# Patient Record
Sex: FEMALE | Race: WHITE | Employment: OTHER | URBAN - NONMETROPOLITAN AREA
[De-identification: names, ages, dates, MRNs, and addresses within clinical notes are randomized per-mention and may not be internally consistent; named-entity substitution may affect disease eponyms.]

---

## 2017-01-09 ENCOUNTER — OFFICE VISIT (OUTPATIENT)
Dept: PULMONOLOGY | Age: 77
End: 2017-01-09

## 2017-01-09 VITALS
BODY MASS INDEX: 22.81 KG/M2 | OXYGEN SATURATION: 97 % | DIASTOLIC BLOOD PRESSURE: 64 MMHG | HEIGHT: 69 IN | SYSTOLIC BLOOD PRESSURE: 104 MMHG | WEIGHT: 154 LBS | HEART RATE: 68 BPM

## 2017-01-09 DIAGNOSIS — I26.92 ACUTE SADDLE PULMONARY EMBOLISM WITHOUT ACUTE COR PULMONALE (HCC): Primary | ICD-10-CM

## 2017-01-09 PROCEDURE — 99213 OFFICE O/P EST LOW 20 MIN: CPT | Performed by: NURSE PRACTITIONER

## 2017-01-25 ENCOUNTER — TELEPHONE (OUTPATIENT)
Dept: FAMILY MEDICINE CLINIC | Age: 77
End: 2017-01-25

## 2017-02-06 ENCOUNTER — OFFICE VISIT (OUTPATIENT)
Dept: FAMILY MEDICINE CLINIC | Age: 77
End: 2017-02-06

## 2017-02-06 VITALS
HEIGHT: 69 IN | RESPIRATION RATE: 14 BRPM | TEMPERATURE: 98.2 F | SYSTOLIC BLOOD PRESSURE: 104 MMHG | DIASTOLIC BLOOD PRESSURE: 76 MMHG | BODY MASS INDEX: 22.22 KG/M2 | HEART RATE: 59 BPM | WEIGHT: 150 LBS

## 2017-02-06 DIAGNOSIS — R63.4 WEIGHT LOSS: Primary | ICD-10-CM

## 2017-02-06 DIAGNOSIS — I10 ESSENTIAL (PRIMARY) HYPERTENSION: Chronic | ICD-10-CM

## 2017-02-06 DIAGNOSIS — I26.99 BILATERAL PULMONARY EMBOLISM (HCC): ICD-10-CM

## 2017-02-06 DIAGNOSIS — S91.109A WOUND, OPEN, TOE, INITIAL ENCOUNTER: ICD-10-CM

## 2017-02-06 PROCEDURE — 1090F PRES/ABSN URINE INCON ASSESS: CPT | Performed by: FAMILY MEDICINE

## 2017-02-06 PROCEDURE — G8419 CALC BMI OUT NRM PARAM NOF/U: HCPCS | Performed by: FAMILY MEDICINE

## 2017-02-06 PROCEDURE — G8484 FLU IMMUNIZE NO ADMIN: HCPCS | Performed by: FAMILY MEDICINE

## 2017-02-06 PROCEDURE — 1036F TOBACCO NON-USER: CPT | Performed by: FAMILY MEDICINE

## 2017-02-06 PROCEDURE — 99204 OFFICE O/P NEW MOD 45 MIN: CPT | Performed by: FAMILY MEDICINE

## 2017-02-06 PROCEDURE — G8400 PT W/DXA NO RESULTS DOC: HCPCS | Performed by: FAMILY MEDICINE

## 2017-02-06 PROCEDURE — 1123F ACP DISCUSS/DSCN MKR DOCD: CPT | Performed by: FAMILY MEDICINE

## 2017-02-06 PROCEDURE — 4040F PNEUMOC VAC/ADMIN/RCVD: CPT | Performed by: FAMILY MEDICINE

## 2017-02-06 PROCEDURE — G8427 DOCREV CUR MEDS BY ELIG CLIN: HCPCS | Performed by: FAMILY MEDICINE

## 2017-02-06 RX ORDER — CEPHALEXIN 500 MG/1
1000 CAPSULE ORAL 4 TIMES DAILY
Qty: 40 CAPSULE | Refills: 0 | Status: SHIPPED | OUTPATIENT
Start: 2017-02-06 | End: 2017-03-30 | Stop reason: ALTCHOICE

## 2017-02-06 ASSESSMENT — ENCOUNTER SYMPTOMS
WHEEZING: 0
EYE REDNESS: 0
COUGH: 0
BLURRED VISION: 0
DOUBLE VISION: 0
BLOOD IN STOOL: 0
EYE PAIN: 0
BACK PAIN: 1
SHORTNESS OF BREATH: 1
SORE THROAT: 0
HEARTBURN: 0
DIARRHEA: 0
EYE DISCHARGE: 0
ORTHOPNEA: 0
HEMOPTYSIS: 0
NAUSEA: 0
VOMITING: 0
CONSTIPATION: 0

## 2017-02-06 ASSESSMENT — PATIENT HEALTH QUESTIONNAIRE - PHQ9
1. LITTLE INTEREST OR PLEASURE IN DOING THINGS: 0
SUM OF ALL RESPONSES TO PHQ QUESTIONS 1-9: 0
2. FEELING DOWN, DEPRESSED OR HOPELESS: 0
SUM OF ALL RESPONSES TO PHQ9 QUESTIONS 1 & 2: 0

## 2017-03-28 ENCOUNTER — NURSE TRIAGE (OUTPATIENT)
Dept: ADMINISTRATIVE | Age: 77
End: 2017-03-28

## 2017-03-30 ENCOUNTER — OFFICE VISIT (OUTPATIENT)
Dept: FAMILY MEDICINE CLINIC | Age: 77
End: 2017-03-30

## 2017-03-30 VITALS
WEIGHT: 150.6 LBS | DIASTOLIC BLOOD PRESSURE: 62 MMHG | TEMPERATURE: 97.9 F | SYSTOLIC BLOOD PRESSURE: 112 MMHG | HEIGHT: 68 IN | HEART RATE: 56 BPM | BODY MASS INDEX: 22.82 KG/M2

## 2017-03-30 DIAGNOSIS — I10 ESSENTIAL (PRIMARY) HYPERTENSION: Primary | Chronic | ICD-10-CM

## 2017-03-30 DIAGNOSIS — R42 DIZZINESS: ICD-10-CM

## 2017-03-30 DIAGNOSIS — E03.8 OTHER SPECIFIED HYPOTHYROIDISM: ICD-10-CM

## 2017-03-30 PROCEDURE — 4040F PNEUMOC VAC/ADMIN/RCVD: CPT | Performed by: FAMILY MEDICINE

## 2017-03-30 PROCEDURE — G8420 CALC BMI NORM PARAMETERS: HCPCS | Performed by: FAMILY MEDICINE

## 2017-03-30 PROCEDURE — 1036F TOBACCO NON-USER: CPT | Performed by: FAMILY MEDICINE

## 2017-03-30 PROCEDURE — 1090F PRES/ABSN URINE INCON ASSESS: CPT | Performed by: FAMILY MEDICINE

## 2017-03-30 PROCEDURE — G8400 PT W/DXA NO RESULTS DOC: HCPCS | Performed by: FAMILY MEDICINE

## 2017-03-30 PROCEDURE — 1123F ACP DISCUSS/DSCN MKR DOCD: CPT | Performed by: FAMILY MEDICINE

## 2017-03-30 PROCEDURE — 99213 OFFICE O/P EST LOW 20 MIN: CPT | Performed by: FAMILY MEDICINE

## 2017-03-30 PROCEDURE — G8484 FLU IMMUNIZE NO ADMIN: HCPCS | Performed by: FAMILY MEDICINE

## 2017-03-30 PROCEDURE — G8427 DOCREV CUR MEDS BY ELIG CLIN: HCPCS | Performed by: FAMILY MEDICINE

## 2017-03-30 RX ORDER — METOPROLOL SUCCINATE 25 MG/1
25 TABLET, EXTENDED RELEASE ORAL DAILY
Qty: 30 TABLET | Refills: 5 | Status: SHIPPED | OUTPATIENT
Start: 2017-03-30 | End: 2017-07-24

## 2017-04-06 ENCOUNTER — TELEPHONE (OUTPATIENT)
Dept: FAMILY MEDICINE CLINIC | Age: 77
End: 2017-04-06

## 2017-05-16 ENCOUNTER — TELEPHONE (OUTPATIENT)
Dept: FAMILY MEDICINE CLINIC | Age: 77
End: 2017-05-16

## 2017-05-18 ENCOUNTER — OFFICE VISIT (OUTPATIENT)
Dept: FAMILY MEDICINE CLINIC | Age: 77
End: 2017-05-18

## 2017-05-18 VITALS
TEMPERATURE: 98 F | HEIGHT: 67 IN | BODY MASS INDEX: 23.23 KG/M2 | SYSTOLIC BLOOD PRESSURE: 120 MMHG | WEIGHT: 148 LBS | DIASTOLIC BLOOD PRESSURE: 74 MMHG | RESPIRATION RATE: 14 BRPM | HEART RATE: 67 BPM

## 2017-05-18 DIAGNOSIS — M54.42 CHRONIC BILATERAL LOW BACK PAIN WITH BILATERAL SCIATICA: Primary | ICD-10-CM

## 2017-05-18 DIAGNOSIS — G89.29 CHRONIC BILATERAL LOW BACK PAIN WITH BILATERAL SCIATICA: Primary | ICD-10-CM

## 2017-05-18 DIAGNOSIS — M54.41 CHRONIC BILATERAL LOW BACK PAIN WITH BILATERAL SCIATICA: Primary | ICD-10-CM

## 2017-05-18 PROCEDURE — 1036F TOBACCO NON-USER: CPT | Performed by: FAMILY MEDICINE

## 2017-05-18 PROCEDURE — G8420 CALC BMI NORM PARAMETERS: HCPCS | Performed by: FAMILY MEDICINE

## 2017-05-18 PROCEDURE — 99213 OFFICE O/P EST LOW 20 MIN: CPT | Performed by: FAMILY MEDICINE

## 2017-05-18 PROCEDURE — G8427 DOCREV CUR MEDS BY ELIG CLIN: HCPCS | Performed by: FAMILY MEDICINE

## 2017-05-18 PROCEDURE — 4040F PNEUMOC VAC/ADMIN/RCVD: CPT | Performed by: FAMILY MEDICINE

## 2017-05-18 PROCEDURE — 1090F PRES/ABSN URINE INCON ASSESS: CPT | Performed by: FAMILY MEDICINE

## 2017-05-18 PROCEDURE — G8400 PT W/DXA NO RESULTS DOC: HCPCS | Performed by: FAMILY MEDICINE

## 2017-05-18 PROCEDURE — 1123F ACP DISCUSS/DSCN MKR DOCD: CPT | Performed by: FAMILY MEDICINE

## 2017-05-18 RX ORDER — HYDROCODONE BITARTRATE AND ACETAMINOPHEN 5; 325 MG/1; MG/1
1 TABLET ORAL EVERY 6 HOURS PRN
Qty: 30 TABLET | Refills: 0 | Status: SHIPPED | OUTPATIENT
Start: 2017-05-18 | End: 2017-06-15 | Stop reason: SDUPTHER

## 2017-05-22 RX ORDER — ENALAPRIL MALEATE 10 MG/1
10 TABLET ORAL DAILY
Qty: 90 TABLET | Refills: 3 | Status: ON HOLD | OUTPATIENT
Start: 2017-05-22 | End: 2017-09-16

## 2017-05-22 RX ORDER — GABAPENTIN 400 MG/1
400 CAPSULE ORAL 3 TIMES DAILY
Qty: 270 CAPSULE | Refills: 3 | Status: SHIPPED | OUTPATIENT
Start: 2017-05-22 | End: 2017-09-26 | Stop reason: SDUPTHER

## 2017-06-12 ENCOUNTER — TELEPHONE (OUTPATIENT)
Dept: FAMILY MEDICINE CLINIC | Age: 77
End: 2017-06-12

## 2017-06-15 DIAGNOSIS — M54.41 CHRONIC BILATERAL LOW BACK PAIN WITH BILATERAL SCIATICA: ICD-10-CM

## 2017-06-15 DIAGNOSIS — G89.29 CHRONIC BILATERAL LOW BACK PAIN WITH BILATERAL SCIATICA: ICD-10-CM

## 2017-06-15 DIAGNOSIS — M54.42 CHRONIC BILATERAL LOW BACK PAIN WITH BILATERAL SCIATICA: ICD-10-CM

## 2017-06-15 RX ORDER — HYDROCODONE BITARTRATE AND ACETAMINOPHEN 5; 325 MG/1; MG/1
1 TABLET ORAL EVERY 6 HOURS PRN
Qty: 30 TABLET | Refills: 0 | Status: SHIPPED | OUTPATIENT
Start: 2017-06-15 | End: 2017-08-08 | Stop reason: SDUPTHER

## 2017-06-28 ENCOUNTER — TELEPHONE (OUTPATIENT)
Dept: FAMILY MEDICINE CLINIC | Age: 77
End: 2017-06-28

## 2017-06-29 ENCOUNTER — OFFICE VISIT (OUTPATIENT)
Dept: FAMILY MEDICINE CLINIC | Age: 77
End: 2017-06-29

## 2017-06-29 VITALS
TEMPERATURE: 98.3 F | HEART RATE: 76 BPM | OXYGEN SATURATION: 97 % | BODY MASS INDEX: 23.07 KG/M2 | RESPIRATION RATE: 16 BRPM | WEIGHT: 147 LBS | SYSTOLIC BLOOD PRESSURE: 136 MMHG | HEIGHT: 67 IN | DIASTOLIC BLOOD PRESSURE: 74 MMHG

## 2017-06-29 DIAGNOSIS — R00.2 HEART PALPITATIONS: Primary | ICD-10-CM

## 2017-06-29 DIAGNOSIS — R42 LIGHTHEADEDNESS: ICD-10-CM

## 2017-06-29 PROCEDURE — 99214 OFFICE O/P EST MOD 30 MIN: CPT | Performed by: FAMILY MEDICINE

## 2017-06-29 PROCEDURE — 1036F TOBACCO NON-USER: CPT | Performed by: FAMILY MEDICINE

## 2017-06-29 PROCEDURE — 1090F PRES/ABSN URINE INCON ASSESS: CPT | Performed by: FAMILY MEDICINE

## 2017-06-29 PROCEDURE — G8420 CALC BMI NORM PARAMETERS: HCPCS | Performed by: FAMILY MEDICINE

## 2017-06-29 PROCEDURE — G8400 PT W/DXA NO RESULTS DOC: HCPCS | Performed by: FAMILY MEDICINE

## 2017-06-29 PROCEDURE — G8427 DOCREV CUR MEDS BY ELIG CLIN: HCPCS | Performed by: FAMILY MEDICINE

## 2017-06-29 PROCEDURE — 93000 ELECTROCARDIOGRAM COMPLETE: CPT | Performed by: FAMILY MEDICINE

## 2017-06-29 PROCEDURE — 1123F ACP DISCUSS/DSCN MKR DOCD: CPT | Performed by: FAMILY MEDICINE

## 2017-06-29 PROCEDURE — 4040F PNEUMOC VAC/ADMIN/RCVD: CPT | Performed by: FAMILY MEDICINE

## 2017-06-30 ENCOUNTER — TELEPHONE (OUTPATIENT)
Dept: FAMILY MEDICINE CLINIC | Age: 77
End: 2017-06-30

## 2017-07-05 ENCOUNTER — TELEPHONE (OUTPATIENT)
Dept: FAMILY MEDICINE CLINIC | Age: 77
End: 2017-07-05

## 2017-07-05 DIAGNOSIS — E87.1 HYPONATREMIA: Primary | ICD-10-CM

## 2017-07-06 ENCOUNTER — TELEPHONE (OUTPATIENT)
Dept: FAMILY MEDICINE CLINIC | Age: 77
End: 2017-07-06

## 2017-07-06 DIAGNOSIS — E87.1 HYPONATREMIA: Primary | ICD-10-CM

## 2017-07-06 DIAGNOSIS — D72.829 LEUKOCYTOSIS, UNSPECIFIED TYPE: Primary | ICD-10-CM

## 2017-07-10 ENCOUNTER — TELEPHONE (OUTPATIENT)
Dept: FAMILY MEDICINE CLINIC | Age: 77
End: 2017-07-10

## 2017-07-17 ENCOUNTER — TELEPHONE (OUTPATIENT)
Dept: FAMILY MEDICINE CLINIC | Age: 77
End: 2017-07-17

## 2017-07-24 ENCOUNTER — HOSPITAL ENCOUNTER (OUTPATIENT)
Dept: INFUSION THERAPY | Age: 77
Discharge: HOME | End: 2017-07-24
Payer: MEDICARE

## 2017-07-24 ENCOUNTER — OFFICE VISIT (OUTPATIENT)
Dept: ONCOLOGY | Age: 77
End: 2017-07-24
Payer: MEDICARE

## 2017-07-24 VITALS
DIASTOLIC BLOOD PRESSURE: 72 MMHG | BODY MASS INDEX: 23.23 KG/M2 | SYSTOLIC BLOOD PRESSURE: 112 MMHG | HEIGHT: 67 IN | RESPIRATION RATE: 18 BRPM | HEART RATE: 78 BPM | OXYGEN SATURATION: 97 % | WEIGHT: 148 LBS

## 2017-07-24 DIAGNOSIS — M79.7 FIBROMYALGIA: ICD-10-CM

## 2017-07-24 DIAGNOSIS — D70.9 NEUTROPENIA, UNSPECIFIED TYPE (HCC): Primary | ICD-10-CM

## 2017-07-24 PROCEDURE — 4040F PNEUMOC VAC/ADMIN/RCVD: CPT | Performed by: INTERNAL MEDICINE

## 2017-07-24 PROCEDURE — 1123F ACP DISCUSS/DSCN MKR DOCD: CPT | Performed by: INTERNAL MEDICINE

## 2017-07-24 PROCEDURE — G8420 CALC BMI NORM PARAMETERS: HCPCS | Performed by: INTERNAL MEDICINE

## 2017-07-24 PROCEDURE — 99203 OFFICE O/P NEW LOW 30 MIN: CPT | Performed by: INTERNAL MEDICINE

## 2017-07-24 PROCEDURE — 99211 OFF/OP EST MAY X REQ PHY/QHP: CPT

## 2017-07-24 PROCEDURE — G8400 PT W/DXA NO RESULTS DOC: HCPCS | Performed by: INTERNAL MEDICINE

## 2017-07-24 PROCEDURE — G8427 DOCREV CUR MEDS BY ELIG CLIN: HCPCS | Performed by: INTERNAL MEDICINE

## 2017-07-24 PROCEDURE — 1090F PRES/ABSN URINE INCON ASSESS: CPT | Performed by: INTERNAL MEDICINE

## 2017-07-24 PROCEDURE — 1036F TOBACCO NON-USER: CPT | Performed by: INTERNAL MEDICINE

## 2017-07-24 RX ORDER — GLUCOSAMINE/D3/BOSWELLIA SERRA 1500MG-400
1-2 TABLET ORAL DAILY
COMMUNITY
End: 2021-05-17

## 2017-07-24 RX ORDER — FLUTICASONE PROPIONATE 50 MCG
1 SPRAY, SUSPENSION (ML) NASAL PRN
COMMUNITY

## 2017-07-25 ENCOUNTER — OFFICE VISIT (OUTPATIENT)
Dept: FAMILY MEDICINE CLINIC | Age: 77
End: 2017-07-25
Payer: MEDICARE

## 2017-07-25 VITALS
TEMPERATURE: 98.2 F | RESPIRATION RATE: 12 BRPM | HEIGHT: 69 IN | DIASTOLIC BLOOD PRESSURE: 76 MMHG | SYSTOLIC BLOOD PRESSURE: 128 MMHG | WEIGHT: 148.2 LBS | BODY MASS INDEX: 21.95 KG/M2 | HEART RATE: 80 BPM

## 2017-07-25 DIAGNOSIS — M32.9 SLE (SYSTEMIC LUPUS ERYTHEMATOSUS) (HCC): ICD-10-CM

## 2017-07-25 DIAGNOSIS — I49.3 FREQUENT PVCS: Primary | ICD-10-CM

## 2017-07-25 PROCEDURE — G8400 PT W/DXA NO RESULTS DOC: HCPCS | Performed by: FAMILY MEDICINE

## 2017-07-25 PROCEDURE — G8420 CALC BMI NORM PARAMETERS: HCPCS | Performed by: FAMILY MEDICINE

## 2017-07-25 PROCEDURE — G8427 DOCREV CUR MEDS BY ELIG CLIN: HCPCS | Performed by: FAMILY MEDICINE

## 2017-07-25 PROCEDURE — 1090F PRES/ABSN URINE INCON ASSESS: CPT | Performed by: FAMILY MEDICINE

## 2017-07-25 PROCEDURE — 1123F ACP DISCUSS/DSCN MKR DOCD: CPT | Performed by: FAMILY MEDICINE

## 2017-07-25 PROCEDURE — 99213 OFFICE O/P EST LOW 20 MIN: CPT | Performed by: FAMILY MEDICINE

## 2017-07-25 PROCEDURE — 1036F TOBACCO NON-USER: CPT | Performed by: FAMILY MEDICINE

## 2017-07-25 PROCEDURE — 4040F PNEUMOC VAC/ADMIN/RCVD: CPT | Performed by: FAMILY MEDICINE

## 2017-07-25 RX ORDER — METOPROLOL SUCCINATE 25 MG/1
25 TABLET, EXTENDED RELEASE ORAL DAILY
Qty: 30 TABLET | Refills: 3 | Status: SHIPPED | OUTPATIENT
Start: 2017-07-25 | End: 2017-08-29 | Stop reason: SDUPTHER

## 2017-08-08 ENCOUNTER — HOSPITAL ENCOUNTER (OUTPATIENT)
Age: 77
Discharge: HOME | End: 2017-08-08
Payer: MEDICARE

## 2017-08-08 DIAGNOSIS — M54.41 CHRONIC BILATERAL LOW BACK PAIN WITH BILATERAL SCIATICA: ICD-10-CM

## 2017-08-08 DIAGNOSIS — M54.42 CHRONIC BILATERAL LOW BACK PAIN WITH BILATERAL SCIATICA: ICD-10-CM

## 2017-08-08 DIAGNOSIS — G89.29 CHRONIC BILATERAL LOW BACK PAIN WITH BILATERAL SCIATICA: ICD-10-CM

## 2017-08-08 DIAGNOSIS — M32.9 SLE (SYSTEMIC LUPUS ERYTHEMATOSUS) (HCC): ICD-10-CM

## 2017-08-08 LAB — SEDIMENTATION RATE, ERYTHROCYTE: 8 MM/HR (ref 0–20)

## 2017-08-08 PROCEDURE — 36415 COLL VENOUS BLD VENIPUNCTURE: CPT

## 2017-08-08 PROCEDURE — 86038 ANTINUCLEAR ANTIBODIES: CPT

## 2017-08-08 PROCEDURE — 86225 DNA ANTIBODY NATIVE: CPT

## 2017-08-08 PROCEDURE — 85651 RBC SED RATE NONAUTOMATED: CPT

## 2017-08-08 RX ORDER — HYDROCODONE BITARTRATE AND ACETAMINOPHEN 5; 325 MG/1; MG/1
1 TABLET ORAL EVERY 6 HOURS PRN
Qty: 30 TABLET | Refills: 0 | Status: SHIPPED | OUTPATIENT
Start: 2017-08-08 | End: 2017-09-27

## 2017-08-11 ENCOUNTER — TELEPHONE (OUTPATIENT)
Dept: FAMILY MEDICINE CLINIC | Age: 77
End: 2017-08-11

## 2017-08-11 LAB
ANA SCREEN: NORMAL
DSDNA ANTIBODY: NORMAL

## 2017-08-15 ASSESSMENT — ENCOUNTER SYMPTOMS
CHEST TIGHTNESS: 0
ABDOMINAL DISTENTION: 0
TROUBLE SWALLOWING: 1
BLOOD IN STOOL: 0
CONSTIPATION: 0
NAUSEA: 1
COLOR CHANGE: 0
WHEEZING: 0
COUGH: 0
BACK PAIN: 1
EYE DISCHARGE: 0
FACIAL SWELLING: 0
SORE THROAT: 0
RECTAL PAIN: 0
DIARRHEA: 0
VOMITING: 0
ABDOMINAL PAIN: 0
SHORTNESS OF BREATH: 1

## 2017-08-25 ENCOUNTER — OFFICE VISIT (OUTPATIENT)
Dept: CARDIOLOGY CLINIC | Age: 77
End: 2017-08-25
Payer: MEDICARE

## 2017-08-25 VITALS — DIASTOLIC BLOOD PRESSURE: 75 MMHG | HEART RATE: 75 BPM | SYSTOLIC BLOOD PRESSURE: 123 MMHG

## 2017-08-25 DIAGNOSIS — I49.3 PVC (PREMATURE VENTRICULAR CONTRACTION): ICD-10-CM

## 2017-08-25 DIAGNOSIS — I10 ESSENTIAL HYPERTENSION: Primary | ICD-10-CM

## 2017-08-25 DIAGNOSIS — R42 DIZZINESS: ICD-10-CM

## 2017-08-25 DIAGNOSIS — R00.2 PALPITATION: ICD-10-CM

## 2017-08-25 PROCEDURE — 1036F TOBACCO NON-USER: CPT | Performed by: NUCLEAR MEDICINE

## 2017-08-25 PROCEDURE — 1123F ACP DISCUSS/DSCN MKR DOCD: CPT | Performed by: NUCLEAR MEDICINE

## 2017-08-25 PROCEDURE — 1090F PRES/ABSN URINE INCON ASSESS: CPT | Performed by: NUCLEAR MEDICINE

## 2017-08-25 PROCEDURE — G8400 PT W/DXA NO RESULTS DOC: HCPCS | Performed by: NUCLEAR MEDICINE

## 2017-08-25 PROCEDURE — G8420 CALC BMI NORM PARAMETERS: HCPCS | Performed by: NUCLEAR MEDICINE

## 2017-08-25 PROCEDURE — 4040F PNEUMOC VAC/ADMIN/RCVD: CPT | Performed by: NUCLEAR MEDICINE

## 2017-08-25 PROCEDURE — G8427 DOCREV CUR MEDS BY ELIG CLIN: HCPCS | Performed by: NUCLEAR MEDICINE

## 2017-08-25 PROCEDURE — 99204 OFFICE O/P NEW MOD 45 MIN: CPT | Performed by: NUCLEAR MEDICINE

## 2017-08-25 ASSESSMENT — ENCOUNTER SYMPTOMS
RECTAL PAIN: 0
SHORTNESS OF BREATH: 1
ANAL BLEEDING: 0
ABDOMINAL DISTENTION: 0
NAUSEA: 0
VOMITING: 0
BLOOD IN STOOL: 0
DIARRHEA: 0
CHEST TIGHTNESS: 1
BACK PAIN: 1
CONSTIPATION: 0
PHOTOPHOBIA: 0
COLOR CHANGE: 0
ABDOMINAL PAIN: 0

## 2017-08-29 DIAGNOSIS — I49.3 FREQUENT PVCS: ICD-10-CM

## 2017-08-29 RX ORDER — METOPROLOL SUCCINATE 25 MG/1
25 TABLET, EXTENDED RELEASE ORAL DAILY
Qty: 30 TABLET | Refills: 5 | Status: SHIPPED | OUTPATIENT
Start: 2017-08-29 | End: 2017-09-07 | Stop reason: SDUPTHER

## 2017-08-29 RX ORDER — LEVOTHYROXINE SODIUM 0.15 MG/1
150 TABLET ORAL DAILY
Qty: 30 TABLET | Refills: 5 | Status: SHIPPED | OUTPATIENT
Start: 2017-08-29 | End: 2017-09-07 | Stop reason: SDUPTHER

## 2017-09-07 ENCOUNTER — TELEPHONE (OUTPATIENT)
Dept: FAMILY MEDICINE CLINIC | Age: 77
End: 2017-09-07

## 2017-09-07 DIAGNOSIS — I49.3 FREQUENT PVCS: ICD-10-CM

## 2017-09-07 RX ORDER — METOPROLOL SUCCINATE 25 MG/1
25 TABLET, EXTENDED RELEASE ORAL DAILY
Qty: 90 TABLET | Refills: 3 | Status: SHIPPED | OUTPATIENT
Start: 2017-09-07 | End: 2018-09-27 | Stop reason: SDUPTHER

## 2017-09-07 RX ORDER — LEVOTHYROXINE SODIUM 0.15 MG/1
150 TABLET ORAL DAILY
Qty: 90 TABLET | Refills: 3 | Status: SHIPPED | OUTPATIENT
Start: 2017-09-07 | End: 2018-09-04 | Stop reason: SDUPTHER

## 2017-09-08 ENCOUNTER — OFFICE VISIT (OUTPATIENT)
Dept: FAMILY MEDICINE CLINIC | Age: 77
End: 2017-09-08
Payer: MEDICARE

## 2017-09-08 ENCOUNTER — HOSPITAL ENCOUNTER (OUTPATIENT)
Age: 77
Discharge: HOME | End: 2017-09-08
Payer: MEDICARE

## 2017-09-08 ENCOUNTER — HOSPITAL ENCOUNTER (OUTPATIENT)
Dept: NON INVASIVE DIAGNOSTICS | Age: 77
Discharge: HOME | End: 2017-09-08
Payer: MEDICARE

## 2017-09-08 VITALS
WEIGHT: 151 LBS | BODY MASS INDEX: 22.88 KG/M2 | SYSTOLIC BLOOD PRESSURE: 118 MMHG | HEART RATE: 60 BPM | HEIGHT: 68 IN | TEMPERATURE: 98.5 F | DIASTOLIC BLOOD PRESSURE: 60 MMHG | RESPIRATION RATE: 10 BRPM

## 2017-09-08 DIAGNOSIS — K62.5 PRB (RECTAL BLEEDING): ICD-10-CM

## 2017-09-08 DIAGNOSIS — I10 ESSENTIAL HYPERTENSION: ICD-10-CM

## 2017-09-08 DIAGNOSIS — K62.5 PRB (RECTAL BLEEDING): Primary | ICD-10-CM

## 2017-09-08 DIAGNOSIS — I49.3 PVC (PREMATURE VENTRICULAR CONTRACTION): ICD-10-CM

## 2017-09-08 DIAGNOSIS — R42 DIZZINESS: ICD-10-CM

## 2017-09-08 LAB
BASOPHILS # BLD: 2.1 %
BASOPHILS ABSOLUTE: 0.1 THOU/MM3 (ref 0–0.1)
EOSINOPHIL # BLD: 5.5 %
EOSINOPHILS ABSOLUTE: 0.2 THOU/MM3 (ref 0–0.4)
HCT VFR BLD CALC: 37.2 % (ref 37–47)
HEMOGLOBIN: 12.6 GM/DL (ref 12–16)
LYMPHOCYTES # BLD: 30.4 %
LYMPHOCYTES ABSOLUTE: 1.2 THOU/MM3 (ref 1–4.8)
MCH RBC QN AUTO: 31 PG (ref 27–31)
MCHC RBC AUTO-ENTMCNC: 34 GM/DL (ref 33–37)
MCV RBC AUTO: 91.2 FL (ref 81–99)
MONOCYTES # BLD: 6.9 %
MONOCYTES ABSOLUTE: 0.3 THOU/MM3 (ref 0.4–1.3)
NUCLEATED RED BLOOD CELLS: 0 /100 WBC
PDW BLD-RTO: 14.2 % (ref 11.5–14.5)
PLATELET # BLD: 274 THOU/MM3 (ref 130–400)
PMV BLD AUTO: 8.1 MCM (ref 7.4–10.4)
RBC # BLD: 4.07 MILL/MM3 (ref 4.2–5.4)
RBC # BLD: NORMAL 10*6/UL
SEG NEUTROPHILS: 55.1 %
SEGMENTED NEUTROPHILS ABSOLUTE COUNT: 2.3 THOU/MM3 (ref 1.8–7.7)
WBC # BLD: 4.1 THOU/MM3 (ref 4.8–10.8)

## 2017-09-08 PROCEDURE — 1090F PRES/ABSN URINE INCON ASSESS: CPT | Performed by: FAMILY MEDICINE

## 2017-09-08 PROCEDURE — G8427 DOCREV CUR MEDS BY ELIG CLIN: HCPCS | Performed by: FAMILY MEDICINE

## 2017-09-08 PROCEDURE — 99213 OFFICE O/P EST LOW 20 MIN: CPT | Performed by: FAMILY MEDICINE

## 2017-09-08 PROCEDURE — 4040F PNEUMOC VAC/ADMIN/RCVD: CPT | Performed by: FAMILY MEDICINE

## 2017-09-08 PROCEDURE — 93660 TILT TABLE EVALUATION: CPT | Performed by: NUCLEAR MEDICINE

## 2017-09-08 PROCEDURE — 36415 COLL VENOUS BLD VENIPUNCTURE: CPT

## 2017-09-08 PROCEDURE — G8420 CALC BMI NORM PARAMETERS: HCPCS | Performed by: FAMILY MEDICINE

## 2017-09-08 PROCEDURE — 1123F ACP DISCUSS/DSCN MKR DOCD: CPT | Performed by: FAMILY MEDICINE

## 2017-09-08 PROCEDURE — G8400 PT W/DXA NO RESULTS DOC: HCPCS | Performed by: FAMILY MEDICINE

## 2017-09-08 PROCEDURE — 1036F TOBACCO NON-USER: CPT | Performed by: FAMILY MEDICINE

## 2017-09-08 PROCEDURE — 85025 COMPLETE CBC W/AUTO DIFF WBC: CPT

## 2017-09-09 ENCOUNTER — NURSE TRIAGE (OUTPATIENT)
Dept: ADMINISTRATIVE | Age: 77
End: 2017-09-09

## 2017-09-11 ENCOUNTER — TELEPHONE (OUTPATIENT)
Dept: FAMILY MEDICINE CLINIC | Age: 77
End: 2017-09-11

## 2017-09-13 ENCOUNTER — APPOINTMENT (OUTPATIENT)
Dept: CT IMAGING | Age: 77
DRG: 378 | End: 2017-09-13
Payer: MEDICARE

## 2017-09-13 ENCOUNTER — HOSPITAL ENCOUNTER (INPATIENT)
Age: 77
LOS: 2 days | Discharge: HOME | DRG: 378 | End: 2017-09-16
Attending: FAMILY MEDICINE | Admitting: INTERNAL MEDICINE
Payer: MEDICARE

## 2017-09-13 DIAGNOSIS — K62.5 RECTAL BLEEDING: Primary | ICD-10-CM

## 2017-09-13 LAB
ALBUMIN SERPL-MCNC: 3.7 G/DL (ref 3.5–5.1)
ALP BLD-CCNC: 56 U/L (ref 38–126)
ALT SERPL-CCNC: 15 U/L (ref 11–66)
ANION GAP SERPL CALCULATED.3IONS-SCNC: 11 MEQ/L (ref 8–16)
AST SERPL-CCNC: 26 U/L (ref 5–40)
BASOPHILS # BLD: 2.7 %
BASOPHILS ABSOLUTE: 0.1 THOU/MM3 (ref 0–0.1)
BILIRUB SERPL-MCNC: 0.2 MG/DL (ref 0.3–1.2)
BILIRUBIN DIRECT: < 0.2 MG/DL (ref 0–0.3)
BUN BLDV-MCNC: 17 MG/DL (ref 7–22)
CALCIUM SERPL-MCNC: 9.2 MG/DL (ref 8.5–10.5)
CHLORIDE BLD-SCNC: 94 MEQ/L (ref 98–111)
CO2: 28 MEQ/L (ref 23–33)
CREAT SERPL-MCNC: 0.8 MG/DL (ref 0.4–1.2)
EOSINOPHIL # BLD: 6.2 %
EOSINOPHILS ABSOLUTE: 0.2 THOU/MM3 (ref 0–0.4)
GFR SERPL CREATININE-BSD FRML MDRD: 69 ML/MIN/1.73M2
GLUCOSE BLD-MCNC: 76 MG/DL (ref 70–108)
HCT VFR BLD CALC: 37.4 % (ref 37–47)
HEMOCCULT STL QL: POSITIVE
HEMOGLOBIN: 12.2 GM/DL (ref 12–16)
INR BLD: 0.84 (ref 0.85–1.13)
LIPASE: 29.7 U/L (ref 5.6–51.3)
LYMPHOCYTES # BLD: 29.5 %
LYMPHOCYTES ABSOLUTE: 0.9 THOU/MM3 (ref 1–4.8)
MCH RBC QN AUTO: 30.2 PG (ref 27–31)
MCHC RBC AUTO-ENTMCNC: 32.7 GM/DL (ref 33–37)
MCV RBC AUTO: 92.2 FL (ref 81–99)
MONOCYTES # BLD: 9.8 %
MONOCYTES ABSOLUTE: 0.3 THOU/MM3 (ref 0.4–1.3)
NUCLEATED RED BLOOD CELLS: 0 /100 WBC
OSMOLALITY CALCULATION: 266.7 MOSMOL/KG (ref 275–300)
PDW BLD-RTO: 13.8 % (ref 11.5–14.5)
PLATELET # BLD: 245 THOU/MM3 (ref 130–400)
PMV BLD AUTO: 8.1 MCM (ref 7.4–10.4)
POTASSIUM SERPL-SCNC: 3.9 MEQ/L (ref 3.5–5.2)
RBC # BLD: 4.05 MILL/MM3 (ref 4.2–5.4)
RBC # BLD: NORMAL 10*6/UL
SEG NEUTROPHILS: 51.8 %
SEGMENTED NEUTROPHILS ABSOLUTE COUNT: 1.7 THOU/MM3 (ref 1.8–7.7)
SODIUM BLD-SCNC: 133 MEQ/L (ref 135–145)
TOTAL PROTEIN: 6.4 G/DL (ref 6.1–8)
WBC # BLD: 3.2 THOU/MM3 (ref 4.8–10.8)

## 2017-09-13 PROCEDURE — 85025 COMPLETE CBC W/AUTO DIFF WBC: CPT

## 2017-09-13 PROCEDURE — 82248 BILIRUBIN DIRECT: CPT

## 2017-09-13 PROCEDURE — 85610 PROTHROMBIN TIME: CPT

## 2017-09-13 PROCEDURE — 80053 COMPREHEN METABOLIC PANEL: CPT

## 2017-09-13 PROCEDURE — 83690 ASSAY OF LIPASE: CPT

## 2017-09-13 PROCEDURE — 99284 EMERGENCY DEPT VISIT MOD MDM: CPT

## 2017-09-13 PROCEDURE — 83540 ASSAY OF IRON: CPT

## 2017-09-13 PROCEDURE — 74177 CT ABD & PELVIS W/CONTRAST: CPT

## 2017-09-13 PROCEDURE — 96361 HYDRATE IV INFUSION ADD-ON: CPT

## 2017-09-13 PROCEDURE — 2580000003 HC RX 258: Performed by: FAMILY MEDICINE

## 2017-09-13 PROCEDURE — 82272 OCCULT BLD FECES 1-3 TESTS: CPT

## 2017-09-13 PROCEDURE — 6360000004 HC RX CONTRAST MEDICATION: Performed by: FAMILY MEDICINE

## 2017-09-13 PROCEDURE — 36415 COLL VENOUS BLD VENIPUNCTURE: CPT

## 2017-09-13 RX ORDER — 0.9 % SODIUM CHLORIDE 0.9 %
1000 INTRAVENOUS SOLUTION INTRAVENOUS ONCE
Status: COMPLETED | OUTPATIENT
Start: 2017-09-13 | End: 2017-09-14

## 2017-09-13 RX ADMIN — IOPAMIDOL 80 ML: 755 INJECTION, SOLUTION INTRAVENOUS at 23:50

## 2017-09-13 RX ADMIN — SODIUM CHLORIDE 1000 ML: 9 INJECTION, SOLUTION INTRAVENOUS at 23:14

## 2017-09-13 ASSESSMENT — ENCOUNTER SYMPTOMS
EYE PAIN: 0
WHEEZING: 0
RHINORRHEA: 0
COUGH: 0
SHORTNESS OF BREATH: 0
ABDOMINAL PAIN: 1
ANAL BLEEDING: 1
NAUSEA: 0
DIARRHEA: 0
BACK PAIN: 0
ABDOMINAL DISTENTION: 1
VOMITING: 0
BLOOD IN STOOL: 1
EYE DISCHARGE: 0
SORE THROAT: 0

## 2017-09-13 ASSESSMENT — PAIN DESCRIPTION - ORIENTATION: ORIENTATION: LOWER

## 2017-09-13 ASSESSMENT — PAIN DESCRIPTION - LOCATION: LOCATION: BACK

## 2017-09-13 ASSESSMENT — PAIN SCALES - GENERAL: PAINLEVEL_OUTOF10: 4

## 2017-09-13 ASSESSMENT — PAIN DESCRIPTION - DESCRIPTORS: DESCRIPTORS: SORE

## 2017-09-13 ASSESSMENT — PAIN DESCRIPTION - PAIN TYPE: TYPE: CHRONIC PAIN

## 2017-09-14 ENCOUNTER — APPOINTMENT (OUTPATIENT)
Dept: NUCLEAR MEDICINE | Age: 77
DRG: 378 | End: 2017-09-14
Payer: MEDICARE

## 2017-09-14 PROBLEM — T39.395A GI BLEED DUE TO NSAIDS: Status: ACTIVE | Noted: 2017-09-14

## 2017-09-14 PROBLEM — K62.5 BRBPR (BRIGHT RED BLOOD PER RECTUM): Status: ACTIVE | Noted: 2017-09-14

## 2017-09-14 PROBLEM — K92.2 GI BLEED DUE TO NSAIDS: Status: ACTIVE | Noted: 2017-09-14

## 2017-09-14 LAB
BACTERIA: ABNORMAL /HPF
BILIRUBIN URINE: NEGATIVE
BLOOD, URINE: ABNORMAL
CASTS 2: ABNORMAL /LPF
CASTS UA: ABNORMAL /LPF
CHARACTER, URINE: ABNORMAL
COLOR: YELLOW
CRYSTALS, UA: ABNORMAL
EPITHELIAL CELLS, UA: ABNORMAL /HPF
GLUCOSE URINE: NEGATIVE MG/DL
HCT VFR BLD CALC: 34 % (ref 37–47)
HEMOGLOBIN: 11.5 GM/DL (ref 12–16)
IRON: NORMAL UG/DL (ref 50–170)
KETONES, URINE: NEGATIVE
LEUKOCYTE ESTERASE, URINE: NEGATIVE
MISCELLANEOUS 2: ABNORMAL
NITRITE, URINE: NEGATIVE
ORIGINAL SAMPLE NUMBER: NORMAL
PH UA: 7
PROTEIN UA: NEGATIVE
RBC URINE: ABNORMAL /HPF
REFERENCE LOCATION: NORMAL
REFERENCE RANGE: NORMAL
RENAL EPITHELIAL, UA: ABNORMAL
SPECIFIC GRAVITY, URINE: 1.02 (ref 1–1.03)
TEST RESULTS WITH UNITS: NORMAL
TEST(S) BEING PERFORMED: NORMAL
UROBILINOGEN, URINE: 0.2 EU/DL
WBC UA: ABNORMAL /HPF
YEAST: ABNORMAL

## 2017-09-14 PROCEDURE — 85014 HEMATOCRIT: CPT

## 2017-09-14 PROCEDURE — 78278 ACUTE GI BLOOD LOSS IMAGING: CPT

## 2017-09-14 PROCEDURE — 1210000002 HC MED SURG R&B - NEUROSCIENCE

## 2017-09-14 PROCEDURE — 85018 HEMOGLOBIN: CPT

## 2017-09-14 PROCEDURE — C9113 INJ PANTOPRAZOLE SODIUM, VIA: HCPCS | Performed by: FAMILY MEDICINE

## 2017-09-14 PROCEDURE — 6370000000 HC RX 637 (ALT 250 FOR IP): Performed by: INTERNAL MEDICINE

## 2017-09-14 PROCEDURE — A9560 TC99M LABELED RBC: HCPCS | Performed by: NURSE PRACTITIONER

## 2017-09-14 PROCEDURE — 2580000003 HC RX 258: Performed by: FAMILY MEDICINE

## 2017-09-14 PROCEDURE — 96365 THER/PROPH/DIAG IV INF INIT: CPT

## 2017-09-14 PROCEDURE — 6370000000 HC RX 637 (ALT 250 FOR IP): Performed by: NURSE PRACTITIONER

## 2017-09-14 PROCEDURE — 36415 COLL VENOUS BLD VENIPUNCTURE: CPT

## 2017-09-14 PROCEDURE — 81001 URINALYSIS AUTO W/SCOPE: CPT

## 2017-09-14 PROCEDURE — 2580000003 HC RX 258: Performed by: INTERNAL MEDICINE

## 2017-09-14 PROCEDURE — 6360000002 HC RX W HCPCS: Performed by: FAMILY MEDICINE

## 2017-09-14 PROCEDURE — 3430000000 HC RX DIAGNOSTIC RADIOPHARMACEUTICAL: Performed by: NURSE PRACTITIONER

## 2017-09-14 PROCEDURE — 99222 1ST HOSP IP/OBS MODERATE 55: CPT | Performed by: INTERNAL MEDICINE

## 2017-09-14 RX ORDER — ACETAMINOPHEN 325 MG/1
650 TABLET ORAL EVERY 6 HOURS PRN
Status: DISCONTINUED | OUTPATIENT
Start: 2017-09-14 | End: 2017-09-16 | Stop reason: HOSPADM

## 2017-09-14 RX ORDER — ONDANSETRON 2 MG/ML
4 INJECTION INTRAMUSCULAR; INTRAVENOUS EVERY 6 HOURS PRN
Status: DISCONTINUED | OUTPATIENT
Start: 2017-09-14 | End: 2017-09-16 | Stop reason: HOSPADM

## 2017-09-14 RX ORDER — GABAPENTIN 400 MG/1
400 CAPSULE ORAL 3 TIMES DAILY
Status: DISCONTINUED | OUTPATIENT
Start: 2017-09-14 | End: 2017-09-16 | Stop reason: HOSPADM

## 2017-09-14 RX ORDER — SENNA PLUS 8.6 MG/1
15 TABLET ORAL ONCE
Status: COMPLETED | OUTPATIENT
Start: 2017-09-14 | End: 2017-09-14

## 2017-09-14 RX ORDER — ENALAPRIL MALEATE 10 MG/1
10 TABLET ORAL DAILY
Status: DISCONTINUED | OUTPATIENT
Start: 2017-09-14 | End: 2017-09-16 | Stop reason: HOSPADM

## 2017-09-14 RX ORDER — SODIUM CHLORIDE 0.9 % (FLUSH) 0.9 %
10 SYRINGE (ML) INJECTION PRN
Status: DISCONTINUED | OUTPATIENT
Start: 2017-09-14 | End: 2017-09-16 | Stop reason: HOSPADM

## 2017-09-14 RX ORDER — SODIUM CHLORIDE 0.9 % (FLUSH) 0.9 %
10 SYRINGE (ML) INJECTION EVERY 12 HOURS SCHEDULED
Status: DISCONTINUED | OUTPATIENT
Start: 2017-09-14 | End: 2017-09-16 | Stop reason: HOSPADM

## 2017-09-14 RX ORDER — POLYETHYLENE GLYCOL 3350 17 G/17G
238 POWDER, FOR SOLUTION ORAL ONCE
Status: COMPLETED | OUTPATIENT
Start: 2017-09-14 | End: 2017-09-14

## 2017-09-14 RX ORDER — M-VIT,TX,IRON,MINS/CALC/FOLIC 27MG-0.4MG
1 TABLET ORAL NIGHTLY
Status: DISCONTINUED | OUTPATIENT
Start: 2017-09-14 | End: 2017-09-16 | Stop reason: HOSPADM

## 2017-09-14 RX ORDER — LEVOTHYROXINE SODIUM 0.15 MG/1
150 TABLET ORAL DAILY
Status: DISCONTINUED | OUTPATIENT
Start: 2017-09-14 | End: 2017-09-16 | Stop reason: HOSPADM

## 2017-09-14 RX ORDER — FLUTICASONE PROPIONATE 50 MCG
1 SPRAY, SUSPENSION (ML) NASAL DAILY PRN
Status: DISCONTINUED | OUTPATIENT
Start: 2017-09-14 | End: 2017-09-16 | Stop reason: HOSPADM

## 2017-09-14 RX ORDER — METOPROLOL SUCCINATE 25 MG/1
25 TABLET, EXTENDED RELEASE ORAL DAILY
Status: DISCONTINUED | OUTPATIENT
Start: 2017-09-14 | End: 2017-09-16 | Stop reason: HOSPADM

## 2017-09-14 RX ORDER — PANTOPRAZOLE SODIUM 40 MG/1
40 TABLET, DELAYED RELEASE ORAL
Status: DISCONTINUED | OUTPATIENT
Start: 2017-09-15 | End: 2017-09-16 | Stop reason: HOSPADM

## 2017-09-14 RX ADMIN — SENNA 129 MG: 8.6 TABLET, COATED ORAL at 22:08

## 2017-09-14 RX ADMIN — METOPROLOL SUCCINATE 25 MG: 25 TABLET, FILM COATED, EXTENDED RELEASE ORAL at 10:33

## 2017-09-14 RX ADMIN — Medication 10 ML: at 22:07

## 2017-09-14 RX ADMIN — POLYETHYLENE GLYCOL 3350 238 G: 17 POWDER, FOR SOLUTION ORAL at 22:07

## 2017-09-14 RX ADMIN — ACETAMINOPHEN 650 MG: 325 TABLET ORAL at 13:57

## 2017-09-14 RX ADMIN — GABAPENTIN 400 MG: 400 CAPSULE ORAL at 22:07

## 2017-09-14 RX ADMIN — GABAPENTIN 400 MG: 400 CAPSULE ORAL at 10:33

## 2017-09-14 RX ADMIN — SODIUM CHLORIDE 8 MG/HR: 9 INJECTION, SOLUTION INTRAVENOUS at 00:21

## 2017-09-14 RX ADMIN — SODIUM CHLORIDE 8 MG/HR: 9 INJECTION, SOLUTION INTRAVENOUS at 10:42

## 2017-09-14 RX ADMIN — Medication 27.1 MILLICURIE: at 17:42

## 2017-09-14 RX ADMIN — ENALAPRIL MALEATE 5 MG: 10 TABLET ORAL at 10:33

## 2017-09-14 RX ADMIN — MULTIPLE VITAMINS W/ MINERALS TAB 1 TABLET: TAB at 22:07

## 2017-09-14 RX ADMIN — LEVOTHYROXINE SODIUM 150 MCG: 150 TABLET ORAL at 06:52

## 2017-09-14 RX ADMIN — SODIUM CHLORIDE 80 MG: 9 INJECTION, SOLUTION INTRAVENOUS at 01:09

## 2017-09-14 RX ADMIN — SODIUM CHLORIDE 8 MG/HR: 9 INJECTION, SOLUTION INTRAVENOUS at 04:50

## 2017-09-14 RX ADMIN — GABAPENTIN 400 MG: 400 CAPSULE ORAL at 13:57

## 2017-09-14 ASSESSMENT — PAIN SCALES - GENERAL
PAINLEVEL_OUTOF10: 0
PAINLEVEL_OUTOF10: 6
PAINLEVEL_OUTOF10: 0
PAINLEVEL_OUTOF10: 5
PAINLEVEL_OUTOF10: 0
PAINLEVEL_OUTOF10: 5
PAINLEVEL_OUTOF10: 0

## 2017-09-14 ASSESSMENT — PAIN DESCRIPTION - PAIN TYPE
TYPE: CHRONIC PAIN

## 2017-09-14 ASSESSMENT — PAIN DESCRIPTION - LOCATION
LOCATION: BACK

## 2017-09-14 ASSESSMENT — PAIN DESCRIPTION - FREQUENCY
FREQUENCY: INTERMITTENT
FREQUENCY: INTERMITTENT

## 2017-09-14 ASSESSMENT — PAIN DESCRIPTION - ORIENTATION
ORIENTATION: LOWER

## 2017-09-14 ASSESSMENT — PAIN DESCRIPTION - DESCRIPTORS
DESCRIPTORS: ACHING
DESCRIPTORS: ACHING
DESCRIPTORS: SORE

## 2017-09-15 LAB
ANION GAP SERPL CALCULATED.3IONS-SCNC: 14 MEQ/L (ref 8–16)
BUN BLDV-MCNC: 11 MG/DL (ref 7–22)
CALCIUM SERPL-MCNC: 8.9 MG/DL (ref 8.5–10.5)
CHLORIDE BLD-SCNC: 95 MEQ/L (ref 98–111)
CO2: 26 MEQ/L (ref 23–33)
CREAT SERPL-MCNC: 0.6 MG/DL (ref 0.4–1.2)
GFR SERPL CREATININE-BSD FRML MDRD: > 90 ML/MIN/1.73M2
GLUCOSE BLD-MCNC: 103 MG/DL (ref 70–108)
HCT VFR BLD CALC: 36.4 % (ref 37–47)
HEMOGLOBIN: 12.3 GM/DL (ref 12–16)
MCH RBC QN AUTO: 31.2 PG (ref 27–31)
MCHC RBC AUTO-ENTMCNC: 33.8 GM/DL (ref 33–37)
MCV RBC AUTO: 92.4 FL (ref 81–99)
PDW BLD-RTO: 13.7 % (ref 11.5–14.5)
PLATELET # BLD: 236 THOU/MM3 (ref 130–400)
PMV BLD AUTO: 7.8 MCM (ref 7.4–10.4)
POTASSIUM SERPL-SCNC: 4.3 MEQ/L (ref 3.5–5.2)
RBC # BLD: 3.94 MILL/MM3 (ref 4.2–5.4)
SODIUM BLD-SCNC: 135 MEQ/L (ref 135–145)
WBC # BLD: 3.9 THOU/MM3 (ref 4.8–10.8)

## 2017-09-15 PROCEDURE — 7100000001 HC PACU RECOVERY - ADDTL 15 MIN: Performed by: INTERNAL MEDICINE

## 2017-09-15 PROCEDURE — 99152 MOD SED SAME PHYS/QHP 5/>YRS: CPT | Performed by: INTERNAL MEDICINE

## 2017-09-15 PROCEDURE — A6210 FOAM DRG >16<=48 SQ IN W/O B: HCPCS

## 2017-09-15 PROCEDURE — 6370000000 HC RX 637 (ALT 250 FOR IP): Performed by: NURSE PRACTITIONER

## 2017-09-15 PROCEDURE — 99153 MOD SED SAME PHYS/QHP EA: CPT | Performed by: INTERNAL MEDICINE

## 2017-09-15 PROCEDURE — 6370000000 HC RX 637 (ALT 250 FOR IP): Performed by: INTERNAL MEDICINE

## 2017-09-15 PROCEDURE — 85027 COMPLETE CBC AUTOMATED: CPT

## 2017-09-15 PROCEDURE — 7100000000 HC PACU RECOVERY - FIRST 15 MIN: Performed by: INTERNAL MEDICINE

## 2017-09-15 PROCEDURE — 6360000002 HC RX W HCPCS: Performed by: INTERNAL MEDICINE

## 2017-09-15 PROCEDURE — 1210000002 HC MED SURG R&B - NEUROSCIENCE

## 2017-09-15 PROCEDURE — 80048 BASIC METABOLIC PNL TOTAL CA: CPT

## 2017-09-15 PROCEDURE — 3609027000 HC COLONOSCOPY: Performed by: INTERNAL MEDICINE

## 2017-09-15 PROCEDURE — 36415 COLL VENOUS BLD VENIPUNCTURE: CPT

## 2017-09-15 PROCEDURE — 2580000003 HC RX 258: Performed by: INTERNAL MEDICINE

## 2017-09-15 PROCEDURE — 0DJD8ZZ INSPECTION OF LOWER INTESTINAL TRACT, VIA NATURAL OR ARTIFICIAL OPENING ENDOSCOPIC: ICD-10-PCS | Performed by: INTERNAL MEDICINE

## 2017-09-15 PROCEDURE — 99232 SBSQ HOSP IP/OBS MODERATE 35: CPT | Performed by: INTERNAL MEDICINE

## 2017-09-15 RX ORDER — SENNA PLUS 8.6 MG/1
15 TABLET ORAL ONCE
Status: COMPLETED | OUTPATIENT
Start: 2017-09-15 | End: 2017-09-15

## 2017-09-15 RX ORDER — FENTANYL CITRATE 50 UG/ML
INJECTION, SOLUTION INTRAMUSCULAR; INTRAVENOUS PRN
Status: DISCONTINUED | OUTPATIENT
Start: 2017-09-15 | End: 2017-09-15 | Stop reason: HOSPADM

## 2017-09-15 RX ORDER — POLYETHYLENE GLYCOL 3350 17 G/17G
238 POWDER, FOR SOLUTION ORAL ONCE
Status: COMPLETED | OUTPATIENT
Start: 2017-09-15 | End: 2017-09-15

## 2017-09-15 RX ORDER — MIDAZOLAM HYDROCHLORIDE 1 MG/ML
INJECTION INTRAMUSCULAR; INTRAVENOUS PRN
Status: DISCONTINUED | OUTPATIENT
Start: 2017-09-15 | End: 2017-09-15 | Stop reason: HOSPADM

## 2017-09-15 RX ADMIN — POLYETHYLENE GLYCOL 3350 238 G: 17 POWDER, FOR SOLUTION ORAL at 07:52

## 2017-09-15 RX ADMIN — ACETAMINOPHEN 650 MG: 325 TABLET ORAL at 18:26

## 2017-09-15 RX ADMIN — Medication 10 ML: at 21:10

## 2017-09-15 RX ADMIN — Medication 10 ML: at 09:35

## 2017-09-15 RX ADMIN — GABAPENTIN 400 MG: 400 CAPSULE ORAL at 18:05

## 2017-09-15 RX ADMIN — GABAPENTIN 400 MG: 400 CAPSULE ORAL at 23:58

## 2017-09-15 RX ADMIN — ONDANSETRON 4 MG: 2 INJECTION INTRAMUSCULAR; INTRAVENOUS at 03:06

## 2017-09-15 RX ADMIN — ONDANSETRON 4 MG: 2 INJECTION INTRAMUSCULAR; INTRAVENOUS at 09:24

## 2017-09-15 RX ADMIN — SENNA 129 MG: 8.6 TABLET, COATED ORAL at 07:53

## 2017-09-15 RX ADMIN — MULTIPLE VITAMINS W/ MINERALS TAB 1 TABLET: TAB at 21:10

## 2017-09-15 ASSESSMENT — PAIN DESCRIPTION - FREQUENCY: FREQUENCY: INTERMITTENT

## 2017-09-15 ASSESSMENT — PAIN SCALES - GENERAL
PAINLEVEL_OUTOF10: 5
PAINLEVEL_OUTOF10: 6
PAINLEVEL_OUTOF10: 6
PAINLEVEL_OUTOF10: 0
PAINLEVEL_OUTOF10: 3
PAINLEVEL_OUTOF10: 0

## 2017-09-15 ASSESSMENT — PAIN DESCRIPTION - LOCATION
LOCATION: HEAD
LOCATION: BACK
LOCATION: HEAD

## 2017-09-15 ASSESSMENT — PAIN DESCRIPTION - ORIENTATION
ORIENTATION: LOWER
ORIENTATION: RIGHT;LEFT
ORIENTATION: RIGHT;LEFT

## 2017-09-15 ASSESSMENT — PAIN DESCRIPTION - PAIN TYPE
TYPE: ACUTE PAIN
TYPE: CHRONIC PAIN
TYPE: ACUTE PAIN

## 2017-09-15 ASSESSMENT — PAIN DESCRIPTION - DESCRIPTORS: DESCRIPTORS: ACHING

## 2017-09-16 VITALS
RESPIRATION RATE: 16 BRPM | TEMPERATURE: 98.4 F | WEIGHT: 150.4 LBS | OXYGEN SATURATION: 94 % | SYSTOLIC BLOOD PRESSURE: 126 MMHG | HEIGHT: 68 IN | BODY MASS INDEX: 22.79 KG/M2 | DIASTOLIC BLOOD PRESSURE: 76 MMHG | HEART RATE: 74 BPM

## 2017-09-16 PROCEDURE — 6370000000 HC RX 637 (ALT 250 FOR IP): Performed by: INTERNAL MEDICINE

## 2017-09-16 PROCEDURE — 99238 HOSP IP/OBS DSCHRG MGMT 30/<: CPT | Performed by: INTERNAL MEDICINE

## 2017-09-16 PROCEDURE — 2580000003 HC RX 258: Performed by: INTERNAL MEDICINE

## 2017-09-16 PROCEDURE — 6370000000 HC RX 637 (ALT 250 FOR IP): Performed by: NURSE PRACTITIONER

## 2017-09-16 RX ORDER — ENALAPRIL MALEATE 10 MG/1
5 TABLET ORAL DAILY
Qty: 30 TABLET | Refills: 1
Start: 2017-09-16 | End: 2018-06-29 | Stop reason: SDUPTHER

## 2017-09-16 RX ADMIN — LEVOTHYROXINE SODIUM 150 MCG: 150 TABLET ORAL at 06:48

## 2017-09-16 RX ADMIN — ACETAMINOPHEN 650 MG: 325 TABLET ORAL at 04:56

## 2017-09-16 RX ADMIN — Medication 10 ML: at 08:37

## 2017-09-16 RX ADMIN — PANTOPRAZOLE SODIUM 40 MG: 40 TABLET, DELAYED RELEASE ORAL at 06:48

## 2017-09-16 RX ADMIN — GABAPENTIN 400 MG: 400 CAPSULE ORAL at 15:00

## 2017-09-16 RX ADMIN — GABAPENTIN 400 MG: 400 CAPSULE ORAL at 08:37

## 2017-09-16 ASSESSMENT — PAIN SCALES - GENERAL: PAINLEVEL_OUTOF10: 5

## 2017-09-16 ASSESSMENT — PAIN DESCRIPTION - LOCATION: LOCATION: HEAD

## 2017-09-16 ASSESSMENT — PAIN DESCRIPTION - PAIN TYPE: TYPE: ACUTE PAIN

## 2017-09-17 ENCOUNTER — NURSE TRIAGE (OUTPATIENT)
Dept: ADMINISTRATIVE | Age: 77
End: 2017-09-17

## 2017-09-18 ENCOUNTER — TELEPHONE (OUTPATIENT)
Dept: FAMILY MEDICINE CLINIC | Age: 77
End: 2017-09-18

## 2017-09-19 ENCOUNTER — TELEPHONE (OUTPATIENT)
Dept: FAMILY MEDICINE CLINIC | Age: 77
End: 2017-09-19

## 2017-09-20 ENCOUNTER — OFFICE VISIT (OUTPATIENT)
Dept: FAMILY MEDICINE CLINIC | Age: 77
End: 2017-09-20
Payer: MEDICARE

## 2017-09-20 VITALS
HEIGHT: 68 IN | RESPIRATION RATE: 12 BRPM | SYSTOLIC BLOOD PRESSURE: 124 MMHG | BODY MASS INDEX: 22.28 KG/M2 | WEIGHT: 147 LBS | HEART RATE: 57 BPM | DIASTOLIC BLOOD PRESSURE: 76 MMHG | TEMPERATURE: 97.7 F

## 2017-09-20 DIAGNOSIS — R05.9 COUGH: ICD-10-CM

## 2017-09-20 DIAGNOSIS — K92.2 LOWER GI BLEED: ICD-10-CM

## 2017-09-20 DIAGNOSIS — K57.31 DIVERTICULOSIS OF LARGE INTESTINE WITH HEMORRHAGE: Primary | ICD-10-CM

## 2017-09-20 PROCEDURE — 99495 TRANSJ CARE MGMT MOD F2F 14D: CPT | Performed by: FAMILY MEDICINE

## 2017-09-20 RX ORDER — GUAIFENESIN/DEXTROMETHORPHAN 100-10MG/5
5 SYRUP ORAL 4 TIMES DAILY PRN
Qty: 200 ML | Refills: 0 | Status: SHIPPED | OUTPATIENT
Start: 2017-09-20 | End: 2017-09-30

## 2017-09-20 RX ORDER — AZITHROMYCIN 250 MG/1
TABLET, FILM COATED ORAL
Qty: 1 PACKET | Refills: 0 | Status: SHIPPED | OUTPATIENT
Start: 2017-09-20 | End: 2017-11-20 | Stop reason: ALTCHOICE

## 2017-09-26 ENCOUNTER — TELEPHONE (OUTPATIENT)
Dept: FAMILY MEDICINE CLINIC | Age: 77
End: 2017-09-26

## 2017-09-26 ENCOUNTER — HOSPITAL ENCOUNTER (OUTPATIENT)
Dept: GENERAL RADIOLOGY | Age: 77
Discharge: HOME | End: 2017-09-26
Payer: MEDICARE

## 2017-09-26 ENCOUNTER — HOSPITAL ENCOUNTER (OUTPATIENT)
Age: 77
Discharge: HOME | End: 2017-09-26
Payer: MEDICARE

## 2017-09-26 DIAGNOSIS — M54.42 CHRONIC BILATERAL LOW BACK PAIN WITH BILATERAL SCIATICA: Primary | ICD-10-CM

## 2017-09-26 DIAGNOSIS — R05.9 COUGH: ICD-10-CM

## 2017-09-26 DIAGNOSIS — G89.29 CHRONIC BILATERAL LOW BACK PAIN WITH BILATERAL SCIATICA: Primary | ICD-10-CM

## 2017-09-26 DIAGNOSIS — M54.41 CHRONIC BILATERAL LOW BACK PAIN WITH BILATERAL SCIATICA: Primary | ICD-10-CM

## 2017-09-26 PROCEDURE — 71020 XR CHEST STANDARD TWO VW: CPT

## 2017-09-26 RX ORDER — GABAPENTIN 300 MG/1
600 CAPSULE ORAL 3 TIMES DAILY
Qty: 180 CAPSULE | Refills: 2 | Status: SHIPPED | OUTPATIENT
Start: 2017-09-26 | End: 2018-06-20

## 2017-09-27 ENCOUNTER — OFFICE VISIT (OUTPATIENT)
Dept: FAMILY MEDICINE CLINIC | Age: 77
End: 2017-09-27
Payer: MEDICARE

## 2017-09-27 VITALS
SYSTOLIC BLOOD PRESSURE: 130 MMHG | HEIGHT: 68 IN | DIASTOLIC BLOOD PRESSURE: 64 MMHG | TEMPERATURE: 97.9 F | BODY MASS INDEX: 22.28 KG/M2 | RESPIRATION RATE: 14 BRPM | WEIGHT: 147 LBS | HEART RATE: 60 BPM

## 2017-09-27 DIAGNOSIS — S22.000A COMPRESSION FX, THORACIC SPINE, CLOSED, INITIAL ENCOUNTER (HCC): ICD-10-CM

## 2017-09-27 DIAGNOSIS — G89.29 CHRONIC MIDLINE THORACIC BACK PAIN: Primary | ICD-10-CM

## 2017-09-27 DIAGNOSIS — M54.42 CHRONIC BILATERAL LOW BACK PAIN WITH BILATERAL SCIATICA: ICD-10-CM

## 2017-09-27 DIAGNOSIS — M54.6 CHRONIC MIDLINE THORACIC BACK PAIN: Primary | ICD-10-CM

## 2017-09-27 DIAGNOSIS — M54.41 CHRONIC BILATERAL LOW BACK PAIN WITH BILATERAL SCIATICA: ICD-10-CM

## 2017-09-27 DIAGNOSIS — G89.29 CHRONIC BILATERAL LOW BACK PAIN WITH BILATERAL SCIATICA: ICD-10-CM

## 2017-09-27 DIAGNOSIS — R05.9 COUGH: ICD-10-CM

## 2017-09-27 DIAGNOSIS — Z78.0 POST-MENOPAUSAL: ICD-10-CM

## 2017-09-27 PROCEDURE — G8420 CALC BMI NORM PARAMETERS: HCPCS | Performed by: FAMILY MEDICINE

## 2017-09-27 PROCEDURE — G8400 PT W/DXA NO RESULTS DOC: HCPCS | Performed by: FAMILY MEDICINE

## 2017-09-27 PROCEDURE — 1111F DSCHRG MED/CURRENT MED MERGE: CPT | Performed by: FAMILY MEDICINE

## 2017-09-27 PROCEDURE — 1036F TOBACCO NON-USER: CPT | Performed by: FAMILY MEDICINE

## 2017-09-27 PROCEDURE — 99214 OFFICE O/P EST MOD 30 MIN: CPT | Performed by: FAMILY MEDICINE

## 2017-09-27 PROCEDURE — 4040F PNEUMOC VAC/ADMIN/RCVD: CPT | Performed by: FAMILY MEDICINE

## 2017-09-27 PROCEDURE — 1123F ACP DISCUSS/DSCN MKR DOCD: CPT | Performed by: FAMILY MEDICINE

## 2017-09-27 PROCEDURE — 1090F PRES/ABSN URINE INCON ASSESS: CPT | Performed by: FAMILY MEDICINE

## 2017-09-27 PROCEDURE — G8428 CUR MEDS NOT DOCUMENT: HCPCS | Performed by: FAMILY MEDICINE

## 2017-09-27 RX ORDER — HYDROCODONE BITARTRATE AND ACETAMINOPHEN 7.5; 325 MG/1; MG/1
1 TABLET ORAL EVERY 8 HOURS PRN
Qty: 60 TABLET | Refills: 0 | Status: SHIPPED | OUTPATIENT
Start: 2017-09-27 | End: 2017-10-04

## 2017-09-27 RX ORDER — GUAIFENESIN AND CODEINE PHOSPHATE 100; 10 MG/5ML; MG/5ML
5 SOLUTION ORAL EVERY 4 HOURS PRN
Qty: 200 ML | Refills: 0 | Status: SHIPPED | OUTPATIENT
Start: 2017-09-27 | End: 2017-10-04

## 2017-09-27 RX ORDER — BENZONATATE 200 MG/1
200 CAPSULE ORAL 3 TIMES DAILY PRN
Qty: 30 CAPSULE | Refills: 0 | Status: SHIPPED | OUTPATIENT
Start: 2017-09-27 | End: 2017-10-07

## 2017-09-29 ENCOUNTER — HOSPITAL ENCOUNTER (OUTPATIENT)
Dept: WOMENS IMAGING | Age: 77
Discharge: HOME | End: 2017-09-29
Payer: MEDICARE

## 2017-09-29 DIAGNOSIS — S22.000A COMPRESSION FX, THORACIC SPINE, CLOSED, INITIAL ENCOUNTER (HCC): ICD-10-CM

## 2017-09-29 DIAGNOSIS — Z78.0 POST-MENOPAUSAL: ICD-10-CM

## 2017-09-29 PROCEDURE — 77080 DXA BONE DENSITY AXIAL: CPT

## 2017-10-02 ENCOUNTER — TELEPHONE (OUTPATIENT)
Dept: FAMILY MEDICINE CLINIC | Age: 77
End: 2017-10-02

## 2017-10-02 NOTE — TELEPHONE ENCOUNTER
----- Message from Blue Alvarado DO sent at 9/29/2017  5:33 PM EDT -----  Surprisingly, her DEXA scan came back normal.  Does not treated for osteoporosis at this time.

## 2017-10-03 ENCOUNTER — TELEPHONE (OUTPATIENT)
Dept: FAMILY MEDICINE CLINIC | Age: 77
End: 2017-10-03

## 2017-10-03 DIAGNOSIS — R05.9 COUGH: Primary | ICD-10-CM

## 2017-10-03 RX ORDER — DOXYCYCLINE HYCLATE 100 MG
100 TABLET ORAL 2 TIMES DAILY
Qty: 20 TABLET | Refills: 0 | Status: SHIPPED | OUTPATIENT
Start: 2017-10-03 | End: 2017-10-13

## 2017-10-24 ENCOUNTER — HOSPITAL ENCOUNTER (OUTPATIENT)
Dept: INFUSION THERAPY | Age: 77
Discharge: HOME | End: 2017-10-24
Payer: MEDICARE

## 2017-10-24 ENCOUNTER — OFFICE VISIT (OUTPATIENT)
Dept: ONCOLOGY | Age: 77
End: 2017-10-24
Payer: MEDICARE

## 2017-10-24 VITALS
TEMPERATURE: 97.2 F | HEIGHT: 68 IN | BODY MASS INDEX: 22.85 KG/M2 | HEART RATE: 64 BPM | DIASTOLIC BLOOD PRESSURE: 81 MMHG | SYSTOLIC BLOOD PRESSURE: 124 MMHG | RESPIRATION RATE: 16 BRPM | OXYGEN SATURATION: 97 % | WEIGHT: 150.8 LBS

## 2017-10-24 DIAGNOSIS — D70.9 NEUTROPENIA, UNSPECIFIED TYPE (HCC): Primary | ICD-10-CM

## 2017-10-24 DIAGNOSIS — D70.9 NEUTROPENIA, UNSPECIFIED TYPE (HCC): ICD-10-CM

## 2017-10-24 LAB
BASINOPHIL, AUTOMATED: 0 % (ref 0–12)
EOSINOPHILS RELATIVE PERCENT: 4 % (ref 0–12)
HCT VFR BLD CALC: 37.8 % (ref 37–47)
HEMOGLOBIN: 12.5 GM/DL (ref 12–16)
LYMPHOCYTES # BLD: 37 % (ref 15–47)
MCH RBC QN AUTO: 30.6 PG (ref 27–31)
MCHC RBC AUTO-ENTMCNC: 33.1 GM/DL (ref 33–37)
MCV RBC AUTO: 92 FL (ref 81–99)
MONOCYTES: 9 % (ref 0–12)
PDW BLD-RTO: 11.2 % (ref 11.5–14.5)
PLATELET # BLD: 241 THOU/MM3 (ref 130–400)
PMV BLD AUTO: 7.7 MCM (ref 7.4–10.4)
RBC # BLD: 4.09 MILL/MM3 (ref 4.2–5.4)
SEG NEUTROPHILS: 49 % (ref 43–75)
WBC # BLD: 3.2 THOU/MM3 (ref 4.8–10.8)

## 2017-10-24 PROCEDURE — G8420 CALC BMI NORM PARAMETERS: HCPCS | Performed by: INTERNAL MEDICINE

## 2017-10-24 PROCEDURE — G8484 FLU IMMUNIZE NO ADMIN: HCPCS | Performed by: INTERNAL MEDICINE

## 2017-10-24 PROCEDURE — 99211 OFF/OP EST MAY X REQ PHY/QHP: CPT

## 2017-10-24 PROCEDURE — G8399 PT W/DXA RESULTS DOCUMENT: HCPCS | Performed by: INTERNAL MEDICINE

## 2017-10-24 PROCEDURE — 85025 COMPLETE CBC W/AUTO DIFF WBC: CPT

## 2017-10-24 PROCEDURE — G8427 DOCREV CUR MEDS BY ELIG CLIN: HCPCS | Performed by: INTERNAL MEDICINE

## 2017-10-24 PROCEDURE — 99213 OFFICE O/P EST LOW 20 MIN: CPT | Performed by: INTERNAL MEDICINE

## 2017-10-24 PROCEDURE — 1123F ACP DISCUSS/DSCN MKR DOCD: CPT | Performed by: INTERNAL MEDICINE

## 2017-10-24 PROCEDURE — 4040F PNEUMOC VAC/ADMIN/RCVD: CPT | Performed by: INTERNAL MEDICINE

## 2017-10-24 PROCEDURE — 1036F TOBACCO NON-USER: CPT | Performed by: INTERNAL MEDICINE

## 2017-10-24 PROCEDURE — 36415 COLL VENOUS BLD VENIPUNCTURE: CPT

## 2017-10-24 PROCEDURE — 1090F PRES/ABSN URINE INCON ASSESS: CPT | Performed by: INTERNAL MEDICINE

## 2017-10-24 ASSESSMENT — ENCOUNTER SYMPTOMS
CHEST TIGHTNESS: 0
SORE THROAT: 0
CONSTIPATION: 0
TROUBLE SWALLOWING: 1
DIARRHEA: 0
SHORTNESS OF BREATH: 1
ABDOMINAL DISTENTION: 0
RECTAL PAIN: 0
BLOOD IN STOOL: 0
VOMITING: 0
NAUSEA: 1
EYE DISCHARGE: 0
WHEEZING: 0
FACIAL SWELLING: 0
BACK PAIN: 1
COLOR CHANGE: 0
COUGH: 0
ABDOMINAL PAIN: 0

## 2017-10-24 NOTE — PROGRESS NOTES
[facility] PROFESSIONAL SERVS  [facility]COL[facility] SPECIAL[facility] OF [facility]  [address] [facility] [address]  [address] [facility] [address]  [address] [address]  Dept: [phone]  Dept Fax: [phone]: [phone]     Visit Date: 10/24/2017     Jewell Gunter is a 76 y.o. 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 CBC available in [facility] showed the patient had mild neutropenia in May 2016, at that time her WBC was 3800. In July, 2016 her WBC dropped to 2500. However it returned to normal value in August and October 2016. Her most recent CBC from July 6, 2017 showed WBC of 3000. Her platelet count has been normal from May 2016 till most current in July 2017. Her hemoglobin was fine until September 2016 when it dropped to 10.5. Most recent hemoglobin on July 6, 2017 was 14.3 g.  Cell count differential didn't show any abnormal findings. No shift to the left and no blasts. The patient reports history of DVT and PÉAN in October 2016. The patient is Jehovah witness she never had a blood transfusion.         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 Theron Alva GERD (gastroesophageal reflux disease)     Diverticulitis     Hiatal hernia     Hypertension     Hypothyroidism     Osteoarthritis       Past Surgical History:   Procedure Laterality Date    APPENDECTOMY  1958    BACK SURGERY      CARPAL TUNNEL RELEASE Bilateral 2013    w/cubital tunnel    COLON SURGERY  07/29/2016    Procedure: ATTEMPTED DAVINCI XI ROBOTIC ASSISTED SIGMOID COLON RESECTION, ROBOTIC ASSISTED MOBILIZATION OF RECTAL SIGMOID TO SPLENIC FLEXURE, CONVERTED TO Biotin 48993 MCG TABS Take 1-2 tablets by mouth daily      fluticasone (FLONASE) 50 MCG/ACT nasal spray 1 spray by Nasal route as needed for Rhinitis      DULoxetine (CYMBALTA) 60 MG extended release capsule Take 50 mg by mouth daily       Omega 3-6-9 Fatty Acids (OMEGA 3-6-9 COMPLEX) CAPS Take 1 capsule by mouth daily       calcium carbonate-vitamin D (CALTRATE 600+D) 600-400 MG-UNIT TABS per tab Take 1 tablet by mouth 2 times daily.  therapeutic multivitamin-minerals (THERAGRAN-M) tablet Take 1 tablet by mouth nightly       azithromycin (ZITHROMAX) 250 MG tablet Take 2 tabs (500 mg) on Day 1, and take 1 tab (250 mg) on days 2 through 5. 1 packet 0    meclizine (ANTIVERT) 25 MG tablet Take 25 mg by mouth every 6 hours as needed for Dizziness       No current facility-administered medications for this visit. Allergies   Allergen Reactions    Ampicillin     Morphine Other (See Comments)     Hallucinations     Pcn [Penicillins]     Sulfa Antibiotics       Health Maintenance   Topic Date Due    DTaP/Tdap/Td vaccine (1 - Tdap) 10/17/1957    Lipid screen  10/17/1978    Zostavax vaccine  10/17/1998    Pneumococcal low/med risk (2 of 2 - PCV13) 10/12/2011    Flu vaccine (1) 09/01/2017    DEXA (modify frequency per FRAX score)  Completed        Subjective:   Review of Systems   Constitutional: Positive for fatigue, fever and unexpected weight change. Negative for activity change and appetite change. HENT: Positive for hearing loss and trouble swallowing. Negative for congestion, dental problem, facial swelling, mouth sores, nosebleeds, sore throat and tinnitus. Eyes: Positive for visual disturbance. Negative for discharge. Respiratory: Positive for shortness of breath. Negative for cough, chest tightness and wheezing. Cardiovascular: Positive for palpitations. Negative for chest pain and leg swelling. Gastrointestinal: Positive for nausea.  Negative for abdominal distention, abdominal another condition. The neutropenia is typically mild to moderate. In adults, immune neutropenia can occur in the setting of collagen/vascular disorders, alone or in combination with other cytopenias. Deficiencies of dietary vitamins and minerals ( vitamin B12, folate, copper) typically cause neutropenia along with other cytopenias, but isolated or predominant neutropenia is possible. Myelodysplastic syndromes and hematologic malignancies typically cause pancytopenia and may present with infection or symptoms related to other cytopenias. Paroxysmal nocturnal hemoglobinuria can present with neutropenia, along with symptoms and signs of intravascular hemolytic anemia ( fatigue, dark urine). Patient denies recent infections. She has arthritis, but she was never diagnosed with collagen/vascular disorder. She has been not placed on any new drugs recently. Today her hemoglobin and platelet count are within normal limits. Her WBC is 3200 with 65% of neutrophils. Will continue monitoring. Return in about 6 months (around 4/24/2018). Orders Placed:   Orders Placed This Encounter   Procedures    CBC Auto Differential     Standing Status:   Future     Standing Expiration Date:   10/24/2018        Medications Prescribed:   No orders of the defined types were placed in this encounter.

## 2017-10-31 ENCOUNTER — OFFICE VISIT (OUTPATIENT)
Dept: FAMILY MEDICINE CLINIC | Age: 77
End: 2017-10-31
Payer: MEDICARE

## 2017-10-31 VITALS
WEIGHT: 148 LBS | TEMPERATURE: 97.6 F | RESPIRATION RATE: 14 BRPM | SYSTOLIC BLOOD PRESSURE: 106 MMHG | BODY MASS INDEX: 22.43 KG/M2 | DIASTOLIC BLOOD PRESSURE: 68 MMHG | HEIGHT: 68 IN | HEART RATE: 76 BPM

## 2017-10-31 DIAGNOSIS — M41.26 OTHER IDIOPATHIC SCOLIOSIS, LUMBAR REGION: ICD-10-CM

## 2017-10-31 DIAGNOSIS — M48.061 SPINAL STENOSIS OF LUMBAR REGION WITHOUT NEUROGENIC CLAUDICATION: Primary | ICD-10-CM

## 2017-10-31 DIAGNOSIS — G89.4 CHRONIC PAIN SYNDROME: ICD-10-CM

## 2017-10-31 PROCEDURE — 1123F ACP DISCUSS/DSCN MKR DOCD: CPT | Performed by: FAMILY MEDICINE

## 2017-10-31 PROCEDURE — G8484 FLU IMMUNIZE NO ADMIN: HCPCS | Performed by: FAMILY MEDICINE

## 2017-10-31 PROCEDURE — 99213 OFFICE O/P EST LOW 20 MIN: CPT | Performed by: FAMILY MEDICINE

## 2017-10-31 PROCEDURE — G8399 PT W/DXA RESULTS DOCUMENT: HCPCS | Performed by: FAMILY MEDICINE

## 2017-10-31 PROCEDURE — 4040F PNEUMOC VAC/ADMIN/RCVD: CPT | Performed by: FAMILY MEDICINE

## 2017-10-31 PROCEDURE — G8427 DOCREV CUR MEDS BY ELIG CLIN: HCPCS | Performed by: FAMILY MEDICINE

## 2017-10-31 PROCEDURE — 1090F PRES/ABSN URINE INCON ASSESS: CPT | Performed by: FAMILY MEDICINE

## 2017-10-31 PROCEDURE — 1036F TOBACCO NON-USER: CPT | Performed by: FAMILY MEDICINE

## 2017-10-31 PROCEDURE — G8420 CALC BMI NORM PARAMETERS: HCPCS | Performed by: FAMILY MEDICINE

## 2017-10-31 RX ORDER — OXYCODONE HYDROCHLORIDE AND ACETAMINOPHEN 5; 325 MG/1; MG/1
1 TABLET ORAL EVERY 8 HOURS PRN
Qty: 90 TABLET | Refills: 0 | Status: SHIPPED | OUTPATIENT
Start: 2017-10-31 | End: 2017-11-20 | Stop reason: SDUPTHER

## 2017-10-31 NOTE — PROGRESS NOTES
Wesley Gunter is a 76 y.o. female that presents for Follow-up (power mobility ); Other (leifer Valentino Hitesh  have different  opinoins  on colectomy  want  Dr Meryle Poet opinio ); and Other (norco not working )        HPI:    Power mobility evaluation:    Date: 10/31/17  Dx:  Low back pain without sciatica, lumbar spinal stenosis, scoliosis    Prognosis:Fair    Ambulatory Status:  Currently uses a wheeled walker with all ambulation    Hx of falls:  yes - with instability  Bed/chair confined:   no    Why can't manual wheelchair , can, walker or scooter meet patients needs? She has a history of falls with a walker and cane in the past.  She is unable to manually power a manual wheelchair due to subjective weakness and ostearthritis. A power wheelchair is needed to complete the following ADLs that he would otherwise be unable to accomplish:  eating, bathing, toileting, personal cares and ambulating    Does patient have physical and mental abilities to operate device? yes     How will patient transfer to a power wheelchair? Able to transfer independently     Patient willing and motivated to use power mobiliy device? yes     Chronic Pain    HPI:    Patient has chronic pain of the low back. Was previously on Norco, but states that she did not notice an improvement in pain or functioning. Pain control: inadequate  Improvement in function and ADLs?  no  Current Medications:  Cymbalta  Escalation of dosage recently?  no    Evidence for abuse or diversion? no   Seen specialist or pain clinic?: yes  Pain contract: yes    Controlled Substances Monitoring: Attestation: The Prescription Monitoring Report for this patient was reviewed today. Ciara Sinclair DO)  Documentation: No signs of potential drug abuse or diversion identified.  Ciara Sinclair DO)        Health Maintenance   Topic Date Due    DTaP/Tdap/Td vaccine (1 - Tdap) 10/17/1957    Lipid screen  10/17/1978    Zostavax vaccine  10/17/1998    Pneumococcal low/med risk Arthritis Mother     Hearing Loss Mother     High Cholesterol Mother     Hearing Loss Father     Heart Disease Father 76     CABG    Stroke Father     Arthritis Sister     Depression Sister     Diabetes Sister     Arthritis Brother     Depression Brother     Cancer Maternal Aunt     Early Death Maternal Aunt     Diabetes Maternal Grandmother     Heart Disease Paternal Grandmother          I have reviewed the patient's past medical history, past surgical history, allergies, medications, social and family history and I have made updates where appropriate. ROS        PHYSICAL EXAM:  /68   Pulse 76   Temp 97.6 °F (36.4 °C) (Oral)   Resp 14   Ht 5' 7.75\" (1.721 m)   Wt 148 lb (67.1 kg)   BMI 22.67 kg/m²     General Appearance: well developed and well- nourished, in no acute distress  Head: normocephalic and atraumatic  ENT: external ear and ear canal normal bilaterally, nose without deformity, nasal mucosa and turbinates normal without polyps, oropharynx normal, dentition is normal for age, no lip or gum lesions noted  Neck: supple and non-tender without mass, no thyromegaly or thyroid nodules, no cervical lymphadenopathy  Pulmonary/Chest: clear to auscultation bilaterally- no wheezes, rales or rhonchi, normal air movement, no respiratory distress or retractions  Cardiovascular: normal rate, regular rhythm, normal S1 and S2, no murmurs, rubs, clicks, or gallops, distal pulses intact  Extremities: no cyanosis, clubbing or edema of the lower extremities  Psych:  Normal affect without evidence of depression or anxiety, insight and judgement are appropriate, memory appears intact  Skin: warm and dry, no rash or erythema      ASSESSMENT & PLAN  Flavio Chavez was seen today for follow-up, other and other. Diagnoses and all orders for this visit:    Spinal stenosis of lumbar region without neurogenic claudication  -     oxyCODONE-acetaminophen (PERCOCET) 5-325 MG per tablet;  Take 1 tablet by mouth every

## 2017-11-01 ENCOUNTER — TELEPHONE (OUTPATIENT)
Dept: FAMILY MEDICINE CLINIC | Age: 77
End: 2017-11-01

## 2017-11-03 ENCOUNTER — TELEPHONE (OUTPATIENT)
Dept: FAMILY MEDICINE CLINIC | Age: 77
End: 2017-11-03

## 2017-11-07 NOTE — TELEPHONE ENCOUNTER
Appointment scheduled for Nov. 22 at 11:30 at Dr. Brewster Fairly office. Pt informed and verbalized understanding.

## 2017-11-20 ENCOUNTER — TELEPHONE (OUTPATIENT)
Dept: FAMILY MEDICINE CLINIC | Age: 77
End: 2017-11-20

## 2017-11-20 ENCOUNTER — OFFICE VISIT (OUTPATIENT)
Dept: FAMILY MEDICINE CLINIC | Age: 77
End: 2017-11-20
Payer: MEDICARE

## 2017-11-20 VITALS
RESPIRATION RATE: 14 BRPM | HEART RATE: 60 BPM | HEIGHT: 68 IN | TEMPERATURE: 97.9 F | SYSTOLIC BLOOD PRESSURE: 126 MMHG | DIASTOLIC BLOOD PRESSURE: 64 MMHG | WEIGHT: 147 LBS | BODY MASS INDEX: 22.28 KG/M2

## 2017-11-20 DIAGNOSIS — G89.4 CHRONIC PAIN SYNDROME: ICD-10-CM

## 2017-11-20 DIAGNOSIS — G62.9 NEUROPATHY: ICD-10-CM

## 2017-11-20 DIAGNOSIS — M48.061 SPINAL STENOSIS OF LUMBAR REGION WITHOUT NEUROGENIC CLAUDICATION: Primary | ICD-10-CM

## 2017-11-20 DIAGNOSIS — M41.26 OTHER IDIOPATHIC SCOLIOSIS, LUMBAR REGION: ICD-10-CM

## 2017-11-20 PROCEDURE — G8399 PT W/DXA RESULTS DOCUMENT: HCPCS | Performed by: FAMILY MEDICINE

## 2017-11-20 PROCEDURE — 4040F PNEUMOC VAC/ADMIN/RCVD: CPT | Performed by: FAMILY MEDICINE

## 2017-11-20 PROCEDURE — G8427 DOCREV CUR MEDS BY ELIG CLIN: HCPCS | Performed by: FAMILY MEDICINE

## 2017-11-20 PROCEDURE — G8420 CALC BMI NORM PARAMETERS: HCPCS | Performed by: FAMILY MEDICINE

## 2017-11-20 PROCEDURE — 1123F ACP DISCUSS/DSCN MKR DOCD: CPT | Performed by: FAMILY MEDICINE

## 2017-11-20 PROCEDURE — G8484 FLU IMMUNIZE NO ADMIN: HCPCS | Performed by: FAMILY MEDICINE

## 2017-11-20 PROCEDURE — 1036F TOBACCO NON-USER: CPT | Performed by: FAMILY MEDICINE

## 2017-11-20 PROCEDURE — 1090F PRES/ABSN URINE INCON ASSESS: CPT | Performed by: FAMILY MEDICINE

## 2017-11-20 PROCEDURE — 99213 OFFICE O/P EST LOW 20 MIN: CPT | Performed by: FAMILY MEDICINE

## 2017-11-20 RX ORDER — OXYCODONE HYDROCHLORIDE AND ACETAMINOPHEN 5; 325 MG/1; MG/1
1 TABLET ORAL EVERY 8 HOURS PRN
Qty: 90 TABLET | Refills: 0 | Status: SHIPPED | OUTPATIENT
Start: 2017-11-20 | End: 2017-12-29 | Stop reason: SDUPTHER

## 2017-11-20 NOTE — PROGRESS NOTES
Kelsey Gunter is a 76 y.o. female that presents for Follow-up (2 month f/u, clarification for power w/c diagnosis, forms brought to office)        HPI:    Chronic Pain    HPI:    Patient has chronic pain of the low and mid back. Emmanuel Amador stopped at last visit and percocet was started due to Emmanuel Amador being ineffective at all for her pain. States that she is taking the Percocet on a daily basis, it helps to 'take the edge off'. She is taking about 2 per day. Pain control: adequate  Improvement in function and ADLs? 'not particularly'  Present for: 10+ years  Current Medications:  Percocet 5/325 q 8 hrs pRN, gabapentin  Escalation of dosage recently?  no    Evidence for abuse or diversion? no   Seen specialist or pain clinic?: yes  Pain contract: yes    Controlled Substances Monitoring: Attestation: The Prescription Monitoring Report for this patient was reviewed today. Jenna Eddy DO)  Documentation: No signs of potential drug abuse or diversion identified.  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 Lucian Colunga GERD (gastroesophageal reflux disease)     Diverticulitis     Hiatal hernia     Hypertension     Hypothyroidism     Osteoarthritis        Past Surgical History:   Procedure Laterality Date    APPENDECTOMY  1958    BACK SURGERY      CARPAL TUNNEL RELEASE Bilateral 2013    w/cubital tunnel    COLON SURGERY  07/29/2016    Procedure: ATTEMPTED DAVINCI XI ROBOTIC compliance addressed. Patient given educational materials - see patient instructions. All patient questions answered. Patient voiced understanding.

## 2017-11-27 ENCOUNTER — HOSPITAL ENCOUNTER (OUTPATIENT)
Dept: MRI IMAGING | Age: 77
Discharge: HOME | End: 2017-11-27
Payer: MEDICARE

## 2017-11-27 DIAGNOSIS — M51.26 DISPLACEMENT OF LUMBAR INTERVERTEBRAL DISC WITHOUT MYELOPATHY: ICD-10-CM

## 2017-11-27 PROCEDURE — 72148 MRI LUMBAR SPINE W/O DYE: CPT

## 2017-12-14 ENCOUNTER — HOSPITAL ENCOUNTER (OUTPATIENT)
Dept: OCCUPATIONAL THERAPY | Age: 77
Setting detail: THERAPIES SERIES
Discharge: HOME | End: 2017-12-14
Payer: MEDICARE

## 2017-12-14 PROCEDURE — G8978 MOBILITY CURRENT STATUS: HCPCS

## 2017-12-14 PROCEDURE — 97542 WHEELCHAIR MNGMENT TRAINING: CPT

## 2017-12-14 PROCEDURE — 97165 OT EVAL LOW COMPLEX 30 MIN: CPT

## 2017-12-14 PROCEDURE — G8980 MOBILITY D/C STATUS: HCPCS

## 2017-12-14 PROCEDURE — G8979 MOBILITY GOAL STATUS: HCPCS

## 2017-12-14 ASSESSMENT — PAIN DESCRIPTION - PAIN TYPE: TYPE: CHRONIC PAIN

## 2017-12-14 ASSESSMENT — PAIN DESCRIPTION - DIRECTION: RADIATING_TOWARDS: RADIATES DOWN LEGS

## 2017-12-14 ASSESSMENT — PAIN DESCRIPTION - LOCATION: LOCATION: BACK

## 2017-12-14 NOTE — PROGRESS NOTES
I certify that I have examined the above patient and determined that Physical Medicine and Rehabilitation service is necessary; that the secondary diagnosis for the provision of rehabilitation services is consistent with identified needs; that service will be furnished on an outpatient basis while the patient is in my care; that I approve the above plan of care for up to 90 days or as specifically noted above and will review it within that time frame or more often if the patients condition requires. Attestation, signature or co-signature of physician indicates approval of certification requirements.    ______________________________________  ______________________  Physician Signature      Date    4258 THERAPY  GENERAL EVALUATION  WHEELCHAIR NECESSITY FORM         Date: 2017  Patient Name: Esteban Gunter        MRN: [id number]      : 1940  (73 y.o.)  Gender: female   Referring Practitioner: Dr. Abelino Johnson  Diagnosis: Spinal Stenosis of lumbar region M48.061, idiopathic scoliosis of lumbar region M41.26     Additional Pertinent Hx: Pt. presents today for evaluation for a power wheelchair. Pt. currently utilizing a rolling walker at home but her  must provide assist at all times when pt. is using the walker due to frequent falling. Pt. has a transport chair for when she goes out in the community and her  pushes her. Medical issues include spinal stenosis, of lumbar region , idiopathic scoliosis of lumbar region, chronic low back pain, fibromyalgia, OA, back surgery, bilateral carpal tunnel release with cubital tunnel, left total knee, bilateral total hips, and left total should replacement. Subjective:  Subjective: Pt. presents today for evaluation of power wheelchair. Pt. has had frequent falling using a rolling walker and must have her  with her at all times if up using her walker.          Please see medical history questionnaire bilateral elbow strength - pt. Does have OA in both elbows   Comments/Support Needed: Height adjustable armrests for optimal support    WRIST AND HAND:  Posture of Right Wrist/Hand: Within Functional Limits    Posture of Left Wrist/Hand: Within Functional Limits    Wrist/Hand Strength/Dexterity: Within Functional Limits    Comments/Support Needed: Pt. Is able to touch fingers to thumb both hands. Pt. Does have OA in joints of hands, but will be able to manipulate a joystick       TRUNK:  Anterior/Posterior:Increased Thoracic Kyphosis and Partly Flexible  Left/Right: Within Functional Limits and Partly Flexible  Rotation:  Within Functional Limits and Partly Flexible  Comments/Support Needed: Pt. Does have slightly forward shoulders with kyphotic posture and forward head position    PELVIS:  Anterior/Posterior: Posterior and Partly Flexible  Obliquity:  Within Functional Limits and Partly Flexible   Rotation:  Within Functional Limits and Partly Flexible   Comments/Support Needed: standard captain chair will provide adequate support    HIPS:   Position:Neutral and Partly Flexible  Windswept: Neutral and Partly Flexible  Comments/Support Needed: Pt. Does have bilateral total hip replacements with weakness of both hips. Pt. Unable to stand completely erect. KNEES AND FEET:  Strength: Poor strength of knees and ankles  Hamstring Flexibility       Pelvis to Thigh Angle:  Greater Than 90       Thigh to Calf Angle: Greater Than 90     Tone/Contractures: n/a  Foot Positioning:       Right:  Within Functional Limits       Left: Within Functional Limits  Comments/Support Needed: Pt.  Would benefit from angle adjustable footplates for added support with swing away leg rests for safety with transfers    MOBILITY   SITTING BALANCE:  WFL  STANDING BALANCE:  Minimal Support  BED TO WHEELCHAIR TRANSFER:  Assisted  WHEELCHAIR TO COMMODE TRANSFER: Assisted    AMBULATION:  Ambulates with rolling walker with Assistance from Does the user's environment support the use of the type of wheelchair being recommended? SEE VENDOR'S NOTE. If a manual wheelchair is recommended, does the user have sufficient function/abilities to use the recommended equipment? no  - pt. Has had a left total shoulder replacement, bilateral carpal tunnel release with cubital tunnel, and OA making it impossible to self propel a manual chair    If a POV is recommended, does the user have sufficient stability and upper extremity function to operate it?  n/a    If a power wheelchair is recommended, does the user have sufficient function/abilities to use the recommended equipment? yes and pt. Demo's functional use of hands to be able to manipulate a joystick and functional cognitive abilities. RECOMMENDATION/GOALS     TYPE OF WHEELCHAIR RECOMMENDED:  Power Wheelchair - Emma [employer]  POSITIONING SYSTEMS RECOMMENDED: None    SEATING RECOMMENDED:      * Captain Seat 18 inches wide to 18-20 inches deep  * Height adjustable armrests for optimal support of UE\"s  * 2 batteries to power the wheelchair  * Lap belt for safety while driving    OT G-codes  Functional Assessment Tool Used: Patient specific Functional scale  Score: walking = 1, standing to cook =0  Functional Limitation: Mobility: Walking and moving around  Mobility: Walking and Moving Around Current Status (): At least 80 percent but less than 100 percent impaired, limited or restricted  Mobility: Walking and Moving Around Goal Status (): At least 80 percent but less than 100 percent impaired, limited or restricted  Mobility: Walking and Moving Around Discharge Status ():  At least 80 percent but less than 100 percent impaired, limited or restricted     [employer], OTR/L [phone]

## 2017-12-29 DIAGNOSIS — M41.26 OTHER IDIOPATHIC SCOLIOSIS, LUMBAR REGION: ICD-10-CM

## 2017-12-29 DIAGNOSIS — G89.4 CHRONIC PAIN SYNDROME: ICD-10-CM

## 2017-12-29 DIAGNOSIS — M48.061 SPINAL STENOSIS OF LUMBAR REGION WITHOUT NEUROGENIC CLAUDICATION: ICD-10-CM

## 2017-12-29 RX ORDER — OXYCODONE HYDROCHLORIDE AND ACETAMINOPHEN 5; 325 MG/1; MG/1
1 TABLET ORAL EVERY 8 HOURS PRN
Qty: 90 TABLET | Refills: 0 | Status: SHIPPED | OUTPATIENT
Start: 2017-12-29 | End: 2018-01-28

## 2018-01-23 ENCOUNTER — TELEPHONE (OUTPATIENT)
Dept: FAMILY MEDICINE CLINIC | Age: 78
End: 2018-01-23

## 2018-01-23 RX ORDER — DULOXETIN HYDROCHLORIDE 60 MG/1
60 CAPSULE, DELAYED RELEASE ORAL DAILY
Qty: 90 CAPSULE | Refills: 3 | Status: SHIPPED | OUTPATIENT
Start: 2018-01-23 | End: 2019-01-21 | Stop reason: SDUPTHER

## 2018-02-20 ENCOUNTER — OFFICE VISIT (OUTPATIENT)
Dept: FAMILY MEDICINE CLINIC | Age: 78
End: 2018-02-20
Payer: MEDICARE

## 2018-02-20 VITALS
TEMPERATURE: 98.1 F | HEIGHT: 68 IN | SYSTOLIC BLOOD PRESSURE: 104 MMHG | WEIGHT: 145 LBS | RESPIRATION RATE: 14 BRPM | BODY MASS INDEX: 21.98 KG/M2 | HEART RATE: 68 BPM | DIASTOLIC BLOOD PRESSURE: 62 MMHG

## 2018-02-20 DIAGNOSIS — E03.9 ACQUIRED HYPOTHYROIDISM: ICD-10-CM

## 2018-02-20 DIAGNOSIS — M48.061 SPINAL STENOSIS OF LUMBAR REGION WITHOUT NEUROGENIC CLAUDICATION: ICD-10-CM

## 2018-02-20 DIAGNOSIS — M54.41 CHRONIC BILATERAL LOW BACK PAIN WITH BILATERAL SCIATICA: Primary | ICD-10-CM

## 2018-02-20 DIAGNOSIS — G89.29 CHRONIC BILATERAL LOW BACK PAIN WITH BILATERAL SCIATICA: Primary | ICD-10-CM

## 2018-02-20 DIAGNOSIS — M54.42 CHRONIC BILATERAL LOW BACK PAIN WITH BILATERAL SCIATICA: Primary | ICD-10-CM

## 2018-02-20 DIAGNOSIS — I10 ESSENTIAL (PRIMARY) HYPERTENSION: Chronic | ICD-10-CM

## 2018-02-20 PROCEDURE — 3288F FALL RISK ASSESSMENT DOCD: CPT | Performed by: FAMILY MEDICINE

## 2018-02-20 PROCEDURE — 99214 OFFICE O/P EST MOD 30 MIN: CPT | Performed by: FAMILY MEDICINE

## 2018-02-20 PROCEDURE — G8510 SCR DEP NEG, NO PLAN REQD: HCPCS | Performed by: FAMILY MEDICINE

## 2018-02-20 ASSESSMENT — PATIENT HEALTH QUESTIONNAIRE - PHQ9
1. LITTLE INTEREST OR PLEASURE IN DOING THINGS: 1
SUM OF ALL RESPONSES TO PHQ9 QUESTIONS 1 & 2: 2
SUM OF ALL RESPONSES TO PHQ QUESTIONS 1-9: 2
2. FEELING DOWN, DEPRESSED OR HOPELESS: 1

## 2018-04-16 ENCOUNTER — TELEPHONE (OUTPATIENT)
Dept: FAMILY MEDICINE CLINIC | Age: 78
End: 2018-04-16

## 2018-04-16 DIAGNOSIS — N30.00 ACUTE CYSTITIS WITHOUT HEMATURIA: Primary | ICD-10-CM

## 2018-04-16 RX ORDER — CIPROFLOXACIN 500 MG/1
500 TABLET, FILM COATED ORAL 2 TIMES DAILY
Qty: 10 TABLET | Refills: 0 | Status: SHIPPED | OUTPATIENT
Start: 2018-04-16 | End: 2019-04-12 | Stop reason: SDUPTHER

## 2018-04-18 ENCOUNTER — TELEPHONE (OUTPATIENT)
Dept: FAMILY MEDICINE CLINIC | Age: 78
End: 2018-04-18

## 2018-05-04 ENCOUNTER — OFFICE VISIT (OUTPATIENT)
Dept: ONCOLOGY | Age: 78
End: 2018-05-04
Payer: MEDICARE

## 2018-05-04 ENCOUNTER — HOSPITAL ENCOUNTER (OUTPATIENT)
Dept: INFUSION THERAPY | Age: 78
Discharge: HOME | End: 2018-05-04
Payer: MEDICARE

## 2018-05-04 VITALS
DIASTOLIC BLOOD PRESSURE: 77 MMHG | RESPIRATION RATE: 16 BRPM | HEART RATE: 67 BPM | OXYGEN SATURATION: 97 % | BODY MASS INDEX: 21.52 KG/M2 | WEIGHT: 142 LBS | TEMPERATURE: 97.4 F | HEIGHT: 68 IN | SYSTOLIC BLOOD PRESSURE: 117 MMHG

## 2018-05-04 DIAGNOSIS — M48.061 SPINAL STENOSIS OF LUMBAR REGION WITHOUT NEUROGENIC CLAUDICATION: ICD-10-CM

## 2018-05-04 DIAGNOSIS — D70.9 NEUTROPENIA, UNSPECIFIED TYPE (HCC): Primary | ICD-10-CM

## 2018-05-04 DIAGNOSIS — D70.9 NEUTROPENIA, UNSPECIFIED TYPE (HCC): ICD-10-CM

## 2018-05-04 LAB
BASINOPHIL, AUTOMATED: 0 % (ref 0–3)
EOSINOPHILS RELATIVE PERCENT: 4 % (ref 0–4)
HCT VFR BLD CALC: 41.5 % (ref 37–47)
HEMOGLOBIN: 13.2 GM/DL (ref 12–16)
LYMPHOCYTES # BLD: 32 % (ref 15–47)
MCH RBC QN AUTO: 30.1 PG (ref 27–31)
MCHC RBC AUTO-ENTMCNC: 31.9 GM/DL (ref 33–37)
MCV RBC AUTO: 94 FL (ref 81–99)
MONOCYTES: 10 % (ref 0–12)
PDW BLD-RTO: 11.1 % (ref 11.5–14.5)
PLATELET # BLD: 260 THOU/MM3 (ref 130–400)
PMV BLD AUTO: 6.8 FL (ref 7.4–10.4)
RBC # BLD: 4.39 MILL/MM3 (ref 4.2–5.4)
SEG NEUTROPHILS: 53 % (ref 43–75)
WBC # BLD: 3 THOU/MM3 (ref 4.8–10.8)

## 2018-05-04 PROCEDURE — 99213 OFFICE O/P EST LOW 20 MIN: CPT | Performed by: INTERNAL MEDICINE

## 2018-05-04 PROCEDURE — 85025 COMPLETE CBC W/AUTO DIFF WBC: CPT

## 2018-05-04 PROCEDURE — 99211 OFF/OP EST MAY X REQ PHY/QHP: CPT

## 2018-05-04 PROCEDURE — 36415 COLL VENOUS BLD VENIPUNCTURE: CPT

## 2018-05-04 ASSESSMENT — ENCOUNTER SYMPTOMS
TROUBLE SWALLOWING: 1
SHORTNESS OF BREATH: 1
CONSTIPATION: 0
RECTAL PAIN: 0
COUGH: 0
SORE THROAT: 0
NAUSEA: 1
FACIAL SWELLING: 0
ABDOMINAL DISTENTION: 0
VOMITING: 0
BACK PAIN: 1
DIARRHEA: 0
CHEST TIGHTNESS: 0
EYE DISCHARGE: 0
COLOR CHANGE: 0
WHEEZING: 0
ABDOMINAL PAIN: 0
BLOOD IN STOOL: 0

## 2018-05-10 ENCOUNTER — OFFICE VISIT (OUTPATIENT)
Dept: CARDIOLOGY CLINIC | Age: 78
End: 2018-05-10
Payer: MEDICARE

## 2018-05-10 VITALS
HEART RATE: 70 BPM | HEIGHT: 67 IN | BODY MASS INDEX: 23.23 KG/M2 | WEIGHT: 148 LBS | SYSTOLIC BLOOD PRESSURE: 118 MMHG | DIASTOLIC BLOOD PRESSURE: 64 MMHG

## 2018-05-10 DIAGNOSIS — I10 ESSENTIAL (PRIMARY) HYPERTENSION: Primary | Chronic | ICD-10-CM

## 2018-05-10 DIAGNOSIS — I49.3 PVC (PREMATURE VENTRICULAR CONTRACTION): ICD-10-CM

## 2018-05-10 DIAGNOSIS — G62.9 NEUROPATHY: ICD-10-CM

## 2018-05-10 PROCEDURE — 93000 ELECTROCARDIOGRAM COMPLETE: CPT | Performed by: NUCLEAR MEDICINE

## 2018-05-10 PROCEDURE — 99213 OFFICE O/P EST LOW 20 MIN: CPT | Performed by: NUCLEAR MEDICINE

## 2018-05-31 ENCOUNTER — TELEPHONE (OUTPATIENT)
Dept: FAMILY MEDICINE CLINIC | Age: 78
End: 2018-05-31

## 2018-05-31 DIAGNOSIS — M54.41 CHRONIC BILATERAL LOW BACK PAIN WITH BILATERAL SCIATICA: Primary | ICD-10-CM

## 2018-05-31 DIAGNOSIS — G89.29 CHRONIC BILATERAL LOW BACK PAIN WITH BILATERAL SCIATICA: Primary | ICD-10-CM

## 2018-05-31 DIAGNOSIS — M54.42 CHRONIC BILATERAL LOW BACK PAIN WITH BILATERAL SCIATICA: Primary | ICD-10-CM

## 2018-06-06 ENCOUNTER — NURSE TRIAGE (OUTPATIENT)
Dept: ADMINISTRATIVE | Age: 78
End: 2018-06-06

## 2018-06-20 ENCOUNTER — OFFICE VISIT (OUTPATIENT)
Dept: FAMILY MEDICINE CLINIC | Age: 78
End: 2018-06-20
Payer: MEDICARE

## 2018-06-20 VITALS
HEART RATE: 99 BPM | BODY MASS INDEX: 23.63 KG/M2 | DIASTOLIC BLOOD PRESSURE: 68 MMHG | HEIGHT: 66 IN | TEMPERATURE: 97.8 F | SYSTOLIC BLOOD PRESSURE: 129 MMHG | RESPIRATION RATE: 12 BRPM | WEIGHT: 147 LBS

## 2018-06-20 DIAGNOSIS — M79.7 FIBROMYALGIA: Primary | ICD-10-CM

## 2018-06-20 DIAGNOSIS — I10 ESSENTIAL (PRIMARY) HYPERTENSION: Chronic | ICD-10-CM

## 2018-06-20 DIAGNOSIS — M54.40 CHRONIC LOW BACK PAIN WITH SCIATICA, SCIATICA LATERALITY UNSPECIFIED, UNSPECIFIED BACK PAIN LATERALITY: ICD-10-CM

## 2018-06-20 DIAGNOSIS — G89.29 CHRONIC LOW BACK PAIN WITH SCIATICA, SCIATICA LATERALITY UNSPECIFIED, UNSPECIFIED BACK PAIN LATERALITY: ICD-10-CM

## 2018-06-20 PROCEDURE — 99214 OFFICE O/P EST MOD 30 MIN: CPT | Performed by: FAMILY MEDICINE

## 2018-06-20 RX ORDER — GABAPENTIN 400 MG/1
400 CAPSULE ORAL 3 TIMES DAILY
COMMUNITY
Start: 2018-04-18 | End: 2018-06-20 | Stop reason: SDUPTHER

## 2018-06-20 RX ORDER — GABAPENTIN 400 MG/1
400 CAPSULE ORAL 4 TIMES DAILY
Qty: 120 CAPSULE | Refills: 3 | Status: SHIPPED | OUTPATIENT
Start: 2018-06-20 | End: 2018-09-10 | Stop reason: SDUPTHER

## 2018-06-29 RX ORDER — ENALAPRIL MALEATE 10 MG/1
5 TABLET ORAL DAILY
Qty: 30 TABLET | Refills: 5 | Status: SHIPPED | OUTPATIENT
Start: 2018-06-29 | End: 2019-08-24 | Stop reason: SDUPTHER

## 2018-07-09 NOTE — TELEPHONE ENCOUNTER
Patient was seen 09/27/17 and the medication for her cough is not helping. Her cough is not getting better and she is spitting up green and yellow film. Patient stated the gabapentin was increased to 600 mg three times a day and it is not helping.    Pharmacy:  [address]
Pt informed. Verbalized understanding.
patient brought to in house dental clinic by Ed staff

## 2018-07-26 ENCOUNTER — OFFICE VISIT (OUTPATIENT)
Dept: FAMILY MEDICINE CLINIC | Age: 78
End: 2018-07-26
Payer: MEDICARE

## 2018-07-26 VITALS
HEART RATE: 84 BPM | BODY MASS INDEX: 23.39 KG/M2 | TEMPERATURE: 98.1 F | HEIGHT: 67 IN | RESPIRATION RATE: 16 BRPM | WEIGHT: 149 LBS | SYSTOLIC BLOOD PRESSURE: 122 MMHG | DIASTOLIC BLOOD PRESSURE: 64 MMHG

## 2018-07-26 DIAGNOSIS — M79.7 FIBROMYALGIA: ICD-10-CM

## 2018-07-26 DIAGNOSIS — M54.10 RADICULOPATHY, UNSPECIFIED SPINAL REGION: Primary | ICD-10-CM

## 2018-07-26 PROCEDURE — 99213 OFFICE O/P EST LOW 20 MIN: CPT | Performed by: FAMILY MEDICINE

## 2018-07-26 RX ORDER — AMITRIPTYLINE HYDROCHLORIDE 10 MG/1
10 TABLET, FILM COATED ORAL NIGHTLY
Qty: 30 TABLET | Refills: 5 | Status: SHIPPED | OUTPATIENT
Start: 2018-07-26 | End: 2019-04-18

## 2018-07-26 NOTE — PROGRESS NOTES
Viral Gunter is a 76 y.o. female that presents for Follow-up and Discuss Medications (gabepentin not helping )        HPI:    Patient resents for follow up of chronic pain. I had increased her gabapentin at last visit. States that she did not notice any difference with increasing this. She is getting a tingling/burning sensation of the arms and legs. Has a history of radiculopathy. Feel like the arm pain is advancing. Has seen pain management and had an RFA and states that this helps. She is on cymbalta.       Health Maintenance   Topic Date Due    DTaP/Tdap/Td vaccine (1 - Tdap) 10/17/1957    Shingles Vaccine (1 of 2 - 2 Dose Series) 10/17/1988    Pneumococcal low/med risk (2 of 2 - PCV13) 10/12/2011    TSH testing  06/29/2018    Flu vaccine (1) 09/01/2018    Potassium monitoring  09/15/2018    Creatinine monitoring  09/15/2018    DEXA (modify frequency per FRAX score)  Completed       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 Humza Vitale GERD (gastroesophageal reflux disease)     Diverticulitis     Hiatal hernia     Hypertension     Hypothyroidism     Osteoarthritis        Past Surgical History:   Procedure Laterality Date    APPENDECTOMY  1958    BACK SURGERY      CARPAL TUNNEL RELEASE Bilateral 2013    w/cubital tunnel    COLON SURGERY  07/29/2016    Procedure: ATTEMPTED DAVINCI XI ROBOTIC ASSISTED SIGMOID COLON RESECTION, ROBOTIC ASSISTED MOBILIZATION OF RECTAL SIGMOID TO SPLENIC FLEXURE, CONVERTED TO OPEN LEFT HEMICOLECTOMY WITH COLOPROCTOCTOMY, SPLENORRHAPHY, RIGID SIGMOIDOSCOPY, LASER EVALUATION OF VASCULARITY WITH ICG; Surgeon: Humza Vitale MD; Location: [facility]; Service: General    COLONOSCOPY  2012    CYST REMOVAL  2010    EYE

## 2018-09-04 RX ORDER — LEVOTHYROXINE SODIUM 0.15 MG/1
150 TABLET ORAL DAILY
Qty: 90 TABLET | Refills: 3 | Status: SHIPPED | OUTPATIENT
Start: 2018-09-04 | End: 2019-02-11 | Stop reason: SDUPTHER

## 2018-09-10 DIAGNOSIS — M54.40 CHRONIC LOW BACK PAIN WITH SCIATICA, SCIATICA LATERALITY UNSPECIFIED, UNSPECIFIED BACK PAIN LATERALITY: ICD-10-CM

## 2018-09-10 DIAGNOSIS — M79.7 FIBROMYALGIA: ICD-10-CM

## 2018-09-10 DIAGNOSIS — G89.29 CHRONIC LOW BACK PAIN WITH SCIATICA, SCIATICA LATERALITY UNSPECIFIED, UNSPECIFIED BACK PAIN LATERALITY: ICD-10-CM

## 2018-09-10 RX ORDER — GABAPENTIN 400 MG/1
400 CAPSULE ORAL 4 TIMES DAILY
Qty: 120 CAPSULE | Refills: 3 | Status: SHIPPED | OUTPATIENT
Start: 2018-09-10 | End: 2019-01-08 | Stop reason: SDUPTHER

## 2018-09-27 DIAGNOSIS — I49.3 FREQUENT PVCS: ICD-10-CM

## 2018-09-27 RX ORDER — METOPROLOL SUCCINATE 25 MG/1
25 TABLET, EXTENDED RELEASE ORAL DAILY
Qty: 90 TABLET | Refills: 3 | Status: SHIPPED | OUTPATIENT
Start: 2018-09-27 | End: 2019-09-23 | Stop reason: SDUPTHER

## 2018-10-18 ENCOUNTER — OFFICE VISIT (OUTPATIENT)
Dept: FAMILY MEDICINE CLINIC | Age: 78
End: 2018-10-18
Payer: MEDICARE

## 2018-10-18 VITALS
TEMPERATURE: 98.1 F | DIASTOLIC BLOOD PRESSURE: 70 MMHG | HEIGHT: 67 IN | WEIGHT: 152 LBS | BODY MASS INDEX: 23.86 KG/M2 | RESPIRATION RATE: 16 BRPM | SYSTOLIC BLOOD PRESSURE: 124 MMHG | HEART RATE: 68 BPM

## 2018-10-18 DIAGNOSIS — M54.42 CHRONIC BILATERAL LOW BACK PAIN WITH BILATERAL SCIATICA: Primary | ICD-10-CM

## 2018-10-18 DIAGNOSIS — M79.7 FIBROMYALGIA: ICD-10-CM

## 2018-10-18 DIAGNOSIS — G89.29 CHRONIC BILATERAL LOW BACK PAIN WITH BILATERAL SCIATICA: Primary | ICD-10-CM

## 2018-10-18 DIAGNOSIS — E03.9 ACQUIRED HYPOTHYROIDISM: ICD-10-CM

## 2018-10-18 DIAGNOSIS — M54.41 CHRONIC BILATERAL LOW BACK PAIN WITH BILATERAL SCIATICA: Primary | ICD-10-CM

## 2018-10-18 DIAGNOSIS — I10 ESSENTIAL (PRIMARY) HYPERTENSION: Chronic | ICD-10-CM

## 2018-10-18 PROCEDURE — 99214 OFFICE O/P EST MOD 30 MIN: CPT | Performed by: FAMILY MEDICINE

## 2018-10-18 NOTE — PROGRESS NOTES
 Depression Brother     Cancer Maternal Aunt     Early Death Maternal Aunt     Diabetes Maternal Grandmother     Heart Disease Paternal Grandmother          I have reviewed the patient's past medical history, past surgical history, allergies, medications, social and family history and I have made updates where appropriate. Review of Systems        PHYSICAL EXAM:  /70   Pulse 68   Temp 98.1 °F (36.7 °C) (Oral)   Resp 16   Ht 5' 7\" (1.702 m)   Wt 152 lb (68.9 kg)   BMI 23.81 kg/m²     General Appearance: well developed and well- nourished, in no acute distress  Head: normocephalic and atraumatic  ENT: external ear and ear canal normal bilaterally, nose without deformity, nasal mucosa and turbinates normal without polyps, oropharynx normal, dentition is normal for age, no lipor gum lesions noted  Neck: supple and non-tender without mass, no thyromegaly or thyroid nodules, no cervical lymphadenopathy  Pulmonary/Chest: clear to auscultation bilaterally- no wheezes, rales or rhonchi, normal air movement, no respiratory distress or retractions  Cardiovascular: normal rate, regular rhythm, normal S1 and S2, no murmurs, rubs, clicks, or gallops, distal pulses intact  Abdomen: soft, non-tender, non-distended, no rebound or guarding, no masses or hernias noted, no hepatospleenomegaly  Extremities: no cyanosis, clubbing or edema of the lower extremities  Psych:  Normal affect without evidence of depression oranxiety, insight and judgement are appropriate, memory appears intact  Skin: warm and dry, no rash or erythema      ASSESSMENT & PLAN  Minh Delgado was seen today for follow-up, other and other. Diagnoses and all orders for this visit:    Chronic bilateral low back pain with bilateral sciatica    Fibromyalgia    Essential (primary) hypertension  -     Basic Metabolic Panel; Future    Acquired hypothyroidism  -     TSH with Reflex;  Future    -Chronic pain/Back pain/FM is at her baseline, continue gabapentin and Cymbalta, she wants to start the amitriptyline  -HTN controlled, continue current regimen    Return in about 6 months (around 4/18/2019), or if symptoms worsen or fail to improve. Controlled Substances Monitoring: The Prescription Monitoring Report for this patient was reviewed today. Yoko Pierce, DO)    No signs of potential drug abuse or diversion identified. Rambo Torres DO)          Alline Body received counseling on the following healthy behaviors: medication adherence  Reviewed prior labs and health maintenance. Continue current medications, diet and exercise. Discussed use, benefit, and side effects of prescribed medications. Barriers to medication compliance addressed. Patient given educational materials - see patient instructions. All patient questions answered. Patient voiced understanding.

## 2018-10-18 NOTE — PROGRESS NOTES
Have you changed or started any medications since your last visit including any over-the-counter medicines, vitamins, or herbal medicines? no   Are you having any side effects from any of your medications? -  no  Have you stopped taking any of your medications? Is so, why? -  no    Have you seen any other physician or provider since your last visit? No  Have you had any other diagnostic tests since your last visit? No  Have you been seen in the emergency room and/or had an admission to a hospital since we last saw you? No  Have you had your routine dental cleaning in the past 6 months? no    Have you activated your [employer] account? If not, what are your barriers? No:      Patient Care Team:  Baron Glynns, DO as PCP - General  Montaño Shoulders, DO as PCP - [facility] Attributed Provider    Medical History Review  Past Medical, Family, and Social History     Defer to provider.

## 2018-11-05 DIAGNOSIS — D64.9 NORMOCYTIC ANEMIA: Primary | ICD-10-CM

## 2018-11-06 ENCOUNTER — HOSPITAL ENCOUNTER (OUTPATIENT)
Dept: INFUSION THERAPY | Age: 78
Discharge: HOME | End: 2018-11-06
Payer: MEDICARE

## 2018-11-06 ENCOUNTER — OFFICE VISIT (OUTPATIENT)
Dept: ONCOLOGY | Age: 78
End: 2018-11-06
Payer: MEDICARE

## 2018-11-06 VITALS
WEIGHT: 152 LBS | TEMPERATURE: 97.2 F | RESPIRATION RATE: 18 BRPM | HEIGHT: 67 IN | BODY MASS INDEX: 23.86 KG/M2 | OXYGEN SATURATION: 96 % | SYSTOLIC BLOOD PRESSURE: 130 MMHG | DIASTOLIC BLOOD PRESSURE: 78 MMHG | HEART RATE: 65 BPM

## 2018-11-06 DIAGNOSIS — D64.9 NORMOCYTIC ANEMIA: ICD-10-CM

## 2018-11-06 DIAGNOSIS — D70.9 NEUTROPENIA, UNSPECIFIED TYPE (HCC): Primary | ICD-10-CM

## 2018-11-06 LAB
ALBUMIN SERPL-MCNC: 3.8 G/DL (ref 3.5–5.1)
ALP BLD-CCNC: 107 U/L (ref 38–126)
ALT SERPL-CCNC: 12 U/L (ref 11–66)
AST SERPL-CCNC: 24 U/L (ref 5–40)
BASINOPHIL, AUTOMATED: 0 % (ref 0–3)
BILIRUB SERPL-MCNC: 0.3 MG/DL (ref 0.3–1.2)
BILIRUBIN DIRECT: < 0.2 MG/DL (ref 0–0.3)
BUN, WHOLE BLOOD: 15 MG/DL (ref 8–26)
CHLORIDE, WHOLE BLOOD: 95 MEQ/L (ref 98–109)
CREATININE, WHOLE BLOOD: 0.8 MG/DL (ref 0.5–1.2)
EOSINOPHILS RELATIVE PERCENT: 6 % (ref 0–4)
GFR, ESTIMATED: 73 ML/MIN/1.73M2
GLUCOSE, WHOLE BLOOD: 99 MG/DL (ref 70–108)
HCT VFR BLD CALC: 38.7 % (ref 37–47)
HEMOGLOBIN: 13.3 GM/DL (ref 12–16)
IONIZED CALCIUM, WHOLE BLOOD: 1.2 MMOL/L (ref 1.12–1.32)
LYMPHOCYTES # BLD: 24 % (ref 15–47)
MCH RBC QN AUTO: 31.1 PG (ref 27–31)
MCHC RBC AUTO-ENTMCNC: 34.3 GM/DL (ref 33–37)
MCV RBC AUTO: 91 FL (ref 81–99)
MONOCYTES: 10 % (ref 0–12)
PDW BLD-RTO: 10.9 % (ref 11.5–14.5)
PLATELET # BLD: 236 THOU/MM3 (ref 130–400)
PMV BLD AUTO: 6.8 FL (ref 7.4–10.4)
POTASSIUM, WHOLE BLOOD: 4.7 MEQ/L (ref 3.5–4.9)
RBC # BLD: 4.26 MILL/MM3 (ref 4.2–5.4)
SEG NEUTROPHILS: 60 % (ref 43–75)
SODIUM, WHOLE BLOOD: 132 MEQ/L (ref 138–146)
TOTAL CO2, WHOLE BLOOD: 29 MEQ/L (ref 23–33)
TOTAL PROTEIN: 7.1 G/DL (ref 6.1–8)
WBC # BLD: 3.6 THOU/MM3 (ref 4.8–10.8)

## 2018-11-06 PROCEDURE — 99211 OFF/OP EST MAY X REQ PHY/QHP: CPT

## 2018-11-06 PROCEDURE — 80076 HEPATIC FUNCTION PANEL: CPT

## 2018-11-06 PROCEDURE — 85025 COMPLETE CBC W/AUTO DIFF WBC: CPT

## 2018-11-06 PROCEDURE — 36415 COLL VENOUS BLD VENIPUNCTURE: CPT

## 2018-11-06 PROCEDURE — 80047 BASIC METABLC PNL IONIZED CA: CPT

## 2018-11-06 PROCEDURE — 99213 OFFICE O/P EST LOW 20 MIN: CPT | Performed by: INTERNAL MEDICINE

## 2018-11-06 ASSESSMENT — ENCOUNTER SYMPTOMS
ABDOMINAL PAIN: 0
BLOOD IN STOOL: 0
RECTAL PAIN: 0
WHEEZING: 0
SHORTNESS OF BREATH: 1
CONSTIPATION: 0
CHEST TIGHTNESS: 0
COUGH: 0
BACK PAIN: 1
VOMITING: 0
SORE THROAT: 0
NAUSEA: 1
FACIAL SWELLING: 0
TROUBLE SWALLOWING: 1
ABDOMINAL DISTENTION: 0
DIARRHEA: 0
EYE DISCHARGE: 0
COLOR CHANGE: 0

## 2019-01-08 DIAGNOSIS — M79.7 FIBROMYALGIA: ICD-10-CM

## 2019-01-08 DIAGNOSIS — G89.29 CHRONIC LOW BACK PAIN WITH SCIATICA, SCIATICA LATERALITY UNSPECIFIED, UNSPECIFIED BACK PAIN LATERALITY: ICD-10-CM

## 2019-01-08 DIAGNOSIS — M54.40 CHRONIC LOW BACK PAIN WITH SCIATICA, SCIATICA LATERALITY UNSPECIFIED, UNSPECIFIED BACK PAIN LATERALITY: ICD-10-CM

## 2019-01-08 RX ORDER — GABAPENTIN 400 MG/1
400 CAPSULE ORAL 4 TIMES DAILY
Qty: 120 CAPSULE | Refills: 3 | Status: SHIPPED | OUTPATIENT
Start: 2019-01-08 | End: 2019-05-07 | Stop reason: SDUPTHER

## 2019-01-21 RX ORDER — DULOXETIN HYDROCHLORIDE 60 MG/1
60 CAPSULE, DELAYED RELEASE ORAL DAILY
Qty: 90 CAPSULE | Refills: 3 | Status: SHIPPED | OUTPATIENT
Start: 2019-01-21 | End: 2020-01-16

## 2019-01-24 ENCOUNTER — TELEPHONE (OUTPATIENT)
Dept: FAMILY MEDICINE CLINIC | Age: 79
End: 2019-01-24

## 2019-02-08 ENCOUNTER — TELEPHONE (OUTPATIENT)
Dept: FAMILY MEDICINE CLINIC | Age: 79
End: 2019-02-08

## 2019-02-08 ENCOUNTER — HOSPITAL ENCOUNTER (OUTPATIENT)
Age: 79
Discharge: HOME | End: 2019-02-08
Payer: MEDICARE

## 2019-02-08 DIAGNOSIS — E03.9 ACQUIRED HYPOTHYROIDISM: ICD-10-CM

## 2019-02-08 DIAGNOSIS — I10 ESSENTIAL (PRIMARY) HYPERTENSION: Chronic | ICD-10-CM

## 2019-02-08 LAB
ANION GAP SERPL CALCULATED.3IONS-SCNC: 11 MEQ/L (ref 8–16)
BUN BLDV-MCNC: 7 MG/DL (ref 7–22)
CALCIUM SERPL-MCNC: 9.1 MG/DL (ref 8.5–10.5)
CHLORIDE BLD-SCNC: 95 MEQ/L (ref 98–111)
CO2: 26 MEQ/L (ref 23–33)
CREAT SERPL-MCNC: 0.6 MG/DL (ref 0.4–1.2)
GFR SERPL CREATININE-BSD FRML MDRD: > 90 ML/MIN/1.73M2
GLUCOSE BLD-MCNC: 113 MG/DL (ref 70–108)
POTASSIUM SERPL-SCNC: 4.8 MEQ/L (ref 3.5–5.2)
SODIUM BLD-SCNC: 132 MEQ/L (ref 135–145)
T4 FREE: 1.92 NG/DL (ref 0.93–1.76)
TSH SERPL DL<=0.05 MIU/L-ACNC: 0.02 UIU/ML (ref 0.4–4.2)

## 2019-02-08 PROCEDURE — 36415 COLL VENOUS BLD VENIPUNCTURE: CPT

## 2019-02-08 PROCEDURE — 80048 BASIC METABOLIC PNL TOTAL CA: CPT

## 2019-02-08 PROCEDURE — 84439 ASSAY OF FREE THYROXINE: CPT

## 2019-02-08 PROCEDURE — 84443 ASSAY THYROID STIM HORMONE: CPT

## 2019-02-11 ENCOUNTER — TELEPHONE (OUTPATIENT)
Dept: FAMILY MEDICINE CLINIC | Age: 79
End: 2019-02-11

## 2019-02-11 DIAGNOSIS — E03.9 ACQUIRED HYPOTHYROIDISM: Primary | ICD-10-CM

## 2019-02-11 RX ORDER — LEVOTHYROXINE SODIUM 0.12 MG/1
125 TABLET ORAL DAILY
Qty: 30 TABLET | Refills: 5 | Status: SHIPPED | OUTPATIENT
Start: 2019-02-11 | End: 2019-07-26 | Stop reason: SDUPTHER

## 2019-02-14 ENCOUNTER — OFFICE VISIT (OUTPATIENT)
Dept: FAMILY MEDICINE CLINIC | Age: 79
End: 2019-02-14
Payer: MEDICARE

## 2019-02-14 VITALS
HEART RATE: 64 BPM | SYSTOLIC BLOOD PRESSURE: 132 MMHG | RESPIRATION RATE: 14 BRPM | DIASTOLIC BLOOD PRESSURE: 80 MMHG | TEMPERATURE: 98.2 F

## 2019-02-14 DIAGNOSIS — J04.0 LARYNGITIS: Primary | ICD-10-CM

## 2019-02-14 PROCEDURE — 99213 OFFICE O/P EST LOW 20 MIN: CPT | Performed by: FAMILY MEDICINE

## 2019-02-14 RX ORDER — AZITHROMYCIN 250 MG/1
250 TABLET, FILM COATED ORAL SEE ADMIN INSTRUCTIONS
Qty: 6 TABLET | Refills: 0 | Status: SHIPPED | OUTPATIENT
Start: 2019-02-14 | End: 2019-02-19

## 2019-02-14 RX ORDER — PREDNISONE 20 MG/1
40 TABLET ORAL DAILY
Qty: 10 TABLET | Refills: 0 | Status: SHIPPED | OUTPATIENT
Start: 2019-02-14 | End: 2019-02-19

## 2019-02-14 ASSESSMENT — PATIENT HEALTH QUESTIONNAIRE - PHQ9
SUM OF ALL RESPONSES TO PHQ QUESTIONS 1-9: 0
1. LITTLE INTEREST OR PLEASURE IN DOING THINGS: 0
SUM OF ALL RESPONSES TO PHQ QUESTIONS 1-9: 0
2. FEELING DOWN, DEPRESSED OR HOPELESS: 0
SUM OF ALL RESPONSES TO PHQ9 QUESTIONS 1 & 2: 0

## 2019-03-15 ENCOUNTER — APPOINTMENT (OUTPATIENT)
Dept: GENERAL RADIOLOGY | Age: 79
End: 2019-03-15
Payer: MEDICARE

## 2019-03-15 ENCOUNTER — HOSPITAL ENCOUNTER (EMERGENCY)
Age: 79
Discharge: HOME | End: 2019-03-15
Attending: EMERGENCY MEDICINE
Payer: MEDICARE

## 2019-03-15 ENCOUNTER — APPOINTMENT (OUTPATIENT)
Dept: CT IMAGING | Age: 79
End: 2019-03-15
Payer: MEDICARE

## 2019-03-15 VITALS
TEMPERATURE: 97.6 F | HEIGHT: 67 IN | BODY MASS INDEX: 23.54 KG/M2 | WEIGHT: 150 LBS | SYSTOLIC BLOOD PRESSURE: 152 MMHG | HEART RATE: 71 BPM | RESPIRATION RATE: 19 BRPM | OXYGEN SATURATION: 96 % | DIASTOLIC BLOOD PRESSURE: 86 MMHG
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
## 2019-03-15 DIAGNOSIS — E87.1 HYPONATREMIA: ICD-10-CM

## 2019-03-15 DIAGNOSIS — S60.211A CONTUSION OF RIGHT WRIST, INITIAL ENCOUNTER: ICD-10-CM

## 2019-03-15 DIAGNOSIS — S92.514A CLOSED NONDISPLACED FRACTURE OF PROXIMAL PHALANX OF LESSER TOE OF RIGHT FOOT, INITIAL ENCOUNTER: ICD-10-CM

## 2019-03-15 DIAGNOSIS — S70.01XA CONTUSION OF RIGHT HIP, INITIAL ENCOUNTER: ICD-10-CM

## 2019-03-15 LAB
ANION GAP SERPL CALCULATED.3IONS-SCNC: 11 MEQ/L (ref 8–16)
BASOPHILS # BLD: 2.3 %
BASOPHILS ABSOLUTE: 0.1 THOU/MM3 (ref 0–0.1)
BUN BLDV-MCNC: 7 MG/DL (ref 7–22)
CALCIUM SERPL-MCNC: 8.8 MG/DL (ref 8.5–10.5)
CHLORIDE BLD-SCNC: 90 MEQ/L (ref 98–111)
CO2: 27 MEQ/L (ref 23–33)
CREAT SERPL-MCNC: 0.6 MG/DL (ref 0.4–1.2)
EKG ATRIAL RATE: 64 BPM
EKG P AXIS: 57 DEGREES
EKG P-R INTERVAL: 168 MS
EKG Q-T INTERVAL: 376 MS
EKG QRS DURATION: 76 MS
EKG QTC CALCULATION (BAZETT): 387 MS
EKG R AXIS: 15 DEGREES
EKG T AXIS: 57 DEGREES
EKG VENTRICULAR RATE: 64 BPM
EOSINOPHIL # BLD: 6.3 %
EOSINOPHILS ABSOLUTE: 0.2 THOU/MM3 (ref 0–0.4)
ERYTHROCYTE [DISTWIDTH] IN BLOOD BY AUTOMATED COUNT: 12.6 % (ref 11.5–14.5)
ERYTHROCYTE [DISTWIDTH] IN BLOOD BY AUTOMATED COUNT: 42.3 FL (ref 35–45)
GFR SERPL CREATININE-BSD FRML MDRD: > 90 ML/MIN/1.73M2
GLUCOSE BLD-MCNC: 84 MG/DL (ref 70–108)
HCT VFR BLD CALC: 39.2 % (ref 37–47)
HEMOGLOBIN: 12.9 GM/DL (ref 12–16)
IMMATURE GRANS (ABS): 0.02 THOU/MM3 (ref 0–0.07)
IMMATURE GRANULOCYTES: 0.6 %
LYMPHOCYTES # BLD: 24.5 %
LYMPHOCYTES ABSOLUTE: 0.9 THOU/MM3 (ref 1–4.8)
MCH RBC QN AUTO: 30.4 PG (ref 26–33)
MCHC RBC AUTO-ENTMCNC: 32.9 GM/DL (ref 32.2–35.5)
MCV RBC AUTO: 92.2 FL (ref 81–99)
MONOCYTES # BLD: 10.1 %
MONOCYTES ABSOLUTE: 0.4 THOU/MM3 (ref 0.4–1.3)
NUCLEATED RED BLOOD CELLS: 0 /100 WBC
OSMOLALITY CALCULATION: 254.2 MOSMOL/KG (ref 275–300)
PLATELET # BLD: 264 THOU/MM3 (ref 130–400)
PMV BLD AUTO: 9.2 FL (ref 9.4–12.4)
POTASSIUM REFLEX MAGNESIUM: 4.3 MEQ/L (ref 3.5–5.2)
RBC # BLD: 4.25 MILL/MM3 (ref 4.2–5.4)
SEG NEUTROPHILS: 56.2 %
SEGMENTED NEUTROPHILS ABSOLUTE COUNT: 2 THOU/MM3 (ref 1.8–7.7)
SODIUM BLD-SCNC: 128 MEQ/L (ref 135–145)
WBC # BLD: 3.5 THOU/MM3 (ref 4.8–10.8)

## 2019-03-15 PROCEDURE — 73562 X-RAY EXAM OF KNEE 3: CPT

## 2019-03-15 PROCEDURE — 73120 X-RAY EXAM OF HAND: CPT

## 2019-03-15 PROCEDURE — 80048 BASIC METABOLIC PNL TOTAL CA: CPT

## 2019-03-15 PROCEDURE — 73630 X-RAY EXAM OF FOOT: CPT

## 2019-03-15 PROCEDURE — 73502 X-RAY EXAM HIP UNI 2-3 VIEWS: CPT

## 2019-03-15 PROCEDURE — 73610 X-RAY EXAM OF ANKLE: CPT

## 2019-03-15 PROCEDURE — 73090 X-RAY EXAM OF FOREARM: CPT

## 2019-03-15 PROCEDURE — 70450 CT HEAD/BRAIN W/O DYE: CPT

## 2019-03-15 PROCEDURE — 36415 COLL VENOUS BLD VENIPUNCTURE: CPT

## 2019-03-15 PROCEDURE — 99284 EMERGENCY DEPT VISIT MOD MDM: CPT

## 2019-03-15 PROCEDURE — 93010 ELECTROCARDIOGRAM REPORT: CPT | Performed by: INTERNAL MEDICINE

## 2019-03-15 PROCEDURE — 73130 X-RAY EXAM OF HAND: CPT

## 2019-03-15 PROCEDURE — 73564 X-RAY EXAM KNEE 4 OR MORE: CPT

## 2019-03-15 PROCEDURE — 93005 ELECTROCARDIOGRAM TRACING: CPT | Performed by: EMERGENCY MEDICINE

## 2019-03-15 PROCEDURE — 85025 COMPLETE CBC W/AUTO DIFF WBC: CPT

## 2019-03-15 ASSESSMENT — ENCOUNTER SYMPTOMS
NAUSEA: 0
SHORTNESS OF BREATH: 0
COUGH: 0
BACK PAIN: 0
ABDOMINAL PAIN: 0
DIARRHEA: 0
WHEEZING: 0
RHINORRHEA: 0
EYE PAIN: 0
SORE THROAT: 0
VOMITING: 0
EYE DISCHARGE: 0

## 2019-03-15 ASSESSMENT — PAIN DESCRIPTION - LOCATION: LOCATION: HIP;SHOULDER;WRIST

## 2019-03-15 ASSESSMENT — PAIN SCALES - GENERAL: PAINLEVEL_OUTOF10: 6

## 2019-03-15 ASSESSMENT — PAIN DESCRIPTION - DESCRIPTORS: DESCRIPTORS: ACHING

## 2019-03-15 ASSESSMENT — PAIN DESCRIPTION - ORIENTATION: ORIENTATION: RIGHT

## 2019-03-15 ASSESSMENT — PAIN DESCRIPTION - PAIN TYPE: TYPE: ACUTE PAIN

## 2019-03-18 ENCOUNTER — CARE COORDINATION (OUTPATIENT)
Dept: CASE MANAGEMENT | Age: 79
End: 2019-03-18

## 2019-03-20 ENCOUNTER — OFFICE VISIT (OUTPATIENT)
Dept: FAMILY MEDICINE CLINIC | Age: 79
End: 2019-03-20
Payer: MEDICARE

## 2019-03-20 VITALS
HEIGHT: 67 IN | BODY MASS INDEX: 24.48 KG/M2 | SYSTOLIC BLOOD PRESSURE: 148 MMHG | WEIGHT: 156 LBS | HEART RATE: 68 BPM | DIASTOLIC BLOOD PRESSURE: 80 MMHG | TEMPERATURE: 98.1 F | RESPIRATION RATE: 16 BRPM

## 2019-03-20 DIAGNOSIS — M54.42 CHRONIC BILATERAL LOW BACK PAIN WITH BILATERAL SCIATICA: Primary | ICD-10-CM

## 2019-03-20 DIAGNOSIS — G89.29 CHRONIC BILATERAL LOW BACK PAIN WITH BILATERAL SCIATICA: Primary | ICD-10-CM

## 2019-03-20 DIAGNOSIS — R29.6 FALLS FREQUENTLY: ICD-10-CM

## 2019-03-20 DIAGNOSIS — E03.9 ACQUIRED HYPOTHYROIDISM: ICD-10-CM

## 2019-03-20 DIAGNOSIS — M54.41 CHRONIC BILATERAL LOW BACK PAIN WITH BILATERAL SCIATICA: Primary | ICD-10-CM

## 2019-03-20 DIAGNOSIS — Z91.81 AT HIGH RISK FOR FALLS: ICD-10-CM

## 2019-03-20 DIAGNOSIS — R53.81 PHYSICAL DECONDITIONING: ICD-10-CM

## 2019-03-20 DIAGNOSIS — D70.9 NEUTROPENIA, UNSPECIFIED TYPE (HCC): ICD-10-CM

## 2019-03-20 PROCEDURE — 99213 OFFICE O/P EST LOW 20 MIN: CPT | Performed by: FAMILY MEDICINE

## 2019-03-20 PROCEDURE — 3288F FALL RISK ASSESSMENT DOCD: CPT | Performed by: FAMILY MEDICINE

## 2019-04-12 ENCOUNTER — OFFICE VISIT (OUTPATIENT)
Dept: FAMILY MEDICINE CLINIC | Age: 79
End: 2019-04-12
Payer: MEDICARE

## 2019-04-12 VITALS
SYSTOLIC BLOOD PRESSURE: 120 MMHG | TEMPERATURE: 98 F | RESPIRATION RATE: 16 BRPM | HEART RATE: 88 BPM | DIASTOLIC BLOOD PRESSURE: 74 MMHG

## 2019-04-12 DIAGNOSIS — N30.00 ACUTE CYSTITIS WITHOUT HEMATURIA: Primary | ICD-10-CM

## 2019-04-12 LAB
BILIRUBIN, POC: NEGATIVE
BLOOD URINE, POC: NORMAL
CLARITY, POC: NORMAL
COLOR, POC: YELLOW
GLUCOSE URINE, POC: NEGATIVE
KETONES, POC: NEGATIVE
LEUKOCYTE EST, POC: NORMAL
NITRITE, POC: NEGATIVE
PH, POC: 7
PROTEIN, POC: NORMAL
SPECIFIC GRAVITY, POC: 1.01
UROBILINOGEN, POC: 0.2

## 2019-04-12 PROCEDURE — 81003 URINALYSIS AUTO W/O SCOPE: CPT | Performed by: FAMILY MEDICINE

## 2019-04-12 PROCEDURE — 99213 OFFICE O/P EST LOW 20 MIN: CPT | Performed by: FAMILY MEDICINE

## 2019-04-12 RX ORDER — CIPROFLOXACIN 500 MG/1
500 TABLET, FILM COATED ORAL 2 TIMES DAILY
Qty: 14 TABLET | Refills: 0 | Status: SHIPPED | OUTPATIENT
Start: 2019-04-12 | End: 2019-04-15

## 2019-04-12 NOTE — PROGRESS NOTES
connections:     Talks on phone: Not on file     Gets together: Not on file     Attends Restoration[employer] service: Not on file     Active member of club or organization: Not on file     Attends meetings of clubs or organizations: Not on file     Relationship status: Not on file    Intimate partner violence:     Fear of current or ex partner: Not on file     Emotionally abused: Not on file     Physically abused: Not on file     Forced sexual activity: Not on file   Other Topics Concern    Not on file   Social History Narrative    Not on file       Family History   Problem Relation Age of Onset    Arthritis Mother     Hearing Loss Mother     High Cholesterol Mother     Hearing Loss Father     Heart Disease Father 76        CABG    Stroke Father     Arthritis Sister     Depression Sister     Diabetes Sister     Arthritis Brother     Depression Brother     Cancer Maternal Aunt     Early Death Maternal Aunt     Diabetes Maternal Grandmother     Heart Disease Paternal Grandmother            OBJECTIVE:  /74   Pulse 88   Temp 98 °F (36.7 °C) (Oral)   Resp 16   She appears well; non-toxic and in no apparent distress. Physical Examination: Chest - clear to auscultation, no wheezes, rales or rhonchi, symmetric air entry  Heart - normal rate, regular rhythm, normal S1, S2, no murmurs, rubs, clicks or gallops  Lungs - clear to auscultation, no wheezes, rales or rhonchi, symmetric air entry. Abdomen - soft, nontender, nondistended, no masses or organomegaly, no CVA tenderness  Extremities - peripheral pulses normal, no pedal edema, no clubbing or cyanosis  Psych -  Affect appropriate. Thought process is normal without evidence of depression or psychosis. Good insight and appropriae interaction. Cognition and memory appear to be intact. ASSESSMENT & PLAN  Percy La was seen today for other.     Diagnoses and all orders for this visit:    Acute cystitis without hematuria  -     POCT Urinalysis No Micro (Auto)  -     Urine Culture; Future  -     ciprofloxacin (CIPRO) 500 MG tablet; Take 1 tablet by mouth 2 times daily for 7 days        Return if symptoms worsen or fail to improve. Will treat with cipro. Will also send urine for culture. Advised on conservative treatment. Nina Archiedaisy received counseling on the following healthy behaviors: medication adherence  Reviewed prior labs and health maintenance. Continue current medications, diet and exercise. Discussed use, benefit, and side effects of prescribed medications. Barriers to medication compliance addressed. Patient given educational materials - see patient instructions. All patient questions answered. Patient voiced understanding.

## 2019-04-14 LAB
ORGANISM: ABNORMAL
URINE CULTURE, ROUTINE: ABNORMAL

## 2019-04-15 ENCOUNTER — TELEPHONE (OUTPATIENT)
Dept: FAMILY MEDICINE CLINIC | Age: 79
End: 2019-04-15

## 2019-04-15 RX ORDER — NITROFURANTOIN 25; 75 MG/1; MG/1
100 CAPSULE ORAL 2 TIMES DAILY
Qty: 10 CAPSULE | Refills: 0 | Status: SHIPPED | OUTPATIENT
Start: 2019-04-15 | End: 2019-04-20

## 2019-04-15 NOTE — TELEPHONE ENCOUNTER
----- Message from Gayatri Holliday DO sent at 4/15/2019  8:12 AM EDT -----  Culture indicates that Cipro may not be effective for her UTI. I want her to stop the cipro and I will send macrobid to the pharmacy in its place.

## 2019-04-15 NOTE — TELEPHONE ENCOUNTER
Pt notified via VM (ok per hipaa) of Macrobid at pharmacy for UTI, advised to stop the Cipro as directed, advised to call back for any additional questions or concerns

## 2019-04-16 ENCOUNTER — NURSE ONLY (OUTPATIENT)
Dept: LAB | Age: 79
End: 2019-04-16

## 2019-04-16 DIAGNOSIS — E03.9 ACQUIRED HYPOTHYROIDISM: ICD-10-CM

## 2019-04-16 LAB
ANION GAP SERPL CALCULATED.3IONS-SCNC: 10 MEQ/L (ref 8–16)
BUN BLDV-MCNC: 9 MG/DL (ref 7–22)
CALCIUM SERPL-MCNC: 8.8 MG/DL (ref 8.5–10.5)
CHLORIDE BLD-SCNC: 96 MEQ/L (ref 98–111)
CO2: 27 MEQ/L (ref 23–33)
CREAT SERPL-MCNC: 0.6 MG/DL (ref 0.4–1.2)
GFR SERPL CREATININE-BSD FRML MDRD: > 90 ML/MIN/1.73M2
GLUCOSE BLD-MCNC: 107 MG/DL (ref 70–108)
POTASSIUM SERPL-SCNC: 4.4 MEQ/L (ref 3.5–5.2)
SODIUM BLD-SCNC: 133 MEQ/L (ref 135–145)
T4 FREE: 1.69 NG/DL (ref 0.93–1.76)
TSH SERPL DL<=0.05 MIU/L-ACNC: 0.06 UIU/ML (ref 0.4–4.2)

## 2019-04-18 ENCOUNTER — OFFICE VISIT (OUTPATIENT)
Dept: FAMILY MEDICINE CLINIC | Age: 79
End: 2019-04-18
Payer: MEDICARE

## 2019-04-18 VITALS
BODY MASS INDEX: 24.9 KG/M2 | WEIGHT: 159 LBS | HEART RATE: 72 BPM | RESPIRATION RATE: 16 BRPM | SYSTOLIC BLOOD PRESSURE: 122 MMHG | DIASTOLIC BLOOD PRESSURE: 74 MMHG | TEMPERATURE: 98 F

## 2019-04-18 DIAGNOSIS — N30.00 ACUTE CYSTITIS WITHOUT HEMATURIA: Primary | ICD-10-CM

## 2019-04-18 DIAGNOSIS — M54.40 CHRONIC LOW BACK PAIN WITH SCIATICA, SCIATICA LATERALITY UNSPECIFIED, UNSPECIFIED BACK PAIN LATERALITY: ICD-10-CM

## 2019-04-18 DIAGNOSIS — M79.7 FIBROMYALGIA: ICD-10-CM
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
## 2019-04-18 DIAGNOSIS — R53.1 GENERALIZED WEAKNESS: ICD-10-CM

## 2019-04-18 DIAGNOSIS — I10 ESSENTIAL (PRIMARY) HYPERTENSION: ICD-10-CM

## 2019-04-18 DIAGNOSIS — G89.29 CHRONIC LOW BACK PAIN WITH SCIATICA, SCIATICA LATERALITY UNSPECIFIED, UNSPECIFIED BACK PAIN LATERALITY: ICD-10-CM

## 2019-04-18 PROCEDURE — 99214 OFFICE O/P EST MOD 30 MIN: CPT | Performed by: FAMILY MEDICINE

## 2019-04-18 NOTE — PROGRESS NOTES
Have you changed or started any medications since your last visit including any over-the-counter medicines, vitamins, or herbal medicines? no   Are you having any side effects from any of your medications? -  no  Have you stopped taking any of your medications? Is so, why? -  no    Have you seen any other physician or provider since your last visit? No  Have you had any other diagnostic tests since your last visit? No  Have you been seen in the emergency room and/or had an admission to a hospital since we last saw you? No  Have you had your routine dental cleaning in the past 6 months? no    Have you activated your [employer] account? If not, what are your barriers? No:      Patient Care Team:  Chantal Can,  as PCP - General  Chantal Can DO as PCP - [facility] Attributed Provider    Medical History Review  Past Medical, Family, and Social History     Defer to provider.

## 2019-04-18 NOTE — PROGRESS NOTES
COLONOSCOPY performed by Soraya Mccormack MD at [facility]    discectomy   [address] [place] [address]    UPPER GASTROINTESTINAL ENDOSCOPY  2012       Social History     Tobacco Use    Smoking status: Former Smoker     Packs/day: 2.00     Years: 11.00     Pack years: 22.00     Types: Cigarettes     Start date: 1957     Last attempt to quit: 1967     Years since quittin.4    Smokeless tobacco: Never Used   Substance Use Topics    Alcohol use: No    Drug use: No       Family History   Problem Relation Age of Onset    Arthritis Mother     Hearing Loss Mother     High Cholesterol Mother     Hearing Loss Father     Heart Disease Father 76        CABG    Stroke Father     Arthritis Sister     Depression Sister     Diabetes Sister     Arthritis Brother     Depression Brother     Cancer Maternal Aunt     Early Death Maternal Aunt     Diabetes Maternal Grandmother     Heart Disease Paternal Grandmother          I have reviewed the patient's past medical history, past surgical history, allergies, medications, social and family history and I have made updates where appropriate.     Review of Systems        PHYSICAL EXAM:  /74   Pulse 72   Temp 98 °F (36.7 °C) (Oral)   Resp 16   Wt 159 lb (72.1 kg)   BMI 24.90 kg/m²     General Appearance: well developed and well- nourished, in no acute distress  Head: normocephalic and atraumatic  ENT: external ear and ear canal normal bilaterally, nose without deformity, nasal mucosa and turbinates normal without polyps, oropharynx normal, dentition is normal for age, no lipor gum lesions noted  Neck: supple and non-tender without mass, no thyromegaly or thyroid nodules, no cervical lymphadenopathy  Pulmonary/Chest: clear to auscultation bilaterally- no wheezes, rales or rhonchi, normal air movement, no respiratory distress or retractions  Cardiovascular: normal rate, regular rhythm, normal S1 and S2, no murmurs, rubs, clicks, or gallops, distal pulses intact  Extremities: no cyanosis, clubbing or edema of the lower extremities  Psych:  Normal affect without evidence of depression oranxiety, insight and judgement are appropriate, memory appears intact  Skin: warm and dry, no rash or erythema      ASSESSMENT & PLAN  Jhon Vasquez was seen today for follow-up, discuss labs and swelling. Diagnoses and all orders for this visit:    Acute cystitis without hematuria    Fibromyalgia    Chronic low back pain with sciatica, sciatica laterality unspecified, unspecified back pain laterality    Essential (primary) hypertension    Fall, subsequent encounter    Generalized weakness    -Continue macrobid for UTI  -Weakness is improving, continue PT  -Chronic issues stable, continue current medications  -Advised to call if any issues      Return in about 4 months (around 8/18/2019), or if symptoms worsen or fail to improve. Controlled Substances Monitoring:                     Jhon Vasquez received counseling on the following healthy behaviors: medication adherence  Reviewed prior labs and health maintenance. Continue current medications, diet and exercise. Discussed use, benefit, and side effects of prescribed medications. Barriers to medication compliance addressed. Patient given educational materials - see patient instructions. All patient questions answered. Patient voiced understanding.

## 2019-05-07 DIAGNOSIS — M79.7 FIBROMYALGIA: ICD-10-CM

## 2019-05-07 DIAGNOSIS — G89.29 CHRONIC LOW BACK PAIN WITH SCIATICA, SCIATICA LATERALITY UNSPECIFIED, UNSPECIFIED BACK PAIN LATERALITY: ICD-10-CM

## 2019-05-07 DIAGNOSIS — M54.40 CHRONIC LOW BACK PAIN WITH SCIATICA, SCIATICA LATERALITY UNSPECIFIED, UNSPECIFIED BACK PAIN LATERALITY: ICD-10-CM

## 2019-05-07 RX ORDER — GABAPENTIN 400 MG/1
400 CAPSULE ORAL 4 TIMES DAILY
Qty: 360 CAPSULE | Refills: 3 | Status: SHIPPED | OUTPATIENT
Start: 2019-05-07 | End: 2019-07-26 | Stop reason: SDUPTHER

## 2019-05-16 ENCOUNTER — OFFICE VISIT (OUTPATIENT)
Dept: CARDIOLOGY CLINIC | Age: 79
End: 2019-05-16
Payer: MEDICARE

## 2019-05-16 VITALS
DIASTOLIC BLOOD PRESSURE: 78 MMHG | WEIGHT: 160 LBS | SYSTOLIC BLOOD PRESSURE: 152 MMHG | HEIGHT: 66 IN | HEART RATE: 66 BPM | BODY MASS INDEX: 25.71 KG/M2

## 2019-05-16 DIAGNOSIS — I49.3 PVC (PREMATURE VENTRICULAR CONTRACTION): Primary | ICD-10-CM

## 2019-05-16 DIAGNOSIS — I10 ESSENTIAL HYPERTENSION: ICD-10-CM

## 2019-05-16 PROCEDURE — 99213 OFFICE O/P EST LOW 20 MIN: CPT | Performed by: NUCLEAR MEDICINE

## 2019-05-16 NOTE — PROGRESS NOTES
[address] [facility] CARDIOLOGY  [address] [place] [address] [address].  [address]  [address] [facility] [place] [address]  Dept: [phone]  Dept Fax: [phone]  Loc: [phone]    Visit Date: 5/16/2019    Wandalee Sandhoff L Height is a [de-identified] y.o. 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 Piter Marinelli     GERD (gastroesophageal reflux disease)     Diverticulitis     Hiatal hernia     Hypertension     Hypothyroidism     Osteoarthritis       Past Surgical History:   Procedure Laterality Date    APPENDECTOMY  1958    BACK SURGERY      CARPAL TUNNEL RELEASE Bilateral 2013    w/cubital tunnel    COLON SURGERY  07/29/2016    Procedure: ATTEMPTED DAVINCI XI ROBOTIC ASSISTED SIGMOID COLON RESECTION, ROBOTIC ASSISTED MOBILIZATION OF RECTAL SIGMOID TO SPLENIC FLEXURE, CONVERTED TO OPEN LEFT HEMICOLECTOMY WITH COLOPROCTOCTOMY, SPLENORRHAPHY, RIGID SIGMOIDOSCOPY, LASER EVALUATION OF VASCULARITY WITH ICG; Surgeon: Deanna Ashford MD; Location: [facility]; Service: General    COLONOSCOPY  2012    CYST REMOVAL  2010   [address], 1999    Cataract    FOOT SURGERY Left 2001   [facility]    w/bladder suspension    JOINT REPLACEMENT  2002, 2008, 2009    rt hip(2002), lt knee(2009), lt hip(2008) Shoulder (2009)    OVARY REMOVAL  1999    [place] COLON CA SCRN NOT  [address] IND Left 9/15/2017    COLONOSCOPY performed by Philippe Alegria MD at [address] [place] [address] [facility]    discectomy    TONSILLECTOMY      TOOTH EXTRACTION  1964    UPPER GASTROINTESTINAL ENDOSCOPY       Family History   Problem Relation Age of Onset    Arthritis Mother     Hearing Loss Mother     High Cholesterol Mother     Hearing Loss Father     Heart Disease Father 76        CABG    Stroke Father     Arthritis Sister     Depression Sister     Diabetes Sister     Arthritis Brother     Depression Brother     Cancer Maternal Aunt     Early Death Maternal Aunt     Diabetes Maternal Grandmother     Heart Disease Paternal Grandmother      Social History     Tobacco Use    Smoking status: Former Smoker     Packs/day: 2.00     Years: 11.00     Pack years: 22.00     Types: Cigarettes     Start date: 1957     Last attempt to quit: 1967     Years since quittin.5    Smokeless tobacco: Never Used   Substance Use Topics    Alcohol use: No      Current Outpatient Medications   Medication Sig Dispense Refill    gabapentin (NEURONTIN) 400 MG capsule Take 1 capsule by mouth 4 times daily for 120 days. 360 capsule 3    levothyroxine (SYNTHROID) 125 MCG tablet Take 1 tablet by mouth Daily 30 tablet 5    DULoxetine (CYMBALTA) 60 MG extended release capsule Take 1 capsule by mouth daily 90 capsule 3    metoprolol succinate (TOPROL XL) 25 MG extended release tablet Take 1 tablet by mouth daily 90 tablet 3    enalapril (VASOTEC) 10 MG tablet Take 0.5 tablets by mouth daily 30 tablet 5    Misc.  Devices MISC Mechanical lift for a [employer] 1 Device 0    Scooter MISC by Does not apply route Power scooter 1 each 0    CRANBERRY PO Take 1,500 mg by mouth daily      Biotin [address]9817 MCG TABS Take 1-2 tablets by mouth daily      fluticasone (FLONASE) 50 MCG/ACT nasal spray 1 spray by Nasal route as needed for Rhinitis      meclizine (ANTIVERT) 25 MG tablet Take 25 mg by mouth every 6 hours as needed for Dizziness      Omega 3-6-9 Fatty Acids (OMEGA 3-6-9 COMPLEX) CAPS Take 1 capsule by mouth daily       therapeutic multivitamin-minerals (THERAGRAN-M) tablet Take 1 tablet by mouth nightly        No current facility-administered medications for this visit. Allergies   Allergen Reactions    Ampicillin     Morphine Other (See Comments)     Hallucinations     Pcn [Penicillins]     Sulfa Antibiotics      Health Maintenance   Topic Date Due    DTaP/Tdap/Td vaccine (1 - Tdap) 10/17/1957    Shingles Vaccine (1 of 2) 10/17/1988    Pneumococcal 65+ years Vaccine (2 of 2 - PCV13) 10/12/2011    TSH testing  04/16/2020    Potassium monitoring  04/16/2020    Creatinine monitoring  04/16/2020    DEXA (modify frequency per FRAX score)  Completed    Flu vaccine  Completed       Subjective:  Review of Systems  General:   No fever, no chills, No fatigue or weight loss  Pulmonary:    some dyspnea, no wheezing  Cardiac:    Denies recent chest pain,   GI:     No nausea or vomiting, no abdominal pain  Neuro:    some dizziness or light headedness,   Musculoskeletal: Neuropathy   Extremities:   No edema, good peripheral pulses      Objective:  Physical Exam  BP (!) 152/78   Pulse 66   Ht 5' 6\" (1.676 m)   Wt 160 lb (72.6 kg)   BMI 25.82 kg/m²   General:   Well developed, well nourished  Lungs:   Clear to auscultation  Heart:    Normal S1 S2, Slight murmur. no rubs, no gallops  Abdomen:   Soft, non tender, no organomegalies, positive bowel sounds  Extremities:   No edema, no cyanosis, good peripheral pulses  Neurological:   Awake, alert, oriented. No obvious focal deficits  Musculoskelatal:  No obvious deformities    Assessment:      Diagnosis Orders   1. PVC (premature ventricular contraction)     2. Essential hypertension     cardiac as above  Fair for most part       Plan:  No follow-ups on file. As above  Continue risk factor modification and medical management  Thank you for allowing me to participate in the care of your patient.  Please don't hesitate to contact me regarding any further issues related to the patient care    Orders Placed:  No orders of the defined types were placed in this encounter. Medications Prescribed:  No orders of the defined types were placed in this encounter. Discussed use, benefit, and side effects of prescribed medications. All patient questions answered. Pt voicedunderstanding. Instructed to continue current medications, diet and exercise. Continue risk factor modification and medical management. Patient agreed with treatment plan. Follow up as directed.     Electronically signedby Yousuf Jamil MD on 5/16/2019 at 11:41 AM

## 2019-07-26 DIAGNOSIS — E03.9 ACQUIRED HYPOTHYROIDISM: ICD-10-CM

## 2019-07-26 DIAGNOSIS — M79.7 FIBROMYALGIA: ICD-10-CM

## 2019-07-26 DIAGNOSIS — M54.40 CHRONIC LOW BACK PAIN WITH SCIATICA, SCIATICA LATERALITY UNSPECIFIED, UNSPECIFIED BACK PAIN LATERALITY: ICD-10-CM

## 2019-07-26 DIAGNOSIS — G89.29 CHRONIC LOW BACK PAIN WITH SCIATICA, SCIATICA LATERALITY UNSPECIFIED, UNSPECIFIED BACK PAIN LATERALITY: ICD-10-CM

## 2019-07-26 RX ORDER — LEVOTHYROXINE SODIUM 0.12 MG/1
125 TABLET ORAL DAILY
Qty: 30 TABLET | Refills: 5 | Status: SHIPPED | OUTPATIENT
Start: 2019-07-26 | End: 2020-01-22 | Stop reason: SDUPTHER

## 2019-07-26 RX ORDER — GABAPENTIN 400 MG/1
400 CAPSULE ORAL 4 TIMES DAILY
Qty: 360 CAPSULE | Refills: 3 | Status: SHIPPED | OUTPATIENT
Start: 2019-07-26 | End: 2019-08-08

## 2019-07-29 ENCOUNTER — OFFICE VISIT (OUTPATIENT)
Dept: FAMILY MEDICINE CLINIC | Age: 79
End: 2019-07-29
Payer: MEDICARE

## 2019-07-29 VITALS
SYSTOLIC BLOOD PRESSURE: 108 MMHG | BODY MASS INDEX: 26.15 KG/M2 | DIASTOLIC BLOOD PRESSURE: 64 MMHG | TEMPERATURE: 98 F | WEIGHT: 162 LBS | RESPIRATION RATE: 14 BRPM | HEART RATE: 72 BPM

## 2019-07-29 DIAGNOSIS — M48.061 SPINAL STENOSIS OF LUMBAR REGION WITHOUT NEUROGENIC CLAUDICATION: ICD-10-CM

## 2019-07-29 DIAGNOSIS — M54.2 NECK PAIN: Primary | ICD-10-CM

## 2019-07-29 PROCEDURE — 99213 OFFICE O/P EST LOW 20 MIN: CPT | Performed by: FAMILY MEDICINE

## 2019-07-29 RX ORDER — PREDNISONE 20 MG/1
40 TABLET ORAL DAILY
Qty: 10 TABLET | Refills: 0 | Status: SHIPPED | OUTPATIENT
Start: 2019-07-29 | End: 2019-08-03

## 2019-07-29 RX ORDER — HYDROCODONE BITARTRATE AND ACETAMINOPHEN 5; 325 MG/1; MG/1
1 TABLET ORAL EVERY 6 HOURS PRN
Qty: 20 TABLET | Refills: 0 | Status: SHIPPED | OUTPATIENT
Start: 2019-07-29 | End: 2019-08-03

## 2019-07-29 NOTE — PROGRESS NOTES
Hypertension     Hypothyroidism     Osteoarthritis        Past Surgical History:   Procedure Laterality Date    APPENDECTOMY      BACK SURGERY      CARPAL TUNNEL RELEASE Bilateral 2013    w/cubital tunnel    COLON SURGERY  2016    Procedure: ATTEMPTED DAVINCI XI ROBOTIC ASSISTED SIGMOID COLON RESECTION, ROBOTIC ASSISTED MOBILIZATION OF RECTAL SIGMOID TO SPLENIC FLEXURE, CONVERTED TO OPEN LEFT HEMICOLECTOMY WITH COLOPROCTOCTOMY, SPLENORRHAPHY, RIGID SIGMOIDOSCOPY, LASER EVALUATION OF VASCULARITY WITH ICG; Surgeon: Dayana Hernandez MD; Location: [place] [address]; Service: General    COLONOSCOPY      CYST REMOVAL     [address],     Cataract    FOOT SURGERY Left    [place]    w/bladder suspension    JOINT REPLACEMENT  , , 2009    rt hip(), lt knee(), lt hip() Shoulder ()   [address]    [place] COLON CA SCRN NOT  [address] IND Left 9/15/2017    COLONOSCOPY performed by Krysta Hannah MD at [address] [facility]   [address] [facility]    discectomy   [address]    UPPER GASTROINTESTINAL ENDOSCOPY         Social History     Tobacco Use    Smoking status: Former Smoker     Packs/day: 2.00     Years: 11.00     Pack years: 22.00     Types: Cigarettes     Start date: 1957     Last attempt to quit: 1967     Years since quittin.7    Smokeless tobacco: Never Used   Substance Use Topics    Alcohol use: No    Drug use: No       Family History   Problem Relation Age of Onset    Arthritis Mother     Hearing Loss Mother     High Cholesterol Mother     Hearing Loss Father     Heart Disease Father 76        CABG    Stroke Father     Arthritis Sister     Depression Sister     Diabetes Sister     Arthritis Brother     Depression Brother     Cancer Maternal Aunt     Early Death Maternal Aunt     Diabetes Maternal Grandmother     Heart Disease Paternal worsening of symptoms      Return in about 10 days (around 8/8/2019). Controlled Substance Monitoring:    Acute and Chronic Pain Monitoring:   RX Monitoring 10/18/2018   Attestation The Prescription Monitoring Report for this patient was reviewed today. Periodic Controlled Substance Monitoring No signs of potential drug abuse or diversion identified. Chronic Pain > 80 MEDD -                 Paulina received counseling on the following healthy behaviors: medication adherence  Reviewed prior labs and health maintenance. Continue current medications, diet and exercise. Discussed use, benefit, and side effects of prescribed medications. Barriers to medication compliance addressed. Patient given educational materials - see patient instructions. All patient questions answered. Patient voiced understanding.

## 2019-08-02 ENCOUNTER — HOSPITAL ENCOUNTER (OUTPATIENT)
Age: 79
Discharge: HOME | End: 2019-08-02
Payer: MEDICARE

## 2019-08-02 ENCOUNTER — HOSPITAL ENCOUNTER (OUTPATIENT)
Dept: GENERAL RADIOLOGY | Age: 79
Discharge: HOME | End: 2019-08-02
Payer: MEDICARE

## 2019-08-02 DIAGNOSIS — M54.2 NECK PAIN: ICD-10-CM

## 2019-08-02 DIAGNOSIS — M48.061 SPINAL STENOSIS OF LUMBAR REGION WITHOUT NEUROGENIC CLAUDICATION: ICD-10-CM

## 2019-08-02 PROCEDURE — 72100 X-RAY EXAM L-S SPINE 2/3 VWS: CPT

## 2019-08-02 PROCEDURE — 72040 X-RAY EXAM NECK SPINE 2-3 VW: CPT

## 2019-08-05 ENCOUNTER — TELEPHONE (OUTPATIENT)
Dept: FAMILY MEDICINE CLINIC | Age: 79
End: 2019-08-05

## 2019-08-08 ENCOUNTER — OFFICE VISIT (OUTPATIENT)
Dept: FAMILY MEDICINE CLINIC | Age: 79
End: 2019-08-08
Payer: MEDICARE

## 2019-08-08 VITALS
HEART RATE: 68 BPM | SYSTOLIC BLOOD PRESSURE: 132 MMHG | DIASTOLIC BLOOD PRESSURE: 80 MMHG | RESPIRATION RATE: 14 BRPM | WEIGHT: 162 LBS | TEMPERATURE: 98.1 F | BODY MASS INDEX: 26.15 KG/M2

## 2019-08-08 DIAGNOSIS — G89.29 CHRONIC LOW BACK PAIN WITH SCIATICA, SCIATICA LATERALITY UNSPECIFIED, UNSPECIFIED BACK PAIN LATERALITY: ICD-10-CM

## 2019-08-08 DIAGNOSIS — M79.7 FIBROMYALGIA: ICD-10-CM

## 2019-08-08 DIAGNOSIS — M54.40 CHRONIC LOW BACK PAIN WITH SCIATICA, SCIATICA LATERALITY UNSPECIFIED, UNSPECIFIED BACK PAIN LATERALITY: ICD-10-CM

## 2019-08-08 PROCEDURE — 99213 OFFICE O/P EST LOW 20 MIN: CPT | Performed by: FAMILY MEDICINE

## 2019-08-08 RX ORDER — GABAPENTIN 600 MG/1
600 TABLET ORAL 4 TIMES DAILY
Qty: 120 TABLET | Refills: 5 | Status: SHIPPED | OUTPATIENT
Start: 2019-08-08 | End: 2019-08-08 | Stop reason: SDUPTHER

## 2019-08-08 RX ORDER — PREDNISONE 10 MG/1
TABLET ORAL
Qty: 40 TABLET | Refills: 0 | Status: SHIPPED | OUTPATIENT
Start: 2019-08-08 | End: 2019-10-12

## 2019-08-08 RX ORDER — GABAPENTIN 600 MG/1
600 TABLET ORAL 4 TIMES DAILY
Qty: 360 TABLET | Refills: 1 | Status: SHIPPED | OUTPATIENT
Start: 2019-08-08 | End: 2020-03-13 | Stop reason: SDUPTHER

## 2019-08-08 NOTE — PROGRESS NOTES
Have you changed or started any medications since your last visit including any over-the-counter medicines, vitamins, or herbal medicines? no   Are you having any side effects from any of your medications? -  no  Have you stopped taking any of your medications? Is so, why? -  no    Have you seen any other physician or provider since your last visit? No  Have you had any other diagnostic tests since your last visit? No  Have you been seen in the emergency room and/or had an admission to a hospital since we last saw you? No  Have you had your routine dental cleaning in the past 6 months? no    Have you activated your [employer] account? If not, what are your barriers? No:      Patient Care Team:  Juan Rosa DO as PCP - General  Juan Rosa DO [facility] PCP [facility]    Medical History Review  Past Medical, Family, and Social History     Defer to provider.

## 2019-08-08 NOTE — PROGRESS NOTES
Paternal Grandmother          I have reviewed the patient's past medical history, past surgical history, allergies, medications, social and family history and I have made updates where appropriate. Review of Systems        PHYSICAL EXAM:  /80   Pulse 68   Temp 98.1 °F (36.7 °C) (Oral)   Resp 14   Wt 162 lb (73.5 kg)   BMI 26.15 kg/m²     General Appearance: well developed and well- nourished, in no acute distress  Head: normocephalic and atraumatic  ENT: external ear and ear canal normal bilaterally, nose without deformity, nasal mucosa and turbinates normal without polyps, oropharynx normal, dentition is normal for age, no lipor gum lesions noted  Neck: supple and non-tender without mass, no thyromegaly or thyroid nodules, no cervical lymphadenopathy  Pulmonary/Chest: clear to auscultation bilaterally- no wheezes, rales or rhonchi, normal air movement, no respiratory distress or retractions  Cardiovascular: normal rate, regular rhythm, normal S1 and S2, no murmurs, rubs, clicks, or gallops, distal pulses intact  Extremities: no cyanosis, clubbing or edema of the lower extremities  Psych:  Normal affect without evidence of depression oranxiety, insight and judgement are appropriate, memory appears intact  Skin: warm and dry, no rash or erythema      ASSESSMENT & PLAN  Nicole Kay was seen today for follow-up. Diagnoses and all orders for this visit:    Fibromyalgia  -     Discontinue: gabapentin (NEURONTIN) 600 MG tablet; Take 1 tablet by mouth 4 times daily for 180 days. -     predniSONE (DELTASONE) 10 MG tablet; Take 4 tabs PO x 4 days, then take 3 tabs PO x 4 days, then take 2 tabs PO x 4 days, then take 1 tab PO x 4 days  -     gabapentin (NEURONTIN) 600 MG tablet; Take 1 tablet by mouth 4 times daily for 180 days. Chronic low back pain with sciatica, sciatica laterality unspecified, unspecified back pain laterality  -     Discontinue: gabapentin (NEURONTIN) 600 MG tablet;  Take 1 tablet by mouth 4 times daily for 180 days. -     predniSONE (DELTASONE) 10 MG tablet; Take 4 tabs PO x 4 days, then take 3 tabs PO x 4 days, then take 2 tabs PO x 4 days, then take 1 tab PO x 4 days  -     gabapentin (NEURONTIN) 600 MG tablet; Take 1 tablet by mouth 4 times daily for 180 days. Will trial doing a prednisone taper for her acute inflammation and increase her gabapentin to 600mg QID for chronic management. Recheck in 2 months. Return in about 2 months (around 10/8/2019), or if symptoms worsen or fail to improve. Controlled Substance Monitoring:    Acute and Chronic Pain Monitoring:   RX Monitoring 10/18/2018   Attestation The Prescription Monitoring Report for this patient was reviewed today. Periodic Controlled Substance Monitoring No signs of potential drug abuse or diversion identified. Chronic Pain > 80 MEDD -                 Paulina received counseling on the following healthy behaviors: medication adherence  Reviewed prior labs and health maintenance. Continue current medications, diet and exercise. Discussed use, benefit, and side effects of prescribed medications. Barriers to medication compliance addressed. Patient given educational materials - see patient instructions. All patient questions answered. Patient voiced understanding.

## 2019-09-23 DIAGNOSIS — I49.3 FREQUENT PVCS: ICD-10-CM

## 2019-09-23 RX ORDER — METOPROLOL SUCCINATE 25 MG/1
TABLET, EXTENDED RELEASE ORAL
Qty: 90 TABLET | Refills: 4 | Status: SHIPPED | OUTPATIENT
Start: 2019-09-23 | End: 2020-12-16

## 2019-10-12 ENCOUNTER — OFFICE VISIT (OUTPATIENT)
Dept: FAMILY MEDICINE CLINIC | Age: 79
End: 2019-10-12
Payer: MEDICARE

## 2019-10-12 VITALS
SYSTOLIC BLOOD PRESSURE: 124 MMHG | HEART RATE: 88 BPM | BODY MASS INDEX: 25.99 KG/M2 | TEMPERATURE: 97.6 F | DIASTOLIC BLOOD PRESSURE: 62 MMHG | WEIGHT: 161 LBS | RESPIRATION RATE: 10 BRPM

## 2019-10-12 DIAGNOSIS — G89.29 CHRONIC LOW BACK PAIN WITH SCIATICA, SCIATICA LATERALITY UNSPECIFIED, UNSPECIFIED BACK PAIN LATERALITY: Primary | ICD-10-CM

## 2019-10-12 DIAGNOSIS — M54.40 CHRONIC LOW BACK PAIN WITH SCIATICA, SCIATICA LATERALITY UNSPECIFIED, UNSPECIFIED BACK PAIN LATERALITY: Primary | ICD-10-CM

## 2019-10-12 DIAGNOSIS — M79.7 FIBROMYALGIA: ICD-10-CM

## 2019-10-12 DIAGNOSIS — Z23 NEEDS FLU SHOT: ICD-10-CM

## 2019-10-12 PROCEDURE — 99213 OFFICE O/P EST LOW 20 MIN: CPT | Performed by: FAMILY MEDICINE

## 2019-10-12 PROCEDURE — 90653 IIV ADJUVANT VACCINE IM: CPT | Performed by: FAMILY MEDICINE

## 2019-10-12 PROCEDURE — G0008 ADMIN INFLUENZA VIRUS VAC: HCPCS | Performed by: FAMILY MEDICINE

## 2019-10-12 RX ORDER — LIDOCAINE 40 MG/G
CREAM TOPICAL
Qty: 30 G | Refills: 5 | Status: SHIPPED | OUTPATIENT
Start: 2019-10-12 | End: 2021-03-05

## 2019-10-14 ENCOUNTER — TELEPHONE (OUTPATIENT)
Dept: FAMILY MEDICINE CLINIC | Age: 79
End: 2019-10-14

## 2019-10-22 ENCOUNTER — TELEPHONE (OUTPATIENT)
Dept: FAMILY MEDICINE CLINIC | Age: 79
End: 2019-10-22

## 2019-12-05 ENCOUNTER — TELEPHONE (OUTPATIENT)
Dept: FAMILY MEDICINE CLINIC | Age: 79
End: 2019-12-05

## 2019-12-19 ENCOUNTER — TELEPHONE (OUTPATIENT)
Dept: FAMILY MEDICINE CLINIC | Age: 79
End: 2019-12-19

## 2019-12-21 ENCOUNTER — HOSPITAL ENCOUNTER (EMERGENCY)
Age: 79
Discharge: HOME | End: 2019-12-21
Payer: MEDICARE

## 2019-12-21 ENCOUNTER — APPOINTMENT (OUTPATIENT)
Dept: GENERAL RADIOLOGY | Age: 79
End: 2019-12-21
Payer: MEDICARE

## 2019-12-21 VITALS
OXYGEN SATURATION: 97 % | RESPIRATION RATE: 18 BRPM | TEMPERATURE: 98.2 F | HEART RATE: 87 BPM | DIASTOLIC BLOOD PRESSURE: 81 MMHG | SYSTOLIC BLOOD PRESSURE: 156 MMHG

## 2019-12-21 DIAGNOSIS — J04.0 LARYNGITIS, ACUTE: ICD-10-CM

## 2019-12-21 DIAGNOSIS — J06.9 URI WITH COUGH AND CONGESTION: Primary | ICD-10-CM

## 2019-12-21 LAB
ALBUMIN SERPL-MCNC: 3.5 G/DL (ref 3.5–5.1)
ALP BLD-CCNC: 100 U/L (ref 38–126)
ALT SERPL-CCNC: 12 U/L (ref 11–66)
ANION GAP SERPL CALCULATED.3IONS-SCNC: 8 MEQ/L (ref 8–16)
AST SERPL-CCNC: 24 U/L (ref 5–40)
BASOPHILS # BLD: 1.2 %
BASOPHILS ABSOLUTE: 0.1 THOU/MM3 (ref 0–0.1)
BILIRUB SERPL-MCNC: 0.5 MG/DL (ref 0.3–1.2)
BUN BLDV-MCNC: 8 MG/DL (ref 7–22)
CALCIUM SERPL-MCNC: 9 MG/DL (ref 8.5–10.5)
CHLORIDE BLD-SCNC: 94 MEQ/L (ref 98–111)
CO2: 26 MEQ/L (ref 23–33)
CREAT SERPL-MCNC: 0.5 MG/DL (ref 0.4–1.2)
EKG ATRIAL RATE: 76 BPM
EKG P AXIS: 29 DEGREES
EKG P-R INTERVAL: 162 MS
EKG Q-T INTERVAL: 344 MS
EKG QRS DURATION: 62 MS
EKG QTC CALCULATION (BAZETT): 387 MS
EKG R AXIS: -10 DEGREES
EKG T AXIS: 45 DEGREES
EKG VENTRICULAR RATE: 76 BPM
EOSINOPHIL # BLD: 2.1 %
EOSINOPHILS ABSOLUTE: 0.1 THOU/MM3 (ref 0–0.4)
ERYTHROCYTE [DISTWIDTH] IN BLOOD BY AUTOMATED COUNT: 13.1 % (ref 11.5–14.5)
ERYTHROCYTE [DISTWIDTH] IN BLOOD BY AUTOMATED COUNT: 46.7 FL (ref 35–45)
FLU A ANTIGEN: NEGATIVE
FLU B ANTIGEN: NEGATIVE
GFR SERPL CREATININE-BSD FRML MDRD: > 90 ML/MIN/1.73M2
GLUCOSE BLD-MCNC: 87 MG/DL (ref 70–108)
GROUP A STREP CULTURE, REFLEX: NEGATIVE
HCT VFR BLD CALC: 41.4 % (ref 37–47)
HEMOGLOBIN: 13.3 GM/DL (ref 12–16)
IMMATURE GRANS (ABS): 0.02 THOU/MM3 (ref 0–0.07)
IMMATURE GRANULOCYTES: 0.3 %
LYMPHOCYTES # BLD: 11.9 %
LYMPHOCYTES ABSOLUTE: 0.8 THOU/MM3 (ref 1–4.8)
MCH RBC QN AUTO: 31.4 PG (ref 26–33)
MCHC RBC AUTO-ENTMCNC: 32.1 GM/DL (ref 32.2–35.5)
MCV RBC AUTO: 97.6 FL (ref 81–99)
MONOCYTES # BLD: 12.8 %
MONOCYTES ABSOLUTE: 0.9 THOU/MM3 (ref 0.4–1.3)
NUCLEATED RED BLOOD CELLS: 0 /100 WBC
OSMOLALITY CALCULATION: 254.8 MOSMOL/KG (ref 275–300)
PLATELET # BLD: 277 THOU/MM3 (ref 130–400)
PMV BLD AUTO: 9.3 FL (ref 9.4–12.4)
POTASSIUM SERPL-SCNC: 5.4 MEQ/L (ref 3.5–5.2)
PROCALCITONIN: 0.03 NG/ML (ref 0.01–0.09)
RBC # BLD: 4.24 MILL/MM3 (ref 4.2–5.4)
REFLEX THROAT C + S: NORMAL
SEG NEUTROPHILS: 71.7 %
SEGMENTED NEUTROPHILS ABSOLUTE COUNT: 4.8 THOU/MM3 (ref 1.8–7.7)
SODIUM BLD-SCNC: 128 MEQ/L (ref 135–145)
TOTAL PROTEIN: 6.8 G/DL (ref 6.1–8)
TROPONIN T: < 0.01 NG/ML
WBC # BLD: 6.7 THOU/MM3 (ref 4.8–10.8)

## 2019-12-21 PROCEDURE — 71046 X-RAY EXAM CHEST 2 VIEWS: CPT

## 2019-12-21 PROCEDURE — 87880 STREP A ASSAY W/OPTIC: CPT

## 2019-12-21 PROCEDURE — 84145 PROCALCITONIN (PCT): CPT

## 2019-12-21 PROCEDURE — 84484 ASSAY OF TROPONIN QUANT: CPT

## 2019-12-21 PROCEDURE — 93005 ELECTROCARDIOGRAM TRACING: CPT | Performed by: PHYSICIAN ASSISTANT

## 2019-12-21 PROCEDURE — 87804 INFLUENZA ASSAY W/OPTIC: CPT

## 2019-12-21 PROCEDURE — 87070 CULTURE OTHR SPECIMN AEROBIC: CPT

## 2019-12-21 PROCEDURE — 85025 COMPLETE CBC W/AUTO DIFF WBC: CPT

## 2019-12-21 PROCEDURE — 99284 EMERGENCY DEPT VISIT MOD MDM: CPT

## 2019-12-21 PROCEDURE — 80053 COMPREHEN METABOLIC PANEL: CPT

## 2019-12-21 PROCEDURE — 36415 COLL VENOUS BLD VENIPUNCTURE: CPT

## 2019-12-21 RX ORDER — GUAIFENESIN 1200 MG/1
1200 TABLET, EXTENDED RELEASE ORAL 2 TIMES DAILY
Qty: 20 TABLET | Refills: 0 | Status: SHIPPED | OUTPATIENT
Start: 2019-12-21 | End: 2022-06-13

## 2019-12-21 RX ORDER — PROMETHAZINE HYDROCHLORIDE AND CODEINE PHOSPHATE 6.25; 1 MG/5ML; MG/5ML
5 SYRUP ORAL 4 TIMES DAILY PRN
Qty: 75 ML | Refills: 0 | Status: SHIPPED | OUTPATIENT
Start: 2019-12-21 | End: 2019-12-25

## 2019-12-21 ASSESSMENT — PAIN DESCRIPTION - LOCATION: LOCATION: THROAT

## 2019-12-21 ASSESSMENT — ENCOUNTER SYMPTOMS
EYE ITCHING: 0
ABDOMINAL PAIN: 0
SINUS PRESSURE: 0
RHINORRHEA: 0
SORE THROAT: 1
SHORTNESS OF BREATH: 1
NAUSEA: 0
VOMITING: 0
DIARRHEA: 0
EYE DISCHARGE: 0

## 2019-12-21 ASSESSMENT — PAIN DESCRIPTION - PAIN TYPE: TYPE: ACUTE PAIN

## 2019-12-21 ASSESSMENT — PAIN SCALES - GENERAL: PAINLEVEL_OUTOF10: 4

## 2019-12-22 PROCEDURE — 93010 ELECTROCARDIOGRAM REPORT: CPT | Performed by: INTERNAL MEDICINE

## 2019-12-22 ASSESSMENT — ENCOUNTER SYMPTOMS
VOICE CHANGE: 1
COUGH: 1

## 2019-12-23 LAB — THROAT/NOSE CULTURE: NORMAL

## 2020-01-16 RX ORDER — DULOXETIN HYDROCHLORIDE 60 MG/1
CAPSULE, DELAYED RELEASE ORAL
Qty: 90 CAPSULE | Refills: 4 | Status: SHIPPED | OUTPATIENT
Start: 2020-01-16 | End: 2021-04-12

## 2020-01-22 RX ORDER — LEVOTHYROXINE SODIUM 0.12 MG/1
125 TABLET ORAL DAILY
Qty: 90 TABLET | Refills: 3 | Status: SHIPPED | OUTPATIENT
Start: 2020-01-22 | End: 2021-02-02

## 2020-01-29 ENCOUNTER — TELEPHONE (OUTPATIENT)
Dept: FAMILY MEDICINE CLINIC | Age: 80
End: 2020-01-29

## 2020-01-29 RX ORDER — OSELTAMIVIR PHOSPHATE 75 MG/1
75 CAPSULE ORAL DAILY
Qty: 7 CAPSULE | Refills: 0 | Status: SHIPPED | OUTPATIENT
Start: 2020-01-29 | End: 2020-02-05

## 2020-02-11 ENCOUNTER — NURSE ONLY (OUTPATIENT)
Dept: LAB | Age: 80
End: 2020-02-11

## 2020-02-11 ENCOUNTER — OFFICE VISIT (OUTPATIENT)
Dept: FAMILY MEDICINE CLINIC | Age: 80
End: 2020-02-11
Payer: MEDICARE

## 2020-02-11 VITALS
DIASTOLIC BLOOD PRESSURE: 88 MMHG | BODY MASS INDEX: 25.27 KG/M2 | HEIGHT: 67 IN | WEIGHT: 161 LBS | HEART RATE: 76 BPM | SYSTOLIC BLOOD PRESSURE: 132 MMHG | RESPIRATION RATE: 14 BRPM | TEMPERATURE: 98.5 F

## 2020-02-11 PROBLEM — M32.9 SYSTEMIC LUPUS ERYTHEMATOSUS (HCC): Status: ACTIVE | Noted: 2020-02-11

## 2020-02-11 LAB
ANION GAP SERPL CALCULATED.3IONS-SCNC: 11 MEQ/L (ref 8–16)
BUN BLDV-MCNC: 8 MG/DL (ref 7–22)
CALCIUM SERPL-MCNC: 8.9 MG/DL (ref 8.5–10.5)
CHLORIDE BLD-SCNC: 97 MEQ/L (ref 98–111)
CO2: 25 MEQ/L (ref 23–33)
CREAT SERPL-MCNC: 0.5 MG/DL (ref 0.4–1.2)
GFR SERPL CREATININE-BSD FRML MDRD: > 90 ML/MIN/1.73M2
GLUCOSE BLD-MCNC: 98 MG/DL (ref 70–108)
POTASSIUM SERPL-SCNC: 4.5 MEQ/L (ref 3.5–5.2)
SODIUM BLD-SCNC: 133 MEQ/L (ref 135–145)
T4 FREE: 1.6 NG/DL (ref 0.93–1.76)
TSH SERPL DL<=0.05 MIU/L-ACNC: 0.06 UIU/ML (ref 0.4–4.2)

## 2020-02-11 PROCEDURE — G8510 SCR DEP NEG, NO PLAN REQD: HCPCS | Performed by: FAMILY MEDICINE

## 2020-02-11 PROCEDURE — 99214 OFFICE O/P EST MOD 30 MIN: CPT | Performed by: FAMILY MEDICINE

## 2020-02-11 ASSESSMENT — PATIENT HEALTH QUESTIONNAIRE - PHQ9
1. LITTLE INTEREST OR PLEASURE IN DOING THINGS: 0
SUM OF ALL RESPONSES TO PHQ QUESTIONS 1-9: 0
2. FEELING DOWN, DEPRESSED OR HOPELESS: 0
SUM OF ALL RESPONSES TO PHQ9 QUESTIONS 1 & 2: 0
SUM OF ALL RESPONSES TO PHQ QUESTIONS 1-9: 0

## 2020-03-10 ENCOUNTER — OFFICE VISIT (OUTPATIENT)
Dept: FAMILY MEDICINE CLINIC | Age: 80
End: 2020-03-10
Payer: MEDICARE

## 2020-03-10 VITALS
RESPIRATION RATE: 16 BRPM | WEIGHT: 162 LBS | BODY MASS INDEX: 25.43 KG/M2 | DIASTOLIC BLOOD PRESSURE: 78 MMHG | HEART RATE: 76 BPM | TEMPERATURE: 98 F | HEIGHT: 67 IN | SYSTOLIC BLOOD PRESSURE: 138 MMHG

## 2020-03-10 PROCEDURE — G0438 PPPS, INITIAL VISIT: HCPCS | Performed by: FAMILY MEDICINE

## 2020-03-10 ASSESSMENT — LIFESTYLE VARIABLES
HOW OFTEN DURING THE LAST YEAR HAVE YOU NEEDED AN ALCOHOLIC DRINK FIRST THING IN THE MORNING TO GET YOURSELF GOING AFTER A NIGHT OF HEAVY DRINKING: 0
HOW OFTEN DURING THE LAST YEAR HAVE YOU HAD A FEELING OF GUILT OR REMORSE AFTER DRINKING: 0
HOW OFTEN DO YOU HAVE A DRINK CONTAINING ALCOHOL: 4
HOW OFTEN DO YOU HAVE SIX OR MORE DRINKS ON ONE OCCASION: 0
HOW OFTEN DURING THE LAST YEAR HAVE YOU BEEN UNABLE TO REMEMBER WHAT HAPPENED THE NIGHT BEFORE BECAUSE YOU HAD BEEN DRINKING: 0
AUDIT-C TOTAL SCORE: 4
HAVE YOU OR SOMEONE ELSE BEEN INJURED AS A RESULT OF YOUR DRINKING: 0
HOW OFTEN DURING THE LAST YEAR HAVE YOU FAILED TO DO WHAT WAS NORMALLY EXPECTED FROM YOU BECAUSE OF DRINKING: 0
HOW MANY STANDARD DRINKS CONTAINING ALCOHOL DO YOU HAVE ON A TYPICAL DAY: 0
HAS A RELATIVE, FRIEND, DOCTOR, OR ANOTHER HEALTH PROFESSIONAL EXPRESSED CONCERN ABOUT YOUR DRINKING OR SUGGESTED YOU CUT DOWN: 2
AUDIT TOTAL SCORE: 6
HOW OFTEN DURING THE LAST YEAR HAVE YOU FOUND THAT YOU WERE NOT ABLE TO STOP DRINKING ONCE YOU HAD STARTED: 0

## 2020-03-10 ASSESSMENT — PATIENT HEALTH QUESTIONNAIRE - PHQ9
SUM OF ALL RESPONSES TO PHQ QUESTIONS 1-9: 2
SUM OF ALL RESPONSES TO PHQ QUESTIONS 1-9: 2

## 2020-03-10 NOTE — PROGRESS NOTES
Medicare Annual Wellness Visit  Name: Shannan Gunter [id number] Date: 3/10/2020   MRN: [id number] Sex: Female   Age: 78 y.o. Ethnicity: Non-/Non    : 1940 Race: Diana Gunter is here for Medicare AWV    Screenings for behavioral, psychosocial and functional/safety risks, and cognitive dysfunction are all negative except as indicated below. These results, as well as other patient data from the [address] form, are documented in Flowsheets linked to this Encounter. Allergies   Allergen Reactions    Ampicillin     Morphine Other (See Comments)     Hallucinations     Pcn [Penicillins]     Sulfa Antibiotics        Prior to Visit Medications    Medication Sig Taking? Authorizing Provider   Misc. Devices (WALKER) MISC 1 each by Does not apply route daily Yes Denis Mathur, DO   levothyroxine (SYNTHROID) 125 MCG tablet Take 1 tablet by mouth Daily Yes Denis Mathur, DO   DULoxetine (CYMBALTA) 60 MG extended release capsule TAKE 1 CAPSULE DAILY Yes Ascencion Pierce, DO   lidocaine (LMX) 4 % cream Apply topically 4x daily as needed. Yes Denis Mathur, DO   metoprolol succinate (TOPROL XL) 25 MG extended release tablet TAKE 1 TABLET DAILY Yes Ascencion Pierce, DO   enalapril (VASOTEC) 10 MG tablet TAKE ONE-HALF (1/2) TABLET DAILY Yes Denis Mathur, DO   Misc.  Devices MISC Mechanical lift for a [facility] Denis Mathur, DO   Scooter MISC by Does not apply route Power scooter Yes Denis Mathur DO   Biotin 99519 MCG TABS Take 1-2 tablets by mouth daily Yes Historical Provider, MD   meclizine (ANTIVERT) 25 MG tablet Take 25 mg by mouth every 6 hours as needed for Dizziness Yes Historical Provider, MD   Omega 3-6-9 Fatty Acids (OMEGA 3-6-9 COMPLEX) CAPS Take 1 capsule by mouth daily  Yes Historical Provider, MD   therapeutic multivitamin-minerals (THERAGRAN-M) tablet Take 1 tablet by mouth nightly  Yes Historical Provider, MD   guaiFENesin 1200 MG TB12 Take 1,200 mg by mouth 2 times daily  Patient not taking: Reported on 3/10/2020  Daniella Gabriel PA-C   gabapentin (NEURONTIN) 600 MG tablet Take 1 tablet by mouth 4 times daily for 180 days.   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 Radha Stewart GERD (gastroesophageal reflux disease)     Diverticulitis     Hiatal hernia     Hypertension     Hypothyroidism     Osteoarthritis        Past Surgical History:   Procedure Laterality Date    APPENDECTOMY  1958    BACK SURGERY      CARPAL TUNNEL RELEASE Bilateral 2013    w/cubital tunnel    COLON SURGERY  07/29/2016    Procedure: ATTEMPTED DAVINCI XI ROBOTIC ASSISTED SIGMOID COLON RESECTION, ROBOTIC ASSISTED MOBILIZATION OF RECTAL SIGMOID TO SPLENIC FLEXURE, CONVERTED TO OPEN LEFT HEMICOLECTOMY WITH COLOPROCTOCTOMY, SPLENORRHAPHY, RIGID SIGMOIDOSCOPY, LASER EVALUATION OF VASCULARITY WITH ICG; Surgeon: Radha Stewart MD; Location: [facility]; Service: General    COLONOSCOPY  2012    CYST REMOVAL  2010   [address], [address]    Cataract    FOOT SURGERY Left 2001   [place]    w/bladder suspension    JOINT REPLACEMENT  2002, 2008, 2009    rt hip(2002), lt knee(2009), lt hip(2008) Shoulder (2009)   [address]    [place] COLON CA SCRN NOT  [address] IND Left 9/15/2017    COLONOSCOPY performed by Denny Owens MD at [facility]   [address] [facility]    discectomy   [address] [place] [address]   2012       Family History   Problem Polysaccharide ([id number]) 11/02/2004, 10/12/2010        Health Maintenance   Topic Date Due    DTaP/Tdap/Td vaccine (1 - Tdap) 10/17/1957    Shingles Vaccine (1 of 2) 10/17/1988    Annual Wellness Visit (AWV)  05/29/2019    TSH testing  02/11/2021    Potassium monitoring  02/11/2021    Creatinine monitoring  02/11/2021    DEXA (modify frequency per FRAX score)  Completed    Flu vaccine  Completed    Pneumococcal 65+ years Vaccine  Completed    Hepatitis A vaccine  Aged Out    Hepatitis B vaccine  Aged Out    Hib vaccine  Aged Out    Meningococcal (ACWY) vaccine  Aged Out     Recommendations for [employer] Due: see orders and patient instructions/AVS.  . Recommended screening schedule for the next 5-10 years is provided to the patient in written form: see Patient Instructions/AVS.    Nilson ZIMMERMAN LPN, 7/87/8975, performed the documented evaluation under the direct supervision of the attending physician.

## 2020-03-10 NOTE — PATIENT INSTRUCTIONS
Personalized Preventive Plan for Johnny Gunter - 3/10/2020  Medicare offers a range of preventive health benefits. Some of the tests and screenings are paid in full while other may be subject to a deductible, co-insurance, and/or copay. Some of these benefits include a comprehensive review of your medical history including lifestyle, illnesses that may run in your family, and various assessments and screenings as appropriate. After reviewing your medical record and screening and assessments performed today your provider may have ordered immunizations, labs, imaging, and/or referrals for you. A list of these orders (if applicable) as well as your Preventive Care list are included within your After Visit Summary for your review. Other Preventive Recommendations:    · A preventive eye exam performed by an eye specialist is recommended every 1-2 years to screen for glaucoma; cataracts, macular degeneration, and other eye disorders. · A preventive dental visit is recommended every 6 months. · Try to get at least 150 minutes of exercise per week or 10,000 steps per day on a pedometer . · Order or download the FREE \"Exercise & Physical Activity: Your Everyday Guide\" from [employer]. Call 4-287.493.6409 or search [employer] online. · You need 8502-6721 mg of calcium and 8768-1991 IU of vitamin D per day. It is possible to meet your calcium requirement with diet alone, but a vitamin D supplement is usually necessary to meet this goal.  · When exposed to the sun, use a sunscreen that protects against both UVA and UVB radiation with an SPF of 30 or greater. Reapply every 2 to 3 hours or after sweating, drying off with a towel, or swimming. · Always wear a seat belt when traveling in a car. Always wear a helmet when riding a bicycle or motorcycle.

## 2020-03-13 RX ORDER — GABAPENTIN 600 MG/1
600 TABLET ORAL 4 TIMES DAILY
Qty: 360 TABLET | Refills: 1 | Status: SHIPPED | OUTPATIENT
Start: 2020-03-13 | End: 2021-01-27 | Stop reason: ALTCHOICE

## 2020-03-13 NOTE — TELEPHONE ENCOUNTER
Trista Gunter called requesting a refill on the following medications:  Requested Prescriptions     Pending Prescriptions Disp Refills    gabapentin (NEURONTIN) 600 MG tablet 360 tablet 1     Sig: Take 1 tablet by mouth 4 times daily for 180 days.      Pharmacy verified:  .pv    [facility]    Date of last visit: 3/10/20  Date of next visit (if applicable): 6/37/7116

## 2020-08-19 ENCOUNTER — OFFICE VISIT (OUTPATIENT)
Dept: FAMILY MEDICINE CLINIC | Age: 80
End: 2020-08-19
Payer: MEDICARE

## 2020-08-19 VITALS
BODY MASS INDEX: 26.52 KG/M2 | TEMPERATURE: 97.8 F | RESPIRATION RATE: 14 BRPM | SYSTOLIC BLOOD PRESSURE: 138 MMHG | HEART RATE: 64 BPM | HEIGHT: 66 IN | WEIGHT: 165 LBS | DIASTOLIC BLOOD PRESSURE: 84 MMHG

## 2020-08-19 PROCEDURE — 99214 OFFICE O/P EST MOD 30 MIN: CPT | Performed by: FAMILY MEDICINE

## 2020-08-19 RX ORDER — PREGABALIN 100 MG/1
100 CAPSULE ORAL 2 TIMES DAILY
Qty: 180 CAPSULE | Refills: 1 | Status: SHIPPED | OUTPATIENT
Start: 2020-08-19 | End: 2021-01-28

## 2020-08-19 NOTE — PROGRESS NOTES
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 Mars Lopes GERD (gastroesophageal reflux disease)     Diverticulitis     Hiatal hernia     Hypertension     Hypothyroidism     Osteoarthritis        Past Surgical History:   Procedure Laterality Date    APPENDECTOMY      BACK SURGERY      CARPAL TUNNEL RELEASE Bilateral     w/cubital tunnel    COLON SURGERY  2016    Procedure: ATTEMPTED DAVINCI XI ROBOTIC ASSISTED SIGMOID COLON RESECTION, ROBOTIC ASSISTED MOBILIZATION OF RECTAL SIGMOID TO SPLENIC FLEXURE, CONVERTED TO OPEN LEFT HEMICOLECTOMY WITH COLOPROCTOCTOMY, SPLENORRHAPHY, RIGID SIGMOIDOSCOPY, LASER EVALUATION OF VASCULARITY WITH ICG; Surgeon: Mars Lopes MD; Location: [facility]; Service: General    COLONOSCOPY      [address] REMOV[address]     [address],     Cataract    FOOT SURGERY Left    [address]    w/bladder suspension    JOINT REPLACEMENT  , ,     rt hip(), lt knee(), lt hip() Shoulder ()    OVARY REMOVAL      [place] COLON CA SCRN NOT  [address] IND Left 9/15/2017    COLONOSCOPY performed by Terrie Carmen MD at [facility]   [facility]    discectomy   [address] [place] [address]          Social History     Tobacco Use    Smoking status: Former Smoker     Packs/day: 2.00     Years: 11.00     Pack years: 22.00     Types: Cigarettes     Start date: 1957     Last attempt to quit: 1967     Years since quittin.8    Smokeless tobacco: (LYRICA) 100 MG capsule; Take 1 capsule by mouth 2 times daily for 180 days. At high risk for falls    Chronic low back pain with sciatica, sciatica laterality unspecified, unspecified back pain laterality  -     pregabalin (LYRICA) 100 MG capsule; Take 1 capsule by mouth 2 times daily for 180 days.  -     External Referral To Pain Clinic    Acquired hypothyroidism  -     T4, Free; Future  -     TSH without Reflex; Future    Essential (primary) hypertension  -     Basic Metabolic Panel; Future    Pain in both hands  -     Hafsa Timmons MD, Orthopedic Surgery, [facility]         -Pain is uncontrolled, will stop gabapentin and try lyrica instead. She will let me know if she has SEs or if ineffective.  -Other chronic issues are stable, continue current medications  -Advised to call if any issues          Return in about 6 months (around 2/19/2021), or if symptoms worsen or fail to improve. Controlled Substance Monitoring:    Acute and Chronic Pain Monitoring:   RX Monitoring 10/18/2018   Attestation The Prescription Monitoring Report for this patient was reviewed today. Periodic Controlled Substance Monitoring No signs of potential drug abuse or diversion identified. Chronic Pain > 80 MEDD -                 Paulina received counseling on the following healthy behaviors: medication adherence  Reviewed prior labs and health maintenance. Continue current medications, diet and exercise. Discussed use, benefit, and side effects of prescribed medications. Barriers to medication compliance addressed. Patient given educational materials - see patient instructions. All patient questions answered. Patient voiced understanding. On the basis of positive falls risk screening, assessment and plan is as follows: home safety tips provided, continue wheelchair.

## 2020-10-05 ENCOUNTER — VIRTUAL VISIT (OUTPATIENT)
Dept: FAMILY MEDICINE CLINIC | Age: 80
End: 2020-10-05
Payer: MEDICARE

## 2020-10-05 PROCEDURE — 99441 PR PHYS/QHP TELEPHONE EVALUATION 5-10 MIN: CPT | Performed by: FAMILY MEDICINE

## 2020-10-05 RX ORDER — DOXYCYCLINE HYCLATE 100 MG
100 TABLET ORAL 2 TIMES DAILY
Qty: 14 TABLET | Refills: 0 | Status: SHIPPED | OUTPATIENT
Start: 2020-10-05 | End: 2020-10-12

## 2020-10-06 ENCOUNTER — HOSPITAL ENCOUNTER (OUTPATIENT)
Age: 80
Setting detail: SPECIMEN
Discharge: HOME | End: 2020-10-06
Payer: MEDICARE

## 2020-10-06 PROCEDURE — U0003 INFECTIOUS AGENT DETECTION BY NUCLEIC ACID (DNA OR RNA); SEVERE ACUTE RESPIRATORY SYNDROME CORONAVIRUS 2 (SARS-COV-2) (CORONAVIRUS DISEASE [COVID-19]), AMPLIFIED PROBE TECHNIQUE, MAKING USE OF HIGH THROUGHPUT TECHNOLOGIES AS DESCRIBED BY CMS-2020-01-R: HCPCS

## 2020-10-07 LAB — SARS-COV-2: NOT DETECTED

## 2020-10-08 ENCOUNTER — TELEPHONE (OUTPATIENT)
Dept: FAMILY MEDICINE CLINIC | Age: 80
End: 2020-10-08

## 2020-10-08 NOTE — TELEPHONE ENCOUNTER
----- Message from Nellie Celis DO sent at 10/8/2020  7:41 AM EDT -----  Please let patient know that COVID test was normal.

## 2020-10-14 ENCOUNTER — TELEPHONE (OUTPATIENT)
Dept: FAMILY MEDICINE CLINIC | Age: 80
End: 2020-10-14

## 2020-10-14 RX ORDER — BENZONATATE 100 MG/1
100-200 CAPSULE ORAL 3 TIMES DAILY PRN
Qty: 42 CAPSULE | Refills: 0 | Status: SHIPPED | OUTPATIENT
Start: 2020-10-14 | End: 2020-10-21

## 2020-11-18 RX ORDER — ENALAPRIL MALEATE 10 MG/1
TABLET ORAL
Qty: 90 TABLET | Refills: 3 | Status: ON HOLD | OUTPATIENT
Start: 2020-11-18 | End: 2021-03-19 | Stop reason: SDUPTHER

## 2020-12-16 RX ORDER — METOPROLOL SUCCINATE 25 MG/1
TABLET, EXTENDED RELEASE ORAL
Qty: 90 TABLET | Refills: 3 | Status: ON HOLD | OUTPATIENT
Start: 2020-12-16 | End: 2021-03-03 | Stop reason: HOSPADM

## 2020-12-20 ENCOUNTER — APPOINTMENT (OUTPATIENT)
Dept: GENERAL RADIOLOGY | Age: 80
DRG: 481 | End: 2020-12-20
Payer: MEDICARE

## 2020-12-20 ENCOUNTER — HOSPITAL ENCOUNTER (INPATIENT)
Age: 80
LOS: 2 days | Discharge: NURSING, REHAB OR LONG-TERM CARE | DRG: 481 | End: 2020-12-23
Attending: EMERGENCY MEDICINE | Admitting: ORTHOPAEDIC SURGERY
Payer: MEDICARE

## 2020-12-20 PROCEDURE — 6820000001 HC L2 TRAUMA SURGERY EVALUATION: Performed by: ORTHOPAEDIC SURGERY

## 2020-12-20 PROCEDURE — 96374 THER/PROPH/DIAG INJ IV PUSH: CPT

## 2020-12-20 PROCEDURE — 71045 X-RAY EXAM CHEST 1 VIEW: CPT

## 2020-12-20 PROCEDURE — 99285 EMERGENCY DEPT VISIT HI MDM: CPT

## 2020-12-20 PROCEDURE — 96375 TX/PRO/DX INJ NEW DRUG ADDON: CPT

## 2020-12-20 PROCEDURE — 73564 X-RAY EXAM KNEE 4 OR MORE: CPT

## 2020-12-20 PROCEDURE — 73552 X-RAY EXAM OF FEMUR 2/>: CPT

## 2020-12-20 RX ORDER — ONDANSETRON 2 MG/ML
4 INJECTION INTRAMUSCULAR; INTRAVENOUS ONCE
Status: COMPLETED | OUTPATIENT
Start: 2020-12-20 | End: 2020-12-21

## 2020-12-20 RX ORDER — FENTANYL CITRATE 50 UG/ML
50 INJECTION, SOLUTION INTRAMUSCULAR; INTRAVENOUS ONCE
Status: COMPLETED | OUTPATIENT
Start: 2020-12-20 | End: 2020-12-21

## 2020-12-20 ASSESSMENT — PAIN SCALES - GENERAL: PAINLEVEL_OUTOF10: 3

## 2020-12-20 ASSESSMENT — PAIN DESCRIPTION - LOCATION: LOCATION: KNEE

## 2020-12-20 ASSESSMENT — PAIN DESCRIPTION - ORIENTATION: ORIENTATION: LEFT

## 2020-12-21 ENCOUNTER — APPOINTMENT (OUTPATIENT)
Dept: GENERAL RADIOLOGY | Age: 80
DRG: 481 | End: 2020-12-21
Payer: MEDICARE

## 2020-12-21 ENCOUNTER — ANESTHESIA EVENT (OUTPATIENT)
Dept: OPERATING ROOM | Age: 80
DRG: 481 | End: 2020-12-21
Payer: MEDICARE

## 2020-12-21 ENCOUNTER — ANESTHESIA (OUTPATIENT)
Dept: OPERATING ROOM | Age: 80
DRG: 481 | End: 2020-12-21
Payer: MEDICARE

## 2020-12-21 VITALS
SYSTOLIC BLOOD PRESSURE: 112 MMHG | RESPIRATION RATE: 15 BRPM | DIASTOLIC BLOOD PRESSURE: 58 MMHG | OXYGEN SATURATION: 95 %

## 2020-12-21 PROBLEM — S72.402A CLOSED FRACTURE OF DISTAL END OF LEFT FEMUR, INITIAL ENCOUNTER (HCC): Status: ACTIVE | Noted: 2020-12-21

## 2020-12-21 LAB
ANION GAP SERPL CALCULATED.3IONS-SCNC: 11 MEQ/L (ref 8–16)
APTT: 29.1 SECONDS (ref 22–38)
BASOPHILS # BLD: 0.9 %
BASOPHILS ABSOLUTE: 0.1 THOU/MM3 (ref 0–0.1)
BUN BLDV-MCNC: 7 MG/DL (ref 7–22)
CALCIUM SERPL-MCNC: 9 MG/DL (ref 8.5–10.5)
CHLORIDE BLD-SCNC: 93 MEQ/L (ref 98–111)
CO2: 25 MEQ/L (ref 23–33)
CREAT SERPL-MCNC: 0.5 MG/DL (ref 0.4–1.2)
EKG ATRIAL RATE: 68 BPM
EKG P AXIS: 63 DEGREES
EKG P-R INTERVAL: 190 MS
EKG Q-T INTERVAL: 384 MS
EKG QRS DURATION: 70 MS
EKG QTC CALCULATION (BAZETT): 408 MS
EKG R AXIS: 42 DEGREES
EKG T AXIS: 57 DEGREES
EKG VENTRICULAR RATE: 68 BPM
EOSINOPHIL # BLD: 2 %
EOSINOPHILS ABSOLUTE: 0.2 THOU/MM3 (ref 0–0.4)
ERYTHROCYTE [DISTWIDTH] IN BLOOD BY AUTOMATED COUNT: 12.2 % (ref 11.5–14.5)
ERYTHROCYTE [DISTWIDTH] IN BLOOD BY AUTOMATED COUNT: 43.8 FL (ref 35–45)
GFR SERPL CREATININE-BSD FRML MDRD: > 90 ML/MIN/1.73M2
GLUCOSE BLD-MCNC: 92 MG/DL (ref 70–108)
HCT VFR BLD CALC: 39.8 % (ref 37–47)
HEMOGLOBIN: 13.1 GM/DL (ref 12–16)
IMMATURE GRANS (ABS): 0.02 THOU/MM3 (ref 0–0.07)
IMMATURE GRANULOCYTES: 0.3 %
INR BLD: 0.91 (ref 0.85–1.13)
LYMPHOCYTES # BLD: 12.7 %
LYMPHOCYTES ABSOLUTE: 1 THOU/MM3 (ref 1–4.8)
MCH RBC QN AUTO: 32 PG (ref 26–33)
MCHC RBC AUTO-ENTMCNC: 32.9 GM/DL (ref 32.2–35.5)
MCV RBC AUTO: 97.3 FL (ref 81–99)
MONOCYTES # BLD: 6.6 %
MONOCYTES ABSOLUTE: 0.5 THOU/MM3 (ref 0.4–1.3)
NUCLEATED RED BLOOD CELLS: 0 /100 WBC
OSMOLALITY CALCULATION: 256.6 MOSMOL/KG (ref 275–300)
PLATELET # BLD: 206 THOU/MM3 (ref 130–400)
PMV BLD AUTO: 9.7 FL (ref 9.4–12.4)
POTASSIUM REFLEX MAGNESIUM: 3.9 MEQ/L (ref 3.5–5.2)
RBC # BLD: 4.09 MILL/MM3 (ref 4.2–5.4)
SARS-COV-2, NAAT: NOT DETECTED
SEG NEUTROPHILS: 77.5 %
SEGMENTED NEUTROPHILS ABSOLUTE COUNT: 6.1 THOU/MM3 (ref 1.8–7.7)
SODIUM BLD-SCNC: 129 MEQ/L (ref 135–145)
WBC # BLD: 7.9 THOU/MM3 (ref 4.8–10.8)

## 2020-12-21 PROCEDURE — 3700000000 HC ANESTHESIA ATTENDED CARE: Performed by: ORTHOPAEDIC SURGERY

## 2020-12-21 PROCEDURE — 2580000003 HC RX 258: Performed by: ORTHOPAEDIC SURGERY

## 2020-12-21 PROCEDURE — 85610 PROTHROMBIN TIME: CPT

## 2020-12-21 PROCEDURE — 6360000002 HC RX W HCPCS: Performed by: ORTHOPAEDIC SURGERY

## 2020-12-21 PROCEDURE — 73552 X-RAY EXAM OF FEMUR 2/>: CPT

## 2020-12-21 PROCEDURE — 6370000000 HC RX 637 (ALT 250 FOR IP): Performed by: PHYSICIAN ASSISTANT

## 2020-12-21 PROCEDURE — 93005 ELECTROCARDIOGRAM TRACING: CPT | Performed by: EMERGENCY MEDICINE

## 2020-12-21 PROCEDURE — 99221 1ST HOSP IP/OBS SF/LOW 40: CPT | Performed by: PHYSICIAN ASSISTANT

## 2020-12-21 PROCEDURE — 6360000002 HC RX W HCPCS: Performed by: ANESTHESIOLOGY

## 2020-12-21 PROCEDURE — 85730 THROMBOPLASTIN TIME PARTIAL: CPT

## 2020-12-21 PROCEDURE — 0QSC04Z REPOSITION LEFT LOWER FEMUR WITH INTERNAL FIXATION DEVICE, OPEN APPROACH: ICD-10-PCS | Performed by: ORTHOPAEDIC SURGERY

## 2020-12-21 PROCEDURE — 7100000000 HC PACU RECOVERY - FIRST 15 MIN: Performed by: ORTHOPAEDIC SURGERY

## 2020-12-21 PROCEDURE — U0002 COVID-19 LAB TEST NON-CDC: HCPCS

## 2020-12-21 PROCEDURE — 3700000001 HC ADD 15 MINUTES (ANESTHESIA): Performed by: ORTHOPAEDIC SURGERY

## 2020-12-21 PROCEDURE — C1713 ANCHOR/SCREW BN/BN,TIS/BN: HCPCS | Performed by: ORTHOPAEDIC SURGERY

## 2020-12-21 PROCEDURE — 2580000003 HC RX 258: Performed by: PHYSICIAN ASSISTANT

## 2020-12-21 PROCEDURE — 2500000003 HC RX 250 WO HCPCS: Performed by: ORTHOPAEDIC SURGERY

## 2020-12-21 PROCEDURE — 93010 ELECTROCARDIOGRAM REPORT: CPT | Performed by: NUCLEAR MEDICINE

## 2020-12-21 PROCEDURE — 2709999900 HC NON-CHARGEABLE SUPPLY: Performed by: ORTHOPAEDIC SURGERY

## 2020-12-21 PROCEDURE — 2500000003 HC RX 250 WO HCPCS: Performed by: NURSE ANESTHETIST, CERTIFIED REGISTERED

## 2020-12-21 PROCEDURE — 7100000001 HC PACU RECOVERY - ADDTL 15 MIN: Performed by: ORTHOPAEDIC SURGERY

## 2020-12-21 PROCEDURE — 3600000014 HC SURGERY LEVEL 4 ADDTL 15MIN: Performed by: ORTHOPAEDIC SURGERY

## 2020-12-21 PROCEDURE — 36415 COLL VENOUS BLD VENIPUNCTURE: CPT

## 2020-12-21 PROCEDURE — 51798 US URINE CAPACITY MEASURE: CPT

## 2020-12-21 PROCEDURE — 85025 COMPLETE CBC W/AUTO DIFF WBC: CPT

## 2020-12-21 PROCEDURE — 6360000002 HC RX W HCPCS: Performed by: NURSE ANESTHETIST, CERTIFIED REGISTERED

## 2020-12-21 PROCEDURE — 3600000004 HC SURGERY LEVEL 4 BASE: Performed by: ORTHOPAEDIC SURGERY

## 2020-12-21 PROCEDURE — 1200000000 HC SEMI PRIVATE

## 2020-12-21 PROCEDURE — 80048 BASIC METABOLIC PNL TOTAL CA: CPT

## 2020-12-21 PROCEDURE — 3209999900 FLUORO FOR SURGICAL PROCEDURES

## 2020-12-21 PROCEDURE — 6360000002 HC RX W HCPCS: Performed by: EMERGENCY MEDICINE

## 2020-12-21 DEVICE — PERI-LOC 4.5MM T25 LOCKING SCREW                                    68MM SELF-TAPPING
Type: IMPLANTABLE DEVICE | Status: FUNCTIONAL
Brand: PERI-LOC

## 2020-12-21 DEVICE — PERI-LOC 4.5MM T25 CORTEX SCREW                                    36MM SELF-TAPPING
Type: IMPLANTABLE DEVICE | Status: FUNCTIONAL
Brand: PERI-LOC

## 2020-12-21 DEVICE — PERI-LOC 4.5MM T25 CORTEX SCREW                                    34MM SELF-TAPPING
Type: IMPLANTABLE DEVICE | Status: FUNCTIONAL
Brand: PERI-LOC

## 2020-12-21 DEVICE — 4.5MM LAT DIST FEMUR LCK PL 16H L342MM
Type: IMPLANTABLE DEVICE | Status: FUNCTIONAL
Brand: PERI-LOC

## 2020-12-21 DEVICE — PERI-LOC 4.5MM T25 LOCKING SCREW                                    76MM SELF-TAPPING
Type: IMPLANTABLE DEVICE | Status: FUNCTIONAL
Brand: PERI-LOC

## 2020-12-21 DEVICE — PERI-LOC 4.5MM T25 LOCKING SCREW                                    78MM SELF-TAPPING
Type: IMPLANTABLE DEVICE | Status: FUNCTIONAL
Brand: PERI-LOC

## 2020-12-21 DEVICE — PERI-LOC 4.5MM T25 LOCKING SCREW                                    80MM SELF-TAPPING
Type: IMPLANTABLE DEVICE | Status: FUNCTIONAL
Brand: PERI-LOC

## 2020-12-21 DEVICE — PERI-LOC 4.5MM T25 CORTEX SCREW                                    40MM SELF-TAPPING
Type: IMPLANTABLE DEVICE | Status: FUNCTIONAL
Brand: PERI-LOC

## 2020-12-21 DEVICE — PERI-LOC 4.5MM T25 LOCKING SCREW                                    32MM SELF-TAPPING
Type: IMPLANTABLE DEVICE | Status: FUNCTIONAL
Brand: PERI-LOC

## 2020-12-21 DEVICE — PERI-LOC 4.5MM T25 CORTEX SCREW                                    38MM SELF-TAPPING
Type: IMPLANTABLE DEVICE | Status: FUNCTIONAL
Brand: PERI-LOC

## 2020-12-21 RX ORDER — CEFAZOLIN SODIUM 1 G/50ML
1 INJECTION, SOLUTION INTRAVENOUS EVERY 8 HOURS
Status: COMPLETED | OUTPATIENT
Start: 2020-12-21 | End: 2020-12-22

## 2020-12-21 RX ORDER — MORPHINE SULFATE 2 MG/ML
2 INJECTION, SOLUTION INTRAMUSCULAR; INTRAVENOUS EVERY 4 HOURS PRN
Status: DISCONTINUED | OUTPATIENT
Start: 2020-12-21 | End: 2020-12-23 | Stop reason: HOSPADM

## 2020-12-21 RX ORDER — MORPHINE SULFATE 2 MG/ML
1 INJECTION, SOLUTION INTRAMUSCULAR; INTRAVENOUS EVERY 4 HOURS PRN
Status: DISCONTINUED | OUTPATIENT
Start: 2020-12-21 | End: 2020-12-23 | Stop reason: HOSPADM

## 2020-12-21 RX ORDER — KETOROLAC TROMETHAMINE 30 MG/ML
INJECTION, SOLUTION INTRAMUSCULAR; INTRAVENOUS PRN
Status: DISCONTINUED | OUTPATIENT
Start: 2020-12-21 | End: 2020-12-21 | Stop reason: ALTCHOICE

## 2020-12-21 RX ORDER — FENTANYL CITRATE 50 UG/ML
50 INJECTION, SOLUTION INTRAMUSCULAR; INTRAVENOUS EVERY 5 MIN PRN
Status: DISCONTINUED | OUTPATIENT
Start: 2020-12-21 | End: 2020-12-21 | Stop reason: HOSPADM

## 2020-12-21 RX ORDER — LABETALOL 20 MG/4 ML (5 MG/ML) INTRAVENOUS SYRINGE
5 EVERY 10 MIN PRN
Status: DISCONTINUED | OUTPATIENT
Start: 2020-12-21 | End: 2020-12-21 | Stop reason: HOSPADM

## 2020-12-21 RX ORDER — LIDOCAINE HCL/PF 100 MG/5ML
SYRINGE (ML) INJECTION PRN
Status: DISCONTINUED | OUTPATIENT
Start: 2020-12-21 | End: 2020-12-21 | Stop reason: SDUPTHER

## 2020-12-21 RX ORDER — SODIUM CHLORIDE 9 MG/ML
INJECTION, SOLUTION INTRAVENOUS CONTINUOUS PRN
Status: COMPLETED | OUTPATIENT
Start: 2020-12-21 | End: 2020-12-21

## 2020-12-21 RX ORDER — FENTANYL CITRATE 50 UG/ML
25 INJECTION, SOLUTION INTRAMUSCULAR; INTRAVENOUS EVERY 5 MIN PRN
Status: DISCONTINUED | OUTPATIENT
Start: 2020-12-21 | End: 2020-12-21 | Stop reason: HOSPADM

## 2020-12-21 RX ORDER — ONDANSETRON 2 MG/ML
4 INJECTION INTRAMUSCULAR; INTRAVENOUS EVERY 6 HOURS PRN
Status: DISCONTINUED | OUTPATIENT
Start: 2020-12-21 | End: 2020-12-23 | Stop reason: HOSPADM

## 2020-12-21 RX ORDER — PROPOFOL 10 MG/ML
INJECTION, EMULSION INTRAVENOUS PRN
Status: DISCONTINUED | OUTPATIENT
Start: 2020-12-21 | End: 2020-12-21 | Stop reason: SDUPTHER

## 2020-12-21 RX ORDER — HYDROCODONE BITARTRATE AND ACETAMINOPHEN 5; 325 MG/1; MG/1
1-2 TABLET ORAL EVERY 4 HOURS PRN
Qty: 50 TABLET | Refills: 0 | Status: SHIPPED | OUTPATIENT
Start: 2020-12-21 | End: 2020-12-28

## 2020-12-21 RX ORDER — ENALAPRIL MALEATE 5 MG/1
5 TABLET ORAL DAILY
Status: DISCONTINUED | OUTPATIENT
Start: 2020-12-21 | End: 2020-12-23 | Stop reason: HOSPADM

## 2020-12-21 RX ORDER — FLUTICASONE PROPIONATE 50 MCG
1 SPRAY, SUSPENSION (ML) NASAL 2 TIMES DAILY PRN
Status: DISCONTINUED | OUTPATIENT
Start: 2020-12-21 | End: 2020-12-23 | Stop reason: HOSPADM

## 2020-12-21 RX ORDER — DULOXETIN HYDROCHLORIDE 60 MG/1
60 CAPSULE, DELAYED RELEASE ORAL DAILY
Status: DISCONTINUED | OUTPATIENT
Start: 2020-12-21 | End: 2020-12-23 | Stop reason: HOSPADM

## 2020-12-21 RX ORDER — LEVOTHYROXINE SODIUM 0.12 MG/1
125 TABLET ORAL DAILY
Status: DISCONTINUED | OUTPATIENT
Start: 2020-12-21 | End: 2020-12-23 | Stop reason: HOSPADM

## 2020-12-21 RX ORDER — MECLIZINE HYDROCHLORIDE CHEWABLE TABLETS 25 MG/1
25 TABLET, CHEWABLE ORAL EVERY 6 HOURS PRN
Status: DISCONTINUED | OUTPATIENT
Start: 2020-12-21 | End: 2020-12-23 | Stop reason: HOSPADM

## 2020-12-21 RX ORDER — ONDANSETRON 2 MG/ML
INJECTION INTRAMUSCULAR; INTRAVENOUS PRN
Status: DISCONTINUED | OUTPATIENT
Start: 2020-12-21 | End: 2020-12-21 | Stop reason: SDUPTHER

## 2020-12-21 RX ORDER — MORPHINE SULFATE 10 MG/ML
INJECTION, SOLUTION INTRAMUSCULAR; INTRAVENOUS PRN
Status: DISCONTINUED | OUTPATIENT
Start: 2020-12-21 | End: 2020-12-21 | Stop reason: ALTCHOICE

## 2020-12-21 RX ORDER — ONDANSETRON 2 MG/ML
4 INJECTION INTRAMUSCULAR; INTRAVENOUS
Status: DISCONTINUED | OUTPATIENT
Start: 2020-12-21 | End: 2020-12-21 | Stop reason: HOSPADM

## 2020-12-21 RX ORDER — PREGABALIN 50 MG/1
100 CAPSULE ORAL 2 TIMES DAILY
Status: DISCONTINUED | OUTPATIENT
Start: 2020-12-21 | End: 2020-12-23 | Stop reason: HOSPADM

## 2020-12-21 RX ORDER — CEFAZOLIN SODIUM 1 G/3ML
INJECTION, POWDER, FOR SOLUTION INTRAMUSCULAR; INTRAVENOUS PRN
Status: DISCONTINUED | OUTPATIENT
Start: 2020-12-21 | End: 2020-12-21 | Stop reason: SDUPTHER

## 2020-12-21 RX ORDER — HYDROCODONE BITARTRATE AND ACETAMINOPHEN 5; 325 MG/1; MG/1
2 TABLET ORAL EVERY 6 HOURS PRN
Status: DISCONTINUED | OUTPATIENT
Start: 2020-12-21 | End: 2020-12-23 | Stop reason: HOSPADM

## 2020-12-21 RX ORDER — METOPROLOL SUCCINATE 25 MG/1
25 TABLET, EXTENDED RELEASE ORAL DAILY
Status: DISCONTINUED | OUTPATIENT
Start: 2020-12-21 | End: 2020-12-23 | Stop reason: HOSPADM

## 2020-12-21 RX ORDER — HYDROCODONE BITARTRATE AND ACETAMINOPHEN 5; 325 MG/1; MG/1
1 TABLET ORAL EVERY 6 HOURS PRN
Status: DISCONTINUED | OUTPATIENT
Start: 2020-12-21 | End: 2020-12-23 | Stop reason: HOSPADM

## 2020-12-21 RX ORDER — SODIUM CHLORIDE 9 MG/ML
INJECTION, SOLUTION INTRAVENOUS CONTINUOUS
Status: DISCONTINUED | OUTPATIENT
Start: 2020-12-21 | End: 2020-12-23 | Stop reason: HOSPADM

## 2020-12-21 RX ORDER — FENTANYL CITRATE 50 UG/ML
INJECTION, SOLUTION INTRAMUSCULAR; INTRAVENOUS PRN
Status: DISCONTINUED | OUTPATIENT
Start: 2020-12-21 | End: 2020-12-21 | Stop reason: SDUPTHER

## 2020-12-21 RX ORDER — BUPIVACAINE HYDROCHLORIDE AND EPINEPHRINE 5; 5 MG/ML; UG/ML
INJECTION, SOLUTION EPIDURAL; INTRACAUDAL; PERINEURAL PRN
Status: DISCONTINUED | OUTPATIENT
Start: 2020-12-21 | End: 2020-12-21 | Stop reason: ALTCHOICE

## 2020-12-21 RX ADMIN — CEFAZOLIN SODIUM 1 G: 1 INJECTION, SOLUTION INTRAVENOUS at 21:03

## 2020-12-21 RX ADMIN — METOPROLOL SUCCINATE 25 MG: 25 TABLET, FILM COATED, EXTENDED RELEASE ORAL at 11:22

## 2020-12-21 RX ADMIN — PHENYLEPHRINE HYDROCHLORIDE 200 MCG: 10 INJECTION INTRAVENOUS at 14:14

## 2020-12-21 RX ADMIN — SODIUM CHLORIDE: 9 INJECTION, SOLUTION INTRAVENOUS at 21:03

## 2020-12-21 RX ADMIN — FENTANYL CITRATE 50 MCG: 50 INJECTION, SOLUTION INTRAMUSCULAR; INTRAVENOUS at 14:14

## 2020-12-21 RX ADMIN — HYDROCODONE BITARTRATE AND ACETAMINOPHEN 2 TABLET: 5; 325 TABLET ORAL at 02:54

## 2020-12-21 RX ADMIN — SODIUM CHLORIDE: 9 INJECTION, SOLUTION INTRAVENOUS at 14:48

## 2020-12-21 RX ADMIN — HYDROCODONE BITARTRATE AND ACETAMINOPHEN 1 TABLET: 5; 325 TABLET ORAL at 09:55

## 2020-12-21 RX ADMIN — FENTANYL CITRATE 50 MCG: 50 INJECTION, SOLUTION INTRAMUSCULAR; INTRAVENOUS at 14:55

## 2020-12-21 RX ADMIN — PREGABALIN 100 MG: 50 CAPSULE ORAL at 21:03

## 2020-12-21 RX ADMIN — CEFAZOLIN 2000 MG: 1 INJECTION, POWDER, FOR SOLUTION INTRAMUSCULAR; INTRAVENOUS; PARENTERAL at 14:13

## 2020-12-21 RX ADMIN — SODIUM CHLORIDE: 9 INJECTION, SOLUTION INTRAVENOUS at 03:01

## 2020-12-21 RX ADMIN — HYDROCODONE BITARTRATE AND ACETAMINOPHEN 2 TABLET: 5; 325 TABLET ORAL at 15:10

## 2020-12-21 RX ADMIN — Medication 100 MG: at 14:05

## 2020-12-21 RX ADMIN — PHENYLEPHRINE HYDROCHLORIDE 100 MCG: 10 INJECTION INTRAVENOUS at 14:10

## 2020-12-21 RX ADMIN — PHENYLEPHRINE HYDROCHLORIDE 200 MCG: 10 INJECTION INTRAVENOUS at 14:20

## 2020-12-21 RX ADMIN — PREGABALIN 100 MG: 50 CAPSULE ORAL at 09:54

## 2020-12-21 RX ADMIN — LEVOTHYROXINE SODIUM 125 MCG: 125 TABLET ORAL at 11:22

## 2020-12-21 RX ADMIN — ONDANSETRON 4 MG: 2 INJECTION INTRAMUSCULAR; INTRAVENOUS at 00:23

## 2020-12-21 RX ADMIN — PROPOFOL 120 MG: 10 INJECTION, EMULSION INTRAVENOUS at 14:05

## 2020-12-21 RX ADMIN — DULOXETINE HYDROCHLORIDE 60 MG: 60 CAPSULE, DELAYED RELEASE ORAL at 11:22

## 2020-12-21 RX ADMIN — PHENYLEPHRINE HYDROCHLORIDE 200 MCG: 10 INJECTION INTRAVENOUS at 14:33

## 2020-12-21 RX ADMIN — FENTANYL CITRATE 50 MCG: 50 INJECTION, SOLUTION INTRAMUSCULAR; INTRAVENOUS at 00:24

## 2020-12-21 RX ADMIN — HYDROCODONE BITARTRATE AND ACETAMINOPHEN 2 TABLET: 5; 325 TABLET ORAL at 21:03

## 2020-12-21 RX ADMIN — FENTANYL CITRATE 50 MCG: 50 INJECTION, SOLUTION INTRAMUSCULAR; INTRAVENOUS at 15:00

## 2020-12-21 RX ADMIN — ONDANSETRON HYDROCHLORIDE 4 MG: 4 INJECTION, SOLUTION INTRAMUSCULAR; INTRAVENOUS at 14:48

## 2020-12-21 RX ADMIN — FENTANYL CITRATE 50 MCG: 50 INJECTION, SOLUTION INTRAMUSCULAR; INTRAVENOUS at 14:05

## 2020-12-21 ASSESSMENT — PAIN SCALES - GENERAL
PAINLEVEL_OUTOF10: 8
PAINLEVEL_OUTOF10: 4
PAINLEVEL_OUTOF10: 5
PAINLEVEL_OUTOF10: 8
PAINLEVEL_OUTOF10: 9
PAINLEVEL_OUTOF10: 8

## 2020-12-21 ASSESSMENT — PULMONARY FUNCTION TESTS
PIF_VALUE: 9
PIF_VALUE: 14
PIF_VALUE: 13
PIF_VALUE: 13
PIF_VALUE: 5
PIF_VALUE: 13
PIF_VALUE: 13
PIF_VALUE: 2
PIF_VALUE: 13
PIF_VALUE: 1
PIF_VALUE: 16
PIF_VALUE: 13
PIF_VALUE: 13
PIF_VALUE: 15
PIF_VALUE: 13
PIF_VALUE: 6
PIF_VALUE: 13
PIF_VALUE: 1
PIF_VALUE: 13
PIF_VALUE: 13
PIF_VALUE: 12
PIF_VALUE: 13
PIF_VALUE: 15
PIF_VALUE: 14
PIF_VALUE: 2
PIF_VALUE: 15
PIF_VALUE: 16
PIF_VALUE: 13
PIF_VALUE: 15
PIF_VALUE: 16
PIF_VALUE: 13
PIF_VALUE: 14
PIF_VALUE: 13
PIF_VALUE: 13
PIF_VALUE: 15
PIF_VALUE: 13
PIF_VALUE: 16
PIF_VALUE: 13
PIF_VALUE: 21
PIF_VALUE: 5

## 2020-12-21 ASSESSMENT — PAIN DESCRIPTION - DESCRIPTORS: DESCRIPTORS: SHARP;SHOOTING

## 2020-12-21 ASSESSMENT — PAIN DESCRIPTION - ORIENTATION: ORIENTATION: LEFT

## 2020-12-21 ASSESSMENT — PAIN DESCRIPTION - LOCATION: LOCATION: LEG

## 2020-12-21 ASSESSMENT — PAIN DESCRIPTION - FREQUENCY: FREQUENCY: CONTINUOUS

## 2020-12-21 ASSESSMENT — PAIN DESCRIPTION - PAIN TYPE: TYPE: SURGICAL PAIN

## 2020-12-21 NOTE — ANESTHESIA PRE PROCEDURE
Department of Anesthesiology  Preprocedure Note       Name:  Kadie Perez Height   Age:  78 y.o.  :  1940                                          MRN:  [id number]         Date:  2020      Surgeon: Paulo Dowling):  Verónica Camacho MD    Procedure: Procedure(s):  LEFT FEMUR OPEN REDUCTION INTERNAL FIXATION    Medications prior to admission:   Prior to Admission medications    Medication Sig Start Date End Date Taking? Authorizing Provider   metoprolol succinate (TOPROL XL) 25 MG extended release tablet TAKE 1 TABLET DAILY 20  Yes FLORIDA Vazquez CNP   enalapril (VASOTEC) 10 MG tablet TAKE ONE-HALF (1/2) TABLET DAILY 20  Yes Eli Pierce DO   pregabalin (LYRICA) 100 MG capsule Take 1 capsule by mouth 2 times daily for 180 days. 8/19/20 2/15/21 Yes Griffin Corona, DO   Misc. Devices [facility]) MISC 1 each by Does not apply route daily 20  Yes Griffin Corona DO   levothyroxine (SYNTHROID) 125 MCG tablet Take 1 tablet by mouth Daily 20  Yes Griffin Corona DO   DULoxetine (CYMBALTA) 60 MG extended release capsule TAKE 1 CAPSULE DAILY 20  Yes Griffin Corona, DO   Misc. Devices MISC Mechanical lift for a [address]  Yes Griffin Corona DO   Scooter [address] [facility] by Does not apply route Power scooter 10/31/17  Yes Ascencion Pierce, DO   CRANBERRY PO Take 1,500 mg by mouth daily   Yes Historical Provider, MD   Biotin 55860 MCG TABS Take 1-2 tablets by mouth daily   Yes Historical Provider, MD   Omega 3-6-9 Fatty Acids (OMEGA 3-6-9 COMPLEX) CAPS Take 1 capsule by mouth daily    Yes Historical Provider, MD   therapeutic multivitamin-minerals (THERAGRAN-M) tablet Take 1 tablet by mouth nightly    Yes Historical Provider, MD   gabapentin (NEURONTIN) 600 MG tablet Take 1 tablet by mouth 4 times daily for 180 days.  3/13/20 9/9/20  Griffin Corona DO   guaiFENesin 1200 MG TB12 Take 1,200 mg by mouth 2 times daily 19   Joie Marr PA-C lidocaine (LMX) 4 % cream Apply topically 4x daily as needed. 10/12/19   [place], DO   fluticasone (FLONASE) 50 MCG/ACT nasal spray 1 spray by Nasal route as needed for Rhinitis    Historical Provider, MD   meclizine (ANTIVERT) 25 MG tablet Take 25 mg by mouth every 6 hours as needed for Dizziness    Historical Provider, MD       Current medications:    Current Facility-Administered Medications   Medication Dose Route Frequency Provider Last Rate Last Admin    0.9 % sodium chloride infusion   Intravenous Continuous Alesha Bright PA-C 100 mL/hr at 12/21/20 0301 New Bag at 12/21/20 0301    morphine (PF) injection 1 mg  1 mg Intravenous Q4H PRN Alesha Bright PA-C        Or    morphine (PF) injection 2 mg  2 mg Intravenous Q4H PRN Alesha Bright PA-C        HYDROcodone-acetaminophen [facility]) 5-325 MG per tablet 1 tablet  1 tablet Oral Q6H PRN Alesha Bright PA-C   1 tablet at 12/21/20 7361    Or    HYDROcodone-acetaminophen (NORCO) 5-325 MG per tablet 2 tablet  2 tablet Oral Q6H PRN Alesha Bright PA-C   2 tablet at 12/21/20 0254    ondansetron (ZOFRAN) injection 4 mg  4 mg Intravenous Q6H PRN Alesha Bright PA-C        DULoxetine (CYMBALTA) extended release capsule 60 mg  60 mg Oral Daily [employer], PA-C   60 mg at 12/21/20 1122    enalapril (VASOTEC) tablet 5 mg  5 mg Oral Daily Dina R BrownFRED        fluticasone (FLONASE) 50 MCG/ACT nasal spray 1 spray  1 spray Nasal BID PRN [employer], PA-C        levothyroxine (SYNTHROID) tablet 125 mcg  125 mcg Oral Daily [employer], PA-C   125 mcg at 12/21/20 1122    meclizine (ANTIVERT) chewable tablet 25 mg  25 mg Oral Q6H PRN [employer] PA-C        metoprolol succinate (TOPROL XL) extended release tablet 25 mg  25 mg Oral Daily [employer], PA-C   25 mg at 12/21/20 1122    pregabalin (LYRICA) capsule 100 mg  100 mg Oral BID [employer], PA-C   100 mg at 12/21/20 1930       Allergies:     Allergies   Allergen Reactions  Ampicillin     Morphine Other (See Comments)     Hallucinations     Pcn [Penicillins]     Sulfa Antibiotics        Problem List:    Patient Active Problem List   Diagnosis Code    Osteoarthritis M19.90    Fibromyalgia M79.7    Gastroesophageal reflux disease K21.9    Hypothyroidism E03.9    Patient is Spiritism Z78.9    Back pain M54.9    H/O abdominal surgery Z98.890    Normocytic anemia D64.9    History of diverticulosis Z87.19    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.40, G89.29    Chest pain R07.9    Essential (primary) hypertension I10    Abnormal CT scan, chest R93.89    BRBPR (bright red blood per rectum) K62.5    GI bleed due to NSAIDs K92.2, T39.395A    Spinal stenosis of lumbar region without neurogenic claudication M48.061    Other idiopathic scoliosis, lumbar region M41.26    Neutropenia (HCC) D70.9    Systemic lupus erythematosus ([facility] M32.9    Closed [facility] of distal end of left femur[facility] S72.402A       Past Medical History:        Diagnosis Date    Abnormal EKG     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 Krish Dan GERD (gastroesophageal reflux disease)     Diverticulitis     Hiatal hernia     Hypertension     Hypothyroidism     Osteoarthritis        Past Surgical History:        Procedure Laterality Date    APPENDECTOMY  1958    BACK SURGERY      CARPAL TUNNEL RELEASE Bilateral 2013    w/cubital tunnel    COLON SURGERY  07/29/2016 Procedure: ATTEMPTED DAVINCI XI ROBOTIC ASSISTED SIGMOID COLON RESECTION, ROBOTIC ASSISTED MOBILIZATION OF RECTAL SIGMOID TO SPLENIC FLEXURE, CONVERTED TO OPEN LEFT HEMICOLECTOMY WITH COLOPROCTOCTOMY, SPLENORRHAPHY, RIGID SIGMOIDOSCOPY, LASER EVALUATION OF VASCULARITY WITH ICG; Surgeon: Wilmer Sarmiento MD; Location: [place] [address]; Service: General    COLONOSCOPY      CYST REMOVAL     [address],     Cataract    FOOT SURGERY Left    [place]    w/bladder suspension    JOINT REPLACEMENT  , ,     rt hip(), lt knee(), lt hip() Shoulder ()   [address] [facility]    [place] COLON CA SCRN NOT  [address] IND Left 9/15/2017    COLONOSCOPY performed by Tammie Bhatia MD at [facility]   [address] [place] [address] ENDOSCOPY         Social History:    Social History     Tobacco Use    Smoking status: Former Smoker     Packs/day: 2.00     Years: 11.00     Pack years: 22.00     Types: Cigarettes     Start date: 1957     Quit date: 1967     Years since quittin.1    Smokeless tobacco: Never Used   Substance Use Topics    Alcohol use:  No                                Counseling given: Not Answered      Vital Signs (Current):   Vitals:    20 0020 20 0101 20 0237 20 0945   BP: (!) 166/98 (!) 142/83 (!) 176/89 137/68   Pulse: 66 70 75 86   Resp:  18 18 16   Temp:   97.7 °F (36.5 °C) 97.9 °F (36.6 °C)   TempSrc:   Oral Oral   SpO2: 97% 92% 96% 93%   Weight:   162 lb 6.4 oz (73.7 kg)    Height:   5' 6.5\" (1.689 m)                                               BP Readings from Last 3 Encounters:   20 137/68   20 138/84   03/10/20 138/78       NPO Status:                                                                                 BMI:   Wt Readings from Last 3 Encounters: 12/21/20 162 lb 6.4 oz (73.7 kg)   08/19/20 165 lb (74.8 kg)   03/10/20 162 lb (73.5 kg)     Body mass index is 25.82 kg/m². CBC:   Lab Results   Component Value Date    WBC 7.9 12/21/2020    RBC 4.09 12/21/2020    HGB 13.1 12/21/2020    HCT 39.8 12/21/2020    MCV 97.3 12/21/2020    RDW 10.9 11/06/2018     12/21/2020       CMP:   Lab Results   Component Value Date     12/21/2020    K 3.9 12/21/2020    CL 93 12/21/2020    CO2 25 12/21/2020    BUN 7 12/21/2020    CREATININE 0.5 12/21/2020    LABGLOM >90 12/21/2020    GLUCOSE 92 12/21/2020    PROT 6.8 12/21/2019    CALCIUM 9.0 12/21/2020    BILITOT 0.5 12/21/2019    ALKPHOS 100 12/21/2019    AST 24 12/21/2019    ALT 12 12/21/2019       POC Tests: No results for input(s): POCGLU, POCNA, POCK, POCCL, POCBUN, POCHEMO, POCHCT in the last 72 hours. Coags:   Lab Results   Component Value Date    INR 0.91 12/21/2020    APTT 29.1 12/21/2020       HCG (If Applicable): No results found for: PREGTESTUR, PREGSERUM, HCG, HCGQUANT     ABGs: No results found for: PHART, PO2ART, NTH7FMV, GDN1GIJ, BEART, S7MFATSI     Type & Screen (If Applicable):  No results found for: LABABO, LABRH    Drug/Infectious Status (If Applicable):  No results found for: HIV, HEPCAB    COVID-19 Screening (If Applicable):   Lab Results   Component Value Date    COVID19 NOT DETECTED 12/21/2020    COVID19 Not Detected 10/06/2020         Anesthesia Evaluation   no history of anesthetic complications:   Airway: Mallampati: II  TM distance: >3 FB   Neck ROM: full  Mouth opening: > = 3 FB Dental:          Pulmonary:normal exam              Patient did not smoke on day of surgery.                  Cardiovascular:    (+) hypertension:,                   Neuro/Psych:   (+) neuromuscular disease:,             GI/Hepatic/Renal:   (+) hiatal hernia, GERD: well controlled,           Endo/Other:    (+) hypothyroidism::., .          Pt had no PAT visit       Abdominal: Vascular: negative vascular ROS. Anesthesia Plan      general     ASA 3       Induction: intravenous. MIPS: Postoperative opioids intended and Prophylactic antiemetics administered. Anesthetic plan and risks discussed with patient. Special considerations: Quaker. Plan discussed with CRNA.                   Jeannette Rosario MD   12/21/2020

## 2020-12-21 NOTE — PLAN OF CARE
Problem: DISCHARGE BARRIERS  Goal: Patient's continuum of care needs are met  12/21/2020 1348 by Kenny Poole MSW, LSW  Outcome: Ongoing  Note: Patient may need rehab at [place], referral to [facility].  See SW notes 12/21/2020.  12/21/2020 1133 by Nelson Amador RN  Outcome: Ongoing

## 2020-12-21 NOTE — ED PROVIDER NOTES
 CARPAL TUNNEL RELEASE Bilateral 2013    w/cubital tunnel    COLON SURGERY  07/29/2016    Procedure: ATTEMPTED DAVINCI XI ROBOTIC ASSISTED SIGMOID COLON RESECTION, ROBOTIC ASSISTED MOBILIZATION OF RECTAL SIGMOID TO SPLENIC FLEXURE, CONVERTED TO OPEN LEFT HEMICOLECTOMY WITH COLOPROCTOCTOMY, SPLENORRHAPHY, RIGID SIGMOIDOSCOPY, LASER EVALUATION OF VASCULARITY WITH ICG; Surgeon: Natty Barnett MD; [facility]; Service: General    COLONOSCOPY  2012    CYST REMOVAL  2010   [address], [address]    [place]ataract    FOOT SURGERY Left 2001   [place]    w/bladder suspension    JOINT REPLACEMENT  2002, 2008, 2009    rt hip(2002), lt knee(2009), lt hip(2008) Shoulder (2009)   [address]    MT COLON CA SCRN NOT  [address] IND Left 9/15/2017    COLONOSCOPY performed by Jayde Bateman MD at [facility]   [facility]    discectomy   [address] [facility] ENDOSCOPY  2012         CURRENT MEDICATIONS       Current Discharge Medication List      CONTINUE these medications which have NOT CHANGED    Details   metoprolol succinate (TOPROL XL) 25 MG extended release tablet TAKE 1 TABLET DAILY  Qty: 90 tablet, Refills: 3    Associated Diagnoses: Frequent PVCs      enalapril (VASOTEC) 10 MG tablet TAKE ONE-HALF (1/2) TABLET DAILY  Qty: 90 tablet, Refills: 3      pregabalin (LYRICA) 100 MG capsule Take 1 capsule by mouth 2 times daily for 180 days. Qty: 180 capsule, Refills: 1    Associated Diagnoses: Fibromyalgia; Chronic low back pain with sciatica, sciatica laterality unspecified, unspecified back pain laterality      gabapentin (NEURONTIN) 600 MG tablet Take 1 tablet by mouth 4 times daily for 180 days. Qty: 360 tablet, Refills: 1    Associated Diagnoses: Fibromyalgia;  Chronic low back pain with sciatica, sciatica laterality unspecified, unspecified back pain laterality !! Misc. Devices (WALKER) MISC 1 each by Does not apply route daily  Qty: 1 each, Refills: 0    Associated Diagnoses: Spinal stenosis of lumbar region without neurogenic claudication      levothyroxine (SYNTHROID) 125 MCG tablet Take 1 tablet by mouth Daily  Qty: 90 tablet, Refills: 3    Associated Diagnoses: Acquired hypothyroidism      DULoxetine (CYMBALTA) 60 MG extended release capsule TAKE 1 CAPSULE DAILY  Qty: 90 capsule, Refills: 4      guaiFENesin 1200 MG TB12 Take 1,200 mg by mouth 2 times daily  Qty: 20 tablet, Refills: 0      lidocaine (LMX) 4 % cream Apply topically 4x daily as needed. Qty: 30 g, Refills: 5    Associated Diagnoses: Chronic low back pain with sciatica, sciatica laterality unspecified, unspecified back pain laterality      !! Misc. Devices MISC Mechanical lift for a [employer]: 1 Device, Refills: 0    Associated Diagnoses: Chronic bilateral low back pain with bilateral sciatica      Scooter MISC by Does not apply route Power scooter  Qty: 1 each, Refills: 0    Associated Diagnoses: Spinal stenosis of lumbar region without neurogenic claudication; Other idiopathic scoliosis, lumbar region; Chronic pain syndrome      CRANBERRY PO Take 1,500 mg by mouth daily      Biotin 43545 MCG TABS Take 1-2 tablets by mouth daily      fluticasone (FLONASE) 50 MCG/ACT nasal spray 1 spray by Nasal route as needed for Rhinitis      meclizine (ANTIVERT) 25 MG tablet Take 25 mg by mouth every 6 hours as needed for Dizziness      Omega 3-6-9 Fatty Acids (OMEGA 3-6-9 COMPLEX) CAPS Take 1 capsule by mouth daily       therapeutic multivitamin-minerals (THERAGRAN-M) tablet Take 1 tablet by mouth nightly        !! - Potential duplicate medications found. Please discuss with provider.           ALLERGIES     Ampicillin, Morphine, Pcn [penicillins], and Sulfa antibiotics    FAMILY HISTORY       Family History   Problem Relation Age of Onset    Arthritis Mother     Hearing Loss Mother     High Cholesterol Mother  Hearing Loss Father     Heart Disease Father 76        CABG    Stroke Father     Arthritis Sister     Depression Sister     Diabetes Sister     Arthritis Brother     Depression Brother     Cancer Maternal Aunt     Early Death Maternal Aunt     Diabetes Maternal Grandmother     Heart Disease Paternal Grandmother           SOCIAL HISTORY       Social History     Socioeconomic History    Marital status:      Spouse name: None    Number of children: None    Years of education: None    Highest education level: None   Occupational History    None   Social Needs    Financial resource strain: None    Food insecurity     Worry: None     Inability: None    Transportation needs     Medical: None     Non-medical: None   Tobacco Use    Smoking status: Former Smoker     Packs/day: 2.00     Years: 11.00     Pack years: 22.00     Types: Cigarettes     Start date: 1957     Quit date: 1967     Years since quittin.1    Smokeless tobacco: Never Used   Substance and Sexual Activity    Alcohol use: No    Drug use: No    Sexual activity: None   Lifestyle    Physical activity     Days per week: None     Minutes per session: None    Stress: None   Relationships    Social connections     Talks on phone: None     Gets together: None     Attends Orthodox service: None     Active member of club or organization: None     Attends meetings of clubs or organizations: None     Relationship status: None    Intimate partner violence     Fear of current or ex partner: None     Emotionally abused: None     Physically abused: None     Forced sexual activity: None   Other Topics Concern    None   Social History Narrative    None       SCREENINGS        [place] Coma Scale  Eye Opening: Spontaneous  Best Verbal Response: Oriented  Best Motor Response: Obeys commands  [place] Coma Scale Score: 15               PHYSICAL EXAM    (up to 7 for level 4, 8 or more for level 5) ED Triage Vitals [12/20/20 2235]   BP Temp Temp Source Pulse Resp SpO2 Height Weight   135/83 97.8 °F (36.6 °C) Oral 64 18 98 % 5' 6.5\" (1.689 m) 165 lb (74.8 kg)       Physical Exam  Vitals signs and nursing note reviewed. Constitutional:       General: She is not in acute distress. Appearance: She is well-developed. She is not diaphoretic. HENT:      Head: Normocephalic. Mouth/Throat:      Pharynx: No oropharyngeal exudate. Eyes:      General:         Right eye: No discharge. Left eye: No discharge. Pupils: Pupils are equal, round, and reactive to light. Neck:      Musculoskeletal: Neck supple. Trachea: No tracheal deviation. Cardiovascular:      Rate and Rhythm: Normal rate and regular rhythm. Pulses:           Radial pulses are 2+ on the right side and 2+ on the left side. Dorsalis pedis pulses are 2+ on the right side and 2+ on the left side. Heart sounds: Normal heart sounds. No murmur. Pulmonary:      Effort: Pulmonary effort is normal. No respiratory distress. Breath sounds: Normal breath sounds. No stridor. No wheezing or rales. Abdominal:      General: Bowel sounds are normal. There is no distension. Palpations: Abdomen is soft. Tenderness: There is no abdominal tenderness. There is no guarding or rebound. Musculoskeletal:      Left hip: Normal.      Left knee: She exhibits decreased range of motion, swelling and ecchymosis. She exhibits no deformity. Tenderness found. Left ankle: Normal. No tenderness. Right lower leg: No edema. Left lower leg: No edema. Comments: Patient unable to straight leg raise left lower extremity. Skin:     General: Skin is warm and dry. Capillary Refill: Capillary refill takes less than 2 seconds. Findings: No erythema or rash. Neurological:      Mental Status: She is alert and oriented to person, place, and time. Cranial Nerves: No cranial nerve deficit. Sensory: No sensory deficit. Motor: No abnormal muscle tone. Coordination: Coordination normal.   Psychiatric:         Behavior: Behavior normal.         Thought Content: Thought content normal.         Judgment: Judgment normal.         DIAGNOSTIC RESULTS     EKG: All EKG's are interpreted by the Emergency Department Physician who either signs or Co-signs this chart in the absence of a cardiologist.    EKG demonstrates normal sinus rhythm. OK interval is 190. Ventricular rate of 68. QRS duration is 70. No ST elevation. No T wave inversions suggestive of ischemia. RADIOLOGY:   Non-plain film images such as CT, Ultrasound and MRI are read by the radiologist. Plain radiographic images are visualized and preliminarily interpreted by the emergency physician with the below findings:      Interpretation per the Radiologist below, if available at the time of this note:    XR FEMUR LEFT (MIN 2 VIEWS)   Final Result   Mid to distal left femur fracture. This document has been electronically signed by: Cira Serna MD on    12/21/2020 12:43 AM         XR CHEST PORTABLE   Final Result   No acute findings. Nonemergent/incidental findings in the report. This document has been electronically signed by: Cira Serna MD on    12/21/2020 12:43 AM         XR KNEE LEFT (MIN 4 VIEWS)   Final Result   Comminuted distal femur fracture.       This document has been electronically signed by: Cira Serna MD on    12/20/2020 11:09 PM               ED BEDSIDE ULTRASOUND:   Performed by ED Physician - none    LABS:  Labs Reviewed   CBC WITH AUTO DIFFERENTIAL - Abnormal; Notable for the following components:       Result Value    RBC 4.09 (*)     All other components within normal limits   BASIC METABOLIC PANEL W/ REFLEX TO MG FOR LOW K - Abnormal; Notable for the following components:    Sodium 129 (*)     Chloride 93 (*)     All other components within normal limits OSMOLALITY - Abnormal; Notable for the following components:    Osmolality Calc 256.6 (*)     All other components within normal limits   PROTIME-INR   APTT   ANION GAP   GLOMERULAR FILTRATION RATE, ESTIMATED       All other labs were within normal range or not returned as of this dictation. EMERGENCY DEPARTMENT COURSE and DIFFERENTIAL DIAGNOSIS/MDM:   Vitals:    Vitals:    12/20/20 2235 12/20/20 2312 12/21/20 0020 12/21/20 0101   BP: 135/83 (!) 143/77 (!) 166/98 (!) 142/83   Pulse: 64 67 66 70   Resp: 18 18  18   Temp: 97.8 °F (36.6 °C)      TempSrc: Oral      SpO2: 98% 99% 97% 92%   Weight: 165 lb (74.8 kg)      Height: 5' 6.5\" (1.689 m)          MDM  Number of Diagnoses or Management Options  Closed fracture of distal end of left femur, unspecified fracture morphology, initial encounter ([facility]  Periprosthetic fracture around internal prosthetic left knee joint, initial encounter  Diagnosis management comments: 49-year-old female with fall and twisting injury mechanism to left knee with periprosthetic distal femur fracture. Patient obviously cannot ambulate due to the fracture. She is neurovascularly intact distal to the injury. No hip pain or left ankle pain. Head injury or loss of consciousness. Patient has not anticoagulation. I did discuss the patient with Delores MARTÍNEZ. He recommended admission to Dr. Terence Arita and consultation of the hospitalist.  Preoperative laboratory testing ordered. REASSESSMENT          CRITICAL CARE TIME     CONSULTS:  None    PROCEDURES:  Unless otherwise noted below, none     Procedures      FINAL IMPRESSION      1. Closed fracture of distal end of left femur, unspecified fracture morphology, initial encounter ([facility]    2. Periprosthetic fracture around internal prosthetic left knee joint, initial encounter          DISPOSITION/PLAN   DISPOSITION Admitted 12/21/2020 01:10:44 AM      PATIENT REFERRED TO:  No follow-up provider specified.     DISCHARGE MEDICATIONS:

## 2020-12-21 NOTE — ED TRIAGE NOTES
Pt presents to the ED via EMS from home after patient fell and injured her left knee. Pt had a knee replacement completed years ago. Pt states she fell because she is clumsy. Pt is not on blood thinners, denies loss of consciousness, and is alert and oriented times 4. Pt states pain is 2/10, but states pain is worse with movement.

## 2020-12-21 NOTE — H&P
History and Physical        CHIEF COMPLAINT:  Left leg pain    HISTORY OF PRESENT ILLNESS:      The patient is a 78 y.o. female with a past medical history significant for HTN, OA, anemia, DVT, and PE who ortho admitted for definitive treatment of a left distal femur fracture. Patient states she lost her balance last evening outside her home and fell onto the left leg. She had severe pain in the left leg and was unable to tolerate movement. Patient usually walks with a walker and notes has frequent falls secondary to weakness. She denies other injuries and no numbness and tingling. X-rays show a nondisplaced left distal femur fracture. She had a left TKR 4 years ago and denies knee pain. 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 Hgb is 13.      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 Shanell Rodriguez GERD (gastroesophageal reflux disease)     Diverticulitis     Hiatal hernia     Hypertension     Hypothyroidism     Osteoarthritis        Past Surgical History:    Past Surgical History:   Procedure Laterality Date    APPENDECTOMY  1958    BACK SURGERY      CARPAL TUNNEL RELEASE Bilateral 2013    w/cubital tunnel    COLON SURGERY  07/29/2016    Procedure: ATTEMPTED DAVINCI XI ROBOTIC ASSISTED SIGMOID COLON RESECTION, ROBOTIC ASSISTED MOBILIZATION OF RECTAL SIGMOID TO SPLENIC FLEXURE, CONVERTED TO OPEN LEFT HEMICOLECTOMY WITH COLOPROCTOCTOMY, SPLENORRHAPHY, RIGID SIGMOIDOSCOPY, LASER EVALUATION OF VASCULARITY WITH ICG; Surgeon: Shanell Rodriguez MD; Location: [facility]; Service: General    COLONOSCOPY  2012    CYST REMOVAL  2010   [place] gabapentin (NEURONTIN) 600 MG tablet Take 1 tablet by mouth 4 times daily for 180 days. 3/13/20 9/9/20  Clebalaji Dela Cruz DO   guaiFENesin 1200 MG TB12 Take 1,200 mg by mouth 2 times daily 19   Timothy Concepcion PA-C   lidocaine (LMX) 4 % cream Apply topically 4x daily as needed.  10/12/19   [place] DO Jose De Jesus   fluticasone (FLONASE) 50 MCG/ACT nasal spray 1 spray by Nasal route as needed for Rhinitis    Historical Provider, MD   meclizine (ANTIVERT) 25 MG tablet Take 25 mg by mouth every 6 hours as needed for Dizziness    Historical Provider, MD    Scheduled Meds:   DULoxetine  60 mg Oral Daily    enalapril  5 mg Oral Daily    levothyroxine  125 mcg Oral Daily    metoprolol succinate  25 mg Oral Daily    pregabalin  100 mg Oral BID     Continuous Infusions:   sodium chloride 100 mL/hr at 20 0301     PRN Meds:.morphine **OR** morphine, HYDROcodone 5 mg - acetaminophen **OR** HYDROcodone 5 mg - acetaminophen, ondansetron, fluticasone, meclizine    Allergies:  Ampicillin, Morphine, Pcn [penicillins], and Sulfa antibiotics    Social History:   Social History     Socioeconomic History    Marital status:      Spouse name: None    Number of children: None    Years of education: None    Highest education level: None   Occupational History    None   Social Needs    Financial resource strain: None    Food insecurity     Worry: None     Inability: None    Transportation needs     Medical: None     Non-medical: None   Tobacco Use    Smoking status: Former Smoker     Packs/day: 2.00     Years: 11.00     Pack years: 22.00     Types: Cigarettes     Start date: 1957     Quit date: 1967     Years since quittin.1    Smokeless tobacco: Never Used   Substance and Sexual Activity    Alcohol use: No    Drug use: No    Sexual activity: None   Lifestyle    Physical activity     Days per week: None     Minutes per session: None    Stress: None   Relationships    Social connections Talks on phone: None     Gets together: None     Attends Adventism service: None     Active member of club or organization: None     Attends meetings of clubs or organizations: None     Relationship status: None    Intimate partner violence     Fear of current or ex partner: None     Emotionally abused: None     Physically abused: None     Forced sexual activity: None   Other Topics Concern    None   Social History Narrative    None     Social History     Tobacco Use   Smoking Status Former Smoker    Packs/day: 2.00    Years: 11.00    Pack years: 22.00    Types: Cigarettes    Start date: 1957   Shaniqua Her Quit date: 1967    Years since quittin.1   Smokeless Tobacco Never Used     Social History     Substance and Sexual Activity   Alcohol Use No     Social History     Substance and Sexual Activity   Drug Use No       Family History:  Family History   Problem Relation Age of Onset    Arthritis Mother     Hearing Loss Mother     High Cholesterol Mother     Hearing Loss Father     Heart Disease Father 76        CABG    Stroke Father     Arthritis Sister     Depression Sister     Diabetes Sister     Arthritis Brother     Depression Brother     Cancer Maternal Aunt     Early Death Maternal Aunt     Diabetes Maternal Grandmother     Heart Disease Paternal Grandmother        REVIEW OF SYSTEMS:  Constitutional: Denies any fever, chills. Derm: Denies any rash or skin color change. Eyes: Denies blurred or decreased in vision. Ent: Denies any tinnitus or vertigo. Resp: Denies any cough or shortness of breath. CV: Denies any syncope, palpitations or chest pain. GI:  Denies any abdominal pain, nausea, vomiting, constipation or diarrhea. : Denies any hematuria, hesitancy, or dysuria. Heme/Lymph: Denies any bleeding. Musculoskeletal: Positive for left leg pain  Neuro: Denies any dizziness, paresthesia or weakness.     PHYSICAL EXAM:  Patient Vitals for the past 24 hrs: BP Temp Temp src Pulse Resp SpO2 Height Weight   12/21/20 0237 (!) 176/89 97.7 °F (36.5 °C) Oral 75 18 96 % 5' 6.5\" (1.689 m) 162 lb 6.4 oz (73.7 kg)   12/21/20 0101 (!) 142/83   70 18 92 %     12/21/20 0020 (!) 166/98   66  97 %     12/20/20 2312 (!) 143/77   67 18 99 %     12/20/20 2235 135/83 97.8 °F (36.6 °C) Oral 64 18 98 % 5' 6.5\" (1.689 m) 165 lb (74.8 kg)     General appearance:  Alert and oriented x 3. No apparent distress, appears stated age and cooperative. HEENT:  Normal cephalic, atraumatic without obvious deformity. Pupils equal, round, and reactive to light. Conjunctivae/corneas clear. Neck: Supple, with full range of motion. Trachea midline. Respiratory:  Normal respiratory effort. No audible Wheezes or Rhonchi. Cardiovascular:  Regular rate and rhythm. Abdomen: Soft, non-tender, non-distended. Musculoskeletal: LLE skin intact. Flex and extends toes and ankle. ROM limited 2/2 pain. Calf and compartments soft. SILT. Skin: Skin color, texture, turgor normal.  No rashes or lesions. Neurologic:  Neurovascularly intact without any focal sensory/motor deficits. Sensation intact. DATA:  CBC:   Lab Results   Component Value Date    WBC 7.9 12/21/2020    HGB 13.1 12/21/2020     12/21/2020     BMP:    Lab Results   Component Value Date     12/21/2020    K 3.9 12/21/2020    CL 93 12/21/2020    CO2 25 12/21/2020    BUN 7 12/21/2020    CREATININE 0.5 12/21/2020    CALCIUM 9.0 12/21/2020    GLUCOSE 92 12/21/2020     PT/INR:    Lab Results   Component Value Date    INR 0.91 12/21/2020     Troponin:  No results found for: TROPONINI  No results for input(s): LIPASE, AMYLASE in the last 72 hours. No results for input(s): AST, ALT, BILIDIR, BILITOT, ALKPHOS in the last 72 hours.     Radiology:  Xr Femur Left (min 2 Views)    Result Date: 12/21/2020 Left femur, 2 views COMPARISON:  CR,SR  - XR KNEE LEFT (MIN 4 VIEWS)  - 12/20/2020 10:43 PM EST FINDINGS: Mildly displaced mid to distal femur fracture. No dislocation. Status post left hip and left knee arthroplasty. Hardware appears intact. Unremarkable soft tissues. Mid to distal left femur fracture. This document has been electronically signed by: Tony Salmeron MD on 12/21/2020 12:43 AM     Xr Knee Left (min 4 Views)    Result Date: 12/20/2020  XR KNEE LEFT (MIN 4 VIEWS), 4 views COMPARISON: None FINDINGS: Comminuted mildly displaced distal femur fracture. No dislocation. Status post left knee arthroplasty. Hardware appears intact. No visible effusion. Unremarkable soft tissues. Comminuted distal femur fracture. This document has been electronically signed by: Tony Salmeron MD on 12/20/2020 11:09 PM     Xr Chest Portable    Result Date: 12/21/2020  Chest X-ray, 1 View COMPARISON:  CR,SR  - XR CHEST STANDARD (2 VW)  - 12/21/2019 10:42 AM EST FINDINGS: No consolidation. Hiatal hernia projecting behind the heart. No pleural effusion. No pneumothorax. No cardiomegaly. No acute fracture. Status post left shoulder arthroplasty. Degenerative changes in the spine. Elevation of the right hemidiaphragm. No acute findings. Nonemergent/incidental findings in the report. This document has been electronically signed by: Tony Salmeron MD on 12/21/2020 12:43 AM       ASSESSMENT:Principal Problem:    Closed fracture of distal end of left femur, initial encounter [facility])  Active Problems:    Hypothyroidism    Essential (primary) hypertension  Resolved Problems:    * No resolved hospital problems. *       PLAN as discussed with Dr. Cristiano Pineda:  Surgery today for ORIF left femur. Continue NPO  COvid negative  Consent for surgery. The patient was counseled at length about the risks of lacie Covid-19 during their perioperative period and any recovery window from their procedure. The patient was made aware that lacie Covid-19  may worsen their prognosis for recovering from their procedure  and lend to a higher morbidity and/or mortality risk. All material risks, benefits, and reasonable alternatives including postponing the procedure were discussed. The patient does wish to proceed with the procedure at this time.          Electronically signed by FLORIDA Whyte CNP on 12/21/2020 at 9:40 AM

## 2020-12-21 NOTE — PLAN OF CARE
Problem: Falls - Risk of:  Goal: Will remain free from falls  Description: Will remain free from falls  Outcome: Ongoing  Goal: Absence of physical injury  Description: Absence of physical injury  Outcome: Ongoing     Problem: Pain:  Goal: Pain level will decrease  Description: Pain level will decrease  Outcome: Ongoing  Goal: Control of acute pain  Description: Control of acute pain  Outcome: Ongoing  Goal: Control of chronic pain  Description: Control of chronic pain  Outcome: Ongoing     Problem: Skin Integrity:  Goal: Will show no infection signs and symptoms  Description: Will show no infection signs and symptoms  Outcome: Ongoing  Goal: Absence of new skin breakdown  Description: Absence of new skin breakdown  Outcome: Ongoing     Problem: DAILY CARE  Goal: Daily care needs are met  Outcome: Ongoing     Problem: KNOWLEDGE DEFICIT  Goal: Patient/S.O. demonstrates understanding of disease process, treatment plan, medications, and discharge instructions. Outcome: Ongoing     Problem: DISCHARGE BARRIERS  Goal: Patient's continuum of care needs are met  Outcome: Ongoing   Planning in progress pending post op states    Care plan reviewed with patient and  spouse. Patient and spouse verbalize understanding of the plan of care and contribute to goal setting.

## 2020-12-21 NOTE — PROGRESS NOTES
Patient has two gold rings on left ring finger. Was unable to get them off.  has two silver rings from right pinky finger. Patient's upper and lower dentures are in denture cup with chart.

## 2020-12-21 NOTE — PROGRESS NOTES
1452 Awake and oriented on arrival to PACU , O2 3L NC applied , pt c/o # 8 Lt leg pain   1455 medicated with Fentanyl 50 mcg IV   1500 no change in pain medicated with fentanyl 50 mcg IV   1510 pain unchanged , pt states does very well with Norco , pt medicated with 2 tabs po   1525 Pt resting resp easy   1540 resting on and off   1555 pt states pain tolerable , denies any needs , pt  in room waiting  1600 meets criteria for discharge , transported to [address] [place] [address]

## 2020-12-21 NOTE — ED NOTES
Pt denies any pain after medication. Pt  at bedside, respirations even and unlabored.        Cynthia Zurita RN  12/21/20 2713

## 2020-12-21 NOTE — CARE COORDINATION
DISASTER CHARTING    12/21/20, 12:25 PM EST    DISCHARGE ONGOING EVALUATION:     [address] day: 0  Location: 7-11/011-A Reason for admit: Closed fracture of distal end of left femur, initial encounter ([facility] [address] [S70.823V]   Barriers to Discharge: Admitted with fx left femur. Surgery planned today by Dr Tidwell Abo: ORIF left femur  She is a [employer] and does not want any blood or blood products, she will accept iron and erythropoietin if need be. Hgb is 13. Hgb 13.1 today. PCP: Raf Renee, DO  Patient Goals/Plan/Treatment Preferences: I spoke with Sofy and her . They are requesting [facility]. SW consulted.

## 2020-12-21 NOTE — ANESTHESIA POSTPROCEDURE EVALUATION
Department of Anesthesiology  Postprocedure Note    Patient: Ramandeep Gunter  MRN: [id number]  YOB: 1940  Date of evaluation: 12/21/2020  Time:  5:32 PM     Procedure Summary     Date: 12/21/20 Room / Location: [place] [facility] [address] / [facility] OR    Anesthesia Start: 1400 Anesthesia Stop: 5318    Procedure: LEFT FEMUR OPEN REDUCTION INTERNAL FIXATION (Left Leg Upper) Diagnosis: (FRACTURE OF DISTAL END OF LEFT FEMUR)    Surgeons: Odessa Mcmahon MD Responsible Provider: Kesha Varma MD    Anesthesia Type: general ASA Status: 3          Anesthesia Type: general    Mat Phase I: Mat Score: 9    Mat Phase II:      Last vitals: Reviewed and per EMR flowsheets.        Anesthesia Post Evaluation    Patient location during evaluation: PACU  Patient participation: complete - patient participated  Level of consciousness: awake and alert  Airway patency: patent  Nausea & Vomiting: no nausea  Complications: no  Cardiovascular status: blood pressure returned to baseline and hemodynamically stable  Respiratory status: acceptable and spontaneous ventilation  Hydration status: euvolemic

## 2020-12-21 NOTE — OP NOTE
Operative Note      Patient: Franco Gunter  YOB: 1940  MRN: [id number]    Date of Procedure: 12/21/2020    Pre-Op Diagnosis: Left closed minimally displaced supracondylar femur fracture without intercondylar extension periprosthetic    Post-Op Diagnosis: Same       Procedure:  Open treatment left supracondylar femur fracture without intercondylar extension 80916    Surgeon(s):  Luther Cadena MD    Assistant:   Physician Assistant: Rodolfo Mahmood PA-C    Anesthesia: General    Estimated Blood Loss (mL): 028     Complications: None    Specimens:   * No specimens in log *    Implants:  * No implants in log *      Drains: * No LDAs found *    Findings: Confirmed    Detailed Description of Procedure: Indications  This an 78-year-old female history of fall. Pain in the left thigh area. Diagnosed with a supracondylar femur fracture without intercondylar extension. Fracture is minimally displaced but a high incidence of displacing. Felt she would benefit from op intervention the form of a plate and screws. Patient agreed.     Narrative Patient taken operating underwent general anesthetic. Left lower extremities prepped in a sterile fashion. Timeout was taken consent was confirmed. Started a lateral approach to the distal femur skin knife followed by electrocautery splitting the iliotibial band. Slid a 16 hole distal femoral locking plate into position. Placed 1 nonlocking screw in the supracondylar region. Centered the plate proximally and placed multiple nonlocking screws up the shaft. Get a cortices. And were able to bridge the total hip as well. A series of locking screws were then placed distally. X-rays demonstrate fracture be in satisfactory limit hardware in satisfactory position. Wounds were irrigated. Injected local anesthetic. Repaired the deep tissues with absorbable suture followed by Charlotte Briceno dry dressings. Patient was awakened turned to cover room in good condition. Postoperative plan  She will toe-touch weightbearing on that side. Dry dressings as necessary. I will see her in the office in 8 weeks x-rays 2 views of the left femur at that time.     Electronically signed by Mynor Tucker MD on 12/21/2020 at 2:40 PM

## 2020-12-21 NOTE — ED NOTES
Bed: 012A  Expected date: 12/20/20  Expected time: 10:27 PM  Means of arrival: [facility]  Comments:     Henrietta Spence RN  12/20/20 6517

## 2020-12-21 NOTE — CONSULTS
Hospitalist Consult Note        Patient:  Adama Arguello Height  YOB: 1940  Date of Service: 12/21/2020  MRN: [id number]   Acct:  [de-identified]   Primary Care Physician: Prem Edwards DO    Chief Complaint:  Left leg pain  Reason for consult  Pre op risk management and medical management    Date of Service: Pt seen/examined in consultation on 12/21/2020     History Of Present Illness:      Lavetta Sauce y.o. female who we are asked to see/evaluate by Jacob Deluca MD for medical management of hypertension and hypothyroidism. The patient states she twisted her left knee and felt a pop. She fell to the ground and was unable to walk. The patient has a prosthetic of the left knee. Assessment and Plan:-  1. Left distal femur fracture: ortho to manage  2. Hypertension: continue home medications, monitor for hypotension  3. Hypothyroidism: continue home medications, euthyroid  4. Pre-op risk stratification: Revised Cardiac Risk Assessment is 0 or Class I risk. Patient is low risk for low risk surgery.       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 Shanell Rodriguez GERD (gastroesophageal reflux disease)     Diverticulitis     Hiatal hernia     Hypertension     Hypothyroidism     Osteoarthritis        Past Surgical History:        Procedure Laterality Date    APPENDECTOMY  1958    BACK SURGERY      CARPAL TUNNEL RELEASE Bilateral 2013    w/cubital tunnel    COLON SURGERY  07/29/2016 Procedure: ATTEMPTED DAVINCI XI ROBOTIC ASSISTED SIGMOID COLON RESECTION, ROBOTIC ASSISTED MOBILIZATION OF RECTAL SIGMOID TO SPLENIC FLEXURE, CONVERTED TO OPEN LEFT HEMICOLECTOMY WITH COLOPROCTOCTOMY, SPLENORRHAPHY, RIGID SIGMOIDOSCOPY, LASER EVALUATION OF VASCULARITY WITH ICG; Surgeon: Christine Tovar MD; Location: [facility]; Service: General    COLONOSCOPY  2012    CYST REMOVAL  2010   [address], 1999    Cataract    FOOT SURGERY Left 2001   [place]    w/bladder suspension    JOINT REPLACEMENT  2002, 2008, 2009    rt hip(2002), lt knee(2009), lt hip(2008) Shoulder (2009)   [address]    [place] COLON CA SCRN NOT  [address] IND Left 9/15/2017    COLONOSCOPY performed by Segundo Davis MD at [facility]   [facility]    discectomy   [facility] ENDOSCOPY  2012       Home Medications:   No current facility-administered medications on file prior to encounter. Current Outpatient Medications on File Prior to Encounter   Medication Sig Dispense Refill    metoprolol succinate (TOPROL XL) 25 MG extended release tablet TAKE 1 TABLET DAILY 90 tablet 3    enalapril (VASOTEC) 10 MG tablet TAKE ONE-HALF (1/2) TABLET DAILY 90 tablet 3    pregabalin (LYRICA) 100 MG capsule Take 1 capsule by mouth 2 times daily for 180 days. 180 capsule 1    Misc. Devices (WALKER) MISC 1 each by Does not apply route daily 1 each 0    levothyroxine (SYNTHROID) 125 MCG tablet Take 1 tablet by mouth Daily 90 tablet 3    DULoxetine (CYMBALTA) 60 MG extended release capsule TAKE 1 CAPSULE DAILY 90 capsule 4    Misc.  Devices MISC Mechanical lift for a Anthonette Slimmer 1 Device 0    Scooter MISC by Does not apply route Power scooter 1 each 0    CRANBERRY PO Take 1,500 mg by mouth daily      Biotin 31579 MCG TABS Take 1-2 tablets by mouth daily  Omega 3-6-9 Fatty Acids (OMEGA 3-6-9 COMPLEX) CAPS Take 1 capsule by mouth daily       therapeutic multivitamin-minerals (THERAGRAN-M) tablet Take 1 tablet by mouth nightly       gabapentin (NEURONTIN) 600 MG tablet Take 1 tablet by mouth 4 times daily for 180 days. 360 tablet 1    guaiFENesin 1200 MG TB12 Take 1,200 mg by mouth 2 times daily 20 tablet 0    lidocaine (LMX) 4 % cream Apply topically 4x daily as needed. 30 g 5    fluticasone (FLONASE) 50 MCG/ACT nasal spray 1 spray by Nasal route as needed for Rhinitis      meclizine (ANTIVERT) 25 MG tablet Take 25 mg by mouth every 6 hours as needed for Dizziness         Allergies:    Ampicillin, Morphine, Pcn [penicillins], and Sulfa antibiotics    Social History:    reports that she quit smoking about 53 years ago. Her smoking use included cigarettes. She started smoking about 63 years ago. She has a 22.00 pack-year smoking history. She has never used smokeless tobacco. She reports that she does not drink alcohol or use drugs. Family History:       Problem Relation Age of Onset    Arthritis Mother     Hearing Loss Mother     High Cholesterol Mother     Hearing Loss Father     Heart Disease Father 76        CABG    Stroke Father     Arthritis Sister     Depression Sister     Diabetes Sister     Arthritis Brother     Depression Brother     Cancer Maternal Aunt     Early Death Maternal Aunt     Diabetes Maternal Grandmother     Heart Disease Paternal Grandmother        Diet:  Diet NPO Effective Now    Review of systems:   Pertinent positives as noted in the HPI. All other systems reviewed and negative. PHYSICAL EXAM:  BP (!) 176/89   Pulse 75   Temp 97.7 °F (36.5 °C) (Oral)   Resp 18   Ht 5' 6.5\" (1.689 m)   Wt 162 lb 6.4 oz (73.7 kg)   SpO2 96%   BMI 25.82 kg/m²   General appearance: No apparent distress, appears stated age and cooperative. HEENT: Normal cephalic, atraumatic without obvious deformity. Pupils equal, round, and reactive to light. Extra ocular muscles intact. Conjunctivae/corneas clear. Neck: Supple, with full range of motion. No jugular venous distention. Trachea midline. Respiratory:  Normal respiratory effort. Clear to auscultation, bilaterally without Rales/Wheezes/Rhonchi. Cardiovascular: Regular rate and rhythm with normal S1/S2 without murmurs, rubs or gallops. Abdomen: Soft, non-tender, non-distended with normal bowel sounds. Musculoskeletal:  No clubbing, cyanosis or edema bilaterally. Skin: Skin color, texture, turgor normal.  No rashes or lesions. Neurologic:  Neurovascularly intact without any focal sensory/motor deficits. Cranial nerves: II-XII intact, grossly non-focal.  Psychiatric: Alert and oriented, thought content appropriate, normal insight  Capillary Refill: Brisk,< 3 seconds   Peripheral Pulses: +2 palpable, equal bilaterally     Labs:   Recent Labs     12/21/20  0015   WBC 7.9   HGB 13.1   HCT 39.8        Recent Labs     12/21/20  0015   *   K 3.9   CL 93*   CO2 25   BUN 7   CREATININE 0.5   CALCIUM 9.0     No results for input(s): AST, ALT, BILIDIR, BILITOT, ALKPHOS in the last 72 hours. Recent Labs     12/21/20  0015   INR 0.91     No results for input(s): Anthony Hug in the last 72 hours. Urinalysis:    Lab Results   Component Value Date    NITRU NEGATIVE 09/14/2017    WBCUA 0-2 09/14/2017    BACTERIA NONE 09/14/2017    RBCUA 0-2 09/14/2017    BLOODU small 04/12/2019    BLOODU MODERATE 09/14/2017    SPECGRAV 1.015 04/12/2019    GLUCOSEU negative 04/12/2019    GLUCOSEU NEGATIVE 09/14/2017       Radiology:   XR FEMUR LEFT (MIN 2 VIEWS)   Final Result   Mid to distal left femur fracture. This document has been electronically signed by: Markos Potts MD on    12/21/2020 12:43 AM         XR CHEST PORTABLE   Final Result   No acute findings.

## 2020-12-21 NOTE — CARE COORDINATION
DISCHARGE/PLANNING EVALUATION  12/21/20, 1:27 PM EST    Reason for Referral: requesting [facility]  Mental Status: Patient is alert and oriented  Decision Making: Patient is making own decisions. Family/Social/Home Environment: Assessment completed with Patient. Patient and spouse resides in a 2 story home where they complete all ADL's on first floor. Patient stated that she lives with her daughter, MARIELA, and MARIELA's mother also. Patient stated that they all have their own space and cooking area. Spouse is an active . Patient stated she will more than likely need 24 hour care at discharge. Current Services including food security, transportation and housekeeping:  See above  Current Equipment: walker, wheelchair, electric scooter  Payment Source: [employer] Medicare  Concerns or Barriers to Discharge:  Not at this time  Post acute provider list with quality measures, geographic area and applicable managed care information provided. Questions regarding selection process answered: offered and provided    Teach Back Method used with Patient regarding care plan and discharge plan. Patient and spouse verbalize understanding of the plan of care and contribute to goal setting. Patient goals, treatment preferences and discharge plan: Patient is agreeable for a referral to [facility] as there is no availability currently at [facility]. Referral provided to [facility].      Electronically signed by MARK Bull LSW on 12/21/2020 at 1:27 PM

## 2020-12-22 LAB
ANION GAP SERPL CALCULATED.3IONS-SCNC: 7 MEQ/L (ref 8–16)
BASOPHILS # BLD: 0.7 %
BASOPHILS ABSOLUTE: 0 THOU/MM3 (ref 0–0.1)
BUN BLDV-MCNC: 14 MG/DL (ref 7–22)
CALCIUM SERPL-MCNC: 8.1 MG/DL (ref 8.5–10.5)
CHLORIDE BLD-SCNC: 99 MEQ/L (ref 98–111)
CO2: 25 MEQ/L (ref 23–33)
CREAT SERPL-MCNC: 0.6 MG/DL (ref 0.4–1.2)
EOSINOPHIL # BLD: 2 %
EOSINOPHILS ABSOLUTE: 0.1 THOU/MM3 (ref 0–0.4)
ERYTHROCYTE [DISTWIDTH] IN BLOOD BY AUTOMATED COUNT: 12.6 % (ref 11.5–14.5)
ERYTHROCYTE [DISTWIDTH] IN BLOOD BY AUTOMATED COUNT: 47.1 FL (ref 35–45)
GFR SERPL CREATININE-BSD FRML MDRD: > 90 ML/MIN/1.73M2
GLUCOSE BLD-MCNC: 171 MG/DL (ref 70–108)
HCT VFR BLD CALC: 31.7 % (ref 37–47)
HEMOGLOBIN: 10.1 GM/DL (ref 12–16)
IMMATURE GRANS (ABS): 0.01 THOU/MM3 (ref 0–0.07)
IMMATURE GRANULOCYTES: 0.2 %
LYMPHOCYTES # BLD: 6.6 %
LYMPHOCYTES ABSOLUTE: 0.4 THOU/MM3 (ref 1–4.8)
MCH RBC QN AUTO: 32.4 PG (ref 26–33)
MCHC RBC AUTO-ENTMCNC: 31.9 GM/DL (ref 32.2–35.5)
MCV RBC AUTO: 101.6 FL (ref 81–99)
MONOCYTES # BLD: 8.3 %
MONOCYTES ABSOLUTE: 0.5 THOU/MM3 (ref 0.4–1.3)
NUCLEATED RED BLOOD CELLS: 0 /100 WBC
PLATELET # BLD: 181 THOU/MM3 (ref 130–400)
PMV BLD AUTO: 10 FL (ref 9.4–12.4)
POTASSIUM SERPL-SCNC: 4.8 MEQ/L (ref 3.5–5.2)
RBC # BLD: 3.12 MILL/MM3 (ref 4.2–5.4)
SEG NEUTROPHILS: 82.2 %
SEGMENTED NEUTROPHILS ABSOLUTE COUNT: 4.9 THOU/MM3 (ref 1.8–7.7)
SODIUM BLD-SCNC: 131 MEQ/L (ref 135–145)
WBC # BLD: 6 THOU/MM3 (ref 4.8–10.8)

## 2020-12-22 PROCEDURE — 36415 COLL VENOUS BLD VENIPUNCTURE: CPT

## 2020-12-22 PROCEDURE — 1200000000 HC SEMI PRIVATE

## 2020-12-22 PROCEDURE — 97162 PT EVAL MOD COMPLEX 30 MIN: CPT

## 2020-12-22 PROCEDURE — 6370000000 HC RX 637 (ALT 250 FOR IP): Performed by: PHYSICIAN ASSISTANT

## 2020-12-22 PROCEDURE — 85025 COMPLETE CBC W/AUTO DIFF WBC: CPT

## 2020-12-22 PROCEDURE — 6370000000 HC RX 637 (ALT 250 FOR IP): Performed by: NURSE PRACTITIONER

## 2020-12-22 PROCEDURE — 97110 THERAPEUTIC EXERCISES: CPT

## 2020-12-22 PROCEDURE — 6360000002 HC RX W HCPCS: Performed by: ORTHOPAEDIC SURGERY

## 2020-12-22 PROCEDURE — 99231 SBSQ HOSP IP/OBS SF/LOW 25: CPT | Performed by: PHYSICIAN ASSISTANT

## 2020-12-22 PROCEDURE — 51701 INSERT BLADDER CATHETER: CPT

## 2020-12-22 PROCEDURE — 80048 BASIC METABOLIC PNL TOTAL CA: CPT

## 2020-12-22 PROCEDURE — 6370000000 HC RX 637 (ALT 250 FOR IP): Performed by: ORTHOPAEDIC SURGERY

## 2020-12-22 RX ORDER — DOCUSATE SODIUM 100 MG/1
100 CAPSULE, LIQUID FILLED ORAL 2 TIMES DAILY
Status: DISCONTINUED | OUTPATIENT
Start: 2020-12-22 | End: 2020-12-23 | Stop reason: HOSPADM

## 2020-12-22 RX ORDER — SENNA PLUS 8.6 MG/1
2 TABLET ORAL NIGHTLY
Status: DISCONTINUED | OUTPATIENT
Start: 2020-12-22 | End: 2020-12-23 | Stop reason: HOSPADM

## 2020-12-22 RX ORDER — POLYETHYLENE GLYCOL 3350 17 G/17G
17 POWDER, FOR SOLUTION ORAL 2 TIMES DAILY
Status: DISCONTINUED | OUTPATIENT
Start: 2020-12-22 | End: 2020-12-23 | Stop reason: HOSPADM

## 2020-12-22 RX ADMIN — DOCUSATE SODIUM 100 MG: 100 CAPSULE, LIQUID FILLED ORAL at 20:51

## 2020-12-22 RX ADMIN — CEFAZOLIN SODIUM 1 G: 1 INJECTION, SOLUTION INTRAVENOUS at 06:17

## 2020-12-22 RX ADMIN — LEVOTHYROXINE SODIUM 125 MCG: 125 TABLET ORAL at 06:18

## 2020-12-22 RX ADMIN — HYDROCODONE BITARTRATE AND ACETAMINOPHEN 2 TABLET: 5; 325 TABLET ORAL at 12:23

## 2020-12-22 RX ADMIN — PREGABALIN 100 MG: 50 CAPSULE ORAL at 08:22

## 2020-12-22 RX ADMIN — METOPROLOL SUCCINATE 25 MG: 25 TABLET, FILM COATED, EXTENDED RELEASE ORAL at 08:23

## 2020-12-22 RX ADMIN — PREGABALIN 100 MG: 50 CAPSULE ORAL at 20:51

## 2020-12-22 RX ADMIN — ENALAPRIL MALEATE 5 MG: 5 TABLET ORAL at 20:58

## 2020-12-22 RX ADMIN — RIVAROXABAN 10 MG: 10 TABLET, FILM COATED ORAL at 19:35

## 2020-12-22 RX ADMIN — POLYETHYLENE GLYCOL 3350 17 G: 17 POWDER, FOR SOLUTION ORAL at 20:57

## 2020-12-22 RX ADMIN — DULOXETINE HYDROCHLORIDE 60 MG: 60 CAPSULE, DELAYED RELEASE ORAL at 08:23

## 2020-12-22 RX ADMIN — HYDROCODONE BITARTRATE AND ACETAMINOPHEN 2 TABLET: 5; 325 TABLET ORAL at 06:17

## 2020-12-22 ASSESSMENT — PAIN SCALES - GENERAL
PAINLEVEL_OUTOF10: 8
PAINLEVEL_OUTOF10: 7

## 2020-12-22 NOTE — PROGRESS NOTES
Hospitalist Progress Note      Patient:  Patricia Gunter    Unit/Bed:7K-11/011-A  YOB: 1940  MRN: [id number]   Acct: [de-identified]   PCP: Stephanie Tran DO  Date of Admission: 12/20/2020    Assessment/Plan:    1. Left distal femur fracture: ortho to manage  2. Hypertension: continue home medications, monitor for hypotension. Blood pressure well controlled at this time. 3. Hypothyroidism: continue home medications, euthyroid. Continue to monitor  4. Pre-op risk stratification: Revised Cardiac Risk Assessment is 0 or Class I risk. Patient is low risk for low risk surgery. 5. Hyponatremia: 129 on admission, 131 today. Decreased rate IV fluids. Patient is on general diet. Chief Complaint: Left leg pain    Initial H and P:-    Catalino Rojas y.o. female who we are asked to see/evaluate by Cristina Butts MD for medical management of hypertension and hypothyroidism. The patient states she twisted her left knee and felt a pop. She fell to the ground and was unable to walk. The patient has a prosthetic of the left knee. Subjective (past 24 hours):    Patient reports continued left leg pain, but states it is not severe. Patient's  present at bedside patient states overall she feels well, tolerated therapy today patient denies chest pain, shortness of breath, cough, headache, dizziness, lightheadedness, numbness, paraesthesias, weakness, chills, fevers, abdominal pain, nausea, vomiting,neck pain, or back pain. Plan to continue to monitor, primary to manage left distal femur fracture. Hemoglobin down trended as expected after surgery, WBC stable, mild hyponatremia ,  vital signs stable on exam.  Mildly elevated glucose. We will continue to monitor    Past medical history, family history, social history and allergies reviewed again and is unchanged since admission. ROS (12 point review of systems completed. Pertinent positives noted. Otherwise ROS is negative)     Medications:  Reviewed    Infusion Medications    sodium chloride 100 mL/hr at 12/21/20 2103     Scheduled Medications    docusate sodium  100 mg Oral BID    senna  2 tablet Oral Nightly    polyethylene glycol  17 g Oral BID    rivaroxaban  10 mg Oral Daily    DULoxetine  60 mg Oral Daily    enalapril  5 mg Oral Daily    levothyroxine  125 mcg Oral Daily    metoprolol succinate  25 mg Oral Daily    pregabalin  100 mg Oral BID     PRN Meds: magnesium hydroxide, morphine **OR** morphine, HYDROcodone 5 mg - acetaminophen **OR** HYDROcodone 5 mg - acetaminophen, ondansetron, fluticasone, meclizine      Intake/Output Summary (Last 24 hours) at 12/22/2020 1730  Last data filed at 12/22/2020 0346  Gross per 24 hour   Intake 1619.3 ml   Output 950 ml   Net 669.3 ml       Diet:  DIET GENERAL;    Exam:  /64   Pulse 74   Temp 98.4 °F (36.9 °C) (Oral)   Resp 16   Ht 5' 6.5\" (1.689 m)   Wt 162 lb 6.4 oz (73.7 kg)   SpO2 94%   BMI 25.82 kg/m²   General appearance: No apparent distress, appears stated age and cooperative. HEENT: Pupils equal, round, and reactive to light. Conjunctivae/corneas clear. Neck: Supple, with full range of motion. No jugular venous distention. Trachea midline. Respiratory:  Normal respiratory effort. Clear to auscultation, bilaterally without Rales/Wheezes/Rhonchi. Cardiovascular: Regular rate and rhythm with normal S1/S2 without murmurs, rubs or gallops. Abdomen: Soft, non-tender, non-distended with normal bowel sounds. Musculoskeletal: Left knee dressed with Ace wrap. Multiple surgical sites on left lateral thigh appear well-healing, no active bleeding. Remaining limbs adequate passive and active ROM. Skin: Skin color, texture, turgor normal.  No rashes or lesions. Neurologic:  Neurovascularly intact without any focal sensory/motor deficits. Cranial nerves: II-XII intact, grossly non-focal.  Psychiatric: Alert and oriented, thought content appropriate, normal insight  Capillary Refill: Brisk,< 3 seconds   Peripheral Pulses: +2 palpable, equal bilaterally     Labs:   Recent Labs     12/21/20 0015 12/22/20  0911   WBC 7.9 6.0   HGB 13.1 10.1*   HCT 39.8 31.7*    181     Recent Labs     12/21/20 0015 12/22/20 0911   * 131*   K 3.9 4.8   CL 93* 99   CO2 25 25   BUN 7 14   CREATININE 0.5 0.6   CALCIUM 9.0 8.1*     No results for input(s): AST, ALT, BILIDIR, BILITOT, ALKPHOS in the last 72 hours. Recent Labs     12/21/20 0015   INR 0.91     No results for input(s): Kuldeep Snowball in the last 72 hours.     Microbiology:    Blood culture #1: No results found for: BC    Blood culture #2:No results found for: BLOODCULT2    Organism:  Lab Results   Component Value Date    ORG Enterococcus faecalis - (Group D) 04/12/2019       No results found for: LABGRAM    MRSA culture only:No results found for: [facility]    Urine culture:   Lab Results   Component Value Date    LABURIN [place] count: >100,000 CFU/mL 04/12/2019       Respiratory culture: No results found for: CULTRESP    Aerobic and Anaerobic :  No results found for: LABAERO  No results found for: LABANAE    Urinalysis:      Lab Results   Component Value Date    NITRU NEGATIVE 09/14/2017    WBCUA 0-2 09/14/2017    BACTERIA NONE 09/14/2017    RBCUA 0-2 09/14/2017    BLOODU small 04/12/2019    BLOODU MODERATE 09/14/2017    SPECGRAV 1.015 04/12/2019    GLUCOSEU negative 04/12/2019    GLUCOSEU NEGATIVE 09/14/2017       Radiology:  FLUORO FOR SURGICAL PROCEDURES   Final Result      XR FEMUR LEFT (MIN 2 VIEWS)   Final Result FINDINGS/IMPRESSION: Saved fluoroscopic images show screw plate fixation of distal femoral fracture with anatomic alignment and without evidence of hardware failure or loosening. Redemonstration of left knee and hip arthroplasties. Please refer to the operative report for further information. **This report has been created using voice recognition software. It may contain minor errors which are inherent in voice recognition technology. ** Final report electronically signed by Dr. Keyonna Rodas MD on 12/21/2020 4:37 PM    Xr Femur Left (min 2 Views)    Result Date: 12/21/2020  Left femur, 2 views COMPARISON:  CR,SR  - XR KNEE LEFT (MIN 4 VIEWS)  - 12/20/2020 10:43 PM EST FINDINGS: Mildly displaced mid to distal femur fracture. No dislocation. Status post left hip and left knee arthroplasty. Hardware appears intact. Unremarkable soft tissues. Mid to distal left femur fracture. This document has been electronically signed by: Markos Potts MD on 12/21/2020 12:43 AM     Xr Knee Left (min 4 Views)    Result Date: 12/20/2020  XR KNEE LEFT (MIN 4 VIEWS), 4 views COMPARISON: None FINDINGS: Comminuted mildly displaced distal femur fracture. No dislocation. Status post left knee arthroplasty. Hardware appears intact. No visible effusion. Unremarkable soft tissues. Comminuted distal femur fracture. This document has been electronically signed by: Markos Potts MD on 12/20/2020 11:09 PM     Xr Chest Portable    Result Date: 12/21/2020  Chest X-ray, 1 View COMPARISON:  CR,SR  - XR CHEST STANDARD (2 VW)  - 12/21/2019 10:42 AM EST FINDINGS: No consolidation. Hiatal hernia projecting behind the heart. No pleural effusion. No pneumothorax. No cardiomegaly. No acute fracture. Status post left shoulder arthroplasty. Degenerative changes in the spine. Elevation of the right hemidiaphragm. No acute findings. Nonemergent/incidental findings in the report. This document has been electronically signed by: Rhonda Everett MD on 12/21/2020 12:43 AM     Fluoro For Surgical Procedures    Result Date: 12/21/2020  Radiology exam is complete. No Radiologist dictation. Please follow up with ordering provider.        Electronically signed by MAURICIO Michael on 12/22/2020 at 5:30 PM

## 2020-12-22 NOTE — DISCHARGE INSTR - COC
Continuity of Care Form    Patient Name: Abdulaziz Gunter   :  1940  MRN:  [id number]    Admit date:  2020  Discharge date:  2020    Code Status Order: Prior   Advance Directives:   [facility] Documentation       Date/Time Healthcare Directive Type of Healthcare Directive Copy in [address] Agent's Name Healthcare Agent's Phone Number    [phone]  Yes, patient has an advance directive for healthcare treatment  Durable power of  for health care  Yes, copy in chart  Healthcare power of   Bruce Gunter and [address]            Admitting Physician:  Christiano Negrete MD  PCP: Juanis Mir DO    Discharging Nurse: Oracio Kebede RN  [address] Unit/Room#: 7K-11/011-A  Discharging Unit Phone Number: [phone]    Emergency Contact:   Extended Emergency Contact Information  Primary Emergency Contact: Kari Gunter  Address: [address] [place] [address], [address] [place] [address] [facility] Phone: [phone]  Mobile Phone: [phone]  Relation: Spouse  Secondary Emergency Contact: Kris Santos  Address: [phone]           [address] Phone: [phone]  Mobile Phone: [phone]  Relation: Child    Past Surgical History:  Past Surgical History:   Procedure Laterality Date   [address] Bilateral     w/cubital tunnel    COLON SURGERY  2016    Procedure: ATTEMPTED DAVINCI XI ROBOTIC ASSISTED SIGMOID COLON RESECTION, ROBOTIC ASSISTED MOBILIZATION OF RECTAL SIGMOID TO SPLENIC FLEXURE, CONVERTED TO OPEN LEFT HEMICOLECTOMY WITH COLOPROCTOCTOMY, SPLENORRHAPHY, RIGID SIGMOIDOSCOPY, LASER EVALUATION OF VASCULARITY WITH ICG; Surgeon: Christine Tovar MD; Location: [facility]; Service: General    COLONOSCOPY  2012    CYST REMOVAL     [facility]    FOOT SURGERY Left   HYSTERECTOMY  1979    w/bladder suspension    JOINT REPLACEMENT  2002, 2008, 2009    rt hip(2002), lt knee(2009), lt hip(2008) Shoulder (2009)    OVARY REMOVAL  1999    MO COLON CA SCRN NOT  [address] IND Left 9/15/2017    COLONOSCOPY performed by Kaelyn Robertson MD at [facility]    discectomy   [address] [place] [address]    UPPER GASTROINTESTINAL ENDOSCOPY  2012       Immunization History:   Immunization History   Administered Date(s) Administered    Influenza Vaccine, unspecified formulation 09/26/2014, 10/13/2015, 10/01/2016    Influenza, High Dose (Fluzone 65 yrs and older) 10/24/2017, 09/29/2018, 10/20/2020    Influenza, Triv, inactivated, subunit, adjuvanted, IM (Fluad 65 yrs and older) 10/12/2019    Pneumococcal Polysaccharide ([id number]) 11/02/2004, 10/12/2010       Active Problems:  Patient Active Problem List   Diagnosis Code    Osteoarthritis M19.90    Fibromyalgia M79.7    Gastroesophageal reflux disease K21.9    Hypothyroidism E03.9    Patient is Sikh Z78.9    Back pain M54.9    H/O abdominal surgery Z98.890    Normocytic anemia D64.9    History of diverticulosis Z87.19    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.40, G89.29    Chest pain R07.9    Essential (primary) hypertension I10    Abnormal CT scan, chest R93.89    BRBPR (bright red blood per rectum) K62.5    GI bleed due to NSAIDs K92.2, T39.395A    Spinal stenosis of lumbar region without neurogenic claudication M48.061    Other idiopathic scoliosis, lumbar region M41.26    Neutropenia (HCC) D70.9    Systemic lupus erythematosus (HCC) M32.9    Closed fracture of distal end of left femur, initial encounter ([facility] [address] S72.402A       Isolation/Infection:   Isolation            Contact          Patient Infection Status       Infection Onset Added Last Indicated Last Indicated By Review Planned Expiration Resolved Resolved By VRE  10/05/16 10/05/16 Simona Melgar RN        MRSA  10/03/16 10/03/16 Simona Melgar RN        Resolved    COVID-19 Rule Out 12/21/20 12/21/20 12/21/20 COVID-19 (Ordered)   12/21/20 Rule-Out Test Resulted    COVID-19 Rule Out 10/06/20 10/06/20 10/06/20 Covid-19 Ambulatory (Ordered)   10/07/20 Rule-Out Test Resulted            Nurse Assessment:  Last Vital Signs: /64   Pulse 74   Temp 98.4 °F (36.9 °C) (Oral)   Resp 16   Ht 5' 6.5\" (1.689 m)   Wt 162 lb 6.4 oz (73.7 kg)   SpO2 94%   BMI 25.82 kg/m²     Last documented pain score (0-10 scale): Pain Level: 8  Last Weight:   Wt Readings from Last 1 Encounters:   12/21/20 162 lb 6.4 oz (73.7 kg)     Mental Status:  oriented and alert    IV Access:  - None    Nursing Mobility/ADLs:  Walking   Dependent  Transfer  Dependent  Bathing  Dependent  Dressing  Assisted  Toileting  Dependent  Feeding  Independent  Med Admin  Independent  Med Delivery   whole    Wound Care Documentation and Therapy:  Wound 09/15/17 Skin tear Wrist Left (Active)   Number of days: 1194        Elimination:  Continence:   · Bowel: Yes  · Bladder: Yes  Urinary Catheter: None   Colostomy/Ileostomy/Ileal Conduit: No       Date of Last BM: 12/21/2020    Intake/Output Summary (Last 24 hours) at 12/22/2020 1034  Last data filed at 12/22/2020 0346  Gross per 24 hour   Intake 3698.3 ml   Output 1050 ml   Net 2648. 3 ml     I/O last 3 completed shifts: In: 3698.3 [P.O.:350; I.V.:3348.3]  Out: 1050 [Urine:950; Blood:100]    Safety Concerns:     History of Falls (last 30 days) and At Risk for Falls    Impairments/Disabilities:      None    Nutrition Therapy:  Current Nutrition Therapy:   - Oral Diet:  General    Routes of Feeding: Oral  Liquids: Thin Liquids  Daily Fluid Restriction: no  Last Modified Barium Swallow with Video (Video Swallowing Test): not done    Treatments at the Time of Hospital Discharge:   Respiratory Treatments: NA  Oxygen Therapy:  is not on home oxygen therapy. Ventilator:    - No ventilator support    Rehab Therapies: Physical Therapy and Occupational Therapy  Weight Bearing Status/Restrictions: Touchdown weight bearing (10-25 lbs) only on left leg  Other Medical Equipment (for information only, NOT a DME order):  walker  Other Treatments: NA    Patient's personal belongings (please select all that are sent with patient):  None    RN SIGNATURE:  Electronically signed by Cheng Montenegro on 12/23/20 at 11:28 AM EST    CASE MANAGEMENT/SOCIAL WORK SECTION    Inpatient Status Date:  12/21/2020    Readmission Risk Assessment Score:  Readmission Risk              Risk of Unplanned Readmission:        13           Discharging to Facility/ Agency   · Name:  [facility]  · Address:  [address] [place] [address], [address] [place] [address]  · Phone:  [phone]  · Fax:  [phone]    Dialysis Facility (if applicable)   · Name:  · Address:  · Dialysis Schedule:  · Phone:  · Fax:    / signature: Electronically signed by KARLIE Ferguson on 12/22/20 at 10:36 AM EST    PHYSICIAN SECTION    Prognosis: Good    Condition at Discharge: Stable    Rehab Potential (if transferring to Rehab): Good    Recommended Labs or Other Treatments After Discharge: NA    Physician Certification: I certify the above information and transfer of Roderick Gunter  is necessary for the continuing treatment of the diagnosis listed and that she requires [facility] for less 30 days.      Update Admission H&P: No change in H&P    PHYSICIAN SIGNATURE:  Electronically signed by FLORIDA Rice CNP on 12/23/2020 at 8:21 AM

## 2020-12-22 NOTE — PROGRESS NOTES
Orthopaedic Progress Note      SUBJECTIVE:    Chief Complaint   Patient presents with    Fall    Knee Pain   POD 1 s/p ORIF left supracondylar femur fracture without intercondylar extension. Seen up in chair  Pain controlled with medications. Denies numbness and tingling. Physical    Vitals:    12/22/20 0815   BP: 114/64   Pulse: 74   Resp: 16   Temp: 98.4 °F (36.9 °C)   SpO2: 94%         OBJECTIVE  LLE dressing c/d/i. Flex and extends toes and ankle. Compartments and calf soft. NVI foot.        Data  CBC:   Lab Results   Component Value Date    WBC 6.0 12/22/2020    RBC 3.12 12/22/2020    HGB 10.1 12/22/2020    HCT 31.7 12/22/2020    .6 12/22/2020    MCH 32.4 12/22/2020    MCHC 31.9 12/22/2020    RDW 10.9 11/06/2018     12/22/2020    MPV 10.0 12/22/2020     BMP:    Lab Results   Component Value Date     12/22/2020    K 4.8 12/22/2020    K 3.9 12/21/2020    CL 99 12/22/2020    CO2 25 12/22/2020    BUN 14 12/22/2020    LABALBU 3.5 12/21/2019    CREATININE 0.6 12/22/2020    CALCIUM 8.1 12/22/2020    LABGLOM >90 12/22/2020    GLUCOSE 171 12/22/2020     Uric Acid:  No components found for: URIC  PT/INR:    Lab Results   Component Value Date    INR 0.91 12/21/2020     PTT:    Lab Results   Component Value Date    APTT 29.1 12/21/2020   [APTT  Troponin:  No results found for: TROPONINI  Urine Culture:  No components found for: CURINE    Current Inpatient Medications    Current Facility-Administered Medications: docusate sodium (COLACE) capsule 100 mg, 100 mg, Oral, BID  senna (SENOKOT) tablet 17.2 mg, 2 tablet, Oral, Nightly  polyethylene glycol (GLYCOLAX) packet 17 g, 17 g, Oral, BID  magnesium hydroxide (MILK OF MAGNESIA) 400 MG/5ML suspension 30 mL, 30 mL, Oral, Daily PRN  rivaroxaban (XARELTO) tablet 10 mg, 10 mg, Oral, Daily  0.9 % sodium chloride infusion, , Intravenous, Continuous morphine (PF) injection 1 mg, 1 mg, Intravenous, Q4H PRN **OR** morphine (PF) injection 2 mg, 2 mg, Intravenous, Q4H PRN  HYDROcodone-acetaminophen (NORCO) 5-325 MG per tablet 1 tablet, 1 tablet, Oral, Q6H PRN **OR** HYDROcodone-acetaminophen (NORCO) 5-325 MG per tablet 2 tablet, 2 tablet, Oral, Q6H PRN  ondansetron (ZOFRAN) injection 4 mg, 4 mg, Intravenous, Q6H PRN  DULoxetine (CYMBALTA) extended release capsule 60 mg, 60 mg, Oral, Daily  enalapril (VASOTEC) tablet 5 mg, 5 mg, Oral, Daily  fluticasone (FLONASE) 50 MCG/ACT nasal spray 1 spray, 1 spray, Nasal, BID PRN  levothyroxine (SYNTHROID) tablet 125 mcg, 125 mcg, Oral, Daily  meclizine (ANTIVERT) chewable tablet 25 mg, 25 mg, Oral, Q6H PRN  metoprolol succinate (TOPROL XL) extended release tablet 25 mg, 25 mg, Oral, Daily  pregabalin (LYRICA) capsule 100 mg, 100 mg, Oral, BID    .     ASSESSMENT AND PLAN  Toe touch weight bearing LLE  PT/OT  Xarelto for DVT ppx  Dry dressings prn  Continue medical management  [facility]

## 2020-12-22 NOTE — CARE COORDINATION
DISASTER CHARTING    12/22/20, 1:19 PM EST    DISCHARGE ONGOING EVALUATION:     [address] day: 1  Location: -11/011-A Reason for admit: [facility] distal end of left [facility] [S71.947Q]   Barriers to Discharge: Admitted with fx left femur. Surgery yesterday by Dr Silvia Stroud: ORIF left femur  She is a Christian and does not want any blood or blood products, she will accept iron and erythropoietin if need be. Hgb is 13.   PCP: Beverly Rosales, DO  Patient Goals/Plan/Treatment Preferences: SW made referral to [employer].  Per Joni Alvarez, \"They are able to take clinically and will just need therapy notes to start a pre-cert but do not need to wait until approval. They are also able to take with the COVID test done yesterday as long as discharged within 4 days\"

## 2020-12-22 NOTE — PROCEDURES
A Bladder scan was performed at 2347 . The residual amount was measured to be >999 ML. Report of results was given to [facility].

## 2020-12-22 NOTE — PLAN OF CARE
Problem: Falls - Risk of:  Goal: Will remain free from falls  Description: Will remain free from falls  Outcome: Ongoing  Goal: Absence of physical injury  Description: Absence of physical injury  Outcome: Ongoing     Problem: Pain:  Goal: Pain level will decrease  Description: Pain level will decrease  Outcome: Ongoing  Goal: Control of acute pain  Description: Control of acute pain  Outcome: Ongoing  Goal: Control of chronic pain  Description: Control of chronic pain  Outcome: Ongoing     Problem: Skin Integrity:  Goal: Will show no infection signs and symptoms  Description: Will show no infection signs and symptoms  Outcome: Ongoing  Goal: Absence of new skin breakdown  Description: Absence of new skin breakdown  Outcome: Ongoing     Problem: DAILY CARE  Goal: Daily care needs are met  Outcome: Ongoing  Patient encouraged to provide own care as able. Problem: KNOWLEDGE DEFICIT  Goal: Patient/S.O. demonstrates understanding of disease process, treatment plan, medications, and discharge instructions. Outcome: Ongoing     Problem: DISCHARGE BARRIERS  Goal: Patient's continuum of care needs are met  Outcome: Ongoing  Patient requesting Nathanael Olszewski for continued therapies prior to returning home with spouse. Care plan reviewed with patient and spouse. Patient and spouse verbalize understanding of the plan of care and contribute to goal setting.

## 2020-12-22 NOTE — CARE COORDINATION
12/22/20, 10:31 AM EST    DISCHARGE PLANNING EVALUATION      SW spoke to Sarah Fish with [facility] admissions. They are able to take clinically and will just need therapy notes to start a pre-cert but do not need to wait until approval. They are also able to take with the COVID test done yesterday as long as discharged within 4 days.  SW updated the CM

## 2020-12-22 NOTE — PROGRESS NOTES
Additional Comments: Pt amb with rollator at home, power w/c for longer distance due to h/o scoliosis    OBJECTIVE:  Range of Motion:  Left Lower Extremity: Impaired - L knee flexion ~100 degrees, in sitting    Strength:  Left Lower Extremity: Impaired - L quad 3+/5, L DF 3+/5    Balance:  Static Standing Balance: Moderate Assistance, with SW, unable to stand fully, R knee flexion and stooped posture, cues to extend R knee, pt unable, unable to stand upright at trunk due to h/o scoliosis  Dynamic Standing Balance: Moderate Assistance, X 2    Bed Mobility:  Not Tested    Transfers:  Sit to Stand: Maximum Assistance, X 1, slow to ascend, difficulty advancing UE's to walker, manual assist to maintain TTWB L LE  Stand to Sit:Maximum Assistance, X 1  Stand Pivot: Moderate Assistance, X 2, without AD, recliner to bedside commode    Ambulation:  Unable due to impaired standing balance    Exercise:  Patient was guided in 1 set(s) 10 reps of exercise to left lower extremities. Ankle pumps, Seated marches and Long arc quads. Exercises were completed for increased independence with functional mobility. Functional Outcome Measures: Completed  AM-PAC Inpatient Mobility Raw Score : 10  AM-PAC Inpatient T-Scale Score : 32.29    ASSESSMENT:  Activity Tolerance:  Patient tolerance of  treatment: good. Treatment Initiated: Treatment and education initiated within context of evaluation. Evaluation time included review of current medical information, gathering information related to past medical, social and functional history, completion of standardized testing, formal and informal observation of tasks, assessment of data and development of plan of care and goals. Treatment time included skilled education and facilitation of tasks to increase safety and independence with functional mobility for improved independence and quality of life. See above exercises, education on WB status and safe transfer technique.

## 2020-12-23 VITALS
WEIGHT: 162.4 LBS | HEIGHT: 67 IN | RESPIRATION RATE: 18 BRPM | HEART RATE: 95 BPM | OXYGEN SATURATION: 97 % | SYSTOLIC BLOOD PRESSURE: 127 MMHG | DIASTOLIC BLOOD PRESSURE: 61 MMHG | BODY MASS INDEX: 25.49 KG/M2 | TEMPERATURE: 98.3 F

## 2020-12-23 PROCEDURE — 97535 SELF CARE MNGMENT TRAINING: CPT

## 2020-12-23 PROCEDURE — 6370000000 HC RX 637 (ALT 250 FOR IP): Performed by: ORTHOPAEDIC SURGERY

## 2020-12-23 PROCEDURE — 6370000000 HC RX 637 (ALT 250 FOR IP): Performed by: PHYSICIAN ASSISTANT

## 2020-12-23 PROCEDURE — 97166 OT EVAL MOD COMPLEX 45 MIN: CPT

## 2020-12-23 RX ADMIN — METOPROLOL SUCCINATE 25 MG: 25 TABLET, FILM COATED, EXTENDED RELEASE ORAL at 08:22

## 2020-12-23 RX ADMIN — DULOXETINE HYDROCHLORIDE 60 MG: 60 CAPSULE, DELAYED RELEASE ORAL at 08:22

## 2020-12-23 RX ADMIN — HYDROCODONE BITARTRATE AND ACETAMINOPHEN 2 TABLET: 5; 325 TABLET ORAL at 08:22

## 2020-12-23 RX ADMIN — LEVOTHYROXINE SODIUM 125 MCG: 125 TABLET ORAL at 06:28

## 2020-12-23 RX ADMIN — HYDROCODONE BITARTRATE AND ACETAMINOPHEN 1 TABLET: 5; 325 TABLET ORAL at 01:33

## 2020-12-23 RX ADMIN — PREGABALIN 100 MG: 50 CAPSULE ORAL at 08:22

## 2020-12-23 RX ADMIN — POLYETHYLENE GLYCOL 3350 17 G: 17 POWDER, FOR SOLUTION ORAL at 08:22

## 2020-12-23 RX ADMIN — DOCUSATE SODIUM 100 MG: 100 CAPSULE, LIQUID FILLED ORAL at 08:22

## 2020-12-23 ASSESSMENT — PAIN DESCRIPTION - DESCRIPTORS: DESCRIPTORS: ACHING;SORE

## 2020-12-23 ASSESSMENT — PAIN DESCRIPTION - PAIN TYPE
TYPE: ACUTE PAIN;SURGICAL PAIN
TYPE: SURGICAL PAIN

## 2020-12-23 ASSESSMENT — PAIN DESCRIPTION - LOCATION
LOCATION: LEG
LOCATION: HIP

## 2020-12-23 ASSESSMENT — PAIN DESCRIPTION - ORIENTATION
ORIENTATION: LEFT
ORIENTATION: LEFT

## 2020-12-23 ASSESSMENT — PAIN DESCRIPTION - ONSET: ONSET: ON-GOING

## 2020-12-23 ASSESSMENT — PAIN - FUNCTIONAL ASSESSMENT: PAIN_FUNCTIONAL_ASSESSMENT: ACTIVITIES ARE NOT PREVENTED

## 2020-12-23 ASSESSMENT — PAIN SCALES - GENERAL
PAINLEVEL_OUTOF10: 2
PAINLEVEL_OUTOF10: 6
PAINLEVEL_OUTOF10: 5

## 2020-12-23 ASSESSMENT — PAIN DESCRIPTION - FREQUENCY: FREQUENCY: CONTINUOUS

## 2020-12-23 ASSESSMENT — PAIN DESCRIPTION - PROGRESSION: CLINICAL_PROGRESSION: NOT CHANGED

## 2020-12-23 NOTE — PROGRESS NOTES
Flor Marie 58  INPATIENT OCCUPATIONAL THERAPY  [facility] ORTHOPEDICS 7K  EVALUATION    Time:    Time In: 8940  Time Out: 3499  Timed Code Treatment Minutes: 21 Minutes  Minutes: 35          Date: 2020  Patient Name: Ailyn Gunter,   Gender: female      MRN: [id number]  : 1940  (78 y.o.)  Referring Practitioner: Dr. Adia Sheridan  Diagnosis: closed fracture of distal femur  Additional Pertinent Hx: Patient is an 73-year-old female patient has a history of hypertension, hypothyroidism who presents for evaluation of left knee pain. She states that she was outside on her porch when she got clumsy and twisted her left knee. She states that she fell to the ground and was unable to ambulate after this occurred. Patient states that she has a left knee replacement done approximately 10 years ago by Dr. Oracio Caicedo, orthopedics. Pt sustained a distal L femur fracture, underwent ORIF by Dr Laura Pearl . Restrictions/Precautions:  Restrictions/Precautions: Weight Bearing, Fall Risk  Left Lower Extremity Weight Bearing: Toe Touch Weight Bearing    Subjective  Chart Reviewed: Yes, Orders, Progress Notes, History and Physical  Patient assessed for rehabilitation services?: Yes  Family / Caregiver Present: Yes    Subjective: pleasant, cooperative, spouse present and supportive.     Pain:  Pain Assessment  Patient Currently in Pain: Yes  Pain Level: 5  Pain Type: Surgical pain  Pain Location: Leg  Pain Orientation: Left    Social/Functional History:  Lives With: Family  Type of Home: House  Home Layout: One level  Home Access: Stairs to enter without rails  Entrance Stairs - Number of Steps: 1  Home Equipment: 4 wheeled walker, Wheelchair-electric   Bathroom Shower/Tub: Walk-in shower, Shower chair with back  Bathroom Toilet: Handicap height  Bathroom Equipment: Grab bars in shower       ADL Assistance: [address] [place] [address] Avenue: Independent  Ambulation Assistance: Independent Performance deficits / Impairments: Decreased functional mobility , Decreased high-level IADLs, Decreased endurance, Decreased ADL status, Decreased strength, Decreased balance  Prognosis: Good  REQUIRES OT FOLLOW UP: Yes  Decision Making: Medium Complexity  Safety Devices in place: Yes  Type of devices: All fall risk precautions in place, Gait belt, Call light within reach, Chair alarm in place, Left in chair    Treatment Initiated: Treatment and education initiated within context of evaluation. Evaluation time included review of current medical information, gathering information related to past medical, social and functional history, completion of standardized testing, formal and informal observation of tasks, assessment of data and development of plan of care and goals. Treatment time included skilled education and facilitation of tasks to increase safety and independence with ADL's for improved functional independence and quality of life. Discharge Recommendations:  Continue to assess pending progress, ECF with OT    Patient Education:  OT Education: Plan of Care, OT Role, Precautions, ADL Adaptive Strategies    Equipment Recommendations: Other: has a BSC and shower chair.  need to assess for a reacher    Plan:  Times per week: 6 x  Current Treatment Recommendations: Strengthening, Safety Education & Training, Balance Training, Patient/Caregiver Education & Training, Self-Care / ADL, Functional Mobility Training, Home Management Training, Endurance Training    Goals:  Patient goals : Be able to transfer better  Short term goals  Time Frame for Short term goals: By discharge pt will  Short term goal 1: complete basic trasfers with CGA and no > min cues for TTWB L LE to increase ability to complete toilet transfers  Short term goal 2: tolerate standing x 2 min with CGA and able to stand for clothing mgt Short term goal 3: Pt will compelte B UE light strengthening ex x 10 reps to increase strength for safe transers  Short term goal 4: Complete ADL routine with no > min A and min cues for adapted tech  Long term goals  Time Frame for Long term goals : Not set due to est short LOS  See long-term goal time frame for expected duration of plan of care. If no long-term goals established, a short length of stay is anticipated. Following session, patient left in safe position with all fall risk precautions in place.

## 2020-12-23 NOTE — PROGRESS NOTES
Orthopaedic Progress Note      SUBJECTIVE:    Chief Complaint   Patient presents with    Fall    Knee Pain   POD 2 s/p ORIF left supracondylar femur fracture. Doing well.    No new ortho complaints      Physical    Vitals:    12/23/20 0810   BP: 127/61   Pulse: 95   Resp: 18   Temp: 98.3 °F (36.8 °C)   SpO2: 97%               Data  CBC:   Lab Results   Component Value Date    WBC 6.0 12/22/2020    RBC 3.12 12/22/2020    HGB 10.1 12/22/2020    HCT 31.7 12/22/2020    .6 12/22/2020    MCH 32.4 12/22/2020    MCHC 31.9 12/22/2020    RDW 10.9 11/06/2018     12/22/2020    MPV 10.0 12/22/2020     BMP:    Lab Results   Component Value Date     12/22/2020    K 4.8 12/22/2020    K 3.9 12/21/2020    CL 99 12/22/2020    CO2 25 12/22/2020    BUN 14 12/22/2020    LABALBU 3.5 12/21/2019    CREATININE 0.6 12/22/2020    CALCIUM 8.1 12/22/2020    LABGLOM >90 12/22/2020    GLUCOSE 171 12/22/2020     Uric Acid:  No components found for: URIC  PT/INR:    Lab Results   Component Value Date    INR 0.91 12/21/2020     PTT:    Lab Results   Component Value Date    APTT 29.1 12/21/2020   [APTT  Troponin:  No results found for: TROPONINI  Urine Culture:  No components found for: CURINE    Current Inpatient Medications    Current Facility-Administered Medications: docusate sodium (COLACE) capsule 100 mg, 100 mg, Oral, BID  senna (SENOKOT) tablet 17.2 mg, 2 tablet, Oral, Nightly  polyethylene glycol (GLYCOLAX) packet 17 g, 17 g, Oral, BID  magnesium hydroxide (MILK OF MAGNESIA) 400 MG/5ML suspension 30 mL, 30 mL, Oral, Daily PRN  rivaroxaban (XARELTO) tablet 10 mg, 10 mg, Oral, Daily  0.9 % sodium chloride infusion, , Intravenous, Continuous  morphine (PF) injection 1 mg, 1 mg, Intravenous, Q4H PRN **OR** morphine (PF) injection 2 mg, 2 mg, Intravenous, Q4H PRN HYDROcodone-acetaminophen (NORCO) 5-325 MG per tablet 1 tablet, 1 tablet, Oral, Q6H PRN **OR** HYDROcodone-acetaminophen (NORCO) 5-325 MG per tablet 2 tablet, 2 tablet, Oral, Q6H PRN  ondansetron (ZOFRAN) injection 4 mg, 4 mg, Intravenous, Q6H PRN  DULoxetine (CYMBALTA) extended release capsule 60 mg, 60 mg, Oral, Daily  enalapril (VASOTEC) tablet 5 mg, 5 mg, Oral, Daily  fluticasone (FLONASE) 50 MCG/ACT nasal spray 1 spray, 1 spray, Nasal, BID PRN  levothyroxine (SYNTHROID) tablet 125 mcg, 125 mcg, Oral, Daily  meclizine (ANTIVERT) chewable tablet 25 mg, 25 mg, Oral, Q6H PRN  metoprolol succinate (TOPROL XL) extended release tablet 25 mg, 25 mg, Oral, Daily  pregabalin (LYRICA) capsule 100 mg, 100 mg, Oral, BID    .     ASSESSMENT AND PLAN  Toe touch weight bearing LLE  PT/OT  Xarelto for DVT ppx  Dry dressings prn  Continue medical management  [facility] [place]

## 2020-12-23 NOTE — PROGRESS NOTES
Attempted to call report to [facility] and was placed on hold for 10 minutes. Will attempt to call back at a later time.

## 2020-12-24 ENCOUNTER — TELEPHONE (OUTPATIENT)
Dept: FAMILY MEDICINE CLINIC | Age: 80
End: 2020-12-24

## 2020-12-24 NOTE — TELEPHONE ENCOUNTER
Dariela [facility] Transitions Initial Follow Up Call    Outreach made within 2 business days of discharge: Yes    Patient: Adama Gunter Patient : 1940   MRN: [id number]  Reason for Admission: There are no discharge diagnoses documented for the most recent discharge. Discharge Date: 20       Spoke with: [facility] requesting pt to call back at earliest convenience.        Scheduled appointment with PCP within 7-14 days    Follow Up  Future Appointments   Date Time Provider Huber Little   2021  1:20 PM DO ARCADIO Cameron PCT PHILIPPP - Walt Godinez LPN

## 2020-12-29 NOTE — DISCHARGE SUMMARY
Patient ID:  Margo Talbot Height  [id number]  78 y.o.  1940    Admit date: 12/20/2020    Discharge date and time: 12/23/2020 12:42 PM     Admitting Physician: Michaela Gee MD     Discharge Physician: Michaela Gee MD    Admission Diagnoses: Closed fracture of distal end of left femur, initial encounter [facility]) [S72.402A]    Discharge Diagnoses: Closed fracture of distal end of left femur, initial encounter [facility]) [id number] [facility] Course: Nika Glez is an 78year old female with a history of a fall sustaining a left supracondylar femur fracture. She underwent ORIF left femur without surgical complications. Post-operatively her pain was well controlled, she worked with PT/OT. She is stable and ready for DC. Consults:  IM     Treatments: ORIF left distal femur    Disposition: fair/stable    Patient Instructions:      Medication List      START taking these medications    rivaroxaban 10 MG Tabs tablet  Commonly known as: XARELTO  Take 1 tablet by mouth daily for 28 days        CONTINUE taking these medications    Biotin 47620 MCG Tabs     CRANBERRY PO     DULoxetine 60 MG extended release capsule  Commonly known as: CYMBALTA  TAKE 1 CAPSULE DAILY     enalapril 10 MG tablet  Commonly known as: VASOTEC  TAKE ONE-HALF (1/2) TABLET DAILY     Flonase 50 MCG/ACT nasal spray  Generic drug: fluticasone     gabapentin 600 MG tablet  Commonly known as: NEURONTIN  Take 1 tablet by mouth 4 times daily for 180 days. guaiFENesin 1200 MG Tb12  Take 1,200 mg by mouth 2 times daily     levothyroxine 125 MCG tablet  Commonly known as: SYNTHROID  Take 1 tablet by mouth Daily     lidocaine 4 % cream  Commonly known as: LMX  Apply topically 4x daily as needed. meclizine 25 MG tablet  Commonly known as: ANTIVERT     metoprolol succinate 25 MG extended release tablet  Commonly known as: TOPROL XL  TAKE 1 TABLET DAILY     * Misc.  Devices Misc  Mechanical lift for a [employer] Misc  1 each by Does not apply route daily Omega 3-6-9 Complex Caps     pregabalin 100 MG capsule  Commonly known as: LYRICA  Take 1 capsule by mouth 2 times daily for 180 days. Scooter Misc  by Does not apply route Power scooter     therapeutic multivitamin-minerals tablet         * This list has 2 medication(s) that are the same as other medications prescribed for you. Read the directions carefully, and ask your doctor or other care provider to review them with you. ASK your doctor about these medications    HYDROcodone-acetaminophen 5-325 MG per tablet  Commonly known as: Norco  Take 1-2 tablets by mouth every 4 hours as needed for Pain for up to 7 days. Ask about: Should I take this medication?            Where to Get Your Medications      You can get these medications from any pharmacy    Bring a paper prescription for each of these medications  · HYDROcodone-acetaminophen 5-325 MG per tablet  · rivaroxaban 10 MG Tabs tablet       Activity: WBAT   Diet: regular  Wound Care: Dry dressings prn    Follow-up Dr. Sandra Corbin    Signed:  Joni Riggins  12/29/2020  9:53 AM

## 2021-01-04 ENCOUNTER — TELEPHONE (OUTPATIENT)
Dept: FAMILY MEDICINE CLINIC | Age: 81
End: 2021-01-04

## 2021-01-04 NOTE — TELEPHONE ENCOUNTER
Patient currently at [facility] for rehab from recent surgery and has tested positive for COVID on 01.02.2021  Bola Holland (on HIPAA) is calling () to see if Dr An Olguin was aware of this and if he would be accessing the patients care and if so would he be able to prescribe something to help with the sxs the patient is having.     Please advise

## 2021-01-27 ENCOUNTER — OFFICE VISIT (OUTPATIENT)
Dept: FAMILY MEDICINE CLINIC | Age: 81
End: 2021-01-27
Payer: MEDICARE

## 2021-01-27 VITALS
HEART RATE: 68 BPM | OXYGEN SATURATION: 97 % | RESPIRATION RATE: 16 BRPM | DIASTOLIC BLOOD PRESSURE: 65 MMHG | SYSTOLIC BLOOD PRESSURE: 136 MMHG | TEMPERATURE: 97.7 F

## 2021-01-27 DIAGNOSIS — M48.061 SPINAL STENOSIS OF LUMBAR REGION WITHOUT NEUROGENIC CLAUDICATION: ICD-10-CM

## 2021-01-27 DIAGNOSIS — R09.82 POST-NASAL DRIP: ICD-10-CM

## 2021-01-27 DIAGNOSIS — E03.9 ACQUIRED HYPOTHYROIDISM: ICD-10-CM

## 2021-01-27 DIAGNOSIS — D70.9 NEUTROPENIA, UNSPECIFIED TYPE (HCC): ICD-10-CM

## 2021-01-27 DIAGNOSIS — S72.402A CLOSED FRACTURE OF DISTAL END OF LEFT FEMUR, INITIAL ENCOUNTER (HCC): Primary | ICD-10-CM

## 2021-01-27 PROBLEM — M32.9 SYSTEMIC LUPUS ERYTHEMATOSUS (HCC): Status: RESOLVED | Noted: 2020-02-11 | Resolved: 2021-01-27

## 2021-01-27 PROCEDURE — 1111F DSCHRG MED/CURRENT MED MERGE: CPT | Performed by: FAMILY MEDICINE

## 2021-01-27 PROCEDURE — 99214 OFFICE O/P EST MOD 30 MIN: CPT | Performed by: FAMILY MEDICINE

## 2021-01-27 RX ORDER — HYDROCODONE BITARTRATE AND ACETAMINOPHEN 5; 325 MG/1; MG/1
TABLET ORAL
COMMUNITY
Start: 2021-01-21 | End: 2021-02-15 | Stop reason: SDUPTHER

## 2021-01-27 RX ORDER — PREDNISONE 20 MG/1
20 TABLET ORAL DAILY
Qty: 7 TABLET | Refills: 0 | Status: SHIPPED | OUTPATIENT
Start: 2021-01-27 | End: 2021-02-03

## 2021-01-27 ASSESSMENT — PATIENT HEALTH QUESTIONNAIRE - PHQ9
2. FEELING DOWN, DEPRESSED OR HOPELESS: 1
1. LITTLE INTEREST OR PLEASURE IN DOING THINGS: 1
SUM OF ALL RESPONSES TO PHQ QUESTIONS 1-9: 2
SUM OF ALL RESPONSES TO PHQ9 QUESTIONS 1 & 2: 2
SUM OF ALL RESPONSES TO PHQ QUESTIONS 1-9: 2

## 2021-01-27 NOTE — PROGRESS NOTES
LASER EVALUATION OF VASCULARITY WITH ICG; Surgeon: Anatoly Stewart MD; Location: [place] [address]; Service: General    COLONOSCOPY      CYST REMOVAL  2010   [address],     Cataract    FEMUR FRACTURE SURGERY Left 2020    LEFT FEMUR OPEN REDUCTION INTERNAL FIXATION performed by Adriana Snyder MD at [address] Left    [place]    w/bladder suspension    JOINT REPLACEMENT  , ,     rt hip(), lt knee(), lt hip() Shoulder ()   Juvenal MitchellJosué De Fabiano [address] SCRN NOT  [address] IND Left 9/15/2017    COLONOSCOPY performed by Anthony Cheng MD at [facility]   [address]    discectomy   [address]    UPPER GASTROINTESTINAL ENDOSCOPY  2012       Social History     Tobacco Use    Smoking status: Former Smoker     Packs/day: 2.00     Years: 11.00     Pack years: 22.00     Types: Cigarettes     Start date: 1957     Quit date: 1967     Years since quittin.2    Smokeless tobacco: Never Used   Substance Use Topics    Alcohol use: No    Drug use: No       Family History   Problem Relation Age of Onset    Arthritis Mother     Hearing Loss Mother     High Cholesterol Mother     Hearing Loss Father     Heart Disease Father 76        CABG    Stroke Father     Arthritis Sister     Depression Sister     Diabetes Sister     Arthritis Brother     Depression Brother     Cancer Maternal Aunt     Early Death Maternal Aunt     Diabetes Maternal Grandmother     Heart Disease Paternal Grandmother          I have reviewed the patient's past medical history, past surgical history, allergies, medications, social and family history and I have made updates where appropriate.         PHYSICAL EXAM:  /65   Pulse 68   Temp 97.7 °F (36.5 °C) (Temporal)   Resp 16   SpO2 97%   General Appearance: well developed and well- nourished, in no acute distress  Head: normocephalic and atraumatic  ENT: external ear and ear canal normal bilaterally, nose without deformity, nasal mucosa and turbinates normal without polyps, oropharynx normal, dentition is normal for age, no lip or gum lesions noted  Neck: supple and non-tender without mass, no thyromegaly or thyroid nodules, no cervical lymphadenopathy  Pulmonary/Chest: clear to auscultation bilaterally- no wheezes, rales or rhonchi, normal air movement, no respiratory distress or retractions  Cardiovascular: normal rate, regular rhythm, normal S1 and S2, no murmurs, rubs, clicks, or gallops, distal pulses intact  Abdomen: soft, non-tender, non-distended, no rebound or guarding, no masses or hernias noted, no hepatospleenomegaly  Extremities: no cyanosis, clubbing or edema of the lower extremities  Psych:  Normal affect without evidence of depression or anxiety, insight and judgement are appropriate, memory appears intact  Skin: warm and dry, no rash or erythema      Diagnostic test results reviewed: inpatient labs    Patient risk of morbidity and mortality: moderate      ASSESSMENT & PLAN  Jose M Ryan was seen today for follow-up from hospital, chest congestion and hand pain. Diagnoses and all orders for this visit:    Closed fracture of distal end of left femur, initial encounter ([facility]  -     [facility] DISCHARGE MEDS RECONCILED W/ CURRENT OUTPATIENT MED LIST    [facility]    Spinal stenosis of lumbar region without neurogenic claudication    Acquired hypothyroidism    Post-nasal drip  -     predniSONE (DELTASONE) 20 MG tablet; Take 1 tablet by mouth daily for 7 days        Return in about 3 months (around 4/27/2021), or if symptoms worsen or fail to improve.     Level of medical complexity:  moderate    -Overall, patient is improving, continue home PT/OT  -I can refill norco if needed, pt aware  -Will do 7 days of prednisone for the chest and nasal congestion  -Other chronic issues are stable, continue current medications  -Advised to call if any issues      All copied or forwarded information in the progress note was verified by me to be accurate at the time of visit  Patient's past medical, surgical, social and family history were reviewed and updated      All patient questions answered. Patient voiced understanding.

## 2021-01-28 ENCOUNTER — TELEPHONE (OUTPATIENT)
Dept: FAMILY MEDICINE CLINIC | Age: 81
End: 2021-01-28

## 2021-01-28 DIAGNOSIS — M79.7 FIBROMYALGIA: Primary | ICD-10-CM

## 2021-01-28 RX ORDER — PREGABALIN 150 MG/1
150 CAPSULE ORAL 2 TIMES DAILY
Qty: 180 CAPSULE | Refills: 1 | Status: SHIPPED | OUTPATIENT
Start: 2021-01-28 | End: 2021-08-09 | Stop reason: SDUPTHER

## 2021-01-28 NOTE — TELEPHONE ENCOUNTER
Pt called the office today stating she had forgotten to inform  that her neuropathy has increased. Pt had woken up this morning with the sensation of \"ants crawling\" on her face. Pt is wanting 's recommendation on what she should do.     Please advise

## 2021-02-02 DIAGNOSIS — E03.9 ACQUIRED HYPOTHYROIDISM: ICD-10-CM

## 2021-02-02 RX ORDER — LEVOTHYROXINE SODIUM 0.12 MG/1
TABLET ORAL
Qty: 90 TABLET | Refills: 3 | Status: ON HOLD | OUTPATIENT
Start: 2021-02-02 | End: 2021-03-03 | Stop reason: HOSPADM

## 2021-02-15 DIAGNOSIS — S72.402A CLOSED FRACTURE OF DISTAL END OF LEFT FEMUR, INITIAL ENCOUNTER (HCC): Primary | ICD-10-CM

## 2021-02-15 RX ORDER — HYDROCODONE BITARTRATE AND ACETAMINOPHEN 5; 325 MG/1; MG/1
TABLET ORAL
Status: CANCELLED | OUTPATIENT
Start: 2021-02-15

## 2021-02-15 RX ORDER — HYDROCODONE BITARTRATE AND ACETAMINOPHEN 5; 325 MG/1; MG/1
1 TABLET ORAL EVERY 8 HOURS PRN
Qty: 21 TABLET | Refills: 0 | Status: SHIPPED | OUTPATIENT
Start: 2021-02-15 | End: 2021-02-22

## 2021-02-24 ENCOUNTER — TELEPHONE (OUTPATIENT)
Dept: FAMILY MEDICINE CLINIC | Age: 81
End: 2021-02-24

## 2021-02-24 NOTE — TELEPHONE ENCOUNTER
1st attempt to contact the pt re:overdue labs that Dr Cuba Rivera ordered on 8/19/20. No answer, no VM so no msg was left.

## 2021-02-25 ENCOUNTER — TELEPHONE (OUTPATIENT)
Dept: FAMILY MEDICINE CLINIC | Age: 81
End: 2021-02-25

## 2021-02-25 NOTE — TELEPHONE ENCOUNTER
Pt called stating on Tuesday evening she had bright red blood on the toilet paper when wiping after a bowel movement. Then the next morning she had actual blood in her stool which had turned the water red. Pt also had an \"extreme\" full feeling in her lower abdomin and discomfort in the bowel that goes to the rectum. Pt stated about 4 to 5 years ago she had 10in of her bowel removed by . Pt is asking if she should contact 's office.     Please advise

## 2021-02-26 ENCOUNTER — OFFICE VISIT (OUTPATIENT)
Dept: FAMILY MEDICINE CLINIC | Age: 81
End: 2021-02-26
Payer: MEDICARE

## 2021-02-26 VITALS
RESPIRATION RATE: 18 BRPM | TEMPERATURE: 97.6 F | DIASTOLIC BLOOD PRESSURE: 70 MMHG | OXYGEN SATURATION: 98 % | SYSTOLIC BLOOD PRESSURE: 132 MMHG | HEART RATE: 70 BPM

## 2021-02-26 DIAGNOSIS — R19.7 DIARRHEA, UNSPECIFIED TYPE: ICD-10-CM

## 2021-02-26 DIAGNOSIS — Z87.19 HISTORY OF DIVERTICULOSIS: ICD-10-CM

## 2021-02-26 DIAGNOSIS — K62.5 BRBPR (BRIGHT RED BLOOD PER RECTUM): Primary | ICD-10-CM

## 2021-02-26 PROCEDURE — 99214 OFFICE O/P EST MOD 30 MIN: CPT | Performed by: NURSE PRACTITIONER

## 2021-02-26 NOTE — PATIENT INSTRUCTIONS
Patient Education        Rectal Bleeding: Care Instructions  Your Care Instructions     Rectal bleeding in small amounts is common. You may see red spotting on toilet paper or drops of blood in the toilet. Rectal bleeding has many possible causes, from something as minor as hemorrhoids to something as serious as colon cancer. You may need more tests to find the cause of your bleeding. Follow-up care is a key part of your treatment and safety. Be sure to make and go to all appointments, and call your doctor if you are having problems. It's also a good idea to know your test results and keep a list of the medicines you take. How can you care for yourself at home? · Avoid aspirin and other nonsteroidal anti-inflammatory drugs (NSAIDs), such as ibuprofen (Advil, Motrin) and naproxen (Aleve). They can cause you to bleed more. Ask your doctor if you can take acetaminophen (Tylenol). Read and follow all instructions on the label. · Use a stool softener that contains bran or psyllium. You can save money by buying bran or psyllium (available in bulk at most health food stores) and sprinkling it on foods or stirring it into fruit juice. You can also use a product such as Metamucil or Citrucel. · Take your medicines exactly as directed. Call your doctor if you think you are having a problem with your medicine. When should you call for help? Call 911 anytime you think you may need emergency care. For example, call if:    · You passed out (lost consciousness). Call your doctor now or seek immediate medical care if:    · You have new or worse pain.     · You have new or worse bleeding from the rectum.     · You are dizzy or light-headed, or you feel like you may faint. Watch closely for changes in your health, and be sure to contact your doctor if:    · You cannot pass stools or gas.     · You do not get better as expected. Where can you learn more? Go to [email or web address].[email or web address].[email or web address]. org and sign in to your [employer] account. Enter Q105 in the [facility] box to learn more about \"Rectal Bleeding: Care Instructions. \"     If you do not have an account, please click on the \"Sign Up Now\" link. Current as of: April 15, 2020               Content Version: 12.6  © 2484-9980 [employer]. Care instructions adapted under license by [facility] ([facility]). If you have questions about a medical condition or this instruction, always ask your healthcare professional. [address] any warranty or liability for your use of this information.

## 2021-02-26 NOTE — PROGRESS NOTES
SURGERY  ,     Cataract    FEMUR FRACTURE SURGERY Left 2020    LEFT FEMUR OPEN REDUCTION INTERNAL FIXATION performed by Ezequiel Charles MD at [address] [facility]    JOINT REPLACEMENT  , ,     rt hip(), lt knee(), lt hip() Shoulder ()   Juvenal Wolf [address] [place] SCRN NOT  [address] IND Left 9/15/2017    COLONOSCOPY performed by Nazia Munguia MD at [facility]   [facility]    discectomy   [address]    UPPER GASTROINTESTINAL ENDOSCOPY         Social History     Tobacco Use    Smoking status: Former Smoker     Packs/day: 2.00     Years: 11.00     Pack years: 22.00     Types: Cigarettes     Start date: 1957     Quit date: 1967     Years since quittin.3    Smokeless tobacco: Never Used   Substance Use Topics    Alcohol use: No    Drug use: No       Family History   Problem Relation Age of Onset    Arthritis Mother     Hearing Loss Mother     High Cholesterol Mother     Hearing Loss Father     Heart Disease Father 76        CABG    Stroke Father     Arthritis Sister     Depression Sister     Diabetes Sister     Arthritis Brother     Depression Brother     Cancer Maternal Aunt     Early Death Maternal Aunt     Diabetes Maternal Grandmother     Heart Disease Paternal Grandmother          Review of Systems  Constitutional: Negative for Fever, Chills, Fatigue  Cardiovascular:  Negative forChest Pain, Palpitations  Respiratory:  Negative for Cough, Wheezing, Shortness of breath  Gastrointestinal:  +diarrhea, bloody stools, no N, V  Genitourinary:  Negative for Difficulty or painful urination,Change in frequency, Urgency  Skin:  Negative for Color change, Rash, Itching, Wound  Psychiatric:  Negative for Anxiety, Depression, Suicidal ideation  Musculoskeletal:  Negative for Joint pain, Back pain, Gait problems  Neurological:  Negative for Dizziness, Headaches        PHYSICAL EXAM:  /70   Pulse 70   Temp 97.6 °F (36.4 °C)   Resp 18   SpO2 98%     General Appearance: well developed and well- nourished, in no acute distress  Head: normocephalic and atraumatic  Eyes: pupils equal, round, and reactive to light,  conjunctivae and eye lids without erythema  ENT: external ear normal bilaterally, nose without deformity, nasal mucosa and turbinates normal without polyps, oropharynx normal, dentition is normal for age, no lip or gum lesions noted  Neck: supple and non-tender without mass, no thyromegaly or thyroid nodules, no cervical lymphadenopathy  Pulmonary/Chest: clear to auscultation bilaterally- no wheezes, rales or rhonchi, normal air movement, no respiratory distress or retractions  Cardiovascular: normal rate, regular rhythm, normal S1 and S2, no murmurs, rubs, clicks, or gallops,distal pulses intact  Abdomen: soft, non-tender, non-distended, normal bowel sounds  Extremities: no cyanosis, clubbing or edema of the lower extremities  Musculoskeletal: No joint swelling or gross deformity   Neuro:  Alert, 5/5 strength globally and symmetrically, 2+ patellar reflexes b/l,  normal speech, no focal findings or movement disorder noted, in wheelchair  Psych:  Normal affect without evidence of depression or anxiety, insight and judgement are appropriate, memory appears intact  Skin: warm and dry, no rash or erythema  Lymph:  No cervical, auricular or supraclavicular lymph nodes palpated    ASSESSMENT & PLAN  Dong Cruz was seen today for other. Diagnoses and all orders for this visit:    BRBPR (bright red blood per rectum)  -     Gastrointestinal Panel, Molecular; Future  -     Culture, Stool; Future    History of diverticulosis  -     Gastrointestinal Panel, Molecular; Future  -     Culture, Stool; Future    Diarrhea, unspecified type  -     Gastrointestinal Panel, Molecular;  Future  -     Culture, Stool; Future    Follows with Dr. Donavan Landry  Encouraged her to call him and get scheduled for an appt  Will check stool  ER precautions given in case her symptoms return  They verbalized understanding  Encouraged them to call with any questions or concerns    No follow-ups on file. Caryle Muller received counseling on the following healthy behaviors: nutrition, exercise and medication adherence  Reviewedprior labs and health maintenance. Continue current medications, diet and exercise. Discussed use, benefit, and side effects of prescribed medications. Barriers to medication compliance addressed. Patient given educationalmaterials - see patient instructions. All patient questions answered. Patient voiced understanding.      Electronically signed by FLORIDA Mcdaniel on 2/26/21 at 12:53 PM EST

## 2021-02-27 ENCOUNTER — HOSPITAL ENCOUNTER (INPATIENT)
Age: 81
LOS: 3 days | Discharge: HOME | DRG: 393 | End: 2021-03-03
Attending: EMERGENCY MEDICINE | Admitting: INTERNAL MEDICINE
Payer: MEDICARE

## 2021-02-27 ENCOUNTER — APPOINTMENT (OUTPATIENT)
Dept: GENERAL RADIOLOGY | Age: 81
DRG: 393 | End: 2021-02-27
Payer: MEDICARE

## 2021-02-27 DIAGNOSIS — R07.89 OTHER CHEST PAIN: ICD-10-CM

## 2021-02-27 DIAGNOSIS — E87.1 HYPONATREMIA: ICD-10-CM

## 2021-02-27 DIAGNOSIS — R55 PRE-SYNCOPE: Primary | ICD-10-CM

## 2021-02-27 LAB
ALBUMIN SERPL-MCNC: 3.5 G/DL (ref 3.5–5.1)
ALP BLD-CCNC: 111 U/L (ref 38–126)
ALT SERPL-CCNC: 12 U/L (ref 11–66)
ANION GAP SERPL CALCULATED.3IONS-SCNC: 9 MEQ/L (ref 8–16)
APTT: 29.6 SECONDS (ref 22–38)
AST SERPL-CCNC: 30 U/L (ref 5–40)
BASOPHILS # BLD: 1.1 %
BASOPHILS ABSOLUTE: 0.1 THOU/MM3 (ref 0–0.1)
BILIRUB SERPL-MCNC: 0.4 MG/DL (ref 0.3–1.2)
BUN BLDV-MCNC: 7 MG/DL (ref 7–22)
CALCIUM SERPL-MCNC: 9 MG/DL (ref 8.5–10.5)
CHLORIDE BLD-SCNC: 90 MEQ/L (ref 98–111)
CO2: 26 MEQ/L (ref 23–33)
CREAT SERPL-MCNC: 0.5 MG/DL (ref 0.4–1.2)
EOSINOPHIL # BLD: 2.2 %
EOSINOPHILS ABSOLUTE: 0.1 THOU/MM3 (ref 0–0.4)
ERYTHROCYTE [DISTWIDTH] IN BLOOD BY AUTOMATED COUNT: 13.1 % (ref 11.5–14.5)
ERYTHROCYTE [DISTWIDTH] IN BLOOD BY AUTOMATED COUNT: 43.8 FL (ref 35–45)
GFR SERPL CREATININE-BSD FRML MDRD: > 90 ML/MIN/1.73M2
GLUCOSE BLD-MCNC: 102 MG/DL (ref 70–108)
HCT VFR BLD CALC: 42.8 % (ref 37–47)
HEMOGLOBIN: 14.2 GM/DL (ref 12–16)
IMMATURE GRANS (ABS): 0.01 THOU/MM3 (ref 0–0.07)
IMMATURE GRANULOCYTES: 0.2 %
INR BLD: 0.9 (ref 0.85–1.13)
LIPASE: 14.1 U/L (ref 5.6–51.3)
LYMPHOCYTES # BLD: 10.7 %
LYMPHOCYTES ABSOLUTE: 0.7 THOU/MM3 (ref 1–4.8)
MCH RBC QN AUTO: 30.5 PG (ref 26–33)
MCHC RBC AUTO-ENTMCNC: 33.2 GM/DL (ref 32.2–35.5)
MCV RBC AUTO: 92 FL (ref 81–99)
MONOCYTES # BLD: 7.4 %
MONOCYTES ABSOLUTE: 0.5 THOU/MM3 (ref 0.4–1.3)
NUCLEATED RED BLOOD CELLS: 0 /100 WBC
OSMOLALITY CALCULATION: 249.7 MOSMOL/KG (ref 275–300)
PLATELET # BLD: 252 THOU/MM3 (ref 130–400)
PMV BLD AUTO: 9.8 FL (ref 9.4–12.4)
POTASSIUM REFLEX MAGNESIUM: 4.3 MEQ/L (ref 3.5–5.2)
RBC # BLD: 4.65 MILL/MM3 (ref 4.2–5.4)
SEG NEUTROPHILS: 78.4 %
SEGMENTED NEUTROPHILS ABSOLUTE COUNT: 5.1 THOU/MM3 (ref 1.8–7.7)
SODIUM BLD-SCNC: 125 MEQ/L (ref 135–145)
TOTAL PROTEIN: 6.9 G/DL (ref 6.1–8)
TROPONIN T: < 0.01 NG/ML
WBC # BLD: 6.5 THOU/MM3 (ref 4.8–10.8)

## 2021-02-27 PROCEDURE — 99285 EMERGENCY DEPT VISIT HI MDM: CPT

## 2021-02-27 PROCEDURE — 93005 ELECTROCARDIOGRAM TRACING: CPT | Performed by: EMERGENCY MEDICINE

## 2021-02-27 PROCEDURE — 96374 THER/PROPH/DIAG INJ IV PUSH: CPT

## 2021-02-27 PROCEDURE — 83690 ASSAY OF LIPASE: CPT

## 2021-02-27 PROCEDURE — 85610 PROTHROMBIN TIME: CPT

## 2021-02-27 PROCEDURE — 6360000002 HC RX W HCPCS: Performed by: STUDENT IN AN ORGANIZED HEALTH CARE EDUCATION/TRAINING PROGRAM

## 2021-02-27 PROCEDURE — 85025 COMPLETE CBC W/AUTO DIFF WBC: CPT

## 2021-02-27 PROCEDURE — 84484 ASSAY OF TROPONIN QUANT: CPT

## 2021-02-27 PROCEDURE — 71045 X-RAY EXAM CHEST 1 VIEW: CPT

## 2021-02-27 PROCEDURE — 80053 COMPREHEN METABOLIC PANEL: CPT

## 2021-02-27 PROCEDURE — 36415 COLL VENOUS BLD VENIPUNCTURE: CPT

## 2021-02-27 PROCEDURE — 96375 TX/PRO/DX INJ NEW DRUG ADDON: CPT

## 2021-02-27 PROCEDURE — 85730 THROMBOPLASTIN TIME PARTIAL: CPT

## 2021-02-27 RX ORDER — ONDANSETRON 2 MG/ML
4 INJECTION INTRAMUSCULAR; INTRAVENOUS ONCE
Status: COMPLETED | OUTPATIENT
Start: 2021-02-27 | End: 2021-02-27

## 2021-02-27 RX ORDER — FENTANYL CITRATE 50 UG/ML
50 INJECTION, SOLUTION INTRAMUSCULAR; INTRAVENOUS ONCE
Status: COMPLETED | OUTPATIENT
Start: 2021-02-27 | End: 2021-02-27

## 2021-02-27 RX ADMIN — ONDANSETRON 4 MG: 2 INJECTION INTRAMUSCULAR; INTRAVENOUS at 23:42

## 2021-02-27 RX ADMIN — FENTANYL CITRATE 50 MCG: 50 INJECTION, SOLUTION INTRAMUSCULAR; INTRAVENOUS at 23:42

## 2021-02-28 ENCOUNTER — APPOINTMENT (OUTPATIENT)
Dept: ULTRASOUND IMAGING | Age: 81
DRG: 393 | End: 2021-02-28
Payer: MEDICARE

## 2021-02-28 ENCOUNTER — APPOINTMENT (OUTPATIENT)
Dept: CT IMAGING | Age: 81
DRG: 393 | End: 2021-02-28
Payer: MEDICARE

## 2021-02-28 ENCOUNTER — APPOINTMENT (OUTPATIENT)
Dept: INTERVENTIONAL RADIOLOGY/VASCULAR | Age: 81
DRG: 393 | End: 2021-02-28
Payer: MEDICARE

## 2021-02-28 ENCOUNTER — APPOINTMENT (OUTPATIENT)
Dept: GENERAL RADIOLOGY | Age: 81
DRG: 393 | End: 2021-02-28
Payer: MEDICARE

## 2021-02-28 PROBLEM — E83.42 HYPOMAGNESEMIA: Status: ACTIVE | Noted: 2021-02-28

## 2021-02-28 PROBLEM — R13.19 ESOPHAGEAL DYSPHAGIA: Status: ACTIVE | Noted: 2021-02-28

## 2021-02-28 PROBLEM — J90 PLEURAL EFFUSION ON LEFT: Status: ACTIVE | Noted: 2021-02-28

## 2021-02-28 PROBLEM — R53.1 WEAKNESS GENERALIZED: Status: ACTIVE | Noted: 2021-02-28

## 2021-02-28 PROBLEM — K44.9 LARGE HIATAL HERNIA: Status: ACTIVE | Noted: 2021-02-28

## 2021-02-28 PROBLEM — E87.1 HYPO-OSMOLAR HYPONATREMIA: Status: ACTIVE | Noted: 2021-02-28

## 2021-02-28 PROBLEM — R55 PRE-SYNCOPE: Status: ACTIVE | Noted: 2021-02-28

## 2021-02-28 LAB
BILIRUBIN URINE: NEGATIVE
BLOOD, URINE: NEGATIVE
C-REACTIVE PROTEIN: 4 MG/DL (ref 0–1)
CHARACTER, URINE: CLEAR
CHLORIDE, URINE: 229 MEQ/L
COLOR: YELLOW
EKG ATRIAL RATE: 97 BPM
EKG ATRIAL RATE: 97 BPM
EKG P AXIS: 37 DEGREES
EKG P AXIS: 55 DEGREES
EKG P-R INTERVAL: 154 MS
EKG P-R INTERVAL: 178 MS
EKG Q-T INTERVAL: 334 MS
EKG Q-T INTERVAL: 346 MS
EKG QRS DURATION: 66 MS
EKG QRS DURATION: 80 MS
EKG QTC CALCULATION (BAZETT): 416 MS
EKG QTC CALCULATION (BAZETT): 424 MS
EKG R AXIS: 32 DEGREES
EKG T AXIS: 45 DEGREES
EKG T AXIS: 60 DEGREES
EKG VENTRICULAR RATE: 87 BPM
EKG VENTRICULAR RATE: 97 BPM
GLUCOSE URINE: NEGATIVE MG/DL
KETONES, URINE: NEGATIVE
LACTIC ACID: 1.2 MMOL/L (ref 0.5–2.2)
LEUKOCYTE ESTERASE, URINE: NEGATIVE
MAGNESIUM: 1.5 MG/DL (ref 1.6–2.4)
NITRITE, URINE: NEGATIVE
OSMOLALITY URINE: 961 MOSMOL/KG (ref 250–750)
PH UA: 5 (ref 5–9)
PHOSPHORUS: 3.5 MG/DL (ref 2.4–4.7)
POTASSIUM, URINE: 39 MEQ/L
PROCALCITONIN: 0.86 NG/ML (ref 0.01–0.09)
PROCALCITONIN: 1.31 NG/ML (ref 0.01–0.09)
PROTEIN UA: NEGATIVE
SODIUM BLD-SCNC: 124 MEQ/L (ref 135–145)
SODIUM BLD-SCNC: 126 MEQ/L (ref 135–145)
SODIUM BLD-SCNC: 126 MEQ/L (ref 135–145)
SODIUM BLD-SCNC: 127 MEQ/L (ref 135–145)
SODIUM BLD-SCNC: 128 MEQ/L (ref 135–145)
SODIUM URINE: 209 MEQ/L
SPECIFIC GRAVITY, URINE: 1.02 (ref 1–1.03)
T4 FREE: 2.09 NG/DL (ref 0.93–1.76)
TROPONIN T: < 0.01 NG/ML
TROPONIN T: < 0.01 NG/ML
TSH SERPL DL<=0.05 MIU/L-ACNC: 0.03 UIU/ML (ref 0.4–4.2)
UROBILINOGEN, URINE: 1 EU/DL (ref 0–1)

## 2021-02-28 PROCEDURE — 84100 ASSAY OF PHOSPHORUS: CPT

## 2021-02-28 PROCEDURE — 93010 ELECTROCARDIOGRAM REPORT: CPT | Performed by: NUCLEAR MEDICINE

## 2021-02-28 PROCEDURE — G0378 HOSPITAL OBSERVATION PER HR: HCPCS

## 2021-02-28 PROCEDURE — 83605 ASSAY OF LACTIC ACID: CPT

## 2021-02-28 PROCEDURE — 96375 TX/PRO/DX INJ NEW DRUG ADDON: CPT

## 2021-02-28 PROCEDURE — 6370000000 HC RX 637 (ALT 250 FOR IP): Performed by: FAMILY MEDICINE

## 2021-02-28 PROCEDURE — 81003 URINALYSIS AUTO W/O SCOPE: CPT

## 2021-02-28 PROCEDURE — 84439 ASSAY OF FREE THYROXINE: CPT

## 2021-02-28 PROCEDURE — 6360000002 HC RX W HCPCS: Performed by: FAMILY MEDICINE

## 2021-02-28 PROCEDURE — 96365 THER/PROPH/DIAG IV INF INIT: CPT

## 2021-02-28 PROCEDURE — 2060000000 HC ICU INTERMEDIATE R&B

## 2021-02-28 PROCEDURE — 84145 PROCALCITONIN (PCT): CPT

## 2021-02-28 PROCEDURE — 96372 THER/PROPH/DIAG INJ SC/IM: CPT

## 2021-02-28 PROCEDURE — 2580000003 HC RX 258: Performed by: INTERNAL MEDICINE

## 2021-02-28 PROCEDURE — 99220 PR INITIAL OBSERVATION CARE/DAY 70 MINUTES: CPT | Performed by: INTERNAL MEDICINE

## 2021-02-28 PROCEDURE — 86140 C-REACTIVE PROTEIN: CPT

## 2021-02-28 PROCEDURE — 2500000003 HC RX 250 WO HCPCS: Performed by: STUDENT IN AN ORGANIZED HEALTH CARE EDUCATION/TRAINING PROGRAM

## 2021-02-28 PROCEDURE — 96376 TX/PRO/DX INJ SAME DRUG ADON: CPT

## 2021-02-28 PROCEDURE — 83735 ASSAY OF MAGNESIUM: CPT

## 2021-02-28 PROCEDURE — 84133 ASSAY OF URINE POTASSIUM: CPT

## 2021-02-28 PROCEDURE — 84295 ASSAY OF SERUM SODIUM: CPT

## 2021-02-28 PROCEDURE — 84443 ASSAY THYROID STIM HORMONE: CPT

## 2021-02-28 PROCEDURE — 6360000002 HC RX W HCPCS: Performed by: EMERGENCY MEDICINE

## 2021-02-28 PROCEDURE — 6360000002 HC RX W HCPCS: Performed by: INTERNAL MEDICINE

## 2021-02-28 PROCEDURE — 32555 ASPIRATE PLEURA W/ IMAGING: CPT

## 2021-02-28 PROCEDURE — 71275 CT ANGIOGRAPHY CHEST: CPT

## 2021-02-28 PROCEDURE — 83935 ASSAY OF URINE OSMOLALITY: CPT

## 2021-02-28 PROCEDURE — 6360000004 HC RX CONTRAST MEDICATION: Performed by: EMERGENCY MEDICINE

## 2021-02-28 PROCEDURE — 82436 ASSAY OF URINE CHLORIDE: CPT

## 2021-02-28 PROCEDURE — 0W9B3ZZ DRAINAGE OF LEFT PLEURAL CAVITY, PERCUTANEOUS APPROACH: ICD-10-PCS | Performed by: RADIOLOGY

## 2021-02-28 PROCEDURE — 93005 ELECTROCARDIOGRAM TRACING: CPT | Performed by: INTERNAL MEDICINE

## 2021-02-28 PROCEDURE — 6370000000 HC RX 637 (ALT 250 FOR IP): Performed by: INTERNAL MEDICINE

## 2021-02-28 PROCEDURE — 96367 TX/PROPH/DG ADDL SEQ IV INF: CPT

## 2021-02-28 PROCEDURE — 2580000003 HC RX 258: Performed by: FAMILY MEDICINE

## 2021-02-28 PROCEDURE — 84300 ASSAY OF URINE SODIUM: CPT

## 2021-02-28 PROCEDURE — 94760 N-INVAS EAR/PLS OXIMETRY 1: CPT

## 2021-02-28 PROCEDURE — 36415 COLL VENOUS BLD VENIPUNCTURE: CPT

## 2021-02-28 PROCEDURE — 99204 OFFICE O/P NEW MOD 45 MIN: CPT | Performed by: INTERNAL MEDICINE

## 2021-02-28 PROCEDURE — 84484 ASSAY OF TROPONIN QUANT: CPT

## 2021-02-28 PROCEDURE — 96366 THER/PROPH/DIAG IV INF ADDON: CPT

## 2021-02-28 PROCEDURE — 93880 EXTRACRANIAL BILAT STUDY: CPT

## 2021-02-28 RX ORDER — SODIUM CHLORIDE 0.9 % (FLUSH) 0.9 %
10 SYRINGE (ML) INJECTION PRN
Status: DISCONTINUED | OUTPATIENT
Start: 2021-02-28 | End: 2021-03-03 | Stop reason: HOSPADM

## 2021-02-28 RX ORDER — PROMETHAZINE HYDROCHLORIDE 25 MG/1
12.5 TABLET ORAL EVERY 6 HOURS PRN
Status: DISCONTINUED | OUTPATIENT
Start: 2021-02-28 | End: 2021-03-03 | Stop reason: HOSPADM

## 2021-02-28 RX ORDER — LEVOTHYROXINE SODIUM 0.1 MG/1
100 TABLET ORAL DAILY
Status: DISCONTINUED | OUTPATIENT
Start: 2021-02-28 | End: 2021-02-28

## 2021-02-28 RX ORDER — TRAMADOL HYDROCHLORIDE 50 MG/1
25 TABLET ORAL EVERY 8 HOURS PRN
Status: DISCONTINUED | OUTPATIENT
Start: 2021-02-28 | End: 2021-02-28

## 2021-02-28 RX ORDER — HYDROCODONE BITARTRATE AND ACETAMINOPHEN 5; 325 MG/1; MG/1
1 TABLET ORAL EVERY 8 HOURS PRN
Status: DISCONTINUED | OUTPATIENT
Start: 2021-02-28 | End: 2021-03-03 | Stop reason: HOSPADM

## 2021-02-28 RX ORDER — LEVOFLOXACIN 5 MG/ML
500 INJECTION, SOLUTION INTRAVENOUS EVERY 24 HOURS
Status: DISCONTINUED | OUTPATIENT
Start: 2021-02-28 | End: 2021-02-28

## 2021-02-28 RX ORDER — ACETAMINOPHEN 650 MG/1
650 SUPPOSITORY RECTAL EVERY 6 HOURS PRN
Status: DISCONTINUED | OUTPATIENT
Start: 2021-02-28 | End: 2021-03-03 | Stop reason: HOSPADM

## 2021-02-28 RX ORDER — SODIUM CHLORIDE, SODIUM LACTATE, POTASSIUM CHLORIDE, CALCIUM CHLORIDE 600; 310; 30; 20 MG/100ML; MG/100ML; MG/100ML; MG/100ML
INJECTION, SOLUTION INTRAVENOUS CONTINUOUS
Status: DISCONTINUED | OUTPATIENT
Start: 2021-02-28 | End: 2021-02-28

## 2021-02-28 RX ORDER — POLYETHYLENE GLYCOL 3350 17 G/17G
17 POWDER, FOR SOLUTION ORAL DAILY PRN
Status: DISCONTINUED | OUTPATIENT
Start: 2021-02-28 | End: 2021-03-03 | Stop reason: HOSPADM

## 2021-02-28 RX ORDER — DULOXETIN HYDROCHLORIDE 60 MG/1
60 CAPSULE, DELAYED RELEASE ORAL DAILY
Status: DISCONTINUED | OUTPATIENT
Start: 2021-02-28 | End: 2021-03-03 | Stop reason: HOSPADM

## 2021-02-28 RX ORDER — ONDANSETRON 2 MG/ML
4 INJECTION INTRAMUSCULAR; INTRAVENOUS EVERY 6 HOURS PRN
Status: DISCONTINUED | OUTPATIENT
Start: 2021-02-28 | End: 2021-03-03 | Stop reason: HOSPADM

## 2021-02-28 RX ORDER — LEVOTHYROXINE SODIUM 0.1 MG/1
100 TABLET ORAL DAILY
Status: DISCONTINUED | OUTPATIENT
Start: 2021-03-01 | End: 2021-03-03 | Stop reason: HOSPADM

## 2021-02-28 RX ORDER — SODIUM CHLORIDE 9 MG/ML
INJECTION, SOLUTION INTRAVENOUS CONTINUOUS
Status: DISCONTINUED | OUTPATIENT
Start: 2021-02-28 | End: 2021-03-01

## 2021-02-28 RX ORDER — SODIUM CHLORIDE 0.9 % (FLUSH) 0.9 %
10 SYRINGE (ML) INJECTION EVERY 12 HOURS SCHEDULED
Status: DISCONTINUED | OUTPATIENT
Start: 2021-02-28 | End: 2021-03-03 | Stop reason: HOSPADM

## 2021-02-28 RX ORDER — LEVOTHYROXINE SODIUM 0.07 MG/1
75 TABLET ORAL DAILY
Status: DISCONTINUED | OUTPATIENT
Start: 2021-03-01 | End: 2021-02-28

## 2021-02-28 RX ORDER — ONDANSETRON 2 MG/ML
4 INJECTION INTRAMUSCULAR; INTRAVENOUS ONCE
Status: COMPLETED | OUTPATIENT
Start: 2021-02-28 | End: 2021-02-28

## 2021-02-28 RX ORDER — MAGNESIUM SULFATE IN WATER 40 MG/ML
2000 INJECTION, SOLUTION INTRAVENOUS ONCE
Status: COMPLETED | OUTPATIENT
Start: 2021-02-28 | End: 2021-02-28

## 2021-02-28 RX ORDER — METOPROLOL SUCCINATE 25 MG/1
25 TABLET, EXTENDED RELEASE ORAL DAILY
Status: DISCONTINUED | OUTPATIENT
Start: 2021-02-28 | End: 2021-03-02

## 2021-02-28 RX ORDER — MAGNESIUM SULFATE IN WATER 40 MG/ML
2000 INJECTION, SOLUTION INTRAVENOUS PRN
Status: DISCONTINUED | OUTPATIENT
Start: 2021-02-28 | End: 2021-03-03 | Stop reason: HOSPADM

## 2021-02-28 RX ORDER — ACETAMINOPHEN 325 MG/1
650 TABLET ORAL EVERY 6 HOURS PRN
Status: DISCONTINUED | OUTPATIENT
Start: 2021-02-28 | End: 2021-03-03 | Stop reason: HOSPADM

## 2021-02-28 RX ORDER — LIDOCAINE 4 G/G
1 PATCH TOPICAL DAILY
Status: DISCONTINUED | OUTPATIENT
Start: 2021-02-28 | End: 2021-03-03 | Stop reason: HOSPADM

## 2021-02-28 RX ORDER — PREGABALIN 75 MG/1
150 CAPSULE ORAL 2 TIMES DAILY
Status: DISCONTINUED | OUTPATIENT
Start: 2021-02-28 | End: 2021-03-03 | Stop reason: HOSPADM

## 2021-02-28 RX ADMIN — HYDROCODONE BITARTRATE AND ACETAMINOPHEN 1 TABLET: 5; 325 TABLET ORAL at 11:50

## 2021-02-28 RX ADMIN — MAGNESIUM SULFATE HEPTAHYDRATE 2000 MG: 40 INJECTION, SOLUTION INTRAVENOUS at 09:21

## 2021-02-28 RX ADMIN — SODIUM CHLORIDE, PRESERVATIVE FREE 10 ML: 5 INJECTION INTRAVENOUS at 20:20

## 2021-02-28 RX ADMIN — GLUCAGON HYDROCHLORIDE 1 MG: KIT at 00:41

## 2021-02-28 RX ADMIN — PREGABALIN 150 MG: 75 CAPSULE ORAL at 20:20

## 2021-02-28 RX ADMIN — DULOXETINE HYDROCHLORIDE 60 MG: 60 CAPSULE, DELAYED RELEASE ORAL at 09:34

## 2021-02-28 RX ADMIN — PREGABALIN 150 MG: 75 CAPSULE ORAL at 09:00

## 2021-02-28 RX ADMIN — METOPROLOL SUCCINATE 25 MG: 25 TABLET, FILM COATED, EXTENDED RELEASE ORAL at 11:12

## 2021-02-28 RX ADMIN — IOPAMIDOL 80 ML: 755 INJECTION, SOLUTION INTRAVENOUS at 01:20

## 2021-02-28 RX ADMIN — SODIUM CHLORIDE: 9 INJECTION, SOLUTION INTRAVENOUS at 09:20

## 2021-02-28 RX ADMIN — ENOXAPARIN SODIUM 40 MG: 40 INJECTION SUBCUTANEOUS at 09:00

## 2021-02-28 RX ADMIN — PIPERACILLIN AND TAZOBACTAM 3375 MG: 3; .375 INJECTION, POWDER, LYOPHILIZED, FOR SOLUTION INTRAVENOUS at 20:26

## 2021-02-28 RX ADMIN — HYDROCODONE BITARTRATE AND ACETAMINOPHEN 1 TABLET: 5; 325 TABLET ORAL at 20:20

## 2021-02-28 RX ADMIN — ONDANSETRON HYDROCHLORIDE 4 MG: 4 INJECTION, SOLUTION INTRAMUSCULAR; INTRAVENOUS at 01:05

## 2021-02-28 RX ADMIN — PIPERACILLIN AND TAZOBACTAM 3375 MG: 3; .375 INJECTION, POWDER, LYOPHILIZED, FOR SOLUTION INTRAVENOUS at 12:30

## 2021-02-28 ASSESSMENT — ENCOUNTER SYMPTOMS
ABDOMINAL PAIN: 1
TROUBLE SWALLOWING: 1
EYES NEGATIVE: 1
COLOR CHANGE: 0
CHOKING: 1
COUGH: 1
VOICE CHANGE: 0
SHORTNESS OF BREATH: 0
EYE PAIN: 0
BACK PAIN: 0
RECTAL PAIN: 0
SORE THROAT: 0
WHEEZING: 0
APNEA: 0
ANAL BLEEDING: 0
EYE DISCHARGE: 0
CHOKING: 0
FACIAL SWELLING: 0
NAUSEA: 0
CHEST TIGHTNESS: 0
EYE REDNESS: 0
SHORTNESS OF BREATH: 1
CHEST TIGHTNESS: 1
BLOOD IN STOOL: 0
ABDOMINAL PAIN: 0
VOMITING: 0
NAUSEA: 1
RHINORRHEA: 0
EYE ITCHING: 0
DIARRHEA: 0
COUGH: 0
CONSTIPATION: 0
PHOTOPHOBIA: 0
SINUS PRESSURE: 0
SINUS PAIN: 0
VOMITING: 1
STRIDOR: 0
ABDOMINAL DISTENTION: 0

## 2021-02-28 ASSESSMENT — PAIN SCALES - GENERAL: PAINLEVEL_OUTOF10: 0

## 2021-02-28 NOTE — PROGRESS NOTES
Patient will be seen by Dr. Jane Anand who is covering for [facility].  Called in referral.

## 2021-02-28 NOTE — PLAN OF CARE
Problem: Falls - Risk of:  Goal: Will remain free from falls  Description: Will remain free from falls  Outcome: Ongoing   Bed alarm and non skid socks currently on. Problem: Falls - Risk of:  Goal: Absence of physical injury  Description: Absence of physical injury  Outcome: Ongoing     Problem: Pain:  Goal: Pain level will decrease  Description: Pain level will decrease  Outcome: Ongoing  Patient has norco ordered for pain.    Problem: Pain:  Goal: Control of acute pain  Description: Control of acute pain  Outcome: Ongoing     Problem: Pain:  Goal: Control of chronic pain  Description: Control of chronic pain  Outcome: Ongoing

## 2021-02-28 NOTE — PROGRESS NOTES
Pt admitted to  6 IV site free of s/s of infection or infiltration. Vital signs obtained. Assessment and data collection initiated. Two nurse skin assessment performed by [facility] RN and [facility]. Oriented to room. Policies and procedures for  explained. Mel RODRIGUEZ discussed hourly rounding with patient addressing 5 P's. Fall prevention and safety brochure discussed with patient. Bed alarm on. Call light in reach.

## 2021-02-28 NOTE — ED NOTES
Pt called out stating that she is having difficulty catching her breath due to the pain. Pt's pulse ox is 98% at this time. Pt stating that her pain has \"wrapped around my entire ribcage. \[address] [facility]  02/27/21 1582

## 2021-02-28 NOTE — H&P
Patient: Venecia Levine Height    Unit/Bed: 6-01/001-A    YOB: 1940    MRN: [id number]    Acct: [de-identified]     Admitting Diagnosis: Pre-syncope [R55]    Admit Date:  2/27/2021    CC: Dysphagia x1 day    HPI :  66-year-old female patient presented to the ED with sensation of esophageal food impaction after dinner. Associated regurgitation of liquids and food . She complains of upper abdominal pain due to persistent nausea and vomiting. She has a 2 years history of progressive dysphagia to solids. No history of prior EGD studies. In the ED patient had dizziness with near syncope after nausea and vomiting. She was noted to be pale and diaphoretic by the nursing staff. Latest vital signs in the ED - temperature 98.6 °C, respirate of 25 breaths/min, heart rate 110 bpm, blood pressure 105/93, oxygen 93% on room air. Notable labs sodium 125 from 131 on 12/22/2020, potassium 4.2, creatinine 0.5, osmolality 249.7, troponin first set less than 0.010, WBC 6.5, hemoglobin 14.2, MCV 92.0, platelets 839. Chest x-ray which I reviewed shows left lower lung zone infiltrate. ED treatment included fentanyl 50 mcg IV x1 dose, Zofran 4 mg IV x2 doses, glucagon 1 mg. Review of Systems   Constitutional: Negative for activity change, appetite change, chills, diaphoresis, fatigue, fever and unexpected weight change. HENT: Positive for trouble swallowing. Negative for congestion, dental problem, drooling, ear pain, facial swelling, hearing loss, mouth sores, nosebleeds, postnasal drip, rhinorrhea, sinus pressure, sinus pain, sore throat, tinnitus and voice change. Eyes: Negative for photophobia, pain, discharge, redness, itching and visual disturbance. Respiratory: Positive for cough. Negative for apnea, choking, chest tightness, shortness of breath, wheezing and stridor. Cardiovascular: Negative for chest pain, palpitations and leg swelling.    Gastrointestinal: Positive for abdominal pain, nausea and vomiting. Negative for abdominal distention, anal bleeding, blood in stool, constipation, diarrhea and rectal pain. Endocrine: Negative for heat intolerance, polyphagia and polyuria. Genitourinary: Negative for decreased urine volume, difficulty urinating, dyspareunia, dysuria, enuresis, flank pain, genital sores, hematuria, menstrual problem, pelvic pain, urgency, vaginal bleeding, vaginal discharge and vaginal pain. Musculoskeletal: Negative for arthralgias, back pain, gait problem, myalgias, neck pain and neck stiffness. Skin: Negative for color change, pallor, rash and wound. Allergic/Immunologic: Negative for food allergies and immunocompromised state. Neurological: Positive for dizziness, weakness and light-headedness. Negative for tremors, seizures, syncope, facial asymmetry, speech difficulty, numbness and headaches. Hematological: Negative for adenopathy. Does not bruise/bleed easily. Psychiatric/Behavioral: Negative for agitation, confusion, dysphoric mood, hallucinations, sleep disturbance and suicidal ideas. The patient is not nervous/anxious and is not hyperactive.         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 Luci Holm GERD (gastroesophageal reflux disease)     Diverticulitis     Hiatal hernia     Hypertension     Hypothyroidism     Osteoarthritis        Past Surgical History:        Procedure Laterality Date    APPENDECTOMY  1958    BACK SURGERY      CARPAL TUNNEL RELEASE Bilateral 2013    w/cubital tunnel    COLON SURGERY  07/29/2016    Procedure: ATTEMPTED DAVINCI XI ROBOTIC ASSISTED SIGMOID COLON RESECTION, ROBOTIC ASSISTED MOBILIZATION OF RECTAL SIGMOID TO SPLENIC FLEXURE, CONVERTED TO OPEN LEFT HEMICOLECTOMY WITH COLOPROCTOCTOMY, SPLENORRHAPHY, RIGID SIGMOIDOSCOPY, LASER EVALUATION OF VASCULARITY WITH ICG; Surgeon: Dez Hodgson MD; Location: [facility]; Service: General    COLONOSCOPY  2012    CYST REMOVAL  2010   [address], [address]    Cataract    FEMUR FRACTURE SURGERY Left 12/21/2020    LEFT FEMUR OPEN REDUCTION INTERNAL FIXATION performed by Lauro Adair MD at [address] Left 2001   [place]    w/bladder suspension    JOINT REPLACEMENT  2002, 2008, 2009    rt hip(2002), lt knee(2009), lt hip(2008) Shoulder (2009)   [address]    ME COLON CA SCRN NOT  [address] IND Left 9/15/2017    COLONOSCOPY performed by Eugene Beach MD at [address] [facility]   [facility]    discectomy   [facility] ENDOSCOPY  2012       Medications Prior to Admission:    Prior to Admission medications    Medication Sig Start Date End Date Taking? Authorizing Provider   levothyroxine (SYNTHROID) 125 MCG tablet TAKE 1 TABLET DAILY 2/2/21  Yes FLORIDA Hill CNP   pregabalin (LYRICA) 150 MG capsule Take 1 capsule by mouth 2 times daily for 180 days. 1/28/21 7/27/21 Yes Ascencion Pierce DO   metoprolol succinate (TOPROL XL) 25 MG extended release tablet TAKE 1 TABLET DAILY 12/16/20  Yes FLORIDA Hill CNP   enalapril (VASOTEC) 10 MG tablet TAKE ONE-HALF (1/2) TABLET DAILY 11/18/20  Yes Pawan Garcia DO   DULoxetine (CYMBALTA) 60 MG extended release capsule TAKE 1 CAPSULE DAILY 1/16/20  Yes Pawan Garcia DO   guaiFENesin 1200 MG TB12 Take 1,200 mg by mouth 2 times daily 12/21/19  Yes eFlicitas Kay PA-C   lidocaine (LMX) 4 % cream Apply topically 4x daily as needed.  10/12/19  Yes Ascencion Pierce DO   CRANBERRY PO Take 1,500 mg by mouth daily   Yes Historical Provider, MD   Biotin 29890 MCG TABS Take 1-2 tablets by mouth daily   Yes Historical Provider, MD fluticasone (FLONASE) 50 MCG/ACT nasal spray 1 spray by Nasal route as needed for Rhinitis   Yes Historical Provider, MD   Omega 3-6-9 Fatty Acids (OMEGA 3-6-9 COMPLEX) CAPS Take 1 capsule by mouth daily    Yes Historical Provider, MD   therapeutic multivitamin-minerals (THERAGRAN-M) tablet Take 1 tablet by mouth nightly    Yes Historical Provider, MD   Misc. Devices [facility]) MISC 1 each by Does not apply route daily 2/11/20   Tasia Payne DO   Misc. Devices MISC Mechanical lift for a Bernette Evener 5/31/18   Tasia Payne DO   Scooter MISC by Does not apply route Power scooter 10/31/17   Tasia Payne DO       Allergies:    Ampicillin, Morphine, Pcn [penicillins], and Sulfa antibiotics    Social History:    TOBACCO:   reports that she quit smoking about 53 years ago. Her smoking use included cigarettes. She started smoking about 64 years ago. She has a 22.00 pack-year smoking history. She has never used smokeless tobacco.    ETOH:   reports no history of alcohol use. Family History:        Problem Relation Age of Onset    Arthritis Mother    [facility] Hearing Loss Mother     High Cholesterol Mother    [facility] Hearing Loss Father     Heart Disease Father 76        CABG    Stroke Father     Arthritis Sister     Depression Sister     Diabetes Sister     Arthritis Brother     Depression Brother     Cancer Maternal Aunt     Early Death Maternal Aunt     Diabetes Maternal Grandmother     Heart Disease Paternal Grandmother        Objective:   /66   Pulse 95   Temp 98.4 °F (36.9 °C) (Oral)   Resp 16   Ht 5' 6\" (1.676 m)   Wt 162 lb (73.5 kg)   SpO2 96%   BMI 26.15 kg/m²   No intake or output data in the 24 hours ending 02/28/21 9970    Physical Exam  Vitals signs and nursing note reviewed. Constitutional:       General: She is not in acute distress. Appearance: Normal appearance. She is normal weight. She is not ill-appearing, toxic-appearing or diaphoretic. HENT:      Head: Normocephalic and atraumatic. Right Ear: External ear normal. There is no impacted cerumen. Left Ear: External ear normal. There is no impacted cerumen. Nose: Nose normal. No congestion or rhinorrhea. Mouth/Throat:      Mouth: Mucous membranes are moist.      Pharynx: Oropharynx is clear. No oropharyngeal exudate or posterior oropharyngeal erythema. Eyes:      General: No scleral icterus. Right eye: No discharge. Left eye: No discharge. Extraocular Movements: Extraocular movements intact. Conjunctiva/sclera: Conjunctivae normal.      Pupils: Pupils are equal, round, and reactive to light. Neck:      Musculoskeletal: Normal range of motion and neck supple. No neck rigidity or muscular tenderness. Vascular: No carotid bruit. Cardiovascular:      Rate and Rhythm: Normal rate and regular rhythm. Pulses: Normal pulses. Heart sounds: Normal heart sounds. No murmur. No friction rub. No gallop. Pulmonary:      Effort: Pulmonary effort is normal. No respiratory distress. Breath sounds: Normal breath sounds. No stridor. No wheezing, rhonchi or rales. Chest:      Chest wall: No tenderness. Abdominal:      General: Abdomen is flat. Bowel sounds are normal. There is distension. Palpations: Abdomen is soft. There is no mass. Tenderness: There is abdominal tenderness. There is no right CVA tenderness, left CVA tenderness, guarding or rebound. Hernia: No hernia is present. Comments: Epigastric tenderness. Uncomplicated ventral hernia. Musculoskeletal: Normal range of motion. General: No swelling, tenderness, deformity or signs of injury. Right lower leg: No edema. Left lower leg: No edema. Lymphadenopathy:      Cervical: No cervical adenopathy. Skin:     General: Skin is warm and dry. Coloration: Skin is not jaundiced or pale. Findings: No bruising, erythema, lesion or rash. Neurological:      General: No focal deficit present. Mental Status: She is alert and oriented to person, place, and time. Mental status is at baseline. Cranial Nerves: No cranial nerve deficit. Sensory: No sensory deficit. Motor: No weakness. Coordination: Coordination normal.      Gait: Gait normal.      Deep Tendon Reflexes: Reflexes normal.   Psychiatric:         Mood and Affect: Mood normal.         Behavior: Behavior normal.         Thought Content: Thought content normal.         Judgment: Judgment normal.     Medications:     Continuous Infusions:    PRN Meds:    Data:    CBC:   Recent Labs     02/27/21 2200   WBC 6.5   RBC 4.65   HGB 14.2   HCT 42.8   MCV 92.0          BMP:   Recent Labs     02/27/21 2200 02/28/21  0521   *  --    K 4.3  --    CL 90*  --    CO2 26  --    PHOS  --  3.5   BUN 7  --    CREATININE 0.5  --        PT/INR:   Recent Labs     02/27/21 2200   INR 0.90       LIVER PROFILE:   Recent Labs     02/27/21 2200   AST 30   ALT 12   BILITOT 0.4   ALKPHOS 111      ABG. None. URINALYSIS. None. SEROLOGY  None. TUMOR MARKERS. None. MICROBIOLOGY   Results for Douglas Leal" (MRN [id number]) as of 2/28/2021 05:38   Ref. Range 10/6/2020 10:23 12/21/2020 08:04   SARS-CoV-2 Latest Ref Range: Not Detected  Not Detected    COVID-19 Unknown  Rpt   SARS-CoV-2, NAAT Latest Ref Range: NOT DETECTED   NOT DETECTED     HISTOPATHOLOGY. None. TOXICOLOGY. None. ENDOSCOPE STUDIES. None. PROCEDURES. Holter monitor report-07/20/2017. CONCLUSION:  Abnormal 48-hour Holter monitor for frequent PACs and runs of PACs as well as PVCs, occasional symptoms associated with both the PACs and PVCs.     EVELIN WOOD. RADIOLOGY. CTA chest -02/28/2021. FINDINGS:  Artifact mildly limits the study. No definite PE is identified. No dissection of the thoracic aorta is seen.     There is a large hiatal hernia with mild nonspecific dilatation of the esophagus.     There is evidence of prior granulomatous disease. There is elevation of the right hemidiaphragm. Small left pleural effusion. There is no focal consolidation. There are patchy areas of mild atelectasis or scarring bilaterally. Indeterminate parenchymal opacity in the left lower lobe could represent developing round atelectasis. The gallbladder is moderately distended.     IMPRESSION:  No definite PE is identified. Large hiatal hernia. Small left pleural effusion. Possible developing round atelectasis left lower lobe is technically indeterminate. Recommend follow-up.     This document has been electronically signed by: Bianka Truong MD on 02/28/2021   02:00 AM    Chest x-ray-02/27/2021. FINDINGS:  New patchy opacity in the left midlung with laboratory atelectasis or airspace disease. No pneumothorax. Stable nodular density in the right midlung. Right infrahilar atelectasis.     Normal size heart. No pulmonary vascular congestion. Calcification in the aortic knob. Intact left shoulder prosthesis.     IMPRESSION:  New patchy left midlung opacity which may reflect underlying infiltrate.     This document has been electronically signed by: Librado Alva MD on   02/27/2021 11:47 PM    Echocardiogram-07/10/2017. Ejection fraction is visually estimated at 60%. Overall left ventricular function is normal.    Echocardiogram-09/30/2016. Normal left ventricle size and systolic function. Ejection fraction was estimated at 55-%. There were no regional left ventricular wall motion abnormalities and wall thickness   was within normal limits. The mitral valve structure was normal with normal leaflet separation. DOPPLER:   The transmitral velocity was within the normal range with no evidence for mitral stenosis. .    Mild mitral regurgitation is present. The tricuspid valve structure was normal with normal leaflet separation. DOPPLER:   There was no evidence of tricuspid stenosis.     Mild tricuspid regurgitation visualized. MRI lumbar spine without contrast-11/27/2017. IMPRESSION:  Multilevel degenerative changes are present throughout the lumbar spine, as further   discussed by level in the findings. These are most significant at L2-L3 with a disc osteophyte complex and ligamentum  flavum thickening with facet arthropathy causing mild to moderate spinal canal narrowing   and narrowing of the left subarticular zone, impinging the traversing left L3 nerve root. Severe neural foraminal narrowing is also present on the left at this level. Brain MRI with and without contrast -03/02/2017. IMPRESSION:  1. No abnormalities are noted along the expected location of the fourth cranial nerve. 2. Moderate severity chronic small vessel ischemic changes.     ASSESSMENT AND  PLAN. 1.NEUROVASCULAR. Presyncope for evaluation-suspected vasovagal.       2.PULMONARY. Suspected LLL PNA w/ parapneumonic effusion-possible aspiration-IV Levaquin. Wava Prim Unilateral left pleural effusion-diagnostic thoracentesis. Procalcitonin and CRP check. 3.CARDIOVASCULAR. Hypertension-controlled. H/o Abdominal EKGs w/ PVCs and PACs -telemetry. 4.GI. Progressive dysphagia to solids. Symptomatic large hiatal hernia. GERD on PPIs. H/o diverticulosis. GI consult for EGD studies. 5.RENAL AND ELECTROLYTES. Symptomatic hypo-osmolar hyponatremia -Serum Na 125- IVF w/ NS. Electrolyte imbalance with hypomagnesemia-replete. Mag 1.5 . Evaluation for paraneoplastic syndrome. Nephrology consult. 6.ID. Urinalysis rule out a UTI. 7.ENDO. Hypothyroidism on levothyroxine. TSH 0.025.    8.MUSCULOSKELETAL. 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 H/o osteoarthritis. H/o fibromyalgia. 9.DISPO. Planned d/c to home vs rehab soon.       Electronically signed by Elizabeth Rodriguez MD on 2/28/2021 at 6:55 AM

## 2021-02-28 NOTE — ED TRIAGE NOTES
Pt arrived to ED with c/o food stuck in the esophagus and abdominal pain. Pt informed this RN that she did have bloody stool earlier in this week. Pt reporting pain in her \"waistline\" that is getting worse. Pt has had food stuck in her throat before, but usually can get it \"down\" with warm water. Pt states that she did try that, but was unsuccessful. Pt reporting that she does not receive blood d/t being a Mu-ism.

## 2021-02-28 NOTE — H&P
Formulation and discussion of sedation / procedure plans, risks, benefits, side effects and alternatives with patient and/or responsible adult completed.     Electronically signed by Eugene Castellanos MD on 2/28/2021 at 8:12 AM

## 2021-02-28 NOTE — PLAN OF CARE
Hospitalist Progress Note    Patient:  Judy Teran Height      Unit/Bed:6K-01/001-A    YOB: 1940    MRN: [id number]       Acct: [de-identified]     PCP: Deana Mcburney, DO    Date of Admission: 2/27/2021    Chief Complaint: Follow-up for dysphagia    Hospital Course:     Per H&P note:    \"esophageal food impaction after dinner. Associated regurgitation of liquids and food . She complains of upper abdominal pain due to persistent nausea and vomiting. She has a 2 years history of progressive dysphagia to solids. No history of prior EGD studies.       In the ED patient had dizziness with near syncope after nausea and vomiting. She was noted to be pale and diaphoretic by the nursing staff.     Latest vital signs in the ED - temperature 98.6 °C, respirate of 25 breaths/min, heart rate 110 bpm, blood pressure 105/93, oxygen 93% on room air.     Notable labs sodium 125 from 131 on 12/22/2020, potassium 4.2, creatinine 0.5, osmolality 249.7, troponin first set less than 0.010, WBC 6.5, hemoglobin 14.2, MCV 92.0, platelets 008.     Chest x-ray which I reviewed shows left lower lung zone infiltrate.     ED treatment included fentanyl 50 mcg IV x1 dose, Zofran 4 mg IV x2 doses, glucagon 1 mg.\"      Subjective:     Patient seen and examined. Patient reports that she usually has dysphagia with solid food for several years, then got worse yesterday while eating dinner, she said that it feels like food stuck on the mid chest, tried to drink water, however, water got back up and had some vomiting. She denies abdominal pain. She states that about Wednesday last week, had watery diarrhea with the fresh red blood per rectum, that happened again the following day Thursday. She denies black stools. She reports that her dad had history of rectal cancer. She reports that she had bowel movement this morning, formed stool, no rectal bleeding. She denies hematemesis.   Today, she denies abdominal pain, chest pain, shortness of breath, cough, dizziness, palpitations, focal weakness or numbness, new onset blurry vision. She reports 1 out of 10 dull headache on right frontal area. Also, patient reports that she had left leg fracture in December 2020, had surgery on that leg, and since then she is wheelchair-bound. Patient also reports that she has history of scoliosis for several years, and has chronic bilateral, right more than left rib cage pain, constant, 5 out of 10 in intensity at rest, 7 out of 10 in intensity on deep breathing. Medications:  Reviewed    Infusion Medications    sodium chloride 75 mL/hr at 02/28/21 0920     Scheduled Medications    sodium chloride flush  10 mL Intravenous 2 times per day    enoxaparin  40 mg Subcutaneous Daily    DULoxetine  60 mg Oral Daily    metoprolol succinate  25 mg Oral Daily    pregabalin  150 mg Oral BID    magnesium sulfate  2,000 mg Intravenous Once    levofloxacin  500 mg Intravenous Q24H    [START ON 3/1/2021] levothyroxine  75 mcg Oral Daily     PRN Meds: sodium chloride flush, promethazine **OR** ondansetron, polyethylene glycol, acetaminophen **OR** acetaminophen, magnesium sulfate, traMADol    No intake or output data in the 24 hours ending 02/28/21 1033    Diet:  DIET CLEAR LIQUID; No Added Salt (3-4 GM)    Exam:  BP (!) 113/54   Pulse 91   Temp 98 °F (36.7 °C) (Oral)   Resp 16   Ht 5' 6\" (1.676 m)   Wt 162 lb (73.5 kg)   SpO2 94%   BMI 26.15 kg/m²     General appearance: Alert, not in acute distress  HEENT: Pupils equal, round, and reactive to light. Conjunctivae clear. Slightly dry oral mucosa  Neck: Supple, with full range of motion. No jugular venous distention. Trachea midline. Respiratory:  Normal respiratory effort. Crackles on lung bases, right more than left. No wheezing, no rhonchi  Cardiovascular: Normal rate, regular rhythm with normal S1/S2 without murmurs, rubs or gallops.   Abdomen: distended ( globular) ( chronic per pt) with normal bowel sounds. Nontender. Musculoskeletal: passive and active ROM x 4 extremities. Skin: No rashes or lesions. Neurological exam: alert, oriented, normal speech, no focal findings or movement disorder noted, cranial nerves II through XII intact, motor and sensory grossly normal bilaterally. Exam of extremities: peripheral pulses normal, no pedal edema, (+) trace to grade 1 pitting edema from below knees to distal legs, no clubbing or cyanosis        Labs:   Recent Labs     02/27/21 2200   WBC 6.5   HGB 14.2   HCT 42.8        Recent Labs     02/27/21 2200 02/28/21  0521 02/28/21  0959   *  --  127*   K 4.3  --   --    CL 90*  --   --    CO2 26  --   --    BUN 7  --   --    CREATININE 0.5  --   --    CALCIUM 9.0  --   --    PHOS  --  3.5  --      Recent Labs     02/27/21 2200   AST 30   ALT 12   BILITOT 0.4   ALKPHOS 111     Recent Labs     02/27/21 2200   INR 0.90     No results for input(s): Marlene Baseman in the last 72 hours. Urinalysis:      Lab Results   Component Value Date    NITRU NEGATIVE 02/28/2021    WBCUA 0-2 09/14/2017    BACTERIA NONE 09/14/2017    RBCUA 0-2 09/14/2017    BLOODU NEGATIVE 02/28/2021    SPECGRAV 1.015 04/12/2019    GLUCOSEU NEGATIVE 02/28/2021       Radiology:  VL DUP CAROTID BILATERAL   Final Result      1. RIGHT ICA . Gerldine Prost Gerldine Prost Minimal soft plaque, no appreciable stenosis. ..    2. RIGHT ECA. . Minimal soft plaque, no appreciable stenosis. ..... Gerldine Prost RIGHT CCA. Gerldine Prost Unremarkable . Gerldine Prost 3. RIGHT VERTEBRAL. Gerldine Prost Antegrade flow . .. ... LEFT VERTEBRAL. .. Antegrade flow. ..      4. LEFT ICA. .... Minimal soft plaque, no appreciable stenosis. ..   5. LEFT ECA. .. Unremarkable. .... LEFT CCA. .. Unremarkable. **This report has been created using voice recognition software. It may contain minor errors which are inherent in voice recognition technology. **      Final report electronically signed by Dr. Nazia Calles on 2/28/2021 9:30 AM      US THORACENTESIS Which side should the procedure be performed? Left   Final Result   1. Attempted thoracentesis left side. This was unsuccessful due to combination of a very small effusion and the patient's inability to tolerate the needle puncture. 2. If desired, a repeat attempt at thoracentesis can be performed tomorrow with CT guidance. **This report has been created using voice recognition software. It may contain minor errors which are inherent in voice recognition technology. **      Final report electronically signed by Dr. Savanna Liang on 2/28/2021 9:33 AM      CTA CHEST W WO CONTRAST   Final Result   Impression:   No definite PE is identified. Large hiatal hernia. Small left pleural effusion. Possible developing round atelectasis left lower lobe is technically    indeterminate. Recommend follow-up. This document has been electronically signed by: Ej Jensen MD on    02/28/2021 02:00 AM      All CTs at this facility use dose modulation techniques and iterative    reconstructions, and/or weight-based dosing   when appropriate to reduce radiation to a low as reasonably achievable. XR CHEST PORTABLE   Final Result   New patchy left midlung opacity which may reflect underlying infiltrate. This document has been electronically signed by: Virginia Mobley MD on    02/27/2021 11:47 PM      XR CHEST PORTABLE    (Results Pending)       Diet: DIET CLEAR LIQUID; No Added Salt (3-4 GM)      Assessment/Plan:    Dysphagia, worsening    -Patient reports dysphagia to solid food several years ago, worsening today of admission.   -GI consult for further recommendation  -Clear liquid diet for now    Pre-syncope, suspect vasovagal    -Per H&P note, patient had dizziness with near syncope after nausea and vomiting in the ED, where she was noted pale and diaphoretic per nursing staff.  -Work-up were ordered during admission: echocardiogram, pending; bilateral carotid duplex ultrasound unremarkable  -EKG showed sinus rhythm with marked sinus arrhythmia, initial EKG had PVCs and PACs, resolved on the repeat EKG  -Telemetry  -Neurochecks    Acute on chronic hypotonic hyponatremia    -Patient has chronic hyponatremia, her baseline sodium ranges between 128-133. Patient is on Cymbalta, suspect this is the cause of her chronic hyponatremia. Suspect that her acute hyponatremia could be from dehydration from nausea and vomiting, and possibly from diarrhea this past week. Clinically, she looks dry.  -Serum osmolality is low chronically, ordered urine osmolality and urine sodium, unfortunately, patient did not have enough urine yet per RN  -Continue IV fluids 0.9% normal saline at 75 cc/h for now  -Cannot abruptly stop Cymbalta so will continue   -Monitor sodium every 2 hours, initially appropriately corrected since last night 2/27/2021 at 10 PM, most recent sodium at 4 PM today down to 124 from 128 at noon. -nephrology consult  -Check urine osmolality and urine sodium    Hematochezia    -Patient reports that she had 2 days of hematochezia on 2/24-25/2021  -She reports family history of rectal cancer, dad  -GI consult for further recommendation    Suspected CAP versus aspiration pneumonia    -pt denies cough, but had vomiting and regurgitation last night while eating dinner  -chest CTA showed \"no PE, possible developing round atelectasis left lower lobe\"   -Procalcitonin elevated at 1.31 today  -Switch Levaquin to Zosyn to cover for possible aspiration pneumonia  -Check COVID-19    Small left pleural effusion    -Thoracentesis ordered on admission, however per IR report, had very small pleural effusion, hence thoracentesis was not completed  -Patient is on room air, saturating well, will continue to monitor     Hypomagnesemia    -Replace per protocol  -Check magnesium level in a.m.     Large hiatal hernia    -Noted on CTA chest    Bilateral lower extremity edema, chronic, stable    -Echocardiogram pending  -Daily weights, I's and O's    History of left leg fracture, status post surgery    Wheelchair-bound    -PT/OT  -lives at home with daughter and son in-law     History of scoliosis/chronic bilateral rib pain    -on prn norco at home     Diarrhea, resolved    Stable right midlung nodule    -Patient is aware  -Patient is a former smoker  -Advised to follow-up with PCP on discharge for further evaluation    Hypothyroidism    -TSH is low, patient takes levothyroxine 125 mcg p.o. daily, reduce dose to 100 mcg p.o. daily  -Advised to follow-up with PCP on discharge and recommend to recheck TFT in 4-6 weeks    Essential hypertension    -Continue metoprolol with holding parameters    Chronic pain/fibromyalgia    -Continue Cymbalta and Lyrica      Anticipated Discharge in : pending           DVT prophylaxis: [] Lovenox                                 [] SCDs                                 [] SQ Heparin                                 [] Encourage ambulation           [] Already on Anticoagulation     Disposition:    [] Home       [] TCU       [] Rehab       [] Psych       [] SNF       [] [place]       [x] Other-Plan as above       Code Status: Full Code        Electronically signed by Zeinab Guidry MD on 2/28/2021 at 10:33 AM

## 2021-02-28 NOTE — ED NOTES
Pt stating that she is feeling better and will be ok for CT scan. Pt's color has returned and appears to be better.        [address]  02/28/21 9429

## 2021-02-28 NOTE — ED NOTES
Pt taken to bathroom, via wheelchair. Upon arrival to bathroom and sitting on toilet, pt reporting that she is feeling faint. Pt became diaphoretic and pale in color. Staff assisted patient to wheelchair and returned to room 23. Pt was given zofran per verbal order from Dr. Desmond Cadena.        [address] [facility]  02/28/21 5403

## 2021-02-28 NOTE — OP NOTE
Department of Radiology  Post Procedure Progress Note      Pre-Procedure Diagnosis:  Pleural effusion    Procedure Performed:  Attempted thoracentesis left side    Anesthesia: local     Findings: not successful. .small fluid pocket, attempted one puncture , pt could not tolerate, became clammy and nauseated, vagal reaction. .cancelled procedure. Can try tomorrow with CT guidance      Immediate Complications:  None    Estimated Blood Loss: minimal    SEE DICTATED PROCEDURE NOTE FOR COMPLETE DETAILS.     Seven Cordoba MD   2/28/2021 8:12 AM

## 2021-02-28 NOTE — CONSULTS
[facility]          [facility]        [address]        [facility], [address]        [phone]           Inpatient Initial consult note         2/28/2021 6:27 PM    Patient Name:   Michele Holstein Height  YOB: 1940  Primary Care Physician:  Jennifer Aviles DO     History Obtained From:  patient     Consultation requested by : Cassy Clark MD    requested for  : Evaluation of hyponatremia     History of presentingillness   Michele Holstein Height is a 78 y.o.   female with Past Medical History as stated below presented with a chief complaint of Other (Food stuck in throat/unable to keep liquids down) and Abdominal Pain   on 2/27/2021 . Patient presented with chief complaints of dysphagia, which started 1 day prior to admission. ,  Symptoms moderate severity, she had a sensation of associated esophageal food impaction and regurgitation of liquids and food. She was also having some mild upper abdominal pain and some nausea. No significant vomiting. No specific modifying factors. She had a prior history of progressive dysphagia to solids. In the ED patient has dizziness with near syncope after having an episode of nausea and vomiting. She was diaphoretic as well.     Chest x-ray showed bilateral infiltrates and her sodium was 125 during the course of the day the sodium has improved to 128 and subsequently has dropped to 124 and so nephrology has been consulted for further evaluation and manage     Past History      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 Gonzales Fritz GERD (gastroesophageal reflux disease)     Diverticulitis     Hiatal hernia     Hypertension     Hypothyroidism     Osteoarthritis      Past Surgical History: Procedure Laterality Date    APPENDECTOMY      BACK SURGERY      CARPAL TUNNEL RELEASE Bilateral 2013    w/cubital tunnel    COLON SURGERY  2016    Procedure: ATTEMPTED DAVINCI XI ROBOTIC ASSISTED SIGMOID COLON RESECTION, ROBOTIC ASSISTED MOBILIZATION OF RECTAL SIGMOID TO SPLENIC FLEXURE, CONVERTED TO OPEN LEFT HEMICOLECTOMY WITH COLOPROCTOCTOMY, SPLENORRHAPHY, RIGID SIGMOIDOSCOPY, LASER EVALUATION OF VASCULARITY WITH ICG; Surgeon: Isidro Green MD; Location: [place] [address]; Service: General    COLONOSCOPY      CYST REMOVAL     [address],     Cataract    FEMUR FRACTURE SURGERY Left 2020    LEFT FEMUR OPEN REDUCTION INTERNAL FIXATION performed by Sandra Sher MD at [address] [place] [address] [place] Left    [place]    w/bladder suspension    JOINT REPLACEMENT  , ,     rt hip(), lt knee(), lt hip() Shoulder ()   [address] [facility]    CO COLON CA SCRN NOT  [address] IND Left 9/15/2017    COLONOSCOPY performed by Jerome French MD at [facility]   [address]    discectomy   [address]    UPPER GASTROINTESTINAL ENDOSCOPY  2012     Social History     Socioeconomic History    Marital status:      Spouse name: Not on file    Number of children: Not on file    Years of education: Not on file    Highest education level: Not on file   Occupational History    Not on file   Social Needs    Financial resource strain: Not on file    Food insecurity     Worry: Not on file     Inability: Not on file    Transportation needs     Medical: Not on file     Non-medical: Not on file   Tobacco Use    Smoking status: Former Smoker     Packs/day: 2.00     Years: 11.00     Pack years: 22.00     Types: Cigarettes     Start date: 1957     Quit date: 1967     Years since quittin.3    Smokeless tobacco: Never Used   Substance and Sexual Activity    Alcohol use: No    Drug use: No    Sexual activity: Not on file   Lifestyle    Physical activity     Days per week: Not on file     Minutes per session: Not on file    Stress: Not on file   Relationships    Social connections     Talks on phone: Not on file     Gets together: Not on file     Attends Oriental orthodox service: Not on file     Active member of club or organization: Not on file     Attends meetings of clubs or organizations: Not on file     Relationship status: Not on file    Intimate partner violence     Fear of current or ex partner: Not on file     Emotionally abused: Not on file     Physically abused: Not on file     Forced sexual activity: Not on file   Other Topics Concern    Not on file   Social History Narrative    Not on file     Family History   Problem Relation Age of Onset    Arthritis Mother     Hearing Loss Mother     High Cholesterol Mother     Hearing Loss Father     Heart Disease Father 76        CABG    Stroke Father     Arthritis Sister     Depression Sister     Diabetes Sister     Arthritis Brother     Depression Brother     Cancer Maternal Aunt     Early Death Maternal Aunt     Diabetes Maternal Grandmother     Heart Disease Paternal Grandmother      Medications & Allergies      Prior to Admission medications    Medication Sig Start Date End Date Taking? Authorizing Provider   levothyroxine (SYNTHROID) 125 MCG tablet TAKE 1 TABLET DAILY 2/2/21  Yes FLORIDA Orozco CNP   pregabalin (LYRICA) 150 MG capsule Take 1 capsule by mouth 2 times daily for 180 days.  1/28/21 7/27/21 Yes Ascencion Pierce,    metoprolol succinate (TOPROL XL) 25 MG extended release tablet TAKE 1 TABLET DAILY 12/16/20  Yes FLORIDA Orozco CNP   enalapril (VASOTEC) 10 MG tablet TAKE ONE-HALF (1/2) TABLET DAILY 11/18/20  Yes Grover Mccarthy DO   DULoxetine (CYMBALTA) 60 MG extended release capsule TAKE 1 CAPSULE DAILY 1/16/20  Yes Grover Mccarthy DO   guaiFENesin 1200 MG TB12 Take 1,200 mg by mouth 2 times daily 12/21/19  Yes Jose Elias Graves PA-C   lidocaine (LMX) 4 % cream Apply topically 4x daily as needed. 10/12/19  Yes Ascencion Pierce, DO   CRANBERRY PO Take 1,500 mg by mouth daily   Yes Historical Provider, MD   Biotin 38220 MCG TABS Take 1-2 tablets by mouth daily   Yes Historical Provider, MD   fluticasone (FLONASE) 50 MCG/ACT nasal spray 1 spray by Nasal route as needed for Rhinitis   Yes Historical Provider, MD   Omega 3-6-9 Fatty Acids (OMEGA 3-6-9 COMPLEX) CAPS Take 1 capsule by mouth daily    Yes Historical Provider, MD   therapeutic multivitamin-minerals (THERAGRAN-M) tablet Take 1 tablet by mouth nightly    Yes Historical Provider, MD [employer]. [employer] [facility]) MISC 1 each by Does not apply route daily 2/11/20   Michael Marx, DO   [employer]. Devices MISC Mechanical lift for a Buck Mais 5/31/18   Michael Marx DO   Scooter MISC by Does not apply route Power scooter 10/31/17   Michael Marx,      Allergies: Ampicillin, Morphine, Pcn [penicillins], and Sulfa antibiotics  IP meds : Scheduled Meds:   sodium chloride flush  10 mL Intravenous 2 times per day    enoxaparin  40 mg Subcutaneous Daily    DULoxetine  60 mg Oral Daily    metoprolol succinate  25 mg Oral Daily    pregabalin  150 mg Oral BID    piperacillin-tazobactam  3,375 mg Intravenous Q8H    lidocaine  1 patch Transdermal Daily    [START ON 3/1/2021] levothyroxine  100 mcg Oral Daily     Continuous Infusions:   sodium chloride 75 mL/hr at 02/28/21 0920     Review of Systems Physical Exam   Review of Systems   Constitutional: Negative for activity change, chills, fatigue and fever. HENT: Negative for congestion, ear pain and sore throat. Eyes: Negative. Negative for pain. Respiratory: Positive for choking and chest tightness. Negative for cough and shortness of breath. Cardiovascular: Positive for leg swelling. Negative for chest pain. Gastrointestinal: Positive for abdominal pain, nausea and vomiting. Negative for diarrhea. Dysphagia   Genitourinary: Negative for dysuria, frequency and hematuria. Musculoskeletal: Negative for back pain and neck pain. Skin: Negative for rash and wound. Neurological: Negative for dizziness, light-headedness and headaches. Psychiatric/Behavioral: Negative for agitation and confusion. Physical Exam  Vitals signs reviewed. Constitutional:       General: She is not in acute distress. Appearance: Normal appearance. She is not diaphoretic. HENT:      Head: Normocephalic and atraumatic. Right Ear: External ear normal.      Left Ear: External ear normal.      Nose: Nose normal.      Mouth/Throat:      Mouth: Mucous membranes are dry. Eyes:      General: No scleral icterus. Right eye: No discharge. Left eye: No discharge. Conjunctiva/sclera: Conjunctivae normal.   Neck:      Musculoskeletal: Normal range of motion and neck supple. Thyroid: No thyromegaly. Vascular: No JVD. Cardiovascular:      Rate and Rhythm: Normal rate and regular rhythm. Heart sounds: Normal heart sounds. No murmur. Pulmonary:      Effort: Pulmonary effort is normal. No respiratory distress. Breath sounds: Normal breath sounds. No stridor. No wheezing or rales. Chest:      Chest wall: No tenderness. Abdominal:      General: Bowel sounds are normal. There is no distension. Palpations: Abdomen is soft. Tenderness: There is no abdominal tenderness. Musculoskeletal:         General: Swelling present. No tenderness. Right lower leg: Edema present. Left lower leg: Edema present. Skin:     General: Skin is warm and dry. Findings: No erythema or rash. Neurological:      General: No focal deficit present. Mental Status: She is alert and oriented to person, place, and time.    Psychiatric:         Mood and Affect: Mood normal.         Behavior: Behavior normal.           Vitals:    02/28/21 1557   BP: 130/62 Pulse: 84   Resp: 18   Temp: 97.9 °F (36.6 °C)   SpO2: 92%     Labs, Radiology and Tests       Recent Labs     02/27/21  2200   WBC 6.5   RBC 4.65   HGB 14.2   HCT 42.8   MCV 92.0   MCH 30.5   MCHC 33.2        Recent Labs     02/27/21  2200 02/28/21  0959 02/28/21  1154 02/28/21  1606   * 127* 128* 124*   K 4.3  --   --   --    CL 90*  --   --   --    CO2 26  --   --   --    BUN 7  --   --   --    CREATININE 0.5  --   --   --    CALCIUM 9.0  --   --   --    PROT 6.9  --   --   --    LABALBU 3.5  --   --   --    BILITOT 0.4  --   --   --    ALKPHOS 111  --   --   --    AST 30  --   --   --    ALT 12  --   --   --        Radiology : CTA chest shows no PE large hiatus hernia small left pleural effusion    Other : All lab data have been reviewed and noted patient did have some hyponatremia on and off around 130s since almost 2017    Assessment    1 Renal -renal function appears to be stable at baseline  2 Hyponatremia-exact etiology not clear, urine lites are not helpful at this time as she has received IV contrast earlier today  ? However patient does look clinically dry we will continue IV hydration, monitor renal function closely  ? She was on Cymbalta and recently started on Lyrica which can also precipitate some hyponatremia/SIADH  ? If not much improvement with IV fluids might consider SIADH approach  ? Meanwhile 1500 mL fluid restriction as well  ? Patient does have some hyponatremia however this is not significant enough to cause hyponatremia    3 Essential hypertension running reasonable  4 History of hypothyroidism  5 Meds reviewed and discussed with patient and family at bedside      **This report has been created using voice recognition software. It maycontain minor  errors which are inherent in voice recognition technology. **    Gabino Zimmerman M.D  [facility].

## 2021-02-28 NOTE — ED PROVIDER NOTES
[place] ENCOUNTER          Pt Name: Minh Gunter  MRN: [id number]  Carolagfchristina 1940  Date of evaluation: 2/27/2021  Treating Resident Physician: Santos De La Rosa DO  Supervising Physician: Mahad Nelson DO    CHIEF COMPLAINT       Chief Complaint   Patient presents with    Other     Food stuck in throat/unable to keep liquids down    Abdominal Pain     History obtained from the patient. HISTORY OF PRESENT ILLNESS    HPI  Minh Gunter is a 78 y.o. female who presents to the emergency department for evaluation of chest pain. The patient states that she was eating at approximately 7:30 PM the night prior to arrival and reported that food got stuck in her throat. She reports that she was eating shrimp scampi tonight and had had similar episodes of having a food bolus while eating rice and spaghetti in the past.  The patient reports a food bolus sensation and chest pain to the front of her rib cage, radiating to her back and spine. She characterizes her pain stating \"like I cannot breathe, sharp, achy \"and reports that her chest pain is constant. She does not know if she has passed her food bolus or not. The patient reports some associated shortness of breath, nausea and with a small amount of vomiting containing some spaghetti. She additionally states that she is experienced some BRBPR Tuesday night and Wednesday morning and has not had any recurrence of hematochezia or melena. She was diagnosed with Covid earlier this year after being admitted to a nursing home for a femur fracture. 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 The patient has no other acute complaints at this time. REVIEW OF SYSTEMS   Review of Systems   Constitutional: Negative for fever. HENT: Negative for rhinorrhea, sinus pressure and sinus pain. Eyes: Negative for discharge, redness and itching. Respiratory: Positive for shortness of breath. Negative for chest tightness. Cardiovascular: Positive for chest pain. Gastrointestinal: Negative for abdominal distention, abdominal pain, diarrhea, nausea and vomiting. Genitourinary: Negative for difficulty urinating, dysuria, frequency, hematuria and urgency. Musculoskeletal: Negative for arthralgias. Skin: Negative for color change and pallor. Neurological: Negative for dizziness, light-headedness and headaches.          PAST MEDICAL AND SURGICAL HISTORY     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 Codi Gonzalez GERD (gastroesophageal reflux disease)     Diverticulitis     Hiatal hernia     Hypertension     Hypothyroidism     Osteoarthritis      Past Surgical History:   Procedure Laterality Date    APPENDECTOMY  1958    BACK SURGERY      CARPAL TUNNEL RELEASE Bilateral 2013    w/cubital tunnel    COLON SURGERY  07/29/2016    Procedure: ATTEMPTED DAVINCI XI ROBOTIC ASSISTED SIGMOID COLON RESECTION, ROBOTIC ASSISTED MOBILIZATION OF RECTAL SIGMOID TO SPLENIC FLEXURE, CONVERTED TO OPEN LEFT HEMICOLECTOMY WITH COLOPROCTOCTOMY, SPLENORRHAPHY, RIGID SIGMOIDOSCOPY, LASER EVALUATION OF VASCULARITY WITH ICG; Surgeon: Codi Gonzalez MD; Location: [facility]; Service: General    COLONOSCOPY  2012    CYST REMOVAL  2010   [address], [address]    Cataract    FEMUR FRACTURE SURGERY Left 12/21/2020    LEFT FEMUR OPEN REDUCTION INTERNAL FIXATION performed by Queen Osler, MD at [facility] Left 2001   [facility]    w/bladder suspension    JOINT REPLACEMENT  2002, 2008, 2009    rt hip(2002), lt knee(2009), lt hip(2008) Shoulder (2009)    OVARY REMOVAL  1999    VT COLON CA SCRN NOT HI [address] IND Left 9/15/2017    COLONOSCOPY performed by Martha Foster MD at [facility]    discectomy   [address] [place] [address]    UPPER GASTROINTESTINAL ENDOSCOPY  2012         MEDICATIONS     Current Facility-Administered Medications:     sodium chloride flush 0.9 % injection 10 mL, 10 mL, Intravenous, 2 times per day, Eb Mora MD    sodium chloride flush 0.9 % injection 10 mL, 10 mL, Intravenous, PRN, Eb Mora MD    enoxaparin (LOVENOX) injection 40 mg, 40 mg, Subcutaneous, Daily, Eb Mora MD    promethazine (PHENERGAN) tablet 12.5 mg, 12.5 mg, Oral, Q6H PRN **OR** ondansetron (ZOFRAN) injection 4 mg, 4 mg, Intravenous, Q6H PRN, Eb Mora MD    polyethylene glycol (GLYCOLAX) packet 17 g, 17 g, Oral, Daily PRN, Eb Mora MD    acetaminophen (TYLENOL) tablet 650 mg, 650 mg, Oral, Q6H PRN **OR** acetaminophen (TYLENOL) suppository 650 mg, 650 mg, Rectal, Q6H PRN, Eb Mora MD    0.9 % sodium chloride infusion, , Intravenous, Continuous, Eb Mora MD    DULoxetine (CYMBALTA) extended release capsule 60 mg, 60 mg, Oral, Daily, Eb Mora MD    levothyroxine (SYNTHROID) tablet 100 mcg, 100 mcg, Oral, Daily, Eb Mora MD    metoprolol succinate (TOPROL XL) extended release tablet 25 mg, 25 mg, Oral, Daily, Eb Mora MD    pregabalin (LYRICA) capsule 150 mg, 150 mg, Oral, BID, Eb Mora MD    magnesium sulfate 2000 mg in 50 mL IVPB premix, 2,000 mg, Intravenous, Once, Eb Mora MD    levoFLOXacin (LEVAQUIN) 500 MG/100ML infusion 500 mg, 500 mg, Intravenous, Q24H, Eb Mora MD      SOCIAL HISTORY     Social History     Social History Narrative    Not on file     Social History     Tobacco Use    Smoking status: Former Smoker     Packs/day: 2.00     Years: 11.00     Pack years: 22.00     Types: Cigarettes     Start date: 1/9/1957     Quit date: 1967     Years since quittin.1    Smokeless tobacco: Never Used   Substance Use Topics    Alcohol use: No    Drug use: No         ALLERGIES     Allergies   Allergen Reactions    Ampicillin     Morphine Other (See Comments)     Hallucinations     Pcn [Penicillins]     Sulfa Antibiotics          FAMILY HISTORY     Family History   Problem Relation Age of Onset    Arthritis Mother     Hearing Loss Mother     High Cholesterol Mother     Hearing Loss Father     Heart Disease Father 76        CABG    Stroke Father     Arthritis Sister     Depression Sister     Diabetes Sister     Arthritis Brother     Depression Brother     Cancer Maternal Aunt     Early Death Maternal Aunt     Diabetes Maternal Grandmother     Heart Disease Paternal Grandmother          PREVIOUS RECORDS   Previous records reviewed:       PHYSICAL EXAM     ED Triage Vitals [21 2147]   BP Temp Temp Source Pulse Resp SpO2 Height Weight   (!) 172/88 98.6 °F (37 °C) Oral 82 25 92 % 5' 6\" (1.676 m) 162 lb (73.5 kg)     Initial vital signs and nursing assessment reviewed and abnormal from HTN. Body mass index is 26.15 kg/m². Pulsoximetry is normal per my interpretation. Additional Vital Signs:  Vitals:    21 0500   BP: 124/66   Pulse: 95   Resp:    Temp:    SpO2:        Physical Exam  Vitals signs and nursing note reviewed. Constitutional:       Appearance: Normal appearance. She is ill-appearing. Comments: Patient appearing uncomfortable and prefers to lie on the left side of her chest.  The patient was given a small amount of effervescent soda to swallow and she was unable to and reported this worsened her chest pain. HENT:      Head: Normocephalic and atraumatic. Nose: Nose normal. No congestion or rhinorrhea. Mouth/Throat:      Mouth: Mucous membranes are moist.      Pharynx: Oropharynx is clear. No oropharyngeal exudate or posterior oropharyngeal erythema.    Eyes:      Extraocular Movements: Extraocular movements intact. Pupils: Pupils are equal, round, and reactive to light. Neck:      Musculoskeletal: Normal range of motion and neck supple. Cardiovascular:      Rate and Rhythm: Normal rate and regular rhythm. Pulses: Normal pulses. Heart sounds: Normal heart sounds. Pulmonary:      Effort: Pulmonary effort is normal.      Breath sounds: Normal breath sounds. Abdominal:      General: Abdomen is flat. There is no distension. Palpations: Abdomen is soft. Tenderness: There is no abdominal tenderness. Musculoskeletal: Normal range of motion. General: No swelling or tenderness. Right lower leg: No edema. Left lower leg: No edema. Skin:     General: Skin is warm and dry. Neurological:      General: No focal deficit present. Mental Status: She is alert and oriented to person, place, and time. Mental status is at baseline. EKG Interpretation    Interpreted by emergency department physician    Rhythm: sinus rhythm  Rate: 87  Axis: normal  Ectopy: premature ventricular contractions (unifocal) and premature atrial contraction  Conduction: normal  ST Segments: normal  T Waves: normal  Q Waves: none    Clinical Impression: Sinus rhythm at a rate 87 with PACs and PVCs    Holly Marks      MEDICAL DECISION MAKING   Initial Assessment:   1. Chest pain secondary to food bolus  2. R/o ACS, PE  Plan:    Labs, EKG, troponin   Zofran, glucagon for esophageal spasm, fentanyl.    Chest x-ray, CTA chest with without contrast.        ED RESULTS   Laboratory results:  Labs Reviewed   CBC WITH AUTO DIFFERENTIAL - Abnormal; Notable for the following components:       Result Value    Lymphocytes Absolute 0.7 (*)     All other components within normal limits   COMPREHENSIVE METABOLIC PANEL W/ REFLEX TO MG FOR LOW K - Abnormal; Notable for the following components:    Sodium 125 (*)     Chloride 90 (*)     All other components within normal limits OSMOLALITY - Abnormal; Notable for the following components:    Osmolality Calc 249.7 (*)     All other components within normal limits   MAGNESIUM - Abnormal; Notable for the following components:    Magnesium 1.5 (*)     All other components within normal limits   TSH WITH REFLEX - Abnormal; Notable for the following components:    TSH 0.025 (*)     All other components within normal limits   C-REACTIVE PROTEIN - Abnormal; Notable for the following components:    CRP 4.00 (*)     All other components within normal limits   PROCALCITONIN - Abnormal; Notable for the following components:    Procalcitonin 0.86 (*)     All other components within normal limits   STREP PNEUMONIAE ANTIGEN   LEGIONELLA ANTIGEN, URINE   LIPASE   APTT   PROTIME-INR   ANION GAP   GLOMERULAR FILTRATION RATE, ESTIMATED   TROPONIN   TROPONIN   PHOSPHORUS   LACTIC ACID, PLASMA   TROPONIN   OSMOLALITY, URINE   SODIUM, URINE, RANDOM   URINE RT REFLEX TO CULTURE   T4, FREE   PROCALCITONIN       Radiologic studies results:  CTA CHEST W WO CONTRAST   Final Result   Impression:   No definite PE is identified. Large hiatal hernia. Small left pleural effusion. Possible developing round atelectasis left lower lobe is technically    indeterminate. Recommend follow-up. This document has been electronically signed by: Walter Nova MD on    02/28/2021 02:00 AM      All CTs at this facility use dose modulation techniques and iterative    reconstructions, and/or weight-based dosing   when appropriate to reduce radiation to a low as reasonably achievable. XR CHEST PORTABLE   Final Result   New patchy left midlung opacity which may reflect underlying infiltrate. This document has been electronically signed by: Aravind Miguel MD on    02/27/2021 11:47 PM      VL DUP CAROTID BILATERAL    (Results Pending)   US THORACENTESIS Which side should the procedure be performed?  Left    (Results Pending)       ED Medications administered this visit: Medications   sodium chloride flush 0.9 % injection 10 mL (has no administration in time range)   sodium chloride flush 0.9 % injection 10 mL (has no administration in time range)   enoxaparin (LOVENOX) injection 40 mg (has no administration in time range)   promethazine (PHENERGAN) tablet 12.5 mg (has no administration in time range)     Or   ondansetron (ZOFRAN) injection 4 mg (has no administration in time range)   polyethylene glycol (GLYCOLAX) packet 17 g (has no administration in time range)   acetaminophen (TYLENOL) tablet 650 mg (has no administration in time range)     Or   acetaminophen (TYLENOL) suppository 650 mg (has no administration in time range)   0.9 % sodium chloride infusion (has no administration in time range)   DULoxetine (CYMBALTA) extended release capsule 60 mg (has no administration in time range)   levothyroxine (SYNTHROID) tablet 100 mcg (has no administration in time range)   metoprolol succinate (TOPROL XL) extended release tablet 25 mg (has no administration in time range)   pregabalin (LYRICA) capsule 150 mg (has no administration in time range)   magnesium sulfate 2000 mg in 50 mL IVPB premix (has no administration in time range)   levoFLOXacin (LEVAQUIN) 500 MG/100ML infusion 500 mg (has no administration in time range)   ondansetron (ZOFRAN) injection 4 mg (4 mg Intravenous Given 2/27/21 2342)   fentaNYL (SUBLIMAZE) injection 50 mcg (50 mcg Intravenous Given 2/27/21 2342)   glucagon (rDNA) injection 1 mg (1 mg Intravenous Given 2/28/21 0041)   iopamidol (ISOVUE-370) 76 % injection 80 mL (80 mLs Intravenous Given 2/28/21 0120)   ondansetron (ZOFRAN) injection 4 mg (4 mg Intravenous Given 2/28/21 0105)         ED COURSE           MEDICATION CHANGES     Current Discharge Medication List            FINAL DISPOSITION   Work-up in the emergency department was remarkable for an EKG and troponin which was not suggestive of ACS, and a serum sodium of 125 likely secondary to low solute diet, and a chest x-ray showing new patchy left midlung opacity which may reflect underlying infiltrate. The patient was given fentanyl, Zofran, and glucagon for pain and suspected esophageal spasm and during the emergency department course the patient reported that she felt that her food bolus had cleared. However the patient reported that she still had some persistent chest pain after clearing her food bolus. The patient was additionally noted to have a presyncopal event while using the bathroom where she was observed to become diaphoretic and pale which improved after Zofran. Given the patient's presyncope a CTA of the chest was pursued to further evaluate the patient's left chest infiltrate and for PE which was negative. We will plan to admit the patient to the hospital for hyponatremia, presyncope, and for further evaluation of her chest pain. My supervising physician discussed this case with the admitting hospitalist, Dr. Gayatri Anand, who agreed to accept the patient for admission to the hospital.  I discussed the test results and the treatment plan with the patient and her  and all questions were answered. The patient was subsequently admitted to the hospital.     Final diagnoses:   Pre-syncope   Hyponatremia   Other chest pain     Condition: condition: stable  Dispo: Admit to med/surg floor      This transcription was electronically signed. Parts of this transcriptions may have been dictated by use of voice recognition software and electronically transcribed, and parts may have been transcribed with the assistance of an ED scribe. The transcription may contain errors not detected in proofreading. Please refer to my supervising physician's documentation if my documentation differs.     Electronically Signed: Jc Michel, 02/28/21, 7:45 AM          Jc Michel DO  Resident  02/28/21 [id number]

## 2021-02-28 NOTE — ED NOTES
Received report from [facility]. Upon first encounter pt resting in bed radiology into transport for further testing. Will monitor for further orders.      Gisselle James RN  02/28/21 0784

## 2021-02-28 NOTE — ED NOTES
Pt paged out requesting an update on the POC and requesting to use the bathroom. Pt assisted to the bedside commode will monitor for assistance back into bed. Vs obtained and stable. Pt currently denies pain and reports that she no longer feels food stuck in her throat. Call light in reach with significant other at the bedside.      Taylor Bolanos RN  02/28/21 [id number]

## 2021-03-01 ENCOUNTER — APPOINTMENT (OUTPATIENT)
Dept: GENERAL RADIOLOGY | Age: 81
DRG: 393 | End: 2021-03-01
Payer: MEDICARE

## 2021-03-01 PROBLEM — R13.10 DYSPHAGIA: Status: ACTIVE | Noted: 2021-03-01

## 2021-03-01 LAB
ABSOLUTE RETIC #: 66 THOU/MM3 (ref 20–115)
ANION GAP SERPL CALCULATED.3IONS-SCNC: 8 MEQ/L (ref 8–16)
BUN BLDV-MCNC: 7 MG/DL (ref 7–22)
CALCIUM SERPL-MCNC: 8.6 MG/DL (ref 8.5–10.5)
CHLORIDE BLD-SCNC: 97 MEQ/L (ref 98–111)
CO2: 28 MEQ/L (ref 23–33)
CREAT SERPL-MCNC: 0.5 MG/DL (ref 0.4–1.2)
ERYTHROCYTE [DISTWIDTH] IN BLOOD BY AUTOMATED COUNT: 13.4 % (ref 11.5–14.5)
ERYTHROCYTE [DISTWIDTH] IN BLOOD BY AUTOMATED COUNT: 47.8 FL (ref 35–45)
FERRITIN: 209 NG/ML (ref 10–291)
FOLATE: > 20 NG/ML (ref 4.8–24.2)
GFR SERPL CREATININE-BSD FRML MDRD: > 90 ML/MIN/1.73M2
GLUCOSE BLD-MCNC: 93 MG/DL (ref 70–108)
HCT VFR BLD CALC: 37.5 % (ref 37–47)
HEMOGLOBIN: 11.9 GM/DL (ref 12–16)
IMMATURE RETIC FRACT: 9.2 % (ref 3–15.9)
IRON SATURATION: 8 % (ref 20–50)
IRON: 21 UG/DL (ref 50–170)
LACTIC ACID: 0.6 MMOL/L (ref 0.5–2.2)
LEGIONELLA PNEUMOPHILIA AG, URINE: NEGATIVE
LV EF: 60 %
LVEF MODALITY: NORMAL
MAGNESIUM: 1.7 MG/DL (ref 1.6–2.4)
MCH RBC QN AUTO: 30.5 PG (ref 26–33)
MCHC RBC AUTO-ENTMCNC: 31.7 GM/DL (ref 32.2–35.5)
MCV RBC AUTO: 96.2 FL (ref 81–99)
PLATELET # BLD: 196 THOU/MM3 (ref 130–400)
PMV BLD AUTO: 10 FL (ref 9.4–12.4)
POTASSIUM SERPL-SCNC: 4.1 MEQ/L (ref 3.5–5.2)
PROCALCITONIN: 1.44 NG/ML (ref 0.01–0.09)
RBC # BLD: 3.9 MILL/MM3 (ref 4.2–5.4)
RETIC HEMOGLOBIN: 32.8 PG (ref 28.2–35.7)
RETICULOCYTE ABSOLUTE COUNT: 1.6 % (ref 0.5–2)
SARS-COV-2, NAAT: NOT DETECTED
SODIUM BLD-SCNC: 125 MEQ/L (ref 135–145)
SODIUM BLD-SCNC: 128 MEQ/L (ref 135–145)
SODIUM BLD-SCNC: 130 MEQ/L (ref 135–145)
SODIUM BLD-SCNC: 133 MEQ/L (ref 135–145)
STREP PNEUMO AG, UR: NEGATIVE
TOTAL IRON BINDING CAPACITY: 267 UG/DL (ref 171–450)
VITAMIN B-12: 689 PG/ML (ref 211–911)
WBC # BLD: 4.8 THOU/MM3 (ref 4.8–10.8)

## 2021-03-01 PROCEDURE — 85046 RETICYTE/HGB CONCENTRATE: CPT

## 2021-03-01 PROCEDURE — 82607 VITAMIN B-12: CPT

## 2021-03-01 PROCEDURE — 83605 ASSAY OF LACTIC ACID: CPT

## 2021-03-01 PROCEDURE — 6360000002 HC RX W HCPCS: Performed by: FAMILY MEDICINE

## 2021-03-01 PROCEDURE — 82746 ASSAY OF FOLIC ACID SERUM: CPT

## 2021-03-01 PROCEDURE — 6370000000 HC RX 637 (ALT 250 FOR IP): Performed by: NURSE PRACTITIONER

## 2021-03-01 PROCEDURE — 80048 BASIC METABOLIC PNL TOTAL CA: CPT

## 2021-03-01 PROCEDURE — 97110 THERAPEUTIC EXERCISES: CPT

## 2021-03-01 PROCEDURE — 87449 NOS EACH ORGANISM AG IA: CPT

## 2021-03-01 PROCEDURE — 97530 THERAPEUTIC ACTIVITIES: CPT

## 2021-03-01 PROCEDURE — 1200000003 HC TELEMETRY R&B

## 2021-03-01 PROCEDURE — 84145 PROCALCITONIN (PCT): CPT

## 2021-03-01 PROCEDURE — 85027 COMPLETE CBC AUTOMATED: CPT

## 2021-03-01 PROCEDURE — 6360000002 HC RX W HCPCS: Performed by: INTERNAL MEDICINE

## 2021-03-01 PROCEDURE — 97162 PT EVAL MOD COMPLEX 30 MIN: CPT

## 2021-03-01 PROCEDURE — 36415 COLL VENOUS BLD VENIPUNCTURE: CPT

## 2021-03-01 PROCEDURE — 2580000003 HC RX 258: Performed by: FAMILY MEDICINE

## 2021-03-01 PROCEDURE — 93306 TTE W/DOPPLER COMPLETE: CPT

## 2021-03-01 PROCEDURE — 83550 IRON BINDING TEST: CPT

## 2021-03-01 PROCEDURE — 83735 ASSAY OF MAGNESIUM: CPT

## 2021-03-01 PROCEDURE — 97535 SELF CARE MNGMENT TRAINING: CPT

## 2021-03-01 PROCEDURE — 83540 ASSAY OF IRON: CPT

## 2021-03-01 PROCEDURE — 2580000003 HC RX 258: Performed by: INTERNAL MEDICINE

## 2021-03-01 PROCEDURE — 87899 AGENT NOS ASSAY W/OPTIC: CPT

## 2021-03-01 PROCEDURE — 99232 SBSQ HOSP IP/OBS MODERATE 35: CPT | Performed by: FAMILY MEDICINE

## 2021-03-01 PROCEDURE — 82728 ASSAY OF FERRITIN: CPT

## 2021-03-01 PROCEDURE — 84295 ASSAY OF SERUM SODIUM: CPT

## 2021-03-01 PROCEDURE — 6370000000 HC RX 637 (ALT 250 FOR IP): Performed by: FAMILY MEDICINE

## 2021-03-01 PROCEDURE — 6370000000 HC RX 637 (ALT 250 FOR IP): Performed by: INTERNAL MEDICINE

## 2021-03-01 PROCEDURE — 87635 SARS-COV-2 COVID-19 AMP PRB: CPT

## 2021-03-01 PROCEDURE — 97166 OT EVAL MOD COMPLEX 45 MIN: CPT

## 2021-03-01 PROCEDURE — 99214 OFFICE O/P EST MOD 30 MIN: CPT | Performed by: INTERNAL MEDICINE

## 2021-03-01 RX ORDER — PANTOPRAZOLE SODIUM 40 MG/1
40 TABLET, DELAYED RELEASE ORAL
Status: DISCONTINUED | OUTPATIENT
Start: 2021-03-01 | End: 2021-03-03 | Stop reason: HOSPADM

## 2021-03-01 RX ADMIN — DEXTROSE MONOHYDRATE 500 ML: 50 INJECTION, SOLUTION INTRAVENOUS at 06:56

## 2021-03-01 RX ADMIN — SODIUM CHLORIDE, PRESERVATIVE FREE 10 ML: 5 INJECTION INTRAVENOUS at 20:41

## 2021-03-01 RX ADMIN — PIPERACILLIN AND TAZOBACTAM 3375 MG: 3; .375 INJECTION, POWDER, LYOPHILIZED, FOR SOLUTION INTRAVENOUS at 20:41

## 2021-03-01 RX ADMIN — HYDROCODONE BITARTRATE AND ACETAMINOPHEN 1 TABLET: 5; 325 TABLET ORAL at 15:55

## 2021-03-01 RX ADMIN — PIPERACILLIN AND TAZOBACTAM 3375 MG: 3; .375 INJECTION, POWDER, LYOPHILIZED, FOR SOLUTION INTRAVENOUS at 12:02

## 2021-03-01 RX ADMIN — PREGABALIN 150 MG: 75 CAPSULE ORAL at 08:17

## 2021-03-01 RX ADMIN — PANTOPRAZOLE SODIUM 40 MG: 40 TABLET, DELAYED RELEASE ORAL at 14:14

## 2021-03-01 RX ADMIN — HYDROCODONE BITARTRATE AND ACETAMINOPHEN 1 TABLET: 5; 325 TABLET ORAL at 06:00

## 2021-03-01 RX ADMIN — ENOXAPARIN SODIUM 40 MG: 40 INJECTION SUBCUTANEOUS at 08:17

## 2021-03-01 RX ADMIN — METOPROLOL SUCCINATE 25 MG: 25 TABLET, FILM COATED, EXTENDED RELEASE ORAL at 08:17

## 2021-03-01 RX ADMIN — PIPERACILLIN AND TAZOBACTAM 3375 MG: 3; .375 INJECTION, POWDER, LYOPHILIZED, FOR SOLUTION INTRAVENOUS at 05:50

## 2021-03-01 RX ADMIN — LEVOTHYROXINE SODIUM 100 MCG: 100 TABLET ORAL at 06:01

## 2021-03-01 RX ADMIN — PREGABALIN 150 MG: 75 CAPSULE ORAL at 20:39

## 2021-03-01 RX ADMIN — DULOXETINE HYDROCHLORIDE 60 MG: 60 CAPSULE, DELAYED RELEASE ORAL at 08:17

## 2021-03-01 ASSESSMENT — PAIN SCALES - GENERAL
PAINLEVEL_OUTOF10: 3
PAINLEVEL_OUTOF10: 1
PAINLEVEL_OUTOF10: 4

## 2021-03-01 ASSESSMENT — PAIN DESCRIPTION - LOCATION
LOCATION: HEAD
LOCATION: HEAD

## 2021-03-01 ASSESSMENT — PAIN DESCRIPTION - PAIN TYPE
TYPE: ACUTE PAIN
TYPE: ACUTE PAIN

## 2021-03-01 ASSESSMENT — PAIN DESCRIPTION - ORIENTATION: ORIENTATION: RIGHT;LEFT

## 2021-03-01 NOTE — PLAN OF CARE
Problem: Falls - Risk of:  Goal: Will remain free from falls  Description: Will remain free from falls  3/1/2021 0119 by Leola Toscano RN  Outcome: Met This Shift  2/28/2021 1138 by Jerald Tipton RN  Outcome: Ongoing  Goal: Absence of physical injury  Description: Absence of physical injury  3/1/2021 0119 by Leola Toscano RN  Outcome: Met This Shift  2/28/2021 1138 by Jerald Tipton RN  Outcome: Ongoing     Problem: Skin Integrity:  Goal: Will show no infection signs and symptoms  Description: Will show no infection signs and symptoms  Outcome: Met This Shift  Goal: Absence of new skin breakdown  Description: Absence of new skin breakdown  Outcome: Met This Shift     Problem: Pain:  Goal: Pain level will decrease  Description: Pain level will decrease  3/1/2021 0119 by Leola Toscano RN  Outcome: Ongoing  2/28/2021 1138 by Jerald Tipton RN  Outcome: Ongoing  Goal: Control of acute pain  Description: Control of acute pain  3/1/2021 0119 by Leola Toscano RN  Outcome: Ongoing  2/28/2021 1138 by Jerald Tipton RN  Outcome: Ongoing  Goal: Control of chronic pain  Description: Control of chronic pain  2/28/2021 1138 by Jerald Tipton RN  Outcome: Ongoing

## 2021-03-01 NOTE — CARE COORDINATION
DISCHARGE/PLANNING EVALUATION  3/1/21, 12:10 PM EST    Reason for Referral: Has [facility] (therapy is currently on hold until after her OIO appt. On 3/17). Mental Status: Patient is alert and oriented  Decision Making: Patient is making own decisions. Family/Social/Home Environment: Assessment completed with Patient, daughter, spouse and MARIELA. Patient resides with daughter and several family members in the 2 story home but all ADL's on first floor of the home. Patient and spouse have their own space and cooking area. Patient is current with [facility] for RN only at this time. Current Services including food security, transportation and housekeeping:  See above  Current Equipment: walker, wheelchair, electric scooter  Payment Source: tna Medicare  Concerns or Barriers to Discharge: not at this time  Post acute provider list with quality measures, geographic area and applicable managed care information provided. Questions regarding selection process answered: offered and provided    Teach Back Method used with Patient regarding care plan and discharge plan. Patient and familiy verbalize understanding of the plan of care and contribute to goal setting. Patient goals, treatment preferences and discharge plan: Patient discharge home with family and current with [facility].      Electronically signed by MARK Abrams, KARLIE on 3/1/2021 at 12:10 PM

## 2021-03-01 NOTE — PROGRESS NOTES
[facility]         Renal Inpatient Follow-Up note         3/1/2021 12:02 PM    Pt Name:   Cathren Dakin Height  YOB: 1940  Attending: Candi Astorga MD    Chief Complaint : Cathren Dakin Height is a 78 y.o. female being followed by nephrology for hyponatremia    Interval History :   Patient seen and examined by me. No distress  Feels well denies any complaints no chest pain no shortness of breath. She was on normal saline and sodium has been improving well  However she received D5W this morning for concern for overcorrection and sodium dropped right back to 125. Scheduled Medications :    pantoprazole  40 mg Oral QAM AC    sodium chloride flush  10 mL Intravenous 2 times per day    enoxaparin  40 mg Subcutaneous Daily    DULoxetine  60 mg Oral Daily    metoprolol succinate  25 mg Oral Daily    pregabalin  150 mg Oral BID    piperacillin-tazobactam  3,375 mg Intravenous Q8H    lidocaine  1 patch Transdermal Daily    levothyroxine  100 mcg Oral Daily         Vitals :  BP (!) 162/79   Pulse 64   Temp 97.9 °F (36.6 °C) (Oral)   Resp 16   Ht 5' 6\" (1.676 m)   Wt 166 lb 1 oz (75.3 kg)   SpO2 95%   BMI 26.80 kg/m²     24HR INTAKE/OUTPUT:      Intake/Output Summary (Last 24 hours) at 3/1/2021 1202  Last data filed at 3/1/2021 0545  Gross per 24 hour   Intake 1112.87 ml   Output 700 ml   Net 412.87 ml     Last 3 weights  Wt Readings from Last 3 Encounters:   03/01/21 166 lb 1 oz (75.3 kg)   12/21/20 162 lb 6.4 oz (73.7 kg)   08/19/20 165 lb (74.8 kg)           Physical Exam :  General Appearance:  Well developed.  No distress  Mouth/Throat:  Oral mucosa moist  Neck:  Supple, no JVD  Lungs:  Breath sounds: clear  Heart[de-identified]  S1,S2 heard  Abdomen:  Soft, non - tender  Musculoskeletal:  Edema -trace         Last 3 CBC   Recent Labs     02/27/21  2200 03/01/21  0526   WBC 6.5 4.8   RBC 4.65 3.90*   HGB 14.2 11.9*   HCT 42.8 37.5    196     Last 3 CMP  Recent Labs 02/27/21 2200 02/27/21  2200 03/01/21  0055 03/01/21  0526 03/01/21  0915   *   < > 128* 133* 125*   K 4.3  --   --  4.1  --    CL 90*  --   --  97*  --    CO2 26  --   --  28  --    BUN 7  --   --  7  --    CREATININE 0.5  --   --  0.5  --    CALCIUM 9.0  --   --  8.6  --    LABALBU 3.5  --   --   --   --    BILITOT 0.4  --   --   --   --     < > = values in this interval not displayed. Assessment    1. Renal -renal function appears to be stable at baseline  2. Hyponatremia-exact etiology not clear, urine lites are not helpful at this time as she has received IV contrast earlier today  ? However patient does look clinically dry he was started on IV fluids and sodium is improving well.  ? She was on Cymbalta and recently started on Lyrica which can also precipitate some hyponatremia/SIADH but doubt this is the case  ? Recheck sodium at 1 PM based on that might consider normal saline.     3. Essential hypertension running reasonable  4. History of hypothyroidism  5. Dysphagia  6. Meds reviewed and discussed with patient and family at bedside      LOLITA Fonseca D.  [facility].

## 2021-03-01 NOTE — PROGRESS NOTES
PROGRESS NOTE      Patient:  Shani Bain Height      Unit/Bed:6-01/001-A    YOB: 1940    MRN: [id number]       Acct: [de-identified]     PCP: Walt Cruz DO    Date of Admission: 2/27/2021      Assessment/Plan:        Dysphagia  -Gradually worsening dysphagia to solid foods that has been present for years. Worsened on admission. -GI consulted, recommending full liquid, soft diet with n.p.o. at midnight for scheduled esophagram. Appreciate GI input   -Large hiatal hernia noted on CTA of the chest  -Patient will need outpatient EGD. -[facility]    2. Presyncope  -Likely vasovagal process with her nausea and vomiting. -EKG initially showing sinus arrhythmia however this resolved spontaneously  -Bilateral carotid duplex unremarkable  -Echocardiogram completed on 3/1/2021 and pending read  -Continue telemetry and neurochecks    3. Acute on chronic hypotonic hyponatremia  -asymptomatic  -Chronic hyponatremia with sodium baseline of 128-133.  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 Clinically dry on exam.  Urine osmolality elevated and urine sodium elevated. Urine osmolality decreased  -Sodium slightly increased today at 133 which is around 9 mEq greater the 124 mEq of sodium drawn yesterday afternoon ( corrected 9 mEqs in roughly 12 hours). Patient given 250cc bolus of D5W due to this rapid increase in sodium concentration this morning. Sodium reduced to 125 after bolus.  -goals of correction: no more than 6-8 meqs in 24 hours  -cont fluid restriction 1.5L/24 hours  -Nephrology consulted and is following patient's care. Appreciate nephrology input     4. Hematochezia, resolved prior to admission  -Mildly decreased hemoglobin at 11.9  -Anemia panel ordered per GI  -Likely hemorrhoidal versus diverticular  -GI following    5.  CAP versus aspiration pneumonia  -Vomiting and regurgitation after dysphagia prior to admission  -CTA of the chest showing atelectasis in the left lower lobe.  -Mildly elevated procalcitonin at 1.31 on admission this increased to 1.44 today  -Patient on Zosyn to cover for possible aspiration pneumonia  -Continue antibiotics for now. 6. Left-sided pleural effusion  -Very small pleural effusion unable to undergo thoracentesis. -Patient is saturating well on room air no signs of acute respiratory distress currently  -continue to monitor     7. Hypomagnesemia, resolving   -Magnesium of 1.7 today. Will follow    8. Hiatal hernia  -Seen on CTA of the chest    9. Bilateral lower extremity edema  -Chronic, stable  -Pending echocardiogram    10. History of left leg fracture  -Wheelchair-bound most of the time. -PT and OT consulted    11. Stable right midlung nodule  -Former smoker  -Follow-up with PCP at discharge    12. Hypothyroidism  -TSH low on admission. Synthroid decreased to 100 mcg daily.  -Follow-up with PCP with repeat lab work in 6 weeks    13. Essential hypertension  -Stable, chronic  -Continue home Toprol-XL    14. Chronic pain/fibromyalgia  -Continue Cymbalta, Lyrica    15. Former smoker     Chief Complaint: Dysphagia    Hospital Course:   Per H&P note:     \"esophageal food impaction after dinner. Associated regurgitation of liquids and food . She complains of upper abdominal pain due to persistent nausea and vomiting. She has a 2 years history of progressive dysphagia to solids. No history of prior EGD studies. In the ED patient had dizziness with near syncope after nausea and vomiting. She was noted to be pale and diaphoretic by the nursing staff. Latest vital signs in the ED - temperature 98.6 °C, respirate of 25 breaths/min, heart rate 110 bpm, blood pressure 105/93, oxygen 93% on room air. Notable labs sodium 125 from 131 on 12/22/2020, potassium 4.2, creatinine 0.5, osmolality 249.7, troponin first set less than 0.010, WBC 6.5, hemoglobin 14.2, MCV 92.0, platelets 699.      Chest x-ray which I reviewed shows left lower lung zone infiltrate. ED treatment included fentanyl 50 mcg IV x1 dose, Zofran 4 mg IV x2 doses, glucagon 1 mg.\"     3/1/21: Nephrology following for acute on chronic hyponatremia. Rapid increase in sodium level overnight requiring D5W bolus. Sodium improved with bolus and is gradually increasing. GI consulted and recommending esophagram tomorrow morning. N.p.o. at midnight. Subjective: Patient states that she is doing well this morning. She is sitting up eating her food this morning. She states that she is having some difficulty with dysphagia even on soft foods today. She feels like it is getting caught in her mid chest.  Denies any other issues today. Medications:  Reviewed    Infusion Medications    Scheduled Medications    pantoprazole  40 mg Oral QAM AC    sodium chloride flush  10 mL Intravenous 2 times per day    enoxaparin  40 mg Subcutaneous Daily    DULoxetine  60 mg Oral Daily    metoprolol succinate  25 mg Oral Daily    pregabalin  150 mg Oral BID    piperacillin-tazobactam  3,375 mg Intravenous Q8H    lidocaine  1 patch Transdermal Daily    levothyroxine  100 mcg Oral Daily     PRN Meds: sodium chloride flush, promethazine **OR** ondansetron, polyethylene glycol, acetaminophen **OR** acetaminophen, magnesium sulfate, HYDROcodone 5 mg - acetaminophen      Intake/Output Summary (Last 24 hours) at 3/1/2021 1528  Last data filed at 3/1/2021 0545  Gross per 24 hour   Intake 907.98 ml   Output 700 ml   Net 207.98 ml       Diet:  DIET FULL LIQUID; No Added Salt (3-4 GM); Dental Soft; Daily Fluid Restriction: 1500 ml  Diet NPO, After Midnight    Exam:  BP (!) 162/79   Pulse 64   Temp 97.9 °F (36.6 °C) (Oral)   Resp 16   Ht 5' 6\" (1.676 m)   Wt 166 lb 1 oz (75.3 kg)   SpO2 95%   BMI 26.80 kg/m²     General appearance: No apparent distress, appears stated age and cooperative. HEENT: Pupils equal, round, and reactive to light. Conjunctivae/corneas clear.   Neck: Supple, with full range of motion. No jugular venous distention. Trachea midline. Respiratory:  Normal respiratory effort. Fine crackles on LLL, diminished air entry on RLL  Cardiovascular: Normal rate, regular rhythm with normal S1/S2 without murmurs, rubs or gallops. Abdomen: Soft, non-tender, non-distended with normal bowel sounds. Musculoskeletal: passive and active ROM x 4 extremities. Skin: Skin color, texture, turgor normal.  No rashes or lesions. Neurologic:  Neurovascularly intact without any focal sensory/motor deficits. Cranial nerves: II-XII intact, grossly non-focal.  Psychiatric: thought content appropriate, normal insight  Exam of extremities: peripheral pulses normal, no pedal edema, trace pitting edema on both legs, from below knees to distal legs, no clubbing or cyanosis      Labs:   Recent Labs     02/27/21 2200 03/01/21 0526   WBC 6.5 4.8   HGB 14.2 11.9*   HCT 42.8 37.5    196     Recent Labs     02/27/21 2200 02/28/21 0521 02/28/21 0521 03/01/21  0526 03/01/21  0915 03/01/21  1253   *  --    < > 133* 125* 130*   K 4.3  --   --  4.1  --   --    CL 90*  --   --  97*  --   --    CO2 26  --   --  28  --   --    BUN 7  --   --  7  --   --    CREATININE 0.5  --   --  0.5  --   --    CALCIUM 9.0  --   --  8.6  --   --    PHOS  --  3.5  --   --   --   --     < > = values in this interval not displayed. Recent Labs     02/27/21 2200   AST 30   ALT 12   BILITOT 0.4   ALKPHOS 111     Recent Labs     02/27/21 2200   INR 0.90     No results for input(s): Jaycob Neumann in the last 72 hours. Urinalysis:      Lab Results   Component Value Date    NITRU NEGATIVE 02/28/2021    WBCUA 0-2 09/14/2017    BACTERIA NONE 09/14/2017    RBCUA 0-2 09/14/2017    BLOODU NEGATIVE 02/28/2021    SPECGRAV 1.015 04/12/2019    GLUCOSEU NEGATIVE 02/28/2021       Radiology:  VL DUP CAROTID BILATERAL   Final Result      1. RIGHT ICA . Shelly  Shelly  Minimal soft plaque, no appreciable stenosis. ..    2. RIGHT ECA. . Minimal soft plaque, no appreciable stenosis. ..... Vonzella Goodpasture RIGHT CCA. Vonzella Goodpasture Unremarkable . Vonzella Goodpasture 3. RIGHT VERTEBRAL. Vonzella Goodpasture Antegrade flow . .. ... LEFT VERTEBRAL. .. Antegrade flow. ..      4. LEFT ICA. .... Minimal soft plaque, no appreciable stenosis. ..   5. LEFT ECA. .. Unremarkable. .... LEFT CCA. .. Unremarkable. **This report has been created using voice recognition software. It may contain minor errors which are inherent in voice recognition technology. **      Final report electronically signed by Dr. Kashmir Estrada on 2/28/2021 9:30 AM      US THORACENTESIS Which side should the procedure be performed? Left   Final Result   1. Attempted thoracentesis left side. This was unsuccessful due to combination of a very small effusion and the patient's inability to tolerate the needle puncture. 2. If desired, a repeat attempt at thoracentesis can be performed tomorrow with CT guidance. **This report has been created using voice recognition software. It may contain minor errors which are inherent in voice recognition technology. **      Final report electronically signed by Dr. Kashmir Estrada on 2/28/2021 9:33 AM      CTA CHEST W WO CONTRAST   Final Result   Impression:   No definite PE is identified. Large hiatal hernia. Small left pleural effusion. Possible developing round atelectasis left lower lobe is technically    indeterminate. Recommend follow-up. This document has been electronically signed by: Napoleon Phoenix, MD on    02/28/2021 02:00 AM      All CTs at this facility use dose modulation techniques and iterative    reconstructions, and/or weight-based dosing   when appropriate to reduce radiation to a low as reasonably achievable. XR CHEST PORTABLE   Final Result   New patchy left midlung opacity which may reflect underlying infiltrate.       This document has been electronically signed by: Lisa Dinero MD on    02/27/2021 11:47 PM      [place] ESOPHAGRAM    (Results Pending)       Diet: DIET FULL LIQUID; No Added Salt (3-4 GM);  Dental Soft; Daily Fluid Restriction: 1500 ml  Diet NPO, After Midnight    DVT prophylaxis: [x] Lovenox                                 [] SCDs                                 [] SQ Heparin                                 [] Encourage ambulation           [] Already on Anticoagulation    Anticipated Discharge in : TBD      Disposition:    [] Home       [] TCU       [] Rehab       [] Psych       [] SNF       [] [place]       [x] Other-Home with [facility] PT/OT once medically stable     Code Status: Full Code      Electronically signed by Kameron Castro DO on 3/1/2021 at 3:28 PM

## 2021-03-01 NOTE — CONSULTS
1/2014    Back pain     pain clinic - s/p injections     Blood circulation, collateral     Diverticulitis     Dr. Cesar Tovar GERD (gastroesophageal reflux disease)     Diverticulitis     Hiatal hernia     Hypertension     Hypothyroidism     Osteoarthritis        Home Medications:  Prior to Admission medications    Medication Sig Start Date End Date Taking? Authorizing Provider   levothyroxine (SYNTHROID) 125 MCG tablet TAKE 1 TABLET DAILY 2/2/21  Yes FLORIDA Linares CNP   pregabalin (LYRICA) 150 MG capsule Take 1 capsule by mouth 2 times daily for 180 days. 1/28/21 7/27/21 Yes Ascencion Pierce DO   metoprolol succinate (TOPROL XL) 25 MG extended release tablet TAKE 1 TABLET DAILY 12/16/20  Yes FLOR[place] Linares CNP   enalapril (VASOTEC) 10 MG tablet TAKE ONE-HALF (1/2) TABLET DAILY 11/18/20  Yes Michael Marx DO   DULoxetine (CYMBALTA) 60 MG extended release capsule TAKE 1 CAPSULE DAILY 1/16/20  Yes Michael Marx DO   guaiFENesin 1200 MG TB12 Take 1,200 mg by mouth 2 times daily 12/21/19  Yes Jose Elias Graves PA-C   lidocaine (LMX) 4 % cream Apply topically 4x daily as needed. 10/12/19  Yes Ascencion Pierce,    CRANBERRY PO Take 1,500 mg by mouth daily   Yes Historical Provider, MD   Biotin 40851 MCG TABS Take 1-2 tablets by mouth daily   Yes Historical Provider, MD   fluticasone (FLONASE) 50 MCG/ACT nasal spray 1 spray by Nasal route as needed for Rhinitis   Yes Historical Provider, MD   Omega 3-6-9 Fatty Acids (OMEGA 3-6-9 COMPLEX) CAPS Take 1 capsule by mouth daily    Yes Historical Provider, MD   therapeutic multivitamin-minerals (THERAGRAN-M) tablet Take 1 tablet by mouth nightly    Yes Historical Provider, MD   Misc. Devices [facility]) MISC 1 each by Does not apply route daily 2/11/20   Michael Marx, DO   Misc.  Devices MISC Mechanical lift for a Buck Mais 5/31/18   Michael Marx, DO   Scooter MISC by Does not apply route Power scooter 10/31/17   Michael Marx, DO       Surgical History:  Past Surgical History:   Procedure Laterality Date    APPENDECTOMY  1958    BACK SURGERY      CARPAL TUNNEL RELEASE Bilateral 2013    w/cubital tunnel    COLON SURGERY  07/29/2016    Procedure: ATTEMPTED DAVINCI XI ROBOTIC ASSISTED SIGMOID COLON RESECTION, ROBOTIC ASSISTED MOBILIZATION OF RECTAL SIGMOID TO SPLENIC FLEXURE, CONVERTED TO OPEN LEFT HEMICOLECTOMY WITH COLOPROCTOCTOMY, SPLENORRHAPHY, RIGID SIGMOIDOSCOPY, LASER EVALUATION OF VASCULARITY WITH ICG; Surgeon: Anatoly Stewart MD; Location: [place] [address]; Service: General    COLONOSCOPY  2012    CYST REMOVAL  2010   [address], [address]    Cataract    FEMUR FRACTURE SURGERY Left 12/21/2020    LEFT FEMUR OPEN REDUCTION INTERNAL FIXATION performed by Adriana Snyder MD at [facility] Left 2001   [facility]    w/bladder suspension    JOINT REPLACEMENT  2002, 2008, 2009    rt hip(2002), lt knee(2009), lt hip(2008) Shoulder (2009)    OVARY REMOVAL  1999    [place] COLON CA SCRN NOT  [address] IND Left 9/15/2017    COLONOSCOPY performed by Anthony Cheng MD at [facility]    [address]    UPPER GASTROINTESTINAL ENDOSCOPY  2012       Family History:  Family History   Problem Relation Age of Onset    Arthritis Mother     Hearing Loss Mother     High Cholesterol Mother     Hearing Loss Father     Heart Disease Father 76        CABG    Stroke Father     Arthritis Sister     Depression Sister     Diabetes Sister     Arthritis Brother     Depression Brother     Cancer Maternal Aunt     Early Death Maternal Aunt     Diabetes Maternal Grandmother     Heart Disease Paternal Grandmother        Past GI History:  Large hiatal hernia with Mickey ulcer, esophageal dilatation, diverticular bleed, colon resection 2016, diverticulosis, EGD, colonoscopy    Allergies:  Ampicillin, Morphine, Pcn [penicillins], and Sulfa antibiotics    Social History:   TOBACCO:   reports that she quit smoking about 53 years ago. Her smoking use included cigarettes. She started smoking about 64 years ago. She has a 22.00 pack-year smoking history. She has never used smokeless tobacco.  ETOH:   reports no history of alcohol use. Review Of Systems  GENERAL: No fever, chills or weight loss. EYES:  No  blurred vision, double vision   CARDIOVASCULAR: No chest pain or palpitations. RESPIRATORY:  No dyspnea or cough. GI:  See HPI  MUSCULOSKELETAL: No new painful or swollen joints or myalgias. :   No dysuria or hematuria. SKIN:  No rashes or jaundice. NEUROLOGIC:  No headaches or seizures, numbness or tingling of arms, or legs. PSYCH:  No anxiety or depression. ENDOCRINE:  No polyuria or polydipsia. BLOOD:  +anemia    PHYSICAL EXAM:  BP (!) 142/69   Pulse 83   Temp 97.9 °F (36.6 °C) (Oral)   Resp 16   Ht 5' 6\" (1.676 m)   Wt 166 lb 1 oz (75.3 kg)   SpO2 93%   BMI 26.80 kg/m²     General appearance: No apparent distress, appears stated age and cooperative. HEENT: Normal cephalic, atraumatic without obvious deformity. Pupils equal, round, and reactive to light. Neck: Supple, with full range of motion. No jugular venous distention. Trachea midline. Respiratory:  Normal respiratory effort. Clear to auscultation, bilaterally without Rales/Wheezes/Rhonchi. Cardiovascular: Regular rate and rhythm without murmurs, rubs or gallops. Abdomen: Soft, non-tender, chronically distended with active bowel sounds. Hernia noted. Musculoskeletal: No clubbing, cyanosis or edema bilaterally. Skin: Pink, warm, dry. No rashes or lesions.   Psychiatric: Alert and oriented, thought content appropriate, normal insight    Labs:   Recent Labs     03/01/21 0526   WBC 4.8   HGB 11.9*   HCT 37.5        Recent Labs     02/28/21  0521 02/28/21  0521 03/01/21  0526 03/01/21  0915   NA  --    < > 133* 125*   K  --   --  4.1  --    CL  -- --  97*  --    CO2  --   --  28  --    BUN  --   --  7  --    CREATININE  --   --  0.5  --    CALCIUM  --   --  8.6  --    PHOS 3.5  --   --   --     < > = values in this interval not displayed. Recent Labs     02/27/21  2200   AST 30   ALT 12   BILITOT 0.4   ALKPHOS 111     Recent Labs     02/27/21  2200   INR 0.90       Radiology:   CXR 02/27/21  Impression   New patchy left midlung opacity which may reflect underlying infiltrate. CTA chest W WO contrast 02/28/21      Impression   Impression:   No definite PE is identified. Large hiatal hernia. Small left pleural effusion. Possible developing round atelectasis left lower lobe is technically    indeterminate. Recommend follow-up     VL dup carotid bilateral 02/28/21      Impression       1. RIGHT ICA . Haley Severance Haley Severance Minimal soft plaque, no appreciable stenosis. ..    2. RIGHT ECA. . Minimal soft plaque, no appreciable stenosis. ..... Haley Severance RIGHT CCA. Haley Severance Unremarkable . .       3. RIGHT VERTEBRAL. Haley Severance Antegrade flow . .. Haley Severance Haley Severance Haley Severance     LEFT VERTEBRAL. .. Antegrade flow. ..       4. LEFT ICA. .... Minimal soft plaque, no appreciable stenosis. ..   5. LEFT ECA. .. Unremarkable. .... LEFT CCA. .. Unremarkable     Code Status: Full Code    ASSESSMENT:  1. Chronic dysphagia- solids only, ongoing 2 years  2. Pneumonia- on ATBs  3. Left pleural effusion  4. UTI- on ATBs  5. Acute on chronic hyponatremia  6. Hiatal hernia  7. H/O diverticular disease s/p colon resection 2016  8. Pandiverticulosis noted on colonoscopy 2017  9. Hypothyroidism  10. H/O left leg fracture s/p surgery- wheelchair bound  11. H/O HTN  12. GERD- not on medication  13. Anemia  14.  BRBPR last week- resolved, likely hemorrhoidal vs diveritcular     PLAN:     Monitor H & H, transfuse prn   Nursing to monitor stool output   Anemia labs   PO PPI daily   Advance to full liquid, soft diet   NPO at midnight   Esophagram tomorrow   ATBs per primary   Electrolyte management per nephrology   No plan for emergent endoscopy at

## 2021-03-01 NOTE — PROGRESS NOTES
[address] [address]  INPATIENT PHYSICAL THERAPY  EVALUATION  [facility] RENAL TELEMETRY 6K - 6K-01/001-A    Time In: 1040  Time Out: 1120  Timed Code Treatment Minutes: 25 Minutes  Minutes: 40          Date: 3/1/2021  Patient Name: Abel Gunter,  Gender:  female        MRN: [id number]  : 1940  (78 y.o.)      Referring Practitioner: Kt Navarro MD  Diagnosis: Pre-syncope  Additional Pertinent Hx: 78-year-old female patient presented to the ED with sensation of esophageal food impaction after dinner. Associated regurgitation of liquids and food . She complains of upper abdominal pain due to persistent nausea and vomiting. In the ED patient had dizziness with near syncope after nausea and vomiting. She was noted to be pale and diaphoretic by the nursing staff. Chest x-ray which I reviewed shows left lower lung zone infiltrate. Restrictions/Precautions:  Restrictions/Precautions: Weight Bearing, Fall Risk  Left Lower Extremity Weight Bearing: Toe Touch Weight Bearing  Position Activity Restriction  Other position/activity restrictions: s/p distal L femur ORIF     Subjective:  Chart Reviewed: Yes  Patient assessed for rehabilitation services?: Yes  Family / Caregiver Present: Yes  Subjective: RN approved session, pt is supine in bed with several family members present, pleasant and agreeable to PT. Pt states she just saw ortho last week, is to continue with TTWB at this time.     General:  Overall Orientation Status: Within Functional Limits  Follows Commands: Within Functional Limits    Vision: Impaired  Vision Exceptions: Wears glasses at all times    Hearing: Within functional limits       Pain: \"mild headache\"    Social/Functional History:    Lives With: Spouse, Daughter  Type of Home: House  Home Access: Stairs to enter with rails  Entrance Stairs - Number of Steps: 5-6 (family assists pt in/out of the home in w/c, working on obtaining ramp)  Home Equipment: Sissy Gilford walker, 4 wheeled walker     Bathroom Shower/Tub: Walk-in shower  Bathroom Toilet: Handicap height  Bathroom Equipment: Grab bars in shower, Shower chair  Bathroom Accessibility: Accessible       ADL Assistance: [address]: Independent  Homemaking Responsibilities: Yes  Transfer Assistance: Independent        Additional Comments: Pt went to SNF after fracture in December, has been home ~3 weeks, transfers stand pivot to w/c I'ly, primary mobility via w/c, pt able to self propel around home; family home 24/7, having ramp installed    OBJECTIVE:  Range of Motion:  Bilateral Lower Extremity: WFL    Strength:  Bilateral Lower Extremity: WFL    Balance:  Static Sitting Balance:  Stand By Assistance; pt sits EOB at least 15 minutes during session  Static Standing Balance: Stand By Assistance  Dynamic Standing Balance: Contact Guard Assistance    Bed Mobility:  Supine to Sit: Stand By Assistance, HOB elevated, use of bed rail  Sit to Supine: Stand By Assistance     Transfers:  Sit to Stand: Contact Guard Assistance  Stand to [employer] Assistance  Stand Pivot:Contact Guard Assistance  **First trial pt transfers ~180 degree turn with w/c facing pt, uses arm rests for support, does WB ~25% on one occasion through L LE, transfers towards L side; education on improved setup and to complete towards R side and to complete ~90 degree turn, good compliance with TTWB L LE; pt states she is unable to transfer towards R side at home due to setup    Ambulation:  NA at this time    Exercise:  Patient was guided in 1 set(s) 10 reps of exercise to both lower extremities. Ankle pumps, Quad sets, Heelslides and Hip abduction/adduction. Exercises were completed for increased independence with functional mobility. Functional Outcome Measures: Not completed     ASSESSMENT:  Activity Tolerance:  Patient tolerance of  treatment: good.        Treatment Initiated: Treatment and education initiated within context of evaluation. Evaluation time included review of current medical information, gathering information related to past medical, social and functional history, completion of standardized testing, formal and informal observation of tasks, assessment of data and development of plan of care and goals. Treatment time included skilled education and facilitation of tasks to increase safety and independence with functional mobility for improved independence and quality of life. See above exercises, additional transfer, education. Assessment: Body structures, Functions, Activity limitations: Decreased functional mobility , Decreased strength, Decreased balance  Assessment: Pt tolerates session well, transfers with SBA-CGA, cues for TTWB to L LE and education on alternative setup with w/c, however, pt states unable due to home setup. PT to continue to progress strength and mobility. Prognosis: Good    REQUIRES PT FOLLOW UP: Yes    Discharge Recommendations:  Discharge Recommendations: 24 hour supervision or assist, Home with Home health PT( PT to resume after ortho appt on 3/17)    Patient Education:  PT Education: PT Role, Plan of Care, General Safety, Transfer Training    Equipment Recommendations:  Equipment Needed: No    Plan:  Times per week: 1-2x GM  Current Treatment Recommendations: Strengthening, Home Exercise Program, Safety Education & Training, Functional Mobility Training, Transfer Training, Patient/Caregiver Education & Training, Wheelchair Mobility Training    Goals:  Patient goals : to go home  Short term goals  Time Frame for Short term goals: by discharge  Short term goal 1: Pt to transfer supine <--> sit S to enable pt to get in/out of bed. Short term goal 2: Pt to perform stand pivot S for increased functional mobility. Short term goal 3: Pt to self propel >100 feet in manual w/c mod I for home mobility.   Long term goals  Time Frame for Long term goals : NA due to short length of stay.    Following session, patient left in safe position with all fall risk precautions in place.

## 2021-03-01 NOTE — PROGRESS NOTES
[address]  INPATIENT OCCUPATIONAL THERAPY  [facility] RENAL TELEMETRY 6K  EVALUATION    Time In: 6591  Time Out: 1229  Timed Code Treatment Minutes: 15 Minutes  Minutes: 24          Date: 3/1/2021  Patient Name: Lydia Gunter,   Gender: female      MRN: [id number]  : 1940  (78 y.o.)  Referring Practitioner: Cheikh Grant MD  Diagnosis: Pre-syncope  Additional Pertinent Hx: 24-year-old female patient presented to the ED with sensation of esophageal food impaction after dinner. Associated regurgitation of liquids and food . She complains of upper abdominal pain due to persistent nausea and vomiting. Restrictions/Precautions:  Restrictions/Precautions: Weight Bearing, Fall Risk  Left Lower Extremity Weight Bearing: Toe Touch Weight Bearing  Position Activity Restriction  Other position/activity restrictions: s/p distal L femur ORIF     Vitals: Vitals not assessed per clinical judgement, see nursing flowsheet    Subjective  Chart Reviewed: Yes, Orders, Progress Notes  Patient assessed for rehabilitation services?: Yes    Subjective: RN okayed session. Pt was supine in bed with family present upon arrival. Pt was pleasant and agreeable to OT.     Pain:  Pain Assessment  Patient Currently in Pain: No    Social/Functional History:  Lives With: Spouse, Daughter  Type of Home: House  Home Access: Stairs to enter with rails  Entrance Stairs - Number of Steps: 5-6  Home Equipment: Wheelchair-manual, Rolling walker, 4 wheeled walker   Bathroom Shower/Tub: Walk-in shower  Bathroom Toilet: Handicap height  Bathroom Equipment: Grab bars in shower, Shower chair  Bathroom Accessibility: Accessible       ADL Assistance: [address] [place] [address] Avenue: Independent  Homemaking Responsibilities: Yes  Transfer Assistance: Independent          Additional Comments: Pt went to SNF after fracture in December, has been home ~3 weeks, transfers stand pivot to w/c I'ly, primary mobility via w/c, pt able to self propel around home; family home 24/7, having ramp installed    VISION:Corrected    HEARING:  WFL    COGNITION: Decreased Insight and Decreased Safety Awareness    RANGE OF MOTION:  Bilateral Upper Extremity:  WFL    STRENGTH:  Bilateral Upper Extremity:  grossly deconditioned    SENSATION:   WFL    ADL:   Grooming: Modified Independent and with set-up. for combing hair while seated in w/c at sink and washing hands while seated  Toileting: Contact Guard Assistance. while standing for clothing management  Toilet Transfer: Minimal Assistance. Pt educated on appropriate angle for stand pivot t/f to increase ease. Pt required min A, good maintainence of WB status. BALANCE:  Sitting Balance:  Stand By Assistance. while seated on EOB  Standing Balance: Contact Guard Assistance, Minimal Assistance. with 0-1 hand release for clothing management and t/fs    BED MOBILITY:  Supine to Sit: Stand By Assistance    Sit to Supine: Stand By Assistance    Scooting: Stand By Assistance      TRANSFERS:  Sit to Stand:  [employer], Minimal Assistance. Pt fluctuated in assist throughout session, min VC for WB status required  Stand to Sit: Contact Guard Assistance, Minimal Assistance. Min A for toilet t/f to control descent, pt completed t/f to EOB and w/c with CGA and good maintainence of WB status  Stand Pivot: Contact Guard Assistance, Minimal Assistance. Pt requiring min VC for WB status and w/c positioning to increase ease of stand pivot. Pt tolerated well    FUNCTIONAL MOBILITY:  Pt self propelled w/c from EOB to bathroom      Activity Tolerance:  Patient tolerance of  treatment: good. Assessment:  Assessment: Pt presented with the listed defiicts on this date. Pt with decreased strength, endurance, and overall indep with ADLs and IADLs. Pt would benefit from continued OT to further increase strength, endurance, and activity tolerance in prep for increased ease with ADLs and IADLs.   Performance deficits / Impairments: Decreased safe awareness, Decreased balance, Decreased posture, Decreased endurance, Decreased high-level IADLs, Decreased strength, Decreased ADL status  Prognosis: Good  REQUIRES OT FOLLOW UP: Yes  Decision Making: Medium Complexity  Safety Devices in place: Yes    Treatment Initiated: Treatment and education initiated within context of evaluation. Evaluation time included review of current medical information, gathering information related to past medical, social and functional history, completion of standardized testing, formal and informal observation of tasks, assessment of data and development of plan of care and goals. Treatment time included skilled education and facilitation of tasks to increase safety and independence with ADL's for improved functional independence and quality of life. Discharge Recommendations:  Continue to assess pending progress, Home with Home health OT    Patient Education:  OT Education: OT Role, Plan of Care, Transfer Training, Energy Conservation, IADL Safety    Equipment Recommendations:       Plan:  Times per week: 5x  Times per day: Daily  Current Treatment Recommendations: Strengthening, Safety Education & Training, Balance Training, Self-Care / ADL, Patient/Caregiver Education & Training, Functional Mobility Training, Home Management Training, Endurance Training. See long-term goal time frame for expected duration of plan of care. If no long-term goals established, a short length of stay is anticipated.     Goals:  Patient goals : return home  Short term goals  Time Frame for Short term goals: by discharge  Short term goal 1: Pt will complete stand pivot t/fs with SBA and no safety cues for WB status to increase ease with toileting routine  Short term goal 2: Pt will complete BADL routine with SBA and no cues for WB status to increase indep with bathing  Short term goal 3: Pt will tolerate dynamic standing wtih unilateral release x2 min with SBA and no cues for WB status to increase ease with clothing mgmt during toileting routine  Short term goal 4: Pt will complete BUE strengthening HEP with light resistance to increase ease with t/fs  Long term goals  Time Frame for Long term goals : no LTG d/t short ELOS         Following session, patient left in safe position with all fall risk precautions in place.

## 2021-03-01 NOTE — CARE COORDINATION
3/1/21, 7:13 AM EST  DISCHARGE PLANNING EVALUATION:    Bon Scales Height       Admitted: 2/27/2021/ 2144   Hospital day: 0   Location: -01/001-A Reason for admit: Pre-syncope [R55]   PMH:  has a past medical history of Abnormal EKG, Anemia, Back pain, Blood circulation, collateral, Diverticulitis, Fibromyalgia, GERD (gastroesophageal reflux disease), Hiatal hernia, Hypertension, Hypothyroidism, and Osteoarthritis. Procedure: 2/28 attempted left thoracentesis- unsuccessful  Barriers to Discharge:  PCT 1.44, CRP 4.00, Na+ improved to 133. 500 mL D5 bolus x1, IV zosyn. Nephrology following. Await GI consult. Echo ordered. Palliative care to see. PCP: DO Marleny Peralta    Patient Goals/Plan/Treatment Preferences: Spoke with Sofy and family, she and her spouse live with their daughter and family. She is NWB d/t femur fracture in December. She has a wheelchair and walker. She has [facility], but the therapy portion is currently on hold until after her OIO appointment on March 17. Terry Oreilly consulted. Transportation/Food Security/Housekeeping Addressed:  No issues identified.

## 2021-03-01 NOTE — PLAN OF CARE
Problem: DISCHARGE BARRIERS  Goal: Patient's continuum of care needs are met  Outcome: Ongoing  Note: Patient return home with family and current [facility]. See  notes 3/1/21.

## 2021-03-02 ENCOUNTER — APPOINTMENT (OUTPATIENT)
Dept: GENERAL RADIOLOGY | Age: 81
DRG: 393 | End: 2021-03-02
Payer: MEDICARE

## 2021-03-02 LAB
ANION GAP SERPL CALCULATED.3IONS-SCNC: 9 MEQ/L (ref 8–16)
BASOPHILS # BLD: 1.8 %
BASOPHILS ABSOLUTE: 0.1 THOU/MM3 (ref 0–0.1)
BUN BLDV-MCNC: 5 MG/DL (ref 7–22)
CALCIUM SERPL-MCNC: 8.6 MG/DL (ref 8.5–10.5)
CHLORIDE BLD-SCNC: 95 MEQ/L (ref 98–111)
CO2: 28 MEQ/L (ref 23–33)
CREAT SERPL-MCNC: 0.4 MG/DL (ref 0.4–1.2)
EOSINOPHIL # BLD: 7.5 %
EOSINOPHILS ABSOLUTE: 0.2 THOU/MM3 (ref 0–0.4)
ERYTHROCYTE [DISTWIDTH] IN BLOOD BY AUTOMATED COUNT: 13.2 % (ref 11.5–14.5)
ERYTHROCYTE [DISTWIDTH] IN BLOOD BY AUTOMATED COUNT: 47 FL (ref 35–45)
GFR SERPL CREATININE-BSD FRML MDRD: > 90 ML/MIN/1.73M2
GLUCOSE BLD-MCNC: 93 MG/DL (ref 70–108)
HCT VFR BLD CALC: 37.2 % (ref 37–47)
HEMOGLOBIN: 11.6 GM/DL (ref 12–16)
IMMATURE GRANS (ABS): 0.01 THOU/MM3 (ref 0–0.07)
IMMATURE GRANULOCYTES: 0.4 %
LYMPHOCYTES # BLD: 26.5 %
LYMPHOCYTES ABSOLUTE: 0.7 THOU/MM3 (ref 1–4.8)
MAGNESIUM: 1.7 MG/DL (ref 1.6–2.4)
MCH RBC QN AUTO: 29.9 PG (ref 26–33)
MCHC RBC AUTO-ENTMCNC: 31.2 GM/DL (ref 32.2–35.5)
MCV RBC AUTO: 95.9 FL (ref 81–99)
MONOCYTES # BLD: 16.1 %
MONOCYTES ABSOLUTE: 0.5 THOU/MM3 (ref 0.4–1.3)
NUCLEATED RED BLOOD CELLS: 0 /100 WBC
PLATELET # BLD: 202 THOU/MM3 (ref 130–400)
PMV BLD AUTO: 10.1 FL (ref 9.4–12.4)
POTASSIUM SERPL-SCNC: 3.8 MEQ/L (ref 3.5–5.2)
RBC # BLD: 3.88 MILL/MM3 (ref 4.2–5.4)
SEG NEUTROPHILS: 47.7 %
SEGMENTED NEUTROPHILS ABSOLUTE COUNT: 1.3 THOU/MM3 (ref 1.8–7.7)
SODIUM BLD-SCNC: 132 MEQ/L (ref 135–145)
WBC # BLD: 2.8 THOU/MM3 (ref 4.8–10.8)

## 2021-03-02 PROCEDURE — 6370000000 HC RX 637 (ALT 250 FOR IP): Performed by: STUDENT IN AN ORGANIZED HEALTH CARE EDUCATION/TRAINING PROGRAM

## 2021-03-02 PROCEDURE — 99222 1ST HOSP IP/OBS MODERATE 55: CPT | Performed by: SURGERY

## 2021-03-02 PROCEDURE — 97530 THERAPEUTIC ACTIVITIES: CPT

## 2021-03-02 PROCEDURE — 36415 COLL VENOUS BLD VENIPUNCTURE: CPT

## 2021-03-02 PROCEDURE — 97535 SELF CARE MNGMENT TRAINING: CPT

## 2021-03-02 PROCEDURE — 6360000002 HC RX W HCPCS: Performed by: FAMILY MEDICINE

## 2021-03-02 PROCEDURE — 83735 ASSAY OF MAGNESIUM: CPT

## 2021-03-02 PROCEDURE — 2580000003 HC RX 258: Performed by: INTERNAL MEDICINE

## 2021-03-02 PROCEDURE — 99232 SBSQ HOSP IP/OBS MODERATE 35: CPT | Performed by: FAMILY MEDICINE

## 2021-03-02 PROCEDURE — A4641 RADIOPHARM DX AGENT NOC: HCPCS | Performed by: NURSE PRACTITIONER

## 2021-03-02 PROCEDURE — 6360000002 HC RX W HCPCS: Performed by: NURSE PRACTITIONER

## 2021-03-02 PROCEDURE — 6360000004 HC RX CONTRAST MEDICATION: Performed by: NURSE PRACTITIONER

## 2021-03-02 PROCEDURE — 2580000003 HC RX 258: Performed by: NURSE PRACTITIONER

## 2021-03-02 PROCEDURE — 6360000002 HC RX W HCPCS: Performed by: INTERNAL MEDICINE

## 2021-03-02 PROCEDURE — 6370000000 HC RX 637 (ALT 250 FOR IP): Performed by: NURSE PRACTITIONER

## 2021-03-02 PROCEDURE — 85025 COMPLETE CBC W/AUTO DIFF WBC: CPT

## 2021-03-02 PROCEDURE — 6370000000 HC RX 637 (ALT 250 FOR IP): Performed by: FAMILY MEDICINE

## 2021-03-02 PROCEDURE — 2580000003 HC RX 258: Performed by: FAMILY MEDICINE

## 2021-03-02 PROCEDURE — 74220 X-RAY XM ESOPHAGUS 1CNTRST: CPT

## 2021-03-02 PROCEDURE — 1200000003 HC TELEMETRY R&B

## 2021-03-02 PROCEDURE — 6370000000 HC RX 637 (ALT 250 FOR IP): Performed by: INTERNAL MEDICINE

## 2021-03-02 PROCEDURE — 99232 SBSQ HOSP IP/OBS MODERATE 35: CPT | Performed by: INTERNAL MEDICINE

## 2021-03-02 PROCEDURE — 80048 BASIC METABOLIC PNL TOTAL CA: CPT

## 2021-03-02 RX ORDER — AMOXICILLIN AND CLAVULANATE POTASSIUM 875; 125 MG/1; MG/1
1 TABLET, FILM COATED ORAL EVERY 12 HOURS SCHEDULED
Status: DISCONTINUED | OUTPATIENT
Start: 2021-03-02 | End: 2021-03-03 | Stop reason: HOSPADM

## 2021-03-02 RX ORDER — SODIUM CHLORIDE 1000 MG
2 TABLET, SOLUBLE MISCELLANEOUS ONCE
Status: COMPLETED | OUTPATIENT
Start: 2021-03-02 | End: 2021-03-02

## 2021-03-02 RX ORDER — METOPROLOL SUCCINATE 50 MG/1
50 TABLET, EXTENDED RELEASE ORAL DAILY
Status: DISCONTINUED | OUTPATIENT
Start: 2021-03-02 | End: 2021-03-03 | Stop reason: HOSPADM

## 2021-03-02 RX ADMIN — PIPERACILLIN AND TAZOBACTAM 3375 MG: 3; .375 INJECTION, POWDER, LYOPHILIZED, FOR SOLUTION INTRAVENOUS at 04:17

## 2021-03-02 RX ADMIN — Medication 2 G: at 16:06

## 2021-03-02 RX ADMIN — PANTOPRAZOLE SODIUM 40 MG: 40 TABLET, DELAYED RELEASE ORAL at 06:16

## 2021-03-02 RX ADMIN — METOPROLOL SUCCINATE 50 MG: 50 TABLET, FILM COATED, EXTENDED RELEASE ORAL at 10:10

## 2021-03-02 RX ADMIN — IRON SUCROSE 300 MG: 20 INJECTION, SOLUTION INTRAVENOUS at 16:02

## 2021-03-02 RX ADMIN — PREGABALIN 150 MG: 75 CAPSULE ORAL at 21:10

## 2021-03-02 RX ADMIN — ENOXAPARIN SODIUM 40 MG: 40 INJECTION SUBCUTANEOUS at 08:17

## 2021-03-02 RX ADMIN — DULOXETINE HYDROCHLORIDE 60 MG: 60 CAPSULE, DELAYED RELEASE ORAL at 08:18

## 2021-03-02 RX ADMIN — PREGABALIN 150 MG: 75 CAPSULE ORAL at 08:18

## 2021-03-02 RX ADMIN — SODIUM CHLORIDE, PRESERVATIVE FREE 10 ML: 5 INJECTION INTRAVENOUS at 21:11

## 2021-03-02 RX ADMIN — AMOXICILLIN AND CLAVULANATE POTASSIUM 1 TABLET: 875; 125 TABLET, FILM COATED ORAL at 21:10

## 2021-03-02 RX ADMIN — SODIUM CHLORIDE, PRESERVATIVE FREE 10 ML: 5 INJECTION INTRAVENOUS at 08:30

## 2021-03-02 RX ADMIN — BARIUM SULFATE 100 ML: 0.6 SUSPENSION ORAL at 09:57

## 2021-03-02 RX ADMIN — LEVOTHYROXINE SODIUM 100 MCG: 100 TABLET ORAL at 06:22

## 2021-03-02 RX ADMIN — AMOXICILLIN AND CLAVULANATE POTASSIUM 1 TABLET: 875; 125 TABLET, FILM COATED ORAL at 12:59

## 2021-03-02 ASSESSMENT — PAIN SCALES - GENERAL: PAINLEVEL_OUTOF10: 0

## 2021-03-02 NOTE — PROGRESS NOTES
Palliative care referral received to discuss code status wishes with patient. Patient does have living will/DPOA on file. Patient is Christian and wishes are listed in living will document on file. Discussed code status options/wishes with patient. She wishes to remain a Full Code, she does state that she would not want any long term life support measures. Patient denies having any questions. Palliative care will follow as needed.

## 2021-03-02 NOTE — PROGRESS NOTES
AST 30 02/27/2021    BILITOT 0.4 02/27/2021    BILIDIR <0.2 11/06/2018    LABALBU 3.5 02/27/2021    LIPASE 14.1 02/27/2021      Ref. Range 3/1/2021 12:53   Ferritin Latest Ref Range: 10 - 291 ng/mL 209   Iron Latest Ref Range: 50 - 170 ug/dL 21 (L)   Iron Saturation Latest Ref Range: 20 - 50 % 8 (L)   TIBC Latest Ref Range: 171 - 450 ug/dL 267   Folate Latest Ref Range: 4.8 - 24.2 ng/mL > 20.0   Vitamin B-12 Latest Ref Range: 211 - 911 pg/mL 689      Ref. Range 3/1/2021 12:53   Immature Retic Fract Latest Ref Range: 3.0 - 15.9 % 9.2   Retic Ct Abs Latest Ref Range: 0.5 - 2.0 % 1.6   Absolute Retic # Latest Ref Range: 20.0 - 115.0 thou/mm3 66.0   Retic Hemoglobin Latest Ref Range: 28.2 - 35.7 pg 32.8     Significant Diagnostic Studies:   Esophagram 03/02/21  FINDINGS: Evaluation is limited due to patient's lack of mobility. Swallowing was not evaluated. There are no strictures or extrinsic abnormalities in the esophagus. No mucosal lesions are identified. There is a large fixed hiatal hernia containing    approximately two thirds of the esophagus. There is organoaxial gastric volvulus within the hernia. No gastric obstruction is identified.           Impression       Large fixed hiatal hernia with organoaxial gastric volvulus. Current Meds:  Scheduled Meds:   metoprolol succinate  50 mg Oral Daily    amoxicillin-clavulanate  1 tablet Oral 2 times per day    pantoprazole  40 mg Oral QAM AC    sodium chloride flush  10 mL Intravenous 2 times per day    enoxaparin  40 mg Subcutaneous Daily    DULoxetine  60 mg Oral Daily    pregabalin  150 mg Oral BID    lidocaine  1 patch Transdermal Daily    levothyroxine  100 mcg Oral Daily     Assessment:  79 yo F admitted 02/27/21 for worsening dysphagia. H/O intermittent dysphagia for 2 years. Endorses BRBPR twice a week ago. Noted to have pneumonia & UTI, started on ATBs. Thoracentesis attempted, aborted due to patient's anxiety.  H/O diverticular bleeds, underwent

## 2021-03-02 NOTE — PROGRESS NOTES
[facility]         Renal Inpatient Follow-Up note         3/2/2021 11:13 AM    Pt Name:   Chelly Gunter  YOB: 1940  Attending: Kallie Vides MD    Chief Complaint : Chelly Gunter is a 78 y.o. female being followed by nephrology for hyponatremia    Interval History :   Patient seen and examined by me. No distress  Feels well denies any complaints no chest pain no shortness of breath. Overall she is feeling okay. Scheduled Medications :    metoprolol succinate  50 mg Oral Daily    amoxicillin-clavulanate  1 tablet Oral 2 times per day    pantoprazole  40 mg Oral QAM AC    sodium chloride flush  10 mL Intravenous 2 times per day    enoxaparin  40 mg Subcutaneous Daily    DULoxetine  60 mg Oral Daily    pregabalin  150 mg Oral BID    lidocaine  1 patch Transdermal Daily    levothyroxine  100 mcg Oral Daily         Vitals :  BP (!) 180/88   Pulse 73   Temp 97.8 °F (36.6 °C) (Oral)   Resp 16   Ht 5' 6\" (1.676 m)   Wt 166 lb 1 oz (75.3 kg)   SpO2 95%   BMI 26.80 kg/m²     24HR INTAKE/OUTPUT:    No intake or output data in the 24 hours ending 03/02/21 1113  Last 3 weights  Wt Readings from Last 3 Encounters:   03/01/21 166 lb 1 oz (75.3 kg)   12/21/20 162 lb 6.4 oz (73.7 kg)   08/19/20 165 lb (74.8 kg)           Physical Exam :  General Appearance:  Well developed.  No distress  Mouth/Throat:  Oral mucosa moist  Neck:  Supple, no JVD  Lungs:  Breath sounds: clear  Heart[de-identified]  S1,S2 heard  Abdomen:  Soft, non - tender  Musculoskeletal:  Edema -trace         Last 3 CBC   Recent Labs     02/27/21 2200 03/01/21  0526 03/02/21  0500   WBC 6.5 4.8 2.8*   RBC 4.65 3.90* 3.88*   HGB 14.2 11.9* 11.6*   HCT 42.8 37.5 37.2    196 202     Last 3 CMP  Recent Labs     02/27/21  2200 02/27/21  2200 03/01/21  0526 03/01/21  0915 03/01/21  1253 03/02/21  0501   *   < > 133* 125* 130* 132*   K 4.3  --  4.1  --   --  3.8   CL 90*  --  97*  --   --  95*   CO2 26  --  28  --   --  28   BUN 7  --  7  --   --  5*   CREATININE 0.5  --  0.5  --   --  0.4   CALCIUM 9.0  --  8.6  --   --  8.6   LABALBU 3.5  --   --   --   --   --    BILITOT 0.4  --   --   --   --   --     < > = values in this interval not displayed. Assessment    1. Renal -renal function appears to be stable at baseline  2. Hyponatremia-exact etiology not clear, urine lites are not helpful at this time as she has received IV contrast earlier today  ? However patient does look clinically dry he was started on IV fluids and sodium is improving well.  ? She was on Cymbalta and recently started on Lyrica which can also precipitate some hyponatremia/SIADH but doubt this is the case  ? Overall sodium better we will do 2 g of sodium chloride tablet today and continue fluid restriction.     3. Essential hypertension running high this morning add Norvasc 5 mg p.o. daily  4. History of hypothyroidism  5. Dysphagia will be having any esophageal gram today  6. Meds reviewed and discussed with patient and family at bedside      LOLITA Pino D.  [facility].

## 2021-03-02 NOTE — CONSULTS
[address] [id number]  General Surgery Consult Note  Jarek Joyce MD FACS    Pt Name: Cheral Lefort Height  MRN: [id number]  YOB: 1940  Date of evaluation: 3/2/2021  Primary Care Physician: Grover Mccarthy DO  Patient evaluated at the request of Hospitalist    Reason for evaluation: Large paraesophageal/hiatal hernia with volvulus  IMPRESSIONS:   1. Large paraesophageal/hiatal hernia with gastric volvulus  2. Dysphagia  3. Pneumonia  4. Left pleural effusion  5. Urinary tract infection  6. Acute on chronic hyponatremia  7. Anemia  8. GERD  9. Bright red blood per rectum last week  10. Hypothyroidism  11. History diverticular disease status post colectomy  12. Roman Catholic  13. does not have any pertinent problems on file. PLANS:   1. Long discussion with patient and  at bedside about the pros and cons of conservative management, gastric volvulus and hiatal hernia reduction by G-tube insertion and also formal repair of the hernia with fundoplication and possible mesh. Discussed with them the pros and cons of robotic, laparoscopic and open techniques. Concern with patient's previous history of colectomy and scar tissue which may hinder robotic approach. Abdomen benign tonight. No urgent intervention needed. Patient to discuss it further with family prior to making a decision. Starting on liquid diet this evening. Reevaluate in a.m.  2. Palliative care  3. Antibiotics per primary service  4. PPI  5. Monitor hemoglobin/hematocrit  6. Replace electrolytes as needed per protocol. 7. Nephrology following  8. GI service following with no plans of endoscopy at this time  9. Wrist ratification/medical clearance as needed if surgery wished upon by patient/family.     SUBJECTIVE:   Chief complaint: Dysphagia    History of present illness: Cory Schirmer is an 24-year-old female who was asked to see in consultation secondary to large paraesophageal/hiatal hernia with organoaxial volvulus but no gastric outlet obstruction. Patient has a long history of a known hiatal hernia. Gradually has been increasing in size. Came in secondary to food getting stuck at the distal esophagus when she was eating spaghetti. She states it usually happens in regards to the dysphagia with dry foods such as pasta, rice or steak/meat. This time she was trying to pass the food with liquids even hot water for 3 hours unsuccessfully. She states during this time the liquid came right back up. Currently denies any abdominal pain. No nausea or vomiting. No diarrhea or constipation. No melena. Recent history of bright red blood per rectum. Concern for hemorrhoids. Antibiotics for pneumonia and urinary tract infection. Failed thoracentesis for left pleural effusion. Patient unknown Hinduism. Underwent colon resection due to diverticular disease in 2016. This was attempted robotically but converted to open at that time. Previous upper endoscopy in 2011 11 demonstrated per reports a large hiatal hernia with Alondra Powhatan ulcers and esophageal scarring post dilatation. She was dilated at that time of the [address] [place] [address]. She states currently she feels she is at her baseline. She is planning full liquids for dinner. Denies any current chest pain or shortness of breath. No abdominal pain.     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 David Santos GERD (gastroesophageal reflux disease)     Diverticulitis     Hiatal hernia     Hypertension     Hypothyroidism     Osteoarthritis      Past Surgical History:      Procedure Laterality Date    APPENDECTOMY  1958    BACK SURGERY      CARPAL TUNNEL RELEASE Bilateral 2013    w/cubital tunnel    COLON SURGERY  07/29/2016    Procedure: Dai Xiao DAILY 2/2/21   Diya Medicus, APRN - CNP   pregabalin (LYRICA) 150 MG capsule Take 1 capsule by mouth 2 times daily for 180 days. 1/28/21 7/27/21  DO Jessie Alfordc. Devices Donya Da) MISC 1 each by Does not apply route daily 2/11/20   Shira Snyder DO   DULoxetine (CYMBALTA) 60 MG extended release capsule TAKE 1 CAPSULE DAILY 1/16/20   Shira Snyder DO   Misc. Devices MISC Mechanical lift for a Radha Swain 5/31/18   Shira Snyder DO   Scooter MISC by Does not apply route Power scooter 10/31/17   Shira Snyder DO   CRANBERRY PO Take 1,500 mg by mouth daily    Historical Provider, MD   Omega 3-6-9 Fatty Acids (OMEGA 3-6-9 COMPLEX) CAPS Take 1 capsule by mouth daily     Historical Provider, MD    Scheduled Meds:   metoprolol succinate  50 mg Oral Daily    amoxicillin-clavulanate  1 tablet Oral 2 times per day    iron sucrose  300 mg Intravenous Once    pantoprazole  40 mg Oral QAM AC    sodium chloride flush  10 mL Intravenous 2 times per day    enoxaparin  40 mg Subcutaneous Daily    DULoxetine  60 mg Oral Daily    pregabalin  150 mg Oral BID    lidocaine  1 patch Transdermal Daily    levothyroxine  100 mcg Oral Daily     Continuous Infusions:  PRN Meds:.sodium chloride flush, promethazine **OR** ondansetron, polyethylene glycol, acetaminophen **OR** acetaminophen, magnesium sulfate, HYDROcodone 5 mg - acetaminophen  Allergies:  is allergic to ampicillin; morphine; pcn [penicillins]; and sulfa antibiotics. Family History:  family history includes Arthritis in her brother, mother, and sister; Cancer in her maternal aunt; Depression in her brother and sister; Diabetes in her maternal grandmother and sister; Early Death in her maternal aunt; Hearing Loss in her father and mother; Heart Disease in her paternal grandmother; Heart Disease (age of onset: 76) in her father; High Cholesterol in her mother; Stroke in her father. Social History:   reports that she quit smoking about 53 years ago.  Her smoking use included cigarettes. She started smoking about 64 years ago. She has a 22.00 pack-year smoking history. She has never used smokeless tobacco. She reports that she does not drink alcohol or use drugs. Review of Systems:  General Denies any fever or chills. No significant unexpected weight change. HEENT Denies any diplopia, tinnitus or vertigo. No chronic headaches. Resp Denies any shortness of breath, cough or wheezing  Cardiac Denies any chest pain, palpitations, claudication or edema. Hypertension  GI Denies any melena, hematochezia, hematemesis or pyrosis. Dysphagia. Large hiatal hernia as mentioned above. GERD. History of diverticular disease status post colectomy.  Denies any frequency, urgency, hesitancy or incontinence  Heme Denies bruising or bleeding easily  Endocrine hypothyroidism. No diabetes mellitus  Neuro Denies any focal motor or sensory deficits  Musculoskeletal  Arthritis. No gout. No weakness. Fibromyalgia. Chronic back pain  Psychiatric  Denies any severe depression or agitation. No panic attacks. No suicidal ideation. OBJECTIVE:   CURRENT VITALS:  height is 5' 6\" (1.676 m) and weight is 166 lb 1 oz (75.3 kg). Her oral temperature is 97.8 °F (36.6 °C). Her blood pressure is 185/92 (abnormal) and her pulse is 73. Her respiration is 16 and oxygen saturation is 96%. Temperature Range (24h):Temp: 97.8 °F (36.6 °C) Temp  Av.9 °F (36.6 °C)  Min: 97.6 °F (36.4 °C)  Max: 98.2 °F (36.8 °C)  BP Range (02A): Systolic (60XRI), QMP:264 , Min:157 , ZSZ:678     Diastolic (61YZN), BQA:34, Min:76, Max:92    Pulse Range (24h): Pulse  Av  Min: 68  Max: 79  Respiration Range (24h): Resp  Av.4  Min: 16  Max: 18  Current Pulse Ox (24h):  SpO2: 96 %  Pulse Ox Range (24h):  SpO2  Av.2 %  Min: 95 %  Max: 96 %  Oxygen Amount and Delivery:    CONSTITUTIONAL: Alert and oriented times 3, no acute distress and cooperative to examination with proper mood and affect.   SKIN: Skin color, texture, turgor normal. No rashes or lesions. LYMPH: no cervical nodes, no inguinal nodes  HEENT: Head is normocephalic, atraumatic. EOMI, PERRLA. NECK: Supple, symmetrical, trachea midline, no adenopathy, thyroid symmetric, not enlarged and no tenderness, skin normal.  CHEST/LUNGS: chest symmetric with normal A/P diameter, normal respiratory rate and rhythm, lungs clear to auscultation without wheezes, rales or rhonchi. No accessory muscle use. Scars None   CARDIOVASCULAR: Heart sounds are normal.  Regular rate and rhythm. Normal S1 and S2.  ABDOMEN: Normal shape. N Normal bowel sounds. Soft, nondistended, no masses or organomegaly. No evidence of incarcerated/strangulated hernia. Percussion: Normal without hepatosplenomegally. Tenderness: absent. RECTAL: deferred, not clinically indicated  NEUROLOGIC: There are no focalizing motor or sensory deficits. CN II-XII are grossly intact. EXTREMITIES: no cyanosis, no clubbing and no edema.   LABS:     Recent Labs     02/27/21  2200 02/28/21  0521 02/28/21  0521 02/28/21  0800 02/28/21  0800 03/01/21  0526 03/01/21  0658 03/01/21  0915 03/01/21  1253 03/02/21  0500 03/02/21  0501 03/02/21  1348   WBC 6.5  --   --   --   --  4.8  --   --   --  2.8*  --   --    HGB 14.2  --   --   --   --  11.9*  --   --   --  11.6*  --   --    HCT 42.8  --   --   --   --  37.5  --   --   --  37.2  --   --      --   --   --   --  196  --   --   --  202  --   --    *  --    < >  --    < > 133*  --  125* 130*  --  132*  --    K 4.3  --   --   --   --  4.1  --   --   --   --  3.8  --    CL 90*  --   --   --   --  97*  --   --   --   --  95*  --    CO2 26  --   --   --   --  28  --   --   --   --  28  --    BUN 7  --   --   --   --  7  --   --   --   --  5*  --    CREATININE 0.5  --   --   --   --  0.5  --   --   --   --  0.4  --    MG  --  1.5*  --   --   --   --  1.7  --   --   --   --  1.7   PHOS  --  3.5  --   --   --   --   --   --   --   --   --   --    CALCIUM 9.0  -- ATTEMPTED THORACENTESIS WITH ULTRASOUND GUIDANCE:       PERFORMED BY: Nacho Young M.D.       CLINICAL INFORMATION: Pleural effusion left side.       APPROACH: Left side posteriorly and inferiorly . A few permanent sonographic images were obtained during procedure for documentation.       FLUID WITHDRAWN: None. ESTIMATED BLOOD LOSS: Minimal       PROCEDURE: Signed informed consent was obtained prior to performing this procedure.       The thorax was initially evaluated sonographically to determine appropriate puncture site. The skin was marked, prepped, and draped in a sterile fashion. The pleural effusions very small. Following local anesthesia and utilizing aseptic technique, one    pass of a 5 Greek one-step catheter was made on the left side. No fluid was obtained. Unfortunately, the patient did not tolerate the initial puncture and became nauseated, cold, clammy. Due to this, no additional attempts were made.           Impression   1. Attempted thoracentesis left side. This was unsuccessful due to combination of a very small effusion and the patient's inability to tolerate the needle puncture. 2. If desired, a repeat attempt at thoracentesis can be performed tomorrow with CT guidance.               **This report has been created using voice recognition software.  It may contain minor errors which are inherent in voice recognition technology. **       Final report electronically signed by Dr. Daquan Garcia on 2/28/2021 9:33 AM     Narrative   PROCEDURE: FL ESOPHAGRAM       CLINICAL INFORMATION: Dysphasia       TECHNIQUE: Following the oral administration of thin barium, the anatomy of the esophagus was evaluated in a limited esophagram. 10 images were obtained. Total fluoroscopy time 1.0 minutes.       COMPARISON: None       FINDINGS: Evaluation is limited due to patient's lack of mobility. Swallowing was not evaluated. There are no strictures or extrinsic abnormalities in the esophagus.  No mucosal lesions are identified. There is a large fixed hiatal hernia containing    approximately two thirds of the esophagus. There is organoaxial gastric volvulus within the hernia. No gastric obstruction is identified.           Impression       Large fixed hiatal hernia with organoaxial gastric volvulus.       Final report electronically signed by Dr. Areli Snow on 3/2/2021 10:02 AM     Thank you for the interesting evaluation. Further recommendations to follow.     Electronically signed by Eric Melendez MD on 3/2/2021 at 5:05 PM

## 2021-03-02 NOTE — PLAN OF CARE
Problem: Nutrition  Goal: Optimal nutrition therapy  Outcome: Ongoing   Nutrition Problem #1: Inadequate oral intake  Intervention: Food and/or Nutrient Delivery: Continue Current Diet, Start Oral Nutrition Supplement  Nutritional Goals: Patient will safely consume 75% or more of meals during LOS.

## 2021-03-02 NOTE — PROGRESS NOTES
PROGRESS NOTE      Patient:  Joni Bhatia Height      Unit/Bed:-01/001-A    YOB: 1940    MRN: [id number]       Acct: [de-identified]     PCP: Rene Preciado DO    Date of Admission: 2/27/2021      Assessment/Plan:        Dysphagia  -Gradually worsening dysphagia to solid foods that has been present for years. Worsened on admission. -GI consulted, plan for esophagram today. Appreciate GI input   -Large hiatal hernia noted on CTA of the chest  -Esophagram on 3/2/2021: Large fixed hiatal hernia with organoaxial gastric volvulus  -GI on-board. GI consulted general surgery, awaiting consult. Appreciate GI input   -Patient will need outpatient EGD. 2. Presyncope  -Likely vasovagal process with her nausea and vomiting. -EKG initially showing sinus arrhythmia however this resolved spontaneously  -Bilateral carotid duplex unremarkable  -Echocardiogram completed on 3/1/2021 and unremarkable  -Continue telemetry and neurochecks    3. Acute on chronic hypotonic hyponatremia, improving   -asymptomatic  -Chronic hyponatremia with sodium baseline of 128-133.  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 Urine osmolality elevated and urine sodium elevated. Urine osmolality decreased  -Sodium slightly increased on 3/1/2021 at 133 which is around 9 mEq greater the 124 mEq of sodium drawn ( corrected 9 mEqs in roughly 12 hours). Patient given 250cc bolus of D5W due to this rapid increase in sodium concentration. Sodium reduced to 125 after bolus.  -Sodium of 132 this morning, back to baseline.   -cont fluid restriction 1.5L/24 hours  -Nephrology consulted and is following patient's care. Nephrology recommend salt tab 2 g PO x 1 today. Nephrology Appreciate nephrology input     4. Hematochezia, resolved prior to admission  -Mildly decreased hemoglobin at 11.6  -Anemia panel showing mild iron deficiency  -Likely hemorrhoidal versus diverticular  -GI following    5.  CAP versus aspiration pneumonia  -Vomiting and regurgitation after dysphagia prior to admission  -CTA of the chest showing atelectasis in the left lower lobe. -Mildly elevated procalcitonin at 1.31 on admission this increased to 1.44 3/1/2021  -Patient on Zosyn to cover for possible aspiration pneumonia  -Switch zosyn to augmentin today. Had 3 days of zosyn, 4 more day of Augmentin, total 7 days antibiotics     6. Left-sided pleural effusion  -Very small pleural effusion unable to undergo thoracentesis. -Patient is saturating well on room air no signs of acute respiratory distress currently  -continue to monitor     7. Hypomagnesemia, resolved   -Magnesium of 1.7 3/2/21. Will follow    8. Hiatal hernia  -Seen on CTA of the chest    9. Bilateral lower extremity edema  -Chronic, stable  -Echocardiogram unremarkable    10. History of left leg fracture  -Wheelchair-bound most of the time. -PT and OT consulted    11. Stable right midlung nodule  -Former smoker  -Follow-up with PCP at discharge    12. Hypothyroidism  -TSH low on admission. Synthroid decreased to 100 mcg daily.  -Follow-up with PCP with repeat lab work in 6 weeks    13. Essential hypertension  -hypertensive for past 24 hours, increase Toprol XL from 25 mg to 50 mg PO daily  -pt also takes enalapril at home, will hold for now due to hyponatremia     14. Chronic pain/fibromyalgia  -Continue Cymbalta, Lyrica    15. Former smoker     12. Leukopenia, likely from pneumonia    -afebrile. CBC in am    17. Low BUN, possibly malnutrition    -dietician consult     Chief Complaint: Dysphagia    Hospital Course:   Per H&P note:     \"esophageal food impaction after dinner. Associated regurgitation of liquids and food . She complains of upper abdominal pain due to persistent nausea and vomiting. She has a 2 years history of progressive dysphagia to solids. No history of prior EGD studies. In the ED patient had dizziness with near syncope after nausea and vomiting.   She was noted to be pale and diaphoretic by the nursing staff. Latest vital signs in the ED - temperature 98.6 °C, respirate of 25 breaths/min, heart rate 110 bpm, blood pressure 105/93, oxygen 93% on room air. Notable labs sodium 125 from 131 on 12/22/2020, potassium 4.2, creatinine 0.5, osmolality 249.7, troponin first set less than 0.010, WBC 6.5, hemoglobin 14.2, MCV 92.0, platelets 702. Chest x-ray which I reviewed shows left lower lung zone infiltrate. ED treatment included fentanyl 50 mcg IV x1 dose, Zofran 4 mg IV x2 doses, glucagon 1 mg.\"     3/1/21: Nephrology following for acute on chronic hyponatremia. Rapid increase in sodium level overnight requiring D5W bolus. Sodium improved with bolus and is gradually increasing. GI consulted and recommending esophagram tomorrow morning. N.p.o. at midnight. 3/2/21: Esophagram ordered for today. Plan based on results. Subjective: Patient sitting up in bed today. States that she has been feeling pretty well this morning. Still hurts on mid sternum when she swallows. Denies increasing dysphagia yesterday and states that it was around her baseline.  Denies chest pain, abd pain, N/V, sob      Medications:  Reviewed    Infusion Medications    Scheduled Medications    pantoprazole  40 mg Oral QAM AC    sodium chloride flush  10 mL Intravenous 2 times per day    enoxaparin  40 mg Subcutaneous Daily    DULoxetine  60 mg Oral Daily    metoprolol succinate  25 mg Oral Daily    pregabalin  150 mg Oral BID    piperacillin-tazobactam  3,375 mg Intravenous Q8H    lidocaine  1 patch Transdermal Daily    levothyroxine  100 mcg Oral Daily     PRN Meds: sodium chloride flush, promethazine **OR** ondansetron, polyethylene glycol, acetaminophen **OR** acetaminophen, magnesium sulfate, HYDROcodone 5 mg - acetaminophen    No intake or output data in the 24 hours ending 03/02/21 0707    Diet:  Diet NPO, After Midnight    Exam:  BP (!) 157/76   Pulse 77   Temp 98.2 °F (36.8 °C) (Oral) Resp 16   Ht 5' 6\" (1.676 m)   Wt 166 lb 1 oz (75.3 kg)   SpO2 95%   BMI 26.80 kg/m²     General appearance: No apparent distress, appears stated age and cooperative. HEENT: Pupils equal, round, and reactive to light. Conjunctivae/corneas clear. Neck: Supple, with full range of motion. No jugular venous distention. Trachea midline. Respiratory:  Normal respiratory effort. Fine crackles on LLL, diminished air entry on RLL  Cardiovascular: Normal rate, regular rhythm with normal S1/S2 without murmurs, rubs or gallops. Abdomen: Soft, non-tender, non-distended with normal bowel sounds. Musculoskeletal: passive and active ROM x 4 extremities. Trace lower extremity edema. Skin: Skin color, texture, turgor normal.  No rashes or lesions. Neurologic:  Neurovascularly intact without any focal sensory/motor deficits. Cranial nerves: II-XII intact, grossly non-focal.  Psychiatric: thought content appropriate, normal insight  Exam of extremities: peripheral pulses normal, no pedal edema, trace pitting edema on both legs, from below knees to distal legs, no clubbing or cyanosis      Labs:   Recent Labs     02/27/21 2200 03/01/21  0526 03/02/21  0500   WBC 6.5 4.8 2.8*   HGB 14.2 11.9* 11.6*   HCT 42.8 37.5 37.2    196 202     Recent Labs     02/27/21 2200 02/28/21  0521 02/28/21  0521 03/01/21  0526 03/01/21  0915 03/01/21  1253 03/02/21  0501   *  --    < > 133* 125* 130* 132*   K 4.3  --   --  4.1  --   --  3.8   CL 90*  --   --  97*  --   --  95*   CO2 26  --   --  28  --   --  28   BUN 7  --   --  7  --   --  5*   CREATININE 0.5  --   --  0.5  --   --  0.4   CALCIUM 9.0  --   --  8.6  --   --  8.6   PHOS  --  3.5  --   --   --   --   --     < > = values in this interval not displayed. Recent Labs     02/27/21  2200   AST 30   ALT 12   BILITOT 0.4   ALKPHOS 111     Recent Labs     02/27/21  2200   INR 0.90     No results for input(s): [place]aks in the last 72 hours.     Urinalysis: reconstructions, and/or weight-based dosing   when appropriate to reduce radiation to a low as reasonably achievable. XR CHEST PORTABLE   Final Result   New patchy left midlung opacity which may reflect underlying infiltrate.       This document has been electronically signed by: Meaghan Braga MD on    02/27/2021 11:47 PM      FL ESOPHAGRAM    (Results Pending)       Diet: Diet NPO, After Midnight    DVT prophylaxis: [x] Lovenox                                 [] SCDs                                 [] SQ Heparin                                 [] Encourage ambulation           [] Already on Anticoagulation    Anticipated Discharge in : TBD      Disposition:    [] Home       [] TCU       [] Rehab       [] Psych       [] SNF       [] [place]       [x] Other-Home with [facility] PT/OT once medically stable     Code Status: Full Code      Electronically signed by Cortez Mera DO on 3/2/2021 at 7:07 AM

## 2021-03-02 NOTE — ED PROVIDER NOTES
[address], Pt Name: Joni Gunter  MRN: [id number]  Jama 1940  Date of evaluation: 9/12/20      I personally saw and examined the patient. I have reviewed and agree with the Resident findings, including all diagnostic interpretations and treatment plans as written. I was present for the key portion of any procedures performed and the inclusive time noted in any critical care statement. History: This patient was seen in conjunction with Luisa Marina, resident physician. This is a somewhat not-straightforward case where the patient ended up getting admitted for reasons other than the chief complaint. The patient's main presenting complaint sounded like she had an esophageal food bolus. She was eating shrimp scampi and she had a sensation where she could not completely swallow it. When I went in to see the patient she was not spitting or drooling. But she says that she was actually doing that earlier. She thinks she might have passed the obstruction spontaneously. However other issues quickly became apparent. Her serum chemistry was markedly abnormal with a sodium of 125. She has had some hyponatremia in the past but not to this extent. The amount of water that she described drinking to me did not really sound like polydipsia, unless she is downplaying it. Also, I was called to the room emergently about half-way into her stay because she had either a syncopal or near syncopal episode while ambulating to the bathroom in th ED. By the time I arrived she appeared to be fully alert but keeps repeating \" I just don't feel right\". She did describe some chest pain earlier which was unrelated to this food bolus incident, and did have some tachycardia at 1 point. She had a history of previous PE. So we felt that we had to rule her out for pulmonary embolism on this visit.   She ended up getting a CTA of the chest.  Which was negative for pulmonary embolism. EKG interpreted by me showing sinus rhythm at a rate of 87, multiple PACs noted. Troponin negative    In any case, I felt that she needed to remain on a cardiac monitor and probably have a full set of troponins done as well as potential investigation into her hyponatremia. I discussed the case with the hospitalist to arrange for admission. CRITICAL CARE: There was a high probability of clinically significant/life threatening deterioration in this patient's condition which required my urgent intervention. Total critical care time was 30 minutes. This excludes any time for separately reportable procedures.     .    Diagnosis: Near-Syncope vs syncope, Hyponatremia, Chest Pain, ? - esophageal food bolus with spontaneous resolution  Admitted                    Pee Franks DO  03/01/21 4801

## 2021-03-02 NOTE — PROGRESS NOTES
Comprehensive Nutrition Assessment    Type and Reason for Visit:  Initial, Positive Nutrition Screen, Consult(poor appetite/ intake, difficulty chewing/ swallowing; eval and treat)    Nutrition Recommendations/Plan:   Trial ONS: Ensure Clear BID (note report of not tolerate \"milky type supplement\" on past admit). Rcommend MVI. Diet as per GI. Nutrition Assessment:    Pt. nutritionally compromised AEB report of poor intake/ appetite over the past year. At risk for further nutrition compromise r/t admit with worsening dysphagia and underlying medical condition (hx diverticulitis, fibromyalgia, OA). Nutrition recommendations/interventions as per above. Malnutrition Assessment:  Malnutrition Status: At risk for malnutrition (Comment)    Context:  Chronic Illness     Findings of the 6 clinical characteristics of malnutrition:  Energy Intake:  7 - 75% or less estimated energy requirements for 1 month or longer  Weight Loss:  No significant weight loss     Body Fat Loss:  No significant body fat loss     Muscle Mass Loss:  Unable to assess    Fluid Accumulation:  Unable to assess     Strength:  Not Performed    Estimated Daily Nutrient Needs:  Energy (kcal):  1500- 1875 kcals (20-25); Weight Used for Energy Requirements:  (75kgm on 3/1)     Protein (g):   grams (1.3-2); Weight Used for Protein Requirements:  Ideal(59kgm)        Nutrition Related Findings:  Patient admit with worsening dysphagia, history of intermittent dysphagia x 2 years, reports difficulty swallowing spaghetti and rice; had been NPO earlier today for esophagram; per GI esophagram demonstrated large fixed hiatal hernia with organoaxial gastric volvulus.  Reports appetite/ intake had declined over the years due to less active and intentional less intake to prevent weight gain, denies nausea; sodium 132, potassium 3.8, BUN 5, Creatinine 0.4, glucose 93; antibiotic; BM x 1 today brief visit with patient due to Provider visit and patient later unavailable x 2 attempts      Wounds:  None       Current Nutrition Therapies:    DIET DENTAL SOFT; No Added Salt (3-4 GM); Dental Soft; Daily Fluid Restriction: 1500 ml    Anthropometric Measures:  · Height: 5' 6\" (167.6 cm)  · Current Body Weight: 166 lb 1 oz (75.3 kg)   · Admission Body Weight: 166 lb 1 oz (75.3 kg)(3/1; +1 BLE edema)    · Usual Body Weight: (per EMR: 3/10/20 162#, 12/20/20 162# 6.4oz)     · Ideal Body Weight: 130 lbs;      · BMI: 26.8  · BMI Categories: Overweight (BMI 25.0-29. 9)       Nutrition Diagnosis:   · Inadequate oral intake related to altered GI structure as evidenced by poor intake prior to admission      Nutrition Interventions:   Food and/or Nutrient Delivery:  Continue Current Diet, Start Oral Nutrition Supplement  Nutrition Education/Counseling:  Education not appropriate   Coordination of Nutrition Care:  Continue to monitor while inpatient    Goals:  Patient will safely consume 75% or more of meals during LOS. Nutrition Monitoring and Evaluation:   Behavioral-Environmental Outcomes:  None Identified   Food/Nutrient Intake Outcomes:  Diet Advancement/Tolerance, Food and Nutrient Intake, Supplement Intake  Physical Signs/Symptoms Outcomes:  Biochemical Data, Nutrition Focused Physical Findings, Skin, Weight, Chewing or Swallowing, Fluid Status or Edema, GI Status     Discharge Planning:     Too soon to determine     Electronically signed by Brit Hardin RD, LD on 3/2/21 at 3:31 PM EST    Contact: [phone]

## 2021-03-02 NOTE — PROGRESS NOTES
[address] RENAL TELEMETRY 6K  Occupational Therapy  Daily Note  Time:   Time In: 901  Time Out: 9623  Timed Code Treatment Minutes: 24 Minutes  Minutes: 24          Date: 3/2/2021  Patient Name: Yordy Gunter,   Gender: female      Room: [facility]-A  MRN: [id number]  : 1940  (78 y.o.)  Referring Practitioner: Antelmo Beyer MD  Diagnosis: Pre-syncope  Additional Pertinent Hx: 57-year-old female patient presented to the ED with sensation of esophageal food impaction after dinner. Associated regurgitation of liquids and food . She complains of upper abdominal pain due to persistent nausea and vomiting. Restrictions/Precautions:  Restrictions/Precautions: Weight Bearing, Fall Risk  Left Lower Extremity Weight Bearing: Toe Touch Weight Bearing  Position Activity Restriction  Other position/activity restrictions: s/p distal L femur ORIF      VITALS: Vitals not assessed per clinical judgement, see nursing flowsheet    SUBJECTIVE: Pt resting in bed upon arrival, agreeable to OT session requesting up to BR. RN okayed session stating transport was arriving shortly to take pt to procedure. PAIN: Denies. COGNITION: WFL    ADL:   Grooming: with set-up. Using wet wipes washing face/hands while seated on STS. Toileting: Supervision. For hygiene while seated on STS  Toilet Transfer: Minimal Assistance. From STS- use of grab bars as assist.. BALANCE:  Sitting Balance:  Supervision. Standing Balance: Contact Guard Assistance, Minimal Assistance. x1-2 minutes within toileting task. BED MOBILITY:  Supine to Sit: Stand By Assistance    Scooting: Stand By Assistance EOB    TRANSFERS:  Sit to Stand:  [employer], Minimal Assistance. Stand to Sit: Contact Guard Assistance. Stand Pivot: Contact Guard Assistance. EOB to w/c, wc<>BSC    FUNCTIONAL MOBILITY:  Assistive Device: Wheelchair  Assist Level:  Supervision.    Distance: Completed functional mobility to/from BR at fair pace. Pt requires increased time problem solving for safety within tight spaces. ADDITIONAL ACTIVITIES:  Not completed d/t transport arriving. ASSESSMENT:     Activity Tolerance:  Patient tolerance of  treatment: good. Discharge Recommendations: Continue to assess pending progress, Home with Home health OT   Equipment Recommendations:    Plan: Times per week: 5x  Times per day: Daily  Current Treatment Recommendations: Strengthening, Safety Education & Training, Balance Training, Self-Care / ADL, Patient/Caregiver Education & Training, Functional Mobility Training, Home Management Training, Endurance Training    Patient Education  Patient Education: ADL's, Precautions, Assistive Device Safety and Safety with transfers and w/c mobility. Goals  Short term goals  Time Frame for Short term goals: by discharge  Short term goal 1: Pt will complete stand pivot t/fs with SBA and no safety cues for WB status to increase ease with toileting routine  Short term goal 2: Pt will complete BADL routine with SBA and no cues for WB status to increase indep with bathing  Short term goal 3: Pt will tolerate dynamic standing wtih unilateral release x2 min with SBA and no cues for WB status to increase ease with clothing mgmt during toileting routine  Short term goal 4: Pt will complete BUE strengthening HEP with light resistance to increase ease with t/fs  Long term goals  Time Frame for Long term goals : no LTG d/t short ELOS    Following session, patient left in safe position with all fall risk precautions in place.

## 2021-03-03 ENCOUNTER — PREP FOR PROCEDURE (OUTPATIENT)
Dept: SURGERY | Age: 81
End: 2021-03-03

## 2021-03-03 VITALS
DIASTOLIC BLOOD PRESSURE: 93 MMHG | HEART RATE: 78 BPM | RESPIRATION RATE: 18 BRPM | OXYGEN SATURATION: 96 % | SYSTOLIC BLOOD PRESSURE: 186 MMHG | TEMPERATURE: 98.2 F | HEIGHT: 66 IN | BODY MASS INDEX: 25.73 KG/M2 | WEIGHT: 160.1 LBS

## 2021-03-03 LAB
ANION GAP SERPL CALCULATED.3IONS-SCNC: 10 MEQ/L (ref 8–16)
BASOPHILS # BLD: 2.5 %
BASOPHILS ABSOLUTE: 0.1 THOU/MM3 (ref 0–0.1)
BUN BLDV-MCNC: 4 MG/DL (ref 7–22)
CALCIUM SERPL-MCNC: 8.8 MG/DL (ref 8.5–10.5)
CHLORIDE BLD-SCNC: 100 MEQ/L (ref 98–111)
CO2: 27 MEQ/L (ref 23–33)
CREAT SERPL-MCNC: 0.5 MG/DL (ref 0.4–1.2)
EOSINOPHIL # BLD: 6.8 %
EOSINOPHILS ABSOLUTE: 0.2 THOU/MM3 (ref 0–0.4)
ERYTHROCYTE [DISTWIDTH] IN BLOOD BY AUTOMATED COUNT: 13.1 % (ref 11.5–14.5)
ERYTHROCYTE [DISTWIDTH] IN BLOOD BY AUTOMATED COUNT: 45.4 FL (ref 35–45)
GFR SERPL CREATININE-BSD FRML MDRD: > 90 ML/MIN/1.73M2
GLUCOSE BLD-MCNC: 91 MG/DL (ref 70–108)
HCT VFR BLD CALC: 39.3 % (ref 37–47)
HEMOGLOBIN: 12.3 GM/DL (ref 12–16)
IMMATURE GRANS (ABS): 0.01 THOU/MM3 (ref 0–0.07)
IMMATURE GRANULOCYTES: 0.3 %
LYMPHOCYTES # BLD: 21.2 %
LYMPHOCYTES ABSOLUTE: 0.7 THOU/MM3 (ref 1–4.8)
MAGNESIUM: 1.7 MG/DL (ref 1.6–2.4)
MCH RBC QN AUTO: 29.6 PG (ref 26–33)
MCHC RBC AUTO-ENTMCNC: 31.3 GM/DL (ref 32.2–35.5)
MCV RBC AUTO: 94.5 FL (ref 81–99)
MONOCYTES # BLD: 12.6 %
MONOCYTES ABSOLUTE: 0.4 THOU/MM3 (ref 0.4–1.3)
NUCLEATED RED BLOOD CELLS: 0 /100 WBC
PLATELET # BLD: 242 THOU/MM3 (ref 130–400)
PMV BLD AUTO: 9.5 FL (ref 9.4–12.4)
POTASSIUM SERPL-SCNC: 3.5 MEQ/L (ref 3.5–5.2)
PROCALCITONIN: 0.53 NG/ML (ref 0.01–0.09)
RBC # BLD: 4.16 MILL/MM3 (ref 4.2–5.4)
SEG NEUTROPHILS: 56.6 %
SEGMENTED NEUTROPHILS ABSOLUTE COUNT: 1.9 THOU/MM3 (ref 1.8–7.7)
SODIUM BLD-SCNC: 137 MEQ/L (ref 135–145)
WBC # BLD: 3.3 THOU/MM3 (ref 4.8–10.8)

## 2021-03-03 PROCEDURE — 85025 COMPLETE CBC W/AUTO DIFF WBC: CPT

## 2021-03-03 PROCEDURE — 80048 BASIC METABOLIC PNL TOTAL CA: CPT

## 2021-03-03 PROCEDURE — 6370000000 HC RX 637 (ALT 250 FOR IP): Performed by: INTERNAL MEDICINE

## 2021-03-03 PROCEDURE — 6370000000 HC RX 637 (ALT 250 FOR IP): Performed by: NURSE PRACTITIONER

## 2021-03-03 PROCEDURE — 83735 ASSAY OF MAGNESIUM: CPT

## 2021-03-03 PROCEDURE — 84145 PROCALCITONIN (PCT): CPT

## 2021-03-03 PROCEDURE — 99239 HOSP IP/OBS DSCHRG MGMT >30: CPT | Performed by: FAMILY MEDICINE

## 2021-03-03 PROCEDURE — 6370000000 HC RX 637 (ALT 250 FOR IP): Performed by: STUDENT IN AN ORGANIZED HEALTH CARE EDUCATION/TRAINING PROGRAM

## 2021-03-03 PROCEDURE — 99232 SBSQ HOSP IP/OBS MODERATE 35: CPT | Performed by: SURGERY

## 2021-03-03 PROCEDURE — 36415 COLL VENOUS BLD VENIPUNCTURE: CPT

## 2021-03-03 PROCEDURE — 99232 SBSQ HOSP IP/OBS MODERATE 35: CPT | Performed by: INTERNAL MEDICINE

## 2021-03-03 PROCEDURE — 6360000002 HC RX W HCPCS: Performed by: INTERNAL MEDICINE

## 2021-03-03 PROCEDURE — 97535 SELF CARE MNGMENT TRAINING: CPT

## 2021-03-03 PROCEDURE — 6370000000 HC RX 637 (ALT 250 FOR IP): Performed by: FAMILY MEDICINE

## 2021-03-03 PROCEDURE — 2580000003 HC RX 258: Performed by: INTERNAL MEDICINE

## 2021-03-03 PROCEDURE — 97530 THERAPEUTIC ACTIVITIES: CPT

## 2021-03-03 PROCEDURE — APPNB30 APP NON BILLABLE TIME 0-30 MINS: Performed by: NURSE PRACTITIONER

## 2021-03-03 RX ORDER — SODIUM CHLORIDE 9 MG/ML
INJECTION, SOLUTION INTRAVENOUS CONTINUOUS
Status: CANCELLED | OUTPATIENT
Start: 2021-03-03

## 2021-03-03 RX ORDER — METOPROLOL SUCCINATE 50 MG/1
50 TABLET, EXTENDED RELEASE ORAL DAILY
Qty: 30 TABLET | Refills: 0 | Status: SHIPPED | OUTPATIENT
Start: 2021-03-04 | End: 2021-06-08 | Stop reason: SDUPTHER

## 2021-03-03 RX ORDER — CIPROFLOXACIN 2 MG/ML
400 INJECTION, SOLUTION INTRAVENOUS
Status: CANCELLED | OUTPATIENT
Start: 2021-03-03

## 2021-03-03 RX ORDER — AMLODIPINE BESYLATE 10 MG/1
10 TABLET ORAL DAILY
Status: DISCONTINUED | OUTPATIENT
Start: 2021-03-03 | End: 2021-03-03 | Stop reason: HOSPADM

## 2021-03-03 RX ORDER — PANTOPRAZOLE SODIUM 40 MG/1
40 TABLET, DELAYED RELEASE ORAL
Qty: 30 TABLET | Refills: 2 | Status: SHIPPED | OUTPATIENT
Start: 2021-03-04 | End: 2021-05-14 | Stop reason: ALTCHOICE

## 2021-03-03 RX ORDER — LEVOTHYROXINE SODIUM 0.1 MG/1
100 TABLET ORAL DAILY
Qty: 30 TABLET | Refills: 0 | Status: SHIPPED | OUTPATIENT
Start: 2021-03-04 | End: 2021-05-12 | Stop reason: SDUPTHER

## 2021-03-03 RX ORDER — AMOXICILLIN AND CLAVULANATE POTASSIUM 875; 125 MG/1; MG/1
1 TABLET, FILM COATED ORAL EVERY 12 HOURS SCHEDULED
Qty: 20 TABLET | Refills: 0 | Status: ON HOLD | OUTPATIENT
Start: 2021-03-03 | End: 2021-03-19 | Stop reason: HOSPADM

## 2021-03-03 RX ORDER — AMLODIPINE BESYLATE 5 MG/1
5 TABLET ORAL DAILY
Status: DISCONTINUED | OUTPATIENT
Start: 2021-03-03 | End: 2021-03-03

## 2021-03-03 RX ADMIN — AMLODIPINE BESYLATE 10 MG: 5 TABLET ORAL at 13:25

## 2021-03-03 RX ADMIN — AMOXICILLIN AND CLAVULANATE POTASSIUM 1 TABLET: 875; 125 TABLET, FILM COATED ORAL at 08:14

## 2021-03-03 RX ADMIN — METOPROLOL SUCCINATE 50 MG: 50 TABLET, FILM COATED, EXTENDED RELEASE ORAL at 08:15

## 2021-03-03 RX ADMIN — LEVOTHYROXINE SODIUM 100 MCG: 100 TABLET ORAL at 06:34

## 2021-03-03 RX ADMIN — PREGABALIN 150 MG: 75 CAPSULE ORAL at 08:14

## 2021-03-03 RX ADMIN — PANTOPRAZOLE SODIUM 40 MG: 40 TABLET, DELAYED RELEASE ORAL at 06:34

## 2021-03-03 RX ADMIN — ENOXAPARIN SODIUM 40 MG: 40 INJECTION SUBCUTANEOUS at 08:26

## 2021-03-03 RX ADMIN — SODIUM CHLORIDE, PRESERVATIVE FREE 10 ML: 5 INJECTION INTRAVENOUS at 08:18

## 2021-03-03 RX ADMIN — DULOXETINE HYDROCHLORIDE 60 MG: 60 CAPSULE, DELAYED RELEASE ORAL at 08:14

## 2021-03-03 ASSESSMENT — PAIN SCALES - GENERAL
PAINLEVEL_OUTOF10: 0
PAINLEVEL_OUTOF10: 0

## 2021-03-03 NOTE — PROGRESS NOTES
Patient discharged in a wheel chair, with  in a private car. They stated that their children were at home to help get patient into their home. No concerns noted by patient or .

## 2021-03-03 NOTE — PROGRESS NOTES
Stand Pivot: Contact Guard Assistance, Minimal Assistance. EOB<> w/c, w/c<>STS    FUNCTIONAL MOBILITY:  Assistive Device: Wheelchair  Assist Level:  Modified Independent. Distance:   Completed functional mobility to/from BR and within pt room at fair pace, no LOB noted. Pt requires no cues for walker safety. ASSESSMENT:     Activity Tolerance:  Patient tolerance of  treatment: good. Discharge Recommendations: Continue to assess pending progress, Home with Home health OT   Equipment Recommendations:    Plan: Times per week: 5x  Times per day: Daily  Current Treatment Recommendations: Strengthening, Safety Education & Training, Balance Training, Self-Care / ADL, Patient/Caregiver Education & Training, Functional Mobility Training, Home Management Training, Endurance Training    Patient Education  Patient Education: ADL's, Precautions, Importance of Increasing Activity, Assistive Device Safety and Safety with transfers and w/c mobility. Goals  Short term goals  Time Frame for Short term goals: by discharge  Short term goal 1: Pt will complete stand pivot t/fs with SBA and no safety cues for WB status to increase ease with toileting routine  Short term goal 2: Pt will complete BADL routine with SBA and no cues for WB status to increase indep with bathing  Short term goal 3: Pt will tolerate dynamic standing wtih unilateral release x2 min with SBA and no cues for WB status to increase ease with clothing mgmt during toileting routine  Short term goal 4: Pt will complete BUE strengthening HEP with light resistance to increase ease with t/fs  Long term goals  Time Frame for Long term goals : no LTG d/t short ELOS    Following session, patient left in safe position with all fall risk precautions in place.

## 2021-03-03 NOTE — CARE COORDINATION
3/3/21, 9:31 AM EST    DISCHARGE ON 10 [facility] day: 2  Location: [facility]01/001-A Reason for admit: Pre-syncope [R55]  Dysphagia [R13.10]   Barriers to Discharge: General surgery planning OR for large hiatal hernia as OP. Planning to discharge today. PCP: Candelaria Phillip DO  Readmission Risk Score: 14%  Patient Goals/Plan/Treatment Preferences: Home with  and [facility].

## 2021-03-03 NOTE — DISCHARGE SUMMARY
Hospital Medicine Discharge Summary      Patient Identification:   Anais Gunter   : 1940  MRN: [id number]   Account: [de-identified]      Patient's PCP: Ellie Tse DO    Admit Date: 2021     Discharge Date:   3/3/2021    Admitting Physician: Ivette Henderson MD     Discharge Physician: Keisha Muller MD     Discharge Diagnoses: Active Hospital Problems    Diagnosis Date Noted    Dysphagia [R13.10] 2021    Pneumonia of left lower lobe due to infectious organism [J18.9]     Bilateral lower extremity edema [R60.0]     Hx of fracture of leg [Z87.81]     Lung nodule [R91.1]     Pre-syncope [R55] 2021    Weakness generalized [R53.1] 2021    Large hiatal hernia [K44.9] 2021    Hypo-osmolar hyponatremia [E87.1] 2021    Esophageal dysphagia [R13.10] 2021    Pleural effusion on left [J90] 2021    Hypomagnesemia [E83.42] 2021    Spinal stenosis of lumbar region without neurogenic claudication [M48.061] 10/31/2017    Essential hypertension [I10] 10/19/2016    Chronic low back pain with sciatica [M54.40, G89.29]     History of diverticulosis [Z87.19]     Osteoarthritis [M19.90]     Hypothyroidism [E03.9]        The patient was seen and examined on day of discharge and this discharge summary is in conjunction with any daily progress note from day of discharge. Hospital Course:   Anais Gunter is a 78 y.o. female admitted to [address] [place] [address] on 2021 for dysphagia. She was found to have hyponatremia, elevated blood pressures, large paraesophageal, hiatal hernia with gastric volvulus, BRB per rectum. Patient was evaluated by GI and Gen surgery. Plan outpatient egd and hiatal hernia repair. Risk Stratification for surgery:  RCRI: 1 point if patient ends up happening open procedure. She would be classified as Class II risk. 6% 30 day risk of death, MI, cardiac arrest. This was discussed with patient on day of dc.  She understands the risks/benefits and would like to proceed with the surgery. I took over pts care on day of dc. See below for detailed assessments. 1. Dysphagia  -Gradually worsening dysphagia to solid foods that has been present for years. Worsened on admission. -GI consulted, plan for esophagram today. Appreciate GI input   -Large hiatal hernia noted on CTA of the chest  -Esophagram on 3/2/2021: Large fixed hiatal hernia with organoaxial gastric volvulus  -GI on-board. GI consulted general surgery who will do outpt intervention. Appreciate GI input   -Patient will need outpatient EGD.      2. Presyncope  -Likely vasovagal process with her nausea and vomiting. -EKG initially showing sinus arrhythmia however this resolved spontaneously  -Bilateral carotid duplex unremarkable  -Echocardiogram completed on 3/1/2021 and unremarkable  -Continue telemetry and neurochecks     3. Acute on chronic hypotonic hyponatremia, improving   -asymptomatic  -Chronic hyponatremia with sodium baseline of 128-133.  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 Urine osmolality elevated and urine sodium elevated. Urine osmolality decreased  -Sodium slightly increased on 3/1/2021 at 133 which is around 9 mEq greater the 124 mEq of sodium drawn ( corrected 9 mEqs in roughly 12 hours). Patient given 250cc bolus of D5W due to this rapid increase in sodium concentration. Sodium reduced to 125 after bolus.  -Sodium of 132 this morning, back to baseline.   -cont fluid restriction 1.5L/24 hours  -Nephrology consulted and is following patient's care. Nephrology recommend salt tab 2 g PO x 1 today. Nephrology Appreciate nephrology input      4. Hematochezia, resolved prior to admission  -Mildly decreased hemoglobin at 11.6  -Anemia panel showing mild iron deficiency  -Likely hemorrhoidal versus diverticular  -GI following     5.  CAP versus aspiration pneumonia  -Vomiting and regurgitation after dysphagia prior to admission  -CTA of the chest showing atelectasis in the left lower lobe. -Mildly elevated procalcitonin at 1.31 on admission this increased to 1.44 3/1/2021  -Patient on Zosyn to cover for possible aspiration pneumonia  -Switch zosyn to augmentin today. Had 3 days of zosyn, 4 more day of Augmentin, total 7 days antibiotics      6. Left-sided pleural effusion  -Very small pleural effusion unable to undergo thoracentesis. -Patient is saturating well on room air no signs of acute respiratory distress currently  -continue to monitor      7. Hypomagnesemia, resolved   -Magnesium of 1.7 3/2/21. Will follow     8. Hiatal hernia  -Seen on CTA of the chest     9. Bilateral lower extremity edema  -Chronic, stable  -Echocardiogram unremarkable     10. History of left leg fracture  -Wheelchair-bound most of the time. -PT and OT consulted     11. Stable right midlung nodule  -Former smoker  -Follow-up with PCP at discharge     12. Hypothyroidism  -TSH low on admission. Synthroid decreased to 100 mcg daily.  -Follow-up with PCP with repeat lab work in 6 weeks     13. Essential hypertension  -hypertensive for past 24 hours, increase Toprol XL from 25 mg to 50 mg PO daily  -pt also takes enalapril at home - resumed today. Consider adding norvasc if bps persistently elevated     14. Chronic pain/fibromyalgia  -Continue Cymbalta, Lyrica     15. Former smoker      16. Leukopenia, likely from pneumonia     -afebrile. CBC in am     17.  Low BUN, possibly malnutrition     -dietician consult       Exam:     Vitals:  Vitals:    03/02/21 2303 03/03/21 0433 03/03/21 0812 03/03/21 1108   BP: (!) 164/93 (!) 184/87 (!) 182/92 (!) 182/90   Pulse: 71 64 73 72   Resp: 16 18  17   Temp: 97.7 °F (36.5 °C) 97.8 °F (36.6 °C)  97.8 °F (36.6 °C)   TempSrc: Oral Oral  Oral   SpO2: 93% 93%  96%   Weight:  160 lb 1.6 oz (72.6 kg)     Height:         Weight: Weight: 160 lb 1.6 oz (72.6 kg)     24 hour intake/output:    Intake/Output Summary (Last 24 hours) at 3/3/2021 1312  Last data filed at 3/3/2021 0433  Gross per 24 hour   Intake 578.74 ml   Output --   Net 578.74 ml         General appearance:  No apparent distress, appears stated age and cooperative. HEENT:  Normal cephalic, atraumatic without obvious deformity. Pupils equal, round, and reactive to light. Extra ocular muscles intact. Conjunctivae/corneas clear. Neck: Supple, with full range of motion. No jugular venous distention. Trachea midline. Respiratory:  Normal respiratory effort. Clear to auscultation, bilaterally without Rales/Wheezes/Rhonchi. Cardiovascular:  Regular rate and rhythm with normal S1/S2 without murmurs, rubs or gallops. Abdomen: Soft, non-tender, non-distended with normal bowel sounds. Musculoskeletal:  No clubbing, cyanosis or edema bilaterally. Full range of motion without deformity. Skin: Skin color, texture, turgor normal.  No rashes or lesions. Neurologic:  Neurovascularly intact without any focal sensory/motor deficits. Cranial nerves: II-XII intact, grossly non-focal.  Psychiatric:  Alert and oriented, thought content appropriate, normal insight  Capillary Refill: Brisk,< 3 seconds   Peripheral Pulses: +2 palpable, equal bilaterally       Labs: For convenience and continuity at follow-up the following most recent labs are provided:      CBC:    Lab Results   Component Value Date    WBC 3.3 03/03/2021    HGB 12.3 03/03/2021    HCT 39.3 03/03/2021     03/03/2021       Renal:    Lab Results   Component Value Date     03/03/2021    K 3.5 03/03/2021    K 4.3 02/27/2021     03/03/2021    CO2 27 03/03/2021    BUN 4 03/03/2021    CREATININE 0.5 03/03/2021    CALCIUM 8.8 03/03/2021    PHOS 3.5 02/28/2021         Significant Diagnostic Studies    Radiology:   FL ESOPHAGRAM   Final Result      Large fixed hiatal hernia with organoaxial gastric volvulus.       Final report electronically signed by Dr. Lazaro Campos on 3/2/2021 10:02 AM      VL DUP CAROTID BILATERAL   Final Result      1. RIGHT ICA . Leland Fallow Leland Fallow Minimal soft plaque, no appreciable stenosis. ..    2. RIGHT ECA. . Minimal soft plaque, no appreciable stenosis. ..... Leland Fallow RIGHT CCA. Leland Fallow Unremarkable . Leland Fallow 3. RIGHT VERTEBRAL. Leland Fallow Antegrade flow . .. ... LEFT VERTEBRAL. .. Antegrade flow. ..      4. LEFT ICA. .... Minimal soft plaque, no appreciable stenosis. ..   5. LEFT ECA. .. Unremarkable. .... LEFT CCA. .. Unremarkable. **This report has been created using voice recognition software. It may contain minor errors which are inherent in voice recognition technology. **      Final report electronically signed by Dr. Tuan Garcia on 2/28/2021 9:30 AM      US THORACENTESIS Which side should the procedure be performed? Left   Final Result   1. Attempted thoracentesis left side. This was unsuccessful due to combination of a very small effusion and the patient's inability to tolerate the needle puncture. 2. If desired, a repeat attempt at thoracentesis can be performed tomorrow with CT guidance. **This report has been created using voice recognition software. It may contain minor errors which are inherent in voice recognition technology. **      Final report electronically signed by Dr. Tuan Garcia on 2/28/2021 9:33 AM      CTA CHEST W WO CONTRAST   Final Result   Impression:   No definite PE is identified. Large hiatal hernia. Small left pleural effusion. Possible developing round atelectasis left lower lobe is technically    indeterminate. Recommend follow-up. This document has been electronically signed by: Rubin Alston MD on    02/28/2021 02:00 AM      All CTs at this facility use dose modulation techniques and iterative    reconstructions, and/or weight-based dosing   when appropriate to reduce radiation to a low as reasonably achievable. XR CHEST PORTABLE   Final Result   New patchy left midlung opacity which may reflect underlying infiltrate.       This document has been electronically signed by: Fran Schulz Starr Toney MD on    02/27/2021 11:47 PM             Consults:     IP CONSULT TO NEPHROLOGY  PALLIATIVE CARE EVAL  IP CONSULT TO GI  IP CONSULT TO SOCIAL WORK  IP CONSULT TO GENERAL SURGERY  IP CONSULT TO DIETITIAN    Disposition: Home  Condition at Discharge: Stable    Code Status:  Full Code     Patient Instructions:    Discharge lab work: Activity: activity as tolerated  Diet: DIET DENTAL SOFT; No Added Salt (3-4 GM); Dental Soft; Daily Fluid Restriction: 1500 ml  Dietary Nutrition Supplements: Clear Liquid Oral Supplement      Follow-up visits:   [address] [facility] [address] [facility]        Lizette Gonzalez, [address] [facility]  [address]  [place] [address]    Schedule an appointment as soon as possible for a visit in 6 weeks           Discharge Medications:      Height, Leigh Free"   Home Medication Instructions YGK:[id number]    Printed on:03/03/21 1312   Medication Information                      amoxicillin-clavulanate (AUGMENTIN) 875-125 MG per tablet  Take 1 tablet by mouth every 12 hours for 10 days             Biotin 84787 MCG TABS  Take 1-2 tablets by mouth daily             CRANBERRY PO  Take 1,500 mg by mouth daily             DULoxetine (CYMBALTA) 60 MG extended release capsule  TAKE 1 CAPSULE DAILY             enalapril (VASOTEC) 10 MG tablet  TAKE ONE-HALF (1/2) TABLET DAILY             fluticasone (FLONASE) 50 MCG/ACT nasal spray  1 spray by Nasal route as needed for Rhinitis             guaiFENesin 1200 MG TB12  Take 1,200 mg by mouth 2 times daily             levothyroxine (SYNTHROID) 100 MCG tablet  Take 1 tablet by mouth Daily             lidocaine (LMX) 4 % cream  Apply topically 4x daily as needed. metoprolol succinate (TOPROL XL) 50 MG extended release tablet  Take 1 tablet by mouth daily             Misc. Devices (WALKER) MISC  1 each by Does not apply route daily             Misc.  Devices MISC  Mechanical lift for a [employer]

## 2021-03-03 NOTE — PROGRESS NOTES
[facility]         Renal Inpatient Follow-Up note         3/3/2021 7:59 AM    Pt Name:   Cathren Dakin Height  YOB: 1940  Attending:   Marly Davidson MD    Chief Complaint : Cathren Dakin Height is a 78 y.o. female being followed by nephrology for hyponatremia    Interval History :   Patient seen and examined by me. No distress  Feels well denies any complaints no chest pain no shortness of breath. Overall she is feeling okay. Eating and drinking well. Scheduled Medications :    amLODIPine  5 mg Oral Daily    metoprolol succinate  50 mg Oral Daily    amoxicillin-clavulanate  1 tablet Oral 2 times per day    pantoprazole  40 mg Oral QAM AC    sodium chloride flush  10 mL Intravenous 2 times per day    enoxaparin  40 mg Subcutaneous Daily    DULoxetine  60 mg Oral Daily    pregabalin  150 mg Oral BID    lidocaine  1 patch Transdermal Daily    levothyroxine  100 mcg Oral Daily         Vitals :  BP (!) 184/87   Pulse 64   Temp 97.8 °F (36.6 °C) (Oral)   Resp 18   Ht 5' 6\" (1.676 m)   Wt 160 lb 1.6 oz (72.6 kg)   SpO2 93%   BMI 25.84 kg/m²     24HR INTAKE/OUTPUT:      Intake/Output Summary (Last 24 hours) at 3/3/2021 0759  Last data filed at 3/3/2021 0433  Gross per 24 hour   Intake 1251.46 ml   Output 850 ml   Net 401.46 ml     Last 3 weights  Wt Readings from Last 3 Encounters:   03/03/21 160 lb 1.6 oz (72.6 kg)   12/21/20 162 lb 6.4 oz (73.7 kg)   08/19/20 165 lb (74.8 kg)           Physical Exam :  General Appearance:  Well developed.  No distress  Mouth/Throat:  Oral mucosa moist  Neck:  Supple, no JVD  Lungs:  Breath sounds: clear  Heart[de-identified]  S1,S2 heard  Abdomen:  Soft, non - tender  Musculoskeletal:  Edema -trace         Last 3 CBC   Recent Labs     03/01/21  0526 03/02/21  0500 03/03/21  0446   WBC 4.8 2.8* 3.3*   RBC 3.90* 3.88* 4.16*   HGB 11.9* 11.6* 12.3   HCT 37.5 37.2 39.3    202 242     Last 3 CMP  Recent Labs     03/01/21  0526 03/01/21  0526 03/01/21  1253 03/02/21  0501 03/03/21  0446   *   < > 130* 132* 137   K 4.1  --   --  3.8 3.5   CL 97*  --   --  95* 100   CO2 28  --   --  28 27   BUN 7  --   --  5* 4*   CREATININE 0.5  --   --  0.4 0.5   CALCIUM 8.6  --   --  8.6 8.8    < > = values in this interval not displayed. Assessment    1. Renal -renal function appears to be stable at baseline  2. Hyponatremia-exact etiology not clear, urine lites are not helpful at this time as she has received IV contrast earlier today  ? However patient does look clinically dry he was started on IV fluids and sodium is improving well.  ? She was on Cymbalta and recently started on Lyrica which can also precipitate some hyponatremia/SIADH but doubt this is the case  ? She did receive her 2 g of sodium chloride tablet yesterday. Sodium improved to 137  ? Monitor her without salt tabs.     3. Essential hypertension running high increase Norvasc to 10 mg p.o. daily  4. History of hypothyroidism  5. Dysphagia status post esophageal gram, seen by surgery most likely being planned for surgery in near future  6. Meds reviewed and discussed with patient       LOLITA Lou D.  [facility].

## 2021-03-03 NOTE — CARE COORDINATION
3/3/21, 1:40 PM EST    Patient goals/plan/ treatment preferences discussed by  and . Patient goals/plan/ treatment preferences reviewed with patient/ family. Patient/ family verbalize understanding of discharge plan and are in agreement with goal/plan/treatment preferences. Understanding was demonstrated using the teach back method. AVS provided by RN at time of discharge, which includes all necessary medical information pertaining to the patients current course of illness, treatment, post-discharge goals of care, and treatment preferences. Services After Discharge  Services At/After Discharge: Nursing Services([facility])   IMM Letter  IMM Letter given to Patient/Family/Significant other/Guardian/POA/by[de-identified] Javid Armas CM  IMM Letter date given[de-identified] 03/02/21  IMM Letter time given[de-identified] 0935  Observation Status Letter date given[de-identified] 02/28/21  Observation Status Letter time given[de-identified] 36  Observation Status Letter given to Patient/Family/Significant other/Guardian/POA/by[de-identified] staff     Pt plans on returning home with her spouse and current services with [facility]. SW notified Cassie with [facility] of discharge, no need to fax, they have access to epic.

## 2021-03-03 NOTE — FLOWSHEET NOTE
03/03/21 1135   Encounter Summary   Services provided to: Patient and family together   Referral/Consult From: Rounding   Continue Visiting Yes  (3/3)   Complexity of Encounter Moderate   Length of Encounter 15 minutes   Routine   Type Initial   Assessment Approachable   Intervention Nurtured hope   Outcome Comfort   Spiritual/Yarsani   Type Spiritual support   Assessment: In my encounter with the 78 yr old patient the pts family was supportively present. While rounding the unit 6K,  I provided spiritual care to patient and their family through conversation, I also came to assess their spiritual needs present. The pt was admitted due to dysphagia. The pt's yvette preference is Sikhism. Interventions:  I provided, prayer, emotional support and words of comfort.  provided a listening presence and encouraged pt to share their beliefs and how they support him during their hospitalization. Outcomes: The patient was encouraged and didnt share any further spiritual needs at this time. The pt remains optimistic and hopeful. The pt shared that they were appreciative for the support. Plan:  1. Chaplains will follow-up at a later time for assessment of any spiritual care needs present. 2.   The Chaplains will be available to provide further emotional support per request.        3.   The pt receives spiritual support from [facility] witness.

## 2021-03-03 NOTE — PROGRESS NOTES
[facility]  Daily Progress Note    Pt Name: Jeane Gunter  Medical Record Number: [id number]  Date of Birth 1940   Today's Date: 3/3/2021    Hospital day # 2     ASSESSMENT   Large paraesophageal/hiatal hernia with gastric volvulus  Dysphagia  Pneumonia  Left pleural effusion  Urinary tract infection  Acute on chronic hyponatremia  Anemia  GERD  Bright red blood per rectum last week  Hypothyroidism  History diverticular disease status post colectomy  Islam   has a past medical history of Abnormal EKG, Anemia, Back pain, Blood circulation, collateral, Diverticulitis, Fibromyalgia, GERD (gastroesophageal reflux disease), Hiatal hernia, Hypertension, Hypothyroidism, and Osteoarthritis. PLAN   Tolerated soft diet. Having bowel movements. GI following   Medical management. Discussed with them. Risk stratification/medical clearance prior to discharge. Being treated for a UTI. Labs reviewed   Home with home health  Doing well from our standpoint. Patient likely going home today. Discussed surgical intervention again in detail. Patient wants to proceed knowing her risks and possible complications. Surgery scheduled for next Tuesday outpatient. Her instructions are in chart. Postoperative instructions reviewed and discussed dietary changes/modifications. Patient understands, questions answered. Signing off. SUBJECTIVE   Chief complaint: Hiatal hernia    Patient was stable overnight. Blood pressure is elevated - hospitalist managing. Chart reviewed. Updated by nursing staff. Denies chest discomfort or dyspnea. No N/V. Tolerating eggs and toast this morning without issues. Denies any dysphagia and trouble with choking this morning. Occasional feels discomfort in her mid sternal area when she eats too much. Having bowel movements. Up with assistance.   CURRENT MEDICATIONS   Scheduled Meds:   amLODIPine  5 mg Oral Daily    metoprolol succinate  50 [id number] 6014-2339 24 Hour Total   INTAKE   P.O. 0   0   I. V.(mL/kg/hr) 0(0)   0   Shift Total(mL/kg) 0(0)   0(0)   OUTPUT   Shift Total(mL/kg)       Weight (kg) 72.6 72.6 72.6 72.6     LABS     Recent Labs     03/01/21  0526 03/01/21  0658 03/01/21  0658 03/01/21  1253 03/02/21  0500 03/02/21  0501 03/02/21  1348 03/03/21  0446   WBC 4.8  --   --   --  2.8*  --   --  3.3*   HGB 11.9*  --   --   --  11.6*  --   --  12.3   HCT 37.5  --   --   --  37.2  --   --  39.3     --   --   --  202  --   --  242   *  --    < > 130*  --  132*  --  137   K 4.1  --   --   --   --  3.8  --  3.5   CL 97*  --   --   --   --  95*  --  100   CO2 28  --   --   --   --  28  --  27   BUN 7  --   --   --   --  5*  --  4*   CREATININE 0.5  --   --   --   --  0.4  --  0.5   MG  --  1.7  --   --   --   --  1.7 1.7   CALCIUM 8.6  --   --   --   --  8.6  --  8.8    < > = values in this interval not displayed. No results for input(s): PTT, INR in the last 72 hours. Invalid input(s): PT  Recent Labs     03/01/21  0526   LACTA 0.6     RADIOLOGY   No new imaging    Electronically signed by FLORIDA Perry CNP on 3/3/2021 at 10:23 AM     Patient seen and examined independently by me earlier this AM. Above discussed and I agree with Jonatan Yee CNP. See my additional comments below for updated orders and plan. Labs, cultures, and radiographs where available were reviewed. I discussed patient concerns with the patient's nurse and instructions were given. Please see our orders for the updated patient care plan. -Tolerating full liquids this morning. Soft diet today. Bowel function okay. GI service following but at this time not planning any endoscopies. Again discussed with patient the pros and cons of surgical intervention versus conservative treatment. She would actually like to proceed with repair if possible. No surgical intervention required in-house as she is tolerating diet and abdomen benign.   Most likely will proceed in outpatient setting with formal repair. Patient in agreement.     Electronically signed by Low Boswell MD on 3/3/21 at 11:26 AM EST

## 2021-03-03 NOTE — PROGRESS NOTES
Gastroenterology Progress Note:     Patient Name:  Josi Gunter   MRN: [id number]  [id number]  YOB: 1940  Admit Date: 2/27/2021  9:44 PM  Primary Care Physician: Torin Irizarry DO   6K-01/001-A     Patient seen and examined. 24 hours events and chart reviewed. Subjective: Patient resting in bed,  present. Denies abdominal pain, nausea, & vomiting. She is tolerating a diet. She is planning to proceed with surgery and it is scheduled for next week. Objective:  BP (!) 182/90   Pulse 72   Temp 97.8 °F (36.6 °C) (Oral)   Resp 17   Ht 5' 6\" (1.676 m)   Wt 160 lb 1.6 oz (72.6 kg)   SpO2 96%   BMI 25.84 kg/m²     Physical Exam:    General:  Nourished in no distress  HEENT: Atraumatic, normocephalic. Moist oral mucous membranes. Neck: Supple without adenopathy, JVD, thyromegaly or masses. Trachea midline. CV: Heart RRR, no murmurs, rubs, gallops. Resp: Even, easy without cough or accessory use. Lungs clear to ascultation bilaterally. Abd: Round, soft, nontender. No hepatosplenomegaly or mass present. Active bowel sounds heard. No distention noted. Ext:  Without cyanosis, clubbing, edema. Skin: Pink, warm, dry  Neuro:  Alert, oriented x 3 with no obvious deficits.        Rectal: deferred    Labs:   CBC:   Lab Results   Component Value Date    WBC 3.3 03/03/2021    HGB 12.3 03/03/2021    HCT 39.3 03/03/2021    MCV 94.5 03/03/2021     03/03/2021     BMP:   Lab Results   Component Value Date     03/03/2021    K 3.5 03/03/2021    K 4.3 02/27/2021     03/03/2021    CO2 27 03/03/2021    PHOS 3.5 02/28/2021    BUN 4 03/03/2021    CREATININE 0.5 03/03/2021    CALCIUM 8.8 03/03/2021     PT/INR:   Lab Results   Component Value Date    INR 0.90 02/27/2021     Lipids:   Lab Results   Component Value Date    ALKPHOS 111 02/27/2021    ALT 12 02/27/2021    AST 30 02/27/2021    BILITOT 0.4 02/27/2021    BILIDIR <0.2 11/06/2018    LABALBU 3.5 02/27/2021    LIPASE 14.1 02/27/2021 Current Meds:  Scheduled Meds:   amLODIPine  10 mg Oral Daily    metoprolol succinate  50 mg Oral Daily    amoxicillin-clavulanate  1 tablet Oral 2 times per day    pantoprazole  40 mg Oral QAM AC    sodium chloride flush  10 mL Intravenous 2 times per day    enoxaparin  40 mg Subcutaneous Daily    DULoxetine  60 mg Oral Daily    pregabalin  150 mg Oral BID    lidocaine  1 patch Transdermal Daily    levothyroxine  100 mcg Oral Daily     Assessment:  77 yo F admitted 02/27/21 for worsening dysphagia. H/O intermittent dysphagia for 2 years. Endorses BRBPR twice a week ago. Noted to have pneumonia & UTI, started on ATBs. Thoracentesis attempted, aborted due to patient's anxiety. H/O diverticular bleeds, underwent colon resection secondary to this in 2016. H/O large hiatal hernia with Carry Lyme ulcers. Esophagram 03/02/21 demonstrated large fixed hiatal hernia with organoaxial gastric volvulus. 1. Chronic dysphagia- solids only, ongoing 2 years  2. Pneumonia- on ATBs  3. Left pleural effusion  4. UTI- on ATBs  5. Acute on chronic hyponatremia  6. Hiatal hernia, large- taking up 2/3 the esophagus  7. H/O diverticular disease s/p colon resection 2016  8. Pandiverticulosis noted on colonoscopy 2017  9. Hypothyroidism  10. H/O left leg fracture s/p surgery- wheelchair bound  11. H/O HTN  12. GERD- not on medication  13. Anemia  14. BRBPR last week- resolved, likely hemorrhoidal vs diveritcular   15.  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 Nicolas Chi signing off    Case reviewed and impression/plan reviewed in collaboration with Dr. Ezra Ponce  Electronically signed by FLORIDA Mueller CNP on 3/3/2021 at 11:26 AM    [facility]

## 2021-03-04 ENCOUNTER — TELEPHONE (OUTPATIENT)
Dept: FAMILY MEDICINE CLINIC | Age: 81
End: 2021-03-04

## 2021-03-04 ENCOUNTER — CARE COORDINATION (OUTPATIENT)
Dept: CASE MANAGEMENT | Age: 81
End: 2021-03-04

## 2021-03-04 NOTE — TELEPHONE ENCOUNTER
Dariela [facility] Transitions Initial Follow Up Call    Outreach made within 2 business days of discharge: Yes    Patient: Christian Gunter Patient : 1940   MRN: [id number]  Reason for Admission: There are no discharge diagnoses documented for the most recent discharge.   Discharge Date: 3/3/21       Spoke with:     No answer, VM full    Discharge department/facility: [facility]      Scheduled appointment with PCP within 7-14 days    Follow Up  Future Appointments   Date Time Provider Huber Little   2021  1:00 PM [facility] ([address] [place] [address])

## 2021-03-04 NOTE — CARE COORDINATION
Dariela [facility] Transitions Initial Follow Up Call    Call within 2 business days of discharge: Yes    Patient: Barbara Gunter Patient : 1940   MRN: [id number]  Reason for Admission: Pre-Syncope, Abdominal Pain  Discharge Date: 3/3/21 RARS: Readmission Risk Score: 15      Last Discharge [facility]       Complaint Diagnosis Description Type Department Provider    21 Other; Abdominal Pain Pre-syncope . .. ED to Hosp-Admission (Discharged) (ADMITTED) [facility] 6K [id number] Rody MD; JIM Goyal..        1st Attempt to contact patient for Initial 24 Hour Transition from hospital to home Call:   Patient not reached. Mail Box Full - Unable to leave message at this time. Will continue to follow. [facility] care, Spoke to Huber Maldonado. They are following patient and will see her later today.        Hao Chavez LPN    [phone]  [facility] / [address] [address] Transitions 24 Hour Call    Care Transitions Interventions         Follow Up  Future Appointments   Date Time Provider Huber Little   2021  1:00 PM DO ARCADIO Beltrán PCT P - Luke Cavanaugh LPN

## 2021-03-05 ENCOUNTER — CARE COORDINATION (OUTPATIENT)
Dept: CASE MANAGEMENT | Age: 81
End: 2021-03-05

## 2021-03-05 DIAGNOSIS — K44.9 LARGE HIATAL HERNIA: Primary | ICD-10-CM

## 2021-03-05 PROCEDURE — 1111F DSCHRG MED/CURRENT MED MERGE: CPT | Performed by: FAMILY MEDICINE

## 2021-03-05 NOTE — PROGRESS NOTES
CLINICAL PHARMACY NOTE: MEDS TO [address] Select Patient?: Yes  Total # of Prescriptions Filled: 4   The following medications were delivered to the patient:  Pantoprazole 40mg  Levothyroxine 100mcg  Metoprolol Succinate ER 50mg  Augmentin 875-125mg  Total # of Interventions Completed: 2  Time Spent (min): 30    Additional Documentation:

## 2021-03-05 NOTE — CARE COORDINATION
understanding of administration of home medications. Advised obtaining a 90-day supply of all daily and as-needed medications. Covid Risk Education    Patient has following risk factors of: no known risk factors. Education provided regarding infection prevention, and signs and symptoms of COVID-19 and when to seek medical attention with patient who verbalized understanding. Discussed exposure protocols and quarantine From CDC: Are you at higher risk for severe illness?   and given an opportunity for questions and concerns. The patient agrees to contact  PCP office for questions related to COVID-19. For more information on steps you can take to protect yourself, see CDC's How to Protect Yourself     Was patient discharged with a pulse oximeter? No Discussed and confirmed pulse oximeter discharge instructions and when to notify provider or seek emergency care. Discussed follow-up appointments. . Is follow up appointment scheduled within 7 days of discharge? Yes    Plan for follow-up call in 5-7 days based on severity of symptoms and risk factors. Plan for next call: any choking? weakness/?  CTN provided contact information for future needs.     Non-face-to-face services provided:  Obtained and reviewed discharge summary and/or continuity of care documents  Assessment and support for treatment adherence and medication management-reviewed med    Care Transitions 24 Hour Call    Do you have any ongoing symptoms?: Yes  Do you have a copy of your discharge instructions?: Yes  Do you have all of your prescriptions and are they filled?: Yes  Have you scheduled your follow up appointment?: Yes  How are you going to get to your appointment?: Car - family or friend to transport  Were you discharged with any Home Care or Post Acute Services: Yes  Post Acute Services: Home Health (Comment: [address])  Do you feel like you have everything you need to keep you well at home?: Yes  Care Transitions Interventions  No Identified Needs     Called pt for the THOMPSON YOVANY initial call. Pt stated she is taking a full liquid diet, takes small amount & has been able to swallow. Pt stated the \"blockage in my esophagus, got better after the pain pill in the ER\"  Pt stated she was also increased her water intake. Pt stated she feels stronger than when she went to the ER. Pt is not using the walker at this time, she is using the wheelchair. Pt denied any abdominal pain or feeling faint. Pt feels \"really good\" today. Pt stated she will have hernia repair on 3/9/21    Pt denied any needs or concerns. CTN will continue to follow.     Follow Up  Future Appointments   Date Time Provider Huber Little   3/8/2021 10:40 AM DO ARCADIO Pichardo [facility] [place]   4/27/2021  1:00 PM DO ARCADIO Pichardo [facility] Lissa Hardin RN  Care Transition Nurse  [phone]

## 2021-03-05 NOTE — H&P
Pt Name: Marylee Rom Height   MRN: [id number]   YOB: 1940   Date of evaluation: 3/2/2021   Primary Care Physician: Tanner Perkins DO   Patient evaluated at the request of Hospitalist   Reason for evaluation: Large paraesophageal/hiatal hernia with volvulus   IMPRESSIONS:   1. Large paraesophageal/hiatal hernia with gastric volvulus  2. Dysphagia  3. Pneumonia  4. Left pleural effusion  5. Urinary tract infection  6. Acute on chronic hyponatremia  7. Anemia  8. GERD  9. Bright red blood per rectum last week  10. Hypothyroidism  11. History diverticular disease status post colectomy  12. Jehovah's witness  13. does not have any pertinent problems on file. PLANS:   1. Long discussion with patient and  at bedside about the pros and cons of conservative management, gastric volvulus and hiatal hernia reduction by G-tube insertion and also formal repair of the hernia with fundoplication and possible mesh. Discussed with them the pros and cons of robotic, laparoscopic and open techniques. Concern with patient's previous history of colectomy and scar tissue which may hinder robotic approach. Abdomen benign tonight. No urgent intervention needed. Patient to discuss it further with family prior to making a decision. Starting on liquid diet this evening. Reevaluate in a.m.  2. Palliative care  3. Antibiotics per primary service  4. PPI  5. Monitor hemoglobin/hematocrit  6. Replace electrolytes as needed per protocol. 7. Nephrology following  8. GI service following with no plans of endoscopy at this time  9. Wrist ratification/medical clearance as needed if surgery wished upon by patient/family. SUBJECTIVE:   Chief complaint: Dysphagia   History of present illness: Jesus Gimenez is an 78-year-old female who was asked to see in consultation secondary to large paraesophageal/hiatal hernia with organoaxial volvulus but no gastric outlet obstruction. Patient has a long history of a known hiatal hernia.  Gradually has been ASSISTED MOBILIZATION OF RECTAL SIGMOID TO SPLENIC FLEXURE, CONVERTED TO OPEN LEFT HEMICOLECTOMY WITH COLOPROCTOCTOMY, SPLENORRHAPHY, RIGID SIGMOIDOSCOPY, LASER EVALUATION OF VASCULARITY WITH ICG; Surgeon: Sherly Cruz MD; Location: [facility]; Service: General    COLONOSCOPY  2012    CYST REMOVAL  2010   [address], [address]    Cataract    FEMUR FRACTURE SURGERY Left 12/21/2020    LEFT FEMUR OPEN REDUCTION INTERNAL FIXATION performed by Maria Del Carmen DeL a Rosa MD at [address] Left 2001   [facility]    w/bladder suspension    JOINT REPLACEMENT  2002, 2008, 2009    rt hip(2002), lt knee(2009), lt hip(2008) Shoulder (2009)   [address]    CA COLON CA SCRN NOT  [address] IND Left 9/15/2017    COLONOSCOPY performed by Verner Seton, MD at [facility]   [address]    discectomy   [facility] ENDOSCOPY  2012   Medications:   Home Medications           Prior to Admission medications    Medication Sig Start Date End Date Taking? Authorizing Provider   metoprolol succinate (TOPROL XL) 25 MG extended release tablet TAKE 1 TABLET DAILY 12/16/20  Yes Gracy Sorto APRN - CNP   enalapril (VASOTEC) 10 MG tablet TAKE ONE-HALF (1/2) TABLET DAILY 11/18/20  Yes Qasim Curl, DO   guaiFENesin 1200 MG TB12 Take 1,200 mg by mouth 2 times daily 12/21/19  Yes Krys Dawn PA-C   lidocaine (LMX) 4 % cream Apply topically 4x daily as needed.  10/12/19  Yes Qasim Curl, DO   Biotin 30703 MCG TABS Take 1-2 tablets by mouth daily   Yes Historical Provider, MD   fluticasone (FLONASE) 50 MCG/ACT nasal spray 1 spray by Nasal route as needed for Rhinitis   Yes Historical Provider, MD   therapeutic multivitamin-minerals (THERAGRAN-M) tablet Take 1 tablet by mouth nightly    Yes Historical Provider, MD   levothyroxine (SYNTHROID) 125 MCG tablet TAKE 1 TABLET DAILY 2/2/21   Aunkeshawn Clement Renata Dixon, APRN - CNP   pregabalin (LYRICA) 150 MG capsule Take 1 capsule by mouth 2 times daily for 180 days. 1/28/21 7/27/21  Kirsty Brown, DO   Misc. Devices [facility]) MISC 1 each by Does not apply route daily 2/11/20   Kirsty Ayalaf, DO   DULoxetine (CYMBALTA) 60 MG extended release capsule TAKE 1 CAPSULE DAILY 1/16/20   Kirsty Brown, DO   Misc. Devices MISC Mechanical lift for a Sarika Danni 5/31/18   Kirsty Hof, DO   Scooter MISC by Does not apply route Power scooter 10/31/17   Kirsty Hof, DO   CRANBERRY PO Take 1,500 mg by mouth daily    Historical Provider, MD   Omega 3-6-9 Fatty Acids ([place] 3-6-9 COMPLEX) CAPS Take 1 capsule by mouth daily     Historical Provider, MD   Scheduled Meds:   Scheduled Medications    metoprolol succinate 50 mg Oral Daily    amoxicillin-clavulanate 1 tablet Oral 2 times per day    iron sucrose 300 mg Intravenous Once    pantoprazole 40 mg Oral QAM AC    sodium chloride flush 10 mL Intravenous 2 times per day    enoxaparin 40 mg Subcutaneous Daily    DULoxetine 60 mg Oral Daily    pregabalin 150 mg Oral BID    lidocaine 1 patch Transdermal Daily    levothyroxine 100 mcg Oral Daily   Continuous Infusions:   Infusions Meds      PRN Meds:. PRN Medications   sodium chloride flush, promethazine **OR** ondansetron, polyethylene glycol, acetaminophen **OR** acetaminophen, magnesium sulfate, HYDROcodone 5 mg - acetaminophen   Allergies:   is allergic to ampicillin; morphine; pcn [penicillins]; and sulfa antibiotics. Family History:   family history includes Arthritis in her brother, mother, and sister; Cancer in her maternal aunt; Depression in her brother and sister; Diabetes in her maternal grandmother and sister; Early Death in her maternal aunt; Hearing Loss in her father and mother; Heart Disease in her paternal grandmother; Heart Disease (age of onset: 76) in her father; High Cholesterol in her mother; Stroke in her father.    Social History:   reports that she quit smoking about 53 years ago. Her smoking use included cigarettes. She started smoking about 64 years ago. She has a 22.00 pack-year smoking history. She has never used smokeless tobacco. She reports that she does not drink alcohol or use drugs. Review of Systems:   General Denies any fever or chills. No significant unexpected weight change. HEENT Denies any diplopia, tinnitus or vertigo. No chronic headaches. Resp Denies any shortness of breath, cough or wheezing   Cardiac Denies any chest pain, palpitations, claudication or edema. Hypertension   GI Denies any melena, hematochezia, hematemesis or pyrosis. Dysphagia. Large hiatal hernia as mentioned above. GERD. History of diverticular disease status post colectomy.  Denies any frequency, urgency, hesitancy or incontinence   Heme Denies bruising or bleeding easily   Endocrine hypothyroidism. No diabetes mellitus   Neuro Denies any focal motor or sensory deficits   Musculoskeletal Arthritis. No gout. No weakness. Fibromyalgia. Chronic back pain   Psychiatric Denies any severe depression or agitation. No panic attacks. No suicidal ideation. OBJECTIVE:   CURRENT VITALS: height is 5' 6\" (1.676 m) and weight is 166 lb 1 oz (75.3 kg). Her oral temperature is 97.8 °F (36.6 °C). Her blood pressure is 185/92 (abnormal) and her pulse is 73. Her respiration is 16 and oxygen saturation is 96%.    Temperature Range (24h):Temp: 97.8 °F (36.6 °C) Temp Av.9 °F (36.6 °C) Min: 97.6 °F (36.4 °C) Max: 98.2 °F (36.8 °C)   BP Range (79G): Systolic (26AJA), ZZT:324 , Min:157 , UPO:964   Diastolic (51NJJ), JRU:62, Min:76, Max:92     Pulse Range (24h): Pulse Av Min: 68 Max: 79   Respiration Range (24h): Resp Av.4 Min: 16 Max: 18   Current Pulse Ox (24h): SpO2: 96 %   Pulse Ox Range (24h): SpO2 Av.2 % Min: 95 % Max: 96 %   Oxygen Amount and Delivery:   CONSTITUTIONAL: Alert and oriented times 3, no acute distress and cooperative to examination with proper mood and affect. SKIN: Skin color, texture, turgor normal. No rashes or lesions. LYMPH: no cervical nodes, no inguinal nodes   HEENT: Head is normocephalic, atraumatic. EOMI, PERRLA. NECK: Supple, symmetrical, trachea midline, no adenopathy, thyroid symmetric, not enlarged and no tenderness, skin normal.   CHEST/LUNGS: chest symmetric with normal A/P diameter, normal respiratory rate and rhythm, lungs clear to auscultation without wheezes, rales or rhonchi. No accessory muscle use. Scars None   CARDIOVASCULAR: Heart sounds are normal. Regular rate and rhythm. Normal S1 and S2.   ABDOMEN: Normal shape. N Normal bowel sounds. Soft, nondistended, no masses or organomegaly. No evidence of incarcerated/strangulated hernia. Percussion: Normal without hepatosplenomegally. Tenderness: absent. RECTAL: deferred, not clinically indicated   NEUROLOGIC: There are no focalizing motor or sensory deficits. CN II-XII are grossly intact. EXTREMITIES: no cyanosis, no clubbing and no edema.    LABS:                    Recent Labs    02/27/21   2200 02/28/21   0521 02/28/21   0521 02/28/21   0800 02/28/21   0800 03/01/21   0526 03/01/21   0658 03/01/21   0915 03/01/21   1253 03/02/21   0500 03/02/21   0501 03/02/21   1348   WBC 6.5 --  --  --  --  4.8 --  --  --  2.8* --  --    HGB 14.2 --  --  --  --  11.9* --  --  --  11.6* --  --    HCT 42.8 --  --  --  --  37.5 --  --  --  37.2 --  --     --  --  --  --  196 --  --  --  202 --  --    * --  < > --  < > 133* --  125* 130* --  132* --    K 4.3 --  --  --  --  4.1 --  --  --  --  3.8 --    CL 90* --  --  --  --  97* --  --  --  --  95* --    CO2 26 --  --  --  --  28 --  --  --  --  28 --    BUN 7 --  --  --  --  7 --  --  --  --  5* --    CREATININE 0.5 --  --  --  --  0.5 --  --  --  --  0.4 --    MG --  1.5* --  --  --  --  1.7 --  --  --  --  1.7   PHOS --  3.5 --  --  --  --  --  --  --  --  --  --    CALCIUM 9.0 --  --  --  --  8.6 --  --  --  --  8.6 --    INR 0.90 APPROACH: Left side posteriorly and inferiorly . A few permanent sonographic images were obtained during procedure for documentation. FLUID WITHDRAWN: None. ESTIMATED BLOOD LOSS: Minimal      PROCEDURE: Signed informed consent was obtained prior to performing this procedure. The thorax was initially evaluated sonographically to determine appropriate puncture site. The skin was marked, prepped, and draped in a sterile fashion. The pleural effusions very small. Following local anesthesia and utilizing aseptic technique, one    pass of a 5 [place] one-step catheter was made on the left side. No fluid was obtained. Unfortunately, the patient did not tolerate the initial puncture and became nauseated, cold, clammy. Due to this, no additional attempts were made. Impression   1. Attempted thoracentesis left side. This was unsuccessful due to combination of a very small effusion and the patient's inability to tolerate the needle puncture. 2. If desired, a repeat attempt at thoracentesis can be performed tomorrow with CT guidance. **This report has been created using voice recognition software. It may contain minor errors which are inherent in voice recognition technology. **      Final report electronically signed by Dr. Nataliya Thurston on 2/28/2021 9:33 AM     Narrative   PROCEDURE: FL ESOPHAGRAM      CLINICAL INFORMATION: Dysphasia      TECHNIQUE: Following the oral administration of thin barium, the anatomy of the esophagus was evaluated in a limited esophagram. 10 images were obtained. Total fluoroscopy time 1.0 minutes. COMPARISON: None      FINDINGS: Evaluation is limited due to patient's lack of mobility. Swallowing was not evaluated. There are no strictures or extrinsic abnormalities in the esophagus. No mucosal lesions are identified. There is a large fixed hiatal hernia containing    approximately two thirds of the esophagus.  There is organoaxial gastric volvulus within the hernia. No gastric obstruction is identified. Impression      Large fixed hiatal hernia with organoaxial gastric volvulus. Final report electronically signed by Dr. Leann Hagan on 3/2/2021 10:02 AM   Thank you for the interesting evaluation. Further recommendations to follow. Electronically signed by Maxine Manzanares MD on 3/2/2021 at 5:05 PM    ADDENDUM:  1. Schedule Paulina for robotic repair large paraesophageal/hiatal hernia with gastric volvulus reduction and fundoplication; possible G-tube insertion/gastropexy. 2. She will undergo pre-operative clearance per anesthesia guidelines with risk factors listed under the past medical history diagnosis & problem list.  3. The risks, benefits and alternatives were discussed with Jocelyne Chaidez including non-operative management. The pros and cons of robotic, laparoscopic and open techniques were discussed. The pros and cons of mesh insertion were discussed. All questions answered. She understands and wishes to proceed with surgical intervention. 4. Restrictions discussed with Jocelyne Chaidez and she expresses understanding. 5. She is advised to call back directly if there are further questions/concerns, or if her symptoms worsen prior to surgery.     Electronically signed by Maxine Manzanares MD on 3/5/21 at 6:57 AM EST

## 2021-03-05 NOTE — TELEPHONE ENCOUNTER
[facility] Transitions Initial Follow Up Call    Outreach made within 2 business days of discharge: Yes    Patient: Bon Gunter Patient : 1940   MRN: [id number]  Reason for Admission: There are no discharge diagnoses documented for the most recent discharge. Discharge Date: 3/3/21       Spoke with: Helen Hogan    Discharge department/facility: [facility]    TCM Interactive Patient Contact:  Was patient able to fill all prescriptions: Yes  Was patient instructed to bring all medications to the follow-up visit: Yes  Is patient taking all medications as directed in the discharge summary?  Yes  Does patient understand their discharge instructions: Yes  Does patient have questions or concerns that need addressed prior to 7-14 day follow up office visit: no    Scheduled appointment with PCP within 7-14 days    Follow Up  Future Appointments   Date Time Provider Huber Little   3/8/2021 10:40 AM DO ARCADIO Peralta P - [place]   2021  1:00 PM DO ARCADIO Peralta WARD Andino LPN

## 2021-03-08 ENCOUNTER — OFFICE VISIT (OUTPATIENT)
Dept: FAMILY MEDICINE CLINIC | Age: 81
End: 2021-03-08
Payer: MEDICARE

## 2021-03-08 VITALS
SYSTOLIC BLOOD PRESSURE: 128 MMHG | BODY MASS INDEX: 25.71 KG/M2 | OXYGEN SATURATION: 98 % | TEMPERATURE: 98.7 F | WEIGHT: 160 LBS | HEIGHT: 66 IN | HEART RATE: 76 BPM | DIASTOLIC BLOOD PRESSURE: 72 MMHG | RESPIRATION RATE: 18 BRPM

## 2021-03-08 DIAGNOSIS — K62.5 BRBPR (BRIGHT RED BLOOD PER RECTUM): Primary | ICD-10-CM

## 2021-03-08 DIAGNOSIS — R13.10 DYSPHAGIA, UNSPECIFIED TYPE: ICD-10-CM

## 2021-03-08 DIAGNOSIS — M54.41 CHRONIC BILATERAL LOW BACK PAIN WITH BILATERAL SCIATICA: ICD-10-CM

## 2021-03-08 DIAGNOSIS — R13.19 ESOPHAGEAL DYSPHAGIA: ICD-10-CM

## 2021-03-08 DIAGNOSIS — G89.29 CHRONIC BILATERAL LOW BACK PAIN WITH BILATERAL SCIATICA: ICD-10-CM

## 2021-03-08 DIAGNOSIS — K44.9 LARGE HIATAL HERNIA: ICD-10-CM

## 2021-03-08 DIAGNOSIS — E87.1 HYPONATREMIA: ICD-10-CM

## 2021-03-08 DIAGNOSIS — I10 ESSENTIAL HYPERTENSION: ICD-10-CM

## 2021-03-08 DIAGNOSIS — R53.1 WEAKNESS GENERALIZED: ICD-10-CM

## 2021-03-08 DIAGNOSIS — M48.061 SPINAL STENOSIS OF LUMBAR REGION WITHOUT NEUROGENIC CLAUDICATION: ICD-10-CM

## 2021-03-08 DIAGNOSIS — M54.42 CHRONIC BILATERAL LOW BACK PAIN WITH BILATERAL SCIATICA: ICD-10-CM

## 2021-03-08 PROCEDURE — 1111F DSCHRG MED/CURRENT MED MERGE: CPT | Performed by: FAMILY MEDICINE

## 2021-03-08 PROCEDURE — 99495 TRANSJ CARE MGMT MOD F2F 14D: CPT | Performed by: FAMILY MEDICINE

## 2021-03-08 NOTE — PROGRESS NOTES
Transition of Care Visit/Hospital Follow Up:      Dean Gunter is a 78 y.o. female that presents for Follow-Up from Hospital        Date of Discharge:   3/3/21  Was patient contacted within 2 business days of discharge (see chart for documentation):  yes - 3/5/21      Patient presents for hospital follow up. Patient recently hospitalized at [facility] for treatment of rectal bleeding, hyponatremia, dysphagia. Symptoms prior to admission:  dysphagia     Hospital Course per DC Summary:    Hospital Course:   Dean Gunter is a 78 y.o. female admitted to [address] [place] [address] on 2/27/2021 for dysphagia. She was found to have hyponatremia, elevated blood pressures, large paraesophageal, hiatal hernia with gastric volvulus, BRB per rectum. Patient was evaluated by GI and Gen surgery. Plan outpatient egd and hiatal hernia repair.      Risk Stratification for surgery:  RCRI: 1 point if patient ends up happening open procedure. She would be classified as Class II risk. 6% 30 day risk of death, MI, cardiac arrest. This was discussed with patient on day of dc. She understands the risks/benefits and would like to proceed with the surgery.           I took over pts care on day of dc. See below for detailed assessments.      1. Dysphagia  -Gradually worsening dysphagia to solid foods that has been present for years.  Worsened on admission. -GI consulted, plan for esophagram today.  Appreciate GI input   -Large hiatal hernia noted on CTA of the chest  -Esophagram on 3/2/2021: Large fixed hiatal hernia with organoaxial gastric volvulus  -GI on-board. GI consulted general surgery who will do outpt intervention. Appreciate GI input   -Patient will need outpatient EGD.      2. Presyncope  -Likely vasovagal process with her nausea and vomiting.    -EKG initially showing sinus arrhythmia however this resolved spontaneously  -Bilateral carotid duplex unremarkable  -Echocardiogram completed on 3/1/2021 and unremarkable  -Continue telemetry and neurochecks     3. Acute on chronic hypotonic hyponatremia, improving   -asymptomatic  -Chronic hyponatremia with sodium baseline of 128-133.  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  Urine osmolality elevated and urine sodium elevated.  Urine osmolality decreased  -Sodium slightly increased on 3/1/2021 at 133 which is around 9 mEq greater the 124 mEq of sodium drawn ( corrected 9 mEqs in roughly 12 hours).  Patient given 250cc bolus of D5W due to this rapid increase in sodium concentration. Sodium reduced to 125 after bolus.  -Sodium of 132 this morning, back to baseline.   -cont fluid restriction 1.5L/24 hours  -Nephrology consulted and is following patient's care. Nephrology recommend salt tab 2 g PO x 1 today. Nephrology Appreciate nephrology input      4. Hematochezia, resolved prior to admission  -Mildly decreased hemoglobin at 11.6  -Anemia panel showing mild iron deficiency  -Likely hemorrhoidal versus diverticular  -GI following     5. CAP versus aspiration pneumonia  -Vomiting and regurgitation after dysphagia prior to admission  -CTA of the chest showing atelectasis in the left lower lobe. -Mildly elevated procalcitonin at 1.31 on admission this increased to 1.44 3/1/2021  -Patient on Zosyn to cover for possible aspiration pneumonia  -Switch zosyn to augmentin today. Had 3 days of zosyn, 4 more day of Augmentin, total 7 days antibiotics      6. Left-sided pleural effusion  -Very small pleural effusion unable to undergo thoracentesis. -Patient is saturating well on room air no signs of acute respiratory distress currently  -continue to monitor      7. Hypomagnesemia, resolved   -Magnesium of 1.7 3/2/21.  Will follow     8. Hiatal hernia  -Seen on CTA of the chest     9. Bilateral lower extremity edema  -Chronic, stable  -Echocardiogram unremarkable     10. History of left leg fracture  -Wheelchair-bound most of the time. -PT and OT consulted     11.  Stable right midlung nodule  -Former smoker  -Follow-up with PCP at discharge     12. Hypothyroidism  -TSH low on admission.  Synthroid decreased to 100 mcg daily.  -Follow-up with PCP with repeat lab work in 6 weeks     13. Essential hypertension  -hypertensive for past 24 hours, increase Toprol XL from 25 mg to 50 mg PO daily  -pt also takes enalapril at home - resumed today. Consider adding norvasc if bps persistently elevated     14. Chronic pain/fibromyalgia  -Continue Cymbalta, Lyrica     15. Former smoker      12. Leukopenia, likely from pneumonia     -afebrile. CBC in am     17. Low BUN, possibly malnutrition     -dietician consult     Medication changes at discharge:  Med list reconciled today    Clinical course since discharge:  Has surgery scheduled with Dr Naz Olivares tomorrow to repair hiatal hernia. Has not had issues with dysphagia since discharge, but is on a liquid diet. Has had intermittent dysphagia for several years, but states that she hadn't sought treatment for this because it was only with certain foods. Has had 2 BMs since d/c. Denies cough, SOB, chest pain, nausea. HTN    Does patient check BP regularly at home? - No, had been off of her enalapril, just restarted emalapril last night  Current Medication regimen - metoprolol, enalapril  Tolerating medications well? - yes    Shortness of breath or chest pain? No  Headache or visual complaints? No  Neurologic changes like confusion? No  Extremity edema?  No    BP Readings from Last 3 Encounters:   03/08/21 128/72   03/03/21 (!) 186/93   02/26/21 132/70         Current Outpatient Medications   Medication Sig Dispense Refill    pantoprazole (PROTONIX) 40 MG tablet Take 1 tablet by mouth every morning (before breakfast) 30 tablet 2    amoxicillin-clavulanate (AUGMENTIN) 875-125 MG per tablet Take 1 tablet by mouth every 12 hours for 10 days 20 tablet 0    levothyroxine (SYNTHROID) 100 MCG tablet Take 1 tablet by mouth Daily 30 tablet 0    metoprolol succinate (TOPROL XL) 50 MG extended release tablet Take 1 tablet by mouth daily 30 tablet 0    pregabalin (LYRICA) 150 MG capsule Take 1 capsule by mouth 2 times daily for 180 days. 180 capsule 1    enalapril (VASOTEC) 10 MG tablet TAKE ONE-HALF (1/2) TABLET DAILY 90 tablet 3    Misc. Devices (WALKER) MISC 1 each by Does not apply route daily 1 each 0    DULoxetine (CYMBALTA) 60 MG extended release capsule TAKE 1 CAPSULE DAILY 90 capsule 4    guaiFENesin 1200 MG TB12 Take 1,200 mg by mouth 2 times daily 20 tablet 0    Misc. Devices MISC Mechanical lift for a Dionna Elliott 1 Device 0    Scooter MISC by Does not apply route Power scooter 1 each 0    CRANBERRY PO Take 1,500 mg by mouth daily      Biotin 43705 MCG TABS Take 1-2 tablets by mouth daily      fluticasone (FLONASE) 50 MCG/ACT nasal spray 1 spray by Nasal route as needed for Rhinitis      Omega 3-6-9 Fatty Acids (OMEGA 3-6-9 COMPLEX) CAPS Take 1 capsule by mouth daily       therapeutic multivitamin-minerals (THERAGRAN-M) tablet Take 1 tablet by mouth nightly        No current facility-administered medications for this visit.         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 Carmelo Buck GERD (gastroesophageal reflux disease)     Diverticulitis     Hiatal hernia     Hypertension     Hypothyroidism     Osteoarthritis        Past Surgical History:   Procedure Laterality Date    APPENDECTOMY  1958    BACK SURGERY      CARPAL TUNNEL RELEASE Bilateral 2013    w/cubital tunnel    COLON SURGERY  07/29/2016    Procedure: ATTEMPTED DAVINCI XI ROBOTIC ASSISTED SIGMOID COLON RESECTION, ROBOTIC ASSISTED MOBILIZATION OF RECTAL SIGMOID TO SPLENIC FLEXURE, CONVERTED TO OPEN LEFT HEMICOLECTOMY WITH COLOPROCTOCTOMY, SPLENORRHAPHY, RIGID SIGMOIDOSCOPY, LASER EVALUATION OF VASCULARITY WITH ICG; Surgeon: Winnie Levine MD; Location: [facility] [place] [address]; Service: General    COLONOSCOPY  2012    CYST REMOVAL     [address],     Cataract    FEMUR FRACTURE SURGERY Left 2020    LEFT FEMUR OPEN REDUCTION INTERNAL FIXATION performed by Thelma Harmon MD at [address] Left    [facility]    w/bladder suspension    JOINT REPLACEMENT  , ,     rt hip(), lt knee(), lt hip() Shoulder ()   Juvenal MitchellJosué De Fabiano [address] S[address]  [address] IND Left 9/15/2017    COLONOSCOPY performed by Nicolasa Priest MD at [facility]   [facility]    discectomy   [address]    UPPER GASTROINTESTINAL ENDOSCOPY         Social History     Tobacco Use    Smoking status: Former Smoker     Packs/day: 2.00     Years: 11.00     Pack years: 22.00     Types: Cigarettes     Start date: 1957     Quit date: 1967     Years since quittin.3    Smokeless tobacco: Never Used   Substance Use Topics    Alcohol use: No    Drug use: No       Family History   Problem Relation Age of Onset    Arthritis Mother     Hearing Loss Mother     High Cholesterol Mother     Hearing Loss Father     Heart Disease Father 76        CABG    Stroke Father     Arthritis Sister     Depression Sister     Diabetes Sister     Arthritis Brother     Depression Brother     Cancer Maternal Aunt     Early Death Maternal Aunt     Diabetes Maternal Grandmother     Heart Disease Paternal Grandmother          I have reviewed the patient's past medical history, past surgical history, allergies, medications, social and family history and I have made updates where appropriate.         PHYSICAL EXAM:  /72   Pulse 76   Temp 98.7 °F (37.1 °C)   Resp 18   Ht 5' 6\" (1.676 m)   Wt 160 lb (72.6 kg)   SpO2 98%   BMI 25.82 kg/m²   General Appearance: well developed and well- nourished, in no acute distress  Head: normocephalic and atraumatic  ENT: external ear and ear canal normal bilaterally, nose without deformity, nasal mucosa and turbinates normal without polyps, oropharynx normal, dentition is normal for age, no lip or gum lesions noted  Neck: supple and non-tender without mass, no thyromegaly or thyroid nodules, no cervical lymphadenopathy  Pulmonary/Chest: clear to auscultation bilaterally- no wheezes, rales or rhonchi, normal air movement, no respiratory distress or retractions  Cardiovascular: normal rate, regular rhythm, normal S1 and S2, no murmurs, rubs, clicks, or gallops, distal pulses intact  Abdomen: soft, non-tender, non-distended, no rebound or guarding, no masses or hernias noted, no hepatospleenomegaly  Extremities: no cyanosis, clubbing or edema of the lower extremities  Psych:  Normal affect without evidence of depression or anxiety, insight and judgement are appropriate, memory appears intact  Skin: warm and dry, no rash or erythema      Diagnostic test results reviewed: inpatient labs    Patient risk of morbidity and mortality: moderate      ASSESSMENT & PLAN  Gil Puri was seen today for follow-up from hospital.    Diagnoses and all orders for this visit:    BRBPR (bright red blood per rectum)  -     MD DISCHARGE MEDS RECONCILED W/ CURRENT OUTPATIENT MED LIST    Spinal stenosis of lumbar region without neurogenic claudication    Weakness generalized    Chronic bilateral low back pain with bilateral sciatica    Dysphagia, unspecified type    Esophageal dysphagia    Essential hypertension    Large hiatal hernia  -     Basic Metabolic Panel; Future    Hyponatremia  -     Basic Metabolic Panel; Future        Return in about 6 weeks (around 4/19/2021).     Level of medical complexity:  moderate    -Overall, patient is improving  -BMP in 2 weeks  -Follow up with specialists as scheduled  -Continue current meds  -Advised to continue to closely monitor her symptoms and if any worsening to seek treatment    All copied or forwarded information in the progress note was verified by me to be accurate at the time of visit  Patient's past medical, surgical, social and family history were reviewed and updated      All patient questions answered. Patient voiced understanding.

## 2021-03-08 NOTE — PROGRESS NOTES
Patient contacted regarding COVID-19 screen. Test was done on 3/1/2021 at [facility]  Following questions asked: In the last month, have you been in contact with someone who was confirmed or suspected to have Coronavirus/COVID-19:  Patient stated NO      Pt was informed can be a visitor allowed. Please bring masks. Do you or family members have any of the following symptoms:  Cough-no   Muscle pain-no   Shortness of breath-no   Fever-no   Weakness-no  Severe headache-no   Sore throat-no   Respiratory symptoms-no    Have you traveled internationally in the last month?  No     Have you been to the emergency room recently-no

## 2021-03-09 ENCOUNTER — ANESTHESIA (OUTPATIENT)
Dept: OPERATING ROOM | Age: 81
DRG: 326 | End: 2021-03-09
Payer: MEDICARE

## 2021-03-09 ENCOUNTER — HOSPITAL ENCOUNTER (INPATIENT)
Age: 81
LOS: 6 days | Discharge: HOME HEALTH | DRG: 326 | End: 2021-03-19
Attending: SURGERY | Admitting: SURGERY
Payer: MEDICARE

## 2021-03-09 ENCOUNTER — ANESTHESIA EVENT (OUTPATIENT)
Dept: OPERATING ROOM | Age: 81
DRG: 326 | End: 2021-03-09
Payer: MEDICARE

## 2021-03-09 VITALS — SYSTOLIC BLOOD PRESSURE: 112 MMHG | OXYGEN SATURATION: 89 % | DIASTOLIC BLOOD PRESSURE: 55 MMHG | TEMPERATURE: 96.3 F

## 2021-03-09 PROBLEM — K44.9 PARAESOPHAGEAL HERNIA: Status: ACTIVE | Noted: 2021-03-09

## 2021-03-09 PROCEDURE — 6360000002 HC RX W HCPCS: Performed by: SURGERY

## 2021-03-09 PROCEDURE — 2720000010 HC SURG SUPPLY STERILE: Performed by: SURGERY

## 2021-03-09 PROCEDURE — 6360000002 HC RX W HCPCS: Performed by: NURSE ANESTHETIST, CERTIFIED REGISTERED

## 2021-03-09 PROCEDURE — 2500000003 HC RX 250 WO HCPCS: Performed by: NURSE ANESTHETIST, CERTIFIED REGISTERED

## 2021-03-09 PROCEDURE — 0DH64UZ INSERTION OF FEEDING DEVICE INTO STOMACH, PERCUTANEOUS ENDOSCOPIC APPROACH: ICD-10-PCS | Performed by: SURGERY

## 2021-03-09 PROCEDURE — 43830 GSTRST OPEN WO CONSTJ TUBE: CPT | Performed by: SURGERY

## 2021-03-09 PROCEDURE — 0DN84ZZ RELEASE SMALL INTESTINE, PERCUTANEOUS ENDOSCOPIC APPROACH: ICD-10-PCS | Performed by: SURGERY

## 2021-03-09 PROCEDURE — 6360000002 HC RX W HCPCS

## 2021-03-09 PROCEDURE — 3600000009 HC SURGERY ROBOT BASE: Performed by: SURGERY

## 2021-03-09 PROCEDURE — 8E0W4CZ ROBOTIC ASSISTED PROCEDURE OF TRUNK REGION, PERCUTANEOUS ENDOSCOPIC APPROACH: ICD-10-PCS | Performed by: SURGERY

## 2021-03-09 PROCEDURE — 2709999900 HC NON-CHARGEABLE SUPPLY: Performed by: SURGERY

## 2021-03-09 PROCEDURE — 0BQT4ZZ REPAIR DIAPHRAGM, PERCUTANEOUS ENDOSCOPIC APPROACH: ICD-10-PCS | Performed by: SURGERY

## 2021-03-09 PROCEDURE — 3600000019 HC SURGERY ROBOT ADDTL 15MIN: Performed by: SURGERY

## 2021-03-09 PROCEDURE — 2500000003 HC RX 250 WO HCPCS: Performed by: SURGERY

## 2021-03-09 PROCEDURE — 2580000003 HC RX 258

## 2021-03-09 PROCEDURE — 0DQ64ZZ REPAIR STOMACH, PERCUTANEOUS ENDOSCOPIC APPROACH: ICD-10-PCS | Performed by: SURGERY

## 2021-03-09 PROCEDURE — 3700000001 HC ADD 15 MINUTES (ANESTHESIA): Performed by: SURGERY

## 2021-03-09 PROCEDURE — 7100000000 HC PACU RECOVERY - FIRST 15 MIN: Performed by: SURGERY

## 2021-03-09 PROCEDURE — 7100000001 HC PACU RECOVERY - ADDTL 15 MIN: Performed by: SURGERY

## 2021-03-09 PROCEDURE — 2500000003 HC RX 250 WO HCPCS

## 2021-03-09 PROCEDURE — 0DNU4ZZ RELEASE OMENTUM, PERCUTANEOUS ENDOSCOPIC APPROACH: ICD-10-PCS | Performed by: SURGERY

## 2021-03-09 PROCEDURE — 6370000000 HC RX 637 (ALT 250 FOR IP): Performed by: SURGERY

## 2021-03-09 PROCEDURE — 3700000000 HC ANESTHESIA ATTENDED CARE: Performed by: SURGERY

## 2021-03-09 PROCEDURE — 2580000003 HC RX 258: Performed by: SURGERY

## 2021-03-09 PROCEDURE — 43281 LAP PARAESOPHAG HERN REPAIR: CPT | Performed by: SURGERY

## 2021-03-09 PROCEDURE — 6360000002 HC RX W HCPCS: Performed by: ANESTHESIOLOGY

## 2021-03-09 PROCEDURE — S2900 ROBOTIC SURGICAL SYSTEM: HCPCS | Performed by: SURGERY

## 2021-03-09 PROCEDURE — G0378 HOSPITAL OBSERVATION PER HR: HCPCS

## 2021-03-09 PROCEDURE — 2580000003 HC RX 258: Performed by: NURSE ANESTHETIST, CERTIFIED REGISTERED

## 2021-03-09 RX ORDER — BUPIVACAINE HYDROCHLORIDE 5 MG/ML
INJECTION, SOLUTION EPIDURAL; INTRACAUDAL PRN
Status: DISCONTINUED | OUTPATIENT
Start: 2021-03-09 | End: 2021-03-09 | Stop reason: ALTCHOICE

## 2021-03-09 RX ORDER — LABETALOL 20 MG/4 ML (5 MG/ML) INTRAVENOUS SYRINGE
5 EVERY 10 MIN PRN
Status: DISCONTINUED | OUTPATIENT
Start: 2021-03-09 | End: 2021-03-09

## 2021-03-09 RX ORDER — PROMETHAZINE HYDROCHLORIDE 25 MG/1
12.5 TABLET ORAL EVERY 6 HOURS PRN
Status: DISCONTINUED | OUTPATIENT
Start: 2021-03-09 | End: 2021-03-19 | Stop reason: HOSPADM

## 2021-03-09 RX ORDER — FENTANYL CITRATE 50 UG/ML
INJECTION, SOLUTION INTRAMUSCULAR; INTRAVENOUS PRN
Status: DISCONTINUED | OUTPATIENT
Start: 2021-03-09 | End: 2021-03-09 | Stop reason: SDUPTHER

## 2021-03-09 RX ORDER — ONDANSETRON 2 MG/ML
4 INJECTION INTRAMUSCULAR; INTRAVENOUS EVERY 6 HOURS PRN
Status: DISCONTINUED | OUTPATIENT
Start: 2021-03-09 | End: 2021-03-19 | Stop reason: HOSPADM

## 2021-03-09 RX ORDER — HYDROCODONE BITARTRATE AND ACETAMINOPHEN 5; 325 MG/1; MG/1
1 TABLET ORAL EVERY 4 HOURS PRN
Status: DISCONTINUED | OUTPATIENT
Start: 2021-03-09 | End: 2021-03-19 | Stop reason: HOSPADM

## 2021-03-09 RX ORDER — FENTANYL CITRATE 50 UG/ML
25 INJECTION, SOLUTION INTRAMUSCULAR; INTRAVENOUS EVERY 5 MIN PRN
Status: DISCONTINUED | OUTPATIENT
Start: 2021-03-09 | End: 2021-03-09

## 2021-03-09 RX ORDER — SODIUM CHLORIDE 9 MG/ML
INJECTION, SOLUTION INTRAVENOUS CONTINUOUS
Status: DISCONTINUED | OUTPATIENT
Start: 2021-03-09 | End: 2021-03-13

## 2021-03-09 RX ORDER — SODIUM CHLORIDE 9 MG/ML
INJECTION, SOLUTION INTRAVENOUS CONTINUOUS PRN
Status: DISCONTINUED | OUTPATIENT
Start: 2021-03-09 | End: 2021-03-09 | Stop reason: SDUPTHER

## 2021-03-09 RX ORDER — HYDROCODONE BITARTRATE AND ACETAMINOPHEN 5; 325 MG/1; MG/1
2 TABLET ORAL EVERY 4 HOURS PRN
Status: DISCONTINUED | OUTPATIENT
Start: 2021-03-09 | End: 2021-03-19 | Stop reason: HOSPADM

## 2021-03-09 RX ORDER — LEVOTHYROXINE SODIUM 0.1 MG/1
100 TABLET ORAL DAILY
Status: DISCONTINUED | OUTPATIENT
Start: 2021-03-10 | End: 2021-03-19 | Stop reason: HOSPADM

## 2021-03-09 RX ORDER — ONDANSETRON 2 MG/ML
INJECTION INTRAMUSCULAR; INTRAVENOUS PRN
Status: DISCONTINUED | OUTPATIENT
Start: 2021-03-09 | End: 2021-03-09 | Stop reason: SDUPTHER

## 2021-03-09 RX ORDER — SODIUM CHLORIDE 9 MG/ML
INJECTION, SOLUTION INTRAVENOUS CONTINUOUS
Status: DISCONTINUED | OUTPATIENT
Start: 2021-03-09 | End: 2021-03-09

## 2021-03-09 RX ORDER — DEXAMETHASONE SODIUM PHOSPHATE 10 MG/ML
INJECTION, EMULSION INTRAMUSCULAR; INTRAVENOUS PRN
Status: DISCONTINUED | OUTPATIENT
Start: 2021-03-09 | End: 2021-03-09 | Stop reason: SDUPTHER

## 2021-03-09 RX ORDER — NEOSTIGMINE METHYLSULFATE 5 MG/5 ML
SYRINGE (ML) INTRAVENOUS PRN
Status: DISCONTINUED | OUTPATIENT
Start: 2021-03-09 | End: 2021-03-09 | Stop reason: SDUPTHER

## 2021-03-09 RX ORDER — ROCURONIUM BROMIDE 10 MG/ML
INJECTION, SOLUTION INTRAVENOUS PRN
Status: DISCONTINUED | OUTPATIENT
Start: 2021-03-09 | End: 2021-03-09 | Stop reason: SDUPTHER

## 2021-03-09 RX ORDER — DULOXETIN HYDROCHLORIDE 60 MG/1
60 CAPSULE, DELAYED RELEASE ORAL DAILY
Status: DISCONTINUED | OUTPATIENT
Start: 2021-03-09 | End: 2021-03-19 | Stop reason: HOSPADM

## 2021-03-09 RX ORDER — PROPOFOL 10 MG/ML
INJECTION, EMULSION INTRAVENOUS PRN
Status: DISCONTINUED | OUTPATIENT
Start: 2021-03-09 | End: 2021-03-09 | Stop reason: SDUPTHER

## 2021-03-09 RX ORDER — FENTANYL CITRATE 50 UG/ML
50 INJECTION, SOLUTION INTRAMUSCULAR; INTRAVENOUS EVERY 5 MIN PRN
Status: DISCONTINUED | OUTPATIENT
Start: 2021-03-09 | End: 2021-03-09

## 2021-03-09 RX ORDER — SODIUM CHLORIDE 0.9 % (FLUSH) 0.9 %
10 SYRINGE (ML) INJECTION EVERY 12 HOURS SCHEDULED
Status: DISCONTINUED | OUTPATIENT
Start: 2021-03-09 | End: 2021-03-19 | Stop reason: HOSPADM

## 2021-03-09 RX ORDER — SUCCINYLCHOLINE/SOD CL,ISO/PF 200MG/10ML
SYRINGE (ML) INTRAVENOUS PRN
Status: DISCONTINUED | OUTPATIENT
Start: 2021-03-09 | End: 2021-03-09 | Stop reason: SDUPTHER

## 2021-03-09 RX ORDER — GLYCOPYRROLATE 1 MG/5 ML
SYRINGE (ML) INTRAVENOUS PRN
Status: DISCONTINUED | OUTPATIENT
Start: 2021-03-09 | End: 2021-03-09 | Stop reason: SDUPTHER

## 2021-03-09 RX ORDER — EPHEDRINE SULFATE 50 MG/ML
INJECTION INTRAVENOUS PRN
Status: DISCONTINUED | OUTPATIENT
Start: 2021-03-09 | End: 2021-03-09 | Stop reason: SDUPTHER

## 2021-03-09 RX ORDER — ONDANSETRON 2 MG/ML
4 INJECTION INTRAMUSCULAR; INTRAVENOUS
Status: DISCONTINUED | OUTPATIENT
Start: 2021-03-09 | End: 2021-03-09

## 2021-03-09 RX ORDER — PREGABALIN 75 MG/1
150 CAPSULE ORAL 2 TIMES DAILY
Status: DISCONTINUED | OUTPATIENT
Start: 2021-03-09 | End: 2021-03-19 | Stop reason: HOSPADM

## 2021-03-09 RX ORDER — SODIUM CHLORIDE 0.9 % (FLUSH) 0.9 %
10 SYRINGE (ML) INJECTION PRN
Status: DISCONTINUED | OUTPATIENT
Start: 2021-03-09 | End: 2021-03-19 | Stop reason: HOSPADM

## 2021-03-09 RX ORDER — METOPROLOL SUCCINATE 25 MG/1
50 TABLET, EXTENDED RELEASE ORAL DAILY
Status: DISCONTINUED | OUTPATIENT
Start: 2021-03-10 | End: 2021-03-19 | Stop reason: HOSPADM

## 2021-03-09 RX ORDER — LIDOCAINE HCL/PF 100 MG/5ML
SYRINGE (ML) INJECTION PRN
Status: DISCONTINUED | OUTPATIENT
Start: 2021-03-09 | End: 2021-03-09 | Stop reason: SDUPTHER

## 2021-03-09 RX ADMIN — PREGABALIN 150 MG: 75 CAPSULE ORAL at 20:42

## 2021-03-09 RX ADMIN — ONDANSETRON HYDROCHLORIDE 4 MG: 4 INJECTION, SOLUTION INTRAMUSCULAR; INTRAVENOUS at 14:30

## 2021-03-09 RX ADMIN — Medication 60 MG: at 14:29

## 2021-03-09 RX ADMIN — DULOXETINE HYDROCHLORIDE 60 MG: 60 CAPSULE, DELAYED RELEASE ORAL at 20:42

## 2021-03-09 RX ADMIN — SODIUM CHLORIDE: 9 INJECTION, SOLUTION INTRAVENOUS at 14:10

## 2021-03-09 RX ADMIN — HYDROCODONE BITARTRATE AND ACETAMINOPHEN 2 TABLET: 5; 325 TABLET ORAL at 20:42

## 2021-03-09 RX ADMIN — Medication 0.2 MG: at 14:47

## 2021-03-09 RX ADMIN — PHENYLEPHRINE HYDROCHLORIDE 100 MCG: 10 INJECTION INTRAVENOUS at 14:57

## 2021-03-09 RX ADMIN — SODIUM CHLORIDE: 9 INJECTION, SOLUTION INTRAVENOUS at 18:43

## 2021-03-09 RX ADMIN — HYDROMORPHONE HYDROCHLORIDE 0.25 MG: 1 INJECTION, SOLUTION INTRAMUSCULAR; INTRAVENOUS; SUBCUTANEOUS at 17:30

## 2021-03-09 RX ADMIN — CEFAZOLIN 2 G: 10 INJECTION, POWDER, FOR SOLUTION INTRAVENOUS at 14:34

## 2021-03-09 RX ADMIN — Medication 4 MG: at 16:48

## 2021-03-09 RX ADMIN — PHENYLEPHRINE HYDROCHLORIDE 100 MCG: 10 INJECTION INTRAVENOUS at 14:48

## 2021-03-09 RX ADMIN — DEXAMETHASONE SODIUM PHOSPHATE 10 MG: 10 INJECTION, EMULSION INTRAMUSCULAR; INTRAVENOUS at 14:31

## 2021-03-09 RX ADMIN — PROPOFOL 100 MG: 10 INJECTION, EMULSION INTRAVENOUS at 14:32

## 2021-03-09 RX ADMIN — EPHEDRINE SULFATE 10 MG: 50 INJECTION, SOLUTION INTRAVENOUS at 15:15

## 2021-03-09 RX ADMIN — SUGAMMADEX 200 MG: 100 INJECTION, SOLUTION INTRAVENOUS at 17:16

## 2021-03-09 RX ADMIN — PHENYLEPHRINE HYDROCHLORIDE 100 MCG: 10 INJECTION INTRAVENOUS at 16:00

## 2021-03-09 RX ADMIN — ROCURONIUM BROMIDE 10 MG: 10 INJECTION INTRAVENOUS at 14:54

## 2021-03-09 RX ADMIN — EPHEDRINE SULFATE 10 MG: 50 INJECTION, SOLUTION INTRAVENOUS at 15:16

## 2021-03-09 RX ADMIN — SODIUM CHLORIDE: 9 INJECTION, SOLUTION INTRAVENOUS at 16:00

## 2021-03-09 RX ADMIN — PHENYLEPHRINE HYDROCHLORIDE 100 MCG: 10 INJECTION INTRAVENOUS at 15:18

## 2021-03-09 RX ADMIN — PHENYLEPHRINE HYDROCHLORIDE 100 MCG: 10 INJECTION INTRAVENOUS at 15:33

## 2021-03-09 RX ADMIN — PHENYLEPHRINE HYDROCHLORIDE 100 MCG: 10 INJECTION INTRAVENOUS at 16:14

## 2021-03-09 RX ADMIN — Medication 0.6 MG: at 16:48

## 2021-03-09 RX ADMIN — EPHEDRINE SULFATE 10 MG: 50 INJECTION, SOLUTION INTRAVENOUS at 15:34

## 2021-03-09 RX ADMIN — FAMOTIDINE 20 MG: 10 INJECTION, SOLUTION INTRAVENOUS at 20:43

## 2021-03-09 RX ADMIN — EPHEDRINE SULFATE 10 MG: 50 INJECTION, SOLUTION INTRAVENOUS at 15:07

## 2021-03-09 RX ADMIN — EPHEDRINE SULFATE 10 MG: 50 INJECTION, SOLUTION INTRAVENOUS at 15:14

## 2021-03-09 RX ADMIN — HYDROMORPHONE HYDROCHLORIDE 0.25 MG: 1 INJECTION, SOLUTION INTRAMUSCULAR; INTRAVENOUS; SUBCUTANEOUS at 23:03

## 2021-03-09 RX ADMIN — HYDROMORPHONE HYDROCHLORIDE 0.25 MG: 1 INJECTION, SOLUTION INTRAMUSCULAR; INTRAVENOUS; SUBCUTANEOUS at 17:35

## 2021-03-09 RX ADMIN — ROCURONIUM BROMIDE 10 MG: 10 INJECTION INTRAVENOUS at 15:36

## 2021-03-09 RX ADMIN — ROCURONIUM BROMIDE 30 MG: 10 INJECTION INTRAVENOUS at 14:41

## 2021-03-09 RX ADMIN — FENTANYL CITRATE 25 MCG: 50 INJECTION, SOLUTION INTRAMUSCULAR; INTRAVENOUS at 14:29

## 2021-03-09 RX ADMIN — FENTANYL CITRATE 50 MCG: 50 INJECTION, SOLUTION INTRAMUSCULAR; INTRAVENOUS at 17:20

## 2021-03-09 RX ADMIN — Medication 100 MG: at 14:29

## 2021-03-09 RX ADMIN — FENTANYL CITRATE 25 MCG: 50 INJECTION, SOLUTION INTRAMUSCULAR; INTRAVENOUS at 17:12

## 2021-03-09 ASSESSMENT — PULMONARY FUNCTION TESTS
PIF_VALUE: 22
PIF_VALUE: 23
PIF_VALUE: 24
PIF_VALUE: 24
PIF_VALUE: 18
PIF_VALUE: 15
PIF_VALUE: 2
PIF_VALUE: 22
PIF_VALUE: 22
PIF_VALUE: 16
PIF_VALUE: 24
PIF_VALUE: 16
PIF_VALUE: 27
PIF_VALUE: 22
PIF_VALUE: 24
PIF_VALUE: 23
PIF_VALUE: 24
PIF_VALUE: 22
PIF_VALUE: 23
PIF_VALUE: 0
PIF_VALUE: 15
PIF_VALUE: 24
PIF_VALUE: 15
PIF_VALUE: 22
PIF_VALUE: 24
PIF_VALUE: 15
PIF_VALUE: 17
PIF_VALUE: 24
PIF_VALUE: 21
PIF_VALUE: 19
PIF_VALUE: 26
PIF_VALUE: 22
PIF_VALUE: 22
PIF_VALUE: 15
PIF_VALUE: 22
PIF_VALUE: 24
PIF_VALUE: 22
PIF_VALUE: 24
PIF_VALUE: 24
PIF_VALUE: 2
PIF_VALUE: 23
PIF_VALUE: 22
PIF_VALUE: 23
PIF_VALUE: 22
PIF_VALUE: 15
PIF_VALUE: 26
PIF_VALUE: 23
PIF_VALUE: 17
PIF_VALUE: 16
PIF_VALUE: 18
PIF_VALUE: 16
PIF_VALUE: 22
PIF_VALUE: 22
PIF_VALUE: 23
PIF_VALUE: 16
PIF_VALUE: 22
PIF_VALUE: 24
PIF_VALUE: 16
PIF_VALUE: 22
PIF_VALUE: 25
PIF_VALUE: 22
PIF_VALUE: 15
PIF_VALUE: 24
PIF_VALUE: 15
PIF_VALUE: 2
PIF_VALUE: 22
PIF_VALUE: 15
PIF_VALUE: 17
PIF_VALUE: 16
PIF_VALUE: 22
PIF_VALUE: 5
PIF_VALUE: 23
PIF_VALUE: 15
PIF_VALUE: 21
PIF_VALUE: 0
PIF_VALUE: 15
PIF_VALUE: 16
PIF_VALUE: 22
PIF_VALUE: 22
PIF_VALUE: 24
PIF_VALUE: 23
PIF_VALUE: 22
PIF_VALUE: 23
PIF_VALUE: 15
PIF_VALUE: 22
PIF_VALUE: 23
PIF_VALUE: 26
PIF_VALUE: 24
PIF_VALUE: 27

## 2021-03-09 ASSESSMENT — PAIN DESCRIPTION - ORIENTATION
ORIENTATION: RIGHT;LEFT
ORIENTATION: RIGHT;LEFT

## 2021-03-09 ASSESSMENT — PAIN DESCRIPTION - ONSET: ONSET: ON-GOING

## 2021-03-09 ASSESSMENT — PAIN SCALES - GENERAL
PAINLEVEL_OUTOF10: 7
PAINLEVEL_OUTOF10: 5
PAINLEVEL_OUTOF10: 7
PAINLEVEL_OUTOF10: 6
PAINLEVEL_OUTOF10: 8
PAINLEVEL_OUTOF10: 7

## 2021-03-09 ASSESSMENT — PAIN - FUNCTIONAL ASSESSMENT: PAIN_FUNCTIONAL_ASSESSMENT: PREVENTS OR INTERFERES SOME ACTIVE ACTIVITIES AND ADLS

## 2021-03-09 ASSESSMENT — PAIN DESCRIPTION - FREQUENCY: FREQUENCY: CONTINUOUS

## 2021-03-09 ASSESSMENT — PAIN DESCRIPTION - LOCATION: LOCATION: ABDOMEN

## 2021-03-09 ASSESSMENT — PAIN DESCRIPTION - PAIN TYPE: TYPE: SURGICAL PAIN

## 2021-03-09 ASSESSMENT — PAIN DESCRIPTION - DESCRIPTORS: DESCRIPTORS: SHARP;STABBING

## 2021-03-09 NOTE — ANESTHESIA PRE PROCEDURE
Department of Anesthesiology  Preprocedure Note       Name:  Vero Easton Height   Age:  78 y.o.  :  1940                                          MRN:  [id number]         Date:  3/9/2021      Surgeon: Myrna Martinez):  Salazar Sears MD    Procedure: Procedure(s):  ROBOTIC PARAESOPHAGEAL HERNIA REPAIR WITH MESH    Medications prior to admission:   Prior to Admission medications    Medication Sig Start Date End Date Taking? Authorizing Provider   pantoprazole (PROTONIX) 40 MG tablet Take 1 tablet by mouth every morning (before breakfast) 3/4/21  Yes Lamar Rosario APRN - CNP   levothyroxine (SYNTHROID) 100 MCG tablet Take 1 tablet by mouth Daily 3/4/21  Yes Vincent Holman MD   metoprolol succinate (TOPROL XL) 50 MG extended release tablet Take 1 tablet by mouth daily 3/4/21  Yes Vincent Holamn MD   pregabalin (LYRICA) 150 MG capsule Take 1 capsule by mouth 2 times daily for 180 days. 21 Yes Ascencion Pierce,    enalapril (VASOTEC) 10 MG tablet TAKE ONE-HALF (1/2) TABLET DAILY 20  Yes Sergio Orta DO   guaiFENesin 1200 MG TB12 Take 1,200 mg by mouth 2 times daily 19  Yes Leonides Roberto PA-C   CRANBERRY PO Take 1,500 mg by mouth daily   Yes Historical Provider, MD   Omega 3-6-9 Fatty Acids (OMEGA 3-6-9 COMPLEX) CAPS Take 1 capsule by mouth daily    Yes Historical Provider, MD   therapeutic multivitamin-minerals (THERAGRAN-M) tablet Take 1 tablet by mouth nightly    Yes Historical Provider, MD   amoxicillin-clavulanate (AUGMENTIN) 875-125 MG per tablet Take 1 tablet by mouth every 12 hours for 10 days 3/3/21 3/13/21  Vincent Holman MD   Misc. Devices [facility]) MISC 1 each by Does not apply route daily 20   Sergio Orta DO   DULoxetine (CYMBALTA) 60 MG extended release capsule TAKE 1 CAPSULE DAILY 20   DO June Dutton.  Devices MISC Mechanical lift for a Deniz Been 18   SergioDO Ross Corrigan MISC by Does not apply route Power scooter 10/31/17   DO Kristen Dutton MCG TABS Take 1-2 tablets by mouth daily    Historical Provider, MD   fluticasone (FLONASE) 50 MCG/ACT nasal spray 1 spray by Nasal route as needed for Rhinitis    Historical Provider, MD       Current medications:    Current Facility-Administered Medications   Medication Dose Route Frequency Provider Last Rate Last Admin    ceFAZolin (ANCEF) 2000 mg in dextrose 5 % 50 mL IVPB  2,000 mg Intravenous 30 Min Pre-Op Salazar Sears MD        0.9 % sodium chloride infusion   Intravenous Continuous Crystal Camptami, LPN           Allergies:     Allergies   Allergen Reactions    Ampicillin     Morphine Other (See Comments)     Hallucinations     Pcn [Penicillins]     Sulfa Antibiotics        Problem List:    Patient Active Problem List   Diagnosis Code    Osteoarthritis M19.90    Fibromyalgia M79.7    Gastroesophageal reflux disease K21.9    Hypothyroidism E03.9    Patient is Temple Z78.9    Back pain M54.9    H/O abdominal surgery Z98.890    Normocytic anemia D64.9    History of diverticulosis Z87.19    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.40, G89.29    Chest pain R07.9    Essential hypertension I10    Abnormal CT scan, chest R93.89    BRBPR (bright red blood per rectum) K62.5    GI bleed due to NSAIDs K92.2, T39.395A    Spinal stenosis of lumbar region without neurogenic claudication M48.061    Other idiopathic scoliosis, lumbar region M41.26    Neutropenia (HCC) D70.9    Closed fracture of distal end of left femur, initial encounter ([facility] [address] [address] S72.402A    Pre-syncope R55    Weakness generalized R53.1    Large hiatal hernia K44.9    Hypo-osmolar hyponatremia E87.1    Esophageal dysphagia R13.10    Pleural effusion on left J90    Hypomagnesemia E83.42    Dysphagia R13.10    Pneumonia of left lower lobe due to infectious organism J18.9    Bilateral lower extremity edema R60.0    Hx of fracture of leg Z87.81    Lung nodule R91.1       Past Medical History:        Diagnosis Date  Abnormal EKG     inferior infarct on EKG - last stress test     Anemia     mild - Hg 11.8 preop 2014    Back pain     pain clinic - s/p injections     Blood circulation, collateral     Diverticulitis     Dr. Shannan Sauer GERD (gastroesophageal reflux disease)     Diverticulitis     Hiatal hernia     Hypertension     Hypothyroidism     Osteoarthritis        Past Surgical History:        Procedure Laterality Date    APPENDECTOMY      BACK SURGERY      CARPAL TUNNEL RELEASE Bilateral     w/cubital tunnel    COLON SURGERY  2016    Procedure: ATTEMPTED DAVINCI XI ROBOTIC ASSISTED SIGMOID COLON RESECTION, ROBOTIC ASSISTED MOBILIZATION OF RECTAL SIGMOID TO SPLENIC FLEXURE, CONVERTED TO OPEN LEFT HEMICOLECTOMY WITH COLOPROCTOCTOMY, SPLENORRHAPHY, RIGID SIGMOIDOSCOPY, LASER EVALUATION OF VASCULARITY WITH ICG; Surgeon: Shannan Sauer MD; Location: [facility]; Service: General    COLONOSCOPY      CYST REMOVAL     [address],     Cataract    FEMUR FRACTURE SURGERY Left 2020    LEFT FEMUR OPEN REDUCTION INTERNAL FIXATION performed by Tatyana Neil MD at [address] Left    [address]    w/bladder suspension   [address]  , ,     rt hip(), lt knee(), lt hip() Shoulder ()   [address]    [place] COLON [place] SCRN NOT  [address] IND Left 9/15/2017    COLONOSCOPY performed by Gloria Burton MD at [facility]   [facility]    discectomy   [address] [place] [address]          Social History:    Social History     Tobacco Use    Smoking status: Former Smoker     Packs/day: 2.00     Years: 11.00     Pack years: 22.00     Types: Cigarettes     Start date: 1957     Quit date: 1967     Years since quittin.3    Smokeless tobacco: Never Used   Substance Use Topics    Alcohol use: No                                Counseling given: Not Answered      Vital Signs (Current):   Vitals:    03/09/21 1316 03/09/21 1323 03/09/21 1332   BP: (!) 160/77     Pulse: 71     Resp: 16     Temp: 97.4 °F (36.3 °C)     TempSrc: Temporal     SpO2: 98%     Weight:  159 lb (72.1 kg)    Height:   5' 7\" (1.702 m)                                              BP Readings from Last 3 Encounters:   03/09/21 (!) 160/77   03/08/21 128/72   03/03/21 (!) 186/93       NPO Status: Time of last liquid consumption: 0700                        Time of last solid consumption: 1800                        Date of last liquid consumption: 03/09/21                        Date of last solid food consumption: 03/08/21    BMI:   Wt Readings from Last 3 Encounters:   03/09/21 159 lb (72.1 kg)   03/08/21 160 lb (72.6 kg)   03/03/21 160 lb 1.6 oz (72.6 kg)     Body mass index is 24.9 kg/m². CBC:   Lab Results   Component Value Date    WBC 3.3 03/03/2021    RBC 4.16 03/03/2021    HGB 12.3 03/03/2021    HCT 39.3 03/03/2021    MCV 94.5 03/03/2021    RDW 10.9 11/06/2018     03/03/2021       CMP:   Lab Results   Component Value Date     03/03/2021    K 3.5 03/03/2021    K 4.3 02/27/2021     03/03/2021    CO2 27 03/03/2021    BUN 4 03/03/2021    CREATININE 0.5 03/03/2021    LABGLOM >90 03/03/2021    GLUCOSE 91 03/03/2021    PROT 6.9 02/27/2021    CALCIUM 8.8 03/03/2021    BILITOT 0.4 02/27/2021    ALKPHOS 111 02/27/2021    AST 30 02/27/2021    ALT 12 02/27/2021       POC Tests: No results for input(s): POCGLU, POCNA, POCK, POCCL, POCBUN, POCHEMO, POCHCT in the last 72 hours.     Coags:   Lab Results   Component Value Date    INR 0.90 02/27/2021    APTT 29.6 02/27/2021       HCG (If Applicable): No results found for: PREGTESTUR, PREGSERUM, HCG, HCGQUANT     ABGs: No results found for: PHART, PO2ART, AVS9IHJ, YOA0UEN, BEART, B4PHJQGU     Type & Screen (If Applicable):  No results found for: LABABO, [address]    Drug/Infectious Status (If Applicable):  No results found for: HIV, HEPCAB    COVID-19 Screening (If Applicable):   Lab Results   Component Value Date    COVID19 NOT DETECTED 03/01/2021    COVID19 Not Detected 10/06/2020         Anesthesia Evaluation   no history of anesthetic complications:   Airway: Mallampati: II  TM distance: >3 FB   Neck ROM: full  Mouth opening: > = 3 FB Dental:          Pulmonary:normal exam              Patient did not smoke on day of surgery. Cardiovascular:  Exercise tolerance: good (>4 METS),   (+) hypertension:,                   Neuro/Psych:   Negative Neuro/Psych ROS              GI/Hepatic/Renal:   (+) hiatal hernia, GERD:,           Endo/Other:    (+) hypothyroidism::., .          Pt had no PAT visit       Abdominal:           Vascular:                                        Anesthesia Plan      general     ASA 3       Induction: intravenous. MIPS: Postoperative opioids intended and Prophylactic antiemetics administered. Anesthetic plan and risks discussed with patient. Special considerations: Latter day. Plan discussed with CRNA.                   Madhu Kim MD   3/9/2021

## 2021-03-09 NOTE — BRIEF OP NOTE
Brief Postoperative Note      Patient: Lb Gunter  YOB: 1940  MRN: [id number]    Date of Procedure: 3/9/2021    Pre-Op Diagnosis: PARAESOPHAGEAL HERNIA with gastric volvulus    Post-Op Diagnosis: Same       Procedure(s):  ROBOTIC EXTENSIVE LYSIS OF ADHESIONS, ROBOTIC REDUCTION OF HIATAL HERNIA WITH GASTROPEXY    Surgeon(s):  Senait Siddiqui MD    Assistant:  First Assistant: Shayy Pisano RN    Anesthesia: General/local    Estimated Blood Loss (mL): 20 ml    Complications: None    Specimens:   * No specimens in log *    Implants:  * No implants in log *      Drains:   Gastrostomy/Enterostomy/Jejunostomy Gastrostomy LUQ 1 24 fr (Active)       Findings: as above - see op note for details     Electronically signed by Senait Siddiqui MD on 3/9/2021 at 5:07 PM

## 2021-03-09 NOTE — PROGRESS NOTES
1712 Awake and oriented on arrival to PACU , HOB elevated , pt c/o pain   1715 medicated with Fentanyl from CRNA  1730 pt states pain a # 8 medicated with 0.25 mg dilaudid   1735 no change in pain medicated with 0.25 mg dilaudid IV   1740 pt states pain easing up   1755 resting on and off   1805 pt states pain tolerable   1808 meets criteria for discharge , transported to [facility]

## 2021-03-09 NOTE — INTERVAL H&P NOTE
Update History & Physical    The patient's History and Physical was reviewed with the patient and I examined the patient. There was no change. The surgical site was confirmed by the patient and me. Plan: The risks, benefits, expected outcome, and alternative to the recommended procedure have been discussed with the patient. Patient understands and wants to proceed with the procedure. The patient was counseled at length about the risks of lacie Covid-19 during their perioperative period and any recovery window from their procedure. The patient was made aware that lacie Covid-19  may worsen their prognosis for recovering from their procedure  and lend to a higher morbidity and/or mortality risk. All material risks, benefits, and reasonable alternatives including postponing the procedure were discussed. The patient does wish to proceed with the procedure at this time.     Electronically signed by Shania Campos MD on 3/9/2021 at 1:20 PM

## 2021-03-10 LAB
ANION GAP SERPL CALCULATED.3IONS-SCNC: 8 MEQ/L (ref 8–16)
BUN BLDV-MCNC: 10 MG/DL (ref 7–22)
CALCIUM SERPL-MCNC: 8.4 MG/DL (ref 8.5–10.5)
CHLORIDE BLD-SCNC: 103 MEQ/L (ref 98–111)
CO2: 23 MEQ/L (ref 23–33)
CREAT SERPL-MCNC: 0.5 MG/DL (ref 0.4–1.2)
GFR SERPL CREATININE-BSD FRML MDRD: > 90 ML/MIN/1.73M2
GLUCOSE BLD-MCNC: 107 MG/DL (ref 70–108)
HCT VFR BLD CALC: 39.7 % (ref 37–47)
HEMOGLOBIN: 12.1 GM/DL (ref 12–16)
POTASSIUM REFLEX MAGNESIUM: 4.9 MEQ/L (ref 3.5–5.2)
SODIUM BLD-SCNC: 134 MEQ/L (ref 135–145)

## 2021-03-10 PROCEDURE — 99024 POSTOP FOLLOW-UP VISIT: CPT | Performed by: SURGERY

## 2021-03-10 PROCEDURE — 36415 COLL VENOUS BLD VENIPUNCTURE: CPT

## 2021-03-10 PROCEDURE — 85014 HEMATOCRIT: CPT

## 2021-03-10 PROCEDURE — 6360000002 HC RX W HCPCS: Performed by: SURGERY

## 2021-03-10 PROCEDURE — 85018 HEMOGLOBIN: CPT

## 2021-03-10 PROCEDURE — 80048 BASIC METABOLIC PNL TOTAL CA: CPT

## 2021-03-10 PROCEDURE — 2500000003 HC RX 250 WO HCPCS: Performed by: SURGERY

## 2021-03-10 PROCEDURE — 6370000000 HC RX 637 (ALT 250 FOR IP): Performed by: SURGERY

## 2021-03-10 PROCEDURE — 6370000000 HC RX 637 (ALT 250 FOR IP): Performed by: NURSE PRACTITIONER

## 2021-03-10 PROCEDURE — 2580000003 HC RX 258: Performed by: SURGERY

## 2021-03-10 PROCEDURE — APPSS30 APP SPLIT SHARED TIME 16-30 MINUTES: Performed by: NURSE PRACTITIONER

## 2021-03-10 PROCEDURE — G0378 HOSPITAL OBSERVATION PER HR: HCPCS

## 2021-03-10 RX ORDER — ENALAPRIL MALEATE 5 MG/1
5 TABLET ORAL DAILY
Status: DISCONTINUED | OUTPATIENT
Start: 2021-03-10 | End: 2021-03-17

## 2021-03-10 RX ORDER — DOCUSATE SODIUM 100 MG/1
100 CAPSULE, LIQUID FILLED ORAL 2 TIMES DAILY
Status: DISCONTINUED | OUTPATIENT
Start: 2021-03-10 | End: 2021-03-14

## 2021-03-10 RX ORDER — POLYETHYLENE GLYCOL 3350 17 G/17G
17 POWDER, FOR SOLUTION ORAL DAILY PRN
Status: DISCONTINUED | OUTPATIENT
Start: 2021-03-10 | End: 2021-03-17

## 2021-03-10 RX ADMIN — FAMOTIDINE 20 MG: 10 INJECTION, SOLUTION INTRAVENOUS at 21:47

## 2021-03-10 RX ADMIN — HYDROCODONE BITARTRATE AND ACETAMINOPHEN 1 TABLET: 5; 325 TABLET ORAL at 14:45

## 2021-03-10 RX ADMIN — METOPROLOL SUCCINATE 50 MG: 25 TABLET, FILM COATED, EXTENDED RELEASE ORAL at 10:04

## 2021-03-10 RX ADMIN — LEVOTHYROXINE SODIUM 100 MCG: 100 TABLET ORAL at 05:51

## 2021-03-10 RX ADMIN — HYDROCODONE BITARTRATE AND ACETAMINOPHEN 2 TABLET: 5; 325 TABLET ORAL at 10:05

## 2021-03-10 RX ADMIN — HYDROCODONE BITARTRATE AND ACETAMINOPHEN 2 TABLET: 5; 325 TABLET ORAL at 19:20

## 2021-03-10 RX ADMIN — HYDROMORPHONE HYDROCHLORIDE 0.25 MG: 1 INJECTION, SOLUTION INTRAMUSCULAR; INTRAVENOUS; SUBCUTANEOUS at 03:26

## 2021-03-10 RX ADMIN — FAMOTIDINE 20 MG: 10 INJECTION, SOLUTION INTRAVENOUS at 10:05

## 2021-03-10 RX ADMIN — ENOXAPARIN SODIUM 40 MG: 40 INJECTION SUBCUTANEOUS at 10:40

## 2021-03-10 RX ADMIN — SODIUM CHLORIDE, PRESERVATIVE FREE 10 ML: 5 INJECTION INTRAVENOUS at 10:07

## 2021-03-10 RX ADMIN — DULOXETINE HYDROCHLORIDE 60 MG: 60 CAPSULE, DELAYED RELEASE ORAL at 10:05

## 2021-03-10 RX ADMIN — PREGABALIN 150 MG: 75 CAPSULE ORAL at 21:47

## 2021-03-10 RX ADMIN — ENALAPRIL MALEATE 5 MG: 5 TABLET ORAL at 10:04

## 2021-03-10 RX ADMIN — SODIUM CHLORIDE: 9 INJECTION, SOLUTION INTRAVENOUS at 07:27

## 2021-03-10 RX ADMIN — PREGABALIN 150 MG: 75 CAPSULE ORAL at 10:04

## 2021-03-10 RX ADMIN — DOCUSATE SODIUM 100 MG: 100 CAPSULE, LIQUID FILLED ORAL at 21:47

## 2021-03-10 ASSESSMENT — PAIN SCALES - GENERAL
PAINLEVEL_OUTOF10: 6
PAINLEVEL_OUTOF10: 7
PAINLEVEL_OUTOF10: 8
PAINLEVEL_OUTOF10: 2
PAINLEVEL_OUTOF10: 8

## 2021-03-10 ASSESSMENT — PAIN DESCRIPTION - DESCRIPTORS
DESCRIPTORS: ACHING
DESCRIPTORS: ACHING

## 2021-03-10 ASSESSMENT — PAIN - FUNCTIONAL ASSESSMENT: PAIN_FUNCTIONAL_ASSESSMENT: PREVENTS OR INTERFERES SOME ACTIVE ACTIVITIES AND ADLS

## 2021-03-10 ASSESSMENT — PAIN DESCRIPTION - FREQUENCY: FREQUENCY: CONTINUOUS

## 2021-03-10 ASSESSMENT — PAIN DESCRIPTION - ORIENTATION: ORIENTATION: LOWER

## 2021-03-10 ASSESSMENT — PAIN DESCRIPTION - PAIN TYPE: TYPE: ACUTE PAIN

## 2021-03-10 ASSESSMENT — PAIN DESCRIPTION - PROGRESSION: CLINICAL_PROGRESSION: NOT CHANGED

## 2021-03-10 ASSESSMENT — PAIN DESCRIPTION - LOCATION: LOCATION: ABDOMEN

## 2021-03-10 NOTE — PROGRESS NOTES
Patient states she is used to taking her morning meds at 7am. She is requesting them to be taken at that time. Primary nurse informed. SANTO PRICE/

## 2021-03-10 NOTE — ANESTHESIA POSTPROCEDURE EVALUATION
Department of Anesthesiology  Postprocedure Note    Patient: Sharmaine Gunter  MRN: [id number]  YOB: 1940  Date of evaluation: 3/9/2021  Time:  8:01 PM     Procedure Summary     Date: 03/09/21 Room / Location: [address] [place] [address]    Anesthesia Start: 7400 Anesthesia Stop: 8207    Procedure: ROBOTIC EXTENSIVE LYSIS OF ADHESIONS, ROBOTIC REDUCTION OF HIATAL HERNIA WITH GASTROPEXY (N/A ) Diagnosis: (PARAESOPHAGEAL HERNIA)    Surgeons: Heidy Abdalla MD Responsible Provider: Kiarra Deluca MD    Anesthesia Type: general ASA Status: 3          Anesthesia Type: general    Mat Phase I: Mat Score: 9    Mat Phase II:      Last vitals: Reviewed and per EMR flowsheets.        Anesthesia Post Evaluation    Patient location during evaluation: PACU  Patient participation: complete - patient participated  Level of consciousness: awake and alert  Pain score: 3  Airway patency: patent  Nausea & Vomiting: no nausea and no vomiting  Complications: no  Cardiovascular status: hemodynamically stable and blood pressure returned to baseline  Respiratory status: spontaneous ventilation, acceptable and nasal cannula  Hydration status: stable

## 2021-03-10 NOTE — FLOWSHEET NOTE
03/10/21 1240   Encounter Summary   Services provided to: Patient   Referral/Consult From: [place]   Continue Visiting Yes  (3/10)   Complexity of Encounter Low   Length of Encounter 15 minutes   Routine   Type Initial   Assessment Sleeping   Intervention Prayer   During my encounter with the 78 yr old patient, I attempted to visit with the pt on 5K . The patient appears to be resting now and I didnt want to disturb the patient. The pt was tired and admitted due to a paraesophageal hernia. The pt receives support from [facility]. I or another Archie Zhang will attempt to visit the patient or the family at another time.

## 2021-03-10 NOTE — OP NOTE
small bowel, large bowel  and omentum. No bowel injury was identified. After all of the  adhesions had been lysed and freed at that point, then I was able to  scrub back into the case and placed the other 8-mm trocar in the  standard location for the hiatal hernia reduction/repair portion of the  procedure. Liver retractor was brought in to the far right lateral  upper abdominal wall trocar site. This was placed under direct vision. She was placed in a reverse Trendelenburg. The robot was brought back  in and docked. Instruments placed under direct vision. We then  unscrubbed again and went back to the console. Here the entire stomach  had been herniated up into the posterior mediastinum. I really was not  able to reduce much of this at this point due to the chronicity of the  hernia. All other organs stayed in the intra-abdominal region. Began  on the right side. Pars flaccida was dissected through. This  dissection up to the pars flaccida and phrenoesophageal membrane carried  me up into the posterior mediastinum. Right roni was fully freed. Similar dissection on the left side up into the posterior mediastinum. We went deep up into the posterior mediastinum free and everything up  anteriorly. Upon doing all this, it became apparent that the patient  had definitely a short esophagus. The entire stomach was not going to  be able to be reduced back into the intra-abdominal cavity. I was able  to continue reduction of the stomach till about 80 to 90% of the stomach  was at that time able to be reduced down into the intraabdominal cavity. At that point given the sheer large nature of the defect, the patient's  age, hostile abdomen with her abdominal wall as well as with being a  Adventism, we elected to simply proceed with the gastropexy. Short gastrics were all taken down off of the greater curve down all the  way up to the left roni.   Again I had about 80% of the stomach if not  more all the way down into the abdomen that was resting comfortably with  no undue tension. On the anterior wall, we did place a 2-0 silk  pursestring. Same gastrostomy tube was placed into the far left upper  lateral abdominal region. The old Burgos's point incision was used for  this. Gastrotomy was then made at the middle of the pursestring suture. The gastric tube was placed within the lumen of the stomach and  pursestring suture tied. Multiple interrupted 2-0 silk sutures were  then used to anchor the stomach up to the undersurface of the anterior  abdominal wall. The gastric tube balloon was insufflated with water. This helped the anchor of the stomach up to the anterior abdominal wall. This was tested with 50 mL of water through the Charlotte syringe and there  was no leakage. No other abnormalities were identified at that time. Instruments removed. Robot undocked. I then scrubbed back into the  case. Large trocar site was closed at the fascia with a 0 Vicryl suture  on a UR needle. At the completion of this, there were no fascial  defects. The patient tolerated desufflation well. Skin reapproximated  at all the incisional sites with 4-0 Vicryl in a subcuticular fashion. Closed incisions were then cleaned, dried, and Steri-Strips applied. Dry sterile dressing was applied. Sponge, needle, and instrumentation  counts were correct at the end of the procedure. The patient tolerated  the procedure well with no apparent complications and less than 20 mL of  blood loss. She was able to be brought out of general anesthesia and  transferred to the postanesthesia care unit in stable condition.         Pino Haji M.D.    D: 03/09/2021 17:17:30       T: 03/09/2021 22:36:26     [email or web address]  Job#: [id number]     Doc#: [id number]    CC:

## 2021-03-10 NOTE — CARE COORDINATION
DISCHARGE/PLANNING EVALUATION  3/10/21, 3:17 PM EST    Reason for Referral:patient is current with [facility]. Mental Status: alert and oriented  Decision Making: patient is able to make own decisions. Family/Social/Home Environment: Spoke with patient, souse and daughter re: home situation and discharge needs. Patient and spouse reside with daughter, her  and his mother. Home is 1 1/2 story with a basement. Patient states that her bedroom and bathroom are on the main level. Bathroom has a walk in shower and a higher toilet. Patient has a history of a femur fracture (right) from this past December. [facility] at [facility] until sometime in February. She is currently toe touch weight bearing on the right. She states that her spouse assists her with her personal care as needed. She is able to transfer from wheelchair to shower seat and he assists her with getting in and out as needed. Her spouse also transports her to her medical appointments. Her son in law assists with transferring her in and out of the care. She is current with [facility] for nursing only. Therapy will start hopefully after her next ortho appointment (3/17). Patient states that she will be returning to daughter's home at [address] [facility] confirmed by daughter and spouse. Current Services including food security, transportation and housekeeping: no issues identified. Current Equipment: wheelchair, transport chair, shower seat, electric scooter  Payment Source: [employer] Medicare  Concerns or Barriers to Discharge: none indciated  Post acute provider list with quality measures, geographic area and applicable managed care information provided. Questions regarding selection process answered: N/A-already current with home health    Teach Back Method used with patient, spouse and daugther regarding care plan   Patient, spouse and daughter verbalize understanding of the plan of care and contribute to goal setting. Patient goals, treatment preferences and discharge plan: home as PTA with continued services through [facility]. Spoke with Elda with [facility] and confirmed services and that they were aware of patient admission.      Electronically signed by KARLIE Tello on 3/10/2021 at 3:17 PM

## 2021-03-10 NOTE — PROGRESS NOTES
Patient resting in bed, denies further needs. Call light and tray table in reach. Bed in lowest position. SBARR report given to primary nurse, Alycia Winn RN.  Jose Luis PRICE/SN

## 2021-03-10 NOTE — PLAN OF CARE
Problem: Falls - Risk of:  Goal: Will remain free from falls  Description: Will remain free from falls  3/10/2021 1135 by Alicia Camacho  Outcome: Ongoing  Note: Fall precaution posted. Nonskid socks worn with ambulation. Up with assistance with staff and assistive devices. Call light within reach. Bed in lowest position. Bed/Chair alarm on. Problem: Falls - Risk of:  Goal: Absence of physical injury  Description: Absence of physical injury  Outcome: Ongoing     Problem: Pain:  Goal: Pain level will decrease  Description: Pain level will decrease  3/10/2021 1135 by Alicia Camacho  Outcome: Ongoing  Note: Patient rates pain 6 on 1-10 scale. Rest/Reposition and PRN pain medication given. Problem: Pain:  Goal: Control of acute pain  Description: Control of acute pain  Outcome: Ongoing     Problem: Pain:  Goal: Control of chronic pain  Description: Control of chronic pain  Outcome: Ongoing     Problem: Discharge Planning:  Goal: Discharged to appropriate level of care  Description: Discharged to appropriate level of care  3/10/2021 1135 by Alicia Camacho  Outcome: Ongoing  Note: Identified barriers to discharge.       Problem: Skin Integrity:  Goal: Will show no infection signs and symptoms  Description: Will show no infection signs and symptoms  Outcome: Ongoing     Problem: Skin Integrity:  Goal: Absence of new skin breakdown  Description: Absence of new skin breakdown  Outcome: Ongoing

## 2021-03-10 NOTE — CARE COORDINATION
DISCHARGE PLANNING EVALUATION: OP/OBSERVATION        3/10/21, 7:42 AM DAVID BERNAL Height       Admitted from: PACU 3/9/2021/ 1252   Location: [facility]/020-A Reason for admit: Paraesophageal hernia [K44.9]   Admit order signed?: n/a    Procedure: Pertinent:   3/09/2021  1. Robotic extensive lysis of adhesions (one hour)  2. Robotic reduction of large paraesophageal/hiatal hernia and gastric  volvulus with gastropexy/G-tube insertion. Info/Orders/Treatment Plan:  IV fluids, IV Pepcid, Lovenox, prn pain medications and antiemetics, full liquid diet, ambulate, abdominal binder, incentive spirometry, SCD's, telemetry, up with assistance. PCP: Dennis Santacruz, DO    Patient Goals/Plan/Treatment Preferences: Met with Alice Ibrahim, her , and daughter present at bedside. Alice Ibrahim and her  reside with her daughter, son-in-law and his mother. Alice Ibrahim is current with [facility] for skilled nursing. Therapies were ordered (per her ) but she is toe touch weight bearing on her left due to recent surgery. Alice Ibrahim was [facility] in December after her surgery. From there she was sent home with [facility]. Alice Ibrahim states the plan is for her to return home at discharge resuming services through [facility]. She has all the DME needed-wheelchair, transfer bench, walkers, canes, electric scooter, etc.   Transportation/Food Security/Housekeeping Addressed:  No issues identified.

## 2021-03-10 NOTE — PROGRESS NOTES
[facility] Surgery - Dr. Lindsay Grullon  Postoperative Progress Note    Pt Name: Veronica Gunter  Medical Record Number: [id number]  Date of Birth 1940   Today's Date: 3/10/2021    ASSESSMENT   1. POD # 1 Status post robotic extensive lysis of adhesions and robotic reduction of large paraesophageal/hiatal hernia and gastric volvulus with gastropexy/G-tube insertion  2. Recent left leg fracture  3. Hyponatremia   has a past medical history of Abnormal EKG, Anemia, Back pain, Blood circulation, collateral, Diverticulitis, Fibromyalgia, GERD (gastroesophageal reflux disease), Hiatal hernia, Hypertension, Hypothyroidism, and Osteoarthritis. PLAN   1. Full liquids  2. Stool softeners  3. Remove carroll  4. Up to [facility] with assist - she can pivot and weight bearing on right leg only  5. Incisional care  6. GI & DVT prophylaxis  7. Diet education. Questions answered. 8. Home medications   9. Pain control  10. Keep peg tube clamped   11. Labs reviewed   12. Looks good. Needs HH at discharge. Hopefully home tomorrow. SUBJECTIVE   Patient was stable overnight. Chart reviewed. Updated by nursing staff. No fevers. No SOB. G-tube clamped. Denies chest discomfort or dyspnea. No N/V; (+) belching, but no flatus and BM. Tolerating DIET FULL LIQUID; diet. Pain controlled with analgesia. Up with assistance - patient only able to pivot. She is up to wheelchair at home. Carroll needs to be removed. Incisions are clean, dry and intact.    CURRENT MEDICATIONS   Scheduled Meds:   ceFAZolin  2,000 mg Intravenous 30 Min Pre-Op    pregabalin  150 mg Oral BID    metoprolol succinate  50 mg Oral Daily    levothyroxine  100 mcg Oral Daily    DULoxetine  60 mg Oral Daily    sodium chloride flush  10 mL Intravenous 2 times per day    enoxaparin  40 mg Subcutaneous Q24H    famotidine (PEPCID) injection  20 mg Intravenous BID     Continuous Infusions:   sodium chloride 75 mL/hr at 03/10/21 0727     PRN Meds:.sodium chloride flush, promethazine **OR** ondansetron, HYDROmorphone **OR** HYDROmorphone, HYDROcodone 5 mg - acetaminophen **OR** HYDROcodone 5 mg - acetaminophen  OBJECTIVE   CURRENT VITALS:  height is 5' 7\" (1.702 m) and weight is 159 lb (72.1 kg). Her oral temperature is 97.8 °F (36.6 °C). Her blood pressure is 128/81 and her pulse is 89. Her respiration is 18 and oxygen saturation is 95%. Temperature Range (24h):Temp: 97.8 °F (36.6 °C) Temp  Av.9 °F (36.1 °C)  Min: 95.7 °F (35.4 °C)  Max: 97.8 °F (36.6 °C)  BP Range (73P): Systolic (46FSE), ERNESTINE:862 , Min:69 , FSF:584     Diastolic (86WQW), UHT:31, Min:42, Max:102    Pulse Range (24h): Pulse  Av.1  Min: 79  Max: 90  Respiration Range (24h): Resp  Av.5  Min: 0  Max: 22  Current Pulse Ox (24h):  SpO2: 95 %  Pulse Ox Range (24h):  SpO2  Av.3 %  Min: 81 %  Max: 100 %  Oxygen Amount and Delivery: O2 Flow Rate (L/min): 1 L/min  Incentive Spirometry Tx:            GENERAL: alert, no distress  LUNGS: clear to ausculation, without wheezes, rales or rhonci  HEART: normal rate and regular rhythm  ABDOMEN: distended, soft, incisional tenderness, bowel sounds hypoactive, G-tube in place and clamped without bleeding   INCISION: healing well, no significant drainage, no significant erythema  EXTERMITY: no cyanosis, clubbing or edema  In: 3416.1 [P.O.:800; I.V.:2616.1]  Out: 6519 [Urine:1150]  Date 03/10/21 0000 - 03/10/21 2359   Shift  4395-4218 4358-2488 24 Hour Total   INTAKE   P.O. 800   800   I. V.(mL/kg/hr) 861.7(1.5)   861.7   Shift Total(mL/kg) 5509.9(45)   1169.9(88)   OUTPUT   Urine(mL/kg/hr) 800(1.4)   800   Shift Total(mL/kg) 800(11.1)   800(11.1)   Weight (kg) 72.1 72.1 72.1 72.1     LABS     Recent Labs     03/10/21  0542   HGB 12.1   HCT 39.7   *   K 4.9      CO2 23   BUN 10   CREATININE 0.5   CALCIUM 8.4*      RADIOLOGY   No new imaging    Electronically signed by FLORIDA Sanders CNP on 3/10/2021 at 8:13 AM     Patient seen and examined independently by me earlier this AM. Above discussed and I agree with Krystle Hdez CNP. See my additional comments below for updated orders and plan. Labs, cultures, and radiographs where available were reviewed. I discussed patient concerns with the patient's nurse and instructions were given. Please see our orders for the updated patient care plan. -Full liquid/soft diet. Dietary education. Remove Timmons. Stool softeners as needed. Incisional/wound care. DVT prophylaxis. G-tube clamped and no need to use at this time. Home medications. Social service for discharge needs. Home health. Most likely home tomorrow.     Electronically signed by Thresa Runner, MD on 3/10/21 at 5:03 PM EST

## 2021-03-10 NOTE — PLAN OF CARE
Consult received-see SW notes for 3/10/21-home with [facility] and [facility].

## 2021-03-10 NOTE — PLAN OF CARE
Problem: Falls - Risk of:  Goal: Will remain free from falls  Description: Will remain free from falls  3/10/2021 0151 by Dov Storm RN  Outcome: Ongoing   Patient bed in lowest position, wheels locked, 2/4 side rails up and alarm on. Call light and belongings within reach. Pathway clear. Nonskid footwear on. Patient rounded on hourly. Fall risk assessment complete. Patient remains free from falls this shift, will continue to monitor. Problem: Pain:  Goal: Pain level will decrease  Description: Pain level will decrease  3/10/2021 0151 by Dov Storm RN  Outcome: Ongoing   Patient pain goal is 0/10. Patient reporting pain up to 7. Pr Norco given with no relief. PRN dilaudid give. Patient rates pain 0/10. Patient utilizes rest and repositioning for comfort. Pain assessment ongoing. Problem: Discharge Planning:  Goal: Discharged to appropriate level of care  Description: Discharged to appropriate level of care  Outcome: Ongoing   Patient plans to be discharged home with . Electronically signed by Ras Germain RN on 3/10/2021 at 1:58 AM     Care plan reviewed with patient and. Patient verbalizes understanding of the plan of care and contribute to goal setting.

## 2021-03-11 ENCOUNTER — APPOINTMENT (OUTPATIENT)
Dept: GENERAL RADIOLOGY | Age: 81
DRG: 326 | End: 2021-03-11
Attending: SURGERY
Payer: MEDICARE

## 2021-03-11 PROBLEM — Z98.890 S/P REPAIR OF PARAESOPHAGEAL HERNIA: Status: ACTIVE | Noted: 2021-03-11

## 2021-03-11 PROBLEM — Z87.19 S/P REPAIR OF PARAESOPHAGEAL HERNIA: Status: ACTIVE | Noted: 2021-03-11

## 2021-03-11 LAB
ANION GAP SERPL CALCULATED.3IONS-SCNC: 7 MEQ/L (ref 8–16)
BUN BLDV-MCNC: 6 MG/DL (ref 7–22)
CALCIUM SERPL-MCNC: 8.3 MG/DL (ref 8.5–10.5)
CHLORIDE BLD-SCNC: 95 MEQ/L (ref 98–111)
CO2: 26 MEQ/L (ref 23–33)
CREAT SERPL-MCNC: 0.4 MG/DL (ref 0.4–1.2)
ERYTHROCYTE [DISTWIDTH] IN BLOOD BY AUTOMATED COUNT: 13.4 % (ref 11.5–14.5)
ERYTHROCYTE [DISTWIDTH] IN BLOOD BY AUTOMATED COUNT: 47.8 FL (ref 35–45)
GFR SERPL CREATININE-BSD FRML MDRD: > 90 ML/MIN/1.73M2
GLUCOSE BLD-MCNC: 97 MG/DL (ref 70–108)
HCT VFR BLD CALC: 39.6 % (ref 37–47)
HCT VFR BLD CALC: 40.8 % (ref 37–47)
HEMOGLOBIN: 12.4 GM/DL (ref 12–16)
HEMOGLOBIN: 12.6 GM/DL (ref 12–16)
MCH RBC QN AUTO: 29.6 PG (ref 26–33)
MCHC RBC AUTO-ENTMCNC: 30.9 GM/DL (ref 32.2–35.5)
MCV RBC AUTO: 95.8 FL (ref 81–99)
PLATELET # BLD: 223 THOU/MM3 (ref 130–400)
PMV BLD AUTO: 10.2 FL (ref 9.4–12.4)
POTASSIUM SERPL-SCNC: 4.4 MEQ/L (ref 3.5–5.2)
RBC # BLD: 4.26 MILL/MM3 (ref 4.2–5.4)
SODIUM BLD-SCNC: 128 MEQ/L (ref 135–145)
WBC # BLD: 9.7 THOU/MM3 (ref 4.8–10.8)

## 2021-03-11 PROCEDURE — G0378 HOSPITAL OBSERVATION PER HR: HCPCS

## 2021-03-11 PROCEDURE — 51798 US URINE CAPACITY MEASURE: CPT

## 2021-03-11 PROCEDURE — APPSS45 APP SPLIT SHARED TIME 31-45 MINUTES: Performed by: NURSE PRACTITIONER

## 2021-03-11 PROCEDURE — 85014 HEMATOCRIT: CPT

## 2021-03-11 PROCEDURE — 2500000003 HC RX 250 WO HCPCS: Performed by: SURGERY

## 2021-03-11 PROCEDURE — 6360000002 HC RX W HCPCS: Performed by: NURSE PRACTITIONER

## 2021-03-11 PROCEDURE — 85027 COMPLETE CBC AUTOMATED: CPT

## 2021-03-11 PROCEDURE — 96376 TX/PRO/DX INJ SAME DRUG ADON: CPT

## 2021-03-11 PROCEDURE — 2700000000 HC OXYGEN THERAPY PER DAY

## 2021-03-11 PROCEDURE — 94640 AIRWAY INHALATION TREATMENT: CPT

## 2021-03-11 PROCEDURE — 99024 POSTOP FOLLOW-UP VISIT: CPT | Performed by: SURGERY

## 2021-03-11 PROCEDURE — 96365 THER/PROPH/DIAG IV INF INIT: CPT

## 2021-03-11 PROCEDURE — 6370000000 HC RX 637 (ALT 250 FOR IP): Performed by: NURSE PRACTITIONER

## 2021-03-11 PROCEDURE — 36415 COLL VENOUS BLD VENIPUNCTURE: CPT

## 2021-03-11 PROCEDURE — 85018 HEMOGLOBIN: CPT

## 2021-03-11 PROCEDURE — 96375 TX/PRO/DX INJ NEW DRUG ADDON: CPT

## 2021-03-11 PROCEDURE — 94760 N-INVAS EAR/PLS OXIMETRY 1: CPT

## 2021-03-11 PROCEDURE — 71045 X-RAY EXAM CHEST 1 VIEW: CPT

## 2021-03-11 PROCEDURE — 2580000003 HC RX 258: Performed by: NURSE PRACTITIONER

## 2021-03-11 PROCEDURE — 96372 THER/PROPH/DIAG INJ SC/IM: CPT

## 2021-03-11 PROCEDURE — 6360000002 HC RX W HCPCS: Performed by: SURGERY

## 2021-03-11 PROCEDURE — 80048 BASIC METABOLIC PNL TOTAL CA: CPT

## 2021-03-11 PROCEDURE — 6370000000 HC RX 637 (ALT 250 FOR IP): Performed by: SURGERY

## 2021-03-11 PROCEDURE — 2580000003 HC RX 258: Performed by: SURGERY

## 2021-03-11 PROCEDURE — 74018 RADEX ABDOMEN 1 VIEW: CPT

## 2021-03-11 RX ORDER — POLYETHYLENE GLYCOL 3350 17 G/17G
17 POWDER, FOR SOLUTION ORAL DAILY
Status: DISCONTINUED | OUTPATIENT
Start: 2021-03-12 | End: 2021-03-14

## 2021-03-11 RX ORDER — ALBUTEROL SULFATE 2.5 MG/3ML
2.5 SOLUTION RESPIRATORY (INHALATION) 4 TIMES DAILY
Status: DISCONTINUED | OUTPATIENT
Start: 2021-03-11 | End: 2021-03-12

## 2021-03-11 RX ORDER — KETOROLAC TROMETHAMINE 30 MG/ML
15 INJECTION, SOLUTION INTRAMUSCULAR; INTRAVENOUS EVERY 6 HOURS
Status: DISPENSED | OUTPATIENT
Start: 2021-03-11 | End: 2021-03-16

## 2021-03-11 RX ORDER — ALBUTEROL SULFATE 2.5 MG/3ML
2.5 SOLUTION RESPIRATORY (INHALATION) EVERY 6 HOURS PRN
Status: DISCONTINUED | OUTPATIENT
Start: 2021-03-11 | End: 2021-03-11

## 2021-03-11 RX ORDER — PREGABALIN 75 MG/1
150 CAPSULE ORAL ONCE
Status: COMPLETED | OUTPATIENT
Start: 2021-03-11 | End: 2021-03-11

## 2021-03-11 RX ADMIN — ALBUTEROL SULFATE 2.5 MG: 2.5 SOLUTION RESPIRATORY (INHALATION) at 10:55

## 2021-03-11 RX ADMIN — FAMOTIDINE 20 MG: 10 INJECTION, SOLUTION INTRAVENOUS at 23:13

## 2021-03-11 RX ADMIN — KETOROLAC TROMETHAMINE 15 MG: 30 INJECTION, SOLUTION INTRAMUSCULAR; INTRAVENOUS at 23:13

## 2021-03-11 RX ADMIN — SODIUM CHLORIDE, PRESERVATIVE FREE 10 ML: 5 INJECTION INTRAVENOUS at 21:32

## 2021-03-11 RX ADMIN — SODIUM CHLORIDE: 9 INJECTION, SOLUTION INTRAVENOUS at 23:12

## 2021-03-11 RX ADMIN — METOPROLOL SUCCINATE 50 MG: 25 TABLET, FILM COATED, EXTENDED RELEASE ORAL at 08:32

## 2021-03-11 RX ADMIN — HYDROCODONE BITARTRATE AND ACETAMINOPHEN 1 TABLET: 5; 325 TABLET ORAL at 02:27

## 2021-03-11 RX ADMIN — ENOXAPARIN SODIUM 40 MG: 40 INJECTION SUBCUTANEOUS at 13:02

## 2021-03-11 RX ADMIN — ONDANSETRON 4 MG: 2 INJECTION INTRAMUSCULAR; INTRAVENOUS at 06:34

## 2021-03-11 RX ADMIN — DOCUSATE SODIUM 100 MG: 100 CAPSULE, LIQUID FILLED ORAL at 23:14

## 2021-03-11 RX ADMIN — CEFTRIAXONE SODIUM 1000 MG: 2 INJECTION, POWDER, FOR SOLUTION INTRAMUSCULAR; INTRAVENOUS at 23:30

## 2021-03-11 RX ADMIN — ALBUTEROL SULFATE 2.5 MG: 2.5 SOLUTION RESPIRATORY (INHALATION) at 16:40

## 2021-03-11 RX ADMIN — ENALAPRIL MALEATE 5 MG: 5 TABLET ORAL at 08:32

## 2021-03-11 RX ADMIN — FAMOTIDINE 20 MG: 10 INJECTION, SOLUTION INTRAVENOUS at 08:32

## 2021-03-11 RX ADMIN — PREGABALIN 150 MG: 75 CAPSULE ORAL at 08:34

## 2021-03-11 RX ADMIN — PREGABALIN 150 MG: 75 CAPSULE ORAL at 23:14

## 2021-03-11 RX ADMIN — DOCUSATE SODIUM 100 MG: 100 CAPSULE, LIQUID FILLED ORAL at 08:31

## 2021-03-11 RX ADMIN — DULOXETINE HYDROCHLORIDE 60 MG: 60 CAPSULE, DELAYED RELEASE ORAL at 08:31

## 2021-03-11 RX ADMIN — POLYETHYLENE GLYCOL 3350 17 G: 17 POWDER, FOR SOLUTION ORAL at 18:47

## 2021-03-11 RX ADMIN — HYDROMORPHONE HYDROCHLORIDE 0.5 MG: 1 INJECTION, SOLUTION INTRAMUSCULAR; INTRAVENOUS; SUBCUTANEOUS at 06:35

## 2021-03-11 RX ADMIN — ALBUTEROL SULFATE 2.5 MG: 2.5 SOLUTION RESPIRATORY (INHALATION) at 22:16

## 2021-03-11 RX ADMIN — LEVOTHYROXINE SODIUM 100 MCG: 100 TABLET ORAL at 08:11

## 2021-03-11 RX ADMIN — KETOROLAC TROMETHAMINE 15 MG: 30 INJECTION, SOLUTION INTRAMUSCULAR; INTRAVENOUS at 15:49

## 2021-03-11 ASSESSMENT — PAIN SCALES - GENERAL
PAINLEVEL_OUTOF10: 8
PAINLEVEL_OUTOF10: 5
PAINLEVEL_OUTOF10: 6

## 2021-03-11 NOTE — CARE COORDINATION
3/11/21, 1:07 PM EST    DISCHARGE ON GOING [address] day: 0  Location: 5K-20/020-A Reason for admit: Paraesophageal hernia [K44.9]  S/P repair of paraesophageal hernia [Z98.890, Z87.19]   Procedure: 3/09/2021  1.  Robotic extensive lysis of adhesions (one hour)  2.  Robotic reduction of large paraesophageal/hiatal hernia and gastric  volvulus with gastropexy/G-tube insertion. Barriers to Discharge: Out-patient in a bed status to observation. NEVES delivered and placed on chart. IV fluids, Lovenox, prn medications, full liquid diet, ambulate, abdominal binder, ambulate, incentive spirometry, telemetry, SCD's, up with assistance. PCP: Vic Araujo, DO   %  Patient Goals/Plan/Treatment Preferences: Jocelyne Chaidez is from home with her family and current with [facility].

## 2021-03-11 NOTE — PROCEDURES
A Bladder scan was performed at 0147 . The patient's last void was at carroll catheter was pulled at 18:00 . The residual amount was measured to be 760 ML. Report of results was given to [employer]

## 2021-03-11 NOTE — PROGRESS NOTES
Upon initial assessment of pt, found to be lethargic and have expiratory wheezing/increased respiratory effort. PRN dilaudid was given for pain at 0635, pt has not had urine output all night and was strait cathed last at 0130 with 760 ml out. This nurse attempted x2 to strait catheterize pt, unsuccessful. Abd still appears distended and swollen, bowel sounds present in all quadrants and lap sites free from bleeding/draining/sxs infection. 1.5 L O2 placed via NC on pt, O2sat 95% with oxygen.  Resting on right side in bed, will monitor and notify MD.

## 2021-03-11 NOTE — PLAN OF CARE
Problem: Falls - Risk of:  Goal: Will remain free from falls  Description: Will remain free from falls  Outcome: Ongoing  Note: Fall precautions posted. Bed in lowest position. Call light within reach. Nonskid socks worn with ambulation and assistive devices. Problem: Falls - Risk of:  Goal: Absence of physical injury  Description: Absence of physical injury  Outcome: Ongoing  Note: Fall precautions posted. Bed in lowest position. Call light within reach. Problem: Pain:  Goal: Pain level will decrease  Description: Pain level will decrease  Outcome: Ongoing  Note: PRN pain medication given. Pain 5 on 1-10 scale. Rest and reposition every 2 hours. Problem: Pain:  Goal: Control of acute pain  Description: Control of acute pain  Outcome: Ongoing  Note: Pain 5 on 1-10 scale. PRN medication given. Door slightly ajar to decrease stimulation. Problem: Pain:  Goal: Control of chronic pain  Description: Control of chronic pain  Outcome: Ongoing     Problem: Discharge Planning:  Goal: Discharged to appropriate level of care  Description: Discharged to appropriate level of care  Outcome: Ongoing  Note: Barriers to discharge identified. Problem: Skin Integrity:  Goal: Will show no infection signs and symptoms  Description: Will show no infection signs and symptoms  Outcome: Ongoing  Note: Vital signs within normal limits. No signs and symptoms present. Problem: Skin Integrity:  Goal: Absence of new skin breakdown  Description: Absence of new skin breakdown  Outcome: Ongoing  Note: Zinc cream used as needed. Free from moisture. Q2 hour turning.

## 2021-03-11 NOTE — PROGRESS NOTES
[facility] - Dr. Divine Grullon  Postoperative Progress Note    Pt Name: Teressa Gunter  Medical Record Number: [id number]  Date of Birth 1940   Today's Date: 3/11/2021    ASSESSMENT   1. POD # 2 Status post robotic extensive lysis of adhesions and robotic reduction of large paraesophageal/hiatal hernia and gastric volvulus with gastropexy/G-tube insertion  2. Recent left leg fracture  3. Hyponatremia  4. Postoperative urinary retention - improving  5. Bilateral atelectasis/pneumonia - left worse than right    has a past medical history of Abnormal EKG, Anemia, Back pain, Blood circulation, collateral, Diverticulitis, Fibromyalgia, GERD (gastroesophageal reflux disease), Hiatal hernia, Hypertension, Hypothyroidism, and Osteoarthritis. PLAN   1. Patient with increasing abdominal pain/bloating this afternoon. Back to NPO with ice chips and sips. No nausea though. 2. G-tube to suction to help decompress and check KUB  3. Continue stool softeners as tolerated  4. Timmons out and was able to void 350 this afternoon. Bladder scan PRN. 5. Up to [facility] with assist - she can pivot and weight bearing on right leg only  6. Agressive pulmonary toileting. IS, C&DB, and breathing tx.  7. Start antibiotics for pneumonia  8. GI & DVT prophylaxis  9. Pain control - limit narcotic use. Toradol ordered. 10. Check CBC this afternoon. Repeat all imaging and labs in am.  11. Clinically, patient does not look bad but needs close monitoring. She is high risk for worsening postoperative complications just because she cannot ambulate or get OOB much due to recent fracture. Will consider hospitalist consult as needed. SUBJECTIVE   Patient had some confusion overnight. Likely related to dilaudid. Chart reviewed. Updated by nursing staff. No fevers but patient with a little more respiratory distress with wheezing this afternoon. Unfortunately she is unable to get up on her own.  Breathing treatments ordered. She tolerated her full liquids fairly but has more abdominal distention this afternoon. Abdomen is firm and rounded. Not passing any gas or having any bowel movements. Abdominal pain not well controlled this afternoon. Up with assistance - patient only able to pivot. She is up to wheelchair at home. Incisions are clean, dry and intact. Voided small amounts this afternoon. Bladder scan after next void. CURRENT MEDICATIONS   Scheduled Meds:   [START ON 3/12/2021] polyethylene glycol  17 g Oral Daily    ketorolac  15 mg Intravenous Q6H    albuterol  2.5 mg Nebulization 4x daily    enalapril  5 mg Oral Daily    docusate sodium  100 mg Oral BID    ceFAZolin  2,000 mg Intravenous 30 Min Pre-Op    pregabalin  150 mg Oral BID    metoprolol succinate  50 mg Oral Daily    levothyroxine  100 mcg Oral Daily    DULoxetine  60 mg Oral Daily    sodium chloride flush  10 mL Intravenous 2 times per day    enoxaparin  40 mg Subcutaneous Q24H    famotidine (PEPCID) injection  20 mg Intravenous BID     Continuous Infusions:   sodium chloride 75 mL/hr at 03/10/21 0727     PRN Meds:.polyethylene glycol, sodium chloride flush, promethazine **OR** ondansetron, HYDROmorphone **OR** HYDROmorphone, HYDROcodone 5 mg - acetaminophen **OR** HYDROcodone 5 mg - acetaminophen  OBJECTIVE   CURRENT VITALS:  height is 5' 7\" (1.702 m) and weight is 159 lb (72.1 kg). Her oral temperature is 97.9 °F (36.6 °C). Her blood pressure is 137/77 and her pulse is 100. Her respiration is 22 and oxygen saturation is 97%.    Temperature Range (24h):Temp: 97.9 °F (36.6 °C) Temp  Av.4 °F (36.9 °C)  Min: 97.5 °F (36.4 °C)  Max: 99.6 °F (37.6 °C)  BP Range (85L): Systolic (10JPB), YRP:799 , Min:137 , TF     Diastolic (78SIR), KGI:09, Min:68, Max:94    Pulse Range (24h): Pulse  Av.9  Min: 68  Max: 100  Respiration Range (24h): Resp  Av.5  Min: 16  Max: 22  Current Pulse Ox (24h):  SpO2: 97 %  Pulse Ox Range (24h):  SpO2  Avg: 93.9 %  Min: 87 %  Max: 98 %  Oxygen Amount and Delivery: O2 Flow Rate (L/min): 2 L/min  Incentive Spirometry Tx:            GENERAL: alert, no distress  LUNGS: lungs diminished with expiratory wheezing   HEART: normal rate and regular rhythm  ABDOMEN: distended, semi-firm, generalized tenderness, bowel sounds hypoactive, G-tube in place and clamped without bleeding   INCISION: healing well, no significant drainage, no significant erythema  EXTERMITY: no cyanosis, clubbing or edema    In: 1715 [P.O.:580; I.V.:1135]  Out: 600 [Urine:600]  Date 03/11/21 0000 - 03/11/21 2359   Shift 7020-8927 9412-2294 8165-3274 24 Hour Total   INTAKE   P.O.  580  580   I. V.(mL/kg/hr) 522(0.9) 283  805   Shift Total(mL/kg) 522(7.2) [id number])  5420(55.8)   OUTPUT   Urine(mL/kg/hr)  600  600   Emesis/NG output  0  0   Other  0  0   Stool  0  0   Blood  0  0   Shift Total(mL/kg)  600(8.3)  600(8.3)   Weight (kg) 72.1 72.1 72.1 72.1     LABS     Recent Labs     03/10/21  0542 03/11/21  0502   HGB 12.1 12.4   HCT 39.7 39.6   * 128*   K 4.9 4.4    95*   CO2 23 26   BUN 10 6*   CREATININE 0.5 0.4   CALCIUM 8.4* 8.3*      RADIOLOGY     PROCEDURE: XR CHEST PORTABLE       CLINICAL INFORMATION: wheezing       COMPARISON: 2/27/2021       TECHNIQUE: A single mobile view of the chest was obtained.               Impression   1. Very poor inflation of lungs. . Cardiac silhouette is obscured by the hemidiaphragms. A shoulder prosthesis. 2. Bibasilar atelectasis/pneumonia, worse on the left. Overall appearance of chest slightly worse than prior.               **This report has been created using voice recognition software.  It may contain minor errors which are inherent in voice recognition technology. **       Final report electronically signed by Dr. Imelda Nettles on 3/11/2021 12:03 PM     Electronically signed by FLORIDA Funez CNP on 3/11/2021 at 3:13 PM     Patient seen and examined independently by me.  Above discussed and I

## 2021-03-12 ENCOUNTER — APPOINTMENT (OUTPATIENT)
Dept: GENERAL RADIOLOGY | Age: 81
DRG: 326 | End: 2021-03-12
Attending: SURGERY
Payer: MEDICARE

## 2021-03-12 LAB
ANION GAP SERPL CALCULATED.3IONS-SCNC: 7 MEQ/L (ref 8–16)
BUN BLDV-MCNC: 6 MG/DL (ref 7–22)
CALCIUM IONIZED: 1.04 MMOL/L (ref 1.12–1.32)
CALCIUM SERPL-MCNC: 8.2 MG/DL (ref 8.5–10.5)
CHLORIDE BLD-SCNC: 96 MEQ/L (ref 98–111)
CO2: 25 MEQ/L (ref 23–33)
CREAT SERPL-MCNC: 0.4 MG/DL (ref 0.4–1.2)
ERYTHROCYTE [DISTWIDTH] IN BLOOD BY AUTOMATED COUNT: 13.2 % (ref 11.5–14.5)
ERYTHROCYTE [DISTWIDTH] IN BLOOD BY AUTOMATED COUNT: 45.4 FL (ref 35–45)
GFR SERPL CREATININE-BSD FRML MDRD: > 90 ML/MIN/1.73M2
GLUCOSE BLD-MCNC: 98 MG/DL (ref 70–108)
HCT VFR BLD CALC: 41.9 % (ref 37–47)
HEMOGLOBIN: 13.1 GM/DL (ref 12–16)
MCH RBC QN AUTO: 29.3 PG (ref 26–33)
MCHC RBC AUTO-ENTMCNC: 31.3 GM/DL (ref 32.2–35.5)
MCV RBC AUTO: 93.7 FL (ref 81–99)
PLATELET # BLD: 232 THOU/MM3 (ref 130–400)
PMV BLD AUTO: 10 FL (ref 9.4–12.4)
POTASSIUM SERPL-SCNC: 3.7 MEQ/L (ref 3.5–5.2)
RBC # BLD: 4.47 MILL/MM3 (ref 4.2–5.4)
SODIUM BLD-SCNC: 128 MEQ/L (ref 135–145)
WBC # BLD: 10.8 THOU/MM3 (ref 4.8–10.8)

## 2021-03-12 PROCEDURE — 99024 POSTOP FOLLOW-UP VISIT: CPT | Performed by: SURGERY

## 2021-03-12 PROCEDURE — 85027 COMPLETE CBC AUTOMATED: CPT

## 2021-03-12 PROCEDURE — 96372 THER/PROPH/DIAG INJ SC/IM: CPT

## 2021-03-12 PROCEDURE — 94640 AIRWAY INHALATION TREATMENT: CPT

## 2021-03-12 PROCEDURE — 6360000002 HC RX W HCPCS: Performed by: NURSE PRACTITIONER

## 2021-03-12 PROCEDURE — APPSS45 APP SPLIT SHARED TIME 31-45 MINUTES: Performed by: NURSE PRACTITIONER

## 2021-03-12 PROCEDURE — 80048 BASIC METABOLIC PNL TOTAL CA: CPT

## 2021-03-12 PROCEDURE — 71045 X-RAY EXAM CHEST 1 VIEW: CPT

## 2021-03-12 PROCEDURE — 6370000000 HC RX 637 (ALT 250 FOR IP): Performed by: NURSE PRACTITIONER

## 2021-03-12 PROCEDURE — 82330 ASSAY OF CALCIUM: CPT

## 2021-03-12 PROCEDURE — 2500000003 HC RX 250 WO HCPCS: Performed by: SURGERY

## 2021-03-12 PROCEDURE — 6360000002 HC RX W HCPCS: Performed by: SURGERY

## 2021-03-12 PROCEDURE — 94760 N-INVAS EAR/PLS OXIMETRY 1: CPT

## 2021-03-12 PROCEDURE — G0378 HOSPITAL OBSERVATION PER HR: HCPCS

## 2021-03-12 PROCEDURE — 6370000000 HC RX 637 (ALT 250 FOR IP): Performed by: SURGERY

## 2021-03-12 PROCEDURE — 36415 COLL VENOUS BLD VENIPUNCTURE: CPT

## 2021-03-12 PROCEDURE — 96376 TX/PRO/DX INJ SAME DRUG ADON: CPT

## 2021-03-12 PROCEDURE — 96366 THER/PROPH/DIAG IV INF ADDON: CPT

## 2021-03-12 PROCEDURE — 2700000000 HC OXYGEN THERAPY PER DAY

## 2021-03-12 PROCEDURE — 74018 RADEX ABDOMEN 1 VIEW: CPT

## 2021-03-12 PROCEDURE — 2580000003 HC RX 258: Performed by: NURSE PRACTITIONER

## 2021-03-12 PROCEDURE — 96367 TX/PROPH/DG ADDL SEQ IV INF: CPT

## 2021-03-12 RX ORDER — ALBUTEROL SULFATE 2.5 MG/3ML
2.5 SOLUTION RESPIRATORY (INHALATION) EVERY 4 HOURS PRN
Status: DISCONTINUED | OUTPATIENT
Start: 2021-03-12 | End: 2021-03-19 | Stop reason: HOSPADM

## 2021-03-12 RX ADMIN — HYDROCODONE BITARTRATE AND ACETAMINOPHEN 2 TABLET: 5; 325 TABLET ORAL at 14:38

## 2021-03-12 RX ADMIN — KETOROLAC TROMETHAMINE 15 MG: 30 INJECTION, SOLUTION INTRAMUSCULAR; INTRAVENOUS at 20:06

## 2021-03-12 RX ADMIN — DOCUSATE SODIUM 100 MG: 100 CAPSULE, LIQUID FILLED ORAL at 20:06

## 2021-03-12 RX ADMIN — DOCUSATE SODIUM 100 MG: 100 CAPSULE, LIQUID FILLED ORAL at 09:37

## 2021-03-12 RX ADMIN — LEVOTHYROXINE SODIUM 100 MCG: 100 TABLET ORAL at 06:41

## 2021-03-12 RX ADMIN — AZITHROMYCIN DIHYDRATE 500 MG: 500 INJECTION, POWDER, LYOPHILIZED, FOR SOLUTION INTRAVENOUS at 00:28

## 2021-03-12 RX ADMIN — ALBUTEROL SULFATE 2.5 MG: 2.5 SOLUTION RESPIRATORY (INHALATION) at 13:27

## 2021-03-12 RX ADMIN — KETOROLAC TROMETHAMINE 15 MG: 30 INJECTION, SOLUTION INTRAMUSCULAR; INTRAVENOUS at 08:30

## 2021-03-12 RX ADMIN — POLYETHYLENE GLYCOL 3350 17 G: 17 POWDER, FOR SOLUTION ORAL at 10:48

## 2021-03-12 RX ADMIN — ENOXAPARIN SODIUM 40 MG: 40 INJECTION SUBCUTANEOUS at 10:48

## 2021-03-12 RX ADMIN — HYDROCODONE BITARTRATE AND ACETAMINOPHEN 1 TABLET: 5; 325 TABLET ORAL at 05:06

## 2021-03-12 RX ADMIN — CEFTRIAXONE SODIUM 1000 MG: 2 INJECTION, POWDER, FOR SOLUTION INTRAMUSCULAR; INTRAVENOUS at 20:04

## 2021-03-12 RX ADMIN — KETOROLAC TROMETHAMINE 15 MG: 30 INJECTION, SOLUTION INTRAMUSCULAR; INTRAVENOUS at 16:35

## 2021-03-12 RX ADMIN — METOPROLOL SUCCINATE 50 MG: 25 TABLET, FILM COATED, EXTENDED RELEASE ORAL at 09:37

## 2021-03-12 RX ADMIN — PREGABALIN 150 MG: 75 CAPSULE ORAL at 20:06

## 2021-03-12 RX ADMIN — ALBUTEROL SULFATE 2.5 MG: 2.5 SOLUTION RESPIRATORY (INHALATION) at 09:03

## 2021-03-12 RX ADMIN — PREGABALIN 150 MG: 75 CAPSULE ORAL at 09:36

## 2021-03-12 RX ADMIN — FAMOTIDINE 20 MG: 10 INJECTION, SOLUTION INTRAVENOUS at 20:06

## 2021-03-12 RX ADMIN — AZITHROMYCIN DIHYDRATE 250 MG: 500 INJECTION, POWDER, LYOPHILIZED, FOR SOLUTION INTRAVENOUS at 18:02

## 2021-03-12 RX ADMIN — ENALAPRIL MALEATE 5 MG: 5 TABLET ORAL at 09:37

## 2021-03-12 RX ADMIN — HYDROCODONE BITARTRATE AND ACETAMINOPHEN 1 TABLET: 5; 325 TABLET ORAL at 18:33

## 2021-03-12 RX ADMIN — DULOXETINE HYDROCHLORIDE 60 MG: 60 CAPSULE, DELAYED RELEASE ORAL at 09:37

## 2021-03-12 RX ADMIN — ALBUTEROL SULFATE 2.5 MG: 2.5 SOLUTION RESPIRATORY (INHALATION) at 21:03

## 2021-03-12 RX ADMIN — HYDROCODONE BITARTRATE AND ACETAMINOPHEN 2 TABLET: 5; 325 TABLET ORAL at 09:35

## 2021-03-12 RX ADMIN — FAMOTIDINE 20 MG: 10 INJECTION, SOLUTION INTRAVENOUS at 08:30

## 2021-03-12 ASSESSMENT — PAIN SCALES - GENERAL
PAINLEVEL_OUTOF10: 6
PAINLEVEL_OUTOF10: 7
PAINLEVEL_OUTOF10: 6
PAINLEVEL_OUTOF10: 8
PAINLEVEL_OUTOF10: 7
PAINLEVEL_OUTOF10: 3

## 2021-03-12 ASSESSMENT — PAIN DESCRIPTION - FREQUENCY
FREQUENCY: CONTINUOUS
FREQUENCY: CONTINUOUS

## 2021-03-12 ASSESSMENT — PAIN DESCRIPTION - PAIN TYPE
TYPE: ACUTE PAIN

## 2021-03-12 ASSESSMENT — PAIN DESCRIPTION - DESCRIPTORS: DESCRIPTORS: ACHING;SHARP

## 2021-03-12 ASSESSMENT — PAIN DESCRIPTION - LOCATION
LOCATION: ABDOMEN
LOCATION: ABDOMEN

## 2021-03-12 ASSESSMENT — PAIN - FUNCTIONAL ASSESSMENT: PAIN_FUNCTIONAL_ASSESSMENT: PREVENTS OR INTERFERES WITH MANY ACTIVE NOT PASSIVE ACTIVITIES

## 2021-03-12 NOTE — PROGRESS NOTES
[facility] - Dr. Lindsay Grullon  Postoperative Progress Note    Pt Name: Veronica Gunter  Medical Record Number: [id number]  Date of Birth 1940   Today's Date: 3/12/2021    ASSESSMENT   1. POD # 3 Status post robotic extensive lysis of adhesions and robotic reduction of large paraesophageal/hiatal hernia and gastric volvulus with gastropexy/G-tube insertion  2. Recent left leg fracture  3. Hyponatremia  4. Postoperative urinary retention - improving  5. Bilateral atelectasis/pneumonia - left worse than right    has a past medical history of Abnormal EKG, Anemia, Back pain, Blood circulation, collateral, Diverticulitis, Fibromyalgia, GERD (gastroesophageal reflux disease), Hiatal hernia, Hypertension, Hypothyroidism, and Osteoarthritis. PLAN   1. KUB slightly improved. Chest x-ray stable. 2. Clamp G-tube back off and trial clear liquids. Started having some bowel function today. 3. Timmons catheter replaced. Strict I&Os. 4. Increase IV fluids for low urine output  5. Up to [facility] with assist - she can pivot and weight bearing on right leg only  6. Agressive pulmonary toileting. IS, C&DB, and breathing tx.  7. Start antibiotics for pneumonia  8. GI & DVT prophylaxis  9. Pain control - limit narcotic use. Toradol ordered. 10. Labs reviewed. WBC looks good. 11. Patient looks better today but still no real bowel function. Will advance diet slowly. Unfortunately, since patient is unable to ambulate it puts her at high risk for worsening ileus or pneumonia. SUBJECTIVE   Patient stable. Chart reviewed. Updated by nursing staff. No fevers. Feels a little better today. Timmons replaced overnight for retention. Abdomen is still distended but a lot softer. She is passing minimal gas and did has very small BM. Abdominal pain better controlled today. Up with assistance - patient only able to pivot. She is up to wheelchair at home. Incisions are clean, dry and intact.    CURRENT significant erythema  EXTERMITY: no cyanosis, clubbing or edema    In: 2350.9 [I.V.:2350.9]  Out: 1450 [Urine:1450]  Date 03/12/21 0000 - 03/12/21 2359   Shift 2453-6497 9756-1233 5769-0901 24 Hour Total   INTAKE   P.O.  0  0   I. V.(mL/kg/hr) 649. 3(1.1) 589(1)  1238.3   Shift Total(mL/kg) 649. 3(9) 589(8.2)  4919.1(07.5)   OUTPUT   Urine(mL/kg/hr) 350(0.6) 100(0.2)  450   Emesis/NG output  0  0   Shift Total(mL/kg) 350(4.9) 100(1.4)  450(6.2)   Weight (kg) 72.1 72.1 72.1 72.1     LABS     Recent Labs     03/10/21  0542 03/11/21  0502 03/11/21  1522 03/12/21  0527   WBC  --   --  9.7 10.8   HGB 12.1 12.4 12.6 13.1   HCT 39.7 39.6 40.8 41.9   PLT  --   --  223 232   * 128*  --  128*   K 4.9 4.4  --  3.7    95*  --  96*   CO2 23 26  --  25   BUN 10 6*  --  6*   CREATININE 0.5 0.4  --  0.4   CALCIUM 8.4* 8.3*  --  8.2*      RADIOLOGY     1 view abdomen       Comparison:  CR,KOSR  - XR ABDOMEN (KUB) (SINGLE AP VIEW)  - 03/11/2021    02:52 PM EST       Findings:   Improvement in degree of gaseous bowel distention vs prior. Again noted is retained enteric contrast throughout the colon. Free peritoneal air cannot be accurately assessed for on supine imaging. Visceral shadows without acute pathology. Degenerative spinal changes / scoliosis / hip arthroplasties stable.           Impression   Improvement in degree of gaseous bowel distention vs prior.       This document has been electronically signed by: Rusty Kennedy MD on    03/12/2021 03:01 AM     1 view of the chest.       COMPARISON: Single view of the chest dated 3/11/2021       FINDINGS:   Left reverse shoulder prosthesis.       Low lung volumes. Stable heart size.  Atherosclerotic thoracic aorta. Left hemidiaphragm and retrocardiac region are obscured.  Minimal    consolidation along the right lung base.  Likely small left pleural    effusion.  This appears similar to prior imaging. Right lung is clear. No pneumothorax.    No displaced fracture.  [place] left curvature of the upper lumbar spine.     Moderate spondylosis.         Impression   1.  No significant interval change from prior imaging dated 3/11/2021.       This document has been electronically signed by: Abhi Bowman MD on    03/12/2021 02:57 AM       PROCEDURE: XR CHEST PORTABLE       CLINICAL INFORMATION: wheezing       COMPARISON: 2/27/2021       TECHNIQUE: A single mobile view of the chest was obtained.               Impression   1. Very poor inflation of lungs. . Cardiac silhouette is obscured by the hemidiaphragms. A shoulder prosthesis. 2. Bibasilar atelectasis/pneumonia, worse on the left. Overall appearance of chest slightly worse than prior.               **This report has been created using voice recognition software.  It may contain minor errors which are inherent in voice recognition technology. **       Final report electronically signed by Dr. Kar Garcia on 3/11/2021 12:03 PM     Electronically signed by FLORIDA Estrella CNP on 3/12/2021 at 4:47 PM     Patient seen and examined independently by me earlier this AM. Above discussed and I agree with Katarzyna Ku CNP. See my additional comments below for updated orders and plan. Labs, cultures, and radiographs where available were reviewed. I discussed patient concerns with the patient's nurse and instructions were given. Please see our orders for the updated patient care plan. -KUB slightly improved. Abdomen less distended. No nausea or vomiting. Clamping G-tube and trial clear liquids. Timmons catheter. PT/OT. DVT prophylaxis. Pulmonary toileting. Breathing treatments. Limiting narcotics. Clinically looking a little better today. Continue supportive care.     Electronically signed by Casandra Whitney MD on 3/12/21 at 5:06 PM EST

## 2021-03-13 ENCOUNTER — APPOINTMENT (OUTPATIENT)
Dept: GENERAL RADIOLOGY | Age: 81
DRG: 326 | End: 2021-03-13
Attending: SURGERY
Payer: MEDICARE

## 2021-03-13 LAB
ANION GAP SERPL CALCULATED.3IONS-SCNC: 12 MEQ/L (ref 8–16)
BUN BLDV-MCNC: 4 MG/DL (ref 7–22)
CALCIUM SERPL-MCNC: 8.1 MG/DL (ref 8.5–10.5)
CHLORIDE BLD-SCNC: 96 MEQ/L (ref 98–111)
CO2: 22 MEQ/L (ref 23–33)
CREAT SERPL-MCNC: 0.4 MG/DL (ref 0.4–1.2)
EKG ATRIAL RATE: 84 BPM
EKG P AXIS: 35 DEGREES
EKG P-R INTERVAL: 146 MS
EKG Q-T INTERVAL: 340 MS
EKG QRS DURATION: 84 MS
EKG QTC CALCULATION (BAZETT): 401 MS
EKG R AXIS: -21 DEGREES
EKG T AXIS: 26 DEGREES
EKG VENTRICULAR RATE: 84 BPM
ERYTHROCYTE [DISTWIDTH] IN BLOOD BY AUTOMATED COUNT: 13.3 % (ref 11.5–14.5)
ERYTHROCYTE [DISTWIDTH] IN BLOOD BY AUTOMATED COUNT: 46.2 FL (ref 35–45)
GFR SERPL CREATININE-BSD FRML MDRD: > 90 ML/MIN/1.73M2
GLUCOSE BLD-MCNC: 109 MG/DL (ref 70–108)
GLUCOSE BLD-MCNC: 91 MG/DL (ref 70–108)
HCT VFR BLD CALC: 42 % (ref 37–47)
HEMOGLOBIN: 13.2 GM/DL (ref 12–16)
MCH RBC QN AUTO: 29.7 PG (ref 26–33)
MCHC RBC AUTO-ENTMCNC: 31.4 GM/DL (ref 32.2–35.5)
MCV RBC AUTO: 94.4 FL (ref 81–99)
PLATELET # BLD: 229 THOU/MM3 (ref 130–400)
PMV BLD AUTO: 10.7 FL (ref 9.4–12.4)
POTASSIUM SERPL-SCNC: 3.6 MEQ/L (ref 3.5–5.2)
RBC # BLD: 4.45 MILL/MM3 (ref 4.2–5.4)
SODIUM BLD-SCNC: 130 MEQ/L (ref 135–145)
WBC # BLD: 12.2 THOU/MM3 (ref 4.8–10.8)

## 2021-03-13 PROCEDURE — 2700000000 HC OXYGEN THERAPY PER DAY

## 2021-03-13 PROCEDURE — 6370000000 HC RX 637 (ALT 250 FOR IP): Performed by: SURGERY

## 2021-03-13 PROCEDURE — 6360000002 HC RX W HCPCS: Performed by: NURSE PRACTITIONER

## 2021-03-13 PROCEDURE — 6370000000 HC RX 637 (ALT 250 FOR IP): Performed by: NURSE PRACTITIONER

## 2021-03-13 PROCEDURE — 2500000003 HC RX 250 WO HCPCS: Performed by: SURGERY

## 2021-03-13 PROCEDURE — 85027 COMPLETE CBC AUTOMATED: CPT

## 2021-03-13 PROCEDURE — 1200000003 HC TELEMETRY R&B

## 2021-03-13 PROCEDURE — 80048 BASIC METABOLIC PNL TOTAL CA: CPT

## 2021-03-13 PROCEDURE — 94761 N-INVAS EAR/PLS OXIMETRY MLT: CPT

## 2021-03-13 PROCEDURE — 36415 COLL VENOUS BLD VENIPUNCTURE: CPT

## 2021-03-13 PROCEDURE — 82948 REAGENT STRIP/BLOOD GLUCOSE: CPT

## 2021-03-13 PROCEDURE — 2580000003 HC RX 258: Performed by: NURSE PRACTITIONER

## 2021-03-13 PROCEDURE — 93005 ELECTROCARDIOGRAM TRACING: CPT | Performed by: SURGERY

## 2021-03-13 PROCEDURE — 2580000003 HC RX 258: Performed by: SURGERY

## 2021-03-13 PROCEDURE — 71045 X-RAY EXAM CHEST 1 VIEW: CPT

## 2021-03-13 PROCEDURE — 6360000002 HC RX W HCPCS: Performed by: SURGERY

## 2021-03-13 RX ADMIN — SODIUM CHLORIDE, PRESERVATIVE FREE 10 ML: 5 INJECTION INTRAVENOUS at 21:42

## 2021-03-13 RX ADMIN — METOPROLOL SUCCINATE 50 MG: 25 TABLET, FILM COATED, EXTENDED RELEASE ORAL at 10:53

## 2021-03-13 RX ADMIN — PREGABALIN 150 MG: 75 CAPSULE ORAL at 20:30

## 2021-03-13 RX ADMIN — DULOXETINE HYDROCHLORIDE 60 MG: 60 CAPSULE, DELAYED RELEASE ORAL at 10:53

## 2021-03-13 RX ADMIN — AZITHROMYCIN DIHYDRATE 250 MG: 500 INJECTION, POWDER, LYOPHILIZED, FOR SOLUTION INTRAVENOUS at 16:55

## 2021-03-13 RX ADMIN — ENOXAPARIN SODIUM 40 MG: 40 INJECTION SUBCUTANEOUS at 10:51

## 2021-03-13 RX ADMIN — HYDROCODONE BITARTRATE AND ACETAMINOPHEN 1 TABLET: 5; 325 TABLET ORAL at 10:51

## 2021-03-13 RX ADMIN — FAMOTIDINE 20 MG: 10 INJECTION, SOLUTION INTRAVENOUS at 10:51

## 2021-03-13 RX ADMIN — FAMOTIDINE 20 MG: 10 INJECTION, SOLUTION INTRAVENOUS at 20:28

## 2021-03-13 RX ADMIN — PREGABALIN 150 MG: 75 CAPSULE ORAL at 10:53

## 2021-03-13 RX ADMIN — SODIUM CHLORIDE: 9 INJECTION, SOLUTION INTRAVENOUS at 00:17

## 2021-03-13 RX ADMIN — ENALAPRIL MALEATE 5 MG: 5 TABLET ORAL at 10:53

## 2021-03-13 RX ADMIN — SODIUM CHLORIDE: 9 INJECTION, SOLUTION INTRAVENOUS at 09:36

## 2021-03-13 RX ADMIN — CEFTRIAXONE SODIUM 1000 MG: 2 INJECTION, POWDER, FOR SOLUTION INTRAMUSCULAR; INTRAVENOUS at 19:35

## 2021-03-13 ASSESSMENT — PAIN SCALES - GENERAL
PAINLEVEL_OUTOF10: 2
PAINLEVEL_OUTOF10: 4
PAINLEVEL_OUTOF10: 3

## 2021-03-13 NOTE — PROGRESS NOTES
Notified Quiana Jones CNP of patient with new congested cough. Lung sounds with fine crackles bilaterally. IV fluids stopped.

## 2021-03-13 NOTE — PROGRESS NOTES
Received report from night RN, Rita Cabrera. Patient resting in bed, awake.  Sherrod Fothergill, [address], SN

## 2021-03-13 NOTE — PROGRESS NOTES
Reported off to primary [employer]. Patient resting comfortably in bed, watching TV. Ana, [facility].  .

## 2021-03-14 PROCEDURE — 6360000002 HC RX W HCPCS: Performed by: NURSE PRACTITIONER

## 2021-03-14 PROCEDURE — 2580000003 HC RX 258: Performed by: SURGERY

## 2021-03-14 PROCEDURE — 6360000002 HC RX W HCPCS: Performed by: SURGERY

## 2021-03-14 PROCEDURE — 99024 POSTOP FOLLOW-UP VISIT: CPT | Performed by: SURGERY

## 2021-03-14 PROCEDURE — 6370000000 HC RX 637 (ALT 250 FOR IP): Performed by: NURSE PRACTITIONER

## 2021-03-14 PROCEDURE — 1200000003 HC TELEMETRY R&B

## 2021-03-14 PROCEDURE — 2500000003 HC RX 250 WO HCPCS: Performed by: SURGERY

## 2021-03-14 PROCEDURE — 2580000003 HC RX 258: Performed by: NURSE PRACTITIONER

## 2021-03-14 PROCEDURE — 6370000000 HC RX 637 (ALT 250 FOR IP): Performed by: SURGERY

## 2021-03-14 RX ORDER — FUROSEMIDE 10 MG/ML
20 INJECTION INTRAMUSCULAR; INTRAVENOUS ONCE
Status: COMPLETED | OUTPATIENT
Start: 2021-03-14 | End: 2021-03-14

## 2021-03-14 RX ORDER — HYDRALAZINE HYDROCHLORIDE 20 MG/ML
10 INJECTION INTRAMUSCULAR; INTRAVENOUS EVERY 6 HOURS PRN
Status: DISCONTINUED | OUTPATIENT
Start: 2021-03-14 | End: 2021-03-19 | Stop reason: HOSPADM

## 2021-03-14 RX ADMIN — FAMOTIDINE 20 MG: 10 INJECTION, SOLUTION INTRAVENOUS at 19:50

## 2021-03-14 RX ADMIN — FUROSEMIDE 20 MG: 10 INJECTION, SOLUTION INTRAVENOUS at 07:52

## 2021-03-14 RX ADMIN — METOPROLOL SUCCINATE 50 MG: 25 TABLET, FILM COATED, EXTENDED RELEASE ORAL at 07:55

## 2021-03-14 RX ADMIN — HYDROCODONE BITARTRATE AND ACETAMINOPHEN 2 TABLET: 5; 325 TABLET ORAL at 12:41

## 2021-03-14 RX ADMIN — PREGABALIN 150 MG: 75 CAPSULE ORAL at 19:56

## 2021-03-14 RX ADMIN — LEVOTHYROXINE SODIUM 100 MCG: 100 TABLET ORAL at 06:50

## 2021-03-14 RX ADMIN — SODIUM CHLORIDE, PRESERVATIVE FREE 10 ML: 5 INJECTION INTRAVENOUS at 07:54

## 2021-03-14 RX ADMIN — HYDROCODONE BITARTRATE AND ACETAMINOPHEN 1 TABLET: 5; 325 TABLET ORAL at 07:52

## 2021-03-14 RX ADMIN — FAMOTIDINE 20 MG: 10 INJECTION, SOLUTION INTRAVENOUS at 07:52

## 2021-03-14 RX ADMIN — SODIUM CHLORIDE, PRESERVATIVE FREE 10 ML: 5 INJECTION INTRAVENOUS at 19:50

## 2021-03-14 RX ADMIN — PREGABALIN 150 MG: 75 CAPSULE ORAL at 07:55

## 2021-03-14 RX ADMIN — DULOXETINE HYDROCHLORIDE 60 MG: 60 CAPSULE, DELAYED RELEASE ORAL at 07:55

## 2021-03-14 RX ADMIN — AZITHROMYCIN DIHYDRATE 250 MG: 500 INJECTION, POWDER, LYOPHILIZED, FOR SOLUTION INTRAVENOUS at 18:00

## 2021-03-14 RX ADMIN — ENOXAPARIN SODIUM 40 MG: 40 INJECTION SUBCUTANEOUS at 11:16

## 2021-03-14 RX ADMIN — ENALAPRIL MALEATE 5 MG: 5 TABLET ORAL at 07:55

## 2021-03-14 RX ADMIN — HYDRALAZINE HYDROCHLORIDE 10 MG: 20 INJECTION INTRAMUSCULAR; INTRAVENOUS at 16:45

## 2021-03-14 RX ADMIN — CEFTRIAXONE SODIUM 1000 MG: 2 INJECTION, POWDER, FOR SOLUTION INTRAMUSCULAR; INTRAVENOUS at 19:54

## 2021-03-14 RX ADMIN — KETOROLAC TROMETHAMINE 15 MG: 30 INJECTION, SOLUTION INTRAMUSCULAR; INTRAVENOUS at 19:52

## 2021-03-14 ASSESSMENT — PAIN DESCRIPTION - PAIN TYPE: TYPE: ACUTE PAIN;SURGICAL PAIN

## 2021-03-14 ASSESSMENT — PAIN DESCRIPTION - FREQUENCY: FREQUENCY: CONTINUOUS

## 2021-03-14 ASSESSMENT — PAIN SCALES - GENERAL
PAINLEVEL_OUTOF10: 7
PAINLEVEL_OUTOF10: 4
PAINLEVEL_OUTOF10: 2

## 2021-03-14 ASSESSMENT — PAIN - FUNCTIONAL ASSESSMENT: PAIN_FUNCTIONAL_ASSESSMENT: PREVENTS OR INTERFERES WITH MANY ACTIVE NOT PASSIVE ACTIVITIES

## 2021-03-14 ASSESSMENT — PAIN DESCRIPTION - ONSET: ONSET: ON-GOING

## 2021-03-14 ASSESSMENT — PAIN DESCRIPTION - PROGRESSION: CLINICAL_PROGRESSION: NOT CHANGED

## 2021-03-14 ASSESSMENT — PAIN DESCRIPTION - DESCRIPTORS: DESCRIPTORS: ACHING

## 2021-03-14 NOTE — PROGRESS NOTES
[facility] - Dr. Vimal Grullon  Postoperative Progress Note    Pt Name: Aries Gunter  Medical Record Number: [id number]  Date of Birth 1940   Today's Date: 3/14/2021    ASSESSMENT   1. POD # 5 Status post robotic extensive lysis of adhesions and robotic reduction of large paraesophageal/hiatal hernia and gastric volvulus with gastropexy/G-tube insertion  2. Postoperative adynamic ileus  3. Recent left leg fracture  4. Hyponatremia  5. Hypertension  6. Postoperative urinary retention - resolved  7. Bilateral atelectasis/pneumonia - left worse than right    has a past medical history of Abnormal EKG, Anemia, Back pain, Blood circulation, collateral, Diverticulitis, Fibromyalgia, GERD (gastroesophageal reflux disease), Hiatal hernia, Hypertension, Hypothyroidism, and Osteoarthritis. PLAN   1. Bowel function has greatly returned over the last 24 hours. Stop stool softeners/laxatives today. Tolerating full liquids. Advance to soft diet. 2. Keep G-tube clamped. 3. Strict I&Os. 4. KVO  5. Up to [facility] with assist - she can pivot and weight bearing on right leg only  6. PT/OT  7. Hydralazine as needed today for hypertension. If no improvement or worsens then reconsult hospitalist service as they saw patient on prior admission. 8. Agressive pulmonary toileting. IS, C&DB, and breathing tx. Chest x-ray in a.m.  9. Antibiotics for pneumonia  10. GI & DVT prophylaxis  11. Pain control - limit narcotic use. Toradol ordered. 12. Labs reviewed. WBC looks good. 13. Patient looks much better today. Bowel function returned. Tolerating liquids. Abdomen benign. 14. Discharge planning. Home 24-48 hours. SUBJECTIVE   Patient stable. Chart reviewed. Updated by nursing staff. No fevers. Blood pressure up this morning with systolic blood pressure in the 180s. Patient has had multiple bowel movements.   Still has Colace and MiraLAX active on medication list.  Feeling a lot better this morning. No nausea or vomiting. Abdomen much less distended and softer. No urinary complaints. Denies chest pain or shortness of breath. No lightheadedness or dizziness. Tolerating full liquids. Up with assistance - patient only able to pivot. She is up to wheelchair at home. Incisions are clean, dry and intact. CURRENT MEDICATIONS   Scheduled Meds:   ketorolac  15 mg Intravenous Q6H    cefTRIAXone (ROCEPHIN) IV  1,000 mg Intravenous Q24H    azithromycin  250 mg Intravenous Q24H    enalapril  5 mg Oral Daily    pregabalin  150 mg Oral BID    metoprolol succinate  50 mg Oral Daily    levothyroxine  100 mcg Oral Daily    DULoxetine  60 mg Oral Daily    sodium chloride flush  10 mL Intravenous 2 times per day    enoxaparin  40 mg Subcutaneous Q24H    famotidine (PEPCID) injection  20 mg Intravenous BID     Continuous Infusions:    PRN Meds:.albuterol, polyethylene glycol, sodium chloride flush, promethazine **OR** ondansetron, HYDROmorphone **OR** HYDROmorphone, HYDROcodone 5 mg - acetaminophen **OR** HYDROcodone 5 mg - acetaminophen  OBJECTIVE   CURRENT VITALS:  height is 5' 7\" (1.702 m) and weight is 159 lb (72.1 kg). Her oral temperature is 97.9 °F (36.6 °C). Her blood pressure is 181/90 (abnormal) and her pulse is 82. Her respiration is 16 and oxygen saturation is 96%.    Temperature Range (24h):Temp: 97.9 °F (36.6 °C) Temp  Av.1 °F (36.7 °C)  Min: 97.7 °F (36.5 °C)  Max: 99.1 °F (37.3 °C)  BP Range (95T): Systolic (63IUD), NFW:041 , Min:163 , WJC:966     Diastolic (90RJG), YAK:90, Min:77, Max:92    Pulse Range (24h): Pulse  Av  Min: 79  Max: 86  Respiration Range (24h): Resp  Avg: 15.8  Min: 15  Max: 16  Current Pulse Ox (24h):  SpO2: 96 %  Pulse Ox Range (24h):  SpO2  Av %  Min: 95 %  Max: 99 %  Oxygen Amount and Delivery: O2 Flow Rate (L/min): 0.5 L/min  Incentive Spirometry Tx:            GENERAL: alert, no distress  LUNGS: lungs diminished but clear  HEART: normal rate and regular rhythm  ABDOMEN: still distended but much softer, generalized tenderness, bowel sounds hypoactive, G-tube in place without bleeding   INCISION: healing well, no significant drainage, no significant erythema  EXTERMITY: no cyanosis, clubbing or edema    In: 786.7 [P.O.:220; I.V.:566.7]  Out: 2500 [Urine:2500]  Date 03/14/21 0000 - 03/14/21 2359   Shift 5430-8645 4080-9596 6868-0587 24 Hour Total   INTAKE   Shift Total(mL/kg)       OUTPUT   Urine(mL/kg/hr) 800(1.4)   800   Shift Total(mL/kg) 800(11.1)   800(11.1)   Weight (kg) 72.1 72.1 72.1 72.1     LABS     Recent Labs     03/11/21  1522 03/12/21  0527 03/13/21  0512   WBC 9.7 10.8 12.2*   HGB 12.6 13.1 13.2   HCT 40.8 41.9 42.0    232 229   NA  --  128* 130*   K  --  3.7 3.6   CL  --  96* 96*   CO2  --  25 22*   BUN  --  6* 4*   CREATININE  --  0.4 0.4   CALCIUM  --  8.2* 8.1*      RADIOLOGY     Narrative   PROCEDURE: XR CHEST PORTABLE       CLINICAL INFORMATION: Cough. Dyspnea.       COMPARISON: 3/12/2021       TECHNIQUE: A single mobile view of the chest was obtained.               Impression   1. Very poor inflation of lungs. The cardiac silhouette is obscured. Left shoulder prosthesis. 2. Small bilateral pleural effusions. Moderate left basilar atelectasis/pneumonia, no appreciable change from yesterday. Suggestion of mild atelectasis/pneumonia along right hemidiaphragm, not present on prior study. 3. Overall appearance of chest has slightly worsened since prior.               **This report has been created using voice recognition software.  It may contain minor errors which are inherent in voice recognition technology. **       Final report electronically signed by Dr. Rosalba Rios on 3/13/2021 3:48 PM       1 view abdomen       Comparison:  CR,KOSR  - XR ABDOMEN (KUB) (SINGLE AP VIEW)  - 03/11/2021    02:52 PM EST       Findings:   Improvement in degree of gaseous bowel distention vs prior.    Again noted is retained enteric contrast throughout the colon. Free peritoneal air cannot be accurately assessed for on supine imaging. Visceral shadows without acute pathology. Degenerative spinal changes / scoliosis / hip arthroplasties stable.           Impression   Improvement in degree of gaseous bowel distention vs prior.       This document has been electronically signed by: Pily Rojo MD on    03/12/2021 03:01 AM     1 view of the chest.       COMPARISON: Single view of the chest dated 3/11/2021       FINDINGS:   Left reverse shoulder prosthesis.       Low lung volumes. Stable heart size.  Atherosclerotic thoracic aorta. Left hemidiaphragm and retrocardiac region are obscured.  Minimal    consolidation along the right lung base.  Likely small left pleural    effusion.  This appears similar to prior imaging. Right lung is clear. No pneumothorax. No displaced fracture.  [place] left curvature of the upper lumbar spine.     Moderate spondylosis.         Impression   1.  No significant interval change from prior imaging dated 3/11/2021.       This document has been electronically signed by: Stephane Quinoenz MD on    03/12/2021 02:57 AM       PROCEDURE: XR CHEST PORTABLE       CLINICAL INFORMATION: wheezing       COMPARISON: 2/27/2021       TECHNIQUE: A single mobile view of the chest was obtained.               Impression   1. Very poor inflation of lungs. . Cardiac silhouette is obscured by the hemidiaphragms. A shoulder prosthesis. 2. Bibasilar atelectasis/pneumonia, worse on the left. Overall appearance of chest slightly worse than prior.               **This report has been created using voice recognition software.  It may contain minor errors which are inherent in voice recognition technology. **       Final report electronically signed by Dr. Rose Smith on 3/11/2021 12:03 PM     Electronically signed by Henna Valdes MD on 3/14/2021 at 8:19 AM

## 2021-03-15 ENCOUNTER — APPOINTMENT (OUTPATIENT)
Dept: GENERAL RADIOLOGY | Age: 81
DRG: 326 | End: 2021-03-15
Attending: SURGERY
Payer: MEDICARE

## 2021-03-15 LAB
ANION GAP SERPL CALCULATED.3IONS-SCNC: 8 MEQ/L (ref 8–16)
BUN BLDV-MCNC: 6 MG/DL (ref 7–22)
CALCIUM SERPL-MCNC: 8.5 MG/DL (ref 8.5–10.5)
CHLORIDE BLD-SCNC: 97 MEQ/L (ref 98–111)
CO2: 28 MEQ/L (ref 23–33)
CREAT SERPL-MCNC: 0.5 MG/DL (ref 0.4–1.2)
ERYTHROCYTE [DISTWIDTH] IN BLOOD BY AUTOMATED COUNT: 13.2 % (ref 11.5–14.5)
ERYTHROCYTE [DISTWIDTH] IN BLOOD BY AUTOMATED COUNT: 44.9 FL (ref 35–45)
GFR SERPL CREATININE-BSD FRML MDRD: > 90 ML/MIN/1.73M2
GLUCOSE BLD-MCNC: 89 MG/DL (ref 70–108)
HCT VFR BLD CALC: 39.5 % (ref 37–47)
HEMOGLOBIN: 12.7 GM/DL (ref 12–16)
MCH RBC QN AUTO: 29.5 PG (ref 26–33)
MCHC RBC AUTO-ENTMCNC: 32.2 GM/DL (ref 32.2–35.5)
MCV RBC AUTO: 91.6 FL (ref 81–99)
PLATELET # BLD: 247 THOU/MM3 (ref 130–400)
PMV BLD AUTO: 10.4 FL (ref 9.4–12.4)
POTASSIUM SERPL-SCNC: 3 MEQ/L (ref 3.5–5.2)
RBC # BLD: 4.31 MILL/MM3 (ref 4.2–5.4)
SODIUM BLD-SCNC: 133 MEQ/L (ref 135–145)
WBC # BLD: 5.6 THOU/MM3 (ref 4.8–10.8)

## 2021-03-15 PROCEDURE — 2500000003 HC RX 250 WO HCPCS: Performed by: SURGERY

## 2021-03-15 PROCEDURE — 6360000002 HC RX W HCPCS: Performed by: NURSE PRACTITIONER

## 2021-03-15 PROCEDURE — 97110 THERAPEUTIC EXERCISES: CPT

## 2021-03-15 PROCEDURE — 6370000000 HC RX 637 (ALT 250 FOR IP): Performed by: SURGERY

## 2021-03-15 PROCEDURE — 36415 COLL VENOUS BLD VENIPUNCTURE: CPT

## 2021-03-15 PROCEDURE — 6370000000 HC RX 637 (ALT 250 FOR IP): Performed by: NURSE PRACTITIONER

## 2021-03-15 PROCEDURE — 97166 OT EVAL MOD COMPLEX 45 MIN: CPT

## 2021-03-15 PROCEDURE — 97535 SELF CARE MNGMENT TRAINING: CPT

## 2021-03-15 PROCEDURE — 6360000002 HC RX W HCPCS: Performed by: SURGERY

## 2021-03-15 PROCEDURE — 1200000003 HC TELEMETRY R&B

## 2021-03-15 PROCEDURE — 99024 POSTOP FOLLOW-UP VISIT: CPT | Performed by: SURGERY

## 2021-03-15 PROCEDURE — 80048 BASIC METABOLIC PNL TOTAL CA: CPT

## 2021-03-15 PROCEDURE — 2580000003 HC RX 258: Performed by: NURSE PRACTITIONER

## 2021-03-15 PROCEDURE — 85027 COMPLETE CBC AUTOMATED: CPT

## 2021-03-15 PROCEDURE — 97116 GAIT TRAINING THERAPY: CPT

## 2021-03-15 PROCEDURE — 97163 PT EVAL HIGH COMPLEX 45 MIN: CPT

## 2021-03-15 PROCEDURE — 97530 THERAPEUTIC ACTIVITIES: CPT

## 2021-03-15 PROCEDURE — 71046 X-RAY EXAM CHEST 2 VIEWS: CPT

## 2021-03-15 PROCEDURE — APPSS30 APP SPLIT SHARED TIME 16-30 MINUTES: Performed by: NURSE PRACTITIONER

## 2021-03-15 PROCEDURE — 2580000003 HC RX 258: Performed by: SURGERY

## 2021-03-15 RX ORDER — POTASSIUM CHLORIDE 7.45 MG/ML
10 INJECTION INTRAVENOUS PRN
Status: DISCONTINUED | OUTPATIENT
Start: 2021-03-15 | End: 2021-03-19 | Stop reason: HOSPADM

## 2021-03-15 RX ORDER — POTASSIUM CHLORIDE 20 MEQ/1
40 TABLET, EXTENDED RELEASE ORAL PRN
Status: DISCONTINUED | OUTPATIENT
Start: 2021-03-15 | End: 2021-03-19 | Stop reason: HOSPADM

## 2021-03-15 RX ADMIN — KETOROLAC TROMETHAMINE 15 MG: 30 INJECTION, SOLUTION INTRAMUSCULAR; INTRAVENOUS at 14:38

## 2021-03-15 RX ADMIN — KETOROLAC TROMETHAMINE 15 MG: 30 INJECTION, SOLUTION INTRAMUSCULAR; INTRAVENOUS at 08:19

## 2021-03-15 RX ADMIN — DULOXETINE HYDROCHLORIDE 60 MG: 60 CAPSULE, DELAYED RELEASE ORAL at 08:19

## 2021-03-15 RX ADMIN — PREGABALIN 150 MG: 75 CAPSULE ORAL at 20:39

## 2021-03-15 RX ADMIN — HYDRALAZINE HYDROCHLORIDE 10 MG: 20 INJECTION INTRAMUSCULAR; INTRAVENOUS at 03:04

## 2021-03-15 RX ADMIN — KETOROLAC TROMETHAMINE 15 MG: 30 INJECTION, SOLUTION INTRAMUSCULAR; INTRAVENOUS at 02:58

## 2021-03-15 RX ADMIN — KETOROLAC TROMETHAMINE 15 MG: 30 INJECTION, SOLUTION INTRAMUSCULAR; INTRAVENOUS at 20:35

## 2021-03-15 RX ADMIN — ENOXAPARIN SODIUM 40 MG: 40 INJECTION SUBCUTANEOUS at 11:56

## 2021-03-15 RX ADMIN — AZITHROMYCIN DIHYDRATE 250 MG: 500 INJECTION, POWDER, LYOPHILIZED, FOR SOLUTION INTRAVENOUS at 17:09

## 2021-03-15 RX ADMIN — METOPROLOL SUCCINATE 50 MG: 25 TABLET, FILM COATED, EXTENDED RELEASE ORAL at 08:19

## 2021-03-15 RX ADMIN — ENALAPRIL MALEATE 5 MG: 5 TABLET ORAL at 08:19

## 2021-03-15 RX ADMIN — LEVOTHYROXINE SODIUM 100 MCG: 100 TABLET ORAL at 05:28

## 2021-03-15 RX ADMIN — CEFTRIAXONE SODIUM 1000 MG: 2 INJECTION, POWDER, FOR SOLUTION INTRAMUSCULAR; INTRAVENOUS at 20:39

## 2021-03-15 RX ADMIN — SODIUM CHLORIDE, PRESERVATIVE FREE 10 ML: 5 INJECTION INTRAVENOUS at 20:33

## 2021-03-15 RX ADMIN — PREGABALIN 150 MG: 75 CAPSULE ORAL at 08:19

## 2021-03-15 RX ADMIN — SODIUM CHLORIDE, PRESERVATIVE FREE 10 ML: 5 INJECTION INTRAVENOUS at 08:20

## 2021-03-15 RX ADMIN — FAMOTIDINE 20 MG: 10 INJECTION, SOLUTION INTRAVENOUS at 08:19

## 2021-03-15 RX ADMIN — FAMOTIDINE 20 MG: 10 INJECTION, SOLUTION INTRAVENOUS at 20:35

## 2021-03-15 ASSESSMENT — PAIN DESCRIPTION - ORIENTATION: ORIENTATION: ANTERIOR

## 2021-03-15 ASSESSMENT — PAIN DESCRIPTION - DESCRIPTORS
DESCRIPTORS: HEADACHE
DESCRIPTORS: HEADACHE

## 2021-03-15 ASSESSMENT — PAIN SCALES - GENERAL: PAINLEVEL_OUTOF10: 4

## 2021-03-15 ASSESSMENT — PAIN - FUNCTIONAL ASSESSMENT: PAIN_FUNCTIONAL_ASSESSMENT: ACTIVITIES ARE NOT PREVENTED

## 2021-03-15 ASSESSMENT — PAIN DESCRIPTION - PAIN TYPE
TYPE: ACUTE PAIN
TYPE: ACUTE PAIN

## 2021-03-15 ASSESSMENT — PAIN DESCRIPTION - LOCATION: LOCATION: HEAD

## 2021-03-15 ASSESSMENT — PAIN DESCRIPTION - ONSET: ONSET: ON-GOING

## 2021-03-15 NOTE — CARE COORDINATION
3/15/21, 2:28 PM EDT    DISCHARGE ON [facility] Hospital day: 2  Location: -20/020-A Reason for admit: Paraesophageal hernia [K44.9]  S/P repair of paraesophageal hernia [Z98.890, Z87.19]   Procedure: 3/09/2021  1.  Robotic extensive lysis of adhesions (one hour)  2.  Robotic reduction of large paraesophageal/hiatal hernia and gastric  volvulus with gastropexy/G-tube insertion. Barriers to Discharge: 3/13/2021 Inpatient status. PT/OT, remove carroll catheter, IV Zithromax, Rocephin, Lovenox, IV Pepcid twice daily, IV Toradol scheduled, prn pain medications and antiemetics, BMP in a.m., low fiber diet, abdominal binder, incentive spirometry, telemetry, up as tolerated with assistance. PCP: Barb Baker DO  Readmission Risk Score: 12%  Patient Goals/Plan/Treatment Preferences: [place] will return home with her family and resume services through [facility].

## 2021-03-15 NOTE — PROGRESS NOTES
[facility] - Dr. Anahi Grullon  Postoperative Progress Note    Pt Name: Lo Gunter  Medical Record Number: [id number]  Date of Birth 1940   Today's Date: 3/15/2021    ASSESSMENT   1. POD # 6 Status post robotic extensive lysis of adhesions and robotic reduction of large paraesophageal/hiatal hernia and gastric volvulus with gastropexy/G-tube insertion  2. Postoperative adynamic ileus  3. Recent left leg fracture  4. Hyponatremia  5. Hypokalemia   6. Hypertension  7. Postoperative urinary retention   8. Bilateral atelectasis/pneumonia - left worse than right    has a past medical history of Abnormal EKG, Anemia, Back pain, Blood circulation, collateral, Diverticulitis, Fibromyalgia, GERD (gastroesophageal reflux disease), Hiatal hernia, Hypertension, Hypothyroidism, and Osteoarthritis. PLAN   1. Tolerating soft diet. Bowel function returned. 2. Keep G-tube clamped  3. Timmons remains in place. Attempt void trial today. 4. Up to [facility] with assist - she can pivot and weight bearing on right leg only. PT/OT. 5. Hydralazine as needed today for hypertension. Somewhat improved. Will continue to monitor today yet. 6. Agressive pulmonary toileting. IS, C&DB, and breathing tx. Chest x-ray stable. 7. Antibiotics for pneumonia  8. GI & DVT prophylaxis  9. Pain control - limit narcotic use. Toradol ordered. 10. Labs reviewed. Replace K+ per protocol. WBC back to normal.   11. Doing much better. Planning home tomorrow as long as she is able to urinate without issues. Home with . SUBJECTIVE   Patient stable. Chart reviewed. Updated by nursing staff. No fevers. Blood pressures still in the 160s but better. Patient has had multiple bowel movements. Feeling a lot better this morning. No nausea or vomiting. Abdomen soft and non-distended. No urinary complaints. Denies chest pain or shortness of breath. No lightheadedness or dizziness. Tolerating soft diet without issues. Up with assistance - patient only able to pivot. She is up to wheelchair at home. Incisions are clean, dry and intact. Timmons in place. CURRENT MEDICATIONS   Scheduled Meds:   ketorolac  15 mg Intravenous Q6H    cefTRIAXone (ROCEPHIN) IV  1,000 mg Intravenous Q24H    azithromycin  250 mg Intravenous Q24H    enalapril  5 mg Oral Daily    pregabalin  150 mg Oral BID    metoprolol succinate  50 mg Oral Daily    levothyroxine  100 mcg Oral Daily    DULoxetine  60 mg Oral Daily    sodium chloride flush  10 mL Intravenous 2 times per day    enoxaparin  40 mg Subcutaneous Q24H    famotidine (PEPCID) injection  20 mg Intravenous BID     Continuous Infusions:    PRN Meds:.hydrALAZINE, albuterol, polyethylene glycol, sodium chloride flush, promethazine **OR** ondansetron, HYDROmorphone **OR** HYDROmorphone, HYDROcodone 5 mg - acetaminophen **OR** HYDROcodone 5 mg - acetaminophen  OBJECTIVE   CURRENT VITALS:  height is 5' 7\" (1.702 m) and weight is 159 lb (72.1 kg). Her oral temperature is 98 °F (36.7 °C). Her blood pressure is 160/87 (abnormal) and her pulse is 76. Her respiration is 18 and oxygen saturation is 93%.    Temperature Range (24h):Temp: 98 °F (36.7 °C) Temp  Av.8 °F (36.6 °C)  Min: 97.6 °F (36.4 °C)  Max: 98 °F (36.7 °C)  BP Range (96U): Systolic (17BRM), LSK:427 , Min:103 , EPR:312     Diastolic (68SRE), PRC:61, Min:55, Max:90    Pulse Range (24h): Pulse  Av.9  Min: 64  Max: 82  Respiration Range (24h): Resp  Av.5  Min: 16  Max: 18  Current Pulse Ox (24h):  SpO2: 93 %  Pulse Ox Range (24h):  SpO2  Av.8 %  Min: 93 %  Max: 97 %  Oxygen Amount and Delivery: O2 Flow Rate (L/min): 0.5 L/min  Incentive Spirometry Tx:            GENERAL: alert, no distress  LUNGS: lungs diminished but clear  HEART: normal rate and regular rhythm  ABDOMEN: non-distended, softer, non-tender, bowel sounds hypoactive, G-tube in place without bleeding   INCISION: healing well, no significant drainage, no appreciable change from yesterday. Suggestion of mild atelectasis/pneumonia along right hemidiaphragm, not present on prior study. 3. Overall appearance of chest has slightly worsened since prior.               **This report has been created using voice recognition software.  It may contain minor errors which are inherent in voice recognition technology. **       Final report electronically signed by Dr. Shannan Silva on 3/13/2021 3:48 PM       1 view abdomen       Comparison:  CR,KOSR  - XR ABDOMEN (KUB) (SINGLE AP VIEW)  - 03/11/2021    02:52 PM EST       Findings:   Improvement in degree of gaseous bowel distention vs prior. Again noted is retained enteric contrast throughout the colon. Free peritoneal air cannot be accurately assessed for on supine imaging. Visceral shadows without acute pathology. Degenerative spinal changes / scoliosis / hip arthroplasties stable.           Impression   Improvement in degree of gaseous bowel distention vs prior.       This document has been electronically signed by: Humza Epstein MD on    03/12/2021 03:01 AM     1 view of the chest.       COMPARISON: Single view of the chest dated 3/11/2021       FINDINGS:   Left reverse shoulder prosthesis.       Low lung volumes. Stable heart size.  Atherosclerotic thoracic aorta. Left hemidiaphragm and retrocardiac region are obscured.  Minimal    consolidation along the right lung base.  Likely small left pleural    effusion.  This appears similar to prior imaging. Right lung is clear. No pneumothorax. No displaced fracture.  Nickerson left curvature of the upper lumbar spine.     Moderate spondylosis.         Impression   1.  No significant interval change from prior imaging dated 3/11/2021.       This document has been electronically signed by:  Ok Edwards MD on    03/12/2021 02:57 AM       PROCEDURE: XR CHEST PORTABLE       CLINICAL INFORMATION: wheezing       COMPARISON: 2/27/2021       TECHNIQUE: A single mobile view of the chest was obtained.               Impression   1. Very poor inflation of lungs. . Cardiac silhouette is obscured by the hemidiaphragms. A shoulder prosthesis. 2. Bibasilar atelectasis/pneumonia, worse on the left. Overall appearance of chest slightly worse than prior.               **This report has been created using voice recognition software.  It may contain minor errors which are inherent in voice recognition technology. **       Final report electronically signed by Dr. Anahi Ruggiero on 3/11/2021 12:03 PM     Electronically signed by FLORIDA Gordillo CNP on 3/15/2021 at 7:36 AM     Above discussed and I agree with Anthony Jacques CNP. See my additional comments below for updated orders and plan. Labs, cultures, and radiographs where available were reviewed. I discussed patient concerns with Seamus Clos and instructions were given. Please see our orders for the updated patient care plan.     Electronically signed by Elda Guzman MD on 3/15/21 at 3:52 PM EDT

## 2021-03-15 NOTE — PROGRESS NOTES
[address] [address]  INPATIENT PHYSICAL THERAPY  EVALUATION  [facility] ONC MED 5K - 5K-20/020-A    Time In: 1471  Time Out: 4120  Timed Code Treatment Minutes: 32 Minutes  Minutes: 39          Date: 3/15/2021  Patient Name: Aries Gunter,  Gender:  female        MRN: [id number]  : 1940  (78 y.o.)      Referring Practitioner: Mingo Carrillo MD  Diagnosis: Paraesophageal hernia  Additional Pertinent Hx: Per H&P: \"Pt has a history of hiatal hernia. She has difficulty with swallowing dry foods and had an episode of food getting stuck distally. She underwent lysis of adhesions and reduction of hiatal hernia on 3/9/21. \" Recent L femur ORIF on 2020. Restrictions/Precautions:  Restrictions/Precautions: Fall Risk, Isolation, Weight Bearing  Left Lower Extremity Weight Bearing: Toe Touch Weight Bearing  Position Activity Restriction  Other position/activity restrictions: Recent L femur ORIF on 2020; Toe-touch WB. Subjective:  Chart Reviewed: Yes  Patient assessed for rehabilitation services?: Yes  Family / Caregiver Present: Yes( and Jehavah's witness)  Subjective: RN approved session. Pt in recliner at end of session, agreeable to therapy.  present throughout session. Pt in bed at end of session with all needs in reach. Bed alarm on.     General:  Overall Orientation Status: Within Functional Limits  Follows Commands: Within Functional Limits    Vision: Impaired  Vision Exceptions: Wears glasses for reading(macular degeneration)    Hearing: Exceptions to [facility]  Hearing Exceptions: Bilateral hearing aid         Pain: 2/10: L hip    Vitals: Vitals not assessed per clinical judgement, see nursing flowsheet    Social/Functional History:    Lives With: Spouse, Daughter, Other (comment)(at her daughter's home with the spouse and mother of the son-in law)  Type of Home: House  Home Layout: Two level, Laundry in basement, Able to Live on Main level with bedroom/bathroom  Home Equipment: Rolling walker, Wheelchair-manual, 4 wheeled walker     Bathroom Shower/Tub: Walk-in shower  Bathroom Toilet: Handicap height  Bathroom Equipment: Toilet raiser  Bathroom Accessibility: Accessible    Receives Help From: Family  ADL Assistance: Needs assistance  Homemaking Assistance: Needs assistance  Homemaking Responsibilities: Yes  Transfer Assistance: Needs assistance    Active : No  Mode of Transportation: Car  Occupation: Retired  Type of occupation: Worked at [address] [employer] in [place] [facility] [address]: [facility], doing crossword puzzles  Additional Comments: Pt has been doing stand- pivot transfers with a rolling walker and assist of her . She does her self care with help for shower transfers. OBJECTIVE:  Range of Motion:  Bilateral Lower Extremity: WFL    Strength:  Bilateral Lower Extremity: Impaired - Not formally tested, B LE grossly at least 3/5. Balance:  Dynamic Sitting Balance: Stand By Assistance  Dynamic Standing Balance: Contact Guard Assistance    Bed Mobility:  Sit to Supine: Minimal Assistance, with verbal cues , with increased time for completion, Min A for L LE mgmt; HOB flat     Transfers:  Sit<>Stand: with increased time for completion, cues for hand placement, with verbal cues  First attempt: Max A x1; pt able to stand but not able to transition B UE from chair to walker; pt returned to chair. Second and third attempt: Max A x1; pt not able to fully stand despite cues. 4th attempt: pt able to fully stand with Mod A x2 up to RW; Min A for B UE hand transition from chair to RW. Ambulation:  Contact Guard Assistance, Minimal Assistance, X 1, with cues for safety, with verbal cues , with increased time for completion. Min A x1 and CGA x1; cues for weight shifting and stepping; cues to maintain weight bearing status on L; cues to back all the way up to chair before sitting.    Distance: 3 feet  Surface: Level Tile  Device:Rolling Walker  Gait functional I and safety with mobility. REQUIRES PT FOLLOW UP: Yes    Discharge Recommendations:  Discharge Recommendations: [address]    Patient Education:  PT Education: Goals, General Safety, Gait Training, PT Role, Plan of Care, Functional Mobility Training, Home Exercise Program, Transfer Training, Weight-bearing Education. See above. Equipment Recommendations:  Equipment Needed: No    Plan:  Times per week: 5xGM  Current Treatment Recommendations: Strengthening, Neuromuscular Re-education, Home Exercise Program, Safety Education & Training, Balance Training, Endurance Training, Patient/Caregiver Education & Training, Functional Mobility Training, Wheelchair Mobility Training, Equipment Evaluation, Education, & procurement, Transfer Training, Gait Training, Stair training    Goals:  Patient goals : To go home  Short term goals  Time Frame for Short term goals: by discharge  Short term goal 1: transfer supine<>sit with supervision for ease of bed mobility. Short term goal 2: transfer sit<>stand with Min A x1 with TTWB on L to safely get in/out of bed/chair. Short term goal 3: ambulate 50 feet with RW with CGA while maintaining TTWB on L to safely ambulate throughout home. Long term goals  Time Frame for Long term goals : N/A due to short ELOS. Following session, patient left in safe position with all fall risk precautions in place.

## 2021-03-15 NOTE — PROGRESS NOTES
[facility]  PHYSICAL THERAPY MISSED TREATMENT NOTE  ACUTE CARE  [facility] ONC MED 5K              Missed Treatment:    Attempted to work with pt at this time; pt with OT. Will re-attempt as time allows.

## 2021-03-15 NOTE — PROGRESS NOTES
Accessibility: Accessible    Receives Help From: Family  ADL Assistance: Needs assistance  Homemaking Assistance: Needs assistance  Homemaking Responsibilities: Yes  Transfer Assistance: Needs assistance    Active : No  Patient's  Info: Pt's spouse does the driving  Mode of Transportation: Car  Occupation: Retired  Type of occupation: Worked at [address] [employer] in [address] [place] [address] Avenue: [address] [place] [address], doing crossword puzzles  Additional Comments: Pt has been doing stand- pivot transfers with a rolling walker and assist of her . She does her self care with help for shower transfers. Cognition/Orientation:  Overall Orientation Status: Within Normal Limits  Overall Cognitive Status: WFL    ADL's:  Grooming: Contact guard assistance(helped pt only with use of the shampoo cap. She dried off her hair and used a comb without difficulty)  Vision - Basic Assessment  Prior Vision: Wears glasses all the time  Visual History: Macular degeneration  Patient Visual Report: Blurring of print when reading  Vision Comments: Pt uses good lighting and magnification while reading and favors large print    Functional Mobility:  Bed mobility  Supine to Sit: Contact guard assistance(using the bedrail)  Scooting: Stand by assistance(using the bedrail)    Functional Mobility  Functional - Mobility Device: Rolling Walker  Activity: Other(2 ft from bed to chair)  Assist Level: Minimal assistance  Functional Mobility Comments: Pt followed TTWB well throughout the transfer.      Balance:  Balance  Sitting Balance: Stand by assistance  Standing Balance: Contact guard assistance(preparing to walk)  Standing Balance  Time: 30 seconds  Activity: preparing to complete transfer    Transfers:  Sit to stand: Contact guard assistance(from the edge of bed)  Stand to sit: Contact guard assistance(to the recliner chair)       Upper Extremity Assessment:Hand Dominance: Right  LUE AROM : WFL  Left Hand General AROM: Trigger finger noted with MCP extension of 2-5 jts; slight ulnar deviation with MCP flexion of digits 3-5  RUE AROM : WFL  Right Hand AROM: WFL  Right Hand General AROM: OA noted in IP jt. LUE Strength  L Hand General: 4-/5  LUE Strength Comment: 3/5 deltoid; 3+/5 pectoral; 4/5 biceps/triceps    RUE Strength  R Hand General: 4/5  RUE Strength Comment: 3+/5 deltoid; 3+/5 pectoral; 4/5 biceps/triceps      Sensation  Overall Sensation Status: Impaired(tingling reported in her fingers; numbness in her feet)  Fine Motor Skills  Fine Motor Comment: Pt had difficulty with texting at times and was assisted with opening the milk carton on the breakfast tray       Activity Tolerance: Patient limited by fatigue  Pt was fatigued from getting up to the chair. She took a short rest break before doing her hair care. She remained in the chair at end of session. Assessment:  Assessment: Patient would benefit from continued skilled OT services to address above deficits. She presents with paraesophageal hernia. She underwent abdominal surgery to reduce the hernia. She has been recovering from L distal femur ORIF and is TTWB. Pt was doing pivot transfers at home with help from her spouse using the rolling walker. She did her self care with help with shower transfers. Pt has scoliosis and limited standing tolerance. Pt demonstrated doing mobility to the chair using a rolling walker and MIN A. She did hair care with help only for use of the shampoo cap. Pt has small abdominal incisions from the surgery. Performance deficits / Impairments: Decreased functional mobility , Decreased ADL status, Decreased sensation, Decreased ROM, Decreased balance  Prognosis: Good  REQUIRES OT FOLLOW UP: Yes  Decision Making: Medium Complexity    Treatment Initiated: Treatment and education initiated within context of evaluation.   Evaluation time included review of current medical information, gathering information related to past demonstrate functional mobility walking a couple of feet to/from a commode or the chair with SBA while following TTWB precaution of her LLE to increase her independence with toileting routine. Short term goal 2: Pt will complete lower body dressing while using any AE needed with CGA to increase her safety and independence with self care. Short term goal 3: Pt will complete simple ADL while using 1 hand release and following TTWB precaution with SBA to increase her independence with toileting. Short term goal 4: Pt will complete B hand activities while using any adaptations needed for joint protection to increase her independence with cooking and dressing. See long-term goal time frame for expected duration of plan of care. If no long-term goals established, a short length of stay is anticipated. Following session, patient left in safe position with all fall risk precautions in place.

## 2021-03-16 ENCOUNTER — APPOINTMENT (OUTPATIENT)
Dept: GENERAL RADIOLOGY | Age: 81
DRG: 326 | End: 2021-03-16
Attending: SURGERY
Payer: MEDICARE

## 2021-03-16 LAB
ANION GAP SERPL CALCULATED.3IONS-SCNC: 13 MEQ/L (ref 8–16)
BUN BLDV-MCNC: 5 MG/DL (ref 7–22)
CALCIUM SERPL-MCNC: 8.8 MG/DL (ref 8.5–10.5)
CHLORIDE BLD-SCNC: 95 MEQ/L (ref 98–111)
CO2: 25 MEQ/L (ref 23–33)
CREAT SERPL-MCNC: 0.4 MG/DL (ref 0.4–1.2)
GFR SERPL CREATININE-BSD FRML MDRD: > 90 ML/MIN/1.73M2
GLUCOSE BLD-MCNC: 93 MG/DL (ref 70–108)
POTASSIUM SERPL-SCNC: 3.1 MEQ/L (ref 3.5–5.2)
POTASSIUM SERPL-SCNC: 3.2 MEQ/L (ref 3.5–5.2)
SODIUM BLD-SCNC: 133 MEQ/L (ref 135–145)

## 2021-03-16 PROCEDURE — 97110 THERAPEUTIC EXERCISES: CPT

## 2021-03-16 PROCEDURE — 97530 THERAPEUTIC ACTIVITIES: CPT

## 2021-03-16 PROCEDURE — 6370000000 HC RX 637 (ALT 250 FOR IP): Performed by: NURSE PRACTITIONER

## 2021-03-16 PROCEDURE — 73552 X-RAY EXAM OF FEMUR 2/>: CPT

## 2021-03-16 PROCEDURE — 6360000002 HC RX W HCPCS: Performed by: SURGERY

## 2021-03-16 PROCEDURE — 80048 BASIC METABOLIC PNL TOTAL CA: CPT

## 2021-03-16 PROCEDURE — 6360000002 HC RX W HCPCS: Performed by: NURSE PRACTITIONER

## 2021-03-16 PROCEDURE — 2580000003 HC RX 258: Performed by: SURGERY

## 2021-03-16 PROCEDURE — 99223 1ST HOSP IP/OBS HIGH 75: CPT | Performed by: PHYSICAL MEDICINE & REHABILITATION

## 2021-03-16 PROCEDURE — APPSS30 APP SPLIT SHARED TIME 16-30 MINUTES: Performed by: NURSE PRACTITIONER

## 2021-03-16 PROCEDURE — 71045 X-RAY EXAM CHEST 1 VIEW: CPT

## 2021-03-16 PROCEDURE — 36415 COLL VENOUS BLD VENIPUNCTURE: CPT

## 2021-03-16 PROCEDURE — 97535 SELF CARE MNGMENT TRAINING: CPT

## 2021-03-16 PROCEDURE — 1200000003 HC TELEMETRY R&B

## 2021-03-16 PROCEDURE — 6370000000 HC RX 637 (ALT 250 FOR IP): Performed by: SURGERY

## 2021-03-16 PROCEDURE — 84132 ASSAY OF SERUM POTASSIUM: CPT

## 2021-03-16 PROCEDURE — 2500000003 HC RX 250 WO HCPCS: Performed by: SURGERY

## 2021-03-16 RX ORDER — KETOROLAC TROMETHAMINE 30 MG/ML
15 INJECTION, SOLUTION INTRAMUSCULAR; INTRAVENOUS EVERY 6 HOURS PRN
Status: DISCONTINUED | OUTPATIENT
Start: 2021-03-16 | End: 2021-03-19 | Stop reason: HOSPADM

## 2021-03-16 RX ADMIN — KETOROLAC TROMETHAMINE 15 MG: 30 INJECTION, SOLUTION INTRAMUSCULAR; INTRAVENOUS at 09:10

## 2021-03-16 RX ADMIN — PREGABALIN 150 MG: 75 CAPSULE ORAL at 20:57

## 2021-03-16 RX ADMIN — SODIUM CHLORIDE, PRESERVATIVE FREE 10 ML: 5 INJECTION INTRAVENOUS at 20:57

## 2021-03-16 RX ADMIN — KETOROLAC TROMETHAMINE 15 MG: 30 INJECTION, SOLUTION INTRAMUSCULAR; INTRAVENOUS at 16:42

## 2021-03-16 RX ADMIN — ENOXAPARIN SODIUM 40 MG: 40 INJECTION SUBCUTANEOUS at 10:33

## 2021-03-16 RX ADMIN — PREGABALIN 150 MG: 75 CAPSULE ORAL at 09:10

## 2021-03-16 RX ADMIN — SODIUM CHLORIDE, PRESERVATIVE FREE 10 ML: 5 INJECTION INTRAVENOUS at 09:13

## 2021-03-16 RX ADMIN — POTASSIUM BICARBONATE 40 MEQ: 782 TABLET, EFFERVESCENT ORAL at 09:10

## 2021-03-16 RX ADMIN — ENALAPRIL MALEATE 5 MG: 5 TABLET ORAL at 09:10

## 2021-03-16 RX ADMIN — FAMOTIDINE 20 MG: 10 INJECTION, SOLUTION INTRAVENOUS at 20:57

## 2021-03-16 RX ADMIN — LEVOTHYROXINE SODIUM 100 MCG: 100 TABLET ORAL at 05:22

## 2021-03-16 RX ADMIN — DULOXETINE HYDROCHLORIDE 60 MG: 60 CAPSULE, DELAYED RELEASE ORAL at 09:10

## 2021-03-16 RX ADMIN — KETOROLAC TROMETHAMINE 15 MG: 30 INJECTION, SOLUTION INTRAMUSCULAR; INTRAVENOUS at 03:17

## 2021-03-16 RX ADMIN — METOPROLOL SUCCINATE 50 MG: 25 TABLET, FILM COATED, EXTENDED RELEASE ORAL at 09:10

## 2021-03-16 RX ADMIN — HYDRALAZINE HYDROCHLORIDE 10 MG: 20 INJECTION INTRAMUSCULAR; INTRAVENOUS at 03:16

## 2021-03-16 RX ADMIN — FAMOTIDINE 20 MG: 10 INJECTION, SOLUTION INTRAVENOUS at 09:10

## 2021-03-16 ASSESSMENT — PAIN DESCRIPTION - LOCATION: LOCATION: HEAD

## 2021-03-16 ASSESSMENT — PAIN SCALES - GENERAL
PAINLEVEL_OUTOF10: 0
PAINLEVEL_OUTOF10: 2
PAINLEVEL_OUTOF10: 5

## 2021-03-16 ASSESSMENT — PAIN DESCRIPTION - PROGRESSION: CLINICAL_PROGRESSION: GRADUALLY IMPROVING

## 2021-03-16 ASSESSMENT — PAIN DESCRIPTION - PAIN TYPE
TYPE: SURGICAL PAIN
TYPE: ACUTE PAIN

## 2021-03-16 ASSESSMENT — PAIN DESCRIPTION - ORIENTATION
ORIENTATION: LEFT
ORIENTATION: ANTERIOR

## 2021-03-16 ASSESSMENT — PAIN DESCRIPTION - DESCRIPTORS
DESCRIPTORS: HEADACHE
DESCRIPTORS: ACHING

## 2021-03-16 ASSESSMENT — PAIN DESCRIPTION - FREQUENCY: FREQUENCY: INTERMITTENT

## 2021-03-16 NOTE — PLAN OF CARE
Problem: Falls - Risk of:  Goal: Will remain free from falls  Description: Will remain free from falls  Outcome: Ongoing     Patient remains free from fall this shift. Bed alarm activated . Bed locked and lowest position. 2/4 side rails raised for safety. Patient has non-skid socks when ambulating. Pathway clear and possessions in reach. Call light within reach. Patient rounded on hourly. Problem: Pain:  Goal: Pain level will decrease  Description: Pain level will decrease  Outcome: Ongoing     Pain Assessment: 0-10  Pain Level: 1   Patient's Stated Pain Goal: No pain   Is pain goal met at this time? No     Non-Pharmaceutical Pain Intervention(s): Rest, Relaxation techniques      Problem: Skin Integrity:  Goal: Will show no infection signs and symptoms  Description: Will show no infection signs and symptoms  Outcome: Ongoing     Patient remains afebrile and continues IV Zithromax and Rocephin      Problem: Impaired respiratory status  Goal: Clear lung sounds  Outcome: Ongoing       Lower bases present with fine crackles. Continues respiratory treatments and IS.     Electronically signed by Shivam Ruiz RN on 3/16/2021 at 1:01 AM

## 2021-03-16 NOTE — CONSULTS
Physical Medicine & Rehabilitation   Consult Note      Admitting Physician: Ann Lund MD    Primary Care Provider: Silva Mcguire DO     Reason for Consult:   Paraesophageal hernia, s/p ORIF left femur fracture, Rehabilitation needs    History of Present Illness:  Lo Gunter is a 78 y.o. female admitted to [address] [place] [address] on 3/9/2021. 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 Reji Webster 3/9/2021 and underwent:  1. Robotic extensive lysis of adhesions (one hour)  2. Robotic reduction of large paraesophageal/hiatal hernia and gastric  volvulus with gastropexy/G-tube insertion. In addition, she is status post ORIF of the left femur fracture 1220 of 2020 and remains toe-touch weightbearing on the left lower extremity. She is in agreement that she would benefit from therapies prior to returning home with her , and stated that she has done well at both [facility] and [facility] in the past.  She states that many of her friends are actually at [facility]. Current Rehabilitation Assessments:  PT:      Diagnosis: Paraesophageal hernia  Additional Pertinent Hx: Per H&P: \"Pt has a history of hiatal hernia. She has difficulty with swallowing dry foods and had an episode of food getting stuck distally. She underwent lysis of adhesions and reduction of hiatal hernia on 3/9/21. \" Recent L femur ORIF on 12/21/2020.      Prior Level of Function:  Lives With: Spouse, Daughter, Other (comment)(at her daughter's home with the spouse and mother of the son-in law)  Type of Home: House  Home Layout: Two level, Laundry in basement, Able to Live on Main level with bedroom/bathroom  Home Equipment: Rolling walker, Wheelchair-manual, 4 wheeled walker   Bathroom Shower/Tub: Walk-in shower  Bathroom Toilet: Handicap height  Bathroom Equipment: Toilet Climbing Raw Score : 11  AM-PAC Inpatient without Stair Climbing T-Scale Score : 35.66     ASSESSMENT:  Assessment: Patient progressing toward established goals. and Pt tolerated session well. Limited by TTWBing of L LE, decreased strength, decreased mobility. Will require continuned therapy at discharge. IP Rehab  Activity Tolerance:  Patient tolerance of  treatment: good. Equipment Recommendations:Equipment Needed: No  Discharge Recommendations: Continue therapies      OT:      Diagnosis: Paraesophageal hernia  Additional Pertinent Hx: Pt has a history of hiatal hernia. She has difficulty with swallowing dry foods and had an episode of food getting stuck distally. She underwent lysis of adhesions and reduction of hiatal hernia on 3/9/21.     Restrictions/Precautions:  Restrictions/Precautions: Fall Risk, Isolation, Weight Bearing  Left Lower Extremity Weight Bearing: Toe Touch Weight Bearing     Subjective  Chart Reviewed: Yes, Orders, History and Physical     Subjective: Pleasant and cooperative  Comments: RN approved session. Pt agreed to do her self care after having gotten up into the chair. Pt had a pain med for a headache.   She reports that it is mild at this time.     Pain:  Pain Assessment  Patient Currently in Pain: Yes  Pain Assessment: 0-10  Pain Level: 1  Pain Type: Acute pain  Pain Location: Head  Pain Orientation: Anterior  Pain Descriptors: Headache  Pain Frequency: Continuous  Clinical Progression: Gradually improving  Patient's Stated Pain Goal: No pain  Response to Pain Intervention: Patient Satisfied  Multiple Pain Sites: No     Vitals: Vitals not assessed per clinical judgement, see nursing flowsheet     Social/Functional History:  Lives With: Spouse, Daughter, Other (comment)(at her daughter's home with the spouse and mother of the son-in law)  Type of Home: House  Home Layout: Two level, Laundry in basement, Able to Live on Main level with bedroom/bathroom  Home Equipment: Rolling walker, Wheelchair-manual   Bathroom Shower/Tub: Walk-in shower  Bathroom Toilet: Handicap height  Bathroom Equipment: Toilet raiser  Bathroom Accessibility: Accessible     Receives Help From: Family  ADL Assistance: Needs assistance  Homemaking Assistance: Needs assistance  Homemaking Responsibilities: Yes  Transfer Assistance: Needs assistance     Active : No  Patient's  Info: Pt's spouse does the driving  Mode of Transportation: Car  Occupation: Retired  Type of occupation: Worked at [employer] in [address] [place] [address] Avenue: [address] [place] [address], doing crossword puzzles  Additional Comments: Pt has been doing stand- pivot transfers with a rolling walker and assist of her . She does her self care with help for shower transfers.     Cognition/Orientation:  Overall Orientation Status: Within Normal Limits  Overall Cognitive Status: WFL     ADL's:  Grooming: Contact guard assistance(helped pt only with use of the shampoo cap.   She dried off her hair and used a comb without difficulty)  Vision - Basic Assessment  Prior Vision: Wears glasses all the time  Visual History: Macular degeneration  Patient Visual Report: Blurring of print when reading  Vision Comments: Pt uses good lighting and magnification while reading and favors large print     Functional Mobility:  Bed mobility  Supine to Sit: Contact guard assistance(using the bedrail)  Scooting: Stand by assistance(using the bedrail)     Functional Mobility  Functional - Mobility Device: Rolling Walker  Activity: Other(2 ft from bed to chair)  Assist Level: Minimal assistance  Functional Mobility Comments: Pt followed TTWB well throughout the transfer.      Balance:  Balance  Sitting Balance: Stand by assistance  Standing Balance: Contact guard assistance(preparing to walk)  Standing Balance  Time: 30 seconds  Activity: preparing to complete transfer     Transfers:  Sit to stand: Contact guard assistance(from the edge of bed)  Stand to sit: Contact guard assistance(to the recliner chair)     Upper Extremity Assessment:Hand Dominance: Right  LUE AROM : WFL  Left Hand General AROM: Trigger finger noted with MCP extension of 2-5 jts; slight ulnar deviation with MCP flexion of digits 3-5  RUE AROM : WFL  Right Hand AROM: WFL  Right Hand General AROM: OA noted in IP jt.     LUE Strength  L Hand General: 4-/5  LUE Strength Comment: 3/5 deltoid; 3+/5 pectoral; 4/5 biceps/triceps     RUE Strength  R Hand General: 4/5  RUE Strength Comment: 3+/5 deltoid; 3+/5 pectoral; 4/5 biceps/triceps        Sensation  Overall Sensation Status: Impaired(tingling reported in her fingers; numbness in her feet)  Fine Motor Skills  Fine Motor Comment: Pt had difficulty with texting at times and was assisted with opening the milk carton on the breakfast tray     Activity Tolerance: Patient limited by fatigue  Pt was fatigued from getting up to the chair. She took a short rest break before doing her hair care. She remained in the chair at end of session.     Assessment:  Assessment: Patient would benefit from continued skilled OT services to address above deficits. She presents with paraesophageal hernia. She underwent abdominal surgery to reduce the hernia. She has been recovering from L distal femur ORIF and is TTWB. Pt was doing pivot transfers at home with help from her spouse using the rolling walker. She did her self care with help with shower transfers. Pt has scoliosis and limited standing tolerance. Pt demonstrated doing mobility to the chair using a rolling walker and MIN A. She did hair care with help only for use of the shampoo cap.   Pt has small abdominal incisions from the surgery.       Performance deficits / Impairments: Decreased functional mobility , Decreased ADL status, Decreased sensation, Decreased ROM, Decreased balance  Prognosis: Good  REQUIRES OT FOLLOW UP: Yes  Decision Making: Medium Complexity     Treatment Initiated: Treatment and education initiated within context of evaluation. Evaluation time included review of current medical information, gathering information related to past medical, social and functional history, completion of standardized testing, formal and informal observation of tasks, assessment of data and development of plan of care and goals. Treatment time included skilled education and facilitation of tasks to increase safety and independence with ADL's for improved functional independence and quality of life. Pt demonstrated B shoulder and hand exercises: shoulder flexion, horizontal abduction/horizontal adduction and internal rotation/external rotation, hand open/closed fists. 5 reps each. Pt completed the hand exercises slowly as her finger joints catch during extension. Discussed wearing wrist braces if she notices having more pain or tingling in her hands as she walks more with the walker. Pt verbalized understanding.   Pt stated that she sits while doing activities in her kitchen using a tall stool.      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 Dickson Nolasco GERD (gastroesophageal reflux disease)     Diverticulitis     Hiatal hernia     Hypertension     Hypothyroidism     Osteoarthritis        Past Surgical History:        Procedure Laterality Date    APPENDECTOMY  1958    BACK SURGERY      CARPAL TUNNEL RELEASE Bilateral 2013    w/cubital tunnel    COLON SURGERY  07/29/2016    Procedure: ATTEMPTED DAVINCI XI ROBOTIC ASSISTED SIGMOID COLON RESECTION, ROBOTIC ASSISTED MOBILIZATION OF RECTAL SIGMOID TO SPLENIC FLEXURE, CONVERTED TO OPEN LEFT HEMICOLECTOMY WITH COLOPROCTOCTOMY, SPLENORRHAPHY, RIGID SIGMOIDOSCOPY, LASER EVALUATION OF VASCULARITY WITH ICG; Surgeon: Manuela Astorga MD; Location: [place] [address]; Service: General    COLONOSCOPY  2012    CYST REMOVAL  2010   [address], [address]    Cataract    FEMUR FRACTURE SURGERY Left 12/21/2020    LEFT FEMUR OPEN REDUCTION INTERNAL FIXATION performed by Minerva Li MD at [address] Left 2001    HIATAL HERNIA REPAIR N/A 3/9/2021    ROBOTIC EXTENSIVE LYSIS OF ADHESIONS, ROBOTIC REDUCTION OF HIATAL HERNIA WITH GASTROPEXY performed by Jordy Courtney MD at [address]    w/bladder suspension   [address]  2002, 2008, 2009    rt hip(2002), lt knee(2009), lt hip(2008) Shoulder (2009)   [address]    ND COLON CA SCRN NOT  [address] IND Left 9/15/2017    COLONOSCOPY performed by Leandra Sheldon MD at [facility]   [facility]    discectomy   [facility] ENDOSCOPY  2012       Allergies:     Allergies   Allergen Reactions    Ampicillin     Morphine Other (See Comments)     Hallucinations     Pcn [Penicillins] Itching    Sulfa Antibiotics         Current Medications:   Current Facility-Administered Medications: ketorolac (TORADOL) injection 15 mg, 15 mg, Intravenous, Q6H PRN  potassium chloride (KLOR-CON M) extended release tablet 40 mEq, 40 mEq, Oral, PRN **OR** potassium bicarb-citric acid (EFFER-K) effervescent tablet 40 mEq, 40 mEq, Oral, PRN **OR** potassium chloride 10 mEq/100 mL IVPB (Peripheral Line), 10 mEq, Intravenous, PRN  hydrALAZINE (APRESOLINE) injection 10 mg, 10 mg, Intravenous, Q6H PRN  albuterol (PROVENTIL) nebulizer solution 2.5 mg, 2.5 mg, Nebulization, Q4H PRN  ketorolac (TORADOL) injection 15 mg, 15 mg, Intravenous, Q6H  enalapril (VASOTEC) tablet 5 mg, 5 mg, Oral, Daily  polyethylene glycol (GLYCOLAX) packet 17 g, 17 g, Oral, Daily PRN  pregabalin (LYRICA) capsule 150 mg, 150 mg, Oral, BID  metoprolol succinate (TOPROL XL) extended release tablet 50 mg, 50 mg, Oral, Daily  levothyroxine (SYNTHROID) tablet 100 mcg, 100 mcg, Oral, Daily  DULoxetine (CYMBALTA) extended release capsule 60 mg, 60 mg, Oral, Daily  sodium chloride flush 0.9 % injection 10 mL, 10 mL, Intravenous, 2 times per day  sodium chloride flush 0.9 % injection 10 mL, 10 mL, Intravenous, PRN  promethazine (PHENERGAN) tablet 12.5 mg, 12.5 mg, Oral, Q6H PRN **OR** ondansetron (ZOFRAN) injection 4 mg, 4 mg, Intravenous, Q6H PRN  enoxaparin (LOVENOX) injection 40 mg, 40 mg, Subcutaneous, Q24H  HYDROcodone-acetaminophen (NORCO) 5-325 MG per tablet 1 tablet, 1 tablet, Oral, Q4H PRN **OR** HYDROcodone-acetaminophen (NORCO) 5-325 MG per tablet 2 tablet, 2 tablet, Oral, Q4H PRN  famotidine (PEPCID) injection 20 mg, 20 mg, Intravenous, BID    Social History:  Social History     Socioeconomic History    Marital status:      Spouse name: Not on file    Number of children: Not on file    Years of education: Not on file    Highest education level: Not on file   Occupational History    Not on file   Social Needs    Financial resource strain: Not on file    Food insecurity     Worry: Not on file     Inability: Not on file    Transportation needs     Medical: Not on file     Non-medical: Not on file   Tobacco Use    Smoking status: Former Smoker     Packs/day: 2.00     Years: 11.00     Pack years: 22.00     Types: Cigarettes     Start date: 1957     Quit date: 1967     Years since quittin.4    Smokeless tobacco: Never Used   Substance and Sexual Activity    Alcohol use: No    Drug use: No    Sexual activity: Not on file   Lifestyle    Physical activity     Days per week: Not on file     Minutes per session: Not on file    Stress: Not on file   Relationships    Social connections     Talks on phone: Not on file     Gets together: Not on file Attends Oriental orthodox service: Not on file     Active member of club or organization: Not on file     Attends meetings of clubs or organizations: Not on file     Relationship status: Not on file    Intimate partner violence     Fear of current or ex partner: Not on file     Emotionally abused: Not on file     Physically abused: Not on file     Forced sexual activity: Not on file   Other Topics Concern    Not on file   Social History Narrative    Not on file     Occupation: Retired (worked at [address] [employer] in [place])  [id number] [place] [address] in [employer] services  Lives With: Spouse, Daughter, Other (comment)(at her daughter's home with the spouse and mother of the son-in law)  Type of Home: [facility] [address]: Two level, Laundry in basement, Able to Live on Main level with bedroom/bathroom  Home Equipment: Rolling walker, [employer]   Bathroom Shower/Tub: Walk-in shower  Bathroom Toilet: Handicap height  Bathroom Equipment: Toilet raiser  Bathroom Accessibility: Accessible     Receives Help From: Family  ADL Assistance: Needs assistance  Homemaking Assistance: Needs assistance  Homemaking Responsibilities: Yes  Transfer Assistance: Needs assistance     Active : No  Patient's  Info: Pt's spouse does the driving  Mode of Transportation: Car  Occupation: Retired  Type of occupation: Worked at [address] [employer] in [address] [place] [address] Avenue: [address] [place] [address], doing crossword puzzles  Additional Comments: Pt has been doing stand- pivot transfers with a rolling walker and assist of her . She does her self care with help for shower transfers.       Family History:       Problem Relation Age of Onset    Arthritis Mother    Echo Arias Hearing Loss Mother     High Cholesterol Mother    Echo Arias Hearing Loss Father     Heart Disease Father 76        CABG    Stroke Father     Arthritis Sister     Depression Sister     Diabetes Sister     Arthritis Brother     Depression Brother    Echo Arias Cancer Maternal Aunt     Early Death Maternal Aunt     Diabetes Maternal Grandmother     Heart Disease Paternal Grandmother        Review of Systems:  CONSTITUTIONAL:  negative  EYES:  negative  HEENT:  positive for  Recent problems with dysphagia  RESPIRATORY:  negative  CARDIOVASCULAR:  negative  GASTROINTESTINAL:  negative  GENITOURINARY:  negative  SKIN:  negative  HEMATOLOGIC/LYMPHATIC:  negative  MUSCULOSKELETAL:  TTWB Left LE following ORIF femur fracture 12/20/2020  NEUROLOGICAL:  negative  BEHAVIOR/PSYCH:  negative  System review otherwise negative    Physical Exam:  Vitals:    03/16/21 0908   BP: (!) 126/91   Pulse: 98   Resp: 18   Temp: 98.3 °F (36.8 °C)   SpO2: 95%     awake  Orientation:   person, place, time  Mood: within normal limits  Affect: within normal limits  General appearance: Patient is well nourished, well developed, well groomed and in no acute distress    Memory:   normal,   Attention/Concentration: normal  Language:  normal    Cranial Nerves:  cranial nerves II-XII are grossly intact  ROM:  normal  Motor Exam:  4+/5 throughout except left hip flexors secondary to fracture  Tone:  normal  Muscle bulk: within normal limits  Sensory:  Sensory intact  Coordination:   normal  Deep Tendon Reflexes:  Reflexes are intact and symmetrical bilaterally  Plantar Response:  Right:  downgoing  Left:  downgoing  Gait: not tested    Heart: normal rate, regular rhythm, normal S1, S2, no murmurs, rubs, clicks or gallops  Lungs: clear to auscultation without wheezes or rales  Abdomen: soft, non-tender, non-distended, normal bowel sounds, no masses or organomegaly    Skin: warm and dry, no rash or erythema  Peripheral vascular: Pulses: Normal upper and lower extremity pulses; Edema: no      Diagnostics:  Recent Results (from the past 24 hour(s))   Basic Metabolic Panel    Collection Time: 03/16/21  5:52 AM   Result Value Ref Range    Sodium 133 (L) 135 - 145 meq/L    Potassium 3.1 (L) 3.5 - 5.2 meq/L Chloride 95 (L) 98 - 111 meq/L    CO2 25 23 - 33 meq/L    Glucose 93 70 - 108 mg/dL    BUN 5 (L) 7 - 22 mg/dL    CREATININE 0.4 0.4 - 1.2 mg/dL    Calcium 8.8 8.5 - 10.5 mg/dL   Anion Gap    Collection Time: 03/16/21  5:52 AM   Result Value Ref Range    Anion Gap 13.0 8.0 - 16.0 meq/L   Glomerular Filtration Rate, Estimated    Collection Time: 03/16/21  5:52 AM   Result Value Ref Range    Est, Glom Filt Rate >90 ml/min/1.73m2           Impression:  1. Large paraesophageal/hiatal hernia with gastric volvulus, s/p surgery  2. Dysphagia  3. Pneumonia  4. Left pleural effusion  5. Urinary tract infection  6. Acute on chronic hyponatremia  7. Anemia  8. GERD  9. Bright red blood per rectum last week  10. Hypothyroidism  11. History diverticular disease status post colectomy  12. [facility]        Recommendations: The patient is a very pleasant 78-year-old female status post extensive robotic lysis of adhesions and Robotic reduction of large paraesophageal/hiatal hernia and gastricvolvulus with   gastropexy/G-tube insertion. In addition, she is status post ORIF of the left femur fracture 12/20/2020 and is currently toe-touch weightbearing on the left lower extremity. I believe she will require and benefit from additional PT/OT in a skilled setting such as [facility] or [facility]. She has been a patient at both facilities in the past and has done well at both of them. It was my pleasure to evaluate Quique BERNAL Height today. Please call with questions.     Kathie Hutson MD

## 2021-03-16 NOTE — PROGRESS NOTES
[address] THERAPY MISSED TREATMENT NOTE  [facility] ONC MED 5K  5K-[id number]-A      Date: 3/16/2021  Patient Name: Dk Thompson Height        CSN: [id number]   : 1940  (78 y.o.)  Gender: female   Referring Practitioner: Dr. Maura Schmitt MD  Diagnosis: Paraesophageal hernia         REASON FOR MISSED TREATMENT: Patient Eating will attempt later today if time allows.

## 2021-03-16 NOTE — PROGRESS NOTES
[address] [address]  INPATIENT PHYSICAL THERAPY  DAILY NOTE  [facility] MED 5K - 5K-20/020-A    Time In: 4361  Time Out: 1114  Timed Code Treatment Minutes: 34 Minutes  Minutes: 29          Date: 3/16/2021  Patient Name: Morgan Gunter,  Gender:  female        MRN: [id number]  : 1940  (78 y.o.)     Referring Practitioner: Gus Cho MD  Diagnosis: Paraesophageal hernia  Additional Pertinent Hx: Per H&P: \"Pt has a history of hiatal hernia. She has difficulty with swallowing dry foods and had an episode of food getting stuck distally. She underwent lysis of adhesions and reduction of hiatal hernia on 3/9/21. \" Recent L femur ORIF on 2020. Prior Level of Function:  Lives With: Spouse, Daughter, Other (comment)(at her daughter's home with the spouse and mother of the son-in law)  Type of Home: House  Home Layout: Two level, Laundry in basement, Able to Live on Main level with bedroom/bathroom  Home Equipment: Rolling walker, [place], 4 wheeled walker   Bathroom Shower/Tub: Walk-in shower  Bathroom Toilet: Handicap height  Bathroom Equipment: Toilet raiser  Bathroom Accessibility: Accessible    Receives Help From: Family  ADL Assistance: Needs assistance  Homemaking Assistance: Needs assistance  Homemaking Responsibilities: Yes  Transfer Assistance: Needs assistance  Active : No  Additional Comments: Pt has been doing stand- pivot transfers with a rolling walker and assist of her . She does her self care with help for shower transfers. Restrictions/Precautions:  Restrictions/Precautions: Fall Risk, Isolation, Weight Bearing  Left Lower Extremity Weight Bearing: Toe Touch Weight Bearing  Position Activity Restriction  Other position/activity restrictions: Recent L femur ORIF on 2020; Toe-touch WB.     SUBJECTIVE: RN approved session. Pt up on bedside commode upon PTA arrival, pleasant and agreeable to therapy.      PAIN: 2-3/10: L LE    Vitals: Vitals not assessed per clinical judgement, see nursing flowsheet    OBJECTIVE:  Bed Mobility:  Not Tested    Transfers:  Sit to Stand: Moderate Assistance, X 1, cues for hand placement  Stand to Sit:Moderate Assistance, X 1, Cuing to reach back and control decent of transfer. Stand Pivot: Moderate Assistance, X 1, Pt not maintaining TTWBing through L LE despite max verbal and tactile cuing. Ambulation:  Pt not able to maintain TTWBing L LE, Not Tested    Balance:  Static Sitting Balance:  Modified Independent  Dynamic Sitting Balance: Supervision, Pt sitting on commode without back support. Leaning both directions to complete mich clean up. Static Standing Balance: Contact Guard Assistance, X 1, PTA managing depends. Exercise:  Patient was guided in 1 set(s) 15 reps of exercise to both lower extremities. Seated marches, Seated hamstring curls, Seated heel/toe raises, Long arc quads and Seated abduction/adduction. Exercises were completed for increased independence with functional mobility. Functional Outcome Measures: Completed  AM-PAC Inpatient Mobility without Stair Climbing Raw Score : 11  AM-PAC Inpatient without Stair Climbing T-Scale Score : 35.66    ASSESSMENT:  Assessment: Patient progressing toward established goals. and Pt tolerated session well. Limited by TTWBing of L LE, decreased strength, decreased mobility. Will require continuned therapy at discharge. IP Rehab  Activity Tolerance:  Patient tolerance of  treatment: good.       Equipment Recommendations:Equipment Needed: No  Discharge Recommendations: IP Rehab    Plan: Times per week: 5xGM  Current Treatment Recommendations: Strengthening, Neuromuscular Re-education, Home Exercise Program, Safety Education & Training, Balance Training, Endurance Training, Patient/Caregiver Education & Training, Functional Mobility Training, Wheelchair Mobility Training, Equipment Evaluation, Education, & procurement, Transfer Training, Gait Training, Stair training    Patient Education  Patient Education: Plan of Care, Transfers    Goals:  Patient goals : To go home  Short term goals  Time Frame for Short term goals: by discharge  Short term goal 1: transfer supine<>sit with supervision for ease of bed mobility. Short term goal 2: transfer sit<>stand with Min A x1 with TTWB on L to safely get in/out of bed/chair. Short term goal 3: ambulate 50 feet with RW with CGA while maintaining TTWB on L to safely ambulate throughout home. Long term goals  Time Frame for Long term goals : N/A due to short ELOS. Following session, patient left in safe position with all fall risk precautions in place.

## 2021-03-16 NOTE — PLAN OF CARE
Problem: Falls - Risk of:  Goal: Will remain free from falls  Description: Will remain free from falls  3/16/2021 1714 by Becka Posadas  Outcome: Ongoing  Note: Remains free of falls at this time. Alert and oriented x4 and uses call light appropriately. Is now WBAT to LLE. Uses walker and gait belt with steady gait. Continue use nonskid socks and bed/chair alarm. Problem: Pain:  Description: Pain management should include both nonpharmacologic and pharmacologic interventions. Goal: Pain level will decrease  Description: Pain level will decrease  3/16/2021 1714 by Becka Posadas  Outcome: Ongoing  Note: Patient states pain of mostly 2-3/10. Highest rated as 4-5/10. Toradol used prn per patient request and as ordered. Patient reluctant to take norco; \"I don't want to take it if I don't have to. \"  Education on pain management provided. Problem: Discharge Planning:  Goal: Discharged to appropriate level of care  Description: Discharged to appropriate level of care  3/16/2021 1714 by Becka Posadas  Outcome: Ongoing  Note: Patient from home with . Was initially planning on returning home with  and home health but therapy has advised that the patient likely needs more therapy before being able to safely return home. Patient is asking for [facility] or [facility] since she does not qualify for [facility]. Continue to encourage therapy, bed mobility, and get up in room with assist.     Problem: Skin Integrity:  Goal: Will show no infection signs and symptoms  Description: Will show no infection signs and symptoms  3/16/2021 1714 by Becka Posadas  Outcome: Ongoing  Note: No new areas of issue. Patient makes frequent position changes when in bed and asks for assistance if needed. Patient was up in chair most of the afternoon. Continue to assess and encourage mobility. Care plan reviewed with patient and vocalized understanding.

## 2021-03-16 NOTE — PROGRESS NOTES
Activity Tolerance:  Patient tolerance of  treatment: fair. Discharge Recommendations: Continue to assess pending progress, Patient would benefit from continued therapy after discharge, SNF  Equipment Recommendations: Equipment Needed: Yes  Other: Adaptive  utensils and cooking tools  Plan: Times per week: 3-5x  Specific instructions for Next Treatment: Functional mobility for short distance; ADLs and standing balance; B hand activities and joint protection techniques  Current Treatment Recommendations: Functional Mobility Training, Self-Care / ADL, Endurance Training, Safety Education & Training, Balance Training  Plan Comment: Pt would benefit from continued skilled OT services when medically stable and discharged from Acute. HHOT recommended. Patient Education  Patient Education: Role of OT, Plan of Care, ADL's, Precautions, Importance of Increasing Activity and Assistive Device Safety    Goals  Short term goals  Time Frame for Short term goals: By discharge  Short term goal 1: Pt will demonstrate functional mobility walking a couple of feet to/from a commode or the chair with SBA while following TTWB precaution of her LLE to increase her independence with toileting routine. Short term goal 2: Pt will complete lower body dressing while using any AE needed with CGA to increase her safety and independence with self care. Short term goal 3: Pt will complete simple ADL while using 1 hand release and following TTWB precaution with SBA to increase her independence with toileting. Short term goal 4: Pt will complete B hand activities while using any adaptations needed for joint protection to increase her independence with cooking and dressing. Following session, patient left in safe position with all fall risk precautions in place.

## 2021-03-16 NOTE — CONSULTS
Orthopedic Consult    Requesting Physician: Dr. Dubois Miguel:  S/p ORIF left femur fracture    HISTORY OF PRESENT ILLNESS:      The patient is a 78 y.o. female who is currently admitted to general surgery and underwent a hernia repair. Patient is s/p ORIF with plate and screw fixation following a left supracondylar femur fracture on 12/20/21. She states she feels like she has become weaker since discharge from SNF in late January. Currently TTWB with a walker and has minimal pain. X-rays show bridging at the fracture site, maintained alignment. Plate and screws without evidence of loosening. THR and TKR prosthesis both stable appearing.        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 Kita Brown GERD (gastroesophageal reflux disease)     Diverticulitis     Hiatal hernia     Hypertension     Hypothyroidism     Osteoarthritis        Past Surgical History:    Past Surgical History:   Procedure Laterality Date    APPENDECTOMY  1958    BACK SURGERY      CARPAL TUNNEL RELEASE Bilateral 2013    w/cubital tunnel    COLON SURGERY  07/29/2016    Procedure: ATTEMPTED DAVINCI XI ROBOTIC ASSISTED SIGMOID COLON RESECTION, ROBOTIC ASSISTED MOBILIZATION OF RECTAL SIGMOID TO SPLENIC FLEXURE, CONVERTED TO OPEN LEFT HEMICOLECTOMY WITH COLOPROCTOCTOMY, SPLENORRHAPHY, RIGID SIGMOIDOSCOPY, LASER EVALUATION OF VASCULARITY WITH ICG; Surgeon: Kita Brown MD; Location: [facility]; Service: General    COLONOSCOPY  2012    CYST REMOVAL  2010   [address], [address]    Cataract    FEMUR FRACTURE SURGERY Left 12/21/2020    LEFT FEMUR OPEN REDUCTION INTERNAL FIXATION performed by Dillon Olivas MD at [facility] Left 2001    HIATAL HERNIA REPAIR N/A 3/9/2021    ROBOTIC EXTENSIVE LYSIS OF ADHESIONS, ROBOTIC REDUCTION OF HIATAL HERNIA WITH GASTROPEXY performed by Riky Beaulieu MD at [address]    w/bladder suspension   [address]  2002, 2008, 2009    rt hip(2002), lt knee(2009), lt hip(2008) Shoulder (2009)   [address]    MO COLON CA SCRN NOT  [address] IND Left 9/15/2017    COLONOSCOPY performed by Issac Desai MD at [facility]   [facility]    discectomy   [facility] ENDOSCOPY  2012       Medications Prior to Admission:   Current Facility-Administered Medications   Medication Dose Route Frequency Provider Last Rate Last Admin    ketorolac (TORADOL) injection 15 mg  15 mg Intravenous Q6H PRN Monica Flock, APRN - CNP        potassium chloride (KLOR-CON M) extended release tablet 40 mEq  40 mEq Oral PRN Monica Flock, APRN - CNP        Or    potassium bicarb-citric acid (EFFER-K) effervescent tablet 40 mEq  40 mEq Oral PRN Monica Flock, APRN - CNP   40 mEq at 03/16/21 0910    Or    potassium chloride 10 mEq/100 mL IVPB (Peripheral Line)  10 mEq Intravenous PRN Monica Flock, APRN - CNP        hydrALAZINE (APRESOLINE) injection 10 mg  10 mg Intravenous Q6H PRN Riky Beaulieu MD   10 mg at 03/16/21 0316    albuterol (PROVENTIL) nebulizer solution 2.5 mg  2.5 mg Nebulization Q4H PRN Monica Flock, APRN - CNP   2.5 mg at 03/12/21 2103    ketorolac (TORADOL) injection 15 mg  15 mg Intravenous Q6H Monica Flock, APRN - CNP   15 mg at 03/16/21 0910    enalapril (VASOTEC) tablet 5 mg  5 mg Oral Daily Monica Floflavio, APRN - CNP   5 mg at 03/16/21 0910    polyethylene glycol (GLYCOLAX) packet 17 g  17 g Oral Daily PRN Monica Flock, APRN - CNP   17 g at 03/11/21 1847    pregabalin (LYRICA) capsule 150 mg  150 mg Oral BID Riky Beaulieu MD   150 mg at 21 0910    metoprolol succinate (TOPROL XL) extended release tablet 50 mg  50 mg Oral Daily Alcides Rodriguez MD   50 mg at 21 0910    levothyroxine (SYNTHROID) tablet 100 mcg  100 mcg Oral Daily Alcides Rodriguez MD   100 mcg at 21 0522    DULoxetine (CYMBALTA) extended release capsule 60 mg  60 mg Oral Daily Alcides Rodriguez MD   60 mg at 21 0910    sodium chloride flush 0.9 % injection 10 mL  10 mL Intravenous 2 times per day Alcides Rodriguez MD   10 mL at 21 0913    sodium chloride flush 0.9 % injection 10 mL  10 mL Intravenous PRN Alcides Rodriguez MD   10 mL at 03/10/21 1007    promethazine (PHENERGAN) tablet 12.5 mg  12.5 mg Oral Q6H PRN Alcides Rodriguez MD        Or    ondansetron [facility]) injection 4 mg  4 mg Intravenous Q6H PRN Alcides Rodriguez MD   4 mg at 21 0634    enoxaparin (LOVENOX) injection 40 mg  40 mg Subcutaneous Q24H Alicdes Rodriguez MD   40 mg at 21 1033    HYDROcodone-acetaminophen (NORCO) 5-325 MG per tablet 1 tablet  1 tablet Oral Q4H PRN Alcides Rodriguez MD   1 tablet at 21 0725    Or    HYDROcodone-acetaminophen (NORCO) 5-325 MG per tablet 2 tablet  2 tablet Oral Q4H PRN Alcides Rodriguez MD   2 tablet at 21 1241    famotidine (PEPCID) injection 20 mg  20 mg Intravenous BID Alcides Rodriguez MD   20 mg at 21 9716       Allergies:  Ampicillin, Morphine, Pcn [penicillins], and Sulfa antibiotics    Social History:   Social History     Tobacco Use   Smoking Status Former Smoker    Packs/day: 2.00    Years: 11.00    Pack years: 22.00    Types: Cigarettes    Start date: 1957   Kelly Wren Quit date: 1967    Years since quittin.4   Smokeless Tobacco Never Used     Social History     Substance and Sexual Activity   Alcohol Use No     Social History     Substance and Sexual Activity   Drug Use No       Family History:  Family History   Problem Relation Age of Onset    Arthritis Mother     Hearing Loss Mother     High Cholesterol Mother     Hearing Loss Father     Heart Disease Father 76        CABG    Stroke Father     Arthritis Sister     Depression Sister     Diabetes Sister     Arthritis Brother     Depression Brother     Cancer Maternal Aunt     Early Death Maternal Aunt     Diabetes Maternal Grandmother     Heart Disease Paternal Grandmother        REVIEW OF SYSTEMS:  Musculoskeletal: s/p left femur ORIF  Neuro: Denies any dizziness, paresthesia or weakness. PHYSICAL EXAM:  Patient Vitals for the past 24 hrs:   BP Temp Temp src Pulse Resp SpO2   03/16/21 0908 (!) 126/91 98.3 °F (36.8 °C) Oral 98 18 95 %   03/16/21 0300 (!) 175/66 98.5 °F (36.9 °C) Oral 76 18 95 %   03/15/21 2015 (!) 146/82 98.1 °F (36.7 °C) Oral 70 18 96 %   03/15/21 1842 (!) 147/74 98.4 °F (36.9 °C) Oral 80 16 95 %     General appearance:  Alert and oriented x 3. No apparent distress, appears stated age and cooperative. Musculoskeletal: LLE incisions are well healed without erythema, swelling, warmth or TTP. ROM hip and knee stable, pain free. Strength within functional limitations. Flex and extends toes and ankle. DP 2+ SILT. Skin: Skin color, texture, turgor normal.  No rashes or lesions. Neurologic:  Neurovascularly intact without any focal sensory/motor deficits. Sensation intact. DATA:  CBC:   Lab Results   Component Value Date    WBC 5.6 03/15/2021    HGB 12.7 03/15/2021     03/15/2021     BMP:    Lab Results   Component Value Date     03/16/2021    K 3.1 03/16/2021    K 4.9 03/10/2021    CL 95 03/16/2021    CO2 25 03/16/2021    BUN 5 03/16/2021    CREATININE 0.4 03/16/2021    CALCIUM 8.8 03/16/2021    GLUCOSE 93 03/16/2021     PT/INR:    Lab Results   Component Value Date    INR 0.90 02/27/2021     Troponin:  No results found for: TROPONINI  No results for input(s): LIPASE, AMYLASE in the last 72 hours.   No results for input(s): AST, ALT, BILIDIR, BILITOT, ALKPHOS in the last 72 hours. Radiology:   Echo Complete 2d W Doppler W Color    Result Date: 3/1/2021  Transthoracic Echocardiography Report (TTE)  Demographics   Patient Name   Angélica Lowery  Gender              Female                 L   MR #           [id number]      Race                                                 Ethnicity   Account #      [de-identified]      Room Number         0001   Accession      [id number]     Date of Study       03/01/2021  Number   Date of Birth  1940     Referring Physician Irving Moss MD   Age            78 year(s)     Kwan Aragon, CS                                 Interpreting        Yumiko Cast MD                                Physician  Procedure Type of Study   TTE procedure:ECHOCARDIOGRAM COMPLETE 2D W DOPPLER W COLOR. Procedure Date Date: 03/01/2021 Start: 01:16 PM Study Location: Bedside Technical Quality: Adequate visualization Indications:Near Syncope. Additional Medical History: Former smoker, hypertension, hypothyroidism, GERD, anemia Patient Status: Routine Height: 66.14 inches Weight: 165.35 pounds BSA: 1.85 m^2 BMI: 26.57 kg/m^2 BP: 168/74 mmHg  Conclusions   Summary  Ejection fraction is visually estimated at 60%. Overall left ventricular function is normal.   Signature      Findings   Mitral Valve  Trace mitral regurgitation is present. Aortic Valve  The aortic valve was trileaflet with normal thickness and cuspal  separation. DOPPLER: Transaortic velocity was within the normal range with  no evidence of aortic stenosis. There was no evidence of aortic  regurgitation. Tricuspid Valve  Mild tricuspid regurgitation visualized. Pulmonic Valve  The pulmonic valve leaflets exhibited normal thickness, no calcification,  and normal cuspal separation. DOPPLER: The transpulmonic velocity was  within the normal range with no evidence for regurgitation.    Left Atrium  Left alignment. There is no evidence of hardware failure. There has been interval placement of screw plate fixation of distal mildly comminuted femoral fracture. There is no evidence of hardware failure. There is some callus formation at the medial margin of the distal femoral fracture site. There is some bridging trabeculation. Some interval healing of the distal femoral diaphyseal fracture status post ORIF. **This report has been created using voice recognition software. It may contain minor errors which are inherent in voice recognition technology. ** Final report electronically signed by Dr. Verónica Hdez MD on 3/16/2021 10:59 AM    Xr Abdomen (kub) (single Ap View)    Result Date: 3/12/2021  1 view abdomen Comparison:  CR,KOSR  - XR ABDOMEN (KUB) (SINGLE AP VIEW)  - 03/11/2021 02:52 PM EST Findings: Improvement in degree of gaseous bowel distention vs prior. Again noted is retained enteric contrast throughout the colon. Free peritoneal air cannot be accurately assessed for on supine imaging. Visceral shadows without acute pathology. Degenerative spinal changes / scoliosis / hip arthroplasties stable. Improvement in degree of gaseous bowel distention vs prior. This document has been electronically signed by: Carroll Garcia MD on 03/12/2021 03:01 AM    Xr Abdomen (kub) (single Ap View)    Result Date: 3/11/2021  PROCEDURE: XR ABDOMEN (KUB) (SINGLE AP VIEW) CLINICAL INFORMATION: abdominal bloating/distention postoperatively. COMPARISON: Abdominal radiographs 8/11/2016. TECHNIQUE: 2 AP radiographs of the abdomen and pelvis. FINDINGS: There is extensive intra-abdominal free air new from prior chest x-ray 4 hours previously. Contrast is seen within the large bowel with moderate colonic stool burden. Prominent air-filled loops of small bowel are noted in the midline abdomen. Patient status post bilateral hip arthroplasties. Chain sutures project over the pelvis.      Large amount of intra-abdominal air is suspected. Considered noncontrast CT abdomen and pelvis for further evaluation. Dr. Letha Haynes suggested by findings with JENNIFER Quiles at [phone] on 3/11/2021 by telephone. **This report has been created using voice recognition software. It may contain minor errors which are inherent in voice recognition technology. ** Final report electronically signed by Dr. Mariela Walsh on 3/11/2021 3:23 PM    Cta Chest W Wo Contrast    Result Date: 2/28/2021  CT angiogram of the chest. Coronal MIPS were obtained. Comparison 10/19/2016. Findings: Artifact mildly limits the study. No definite PE is identified. No dissection of the thoracic aorta is seen. There is a large hiatal hernia with mild nonspecific dilatation of the esophagus. There is evidence of prior granulomatous disease. There is elevation of the right hemidiaphragm. Small left pleural effusion. There is no focal consolidation. There are patchy areas of mild atelectasis or scarring bilaterally. Indeterminate parenchymal opacity in the left lower lobe could represent developing round atelectasis. The gallbladder is moderately distended. Impression: No definite PE is identified. Large hiatal hernia. Small left pleural effusion. Possible developing round atelectasis left lower lobe is technically indeterminate. Recommend follow-up. This document has been electronically signed by: Sid Baldwin MD on 02/28/2021 02:00 AM All CTs at this facility use dose modulation techniques and iterative reconstructions, and/or weight-based dosing when appropriate to reduce radiation to a low as reasonably achievable. Fl Esophagram    Result Date: 3/2/2021  PROCEDURE: FL ESOPHAGRAM CLINICAL INFORMATION: Dysphasia TECHNIQUE: Following the oral administration of thin barium, the anatomy of the esophagus was evaluated in a limited esophagram. 10 images were obtained. Total fluoroscopy time 1.0 minutes. COMPARISON: None FINDINGS: Evaluation is limited due to patient's lack of mobility. Swallowing was not evaluated. There are no strictures or extrinsic abnormalities in the esophagus. No mucosal lesions are identified. There is a large fixed hiatal hernia containing approximately two thirds of the esophagus. There is organoaxial gastric volvulus within the hernia. No gastric obstruction is identified. Large fixed hiatal hernia with organoaxial gastric volvulus. Final report electronically signed by Dr. Leann Hagan on 3/2/2021 10:02 AM    Xr Chest Portable    Result Date: 3/13/2021  PROCEDURE: XR CHEST PORTABLE CLINICAL INFORMATION: Cough. Dyspnea. COMPARISON: 3/12/2021 TECHNIQUE: A single mobile view of the chest was obtained. 1. Very poor inflation of lungs. The cardiac silhouette is obscured. Left shoulder prosthesis. 2. Small bilateral pleural effusions. Moderate left basilar atelectasis/pneumonia, no appreciable change from yesterday. Suggestion of mild atelectasis/pneumonia along right hemidiaphragm, not present on prior study. 3. Overall appearance of chest has slightly worsened since prior. **This report has been created using voice recognition software. It may contain minor errors which are inherent in voice recognition technology. ** Final report electronically signed by Dr. Keisha Remy on 3/13/2021 3:48 PM    Xr Chest Portable    Result Date: 3/12/2021  1 view of the chest. COMPARISON: Single view of the chest dated 3/11/2021 FINDINGS: Left reverse shoulder prosthesis. Low lung volumes. Stable heart size. Atherosclerotic thoracic aorta. Left hemidiaphragm and retrocardiac region are obscured. Minimal consolidation along the right lung base. Likely small left pleural effusion. This appears similar to prior imaging. Right lung is clear. No pneumothorax. No displaced fracture. Frakes left curvature of the upper lumbar spine. Moderate spondylosis. 1.  No significant interval change from prior imaging dated 3/11/2021. This document has been electronically signed by:  Gautam Noe Graves MD on 03/12/2021 02:57 AM    Xr Chest Portable    Result Date: 3/11/2021  PROCEDURE: XR CHEST PORTABLE CLINICAL INFORMATION: wheezing COMPARISON: 2/27/2021 TECHNIQUE: A single mobile view of the chest was obtained. 1. Very poor inflation of lungs. . Cardiac silhouette is obscured by the hemidiaphragms. A shoulder prosthesis. 2. Bibasilar atelectasis/pneumonia, worse on the left. Overall appearance of chest slightly worse than prior. **This report has been created using voice recognition software. It may contain minor errors which are inherent in voice recognition technology. ** Final report electronically signed by Dr. Elza Henao on 3/11/2021 12:03 PM    Xr Chest Portable    Result Date: 2/27/2021  1 view chest x-ray Comparison:  ORLINSR  - XR CHEST PORTABLE  - 12/20/2020 11:48 PM EST Findings: New patchy opacity in the left midlung with laboratory atelectasis or airspace disease. No pneumothorax. Stable nodular density in the right midlung. Right infrahilar atelectasis. Normal size heart. No pulmonary vascular congestion. Calcification in the aortic knob. Intact left shoulder prosthesis. New patchy left midlung opacity which may reflect underlying infiltrate. This document has been electronically signed by: Theo Baird MD on 02/27/2021 11:47 PM    Xr Chest 1 View    Result Date: 3/16/2021  PROCEDURE: XR CHEST 1 VIEW CLINICAL INFORMATION: follow up imaging. COMPARISON: 3/15/2021. TECHNIQUE: AP upright view of the chest. FINDINGS: There are persistent linear/bandlike bibasilar opacities with volume loss. There is stable blunting of the left lateral costophrenic angle. There are stable calcified nodules at the right lung base and left perihilar region as evidence for old granulomatous disease. The lungs otherwise appear clear. There is a prominent hiatal hernia which appears more pronounced compared to prior exam. Redemonstration of left shoulder arthroplasty.  There are stable degenerative changes of the spine. Stable bibasilar atelectasis. **This report has been created using voice recognition software. It may contain minor errors which are inherent in voice recognition technology. ** Final report electronically signed by Dr. Ashlyn Mtz MD on 3/16/2021 10:57 AM    Vl Dup Carotid Bilateral    Result Date: 2/28/2021  PROCEDURE: VL DUP CAROTID BILATERAL CLINICAL INFORMATION: Presyncope symptoms. COMPARISON: No prior study. TECHNIQUE: Multiple grayscale and color flow images of both carotid systems and both vertebral arteries were obtained. Spectral Doppler waveforms were generated and velocity measurements were obtained. RIGHT PSV/EDV DIST CCA-------->108/21cm/s PROX ICA-------->63/15cm/s ECA---------------->95/0cm/s VERT-------------->58/14cm/s LEFT PSV/EDV DIST CCA-------->106/17cm/s PROX ICA-------->83/20cm/s ECA---------------->89/2cm/s VERT-------------->67/14cm/s     1. RIGHT ICA . Alyssa Linger Alyssa Linger Minimal soft plaque, no appreciable stenosis. .. 2. RIGHT ECA. . Minimal soft plaque, no appreciable stenosis. ..... Alyssa Linger RIGHT CCA. Alyssa Linger Unremarkable . Alyssa Linger 3. RIGHT VERTEBRAL. Alyssa Linger Antegrade flow . .. ... LEFT VERTEBRAL. .. Antegrade flow. .. 4. LEFT ICA. .... Minimal soft plaque, no appreciable stenosis. .. 5. LEFT ECA. .. Unremarkable. .... LEFT CCA. .. Unremarkable. **This report has been created using voice recognition software. It may contain minor errors which are inherent in voice recognition technology. ** Final report electronically signed by Dr. Alycia Pantoja on 2/28/2021 9:30 AM    Us Thoracentesis Which Side Should The Procedure Be Performed? Left    Result Date: 2/28/2021  ATTEMPTED THORACENTESIS WITH ULTRASOUND GUIDANCE: PERFORMED BY: Marbin Elkins M.D. CLINICAL INFORMATION: Pleural effusion left side. APPROACH: Left side posteriorly and inferiorly . A few permanent sonographic images were obtained during procedure for documentation. FLUID WITHDRAWN: None.  ESTIMATED BLOOD LOSS: Minimal PROCEDURE: Signed informed consent was obtained prior to performing this procedure. The thorax was initially evaluated sonographically to determine appropriate puncture site. The skin was marked, prepped, and draped in a sterile fashion. The pleural effusions very small. Following local anesthesia and utilizing aseptic technique, one pass of a 5 Faro[place] one-step catheter was made on the left side. No fluid was obtained. Unfortunately, the patient did not tolerate the initial puncture and became nauseated, cold, clammy. Due to this, no additional attempts were made. 1. Attempted thoracentesis left side. This was unsuccessful due to combination of a very small effusion and the patient's inability to tolerate the needle puncture. 2. If desired, a repeat attempt at thoracentesis can be performed tomorrow with CT guidance. **This report has been created using voice recognition software. It may contain minor errors which are inherent in voice recognition technology. ** Final report electronically signed by Dr. Ortiz Brewster on 2/28/2021 9:33 AM      ASSESSMENT:Active Problems:    Paraesophageal hernia    S/P repair of paraesophageal hernia  Resolved Problems:    * No resolved hospital problems. *     S/p ORIF left femur for supracondylar femur fracture. PLAN as discussed with Dr. Hugh Alanis:  X-rays show some bridging and callus. She is 3 months out from surgery. Overall doing well from an ortho standpoint and maintaining TTWB. At this time, I believe the bone has enough callus to begin WBAT with a walker. -WBAT LLE  -PT/OT to assist  -multi-modal pain management  -FU at [facility] with Dr. Hugh Alanis in 4 weeks.      Electronically signed by FLORIDA Arauz CNP on 3/16/2021 at 12:57 PM

## 2021-03-16 NOTE — FLOWSHEET NOTE
Pt was in bed at the time of the visit. She was dealing with paraesophageal hemia. When asked if I should pray for her, she said that her  had already prayed for her. She was not giving up.      03/16/21 7953   Encounter Summary   Services provided to: Patient and family together   Referral/Consult From: Etienne Solano Visiting Yes  (3/16 )   Complexity of Encounter Low   Length of Encounter 15 minutes   Routine   Type Follow up   Assessment Approachable;Calm   Intervention Nurtured hope; Active listening;Empowerment;Sustaining presence/ Ministry of presence   Outcome Acceptance;Expressed gratitude;Encouraged; Hopeful

## 2021-03-16 NOTE — PROGRESS NOTES
[facility] - Dr. Eloisa Grullon  Postoperative Progress Note    Pt Name: Ever Gunter  Medical Record Number: [id number]  Date of Birth 1940   Today's Date: 3/16/2021    ASSESSMENT   1. POD # 7 Status post robotic extensive lysis of adhesions and robotic reduction of large paraesophageal/hiatal hernia and gastric volvulus with gastropexy/G-tube insertion  2. Postoperative adynamic ileus - resolved   3. Recent left leg fracture  4. Hyponatremia  5. Hypokalemia   6. Hypertension  7. Bilateral atelectasis/pneumonia - left worse than right    has a past medical history of Abnormal EKG, Anemia, Back pain, Blood circulation, collateral, Diverticulitis, Fibromyalgia, GERD (gastroesophageal reflux disease), Hiatal hernia, Hypertension, Hypothyroidism, and Osteoarthritis. PLAN   1. Tolerating soft diet. Bowel function returned. 2. Keep G-tube clamped  3. Timmons out and urinating spontaneously   4. Up to [facility] with assist - she can pivot and weight bearing on right leg only. PT/OT. Will touch base with Dr. Carolyn Weiss office as she has an appointment tomorrow. 5. Hydralazine PRN. Pressures are up and down but better this morning. 6. Agressive pulmonary toileting. IS, C&DB, and breathing tx. Repeat chest x-ray today. 7. Antibiotics for pneumonia  8. GI & DVT prophylaxis  9. Pain control - limit narcotic use. Toradol ordered. 10. Labs reviewed. K+ was never replaced yesterday even after order was replaced. Discussed with nurse today and will repeat this afternoon. 11. Stable for discharge but needs assistance with transferring. TCU/Rehab consult in place and if no beds may go to outside skilled facility. Awaiting discharge planning. SUBJECTIVE   Patient stable. Chart reviewed. Updated by nursing staff. No fevers. Blood pressures better this morning. Patient having bowel movements. Feeling good. No nausea or vomiting. Abdomen soft and non-distended. No urinary complaints. non-distended, softer, non-tender, bowel sounds hypoactive, G-tube in place without bleeding   INCISION: healing well, no significant drainage, no significant erythema  EXTERMITY: no cyanosis, clubbing or edema    In: 250 [P.O.:250]  Out: -   Date 03/16/21 0000 - 03/16/21 2359   Shift 6268-1160 3152-4060 5874-6791 24 Hour Total   INTAKE   P.O. 100   100   Shift Total(mL/kg) 100(1.4)   100(1.4)   OUTPUT   Shift Total(mL/kg)       Weight (kg) 72.1 72.1 72.1 72.1     LABS     Recent Labs     03/15/21  0515 03/16/21  0552   WBC 5.6  --    HGB 12.7  --    HCT 39.5  --      --    * 133*   K 3.0* 3.1*   CL 97* 95*   CO2 28 25   BUN 6* 5*   CREATININE 0.5 0.4   CALCIUM 8.5 8.8      RADIOLOGY   No new imaging    Electronically signed by FLORIDA Sanders - CNP on 3/16/2021 at 9:25 AM     Patient seen and examined independently by me. Above discussed and I agree with JEREMIAH Joel. See my additional comments below for updated orders and plan. Labs, cultures, and radiographs where available were reviewed. I discussed patient concerns with the patient's nurse and instructions were given. Please see our orders for the updated patient care plan. -Tolerating soft diet. Bowel function returned. Keep G-tube clamped. Urinating spontaneously. PT/OT. Orthopedic service seeing patient today. Reimaging hip. GI and DVT prophylaxis. Antibiotics for pneumonia. Stable from a surgical standpoint and ready for discharge. Social service now following. TCU/rehab consultations. If unable to be placed then may need ECF. Patient in agreement.     Electronically signed by Ann Lund MD on 3/16/2021 at 2:03 PM

## 2021-03-17 LAB
ANION GAP SERPL CALCULATED.3IONS-SCNC: 9 MEQ/L (ref 8–16)
BUN BLDV-MCNC: 7 MG/DL (ref 7–22)
CALCIUM SERPL-MCNC: 8.9 MG/DL (ref 8.5–10.5)
CHLORIDE BLD-SCNC: 96 MEQ/L (ref 98–111)
CO2: 29 MEQ/L (ref 23–33)
CREAT SERPL-MCNC: 0.5 MG/DL (ref 0.4–1.2)
GFR SERPL CREATININE-BSD FRML MDRD: > 90 ML/MIN/1.73M2
GLUCOSE BLD-MCNC: 95 MG/DL (ref 70–108)
POTASSIUM SERPL-SCNC: 3.4 MEQ/L (ref 3.5–5.2)
SODIUM BLD-SCNC: 134 MEQ/L (ref 135–145)

## 2021-03-17 PROCEDURE — 36415 COLL VENOUS BLD VENIPUNCTURE: CPT

## 2021-03-17 PROCEDURE — 6360000002 HC RX W HCPCS: Performed by: SURGERY

## 2021-03-17 PROCEDURE — 6370000000 HC RX 637 (ALT 250 FOR IP): Performed by: SURGERY

## 2021-03-17 PROCEDURE — 97110 THERAPEUTIC EXERCISES: CPT

## 2021-03-17 PROCEDURE — APPSS45 APP SPLIT SHARED TIME 31-45 MINUTES: Performed by: NURSE PRACTITIONER

## 2021-03-17 PROCEDURE — 2500000003 HC RX 250 WO HCPCS: Performed by: SURGERY

## 2021-03-17 PROCEDURE — 99024 POSTOP FOLLOW-UP VISIT: CPT | Performed by: NURSE PRACTITIONER

## 2021-03-17 PROCEDURE — 6360000002 HC RX W HCPCS: Performed by: NURSE PRACTITIONER

## 2021-03-17 PROCEDURE — 2580000003 HC RX 258: Performed by: SURGERY

## 2021-03-17 PROCEDURE — 80048 BASIC METABOLIC PNL TOTAL CA: CPT

## 2021-03-17 PROCEDURE — 97116 GAIT TRAINING THERAPY: CPT

## 2021-03-17 PROCEDURE — 6370000000 HC RX 637 (ALT 250 FOR IP): Performed by: NURSE PRACTITIONER

## 2021-03-17 PROCEDURE — 1200000003 HC TELEMETRY R&B

## 2021-03-17 PROCEDURE — 97530 THERAPEUTIC ACTIVITIES: CPT

## 2021-03-17 RX ORDER — ENALAPRIL MALEATE 10 MG/1
10 TABLET ORAL DAILY
Status: DISCONTINUED | OUTPATIENT
Start: 2021-03-18 | End: 2021-03-17

## 2021-03-17 RX ORDER — ENALAPRIL MALEATE 5 MG/1
5 TABLET ORAL ONCE
Status: COMPLETED | OUTPATIENT
Start: 2021-03-17 | End: 2021-03-17

## 2021-03-17 RX ORDER — POLYETHYLENE GLYCOL 3350 17 G/17G
8 POWDER, FOR SOLUTION ORAL DAILY
Status: DISCONTINUED | OUTPATIENT
Start: 2021-03-18 | End: 2021-03-19 | Stop reason: HOSPADM

## 2021-03-17 RX ORDER — ENALAPRIL MALEATE 10 MG/1
10 TABLET ORAL NIGHTLY
Status: DISCONTINUED | OUTPATIENT
Start: 2021-03-18 | End: 2021-03-19 | Stop reason: HOSPADM

## 2021-03-17 RX ADMIN — SODIUM CHLORIDE, PRESERVATIVE FREE 10 ML: 5 INJECTION INTRAVENOUS at 19:41

## 2021-03-17 RX ADMIN — FAMOTIDINE 20 MG: 10 INJECTION, SOLUTION INTRAVENOUS at 19:41

## 2021-03-17 RX ADMIN — KETOROLAC TROMETHAMINE 15 MG: 30 INJECTION, SOLUTION INTRAMUSCULAR; INTRAVENOUS at 19:41

## 2021-03-17 RX ADMIN — PREGABALIN 150 MG: 75 CAPSULE ORAL at 09:12

## 2021-03-17 RX ADMIN — LEVOTHYROXINE SODIUM 100 MCG: 100 TABLET ORAL at 05:25

## 2021-03-17 RX ADMIN — POTASSIUM CHLORIDE 40 MEQ: 1500 TABLET, EXTENDED RELEASE ORAL at 14:42

## 2021-03-17 RX ADMIN — ENOXAPARIN SODIUM 40 MG: 40 INJECTION SUBCUTANEOUS at 09:10

## 2021-03-17 RX ADMIN — DULOXETINE HYDROCHLORIDE 60 MG: 60 CAPSULE, DELAYED RELEASE ORAL at 09:04

## 2021-03-17 RX ADMIN — ENALAPRIL MALEATE 5 MG: 5 TABLET ORAL at 14:43

## 2021-03-17 RX ADMIN — SODIUM CHLORIDE, PRESERVATIVE FREE 10 ML: 5 INJECTION INTRAVENOUS at 09:04

## 2021-03-17 RX ADMIN — ENALAPRIL MALEATE 5 MG: 5 TABLET ORAL at 09:04

## 2021-03-17 RX ADMIN — METOPROLOL SUCCINATE 50 MG: 25 TABLET, FILM COATED, EXTENDED RELEASE ORAL at 09:04

## 2021-03-17 RX ADMIN — PREGABALIN 150 MG: 75 CAPSULE ORAL at 19:41

## 2021-03-17 RX ADMIN — FAMOTIDINE 20 MG: 10 INJECTION, SOLUTION INTRAVENOUS at 09:10

## 2021-03-17 ASSESSMENT — PAIN DESCRIPTION - PROGRESSION
CLINICAL_PROGRESSION: GRADUALLY WORSENING
CLINICAL_PROGRESSION: GRADUALLY WORSENING

## 2021-03-17 ASSESSMENT — PAIN SCALES - WONG BAKER: WONGBAKER_NUMERICALRESPONSE: 4

## 2021-03-17 ASSESSMENT — PAIN SCALES - GENERAL
PAINLEVEL_OUTOF10: 5
PAINLEVEL_OUTOF10: 6

## 2021-03-17 ASSESSMENT — PAIN DESCRIPTION - ORIENTATION: ORIENTATION: LEFT

## 2021-03-17 ASSESSMENT — PAIN DESCRIPTION - FREQUENCY: FREQUENCY: INTERMITTENT

## 2021-03-17 ASSESSMENT — PAIN DESCRIPTION - PAIN TYPE: TYPE: SURGICAL PAIN

## 2021-03-17 NOTE — DISCHARGE INSTR - COC
Hypomagnesemia E83.42    Dysphagia R13.10    Pneumonia of left lower lobe due to infectious organism J18.9    Bilateral lower extremity edema R60.0    Hx of fracture of leg Z87.81    Lung nodule R91.1    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    S/P repair of paraesophageal hernia Z98.890, Z87.19       Isolation/Infection:   Isolation            Contact          Patient Infection Status       Infection Onset Added Last Indicated Last Indicated By Review Planned Expiration Resolved Resolved By    VRE  10/05/16 10/05/16 Stuart Palacios RN        MRSA  10/03/16 10/03/16 Stuart Palacios RN        Resolved    COVID-19 Rule Out 21 COVID-19, Rapid (Ordered)   21 Rule-Out Test Resulted    COVID-19 Rule Out 20 COVID-19 (Ordered)   20 Rule-Out Test Resulted    COVID-19 Rule Out 10/06/20 10/06/20 10/06/20 Covid-19 Ambulatory (Ordered)   10/07/20 Rule-Out Test Resulted            Nurse Assessment:  Last Vital Signs: BP (!) 151/95   Pulse 85   Temp 97.4 °F (36.3 °C) (Axillary)   Resp 16   Ht 5' 7\" (1.702 m)   Wt 159 lb 4.8 oz (72.3 kg)   SpO2 97%   BMI 24.95 kg/m²     Last documented pain score (0-10 scale): Pain Level: 2  Last Weight:   Wt Readings from Last 1 Encounters:   21 159 lb 4.8 oz (72.3 kg)     Mental Status:  {IP PT MENTAL STATUS:45619}    IV Access:  { OSCAR IV ACCESS:740448657}    Nursing Mobility/ADLs:  Walking   {[facility] DME RBBN:043228282}  Transfer  {[facility] DME ZGFP:604488330}  Bathing  {[facility] DME TBUR:958027011}  Dressing  {[facility] DME PTKW:843106075}  Toileting  {[facility] DME FZDT:306763767}  Feeding  {[facility] DME XXBK:033778949}  Med Admin  {[facility] VHWS:010958889}  Med Delivery   { OSCAR MED Delivery:828423306}    Wound Care Documentation and Therapy:        Elimination:  Continence:    Bowel: {YES / DW:42004}  Bladder: {YES / G}  Urinary Catheter: {Urinary Catheter:577687636}   Colostomy/Ileostomy/Ileal Conduit: {YES / XS:54122}       Date of Last BM: Potential (if transferring to Rehab): {Prognosis:8277786719}    Recommended Labs or Other Treatments After Discharge: ***    Physician Certification: I certify the above information and transfer of Javier Handy Height  is necessary for the continuing treatment of the diagnosis listed and that she requires {Admit to Appropriate Level of Care:10775} for {GREATER/LESS:946633890} 30 days.      Update Admission H&P: {CHP DME Changes in O:572247622}    PHYSICIAN SIGNATURE:  Electronically signed by Jazzy Mercado MD on 3/18/2021 at 1:31 PM

## 2021-03-17 NOTE — PROGRESS NOTES
[facility]  [facility] Note    Pt Name: Dominick Gunter  Medical Record Number: [id number]  Date of Birth 1940   Today's Date: 3/17/2021    ASSESSMENT   1. POD # 8 Status post robotic extensive lysis of adhesions and robotic reduction of large paraesophageal/hiatal hernia and gastric volvulus with gastropexy/G-tube insertion  2. Postoperative adynamic ileus - resolved   3. Recent left femur fracture  4. Hyponatremia  5. Hypokalemia   6. Hypertension  7. Bilateral atelectasis/pneumonia - left worse than right - improved  8. Hypertension    has a past medical history of Abnormal EKG, Anemia, Back pain, Blood circulation, collateral, Diverticulitis, Fibromyalgia, GERD (gastroesophageal reflux disease), Hiatal hernia, Hypertension, Hypothyroidism, and Osteoarthritis. PLAN   1. Tolerating soft diet. Bowel function returned. 2. Start miralax daily as home regimen   3. Keep G-tube clamped  4. Appreciate orthopedic seeing patient. Increase activity per their recommendations. 5. BP medications adjusted. Continue to monitor. 6. Agressive pulmonary toileting. IS, C&DB. 7. Antibiotics completed   8. GI & DVT prophylaxis  9. Pain controlled  10. Labs reviewed. K+ improving. Replace again today. Repeat BMP in am.   11. Stable for discharge but awaiting ECF percert. Okay with discharge when that is back. SUBJECTIVE   Patient stable. Chart reviewed. Updated by nursing staff. No fevers. Blood pressure fair but still elevated. Adjusted BP medications. Patient having bowel movements. Feeling good. No nausea or vomiting. Abdomen soft and softly distended. No urinary complaints. Denies chest pain or shortness of breath. No lightheadedness or dizziness. Tolerating soft diet without issues. Up with assistance - up with walker. Incisions are clean, dry and intact.    CURRENT MEDICATIONS   Scheduled Meds:   potassium bicarb-citric acid  40 mEq Oral Once  enalapril  5 mg Oral Daily    pregabalin  150 mg Oral BID    metoprolol succinate  50 mg Oral Daily    levothyroxine  100 mcg Oral Daily    DULoxetine  60 mg Oral Daily    sodium chloride flush  10 mL Intravenous 2 times per day    enoxaparin  40 mg Subcutaneous Q24H    famotidine (PEPCID) injection  20 mg Intravenous BID     Continuous Infusions:    PRN Meds:.ketorolac, potassium chloride **OR** potassium alternative oral replacement **OR** potassium chloride, hydrALAZINE, albuterol, polyethylene glycol, sodium chloride flush, promethazine **OR** ondansetron, HYDROcodone 5 mg - acetaminophen **OR** HYDROcodone 5 mg - acetaminophen  OBJECTIVE   CURRENT VITALS:  height is 5' 7\" (1.702 m) and weight is 159 lb 4.8 oz (72.3 kg). Her axillary temperature is 97.4 °F (36.3 °C). Her blood pressure is 151/95 (abnormal) and her pulse is 85. Her respiration is 16 and oxygen saturation is 97%.    Temperature Range (24h):Temp: 97.4 °F (36.3 °C) Temp  Av.6 °F (36.4 °C)  Min: 97.2 °F (36.2 °C)  Max: 98 °F (36.7 °C)  BP Range (57L): Systolic (23ZKI), HWU:985 , Min:141 , IAW:802     Diastolic (31KQL), MLO:56, Min:67, Max:95    Pulse Range (24h): Pulse  Av.5  Min: 67  Max: 85  Respiration Range (24h): Resp  Av.5  Min: 16  Max: 18  Current Pulse Ox (24h):  SpO2: 97 %  Pulse Ox Range (24h):  SpO2  Av %  Min: 96 %  Max: 98 %  Oxygen Amount and Delivery: O2 Flow Rate (L/min): 0.5 L/min  Incentive Spirometry Tx:            GENERAL: alert, no distress  LUNGS: lungs diminished but clear  HEART: normal rate and regular rhythm  ABDOMEN: softly distended, non-tender, bowel sounds hypoactive, G-tube in place without bleeding   INCISION: healing well, no significant drainage, no significant erythema  EXTERMITY: no cyanosis, clubbing or edema    In: 660 [P.O.:660]  Out: 600 [Urine:600]  Date 21 0000 - 21 2359   Shift 4379-7126 9464-4336 1152-8592 24 Hour Total   INTAKE   P.O. 120   120   Shift Total(mL/kg) 120(1.7)   120(1.7)   OUTPUT   Urine(mL/kg/hr) 600(1)   600   Shift Total(mL/kg) 600(8.3)   600(8.3)   Weight (kg) 72.3 72.3 72.3 72.3     LABS     Recent Labs     03/15/21  0515 03/16/21  0552 03/16/21  1409 03/17/21  0554   WBC 5.6  --   --   --    HGB 12.7  --   --   --    HCT 39.5  --   --   --      --   --   --    * 133*  --  134*   K 3.0* 3.1* 3.2* 3.4*   CL 97* 95*  --  96*   CO2 28 25  --  29   BUN 6* 5*  --  7   CREATININE 0.5 0.4  --  0.5   CALCIUM 8.5 8.8  --  8.9      RADIOLOGY     PROCEDURE: XR CHEST 1 VIEW       CLINICAL INFORMATION: follow up imaging.       COMPARISON: 3/15/2021.       TECHNIQUE: AP upright view of the chest.       FINDINGS:   There are persistent linear/bandlike bibasilar opacities with volume loss. There is stable blunting of the left lateral costophrenic angle. There are stable calcified nodules at the right lung base and left perihilar region as evidence for old    granulomatous disease. The lungs otherwise appear clear. There is a prominent hiatal hernia which appears more pronounced compared to prior exam. Redemonstration of left shoulder arthroplasty. There are stable degenerative changes of the spine.           Impression   Stable bibasilar atelectasis.                 **This report has been created using voice recognition software. It may contain minor errors which are inherent in voice recognition technology. **       Final report electronically signed by Dr. Rufino Murdock MD on 3/16/2021 10:57 AM     PROCEDURE: XR FEMUR LEFT (MIN 2 VIEWS)       CLINICAL INFORMATION: follow up imaging - distal left femur fracture .       COMPARISON: 12/20/2020.       TECHNIQUE: AP and lateral views of the left femur.        FINDINGS: Redemonstration of left hip arthroplasty and left knee arthroplasty with appropriate alignment. There is no evidence of hardware failure.  There has been interval placement of screw plate fixation of distal mildly comminuted femoral fracture. There is no evidence of hardware failure. There is some callus formation at the medial margin of the distal femoral fracture site. There is some bridging trabeculation.           Impression    Some interval healing of the distal femoral diaphyseal fracture status post ORIF.                 **This report has been created using voice recognition software. It may contain minor errors which are inherent in voice recognition technology. **       Final report electronically signed by Dr. Anatoliy Chatman MD on 3/16/2021 10:59 AM     Electronically signed by FLORIDA Funez CNP on 3/17/2021 at 11:19 AM

## 2021-03-17 NOTE — CARE COORDINATION
3/17/21, 9:10 AM EDT    DISCHARGE PLANNING EVALUATION    Spoke with patient re: discharge plans. She confirmed that plan is now for SNF. Her choices are [facility] and [facility] as she been at both of them in the past.     Call to Amy at [facility] for him to contact SW. Call to Bobby at [facility] have female beds available. They are familiar with patient. Advised her that SW is also waiting to hear on beds at [facility]. They will review patient information and get back with SW.     Update 11:14 am-received call from Amy at [facility]-anticipate bed availability towards the end of the week. Referral information provided. [facility]. 1:39 PM Spoke with Amy at [facility] are unable to accept patient. Spoke with patient and advised her of outcome of referrals-she is agreeable to [facility]. Spoke with  Bobby at  [facility] and advised her that they can start pre cert.

## 2021-03-17 NOTE — PLAN OF CARE
Problem: Falls - Risk of:  Goal: Will remain free from falls  Description: Will remain free from falls  3/16/2021 2327 by Anisa De La Rosa RN  Outcome: Ongoing  Note: No falls this shift. Bed alarm, nonskid socks, and call light utilized. At risk due to recent fracture of left leg. Problem: Pain:  Goal: Pain level will decrease  Description: Pain level will decrease  3/16/2021 2327 by Anisa De La Rosa RN  Outcome: Ongoing  Note: Pt states pain is 3/10. Pt pain goal 2/10. Rest and reposition provided to decrease pain. Will continue ongoing assessment of pain. Problem: Pain:  Goal: Control of acute pain  Description: Control of acute pain  3/16/2021 2327 by Anisa De La Rosa RN  Outcome: Ongoing     Problem: Pain:  Goal: Control of chronic pain  Description: Control of chronic pain  3/16/2021 2327 by Anisa De La Rosa RN  Outcome: Ongoing     Problem: Discharge Planning:  Goal: Discharged to appropriate level of care  Description: Discharged to appropriate level of care  3/16/2021 2327 by Anisa De La Rosa RN  Outcome: Ongoing  Note: Discharge plans to [place] [address] or [facility] for therapy. Precerts to be started. Discussed with pt. Problem: Skin Integrity:  Goal: Will show no infection signs and symptoms  Description: Will show no infection signs and symptoms  3/16/2021 2327 by Anisa De La Rosa RN  Outcome: Ongoing     Problem: Skin Integrity:  Goal: Absence of new skin breakdown  Description: Absence of new skin breakdown  3/16/2021 2327 by Anisa De La Rosa RN  Outcome: Ongoing  Note: No new skin breakdown. Pt turned and repositioned. Will continue on going skin assessment. Problem: Bowel/Gastric:  Goal: Control of bowel function will improve  Description: Control of bowel function will improve  Outcome: Ongoing  Note: Bowel sounds active in all four quadrants. Pt denies nausea or vomiting at this time. Problem:  Bowel/Gastric:  Goal: Ability to achieve a regular elimination pattern will improve  Description: Ability to achieve a regular elimination pattern will improve  Outcome: Ongoing     Care plan reviewed with patient. Patient verbalize understanding of the plan of care and contribute to goal setting.

## 2021-03-17 NOTE — PROGRESS NOTES
[facility]  INPATIENT PHYSICAL THERAPY  DAILY NOTE  [facility] ONC MED 5K - 5K-020-A     Time in: 5684  Time Out: 1430  Timed Code Treatment Minutes: 45 minutes          Date: 3/18/2021  Patient Name: Ras Gunter,  Gender:  female        MRN: [id number]  : 1940  (78 y.o.)     Referring Practitioner: Ana Rosa Silva MD  Diagnosis: Paraesophageal hernia  Additional Pertinent Hx: Per H&P: \"Pt has a history of hiatal hernia. She has difficulty with swallowing dry foods and had an episode of food getting stuck distally. She underwent lysis of adhesions and reduction of hiatal hernia on 3/9/21. \" Recent L femur ORIF on 2020. Prior Level of Function:  Lives With: Spouse, Daughter, Other (comment)(at her daughter's home with the spouse and mother of the son-in law)  Type of Home: House  Home Layout: Two level, Laundry in basement, Able to Live on Main level with bedroom/bathroom  Home Equipment: Rolling walker, [employer], 4 wheeled walker   Bathroom Shower/Tub: Walk-in shower  Bathroom Toilet: Handicap height  Bathroom Equipment: Toilet raiser  Bathroom Accessibility: Accessible    Receives Help From: Family  ADL Assistance: Needs assistance  Homemaking Assistance: Needs assistance  Homemaking Responsibilities: Yes  Transfer Assistance: Needs assistance  Active : No  Additional Comments: Pt has been doing stand- pivot transfers with a rolling walker and assist of her . She does her self care with help for shower transfers. Restrictions/Precautions:  Restrictions/Precautions: Fall Risk, Isolation, Weight Bearing  Left Lower Extremity Weight Bearing: Weight Bearing As Tolerated(I believe the bone has enough callus to begin WBAT with a walker. Per [facility] Note 3/17/2021)  Position Activity Restriction  Other position/activity restrictions: .---WBAT 3/17/2021     SUBJECTIVE: RN approved session. Pt in bed upon arrival. Pt agrees to session.  Noticed pt board saying L WBAT instead of TTWB, looked on Orthopedics Dr note and found to be correct. Extra time taken in room for education. PAIN: Not rated, back    Vitals: Vitals not assessed per clinical judgement, see nursing flowsheet    OBJECTIVE:  Bed Mobility:  Supine to Sit: Supervision, with increased time for completion  Scooting: Supervision, with increased time for completion    Transfers:  Sit to Stand: Stand By Assistance, [employer], with increased time for completion, cues for hand placement, with verbal cues. Cues to push off bed  Stand to Sit:Stand By Assistance, [employer], with increased time for completion, cues for hand placement, with verbal cues. Cues to align walker to chair and reach back for chair. Pt completed medial/lateral weight shifting while standing ~3 min to prepare L LE to provide support for new weight bearing status for improved gait. Pt stated applying almost all weight onto L LE with no pain. Stated \" felt weird. CGA with cues for posture    Ambulation:  Contact Guard Assistance, with cues for safety, with verbal cues , with increased time for completion  Distance: 20'x1  Surface: Level Tile  Device:Rolling Walker  Gait Deviations: Forward Flexed Posture, Slow [place], Decreased Step Length on Left, Decreased Weight Shift Left, Lean to Right, Decreased Gait Speed, Decreased Heel Strike Bilaterally and Unsteady Gait  Pt moves at very slow pace throughout. Cues for posture, slow pace, and proximity to walker. Pt applied weight to L LE for pre gait activity however applied less once ambulating. Pt fatigued and became shaky and weak at end of ambulation. Balance:  Not Tested    Exercise:  Patient was guided in 1 set(s) 10 reps of exercise to both lower extremities. Ankle pumps, Glut sets, Quad sets, Heelslides, Hip abduction/adduction and Straight leg raises. Exercises were completed for increased independence with functional mobility.     Functional Outcome Measures: Completed       ASSESSMENT:  Assessment: Patient progressing toward established goals. Activity Tolerance:  Patient tolerance of  treatment: good. Pt tolerated session well. Pt oriented on new weight bearing status. Pt demonstrated decreased balance. Posture, and decreased strength due to fatigue and deconditioning. Pt tolerated new WB status well when weight shifting and provided more balance for gait. Equipment Recommendations:Equipment Needed: No  Discharge Recommendations:  [address] with continued therapy for improved functional mobility. Recommended to ensure safety. Plan: Times per week: 5xGM  Current Treatment Recommendations: Strengthening, Neuromuscular Re-education, Home Exercise Program, Safety Education & Training, Balance Training, Endurance Training, Patient/Caregiver Education & Training, Functional Mobility Training, Wheelchair Mobility Training, Equipment Evaluation, Education, & procurement, Transfer Training, Gait Training, Stair training    Patient Education  Patient Education: Plan of Care, [employer], Transfers, Gait, Use of Gait [place], Verbal Exercise Instruction     Goals:  Patient goals : To go home  Short term goals  Time Frame for Short term goals: by discharge  Short term goal 1: transfer supine<>sit with supervision for ease of bed mobility. Short term goal 2: transfer sit<>stand with SBA to safely get in/out of bed/chair. Short term goal 3: ambulate >50 feet with RW with CGA while maintaining WBAT on L to safely ambulate throughout home. Long term goals  Time Frame for Long term goals : N/A due to short ELOS. Following session, patient left in safe position with all fall risk precautions in place.

## 2021-03-18 LAB
ANION GAP SERPL CALCULATED.3IONS-SCNC: 7 MEQ/L (ref 8–16)
BUN BLDV-MCNC: 7 MG/DL (ref 7–22)
CALCIUM SERPL-MCNC: 8.7 MG/DL (ref 8.5–10.5)
CHLORIDE BLD-SCNC: 97 MEQ/L (ref 98–111)
CO2: 29 MEQ/L (ref 23–33)
CREAT SERPL-MCNC: 0.6 MG/DL (ref 0.4–1.2)
GFR SERPL CREATININE-BSD FRML MDRD: > 90 ML/MIN/1.73M2
GLUCOSE BLD-MCNC: 93 MG/DL (ref 70–108)
POTASSIUM SERPL-SCNC: 3.8 MEQ/L (ref 3.5–5.2)
SODIUM BLD-SCNC: 133 MEQ/L (ref 135–145)

## 2021-03-18 PROCEDURE — 6360000002 HC RX W HCPCS: Performed by: SURGERY

## 2021-03-18 PROCEDURE — 6360000002 HC RX W HCPCS: Performed by: NURSE PRACTITIONER

## 2021-03-18 PROCEDURE — 2500000003 HC RX 250 WO HCPCS: Performed by: SURGERY

## 2021-03-18 PROCEDURE — 1200000003 HC TELEMETRY R&B

## 2021-03-18 PROCEDURE — 2580000003 HC RX 258: Performed by: SURGERY

## 2021-03-18 PROCEDURE — 6370000000 HC RX 637 (ALT 250 FOR IP): Performed by: SURGERY

## 2021-03-18 PROCEDURE — 6370000000 HC RX 637 (ALT 250 FOR IP): Performed by: NURSE PRACTITIONER

## 2021-03-18 PROCEDURE — 97530 THERAPEUTIC ACTIVITIES: CPT

## 2021-03-18 PROCEDURE — 97116 GAIT TRAINING THERAPY: CPT

## 2021-03-18 PROCEDURE — 80048 BASIC METABOLIC PNL TOTAL CA: CPT

## 2021-03-18 PROCEDURE — 97110 THERAPEUTIC EXERCISES: CPT

## 2021-03-18 PROCEDURE — 36415 COLL VENOUS BLD VENIPUNCTURE: CPT

## 2021-03-18 PROCEDURE — 99024 POSTOP FOLLOW-UP VISIT: CPT | Performed by: NURSE PRACTITIONER

## 2021-03-18 PROCEDURE — 97535 SELF CARE MNGMENT TRAINING: CPT

## 2021-03-18 RX ADMIN — ENOXAPARIN SODIUM 40 MG: 40 INJECTION SUBCUTANEOUS at 11:19

## 2021-03-18 RX ADMIN — DULOXETINE HYDROCHLORIDE 60 MG: 60 CAPSULE, DELAYED RELEASE ORAL at 09:36

## 2021-03-18 RX ADMIN — SODIUM CHLORIDE, PRESERVATIVE FREE 10 ML: 5 INJECTION INTRAVENOUS at 21:42

## 2021-03-18 RX ADMIN — KETOROLAC TROMETHAMINE 15 MG: 30 INJECTION, SOLUTION INTRAMUSCULAR; INTRAVENOUS at 11:31

## 2021-03-18 RX ADMIN — ENALAPRIL MALEATE 10 MG: 10 TABLET ORAL at 21:38

## 2021-03-18 RX ADMIN — PREGABALIN 150 MG: 75 CAPSULE ORAL at 09:34

## 2021-03-18 RX ADMIN — FAMOTIDINE 20 MG: 10 INJECTION, SOLUTION INTRAVENOUS at 21:38

## 2021-03-18 RX ADMIN — SODIUM CHLORIDE, PRESERVATIVE FREE 10 ML: 5 INJECTION INTRAVENOUS at 11:36

## 2021-03-18 RX ADMIN — PREGABALIN 150 MG: 75 CAPSULE ORAL at 21:38

## 2021-03-18 RX ADMIN — POLYETHYLENE GLYCOL 3350 8 G: 17 POWDER, FOR SOLUTION ORAL at 09:39

## 2021-03-18 RX ADMIN — FAMOTIDINE 20 MG: 10 INJECTION, SOLUTION INTRAVENOUS at 09:39

## 2021-03-18 RX ADMIN — METOPROLOL SUCCINATE 50 MG: 25 TABLET, FILM COATED, EXTENDED RELEASE ORAL at 09:38

## 2021-03-18 RX ADMIN — KETOROLAC TROMETHAMINE 15 MG: 30 INJECTION, SOLUTION INTRAMUSCULAR; INTRAVENOUS at 21:38

## 2021-03-18 RX ADMIN — SODIUM CHLORIDE, PRESERVATIVE FREE 10 ML: 5 INJECTION INTRAVENOUS at 09:41

## 2021-03-18 RX ADMIN — LEVOTHYROXINE SODIUM 100 MCG: 100 TABLET ORAL at 05:33

## 2021-03-18 ASSESSMENT — PAIN DESCRIPTION - LOCATION
LOCATION: CHEST
LOCATION: BACK

## 2021-03-18 ASSESSMENT — PAIN DESCRIPTION - FREQUENCY: FREQUENCY: INTERMITTENT

## 2021-03-18 ASSESSMENT — PAIN DESCRIPTION - PROGRESSION
CLINICAL_PROGRESSION: NOT CHANGED

## 2021-03-18 ASSESSMENT — PAIN DESCRIPTION - ORIENTATION
ORIENTATION: LOWER
ORIENTATION: LOWER

## 2021-03-18 ASSESSMENT — PAIN - FUNCTIONAL ASSESSMENT: PAIN_FUNCTIONAL_ASSESSMENT: ACTIVITIES ARE NOT PREVENTED

## 2021-03-18 ASSESSMENT — PAIN DESCRIPTION - PAIN TYPE
TYPE: CHRONIC PAIN
TYPE: CHRONIC PAIN

## 2021-03-18 ASSESSMENT — PAIN SCALES - GENERAL
PAINLEVEL_OUTOF10: 5
PAINLEVEL_OUTOF10: 5
PAINLEVEL_OUTOF10: 2

## 2021-03-18 ASSESSMENT — PAIN DESCRIPTION - DESCRIPTORS: DESCRIPTORS: ACHING;DISCOMFORT

## 2021-03-18 NOTE — PROGRESS NOTES
PPE is worn. Patient positioned up right in chair upon entrance of room. Alert, and orientated x3. Lung sounds clear, Heart sounds auscultated, Bowel sounds active x 4 with dried sanguineous drainage  noted at site of G-tube. Primary RN alerted, and Tube site is cleaned, and bandage is applied. Upper extremities strength present, and equal bilaterally. Lower extremities  color is appropriate for ethnicity. Patient states no numbness, or tingling. Pain is rated as a 2/10, located at lower back and is described as an ache. Pt denies need for pain medicine at this time.    No further needs at this time   P[address] [phone]

## 2021-03-18 NOTE — PROGRESS NOTES
[facility]  INPATIENT PHYSICAL THERAPY  DAILY NOTE  STR[facility] ONC MED 5K - 5K-20/020-A    Time In: 3651  Time Out: 1301  Timed Code Treatment Minutes: 28 Minutes  Minutes: 38          Date: 3/18/2021  Patient Name: Haritha Gunter,  Gender:  female        MRN: [id number]  : 1940  (78 y.o.)     Referring Practitioner: Max Brown MD  Diagnosis: Paraesophageal hernia  Additional Pertinent Hx: Per H&P: \"Pt has a history of hiatal hernia. She has difficulty with swallowing dry foods and had an episode of food getting stuck distally. She underwent lysis of adhesions and reduction of hiatal hernia on 3/9/21. \" Recent L femur ORIF on 2020. Prior Level of Function:  Lives With: Spouse, Daughter, Other (comment)(at her daughter's home with the spouse and mother of the son-in law)  Type of Home: House  Home Layout: Two level, Laundry in basement, Able to Live on Main level with bedroom/bathroom  Home Equipment: Rolling walker, Rose Doing, 4 wheeled walker   Bathroom Shower/Tub: Walk-in shower  Bathroom Toilet: Handicap height  Bathroom Equipment: Toilet raiser  Bathroom Accessibility: Accessible    Receives Help From: Family  ADL Assistance: Needs assistance  Homemaking Assistance: Needs assistance  Homemaking Responsibilities: Yes  Transfer Assistance: Needs assistance  Active : No  Additional Comments: Pt has been doing stand- pivot transfers with a rolling walker and assist of her . She does her self care with help for shower transfers. Restrictions/Precautions:  Restrictions/Precautions: Fall Risk, Isolation, Weight Bearing  Left Lower Extremity Weight Bearing: Weight Bearing As Tolerated(I believe the bone has enough callus to begin WBAT with a walker. Per [facility] Note 3/17/2021)  Position Activity Restriction  Other position/activity restrictions: .---WBAT 3/17/2021     SUBJECTIVE: RN approved session. Pt in bed upon arrival and approves session.  Pt states feeling good today and has moved around room with more confidence with nursing. Extra time taken in room for RN to assess feeding tube and clean due to it bleeding throughout the pm.    PAIN: Not Rated Back    Vitals: Vitals not assessed per clinical judgement, see nursing flowsheet    OBJECTIVE:  Bed Mobility:  Supine to Sit: Supervision  Scooting: Supervision    Transfers:  Sit to Stand: Stand By Assistance, with increased time for completion, cues for hand placement, with verbal cues  Stand to Sit:Stand By Assistance, with increased time for completion, cues for hand placement, with verbal cues  Cues for walker alignment     Ambulation:  Stand By Assistance, with verbal cues , with increased time for completion  Distance: 20'x1  Surface: Level Tile  Device:Rolling Walker  Gait Deviations: Forward Flexed Posture, Slow Thania, Decreased Step Length on Left, Decreased Weight Shift Left, Decreased Gait Speed and Decreased Heel Strike Bilaterally  Cues for proximity to RW and erect trunk. Pt demonstrated forward lean and encourgaed to distribute more weight into L LE    Balance:  Not Tested    Exercise:  Patient was guided in 1 set(s) 20 reps of exercise to both lower extremities. Ankle pumps, Glut sets, Quad sets, Heelslides, Hip abduction/adduction and Straight leg raises. Exercises were completed for increased independence with functional mobility. Functional Outcome Measures: Completed  AM-PAC Inpatient Mobility Raw Score : 17  AM-PAC Inpatient T-Scale Score : 42.13    ASSESSMENT:  Assessment: Patient progressing toward established goals. Activity Tolerance:  Patient tolerance of  treatment: good. Pt demonstrated decreased balance and endurance. Pt fatigues quickly due to B LE weakness. Forward posture and and decreased weight bearing throughout ambulation with encouragement. Recommend SNF with PT for continued strengthening and mobility to ensure safety around home.   Equipment

## 2021-03-18 NOTE — PROGRESS NOTES
[facility] Surgery - Dr. Philippe Grullon  Postoperative Progress Note    Pt Name: Roselinda Bosworth Height  Medical Record Number: [id number]  Date of Birth 1940   Today's Date: 3/18/2021    ASSESSMENT   1. POD # 9 Status post robotic extensive lysis of adhesions and robotic reduction of large paraesophageal/hiatal hernia and gastric volvulus with gastropexy/G-tube insertion  2. Postoperative adynamic ileus - resolved   3. Recent left femur fracture  4. Hyponatremia  5. Hypokalemia - resolved   6. Hypertension  7. Bilateral atelectasis/pneumonia - left worse than right - improved  8. Hypertension - improving   has a past medical history of Abnormal EKG, Anemia, Back pain, Blood circulation, collateral, Diverticulitis, Fibromyalgia, GERD (gastroesophageal reflux disease), Hiatal hernia, Hypertension, Hypothyroidism, and Osteoarthritis. PLAN   1. Tolerating soft diet. Bowel function returned. 2. Keep G-tube clamped  3. Activity per ortho   4. Blood pressure better today   5. Continue pulmonary toileting. IS, C&DB. 6. GI & DVT prophylaxis  7. Pain controlled  8. K+ better today  9. Stable for discharge but awaiting ECF percert. Okay with discharge when that is back. Possibly today/tomorrow. SUBJECTIVE   Patient stable. Chart reviewed. Updated by nursing staff. No fevers. Blood pressure better. Patient having bowel movements. Feeling good. No nausea or vomiting. Abdomen softly distended. Has small amount of oozing from PEG site. Likely just got pulled on overnight. No active bleeding this morning - old bloody drainage noted. No urinary complaints. Denies chest pain or shortness of breath. No lightheadedness or dizziness. Tolerating soft diet without issues. Up with assistance - up with walker. Incisions are clean, dry and intact.    CURRENT MEDICATIONS   Scheduled Meds:   potassium bicarb-citric acid  40 mEq Oral Once    polyethylene glycol  8 g Oral Daily    enalapril  10 mg Oral Nightly    pregabalin  150 mg Oral BID    metoprolol succinate  50 mg Oral Daily    levothyroxine  100 mcg Oral Daily    DULoxetine  60 mg Oral Daily    sodium chloride flush  10 mL Intravenous 2 times per day    enoxaparin  40 mg Subcutaneous Q24H    famotidine (PEPCID) injection  20 mg Intravenous BID     Continuous Infusions:    PRN Meds:.ketorolac, potassium chloride **OR** potassium alternative oral replacement **OR** potassium chloride, hydrALAZINE, albuterol, sodium chloride flush, promethazine **OR** ondansetron, HYDROcodone 5 mg - acetaminophen **OR** HYDROcodone 5 mg - acetaminophen  OBJECTIVE   CURRENT VITALS:  height is 5' 7\" (1.702 m) and weight is 159 lb 4.8 oz (72.3 kg). Her oral temperature is 98.1 °F (36.7 °C). Her blood pressure is 134/74 and her pulse is 75. Her respiration is 16 and oxygen saturation is 94%. Temperature Range (24h):Temp: 98.1 °F (36.7 °C) Temp  Av.9 °F (36.6 °C)  Min: 97.7 °F (36.5 °C)  Max: 98.1 °F (36.7 °C)  BP Range (07D): Systolic (40UAV), ZHV:507 , Min:134 , VFA:998     Diastolic (22TXD), ESN:28, Min:74, Max:85    Pulse Range (24h): Pulse  Av  Min: 70  Max: 75  Respiration Range (24h): Resp  Av  Min: 16  Max: 16  Current Pulse Ox (24h):  SpO2: 94 %  Pulse Ox Range (24h):  SpO2  Av %  Min: 92 %  Max: 96 %  Oxygen Amount and Delivery: O2 Flow Rate (L/min): 0.5 L/min  Incentive Spirometry Tx:            GENERAL: alert, no distress  LUNGS: lungs diminished but clear  HEART: normal rate and regular rhythm  ABDOMEN: softly distended, non-tender, bowel sounds hypoactive, G-tube in place with scant old bloody drainage. No active bleeding from G-tube.   INCISION: healing well, no significant drainage, no significant erythema  EXTERMITY: no cyanosis, clubbing or edema    In: 600 [P.O.:600]  Out: 1000 [Urine:1000]  Date 21 0000 - 21 2359   Shift 4819-6885 8067-1392 9217-9430 24 Hour Total   INTAKE   P.O. 200   200   Shift Total(mL/kg) 200(2.8)

## 2021-03-18 NOTE — PLAN OF CARE
Problem: Falls - Risk of:  Goal: Will remain free from falls  Description: Will remain free from falls  Outcome: Ongoing  Note: No falls this shift. Bed alarm, nonskid socks, and call light utilized. At risk due to generalized weakness r/t recent fracture. Goal: Absence of physical injury  Description: Absence of physical injury  Outcome: Ongoing     Problem: Pain:  Goal: Pain level will decrease  Description: Pain level will decrease  Outcome: Ongoing  Note: Pt states pain is 6/10. Pt pain goal 3/10. Rest and reposition provided to decrease pain. Will continue ongoing assessment of pain. Goal: Control of acute pain  Description: Control of acute pain  Outcome: Ongoing  Goal: Control of chronic pain  Description: Control of chronic pain  Outcome: Ongoing     Problem: Discharge Planning:  Goal: Discharged to appropriate level of care  Description: Discharged to appropriate level of care  Outcome: Ongoing  Note: [facility]. Discussed with pt. Problem: Bowel/Gastric:  Goal: Control of bowel function will improve  Description: Control of bowel function will improve  Outcome: Ongoing  Note: Bowel sounds active in all four quadrants. Stool passed this shift  Goal: Ability to achieve a regular elimination pattern will improve  Description: Ability to achieve a regular elimination pattern will improve  Outcome: Ongoing     Problem: Impaired respiratory status  Goal: Clear lung sounds  Outcome: Ongoing  Note: Lung sounds clear with spO2 at 97% on room air. Care plan reviewed with patient. Patient verbalize understanding of the plan of care and contribute to goal setting.

## 2021-03-18 NOTE — PROGRESS NOTES
[address]  Occupational Therapy  Daily Note  Time:   Time In: 1157  Time Out: 1545  Timed Code Treatment Minutes: 30 Minutes  Minutes: 30          Date: 3/18/2021  Patient Name: Dominick Sanchez Height,   Gender: female      Room: [facility]  MRN: [id number]  : 1940  (78 y.o.)  Referring Practitioner: Dr. Alexandre Barnes MD  Diagnosis: Paraesophageal hernia  Additional Pertinent Hx: Pt has a history of hiatal hernia. She has difficulty with swallowing dry foods and had an episode of food getting stuck distally. She underwent lysis of adhesions and reduction of hiatal hernia on 3/9/21. Restrictions/Precautions:  Restrictions/Precautions: Fall Risk, Isolation, Weight Bearing  Left Lower Extremity Weight Bearing: Weight Bearing As Tolerated(I believe the bone has enough callus to begin WBAT with a walker. Per [facility] Note 3/17/2021)  Position Activity Restriction  Other position/activity restrictions: .---WBAT 3/17/2021     SUBJECTIVE: Pleasant and cooperative. Reports having pain in her L side which is moderate at times. Minimal pain while at rest.    PAIN: 4/10:  L ribs anteriorly and around her flank    Vitals: Not tested at this time. See Nursing notes for recent vitals. COGNITION: WFL    ADL:   Lower Extremity Dressing: Stand By Assistance. donning her shoes and doffing slipper socks and donning her own socks while crossing her legs. She reached down to the floor to tie laces. BALANCE:  Sitting Balance:  Supervision. dressing her lower body  Standing Balance: Stand By Assistance. preparing to walk    BED MOBILITY:  Supine to Sit: Stand By Assistance using the bedrail and head of bed elevated  Scooting: Stand By Assistance      TRANSFERS:  Sit to Stand:  Stand By Assistance. from the edge of bed  Stand to Sit: Stand By Assistance. to the recliner chair    FUNCTIONAL MOBILITY:  Assistive Device: Rolling Walker  Assist Level:  Stand By Assistance.    Distance: 10 ft  Favoring of her RLE noted. Posture tilted toward her L side and forward. ADDITIONAL ACTIVITIES:  Pt indicated that she has a leg length discrepancy and that she had tried an insert into her RLE which is the shorter of her legs. She stated that she was not able to get use to walking with it. Discussed options for adapting her shoes. She stated that she has velcro shoes in addition to shoes with laces. A shoe button and elastic laces were discussed. Pt assisted with unfolding the sheets for her bed while in sitting. ASSESSMENT:     Activity Tolerance:  Patient tolerance of  treatment: fair. Mild SOB noted with doing lower body dressing and with walking in her room. She remained in sitting following the session. Discharge Recommendations: Continue to assess pending progress, Patient would benefit from continued therapy after discharge. [facility] with continued OT. Equipment Recommendations: Equipment Needed: Yes  Other: Adaptive  utensils and cooking tools  Plan: Times per week: 3-5x  Specific instructions for Next Treatment: Functional mobility for short distance; ADLs and standing balance; B hand activities and joint protection techniques  Current Treatment Recommendations: Functional Mobility Training, Self-Care / ADL, Endurance Training, Safety Education & Training, Balance Training  Plan Comment: Pt would benefit from continued skilled OT services when medically stable and discharged from Acute. HHOT recommended. Patient Education  Patient Education: ADL's and ways to adapt her shoes to help with donning and doffing    Goals  Short term goals  Time Frame for Short term goals: By discharge  Short term goal 1: Pt will demonstrate functional mobility walking a couple of feet to/from a commode or the chair with SBA while following TTWB precaution of her LLE to increase her independence with toileting routine. Met.   Revise goal.   Short term goal 2: Pt will complete lower body dressing while using any AE needed with CGA to increase her safety and independence with self care. Met. Revise goal.   Short term goal 3: Pt will complete simple ADL while using 1 hand release and following TTWB precaution with SBA to increase her independence with toileting. Met. Revise goal.   Short term goal 4: Pt will complete B hand activities while using any adaptations needed for joint protection to increase her independence with cooking and dressing. Partially Met. Cont. goal.   Revised Short-Term Goals  Short term goals  Time Frame for Short term goals: By discharge  Short term goal 1: Pt will demonstrate functional mobility walking to/from the bathroom with Supervision while using a rolling walker to prepare for doing her toileting routine. Short term goal 2: Pt will complete dressing nor bathing while using any AE needed with SBA to increase her safety and independence with self care. Short term goal 3: Pt will complete standing ADL while using 1 hand release for 3 minute duration with SBA to increase her independence and endurance for grooming at the sink or dressing. Short term goal 4: Pt will complete B hand activities while using any adaptations needed for joint protection to increase her independence with cooking and dressing. Following session, patient left in safe position with all fall risk precautions in place.

## 2021-03-19 VITALS
TEMPERATURE: 97.5 F | DIASTOLIC BLOOD PRESSURE: 75 MMHG | HEIGHT: 67 IN | RESPIRATION RATE: 16 BRPM | BODY MASS INDEX: 25 KG/M2 | WEIGHT: 159.3 LBS | HEART RATE: 81 BPM | SYSTOLIC BLOOD PRESSURE: 152 MMHG | OXYGEN SATURATION: 96 %

## 2021-03-19 PROCEDURE — 6370000000 HC RX 637 (ALT 250 FOR IP): Performed by: SURGERY

## 2021-03-19 PROCEDURE — 6370000000 HC RX 637 (ALT 250 FOR IP): Performed by: NURSE PRACTITIONER

## 2021-03-19 PROCEDURE — 2500000003 HC RX 250 WO HCPCS: Performed by: SURGERY

## 2021-03-19 PROCEDURE — 97116 GAIT TRAINING THERAPY: CPT

## 2021-03-19 PROCEDURE — 6360000002 HC RX W HCPCS: Performed by: NURSE PRACTITIONER

## 2021-03-19 PROCEDURE — 99024 POSTOP FOLLOW-UP VISIT: CPT | Performed by: NURSE PRACTITIONER

## 2021-03-19 PROCEDURE — 6360000002 HC RX W HCPCS: Performed by: SURGERY

## 2021-03-19 PROCEDURE — 97110 THERAPEUTIC EXERCISES: CPT

## 2021-03-19 PROCEDURE — 2580000003 HC RX 258: Performed by: SURGERY

## 2021-03-19 RX ORDER — ENALAPRIL MALEATE 10 MG/1
10 TABLET ORAL NIGHTLY
Qty: 90 TABLET | Refills: 3
Start: 2021-03-19 | End: 2021-06-28 | Stop reason: SDUPTHER

## 2021-03-19 RX ADMIN — ENOXAPARIN SODIUM 40 MG: 40 INJECTION SUBCUTANEOUS at 09:10

## 2021-03-19 RX ADMIN — KETOROLAC TROMETHAMINE 15 MG: 30 INJECTION, SOLUTION INTRAMUSCULAR; INTRAVENOUS at 04:18

## 2021-03-19 RX ADMIN — DULOXETINE HYDROCHLORIDE 60 MG: 60 CAPSULE, DELAYED RELEASE ORAL at 09:12

## 2021-03-19 RX ADMIN — FAMOTIDINE 20 MG: 10 INJECTION, SOLUTION INTRAVENOUS at 09:10

## 2021-03-19 RX ADMIN — METOPROLOL SUCCINATE 50 MG: 25 TABLET, FILM COATED, EXTENDED RELEASE ORAL at 09:12

## 2021-03-19 RX ADMIN — SODIUM CHLORIDE, PRESERVATIVE FREE 10 ML: 5 INJECTION INTRAVENOUS at 09:10

## 2021-03-19 RX ADMIN — POLYETHYLENE GLYCOL 3350 8 G: 17 POWDER, FOR SOLUTION ORAL at 09:10

## 2021-03-19 RX ADMIN — PREGABALIN 150 MG: 75 CAPSULE ORAL at 09:16

## 2021-03-19 RX ADMIN — KETOROLAC TROMETHAMINE 15 MG: 30 INJECTION, SOLUTION INTRAMUSCULAR; INTRAVENOUS at 14:35

## 2021-03-19 RX ADMIN — LEVOTHYROXINE SODIUM 100 MCG: 100 TABLET ORAL at 04:22

## 2021-03-19 ASSESSMENT — PAIN DESCRIPTION - PROGRESSION
CLINICAL_PROGRESSION: NOT CHANGED
CLINICAL_PROGRESSION: GRADUALLY WORSENING
CLINICAL_PROGRESSION: GRADUALLY WORSENING
CLINICAL_PROGRESSION: NOT CHANGED

## 2021-03-19 ASSESSMENT — PAIN DESCRIPTION - DESCRIPTORS: DESCRIPTORS: ACHING;DISCOMFORT

## 2021-03-19 ASSESSMENT — PAIN DESCRIPTION - ONSET: ONSET: AWAKENED FROM SLEEP

## 2021-03-19 ASSESSMENT — PAIN SCALES - GENERAL
PAINLEVEL_OUTOF10: 0
PAINLEVEL_OUTOF10: 4

## 2021-03-19 NOTE — PLAN OF CARE
Problem: Nutrition  Goal: Optimal nutrition therapy  Outcome: Ongoing   Nutrition Problem #1: Increased nutrient needs  Intervention: Food and/or Nutrient Delivery: Continue Current Diet, Start Oral Nutrition Supplement, Vitamin Supplement  Nutritional Goals: Pt will consume 75% or more of meals during LOS

## 2021-03-19 NOTE — DISCHARGE INSTR - OTHER ORDERS
Call [facility] on Saturday, March 20th. Ensure they come see you today. Voicemail left by Avril Cruz RN on Friday. If there is drainage soaking the gauze around your gastric tube. Gently cleanse around tube with warm water and wash cloth, careful not to tug - just soft circular motions around tube. Place new split gauze around tube. Use CHG daily on abdomen until it runs out to prevent infection. Continue using incentive spirometer 10x an hour to prevent pneumonia.

## 2021-03-19 NOTE — PROGRESS NOTES
Patient discharged at this time. All IV's removed. Discharge instructions, medication changes and follow up appointments explained at this time. All questions answered at this time. AVS given to patient and paperwork signed with this RN. All patient belongings returned. Home medication sent with patient. Chart broken down and placed in yellow bin. Patient picked up and taken home by  in private vehicle. Patient demonstrated by this RN ho to cleanse and change G tube dressing. Patient able to demonstrate understanding. Sent home with [facility] soap.

## 2021-03-19 NOTE — PROGRESS NOTES
stated feeling tired today difficulty sleeping due to pain at feeding tube placement site throughout the night. PAIN: Not Rated Back and lower left ribs    Vitals: Vitals not assessed per clinical judgement, see nursing flowsheet    OBJECTIVE:  Bed Mobility:  Supine to Sit: Supervision    Transfers:  Sit to Stand: Stand By Assistance, with increased time for completion, cues for hand placement, with verbal cues  Stand to Sit:Stand By Assistance, with increased time for completion, cues for hand placement, with verbal cues  Cues to reach back for chair and push off bed as well as lining up walker with chair prior to sitting. Ambulation:  Stand By Assistance, with cues for safety, with verbal cues , with increased time for completion  Distance: 15'x1, 20'x1  Surface: Level Tile  Device:Rolling Walker  Gait Deviations: Forward Flexed Posture, Slow Thania, Decreased Step Length on Left, Decreased Weight Shift Left, Lean to Right, Decreased Gait Speed, Decreased Heel Strike Bilaterally, Narrow Base of Support, Mild Path Deviations, Decreased Terminal knee extension, and Unsteady Gait  Cues for erect trunk, decreased UE reliance, and proximity to walker. Pt presented with forward lean, and decreased knee extension throughout gait. Pt slighlty impulsive at times and doesn't listen to instructions fully. Balance:  Dynamic Sitting Balance: Supervision. Pt completed self pericare on toilet reaching outside MING ~3 minutes  Static Standing Balance: Stand By Assistance, with verbal cues , with increased time for completion. Pt stood at sink washing hands ~2 minutes. Cues for erect trunk and distributing more weight through lower extremities for decreased UE reliance. Pt presented with forward posture and bent knees. Exercise:  Patient was guided in 1 set(s) 10 reps of exercise to both lower extremities. Ankle pumps, Glut sets, Heelslides, Hip abduction/adduction and Straight leg raises.   Exercises were completed for increased independence with functional mobility. Functional Outcome Measures: Completed  -PAC Inpatient Mobility Raw Score : 18  -PAC Inpatient T-Scale Score : 43.63    ASSESSMENT:  Assessment: Patient progressing toward established goals. Activity Tolerance:  Patient tolerance of  treatment: good. Decreased balance, endurance, and LE strength noted. Pt B LE's fatigues quickly during exercise as well as ambulation. Forward posture, decreased weight bearing onto left, and decreased B knee extension creating unsteady gait. Recommend SNF with PT for continued strengthening, mobility, and endurance to ensure safety. Equipment Recommendations:Equipment Needed: No  Discharge Recommendations:  [address] with PT to ensure safety for ambulation. Plan: Times per week: 5xGM  Current Treatment Recommendations: Strengthening, Neuromuscular Re-education, Home Exercise Program, Safety Education & Training, Balance Training, Endurance Training, Patient/Caregiver Education & Training, Functional Mobility Training, Wheelchair Mobility Training, Equipment Evaluation, Education, & procurement, Transfer Training, Gait Training, Stair training    Patient Education  Patient Education: Plan of Care, [employer], Transfers, Gait, Use of Gait [place], Verbal Exercise Instruction    Goals:  Patient goals : To go home  Short term goals  Time Frame for Short term goals: by discharge  Short term goal 1: transfer supine<>sit with supervision for ease of bed mobility. Short term goal 2: transfer sit<>stand with SBA to safely get in/out of bed/chair. Short term goal 3: ambulate >50 feet with RW with CGA while maintaining WBAT on L to safely ambulate throughout home. Long term goals  Time Frame for Long term goals : N/A due to short ELOS. Following session, patient left in safe position with all fall risk precautions in place.

## 2021-03-19 NOTE — CARE COORDINATION
3/19/21, 3:35 PM EDT    DISCHARGE ON GOING [address] [facility] day: 6  Location: 5-20/020-A Reason for admit: Paraesophageal hernia [K44.9]  S/P repair of paraesophageal hernia [Z98.890, Z87.19]   Procedure:  3/09/2021  1.  Robotic extensive lysis of adhesions (one hour)  2.  Robotic reduction of large paraesophageal/hiatal hernia and gastric  volvulus with gastropexy/G-tube insertion. Barriers to Discharge: PT/OT, SS, Lovenox, IV Pepcid, prn pain medications and antiemetics, Potassium replacement protocol, low fiber diet, abdominal binder, incentive spirometry, SCD's, telemetry, up as tolerated with assistance. PCP: Abbey Neumann DO  Readmission Risk Score: 13%  Patient Goals/Plan/Treatment Preferences: Gaston Avendano is from home with her  and family. [facility] at discharge.

## 2021-03-19 NOTE — PROGRESS NOTES
Weight: 159 lb 4.8 oz (72.3 kg)(3/17; +1 edema BLE)   · Admission Body Weight: 159 lb (72.1 kg)(3/9; +1 edema BLE)    · Usual Body Weight: (Per EMR: 162 lb 6.4 oz on 12/20/20- unsure if actual or stated)     · Ideal Body Weight: 135 lbs  · BMI: 24.9  · BMI Categories: Normal Weight (BMI 18.5-24. 9)       Nutrition Diagnosis:   · Increased nutrient needs related to increase demand for energy/nutrients as evidenced by wounds    Nutrition Interventions:   Food and/or Nutrient Delivery:  Continue Current Diet, Start Oral Nutrition Supplement, Vitamin Supplement  Nutrition Education/Counseling:  Education initiated(Encouraged po intake of meals at best effort)   Coordination of Nutrition Care:  Continue to monitor while inpatient    Goals:  Pt will consume 75% or more of meals during LOS       Nutrition Monitoring and Evaluation:   Behavioral-Environmental Outcomes:  None Identified   Food/Nutrient Intake Outcomes:  Food and Nutrient Intake, Supplement Intake, Vitamin/Mineral Intake, Diet Advancement/Tolerance  Physical Signs/Symptoms Outcomes:  Biochemical Data, GI Status, Weight, Skin, Nutrition Focused Physical Findings, Fluid Status or Edema     Discharge Planning:     Too soon to determine     Electronically signed by Nilton Smith RD, LD on 3/19/21 at 10:57 AM EDT    Contact: [phone]

## 2021-03-19 NOTE — CARE COORDINATION
Notification received that patient's insurance [facility]) has declined SNF placement. For a Peer to Peer review Dr. Jassi Cordero can call [phone]. The reference number is [id number]. Perfect Serve message sent to [place]OR[place] Maloney CNP.   Electronically signed by Robert Cordero RN on 3/19/21 at 4:15 PM EDT

## 2021-03-19 NOTE — DISCHARGE INSTR - DIET

## 2021-03-19 NOTE — CARE COORDINATION
3/19/21, 10:14 AM EDT    DISCHARGE PLANNING EVALUATION    Spoke with Bobby at [facility] earlier today re: status of pre cert-nothing as of yet.

## 2021-03-19 NOTE — PROGRESS NOTES
[facility] Surgery - Dr. Lindsay Grullon  Postoperative Progress Note    Pt Name: Veronica Gunter  Medical Record Number: [id number]  Date of Birth 1940   Today's Date: 3/19/2021    ASSESSMENT   1. POD # 10 Status post robotic extensive lysis of adhesions and robotic reduction of large paraesophageal/hiatal hernia and gastric volvulus with gastropexy/G-tube insertion  2. Postoperative adynamic ileus - resolved   3. Recent left femur fracture  4. Hyponatremia  5. Hypokalemia - resolved   6. Hypertension  7. Bilateral atelectasis/pneumonia - left worse than right - improved  8. Hypertension - improving   has a past medical history of Abnormal EKG, Anemia, Back pain, Blood circulation, collateral, Diverticulitis, Fibromyalgia, GERD (gastroesophageal reflux disease), Hiatal hernia, Hypertension, Hypothyroidism, and Osteoarthritis. PLAN   1. Tolerating soft diet. Bowel function returned. 2. Keep G-tube clamped  3. Activity per ortho   4. Continue pulmonary toileting. IS, C&DB. 5. GI & DVT prophylaxis  6. Pain controlled  7. Stable for discharge but awaiting ECF percert. Okay with discharge when that is back. Possibly today. SUBJECTIVE   Patient stable. Chart reviewed. Updated by nursing staff. Patient having bowel movements. Feeling good. No nausea or vomiting. Abdomen softly distended. Still having small amount of drainage from G-tube but tube is just pulled tight to the abdomen. No bleeding. No urinary complaints. Denies chest pain or shortness of breath. No lightheadedness or dizziness. Tolerating soft diet without issues. Up with assistance - up with walker. Incisions are clean, dry and intact.    CURRENT MEDICATIONS   Scheduled Meds:   potassium bicarb-citric acid  40 mEq Oral Once    polyethylene glycol  8 g Oral Daily    enalapril  10 mg Oral Nightly    pregabalin  150 mg Oral BID    metoprolol succinate  50 mg Oral Daily    levothyroxine  100 mcg Oral Daily    DULoxetine  60 mg Oral Daily    sodium chloride flush  10 mL Intravenous 2 times per day    enoxaparin  40 mg Subcutaneous Q24H    famotidine (PEPCID) injection  20 mg Intravenous BID     Continuous Infusions:    PRN Meds:.ketorolac, potassium chloride **OR** potassium alternative oral replacement **OR** potassium chloride, hydrALAZINE, albuterol, sodium chloride flush, promethazine **OR** ondansetron, HYDROcodone 5 mg - acetaminophen **OR** HYDROcodone 5 mg - acetaminophen  OBJECTIVE   CURRENT VITALS:  height is 5' 7\" (1.702 m) and weight is 159 lb 4.8 oz (72.3 kg). Her oral temperature is 98.1 °F (36.7 °C). Her blood pressure is 172/80 (abnormal) and her pulse is 65. Her respiration is 16 and oxygen saturation is 92%. Temperature Range (24h):Temp: 98.1 °F (36.7 °C) Temp  Av.9 °F (36.6 °C)  Min: 97.1 °F (36.2 °C)  Max: 98.4 °F (36.9 °C)  BP Range (93A): Systolic (83OCJ), NSS:560 , Min:130 , TGE:014     Diastolic (42YZZ), FNQ:42, Min:68, Max:89    Pulse Range (24h): Pulse  Av  Min: 65  Max: 76  Respiration Range (24h): Resp  Av  Min: 16  Max: 16  Current Pulse Ox (24h):  SpO2: 92 %  Pulse Ox Range (24h):  SpO2  Av.6 %  Min: 92 %  Max: 98 %  Oxygen Amount and Delivery: O2 Flow Rate (L/min): 0.5 L/min  Incentive Spirometry Tx:            GENERAL: alert, no distress  LUNGS: lungs diminished but clear  HEART: normal rate and regular rhythm  ABDOMEN: softly distended, non-tender, bowel sounds hypoactive, G-tube in place with scant drainage. No active bleeding from G-tube.   INCISION: healing well, no significant drainage, no significant erythema  EXTERMITY: no cyanosis, clubbing or edema    In: 440 [P.O.:440]  Out: 520 [Urine:520]  Date 21 0000 - 21 2359   Shift 2430-4063 8844-9463 7533-1882 24 Hour Total   INTAKE   P.O. 240   240   Shift Total(mL/kg) 240(3.3)   240(3.3)   OUTPUT   Urine(mL/kg/hr) 100   100   Shift Total(mL/kg) 100(1.4)   100(1.4)   Weight (kg) 72.3 72.3 72.3 72.3 LABS     Recent Labs     03/16/21  1409 03/17/21  0554 03/18/21  0521   NA  --  134* 133*   K 3.2* 3.4* 3.8   CL  --  96* 97*   CO2  --  29 29   BUN  --  7 7   CREATININE  --  0.5 0.6   CALCIUM  --  8.9 8.7      RADIOLOGY   No new imaging    Electronically signed by FLORIDA Demarco - CNP on 3/19/2021 at 7:55 AM

## 2021-03-19 NOTE — PROGRESS NOTES
1630: Patient notified by this RN she was declined from [facility] due to insurance issues. Explained to patient that her physician could complete a peer review with her right now to increase her chances of getting accepted to SNF, or, she could go home with her current [facility]. Patient asked to speak with family before making decision. 1645: Patient and  has decided she will go home with current [facility]. Dayana Rios CNP notified of patients decision. Order for discharge obtained and this RN instructed to reach out to [facility] to make sure they evaluate patient tomorrow. Follow up appointment already scheduled. 1655: Voicemail left for [facility] , asking someone comes to patient's house tomorrow. Patient also educated to call [facility] tomorrow.

## 2021-03-20 ENCOUNTER — CARE COORDINATION (OUTPATIENT)
Dept: CASE MANAGEMENT | Age: 81
End: 2021-03-20

## 2021-03-20 NOTE — CARE COORDINATION
[facility] Transitions Initial Follow Up Call    Call within 2 business days of discharge: Yes    Patient: Francia Marrero Patient : 1940   MRN: <[id number]>  Reason for Admission: Paraesophageal hernia  Discharge Date: 3/19/21 RARS: Readmission Risk Score: 13      Last Discharge [facility]       Complaint Diagnosis Description Type Department Provider    3/9/21   Admission (Discharged) Korina Horta MD      Attempted to contact patient for initial care transitions follow up. Unable to reach patient or LVM d/t vm full. Transitions will attempt again.        Non-face-to-face services provided:      Care Transitions 24 Hour Call    Do you have all of your prescriptions and are they filled?: Yes  Care Transitions Interventions         Follow Up  Future Appointments   Date Time Provider Huber Little   3/29/2021  2:00 PM FLORIDA Sosa - CNP N Adv Surg [facility] - [facility]E [facility] [email or web address]   2021  1:00 PM DO [facility]CAD[facility] Thao Galicia RN

## 2021-03-22 ENCOUNTER — TELEPHONE (OUTPATIENT)
Dept: FAMILY MEDICINE CLINIC | Age: 81
End: 2021-03-22

## 2021-03-22 ENCOUNTER — CARE COORDINATION (OUTPATIENT)
Dept: CASE MANAGEMENT | Age: 81
End: 2021-03-22

## 2021-03-22 DIAGNOSIS — M48.061 SPINAL STENOSIS OF LUMBAR REGION WITHOUT NEUROGENIC CLAUDICATION: Primary | ICD-10-CM

## 2021-03-22 DIAGNOSIS — Z98.890 S/P REPAIR OF PARAESOPHAGEAL HERNIA: Primary | ICD-10-CM

## 2021-03-22 DIAGNOSIS — Z87.19 S/P REPAIR OF PARAESOPHAGEAL HERNIA: Primary | ICD-10-CM

## 2021-03-22 PROCEDURE — 1111F DSCHRG MED/CURRENT MED MERGE: CPT | Performed by: FAMILY MEDICINE

## 2021-03-22 NOTE — TELEPHONE ENCOUNTER
[facility] Transitions Initial Follow Up Call    Outreach made within 2 business days of discharge: Yes    Patient: Chapis Gunter Patient : 1940   MRN: [id number]  Reason for Admission: There are no discharge diagnoses documented for the most recent discharge. Discharge Date: 3/19/21       Spoke with: Jessica Olmos    Discharge department/facility: [facility]    TCM Interactive Patient Contact:  Was patient able to fill all prescriptions: Yes  Was patient instructed to bring all medications to the follow-up visit: Yes  Is patient taking all medications as directed in the discharge summary?  Yes  Does patient understand their discharge instructions: Yes  Does patient have questions or concerns that need addressed prior to 7-14 day follow up office visit: no    Scheduled appointment with PCP within 7-14 days    Follow Up  Future Appointments   Date Time Provider Huber Little   3/29/2021  2:00 PM Edis Motley APRN [address]   2021  1:00 PM DO ARCADIO Forbes PCT [address], [facility] ([address] [place] [address])

## 2021-03-22 NOTE — CARE COORDINATION
[facility] Transitions Initial Follow Up Call    Call within 2 business days of discharge: Yes    Patient: Cheral Lefort Height Patient : 1940   MRN: [id number]  Reason for Admission: Post Op Hiatal hernia  Discharge Date: 3/19/21 RARS: Readmission Risk Score: 13      Last Discharge [facility]       Complaint Diagnosis Description Type Department Provider    3/9/21   Admission (Discharged) Rony Mandel MD           Spoke with: Patient      TRANSITION OF CARE CALL: SHERRIE INITIAL CALL     Charting Summary:     Patient states she is feeling better. Took shower this morning with no issues. Incisions: Area not warm to touch, not painful to touch, No redness, weeping fluid or draining pus, or swollen. No fever or chills. Incision Clean dry and intact. Reviewed medications with patient. Patient confirms they have all medications and are taking medications as directed. Patient has no questions concerning medications at this time. [id number] sent to PCP. Has appointment with surgeon 3/29/2021. They also called patient this am. Will order another pain medication. Patient has good appetite and is drinking adequate fluids. Normal elimination patterns (Urine and BM). Had BM this morning. Uses Walker to ambulate at this time. Explained the Transition to Home program to patient and that they are followed for 30 days after discharge. Patient expresses understanding. Patient is aware of when to contact MD with any new or worsening symptoms. Will continue to follow. Thor Powers LPN    [phone]  Nancy Dorsey / [facility] Coordinator    Advance Care Planning:   Does patient have an Advance Directive:  reviewed and current. Was this a readmission?  No  Patient stated reason for admission: Surgery  Patients top risk factors for readmission: lack of knowledge about disease      Care Transition Nurse (CTN) contacted the patient by telephone to perform post hospital discharge assessment. Verified name and  with patient as identifiers. Provided introduction to self, and explanation of the CTN role. CTN reviewed discharge instructions, medical action plan and red flags with patient who verbalized understanding. Patient given an opportunity to ask questions and does not have any further questions or concerns at this time. Were discharge instructions available to patient? Yes. Reviewed appropriate site of care based on symptoms and resources available to patient including: PCP, Specialist and When to call 911. The patient agrees to contact the PCP office for questions related to their healthcare. Medication reconciliation was performed with patient, who verbalizes understanding of administration of home medications. Advised obtaining a 90-day supply of all daily and as-needed medications. Was patient discharged with a pulse oximeter? No Discussed and confirmed pulse oximeter discharge instructions and when to notify provider or seek emergency care. Discussed follow-up appointments. If no appointment was previously scheduled, appointment scheduling offered: Yes. Is follow up appointment scheduled within 7 days of discharge? Yes  Non-[facility] follow up appointment(s): 3/29/2021  Surgeon    Plan for follow-up call in 3-5 days based on severity of symptoms and risk factors. Plan for next call: symptom management-Pain, Post op  CTN provided contact information for future needs.       Deloris Kennedy LPN    [phone]  [address] 24 Hour Call    Schedule Follow Up Appointment with PCP: Completed  Do you have any ongoing symptoms?: Yes  Patient-reported symptoms: Pain, Fatigue  Do you have a copy of your discharge instructions?: Yes  Do you have all of your prescriptions and are they filled?: Yes  Have you been contacted by a [facility] Pharmacist?: No  Have you scheduled your follow up appointment?: Yes (Comment: 3/29/2021 Surgeon)  How are you going to get to your appointment?: Car - family or friend to transport  Were you discharged with any Home Care or Post Acute Services: No  Post Acute Services: [address] (Comment: [address])  Do you feel like you have everything you need to keep you well at home?: Yes  Care Transitions Interventions  No Identified Needs         Follow Up  Future Appointments   Date Time Provider Huber Little   3/29/2021  2:00 PM Vallery Hodgkins, APRN - CNP N Adv Surg [address]   4/27/2021  1:00 PM DO ARCADIO Leone [facility] - Daksha Marin LPN

## 2021-03-22 NOTE — TELEPHONE ENCOUNTER
Pt is asking if there is any pain medication strength between [place] and Toradol. Pt states she doesn't like to take pain medications as strong as Norco, but the last time she was taking Toradol it did not help with her pain.       Please advise

## 2021-03-22 NOTE — CARE COORDINATION
3/22/21, 7:30 AM EDT    DISCHARGE PLANNING EVALUATION    Patient discharged home on 3/19/21 with [facility] as cindi was declined by insurance for SNF placement. See RN documentation for details. Message left for Bobby at [facility] re: insurance denial and patient discharge to home. 3/22/21, 7:33 AM EDT    Patient goals/plan/ treatment preferences discussed by  and . Patient goals/plan/ treatment preferences reviewed with patient/ family. Patient/ family verbalize understanding of discharge plan and are in agreement with goal/plan/treatment preferences. Understanding was demonstrated using the teach back method. AVS provided by RN at time of discharge, which includes all necessary medical information pertaining to the patients current course of illness, treatment, post-discharge goals of care, and treatment preferences. Services After Discharge  Services At/After Discharge: Nursing Services([facility])   IMM Letter  IMM Letter given to Patient/Family/Significant other/Guardian/POA/by[de-identified] copy delivered to patient by mgr. Qureshi  IMM Letter date given[de-identified] 03/19/21  IMM Letter time given[de-identified] 80  Observation Status Letter date given[de-identified] 03/11/21  Observation Status Letter time given[de-identified] 4980  Observation Status Letter given to Patient/Family/Significant other/Guardian/POA/by[de-identified] .  Kiera

## 2021-03-23 RX ORDER — TRAMADOL HYDROCHLORIDE 50 MG/1
50 TABLET ORAL EVERY 6 HOURS PRN
Qty: 20 TABLET | Refills: 0 | Status: SHIPPED | OUTPATIENT
Start: 2021-03-23 | End: 2021-03-28

## 2021-03-23 NOTE — TELEPHONE ENCOUNTER
Spoke with pt she verbalized understanding and she knows about tramadol and is okay with trying that she states so far today and yesterday have been good days do far

## 2021-03-23 NOTE — DISCHARGE SUMMARY
decision. Starting on liquid diet this evening. Reevaluate in a.m.  2. Palliative care  3. Antibiotics per primary service  4. PPI  5. Monitor hemoglobin/hematocrit  6. Replace electrolytes as needed per protocol. 7. Nephrology following  8. GI service following with no plans of endoscopy at this time  9. Wrist ratification/medical clearance as needed if surgery wished upon by patient/family. SUBJECTIVE:   Chief complaint: Dysphagia   History of present illness: Shelbi Brunson is an 64-year-old female who was asked to see in consultation secondary to large paraesophageal/hiatal hernia with organoaxial volvulus but no gastric outlet obstruction. Patient has a long history of a known hiatal hernia. Gradually has been increasing in size. Came in secondary to food getting stuck at the distal esophagus when she was eating spaghetti. She states it usually happens in regards to the dysphagia with dry foods such as pasta, rice or steak/meat. This time she was trying to pass the food with liquids even hot water for 3 hours unsuccessfully. She states during this time the liquid came right back up. Currently denies any abdominal pain. No nausea or vomiting. No diarrhea or constipation. No melena. Recent history of bright red blood per rectum. Concern for hemorrhoids. Antibiotics for pneumonia and urinary tract infection. Failed thoracentesis for left pleural effusion. Patient unknown Nondenominational. Underwent colon resection due to diverticular disease in 2016. This was attempted robotically but converted to open at that time. Previous upper endoscopy in 2011 11 demonstrated per reports a large hiatal hernia with Lyndee Jaron ulcers and esophageal scarring post dilatation. She was dilated at that time of the [address] [place] [address]. She states currently she feels she is at her baseline. She is planning full liquids for dinner. Denies any current chest pain or shortness of breath. No abdominal pain.    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 Cristian Cheatham GERD (gastroesophageal reflux disease)       Diverticulitis     Hiatal hernia      Hypertension      Hypothyroidism      Osteoarthritis     Past Surgical History:         Past Surgical History                 Procedure Laterality Date    APPENDECTOMY   1958    BACK SURGERY        CARPAL TUNNEL RELEASE Bilateral 2013     w/cubital tunnel    COLON SURGERY   07/29/2016     Procedure: ATTEMPTED DAVINCI XI ROBOTIC ASSISTED SIGMOID COLON RESECTION, ROBOTIC ASSISTED MOBILIZATION OF RECTAL SIGMOID TO SPLENIC FLEXURE, CONVERTED TO OPEN LEFT HEMICOLECTOMY WITH COLOPROCTOCTOMY, SPLENORRHAPHY, RIGID SIGMOIDOSCOPY, LASER EVALUATION OF VASCULARITY WITH ICG; Surgeon: Venkat Ayoub MD; Location: [facility]; Service: General    COLONOSCOPY   2012    CYST REMOVAL   2010    EYE SURGERY   1998, 1999     Cataract    FEMUR FRACTURE SURGERY Left 12/21/2020     LEFT FEMUR OPEN REDUCTION INTERNAL FIXATION performed by Radha Vigil MD at [place] [address]. Left 2001   [id number]     w/bladder suspension    JOINT REPLACEMENT   2002, 2008, 2009     rt hip(2002), lt knee(2009), lt hip(2008) Shoulder (2009)    OVARY REMOVAL   1999    VT COLON CA SCRN NOT  [address] IND Left 9/15/2017     COLONOSCOPY performed by Sofia Stafford MD at [facility]   [address]     discectomy   [address]    UPPER GASTROINTESTINAL ENDOSCOPY   2012   Medications:           Home Medications                 Prior to Admission medications    Medication Sig Start Date End Date Taking?  Authorizing Provider   metoprolol succinate (TOPROL XL) 25 MG extended release tablet TAKE 1 TABLET DAILY 12/16/20   Yes FLORIDA Gardner CNP   enalapril (VASOTEC) 10 MG tablet TAKE ONE-HALF (1/2) TABLET DAILY 11/18/20   Yes Lon Shankar DO   guaiFENesin 1200 MG TB12 Take 1,200 mg by mouth 2 times daily 12/21/19   Yes Mukesh Beach PA-C   lidocaine (LMX) 4 % cream Apply topically 4x daily as needed. 10/12/19   Yes Lon Shankar DO   Biotin 51506 MCG TABS Take 1-2 tablets by mouth daily     Yes Historical Provider, MD   fluticasone (FLONASE) 50 MCG/ACT nasal spray 1 spray by Nasal route as needed for Rhinitis     Yes Historical Provider, MD   therapeutic multivitamin-minerals (THERAGRAN-M) tablet Take 1 tablet by mouth nightly      Yes Historical Provider, MD   levothyroxine (SYNTHROID) 125 MCG tablet TAKE 1 TABLET DAILY 2/2/21     FLORIDA Gardner CNP   pregabalin (LYRICA) 150 MG capsule Take 1 capsule by mouth 2 times daily for 180 days. 1/28/21 7/27/21   Lon Shankar DO   Misc. Devices [facility]) MISC 1 each by Does not apply route daily 2/11/20     Lon Shankar DO   DULoxetine (CYMBALTA) 60 MG extended release capsule TAKE 1 CAPSULE DAILY 1/16/20     Lon Shankar DO   Misc.  Devices MISC Mechanical lift for a Kathryn Presser 5/31/18     Lon Shankar DO   Scooter [id number] [facility] by Does not apply route Power scooter 10/31/17     Dorinda Pierce DO   CRANBERRY PO Take 1,500 mg by mouth daily       Historical Provider, MD   Omega 3-6-9 Fatty Acids (OMEGA 3-6-9 COMPLEX) CAPS Take 1 capsule by mouth daily        Historical Provider, MD   Scheduled Meds:          Scheduled Medications    metoprolol succinate 50 mg Oral Daily    amoxicillin-clavulanate 1 tablet Oral 2 times per day    iron sucrose 300 mg Intravenous Once    pantoprazole 40 mg Oral QAM AC    sodium chloride flush 10 mL Intravenous 2 times per day    enoxaparin 40 mg Subcutaneous Daily    DULoxetine 60 mg Oral Daily    pregabalin 150 mg Oral BID    lidocaine 1 patch Transdermal Daily    levothyroxine 100 mcg Oral Daily   Continuous Infusions:   Infusions Meds       PRN Meds:. PRN Medications   sodium chloride flush, promethazine **OR** ondansetron, polyethylene glycol, acetaminophen **OR** acetaminophen, magnesium sulfate, HYDROcodone 5 mg - acetaminophen   Allergies:   is allergic to ampicillin; morphine; pcn [penicillins]; and sulfa antibiotics. Family History:   family history includes Arthritis in her brother, mother, and sister; Cancer in her maternal aunt; Depression in her brother and sister; Diabetes in her maternal grandmother and sister; Early Death in her maternal aunt; Hearing Loss in her father and mother; Heart Disease in her paternal grandmother; Heart Disease (age of onset: 76) in her father; High Cholesterol in her mother; Stroke in her father. Social History:   reports that she quit smoking about 53 years ago. Her smoking use included cigarettes. She started smoking about 64 years ago. She has a 22.00 pack-year smoking history. She has never used smokeless tobacco. She reports that she does not drink alcohol or use drugs. Review of Systems:   General Denies any fever or chills. No significant unexpected weight change. HEENT Denies any diplopia, tinnitus or vertigo. No chronic headaches. Resp Denies any shortness of breath, cough or wheezing   Cardiac Denies any chest pain, palpitations, claudication or edema. Hypertension   GI Denies any melena, hematochezia, hematemesis or pyrosis. Dysphagia. Large hiatal hernia as mentioned above. GERD. History of diverticular disease status post colectomy.  Denies any frequency, urgency, hesitancy or incontinence   Heme Denies bruising or bleeding easily   Endocrine hypothyroidism. No diabetes mellitus   Neuro Denies any focal motor or sensory deficits   Musculoskeletal Arthritis. No gout. No weakness. Fibromyalgia. Chronic back pain   Psychiatric Denies any severe depression or agitation. No panic attacks. No suicidal ideation.    OBJECTIVE: 03/01/21   0526 03/01/21   0658 03/01/21   0915 03/01/21   1253 03/02/21   0500 03/02/21   0501 03/02/21   1348   WBC 6.5 --  --  --  --  4.8 --  --  --  2.8* --  --    HGB 14.2 --  --  --  --  11.9* --  --  --  11.6* --  --    HCT 42.8 --  --  --  --  37.5 --  --  --  37.2 --  --     --  --  --  --  196 --  --  --  202 --  --    * --  < > --  < > 133* --  125* 130* --  132* --    K 4.3 --  --  --  --  4.1 --  --  --  --  3.8 --    CL 90* --  --  --  --  97* --  --  --  --  95* --    CO2 26 --  --  --  --  28 --  --  --  --  28 --    BUN 7 --  --  --  --  7 --  --  --  --  5* --    CREATININE 0.5 --  --  --  --  0.5 --  --  --  --  0.4 --    MG --  1.5* --  --  --  --  1.7 --  --  --  --  1.7   PHOS --  3.5 --  --  --  --  --  --  --  --  --  --    CALCIUM 9.0 --  --  --  --  8.6 --  --  --  --  8.6 --    INR 0.90 --  --  --  --  --  --  --  --  --  --  --    AST 30 --  --  --  --  --  --  --  --  --  --  --    ALT 12 --  --  --  --  --  --  --  --  --  --  --    BILITOT 0.4 --  --  --  --  --  --  --  --  --  --  --    LIPASE 14.1 --  --  --  --  --  --  --  --  --  --  --    LACTA --  1.2 --  --  --  0.6 --  --  --  --  --  --    NITRU --  --  --  NEGATIVE --  --  --  --  --  --  --  --    COLORU --  --  --  YELLOW --  --  --  --  --  --  --  --    < > = values in this interval not displayed.      RADIOLOGY:   I have personally reviewed the following films:   Narrative   CT angiogram of the chest.   Coronal MIPS were obtained.       Comparison 10/19/2016.       Findings:   Artifact mildly limits the study. No definite PE is identified. No dissection of the thoracic aorta is seen.       There is a large hiatal hernia with mild nonspecific dilatation of the    esophagus. There is evidence of prior granulomatous disease. There is elevation of the right hemidiaphragm. Small left pleural effusion. There is no focal consolidation.    There are patchy areas of mild atelectasis or scarring bilaterally. Indeterminate parenchymal opacity in the left lower lobe could represent    developing round atelectasis. The gallbladder is moderately distended.           Impression   Impression:   No definite PE is identified. Large hiatal hernia. Small left pleural effusion. Possible developing round atelectasis left lower lobe is technically    indeterminate. Recommend follow-up.       This document has been electronically signed by: Ivon Son MD on    02/28/2021 02:00 AM       All CTs at this facility use dose modulation techniques and iterative    reconstructions, and/or weight-based dosing   when appropriate to reduce radiation to a low as reasonably achievable.      Narrative   ATTEMPTED THORACENTESIS WITH ULTRASOUND GUIDANCE:       PERFORMED BY: Shania Herrera M.D.       CLINICAL INFORMATION: Pleural effusion left side.       APPROACH: Left side posteriorly and inferiorly . A few permanent sonographic images were obtained during procedure for documentation.       FLUID WITHDRAWN: None. ESTIMATED BLOOD LOSS: Minimal       PROCEDURE: Signed informed consent was obtained prior to performing this procedure.       The thorax was initially evaluated sonographically to determine appropriate puncture site. The skin was marked, prepped, and draped in a sterile fashion. The pleural effusions very small. Following local anesthesia and utilizing aseptic technique, one    pass of a 5 [place] one-step catheter was made on the left side. No fluid was obtained. Unfortunately, the patient did not tolerate the initial puncture and became nauseated, cold, clammy. Due to this, no additional attempts were made.           Impression   1. Attempted thoracentesis left side. This was unsuccessful due to combination of a very small effusion and the patient's inability to tolerate the needle puncture.    2. If desired, a repeat attempt at thoracentesis can be performed tomorrow with CT guidance.               **This report has been created using voice recognition software. It may contain minor errors which are inherent in voice recognition technology. **       Final report electronically signed by Dr. Imelda Nettles on 2/28/2021 9:33 AM      Narrative   PROCEDURE: FL ESOPHAGRAM       CLINICAL INFORMATION: Dysphasia       TECHNIQUE: Following the oral administration of thin barium, the anatomy of the esophagus was evaluated in a limited esophagram. 10 images were obtained. Total fluoroscopy time 1.0 minutes.       COMPARISON: None       FINDINGS: Evaluation is limited due to patient's lack of mobility. Swallowing was not evaluated. There are no strictures or extrinsic abnormalities in the esophagus. No mucosal lesions are identified. There is a large fixed hiatal hernia containing    approximately two thirds of the esophagus. There is organoaxial gastric volvulus within the hernia. No gastric obstruction is identified.           Impression       Large fixed hiatal hernia with organoaxial gastric volvulus.       Final report electronically signed by Dr. Ambreen Jerez on 3/2/2021 10:02 AM   Thank you for the interesting evaluation. Further recommendations to follow. Electronically signed by Froilan Marshall MD on 3/2/2021 at 5:05 PM     ADDENDUM:  1. Schedule Kavon Eng for robotic repair large paraesophageal/hiatal hernia with gastric volvulus reduction and fundoplication; possible G-tube insertion/gastropexy. 2. She will undergo pre-operative clearance per anesthesia guidelines with risk factors listed under the past medical history diagnosis & problem list.  3. The risks, benefits and alternatives were discussed with Kavon Eng including non-operative management. The pros and cons of robotic, laparoscopic and open techniques were discussed. The pros and cons of mesh insertion were discussed. All questions answered. She understands and wishes to proceed with surgical intervention.   4. Restrictions discussed with Kavon Eng and she expresses understanding. 5. She is advised to call back directly if there are further questions/concerns, or if her symptoms worsen prior to surgery.     Electronically signed by Gulshan Reynolds MD on 3/5/21 at 6:57 AM EST     Procedures/Diagnostic Tests:     OPERATIVE REPORT     PATIENT NAME: Cayden Quiroga                    :        1940  MED REC NO:   [id number]                           ROOM:       0020  ACCOUNT NO:   [de-identified]                           ADMIT DATE: 2021  PROVIDER:     Ruth Skinner M.D.     DATE OF PROCEDURE:  2021     PREOPERATIVE DIAGNOSIS:  Large paraesophageal/hiatal hernia with gastric  volvulus.     POSTOPERATIVE DIAGNOSIS:  Large paraesophageal/hiatal hernia with  gastric volvulus.     PROCEDURES:  1.  Robotic extensive lysis of adhesions (one hour)  2. Robotic reduction of large paraesophageal/hiatal hernia and gastric  volvulus with gastropexy/G-tube insertion.     SURGEON:  Ruth Skinner MD     ASSISTANT:  Ajay Cervantes. Sandra, [facility]     ANESTHESIA:  General/local.     ESTIMATED BLOOD LOSS:  20 mL.     DRAINS:  None.     COMPLICATIONS:  None.     DISPOSITION:  Stable to the recovery room.     INDICATIONS:  The patient is an 78year old female who I saw in the  hospital last week secondary to symptomatic large paraesophageal/hiatal  hernia. Gastric volvulus with intermittent obstructive signs/symptoms,  however overtime with gastric arrest, this improved. Both operative and  nonoperative intervention plans were discussed. The risks of surgery  were further discussed. Some of the risks included, but were not  limited to bleeding, infection, the need for reoperation, severe chronic  postoperative pain or numbness, major vascular or nerve injury,  cardiopulmonary complications, anesthetic complications, seroma/hematoma  formation, wound breakdown, trocar site herniation, chronic pain and  death.   After all of the questions were answered in their entirety of adhesions with  a robot using monopolar scissors and blunt dissection was carried out. This took off all the anterior abdominal wall, small bowel, large bowel  and omentum. No bowel injury was identified. After all of the  adhesions had been lysed and freed at that point, then I was able to  scrub back into the case and placed the other 8-mm trocar in the  standard location for the hiatal hernia reduction/repair portion of the  procedure. Liver retractor was brought in to the far right lateral  upper abdominal wall trocar site. This was placed under direct vision. She was placed in a reverse Trendelenburg. The robot was brought back  in and docked. Instruments placed under direct vision. We then  unscrubbed again and went back to the console. Here the entire stomach  had been herniated up into the posterior mediastinum. I really was not  able to reduce much of this at this point due to the chronicity of the  hernia. All other organs stayed in the intra-abdominal region. Began  on the right side. Pars flaccida was dissected through. This  dissection up to the pars flaccida and phrenoesophageal membrane carried  me up into the posterior mediastinum. Right roni was fully freed. Similar dissection on the left side up into the posterior mediastinum. We went deep up into the posterior mediastinum free and everything up  anteriorly. Upon doing all this, it became apparent that the patient  had definitely a short esophagus. The entire stomach was not going to  be able to be reduced back into the intra-abdominal cavity. I was able  to continue reduction of the stomach till about 80 to 90% of the stomach  was at that time able to be reduced down into the intraabdominal cavity. At that point given the sheer large nature of the defect, the patient's  age, hostile abdomen with her abdominal wall as well as with being a  Mormon, we elected to simply proceed with the gastropexy.    Short gastrics were all taken down off of the greater curve down all the  way up to the left roni. Again I had about 80% of the stomach if not  more all the way down into the abdomen that was resting comfortably with  no undue tension. On the anterior wall, we did place a 2-0 silk  pursestring. Same gastrostomy tube was placed into the far left upper  lateral abdominal region. The old Burgos's point incision was used for  this. Gastrotomy was then made at the middle of the pursestring suture. The gastric tube was placed within the lumen of the stomach and  pursestring suture tied. Multiple interrupted 2-0 silk sutures were  then used to anchor the stomach up to the undersurface of the anterior  abdominal wall. The gastric tube balloon was insufflated with water. This helped the anchor of the stomach up to the anterior abdominal wall. This was tested with 50 mL of water through the [place] syringe and there  was no leakage. No other abnormalities were identified at that time. Instruments removed. Robot undocked. I then scrubbed back into the  case. Large trocar site was closed at the fascia with a 0 Vicryl suture  on a UR needle. At the completion of this, there were no fascial  defects. The patient tolerated desufflation well. Skin reapproximated  at all the incisional sites with 4-0 Vicryl in a subcuticular fashion. Closed incisions were then cleaned, dried, and Steri-Strips applied. Dry sterile dressing was applied. Sponge, needle, and instrumentation  counts were correct at the end of the procedure. The patient tolerated  the procedure well with no apparent complications and less than 20 mL of  blood loss. She was able to be brought out of general anesthesia and  transferred to the postanesthesia care unit in stable condition.           Barbara Chavira M.D.     [facility] Course: Beka Acosta is a 78-year old female patient who was taken to the operative suite per Prema Jett MD and the planned procedure was performed as noted above. She was admitted to [facility] for postoperative care and was initially managed with bowel rest, analgesics for pain control, IV fluid hydration, GI and DVT prophylaxis. She did develop a postoperative ileus and required G-tube to be hooked to suction. She also developed pneumonia and was treated with IV antibiotics for 5 days. Patient had a recent femur fracture so she had activity restrictions until mid way though her stay. Orthopedic was consulted to clear her for increased activity. Left leg x-ray showed some improvement and she was advanced to walker with WBAT. This helped this getting her bowels moving and pain control. She slowly improved in ability to tolerate increasing levels of activity, take po fluids and solid foods with evidence of returning bowel function, and able to void on her own accord. Pain controlled with pain medication. She was going to go to a nursing home but once her activity was increased she was moving better and was able to go home with [facility] assistance and PT/OT at home. Discharge Condition: stable    Disposition: Home with [facility]    Labs:  Lab Results   Component Value Date    WBC 5.6 03/15/2021    HGB 12.7 03/15/2021    HCT 39.5 03/15/2021    MCV 91.6 03/15/2021     03/15/2021     Lab Results   Component Value Date     03/18/2021    K 3.8 03/18/2021    K 4.9 03/10/2021    CL 97 03/18/2021    CO2 29 03/18/2021    BUN 7 03/18/2021    CREATININE 0.6 03/18/2021    GLUCOSE 93 03/18/2021    CALCIUM 8.7 03/18/2021      Discharge Instructions:  Pt Name: Ever [facility]  Medical Record Number: [id number]  Today's Date: 3/19/2021    GENERAL ANESTHESIA OR SEDATION  1. Do not drive or operate hazardous machinery for 24 hours. 2. Do not make important business or personal decisions for 24 hours. 3. Do not drink alcoholic beverages or use tobacco for 24 hours. ACTIVITY INSTRUCTIONS:  [] Rest today.  Resume light to normal activity tomorrow.   [] You may resume normal activity tomorrow. Do not engage in strenuous activity that may place stress on your incision. [x] Do not drive for 3-5 days or while taking the pain medication. Avoid heavy lifting, tugging, pullings greater than 20 lbs until seen in the office. DIET INSTRUCTIONS:  [x]Begin with clear liquids. If not nauseated, may increase to a low-fat diet when you desire. Greasy and spicy foods are not advised. [x]Soft diet as tolerated. MEDICATIONS  [x]Prescription sent with you to be used as directed. [x]You may resume your daily prescription medication schedule unless otherwise specified. Take the Zofran exactly as prescribed to control nausea and vomiting. Use Colace and MiraLAX asneeded to prevent constipation. Do not allow yourself to become constipated. Drink at least 64 ounces of liquids per day. WOUND/DRESSING INSTRUCTIONS:  Always ensure you and your care giver clean hands before and after caring for the wound. [] Keep dressing clean and dry for 48 hours. Change when soiled or wet. [x] Allow steri-strips to fall off on their own. [x] Ice operative site for 20 minutes 4 times a day as needed. [x] May wash over incision in shower, but do not soak in a bath.  [] Take sitz bath for 20 minutes twice daily and after bowel movements. [x] Keep the abdominal binder in place during the day. May remove to shower and at night. ABDOMINAL/LAPAROSCOPIC SURGERY  [x]You are encouraged to get up and move around as this helps with circulation, prevents blood clots from forming and speeds up the healing process. Call the office if you develop pain or swelling in your legs. Do not massage sore muscles in the legs. [x]Breath deeply and cough from time to time. This helps to clear your lungs and helps prevent pneumonia. [x]Supporting your incision with a pillow or your hand helps to minimize discomfort and pain.   [x]Laparoscopic patients may develop shoulder pain in the first 48 hours from the gas used during the procedure a heating pad may help alleviate this discomfort. FOLLOW-UP CARE. SPECIFICALLY WATCH FOR:   Fever over 101 degrees by mouth   Increased redness, warmth, hardness at operative site. Blood soaked dressing (small amounts of oozing may be normal.)   Increased or progressive drainage from the surgical area   Inability to urinate or blood in the urine   Pain not relieved by the medications ordered   Persistent nausea and/or vomiting, unable to retain fluids. Pain or swelling in your legs. Shortness of breath. Call the office if you develop any of the above symptoms. FOLLOW-UP APPOINTMENT   []1 week   [x]2 weeks:    []Other    Call my office if you have any problem that concerns you [phone]. After hours, you can reach the answering service via the office phone number. IF YOU NEED IMMEDIATE ATTENTION, GO TO THE EMERGENCY ROOM AND YOUR DOCTOR WILL BE CONTACTED. Prepared by Sloan Carter CNP for   Luis Crum MD [place] [address] [id number]    Discharge Medications:        Medication List      CHANGE how you take these medications    enalapril 10 MG tablet  Commonly known as: VASOTEC  Take 1 tablet by mouth nightly  What changed: See the new instructions. CONTINUE taking these medications    Biotin 78260 MCG Tabs     CRANBERRY PO     DULoxetine 60 MG extended release capsule  Commonly known as: CYMBALTA  TAKE 1 CAPSULE DAILY     Flonase 50 MCG/ACT nasal spray  Generic drug: fluticasone     guaiFENesin 1200 MG Tb12  Take 1,200 mg by mouth 2 times daily     levothyroxine 100 MCG tablet  Commonly known as: SYNTHROID  Take 1 tablet by mouth Daily     metoprolol succinate 50 MG extended release tablet  Commonly known as: TOPROL XL  Take 1 tablet by mouth daily     * Misc.  Devices Misc  Mechanical lift for a [employer] Misc  1 each by Does not apply route daily     Omega 3-6-9 Complex Caps     pantoprazole 40 MG tablet  Commonly known as: PROTONIX  Take 1 tablet by mouth every morning (before breakfast)     pregabalin 150 MG capsule  Commonly known as: LYRICA  Take 1 capsule by mouth 2 times daily for 180 days. Scooter Misc  by Does not apply route Power scooter     therapeutic multivitamin-minerals tablet         * This list has 2 medication(s) that are the same as other medications prescribed for you. Read the directions carefully, and ask your doctor or other care provider to review them with you. STOP taking these medications    amoxicillin-clavulanate 875-125 MG per tablet  Commonly known as: AUGMENTIN           Where to Get Your Medications      Information about where to get these medications is not yet available    Ask your nurse or doctor about these medications  · enalapril 10 MG tablet       Follow-up:  in the next few weeks with Gabriella Owusu, DO  Follow up with FLORIDA Mabry CNP in 1 week.     Mando Javed CNP   Electronincally signed 3/23/2021 at 2:45 PM    A total of 35 minutes was spent in preparing the patient for discharge with greater than 50% of the time involved with education, counseling and coordinating care

## 2021-03-26 ENCOUNTER — CARE COORDINATION (OUTPATIENT)
Dept: CASE MANAGEMENT | Age: 81
End: 2021-03-26

## 2021-03-26 NOTE — CARE COORDINATION
of symptoms and risk factors. Plan for next call: symptom management-incision site pain  CTN provided contact information for future needs. Care Transitions Subsequent and Final Call    Subsequent and Final Calls  Care Transitions Interventions  Other Interventions:            Follow Up  Future Appointments   Date Time Provider Huber Little   3/29/2021  2:00 PM FLORIDA Lauren - [facility] [facility]   4/27/2021  1:00 PM DO ARCADIO Ventura PCT [facility] - Bella Sauceda LPN

## 2021-03-29 ENCOUNTER — OFFICE VISIT (OUTPATIENT)
Dept: SURGERY | Age: 81
End: 2021-03-29

## 2021-03-29 VITALS
SYSTOLIC BLOOD PRESSURE: 110 MMHG | HEIGHT: 69 IN | WEIGHT: 159 LBS | RESPIRATION RATE: 15 BRPM | BODY MASS INDEX: 23.55 KG/M2 | TEMPERATURE: 97 F | OXYGEN SATURATION: 97 % | HEART RATE: 67 BPM | DIASTOLIC BLOOD PRESSURE: 72 MMHG

## 2021-03-29 DIAGNOSIS — Z93.1 GASTROSTOMY IN PLACE (HCC): Primary | ICD-10-CM

## 2021-03-29 DIAGNOSIS — Z98.890 S/P HERNIA REPAIR: ICD-10-CM

## 2021-03-29 DIAGNOSIS — Z87.19 S/P HERNIA REPAIR: ICD-10-CM

## 2021-03-29 PROCEDURE — 99024 POSTOP FOLLOW-UP VISIT: CPT | Performed by: NURSE PRACTITIONER

## 2021-03-29 NOTE — PROGRESS NOTES
[address] [place] [address] [facility] [address]  Dept: [phone]  Dept Fax: [phone]  Loc: [phone]    Visit Date: 3/29/2021    Kenny Gunter is a 78 y.o. female who presents today for:  Chief Complaint   Patient presents with    Post-Op Check     s/p Robotic extensive lysis of adhesions (one hour), Robotic reduction of large paraesophageal/hiatal hernia and gastric volvulus with gastropexy/G-tube insertion 3/9/21       HPI:     HPI    Oumou Reddy is a 78-year old female patient who presents for follow-up appointment status post robotic extensive lysis of adhesions, robotic reduction of large paraesophageal and gastric volvulus with gastropexy almost 3 weeks ago. Patient presents with . She has a history of a recent left femur fracture so she is in a automatic wheelchair. Patient states she is doing well though since surgery. States she is eating almost everything she wants. She has had two \"choking\" episodes since coming home from the hospital.  She states both times were with meats. Otherwise she denies any dysphagia, nausea, or vomiting. Not using G-tube at all for nutrition. Tube only in place to help stomach down postoperatively. Patient denies any GERD. States she has intermittent bloating with constipation. She is drinking prune juice daily which does seem to help her bowels move daily. Denies any diarrhea. Denies any urinary complaints. Abdominal incisions are all clean dry and intact. G-tube to left abdomen is clamped. She does intermittently have some thin bilious drainage. Only changing dressing daily. There are no signs of infection or bleeding around tube site. Patient states she has occasional pain around the G-tube site but otherwise denies any abdominal pain. She is only taking Tylenol as needed. She is getting up with walker and working with therapy at home.   Home health nurses are still coming. Patient denies any shortness of breath or chest pain. Denies any fevers or chills.     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 Cesar Tovar GERD (gastroesophageal reflux disease)     Diverticulitis     Hiatal hernia     Hypertension     Hypothyroidism     Osteoarthritis       Past Surgical History:   Procedure Laterality Date    APPENDECTOMY  1958    BACK SURGERY      CARPAL TUNNEL RELEASE Bilateral 2013    w/cubital tunnel    COLON SURGERY  07/29/2016    Procedure: ATTEMPTED DAVINCI XI ROBOTIC ASSISTED SIGMOID COLON RESECTION, ROBOTIC ASSISTED MOBILIZATION OF RECTAL SIGMOID TO SPLENIC FLEXURE, CONVERTED TO OPEN LEFT HEMICOLECTOMY WITH COLOPROCTOCTOMY, SPLENORRHAPHY, RIGID SIGMOIDOSCOPY, LASER EVALUATION OF VASCULARITY WITH ICG; Surgeon: Cesar Tovar MD; Location: [facility]; Service: General    COLONOSCOPY  2012    CYST REMOVAL  2010   [address], [address]    Cataract    FEMUR FRACTURE SURGERY Left 12/21/2020    LEFT FEMUR OPEN REDUCTION INTERNAL FIXATION performed by Chiara Ramon MD at [address] Left 2001   [facility] N/A 3/9/2021    ROBOTIC EXTENSIVE LYSIS OF ADHESIONS, ROBOTIC REDUCTION OF HIATAL HERNIA WITH GASTROPEXY performed by Aman Owens MD at [facility]    w/bladder suspension   [address]/ [address]  2002, 2008, 2009    rt hip(2002), lt knee(2009), lt hip(2008) Shoulder (2009)   Juvenal Wolf [address] [place] SCRN NOT  [address] IND Left 9/15/2017    COLONOSCOPY performed by Feliberto Drummond MD at [facility]   [address]    discectomy   [address] [facility]  2012 Family History   Problem Relation Age of Onset    Arthritis Mother     Hearing Loss Mother     High Cholesterol Mother     Hearing Loss Father     Heart Disease Father 76        CABG    Stroke Father     Arthritis Sister     Depression Sister     Diabetes Sister     Arthritis Brother     Depression Brother     Cancer Maternal Aunt     Early Death Maternal Aunt     Diabetes Maternal Grandmother     Heart Disease Paternal Grandmother        Social History     Tobacco Use    Smoking status: Former Smoker     Packs/day: 2.00     Years: 11.00     Pack years: 22.00     Types: Cigarettes     Start date: 1957     Quit date: 1967     Years since quittin.4    Smokeless tobacco: Never Used   Substance Use Topics    Alcohol use: No      Current Outpatient Medications   Medication Sig Dispense Refill    enalapril (VASOTEC) 10 MG tablet Take 1 tablet by mouth nightly 90 tablet 3    pantoprazole (PROTONIX) 40 MG tablet Take 1 tablet by mouth every morning (before breakfast) 30 tablet 2    levothyroxine (SYNTHROID) 100 MCG tablet Take 1 tablet by mouth Daily 30 tablet 0    metoprolol succinate (TOPROL XL) 50 MG extended release tablet Take 1 tablet by mouth daily 30 tablet 0    pregabalin (LYRICA) 150 MG capsule Take 1 capsule by mouth 2 times daily for 180 days. 180 capsule 1    Misc. Devices (WALKER) MISC 1 each by Does not apply route daily 1 each 0    DULoxetine (CYMBALTA) 60 MG extended release capsule TAKE 1 CAPSULE DAILY 90 capsule 4    guaiFENesin 1200 MG TB12 Take 1,200 mg by mouth 2 times daily 20 tablet 0    Misc.  Devices MISC Mechanical lift for a [employer] 1 Device 0    Scooter MISC by Does not apply route Power scooter 1 each 0    CRANBERRY PO Take 1,500 mg by mouth daily      Biotin 87962 MCG TABS Take 1-2 tablets by mouth daily      fluticasone (FLONASE) 50 MCG/ACT nasal spray 1 spray by Nasal route as needed for Rhinitis      Omega 3-6-9 Fatty Acids (OMEGA 3-6-9 COMPLEX) CAPS Take 1 capsule by mouth daily       therapeutic multivitamin-minerals (THERAGRAN-M) tablet Take 1 tablet by mouth nightly        No current facility-administered medications for this visit. Allergies   Allergen Reactions    Ampicillin     Morphine Other (See Comments)     Hallucinations     Pcn [Penicillins] Itching    Sulfa Antibiotics        Subjective:     Review of Systems   Constitutional: Positive for activity change and fatigue. Negative for appetite change, chills, diaphoresis, fever and unexpected weight change. HENT: Negative for congestion, dental problem, hearing loss, rhinorrhea, sinus pressure and sore throat. Eyes: Negative for photophobia, pain, discharge, itching and visual disturbance. Respiratory: Negative for apnea, cough, choking, chest tightness, shortness of breath and wheezing. Cardiovascular: Negative for chest pain, palpitations and leg swelling. Gastrointestinal: Positive for abdominal pain (around G-tube site). Negative for abdominal distention, anal bleeding, blood in stool, constipation, diarrhea, nausea and vomiting. Endocrine: Negative. Genitourinary: Negative for decreased urine volume, difficulty urinating, dysuria, frequency and urgency. Musculoskeletal: Positive for gait problem and myalgias. Negative for arthralgias, back pain, joint swelling and neck pain. Skin: Positive for wound. Negative for color change, pallor and rash. Allergic/Immunologic: Negative. Neurological: Positive for weakness. Negative for dizziness, tremors, numbness and headaches. Hematological: Negative. Psychiatric/Behavioral: Negative. Objective:   /72 (Site: Right Upper Arm, Position: Sitting, Cuff Size: Medium Adult)   Pulse 67   Temp 97 °F (36.1 °C) (Tympanic)   Resp 15   Ht 5' 9\" (1.753 m)   Wt 159 lb (72.1 kg)   SpO2 97%   BMI 23.48 kg/m²     Physical Exam  Vitals signs reviewed.    Constitutional:       General: She is not in acute distress. Appearance: Normal appearance. She is well-developed. She is not ill-appearing or toxic-appearing. HENT:      Head: Normocephalic and atraumatic. Right Ear: Hearing and external ear normal.      Left Ear: Hearing and external ear normal.      Nose: Nose normal.      Mouth/Throat:      Mouth: Mucous membranes are not pale, not dry and not cyanotic. Eyes:      General: Lids are normal.   Neck:      Musculoskeletal: Normal range of motion and neck supple. Trachea: Trachea and phonation normal.   Cardiovascular:      Rate and Rhythm: Normal rate and regular rhythm. Pulses: Normal pulses. Heart sounds: S1 normal and S2 normal.   Pulmonary:      Effort: Pulmonary effort is normal. No tachypnea, bradypnea, accessory muscle usage or respiratory distress. Breath sounds: Normal breath sounds. No decreased breath sounds, wheezing or rales. Chest:      Chest wall: No tenderness. Abdominal:      General: Bowel sounds are normal. There is distension (abdomen is normally rounded). Palpations: Abdomen is soft. There is no mass. Tenderness: There is abdominal tenderness. Musculoskeletal: Normal range of motion. General: No tenderness. Skin:     General: Skin is warm and dry. Findings: No abrasion, bruising, burn, ecchymosis, erythema, laceration, lesion or rash. Neurological:      Mental Status: She is alert and oriented to person, place, and time. Motor: Weakness present. No tremor, atrophy or abnormal muscle tone. Coordination: Coordination abnormal.      Gait: Gait abnormal.      Deep Tendon Reflexes: Reflexes are normal and symmetric. Psychiatric:         Speech: Speech normal.         Behavior: Behavior normal.         Thought Content:  Thought content normal.       Lab Results   Component Value Date    WBC 5.6 03/15/2021    HGB 12.7 03/15/2021    HCT 39.5 03/15/2021    MCV 91.6 03/15/2021     03/15/2021     Lab Results Component Value Date     03/18/2021    K 3.8 03/18/2021    K 4.9 03/10/2021    CL 97 03/18/2021    CO2 29 03/18/2021    BUN 7 03/18/2021    CREATININE 0.6 03/18/2021    GLUCOSE 93 03/18/2021    CALCIUM 8.7 03/18/2021      Patient Active Problem List   Diagnosis    Osteoarthritis    Fibromyalgia    Gastroesophageal reflux disease    Hypothyroidism    Patient is Mormonism    Back pain    H/O abdominal surgery    Normocytic anemia    History of diverticulosis    Chronic low back pain with sciatica    Chest pain    Essential hypertension    Abnormal CT scan, chest    BRBPR (bright red blood per rectum)    GI bleed due to NSAIDs    Spinal stenosis of lumbar region without neurogenic claudication    Other idiopathic scoliosis, lumbar region    Neutropenia ([address]    Closed fracture of distal end of left femur, initial encounter ([facility])    Pre-syncope    Weakness generalized    Large hiatal hernia    Hypo-osmolar hyponatremia    Esophageal dysphagia    Pleural effusion on left    Hypomagnesemia    Dysphagia    Pneumonia of left lower lobe due to infectious organism    Bilateral lower extremity edema    Hx of fracture of leg    Lung nodule    Paraesophageal hernia    S/P repair of paraesophageal hernia     Assessment:     1. Status post robotic extensive lysis of adhesions, robotic reduction of large paraesophageal and gastric volvulus with gastropexy/G-tube insertion  2. Recent left femur fracture    Plan:     1. Abdomen benign. Incisions are healing well without signs of infection. Continue wound care as directed. 2. Continue G-tube care as discussed. Not using at this time. Will remove around 6-8 weeks. 3. Appetite doing well. Tolerating diet well. Continue diet as tolerated. High protein supplements encouraged. Discussed small, more frequent meals. Avoid food triggers. 4. Bowel function doing well. Prunes daily. 5. Off narcotics.  Tylenol as needed for discomfort. 6. Lifting/activity restrictions discussed with patient. Questions answered. Continue PT/OT at home as directed per ortho. 7. Follow up in 3 weeks for G-tube removal. Signs and symptoms reviewed with patient that would be concerning and need her to return to office for re-evaluation. Patient states she will call if she has questions or concerns.       Electronically signed by FLORIDA Hayes CNP on 3/30/2021 at 9:21 AM

## 2021-03-30 ASSESSMENT — ENCOUNTER SYMPTOMS
SHORTNESS OF BREATH: 0
CONSTIPATION: 0
APNEA: 0
DIARRHEA: 0
BLOOD IN STOOL: 0
EYE DISCHARGE: 0
ALLERGIC/IMMUNOLOGIC NEGATIVE: 1
SINUS PRESSURE: 0
CHOKING: 0
COLOR CHANGE: 0
SORE THROAT: 0
EYE ITCHING: 0
PHOTOPHOBIA: 0
CHEST TIGHTNESS: 0
WHEEZING: 0
RHINORRHEA: 0
NAUSEA: 0
ABDOMINAL PAIN: 1
ABDOMINAL DISTENTION: 0
ANAL BLEEDING: 0
VOMITING: 0
BACK PAIN: 0
COUGH: 0
EYE PAIN: 0

## 2021-04-05 ENCOUNTER — CARE COORDINATION (OUTPATIENT)
Dept: CASE MANAGEMENT | Age: 81
End: 2021-04-05

## 2021-04-05 NOTE — CARE COORDINATION
[facility]iel[facility] Transitions Follow Up Call    2021    Patient: Carlos Gunter  Patient : 1940   MRN: [id number]  Reason for Admission: Hiatal hernia  Discharge Date: 3/19/21 RARS: Readmission Risk Score: 15         Spoke with: , Juan M Apgar:    CHARTING SUMMARY:    Patients  states patient is sleeping and is doing fine. Very little pain at this time.  states that the incision is clean dry and intact. G-tube left abdomin clamped. Has all medications and is taking as directed. No swallowing issues at this time. Patient has no needs or concerns for writer at this time. Will continue to follow. Odessa Brower LPN    [phone]  Irene Morales / Dariela Chairez Coordinator    Care Transition Nurse (CTN) contacted the family by telephone to follow up after admission on 3/4/2021     Verified name and  with family as identifiers. Addressed changes since last contact: symptom management-Abdominal surgery  Discharged needs reviewed: none  Follow up appointment completed? Yes    Advance Care Planning:   Does patient have an Advance Directive:  reviewed and current. CTN reviewed discharge instructions, medical action plan and red flags with family and discussed any barriers to care and/or understanding of plan of care after discharge. Discussed appropriate site of care based on symptoms and resources available to patient including: When to call 911. The family agrees to contact the PCP office for questions related to their healthcare. Patients top risk factors for readmission: lack of knowledge about disease  Interventions to address risk factors: Obtained and reviewed discharge summary and/or continuity of care documents    Discussed follow-up appointments. If no appointment was previously scheduled, appointment scheduling offered: Yes   Is follow up appointment scheduled within 7 days of discharge?  No  [facility] follow up appointment(s): 3/8/2021      Plan for follow-up call in 3-5 days based on severity of symptoms and risk factors. Plan for next call:   CTN provided contact information for future needs. Phu Mcmahan LPN    [phone]  Arianne Jc / [address] [place] [address] Transitions Subsequent and Final Call    Subsequent and Final Calls  Do you have any ongoing symptoms?: No  Have your medications changed?: No  Do you have any questions related to your medications?: No  Do you currently have any active services?: Yes  Are you currently active with any services?: Home Health  Do you have any needs or concerns that I can assist you with?: No  Identified Barriers: None  Care Transitions Interventions  Other Interventions:            Follow Up  Future Appointments   Date Time Provider Huber Little   4/21/2021  1:00 PM FLORIDA Cutler - [facility]   4/27/2021  1:00 PM Diantha Bumpers, DO [place] [facility] - Savanna Sal LPN

## 2021-04-09 ENCOUNTER — CARE COORDINATION (OUTPATIENT)
Dept: CASE MANAGEMENT | Age: 81
End: 2021-04-09

## 2021-04-09 NOTE — CARE COORDINATION
[facility] Transitions Follow Up Call    2021    Patient: Kevin Gunter  Patient : 1940   MRN: [id number]  Reason for Admission: Hiatal Hernia  Discharge Date: 3/19/21 RARS: Readmission Risk Score: 13         Spoke with: Patient      TRANSITION OF CARE SUBSEQUENT CALL:    CHARTING SUMMARY:    Patient state she is doing fine. No abdominal Pain. G-Tube remains clamped at this time. No abdominal distention or pain. Incision clean, dry and intact. Denies warmth, pain, redness, pus drainage, or swollen to area. No fever or chills. Patient C/O being tired. Patient is eating well and has a good appetite. Patient is drinking adequate fluids. Normal Bladder and Bowel elimination patterns. Patient confirms has all medications and is taking as directed. Patient has no needs or concerns for writer at this time. Will continue to follow. Cindy Hodges, ANTHONY    [phone]  Amie Ashley / Mirian Parisi [facility] Transition Nurse (CTN) contacted the patient by telephone to follow up after admission on 3/5/2021. Verified name and  with patient as identifiers. Addressed changes since last contact: symptom management-Pain  Discharged needs reviewed: none  Follow up appointment completed? Yes    Advance Care Planning:   Does patient have an Advance Directive:  reviewed and current. CTN reviewed discharge instructions, medical action plan and red flags with patient and discussed any barriers to care and/or understanding of plan of care after discharge. Discussed appropriate site of care based on symptoms and resources available to patient including: PCP, Specialist, Home health and When to call 911. The patient agrees to contact the PCP office for questions related to their healthcare.      Patients top risk factors for readmission: lack of knowledge about disease  Interventions to address risk factors: Obtained and reviewed discharge summary and/or continuity of

## 2021-04-12 ENCOUNTER — OFFICE VISIT (OUTPATIENT)
Dept: SURGERY | Age: 81
End: 2021-04-12

## 2021-04-12 DIAGNOSIS — Z51.89 VISIT FOR WOUND CHECK: Primary | ICD-10-CM

## 2021-04-12 PROCEDURE — 99024 POSTOP FOLLOW-UP VISIT: CPT | Performed by: SURGERY

## 2021-04-12 RX ORDER — DULOXETIN HYDROCHLORIDE 60 MG/1
CAPSULE, DELAYED RELEASE ORAL
Qty: 90 CAPSULE | Refills: 3 | Status: SHIPPED | OUTPATIENT
Start: 2021-04-12 | End: 2022-01-17

## 2021-04-12 NOTE — PROGRESS NOTES
Patient here for a wound check-states area around gtube site is red and irritated. Cleansed with saline. Spoke with wound clinic recommendations to use stoma powder instructed patient to cleanse area and use powder twice a day and call if no improvement.  Appt on 4/21/2021 for tube removal.

## 2021-04-15 ENCOUNTER — CARE COORDINATION (OUTPATIENT)
Dept: CASE MANAGEMENT | Age: 81
End: 2021-04-15

## 2021-04-15 NOTE — CARE COORDINATION
Needs to be reviewed by the provider   Additional needs identified to be addressed with provider No  none           Method of communication with provider : none    Care Transition Nurse (CTN) contacted the patient by telephone to follow up after admission on 3/9/21. Verified name and  with patient as identifiers. Addressed changes since last contact: symptom management-Therapy going well, discharged from PT/OT continues with SN visits, incisionis healing well, applying prescribed powder  Discharged needs reviewed: home health care-continues with SN, Discharged from OT/PT  Follow up appointment completed? Yes    CTN reviewed discharge instructions, medical action plan and red flags with patient and discussed any barriers to care and/or understanding of plan of care after discharge. Discussed appropriate site of care based on symptoms and resources available to patient including: PCP, Specialist and Home health. The patient agrees to contact the PCP office for questions related to their healthcare. Patients top risk factors for readmission: medical condition  Interventions to address risk factors: Obtained and reviewed discharge summary and/or continuity of care documents      Plan for follow-up call in 5-7 days based on severity of symptoms and risk factors. Plan for next call: symptom management-wound management  CTN provided contact information for future needs. Guillermo Rocha stated she is progressing well, today is first day she feels \"really good\". Stated her incision site got infected d/t use of ointment.  cleaned it and prescribed powder and it has improved greatly. Stated she was discharged from [place] [address] PT/OT, continues with SN. Stated her G-Tube remains clamped, has not had any recent choking incidents, is careful to make sure to eat soft foods and chew well. Stated she is progressing with ambulation with walker, is able to go up and down front steps without extra assistance.  Pt has eye appt tomorrow.     Silva Riedel, RN BSN   Care Transitions Nurse  [phone]

## 2021-04-19 ENCOUNTER — CARE COORDINATION (OUTPATIENT)
Dept: CASE MANAGEMENT | Age: 81
End: 2021-04-19

## 2021-04-19 NOTE — CARE COORDINATION
Dariela [facility] Transitions Follow Up Call    2021    Patient: Leonides Gunter  Patient : 1940   MRN: [id number]  Reason for Admission: Hernia repair  Discharge Date: 3/19/21 RARS: Readmission Risk Score: 13         TRANSITION OF CARE SUBSEQUENT CALL:    CHARTING SUMMARY:    Patient states she is doing well. Abdominal Incisions clean, dry and healed. Still has G-tube clamped. Patient states is going Wed for G-tube removal. Patient states she is eating and drinking well. No difficulty swallowing or chocking. Patient confirms has all medications and is taking as directed. Patient states she went to eye doctor today and has macular degeneration. Patient has no needs or concerns for writer at this time. Instructed patient that this is the Final Transition Call and to call their Specialist/PCP for any problems or issues that may occur. Expresses understanding. ANTHONY Feliz    [phone]  Xavi Ryan / Dariela Chairez Coordinator          Needs to be reviewed by the provider   Additional needs identified to be addressed with provider No               Care Transition Nurse (CTN) contacted the patient by telephone to follow up after admission on 3/4/2021. Verified name and  with patient as identifiers. Addressed changes since last contact: symptom management-No changes  Discharged needs reviewed: none  Follow up appointment completed? Yes    Advance Care Planning:   Does patient have an Advance Directive:  reviewed and current. CTN reviewed discharge instructions, medical action plan and red flags with patient and discussed any barriers to care and/or understanding of plan of care after discharge. Discussed appropriate site of care based on symptoms and resources available to patient including: When to call 911. The patient agrees to contact the PCP office for questions related to their healthcare.      Patients top risk factors for readmission: lack of knowledge about disease  Interventions to address risk factors: Obtained and reviewed discharge summary and/or continuity of care documents    Discussed follow-up appointments. If no appointment was previously scheduled, appointment scheduling offered: Yes   Is follow up appointment scheduled within 7 days of discharge? Yes  [facility] follow up appointment(s): 3/8/2021      Final Call based on severity of symptoms and risk factors. Plan for next call:   CTN provided contact information for future needs. Odessa Brower LPN    [phone]  [employer] / [address] [place] [address] Transitions Subsequent and Final Call    Subsequent and Final Calls  Do you have any ongoing symptoms?: No  Have your medications changed?: No  Do you have any questions related to your medications?: No  Do you currently have any active services?: No  Are you currently active with any services?: Home Health  Do you have any needs or concerns that I can assist you with?: No  Identified Barriers: None  Care Transitions Interventions  Other Interventions:            Follow Up  Future Appointments   Date Time Provider Huber Little   4/21/2021  1:00 PM Quiana Wilkinson, APRN - [facility]   4/27/2021  1:00 PM DO ARCADIO Sahni PCT [facility] - David Woodson LPN

## 2021-04-20 NOTE — PROGRESS NOTES
[address] [place] [address] [address] [facility] [id number]  Dept: [phone]  Dept Fax: [phone]  Loc: [phone]    Visit Date: 4/21/2021    Roselinda Bosworth Height is a 78 y.o. female who presents today for:  Chief Complaint   Patient presents with    Post-Op Check     s/p Robotic extensive lysis of adhesions (one hour), Robotic reduction of large paraesophageal/hiatal hernia and gastric volvulus with gastropexy/G-tube insertion-3/9/21 Last seen in the office on 3/29/2021    Procedure     remove g tube       HPI:     GISELA Mariano is a 78-year old female patient who presents for follow-up status post robotic extensive lysis of adhesions, robotic reduction of large paraesophageal and gastric volvulus with gastropexy 6 weeks ago. Patient presents with . Would like gastrotomy tube removed today. States she is eating almost everything she wants. No choking, dysphagia, nausea, or vomiting. Not using G-tube at all for nutrition. Tube only in place to help stomach down postoperatively. Patient denies any GERD. States her bowels are moving better. Sometimes multiple times a day. Denies any diarrhea. Denies any urinary complaints. Abdominal incisions are healed without issues. G-tube to left abdomen is clamped. She has some excoriation and rash noted around tube. She has been using powder from our office that seems to be helping. There are no signs of infection or bleeding around tube site. Denies any abdominal pain. She is only taking Tylenol as needed. Recent femur fracture so she is still getting around slowly. Uses a scooter and working with PT/OT still. Home health nurses are still coming. Patient denies any shortness of breath or chest pain. Denies any fevers or chills.     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 - last stress test 2004    Anemia     mild - Hg 11.8 preop 1/2014    Back pain Sister     Arthritis Brother     Depression Brother     Cancer Maternal Aunt     Early Death Maternal Aunt     Diabetes Maternal Grandmother     Heart Disease Paternal Grandmother        Social History     Tobacco Use    Smoking status: Former Smoker     Packs/day: 2.00     Years: 11.00     Pack years: 22.00     Types: Cigarettes     Start date: 1957     Quit date: 1967     Years since quittin.5    Smokeless tobacco: Never Used   Substance Use Topics    Alcohol use: No      Current Outpatient Medications   Medication Sig Dispense Refill    DULoxetine (CYMBALTA) 60 MG extended release capsule TAKE 1 CAPSULE DAILY 90 capsule 3    enalapril (VASOTEC) 10 MG tablet Take 1 tablet by mouth nightly 90 tablet 3    pantoprazole (PROTONIX) 40 MG tablet Take 1 tablet by mouth every morning (before breakfast) 30 tablet 2    levothyroxine (SYNTHROID) 100 MCG tablet Take 1 tablet by mouth Daily 30 tablet 0    metoprolol succinate (TOPROL XL) 50 MG extended release tablet Take 1 tablet by mouth daily 30 tablet 0    pregabalin (LYRICA) 150 MG capsule Take 1 capsule by mouth 2 times daily for 180 days. 180 capsule 1    Misc. Devices (WALKER) MISC 1 each by Does not apply route daily 1 each 0    guaiFENesin 1200 MG TB12 Take 1,200 mg by mouth 2 times daily 20 tablet 0    Misc. Devices MISC Mechanical lift for a [employer] 1 Device 0    Scooter MISC by Does not apply route Power scooter 1 each 0    CRANBERRY PO Take 1,500 mg by mouth daily      Biotin 33708 MCG TABS Take 1-2 tablets by mouth daily      fluticasone (FLONASE) 50 MCG/ACT nasal spray 1 spray by Nasal route as needed for Rhinitis      Omega 3-6-9 Fatty Acids (OMEGA 3-6-9 COMPLEX) CAPS Take 1 capsule by mouth daily       therapeutic multivitamin-minerals (THERAGRAN-M) tablet Take 1 tablet by mouth nightly        No current facility-administered medications for this visit.       Allergies   Allergen Reactions    Ampicillin     Morphine Other (See Comments)     Hallucinations     Pcn [Penicillins] Itching    Sulfa Antibiotics        Subjective:     Review of Systems   Constitutional: Positive for activity change. Negative for appetite change, chills, diaphoresis, fatigue, fever and unexpected weight change. HENT: Negative for congestion, dental problem, hearing loss, rhinorrhea, sinus pressure and sore throat. Eyes: Negative for photophobia, pain, discharge, itching and visual disturbance. Respiratory: Negative for apnea, cough, choking, chest tightness, shortness of breath and wheezing. Cardiovascular: Negative for chest pain, palpitations and leg swelling. Gastrointestinal: Negative for abdominal distention, abdominal pain, anal bleeding, blood in stool, constipation, diarrhea, nausea and vomiting. Endocrine: Negative. Genitourinary: Negative for decreased urine volume, difficulty urinating, dysuria, frequency and urgency. Musculoskeletal: Positive for gait problem. Negative for arthralgias, back pain, joint swelling, myalgias and neck pain. Skin: Positive for wound (excoriation/rash around G-tube site). Negative for color change, pallor and rash. Allergic/Immunologic: Negative. Neurological: Positive for weakness. Negative for dizziness, tremors, numbness and headaches. Hematological: Negative. Psychiatric/Behavioral: Negative. Objective:   /78 (Site: Right Upper Arm, Position: Sitting, Cuff Size: Medium Adult)   Pulse 77   Temp 97.7 °F (36.5 °C) (Tympanic)   Resp 15   Ht 5' 9\" (1.753 m)   Wt 159 lb (72.1 kg)   SpO2 97%   BMI 23.48 kg/m²     Physical Exam  Vitals signs reviewed. Constitutional:       General: She is not in acute distress. Appearance: Normal appearance. She is well-developed. She is not ill-appearing or toxic-appearing. HENT:      Head: Normocephalic and atraumatic.       Right Ear: Hearing and external ear normal.      Left Ear: Hearing and external ear normal.      Nose: Nose

## 2021-04-21 ENCOUNTER — OFFICE VISIT (OUTPATIENT)
Dept: SURGERY | Age: 81
End: 2021-04-21

## 2021-04-21 VITALS
SYSTOLIC BLOOD PRESSURE: 135 MMHG | DIASTOLIC BLOOD PRESSURE: 78 MMHG | RESPIRATION RATE: 15 BRPM | HEART RATE: 77 BPM | BODY MASS INDEX: 23.55 KG/M2 | HEIGHT: 69 IN | OXYGEN SATURATION: 97 % | WEIGHT: 159 LBS | TEMPERATURE: 97.7 F

## 2021-04-21 DIAGNOSIS — Z87.19 S/P HERNIA REPAIR: ICD-10-CM

## 2021-04-21 DIAGNOSIS — Z43.1 PEG (PERCUTANEOUS ENDOSCOPIC GASTROSTOMY) ADJUSTMENT/REPLACEMENT/REMOVAL (HCC): Primary | ICD-10-CM

## 2021-04-21 DIAGNOSIS — Z98.890 S/P HERNIA REPAIR: ICD-10-CM

## 2021-04-21 PROCEDURE — 99024 POSTOP FOLLOW-UP VISIT: CPT | Performed by: NURSE PRACTITIONER

## 2021-04-22 ASSESSMENT — ENCOUNTER SYMPTOMS
COUGH: 0
CONSTIPATION: 0
PHOTOPHOBIA: 0
ABDOMINAL DISTENTION: 0
APNEA: 0
SORE THROAT: 0
DIARRHEA: 0
ALLERGIC/IMMUNOLOGIC NEGATIVE: 1
COLOR CHANGE: 0
ANAL BLEEDING: 0
VOMITING: 0
SINUS PRESSURE: 0
WHEEZING: 0
ABDOMINAL PAIN: 0
EYE DISCHARGE: 0
EYE ITCHING: 0
NAUSEA: 0
RHINORRHEA: 0
SHORTNESS OF BREATH: 0
BACK PAIN: 0
BLOOD IN STOOL: 0
CHEST TIGHTNESS: 0
CHOKING: 0
EYE PAIN: 0

## 2021-04-26 ENCOUNTER — TELEPHONE (OUTPATIENT)
Dept: SURGERY | Age: 81
End: 2021-04-26

## 2021-04-27 ENCOUNTER — OFFICE VISIT (OUTPATIENT)
Dept: FAMILY MEDICINE CLINIC | Age: 81
End: 2021-04-27
Payer: MEDICARE

## 2021-04-27 ENCOUNTER — TELEPHONE (OUTPATIENT)
Dept: SURGERY | Age: 81
End: 2021-04-27

## 2021-04-27 VITALS
RESPIRATION RATE: 16 BRPM | WEIGHT: 162 LBS | HEIGHT: 69 IN | SYSTOLIC BLOOD PRESSURE: 120 MMHG | OXYGEN SATURATION: 97 % | BODY MASS INDEX: 23.99 KG/M2 | DIASTOLIC BLOOD PRESSURE: 64 MMHG | TEMPERATURE: 98.7 F | HEART RATE: 78 BPM

## 2021-04-27 DIAGNOSIS — F32.A DEPRESSION, UNSPECIFIED DEPRESSION TYPE: ICD-10-CM

## 2021-04-27 DIAGNOSIS — Z00.00 ROUTINE GENERAL MEDICAL EXAMINATION AT A HEALTH CARE FACILITY: Primary | ICD-10-CM

## 2021-04-27 DIAGNOSIS — R30.0 DYSURIA: ICD-10-CM

## 2021-04-27 DIAGNOSIS — M48.061 SPINAL STENOSIS OF LUMBAR REGION WITHOUT NEUROGENIC CLAUDICATION: ICD-10-CM

## 2021-04-27 DIAGNOSIS — K44.9 LARGE HIATAL HERNIA: ICD-10-CM

## 2021-04-27 PROCEDURE — 99213 OFFICE O/P EST LOW 20 MIN: CPT | Performed by: FAMILY MEDICINE

## 2021-04-27 PROCEDURE — G0439 PPPS, SUBSEQ VISIT: HCPCS | Performed by: FAMILY MEDICINE

## 2021-04-27 RX ORDER — CIPROFLOXACIN 250 MG/1
250 TABLET, FILM COATED ORAL 2 TIMES DAILY
Qty: 10 TABLET | Refills: 0 | Status: SHIPPED | OUTPATIENT
Start: 2021-04-27 | End: 2021-09-27 | Stop reason: SDUPTHER

## 2021-04-27 ASSESSMENT — PATIENT HEALTH QUESTIONNAIRE - PHQ9
SUM OF ALL RESPONSES TO PHQ QUESTIONS 1-9: 2
SUM OF ALL RESPONSES TO PHQ9 QUESTIONS 1 & 2: 2
SUM OF ALL RESPONSES TO PHQ QUESTIONS 1-9: 2
1. LITTLE INTEREST OR PLEASURE IN DOING THINGS: 1

## 2021-04-27 ASSESSMENT — LIFESTYLE VARIABLES
AUDIT-C TOTAL SCORE: 4
HOW OFTEN DURING THE LAST YEAR HAVE YOU HAD A FEELING OF GUILT OR REMORSE AFTER DRINKING: 0
HOW OFTEN DURING THE LAST YEAR HAVE YOU BEEN UNABLE TO REMEMBER WHAT HAPPENED THE NIGHT BEFORE BECAUSE YOU HAD BEEN DRINKING: 0
HOW OFTEN DO YOU HAVE SIX OR MORE DRINKS ON ONE OCCASION: 0
HOW OFTEN DO YOU HAVE A DRINK CONTAINING ALCOHOL: 4
AUDIT TOTAL SCORE: 4
HAS A RELATIVE, FRIEND, DOCTOR, OR ANOTHER HEALTH PROFESSIONAL EXPRESSED CONCERN ABOUT YOUR DRINKING OR SUGGESTED YOU CUT DOWN: 0

## 2021-04-27 NOTE — PROGRESS NOTES
Teressa Gunter is a 78 y.o. female that presents for     Chief Complaint   Patient presents with    Medicare AWV     ? possible UTI feeling depressed at times       /64   Pulse 78   Temp 98.7 °F (37.1 °C) (Oral)   Resp 16   Ht 5' 9\" (1.753 m)   Wt 162 lb (73.5 kg)   SpO2 97%   BMI 23.92 kg/m²       HPI    Had hiatal hernia surgery and PEG tube placement since I last saw her. PEG tube has since been removed. Eating well per patient. Stoma site is healing well. Urinary Symptoms    HPI:    Symptoms present for 1 weeks. Symptoms are worse since they initially started. Dysuria? Yes  Hematuria? No  Increased urinary frequency? Yes  Abdominal discomfort? Yes  CVA pain? No  Hx of UTIs? Yes  Sexually active? No  LMP? No LMP recorded. Patient has had a hysterectomy. Did have some depressive sx recently, but relates this to being in an ECF following her recent hospitalizations. Mood has 'gotten better' since then.         Health Maintenance   Topic Date Due    COVID-19 Vaccine (1) Never done    DTaP/Tdap/Td vaccine (1 - Tdap) Never done    Shingles Vaccine (1 of 2) Never done   [employer] Visit (AWV)  Never done    TSH testing  02/28/2022    Potassium monitoring  03/18/2022    Creatinine monitoring  03/18/2022    DEXA (modify frequency per FRAX score)  Completed    Flu vaccine  Completed    Pneumococcal 65+ years Vaccine  Completed    Hepatitis A vaccine  Aged Out    Hepatitis B vaccine  Aged Out    Hib vaccine  Aged Out    Meningococcal (ACWY) vaccine  Aged Out       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 Kita Brown GERD (gastroesophageal reflux disease)     Diverticulitis     Hiatal hernia     Hypertension     Hypothyroidism     tablet by mouth 2 times daily for 5 days    -Wants to hold on treatment for depression at this time. Feels like sx are improving overall. Will let me know if any worsening of sx.  -Will start Cipro for dysuria, has hx of recurrent UTIs, will let me know if sx persist  -Other chronic issues are stable, continue current medications  -Advised to call if any issues      Return in 4 months (on 8/27/2021), or if symptoms worsen or fail to improve. All copied or forwarded information in the progress note was verified by me to be accurate at the time of visit  Patient's past medical, surgical, social and family history were reviewed and updated     All patient questions answered. Patient voiced understanding.

## 2021-04-27 NOTE — PATIENT INSTRUCTIONS
of the tests and screenings are paid in full while other may be subject to a deductible, co-insurance, and/or copay. Some of these benefits include a comprehensive review of your medical history including lifestyle, illnesses that may run in your family, and various assessments and screenings as appropriate. After reviewing your medical record and screening and assessments performed today your provider may have ordered immunizations, labs, imaging, and/or referrals for you. A list of these orders (if applicable) as well as your Preventive Care list are included within your After Visit Summary for your review. Other Preventive Recommendations:    A preventive eye exam performed by an eye specialist is recommended every 1-2 years to screen for glaucoma; cataracts, macular degeneration, and other eye disorders. A preventive dental visit is recommended every 6 months. Try to get at least 150 minutes of exercise per week or 10,000 steps per day on a pedometer . Order or download the FREE \"Exercise & Physical Activity: Your Everyday Guide\" from [employer]. Call 4-705.927.3849 or search [employer] online. You need 5457-7572 mg of calcium and 2313-4199 IU of vitamin D per day. It is possible to meet your calcium requirement with diet alone, but a vitamin D supplement is usually necessary to meet this goal.  When exposed to the sun, use a sunscreen that protects against both UVA and UVB radiation with an SPF of 30 or greater. Reapply every 2 to 3 hours or after sweating, drying off with a towel, or swimming. Always wear a seat belt when traveling in a car. Always wear a helmet when riding a bicycle or motorcycle.

## 2021-04-27 NOTE — PROGRESS NOTES
Medicare Annual Wellness Visit  Name: Ever Gunter VLEWXG Date: 2021   MRN: [id number] Sex: Female   Age: 78 y.o. Ethnicity: Non-/Non    : 1940 Race: Patricia BERNAL Height is here for Medicare AWV (? possible UTI feeling depressed at times)    Screenings for behavioral, psychosocial and functional/safety risks, and cognitive dysfunction are all negative except as indicated below. These results, as well as other patient data from the [address] form, are documented in Flowsheets linked to this Encounter. Allergies   Allergen Reactions    Ampicillin     Morphine Other (See Comments)     Hallucinations     Pcn [Penicillins] Itching    Sulfa Antibiotics          Prior to Visit Medications    Medication Sig Taking? Authorizing Provider   DULoxetine (CYMBALTA) 60 MG extended release capsule TAKE 1 CAPSULE DAILY Yes Morrison Cheadle, APRN - CNP   enalapril (VASOTEC) 10 MG tablet Take 1 tablet by mouth nightly Yes FLORIDA Greenberg CNP   pantoprazole (PROTONIX) 40 MG tablet Take 1 tablet by mouth every morning (before breakfast) Yes FLORIDA Segura CNP   levothyroxine (SYNTHROID) 100 MCG tablet Take 1 tablet by mouth Daily Yes Buck Starr MD   metoprolol succinate (TOPROL XL) 50 MG extended release tablet Take 1 tablet by mouth daily Yes Buck Starr MD   pregabalin (LYRICA) 150 MG capsule Take 1 capsule by mouth 2 times daily for 180 days. Yes Harshad Given, DO   Misc. Devices (WALKER) MISC 1 each by Does not apply route daily Yes Harshad Given, DO   Misc.  Devices MISC Mechanical lift for a [facility] Harshad Given, DO   Scooter MISC by Does not apply route Power scooter Yes Ascencion Pierce, DO   CRANBERRY PO Take 1,500 mg by mouth daily Yes Historical Provider, MD   Biotin 33035 MCG TABS Take 1-2 tablets by mouth daily Yes Historical Provider, MD   Omega 3-6-9 Fatty Acids (OMEGA 3-6-9 COMPLEX) CAPS Take 1 capsule by mouth daily  Yes Historical Provider, MD therapeutic multivitamin-minerals (THERAGRAN-M) tablet Take 1 tablet by mouth nightly  Yes Historical Provider, MD   guaiFENesin 1200 MG TB12 Take 1,200 mg by mouth 2 times daily  Patient not taking: Reported on 4/27/2021  Светлана Mccall PA-C   fluticasone Edger Stamp) 50 MCG/ACT nasal spray 1 spray by Nasal route as needed for Rhinitis  Historical Provider, MD         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 Chuy Pradhan GERD (gastroesophageal reflux disease)     Diverticulitis     Hiatal hernia     Hypertension     Hypothyroidism     Osteoarthritis        Past Surgical History:   Procedure Laterality Date    APPENDECTOMY  1958    BACK SURGERY      CARPAL TUNNEL RELEASE Bilateral 2013    w/cubital tunnel    COLON SURGERY  07/29/2016    Procedure: ATTEMPTED DAVINCI XI ROBOTIC ASSISTED SIGMOID COLON RESECTION, ROBOTIC ASSISTED MOBILIZATION OF RECTAL SIGMOID TO SPLENIC FLEXURE, CONVERTED TO OPEN LEFT HEMICOLECTOMY WITH COLOPROCTOCTOMY, SPLENORRHAPHY, RIGID SIGMOIDOSCOPY, LASER EVALUATION OF VASCULARITY WITH ICG; Surgeon: Chuy Pradhan MD; Location: [facility]; Service: General    COLONOSCOPY  2012    CYST REMOVAL  2010   [address], [address]    Cataract    FEMUR FRACTURE SURGERY Left 12/21/2020    LEFT FEMUR OPEN REDUCTION INTERNAL [facility]ATION [facility]   [facility] N/A 3/9/2021    ROBOTIC EXTENSIVE LYSIS OF ADHESIONS, ROBOTIC REDUCTION OF HIATAL HERNIA WITH GASTROPEXY performed by Lai Rios MD at [facility]    w/bladder suspension   [address]  2002, 2008, 2009    rt hip(2002), lt knee(2009), lt hip(2008) Shoulder (2009)   [address] [place] COLON CA SCRN NOT  [address] IND Left 9/15/2017    COLONOSCOPY performed by Venessa Melchor MD at [place] [id number]    UPPER GASTROINTESTINAL ENDOSCOPY  2012         Family History   Problem Relation Age of Onset    Arthritis Mother     Hearing Loss Mother     High Cholesterol Mother     Hearing Loss Father     Heart Disease Father 76        CABG    Stroke Father     Arthritis Sister     Depression Sister     Diabetes Sister     Arthritis Brother     Depression Brother     Cancer Maternal Aunt     Early Death Maternal Aunt     Diabetes Maternal Grandmother     Heart Disease Paternal Grandmother        CareTeam (Including outside providers/suppliers regularly involved in providing care):   Patient Care Team:  Kadi Adair DO as PCP - General  Kadi Adair DO as PCP - [facility] Empaneled Provider    Wt Readings from Last 3 Encounters:   04/27/21 162 lb (73.5 kg)   04/21/21 159 lb (72.1 kg)   03/29/21 159 lb (72.1 kg)     Vitals:    04/27/21 1307   BP: 120/64   Pulse: 78   Resp: 16   Temp: 98.7 °F (37.1 °C)   TempSrc: Oral   SpO2: 97%   Weight: 162 lb (73.5 kg)   Height: 5' 9\" (1.753 m)     Body mass index is 23.92 kg/m². Based upon direct observation of the patient, evaluation of cognition reveals recent and remote memory intact. Patient's complete Health Risk Assessment and screening values have been reviewed and are found in Flowsheets. The following problems were reviewed today and where indicated follow up appointments were made and/or referrals ordered. Positive Risk Factor Screenings with Interventions:     Fall Risk:  Timed Up and Go Test > 12 seconds?  (Complete if either Fall Risk answers are Yes): (!) yes  2 or more falls in past year?: no  Fall with injury in past year?: (!) yes  Fall Risk Interventions:    · Home safety tips provided  · continue to use [employer]         Health Habits/Nutrition:  Health Habits/Nutrition  Do you exercise for at least 20 minutes 2-3 times per week?: (!) No  Have you lost any weight without trying in the past 3 months?: (!) Yes  Do you eat only one meal per day?: No  Have you seen the dentist within the past year?: N/A - wear dentures  Body mass index: 23.92  Health Habits/Nutrition Interventions:  · Weight is improving since her surgery    Hearing/Vision:  No exam data present  Hearing/Vision  Do you or your family notice any trouble with your hearing that hasn't been managed with hearing aids?: No  Do you have difficulty driving, watching TV, or doing any of your daily activities because of your eyesight?: (!) Yes  Have you had an eye exam within the past year?: Yes  Hearing/Vision Interventions:  · Vision concerns:  patient encouraged to make appointment with his/her eye specialist    Safety:  Safety  Do you have working smoke detectors?: Yes  Have all throw rugs been removed or fastened?: (!) No  Do you have non-slip mats or surfaces in all bathtubs/showers?: Yes  Do all of your stairways have a railing or banister?: Yes  Are your doorways, halls and stairs free of clutter?: Yes  Do you always fasten your seatbelt when you are in a car?: Yes  Safety Interventions:  · Home safety tips provided    ADL:  ADLs  In the past 7 days, did you need help from others to perform any of the following everyday activities? Eating, dressing, grooming, bathing, toileting, or walking/balance?: (!) Bathing, Walking/Balance  In the past 7 days, did you need help from others to take care of any of the following?  Laundry, housekeeping, banking/finances, shopping, telephone use, food preparation, transportation, or taking medications?: [employer], Housekeeping, Shopping, Taking Medications  ADL Interventions:  · Patient declines any further evaluation/treatment for this issue  ·  helps with ADLs and IADLs    Personalized Preventive Plan   Current Health Maintenance Status  Immunization History   Administered Date(s) Administered    Influenza Vaccine, unspecified formulation 09/26/2014, 10/13/2015, 10/01/2016    Influenza Virus Vaccine 09/26/2014, 10/01/2016    Influenza, High Dose (Fluzone 65 yrs and older) 10/24/2017, 09/29/2018, 10/20/2020    Influenza, Carrasco Burow, IM, PF (6 mo and older Fluzone, Flulaval, Fluarix, and 3 yrs and older Afluria) 10/13/2015, 10/20/2020    Influenza, Quadv, adjuvanted, 65 yrs +, IM, PF (Fluad) 10/20/2020    Influenza, Triv, inactivated, subunit, adjuvanted, IM (Fluad 65 yrs and older) 09/29/2018, 10/12/2019    MMR 10/24/1995    Pneumococcal Polysaccharide ([id number]) 11/02/2004, 10/12/2010        Health Maintenance   Topic Date Due    COVID-19 Vaccine (1) Never done    DTaP/Tdap/Td vaccine (1 - Tdap) Never done    Shingles Vaccine (1 of 2) Never done   [employer] Visit (AWV)  Never done    TSH testing  02/28/2022    Potassium monitoring  03/18/2022    Creatinine monitoring  03/18/2022    DEXA (modify frequency per FRAX score)  Completed    Flu vaccine  Completed    Pneumococcal 65+ years Vaccine  Completed    Hepatitis A vaccine  Aged Out    Hepatitis B vaccine  Aged Out    Hib vaccine  Aged Out    Meningococcal (ACWY) vaccine  Aged Out     Recommendations for [employer] Due: see orders and patient instructions/AVS.  . Recommended screening schedule for the next 5-10 years is provided to the patient in written form: see Patient Instructions/AVS.    Jesus Gimenez was seen today for medicare awv.     Diagnoses and all orders for this visit:    Routine general medical examination at a health care facility    Spinal stenosis of lumbar region without neurogenic claudication    Large hiatal hernia    Dysuria

## 2021-04-27 NOTE — TELEPHONE ENCOUNTER
Called patient and area around is just pink now. Saw Dr Fior Field today he thinks things look good. Instructed patient to call if nay issues.

## 2021-05-05 ENCOUNTER — OFFICE VISIT (OUTPATIENT)
Dept: SURGERY | Age: 81
End: 2021-05-05

## 2021-05-05 VITALS
HEART RATE: 68 BPM | HEIGHT: 69 IN | WEIGHT: 162 LBS | OXYGEN SATURATION: 96 % | TEMPERATURE: 96.8 F | BODY MASS INDEX: 23.99 KG/M2 | DIASTOLIC BLOOD PRESSURE: 60 MMHG | SYSTOLIC BLOOD PRESSURE: 110 MMHG | RESPIRATION RATE: 18 BRPM

## 2021-05-05 DIAGNOSIS — Z87.19 S/P HERNIA REPAIR: Primary | ICD-10-CM

## 2021-05-05 DIAGNOSIS — Z98.890 S/P HERNIA REPAIR: Primary | ICD-10-CM

## 2021-05-05 PROCEDURE — 99024 POSTOP FOLLOW-UP VISIT: CPT | Performed by: NURSE PRACTITIONER

## 2021-05-07 ASSESSMENT — ENCOUNTER SYMPTOMS
DIARRHEA: 0
RHINORRHEA: 0
SORE THROAT: 0
CHEST TIGHTNESS: 0
ABDOMINAL PAIN: 0
CHOKING: 0
COUGH: 0
ANAL BLEEDING: 0
EYE ITCHING: 0
BACK PAIN: 0
COLOR CHANGE: 0
WHEEZING: 0
EYE PAIN: 0
APNEA: 0
VOMITING: 0
BLOOD IN STOOL: 0
ALLERGIC/IMMUNOLOGIC NEGATIVE: 1
EYE DISCHARGE: 0
NAUSEA: 0
PHOTOPHOBIA: 0
CONSTIPATION: 0
SHORTNESS OF BREATH: 0
SINUS PRESSURE: 0
ABDOMINAL DISTENTION: 0

## 2021-05-07 NOTE — PROGRESS NOTES
[address] [place] [address] Dr Fajardo [address] [place] [id number]  Dept: [phone]  Dept Fax: [phone]  Loc: [phone]    Visit Date: 5/5/2021    Dk Gunter is a 78 y.o. female who presents today for:  Chief Complaint   Patient presents with    Post-Op Check     s/p Robotic extensive lysis of adhesions (one hour), Robotic reduction of large paraesophageal/hiatal hernia and gastric volvulus with gastropexy/G-tube insertion-3/9/21 Last seen in the office on 4/21/2021 Gtube removed on 4/21/2021       HPI:     HPI    Arthur Duffy is a 78-year old female patient who presents for follow-up status post robotic extensive lysis of adhesions, robotic reduction of large paraesophageal and gastric volvulus with gastropexy 8 weeks ago. Patient presents with . G-tube pulled 2 weeks ago in the office. No choking, dysphagia, nausea, or vomiting. Patient denies any GERD.  States her bowels are moving better. Sometimes multiple times a day. Denies any diarrhea.  Denies any urinary complaints.  Abdominal incisions are healed without issues. G-tube site is pretty well healed without any issues. There are no signs of infection or bleeding around site. Denies any abdominal pain.  She is only taking Tylenol as needed. Patient denies any shortness of breath or chest pain.  Denies any fevers or chills.     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 Kelsea Green GERD (gastroesophageal reflux disease)     Diverticulitis     Hiatal hernia     Hypertension     Hypothyroidism     Osteoarthritis       Past Surgical History:   Procedure Laterality Date    APPENDECTOMY  1958    BACK SURGERY      CARPAL TUNNEL RELEASE Bilateral 2013 w/cubital tunnel    COLON SURGERY  07/29/2016    Procedure: ATTEMPTED DAVINCI XI ROBOTIC ASSISTED SIGMOID COLON RESECTION, ROBOTIC ASSISTED MOBILIZATION OF RECTAL SIGMOID TO SPLENIC FLEXURE, CONVERTED TO OPEN LEFT HEMICOLECTOMY WITH COLOPROCTOCTOMY, SPLENORRHAPHY, RIGID SIGMOIDOSCOPY, LASER EVALUATION OF VASCULARITY WITH ICG; Surgeon: Lissa Owens MD; Location: [place] [address]; Service: General    COLONOSCOPY  2012    CYST REMOVAL  2010   [address], [address]    Cataract    FEMUR FRACTURE SURGERY Left 12/21/2020    LEFT FEMUR OPEN REDUCTION INTERNAL FIXATION performed by Bessie Brewster MD at [address] Left 2001    HIATAL HERNIA REPAIR N/A 03/09/2021    ROBOTIC EXTENSIVE LYSIS OF ADHESIONS, ROBOTIC REDUCTION OF HIATAL HERNIA WITH GASTROPEXY performed by Jazzy Mercado MD at [address]    w/bladder suspension   [address]  2002, 2008, 2009    rt hip(2002), lt knee(2009), lt hip(2008) Shoulder (2009)    OTHER SURGICAL HISTORY  04/21/2021    Danny Royal Alan Cage CNP in the office   [address]    MA COLON CA SCRN NOT  [address] IND Left 09/15/2017    COLONOSCOPY performed by Pawan Fisher MD at [facility]   [address]    discectomy   [address]    UPPER GASTROINTESTINAL ENDOSCOPY  2012       Family History   Problem Relation Age of Onset    Arthritis Mother     Hearing Loss Mother     High Cholesterol Mother     Hearing Loss Father     Heart Disease Father 76        CABG    Stroke Father     Arthritis Sister     Depression Sister     Diabetes Sister     Arthritis Brother     Depression Brother     Cancer Maternal Aunt     Early Death Maternal Aunt     Diabetes Maternal Grandmother     Heart Disease Paternal Grandmother        Social History     Tobacco Use    Smoking status: Former Smoker     Packs/day: 2.00     Years: 11.00     Pack years: 22.00     Types: Cigarettes     Start date: 1957     Quit date: 1967     Years since quittin.5    Smokeless tobacco: Never Used   Substance Use Topics    Alcohol use: No      Current Outpatient Medications   Medication Sig Dispense Refill    DULoxetine (CYMBALTA) 60 MG extended release capsule TAKE 1 CAPSULE DAILY 90 capsule 3    enalapril (VASOTEC) 10 MG tablet Take 1 tablet by mouth nightly 90 tablet 3    pantoprazole (PROTONIX) 40 MG tablet Take 1 tablet by mouth every morning (before breakfast) 30 tablet 2    levothyroxine (SYNTHROID) 100 MCG tablet Take 1 tablet by mouth Daily 30 tablet 0    metoprolol succinate (TOPROL XL) 50 MG extended release tablet Take 1 tablet by mouth daily 30 tablet 0    pregabalin (LYRICA) 150 MG capsule Take 1 capsule by mouth 2 times daily for 180 days. 180 capsule 1    Misc. Devices (WALKER) MISC 1 each by Does not apply route daily 1 each 0    guaiFENesin 1200 MG TB12 Take 1,200 mg by mouth 2 times daily 20 tablet 0    Misc. Devices MISC Mechanical lift for a [employer] 1 Device 0    Scooter MISC by Does not apply route Power scooter 1 each 0    CRANBERRY PO Take 1,500 mg by mouth daily      Biotin 56687 MCG TABS Take 1-2 tablets by mouth daily      fluticasone (FLONASE) 50 MCG/ACT nasal spray 1 spray by Nasal route as needed for Rhinitis      Omega 3-6-9 Fatty Acids (OMEGA 3-6-9 COMPLEX) CAPS Take 1 capsule by mouth daily       therapeutic multivitamin-minerals (THERAGRAN-M) tablet Take 1 tablet by mouth nightly        No current facility-administered medications for this visit. Allergies   Allergen Reactions    Ampicillin     Morphine Other (See Comments)     Hallucinations     Pcn [Penicillins] Itching    Sulfa Antibiotics        Subjective:     Review of Systems   Constitutional: Positive for activity change. Negative for appetite change, chills, diaphoresis, fatigue, fever and unexpected weight change.    HENT: Negative for congestion, dental  GI bleed due to NSAIDs    Spinal stenosis of lumbar region without neurogenic claudication    Other idiopathic scoliosis, lumbar region    Neutropenia ([facility]    Closed fracture of distal end of left femur, initial encounter ([facility]    Pre-syncope    Weakness generalized    Large hiatal hernia    Hypo-osmolar hyponatremia    Esophageal dysphagia    Pleural effusion on left    Hypomagnesemia    Dysphagia    Pneumonia of left lower lobe due to infectious organism    Bilateral lower extremity edema    Hx of fracture of leg    Lung nodule    Paraesophageal hernia    S/P repair of paraesophageal hernia     Assessment:     1. Status post robotic extensive lysis of adhesions, robotic reduction of large paraesophageal and gastric volvulus with gastropexy  2. G-tube removal  3. Recent left femur fracture     Plan:     1. Abdomen benign. Incisions are healed. G-tube removal site healing well without any skin issues. No drainage. 2. Appetite doing well. Continue diet as tolerated. High protein supplements encouraged. Continue smaller, more frequent meals. 3. Bowel function doing well. Stool softeners as needed. 4. Tylenol as needed for discomfort  5. Increase activity as tolerated  6. Follow up as needed. Signs and symptoms reviewed with patient that would be concerning and need her to return to office for re-evaluation. Patient states she will call if she has questions or concerns.   7. Follow up with PCP as directed       Electronically signed by FLORIDA Lagos CNP on 5/7/2021 at 7:39 PM

## 2021-05-12 RX ORDER — LEVOTHYROXINE SODIUM 0.1 MG/1
100 TABLET ORAL DAILY
Qty: 90 TABLET | Refills: 3 | Status: SHIPPED | OUTPATIENT
Start: 2021-05-12 | End: 2022-05-16

## 2021-05-12 RX ORDER — LEVOTHYROXINE SODIUM 0.1 MG/1
100 TABLET ORAL DAILY
Qty: 30 TABLET | Refills: 0 | Status: SHIPPED | OUTPATIENT
Start: 2021-05-12 | End: 2021-05-14

## 2021-05-14 ENCOUNTER — TELEPHONE (OUTPATIENT)
Dept: PHARMACY | Facility: CLINIC | Age: 81
End: 2021-05-14

## 2021-05-14 ENCOUNTER — CARE COORDINATION (OUTPATIENT)
Dept: CARE COORDINATION | Age: 81
End: 2021-05-14

## 2021-05-14 ENCOUNTER — OFFICE VISIT (OUTPATIENT)
Dept: FAMILY MEDICINE CLINIC | Age: 81
End: 2021-05-14
Payer: MEDICARE

## 2021-05-14 DIAGNOSIS — E03.9 ACQUIRED HYPOTHYROIDISM: ICD-10-CM

## 2021-05-14 DIAGNOSIS — I10 ESSENTIAL HYPERTENSION: ICD-10-CM

## 2021-05-14 DIAGNOSIS — M48.061 SPINAL STENOSIS OF LUMBAR REGION WITHOUT NEUROGENIC CLAUDICATION: ICD-10-CM

## 2021-05-14 DIAGNOSIS — H35.3132 INTERMEDIATE STAGE NONEXUDATIVE AGE-RELATED MACULAR DEGENERATION OF BOTH EYES: Primary | ICD-10-CM

## 2021-05-14 PROCEDURE — 99215 OFFICE O/P EST HI 40 MIN: CPT | Performed by: FAMILY MEDICINE

## 2021-05-14 PROCEDURE — G2212 PROLONG OUTPT/OFFICE VIS: HCPCS | Performed by: FAMILY MEDICINE

## 2021-05-14 PROCEDURE — 99497 ADVNCD CARE PLAN 30 MIN: CPT | Performed by: FAMILY MEDICINE

## 2021-05-14 SDOH — SOCIAL STABILITY: SOCIAL NETWORK: HOW OFTEN DO YOU GET TOGETHER WITH FRIENDS OR RELATIVES?: MORE THAN THREE TIMES A WEEK

## 2021-05-14 SDOH — ECONOMIC STABILITY: TRANSPORTATION INSECURITY
IN THE PAST 12 MONTHS, HAS THE LACK OF TRANSPORTATION KEPT YOU FROM MEDICAL APPOINTMENTS OR FROM GETTING MEDICATIONS?: NO

## 2021-05-14 SDOH — SOCIAL STABILITY: SOCIAL NETWORK: ARE YOU MARRIED, WIDOWED, DIVORCED, SEPARATED, NEVER MARRIED, OR LIVING WITH A PARTNER?: MARRIED

## 2021-05-14 SDOH — ECONOMIC STABILITY: FOOD INSECURITY: WITHIN THE PAST 12 MONTHS, THE FOOD YOU BOUGHT JUST DIDN'T LAST AND YOU DIDN'T HAVE MONEY TO GET MORE.: NEVER TRUE

## 2021-05-14 SDOH — SOCIAL STABILITY: SOCIAL NETWORK
DO YOU BELONG TO ANY CLUBS OR ORGANIZATIONS SUCH AS CHURCH GROUPS UNIONS, FRATERNAL OR ATHLETIC GROUPS, OR SCHOOL GROUPS?: YES

## 2021-05-14 ASSESSMENT — LIFESTYLE VARIABLES
HOW OFTEN DO YOU HAVE SIX OR MORE DRINKS ON ONE OCCASION: 0
HOW OFTEN DURING THE LAST YEAR HAVE YOU FAILED TO DO WHAT WAS NORMALLY EXPECTED FROM YOU BECAUSE OF DRINKING: 0
HOW OFTEN DURING THE LAST YEAR HAVE YOU NEEDED AN ALCOHOLIC DRINK FIRST THING IN THE MORNING TO GET YOURSELF GOING AFTER A NIGHT OF HEAVY DRINKING: 0
HAVE YOU OR SOMEONE ELSE BEEN INJURED AS A RESULT OF YOUR DRINKING: 0
HAS A RELATIVE, FRIEND, DOCTOR, OR ANOTHER HEALTH PROFESSIONAL EXPRESSED CONCERN ABOUT YOUR DRINKING OR SUGGESTED YOU CUT DOWN: 0
AUDIT-C TOTAL SCORE: 3

## 2021-05-14 NOTE — PROGRESS NOTES
FOCUS ON YOU VISIT  05/14/21      Chief Complaint   Patient presents with    Other     Chelsie         Participants:  [facility], Lety Jesus (Care Coordinator), Howie Chuckalyx Gunter, Bruce Gunter      Patient presents for a comprehensive review of their care. Issues addressed today include Social Determinants of Health, Advanced Care Planning, recent utilization and review of their health issues. What are your goals for your health in the next year? \"I would like to get rid of some of the pain that I have, but I don't know if that's possible\"    What is your biggest concern for your health in the next year? \"My new diagnosis of macular degeneration\", she is following closely with Dr Leann Alanis. They are closely monitoring this, but she has been told that there is not much that they can do right now. Vision is steadily declining. Can still ambulate with a walker. States that she is discouraged because her eyesight is affecting her ability to do cooking and other activities that she enjoys. HPI    Howie Gunter reports the following acute issues today:  Worsening vision. Has patient had any ER visits in the past 12 months?  yes - 12/21/19 with URI    Has patient had any hospital admissions in the past 12 months?  yes -     12/20/21 - Hip fracture    2/27/21 - Presyncope    3/9/21 - Planned admission for hiatal hernia repair    Patient was provided guidance on reducing unnecessary utilization and an ER avoidance plan.         Medications    Current Outpatient Medications   Medication Sig Dispense Refill    levothyroxine (SYNTHROID) 100 MCG tablet Take 1 tablet by mouth Daily 90 tablet 3    DULoxetine (CYMBALTA) 60 MG extended release capsule TAKE 1 CAPSULE DAILY 90 capsule 3    enalapril (VASOTEC) 10 MG tablet Take 1 tablet by mouth nightly 90 tablet 3    metoprolol succinate (TOPROL XL) 50 MG extended release tablet Take 1 tablet by mouth daily 30 tablet 0    pregabalin (LYRICA) 150 MG capsule Take 1 capsule by mouth 2 times daily for 180 days. 180 capsule 1    Misc. Devices (WALKER) MISC 1 each by Does not apply route daily 1 each 0    guaiFENesin 1200 MG TB12 Take 1,200 mg by mouth 2 times daily 20 tablet 0    Misc. Devices MISC Mechanical lift for a [employer] 1 Device 0    Scooter MISC by Does not apply route Power scooter 1 each 0    CRANBERRY PO Take 1,500 mg by mouth daily      Biotin 21392 MCG TABS Take 1-2 tablets by mouth daily      fluticasone (FLONASE) 50 MCG/ACT nasal spray 1 spray by Nasal route as needed for Rhinitis      Omega 3-6-9 Fatty Acids (OMEGA 3-6-9 COMPLEX) CAPS Take 1 capsule by mouth daily       therapeutic multivitamin-minerals (THERAGRAN-M) tablet Take 1 tablet by mouth nightly        No current facility-administered medications for this visit. Patients medications were reviewed with them today. Are you having any difficulty with the cost of your medications? No    Are you taking all of your medications? Yes    Has patient had any recent medication changes? Yes, protonix recently stopped    Do you understand why you are on each of your medications? Yes    Has patient had a pharmacy referral in the previous year? yes        Social Determinants of Health      SBIRT Screening completed? Yes    Mental Health needs identified - somewhat depressed related to eye issues    Does patient use tobacco?  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 , Didi David    What is the highest level of school you have completed or the highest degree you have received? Associates degree    How hard is it for you to pay for the very basics like food, housing, medical care, and heating? Not hard at all    Within the past 12 months, how often were you concerned that your food would run out before you had money to buy more?   Never    Within the past 12 months, how often has the food you bought not last and you didn't have money to get more? Never    In the past 12 months, how often has lack of transportation kept you from medical appointments or from getting medication? Never    In the past 12 months, has lack of transportation kept you from meetings, work, or getting things needed for daily living? Never    How often do you feel stressed, tense, restless, nervous, or anxious, or unable to sleep at night? Sometimes    In a typical week, how often do you meet or talk with friends or family?  more than three times per week    How many times per month do you attend religous services? At least 8 times per month    Do you belong to any clubs or organizations such as Sikhism groups, unions, fraternal or athletic groups, or school groups? Yes, Sikhism groups    Within the last year, have you been afraid of anyone in your life? No    Within the last year, have you been humiliated or emotionally abused in other ways by anyone in your life? No    Within the last year, have you been kicked, hit, slapped, or otherwise physically hurt by anyone in your life? No    Resources were provided on the following topics:  Easter seals to do a home assessment due to visual concerns. Needs help with some household chores, referral to area agency on aging. ACP     Code status:  Full Code     Living Will:  Patient does not have a living will. Does have a DPOA. Healthcare decision maker was updated or verified in chart today. CPR:  In the event sudden cardiac or respiratory arrest, she does wish to have CPR performed. Intubation:  If patient were to be in a situation in which mechanical ventilation may be considered, she  does wish to be intubated and have mechanical ventilation. Other ACP concerns discussed today:  none     Has patient been referred to palliative care?   No, we discuss referral, she wants to think about it and talk at next visit     'Five

## 2021-05-14 NOTE — TELEPHONE ENCOUNTER
Received a referral in [facility] from Kadi Adair DO to review patients medications. Called patient to schedule a time to speak with a pharmacist over the telephone. Spoke to patient and advised them of the above message. Patient verified understanding and scheduled their appointment: 5/17 at [address] [place] [address]      Patient not found in [facility]    Lilli Feldman [facility].    [address] [facility], toll free: [phone], option 7

## 2021-05-14 NOTE — PATIENT INSTRUCTIONS
We are excited to have you a part of our Focus on You Program.  I have summarized our visit from 05/14/21  :    You met today with Dr Donna Landaverde, [facility] coordinator Hans West, and your , Montana Adams. You can use the following steps if you are having a medical issue and think that you may need to go to the ER:    1. Please call our office at [phone] and press option #2 to speak to one of our nurses  2. If you are unable to get ahold of one of our nurses or if it is 'after hours', you can call Dr Michel Prader cell phone at [phone]    -------    If you are admitted to the hospital or seen in the ER, we would like to see you in the office on the next business day. You can walk in and be seen by myself or our nurse practitioner, Brea Baker, Monday to Friday 8AM to 4PM.    -------    We have agreed on the following goals for your health:      #1 - We will place a referral to Dr Nati Borges to see if he can help to improve your pain. #2 - We will work on resources to help make your home visual accessible  #3 - We will try to find resources to help with home care      [facility] to have them discuss possible home adjustments for vision changes        ------    WE HAVE WALK IN CARE AVAILABLE!   You can walk in and be seen any time Monday - Friday, 8AM - 4PM.

## 2021-05-14 NOTE — CARE COORDINATION
Ambulatory Care Coordination Note  5/14/2021  CM Risk Score: 1  Charlson 10 Year Mortality Risk Score: 79%     ACC: Shiva Jensen RN    Summary Note: Met face to face with Octaviano Strong, her  and PCP. Enrolled into Care Coordination program from ROBYN visit/PCP referral.  Discussed new dx of macular degeneration. Vision is poor. Leads to low mood. Has support group with friend. Has wheelchair, walker, scooter and lift on van. Offered behavioral health support which she declined. Has a referral in place with Leonides Paynemarga [facility] for a ramp for her home. No barriers with medication affordability. Detailed Med Rec completed by PCP. Hx of falls with fx. Detailed conversation regarding ACP by PCP. 5 wishes discussed. Living Will on file. Discussed and offered palliative care. She declined at this time. Barriers:  -low mood due to macular degeneration  -Pain management referral placed to Dr. Lizette Ojeda  -would like housekeeping services if able, may have to be private pay   Plan:  -reinforce education completed  -Call involve Esta Cluster for home adaptations  -ensure pain management appt made from referral  -referral to pharmacy placed  -place referral to AAA  -referral to pain management  -xray ordered for back pain    Ambulatory Care Coordination Assessment    Care Coordination Protocol  Program Enrollment: Rising Risk  Referral from Primary Care Provider: Yes  Week 1 - Initial Assessment     Do you have all of your prescriptions and are they filled?: Yes     Do you have Home O2 Therapy?: No      Ability to seek help/take action for Emergent Urgent situations i.e. fire, crime, inclement weather or health crisis. : Independent  Ability to ambulate to restroom: Independent  Ability handle personal hygeine needs (bathing/dressing/grooming): Independent  Ability to manage Medications:  Independent  Ability to prepare Food Preparation: Independent  Ability to maintain home (clean home, laundry): Needs Assistance  Ability to drive and/or has transportation: Needs Assistance  Ability to do shopping: Needs Assistance  Ability to manage finances: Needs Assistance  Is patient able to live independently?: Yes     Current Housing: Private Residence        Per the Fall Risk Screening, did the patient have 2 or more falls or 1 fall with injury in the past year?: Yes  How often do you think you are about to fall and you do NOT fall? For example, you grab something to stabilize yourself or hold onto a wall/furniture?: Occasionally  Use of a Mobility Aid: Yes  Difficulty walking/impaired gait: Yes  Issues with feet or shoes like numbness, edema, shoes not fitting: No  Changes in vision, poor vision or poor lighting in environment: No  Dizziness: No  Other Fall Risk: No        Are you experiencing loss of meaning?: No  Are you experiencing loss of hope and peace?: No     Thinking about your patient's physical health needs, are there any symptoms or problems (risk indicators) you are unsure about that require further investigation?: Mild vague physical symptoms or problems; but do not impact on daily life or are not of concern to patient   Are the patients physical health problems impacting on their mental well-being?: Mild impact on mental well-being e.g. \"\"feeling fed-up\"\", \"\"reduced enjoyment\"\"   Are there any problems with your patients lifestyle behaviors (alcohol, drugs, diet, exercise) that are impacting on physical or mental well-being?: Some mild concern of potential negative impact on well-being   Do you have any other concerns about your patients mental well-being?  How would you rate their severity and impact on the patient?: Mild problems - don't interfere with function   How would you rate their home environment in terms of safety and stability (including domestic violence, insecure housing, neighbor harassment)?: Safe, stable, but with some inconsistency   How do daily activities impact on the patient's well-being? (include current or anticipated unemployment, work, caregiving, access to transportation or other): No identified problems or perceived positive benefits   How would you rate their social network (family, work, friends)?: Adequate participation with social networks   How would you rate their financial resources (including ability to afford all required medical care)?: Financially secure, some resource challenges   How wells does the patient now understand their health and well-being (symptoms, signs or risk factors) and what they need to do to manage their health?: Reasonable to good understanding but do not feel able to engage with advice at this time   How well do you think your patient can engage in healthcare discussions? (Barriers include language, deafness, aphasia, alcohol or drug problems, learning difficulties, concentration): Adequate communication, with or without minor barriers   Do other services need to be involved to help this patient?: Other care/services in place but not sufficient   Are current services involved with this patient well-coordinated? (Include coordination with other services you are now recommendation): Required care/services in place with some coordination barriers   Suggested Interventions and Community Resources                  Prior to Admission medications    Medication Sig Start Date End Date Taking?  Authorizing Provider   levothyroxine (SYNTHROID) 100 MCG tablet Take 1 tablet by mouth Daily 5/12/21   Saurav Domínguez,    DULoxetine (CYMBALTA) 60 MG extended release capsule TAKE 1 CAPSULE DAILY 4/12/21   FLORIDA Salgado CNP   enalapril (VASOTEC) 10 MG tablet Take 1 tablet by mouth nightly 3/19/21   Becka MoroFLORIDA adams CNP   pantoprazole (PROTONIX) 40 MG tablet Take 1 tablet by mouth every morning (before breakfast) 3/4/21   FLORIDA Borjas CNP   metoprolol succinate (TOPROL XL) 50 MG extended release tablet Take 1 tablet by mouth daily 3/4/21 Gisel Teran MD   pregabalin (LYRICA) 150 MG capsule Take 1 capsule by mouth 2 times daily for 180 days. 1/28/21 7/27/21  David Hwang DO   Misc. Devices [facility]) MISC 1 each by Does not apply route daily 2/11/20   David Hwang DO   guaiFENesin 1200 MG TB12 Take 1,200 mg by mouth 2 times daily 12/21/19   Bo Ramos PA-C   Misc.  Devices MISC Mechanical lift for a Zain Graven 5/31/18   David Hwang DO   Scooter MISC by Does not apply route Power scooter 10/31/17   David Hwang DO   CRANBERRY PO Take 1,500 mg by mouth daily    Historical Provider, MD   Biotin 66190 MCG TABS Take 1-2 tablets by mouth daily    Historical Provider, MD   fluticasone (FLONASE) 50 MCG/ACT nasal spray 1 spray by Nasal route as needed for Rhinitis    Historical Provider, MD   Omega 3-6-9 Fatty Acids ([address] 3-6-9 COMPLEX) CAPS Take 1 capsule by mouth daily     Historical Provider, MD   therapeutic multivitamin-minerals (THERAGRAN-M) tablet Take 1 tablet by mouth nightly     Historical Provider, MD       Future Appointments   Date Time Provider Huber Little   8/26/2021  1:20 PM DO ARCADIO Kumar [facility] Marylin Taylor

## 2021-05-14 NOTE — ACP (ADVANCE CARE PLANNING)
Advance Care Planning   Healthcare Decision Maker:    Primary Decision Maker: Sunny Gunter Spouse - [phone]    Secondary Decision Maker: Merry Trevizo - Child - [phone]    Click here to complete Healthcare Decision Makers including selection of the Healthcare Decision Maker Relationship (ie \"Primary\"). Today we documented Decision Maker(s) consistent with Legal Next of Kin hierarchy.

## 2021-05-15 ENCOUNTER — NURSE TRIAGE (OUTPATIENT)
Dept: OTHER | Facility: CLINIC | Age: 81
End: 2021-05-15

## 2021-05-15 NOTE — TELEPHONE ENCOUNTER
Received call from Reyes at [facility]-service center [facility] [place]) ARIE TALLEY II.VIERTEL with [employer]. Brief description of triage: Patient calling for concerns for taking too much Levothyroxine for the past 3-4 weeks. Reports that her daily dosage was changed to 100 mcg daily when she got out of the hospital.  She had a bottle of the previous dose of Levothyroxine (125 mcg) in her bathroom which she has continued to take daily. States that her daughter also prepares her daily medications for her and was including that current dosage of the 100 mcg of Levothyroxine in with her other medications. So she states that she has received both doses now for the past 3-4 weeks. Just noted this morning after taking her dose. Triage indicates for patient to call the [facility] now. Contact information for Poison Control given. Patient also states that provider gave her his contact information yesterday if any concerns:  Advised patient to contact PCP after completing call with Poison Control. Care advice provided, patient verbalizes understanding; denies any other questions or concerns; instructed to call back for any new or worsening symptoms. Attention Provider: Thank you for allowing me to participate in the care of your patient. The patient was connected to triage in response to information provided to the [facility]. Please do not respond through this encounter as the response is not directed to a shared pool. Reason for Disposition   [1] DOUBLE DOSE (an extra dose or lesser amount) of prescription drug AND [2] any symptoms (e.g., dizziness, nausea, pain, sleepiness)    Answer Assessment - Initial Assessment Questions  1. NAME of MEDICATION: \"What medicine are you calling about? \"      Levothyroxine 100 mcg daily    2. QUESTION: \"What is your question?\"        3. PRESCRIBING HCP: \"Who prescribed it? \" Reason: if prescribed by specialist, call should be referred to that group. Dr Bo Comment    4.

## 2021-05-17 ENCOUNTER — SCHEDULED TELEPHONE ENCOUNTER (OUTPATIENT)
Dept: PHARMACY | Facility: CLINIC | Age: 81
End: 2021-05-17

## 2021-05-17 RX ORDER — OYSTER SHELL CALCIUM WITH VITAMIN D 500; 200 MG/1; [IU]/1
2 TABLET, FILM COATED ORAL DAILY
COMMUNITY

## 2021-05-17 NOTE — TELEPHONE ENCOUNTER
Saurav Domínguez DO, medication review completed with patient:  - FYI medication review completed with your patient. No major issues noted. Patient has corrected her levothyroxine mistake and is now taking her normal dose of 100mcg. She reports that symptoms have mostly subsided, except for the tremor. See note below for complete details. Let me know if you have any other questions or concerns    Thank you,  JD Ashford, PharmD, [address] [facility]  Direct: [phone]  Department, toll free: [phone], option 7        =======================================================      CLINICAL PHARMACY NOTE - Medication Review  Patient outreach to review medications - Spoke with patient. SUBJECTIVE/OBJECTIVE:   Leonides Kaur Height is a 78 y.o. female referred to a clinical pharmacy specialist by provider    Medications:  Current Outpatient Medications   Medication Sig Dispense Refill    levothyroxine (SYNTHROID) 100 MCG tablet Take 1 tablet by mouth Daily 90 tablet 3    DULoxetine (CYMBALTA) 60 MG extended release capsule TAKE 1 CAPSULE DAILY 90 capsule 3    enalapril (VASOTEC) 10 MG tablet Take 1 tablet by mouth nightly 90 tablet 3    metoprolol succinate (TOPROL XL) 50 MG extended release tablet Take 1 tablet by mouth daily 30 tablet 0    pregabalin (LYRICA) 150 MG capsule Take 1 capsule by mouth 2 times daily for 180 days. 180 capsule 1    Misc. Devices (WALKER) MISC 1 each by Does not apply route daily 1 each 0    guaiFENesin 1200 MG TB12 Take 1,200 mg by mouth 2 times daily 20 tablet 0    Misc.  Devices MISC Mechanical lift for a [place] 1 Device 0    Scooter MISC by Does not apply route Power scooter 1 each 0    fluticasone (FLONASE) 50 MCG/ACT nasal spray 1 spray by Nasal route as needed for Rhinitis      Omega 3-6-9 Fatty Acids (OMEGA 3-6-9 COMPLEX) CAPS Take 1 capsule by mouth daily       therapeutic multivitamin-minerals (THERAGRAN-M) tablet Take 1 tablet by mouth nightly        No current facility-administered medications for this visit. Additional Medications (including OTC/Herbal Supplements):  · None    Allergies: Allergies   Allergen Reactions    Ampicillin     Morphine Other (See Comments)     Hallucinations     Pcn [Penicillins] Itching    Sulfa Antibiotics        Pertinent Labs/Vitals:  BP Readings from Last 3 Encounters:   21 110/60   21 120/64   21 135/78     No results found for: Lexis Benavides  No results found for: LABA1C  No results found for: CHOL, TRIG, HDL, LDLCHOLESTEROL, LDLCALC, LDLDIRECT  ALT   Date Value Ref Range Status   2021 12 11 - 66 U/L Final     Comment:     Performed at [facility], [address]     AST   Date Value Ref Range Status   2021 30 5 - 40 U/L Final     The ASCVD Risk score (Linda Rosen., et al., 2013) failed to calculate for the following reasons: The 2013 ASCVD risk score is only valid for ages 36 to 78     Lab Results   Component Value Date    CREATININE 0.6 2021       CrCl cannot be calculated (Unknown ideal weight.).     Social History:   Social History     Tobacco Use    Smoking status: Former Smoker     Packs/day: 2.00     Years: 11.00     Pack years: 22.00     Types: Cigarettes     Start date: 1957     Quit date: 1967     Years since quittin.5    Smokeless tobacco: Never Used   Substance Use Topics    Alcohol use: No       Immunizations:   Most Recent Immunizations   Administered Date(s) Administered    Influenza Vaccine, unspecified formulation 10/01/2016    Influenza Virus Vaccine 10/01/2016    Influenza, High Dose (Fluzone 65 yrs and older) 10/20/2020    Influenza, Quadv, IM, PF (6 mo and older Fluzone, Flulaval, Fluarix, and 3 yrs and older Afluria) 10/20/2020    Influenza, Quadv, adjuvanted, 65 yrs +, IM, PF (Fluad) 10/20/2020    Influenza, Triv, inactivated, subunit, adjuvanted, IM (Fluad 65 yrs and older) 10/12/2019    MMR 10/24/1995    Pneumococcal Polysaccharide ([id number]) 10/12/2010       ASSESSMENT/PLAN:   - General Assessment:   · Adherence: Not a concern. Uses pill box  · Cost: Not a concern  · Current pharmacy/pharmacies: [facility]  · Drug interactions: No clinically significant interactions identified via [employer] [employer] Interaction Analysis as category D or higher. · Renal dosing: No renal adjustments necessary. · Patient had accidentally taken levothyroxine 100mcg + 125mcg for 3 weeks or so. She realized and stopped immediately. She called and spoke with poison control and they thought everything was ok. She reports that she had some jitteriness, rapid heartbeat, and hair loss. She reports that she is currently taking the 100mcg dose daily and is starting to feel better. She is awaiting a call back from Dr. Jelly Chun. All symptoms have improved except for jitteriness. - Upcoming appointments:   Future Appointments   Date Time Provider Huber Anabel   5/17/2021  3:00 PM SCHEDULE, S CLINICAL PHARMACY S Clin Rx None   8/26/2021  1:20 PM DO ARCADIO [facility] SANMICHAEL TALLEY II.LUIS Alonso, PharmD, [address]  Direct: [phone]  Department, toll free: [phone], option 7

## 2021-05-18 ENCOUNTER — HOSPITAL ENCOUNTER (OUTPATIENT)
Age: 81
Discharge: HOME | End: 2021-05-18
Payer: MEDICARE

## 2021-05-18 ENCOUNTER — TELEPHONE (OUTPATIENT)
Dept: FAMILY MEDICINE CLINIC | Age: 81
End: 2021-05-18

## 2021-05-18 ENCOUNTER — HOSPITAL ENCOUNTER (OUTPATIENT)
Dept: GENERAL RADIOLOGY | Age: 81
Discharge: HOME | End: 2021-05-18
Payer: MEDICARE

## 2021-05-18 DIAGNOSIS — M48.061 SPINAL STENOSIS OF LUMBAR REGION WITHOUT NEUROGENIC CLAUDICATION: ICD-10-CM

## 2021-05-18 PROCEDURE — 72100 X-RAY EXAM L-S SPINE 2/3 VWS: CPT

## 2021-05-18 NOTE — TELEPHONE ENCOUNTER
Skippy Hodgkin calling to get a VO to agree to follow patient with Deng Jc states that she will fax over the re-certification paper work once they get the VO

## 2021-05-19 ENCOUNTER — TELEPHONE (OUTPATIENT)
Dept: FAMILY MEDICINE CLINIC | Age: 81
End: 2021-05-19

## 2021-05-21 ENCOUNTER — CARE COORDINATION (OUTPATIENT)
Dept: CARE COORDINATION | Age: 81
End: 2021-05-21

## 2021-05-21 NOTE — CARE COORDINATION
Ambulatory Care Coordination Note  5/21/2021  CM Risk Score: 1  Charlson 10 Year Mortality Risk Score: 79%     ACC: Lupita Bamberger, JENNIFER    Summary Note: Spoke with Sofy. Discussed follow up. States she is stable for this review. Did get xray completed and now has appt with pain management for Aug.  States she had the pharmacy referral completed and was making a mistake with her medications. States she is back on track and taking the correct does of thyroid medication now. She is active with [facility]. She is going to talk to them about possible housekeeping support as well. I have called and placed a referral for [facility] to complete a home assessment for macular degeneration. States Coco Maciel will be in contact with Sofy to set up appt. Plan  -reinforce education completed at [facility] visit  -ensure [facility] has been in contact and set up appt  -collaborate with [facility] as needed (are they able to assist with housekeeping)  -reinforce importance of early symptom recognition and reporting and calling PCP office for any changes in condition      Care Coordination Interventions    Program Enrollment: Rising Risk  Referral from Primary Care Provider: Yes  Suggested Interventions and [address]: Declined  Fall Risk Prevention: Completed  Disease Association: In Process (Comment: [facility] for macular degeneration)  Home Health Services: Completed  Meals on Wheels: Declined  Occupational Therapy: Completed  Palliative Care: Declined  Pharmacist: Completed  Physical Therapy: Completed  Registered Dietician: Declined  Social Work: Not Started  Transportation Support: Declined  Zone Management Tools: Not Started         Goals Addressed                    This Visit's Progress      Conditions and Symptoms (pt-stated)   On track      I will schedule office visits, as directed by my provider. I will keep my appointment or reschedule if I have to cancel.   I will notify my provider of any barriers to my plan of care. I will notify my provider of any symptoms that indicate a worsening of my condition. Vision problems with macular degeneration  Barriers: overwhelmed by complexity of regimen  Plan for overcoming my barriers: family and ACM support  Confidence: 8/10  Anticipated Goal Completion Date: 8-14-21          Reduce Falls  (pt-stated)   On track      I will reduce my risk of falls by the following: Use walking aids like cane or walker  Hx of falls  Barriers: impairment:  physical: limitations in mobility  Plan for overcoming my barriers: family and ACM support  Confidence: 7/10  Anticipated Goal Completion Date: 8/14/21            Prior to Admission medications    Medication Sig Start Date End Date Taking? Authorizing Provider   calcium-vitamin D (OSCAL-500) 500-200 MG-UNIT per tablet Take 2 tablets by mouth daily   Yes Historical Provider, MD   levothyroxine (SYNTHROID) 100 MCG tablet Take 1 tablet by mouth Daily 5/12/21  Yes Diadeshaun Portermpers, DO   DULoxetine (CYMBALTA) 60 MG extended release capsule TAKE 1 CAPSULE DAILY 4/12/21  Yes FLOIRDA Núñez - CNP   enalapril (VASOTEC) 10 MG tablet Take 1 tablet by mouth nightly 3/19/21  Yes FLORIDA Cutler - JEREMIAH   metoprolol succinate (TOPROL XL) 50 MG extended release tablet Take 1 tablet by mouth daily 3/4/21  Yes Loreto Bajwa MD   pregabalin (LYRICA) 150 MG capsule Take 1 capsule by mouth 2 times daily for 180 days. 1/28/21 7/27/21 Yes Diantha Bumpers, DO   Misc. Devices [facility]) MISC 1 each by Does not apply route daily 2/11/20  Yes Diantha Bumpers, DO   guaiFENesin 1200 MG TB12 Take 1,200 mg by mouth 2 times daily 12/21/19  Yes Lacrbekah Gamboa PA-C   Misc.  Devices MISC Mechanical lift for a Raford Rafter 5/31/18  Yes Diantha Bumpers, DO   Scooter MISC by Does not apply route Power scooter 10/31/17  Yes Diantha Bumpers, DO   fluticasone (FLONASE) 50 MCG/ACT nasal spray 1 spray by Nasal route as needed for Rhinitis   Yes Historical Provider, MD   Omega 3-6-9 Fatty Acids (OMEGA 3-6-9 COMPLEX) CAPS Take 1 capsule by mouth daily    Yes Historical Provider, MD   therapeutic multivitamin-minerals (THERAGRAN-M) tablet Take 1 tablet by mouth nightly    Yes Historical Provider, MD       Future Appointments   Date Time Provider Huber Little   8/26/2021  1:20 PM DO LILIA ChavezA PCT [facility] KATHREIN AM OFFENEJENA II.LUIS   8/27/2021 11:00 AM DO GUILLERMINA [facility]

## 2021-05-28 ENCOUNTER — CARE COORDINATION (OUTPATIENT)
Dept: CARE COORDINATION | Age: 81
End: 2021-05-28

## 2021-05-28 NOTE — CARE COORDINATION
Ambulatory Care Coordination Note  5/28/2021  CM Risk Score: 1  Charlson 10 Year Mortality Risk Score: 79%     ACC: Jennifer Boland, RN    Summary Note: Spoke with Gagandeep Alonzo. States she is doing well and feeling good. I received a call from [facility] who states they will be reaching out to Gagandeep Alonzo to set up services in the home for her vision changes. She states [facility] can not provide housekeeping support and Gagandeep Alonzo was agreeable to pay private pay for [employer] for housekeeping support. I called [facility] to place that referral.    Plan  -reinforce education completed during [address] [facility] [address] visit  -referral placed to [employer] for housekeeping support  -ensure KATHARINA has been in contact with [facility] Coordination Interventions    Program Enrollment: Rising Risk  Referral from Primary Care Provider: Yes  Suggested Interventions and Community Resources  [address]: Declined  Fall Risk Prevention: Completed  Disease Association: In Process (Comment: [facility] for macular degeneration)  Home Health Services: Completed  Meals on Wheels: Declined  Occupational Therapy: Completed  Palliative Care: Declined  Pharmacist: Completed  Physical Therapy: Completed  Registered Dietician: Declined  Social Work: Not Started  Transportation Support: Declined  Zone Management Tools: Not Started         Goals Addressed    None         Prior to Admission medications    Medication Sig Start Date End Date Taking?  Authorizing Provider   calcium-vitamin D (OSCAL-500) 500-200 MG-UNIT per tablet Take 2 tablets by mouth daily    Historical Provider, MD   levothyroxine (SYNTHROID) 100 MCG tablet Take 1 tablet by mouth Daily 5/12/21   David Hwang DO   DULoxetine (CYMBALTA) 60 MG extended release capsule TAKE 1 CAPSULE DAILY 4/12/21   Troy Pena APRN - CNP   enalapril (VASOTEC) 10 MG tablet Take 1 tablet by mouth nightly 3/19/21   Niko Garcia APRN - CNP   metoprolol succinate (Earline Shillings XL) 50 MG extended release tablet Take 1 tablet by mouth daily 3/4/21   Hasmukh Cho MD   pregabalin (LYRICA) 150 MG capsule Take 1 capsule by mouth 2 times daily for 180 days. 1/28/21 7/27/21  Jann Redo, DO   Misc. Devices [facility]) MISC 1 each by Does not apply route daily 2/11/20   Jann Redo, DO   guaiFENesin 1200 MG TB12 Take 1,200 mg by mouth 2 times daily 12/21/19   Светлана Mccall PA-C   Misc.  Devices MISC Mechanical lift for a Khanh Freeze 5/31/18   Jann Redo, DO   Scooter [address] [facility] by Does not apply route Power scooter 10/31/17   Jann Redo, DO   fluticasone (FLONASE) 50 MCG/ACT nasal spray 1 spray by Nasal route as needed for Rhinitis    Historical Provider, MD   Omega 3-6-9 Fatty Acids (Sujey Oden 3-6-9 COMPLEX) CAPS Take 1 capsule by mouth daily     Historical Provider, MD   therapeutic multivitamin-minerals [facility]) tablet Take 1 tablet by mouth nightly     Historical Provider, MD       Future Appointments   Date Time Provider Huber Little   8/26/2021  1:20 PM DO LILIA BahA PCT IRENE Christiansen   8/27/2021 11:00 AM DO GUILLERMINA Emerson SRPX Pain IRENE Christiansen

## 2021-06-08 ENCOUNTER — CARE COORDINATION (OUTPATIENT)
Dept: CARE COORDINATION | Age: 81
End: 2021-06-08

## 2021-06-08 RX ORDER — METOPROLOL SUCCINATE 50 MG/1
50 TABLET, EXTENDED RELEASE ORAL DAILY
Qty: 90 TABLET | Refills: 3 | Status: SHIPPED | OUTPATIENT
Start: 2021-06-08 | End: 2022-05-16

## 2021-06-08 RX ORDER — DOCUSATE SODIUM 100 MG/1
100 CAPSULE, LIQUID FILLED ORAL 2 TIMES DAILY PRN
Qty: 180 CAPSULE | Refills: 3 | Status: SHIPPED | OUTPATIENT
Start: 2021-06-08 | End: 2022-03-15 | Stop reason: SDUPTHER

## 2021-06-08 NOTE — CARE COORDINATION
Noted.  Thank you! New referral placed to Lobito Oconnor at [facility] for macular degeneration support in her home. Arturo apologized and states the referral was misplaced. Informed Octaviano Ligia that both referral we placed again and she will be hearing from them to provide services and support. Encouraged Elen to call me if she didn't hear by them within the week.

## 2021-06-08 NOTE — CARE COORDINATION
Spoke with Sofy who states she has not yet received a call from [address] [place] [address] for the referral for support in the home. I called [address] [place] [address] who states they didn't receive the fax. Could office staff assist with faxing over the information needed by [address] [place] [address]. It includes Face Sheet, Med list, most recent progress notes from PCP office. Fax number is [phone]. Thank you!

## 2021-06-15 ENCOUNTER — CARE COORDINATION (OUTPATIENT)
Dept: CARE COORDINATION | Age: 81
End: 2021-06-15

## 2021-06-15 NOTE — CARE COORDINATION
Ambulatory Care Coordination Note  6/15/2021  CM Risk Score: 1  Charlson 10 Year Mortality Risk Score: 79%     ACC: Delfino Beth RN    Summary Note: completed follow up call with Raheem Avalos to ensure KATHARINA and Troy Christopher had been in contact with her. States states that [employer] cannot support her as she needs for housekeeping support. We discussed private pay options and Raheem Avalos wanted a referral to a private pay aide that could provide support. I placed a call with Erin May per Elen's permission to set up private pay support. Gustavo Hopkins states she will contact Raheme Avalos today. I also emailed Rigo Gloria from [facility] to ensure that they would be reaching out to her as they said they would to provide the macular degeneration support. Plan  -reinforce education completed  -ensure support is in place  -collaborate with KATHARINA to ensure support in place  -reinforce education completed from [facility] visit        Care Coordination Interventions    Program Enrollment: Rising Risk  Referral from Primary Care Provider: Yes  Suggested Interventions and [address]: Declined  Fall Risk Prevention: Completed  Disease Association: In Process (Comment: [address] [facility] [address] for macular degeneration)  Home Health Services: Completed  Meals on Wheels: Declined  Occupational Therapy: Completed  Palliative Care: Declined  Pharmacist: Completed  Physical Therapy: Completed  Registered Dietician: Declined  Social Work: Not Started  Transportation Support: Declined  Zone Management Tools: Not Started         Goals Addressed    None         Prior to Admission medications    Medication Sig Start Date End Date Taking?  Authorizing Provider   metoprolol succinate (TOPROL XL) 50 MG extended release tablet Take 1 tablet by mouth daily 6/8/21   Qasim Curl, DO   docusate sodium (COLACE) 100 MG capsule Take 1 capsule by mouth 2 times daily as needed for Constipation 6/8/21   Qasim Curl, DO   calcium-vitamin D (OSCAL-500) 500-200 MG-UNIT per tablet Take 2 tablets by mouth daily    Historical Provider, MD   levothyroxine (SYNTHROID) 100 MCG tablet Take 1 tablet by mouth Daily 5/12/21   Perez Diana DO   DULoxetine (CYMBALTA) 60 MG extended release capsule TAKE 1 CAPSULE DAILY 4/12/21   FLORIDA Byrne CNP   enalapril (VASOTEC) 10 MG tablet Take 1 tablet by mouth nightly 3/19/21   FLORIDA Onofre CNP   pregabalin (LYRICA) 150 MG capsule Take 1 capsule by mouth 2 times daily for 180 days. 1/28/21 7/27/21  Perez Diana DO   Misc. Devices [facility]) MISC 1 each by Does not apply route daily 2/11/20   Perez Diana DO   guaiFENesin 1200 MG TB12 Take 1,200 mg by mouth 2 times daily 12/21/19   Clarita Koo PA-C   Misc.  Devices MISC Mechanical lift for a Rin Dubs 5/31/18   Perez Diana DO   Scooter [address] [facility] by Does not apply route Power scooter 10/31/17   Perez Diana DO   fluticasone (FLONASE) 50 MCG/ACT nasal spray 1 spray by Nasal route as needed for Rhinitis    Historical Provider, MD   Omega 3-6-9 Fatty Acids ([place] Push 3-6-9 COMPLEX) CAPS Take 1 capsule by mouth daily     Historical Provider, MD   therapeutic multivitamin-minerals [facility]) tablet Take 1 tablet by mouth nightly     Historical Provider, MD       Future Appointments   Date Time Provider Huber Little   8/26/2021  1:20 PM Perez Diana [facility] [place] [facility]   8/27/2021 11:00 AM DO GUILLERMINA Blunt [facility] OFFENEGG II.LU[facility]

## 2021-06-23 ENCOUNTER — OFFICE VISIT (OUTPATIENT)
Dept: PHYSICAL MEDICINE AND REHAB | Age: 81
End: 2021-06-23
Payer: MEDICARE

## 2021-06-23 VITALS
BODY MASS INDEX: 23.99 KG/M2 | HEIGHT: 69 IN | DIASTOLIC BLOOD PRESSURE: 72 MMHG | WEIGHT: 162 LBS | SYSTOLIC BLOOD PRESSURE: 118 MMHG

## 2021-06-23 DIAGNOSIS — M54.59 LUMBAR FACET JOINT PAIN: Primary | ICD-10-CM

## 2021-06-23 DIAGNOSIS — M79.18 MYOFASCIAL PAIN: ICD-10-CM

## 2021-06-23 DIAGNOSIS — G62.9 NEUROPATHY: ICD-10-CM

## 2021-06-23 DIAGNOSIS — M41.9 SCOLIOSIS OF THORACOLUMBAR SPINE, UNSPECIFIED SCOLIOSIS TYPE: ICD-10-CM

## 2021-06-23 DIAGNOSIS — G89.4 CHRONIC PAIN SYNDROME: ICD-10-CM

## 2021-06-23 PROCEDURE — 20553 NJX 1/MLT TRIGGER POINTS 3/>: CPT | Performed by: ANESTHESIOLOGY

## 2021-06-23 PROCEDURE — 99205 OFFICE O/P NEW HI 60 MIN: CPT | Performed by: ANESTHESIOLOGY

## 2021-06-23 RX ORDER — HYDROCODONE BITARTRATE AND ACETAMINOPHEN 5; 325 MG/1; MG/1
1 TABLET ORAL 2 TIMES DAILY PRN
Qty: 60 TABLET | Refills: 0 | Status: SHIPPED | OUTPATIENT
Start: 2021-06-23 | End: 2021-07-23

## 2021-06-23 NOTE — PROGRESS NOTES
BID  Cymbalta 60 mg daily    Historical Treatment Medications:   Tramadol-ineffective  Norco-effective for postop pain from femur fracture    Imaging:  XR L-spine (5/18/21)    PROCEDURE: XR LUMBAR SPINE (2-3 VIEWS)       CLINICAL INFORMATION: Spinal stenosis of lumbar region without neurogenic claudication       COMPARISON: August 2, 2019       TECHNIQUE: AP and lateral views performed.           FINDINGS:    ALIGNMENT: Levoscoliosis lumbar spine centered at the thoracolumbar junction. VERTEBRAL BODIES: Slight anterior wedging L1   DISKS: Multilevel degenerative disc disease with associated endplate degenerative changes, these are most advanced in the T12-L2 vertebral bodies. SOFT TISSUES: Unremarkable. OBLIQUE PROJECTIONS: Not performed. FLEXION/EXTENSION LATERAL: Not performed.       OTHER: None.               Impression   1. Levoscoliosis with compensatory osteoarthritic changes, predominantly at the thoracolumbar junction.          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 Lauren Tavarez GERD (gastroesophageal reflux disease)     Diverticulitis     Hiatal hernia     Hypertension     Hypothyroidism     Osteoarthritis        Past Surgical History:   Procedure Laterality Date    APPENDECTOMY  1958    BACK SURGERY      CARPAL TUNNEL RELEASE Bilateral 2013    w/cubital tunnel    COLON SURGERY  07/29/2016    Procedure: ATTEMPTED DAVINCI XI ROBOTIC ASSISTED SIGMOID COLON RESECTION, ROBOTIC ASSISTED MOBILIZATION OF RECTAL SIGMOID TO SPLENIC FLEXURE, CONVERTED TO OPEN LEFT HEMICOLECTOMY WITH COLOPROCTOCTOMY, SPLENORRHAPHY, RIGID SIGMOIDOSCOPY, LASER EVALUATION OF VASCULARITY WITH ICG; Surgeon: Lauren Tavarez MD; Location: [facility]; Service: General    COLONOSCOPY 2012    CYST REMOVAL  2010    EYE SURGERY  ,     Cataract    FEMUR FRACTURE SURGERY Left 2020    LEFT FEMUR OPEN REDUCTION INTERNAL FIXATION performed by Jesse Sabillon MD at [facility] Left    [facility] N/A 2021    ROBOTIC EXTENSIVE LYSIS OF ADHESIONS, ROBOTIC REDUCTION OF HIATAL HERNIA WITH GASTROPEXY performed by Jody House MD at [facility]    w/bladder suspension   [address]  , ,     rt hip(), lt knee(), lt hip() Shoulder ()    OTHER SURGICAL HISTORY  2021    Remus Rolando Curran CNP in the office   [address]    [place] COLON CA SCRN NOT  [address] IND Left 09/15/2017    COLONOSCOPY performed by Naga Mercado MD at [facility]   [address]    discectomy   [address]    UPPER GASTROINTESTINAL ENDOSCOPY         Family History   Problem Relation Age of Onset    Arthritis Mother     Hearing Loss Mother     High Cholesterol Mother     Hearing Loss Father     Heart Disease Father 76        CABG    Stroke Father     Arthritis Sister     Depression Sister     Diabetes Sister     Arthritis Brother     Depression Brother     Cancer Maternal Aunt     Early Death Maternal Aunt     Diabetes Maternal Grandmother     Heart Disease Paternal Grandmother        Social History     Socioeconomic History    Marital status:      Spouse name: Bashir Hua Number of children: Not on file    Years of education: 15    Highest education level: Associate degree: academic program   Occupational History    Not on file   Tobacco Use    Smoking status: Former Smoker     Packs/day: 2.00     Years: 11.00     Pack years: 22.00     Types: Cigarettes     Start date: 1957     Quit date: 1967     Years since quittin.6    Smokeless tobacco: Never Used   Substance and Sexual Activity    Alcohol use: Yes     Comment: wine with dinner    Drug use: No    Sexual activity: Not on file   Other Topics Concern    Not on file   Social History Narrative    Referral to AAA placed    Referral placed to [facility]    No barriers with medication affordability    No barriers with transportation    Discussed support from insurance company     Social Determinants of Health     Financial Resource Strain: Low Risk     Difficulty of Paying Living Expenses: Not hard at all   Food Insecurity: No Food Insecurity    Worried About [facility] in the Last Year: Never true    Jes of Food in the Last Year: Never true   Transportation Needs: No Transportation Needs    Lack of Transportation (Medical): No    Lack of Transportation (Non-Medical): No   Physical Activity: Inactive    Days of Exercise per Week: 0 days    Minutes of Exercise per Session: 0 min   Stress: Stress Concern Present    Feeling of Stress : To some extent   Social Connections: Socially Integrated    Frequency of Communication with Friends and Family: More than three times a week    Frequency of Social Gatherings with Friends and Family: More than three times a week    Attends Protestant Services: More than 4 times per year    Active Member of [employer] or Organizations:  Yes    Attends Club or Organization Meetings: More than 4 times per year    Marital Status:    Intimate Partner Violence:     Fear of Current or Ex-Partner:     Emotionally Abused:     Physically Abused:     Sexually Abused:        Medications & Allergies:   Current Outpatient Medications   Medication Instructions    Biotin w/ Vitamins C & E (HAIR/SKIN/NAILS PO) Oral    calcium-vitamin D (OSCAL-500) 500-200 MG-UNIT per tablet 2 tablets, Oral, DAILY    Cranberry 500 MG TABS Oral    docusate sodium (COLACE) 100 mg, Oral, 2 TIMES DAILY PRN    DULoxetine (CYMBALTA) 60 MG extended release capsule TAKE 1 CAPSULE DAILY    enalapril (VASOTEC) 10 mg, grossly intact. · Gait - Severely antalgic gait. Ambulates with assistive device. · Motor: 4/5 muscle strength in bilateral hip flexion, knee flexion, knee extension, ankle dorsiflexion, and ankle plantar flexion (pain limited)  · Sensory: LT sensation diminished in feet bilaterally  · Reflexes: 0 symmetrical in bilateral achilles, 1+ bilateral patellar, negative ankle clonus  Skin: No rashes or lesions visible in low back  Psychological: Cooperative, no exaggerated pain behaviors       Assessment:    Diagnosis Orders   1. Lumbar facet joint pain  CHG FLUOR NEEDLE/CATH SPINE/PARASPINAL DX/THER ADDON    IL INJ DX/THER AGNT PARAVERT FACET JOINT, LUMBAR/SAC, 1ST LEVEL    IL INJ DX/THER AGNT PARAVERT FACET JOINT, LUMBAR/SAC, 2ND LEVEL   2. Chronic pain syndrome  HYDROcodone-acetaminophen (NORCO) 5-325 MG per tablet   3. Myofascial pain     4. Neuropathy     5. Scoliosis of thoracolumbar spine, unspecified scoliosis type           Terrie Gunter is a 78 y. o.female presenting to the pain clinic for evaluation of chronic low back pain. Her clinical history and physical examination are consistent with lumbar facet medial pain with associated myofascial pain. I had set the patient up for diagnostic lumbar medial branch blocks targeting the facet joints of L4/L5 and L5/S1. In addition, I start the patient on low-dose Norco 5 mg every 12 hours as needed for severe pain to keep the patient functional as well. In regard to her left shoulder pain, patient underwent trigger point injections today for myofascial pain. Please see attached procedure note. Plan: The following treatment recommendations and plan were discussed in detail with Terrie Gunter and . Imaging:   I have reviewed patients imaging of XR L-spine and results were discussed with patient today.      Analgesics:   With continued chronic pain, start the patient on Norco 5 mg every 12 hours as needed for severe pain (pain greater than 7/10). Patient is advised to take this medication as prescribed as taking it more than prescribed can lead to development of tolerance, physical dependence, and addiction. OARRS reviewed and is appropriate. Pain contract signed today. Patient is taking Acetaminophen. Patient informed that the maximum amount of acetaminophen taken on a regular basis should only be 4000 mg per day. Patient is taking Aleve. Patient is advised to take as prescribed and not take on an empty stomach. Adjuvants: In light of the presence of a neuropathic component of pain, the patient should continue Lyrica and Cymbalta as prescribed. Interventions: In presence of lumbar axial back pain and with physical exam consistent for facetal pain, the option of medial branch blocks at bilateral L4/L5 and L5/S1 was chosen. The risks and benefits were discussed in detail with the patient. Patient wants to proceed with the injection. Anticoagulation/NPO Recommendations:   Patient will need to hold Aleve x 7 days prior to the procedure. Patient will need to be NPO x 8 hours prior to the procedure. We will start an IV prior to the procedure    Multidisciplinary Pain Management:   In the presence of complex, chronic, and multi-factorial pain, the importance of a multidisciplinary approach to pain management in the patients management regimen was emphasized and discussed in great detail. PHYSICAL THERAPY: Patient is advised to continue gentle low back ROM/stretching exercises     Referrals:  None    Prescriptions Written This Visit:   Norco 5 mg (#60, 0 refills)    Follow-up: For Diagnostic lumbar MBBs    I spent 60 minutes with the patient and greater than 50% this time was spent discussing treatment options for lumbar facet mediated pain including injection therapy (risk versus benefits), setting patient up for left shoulder trigger point injections, and discussing low-dose opioid therapy for chronic pain.       Xavier Rios DO [place]  Interventional Pain Management/PM&R   [place]

## 2021-06-23 NOTE — PROGRESS NOTES
Pre-operative Diagnosis:  Left shoulder/scapular myofascial pain     Post-operative Diagnosis: Left shoulder/scapular myofascial pain     Procedure: Trigger point injection(s)     Procedure Description:  After having signed the informed consent, the patient was seated in a chair. The patient's left shoulder/trapezius region was prepped with alcohol wipes. Trigger points were identified in the left trapezius, rhomboid, and cervical paraspinal muscles for a total of 7 trigger point injections. After negative aspiration, 1 cc of 0.5% bupivacaine was injected at each trigger point for a total of 7 trigger points. The patient tolerated the procedure well.      Procedural Complications: None        Jared Escobar,   Interventional Pain Management/PM&R   [place]

## 2021-06-28 RX ORDER — ENALAPRIL MALEATE 10 MG/1
10 TABLET ORAL NIGHTLY
Qty: 90 TABLET | Refills: 3 | Status: SHIPPED | OUTPATIENT
Start: 2021-06-28 | End: 2022-06-23

## 2021-06-29 ENCOUNTER — CARE COORDINATION (OUTPATIENT)
Dept: CARE COORDINATION | Age: 81
End: 2021-06-29

## 2021-06-29 NOTE — CARE COORDINATION
Ambulatory Care Coordination Note  6/29/2021  CM Risk Score: 1  Charlson 10 Year Mortality Risk Score: 79%     ACC: Schuyler Del Cid RN    Summary Note: Spoke with Sofy. She is stable for this review. She states she has heard from [facility] and has filled out the paperwork needed to get that service. She now has a private duty [facility] aide that comes in once a week to help he clean and she states she is happy with that service. Reinforced education completed during [address] visit. Encouraged her to call with any questions or changes in her condition. Plan  -reinforce education completed  -reinforce fall education and prevention to reduce risk for injury related to falls  -collaborate with [facility] as needed          Care Coordination Interventions    Program Enrollment: Rising Risk  Referral from Primary Care Provider: Yes  Suggested Interventions and Community Resources  [address] [facility] [address]: Declined  Fall Risk Prevention: Completed  Disease Association: Completed (Comment: [place] for macular degeneration)  Home Health Services: Completed  Meals on Wheels: Declined  Occupational Therapy: Completed  Palliative Care: Declined  Pharmacist: Completed  Physical Therapy: Completed  Registered Dietician: Lucio Mathur Work: Not Started  Transportation Support: Declined  Zone Management Tools: Not Started         Goals Addressed                    This Visit's Progress      Conditions and Symptoms (pt-stated)   On track      I will schedule office visits, as directed by my provider. I will keep my appointment or reschedule if I have to cancel. I will notify my provider of any barriers to my plan of care. I will notify my provider of any symptoms that indicate a worsening of my condition.   Vision problems with macular degeneration  Barriers: overwhelmed by complexity of regimen  Plan for overcoming my barriers: family and ACM support  Confidence: 8/10  Anticipated Goal Completion Date: 8-14-21        [facility]  Reduce Falls (pt-stated)   On track      I will reduce my risk of falls by the following: Use walking aids like cane or walker  Hx of falls  Barriers: impairment:  physical: limitations in mobility  Plan for overcoming my barriers: family and ACM support  Confidence: 7/10  Anticipated Goal Completion Date: 8/14/21            Prior to Admission medications    Medication Sig Start Date End Date Taking? Authorizing Provider   enalapril (VASOTEC) 10 MG tablet Take 1 tablet by mouth nightly 6/28/21   Jann Redo, DO   Cranberry 500 MG TABS Take by mouth    Historical Provider, MD   Biotin w/ Vitamins C & E (HAIR/SKIN/NAILS PO) Take by mouth    Historical Provider, MD   HYDROcodone-acetaminophen (NORCO) 5-325 MG per tablet Take 1 tablet by mouth 2 times daily as needed for Pain for up to 30 days. 6/23/21 7/23/21  Jared Dale, DO   metoprolol succinate (TOPROL XL) 50 MG extended release tablet Take 1 tablet by mouth daily 6/8/21   Jann Redo, DO   docusate sodium (COLACE) 100 MG capsule Take 1 capsule by mouth 2 times daily as needed for Constipation 6/8/21   Jann Redo, DO   calcium-vitamin D (OSCAL-500) 500-200 MG-UNIT per tablet Take 2 tablets by mouth daily    Historical Provider, MD   levothyroxine (SYNTHROID) 100 MCG tablet Take 1 tablet by mouth Daily 5/12/21   Jann Redo, DO   DULoxetine (CYMBALTA) 60 MG extended release capsule TAKE 1 CAPSULE DAILY 4/12/21   FLORIDA Paiz CNP   pregabalin (LYRICA) 150 MG capsule Take 1 capsule by mouth 2 times daily for 180 days. 1/28/21 7/27/21  Jann Redo, DO   Misc. Devices [facility]) MISC 1 each by Does not apply route daily 2/11/20   Jann Redo, DO   guaiFENesin 1200 MG TB12 Take 1,200 mg by mouth 2 times daily  Patient not taking: Reported on 6/23/2021 12/21/19   Светлана Mccall PA-C   Misc.  Devices MISC Mechanical lift for a Khanh Freeze 5/31/18   Jann Redo, DO   Scooter MISC by Does not apply route Power scooter 10/31/17   Jann Redo, DO   fluticasone (FLONASE) 50 MCG/ACT nasal spray 1 spray by Nasal route as needed for Rhinitis  Patient not taking: Reported on 6/23/2021    Historical Provider, MD   Omega 3-6-9 Fatty Acids ([place] 3-6-9 COMPLEX) CAPS Take 1 capsule by mouth daily     Historical Provider, MD   therapeutic multivitamin-minerals [facility]) tablet Take 1 tablet by mouth nightly     Historical Provider, MD       Future Appointments   Date Time Provider Huber Little   7/30/2021 11:30 AM [address] [place] [address] DO [facility]ERMINA [facility]   8/26/2021  1:20 PM DO LILIA [facility]A [facility]

## 2021-07-12 NOTE — H&P
Today, patient presents for planned medial branch blocks at bilateral L4/L5 and L5/S1. This note is reflective of the patient's previous visit for evaluation. We will proceed with today's planned procedure. Since patient's last visit for evaluation, there have been no interval changes in medical history. Patient has no new numbness, weakness, or focal neurological deficit since evaluation. Patient has no contraindications to injection (no anticoagulation or recent antibiotic intake for active infections), and has a  present or is able to drive themselves (as discussed and cleared by physician). Allergies to latex, contrast dye, and steroid medications have been confirmed with the patient prior to the procedure. NPO necessity has been assessed and accepted based on procedure complexity. The risks and benefits of the procedure have been explained including but are not limited to infection, bleeding, paralysis, immediate post procedure weakness, and dizziness; the patient acknowledges understanding and desires to proceed with the procedure. Patient has signed consent for same procedure as discussed in previous clinic encounter. All other questions and concerns were addressed at bedside. See procedure note for full details. Follow up: 7/30/21    Post procedure Instructions: The patient was advised not to drive during the day of the procedure and not to engage in any significant decision making (unless otherwise states by physician). The patient was also advised to be cautious with walking/activity for 24 hours following today's visit and asked not to engage in over-exertion (unless otherwise states by physician). After this time, it is ok to resume pre-procedure level of activity. Patient advised to apply ice to site of injection in situations of pain and discomfort. Patient advised to not submerge site of injection during bath or pool activities for approximately 24 hours post-procedure.  Patient attested to understanding post procedure directions / restrictions. All other questions and concerns addressed before patient discharge in ambulatory fashion. Chronic Pain/PM&R Clinic Note     Encounter Date: 6/23/21    Subjective:   Chief Complaint:   No chief complaint on file. History of Present Illness:   Jose Luis Gunter is a 78 y.o. female seen in the clinic initially on 07/12/21 upon request from Juvenal Aparicio DO (PCP) for her history of chronic low back pain. She has a medical history of scoliosis, neuropathy, fibromyalgia, and previous lumbar discectomy (over 50 years ago). She states that she has low back pain that has been progressively worse over the last 20 years. She reports the majority of her low back pain to be at her waistline with radiation across to her waist on both sides. She describes this pain is a constant aching, stabbing 5/10 pain that can get up to a 7-8/10 when severe. This pain is worse with any activities where she is standing upright such as cooking or when she is walking for any duration of time. Her pain is improved with rest and sitting in her motorized wheelchair. She denies any pain radiating down her legs but does endorse numbness in both feet from her neuropathy. She denies any saddle anesthesia or bowel/bladder incontinence. Of note, she has multiple joint replacements. She has bilateral total hip arthroplasties and a left knee total hip arthroplasty. In addition, she has a previous left femur fracture that has been fixated with hardware and a left total shoulder arthroplasty. She states that she had an EMG/NCS performed by Dr. Ana Slater which revealed idiopathic neuropathy. She states that she had a previous RFA in her low back done by Dr. Savanah Tran which did give her longstanding relief but this was done 8-10 years ago.       History of Interventions:   Surgery: Previous discectomy in 30s  Injections: Previous lumabr RFA ~8-10 years ago by Dr. Layo Moon Treatment Medications:   Aleve as needed  Heating pad PRN  Mishel 150 mg BID  Cymbalta 60 mg daily    Historical Treatment Medications:   Tramadol-ineffective  Norco-effective for postop pain from femur fracture    Imaging:  XR L-spine (5/18/21)    PROCEDURE: XR LUMBAR SPINE (2-3 VIEWS)       CLINICAL INFORMATION: Spinal stenosis of lumbar region without neurogenic claudication       COMPARISON: August 2, 2019       TECHNIQUE: AP and lateral views performed.           FINDINGS:    ALIGNMENT: Levoscoliosis lumbar spine centered at the thoracolumbar junction. VERTEBRAL BODIES: Slight anterior wedging L1   DISKS: Multilevel degenerative disc disease with associated endplate degenerative changes, these are most advanced in the T12-L2 vertebral bodies. SOFT TISSUES: Unremarkable. OBLIQUE PROJECTIONS: Not performed. FLEXION/EXTENSION LATERAL: Not performed.       OTHER: None.               Impression   1. Levoscoliosis with compensatory osteoarthritic changes, predominantly at the thoracolumbar junction.          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 Hogue Pontiff GERD (gastroesophageal reflux disease)     Diverticulitis     Hiatal hernia     Hypertension     Hypothyroidism     Osteoarthritis        Past Surgical History:   Procedure Laterality Date    APPENDECTOMY  1958    BACK SURGERY      CARPAL TUNNEL RELEASE Bilateral 2013    w/cubital tunnel    COLON SURGERY  07/29/2016    Procedure: ATTEMPTED DAVINCI XI ROBOTIC ASSISTED SIGMOID COLON RESECTION, ROBOTIC ASSISTED MOBILIZATION OF RECTAL SIGMOID TO SPLENIC FLEXURE, CONVERTED TO OPEN LEFT HEMICOLECTOMY WITH COLOPROCTOCTOMY, SPLENORRHAPHY, RIGID SIGMOIDOSCOPY, LASER EVALUATION OF VASCULARITY WITH ICG; Surgeon: Neri Hamilton Marcell Monet MD; Location: [place] [address]; Service: General    COLONOSCOPY  2012    CYST REMOVAL     [address],     Cataract    FEMUR FRACTURE SURGERY Left 2020    LEFT FEMUR OPEN REDUCTION INTERNAL FIXATION performed by Brenna Blanco MD at [facility] Left    [facility] N/A 2021    ROBOTIC EXTENSIVE LYSIS OF ADHESIONS, ROBOTIC REDUCTION OF HIATAL HERNIA WITH GASTROPEXY performed by Grover Bolanos MD at [address]    w/bladder suspension   [address]  , ,     rt hip(), lt knee(), lt hip() Shoulder ()    OTHER SURGICAL HISTORY  2021    Melany Arteaga CNP in the office   [address]    VT COLON CA SCRN NOT  [address] IND Left 09/15/2017    COLONOSCOPY performed by Traci Chapman MD at [facility]   [address]    discectomy   [address]    UPPER GASTROINTESTINAL ENDOSCOPY         Family History   Problem Relation Age of Onset    Arthritis Mother     Hearing Loss Mother     High Cholesterol Mother     Hearing Loss Father     Heart Disease Father 76        CABG    Stroke Father     Arthritis Sister     Depression Sister     Diabetes Sister     Arthritis Brother     Depression Brother     Cancer Maternal Aunt     Early Death Maternal Aunt     Diabetes Maternal Grandmother     Heart Disease Paternal Grandmother        Social History     Socioeconomic History    Marital status:      Spouse name: Zana Nassar Number of children: Not on file    Years of education: 15    Highest education level: Associate degree: academic program   Occupational History    Not on file   Tobacco Use    Smoking status: Former Smoker     Packs/day: 2.00     Years: 11.00     Pack years: 22.00     Types: Cigarettes     Start date: 1957     Quit date: 1967     Years since quittin.7  Smokeless tobacco: Never Used   Substance and Sexual Activity    Alcohol use: Yes     Comment: wine with dinner    Drug use: No    Sexual activity: Not on file   Other Topics Concern    Not on file   Social History Narrative    Referral to AAA placed    Referral placed to [facility]    No barriers with medication affordability    No barriers with transportation    Discussed support from insurance company     Social Determinants of Health     Financial Resource Strain: Low Risk     Difficulty of Paying Living Expenses: Not hard at all   Food Insecurity: No Food Insecurity    Worried About [facility] in the Last Year: Never true    Jes of Food in the Last Year: Never true   Transportation Needs: No Transportation Needs    Lack of Transportation (Medical): No    Lack of Transportation (Non-Medical): No   Physical Activity: Inactive    Days of Exercise per Week: 0 days    Minutes of Exercise per Session: 0 min   Stress: Stress Concern Present    Feeling of Stress : To some extent   Social Connections: Socially Integrated    Frequency of Communication with Friends and Family: More than three times a week    Frequency of Social Gatherings with Friends and Family: More than three times a week    Attends Cheondo[employer] Services: More than 4 times per year    Active Member of [employer] or Organizations:  Yes    Attends Club or Organization Meetings: More than 4 times per year    Marital Status:    Intimate Partner Violence:     Fear of Current or Ex-Partner:     Emotionally Abused:     Physically Abused:     Sexually Abused:        Medications & Allergies:   Current Outpatient Medications   Medication Instructions    Biotin w/ Vitamins C & E (HAIR/SKIN/NAILS PO) Oral    calcium-vitamin D (OSCAL-500) 500-200 MG-UNIT per tablet 2 tablets, Oral, DAILY    Cranberry 500 MG TABS Oral    docusate sodium (COLACE) 100 mg, Oral, 2 TIMES DAILY PRN    DULoxetine (CYMBALTA) 60 MG extended release capsule TAKE 1 CAPSULE DAILY    enalapril (VASOTEC) 10 mg, Oral, NIGHTLY    fluticasone (FLONASE) 50 MCG/ACT nasal spray 1 spray, PRN    guaiFENesin 1,200 mg, Oral, 2 TIMES DAILY    HYDROcodone-acetaminophen (NORCO) 5-325 MG per tablet 1 tablet, Oral, 2 TIMES DAILY PRN    levothyroxine (SYNTHROID) 100 mcg, Oral, DAILY    metoprolol succinate (TOPROL XL) 50 mg, Oral, DAILY    Misc. Devices (WALKER) MISC 1 each, Does not apply, DAILY    Misc. Devices MISC Mechanical lift for a [employer] 3-6-9 Fatty Acids (OMEGA 3-6-9 COMPLEX) CAPS 1 capsule, Oral, DAILY    pregabalin (LYRICA) 150 mg, Oral, 2 TIMES DAILY    Scooter MISC Does not apply, Power scooter    therapeutic multivitamin-minerals (THERAGRAN-M) tablet 1 tablet, Oral, NIGHTLY       Allergies   Allergen Reactions    Ampicillin     Morphine Other (See Comments)     Hallucinations     Pcn [Penicillins] Itching    Sulfa Antibiotics        Review of Systems:   Constitutional: negative for weight changes or fevers  Genitourinary: negative for bowel/bladder incontinence   Musculoskeletal: positive for low back pain  Neurological: negative for any leg weakness or numbness/tingling  Behavioral/Psych: negative for anxiety/depression   All other systems reviewed and are negative    Objective: There were no vitals filed for this visit. Constitutional: Pleasant, no acute distress   Head: Normocephalic, atraumatic   Eyes: Conjunctivae normal   Neck: Supple, symmetrical   Lungs: Normal respiratory effort, non-labored breathing   Cardiovascular: Limbs warm and well perfused   Abdomen: Non-protruded   Musculoskeletal: Muscle bulk symmetric, no atrophy, no gross deformities   - Left shoulder: Tenderness palpation along left trapezius muscle  · Lumbar Spine: ROM severely reduced in extension. Scoliosis appreciated in thoracolumbar junction. Lumbar paraspinals tender bilaterally. SLR neg bilaterally. Positive facet loading bilaterally. Bilateral SI joints tender to palpation. Neurological: Cranial nerves II-XII grossly intact. · Gait - Severely antalgic gait. Ambulates with assistive device. · Motor: 4/5 muscle strength in bilateral hip flexion, knee flexion, knee extension, ankle dorsiflexion, and ankle plantar flexion (pain limited)  · Sensory: LT sensation diminished in feet bilaterally  · Reflexes: 0 symmetrical in bilateral achilles, 1+ bilateral patellar, negative ankle clonus  Skin: No rashes or lesions visible in low back  Psychological: Cooperative, no exaggerated pain behaviors       Assessment:    Diagnosis Orders   1. Lumbar facet joint pain  CHG FLUOR NEEDLE/CATH SPINE/PARASPINAL DX/THER ADDON    WA INJ DX/THER AGNT PARAVERT FACET JOINT, LUMBAR/SAC, 1ST LEVEL    WA INJ DX/THER AGNT PARAVERT FACET JOINT, LUMBAR/SAC, 2ND LEVEL   2. Chronic pain syndrome  HYDROcodone-acetaminophen (NORCO) 5-325 MG per tablet   3. Myofascial pain     4. Neuropathy     5. Scoliosis of thoracolumbar spine, unspecified scoliosis type           Deidra Gunter is a 78 y. o.female presenting to the pain clinic for evaluation of chronic low back pain. Her clinical history and physical examination are consistent with lumbar facet medial pain with associated myofascial pain. I had set the patient up for diagnostic lumbar medial branch blocks targeting the facet joints of L4/L5 and L5/S1. In addition, I start the patient on low-dose Norco 5 mg every 12 hours as needed for severe pain to keep the patient functional as well. In regard to her left shoulder pain, patient underwent trigger point injections today for myofascial pain. Please see attached procedure note. Plan: The following treatment recommendations and plan were discussed in detail with Deidra Gunter and . Imaging:   I have reviewed patients imaging of XR L-spine and results were discussed with patient today.      Analgesics:   With continued chronic pain, start the patient on Norco 5 mg every 12 hours as needed for severe pain (pain greater than 7/10). Patient is advised to take this medication as prescribed as taking it more than prescribed can lead to development of tolerance, physical dependence, and addiction. OARRS reviewed and is appropriate. Pain contract signed today. Patient is taking Acetaminophen. Patient informed that the maximum amount of acetaminophen taken on a regular basis should only be 4000 mg per day. Patient is taking Aleve. Patient is advised to take as prescribed and not take on an empty stomach. Adjuvants: In light of the presence of a neuropathic component of pain, the patient should continue Lyrica and Cymbalta as prescribed. Interventions: In presence of lumbar axial back pain and with physical exam consistent for facetal pain, the option of medial branch blocks at bilateral L4/L5 and L5/S1 was chosen. The risks and benefits were discussed in detail with the patient. Patient wants to proceed with the injection. Anticoagulation/NPO Recommendations:   Patient will need to hold Aleve x 7 days prior to the procedure. Patient will need to be NPO x 8 hours prior to the procedure. We will start an IV prior to the procedure    Multidisciplinary Pain Management:   In the presence of complex, chronic, and multi-factorial pain, the importance of a multidisciplinary approach to pain management in the patients management regimen was emphasized and discussed in great detail.    PHYSICAL THERAPY: Patient is advised to continue gentle low back ROM/stretching exercises     Referrals:  None    Prescriptions Written This Visit:   Norco 5 mg (#60, 0 refills)    Follow-up: For Diagnostic lumbar MBBs    I spent 60 minutes with the patient and greater than 50% this time was spent discussing treatment options for lumbar facet mediated pain including injection therapy (risk versus benefits), setting patient up for left shoulder trigger point injections, and discussing low-dose opioid therapy for chronic pain.       Rosalinda Nielsen,   Interventional Pain Management/PM&R   [place]

## 2021-07-13 ENCOUNTER — APPOINTMENT (OUTPATIENT)
Dept: GENERAL RADIOLOGY | Age: 81
End: 2021-07-13
Attending: ANESTHESIOLOGY
Payer: MEDICARE

## 2021-07-13 ENCOUNTER — HOSPITAL ENCOUNTER (OUTPATIENT)
Age: 81
Setting detail: OUTPATIENT SURGERY
Discharge: HOME | End: 2021-07-13
Attending: ANESTHESIOLOGY | Admitting: ANESTHESIOLOGY
Payer: MEDICARE

## 2021-07-13 VITALS
SYSTOLIC BLOOD PRESSURE: 154 MMHG | OXYGEN SATURATION: 94 % | WEIGHT: 159 LBS | HEIGHT: 69 IN | HEART RATE: 73 BPM | BODY MASS INDEX: 23.55 KG/M2 | RESPIRATION RATE: 16 BRPM | TEMPERATURE: 97.5 F | DIASTOLIC BLOOD PRESSURE: 86 MMHG

## 2021-07-13 PROCEDURE — 3600000056 HC PAIN LEVEL 4 BASE: Performed by: ANESTHESIOLOGY

## 2021-07-13 PROCEDURE — 3209999900 FLUORO FOR SURGICAL PROCEDURES

## 2021-07-13 PROCEDURE — 99152 MOD SED SAME PHYS/QHP 5/>YRS: CPT | Performed by: ANESTHESIOLOGY

## 2021-07-13 PROCEDURE — 3600000057 HC PAIN LEVEL 4 ADDL 15 MIN: Performed by: ANESTHESIOLOGY

## 2021-07-13 PROCEDURE — 64494 INJ PARAVERT F JNT L/S 2 LEV: CPT | Performed by: ANESTHESIOLOGY

## 2021-07-13 PROCEDURE — 6360000002 HC RX W HCPCS: Performed by: ANESTHESIOLOGY

## 2021-07-13 PROCEDURE — 2500000003 HC RX 250 WO HCPCS: Performed by: ANESTHESIOLOGY

## 2021-07-13 PROCEDURE — 64493 INJ PARAVERT F JNT L/S 1 LEV: CPT | Performed by: ANESTHESIOLOGY

## 2021-07-13 PROCEDURE — 7100000011 HC PHASE II RECOVERY - ADDTL 15 MIN: Performed by: ANESTHESIOLOGY

## 2021-07-13 PROCEDURE — 2709999900 HC NON-CHARGEABLE SUPPLY: Performed by: ANESTHESIOLOGY

## 2021-07-13 PROCEDURE — 7100000010 HC PHASE II RECOVERY - FIRST 15 MIN: Performed by: ANESTHESIOLOGY

## 2021-07-13 RX ORDER — MIDAZOLAM HYDROCHLORIDE 1 MG/ML
INJECTION INTRAMUSCULAR; INTRAVENOUS PRN
Status: DISCONTINUED | OUTPATIENT
Start: 2021-07-13 | End: 2021-07-13 | Stop reason: ALTCHOICE

## 2021-07-13 RX ORDER — LIDOCAINE HYDROCHLORIDE 10 MG/ML
INJECTION, SOLUTION EPIDURAL; INFILTRATION; INTRACAUDAL; PERINEURAL PRN
Status: DISCONTINUED | OUTPATIENT
Start: 2021-07-13 | End: 2021-07-13 | Stop reason: ALTCHOICE

## 2021-07-13 RX ORDER — BUPIVACAINE HYDROCHLORIDE 5 MG/ML
INJECTION, SOLUTION PERINEURAL PRN
Status: DISCONTINUED | OUTPATIENT
Start: 2021-07-13 | End: 2021-07-13 | Stop reason: ALTCHOICE

## 2021-07-13 RX ORDER — FENTANYL CITRATE 50 UG/ML
INJECTION, SOLUTION INTRAMUSCULAR; INTRAVENOUS PRN
Status: DISCONTINUED | OUTPATIENT
Start: 2021-07-13 | End: 2021-07-13 | Stop reason: ALTCHOICE

## 2021-07-13 ASSESSMENT — PAIN - FUNCTIONAL ASSESSMENT: PAIN_FUNCTIONAL_ASSESSMENT: 0-10

## 2021-07-13 ASSESSMENT — PAIN SCALES - GENERAL: PAINLEVEL_OUTOF10: 0

## 2021-07-13 ASSESSMENT — PAIN DESCRIPTION - DESCRIPTORS: DESCRIPTORS: CONSTANT

## 2021-07-13 NOTE — PRE SEDATION
[facility]  Pre-Sedation/Analgesia History & Physical    Pt Name: Wilfredo Gunter  MRN: [id number]  YOB: 1940  Provider Performing Procedure: Lawanda Montaño DO   Primary Care Physician: Brionna Suárez DO      MEDICAL HISTORY       has a past medical history of Abnormal EKG, Anemia, Back pain, Blood circulation, collateral, Diverticulitis, Fibromyalgia, GERD (gastroesophageal reflux disease), Hiatal hernia, Hypertension, Hypothyroidism, and Osteoarthritis. SURGICAL HISTORY   has a past surgical history that includes Appendectomy (1958); Colonoscopy (2012); eye surgery (1998, 1999); Hysterectomy (1979); Spine surgery (1968); Upper gastrointestinal endoscopy (2012); cyst removal (2010); Tonsillectomy (1943); Tooth Extraction (1964); Ovary removal (1999); Foot surgery (Left, 2001); Carpal tunnel release (Bilateral, 2013); Small intestine surgery; back surgery; Colon surgery (07/29/2016); joint replacement (2002, 2008, 2009); pr colon ca scrn not hi rsk ind (Left, 09/15/2017); Femur fracture surgery (Left, 12/21/2020); hiatal hernia repair (N/A, 03/09/2021); and other surgical history (04/21/2021). ALLERGIES   Allergies as of 06/23/2021 - Fully Reviewed 06/23/2021   Allergen Reaction Noted    Ampicillin  05/17/2013    Morphine Other (See Comments) 09/14/2017    Pcn [penicillins] Itching 05/17/2013    Sulfa antibiotics  05/17/2013       MEDICATIONS   Prior to Admission medications    Medication Sig Start Date End Date Taking? Authorizing Provider   enalapril (VASOTEC) 10 MG tablet Take 1 tablet by mouth nightly 6/28/21  Yes Ascencion Pierce, DO   Cranberry 500 MG TABS Take by mouth   Yes Historical Provider, MD   Biotin w/ Vitamins C & E (HAIR/SKIN/NAILS PO) Take by mouth   Yes Historical Provider, MD   HYDROcodone-acetaminophen (NORCO) 5-325 MG per tablet Take 1 tablet by mouth 2 times daily as needed for Pain for up to 30 days.  6/23/21 7/23/21 Yes Jared De La Cruz,    metoprolol succinate (TOPROL XL) 50 MG extended release tablet Take 1 tablet by mouth daily 6/8/21  Yes Beverly Rosales DO   docusate sodium (COLACE) 100 MG capsule Take 1 capsule by mouth 2 times daily as needed for Constipation 6/8/21  Yes Beverly Rosales DO   calcium-vitamin D (Donnamarie Edu) 500-200 MG-UNIT per tablet Take 2 tablets by mouth daily   Yes Historical Provider, MD   levothyroxine (SYNTHROID) 100 MCG tablet Take 1 tablet by mouth Daily 5/12/21  Yes Beverly Rosales DO   DULoxetine (CYMBALTA) 60 MG extended release capsule TAKE 1 CAPSULE DAILY 4/12/21  Yes FLORIDA Blunt CNP   pregabalin (LYRICA) 150 MG capsule Take 1 capsule by mouth 2 times daily for 180 days. 1/28/21 7/27/21 Yes Beverly Rosales DO   guaiFENesin 1200 MG TB12 Take 1,200 mg by mouth 2 times daily 12/21/19  Yes William Ortiz PA-C   fluticasone [facility]) 50 MCG/ACT nasal spray 1 spray by Nasal route as needed for Rhinitis    Yes Historical Provider, MD   Omega 3-6-9 Fatty Acids (OMEGA 3-6-9 COMPLEX) CAPS Take 1 capsule by mouth daily    Yes Historical Provider, MD   therapeutic multivitamin-minerals (THERAGRAN-M) tablet Take 1 tablet by mouth nightly    Yes Historical Provider, MD   Misc. Devices [facility]) MISC 1 each by Does not apply route daily 2/11/20   Beverly Rosales DO   Misc. Devices MISC Mechanical lift for a Hermon Deiters 5/31/18   Beverly Rosales DO   Scooter MISC by Does not apply route Power scooter 10/31/17   Beverly Rosales DO     PHYSICAL:   Vitals:    07/13/21 1126   BP: (!) 154/86   Pulse: 73   Resp: 16   Temp: 97.5 °F (36.4 °C)   SpO2: 94%     PLANNED PROCEDURE   See procedure note  SEDATION  Planned agent: Versed and Fentanyl  ASA Classification: 2  Class 1: A normal healthy patient  Class 2: Pt with mild to moderate systemic disease  Class 3: Severe systemic disease or disturbance  Class 4: Severe systemic disorders that are already life threatening.   Class 5: Moribund pt with little chances of survival, for more than 24 hours.  Mallampati I Airway Classification: 2    1. Pre-procedure diagnostic studies complete and results available. 2. Previous sedation/anesthesia experiences assessed. 3. The patient is an appropriate candidate to undergo the planned procedure sedation and anesthesia. (Refer to nursing sedation/analgesia documentation record)  4. Formulation and discussion of sedation/procedure plan, risks, and expectations with patient and/or responsible adult completed. 5. Patient examined immediately prior to the procedure.  (Refer to nursing sedation/analgesia documentation record)    Kaushal Stephen DO  Electronically signed 7/13/2021 at 11:57 AM

## 2021-07-13 NOTE — POST SEDATION
[address] [place] [address]  Sedation/Analgesia Post Sedation Record    Pt Name: Twan Gunter  MRN: [id number]  YOB: 1940  Procedure Performed By: Grover Felton DO  Primary Care Physician: Red Clark DO    POST-PROCEDURE    Physicians/Assistants: Grover Felton DO  Procedure Performed: See Procedure Note   Sedation/Anesthesia: Versed and Fentanyl (See procedure note for amount and duration)  Estimated Blood Loss:     0  ml  Specimens Removed: None        Complications: None           Jared Jimenez DO  Electronically signed 7/13/2021 at 11:58 AM

## 2021-07-13 NOTE — PROCEDURES
Pre-operative Diagnosis: Bilateral lumbar facet pain     Post-operative Diagnosis: Bilateral lumbar facet pain     Procedure: Bilateral lumbar medial branch blocks targeting facet joints of L4/L5 and L5/S1     Procedure Description:  After consent was obtained, the patient was placed in the prone position having pressure points appropriately padded. The lower back was prepped with chloraprep and draped in a sterile fashion. 0.5 cc of 1 % lidocaine was used for local anesthesia of the skin and superficial subcutaneous tissues. Three 22-gauge 3.5-inch needles were advanced under fluoroscopy in an AP view with caudad angulation: one at the sacral ala and two at the junction of the right superior articular process and the transverse process of the L4 and L5 vertebrae. There was no paresthesias, heme, or CSF obtained. Needle placement was confirmed using AP and oblique views. Then 0.5 cc of 0.5% bupivacaine was injected at the site without complications. The procedure was repeated at L4, L5, and sacral ala on the left side. The patient tolerated the procedure well, transported to the recovery room, and observed for 15 minutes prior to being discharged in ambulatory fashion.      Procedural Complications: None      IV sedation was used during the procedure:  - Moderate intravenous conscious sedation was supervised by Dr. Aniceto Sierra  - The patient was independently monitored by a Registered Nurse assigned to the procedure room  - Monitoring included automated blood pressure, continuous EKG, and continuous pulse oximetry  - The detailed conscious record is permanently stored in the [place] [address]  - The following is the conscious sedation record:  Start Time: 11:10  End Time : 11:25  Duration: 15 minutes   Medications Administered: 1 mg Versed, 50 mcg Fentanyl     Jared Dale DO  Interventional Pain Management/PM&R   [place]

## 2021-07-13 NOTE — PROGRESS NOTES
1126 Patient to Phase 2 from surgery. Report received from [facility]. Patient alert and oriented. Denies any pain at this time. VSS, respirations easy and unlabored on room air. Belongings at bedside. 1130 Patient given snack and drink per patient request. States that her  should still be here. 1140 IV taken out, patient states she feels ready to be discharged. Patient assisted in getting dressed and to her own electric wheelchair. [facility] called and asked to pull the car to the discharge doors. Patient has all documented belongings. Denying any pain at this time. 1149 Patient used electric wheelchair to leave, escorted out by this RN. Denies any questions regarding AVS or instructions.

## 2021-07-16 ENCOUNTER — CARE COORDINATION (OUTPATIENT)
Dept: CARE COORDINATION | Age: 81
End: 2021-07-16

## 2021-07-16 NOTE — CARE COORDINATION
Attempted to reach patient for continued Care Coordination follow up and education. Patient was unavailable at the time of my call, and a generic voicemail message was left asking patient to return my call at [phone].

## 2021-07-19 ENCOUNTER — TELEPHONE (OUTPATIENT)
Dept: FAMILY MEDICINE CLINIC | Age: 81
End: 2021-07-19

## 2021-07-20 NOTE — TELEPHONE ENCOUNTER
LMOM verbal message letting HH know Dr. David Reeves  [facility]

## 2021-07-21 ENCOUNTER — CARE COORDINATION (OUTPATIENT)
Dept: CARE COORDINATION | Age: 81
End: 2021-07-21

## 2021-07-29 ENCOUNTER — CARE COORDINATION (OUTPATIENT)
Dept: CARE COORDINATION | Age: 81
End: 2021-07-29

## 2021-07-29 NOTE — CARE COORDINATION
Ambulatory Care Coordination Note  7/29/2021  CM Risk Score: 5  Charlson 10 Year Mortality Risk Score: 79%     ACC: Jeremy Taylor RN    Summary Note: Spoke with Devan Jimenez for ROBYN followup. She is stable for this review    * continues to have private duty aide support in place  * does not qualify for GWES services due to vision not being \"bad enough\"  * ACP documentation placed in mail per ACP referral  * reinforced education completed at [address] visit    Plan  -reinforce education completed  -collaborate with [place] aide as needed  -ensure ACP paperwork has been received and no additional support  -encourage use of PCP office, Same Day, Walk in clinic versus seeking ED treatment for issues that can be resolved in office          Care Coordination Interventions    Program Enrollment: Rising Risk  Referral from Primary Care Provider: Yes  Suggested Interventions and [address]: Declined  Fall Risk Prevention: Completed  Disease Association: Completed (Comment: Julisa Means for macular degeneration)  Home Health Services: Completed  Meals on Wheels: Declined  Occupational Therapy: Completed  Palliative Care: Declined  Pharmacist: Completed  Physical Therapy: Completed  Registered Dietician: Lucio Mathur Work: Not Started  Transportation Support: Declined  Zone Management Tools: Not Started         Goals Addressed                    This Visit's Progress      Conditions and Symptoms (pt-stated)   On track      I will schedule office visits, as directed by my provider. I will keep my appointment or reschedule if I have to cancel. I will notify my provider of any barriers to my plan of care. I will notify my provider of any symptoms that indicate a worsening of my condition.   Vision problems with macular degeneration  Barriers: overwhelmed by complexity of regimen  Plan for overcoming my barriers: family and ACM support  Confidence: 8/10  Anticipated Goal Completion Date: 8-14-21   Reduce Falls  (pt-stated)   On track      I will reduce my risk of falls by the following: Use walking aids like cane or walker  Hx of falls  Barriers: impairment:  physical: limitations in mobility  Plan for overcoming my barriers: family and ACM support  Confidence: 7/10  Anticipated Goal Completion Date: 8/14/21            Prior to Admission medications    Medication Sig Start Date End Date Taking? Authorizing Provider   enalapril (VASOTEC) 10 MG tablet Take 1 tablet by mouth nightly 6/28/21   Brionna Suárez, DO   Cranberry 500 MG TABS Take by mouth    Historical Provider, MD   Biotin w/ Vitamins C & E (HAIR/SKIN/NAILS PO) Take by mouth    Historical Provider, MD   metoprolol succinate (TOPROL XL) 50 MG extended release tablet Take 1 tablet by mouth daily 6/8/21   Brionna Suárez, DO   docusate sodium (COLACE) 100 MG capsule Take 1 capsule by mouth 2 times daily as needed for Constipation 6/8/21   Brionna Suárez, DO   calcium-vitamin D (Lillia Monika) 500-200 MG-UNIT per tablet Take 2 tablets by mouth daily    Historical Provider, MD   levothyroxine (SYNTHROID) 100 MCG tablet Take 1 tablet by mouth Daily 5/12/21   Brionna Suárez, DO   DULoxetine (CYMBALTA) 60 MG extended release capsule TAKE 1 CAPSULE DAILY 4/12/21   FLORIDA Claudio CNP   pregabalin (LYRICA) 150 MG capsule Take 1 capsule by mouth 2 times daily for 180 days. 1/28/21 7/27/21  Brionna Suárez DO   Misc. Devices [facility]) MISC 1 each by Does not apply route daily 2/11/20   Brionna Suárez, DO   guaiFENesin 1200 MG TB12 Take 1,200 mg by mouth 2 times daily 12/21/19   Graciela Boyd PA-C   Misc.  Devices MISC Mechanical lift for a Julio Hammersmith 5/31/18   Brionna Suárez, DO   Scooter MISC by Does not apply route Power scooter 10/31/17   Brionna Suárez, DO   fluticasone (FLONASE) 50 MCG/ACT nasal spray 1 spray by Nasal route as needed for Rhinitis     Historical Provider, MD   Omega 3-6-9 Fatty Acids (OMEGA 3-6-9 COMPLEX) CAPS Take 1 capsule by mouth daily Historical Provider, MD   therapeutic multivitamin-minerals [facility]) tablet Take 1 tablet by mouth nightly     Historical Provider, MD       Future Appointments   Date Time Provider Hbuer Little   7/30/2021 11:30 AM [address] [place] [address], DO N [facility] [place]   8/26/2021  1:20 PM DO ARCADIO Haskins PCT [place]

## 2021-08-06 ENCOUNTER — OFFICE VISIT (OUTPATIENT)
Dept: PHYSICAL MEDICINE AND REHAB | Age: 81
End: 2021-08-06
Payer: MEDICARE

## 2021-08-06 VITALS
SYSTOLIC BLOOD PRESSURE: 132 MMHG | WEIGHT: 159 LBS | DIASTOLIC BLOOD PRESSURE: 80 MMHG | HEIGHT: 69 IN | BODY MASS INDEX: 23.55 KG/M2

## 2021-08-06 DIAGNOSIS — F11.99 OPIOID USE, UNSPECIFIED WITH UNSPECIFIED OPIOID-INDUCED DISORDER (HCC): ICD-10-CM

## 2021-08-06 DIAGNOSIS — M47.896 OTHER SPONDYLOSIS, LUMBAR REGION: ICD-10-CM

## 2021-08-06 DIAGNOSIS — F11.20 OPIOID DEPENDENCE, UNCOMPLICATED (HCC): ICD-10-CM

## 2021-08-06 DIAGNOSIS — G89.4 CHRONIC PAIN SYNDROME: ICD-10-CM

## 2021-08-06 DIAGNOSIS — M54.59 LUMBAR FACET JOINT PAIN: Primary | ICD-10-CM

## 2021-08-06 DIAGNOSIS — G62.9 NEUROPATHY: ICD-10-CM

## 2021-08-06 DIAGNOSIS — M41.9 SCOLIOSIS OF THORACOLUMBAR SPINE, UNSPECIFIED SCOLIOSIS TYPE: ICD-10-CM

## 2021-08-06 DIAGNOSIS — F11.29 OPIOID DEPENDENCE WITH UNSPECIFIED OPIOID-INDUCED DISORDER (HCC): ICD-10-CM

## 2021-08-06 DIAGNOSIS — M79.18 MYOFASCIAL PAIN: ICD-10-CM

## 2021-08-06 PROCEDURE — 99214 OFFICE O/P EST MOD 30 MIN: CPT | Performed by: ANESTHESIOLOGY

## 2021-08-06 RX ORDER — HYDROCODONE BITARTRATE AND ACETAMINOPHEN 5; 325 MG/1; MG/1
1 TABLET ORAL 2 TIMES DAILY PRN
Qty: 60 TABLET | Refills: 0 | Status: SHIPPED | OUTPATIENT
Start: 2021-08-20 | End: 2021-09-19

## 2021-08-06 NOTE — PATIENT INSTRUCTIONS
The following treatment recommendations and plan were discussed in detail with Princess Gunter and . Imaging:   I have reviewed patients imaging of XR L-spine. Analgesics:   With continued chronic pain, I have contineud her Norco 5 mg every 12 hours as needed for severe pain (pain greater than 7/10). Patient is advised to take this medication as prescribed as taking it more than prescribed can lead to development of tolerance, physical dependence, and addiction. OARRS reviewed and is appropriate. Pain contract signed today. Patient is taking Acetaminophen. Patient informed that the maximum amount of acetaminophen taken on a regular basis should only be 4000 mg per day. Patient is taking Aleve. Patient is advised to take as prescribed and not take on an empty stomach. Adjuvants: In light of the presence of a neuropathic component of pain, the patient should continue Lyrica and Cymbalta as prescribed. Interventions: In presence of lumbar axial back pain and with physical exam consistent for facetal pain and significant response to diagnostic lumbar medial branch blocks, confirmatory  medial branch blocks at bilateral L4/L5 and L5/S1 was chosen. The risks and benefits were discussed in detail with the patient. Patient wants to proceed with the injection. Anticoagulation/NPO Recommendations:   Patient will need to hold Aleve x 7 days prior to the procedure. Patient will need to be NPO x 8 hours prior to the procedure. We will start an IV prior to the procedure    Multidisciplinary Pain Management:   In the presence of complex, chronic, and multi-factorial pain, the importance of a multidisciplinary approach to pain management in the patients management regimen was emphasized and discussed in great detail.    PHYSICAL THERAPY: Patient is advised to continue gentle low back ROM/stretching exercises     Referrals:  None    Prescriptions Written This Visit:   Norco 5 mg (#60, 0 refills)    Follow-up: For confirmatory lumbar MBBs

## 2021-08-06 NOTE — PROGRESS NOTES
for longer duration of time and night with her sleep. She reports not to take some of her breakthrough medications for pain during this time. She does report doing very well with her low-dose Norco as needed. She feels like this medication helps keep her very functional and is only taking about 1 tablet/day. She denies any side effects besides some minimal constipation. She states she would like to proceed with her confirmatory blocks. History of Interventions:   Surgery: Previous discectomy in 30s  Injections: Previous lumabr RFA ~8-10 years ago by Dr. Edilia Stevenson    Current Treatment Medications:   Norco 5 mg twice daily as needed-prescribed by me  Aleve as needed  Heating pad PRN  Mishel 150 mg BID  Cymbalta 60 mg daily    Historical Treatment Medications:   Tramadol-ineffective  Norco-effective for postop pain from femur fracture    Imaging:  XR L-spine (5/18/21)    PROCEDURE: XR LUMBAR SPINE (2-3 VIEWS)       CLINICAL INFORMATION: Spinal stenosis of lumbar region without neurogenic claudication       COMPARISON: August 2, 2019       TECHNIQUE: AP and lateral views performed.           FINDINGS:    ALIGNMENT: Levoscoliosis lumbar spine centered at the thoracolumbar junction. VERTEBRAL BODIES: Slight anterior wedging L1   DISKS: Multilevel degenerative disc disease with associated endplate degenerative changes, these are most advanced in the T12-L2 vertebral bodies. SOFT TISSUES: Unremarkable. OBLIQUE PROJECTIONS: Not performed. FLEXION/EXTENSION LATERAL: Not performed.       OTHER: None.               Impression   1. Levoscoliosis with compensatory osteoarthritic changes, predominantly at the thoracolumbar junction.          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 Louann Alpers Fibromyalgia     GERD (gastroesophageal reflux disease)     Diverticulitis     Hiatal hernia     Hypertension     Hypothyroidism     Osteoarthritis        Past Surgical History:   Procedure Laterality Date    APPENDECTOMY  1958    BACK SURGERY      CARPAL TUNNEL RELEASE Bilateral 2013    w/cubital tunnel    COLON SURGERY  07/29/2016    Procedure: ATTEMPTED DAVINCI XI ROBOTIC ASSISTED SIGMOID COLON RESECTION, ROBOTIC ASSISTED MOBILIZATION OF RECTAL SIGMOID TO SPLENIC FLEXURE, CONVERTED TO OPEN LEFT HEMICOLECTOMY WITH COLOPROCTOCTOMY, SPLENORRHAPHY, RIGID SIGMOIDOSCOPY, LASER EVALUATION OF VASCULARITY WITH ICG; Surgeon: Frances Pereira MD; Location: [facility]; Service: General    COLONOSCOPY  2012    CYST REMOVAL  2010   [address], [address]    Cataract    FEMUR FRACTURE SURGERY Left 12/21/2020    LEFT FEMUR OPEN REDUCTION INTERNAL FIXATION [facility]   [facility] N/A 03/09/2021    ROBOTIC EXTENSIVE LYSIS OF ADHESIONS, ROBOTIC REDUCTION OF HIATAL HERNIA WITH GASTROPEXY performed by Bryant Dennis MD at [facility]    w/bladder suspension   [address]  2002, 2008, 2009    rt hip(2002), lt knee(2009), lt hip(2008) Shoulder (2009)    OTHER SURGICAL HISTORY  04/21/2021    Denney Lefort Seymour Frederic CNP in the office   [address]    PAIN MANAGEMENT PROCEDURE Bilateral 7/13/2021    medial branch blocks at bilateral L4/L5 and L5/S1 performed by Sandra Delong DO at [address] IND Left 09/15/2017    COLONOSCOPY performed by Penny Peters MD at [facility]   [facility]    discectomy    TONSILLECTOMY  1943    TOOTH EXTRACTION  1964    UPPER GASTROINTESTINAL ENDOSCOPY  2012       Family History   Problem Relation Age of Onset    Arthritis Mother     Hearing Loss Mother     High Cholesterol Mother     Hearing Loss Father     Heart Disease Father 76        CABG    Stroke Father     Arthritis Sister     Depression Sister     Diabetes Sister     Arthritis Brother     Depression Brother     Cancer Maternal Aunt     Early Death Maternal Aunt     Diabetes Maternal Grandmother     Heart Disease Paternal Grandmother        Social History     Socioeconomic History    Marital status:      Spouse name: Morales Marie Number of children: Not on file    Years of education: 15    Highest education level: Associate degree: academic program   Occupational History    Not on file   Tobacco Use    Smoking status: Former Smoker     Packs/day: 2.00     Years: 11.00     Pack years: 22.00     Types: Cigarettes     Start date: 1957     Quit date: 1967     Years since quittin.8    Smokeless tobacco: Never Used   Substance and Sexual Activity    Alcohol use: Yes     Comment: wine with dinner    Drug use: No    Sexual activity: Not on file   Other Topics Concern    Not on file   Social History Narrative    Referral to AAA placed    Referral placed to [facility]    No barriers with medication affordability    No barriers with transportation    Discussed support from insurance company     Social Determinants of Health     Financial Resource Strain: Low Risk     Difficulty of Paying Living Expenses: Not hard at all   Food Insecurity: No Food Insecurity    Worried About [facility] in the Last Year: Never true    [facility] in the Last Year: Never true   Transportation Needs: No Transportation Needs    Lack of Transportation (Medical): No    Lack of Transportation (Non-Medical): No   Physical Activity: Inactive    Days of Exercise per Week: 0 days    Minutes of Exercise per Session: 0 min   Stress: Stress Concern Present    Feeling of Stress :  To some extent   Social Connections: Socially Integrated    Frequency of Communication with Friends and Family: More than three times a week    Frequency of Social Gatherings with Friends and Family: More than three times a week    Attends Mosque Services: More than 4 times per year    Active Member of [employer] or Organizations: Yes    Attends Club or Organization Meetings: More than 4 times per year    Marital Status:    Intimate Partner Violence:     Fear of Current or Ex-Partner:     Emotionally Abused:     Physically Abused:     Sexually Abused:        Medications & Allergies:   Current Outpatient Medications   Medication Instructions    Biotin w/ Vitamins C & E (HAIR/SKIN/NAILS PO) Oral    calcium-vitamin D (OSCAL-500) 500-200 MG-UNIT per tablet 2 tablets, Oral, DAILY    Cranberry 500 MG TABS Oral    docusate sodium (COLACE) 100 mg, Oral, 2 TIMES DAILY PRN    DULoxetine (CYMBALTA) 60 MG extended release capsule TAKE 1 CAPSULE DAILY    enalapril (VASOTEC) 10 mg, Oral, NIGHTLY    fluticasone (FLONASE) 50 MCG/ACT nasal spray 1 spray, Nasal, PRN    guaiFENesin 1,200 mg, Oral, 2 TIMES DAILY    levothyroxine (SYNTHROID) 100 mcg, Oral, DAILY    metoprolol succinate (TOPROL XL) 50 mg, Oral, DAILY    Misc. Devices (WALKER) MISC 1 each, Does not apply, DAILY    Misc.  Devices MISC Mechanical lift for a [employer] 3-6-9 Fatty Acids (OMEGA 3-6-9 COMPLEX) CAPS 1 capsule, Oral, DAILY    pregabalin (LYRICA) 150 mg, Oral, 2 TIMES DAILY    Scooter MISC Does not apply, Power scooter    therapeutic multivitamin-minerals (THERAGRAN-M) tablet 1 tablet, Oral, NIGHTLY       Allergies   Allergen Reactions    Ampicillin     Morphine Other (See Comments)     Hallucinations     Pcn [Penicillins] Itching    Sulfa Antibiotics        Review of Systems:   Constitutional: negative for weight changes or fevers  Genitourinary: negative for bowel/bladder incontinence   Musculoskeletal: positive for low back pain  Neurological: negative for any leg weakness or numbness/tingling  Behavioral/Psych: negative for anxiety/depression All other systems reviewed and are negative    Objective:     Vitals:    08/06/21 1112   BP: 132/80       Constitutional: Pleasant, no acute distress   Head: Normocephalic, atraumatic   Eyes: Conjunctivae normal   Neck: Supple, symmetrical   Lungs: Normal respiratory effort, non-labored breathing   Cardiovascular: Limbs warm and well perfused   Abdomen: Non-protruded   Musculoskeletal: Muscle bulk symmetric, no atrophy, no gross deformities   - Left shoulder: Tenderness palpation along left trapezius muscle  · Lumbar Spine: ROM severely reduced in extension. Scoliosis appreciated in thoracolumbar junction. Lumbar paraspinals tender bilaterally. SLR neg bilaterally. Positive facet loading bilaterally. Bilateral SI joints tender to palpation. Neurological: Cranial nerves II-XII grossly intact. · Gait - Severely antalgic gait. Ambulates with assistive device. · Motor: No focal motor deficits appreciated  Skin: No rashes or lesions visible in low back  Psychological: Cooperative, no exaggerated pain behaviors       Assessment:    Diagnosis Orders   1. Lumbar facet joint pain  CHG FLUOR NEEDLE/CATH SPINE/PARASPINAL DX/THER ADDON    KY INJ DX/THER AGNT PARAVERT FACET JOINT, LUMBAR/SAC, 1ST LEVEL    KY INJ DX/THER AGNT PARAVERT FACET JOINT, LUMBAR/SAC, 2ND LEVEL   2. Other spondylosis, lumbar region   KY INJ DX/THER AGNT PARAVERT FACET JOINT, LUMBAR/SAC, 1ST LEVEL    KY INJ DX/THER AGNT PARAVERT FACET JOINT, LUMBAR/SAC, 2ND LEVEL   3. Chronic pain syndrome     4. Myofascial pain     5. Neuropathy     6. Scoliosis of thoracolumbar spine, unspecified scoliosis type           Arlette Forth L Height is a 78 y. o.female presenting to the pain clinic for evaluation of chronic low back pain. Her clinical history and physical examination are consistent with lumbar facet medial pain with associated myofascial pain.   I had set the patient up for diagnostic lumbar medial branch blocks targeting the facet joints of L4/L5 and L5/S1. In addition, I start the patient on low-dose Norco 5 mg every 12 hours as needed for severe pain to keep the patient functional as well. She responded significantly to her diagnostic lumbar medial branch blocks. I set her up for confirmatory blocks at these levels. I have refilled her Norco.      Plan: The following treatment recommendations and plan were discussed in detail with Laverne Gunter and . Imaging:   I have reviewed patients imaging of XR L-spine. Analgesics:   With continued chronic pain, I have contineud her Norco 5 mg every 12 hours as needed for severe pain (pain greater than 7/10). Patient is advised to take this medication as prescribed as taking it more than prescribed can lead to development of tolerance, physical dependence, and addiction. OARRS reviewed and is appropriate. Pain contract signed today. Patient is taking Acetaminophen. Patient informed that the maximum amount of acetaminophen taken on a regular basis should only be 4000 mg per day. Patient is taking Aleve. Patient is advised to take as prescribed and not take on an empty stomach. Adjuvants: In light of the presence of a neuropathic component of pain, the patient should continue Lyrica and Cymbalta as prescribed. Interventions: In presence of lumbar axial back pain and with physical exam consistent for facetal pain and significant response to diagnostic lumbar medial branch blocks, confirmatory  medial branch blocks at bilateral L4/L5 and L5/S1 was chosen. The risks and benefits were discussed in detail with the patient. Patient wants to proceed with the injection. Anticoagulation/NPO Recommendations:   Patient will need to hold Aleve x 7 days prior to the procedure. Patient will need to be NPO x 8 hours prior to the procedure.   We will start an IV prior to the procedure    Multidisciplinary Pain Management:   In the presence of complex, chronic, and multi-factorial pain, the importance of a multidisciplinary approach to pain management in the patients management regimen was emphasized and discussed in great detail.    PHYSICAL THERAPY: Patient is advised to continue gentle low back ROM/stretching exercises     Referrals:  None    Prescriptions Written This Visit:   Norco 5 mg (#60, 0 refills)    Follow-up: For confirmatory lumbar MBBs      Jared Dale, DO  Interventional Pain Management/PM&R   [place]

## 2021-08-09 ENCOUNTER — TELEPHONE (OUTPATIENT)
Dept: PHYSICAL MEDICINE AND REHAB | Age: 81
End: 2021-08-09

## 2021-08-09 DIAGNOSIS — M79.7 FIBROMYALGIA: ICD-10-CM

## 2021-08-09 RX ORDER — PREGABALIN 150 MG/1
150 CAPSULE ORAL 2 TIMES DAILY
Qty: 180 CAPSULE | Refills: 1 | Status: SHIPPED | OUTPATIENT
Start: 2021-08-09 | End: 2021-08-09 | Stop reason: SDUPTHER

## 2021-08-09 RX ORDER — PREGABALIN 150 MG/1
150 CAPSULE ORAL 2 TIMES DAILY
Qty: 180 CAPSULE | Refills: 1 | Status: SHIPPED | OUTPATIENT
Start: 2021-08-09 | End: 2022-02-07 | Stop reason: SDUPTHER

## 2021-08-09 NOTE — TELEPHONE ENCOUNTER
----- Message from Licokelsie Byrdnd sent at 8/9/2021  9:47 AM EDT -----  Subject: Message to Provider    QUESTIONS  Information for Provider? Patient tried to refill her prescription   pregabalin (LYRICA) 150 MG capsule as she ran out of medication this   morning. Her refill at the pharmacy is dated so she is unable to refill   until 8/20/21. She would like to know if this could e adjusted as she does   not have any left to take for tonight.   ---------------------------------------------------------------------------  --------------  CALL BACK INFO  What is the best way for the office to contact you? OK to leave message on   voicemail  Preferred Call Back Phone Number? [phone]  ---------------------------------------------------------------------------  --------------  SCRIPT ANSWERS  Relationship to Patient?  Self

## 2021-08-09 NOTE — TELEPHONE ENCOUNTER
----- Message from Evette Apley sent at 8/7/2021  9:51 AM EDT -----  Subject: Refill Request    QUESTIONS  Name of Medication? pregabalin (LYRICA) 150 MG capsule  Patient-reported dosage and instructions? takes 2 a day   How many days do you have left? 3  Preferred Pharmacy? [facility] phone number (if available)? [phone]  Additional Information for Provider? Pt needs a 2 week supply until she   receives [facility], since it takes 2 weeks   for emere to mail her prescription to her.   ---------------------------------------------------------------------------  --------------,  Name of Medication? pregabalin (LYRICA) 150 MG capsule  Patient-reported dosage and instructions? takes 2 a day   How many days do you have left? 3  Preferred Pharmacy? [address] phone number (if available)? [phone]  ---------------------------------------------------------------------------  --------------  CALL BACK INFO  What is the best way for the office to contact you? OK to leave message on   voicemail  Preferred Call Back Phone Number?  [phone]

## 2021-08-09 NOTE — TELEPHONE ENCOUNTER
Script needs sent to [facility] on [facility]. First one was sent to [facility].

## 2021-08-12 ENCOUNTER — CARE COORDINATION (OUTPATIENT)
Dept: CARE COORDINATION | Age: 81
End: 2021-08-12

## 2021-08-12 NOTE — CARE COORDINATION
complexity of regimen  Plan for overcoming my barriers: family and ACM support  Confidence: 8/10  Anticipated Goal Completion Date: 8-14-21          Reduce Falls  (pt-stated)   On track      I will reduce my risk of falls by the following: Use walking aids like cane or walker  Hx of falls  Barriers: impairment:  physical: limitations in mobility  Plan for overcoming my barriers: family and ACM support  Confidence: 7/10  Anticipated Goal Completion Date: 8/14/21            Prior to Admission medications    Medication Sig Start Date End Date Taking? Authorizing Provider   pregabalin (LYRICA) 150 MG capsule Take 1 capsule by mouth 2 times daily for 180 days. 8/9/21 2/5/22  Nellie Crumb, DO   HYDROcodone-acetaminophen (NORCO) 5-325 MG per tablet Take 1 tablet by mouth 2 times daily as needed for Pain for up to 30 days. 8/20/21 9/19/21  Jared Dale, DO   enalapril (VASOTEC) 10 MG tablet Take 1 tablet by mouth nightly 6/28/21   Nelile Crumb, DO   Cranberry 500 MG TABS Take by mouth    Historical Provider, MD   Biotin w/ Vitamins C & E (HAIR/SKIN/NAILS PO) Take by mouth    Historical Provider, MD   metoprolol succinate (TOPROL XL) 50 MG extended release tablet Take 1 tablet by mouth daily 6/8/21   Nellie Crumb, DO   docusate sodium (COLACE) 100 MG capsule Take 1 capsule by mouth 2 times daily as needed for Constipation 6/8/21   Nellie Crumb, DO   calcium-vitamin D (Marvelyn Hack) 500-200 MG-UNIT per tablet Take 2 tablets by mouth daily    Historical Provider, MD   levothyroxine (SYNTHROID) 100 MCG tablet Take 1 tablet by mouth Daily 5/12/21   Nellie Crumb, DO   DULoxetine (CYMBALTA) 60 MG extended release capsule TAKE 1 CAPSULE DAILY 4/12/21   FLORIDA Smith - CNP   Misc. Devices [facility]) MISC 1 each by Does not apply route daily 2/11/20   Nellie Crumb, DO   guaiFENesin 1200 MG TB12 Take 1,200 mg by mouth 2 times daily 12/21/19   Lizzy Hough PA-C   Misc.  Devices MISC Mechanical lift for a [employer] 5/31/18 Carola Breath, DO   Scooter MISC by Does not apply route Power scooter 10/31/17   Carola Breath, DO   fluticasone (FLONASE) 50 MCG/ACT nasal spray 1 spray by Nasal route as needed for Rhinitis     Historical Provider, MD   Omega 3-6-9 Fatty Acids (Lavera Marco A 3-6-9 COMPLEX) CAPS Take 1 capsule by mouth daily     Historical Provider, MD   therapeutic multivitamin-minerals [facility]) tablet Take 1 tablet by mouth nightly     Historical Provider, MD       Future Appointments   Date Time Provider Huber Little   8/26/2021  1:20 PM Carola Breath, DO [place] [facility] [place]   9/15/2021  3:50 PM Jared Stanford DO N SRPX Pain [facility] [place]

## 2021-08-16 ENCOUNTER — TELEPHONE (OUTPATIENT)
Dept: FAMILY MEDICINE CLINIC | Age: 81
End: 2021-08-16

## 2021-08-16 DIAGNOSIS — M79.7 FIBROMYALGIA: Primary | ICD-10-CM

## 2021-08-16 RX ORDER — PREGABALIN 150 MG/1
150 CAPSULE ORAL 2 TIMES DAILY
Qty: 10 CAPSULE | Refills: 0 | Status: SHIPPED | OUTPATIENT
Start: 2021-08-16 | End: 2022-04-19

## 2021-08-16 NOTE — TELEPHONE ENCOUNTER
Sent 5 day supply to [facility] on [address].   Electronically signed by FLORIDA Up CNP on 8/16/2021 at 10:34 AM

## 2021-08-16 NOTE — TELEPHONE ENCOUNTER
----- Message from Adriana Monty sent at 8/16/2021  9:55 AM EDT -----  Subject: Refill Request    QUESTIONS  Name of Medication? pregabalin (LYRICA) 150 MG capsule  Patient-reported dosage and instructions? daily  How many days do you have left? 0  Preferred Pharmacy? [facility] phone number (if available)? [phone]  Additional Information for Provider? Patient had a mix up with Lyrica   between nursing home and hospital. She ran out and called pharmacy and   they filled a couple to get her through until the script comes from   [address] They did tracking on it but not due to get there until   Thursday. she tried to spread out the few the pharmacy gave her but has no   more and is so uncomfortable and does not know what to do. She would like   to know if there is anything the doctor can do until her script gets here   later in the week. Please call her back  ---------------------------------------------------------------------------  --------------  CALL BACK INFO  What is the best way for the office to contact you? OK to leave message on   voicemail  Preferred Call Back Phone Number?  [phone]

## 2021-08-22 NOTE — H&P
Today, patient presents for planned confirmatory  medial branch blocks at bilateral L4/L5 and L5/S1. This note is reflective of the patient's previous visit for evaluation. We will proceed with today's planned procedure. Since patient's last visit for evaluation, there have been no interval changes in medical history. Patient has no new numbness, weakness, or focal neurological deficit since evaluation. Patient has no contraindications to injection (no anticoagulation or recent antibiotic intake for active infections), and has a  present or is able to drive themselves (as discussed and cleared by physician). Allergies to latex, contrast dye, and steroid medications have been confirmed with the patient prior to the procedure. NPO necessity has been assessed and accepted based on procedure complexity. The risks and benefits of the procedure have been explained including but are not limited to infection, bleeding, paralysis, immediate post procedure weakness, and dizziness; the patient acknowledges understanding and desires to proceed with the procedure. Patient has signed consent for same procedure as discussed in previous clinic encounter. All other questions and concerns were addressed at bedside. See procedure note for full details. Post procedure Instructions: The patient was advised not to drive during the day of the procedure and not to engage in any significant decision making (unless otherwise states by physician). The patient was also advised to be cautious with walking/activity for 24 hours following today's visit and asked not to engage in over-exertion (unless otherwise states by physician). After this time, it is ok to resume pre-procedure level of activity. Patient advised to apply ice to site of injection in situations of pain and discomfort. Patient advised to not submerge site of injection during bath or pool activities for approximately 24 hours post-procedure.  Patient attested to She reports 100% pain relief for 2-3 days after the procedure. She reports improvement in her overall ability to stand up for longer duration of time and night with her sleep. She reports not to take some of her breakthrough medications for pain during this time. She does report doing very well with her low-dose Norco as needed. She feels like this medication helps keep her very functional and is only taking about 1 tablet/day. She denies any side effects besides some minimal constipation. She states she would like to proceed with her confirmatory blocks. History of Interventions:   Surgery: Previous discectomy in 30s  Injections: Previous lumabr RFA ~8-10 years ago by Dr. Haritha Haywood    Current Treatment Medications:   Norco 5 mg twice daily as needed-prescribed by me  Aleve as needed  Heating pad PRN  Mishel 150 mg BID  Cymbalta 60 mg daily    Historical Treatment Medications:   Tramadol-ineffective  Norco-effective for postop pain from femur fracture    Imaging:  XR L-spine (5/18/21)    PROCEDURE: XR LUMBAR SPINE (2-3 VIEWS)       CLINICAL INFORMATION: Spinal stenosis of lumbar region without neurogenic claudication       COMPARISON: August 2, 2019       TECHNIQUE: AP and lateral views performed.           FINDINGS:    ALIGNMENT: Levoscoliosis lumbar spine centered at the thoracolumbar junction. VERTEBRAL BODIES: Slight anterior wedging L1   DISKS: Multilevel degenerative disc disease with associated endplate degenerative changes, these are most advanced in the T12-L2 vertebral bodies. SOFT TISSUES: Unremarkable. OBLIQUE PROJECTIONS: Not performed. FLEXION/EXTENSION LATERAL: Not performed.       OTHER: None.               Impression   1. Levoscoliosis with compensatory osteoarthritic changes, predominantly at the thoracolumbar junction.          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 Kirsty Alvarado GERD (gastroesophageal reflux disease)     Diverticulitis     Hiatal hernia     Hypertension     Hypothyroidism     Osteoarthritis        Past Surgical History:   Procedure Laterality Date    APPENDECTOMY  1958    BACK SURGERY      CARPAL TUNNEL RELEASE Bilateral 2013    w/cubital tunnel    COLON SURGERY  07/29/2016    Procedure: ATTEMPTED [facility] XI ROBOTIC ASSISTED SIGMOID COLON RESECTION, ROBOTIC ASSISTED MOBILIZATION OF RECTAL SIGMOID TO SPLENIC FLEXURE, CONVERTED TO OPEN LEFT HEMICOLECTOMY WITH COLOPROCTOCTOMY, SPLENORRHAPHY, RIGID SIGMOIDOSCOPY, LASER EVALUATION OF VASCULARITY WITH ICG; Surgeon: Kirsty Alvarado MD; Location: [facility]; Service: General    COLONOSCOPY  2012    CYST REMOVAL  2010   [address], [address]    Cataract    FEMUR FRACTURE SURGERY Left 12/21/2020    LEFT FEMUR OPEN REDUCTION INTERNAL FIXATION performed by Johann Villanueva MD at [facility] Left 2001    HIATAL HERNIA REPAIR N/A 03/09/2021    ROBOTIC EXTENSIVE LYSIS OF ADHESIONS, ROBOTIC REDUCTION OF HIATAL HERNIA WITH GASTROPEXY performed by Carlos Stevens MD at [facility]    w/bladder suspension   [address]  2002, 2008, 2009    rt hip(2002), lt knee(2009), lt hip(2008) Shoulder (2009)    OTHER SURGICAL HISTORY  04/21/2021    Carmina Tinoco CNP in the office   [address] PAIN MANAGEMENT PROCEDURE Bilateral 7/13/2021    medial branch blocks at bilateral L4/L5 and L5/S1 performed by [facility],  at [address] IND Left 09/15/2017    COLONOSCOPY performed by Maribell Blackwood MD at [facility]   [facility]    discectomy   [facility] ENDOSCOPY  2012 Family History   Problem Relation Age of Onset    Arthritis Mother     Hearing Loss Mother     High Cholesterol Mother     Hearing Loss Father     Heart Disease Father 76        CABG    Stroke Father     Arthritis Sister     Depression Sister     Diabetes Sister     Arthritis Brother     Depression Brother     Cancer Maternal Aunt     Early Death Maternal Aunt     Diabetes Maternal Grandmother     Heart Disease Paternal Grandmother        Social History     Socioeconomic History    Marital status:      Spouse name: Ulises Neal Number of children: Not on file    Years of education: 15    Highest education level: Associate degree: academic program   Occupational History    Not on file   Tobacco Use    Smoking status: Former Smoker     Packs/day: 2.00     Years: 11.00     Pack years: 22.00     Types: Cigarettes     Start date: 1957     Quit date: 1967     Years since quittin.8    Smokeless tobacco: Never Used   Substance and Sexual Activity    Alcohol use: Yes     Comment: wine with dinner    Drug use: No    Sexual activity: Not on file   Other Topics Concern    Not on file   Social History Narrative    Referral to AAA placed    Referral placed to [facility]    No barriers with medication affordability    No barriers with transportation    Discussed support from insurance company     Social Determinants of Health     Financial Resource Strain: Low Risk     Difficulty of Paying Living Expenses: Not hard at all   Food Insecurity: No Food Insecurity    Worried About [facility] in the Last Year: Never true    [facility] in the Last Year: Never true   Transportation Needs: No Transportation Needs    Lack of Transportation (Medical): No    Lack of Transportation (Non-Medical): No   Physical Activity: Inactive    Days of Exercise per Week: 0 days    Minutes of Exercise per Session: 0 min   Stress: Stress Concern Present    Feeling of Stress :  To some extent   Social Connections: Socially Integrated    Frequency of Communication with Friends and Family: More than three times a week    Frequency of Social Gatherings with Friends and Family: More than three times a week    Attends Anabaptism Services: More than 4 times per year    Active Member of [employer] or Organizations: Yes    Attends Club or Organization Meetings: More than 4 times per year    Marital Status:    Intimate Partner Violence:     Fear of Current or Ex-Partner:     Emotionally Abused:     Physically Abused:     Sexually Abused:        Medications & Allergies:   Current Outpatient Medications   Medication Instructions    Biotin w/ Vitamins C & E (HAIR/SKIN/NAILS PO) Oral    calcium-vitamin D (OSCAL-500) 500-200 MG-UNIT per tablet 2 tablets, Oral, DAILY    Cranberry 500 MG TABS Oral    docusate sodium (COLACE) 100 mg, Oral, 2 TIMES DAILY PRN    DULoxetine (CYMBALTA) 60 MG extended release capsule TAKE 1 CAPSULE DAILY    enalapril (VASOTEC) 10 mg, Oral, NIGHTLY    fluticasone (FLONASE) 50 MCG/ACT nasal spray 1 spray, Nasal, PRN    guaiFENesin 1,200 mg, Oral, 2 TIMES DAILY    HYDROcodone-acetaminophen (NORCO) 5-325 MG per tablet 1 tablet, Oral, 2 TIMES DAILY PRN    levothyroxine (SYNTHROID) 100 mcg, Oral, DAILY    metoprolol succinate (TOPROL XL) 50 mg, Oral, DAILY    Misc. Devices (WALKER) MISC 1 each, Does not apply, DAILY    Misc.  Devices MISC Mechanical lift for a [employer] 3-6-9 Fatty Acids (OMEGA 3-6-9 COMPLEX) CAPS 1 capsule, Oral, DAILY    pregabalin (LYRICA) 150 mg, Oral, 2 TIMES DAILY    pregabalin (LYRICA) 150 mg, Oral, 2 TIMES DAILY    Scooter MISC Does not apply, Power scooter    therapeutic multivitamin-minerals (THERAGRAN-M) tablet 1 tablet, Oral, NIGHTLY       Allergies   Allergen Reactions    Ampicillin     Morphine Other (See Comments)     Hallucinations     Pcn [Penicillins] Itching    Sulfa Antibiotics        Review of Systems: Constitutional: negative for weight changes or fevers  Genitourinary: negative for bowel/bladder incontinence   Musculoskeletal: positive for low back pain  Neurological: negative for any leg weakness or numbness/tingling  Behavioral/Psych: negative for anxiety/depression   All other systems reviewed and are negative    Objective: There were no vitals filed for this visit. Constitutional: Pleasant, no acute distress   Head: Normocephalic, atraumatic   Eyes: Conjunctivae normal   Neck: Supple, symmetrical   Lungs: Normal respiratory effort, non-labored breathing   Cardiovascular: Limbs warm and well perfused   Abdomen: Non-protruded   Musculoskeletal: Muscle bulk symmetric, no atrophy, no gross deformities   - Left shoulder: Tenderness palpation along left trapezius muscle  · Lumbar Spine: ROM severely reduced in extension. Scoliosis appreciated in thoracolumbar junction. Lumbar paraspinals tender bilaterally. SLR neg bilaterally. Positive facet loading bilaterally. Bilateral SI joints tender to palpation. Neurological: Cranial nerves II-XII grossly intact. · Gait - Severely antalgic gait. Ambulates with assistive device. · Motor: No focal motor deficits appreciated  Skin: No rashes or lesions visible in low back  Psychological: Cooperative, no exaggerated pain behaviors       Assessment:    Diagnosis Orders   1. Lumbar facet joint pain  CHG FLUOR NEEDLE/CATH SPINE/PARASPINAL DX/THER ADDON    NC INJ DX/THER AGNT PARAVERT FACET JOINT, LUMBAR/SAC, 1ST LEVEL    NC INJ DX/THER AGNT PARAVERT FACET JOINT, LUMBAR/SAC, 2ND LEVEL   2. Other spondylosis, lumbar region   NC INJ DX/THER AGNT PARAVERT FACET JOINT, LUMBAR/SAC, 1ST LEVEL    NC INJ DX/THER AGNT PARAVERT FACET JOINT, LUMBAR/SAC, 2ND LEVEL   3. Chronic pain syndrome     4. Myofascial pain     5. Neuropathy     6. Scoliosis of thoracolumbar spine, unspecified scoliosis type           Darleene Grates L Height is a 78 y. o.female presenting to the pain clinic for evaluation of chronic low back pain. Her clinical history and physical examination are consistent with lumbar facet medial pain with associated myofascial pain. I had set the patient up for diagnostic lumbar medial branch blocks targeting the facet joints of L4/L5 and L5/S1. In addition, I start the patient on low-dose Norco 5 mg every 12 hours as needed for severe pain to keep the patient functional as well. She responded significantly to her diagnostic lumbar medial branch blocks. I set her up for confirmatory blocks at these levels. I have refilled her Norco.      Plan: The following treatment recommendations and plan were discussed in detail with Arlette Alexandr Gunter and . Imaging:   I have reviewed patients imaging of XR L-spine. Analgesics:   With continued chronic pain, I have contineud her Norco 5 mg every 12 hours as needed for severe pain (pain greater than 7/10). Patient is advised to take this medication as prescribed as taking it more than prescribed can lead to development of tolerance, physical dependence, and addiction. OARRS reviewed and is appropriate. Pain contract signed today. Patient is taking Acetaminophen. Patient informed that the maximum amount of acetaminophen taken on a regular basis should only be 4000 mg per day. Patient is taking Aleve. Patient is advised to take as prescribed and not take on an empty stomach. Adjuvants: In light of the presence of a neuropathic component of pain, the patient should continue Lyrica and Cymbalta as prescribed. Interventions: In presence of lumbar axial back pain and with physical exam consistent for facetal pain and significant response to diagnostic lumbar medial branch blocks, confirmatory  medial branch blocks at bilateral L4/L5 and L5/S1 was chosen. The risks and benefits were discussed in detail with the patient. Patient wants to proceed with the injection.     Anticoagulation/NPO Recommendations:   Patient will need to hold Aleve x 7 days prior to the procedure. Patient will need to be NPO x 8 hours prior to the procedure. We will start an IV prior to the procedure    Multidisciplinary Pain Management:   In the presence of complex, chronic, and multi-factorial pain, the importance of a multidisciplinary approach to pain management in the patients management regimen was emphasized and discussed in great detail.    PHYSICAL THERAPY: Patient is advised to continue gentle low back ROM/stretching exercises     Referrals:  None    Prescriptions Written This Visit:   Norco 5 mg (#60, 0 refills)    Follow-up: For confirmatory lumbar MBBs      Jared Dale,   Interventional Pain Management/PM&R   [place]

## 2021-08-24 ENCOUNTER — HOSPITAL ENCOUNTER (OUTPATIENT)
Age: 81
Setting detail: OUTPATIENT SURGERY
Discharge: HOME | End: 2021-08-24
Attending: ANESTHESIOLOGY | Admitting: ANESTHESIOLOGY
Payer: MEDICARE

## 2021-08-24 ENCOUNTER — APPOINTMENT (OUTPATIENT)
Dept: GENERAL RADIOLOGY | Age: 81
End: 2021-08-24
Attending: ANESTHESIOLOGY
Payer: MEDICARE

## 2021-08-24 VITALS
DIASTOLIC BLOOD PRESSURE: 91 MMHG | HEIGHT: 69 IN | HEART RATE: 67 BPM | OXYGEN SATURATION: 95 % | TEMPERATURE: 97.9 F | BODY MASS INDEX: 25.1 KG/M2 | WEIGHT: 169.5 LBS | SYSTOLIC BLOOD PRESSURE: 147 MMHG | RESPIRATION RATE: 16 BRPM

## 2021-08-24 PROCEDURE — 64494 INJ PARAVERT F JNT L/S 2 LEV: CPT | Performed by: ANESTHESIOLOGY

## 2021-08-24 PROCEDURE — 64493 INJ PARAVERT F JNT L/S 1 LEV: CPT | Performed by: ANESTHESIOLOGY

## 2021-08-24 PROCEDURE — 3209999900 FLUORO FOR SURGICAL PROCEDURES

## 2021-08-24 PROCEDURE — 99152 MOD SED SAME PHYS/QHP 5/>YRS: CPT | Performed by: ANESTHESIOLOGY

## 2021-08-24 PROCEDURE — 3600000057 HC PAIN LEVEL 4 ADDL 15 MIN: Performed by: ANESTHESIOLOGY

## 2021-08-24 PROCEDURE — 3600000056 HC PAIN LEVEL 4 BASE: Performed by: ANESTHESIOLOGY

## 2021-08-24 PROCEDURE — 7100000011 HC PHASE II RECOVERY - ADDTL 15 MIN: Performed by: ANESTHESIOLOGY

## 2021-08-24 PROCEDURE — 6360000002 HC RX W HCPCS: Performed by: ANESTHESIOLOGY

## 2021-08-24 PROCEDURE — 2500000003 HC RX 250 WO HCPCS: Performed by: ANESTHESIOLOGY

## 2021-08-24 PROCEDURE — 2709999900 HC NON-CHARGEABLE SUPPLY: Performed by: ANESTHESIOLOGY

## 2021-08-24 PROCEDURE — 7100000010 HC PHASE II RECOVERY - FIRST 15 MIN: Performed by: ANESTHESIOLOGY

## 2021-08-24 RX ORDER — FENTANYL CITRATE 50 UG/ML
INJECTION, SOLUTION INTRAMUSCULAR; INTRAVENOUS PRN
Status: DISCONTINUED | OUTPATIENT
Start: 2021-08-24 | End: 2021-08-24 | Stop reason: ALTCHOICE

## 2021-08-24 RX ORDER — MIDAZOLAM HYDROCHLORIDE 1 MG/ML
INJECTION INTRAMUSCULAR; INTRAVENOUS PRN
Status: DISCONTINUED | OUTPATIENT
Start: 2021-08-24 | End: 2021-08-24 | Stop reason: ALTCHOICE

## 2021-08-24 RX ORDER — LIDOCAINE HYDROCHLORIDE 10 MG/ML
INJECTION, SOLUTION EPIDURAL; INFILTRATION; INTRACAUDAL; PERINEURAL PRN
Status: DISCONTINUED | OUTPATIENT
Start: 2021-08-24 | End: 2021-08-24 | Stop reason: ALTCHOICE

## 2021-08-24 RX ORDER — BUPIVACAINE HYDROCHLORIDE 5 MG/ML
INJECTION, SOLUTION PERINEURAL PRN
Status: DISCONTINUED | OUTPATIENT
Start: 2021-08-24 | End: 2021-08-24 | Stop reason: ALTCHOICE

## 2021-08-24 ASSESSMENT — PAIN SCALES - GENERAL: PAINLEVEL_OUTOF10: 0

## 2021-08-24 ASSESSMENT — PAIN - FUNCTIONAL ASSESSMENT: PAIN_FUNCTIONAL_ASSESSMENT: 0-10

## 2021-08-24 NOTE — POST SEDATION
[address] [address]  Sedation/Analgesia Post Sedation Record    Pt Name: Margo Talbot Height  MRN: [id number]  YOB: 1940  Procedure Performed By: Adrianne Nickerson DO  Primary Care Physician: Caden Simpson DO    POST-PROCEDURE    Physicians/Assistants: Adrianne Nickerson DO  Procedure Performed: See Procedure Note   Sedation/Anesthesia: Versed and Fentanyl (See procedure note for amount and duration)  Estimated Blood Loss:     0  ml  Specimens Removed: None        Complications: None           Jared Iraheta DO  Electronically signed 8/24/2021 at 8:50 AM

## 2021-08-24 NOTE — PRE SEDATION
[facility]  Pre-Sedation/Analgesia History & Physical    Pt Name: Wilfredo Gunter  MRN: [id number]  YOB: 1940  Provider Performing Procedure: Lawanda Montaño DO   Primary Care Physician: Brionna Suárez DO      MEDICAL HISTORY       has a past medical history of Abnormal EKG, Anemia, Back pain, Blood circulation, collateral, Diverticulitis, Fibromyalgia, GERD (gastroesophageal reflux disease), Hiatal hernia, Hypertension, Hypothyroidism, and Osteoarthritis. SURGICAL HISTORY   has a past surgical history that includes Appendectomy (1958); Colonoscopy (2012); eye surgery (1998, 1999); Hysterectomy (1979); Spine surgery (1968); Upper gastrointestinal endoscopy (2012); cyst removal (2010); Tonsillectomy (1943); Tooth Extraction (1964); Ovary removal (1999); Foot surgery (Left, 2001); Carpal tunnel release (Bilateral, 2013); Small intestine surgery; back surgery; Colon surgery (07/29/2016); joint replacement (2002, 2008, 2009); pr colon ca scrn not hi rsk ind (Left, 09/15/2017); Femur fracture surgery (Left, 12/21/2020); hiatal hernia repair (N/A, 03/09/2021); other surgical history (04/21/2021); and Pain management procedure (Bilateral, 7/13/2021). ALLERGIES   Allergies as of 08/11/2021 - Fully Reviewed 08/06/2021   Allergen Reaction Noted    Ampicillin  05/17/2013    Morphine Other (See Comments) 09/14/2017    Pcn [penicillins] Itching 05/17/2013    Sulfa antibiotics  05/17/2013       MEDICATIONS   Prior to Admission medications    Medication Sig Start Date End Date Taking? Authorizing Provider   pregabalin (LYRICA) 150 MG capsule Take 1 capsule by mouth 2 times daily for 180 days. 8/9/21 2/5/22 Yes Brionna Suárez DO   HYDROcodone-acetaminophen (NORCO) 5-325 MG per tablet Take 1 tablet by mouth 2 times daily as needed for Pain for up to 30 days.  8/20/21 9/19/21 Yes Jared De La Cruz DO   enalapril (VASOTEC) 10 MG tablet Take 1 tablet by mouth nightly 6/28/21  Yes Brionna Suárez DO Cranberry 500 MG TABS Take by mouth   Yes Historical Provider, MD   Biotin w/ Vitamins C & E (HAIR/SKIN/NAILS PO) Take by mouth   Yes Historical Provider, MD   metoprolol succinate (TOPROL XL) 50 MG extended release tablet Take 1 tablet by mouth daily 6/8/21  Yes Juanis Mir DO   docusate sodium (COLACE) 100 MG capsule Take 1 capsule by mouth 2 times daily as needed for Constipation 6/8/21  Yes Juanis Mir DO   calcium-vitamin D (OSCAL-500) 500-200 MG-UNIT per tablet Take 2 tablets by mouth daily   Yes Historical Provider, MD   levothyroxine (SYNTHROID) 100 MCG tablet Take 1 tablet by mouth Daily 5/12/21  Yes Juanis Mir DO   Misc. Devices [facility]) MISC 1 each by Does not apply route daily 2/11/20  Yes Juanis Mir DO   guaiFENesin 1200 MG TB12 Take 1,200 mg by mouth 2 times daily 12/21/19  Yes Chelsi Brown PA-C   Scooter MISC by Does not apply route Power scooter 10/31/17  Yes Ascencion Pierce DO   fluticasone (FLONASE) 50 MCG/ACT nasal spray 1 spray by Nasal route as needed for Rhinitis    Yes Historical Provider, MD   Omega 3-6-9 Fatty Acids (OMEGA 3-6-9 COMPLEX) CAPS Take 1 capsule by mouth daily    Yes Historical Provider, MD   therapeutic multivitamin-minerals (THERAGRAN-M) tablet Take 1 tablet by mouth nightly    Yes Historical Provider, MD   pregabalin (LYRICA) 150 MG capsule Take 1 capsule by mouth 2 times daily for 5 days. 8/16/21 8/21/21  FLORIDA Bustos CNP   DULoxetine (CYMBALTA) 60 MG extended release capsule TAKE 1 CAPSULE DAILY 4/12/21   FLORIDA Bustos CNP   Misc.  Devices MISC Mechanical lift for a Anthonette Slimmer 5/31/18   Juanis Mir DO     PHYSICAL:   Vitals:    08/24/21 0845   BP: (!) 171/83   Pulse: 73   Resp: 8   Temp:    SpO2: 95%     PLANNED PROCEDURE   See procedure note  SEDATION  Planned agent: Versed and Fentanyl  ASA Classification: 2  Class 1: A normal healthy patient  Class 2: Pt with mild to moderate systemic disease  Class 3: Severe systemic disease or disturbance  Class 4: Severe systemic disorders that are already life threatening. Class 5: Moribund pt with little chances of survival, for more than 24 hours. Mallampati I Airway Classification: 2    1. Pre-procedure diagnostic studies complete and results available. 2. Previous sedation/anesthesia experiences assessed. 3. The patient is an appropriate candidate to undergo the planned procedure sedation and anesthesia. (Refer to nursing sedation/analgesia documentation record)  4. Formulation and discussion of sedation/procedure plan, risks, and expectations with patient and/or responsible adult completed. 5. Patient examined immediately prior to the procedure.  (Refer to nursing sedation/analgesia documentation record)    Juan Messina DO  Electronically signed 8/24/2021 at 8:50 AM

## 2021-08-24 NOTE — PROCEDURES
Pre-operative Diagnosis: Bilateral lumbar facet pain     Post-operative Diagnosis: Bilateral lumbar facet pain     Procedure: Bilateral lumbar medial branch blocks targeting facet joints of L4/L5 and L5/S1     Procedure Description:  After consent was obtained, the patient was placed in the prone position having pressure points appropriately padded. The lower back was prepped with chloraprep and draped in a sterile fashion. 0.5 cc of 1 % lidocaine was used for local anesthesia of the skin and superficial subcutaneous tissues. Three 22-gauge 3.5-inch needles were advanced under fluoroscopy in an AP view with caudad angulation: one at the sacral ala and two at the junction of the right superior articular process and the transverse process of the L4 and L5 vertebrae. There was no paresthesias, heme, or CSF obtained. Needle placement was confirmed using AP and oblique views. Then 0.5 cc of 0.5% bupivacaine was injected at the site without complications. The procedure was repeated at L4, L5, and sacral ala on the left side. The patient tolerated the procedure well, transported to the recovery room, and observed for 15 minutes prior to being discharged in ambulatory fashion.      Procedural Complications: None  Estimated Blood Loss: 0 mL      IV sedation was used during the procedure:  - Moderate intravenous conscious sedation was supervised by Dr. Ronnell Henao  - The patient was independently monitored by a Registered Nurse assigned to the procedure room  - Monitoring included automated blood pressure, continuous EKG, and continuous pulse oximetry  - The detailed conscious record is permanently stored in the [place] [address]  - The following is the conscious sedation record:  Start Time: 08:41  End Time : 08:59  Duration: 18 minutes   Medications Administered: 1 mg Versed, 50 mcg Fentanyl     Jared Dale DO  Interventional Pain Management/PM&R   [place]

## 2021-08-26 ENCOUNTER — OFFICE VISIT (OUTPATIENT)
Dept: FAMILY MEDICINE CLINIC | Age: 81
End: 2021-08-26
Payer: MEDICARE

## 2021-08-26 VITALS
OXYGEN SATURATION: 96 % | WEIGHT: 158 LBS | SYSTOLIC BLOOD PRESSURE: 128 MMHG | TEMPERATURE: 98.1 F | DIASTOLIC BLOOD PRESSURE: 76 MMHG | HEART RATE: 68 BPM | RESPIRATION RATE: 12 BRPM | BODY MASS INDEX: 24.8 KG/M2 | HEIGHT: 67 IN

## 2021-08-26 DIAGNOSIS — I10 ESSENTIAL HYPERTENSION: ICD-10-CM

## 2021-08-26 DIAGNOSIS — G89.4 PAIN SYNDROME, CHRONIC: ICD-10-CM

## 2021-08-26 DIAGNOSIS — M79.7 FIBROMYALGIA: ICD-10-CM

## 2021-08-26 DIAGNOSIS — Z91.81 AT HIGH RISK FOR FALLS: ICD-10-CM

## 2021-08-26 DIAGNOSIS — E03.9 ACQUIRED HYPOTHYROIDISM: Primary | ICD-10-CM

## 2021-08-26 PROBLEM — J90 PLEURAL EFFUSION ON LEFT: Status: RESOLVED | Noted: 2021-02-28 | Resolved: 2021-08-26

## 2021-08-26 PROCEDURE — 99214 OFFICE O/P EST MOD 30 MIN: CPT | Performed by: FAMILY MEDICINE

## 2021-08-26 NOTE — PROGRESS NOTES
Bienvenido Gunter is a 78 y.o. female that presents for     Chief Complaint   Patient presents with    Follow-up     chronic conditions     Other     pt states  pshing down on her stomach makes her breathe better     Knee Pain     left knee  worse overtime        /76   Pulse 68   Temp 98.1 °F (36.7 °C) (Oral)   Resp 12   Ht 5' 7\" (1.702 m)   Wt 158 lb (71.7 kg)   SpO2 96%   BMI 24.75 kg/m²       HPI    Recently had epidural shots and feels like this is really helping. Using a walker today instead of her WC. Hypothyroidism    HPI:  Currently treated for Hypothyroidism? Yes - levothyroxine 100mcg/day  Fatigue? Yes - chronic issue  Recent change in weight? No  Cold/Heat intolerance? No  Diarrhea/Constipation? No  Diaphoresis? No  Anxiety? No  Palpitations? No   Hair Loss? No    HTN    Does patient check BP regularly at home? - No  Current Medication regimen - metoprolol, enalapril  Tolerating medications well? - yes    Shortness of breath or chest pain? No  Headache or visual complaints? No  Neurologic changes like confusion? No  Extremity edema?  No    BP Readings from Last 3 Encounters:   08/26/21 128/76   08/24/21 (!) 147/91   08/06/21 132/80               Health Maintenance   Topic Date Due    COVID-19 Vaccine (1) Never done    DTaP/Tdap/Td vaccine (1 - Tdap) Never done    Shingles Vaccine (1 of 2) Never done    Flu vaccine (1) 09/01/2021    TSH testing  02/28/2022    Potassium monitoring  03/18/2022    Creatinine monitoring  03/18/2022    Annual Wellness Visit (AWV)  04/28/2022    DEXA (modify frequency per FRAX score)  Completed    Pneumococcal 65+ years Vaccine  Completed    Hepatitis A vaccine  Aged Out    Hepatitis B vaccine  Aged Out    Hib vaccine  Aged Out    Meningococcal (ACWY) vaccine  Aged Out       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 Ben Montaño GERD (gastroesophageal reflux disease)     Diverticulitis     Hiatal hernia     Hypertension     Hypothyroidism     Osteoarthritis        Past Surgical History:   Procedure Laterality Date    APPENDECTOMY  1958    BACK SURGERY      CARPAL TUNNEL RELEASE Bilateral 2013    w/cubital tunnel    COLON SURGERY  07/29/2016    Procedure: ATTEMPTED DAVINCI XI ROBOTIC ASSISTED SIGMOID COLON RESECTION, ROBOTIC ASSISTED MOBILIZATION OF RECTAL SIGMOID TO SPLENIC FLEXURE, CONVERTED TO OPEN LEFT HEMICOLECTOMY WITH COLOPROCTOCTOMY, SPLENORRHAPHY, RIGID SIGMOIDOSCOPY, LASER EVALUATION OF VASCULARITY WITH ICG; Surgeon: Ben Montaño MD; Location: [facility]; Service: General    COLONOSCOPY  2012    CYST REMOVAL  2010   [address], [address]    Cataract    FEMUR FRACTURE SURGERY Left 12/21/2020    LEFT FEMUR OPEN REDUCTION INTERNAL FIXATION performed by Michaela Gee MD at [facility] Left 2001    HIATAL HERNIA REPAIR N/A 03/09/2021    ROBOTIC EXTENSIVE LYSIS OF ADHESIONS, ROBOTIC REDUCTION OF HIATAL HERNIA WITH GASTROPEXY performed by Mahogany Lizama MD at [address] [place] [address]    w/bladder suspension   C/ [address]  2002, 2008, 2009    rt hip(2002), lt knee(2009), lt hip(2008) Shoulder (2009)    OTHER SURGICAL HISTORY  04/21/2021    Viry Bright CNP in the office   [address] PAIN MANAGEMENT PROCEDURE Bilateral 7/13/2021    medial branch blocks at bilateral L4/L5 and L5/S1 performed by Adrianne Nickerson DO at [address] Bilateral 8/24/2021    confirmatory  medial branch blocks at bilateral L4/L5 and L5/S1 performed by Adrianne Nickerson DO at [address] IND Left 09/15/2017    COLONOSCOPY performed by Jennifer Craft MD at [facility]    discectomy    TONSILLECTOMY  1943    TOOTH EXTRACTION  1964    UPPER GASTROINTESTINAL ENDOSCOPY  2012       Social History     Tobacco Use    Smoking status: Former Smoker     Packs/day: 2.00     Years: 11.00     Pack years: 22.00     Types: Cigarettes     Start date: 1957     Quit date: 1967     Years since quittin.8    Smokeless tobacco: Never Used   Substance Use Topics    Alcohol use: Yes     Comment: wine with dinner    Drug use: No       Family History   Problem Relation Age of Onset    Arthritis Mother     Hearing Loss Mother     High Cholesterol Mother     Hearing Loss Father     Heart Disease Father 76        CABG    Stroke Father     Arthritis Sister     Depression Sister     Diabetes Sister     Arthritis Brother     Depression Brother     Cancer Maternal Aunt     Early Death Maternal Aunt     Diabetes Maternal Grandmother     Heart Disease Paternal Grandmother          I have reviewed the patient's past medical history, past surgical history, allergies, medications, social and family history and I have made updates where appropriate.     Review of Systems        PHYSICAL EXAM:  /76   Pulse 68   Temp 98.1 °F (36.7 °C) (Oral)   Resp 12   Ht 5' 7\" (1.702 m)   Wt 158 lb (71.7 kg)   SpO2 96%   BMI 24.75 kg/m²     General Appearance: well developed and well- nourished, in no acute distress  Head: normocephalic and atraumatic  ENT: external ear and ear canal normal bilaterally, nose without deformity, nasal mucosa and turbinates normal without polyps, oropharynx normal, dentition is normal for age, no lipor gum lesions noted  Neck: supple and non-tender without mass, no thyromegaly or thyroid nodules, no cervical lymphadenopathy  Pulmonary/Chest: clear to auscultation bilaterally- no wheezes, rales or rhonchi, normal air movement, no respiratory distress or retractions  Cardiovascular: normal rate, regular rhythm, normal S1 and S2, no murmurs, rubs, clicks, or gallops, distal pulses intact  Extremities: no cyanosis, clubbing or edema of the lower extremities  Psych:  Normal affect without evidence of depression oranxiety, insight and judgement are appropriate, memory appears intact  Skin: warm and dry, no rash or erythema      ASSESSMENT & PLAN  Milytine Chuysert was seen today for follow-up, other and knee pain. Diagnoses and all orders for this visit:    Acquired hypothyroidism  -     T4, Free; Future  -     TSH without Reflex; Future    Pain syndrome, chronic    Fibromyalgia    Essential hypertension  -     Basic Metabolic Panel; Future    At high risk for falls           -HTN controlled, continue meotprolol and enalapril  -Other chronic issues are stable, continue current medications  -Advised to call if any issues      Return in about 6 months (around 2/26/2022). All copied or forwarded information in the progress note was verified by me to be accurate at the time of visit  Patient's past medical, surgical, social and family history were reviewed and updated     All patient questions answered. Patient voiced understanding. On the basis of positive falls risk screening, assessment and plan is as follows: home safety tips provided.

## 2021-09-02 ENCOUNTER — CARE COORDINATION (OUTPATIENT)
Dept: CARE COORDINATION | Age: 81
End: 2021-09-02

## 2021-09-02 NOTE — CARE COORDINATION
Ambulatory Care Coordination Note  9/2/2021  CM Risk Score: 5  Charlson 10 Year Mortality Risk Score: 79%     ACC: Lina Becker RN    Summary Note: Spoke with Agustin Null for continued ROBYN follow up. States the temporary procedure from pain management seemed to work. Has follow up appt scheduled for 9-15-21 to discuss permanent procedure for pain control. States she was able to use walker but can now feel it wearing off. Continues to have private duty support in place to assist with adl's and housekeeping support. Will continue to follow up with possible new procedure coming up to ensure no new barriers. Plan  Reinforce education completed  Collaborate with pain management as needed  -reinforce education completed at [address] visit to reduce utilization           Care Coordination Interventions    Program Enrollment: Rising Risk  Referral from Primary Care Provider: Yes  Suggested Interventions and Community Resources  [facility] Declined  Fall Risk Prevention: Completed  Disease Association: Completed (Comment: Freddie Moss for macular degeneration)  Home Health Services: Completed  Meals on Wheels: Declined  Occupational Therapy: Completed  Palliative Care: Declined  Pharmacist: Completed  Physical Therapy: Completed  Registered Dietician: [facility]  Social Work: Not Started  Transportation Support: Declined  Zone Management Tools: Not Started         Goals Addressed                    This Visit's Progress      Conditions and Symptoms (pt-stated)   On track      I will schedule office visits, as directed by my provider. I will keep my appointment or reschedule if I have to cancel. I will notify my provider of any barriers to my plan of care. I will notify my provider of any symptoms that indicate a worsening of my condition.   Vision problems with macular degeneration  Barriers: overwhelmed by complexity of regimen  Plan for overcoming my barriers: family and ACM support  Confidence: 8/10  Anticipated Goal Completion Date: 8-14-21 Update 10-2-21          Reduce Falls  (pt-stated)   On track      I will reduce my risk of falls by the following: Use walking aids like cane or walker  Hx of falls  Barriers: impairment:  physical: limitations in mobility  Plan for overcoming my barriers: family and ACM support  Confidence: 7/10  Anticipated Goal Completion Date: 8/14/21 Update 10/2/21            Prior to Admission medications    Medication Sig Start Date End Date Taking? Authorizing Provider   pregabalin (LYRICA) 150 MG capsule Take 1 capsule by mouth 2 times daily for 180 days. 8/9/21 2/5/22 Yes Kale Reeves, DO   HYDROcodone-acetaminophen (NORCO) 5-325 MG per tablet Take 1 tablet by mouth 2 times daily as needed for Pain for up to 30 days. 8/20/21 9/19/21 Yes Jared Dale, DO   enalapril (VASOTEC) 10 MG tablet Take 1 tablet by mouth nightly 6/28/21  Yes Ascencion Pierce, DO   Cranberry 500 MG TABS Take by mouth   Yes Historical Provider, MD   Biotin w/ Vitamins C & E (HAIR/SKIN/NAILS PO) Take by mouth   Yes Historical Provider, MD   metoprolol succinate (TOPROL XL) 50 MG extended release tablet Take 1 tablet by mouth daily 6/8/21  Yes Kale Reeves DO   docusate sodium (COLACE) 100 MG capsule Take 1 capsule by mouth 2 times daily as needed for Constipation 6/8/21  Yes Kale Reeves DO   calcium-vitamin D ([place]) 500-200 MG-UNIT per tablet Take 2 tablets by mouth daily   Yes Historical Provider, MD   levothyroxine (SYNTHROID) 100 MCG tablet Take 1 tablet by mouth Daily 5/12/21  Yes Kale Reeves DO   DULoxetine (CYMBALTA) 60 MG extended release capsule TAKE 1 CAPSULE DAILY 4/12/21  Yes Francia Holter, APRN - CNP   Misc. Devices [facility]) MISC 1 each by Does not apply route daily 2/11/20  Yes Kale Reeves DO   guaiFENesin 1200 MG TB12 Take 1,200 mg by mouth 2 times daily 12/21/19  Yes Arlene Johansen PA-C   Misc.  Devices MISC Mechanical lift for a Queenie Tonya 5/31/18  Yes Kale Reeves, DO Scooter MISC by Does not apply route Power scooter 10/31/17  Yes Ascencion Pierce,    fluticasone (FLONASE) 50 MCG/ACT nasal spray 1 spray by Nasal route as needed for Rhinitis    Yes Historical Provider, MD   Omega 3-6-9 Fatty Acids (OMEGA 3-6-9 COMPLEX) CAPS Take 1 capsule by mouth daily    Yes Historical Provider, MD   therapeutic multivitamin-minerals (THERAGRAN-M) tablet Take 1 tablet by mouth nightly    Yes Historical Provider, MD   pregabalin (LYRICA) 150 MG capsule Take 1 capsule by mouth 2 times daily for 5 days.  8/16/21 8/21/21  FLORIDA Blunt - CNP       Future Appointments   Date Time Provider Huber Little   9/15/2021  3:50 PM DO GUILLERMINA [facility]   2/24/2022  1:20 PM DO LILIA DeleonA [facility] KATHREIN AM OFFENEJENA II.LUIS

## 2021-09-08 ENCOUNTER — CARE COORDINATION (OUTPATIENT)
Dept: CARE COORDINATION | Age: 81
End: 2021-09-08

## 2021-09-08 ENCOUNTER — NURSE TRIAGE (OUTPATIENT)
Dept: OTHER | Facility: CLINIC | Age: 81
End: 2021-09-08

## 2021-09-08 ENCOUNTER — OFFICE VISIT (OUTPATIENT)
Dept: FAMILY MEDICINE CLINIC | Age: 81
End: 2021-09-08
Payer: MEDICARE

## 2021-09-08 VITALS
WEIGHT: 154 LBS | TEMPERATURE: 97.7 F | HEIGHT: 67 IN | SYSTOLIC BLOOD PRESSURE: 116 MMHG | BODY MASS INDEX: 24.17 KG/M2 | OXYGEN SATURATION: 93 % | RESPIRATION RATE: 16 BRPM | DIASTOLIC BLOOD PRESSURE: 64 MMHG | HEART RATE: 67 BPM

## 2021-09-08 DIAGNOSIS — T14.8XXA MULTIPLE SKIN TEARS: ICD-10-CM
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
## 2021-09-08 PROCEDURE — G0008 ADMIN INFLUENZA VIRUS VAC: HCPCS | Performed by: NURSE PRACTITIONER

## 2021-09-08 PROCEDURE — 90694 VACC AIIV4 NO PRSRV 0.5ML IM: CPT | Performed by: NURSE PRACTITIONER

## 2021-09-08 PROCEDURE — 99214 OFFICE O/P EST MOD 30 MIN: CPT | Performed by: NURSE PRACTITIONER

## 2021-09-08 NOTE — PROGRESS NOTES
Daniel Silva [address] MEDICINE [address]  [address] [place] [address]  Dept: [phone]  Loc: [phone]  Visit Date: 9/8/2021      Chief Complaint:   Lamar Gunter is a 78 y.o. female thatpresents for Fall (headache, skin tears)        HPI:  Landon Camara about 1130 this am  Bumped her head, not hard per pt  Says she was lowered to the ground by her spouse  No LOC  Said her ankle twisted causing her fall    The patient comes in with her  today. History obtained from pt, , and medical records. I have reviewed the patient's past medical history, past surgical history, allergies,medications, social and family history and I have made updates where appropriate.     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 Latrice Suarez GERD (gastroesophageal reflux disease)     Diverticulitis     Hiatal hernia     Hypertension     Hypothyroidism     Osteoarthritis        Past Surgical History:   Procedure Laterality Date    APPENDECTOMY  1958    BACK SURGERY      CARPAL TUNNEL RELEASE Bilateral 2013    w/cubital tunnel    COLON SURGERY  07/29/2016    Procedure: ATTEMPTED DAVINCI XI ROBOTIC ASSISTED SIGMOID COLON RESECTION, ROBOTIC ASSISTED MOBILIZATION OF RECTAL SIGMOID TO SPLENIC FLEXURE, CONVERTED TO OPEN LEFT HEMICOLECTOMY WITH COLOPROCTOCTOMY, SPLENORRHAPHY, RIGID SIGMOIDOSCOPY, LASER EVALUATION OF VASCULARITY WITH ICG; Surgeon: Latrice Suarez MD; Location: [facility]; Service: General    COLONOSCOPY  2012    CYST REMOVAL  2010   [address], [address]    Cataract    FEMUR FRACTURE SURGERY Left 12/21/2020    LEFT FEMUR OPEN REDUCTION INTERNAL FIXATION performed by Mynor Tucker MD at [facility] Left 2001    HIATAL HERNIA REPAIR N/A 2021    ROBOTIC EXTENSIVE LYSIS OF ADHESIONS, ROBOTIC REDUCTION OF HIATAL HERNIA WITH GASTROPEXY performed by Zoë Powers MD at [address]    w/bladder suspension   C/ [address]  , ,     rt hip(), lt knee(), lt hip() Shoulder ()    OTHER SURGICAL HISTORY  2021    [place] Florian Lynn CNP in the office   [address] Bilateral 2021    medial branch blocks at bilateral L4/L5 and L5/S1 performed by Dany Hernandez DO at [address] Bilateral 2021    confirmatory  medial branch blocks at bilateral L4/L5 and L5/S1 performed by Dany Hernandez DO at [address] IND Left 09/15/2017    COLONOSCOPY performed by Seymour Yin MD at [facility]   [facility]    discectomy   [address]    UPPER GASTROINTESTINAL ENDOSCOPY         Social History     Tobacco Use    Smoking status: Former Smoker     Packs/day: 2.00     Years: 11.00     Pack years: 22.00     Types: Cigarettes     Start date: 1957     Quit date: 1967     Years since quittin.8    Smokeless tobacco: Never Used   Substance Use Topics    Alcohol use: Yes     Comment: wine with dinner    Drug use: No       Family History   Problem Relation Age of Onset    Arthritis Mother     Hearing Loss Mother     High Cholesterol Mother     Hearing Loss Father     Heart Disease Father 76        CABG    Stroke Father     Arthritis Sister     Depression Sister     Diabetes Sister     Arthritis Brother     Depression Brother     Cancer Maternal Aunt     Early Death Maternal Aunt     Diabetes Maternal Grandmother     Heart Disease Paternal Grandmother                    PHYSICAL EXAM:  /64   Pulse 67   Temp 97.7 °F (36.5 °C) (Infrared)   Resp 16   Ht 5' 7\" (1.702 m)   Wt 154 lb (69.9 kg)   SpO2 93%   BMI 24.12 kg/m²     Physical Exam  Constitutional:       Appearance: Normal appearance. HENT:      Head: Normocephalic and atraumatic. Right Ear: External ear normal.      Left Ear: External ear normal.      Nose: Nose normal.      Mouth/Throat:      Mouth: Mucous membranes are moist.   Eyes:      Conjunctiva/sclera: Conjunctivae normal.   Cardiovascular:      Rate and Rhythm: Normal rate and regular rhythm. Pulses: Normal pulses. Heart sounds: Normal heart sounds. Pulmonary:      Effort: Pulmonary effort is normal.      Breath sounds: Normal breath sounds. Abdominal:      General: Bowel sounds are normal.      Palpations: Abdomen is soft. Musculoskeletal:         General: Normal range of motion. Cervical back: Normal range of motion. Skin:     General: Skin is warm and dry. Neurological:      General: No focal deficit present. Mental Status: She is alert and oriented to person, place, and time. Cranial Nerves: No cranial nerve deficit. Sensory: No sensory deficit. Motor: No weakness. Psychiatric:         Mood and Affect: Mood normal.         Behavior: Behavior normal.             ASSESSMENT & PLAN  Mari Hung was seen today for fall. Diagnoses and all orders for this visit:    Fall, initial encounter    Multiple skin tears    Other orders  -     Discontinue: Tetanus-Diphth-Acell Pertussis (BOOSTRIX) 5-2.5-18.5 LF-MCG/0.5 injection; Inject 0.5 mLs into the muscle once for 1 dose  -     Tetanus-Diphth-Acell Pertussis (BOOSTRIX) injection 0.5 mL  -     INFLUENZA, QUADV, ADJUVANTED, 65 YRS =, IM, PF, PREFILL SYR, 0.5ML (FLUAD)      Neuro exam negative  Declines imaging  ER precautions given     No follow-ups on file.     I spent 35 minutes with the patient discussing symptoms, medications and side effects, treatment options, performing an exam, reviewing previous notes and tests, and developing a plan of care. All questions answered. Patient and  verbalized understanding. Sol Miller received counseling on the following healthy behaviors: nutrition, exercise, medication adherence and fall prevention  Reviewedprior labs and health maintenance. Continue current medications, diet and exercise. Discussed use, benefit, and side effects of prescribed medications. Barriers to medication compliance addressed. Patient given educationalmaterials - see patient instructions. All patient questions answered. Patient voiced understanding.      Electronically signed by FLORIDA Cardona - CNP, APRN on 9/8/21 at 3:19 PM EDT

## 2021-09-08 NOTE — CARE COORDINATION
Mailed out pkt of ACP/LW paperwork to pt and . Will check in a week to see if they received.     Plan of care:   Assist with ACP

## 2021-09-08 NOTE — CARE COORDINATION
Called pt to discuss ACP wishes. Pt would like me to send LW pkt for her and . Pt stated she \"had a fall today. \" He daughter bandaged her arm and MARIELA picked her up. She said she \"didn't hit anything but I know I am going to be sore. \" Pt thankful she lives with daughter. Active listening provided.      Plan of care:  Assist with ACP

## 2021-09-08 NOTE — PATIENT INSTRUCTIONS
Patient Education        13 Ways to Prevent Falls in the Hospital  When you're in the hospital, your risk of falling may be higher than normal. Medicines can make you dizzy. Or illness and treatments may cause you to feel weak and confused, making it harder to move around. But there are ways you can prevent falls during your stay. Things you can do to prevent a fall at the hospital   Bring nonskid socks, slippers, or shoes that stay on your feet. If you do not have these, ask the nurse for a pair of nonskid socks. Bring your walker or cane, if you use one at home. Or ask the hospital to provide one during your stay. Ask your doctor or nurse if your treatments or medicines will increase your risk of a fall. Some hospitals will check your risk when you are admitted to the hospital.    Tell your doctor or nurse if medicines make you dizzy, weak, or lightheaded. If you feel this way, do not try to get up on your own. Call the nurse for help. Call the nurse for help getting out of bed if you are on crutches or have trouble walking. Do not try to do it on your own until the nurse says it's ok and you feel sure you are able. When your nurse says it's ok, get up slowly. Sit up first and count to 10 before you get out of bed. Put on your eyeglasses before you get out of bed, if you wear them. If you need help getting to the bathroom, call the nurse before you have an urgent need to go. If you get fluids through a vein (IV), you may need to go to the bathroom more often. Things the hospital staff can do to prevent a fall   Keep needed items close by. Your phone, the nurse call light or button, and anything you need to help you walk should be close to you. Keep your bed low and locked. Your bed should be low enough so that you don't have problems getting out of bed. The wheels on your bed should be locked so the bed can't move. Explain what is safe.   Your nurse or doctor will tell you what you can safely do and how often you need to get up and move around. Keep your room neat. Your room should not have any wet or slippery areas. There should be nothing in the way of going to the bathroom or hallway. Light up the room. Your room should have good lighting. Make sure there is a night light in your bathroom. Current as of: May 27, 2020               Content Version: 12.9  © 2006-2021 [employer], [facility]. Care instructions adapted under license by [facility] ([facility]). If you have questions about a medical condition or this instruction, always ask your healthcare professional. [place] [address] any warranty or liability for your use of this information.

## 2021-09-08 NOTE — TELEPHONE ENCOUNTER
Reason for Disposition   Last tetanus shot >10 years ago and CLEAN cut or scrape    Answer Assessment - Initial Assessment Questions  1. MECHANISM: \"How did the injury happen? \" For falls, ask: \"What height did you fall from? \" and \"What surface did you fall against?\"   Was eased to the floor about an hour ago by , but she still hit her head slightly, on the door jam    2. ONSET: \"When did the injury happen? \" (Minutes or hours ago)   1 hour ago    3. NEUROLOGIC SYMPTOMS: \"Was there any loss of consciousness? \" \"Are there any other neurological symptoms? \"     No LOC, she has neuropathy, no new neurologic symptoms from fall    4. MENTAL STATUS: \"Does the person know who he is, who you are, and where he is? \"   No altered mental status    5. LOCATION: \"What part of the head was hit? \"   Above right eye    6. SCALP APPEARANCE: \"What does the scalp look like? Is it bleeding now? \" If so, ask: \"Is it difficult to stop? \"    no skin injury or bruising    7. SIZE: For cuts, bruises, or swelling, ask: \"How large is it? \" (e.g., inches or centimeters)   None on head    8. PAIN: \"Is there any pain? \" If so, ask: \"How bad is it? \"  (e.g., Scale 1-10; or mild, moderate, severe)  Headache is a 2/10    9. TETANUS: For any breaks in the skin, ask: \"When was the last tetanus booster? \"    None for head, but over 40 years for tetanus    10. OTHER SYMPTOMS: \"Do you have any other symptoms? \" (e.g., neck pain, vomiting)      Abrasion to right arm with a bruise    11. PREGNANCY: \"Is there any chance you are pregnant? \" \"When was your last menstrual period? \"  n/a    Protocols used: HEAD INJURY-ADULT-[place]    Received call from [address] with [employer] Complaint. Brief description of triage: hit head during fall today. No LOC, has some abrasions on arm, no recent tetanus    Triage indicates for patient to be seen today or tomorrow.     Care advice provided, patient verbalizes understanding; denies any other questions or concerns; instructed to call back for any new or worsening symptoms. Writer provided warm transfer to [facility] for appointment scheduling. Attention Provider: Thank you for allowing me to participate in the care of your patient. The patient was connected to triage in response to information provided to the ECC. Please do not respond through this encounter as the response is not directed to a shared pool.

## 2021-09-15 ENCOUNTER — CARE COORDINATION (OUTPATIENT)
Dept: CARE COORDINATION | Age: 81
End: 2021-09-15

## 2021-09-15 ENCOUNTER — OFFICE VISIT (OUTPATIENT)
Dept: PHYSICAL MEDICINE AND REHAB | Age: 81
End: 2021-09-15
Payer: MEDICARE

## 2021-09-15 VITALS
SYSTOLIC BLOOD PRESSURE: 120 MMHG | WEIGHT: 154 LBS | DIASTOLIC BLOOD PRESSURE: 82 MMHG | HEIGHT: 67 IN | BODY MASS INDEX: 24.17 KG/M2

## 2021-09-15 DIAGNOSIS — M79.18 MYOFASCIAL PAIN: ICD-10-CM

## 2021-09-15 DIAGNOSIS — G89.4 CHRONIC PAIN SYNDROME: ICD-10-CM

## 2021-09-15 DIAGNOSIS — M54.59 LUMBAR FACET JOINT PAIN: Primary | ICD-10-CM

## 2021-09-15 DIAGNOSIS — G62.9 NEUROPATHY: ICD-10-CM

## 2021-09-15 DIAGNOSIS — M47.896 OTHER SPONDYLOSIS, LUMBAR REGION: ICD-10-CM

## 2021-09-15 PROCEDURE — 99213 OFFICE O/P EST LOW 20 MIN: CPT | Performed by: ANESTHESIOLOGY

## 2021-09-15 NOTE — CARE COORDINATION
Called and spoke with pt to see if they received the pkt of ACP paperwork I sent. She stated they had received and will have their daughter help them fill it out. Advisesd pt to contact me with any needs/concerns during the process. Pt voiced understanding. Active listening provided. Plan of care:   Will check back in 2 weeks to discuss ACP
- - -

## 2021-09-15 NOTE — PROGRESS NOTES
Chronic Pain/PM&R Clinic Note     Encounter Date: 9/15/21    Subjective:   Chief Complaint:   Chief Complaint   Patient presents with    Follow-up       History of Present Illness:   Yfn Gunter is a 78 y.o. female seen in the clinic initially on 06/23/21 upon request from Juvenal Aparicio DO (PCP) for her history of chronic low back pain. She has a medical history of scoliosis, neuropathy, fibromyalgia, and previous lumbar discectomy (over 50 years ago). She states that she has low back pain that has been progressively worse over the last 20 years. She reports the majority of her low back pain to be at her waistline with radiation across to her waist on both sides. She describes this pain is a constant aching, stabbing 5/10 pain that can get up to a 7-8/10 when severe. This pain is worse with any activities where she is standing upright such as cooking or when she is walking for any duration of time. Her pain is improved with rest and sitting in her motorized wheelchair. She denies any pain radiating down her legs but does endorse numbness in both feet from her neuropathy. She denies any saddle anesthesia or bowel/bladder incontinence. Of note, she has multiple joint replacements. She has bilateral total hip arthroplasties and a left knee total hip arthroplasty. In addition, she has a previous left femur fracture that has been fixated with hardware and a left total shoulder arthroplasty. She states that she had an EMG/NCS performed by Dr. Malik Lofton which revealed idiopathic neuropathy. She states that she had a previous RFA in her low back done by Dr. Kinjal Oliveira which did give her longstanding relief but this was done 8-10 years ago. 8/6/2021: Patient presents for planned follow-up for chronic low back pain. She underwent diagnostic lumbar medial branch blocks on 7/13/2021. She reports 100% pain relief for 2-3 days after the procedure.   She reports improvement in her overall ability to stand up for longer duration of time and night with her sleep. She reports not to take some of her breakthrough medications for pain during this time. She does report doing very well with her low-dose Norco as needed. She feels like this medication helps keep her very functional and is only taking about 1 tablet/day. She denies any side effects besides some minimal constipation. She states she would like to proceed with her confirmatory blocks. Today, 9/15/2021, patient presents for planned follow-up for chronic low back pain. She underwent confirmatory lumbar medial branch blocks on 8/24/2021. She reports 100% pain relief in her low back for 2 days after the procedure. She states that she had improvement in her overall ability to stand upright and her assistive devices for longer duration of time with back pain. She also states that she was able to ambulate further without as much low back pain. She states that she has had some issues with constipation taking the [employer] wants to stop this medication. She states she would like to proceed with the ablation of her low back. She denies any radicular leg pain, new focal leg weakness, new leg paresthesias, saddle anesthesia, bowel/bladder incontinence. History of Interventions:   Surgery: Previous discectomy in 30s  Injections: Previous lumabr RFA ~8-10 years ago by Dr. Kendra Moeller  Diagnostic lumbar MBBs  Confirmatory lumbar MBBs    Current Treatment Medications:   Aleve as needed  Heating pad PRN  Mishel 150 mg BID  Cymbalta 60 mg daily    Historical Treatment Medications:   Tramadol-ineffective  Norco-stopped (constipation)    Imaging:  XR L-spine (5/18/21)    PROCEDURE: XR LUMBAR SPINE (2-3 VIEWS)       CLINICAL INFORMATION: Spinal stenosis of lumbar region without neurogenic claudication       COMPARISON: August 2, 2019       TECHNIQUE: AP and lateral views performed.           FINDINGS:    ALIGNMENT: Levoscoliosis lumbar spine centered at the thoracolumbar junction. VERTEBRAL BODIES: Slight anterior wedging L1   DISKS: Multilevel degenerative disc disease with associated endplate degenerative changes, these are most advanced in the T12-L2 vertebral bodies. SOFT TISSUES: Unremarkable. OBLIQUE PROJECTIONS: Not performed. FLEXION/EXTENSION LATERAL: Not performed.       OTHER: None.               Impression   1. Levoscoliosis with compensatory osteoarthritic changes, predominantly at the thoracolumbar junction.          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 Natty Barnett GERD (gastroesophageal reflux disease)     Diverticulitis     Hiatal hernia     Hypertension     Hypothyroidism     Osteoarthritis        Past Surgical History:   Procedure Laterality Date    APPENDECTOMY  1958    BACK SURGERY      CARPAL TUNNEL RELEASE Bilateral 2013    w/cubital tunnel    COLON SURGERY  07/29/2016    Procedure: ATTEMPTED DAVINCI XI ROBOTIC ASSISTED SIGMOID COLON RESECTION, ROBOTIC ASSISTED MOBILIZATION OF RECTAL SIGMOID TO SPLENIC FLEXURE, CONVERTED TO OPEN LEFT HEMICOLECTOMY WITH COLOPROCTOCTOMY, SPLENORRHAPHY, RIGID SIGMOIDOSCOPY, LASER EVALUATION OF VASCULARITY WITH ICG; Surgeon: Natty Barnett MD; Location: [facility]; Service: General    COLONOSCOPY  2012    CYST REMOVAL  2010   [address], [address]    Cataract    FEMUR FRACTURE SURGERY Left 12/21/2020    LEFT FEMUR OPEN REDUCTION INTERNAL FIXATION performed by Guillermo Altamirano MD at [address] Left 2001   [address] [facility] N/A 03/09/2021    ROBOTIC EXTENSIVE LYSIS OF ADHESIONS, ROBOTIC REDUCTION OF HIATAL HERNIA WITH GASTROPEXY performed by Gabby Romano MD at [address] [facility]    w/bladder suspension   JAMES/ [address]  2002, 2008, 2009    rt hip(), lt knee(), lt hip(2008) Shoulder ()    OTHER SURGICAL HISTORY  2021    Lorayne Brunner CNP in the office   [address]    PAIN MANAGEMENT PROCEDURE Bilateral 2021    medial branch blocks at bilateral L4/L5 and L5/S1 performed by Kermit Villegas DO at [address] Bilateral 2021    confirmatory  medial branch blocks at bilateral L4/L5 and L5/S1 performed by Kermit Villegas DO at [address] [place] SCRN NOT  [address] IND Left 09/15/2017    COLONOSCOPY performed by Yasmine Napoles MD at [facility]    discectomy   [address] [place] [address] ENDOSCOPY  2012       Family History   Problem Relation Age of Onset    Arthritis Mother     Hearing Loss Mother     High Cholesterol Mother     Hearing Loss Father     Heart Disease Father 76        CABG    Stroke Father     Arthritis Sister     Depression Sister     Diabetes Sister     Arthritis Brother     Depression Brother     Cancer Maternal Aunt     Early Death Maternal Aunt     Diabetes Maternal Grandmother     Heart Disease Paternal Grandmother        Social History     Socioeconomic History    Marital status:      Spouse name: Liudmila Ryan Number of children: Not on file    Years of education: 15    Highest education level: Associate degree: academic program   Occupational History    Not on file   Tobacco Use    Smoking status: Former Smoker     Packs/day: 2.00     Years: 11.00     Pack years: 22.00     Types: Cigarettes     Start date: 1957     Quit date: 1967     Years since quittin.9    Smokeless tobacco: Never Used   Substance and Sexual Activity    Alcohol use: Yes     Comment: wine with dinner    Drug use: No    Sexual activity: Not on file   Other Topics Concern    Not on file   Social History Narrative    Referral to AAA placed    Referral placed to [facility]    No barriers with medication affordability    No barriers with transportation    Discussed support from insurance company     Social Determinants of Health     Financial Resource Strain: Low Risk     Difficulty of Paying Living Expenses: Not hard at all   Food Insecurity: No Food Insecurity    Worried About Running Out of Food in the Last Year: Never true    Jes of Food in the Last Year: Never true   Transportation Needs: No Transportation Needs    Lack of Transportation (Medical): No    Lack of Transportation (Non-Medical): No   Physical Activity: Inactive    Days of Exercise per Week: 0 days    Minutes of Exercise per Session: 0 min   Stress: Stress Concern Present    Feeling of Stress : To some extent   Social Connections: Socially Integrated    Frequency of Communication with Friends and Family: More than three times a week    Frequency of Social Gatherings with Friends and Family: More than three times a week    Attends Zoroastrianism Services: More than 4 times per year    Active Member of [employer] or Organizations:  Yes    Attends Club or Organization Meetings: More than 4 times per year    Marital Status:    Intimate Partner Violence:     Fear of Current or Ex-Partner:     Emotionally Abused:     Physically Abused:     Sexually Abused:        Medications & Allergies:   Current Outpatient Medications   Medication Instructions    Biotin w/ Vitamins C & E (HAIR/SKIN/NAILS PO) Oral    calcium-vitamin D (OSCAL-500) 500-200 MG-UNIT per tablet 2 tablets, Oral, DAILY    Cranberry 500 MG TABS Oral    docusate sodium (COLACE) 100 mg, Oral, 2 TIMES DAILY PRN    DULoxetine (CYMBALTA) 60 MG extended release capsule TAKE 1 CAPSULE DAILY    enalapril (VASOTEC) 10 mg, Oral, NIGHTLY    fluticasone (FLONASE) 50 MCG/ACT nasal spray 1 spray, Nasal, PRN    guaiFENesin 1,200 mg, Oral, 2 TIMES DAILY    HYDROcodone-acetaminophen (NORCO) 5-325 MG per tablet 1 tablet, Oral, 2 TIMES DAILY PRN    levothyroxine (SYNTHROID) 100 mcg, Oral, DAILY    metoprolol succinate (TOPROL XL) 50 mg, Oral, DAILY    Misc. Devices (WALKER) MISC 1 each, Does not apply, DAILY    Misc. Devices MISC Mechanical lift for a [employer] 3-6-9 Fatty Acids (OMEGA 3-6-9 COMPLEX) CAPS 1 capsule, Oral, DAILY    pregabalin (LYRICA) 150 mg, Oral, 2 TIMES DAILY    pregabalin (LYRICA) 150 mg, Oral, 2 TIMES DAILY    Scooter MISC Does not apply, Power scooter    therapeutic multivitamin-minerals (THERAGRAN-M) tablet 1 tablet, Oral, NIGHTLY       Allergies   Allergen Reactions    Ampicillin     Morphine Other (See Comments)     Hallucinations     Pcn [Penicillins] Itching    Sulfa Antibiotics        Review of Systems:   Constitutional: negative for weight changes or fevers  Genitourinary: negative for bowel/bladder incontinence   Musculoskeletal: positive for low back pain  Neurological: negative for any leg weakness or numbness/tingling  Behavioral/Psych: negative for anxiety/depression   All other systems reviewed and are negative    Objective:     Vitals:    09/15/21 1551   BP: 120/82       Constitutional: Pleasant, no acute distress   Head: Normocephalic, atraumatic   Eyes: Conjunctivae normal   Neck: Supple, symmetrical   Lungs: Normal respiratory effort, non-labored breathing   Cardiovascular: Limbs warm and well perfused   Abdomen: Non-protruded   Musculoskeletal: Muscle bulk symmetric, no atrophy, no gross deformities   - Left shoulder: Tenderness palpation along left trapezius muscle  · Lumbar Spine: ROM severely reduced in extension. Scoliosis appreciated in thoracolumbar junction. Lumbar paraspinals tender bilaterally. SLR neg bilaterally. Positive facet loading bilaterally. Bilateral SI joints tender to palpation. Neurological: Cranial nerves II-XII grossly intact. · Gait - Severely antalgic gait.  Ambulates with assistive device. · Motor: No focal motor deficits appreciated in lower limbs  Skin: No rashes or lesions visible in low back  Psychological: Cooperative, no exaggerated pain behaviors       Assessment:    Diagnosis Orders   1. Lumbar facet joint pain  CHG FLUOR NEEDLE/CATH SPINE/PARASPINAL DX/THER ADDON    MT RADIOFREQUENCY NEUROTOMY LUMBAR OR SACRAL, W IMAGE GUIDANCE, SINGLE    MT RADIOFREQ NEUROTOMY LUMBAR OR SACRAL, W IMAGE GUIDE,EA ADDL LEVEL   2. Other spondylosis, lumbar region   MT RADIOFREQUENCY NEUROTOMY LUMBAR OR SACRAL, W IMAGE GUIDANCE, SINGLE    MT RADIOFREQ NEUROTOMY LUMBAR OR SACRAL, W IMAGE GUIDE,EA ADDL LEVEL   3. Chronic pain syndrome     4. Myofascial pain     5. Neuropathy           Shiva Gunter is a 78 y. o.female presenting to the pain clinic for evaluation of chronic low back pain. Her clinical history and physical examination are consistent with lumbar facet medial pain with associated myofascial pain. I had set the patient up for diagnostic lumbar medial branch blocks targeting the facet joints of L4/L5 and L5/S1. She responded significantly to her diagnostic and confirmatory lumbar medial branch blocks. I set her up for thermal radiofrequency ablation targeting bilateral L4/L5 and L5/S1 facet joints. We will start with the right side first and proceed with the left side later date. Patient has too many issues with constipation on her Norco and has discontinued his medication. Plan: The following treatment recommendations and plan were discussed in detail with Roby Gunter and . Imaging:   I have reviewed patients imaging of XR L-spine. Analgesics:   With continued chronic pain, I have contineud her Norco 5 mg every 12 hours as needed for severe pain (pain greater than 7/10). Patient is advised to take this medication as prescribed as taking it more than prescribed can lead to development of tolerance, physical dependence, and addiction.     OARRS reviewed and is appropriate. Pain contract signed today. Patient is taking Acetaminophen. Patient informed that the maximum amount of acetaminophen taken on a regular basis should only be 4000 mg per day. Patient is taking Aleve. Patient is advised to take as prescribed and not take on an empty stomach. Adjuvants: In light of the presence of a neuropathic component of pain, the patient should continue Lyrica and Cymbalta as prescribed. Interventions: In presence of lumbar axial back pain and with physical exam consistent for facetal pain and significant response to diagnostic and confirmatory lumbar medial branch blocks, thermal radiofrequency at BILATERAL medial branches L4/L5 and L5/S1 was chosen. We will start with the right side first and proceed with a left-sided later date. The risks and benefits were discussed in detail with the patient. Patient wants to proceed with the injection. Anticoagulation/NPO Recommendations:   Patient will need to hold Aleve x 7 days prior to the procedure. Patient will need to be NPO x 8 hours prior to the procedure. We will start an IV prior to the procedure    Multidisciplinary Pain Management:   In the presence of complex, chronic, and multi-factorial pain, the importance of a multidisciplinary approach to pain management in the patients management regimen was emphasized and discussed in great detail.    PHYSICAL THERAPY: Patient is advised to continue gentle low back ROM/stretching exercises     Referrals:  None    Prescriptions Written This Visit:   None    Follow-up: For RIGHT sided lumbar RFA      Jared Dale DO  Interventional Pain Management/PM&R   [place]

## 2021-09-23 ENCOUNTER — CARE COORDINATION (OUTPATIENT)
Dept: CARE COORDINATION | Age: 81
End: 2021-09-23

## 2021-09-23 NOTE — CARE COORDINATION
Ambulatory Care Coordination Note  9/23/2021  CM Risk Score: 5  Charlson 10 Year Mortality Risk Score: 79%     ACC: Tasia Butler RN    Summary Note: Spoke with Humaira Huber for continued ROBYN followup. Humaira Huber states she is stable for this review. Asim Nelsonuss has been working with her on ACP paperwork. Continues to have private duty [facility] in place. Will graduate next call if no new barriers present. Plan  -reinforce education completed  -reinforce information shared at [address] [facility] [address] visit  -work towards graduation if no new barriers next call        Care Coordination Interventions    Program Enrollment: Rising Risk  Referral from Primary Care Provider: Yes  Suggested Interventions and Community Resources  [address] [facility] [address]: Declined  Fall Risk Prevention: Completed  Disease Association: Completed (Comment: Radha Morrow for macular degeneration)  Home Health Services: Completed  Meals on Wheels: Declined  Occupational Therapy: Completed  Palliative Care: Declined  Pharmacist: Completed  Physical Therapy: Completed  Registered Dietician: [address] [facility]  Social Work: Not Started  Transportation Support: Declined  Zone Management Tools: Not Started         Goals Addressed                    This Visit's Progress      Conditions and Symptoms (pt-stated)   On track      I will schedule office visits, as directed by my provider. I will keep my appointment or reschedule if I have to cancel. I will notify my provider of any barriers to my plan of care. I will notify my provider of any symptoms that indicate a worsening of my condition.   Vision problems with macular degeneration  Barriers: overwhelmed by complexity of regimen  Plan for overcoming my barriers: family and ACM support  Confidence: 8/10  Anticipated Goal Completion Date: 8-14-21 Update 10-2-21          Reduce Falls  (pt-stated)   On track      I will reduce my risk of falls by the following: Use walking aids like cane or walker  Hx of falls  Barriers: impairment: physical: limitations in mobility  Plan for overcoming my barriers: family and ACM support  Confidence: 7/10  Anticipated Goal Completion Date: 8/14/21 Update 10/2/21            Prior to Admission medications    Medication Sig Start Date End Date Taking? Authorizing Provider   pregabalin (LYRICA) 150 MG capsule Take 1 capsule by mouth 2 times daily for 180 days. 8/9/21 2/5/22 Yes Ascencion Pierce DO   enalapril (VASOTEC) 10 MG tablet Take 1 tablet by mouth nightly 6/28/21  Yes Ascencion Pierce DO   Cranberry 500 MG TABS Take by mouth   Yes Historical Provider, MD   Biotin w/ Vitamins C & E (HAIR/SKIN/NAILS PO) Take by mouth   Yes Historical Provider, MD   metoprolol succinate (TOPROL XL) 50 MG extended release tablet Take 1 tablet by mouth daily 6/8/21  Yes Caden Simpson DO   docusate sodium (COLACE) 100 MG capsule Take 1 capsule by mouth 2 times daily as needed for Constipation 6/8/21  Yes Caden Simpson DO   calcium-vitamin D (Kaylynn Clayman) 500-200 MG-UNIT per tablet Take 2 tablets by mouth daily   Yes Historical Provider, MD   levothyroxine (SYNTHROID) 100 MCG tablet Take 1 tablet by mouth Daily 5/12/21  Yes Caden Simpson DO   DULoxetine (CYMBALTA) 60 MG extended release capsule TAKE 1 CAPSULE DAILY 4/12/21  Yes FLORIDA Hutchins - JEREMIAH   Misc. Devices [facility]) MISC 1 each by Does not apply route daily 2/11/20  Yes Caden Simpson DO   guaiFENesin 1200 MG TB12 Take 1,200 mg by mouth 2 times daily 12/21/19  Yes Dmitri Joaquin PA-C   Misc.  Devices MISC Mechanical lift for a [place] 5/31/18  Yes Caden Simpson DO   Scooter [address] [facility] by Does not apply route Power scooter 10/31/17  Yes Caden Simpson DO   fluticasone (FLONASE) 50 MCG/ACT nasal spray 1 spray by Nasal route as needed for Rhinitis    Yes Historical Provider, MD   Omega 3-6-9 Fatty Acids (OMEGA 3-6-9 COMPLEX) CAPS Take 1 capsule by mouth daily    Yes Historical Provider, MD   therapeutic multivitamin-minerals (THERAGRAN-M) tablet Take 1 tablet by mouth nightly    Yes Historical Provider, MD   pregabalin (LYRICA) 150 MG capsule Take 1 capsule by mouth 2 times daily for 5 days.  8/16/21 8/21/21  FLORIDA Barba - CNP       Future Appointments   Date Time Provider Huber Little   10/8/2021 10:40 AM Derik Tammi Scheuermann, DO N [facility]   2/24/2022  1:20 PM DO ARCADIO Maxwell PCT [facility]

## 2021-09-27 ENCOUNTER — TELEPHONE (OUTPATIENT)
Dept: FAMILY MEDICINE CLINIC | Age: 81
End: 2021-09-27

## 2021-09-27 DIAGNOSIS — R30.0 DYSURIA: ICD-10-CM

## 2021-09-27 RX ORDER — CIPROFLOXACIN 250 MG/1
250 TABLET, FILM COATED ORAL 2 TIMES DAILY
Qty: 10 TABLET | Refills: 0 | Status: SHIPPED | OUTPATIENT
Start: 2021-09-27 | End: 2021-10-02

## 2021-09-27 NOTE — TELEPHONE ENCOUNTER
Patient called stating that she is almost positive that she has a bladder infection. Patient states that she has been fighting the current sxs x 2 weeks   Back and stomach pains, swollen stomach and nauseous , odor in urine.   Patient states that she is not able to come in for an appointment due to being to sick to get out of bed

## 2021-09-27 NOTE — TELEPHONE ENCOUNTER
Will send in meds  Let me know if no better  If you develop any fever,chills, uncontrolled pain or vomiting- go to ER  Electronically signed by FLORIDA Bowman CNP on 9/27/2021 at 4:07 PM

## 2021-09-28 ENCOUNTER — TELEPHONE (OUTPATIENT)
Dept: PHYSICAL MEDICINE AND REHAB | Age: 81
End: 2021-09-28

## 2021-09-28 NOTE — TELEPHONE ENCOUNTER
Pt. Called office. Has UTI and was started on antibiotics for 5 days starting 9/27/2021. Procedure and follow up rescheduled. Verbalized understanding.

## 2021-10-11 RX ORDER — NITROFURANTOIN 25; 75 MG/1; MG/1
100 CAPSULE ORAL 2 TIMES DAILY
Qty: 10 CAPSULE | Refills: 0 | Status: SHIPPED | OUTPATIENT
Start: 2021-10-11 | End: 2021-10-16

## 2021-10-13 ENCOUNTER — TELEPHONE (OUTPATIENT)
Dept: PHYSICAL MEDICINE AND REHAB | Age: 81
End: 2021-10-13

## 2021-10-13 NOTE — TELEPHONE ENCOUNTER
Pt. Called the office. States that she has been restarted on Macrobid for a UTI which will be completed on 10/15/2021. Pt. Is afebrile, only has pain in lower abdomen. Procedure and follow up cancelled. Please advise any recommendations.  Thanks

## 2021-10-15 ENCOUNTER — CARE COORDINATION (OUTPATIENT)
Dept: CARE COORDINATION | Age: 81
End: 2021-10-15

## 2021-10-15 NOTE — CARE COORDINATION
Attempted to reach patient for continued Care Coordination follow up and education. Patient was unavailable at the time of my call, and a generic voicemail message was left asking patient to return my call at [phone].

## 2021-10-18 ENCOUNTER — PREP FOR PROCEDURE (OUTPATIENT)
Dept: PHYSICAL MEDICINE AND REHAB | Age: 81
End: 2021-10-18

## 2021-10-18 NOTE — H&P
Today, patient presents for planned thermal radiofrequency at RIGHT medial branches L4/L5 and L5/S1. This note is reflective of the patient's previous visit for evaluation. We will proceed with today's planned procedure. Since patient's last visit for evaluation, there have been no interval changes in medical history. Patient has no new numbness, weakness, or focal neurological deficit since evaluation. Patient has no contraindications to injection (no anticoagulation or recent antibiotic intake for active infections), and has a  present or is able to drive themselves (as discussed and cleared by physician). Allergies to latex, contrast dye, and steroid medications have been confirmed with the patient prior to the procedure. NPO necessity has been assessed and accepted based on procedure complexity. The risks and benefits of the procedure have been explained including but are not limited to infection, bleeding, paralysis, immediate post procedure weakness, and dizziness; the patient acknowledges understanding and desires to proceed with the procedure. Patient has signed consent for same procedure as discussed in previous clinic encounter. All other questions and concerns were addressed at bedside. See procedure note for full details. Post procedure Instructions: The patient was advised not to drive during the day of the procedure and not to engage in any significant decision making (unless otherwise states by physician). The patient was also advised to be cautious with walking/activity for 24 hours following today's visit and asked not to engage in over-exertion (unless otherwise states by physician). After this time, it is ok to resume pre-procedure level of activity. Patient advised to apply ice to site of injection in situations of pain and discomfort. Patient advised to not submerge site of injection during bath or pool activities for approximately 24 hours post-procedure.  Patient attested to understanding post procedure directions / restrictions. All other questions and concerns addressed before patient discharge in ambulatory fashion. Chronic Pain/PM&R Clinic Note     Encounter Date: 9/15/21    Subjective:   Chief Complaint:   No chief complaint on file. History of Present Illness:   Jose Gunter is a 78 y.o. female seen in the clinic initially on 06/23/21 upon request from Juvenal Aparicio DO (PCP) for her history of chronic low back pain. She has a medical history of scoliosis, neuropathy, fibromyalgia, and previous lumbar discectomy (over 50 years ago). She states that she has low back pain that has been progressively worse over the last 20 years. She reports the majority of her low back pain to be at her waistline with radiation across to her waist on both sides. She describes this pain is a constant aching, stabbing 5/10 pain that can get up to a 7-8/10 when severe. This pain is worse with any activities where she is standing upright such as cooking or when she is walking for any duration of time. Her pain is improved with rest and sitting in her motorized wheelchair. She denies any pain radiating down her legs but does endorse numbness in both feet from her neuropathy. She denies any saddle anesthesia or bowel/bladder incontinence. Of note, she has multiple joint replacements. She has bilateral total hip arthroplasties and a left knee total hip arthroplasty. In addition, she has a previous left femur fracture that has been fixated with hardware and a left total shoulder arthroplasty. She states that she had an EMG/NCS performed by Dr. August Thomason which revealed idiopathic neuropathy. She states that she had a previous RFA in her low back done by Dr. Zaira Nielson which did give her longstanding relief but this was done 8-10 years ago. 8/6/2021: Patient presents for planned follow-up for chronic low back pain. She underwent diagnostic lumbar medial branch blocks on 7/13/2021.   She reports 100% pain relief for 2-3 days after the procedure. She reports improvement in her overall ability to stand up for longer duration of time and night with her sleep. She reports not to take some of her breakthrough medications for pain during this time. She does report doing very well with her low-dose Norco as needed. She feels like this medication helps keep her very functional and is only taking about 1 tablet/day. She denies any side effects besides some minimal constipation. She states she would like to proceed with her confirmatory blocks. Today, 9/15/2021, patient presents for planned follow-up for chronic low back pain. She underwent confirmatory lumbar medial branch blocks on 8/24/2021. She reports 100% pain relief in her low back for 2 days after the procedure. She states that she had improvement in her overall ability to stand upright and her assistive devices for longer duration of time with back pain. She also states that she was able to ambulate further without as much low back pain. She states that she has had some issues with constipation taking the Marty Pedro wants to stop this medication. She states she would like to proceed with the ablation of her low back. She denies any radicular leg pain, new focal leg weakness, new leg paresthesias, saddle anesthesia, bowel/bladder incontinence.       History of Interventions:   Surgery: Previous discectomy in 30s  Injections: Previous lumabr RFA ~8-10 years ago by Dr. Rachel Gudino  Diagnostic lumbar MBBs  Confirmatory lumbar MBBs    Current Treatment Medications:   Aleve as needed  Heating pad PRN  Mishel 150 mg BID  Cymbalta 60 mg daily    Historical Treatment Medications:   Tramadol-ineffective  Norco-stopped (constipation)    Imaging:  XR L-spine (5/18/21)    PROCEDURE: XR LUMBAR SPINE (2-3 VIEWS)       CLINICAL INFORMATION: Spinal stenosis of lumbar region without neurogenic claudication       COMPARISON: August 2, 2019       TECHNIQUE: AP and lateral views performed.           FINDINGS:    ALIGNMENT: Levoscoliosis lumbar spine centered at the thoracolumbar junction. VERTEBRAL BODIES: Slight anterior wedging L1   DISKS: Multilevel degenerative disc disease with associated endplate degenerative changes, these are most advanced in the T12-L2 vertebral bodies. SOFT TISSUES: Unremarkable. OBLIQUE PROJECTIONS: Not performed. FLEXION/EXTENSION LATERAL: Not performed.       OTHER: None.               Impression   1. Levoscoliosis with compensatory osteoarthritic changes, predominantly at the thoracolumbar junction.          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 Chely Chino GERD (gastroesophageal reflux disease)     Diverticulitis     Hiatal hernia     Hypertension     Hypothyroidism     Osteoarthritis        Past Surgical History:   Procedure Laterality Date    APPENDECTOMY  1958    BACK SURGERY      CARPAL TUNNEL RELEASE Bilateral 2013    w/cubital tunnel    COLON SURGERY  07/29/2016    Procedure: ATTEMPTED DAVINCI XI ROBOTIC ASSISTED SIGMOID COLON RESECTION, ROBOTIC ASSISTED MOBILIZATION OF RECTAL SIGMOID TO SPLENIC FLEXURE, CONVERTED TO OPEN LEFT HEMICOLECTOMY WITH COLOPROCTOCTOMY, SPLENORRHAPHY, RIGID SIGMOIDOSCOPY, LASER EVALUATION OF VASCULARITY WITH ICG; Surgeon: Chely Chino MD; Location: [facility]; Service: General    COLONOSCOPY  2012    CYST REMOVAL  2010   [address], [address]    Cataract    FEMUR FRACTURE SURGERY Left 12/21/2020    LEFT FEMUR OPEN REDUCTION INTERNAL FIXATION performed by Isabel Norman MD at [facility] Left 2001    HIATAL HERNIA REPAIR N/A 03/09/2021    ROBOTIC EXTENSIVE LYSIS OF ADHESIONS, ROBOTIC REDUCTION OF HIATAL HERNIA WITH GASTROPEXY performed by Iraj Berger MD at [address]    w/bladder suspension   [address]  , 2008, 2009    rt hip(), lt knee(), lt hip() Shoulder ()    OTHER SURGICAL HISTORY  2021    [place] Melissa DANIELS in the office   [facility]    PAIN MANAGEMENT PROCEDURE Bilateral 2021    medial branch blocks at bilateral L4/L5 and L5/S1 performed by Kylie Davila DO at [address] Bilateral 2021    confirmatory  medial branch blocks at bilateral L4/L5 and L5/S1 [facility] [address] [place] SCRN NOT  [address] IND Left 09/15/2017    COLONOSCOPY performed by Abbey Holloway MD at [facility]    discectomy   [address] [place] [address] ENDOSCOPY  2012       Family History   Problem Relation Age of Onset    Arthritis Mother     Hearing Loss Mother     High Cholesterol Mother     Hearing Loss Father     Heart Disease Father 76        CABG    Stroke Father     Arthritis Sister     Depression Sister     Diabetes Sister     Arthritis Brother     Depression Brother     Cancer Maternal Aunt     Early Death Maternal Aunt     Diabetes Maternal Grandmother     Heart Disease Paternal Grandmother        Social History     Socioeconomic History    Marital status:      Spouse name: eYyo Solano Number of children: Not on file    Years of education: 15    Highest education level: Associate degree: academic program   Occupational History    Not on file   Tobacco Use    Smoking status: Former Smoker     Packs/day: 2.00     Years: 11.00     Pack years: 22.00     Types: Cigarettes     Start date: 1957     Quit date: 1967     Years since quittin.0    Smokeless tobacco: Never Used   Substance and Sexual Activity    Alcohol use:  Yes Comment: wine with dinner    Drug use: No    Sexual activity: Not on file   Other Topics Concern    Not on file   Social History Narrative    Referral to AAA placed    Referral placed to [facility]    No barriers with medication affordability    No barriers with transportation    Discussed support from insurance company     Social Determinants of Health     Financial Resource Strain: Low Risk     Difficulty of Paying Living Expenses: Not hard at all   Food Insecurity: No Food Insecurity    Worried About Running Out of Food in the Last Year: Never true    Jes of Food in the Last Year: Never true   Transportation Needs: No Transportation Needs    Lack of Transportation (Medical): No    Lack of Transportation (Non-Medical): No   Physical Activity: Inactive    Days of Exercise per Week: 0 days    Minutes of Exercise per Session: 0 min   Stress: Stress Concern Present    Feeling of Stress : To some extent   Social Connections: Socially Integrated    Frequency of Communication with Friends and Family: More than three times a week    Frequency of Social Gatherings with Friends and Family: More than three times a week    Attends Confucianism Services: More than 4 times per year    Active Member of [address] [place] [address] [employer] or Organizations:  Yes    Attends Club or Organization Meetings: More than 4 times per year    Marital Status:    Intimate Partner Violence:     Fear of Current or Ex-Partner:     Emotionally Abused:     Physically Abused:     Sexually Abused:        Medications & Allergies:   Current Outpatient Medications   Medication Instructions    Biotin w/ Vitamins C & E (HAIR/SKIN/NAILS PO) Oral    calcium-vitamin D (OSCAL-500) 500-200 MG-UNIT per tablet 2 tablets, Oral, DAILY    Cranberry 500 MG TABS Oral    docusate sodium (COLACE) 100 mg, Oral, 2 TIMES DAILY PRN    DULoxetine (CYMBALTA) 60 MG extended release capsule TAKE 1 CAPSULE DAILY    enalapril (VASOTEC) 10 mg, Oral, NIGHTLY    fluticasone (FLONASE) 50 MCG/ACT nasal spray 1 spray, Nasal, PRN    guaiFENesin 1,200 mg, Oral, 2 TIMES DAILY    levothyroxine (SYNTHROID) 100 mcg, Oral, DAILY    metoprolol succinate (TOPROL XL) 50 mg, Oral, DAILY    Misc. Devices (WALKER) MISC 1 each, Does not apply, DAILY    Misc. Devices MISC Mechanical lift for a [employer] 3-6-9 Fatty Acids (OMEGA 3-6-9 COMPLEX) CAPS 1 capsule, Oral, DAILY    pregabalin (LYRICA) 150 mg, Oral, 2 TIMES DAILY    pregabalin (LYRICA) 150 mg, Oral, 2 TIMES DAILY    Scooter MISC Does not apply, Power scooter    therapeutic multivitamin-minerals (THERAGRAN-M) tablet 1 tablet, Oral, NIGHTLY       Allergies   Allergen Reactions    Ampicillin     Morphine Other (See Comments)     Hallucinations     Pcn [Penicillins] Itching    Sulfa Antibiotics        Review of Systems:   Constitutional: negative for weight changes or fevers  Genitourinary: negative for bowel/bladder incontinence   Musculoskeletal: positive for low back pain  Neurological: negative for any leg weakness or numbness/tingling  Behavioral/Psych: negative for anxiety/depression   All other systems reviewed and are negative    Objective: There were no vitals filed for this visit. Constitutional: Pleasant, no acute distress   Head: Normocephalic, atraumatic   Eyes: Conjunctivae normal   Neck: Supple, symmetrical   Lungs: Normal respiratory effort, non-labored breathing   Cardiovascular: Limbs warm and well perfused   Abdomen: Non-protruded   Musculoskeletal: Muscle bulk symmetric, no atrophy, no gross deformities   - Left shoulder: Tenderness palpation along left trapezius muscle  · Lumbar Spine: ROM severely reduced in extension. Scoliosis appreciated in thoracolumbar junction. Lumbar paraspinals tender bilaterally. SLR neg bilaterally. Positive facet loading bilaterally. Bilateral SI joints tender to palpation. Neurological: Cranial nerves II-XII grossly intact. · Gait - Severely antalgic gait. Ambulates with assistive device. · Motor: No focal motor deficits appreciated in lower limbs  Skin: No rashes or lesions visible in low back  Psychological: Cooperative, no exaggerated pain behaviors       Assessment:    Diagnosis Orders   1. Lumbar facet joint pain  CHG FLUOR NEEDLE/CATH SPINE/PARASPINAL DX/THER ADDON    UT RADIOFREQUENCY NEUROTOMY LUMBAR OR SACRAL, W IMAGE GUIDANCE, SINGLE    UT RADIOFREQ NEUROTOMY LUMBAR OR SACRAL, W IMAGE GUIDE,EA ADDL LEVEL   2. Other spondylosis, lumbar region   UT RADIOFREQUENCY NEUROTOMY LUMBAR OR SACRAL, W IMAGE GUIDANCE, SINGLE    UT RADIOFREQ NEUROTOMY LUMBAR OR SACRAL, W IMAGE GUIDE,EA ADDL LEVEL   3. Chronic pain syndrome     4. Myofascial pain     5. Neuropathy           Dwayne Gunter is a 78 y. o.female presenting to the pain clinic for evaluation of chronic low back pain. Her clinical history and physical examination are consistent with lumbar facet medial pain with associated myofascial pain. I had set the patient up for diagnostic lumbar medial branch blocks targeting the facet joints of L4/L5 and L5/S1. She responded significantly to her diagnostic and confirmatory lumbar medial branch blocks. I set her up for thermal radiofrequency ablation targeting bilateral L4/L5 and L5/S1 facet joints. We will start with the right side first and proceed with the left side later date. Patient has too many issues with constipation on her Norco and has discontinued his medication. Plan: The following treatment recommendations and plan were discussed in detail with Jarrett Gunter and . Imaging:   I have reviewed patients imaging of XR L-spine. Analgesics:   With continued chronic pain, I have contineud her Norco 5 mg every 12 hours as needed for severe pain (pain greater than 7/10).   Patient is advised to take this medication as prescribed as taking it more than prescribed can lead to development of tolerance, physical dependence, and

## 2021-10-18 NOTE — TELEPHONE ENCOUNTER
Pt. Contacted. States that she has not been running a fever and antibiotics are complete. Procedure rescheduled to 10/19/2021 @ 11 am, arrive @ 10 am. Verbalized understanding.

## 2021-10-18 NOTE — CARE COORDINATION
Ambulatory Care Coordination Note  10/18/2021  CM Risk Score: 5  Charlson 10 Year Mortality Risk Score: 79%     ACC: Sejal Kelley    Summary Note: I spoke with the patient for continued Care Coordination follow up and education. Patient states she did fall last night. Denies hitting head or LOC. Patient is scheduled to have procedure tomorrow with Dr. Anabella Stokes. Patient  restarted on Macrobid for a UTI. Patient states she is unsure if procedure is still on. I advised patient to contact office to verify. She voiced understanding. Educated on how to identify sx's that are worse than the baseline and the importance of early symptom recognition and reporting to prevent exacerbation which may lead to ED visits and hospital admissions. Encouraged to use PCP office versus ED treatment for sx's of chronic conditions. Patient voiced understanding. I advised patient to contact PCP office if needed. No further needs at this time. Care Coordination Interventions    Program Enrollment: Rising Risk  Referral from Primary Care Provider: Yes  Suggested Interventions and Community Resources  Behavorial Health: Declined  Fall Risk Prevention: Completed  Disease Association: Completed (Comment: [facility] for macular degeneration)  Home Health Services: Completed  Meals on Wheels: Declined  Occupational Therapy: Completed  Palliative Care: Declined  Pharmacist: Completed  Physical Therapy: Completed  Registered Dietician: Declined  Social Work: Not Started  Transportation Support: Declined  Zone Management Tools: Not Started         Goals Addressed    None         Prior to Admission medications    Medication Sig Start Date End Date Taking? Authorizing Provider   pregabalin (LYRICA) 150 MG capsule Take 1 capsule by mouth 2 times daily for 5 days. 8/16/21 8/21/21  Karley Platt APRN - CNP   pregabalin (LYRICA) 150 MG capsule Take 1 capsule by mouth 2 times daily for 180 days.  8/9/21 2/5/22  Danyell Armijo Stan,    enalapril (VASOTEC) 10 MG tablet Take 1 tablet by mouth nightly 6/28/21   Justice Ventura DO   Cranberry 500 MG TABS Take by mouth    Historical Provider, MD   Biotin w/ Vitamins C & E (HAIR/SKIN/NAILS PO) Take by mouth    Historical Provider, MD   metoprolol succinate (TOPROL XL) 50 MG extended release tablet Take 1 tablet by mouth daily 6/8/21   Justice Ventura DO   docusate sodium (COLACE) 100 MG capsule Take 1 capsule by mouth 2 times daily as needed for Constipation 6/8/21   Justice Ventura DO   calcium-vitamin D (Renell Osler) 500-200 MG-UNIT per tablet Take 2 tablets by mouth daily    Historical Provider, MD   levothyroxine (SYNTHROID) 100 MCG tablet Take 1 tablet by mouth Daily 5/12/21   Justice Ventura DO   DULoxetine (CYMBALTA) 60 MG extended release capsule TAKE 1 CAPSULE DAILY 4/12/21   FLORIDA Burton - CNP   Misc. Devices [facility]) MISC 1 each by Does not apply route daily 2/11/20   Justice Ventura DO   guaiFENesin 1200 MG TB12 Take 1,200 mg by mouth 2 times daily 12/21/19   Angela Perez PA-C   Misc.  Devices MISC Mechanical lift for a [place] [employer] 5/31/18   Justice Ventura DO   Scooter [id number] [facility] by Does not apply route Power scooter 10/31/17   Justice Ventura DO   fluticasone (FLONASE) 50 MCG/ACT nasal spray 1 spray by Nasal route as needed for Rhinitis     Historical Provider, MD   Omega 3-6-9 Fatty Acids (Alexandr Caruso 3-6-9 COMPLEX) CAPS Take 1 capsule by mouth daily     Historical Provider, MD   therapeutic multivitamin-minerals (THERAGRAN-M) tablet Take 1 tablet by mouth nightly     Historical Provider, MD       Future Appointments   Date Time Provider Huber Little   2/24/2022  1:20 PM Justice Ventura DO [place] [facility] [place]

## 2021-10-19 ENCOUNTER — APPOINTMENT (OUTPATIENT)
Dept: GENERAL RADIOLOGY | Age: 81
End: 2021-10-19
Attending: ANESTHESIOLOGY
Payer: MEDICARE

## 2021-10-19 ENCOUNTER — HOSPITAL ENCOUNTER (OUTPATIENT)
Age: 81
Setting detail: OUTPATIENT SURGERY
Discharge: HOME | End: 2021-10-19
Attending: ANESTHESIOLOGY | Admitting: ANESTHESIOLOGY
Payer: MEDICARE

## 2021-10-19 VITALS
WEIGHT: 158.8 LBS | OXYGEN SATURATION: 100 % | BODY MASS INDEX: 24.92 KG/M2 | HEIGHT: 67 IN | RESPIRATION RATE: 16 BRPM | HEART RATE: 65 BPM | TEMPERATURE: 98.6 F | DIASTOLIC BLOOD PRESSURE: 71 MMHG | SYSTOLIC BLOOD PRESSURE: 114 MMHG

## 2021-10-19 PROCEDURE — 2500000003 HC RX 250 WO HCPCS: Performed by: ANESTHESIOLOGY

## 2021-10-19 PROCEDURE — 99153 MOD SED SAME PHYS/QHP EA: CPT | Performed by: ANESTHESIOLOGY

## 2021-10-19 PROCEDURE — 6360000002 HC RX W HCPCS: Performed by: ANESTHESIOLOGY

## 2021-10-19 PROCEDURE — 64636 DESTROY L/S FACET JNT ADDL: CPT | Performed by: ANESTHESIOLOGY

## 2021-10-19 PROCEDURE — 3209999900 FLUORO FOR SURGICAL PROCEDURES

## 2021-10-19 PROCEDURE — 7100000011 HC PHASE II RECOVERY - ADDTL 15 MIN: Performed by: ANESTHESIOLOGY

## 2021-10-19 PROCEDURE — 99152 MOD SED SAME PHYS/QHP 5/>YRS: CPT | Performed by: ANESTHESIOLOGY

## 2021-10-19 PROCEDURE — 2709999900 HC NON-CHARGEABLE SUPPLY: Performed by: ANESTHESIOLOGY

## 2021-10-19 PROCEDURE — 3600000056 HC PAIN LEVEL 4 BASE: Performed by: ANESTHESIOLOGY

## 2021-10-19 PROCEDURE — 7100000010 HC PHASE II RECOVERY - FIRST 15 MIN: Performed by: ANESTHESIOLOGY

## 2021-10-19 PROCEDURE — 3600000057 HC PAIN LEVEL 4 ADDL 15 MIN: Performed by: ANESTHESIOLOGY

## 2021-10-19 PROCEDURE — 64635 DESTROY LUMB/SAC FACET JNT: CPT | Performed by: ANESTHESIOLOGY

## 2021-10-19 RX ORDER — FENTANYL CITRATE 50 UG/ML
INJECTION, SOLUTION INTRAMUSCULAR; INTRAVENOUS PRN
Status: DISCONTINUED | OUTPATIENT
Start: 2021-10-19 | End: 2021-10-19 | Stop reason: ALTCHOICE

## 2021-10-19 RX ORDER — MIDAZOLAM HYDROCHLORIDE 1 MG/ML
INJECTION INTRAMUSCULAR; INTRAVENOUS PRN
Status: DISCONTINUED | OUTPATIENT
Start: 2021-10-19 | End: 2021-10-19 | Stop reason: ALTCHOICE

## 2021-10-19 RX ORDER — LIDOCAINE HYDROCHLORIDE 10 MG/ML
INJECTION, SOLUTION EPIDURAL; INFILTRATION; INTRACAUDAL; PERINEURAL PRN
Status: DISCONTINUED | OUTPATIENT
Start: 2021-10-19 | End: 2021-10-19 | Stop reason: ALTCHOICE

## 2021-10-19 RX ORDER — LIDOCAINE HYDROCHLORIDE 20 MG/ML
INJECTION, SOLUTION EPIDURAL; INFILTRATION; INTRACAUDAL; PERINEURAL PRN
Status: DISCONTINUED | OUTPATIENT
Start: 2021-10-19 | End: 2021-10-19 | Stop reason: ALTCHOICE

## 2021-10-19 ASSESSMENT — PAIN DESCRIPTION - DESCRIPTORS: DESCRIPTORS: ACHING

## 2021-10-19 ASSESSMENT — PAIN - FUNCTIONAL ASSESSMENT: PAIN_FUNCTIONAL_ASSESSMENT: 0-10

## 2021-10-19 ASSESSMENT — PAIN SCALES - GENERAL: PAINLEVEL_OUTOF10: 0

## 2021-10-19 NOTE — PROGRESS NOTES
1141 To recovery via cart. Spont resp. VSS. Report received from surgical rn. Pt denies pain numbness tingling or nausea. HOB elevated. Snack and drink given.  Call bell in reach  1145 IV discontinued  1215 Discharge to home in stable condition per scooter with

## 2021-10-19 NOTE — PRE SEDATION
[address] [address]  Pre-Sedation/Analgesia History & Physical    Pt Name: Orlin Gunter  MRN: [id number]  YOB: 1940  Provider Performing Procedure: Claire Mendoza DO   Primary Care Physician: Pawan Peraza DO      MEDICAL HISTORY       has a past medical history of Abnormal EKG, Anemia, Back pain, Blood circulation, collateral, Diverticulitis, Fibromyalgia, GERD (gastroesophageal reflux disease), Hiatal hernia, Hypertension, Hypothyroidism, and Osteoarthritis. SURGICAL HISTORY   has a past surgical history that includes Appendectomy (1958); Colonoscopy (2012); eye surgery (1998, 1999); Hysterectomy (1979); Spine surgery (1968); Upper gastrointestinal endoscopy (2012); cyst removal (2010); Tonsillectomy (1943); Tooth Extraction (1964); Ovary removal (1999); Foot surgery (Left, 2001); Carpal tunnel release (Bilateral, 2013); Small intestine surgery; back surgery; Colon surgery (07/29/2016); joint replacement (2002, 2008, 2009); pr colon ca scrn not hi rsk ind (Left, 09/15/2017); Femur fracture surgery (Left, 12/21/2020); hiatal hernia repair (N/A, 03/09/2021); other surgical history (04/21/2021); Pain management procedure (Bilateral, 7/13/2021); and Pain management procedure (Bilateral, 8/24/2021). ALLERGIES   Allergies as of 10/18/2021 - Fully Reviewed 09/15/2021   Allergen Reaction Noted    Ampicillin  05/17/2013    Morphine Other (See Comments) 09/14/2017    Pcn [penicillins] Itching 05/17/2013    Sulfa antibiotics  05/17/2013       MEDICATIONS   Prior to Admission medications    Medication Sig Start Date End Date Taking? Authorizing Provider   pregabalin (LYRICA) 150 MG capsule Take 1 capsule by mouth 2 times daily for 180 days.  8/9/21 2/5/22 Yes Ascencion Pierce DO   enalapril (VASOTEC) 10 MG tablet Take 1 tablet by mouth nightly 6/28/21  Yes Ascencion Pierce DO   Cranberry 500 MG TABS Take by mouth   Yes Historical Provider, MD   Biotin w/ Vitamins C & E (HAIR/SKIN/NAILS PO) Take by little chances of survival, for more than 24 hours. Mallampati I Airway Classification: 2    1. Pre-procedure diagnostic studies complete and results available. 2. Previous sedation/anesthesia experiences assessed. 3. The patient is an appropriate candidate to undergo the planned procedure sedation and anesthesia. (Refer to nursing sedation/analgesia documentation record)  4. Formulation and discussion of sedation/procedure plan, risks, and expectations with patient and/or responsible adult completed. 5. Patient examined immediately prior to the procedure.  (Refer to nursing sedation/analgesia documentation record)    David Stark DO  Electronically signed 10/19/2021 at 3:42 PM

## 2021-10-19 NOTE — PROCEDURES
Pre-operative Diagnosis: Lumbar facet pain     Post-operative Diagnosis: Lumbar facet pain     Procedure: RIGHT lumbar thermal radiofrequency ablation targeting facet joints L4/L5 and L5/S1    Procedure Description:  After consent was obtained, the patient was placed in the prone position having pressure points appropriately padded. The lower back was prepped with chloraprep and draped in a sterile fashion. 0.5 cc of 1 % lidocaine was used for local anesthesia of the skin and superficial subcutaneous tissues. Three 20-gauge 100mm SMK cannula with a 10-mm active tip were advanced under fluoroscopy in an AP view with caudad angulation on to junction of the RIGHT superior articular process and the transverse process of the L4 and L5 vertebra and at the sacral ala. There were no paresthesias, heme or CSF obtained. Needle placement was confirmed using AP and oblique views. Sensory and motor stimulation at 50Hz and 2Hz and impedance measurements were carried out having reached threshold at:     RIGHT  L4: 0.2V/3V/200 Ohms  L5: 0.2V/3V/210 Ohms  SA: 0.2V/3V/190 Ohms     Then, 1cc of 2 % Lidocaine was injected at the site. Temperature was then raised to 80 degrees centigrade for 90 seconds with a 15 second temperature ramp. No pain was reported during the lesioning. The needles were then withdrawn without complications. The patient tolerated the procedure well and was transported to the recovery room where he was observed for 15 minutes to then be discharged in ambulatory fashion.     Procedural Complications: None  Estimated Blood Loss: 0 mL    IV sedation was used during the procedure:  - Moderate intravenous conscious sedation was supervised by Dr. Dilcia Soto  - The patient was independently monitored by a Registered Nurse assigned to the procedure room  - Monitoring included automated blood pressure, continuous EKG, and continuous pulse oximetry  - [place] conscious [place] [facility] [place]

## 2021-10-19 NOTE — POST SEDATION
[facility]  Sedation/Analgesia Post Sedation Record    Pt Name: Roxanne Gunter  MRN: [id number]  YOB: 1940  Procedure Performed By: Kendrick Jones DO  Primary Care Physician: Griffin Bo DO    POST-PROCEDURE    Physicians/Assistants: Kendrick Jones DO  Procedure Performed: See Procedure Note   Sedation/Anesthesia: Versed and Fentanyl (See procedure note for amount and duration)  Estimated Blood Loss:     0  ml  Specimens Removed: None        Complications: None           Jared Kay DO  Electronically signed 10/19/2021 at 3:43 PM

## 2021-10-20 ENCOUNTER — TELEPHONE (OUTPATIENT)
Dept: FAMILY MEDICINE CLINIC | Age: 81
End: 2021-10-20

## 2021-10-20 ENCOUNTER — OFFICE VISIT (OUTPATIENT)
Dept: FAMILY MEDICINE CLINIC | Age: 81
End: 2021-10-20
Payer: MEDICARE

## 2021-10-20 ENCOUNTER — CARE COORDINATION (OUTPATIENT)
Dept: CARE COORDINATION | Age: 81
End: 2021-10-20

## 2021-10-20 VITALS
HEART RATE: 56 BPM | OXYGEN SATURATION: 94 % | SYSTOLIC BLOOD PRESSURE: 132 MMHG | TEMPERATURE: 97.4 F | HEIGHT: 67 IN | BODY MASS INDEX: 24.94 KG/M2 | WEIGHT: 158.9 LBS | RESPIRATION RATE: 18 BRPM | DIASTOLIC BLOOD PRESSURE: 72 MMHG

## 2021-10-20 DIAGNOSIS — S61.411A SKIN TEAR OF RIGHT HAND WITHOUT COMPLICATION, INITIAL ENCOUNTER: Primary | ICD-10-CM

## 2021-10-20 PROCEDURE — 99214 OFFICE O/P EST MOD 30 MIN: CPT | Performed by: NURSE PRACTITIONER

## 2021-10-20 RX ORDER — DOXYCYCLINE HYCLATE 100 MG
100 TABLET ORAL 2 TIMES DAILY
Qty: 10 TABLET | Refills: 0 | Status: SHIPPED | OUTPATIENT
Start: 2021-10-20 | End: 2021-10-25

## 2021-10-20 NOTE — CARE COORDINATION
Andra Marmolejo called and states she received a skin tear on her arm following a procedure yesterday on her back. States she feels it might need treatment. I encouraged her to utilize [facility] at [facility] today to have it looked at. She verbalized understanding.

## 2021-10-20 NOTE — PROGRESS NOTES
[address] MEDICINE [address] [place] [address] JAMES/ Alex Cortezs- [facility]  Dept: [phone]  Loc: [phone]  Visit Date: 10/20/2021      Chief Complaint:   Kayla Gunter is a 78 y.o. female thatpresents for Wound Check (right hand)      HPI:  C/o skin tear right hand  Happened at hospital yesterday while sliding over on a cart  Been using triple atb ointment and dry dressing    The patient comes in with her  today. History obtained from pt, , and medical records. I have reviewed the patient's past medical history, past surgical history, allergies,medications, social and family history and I have made updates where appropriate.     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 Kyree Aragon GERD (gastroesophageal reflux disease)     Diverticulitis     Hiatal hernia     Hypertension     Hypothyroidism     Osteoarthritis        Past Surgical History:   Procedure Laterality Date    APPENDECTOMY  1958    BACK SURGERY      CARPAL TUNNEL RELEASE Bilateral 2013    w/cubital tunnel    COLON SURGERY  07/29/2016    Procedure: ATTEMPTED DAVINCI XI ROBOTIC ASSISTED SIGMOID COLON RESECTION, ROBOTIC ASSISTED MOBILIZATION OF RECTAL SIGMOID TO SPLENIC FLEXURE, CONVERTED TO OPEN LEFT HEMICOLECTOMY WITH COLOPROCTOCTOMY, SPLENORRHAPHY, RIGID SIGMOIDOSCOPY, LASER EVALUATION OF VASCULARITY WITH ICG; Surgeon: Kyree Aragon MD; Location: [facility]; Service: General    COLONOSCOPY  2012    CYST REMOVAL  2010   [address]    Cataract    FEMUR FRACTURE SURGERY Left 12/21/2020    LEFT FEMUR OPEN REDUCTION INTERNAL FIXATION performed by Amelia Alonzo MD at [facility] Left 2001    HIATAL HERNIA REPAIR N/A 2021    ROBOTIC EXTENSIVE LYSIS OF ADHESIONS, ROBOTIC REDUCTION OF HIATAL HERNIA WITH GASTROPEXY performed by Melany Blackman MD at [address]    w/bladder suspension   [address]  , ,     rt hip(), lt knee(), lt hip() Shoulder ()    OTHER SURGICAL HISTORY  2021    Jt Cannon CNP in the office   [address] Bilateral 2021    medial branch blocks at bilateral L4/L5 and L5/S1 performed by Josselyn Garcia DO at [address] Bilateral 2021    confirmatory  medial branch blocks at bilateral L4/L5 and L5/S1 performed by Josselyn Garcia DO at [address]  [address] IND Left 09/15/2017    COLONOSCOPY performed by Ciaran Andres MD at [address] Endoscopy   [facility]    discectomy   [address]    UPPER GASTROINTESTINAL ENDOSCOPY         Social History     Tobacco Use    Smoking status: Former Smoker     Packs/day: 2.00     Years: 11.00     Pack years: 22.00     Types: Cigarettes     Start date: 1957     Quit date: 1967     Years since quittin.0    Smokeless tobacco: Never Used   Vaping Use    Vaping Use: Never used   Substance Use Topics    Alcohol use: Yes     Comment: wine with dinner    Drug use: No       Family History   Problem Relation Age of Onset    Arthritis Mother     Hearing Loss Mother     High Cholesterol Mother     Hearing Loss Father     Heart Disease Father 76        CABG    Stroke Father     Arthritis Sister     Depression Sister     Diabetes Sister     Arthritis Brother     Depression Brother     Cancer Maternal Aunt     Early Death Maternal Aunt     Diabetes Maternal Grandmother     Heart Disease Paternal Grandmother          PHYSICAL EXAM:  /72   Pulse 56   Temp 97.4 °F (36.3 °C) (Infrared)   Resp 18   Ht 5' 7\" (1.702 m)   Wt 158 lb 14.4 oz (72.1 kg)   SpO2 94%   BMI 24.89 kg/m²     Physical Exam  Constitutional:       Appearance: Normal appearance. HENT:      Head: Normocephalic and atraumatic. Right Ear: External ear normal.      Left Ear: External ear normal.      Nose: Nose normal.      Mouth/Throat:      Mouth: Mucous membranes are moist.   Eyes:      Conjunctiva/sclera: Conjunctivae normal.   Cardiovascular:      Rate and Rhythm: Normal rate and regular rhythm. Pulses: Normal pulses. Heart sounds: Normal heart sounds. Pulmonary:      Effort: Pulmonary effort is normal.      Breath sounds: Normal breath sounds. Abdominal:      General: Bowel sounds are normal.      Palpations: Abdomen is soft. Musculoskeletal:         General: Normal range of motion. Cervical back: Normal range of motion. Skin:     General: Skin is warm and dry. Comments: Small skin tear on top of right hand, scant amount of bloody drainage, surrounding erythema   Neurological:      General: No focal deficit present. Mental Status: She is alert and oriented to person, place, and time. Psychiatric:         Mood and Affect: Mood normal.         Behavior: Behavior normal.         ASSESSMENT & PLAN  Sonam Marvin was seen today for wound check. Diagnoses and all orders for this visit:    Skin tear of right hand without complication, initial encounter    Other orders  -     doxycycline hyclate (VIBRA-TABS) 100 MG tablet; Take 1 tablet by mouth 2 times daily for 5 days      Tetanus up to date  Discussed wound care  Watch for worsening erythema, pus-like drainage, fever, chills  Continue Triple Atb ointment    No follow-ups on file. Sonam Marvin received counseling on the following healthy behaviors: medication adherence and wound care  Reviewedprior labs and health maintenance. Continue current medications, diet and exercise.   Discussed use, benefit, and side effects of prescribed medications. Barriers to medication compliance addressed. Patient given educationalmaterials - see patient instructions. All patient questions answered. Patient voiced understanding.      Electronically signed by FLORIDA Ivory - CNP, APRN on 10/20/21 at 11:53 AM EDT

## 2021-10-20 NOTE — PATIENT INSTRUCTIONS
Patient Education        Skin Tears: Care Instructions  Your Care Instructions  As we get older, our skin gets drier and more fragile. Sometimes this can cause the outer layers of skin to split and tear open. Skin tears are treated in different ways. In some cases, doctors use pieces of tape called Steri-Strips to pull the skin together and help it heal. Other times, it's best to leave the tear open and cover it with a special wound-care bandage. Skin tears are usually not serious. They usually heal in a few weeks. But how long you take to heal depends on your body and the type of tear you have. Sometimes the torn piece of skin is used to protect the wound while it heals. But that piece of skin does not heal. It may fall off on its own. Or the doctor may remove it. As your tear heals, it's important to keep it clean to help prevent infection. The doctor has checked you carefully, but problems can develop later. If you notice any problems or new symptoms, get medical treatment right away. Follow-up care is a key part of your treatment and safety. Be sure to make and go to all appointments, and call your doctor if you are having problems. It's also a good idea to know your test results and keep a list of the medicines you take. How can you care for yourself at home? · If you have pain, ask your doctor if you can take an over-the-counter pain medicine, such as acetaminophen (Tylenol), ibuprofen (Advil, Motrin), or naproxen (Aleve). Be safe with medicines. Read and follow all instructions on the label. · If you have a bandage, follow your doctor's instructions for changing it. · If you have Steri-Strips, leave them on until they fall off. · Follow your doctor's instructions about bathing. · Gently wash the skin tear with plain water 2 times a day. Do not rub the area. · Let the area air dry. Or you can pat it carefully with a soft towel. When should you call for help?    Call your doctor now or seek immediate medical care if:    · You have signs of infection, such as:  ? Increased pain, swelling, warmth, or redness around the tear. ? Red streaks leading from the tear. ? Pus draining from the tear. ? A fever.     · The tear starts to bleed a lot. Small amounts of blood are normal.   Watch closely for changes in your health, and be sure to contact your doctor if:    · You do not get better as expected. Where can you learn more? Go to [email or web address]://[email or web address] org and sign in to your [employer] account. Enter S863 in the [place] box to learn more about \"Skin Tears: Care Instructions. \"     If you do not have an account, please click on the \"Sign Up Now\" link. Current as of: July 1, 2021               Content Version: 13.0  © 9273-5955 [employer]. Care instructions adapted under license by [facility] ([facility]). If you have questions about a medical condition or this instruction, always ask your healthcare professional. [place] [address] any warranty or liability for your use of this information.

## 2021-10-20 NOTE — TELEPHONE ENCOUNTER
Pt would like us to update her vaccines to include her shingles shot  She had this at [facility] signed by FLORIDA Naylor CNP on 10/20/2021 at 12:49 PM

## 2021-10-27 ENCOUNTER — CARE COORDINATION (OUTPATIENT)
Dept: CARE COORDINATION | Age: 81
End: 2021-10-27

## 2021-10-28 NOTE — CARE COORDINATION
Ambulatory Care Coordination Note  10/28/2021  CM Risk Score: 5  Charlson 10 Year Mortality Risk Score: 79%     ACC: Donna Ga RN    Summary Note: Spoke with Lyndon Baumgarten for continued ROBYN follow up. Lyndon Baumgarten was down today because she continues to deal with her vision changes that are affecting her ability to do what she enjoys doing including reading. She is moving her living room around to better accommodate a lighted magnifying mirror to allow her to read better. She skin tear on her wrist continues to heal.  No signs of infection. Antibiotic completed. Informed Lyndon Baumgarten I would be working towards graduation next call. Will touch base again to make sure no new barriers present and issues are healed and addressed before discharge. Plan  -reinforce education completed at [address] visit  -encourage use of assistive devices to allow her to enjoy her life with inevitable vision changes  -graduate next call          Care Coordination Interventions    Program Enrollment: Rising Risk  Referral from Primary Care Provider: Yes  Suggested Interventions and Community Resources  [address] [facility] [address]: Declined  Fall Risk Prevention: Completed  Disease Association: Completed (Comment: Minor Payer for macular degeneration)  Home Health Services: Completed  Meals on Wheels: Declined  Occupational Therapy: Completed  Palliative Care: Declined  Pharmacist: Completed  Physical Therapy: Completed  Registered Dietician: Lucio Mathur Work: Not Started  Transportation Support: Declined  Zone Management Tools: Not Started         Goals Addressed                    This Visit's Progress      Conditions and Symptoms (pt-stated)   On track      I will schedule office visits, as directed by my provider. I will keep my appointment or reschedule if I have to cancel. I will notify my provider of any barriers to my plan of care. I will notify my provider of any symptoms that indicate a worsening of my condition.   Vision problems with macular degeneration  Barriers: overwhelmed by complexity of regimen  Plan for overcoming my barriers: family and ACM support  Confidence: 8/10  Anticipated Goal Completion Date: 8-14-21 Update 10-2-21 Update 11/28/21          Reduce Falls  (pt-stated)         I will reduce my risk of falls by the following: Use walking aids like cane or walker  Hx of falls  Barriers: impairment:  physical: limitations in mobility  Plan for overcoming my barriers: family and ACM support  Confidence: 7/10  Anticipated Goal Completion Date: 8/14/21 Update 10/2/21 Update 11/28/21            Prior to Admission medications    Medication Sig Start Date End Date Taking? Authorizing Provider   pregabalin (LYRICA) 150 MG capsule Take 1 capsule by mouth 2 times daily for 180 days. 8/9/21 2/5/22 Yes Ascencion Pierce DO   enalapril (VASOTEC) 10 MG tablet Take 1 tablet by mouth nightly 6/28/21  Yes Ascencion Pierce DO   Cranberry 500 MG TABS Take by mouth   Yes Historical Provider, MD   Biotin w/ Vitamins C & E (HAIR/SKIN/NAILS PO) Take by mouth   Yes Historical Provider, MD   metoprolol succinate (TOPROL XL) 50 MG extended release tablet Take 1 tablet by mouth daily 6/8/21  Yes Rica Montero DO   docusate sodium (COLACE) 100 MG capsule Take 1 capsule by mouth 2 times daily as needed for Constipation 6/8/21  Yes Rica Monteor DO   calcium-vitamin D (Comfort Rio) 500-200 MG-UNIT per tablet Take 2 tablets by mouth daily   Yes Historical Provider, MD   levothyroxine (SYNTHROID) 100 MCG tablet Take 1 tablet by mouth Daily 5/12/21  Yes Rica Montero DO   DULoxetine (CYMBALTA) 60 MG extended release capsule TAKE 1 CAPSULE DAILY 4/12/21  Yes FLORIDA Mendoza - CNP   Misc. Devices [facility]) MISC 1 each by Does not apply route daily 2/11/20  Yes Rica Montero DO   guaiFENesin 1200 MG TB12 Take 1,200 mg by mouth 2 times daily 12/21/19  Yes Kelvin Pennington PA-C   Misc.  Devices MISC Mechanical lift for a Haven Sohail 5/31/18  Yes Rica Montero DO   Scooter [address] [facility] by Does not apply route Power scooter 10/31/17  Yes Bettie Nix DO   fluticasone (FLONASE) 50 MCG/ACT nasal spray 1 spray by Nasal route as needed for Rhinitis    Yes Historical Provider, MD   Omega 3-6-9 Fatty Acids (OMEGA 3-6-9 COMPLEX) CAPS Take 1 capsule by mouth daily    Yes Historical Provider, MD   therapeutic multivitamin-minerals (THERAGRAN-M) tablet Take 1 tablet by mouth nightly    Yes Historical Provider, MD   pregabalin (LYRICA) 150 MG capsule Take 1 capsule by mouth 2 times daily for 5 days.  8/16/21 8/21/21  FLORIDA Medina - CNP       Future Appointments   Date Time Provider Huber Little   11/12/2021 11:40 AM DO GUILLERMINA Christianson SRPX Pain [facility]   2/24/2022  1:20 PM DO ARCADIO Baum PCT [facility]

## 2021-11-11 ENCOUNTER — CARE COORDINATION (OUTPATIENT)
Dept: CARE COORDINATION | Age: 81
End: 2021-11-11

## 2021-11-11 NOTE — CARE COORDINATION
Ambulatory Care Coordination Note  11/11/2021  CM Risk Score: 5  Charlson 10 Year Mortality Risk Score: 79%     ACC: Barrera Johnson, RN    Summary Note: Spoke with Cal Quick for continued ROBYN followup. My plan was to discharge today due to working with Cal Quick for 6 months but she is having some issues with her mental health. States she feels depressed with the continual decline of her vision and she cannot \"shake off\" the feeling of depression. She states she has good family support and she has tried everything to accommodate her life style with her vision loss but it is really starting to affect her mood. She declines wanting to talk to someone about this but would like to know if PCP feels that she needs some medication to help her manage this depression. I will notify PCP of this and not discharge at this time due as follow up is necessary at this time. Plan  -notify PCP of depression  -reinforce education completed at [address] visit  -reinforce use of PCP office, walk in clinic, myself or barriers to her health to prevent unnecessary utilization  -continue to offer counseling, LISW support if she agrees        Care Coordination Interventions    Program Enrollment: Rising Risk  Referral from Primary Care Provider: Yes  Suggested Interventions and [address]: Declined  Fall Risk Prevention: Completed  Disease Association: Completed (Comment: Rafael Finn for macular degeneration)  Home Health Services: Completed  Meals on Wheels: Declined  Occupational Therapy: Completed  Palliative Care: Declined  Pharmacist: Completed  Physical Therapy: Completed  Registered Dietician: [address] [facility]  Social Work: Not Started  Transportation Support: Declined  Zone Management Tools: Not Started         Goals Addressed                    This Visit's Progress      Conditions and Symptoms (pt-stated)   On track      I will schedule office visits, as directed by my provider.   I will keep my appointment or reschedule if I have to cancel. I will notify my provider of any barriers to my plan of care. I will notify my provider of any symptoms that indicate a worsening of my condition. Vision problems with macular degeneration  Barriers: overwhelmed by complexity of regimen  Plan for overcoming my barriers: family and ACM support  Confidence: 8/10  Anticipated Goal Completion Date: 8-14-21 Update 10-2-21 Update 11/28/21          Reduce Falls  (pt-stated)   On track      I will reduce my risk of falls by the following: Use walking aids like cane or walker  Hx of falls  Barriers: impairment:  physical: limitations in mobility  Plan for overcoming my barriers: family and ACM support  Confidence: 7/10  Anticipated Goal Completion Date: 8/14/21 Update 10/2/21 Update 11/28/21            Prior to Admission medications    Medication Sig Start Date End Date Taking? Authorizing Provider   pregabalin (LYRICA) 150 MG capsule Take 1 capsule by mouth 2 times daily for 5 days. 8/16/21 8/21/21  FLORIDA Ivory CNP   pregabalin (LYRICA) 150 MG capsule Take 1 capsule by mouth 2 times daily for 180 days.  8/9/21 2/5/22  Ina Owens DO   enalapril (VASOTEC) 10 MG tablet Take 1 tablet by mouth nightly 6/28/21   Ina Owens DO   Cranberry 500 MG TABS Take by mouth    Historical Provider, MD   Biotin w/ Vitamins C & E (HAIR/SKIN/NAILS PO) Take by mouth    Historical Provider, MD   metoprolol succinate (TOPROL XL) 50 MG extended release tablet Take 1 tablet by mouth daily 6/8/21   Ina Owens DO   docusate sodium (COLACE) 100 MG capsule Take 1 capsule by mouth 2 times daily as needed for Constipation 6/8/21   Ina Owens DO   calcium-vitamin D (Linda Sayre) 500-200 MG-UNIT per tablet Take 2 tablets by mouth daily    Historical Provider, MD   levothyroxine (SYNTHROID) 100 MCG tablet Take 1 tablet by mouth Daily 5/12/21   Ina Owens DO   DULoxetine (CYMBALTA) 60 MG extended release capsule TAKE 1 CAPSULE DAILY 4/12/21 Kerri Blackwell, APRN - CNP   Misc. Devices [facility]) MISC 1 each by Does not apply route daily 2/11/20   Ministerio Castaneda DO   guaiFENesin 1200 MG TB12 Take 1,200 mg by mouth 2 times daily 12/21/19   Kathlean Landau, PA-C   Misc.  Devices MISC Mechanical lift for a Venora Shira 5/31/18   Mandel Tonya, DO   Scooter [address] [facility] by Does not apply route Power scooter 10/31/17   Ministerio Castaneda DO   fluticasone (FLONASE) 50 MCG/ACT nasal spray 1 spray by Nasal route as needed for Rhinitis     Historical Provider, MD   Omega 3-6-9 Fatty Acids (Geovanni Saber 3-6-9 COMPLEX) CAPS Take 1 capsule by mouth daily     Historical Provider, MD   therapeutic multivitamin-minerals [facility]) tablet Take 1 tablet by mouth nightly     Historical Provider, MD       Future Appointments   Date Time Provider Huber Little   11/12/2021 11:40 AM DO GUILLERMINA Welch SRPX Pain [facility] [place]   2/24/2022  1:20 PM Ministerio Castaneda DO [place] PCT [facility] [place]

## 2021-11-11 NOTE — CARE COORDINATION
Elen informed and verbalized understanding. She is willing to set up PCP appt. I will have Jessica Yougner assist with setting up first available. I also gave Sarah Craft the information for Randy youngblood to assist with transportation to her appts.

## 2021-11-12 ENCOUNTER — OFFICE VISIT (OUTPATIENT)
Dept: PHYSICAL MEDICINE AND REHAB | Age: 81
End: 2021-11-12
Payer: MEDICARE

## 2021-11-12 VITALS
DIASTOLIC BLOOD PRESSURE: 84 MMHG | OXYGEN SATURATION: 96 % | BODY MASS INDEX: 24.8 KG/M2 | SYSTOLIC BLOOD PRESSURE: 136 MMHG | WEIGHT: 158 LBS | HEART RATE: 64 BPM | HEIGHT: 67 IN

## 2021-11-12 DIAGNOSIS — M54.59 LUMBAR FACET JOINT PAIN: Primary | ICD-10-CM

## 2021-11-12 DIAGNOSIS — M47.896 OTHER SPONDYLOSIS, LUMBAR REGION: ICD-10-CM

## 2021-11-12 DIAGNOSIS — G89.4 CHRONIC PAIN SYNDROME: ICD-10-CM

## 2021-11-12 DIAGNOSIS — M79.18 MYOFASCIAL PAIN: ICD-10-CM

## 2021-11-12 PROCEDURE — 99213 OFFICE O/P EST LOW 20 MIN: CPT | Performed by: ANESTHESIOLOGY

## 2021-11-12 NOTE — PROGRESS NOTES
Chronic Pain/PM&R Clinic Note     Encounter Date: 11/12/21    Subjective:   Chief Complaint:   Chief Complaint   Patient presents with    Follow-up    Back Pain       History of Present Illness:   Ziyad Gunter is a 78 y.o. female seen in the clinic initially on 06/23/21 upon request from Juvenal Aparicio DO (PCP) for her history of chronic low back pain. She has a medical history of scoliosis, neuropathy, fibromyalgia, and previous lumbar discectomy (over 50 years ago). She states that she has low back pain that has been progressively worse over the last 20 years. She reports the majority of her low back pain to be at her waistline with radiation across to her waist on both sides. She describes this pain is a constant aching, stabbing 5/10 pain that can get up to a 7-8/10 when severe. This pain is worse with any activities where she is standing upright such as cooking or when she is walking for any duration of time. Her pain is improved with rest and sitting in her motorized wheelchair. She denies any pain radiating down her legs but does endorse numbness in both feet from her neuropathy. She denies any saddle anesthesia or bowel/bladder incontinence. Of note, she has multiple joint replacements. She has bilateral total hip arthroplasties and a left knee total hip arthroplasty. In addition, she has a previous left femur fracture that has been fixated with hardware and a left total shoulder arthroplasty. She states that she had an EMG/NCS performed by Dr. Pelon Santana which revealed idiopathic neuropathy. She states that she had a previous RFA in her low back done by Dr. Sheela Jernigan which did give her longstanding relief but this was done 8-10 years ago. Today, 11/12/2021, patient presents for planned follow-up for chronic low back pain. She has completed the right-sided thermal radiofrequency ablation for her low back.  She has reported significant relief and almost 100% pain relief on the right side of her low back and flank region since this procedure was done on 10/19/2021. She has reported the left side to be more \"pronounced\". She states that she would like to proceed with a left-sided ablation as we discussed in clinic. She denies any new symptoms of worsening radiating leg pain, new focal leg weakness, leg paresthesias, saddle anesthesia, bowel/bladder incontinence. History of Interventions:   Surgery: Previous discectomy in 30s  Injections: Previous lumabr RFA ~8-10 years ago by Dr. José Luis Mccann  Diagnostic lumbar MBBs  Confirmatory lumbar MBBs  R-sided lumbar RFA (10/19/2021)-100% relief    Current Treatment Medications:   Aleve as needed  Heating pad PRN  Mishel 150 mg BID  Cymbalta 60 mg daily    Historical Treatment Medications:   Tramadol-ineffective  Norco-stopped (constipation)    Imaging:  XR L-spine (5/18/21)    PROCEDURE: XR LUMBAR SPINE (2-3 VIEWS)       CLINICAL INFORMATION: Spinal stenosis of lumbar region without neurogenic claudication       COMPARISON: August 2, 2019       TECHNIQUE: AP and lateral views performed.           FINDINGS:    ALIGNMENT: Levoscoliosis lumbar spine centered at the thoracolumbar junction. VERTEBRAL BODIES: Slight anterior wedging L1   DISKS: Multilevel degenerative disc disease with associated endplate degenerative changes, these are most advanced in the T12-L2 vertebral bodies. SOFT TISSUES: Unremarkable. OBLIQUE PROJECTIONS: Not performed. FLEXION/EXTENSION LATERAL: Not performed.       OTHER: None.               Impression   1. Levoscoliosis with compensatory osteoarthritic changes, predominantly at the thoracolumbar junction.          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 Jessica Rutherford GERD (gastroesophageal reflux disease) Diverticulitis     Hiatal hernia     Hypertension     Hypothyroidism     Osteoarthritis        Past Surgical History:   Procedure Laterality Date    APPENDECTOMY  1958    BACK SURGERY      CARPAL TUNNEL RELEASE Bilateral 2013    w/cubital tunnel    COLON SURGERY  07/29/2016    Procedure: ATTEMPTED DAVINCI XI ROBOTIC ASSISTED SIGMOID COLON RESECTION, ROBOTIC ASSISTED MOBILIZATION OF RECTAL SIGMOID TO SPLENIC FLEXURE, CONVERTED TO OPEN LEFT HEMICOLECTOMY WITH COLOPROCTOCTOMY, SPLENORRHAPHY, RIGID SIGMOIDOSCOPY, LASER EVALUATION OF VASCULARITY WITH ICG; Surgeon: Almita Gsatelum MD; Location: [facility]; Service: General    COLONOSCOPY  2012    CYST REMOVAL  2010   [address], [address]    Cataract    FEMUR FRACTURE SURGERY Left 12/21/2020    LEFT FEMUR OPEN REDUCTION INTERNAL FIXATION performed by Jamar Hogan MD at [address] Left 2001    HIATAL HERNIA REPAIR N/A 03/09/2021    ROBOTIC EXTENSIVE LYSIS OF ADHESIONS, ROBOTIC REDUCTION OF HIATAL HERNIA WITH GASTROPEXY performed by Dakota Moran MD at [address]    w/bladder suspension   [address]  2002, 2008, 2009    rt hip(2002), lt knee(2009), lt hip(2008) Shoulder (2009)    OTHER SURGICAL HISTORY  04/21/2021    Dakota Has Helena Leyden CNP in the office   [address] [facility]    PAIN MANAGEMENT PROCEDURE Bilateral 7/13/2021    medial branch blocks at bilateral L4/L5 and L5/S1 performed by Rusty Bain DO at [address] Bilateral 8/24/2021    confirmatory  medial branch blocks at bilateral L4/L5 and L5/S1 performed by Rusty Bain DO at [address] Right 10/19/2021    thermal radiofrequency at BILATERAL medial branches L4/L5 and L5/S1 was chosen.   We will start with the right side first performed by Rusty Bain DO at [address]  [address] IND Left 09/15/2017 COLONOSCOPY performed by Aguila Silva MD at [facility]    discectomy   [address] EXTRACTION  1964    UPPER GASTROINTESTINAL ENDOSCOPY  2012       Family History   Problem Relation Age of Onset    Arthritis Mother     Hearing Loss Mother     High Cholesterol Mother     Hearing Loss Father     Heart Disease Father 76        CABG    Stroke Father     Arthritis Sister     Depression Sister     Diabetes Sister     Arthritis Brother     Depression Brother     Cancer Maternal Aunt     Early Death Maternal Aunt     Diabetes Maternal Grandmother     Heart Disease Paternal Grandmother        Social History     Socioeconomic History    Marital status:      Spouse name: Zak Boykin Number of children: Not on file    Years of education: 15    Highest education level: Associate degree: academic program   Occupational History    Not on file   Tobacco Use    Smoking status: Former Smoker     Packs/day: 2.00     Years: 11.00     Pack years: 22.00     Types: Cigarettes     Start date: 1957     Quit date: 1967     Years since quittin.0    Smokeless tobacco: Never Used   Vaping Use    Vaping Use: Never used   Substance and Sexual Activity    Alcohol use: Yes     Comment: wine with dinner    Drug use: No    Sexual activity: Not on file   Other Topics Concern    Not on file   Social History Narrative    Referral to AAA placed    Referral placed to [facility]    No barriers with medication affordability    No barriers with transportation    Discussed support from insurance company     Social Determinants of Health     Financial Resource Strain: Low Risk     Difficulty of Paying Living Expenses: Not hard at all   Food Insecurity: No Food Insecurity    Worried About [address] in the Last Year: Never true    [facility] in the Last Year: Never true   Transportation Needs: No Transportation Needs    Lack of Transportation (Medical): No    Lack of Transportation (Non-Medical): No   Physical Activity: Inactive    Days of Exercise per Week: 0 days    Minutes of Exercise per Session: 0 min   Stress: Stress Concern Present    Feeling of Stress : To some extent   Social Connections: Socially Integrated    Frequency of Communication with Friends and Family: More than three times a week    Frequency of Social Gatherings with Friends and Family: More than three times a week    Attends Gnosticist Services: More than 4 times per year    Active Member of [address] [place] [address] ClaimReturn or Organizations: Yes    Attends Club or Organization Meetings: More than 4 times per year    Marital Status:    Intimate Partner Violence:     Fear of Current or Ex-Partner: Not on file    Emotionally Abused: Not on file    Physically Abused: Not on file    Sexually Abused: Not on file   Housing Stability:     Unable to Pay for Housing in the Last Year: Not on file    Number of Places Lived in the Last Year: Not on file    Unstable Housing in the Last Year: Not on file       Medications & Allergies:   Current Outpatient Medications   Medication Instructions    Biotin w/ Vitamins C & E (HAIR/SKIN/NAILS PO) Oral    calcium-vitamin D (OSCAL-500) 500-200 MG-UNIT per tablet 2 tablets, Oral, DAILY    Cranberry 500 MG TABS Oral    docusate sodium (COLACE) 100 mg, Oral, 2 TIMES DAILY PRN    DULoxetine (CYMBALTA) 60 MG extended release capsule TAKE 1 CAPSULE DAILY    enalapril (VASOTEC) 10 mg, Oral, NIGHTLY    fluticasone (FLONASE) 50 MCG/ACT nasal spray 1 spray, Nasal, PRN    guaiFENesin 1,200 mg, Oral, 2 TIMES DAILY    levothyroxine (SYNTHROID) 100 mcg, Oral, DAILY    metoprolol succinate (TOPROL XL) 50 mg, Oral, DAILY    Misc. Devices (WALKER) MISC 1 each, Does not apply, DAILY    Misc.  Devices MISC Mechanical lift for a [employer] 3-6-9 Fatty Acids (OMEGA 3-6-9 COMPLEX) CAPS 1 capsule, Oral, DAILY    pregabalin (LYRICA) 150 mg, Oral, 2 TIMES DAILY    pregabalin (LYRICA) 150 mg, Oral, 2 TIMES DAILY    Scooter MISC Does not apply, Power scooter    therapeutic multivitamin-minerals (THERAGRAN-M) tablet 1 tablet, Oral, NIGHTLY       Allergies   Allergen Reactions    Ampicillin     Morphine Other (See Comments)     Hallucinations     Pcn [Penicillins] Itching    Sulfa Antibiotics        Review of Systems:   Constitutional: negative for weight changes or fevers  Genitourinary: negative for bowel/bladder incontinence   Musculoskeletal: positive for low back pain  Neurological: negative for any leg weakness or numbness/tingling  Behavioral/Psych: negative for anxiety/depression   All other systems reviewed and are negative    Objective:     Vitals:    11/12/21 1109   BP: 136/84   Pulse: 64   SpO2: 96%       Constitutional: Pleasant, no acute distress   Head: Normocephalic, atraumatic   Eyes: Conjunctivae normal   Neck: Supple, symmetrical   Lungs: Normal respiratory effort, non-labored breathing   Cardiovascular: Limbs warm and well perfused   Abdomen: Non-protruded   Musculoskeletal: Muscle bulk symmetric, no atrophy, no gross deformities   - Left shoulder: Tenderness palpation along left trapezius muscle  · Lumbar Spine: ROM severely reduced in extension. Scoliosis appreciated in thoracolumbar junction. Lumbar paraspinals tender bilaterally. SLR neg bilaterally. Positive facet loading bilaterally. Bilateral SI joints tender to palpation. Neurological: Cranial nerves II-XII grossly intact. · Gait - Severely antalgic gait. Ambulates with assistive device. · Motor: No focal motor deficits appreciated in lower limbs  Skin: No rashes or lesions visible in low back  Psychological: Cooperative, no exaggerated pain behaviors       Assessment:    Diagnosis Orders   1. Lumbar facet joint pain     2.  Other spondylosis, lumbar region  CHG FLUOR NEEDLE/CATH SPINE/PARASPINAL DX/THER ADDON    AK RADIOFREQUENCY NEUROTOMY LUMBAR OR SACRAL, W IMAGE GUIDANCE, SINGLE    NE RADIOFREQ NEUROTOMY LUMBAR OR SACRAL, W IMAGE GUIDE,EA ADDL LEVEL   3. Chronic pain syndrome     4. Myofascial pain           Pricilla Gunter is a 78 y. o.female presenting to the pain clinic for evaluation of chronic low back pain. Her clinical history and physical examination are consistent with lumbar facet medial pain with associated myofascial pain. I had set the patient up for diagnostic lumbar medial branch blocks targeting the facet joints of L4/L5 and L5/S1. She responded significantly to her diagnostic and confirmatory lumbar medial branch blocks. I set her up for thermal radiofrequency ablation targeting bilateral L4/L5 and L5/S1 facet joints. We will start with the right side first and proceed with the left side later date. Patient has too many issues with constipation on her Norco and has discontinued his medication. Plan: The following treatment recommendations and plan were discussed in detail with Hayes Gunter and . Imaging:   I have reviewed patients imaging of XR L-spine. Analgesics:   Patient is taking Acetaminophen. Patient informed that the maximum amount of acetaminophen taken on a regular basis should only be 4000 mg per day. Patient is taking Aleve. Patient is advised to take as prescribed and not take on an empty stomach. Adjuvants: In light of the presence of a neuropathic component of pain, the patient should continue Lyrica and Cymbalta as prescribed. Interventions: In presence of lumbar axial back pain and with physical exam consistent for facetal pain and significant response to diagnostic and confirmatory lumbar medial branch blocks, thermal radiofrequency at BILATERAL medial branches L4/L5 and L5/S1 was chosen. Patient has completed her right-sided lumbar RFA and we have set her up for left-sided lumbar RFA. The risks and benefits were discussed in detail with the patient.  Patient wants to proceed with the injection. Anticoagulation/NPO Recommendations:   Patient will need to hold Aleve x 7 days prior to the procedure. Patient will need to be NPO x 8 hours prior to the procedure. We will start an IV prior to the procedure    Multidisciplinary Pain Management:   In the presence of complex, chronic, and multi-factorial pain, the importance of a multidisciplinary approach to pain management in the patients management regimen was emphasized and discussed in great detail.    PHYSICAL THERAPY: Patient is advised to continue gentle low back ROM/stretching exercises     Referrals:  None    Prescriptions Written This Visit:   None    Follow-up: For LEFT sided lumbar RFA      Mortimer Feil,   Interventional Pain Management/PM&R   [place]

## 2021-11-15 NOTE — TELEPHONE ENCOUNTER
Patient states she has had some pleasant things happy in her life and decided that she does not need any medication.

## 2021-11-17 ENCOUNTER — TELEPHONE (OUTPATIENT)
Dept: FAMILY MEDICINE CLINIC | Age: 81
End: 2021-11-17

## 2021-11-17 NOTE — TELEPHONE ENCOUNTER
----- Message from Shefali Cadena sent at 11/16/2021  4:48 PM EST -----  Subject: Message to Provider    QUESTIONS  Information for Provider? Nurse named Naa Carter from [facility]   called in to see if she can get an order form for patient home visits   follow up. You can call Naa Carter directed at the attached number. [phone]  ---------------------------------------------------------------------------  --------------  Vin Anglin INFO  What is the best way for the office to contact you? OK to leave message on   voicemail  Preferred Call Back Phone Number? [phone]  ---------------------------------------------------------------------------  --------------  SCRIPT ANSWERS  Relationship to Patient? Third Party  Representative Name?  Nurse Alecia Carter

## 2021-11-19 ENCOUNTER — CARE COORDINATION (OUTPATIENT)
Dept: CARE COORDINATION | Age: 81
End: 2021-11-19

## 2021-11-19 NOTE — CARE COORDINATION
Dr. Ben Yen, I have been working with Michelle Moore on Care Coordination program to through [address] [place] [address] to provide support and education to help manage chronic conditions. She  has met those goals and all education has been completed with no new barriers noted. I would like to graduate pt from Care Coordination at this time pending PCP approval.  Do you approve of this discharge? Please advise. Thank you.

## 2021-11-19 NOTE — CARE COORDINATION
Ambulatory Care Coordination Note  11/19/2021  CM Risk Score: 5  Charlson 10 Year Mortality Risk Score: 79%     ACC: Melina Ling RN    Summary Note: Spoke with Renetta Rose for continued follow up. She is stable for this review. - declined follow up appt with PCP to address low mood due to vision changes  -declined AAA referral for transportation, family is transporting to appts  -she has appt set up with pain management for ablation  To her back to help manage pain  -Has had local [address] [place] [address] work with her for vision impairment interventions  -reinforce use of PCP office, myself, walk in clinic to prevent exacerbation and reduce unnecessary utilization    Plan  -will discharge from Care Coordination at this time due to goals met and no new need pending PCP approval      Care Coordination Interventions    Program Enrollment: Rising Risk  Referral from Primary Care Provider: Yes  Suggested Interventions and [facility]: Declined  Fall Risk Prevention: Completed  Disease Association: Completed (Comment: [address] [place] [address] for macular degeneration)  Home Health Services: Completed  Meals on Wheels: Declined  Occupational Therapy: Completed  Palliative Care: Declined  Pharmacist: Completed  Physical Therapy: Completed  Registered Dietician: [facility] [employer]  Social Work: Not Started  Transportation Support: Declined  Zone Management Tools: Not Started         Goals Addressed                    This Visit's Progress      COMPLETED: Conditions and Symptoms (pt-stated)         I will schedule office visits, as directed by my provider. I will keep my appointment or reschedule if I have to cancel. I will notify my provider of any barriers to my plan of care. I will notify my provider of any symptoms that indicate a worsening of my condition.   Vision problems with macular degeneration  Barriers: overwhelmed by complexity of regimen  Plan for overcoming my barriers: family and ACM support  Confidence: 8/10  Anticipated Goal Completion Date: 8-14-21 Update 10-2-21 Update 11/28/21          COMPLETED: Reduce Falls  (pt-stated)         I will reduce my risk of falls by the following: Use walking aids like cane or walker  Hx of falls  Barriers: impairment:  physical: limitations in mobility  Plan for overcoming my barriers: family and ACM support  Confidence: 7/10  Anticipated Goal Completion Date: 8/14/21 Update 10/2/21 Update 11/28/21            Prior to Admission medications    Medication Sig Start Date End Date Taking? Authorizing Provider   pregabalin (LYRICA) 150 MG capsule Take 1 capsule by mouth 2 times daily for 5 days. 8/16/21 8/21/21  FLORIDA Burton CNP   pregabalin (LYRICA) 150 MG capsule Take 1 capsule by mouth 2 times daily for 180 days. 8/9/21 2/5/22  Justice Ventura DO   enalapril (VASOTEC) 10 MG tablet Take 1 tablet by mouth nightly 6/28/21   Justice Ventura DO   Cranberry 500 MG TABS Take by mouth    Historical Provider, MD   Biotin w/ Vitamins C & E (HAIR/SKIN/NAILS PO) Take by mouth    Historical Provider, MD   metoprolol succinate (TOPROL XL) 50 MG extended release tablet Take 1 tablet by mouth daily 6/8/21   Justice Ventura DO   docusate sodium (COLACE) 100 MG capsule Take 1 capsule by mouth 2 times daily as needed for Constipation 6/8/21   Justice Ventura DO   calcium-vitamin D (Renell Osler) 500-200 MG-UNIT per tablet Take 2 tablets by mouth daily    Historical Provider, MD   levothyroxine (SYNTHROID) 100 MCG tablet Take 1 tablet by mouth Daily 5/12/21   Justice Ventura DO   DULoxetine (CYMBALTA) 60 MG extended release capsule TAKE 1 CAPSULE DAILY 4/12/21   FLORIDA Burton CNP   Misc. Devices [facility]) MISC 1 each by Does not apply route daily 2/11/20   Justice Ventura DO   guaiFENesin 1200 MG TB12 Take 1,200 mg by mouth 2 times daily 12/21/19   Angela Perez PA-C   Misc.  Devices MISC Mechanical lift for a Pattison Husbands 5/31/18   DO Ross Tesfaye MISC by Does not apply route Power scooter 10/31/17   Raman Roque DO   fluticasone (FLONASE) 50 MCG/ACT nasal spray 1 spray by Nasal route as needed for Rhinitis     Historical Provider, MD   Omega 3-6-9 Fatty Acids (Rama Buck 3-6-9 COMPLEX) CAPS Take 1 capsule by mouth daily     Historical Provider, MD   therapeutic multivitamin-minerals [facility]) tablet Take 1 tablet by mouth nightly     Historical Provider, MD       Future Appointments   Date Time Provider Huber Little   12/30/2021  9:00 AM [address] [place] [address] DO GUILLERMINA Blandon SRPX Pain [facility] [place] [facility]   2/24/2022  1:20 PM DO ARCADIO Holden PCT [facility] [place] [facility]

## 2021-11-26 ENCOUNTER — CARE COORDINATION (OUTPATIENT)
Dept: CARE COORDINATION | Age: 81
End: 2021-11-26

## 2021-11-26 NOTE — CARE COORDINATION
Called pt to discuss completion of ACP. Pt shared that because of their Jehovah Witness following she would like her Durable Power of Attorned to take precedents over the Living will as she doesn't like the way the LW is worded as well as the DPA. Informed pt that I will check into that to see if I can make that clear to the hospital. Pt voiced understanding. Active listening provided. Advised pt to call with any needs or concerns. I have sent [address] [facility]  an email regarding this.

## 2021-12-01 ENCOUNTER — PREP FOR PROCEDURE (OUTPATIENT)
Dept: PHYSICAL MEDICINE AND REHAB | Age: 81
End: 2021-12-01

## 2021-12-02 ENCOUNTER — TELEPHONE (OUTPATIENT)
Dept: FAMILY MEDICINE CLINIC | Age: 81
End: 2021-12-02

## 2021-12-02 NOTE — TELEPHONE ENCOUNTER
----- Message from Mac Brambila sent at 12/2/2021 11:43 AM EST -----  Subject: Message to Provider    QUESTIONS  Information for Provider? Patient is looking for confirmation for   instructions on a thyroid test she was given at her last appt.  ---------------------------------------------------------------------------  --------------  [address]  What is the best way for the office to contact you? OK to leave message on   voicemail  Preferred Call Back Phone Number? [phone]  ---------------------------------------------------------------------------  --------------  SCRIPT ANSWERS  Relationship to Patient?  Self

## 2021-12-04 ENCOUNTER — HOSPITAL ENCOUNTER (OUTPATIENT)
Age: 81
Discharge: HOME | End: 2021-12-04
Payer: MEDICARE

## 2021-12-04 DIAGNOSIS — I10 ESSENTIAL HYPERTENSION: ICD-10-CM

## 2021-12-04 DIAGNOSIS — E03.9 ACQUIRED HYPOTHYROIDISM: ICD-10-CM

## 2021-12-04 LAB
ANION GAP SERPL CALCULATED.3IONS-SCNC: 7 MEQ/L (ref 8–16)
BUN BLDV-MCNC: 6 MG/DL (ref 7–22)
CALCIUM SERPL-MCNC: 9.4 MG/DL (ref 8.5–10.5)
CHLORIDE BLD-SCNC: 96 MEQ/L (ref 98–111)
CO2: 30 MEQ/L (ref 23–33)
CREAT SERPL-MCNC: 0.6 MG/DL (ref 0.4–1.2)
GFR SERPL CREATININE-BSD FRML MDRD: > 90 ML/MIN/1.73M2
GLUCOSE BLD-MCNC: 91 MG/DL (ref 70–108)
POTASSIUM SERPL-SCNC: 4.7 MEQ/L (ref 3.5–5.2)
SODIUM BLD-SCNC: 133 MEQ/L (ref 135–145)
T4 FREE: 1.38 NG/DL (ref 0.93–1.76)
TSH SERPL DL<=0.05 MIU/L-ACNC: 3.43 UIU/ML (ref 0.4–4.2)

## 2021-12-04 PROCEDURE — 80048 BASIC METABOLIC PNL TOTAL CA: CPT

## 2021-12-04 PROCEDURE — 36415 COLL VENOUS BLD VENIPUNCTURE: CPT

## 2021-12-04 PROCEDURE — 84443 ASSAY THYROID STIM HORMONE: CPT

## 2021-12-04 PROCEDURE — 84439 ASSAY OF FREE THYROXINE: CPT

## 2021-12-06 NOTE — H&P
Today, patient presents for planned thermal radiofrequency at LEFT medial branches L4/L5 and L5/S1. This note is reflective of the patient's previous visit for evaluation. We will proceed with today's planned procedure. Since patient's last visit for evaluation, there have been no interval changes in medical history. Patient has no new numbness, weakness, or focal neurological deficit since evaluation. Patient has no contraindications to injection (no anticoagulation or recent antibiotic intake for active infections), and has a  present or is able to drive themselves (as discussed and cleared by physician). Allergies to latex, contrast dye, and steroid medications have been confirmed with the patient prior to the procedure. NPO necessity has been assessed and accepted based on procedure complexity. The risks and benefits of the procedure have been explained including but are not limited to infection, bleeding, paralysis, immediate post procedure weakness, and dizziness; the patient acknowledges understanding and desires to proceed with the procedure. Patient has signed consent for same procedure as discussed in previous clinic encounter. All other questions and concerns were addressed at bedside. See procedure note for full details. Post procedure Instructions: The patient was advised not to drive during the day of the procedure and not to engage in any significant decision making (unless otherwise states by physician). The patient was also advised to be cautious with walking/activity for 24 hours following today's visit and asked not to engage in over-exertion (unless otherwise states by physician). After this time, it is ok to resume pre-procedure level of activity. Patient advised to apply ice to site of injection in situations of pain and discomfort. Patient advised to not submerge site of injection during bath or pool activities for approximately 24 hours post-procedure.  Patient attested to understanding post procedure directions / restrictions. All other questions and concerns addressed before patient discharge in ambulatory fashion. Chronic Pain/PM&R Clinic Note     Encounter Date: 11/12/21    Subjective:   Chief Complaint:   No chief complaint on file. History of Present Illness:   Mick Gunter is a 78 y.o. female seen in the clinic initially on 06/23/21 upon request from Juvenal Aparicio DO (PCP) for her history of chronic low back pain. She has a medical history of scoliosis, neuropathy, fibromyalgia, and previous lumbar discectomy (over 50 years ago). She states that she has low back pain that has been progressively worse over the last 20 years. She reports the majority of her low back pain to be at her waistline with radiation across to her waist on both sides. She describes this pain is a constant aching, stabbing 5/10 pain that can get up to a 7-8/10 when severe. This pain is worse with any activities where she is standing upright such as cooking or when she is walking for any duration of time. Her pain is improved with rest and sitting in her motorized wheelchair. She denies any pain radiating down her legs but does endorse numbness in both feet from her neuropathy. She denies any saddle anesthesia or bowel/bladder incontinence. Of note, she has multiple joint replacements. She has bilateral total hip arthroplasties and a left knee total hip arthroplasty. In addition, she has a previous left femur fracture that has been fixated with hardware and a left total shoulder arthroplasty. She states that she had an EMG/NCS performed by Dr. Eric Baker which revealed idiopathic neuropathy. She states that she had a previous RFA in her low back done by Dr. Reese Graves which did give her longstanding relief but this was done 8-10 years ago. Today, 11/12/2021, patient presents for planned follow-up for chronic low back pain.  She has completed the right-sided thermal radiofrequency ablation for her low back. She has reported significant relief and almost 100% pain relief on the right side of her low back and flank region since this procedure was done on 10/19/2021. She has reported the left side to be more \"pronounced\". She states that she would like to proceed with a left-sided ablation as we discussed in clinic. She denies any new symptoms of worsening radiating leg pain, new focal leg weakness, leg paresthesias, saddle anesthesia, bowel/bladder incontinence. History of Interventions:   Surgery: Previous discectomy in 30s  Injections: Previous lumabr RFA ~8-10 years ago by Dr. Tate Avila  Diagnostic lumbar MBBs  Confirmatory lumbar MBBs  R-sided lumbar RFA (10/19/2021)-100% relief    Current Treatment Medications:   Aleve as needed  Heating pad PRN  Mishel 150 mg BID  Cymbalta 60 mg daily    Historical Treatment Medications:   Tramadol-ineffective  Norco-stopped (constipation)    Imaging:  XR L-spine (5/18/21)    PROCEDURE: XR LUMBAR SPINE (2-3 VIEWS)       CLINICAL INFORMATION: Spinal stenosis of lumbar region without neurogenic claudication       COMPARISON: August 2, 2019       TECHNIQUE: AP and lateral views performed.           FINDINGS:    ALIGNMENT: Levoscoliosis lumbar spine centered at the thoracolumbar junction. VERTEBRAL BODIES: Slight anterior wedging L1   DISKS: Multilevel degenerative disc disease with associated endplate degenerative changes, these are most advanced in the T12-L2 vertebral bodies. SOFT TISSUES: Unremarkable. OBLIQUE PROJECTIONS: Not performed. FLEXION/EXTENSION LATERAL: Not performed.       OTHER: None.               Impression   1. Levoscoliosis with compensatory osteoarthritic changes, predominantly at the thoracolumbar junction.          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 Almita Gastelum GERD (gastroesophageal reflux disease)     Diverticulitis     Hiatal hernia     Hypertension     Hypothyroidism     Osteoarthritis        Past Surgical History:   Procedure Laterality Date    APPENDECTOMY  1958    BACK SURGERY      CARPAL TUNNEL RELEASE Bilateral 2013    w/cubital tunnel    COLON SURGERY  07/29/2016    Procedure: ATTEMPTED DAVINCI XI ROBOTIC ASSISTED SIGMOID COLON RESECTION, ROBOTIC ASSISTED MOBILIZATION OF RECTAL SIGMOID TO SPLENIC FLEXURE, CONVERTED TO OPEN LEFT HEMICOLECTOMY WITH COLOPROCTOCTOMY, SPLENORRHAPHY, RIGID SIGMOIDOSCOPY, LASER EVALUATION OF VASCULARITY WITH ICG; Surgeon: Almita Gastelum MD; Location: [facility]; Service: General    COLONOSCOPY  2012    CYST REMOVAL  2010   [address], 1999    Cataract    FEMUR FRACTURE SURGERY Left 12/21/2020    LEFT FEMUR OPEN REDUCTION INTERNAL FIXATION performed by Jamar Hogan MD at [place] [address]. Left 2001    HIATAL HERNIA REPAIR N/A 03/09/2021    ROBOTIC EXTENSIVE LYSIS OF ADHESIONS, ROBOTIC REDUCTION OF HIATAL HERNIA WITH GASTROPEXY performed by Dakota Moran MD at [address] [place] [address]    w/bladder suspension   [address]  2002, 2008, 2009    rt hip(2002), lt knee(2009), lt hip(2008) Shoulder (2009)    OTHER SURGICAL HISTORY  04/21/2021    Dakota Has Helena Leyden CNP in the office   [address]    PAIN MANAGEMENT PROCEDURE Bilateral 7/13/2021    medial branch blocks at bilateral L4/L5 and L5/S1 performed by Rusty Bain DO at [address] Bilateral 8/24/2021    confirmatory  medial branch blocks at bilateral L4/L5 and L5/S1 performed by Rusty Bain DO at [address] Right 10/19/2021    thermal radiofrequency at BILATERAL medial branches L4/L5 and L5/S1 was chosen.   We will start with the right side first performed by Rusty Bain DO at [facility] [address]CRN NOT  [address] IND Left 09/15/2017    COLONOSCOPY performed by Kike Gates MD at [facility]    [address] ENDOSCOPY  2012       Family History   Problem Relation Age of Onset    Arthritis Mother     Hearing Loss Mother     High Cholesterol Mother     Hearing Loss Father     Heart Disease Father 76        CABG    Stroke Father     Arthritis Sister     Depression Sister     Diabetes Sister     Arthritis Brother     Depression Brother     Cancer Maternal Aunt     Early Death Maternal Aunt     Diabetes Maternal Grandmother     Heart Disease Paternal Grandmother        Social History     Socioeconomic History    Marital status:      Spouse name: Tracey Palmer Number of children: Not on file    Years of education: 15    Highest education level: Associate degree: academic program   Occupational History    Not on file   Tobacco Use    Smoking status: Former Smoker     Packs/day: 2.00     Years: 11.00     Pack years: 22.00     Types: Cigarettes     Start date: 1957     Quit date: 1967     Years since quittin.4    Smokeless tobacco: Never Used   Vaping Use    Vaping Use: Never used   Substance and Sexual Activity    Alcohol use: Yes     Comment: wine with dinner    Drug use: No    Sexual activity: Not on file   Other Topics Concern    Not on file   Social History Narrative    Referral to AAA placed    Referral placed to [facility]    No barriers with medication affordability    No barriers with transportation    Discussed support from insurance company     Social Determinants of Health     Financial Resource Strain: Low Risk     Difficulty of Paying Living Expenses: Not hard at all   Food Insecurity: No Food Insecurity    Worried About Running Out of Food in the Last Year: Never true    [address] [facility] [address] in the Last Year: Never true   Transportation Needs: No Transportation Needs    Lack of Transportation (Medical): No    Lack of Transportation (Non-Medical): No   Physical Activity: Inactive    Days of Exercise per Week: 0 days    Minutes of Exercise per Session: 0 min   Stress: Stress Concern Present    Feeling of Stress : To some extent   Social Connections: Socially Integrated    Frequency of Communication with Friends and Family: More than three times a week    Frequency of Social Gatherings with Friends and Family: More than three times a week    Attends Jainism Services: More than 4 times per year    Active Member of [address] [place] [address] [place] Devices or Organizations: Yes    Attends Club or Organization Meetings: More than 4 times per year    Marital Status:    Intimate Partner Violence:     Fear of Current or Ex-Partner: Not on file    Emotionally Abused: Not on file    Physically Abused: Not on file    Sexually Abused: Not on file   Housing Stability:     Unable to Pay for Housing in the Last Year: Not on file    Number of Places Lived in the Last Year: Not on file    Unstable Housing in the Last Year: Not on file       Medications & Allergies:   Current Outpatient Medications   Medication Instructions    Biotin w/ Vitamins C & E (HAIR/SKIN/NAILS PO) Oral    calcium-vitamin D (OSCAL-500) 500-200 MG-UNIT per tablet 2 tablets, Oral, DAILY    Cranberry 500 MG TABS Oral    docusate sodium (COLACE) 100 mg, Oral, 2 TIMES DAILY PRN    DULoxetine (CYMBALTA) 60 MG extended release capsule TAKE 1 CAPSULE DAILY    enalapril (VASOTEC) 10 mg, Oral, NIGHTLY    fluticasone (FLONASE) 50 MCG/ACT nasal spray 1 spray, Nasal, PRN    guaiFENesin 1,200 mg, Oral, 2 TIMES DAILY    levothyroxine (SYNTHROID) 100 mcg, Oral, DAILY    metoprolol succinate (TOPROL XL) 50 mg, Oral, DAILY    Misc. Devices (WALKER) MISC 1 each, Does not apply, DAILY    Misc.  Devices MISC Mechanical lift for a [employer] 3-6-9 Fatty Acids (OMEGA 3-6-9 COMPLEX) CAPS 1 capsule, Oral, DAILY    pregabalin (LYRICA) 150 mg, Oral, 2 TIMES DAILY    pregabalin (LYRICA) 150 mg, Oral, 2 TIMES DAILY    Scooter MISC Does not apply, Power scooter    therapeutic multivitamin-minerals (THERAGRAN-M) tablet 1 tablet, Oral, NIGHTLY       Allergies   Allergen Reactions    Ampicillin     Morphine Other (See Comments)     Hallucinations     Pcn [Penicillins] Itching    Sulfa Antibiotics        Review of Systems:   Constitutional: negative for weight changes or fevers  Genitourinary: negative for bowel/bladder incontinence   Musculoskeletal: positive for low back pain  Neurological: negative for any leg weakness or numbness/tingling  Behavioral/Psych: negative for anxiety/depression   All other systems reviewed and are negative    Objective: There were no vitals filed for this visit. Constitutional: Pleasant, no acute distress   Head: Normocephalic, atraumatic   Eyes: Conjunctivae normal   Neck: Supple, symmetrical   Lungs: Normal respiratory effort, non-labored breathing   Cardiovascular: Limbs warm and well perfused   Abdomen: Non-protruded   Musculoskeletal: Muscle bulk symmetric, no atrophy, no gross deformities   - Left shoulder: Tenderness palpation along left trapezius muscle  · Lumbar Spine: ROM severely reduced in extension. Scoliosis appreciated in thoracolumbar junction. Lumbar paraspinals tender bilaterally. SLR neg bilaterally. Positive facet loading bilaterally. Bilateral SI joints tender to palpation. Neurological: Cranial nerves II-XII grossly intact. · Gait - Severely antalgic gait. Ambulates with assistive device. · Motor: No focal motor deficits appreciated in lower limbs  Skin: No rashes or lesions visible in low back  Psychological: Cooperative, no exaggerated pain behaviors       Assessment:    Diagnosis Orders   1. Lumbar facet joint pain     2.  Other spondylosis, lumbar region  CHG FLUOR NEEDLE/CATH SPINE/PARASPINAL DX/THER ADDON    ME RADIOFREQUENCY NEUROTOMY LUMBAR OR SACRAL, W IMAGE GUIDANCE, SINGLE    ME RADIOFREQ NEUROTOMY LUMBAR OR SACRAL, W IMAGE GUIDE,EA ADDL LEVEL   3. Chronic pain syndrome     4. Myofascial pain           Nurys Gunter is a 78 y. o.female presenting to the pain clinic for evaluation of chronic low back pain. Her clinical history and physical examination are consistent with lumbar facet medial pain with associated myofascial pain. I had set the patient up for diagnostic lumbar medial branch blocks targeting the facet joints of L4/L5 and L5/S1. She responded significantly to her diagnostic and confirmatory lumbar medial branch blocks. I set her up for thermal radiofrequency ablation targeting bilateral L4/L5 and L5/S1 facet joints. We will start with the right side first and proceed with the left side later date. Patient has too many issues with constipation on her Norco and has discontinued his medication. Plan: The following treatment recommendations and plan were discussed in detail with Navneet Gunter and . Imaging:   I have reviewed patients imaging of XR L-spine. Analgesics:   Patient is taking Acetaminophen. Patient informed that the maximum amount of acetaminophen taken on a regular basis should only be 4000 mg per day. Patient is taking Aleve. Patient is advised to take as prescribed and not take on an empty stomach. Adjuvants: In light of the presence of a neuropathic component of pain, the patient should continue Lyrica and Cymbalta as prescribed. Interventions: In presence of lumbar axial back pain and with physical exam consistent for facetal pain and significant response to diagnostic and confirmatory lumbar medial branch blocks, thermal radiofrequency at BILATERAL medial branches L4/L5 and L5/S1 was chosen.  Patient has completed her right-sided lumbar RFA and we have set her up for left-sided

## 2021-12-07 ENCOUNTER — HOSPITAL ENCOUNTER (OUTPATIENT)
Age: 81
Setting detail: OUTPATIENT SURGERY
Discharge: HOME | End: 2021-12-07
Attending: ANESTHESIOLOGY | Admitting: ANESTHESIOLOGY
Payer: MEDICARE

## 2021-12-07 ENCOUNTER — TELEPHONE (OUTPATIENT)
Dept: FAMILY MEDICINE CLINIC | Age: 81
End: 2021-12-07

## 2021-12-07 ENCOUNTER — APPOINTMENT (OUTPATIENT)
Dept: GENERAL RADIOLOGY | Age: 81
End: 2021-12-07
Attending: ANESTHESIOLOGY
Payer: MEDICARE

## 2021-12-07 VITALS
HEIGHT: 67 IN | DIASTOLIC BLOOD PRESSURE: 72 MMHG | RESPIRATION RATE: 12 BRPM | TEMPERATURE: 97.2 F | OXYGEN SATURATION: 92 % | WEIGHT: 159 LBS | HEART RATE: 67 BPM | SYSTOLIC BLOOD PRESSURE: 159 MMHG | BODY MASS INDEX: 24.96 KG/M2

## 2021-12-07 PROCEDURE — 99152 MOD SED SAME PHYS/QHP 5/>YRS: CPT | Performed by: ANESTHESIOLOGY

## 2021-12-07 PROCEDURE — 7100000010 HC PHASE II RECOVERY - FIRST 15 MIN: Performed by: ANESTHESIOLOGY

## 2021-12-07 PROCEDURE — 64636 DESTROY L/S FACET JNT ADDL: CPT | Performed by: ANESTHESIOLOGY

## 2021-12-07 PROCEDURE — 3600000056 HC PAIN LEVEL 4 BASE: Performed by: ANESTHESIOLOGY

## 2021-12-07 PROCEDURE — 64635 DESTROY LUMB/SAC FACET JNT: CPT | Performed by: ANESTHESIOLOGY

## 2021-12-07 PROCEDURE — 2709999900 HC NON-CHARGEABLE SUPPLY: Performed by: ANESTHESIOLOGY

## 2021-12-07 PROCEDURE — 3600000057 HC PAIN LEVEL 4 ADDL 15 MIN: Performed by: ANESTHESIOLOGY

## 2021-12-07 PROCEDURE — 7100000011 HC PHASE II RECOVERY - ADDTL 15 MIN: Performed by: ANESTHESIOLOGY

## 2021-12-07 PROCEDURE — 3209999900 FLUORO FOR SURGICAL PROCEDURES

## 2021-12-07 PROCEDURE — 6360000002 HC RX W HCPCS: Performed by: ANESTHESIOLOGY

## 2021-12-07 PROCEDURE — 2500000003 HC RX 250 WO HCPCS: Performed by: ANESTHESIOLOGY

## 2021-12-07 RX ORDER — MIDAZOLAM HYDROCHLORIDE 1 MG/ML
INJECTION INTRAMUSCULAR; INTRAVENOUS PRN
Status: DISCONTINUED | OUTPATIENT
Start: 2021-12-07 | End: 2021-12-07 | Stop reason: ALTCHOICE

## 2021-12-07 RX ORDER — LIDOCAINE HYDROCHLORIDE 20 MG/ML
INJECTION, SOLUTION EPIDURAL; INFILTRATION; INTRACAUDAL; PERINEURAL PRN
Status: DISCONTINUED | OUTPATIENT
Start: 2021-12-07 | End: 2021-12-07 | Stop reason: ALTCHOICE

## 2021-12-07 RX ORDER — LIDOCAINE HYDROCHLORIDE 10 MG/ML
INJECTION, SOLUTION EPIDURAL; INFILTRATION; INTRACAUDAL; PERINEURAL PRN
Status: DISCONTINUED | OUTPATIENT
Start: 2021-12-07 | End: 2021-12-07 | Stop reason: ALTCHOICE

## 2021-12-07 RX ORDER — FENTANYL CITRATE 50 UG/ML
INJECTION, SOLUTION INTRAMUSCULAR; INTRAVENOUS PRN
Status: DISCONTINUED | OUTPATIENT
Start: 2021-12-07 | End: 2021-12-07 | Stop reason: ALTCHOICE

## 2021-12-07 ASSESSMENT — PAIN SCALES - GENERAL: PAINLEVEL_OUTOF10: 1

## 2021-12-07 ASSESSMENT — PAIN DESCRIPTION - DESCRIPTORS: DESCRIPTORS: CONSTANT

## 2021-12-07 ASSESSMENT — PAIN DESCRIPTION - PAIN TYPE: TYPE: CHRONIC PAIN

## 2021-12-07 ASSESSMENT — PAIN DESCRIPTION - LOCATION: LOCATION: NECK

## 2021-12-07 ASSESSMENT — PAIN - FUNCTIONAL ASSESSMENT: PAIN_FUNCTIONAL_ASSESSMENT: 0-10

## 2021-12-07 NOTE — PRE SEDATION
[address] [address]  Pre-Sedation/Analgesia History & Physical    Pt Name: Ana Gunter  MRN: [id number]  YOB: 1940  Provider Performing Procedure: Earnest Gordillo DO   Primary Care Physician: Bettie Nix DO      MEDICAL HISTORY       has a past medical history of Abnormal EKG, Anemia, Back pain, Blood circulation, collateral, Diverticulitis, Fibromyalgia, GERD (gastroesophageal reflux disease), Hiatal hernia, Hypertension, Hypothyroidism, and Osteoarthritis. SURGICAL HISTORY   has a past surgical history that includes Appendectomy (1958); Colonoscopy (2012); eye surgery (1998, 1999); Hysterectomy (1979); Spine surgery (1968); Upper gastrointestinal endoscopy (2012); cyst removal (2010); Tonsillectomy (1943); Tooth Extraction (1964); Ovary removal (1999); Foot surgery (Left, 2001); Carpal tunnel release (Bilateral, 2013); Small intestine surgery; back surgery; Colon surgery (07/29/2016); joint replacement (2002, 2008, 2009); pr colon ca scrn not hi rsk ind (Left, 09/15/2017); Femur fracture surgery (Left, 12/21/2020); hiatal hernia repair (N/A, 03/09/2021); other surgical history (04/21/2021); Pain management procedure (Bilateral, 7/13/2021); Pain management procedure (Bilateral, 8/24/2021); and Pain management procedure (Right, 10/19/2021). ALLERGIES   Allergies as of 11/12/2021 - Fully Reviewed 11/12/2021   Allergen Reaction Noted    Ampicillin  05/17/2013    Morphine Other (See Comments) 09/14/2017    Pcn [penicillins] Itching 05/17/2013    Sulfa antibiotics  05/17/2013       MEDICATIONS   Prior to Admission medications    Medication Sig Start Date End Date Taking? Authorizing Provider   pregabalin (LYRICA) 150 MG capsule Take 1 capsule by mouth 2 times daily for 5 days. 8/16/21 12/7/21 Yes FLORIDA Medina - CNP   pregabalin (LYRICA) 150 MG capsule Take 1 capsule by mouth 2 times daily for 180 days.  8/9/21 2/5/22 Yes Ascencion Pierce DO   enalapril (VASOTEC) 10 MG tablet

## 2021-12-07 NOTE — PROGRESS NOTES
1287-  Patient arrived to phase II via cart. Spontaneous respiraitons even and unlabored. Placed on monitor--VSS. Report received from [facility].    1095-  Assessment completed. Patient is alert and oriented x4. IV capped off-- no complications. Patient states pain 1/10, but tolerable. Injection sites clean and dry. 5616-  Snack and drink given to patient. Family in car.   2346-  IV removed-- no complications. Bandage applied. 6718-  This RN helping patient dress. 3872-  Patient discharged in stable condition with all belongings via person motorized vehicle.

## 2021-12-07 NOTE — TELEPHONE ENCOUNTER
----- Message from Raman Roque DO sent at 12/7/2021  9:28 AM EST -----  Labs look good. I recommend continuing current meds and I can discuss these with her at her upcoming appt.

## 2021-12-07 NOTE — POST SEDATION
[address] [address]  Sedation/Analgesia Post Sedation Record    Pt Name: Otoniel Gunter  MRN: [id number]  YOB: 1940  Procedure Performed By: Zaira Gallegos DO  Primary Care Physician: Malissa Garza DO    POST-PROCEDURE    Physicians/Assistants: Zaira Gallegos DO  Procedure Performed: See Procedure Note   Sedation/Anesthesia: Versed and Fentanyl (See procedure note for amount and duration)  Estimated Blood Loss:     0  ml  Specimens Removed: None        Complications: None           Jared Flood DO  Electronically signed 12/7/2021 at 10:09 AM

## 2021-12-07 NOTE — PROCEDURES
Pre-operative Diagnosis: Lumbar facet pain     Post-operative Diagnosis: Lumbar facet pain     Procedure: LEFT lumbar thermal radiofrequency ablation targeting facet joints L4/L5 and L5/S1     Procedure Description:  After consent was obtained, the patient was placed in the prone position having pressure points appropriately padded. The lower back was prepped with chloraprep and draped in a sterile fashion.  0.5 cc of 1 % lidocaine was used for local anesthesia of the skin and superficial subcutaneous tissues.  Three 20-gauge 100mm SMK cannula with a 10-mm active tip were advanced under fluoroscopy in an AP view with caudad angulation on to junction of the LEFT superior articular process and the transverse process of the L4 and L5 vertebra and at the sacral ala. There were no paresthesias, heme or CSF obtained.  Needle placement was confirmed using AP and oblique views.      Sensory and motor stimulation at 50Hz and 2Hz and impedance measurements were carried out having reached threshold at:     LEFT  L4: 0.2V/3V/210 Ohms  L5: 0.2V/3V/200 Ohms  SA: 0.2V/3V/190 Ohms     Then, 1cc of 2 % Lidocaine was injected at the site. Temperature was then raised to 80 degrees centigrade for 90 seconds with a 15 second temperature ramp. No pain was reported during the lesioning. The needles were then withdrawn without complications.  The patient tolerated the procedure well and was transported to the recovery room where he was observed for 15 minutes to then be discharged in ambulatory fashion.     Procedural Complications: None  Estimated Blood Loss: 0 mL     IV sedation was used during the procedure:  - Moderate intravenous conscious sedation was supervised by Dr. Areli Guy  - The patient was independently monitored by a Registered Nurse assigned to the procedure room  - Monitoring included automated blood pressure, continuous EKG, and continuous pulse oximetry  - The detailed conscious record is permanently stored in the [facility] [place]  - The following is the conscious sedation record:  Start Time: 08:55  End Time : 09:10  Duration: 15 minutes   Medications Administered: 1 mg Versed, 100 mcg Fentanyl         Jared Dale DO  Interventional Pain Management/PM&R   [facility]

## 2021-12-07 NOTE — TELEPHONE ENCOUNTER
Spoke with patient that her labs look good and to continue on with her current medications. Patient stated understanding.

## 2021-12-30 ENCOUNTER — OFFICE VISIT (OUTPATIENT)
Dept: PHYSICAL MEDICINE AND REHAB | Age: 81
End: 2021-12-30
Payer: MEDICARE

## 2021-12-30 VITALS
HEIGHT: 67 IN | DIASTOLIC BLOOD PRESSURE: 82 MMHG | WEIGHT: 159 LBS | BODY MASS INDEX: 24.96 KG/M2 | SYSTOLIC BLOOD PRESSURE: 132 MMHG

## 2021-12-30 DIAGNOSIS — M79.18 MYOFASCIAL PAIN: ICD-10-CM

## 2021-12-30 DIAGNOSIS — M54.59 LUMBAR FACET JOINT PAIN: Primary | ICD-10-CM

## 2021-12-30 DIAGNOSIS — M47.896 OTHER SPONDYLOSIS, LUMBAR REGION: ICD-10-CM

## 2021-12-30 DIAGNOSIS — G89.4 CHRONIC PAIN SYNDROME: ICD-10-CM

## 2021-12-30 PROCEDURE — 99214 OFFICE O/P EST MOD 30 MIN: CPT | Performed by: ANESTHESIOLOGY

## 2021-12-30 RX ORDER — MELOXICAM 7.5 MG/1
7.5 TABLET ORAL EVERY MORNING
Qty: 90 TABLET | Refills: 0 | Status: SHIPPED | OUTPATIENT
Start: 2021-12-30 | End: 2022-04-18 | Stop reason: SDUPTHER

## 2021-12-30 NOTE — PROGRESS NOTES
Chronic Pain/PM&R Clinic Note     Encounter Date: 12/30/21    Subjective:   Chief Complaint:   Chief Complaint   Patient presents with    Follow-up     talk about arthritis issues        History of Present Illness:   Eveline Gunter is a 78 y.o. female seen in the clinic initially on 06/23/21 upon request from Cam Eagle DO (PCP) for her history of chronic low back pain. She has a medical history of scoliosis, neuropathy, fibromyalgia, and previous lumbar discectomy (over 50 years ago). She states that she has low back pain that has been progressively worse over the last 20 years. She reports the majority of her low back pain to be at her waistline with radiation across to her waist on both sides. She describes this pain is a constant aching, stabbing 5/10 pain that can get up to a 7-8/10 when severe. This pain is worse with any activities where she is standing upright such as cooking or when she is walking for any duration of time. Her pain is improved with rest and sitting in her motorized wheelchair. She denies any pain radiating down her legs but does endorse numbness in both feet from her neuropathy. She denies any saddle anesthesia or bowel/bladder incontinence. Of note, she has multiple joint replacements. She has bilateral total hip arthroplasties and a left knee total hip arthroplasty. In addition, she has a previous left femur fracture that has been fixated with hardware and a left total shoulder arthroplasty. She states that she had an EMG/NCS performed by Dr. Lashawn Brown which revealed idiopathic neuropathy. She states that she had a previous RFA in her low back done by Dr. Ashley Miguel which did give her longstanding relief but this was done 8-10 years ago. Today, 12/30/2021, patient presents for planned follow-up for chronic low back pain. She has completed both her right and left thermal radiofrequency ablations in her low back.   She reports significant relief particularly in the severe pain in her low back and her flanks region. She states that she is very happy overall with the results from the ablation therapies. She continues struggle with diffuse arthritic pain and feels like she can take the edge off slightly with relief but feels like she aches too much of this medication when she has flareups. She is wonder if there is any other arthritis medication that can help with her pain. She denies any new symptoms at this point. History of Interventions:   Surgery: Previous discectomy in 30s  Injections: Previous lumabr RFA ~8-10 years ago by Dr. Jama Brown  Diagnostic lumbar MBBs  Confirmatory lumbar MBBs  Lumbar RFA (10/19/2021 and 12/7/21)-significant relief    Current Treatment Medications:   Aleve as needed  Heating pad PRN  Mishel 150 mg BID  Cymbalta 60 mg daily    Historical Treatment Medications:   Tramadol-ineffective  Norco-stopped (constipation)    Imaging:  XR L-spine (5/18/21)    PROCEDURE: XR LUMBAR SPINE (2-3 VIEWS)       CLINICAL INFORMATION: Spinal stenosis of lumbar region without neurogenic claudication       COMPARISON: August 2, 2019       TECHNIQUE: AP and lateral views performed.           FINDINGS:    ALIGNMENT: Levoscoliosis lumbar spine centered at the thoracolumbar junction. VERTEBRAL BODIES: Slight anterior wedging L1   DISKS: Multilevel degenerative disc disease with associated endplate degenerative changes, these are most advanced in the T12-L2 vertebral bodies. SOFT TISSUES: Unremarkable. OBLIQUE PROJECTIONS: Not performed. FLEXION/EXTENSION LATERAL: Not performed.       OTHER: None.               Impression   1. Levoscoliosis with compensatory osteoarthritic changes, predominantly at the thoracolumbar junction.          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 - last stress test 2004    Anemia     mild - Hg 11.8 preop 1/2014    Back pain     pain clinic - s/p injections     Blood circulation, collateral     Alessio Salinas DO at [facility] PAIN MANAGEMENT PROCEDURE Left 2021    thermal radiofrequency at BILATERAL medial branches L4/L5 and L5/S1 was chosen.  [facility] [place] [facility] NOT  [address] IND Left 09/15/2017    COLONOSCOPY performed by Yazmin Villanueva MD at [facility]    discectomy   [facility]    UPPER GASTROINTESTINAL ENDOSCOPY         Family History   Problem Relation Age of Onset    Arthritis Mother     Hearing Loss Mother     High Cholesterol Mother     Hearing Loss Father     Heart Disease Father 76        CABG    Stroke Father     Arthritis Sister     Depression Sister     Diabetes Sister     Arthritis Brother     Depression Brother     Cancer Maternal Aunt     Early Death Maternal Aunt     Diabetes Maternal Grandmother     Heart Disease Paternal Grandmother        Social History     Socioeconomic History    Marital status:      Spouse name: Sherry Bell Number of children: Not on file    Years of education: 15    Highest education level: Associate degree: academic program   Occupational History    Not on file   Tobacco Use    Smoking status: Former Smoker     Packs/day: 2.00     Years: 11.00     Pack years: 22.00     Types: Cigarettes     Start date: 1957     Quit date: 1967     Years since quittin.2    Smokeless tobacco: Never Used   Vaping Use    Vaping Use: Never used   Substance and Sexual Activity    Alcohol use: Yes     Comment: wine with dinner    Drug use: No    Sexual activity: Not on file   Other Topics Concern    Not on file   Social History Narrative    Referral to AAA placed    Referral placed to [facility]    No barriers with medication affordability    No barriers with transportation    Discussed support from [employer]     Social Determinants of Health (SYNTHROID) 100 mcg, Oral, DAILY    meloxicam (MOBIC) 7.5 mg, Oral, EVERY MORNING    metoprolol succinate (TOPROL XL) 50 mg, Oral, DAILY    Misc. Devices (WALKER) MISC 1 each, Does not apply, DAILY    Misc. Devices MISC Mechanical lift for a [employer] 3-6-9 Fatty Acids (OMEGA 3-6-9 COMPLEX) CAPS 1 capsule, Oral, DAILY    pregabalin (LYRICA) 150 mg, Oral, 2 TIMES DAILY    pregabalin (LYRICA) 150 mg, Oral, 2 TIMES DAILY    Scooter MISC Does not apply, Power scooter    therapeutic multivitamin-minerals (THERAGRAN-M) tablet 1 tablet, Oral, NIGHTLY       Allergies   Allergen Reactions    Ampicillin     Morphine Other (See Comments)     Hallucinations     Pcn [Penicillins] Itching    Sulfa Antibiotics        Review of Systems:   Constitutional: negative for weight changes or fevers  Genitourinary: negative for bowel/bladder incontinence   Musculoskeletal: positive for diffuse arthritis pain  Neurological: negative for any leg weakness or numbness/tingling  Behavioral/Psych: negative for anxiety/depression   All other systems reviewed and are negative    Objective:     Vitals:    12/30/21 0849   BP: 132/82       Constitutional: Pleasant, no acute distress   Head: Normocephalic, atraumatic   Eyes: Conjunctivae normal   Neck: Supple, symmetrical   Lungs: Normal respiratory effort, non-labored breathing   Cardiovascular: Limbs warm and well perfused   Abdomen: Non-protruded   Musculoskeletal: Muscle bulk symmetric, no atrophy, no gross deformities   - Left shoulder: Tenderness palpation along left trapezius muscle  · Lumbar Spine: ROM severely reduced in extension. Scoliosis appreciated in thoracolumbar junction. Lumbar paraspinals tender bilaterally. SLR neg bilaterally. Positive facet loading bilaterally. Bilateral SI joints tender to palpation. Neurological: Cranial nerves II-XII grossly intact. · Gait - Severely antalgic gait. Ambulates with assistive device.    · Motor: No focal motor deficits appreciated in lower limbs  Skin: No rashes or lesions visible in low back  Psychological: Cooperative, no exaggerated pain behaviors       Assessment:    Diagnosis Orders   1. Lumbar facet joint pain     2. Other spondylosis, lumbar region     3. Chronic pain syndrome     4. Myofascial pain           Samy Gunter is a 78 y. o.female presenting to the pain clinic for evaluation of chronic low back pain. Her clinical history and physical examination are consistent with lumbar facet medial pain with associated myofascial pain. I had set the patient up for diagnostic lumbar medial branch blocks targeting the facet joints of L4/L5 and L5/S1. She responded significantly to her lumbar radiofrequency ablations. For her polyarthralgia pain I have discontinued her Aleve and start the patient on meloxicam 7.5 mg. We will follow-up in 4 weeks for reevaluation and potentially titrate this medication up. Plan: The following treatment recommendations and plan were discussed in detail with Leonor Gunter and . Imaging:   I have reviewed patients imaging of XR L-spine. Analgesics:   Patient is taking Acetaminophen. Patient informed that the maximum amount of acetaminophen taken on a regular basis should only be 4000 mg per day. We will start patient on meloxicam 7.5 mg daily. I advised patient take this medication with food in the morning. Patient should stop any other NSAID therapies including her Aleve. Adjuvants: In light of the presence of a neuropathic component of pain, the patient should continue Lyrica and Cymbalta as prescribed. Interventions:   None    Anticoagulation/NPO Recommendations:   None    Multidisciplinary Pain Management:   In the presence of complex, chronic, and multi-factorial pain, the importance of a multidisciplinary approach to pain management in the patients management regimen was emphasized and discussed in great detail.    PHYSICAL THERAPY: Patient is

## 2022-01-10 ENCOUNTER — TELEPHONE (OUTPATIENT)
Dept: FAMILY MEDICINE CLINIC | Age: 82
End: 2022-01-10

## 2022-01-10 NOTE — TELEPHONE ENCOUNTER
----- Message from Thao Puga MA sent at 1/10/2022 12:09 PM EST -----  Subject: Message to Provider    QUESTIONS  Information for Provider? Going through a but of depression now and wants   medicine to help. Talked with Dr. Codie Burgos about this in the past. Diagnosed   with macular degeneration 1 year ago. Feeling down. [facility]? [facility], [address] [place] [address]   [phone].   ---------------------------------------------------------------------------  --------------  CALL BACK INFO  What is the best way for the office to contact you? OK to leave message on   voicemail  Preferred Call Back Phone Number? [phone]  ---------------------------------------------------------------------------  --------------  SCRIPT ANSWERS  Relationship to Patient?  Self

## 2022-01-17 ENCOUNTER — TELEMEDICINE (OUTPATIENT)
Dept: FAMILY MEDICINE CLINIC | Age: 82
End: 2022-01-17
Payer: MEDICARE

## 2022-01-17 DIAGNOSIS — F33.1 MODERATE EPISODE OF RECURRENT MAJOR DEPRESSIVE DISORDER (HCC): Primary | ICD-10-CM

## 2022-01-17 PROBLEM — D70.9 NEUTROPENIA (HCC): Status: RESOLVED | Noted: 2018-05-04 | Resolved: 2022-01-17

## 2022-01-17 PROBLEM — F11.99 OPIOID USE, UNSPECIFIED WITH UNSPECIFIED OPIOID-INDUCED DISORDER (HCC): Status: RESOLVED | Noted: 2021-08-06 | Resolved: 2022-01-17

## 2022-01-17 PROBLEM — F11.29 OPIOID DEPENDENCE WITH UNSPECIFIED OPIOID-INDUCED DISORDER (HCC): Status: RESOLVED | Noted: 2021-08-06 | Resolved: 2022-01-17

## 2022-01-17 PROBLEM — F11.20 OPIOID DEPENDENCE, UNCOMPLICATED (HCC): Status: RESOLVED | Noted: 2021-08-06 | Resolved: 2022-01-17

## 2022-01-17 PROCEDURE — 99214 OFFICE O/P EST MOD 30 MIN: CPT | Performed by: FAMILY MEDICINE

## 2022-01-17 RX ORDER — FLUOXETINE 10 MG/1
10 CAPSULE ORAL DAILY
Qty: 30 CAPSULE | Refills: 3 | Status: SHIPPED | OUTPATIENT
Start: 2022-01-17 | End: 2022-02-07 | Stop reason: SDUPTHER

## 2022-01-17 NOTE — PROGRESS NOTES
2022    TELEHEALTH EVALUATION -- Audio/Visual (During JFDQV-06 public health emergency)    HPI:    Cole Gunter (:  1940) has requested an audio/video evaluation for   Chief Complaint   Patient presents with    Depression   :      Depressed Mood    HPI:  Notes worsening depression for the past 3+ months. Notes that she is having less motivation. On cymbalta, more for pain control    Depressed Mood? yes   Anhedonia? yes   Appetite changes?  no  Sleep disturbances? yes - has difficulty staying asleep  Feelings of guilt? no  Decreased energy? yes   Impaired concentration? yes   Substance abuse? no    Suicidal/Homicidal Ideation?  no      Compliant with meds: Yes  Med side effects: no   Sees therapist?:  no  Family History of Mental Illness?  no        Review of Systems      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 - last stress test     Anemia     mild - Hg 11.8 preop 2014    Back pain     pain clinic - s/p injections     Blood circulation, collateral     Diverticulitis     Dr. Casi Naik GERD (gastroesophageal reflux disease)     Diverticulitis     Hiatal hernia     Hypertension     Hypothyroidism     Osteoarthritis        Past Surgical History:   Procedure Laterality Date    APPENDECTOMY      BACK SURGERY      CARPAL TUNNEL RELEASE Bilateral     w/cubital tunnel    COLON SURGERY  2016    Procedure: ATTEMPTED DAVINCI XI ROBOTIC ASSISTED SIGMOID COLON RESECTION, ROBOTIC ASSISTED MOBILIZATION OF RECTAL SIGMOID TO SPLENIC FLEXURE, CONVERTED TO OPEN LEFT HEMICOLECTOMY WITH COLOPROCTOCTOMY, SPLENORRHAPHY, RIGID SIGMOIDOSCOPY, LASER EVALUATION OF VASCULARITY WITH ICG; Surgeon: Casi Naik MD; Location[facility]; Service: General    COLONOSCOPY      CYST REMOVAL     [address],     Cataract    FEMUR FRACTURE SURGERY Left 2020    LEFT FEMUR OPEN REDUCTION INTERNAL FIXATION performed by Dana Kelley MD at [address] Left 2001   [address] N/A 03/09/2021    ROBOTIC EXTENSIVE LYSIS OF ADHESIONS, ROBOTIC REDUCTION OF HIATAL HERNIA WITH GASTROPEXY performed by Emmitt Sandhoff, MD at [facility]    w/bladder suspension   [address]  2002, 2008, 2009    rt hip(2002), lt knee(2009), lt hip(2008) Shoulder (2009)    OTHER SURGICAL HISTORY  04/21/2021    Tess Waddell CNP in the office   [address] [facility]    PAIN MANAGEMENT PROCEDURE Bilateral 7/13/2021    medial branch blocks at bilateral L4/L5 and L5/S1 performed by Daisy Serrano DO at [address] Bilateral 8/24/2021    confirmatory  medial branch blocks at bilateral L4/L5 and L5/S1 performed by Daisy Serrano DO at [address] Right 10/19/2021    thermal radiofrequency at BILATERAL medial branches L4/L5 and L5/S1 was chosen. We will start with the right side first performed by Daisy Serrano DO at [address] Left 12/7/2021    thermal radiofrequency at BILATERAL medial branches L4/L5 and L5/S1 was chosen.  performed by Daisy Serrano DO at [address] [place] [address]CR[address] NOT  [address] IND Left 09/15/2017    COLONOSCOPY performed by Miguel Zavaleta MD at [facility]   [address]    discectomy   [address]    UPPER GASTROINTESTINAL ENDOSCOPY  2012       Social History     Socioeconomic History    Marital status:      Spouse name: Walt Watt Number of children: Not on file    Years of education: 15    Highest education level: Associate degree: academic program   Occupational History    Not on file   Tobacco Use    Smoking status: Former Smoker     Packs/day: 2.00     Years: 11.00     Pack years: 22.00     Types: Cigarettes Start date: 1957     Quit date: 1967     Years since quittin.1    Smokeless tobacco: Never Used   Vaping Use    Vaping Use: Never used   Substance and Sexual Activity    Alcohol use: Yes     Comment: wine with dinner    Drug use: No    Sexual activity: Not on file   Other Topics Concern    Not on file   Social History Narrative    Referral to AAA placed    Referral placed to [facility]    No barriers with medication affordability    No barriers with transportation    Discussed support from insurance company     Social Determinants of Health     Financial Resource Strain: Low Risk     Difficulty of Paying Living Expenses: Not hard at all   Food Insecurity: No Food Insecurity    Worried About [address] in the Last Year: Never true    [employer] in the Last Year: Never true   Transportation Needs: No Transportation Needs    Lack of Transportation (Medical): No    Lack of Transportation (Non-Medical): No   Physical Activity: Inactive    Days of Exercise per Week: 0 days    Minutes of Exercise per Session: 0 min   Stress: Stress Concern Present    Feeling of Stress : To some extent   Social Connections: Socially Integrated    Frequency of Communication with Friends and Family: More than three times a week    Frequency of Social Gatherings with Friends and Family: More than three times a week    Attends Rastafarian Services: More than 4 times per year    Active Member of [address] [place] [address] [employer] or Organizations:  Yes    Attends Club or Organization Meetings: More than 4 times per year    Marital Status:    Intimate Partner Violence:     Fear of Current or Ex-Partner: Not on file    Emotionally Abused: Not on file    Physically Abused: Not on file    Sexually Abused: Not on file   Housing Stability:     Unable to Pay for Housing in the Last Year: Not on file    Number of [place] in the Last Year: Not on file    Unstable Housing in the Last Year: Not on file is normal. No respiratory distress. Neurological:      Mental Status: She is alert. Psychiatric:         Mood and Affect: Mood is depressed. Behavior: Behavior normal.         Thought Content: Thought content normal.         Judgment: Judgment normal.           ASSESSMENT & PLAN  Tony Pavon was seen today for depression. Diagnoses and all orders for this visit:    Moderate episode of recurrent major depressive disorder (HCC)  -     FLUoxetine (PROZAC) 10 MG capsule; Take 1 capsule by mouth daily    -Pt with worsening depressive sx. Will stop cymbalta and start prozac. Recheck in 1 month.    -Discussed side effects and benefits of prozac. I advised her that if she develops any SI/HI or concerning symptoms after starting the medication she needs to stop the medication and seek treatment immediately      Return in about 1 month (around 2/17/2022). An  electronic signature was used to authenticate this note. --Octavia Priest DO on 1/17/2022 at 2:05 PM    {    Pursuant to the emergency declaration under the [facility], [address] waiver authority and the [employer] and [employer] Act, this Virtual  Visit was conducted, with patient's consent, to reduce the patient's risk of exposure to COVID-19 and provide continuity of care for an established patient. Services were provided through a video synchronous discussion virtually to substitute for in-person clinic visit.

## 2022-01-17 NOTE — TELEPHONE ENCOUNTER
Pt states her great granddaughter was dx with influenza B last tues.   Pt requesting RX for tamiflu  [facility]/[address] irregular

## 2022-02-07 DIAGNOSIS — M79.7 FIBROMYALGIA: ICD-10-CM

## 2022-02-07 DIAGNOSIS — F33.1 MODERATE EPISODE OF RECURRENT MAJOR DEPRESSIVE DISORDER (HCC): ICD-10-CM

## 2022-02-07 RX ORDER — PREGABALIN 150 MG/1
150 CAPSULE ORAL 2 TIMES DAILY
Qty: 180 CAPSULE | Refills: 1 | Status: SHIPPED | OUTPATIENT
Start: 2022-02-07 | End: 2022-02-09 | Stop reason: SDUPTHER

## 2022-02-07 RX ORDER — FLUOXETINE 10 MG/1
10 CAPSULE ORAL DAILY
Qty: 90 CAPSULE | Refills: 3 | Status: SHIPPED | OUTPATIENT
Start: 2022-02-07 | End: 2022-02-09 | Stop reason: SDUPTHER

## 2022-02-07 NOTE — TELEPHONE ENCOUNTER
Patient requesting the following to be sent to [facility]   Please approve or refuse Rx request:  Requested Prescriptions     Pending Prescriptions Disp Refills    pregabalin (LYRICA) 150 MG capsule 180 capsule 1     Sig: Take 1 capsule by mouth 2 times daily for 180 days.     FLUoxetine (PROZAC) 10 MG capsule 30 capsule 3     Sig: Take 1 capsule by mouth daily       Next appointment:  2/24/2022

## 2022-02-09 DIAGNOSIS — F33.1 MODERATE EPISODE OF RECURRENT MAJOR DEPRESSIVE DISORDER (HCC): ICD-10-CM

## 2022-02-09 DIAGNOSIS — M79.7 FIBROMYALGIA: ICD-10-CM

## 2022-02-09 RX ORDER — FLUOXETINE 10 MG/1
10 CAPSULE ORAL DAILY
Qty: 90 CAPSULE | Refills: 0 | Status: SHIPPED | OUTPATIENT
Start: 2022-02-09 | End: 2022-06-13 | Stop reason: ALTCHOICE

## 2022-02-09 RX ORDER — PREGABALIN 150 MG/1
150 CAPSULE ORAL 2 TIMES DAILY
Qty: 180 CAPSULE | Refills: 0 | Status: SHIPPED | OUTPATIENT
Start: 2022-02-09 | End: 2022-04-19 | Stop reason: SDUPTHER

## 2022-02-09 NOTE — TELEPHONE ENCOUNTER
----- Message from [place] sent at 2/9/2022 10:07 AM EST -----  Subject: Refill Request    QUESTIONS  Name of Medication? pregabalin (LYRICA) 150 MG capsule  Patient-reported dosage and instructions? 1 TABLET 2X DAILY  How many days do you have left? 30  Preferred Pharmacy? [facility] phone number (if available)? [phone]  Additional Information for Provider? Pt is going to pick this up at [facility], she isnt sure how many days supply the new rx is for. However she would [facility].   ---------------------------------------------------------------------------  --------------,  Name of Medication? FLUoxetine (PROZAC) 10 MG capsule  Patient-reported dosage and instructions? 1 tablet am  How many days do you have left? 30  Preferred Pharmacy? [facility] phone number (if available)? [phone]  Additional Information for Provider? Pt would like future refills of this   med[facility] if that is possible. She understands if   that is not possible for this type of med.  ---------------------------------------------------------------------------  --------------  CALL BACK INFO  What is the best way for the office to contact you? OK to leave message on   voicemail  Preferred Call Back Phone Number?  [phone]

## 2022-02-18 ENCOUNTER — TELEPHONE (OUTPATIENT)
Dept: FAMILY MEDICINE CLINIC | Age: 82
End: 2022-02-18

## 2022-02-18 NOTE — TELEPHONE ENCOUNTER
----- Message from Aurora Mckoy DO sent at 2/17/2022 10:19 AM EST -----  Regarding: FW: Schedule 1 month follow up  Please contact patient and schedule a follow up OV of VV for med follow up with me or Otilia Zavala    ----- Message -----  From: Aurora Mckoy DO  Sent: 2/16/2022  12:00 AM EST  To: Aurora Mckoy DO  Subject: Schedule 1 month follow up                       Schedule 1 month follow up

## 2022-03-15 ENCOUNTER — OFFICE VISIT (OUTPATIENT)
Dept: FAMILY MEDICINE CLINIC | Age: 82
End: 2022-03-15
Payer: MEDICARE

## 2022-03-15 VITALS
BODY MASS INDEX: 25.55 KG/M2 | SYSTOLIC BLOOD PRESSURE: 130 MMHG | OXYGEN SATURATION: 97 % | WEIGHT: 162.8 LBS | DIASTOLIC BLOOD PRESSURE: 80 MMHG | TEMPERATURE: 97.1 F | HEART RATE: 54 BPM | HEIGHT: 67 IN

## 2022-03-15 DIAGNOSIS — F33.1 MODERATE EPISODE OF RECURRENT MAJOR DEPRESSIVE DISORDER (HCC): Primary | ICD-10-CM

## 2022-03-15 DIAGNOSIS — K59.00 CONSTIPATION, UNSPECIFIED CONSTIPATION TYPE: ICD-10-CM

## 2022-03-15 DIAGNOSIS — E03.9 ACQUIRED HYPOTHYROIDISM: ICD-10-CM

## 2022-03-15 DIAGNOSIS — G89.4 PAIN SYNDROME, CHRONIC: ICD-10-CM

## 2022-03-15 DIAGNOSIS — M79.7 FIBROMYALGIA: ICD-10-CM

## 2022-03-15 PROCEDURE — 99214 OFFICE O/P EST MOD 30 MIN: CPT | Performed by: FAMILY MEDICINE

## 2022-03-15 RX ORDER — FLUOXETINE HYDROCHLORIDE 20 MG/1
20 CAPSULE ORAL DAILY
Qty: 30 CAPSULE | Refills: 3 | Status: SHIPPED | OUTPATIENT
Start: 2022-03-15 | End: 2022-04-04 | Stop reason: SDUPTHER

## 2022-03-15 RX ORDER — DOCUSATE SODIUM 100 MG/1
100 CAPSULE, LIQUID FILLED ORAL 2 TIMES DAILY PRN
Qty: 180 CAPSULE | Refills: 3 | Status: ON HOLD | OUTPATIENT
Start: 2022-03-15 | End: 2022-09-10 | Stop reason: HOSPADM

## 2022-03-15 NOTE — PROGRESS NOTES
Johann Gunter is a 78 y.o. female that presents for     Chief Complaint   Patient presents with    6 Month Follow-Up    Pain     Patient states that she has been experiencing esophagus pain. Feels like food gets stuck    Arthritis     In her hands and back.  Other     Daughter states that patient has been weak and almost fell.  Discuss Medications     Prozac       /80 (Site: Left Upper Arm, Position: Sitting, Cuff Size: Medium Adult)   Pulse 54   Temp 97.1 °F (36.2 °C) (Temporal)   Ht 5' 7\" (1.702 m)   Wt 162 lb 12.8 oz (73.8 kg)   SpO2 97%   BMI 25.50 kg/m²       HPI    Patient states that the Solomon Emani did not help much with the back pain. Following with Dr Mariam Pleitez. Next appt 4/15. She is using her WC more frequently. Notes some changes her mood. Relates some of this to losing her independence. Notes that she is feeling more irritable overall. Notes that she is having a globus sensation intermittently. Notes this only occurs with rice. Hypothyroidism    HPI:  Currently treated for Hypothyroidism? Yes - levothyroxine 100mcg/day  Fatigue? Yes - chronic issue  Recent change in weight? No  Cold/Heat intolerance? No  Diarrhea/Constipation? No  Diaphoresis? No  Anxiety? No  Palpitations? No   Hair Loss? No    HTN    Does patient check BP regularly at home? - No  Current Medication regimen - metoprolol, enalapril  Tolerating medications well? - yes    Shortness of breath or chest pain? No  Headache or visual complaints? No  Neurologic changes like confusion? No  Extremity edema?  No    BP Readings from Last 3 Encounters:   03/15/22 130/80   12/30/21 132/82   12/07/21 (!) 159/72               Health Maintenance   Topic Date Due    Depression Monitoring  04/27/2022    Annual Wellness Visit (AWV)  04/28/2022    TSH testing  12/04/2022    Potassium monitoring  12/04/2022    Creatinine monitoring  12/04/2022    DTaP/Tdap/Td vaccine (2 - Td or Tdap) 09/08/2031    DEXA (modify frequency per FRAX score)  Completed    Flu vaccine  Completed    Shingles Vaccine  Completed    Pneumococcal 65+ years Vaccine  Completed    COVID-19 Vaccine  Completed    Hepatitis A vaccine  Aged Out    Hepatitis B vaccine  Aged Out    Hib vaccine  Aged Out    Meningococcal (ACWY) vaccine  Aged Out       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 Adelfo Whiting GERD (gastroesophageal reflux disease)     Diverticulitis     Hiatal hernia     Hypertension     Hypothyroidism     Osteoarthritis        Past Surgical History:   Procedure Laterality Date    APPENDECTOMY  1958    BACK SURGERY      CARPAL TUNNEL RELEASE Bilateral 2013    w/cubital tunnel    COLON SURGERY  07/29/2016    Procedure: ATTEMPTED DAVINCI XI ROBOTIC ASSISTED SIGMOID COLON RESECTION, ROBOTIC ASSISTED MOBILIZATION OF RECTAL SIGMOID TO SPLENIC FLEXURE, CONVERTED TO OPEN LEFT HEMICOLECTOMY WITH COLOPROCTOCTOMY, SPLENORRHAPHY, RIGID SIGMOIDOSCOPY, LASER EVALUATION OF VASCULARITY WITH ICG; Surgeon: Adelfo Whiting MD; Location: [facility]; Service: General    COLONOSCOPY  2012    CYST REMOVAL  2010   [address], [address]    Cataract    FEMUR FRACTURE SURGERY Left 12/21/2020    LEFT FEMUR OPEN REDUCTION INTERNAL FIXATION performed by Ann Wills MD at [address] Left 2001   [address] [facility] N/A 03/09/2021    ROBOTIC EXTENSIVE LYSIS OF ADHESIONS, ROBOTIC REDUCTION OF HIATAL HERNIA WITH GASTROPEXY performed by Ferd Severance, MD at [address] [facility]    w/bladder suspension   [address]  2002, 2008, 2009    rt hip(2002), lt knee(2009), lt hip(2008) Shoulder (2009)    OTHER SURGICAL HISTORY  04/21/2021    Eugene Gunter CNP in the office   [facility]    PAIN MANAGEMENT PROCEDURE Bilateral 2021    medial branch blocks at bilateral L4/L5 and L5/S1 performed by Zahra Dumont DO at [address] Bilateral 2021    confirmatory  medial branch blocks at bilateral L4/L5 and L5/S1 performed by Zahra Dumont DO at [address] Right 10/19/2021    thermal radiofrequency at BILATERAL medial branches L4/L5 and L5/S1 was chosen. We will start with the right side first performed by Zahra Dumont DO at [address] Left 2021    thermal radiofrequency at BILATERAL medial branches L4/L5 and L5/S1 was chosen.  performed by Zahra Dumont DO at [address] [place] SCRN NOT  [address] IND Left 09/15/2017    COLONOSCOPY performed by Veena Singh MD at [facility]   [address]    discectomy   [address]    UPPER GASTROINTESTINAL ENDOSCOPY         Social History     Tobacco Use    Smoking status: Former Smoker     Packs/day: 2.00     Years: 11.00     Pack years: 22.00     Types: Cigarettes     Start date: 1957     Quit date: 1967     Years since quittin.4    Smokeless tobacco: Never Used   Vaping Use    Vaping Use: Never used   Substance Use Topics    Alcohol use: Yes     Comment: wine with dinner    Drug use: No       Family History   Problem Relation Age of Onset    Arthritis Mother     Hearing Loss Mother     High Cholesterol Mother     Hearing Loss Father     Heart Disease Father 76        CABG    Stroke Father     Arthritis Sister     Depression Sister     Diabetes Sister     Arthritis Brother     Depression Brother     Cancer Maternal Aunt     Early Death Maternal Aunt     Diabetes Maternal Grandmother     Heart Disease Paternal Grandmother          I have reviewed the patient's past medical history, past surgical history, allergies, medications, social and family history and I have made updates where appropriate. Review of Systems        PHYSICAL EXAM:  /80 (Site: Left Upper Arm, Position: Sitting, Cuff Size: Medium Adult)   Pulse 54   Temp 97.1 °F (36.2 °C) (Temporal)   Ht 5' 7\" (1.702 m)   Wt 162 lb 12.8 oz (73.8 kg)   SpO2 97% Comment: Room Air  BMI 25.50 kg/m²     General Appearance: well developed and well- nourished, in no acute distress  Head: normocephalic and atraumatic  ENT: external ear and ear canal normal bilaterally, nose without deformity, nasal mucosa and turbinates normal without polyps, oropharynx normal, dentition is normal for age, no lipor gum lesions noted  Neck: supple and non-tender without mass, no thyromegaly or thyroid nodules, no cervical lymphadenopathy  Pulmonary/Chest: clear to auscultation bilaterally- no wheezes, rales or rhonchi, normal air movement, no respiratory distress or retractions  Cardiovascular: normal rate, regular rhythm, normal S1 and S2, no murmurs, rubs, clicks, or gallops, distal pulses intact  Extremities: no cyanosis, clubbing or edema of the lower extremities  Psych:  Normal affect without evidence of depression oranxiety, insight and judgement are appropriate, memory appears intact  Skin: warm and dry, no rash or erythema      ASSESSMENT & PLAN  Kimi Kennedy was seen today for 6 month follow-up, pain, arthritis, other and discuss medications. Diagnoses and all orders for this visit:    Moderate episode of recurrent major depressive disorder (HCC)  -     FLUoxetine (PROZAC) 20 MG capsule; Take 1 capsule by mouth daily    Fibromyalgia    Pain syndrome, chronic    Acquired hypothyroidism    Constipation, unspecified constipation type  -     docusate sodium (COLACE) 100 MG capsule;  Take 1 capsule by mouth 2 times daily as needed for Constipation           -HTN controlled, continue metoprolol and enalapril  -Other chronic issues are stable, continue current medications  -Advised to call if any issues      Return in about 6 months (around 9/15/2022). All copied or forwarded information in the progress note was verified by me to be accurate at the time of visit  Patient's past medical, surgical, social and family history were reviewed and updated     All patient questions answered. Patient voiced understanding.

## 2022-04-04 DIAGNOSIS — F33.1 MODERATE EPISODE OF RECURRENT MAJOR DEPRESSIVE DISORDER (HCC): ICD-10-CM

## 2022-04-04 RX ORDER — FLUOXETINE HYDROCHLORIDE 20 MG/1
20 CAPSULE ORAL DAILY
Qty: 90 CAPSULE | Refills: 3 | Status: SHIPPED | OUTPATIENT
Start: 2022-04-04

## 2022-04-05 RX ORDER — DULOXETIN HYDROCHLORIDE 60 MG/1
CAPSULE, DELAYED RELEASE ORAL
Qty: 90 CAPSULE | Refills: 3 | OUTPATIENT
Start: 2022-04-05

## 2022-04-18 DIAGNOSIS — M79.7 FIBROMYALGIA: ICD-10-CM

## 2022-04-18 RX ORDER — MELOXICAM 7.5 MG/1
7.5 TABLET ORAL EVERY MORNING
Qty: 90 TABLET | Refills: 0 | Status: SHIPPED | OUTPATIENT
Start: 2022-04-18 | End: 2022-05-11 | Stop reason: DRUGHIGH

## 2022-04-18 NOTE — TELEPHONE ENCOUNTER
OARRS reviewed. UDS: none. Last seen: 12/30/2021. Follow-up:   Future Appointments   Date Time Provider Huber Little   5/11/2022  8:40 AM DO GUILLERMINA Leal SRPX Pain [facility] [place] [address]   9/15/2022 11:00 AM Kim Bartlett DO [place] [facility] [place] [address]   your note from 12/30 indicated pt. To resee in 4 weeks  For potential titration of melixicam. Not seeing till 5/11. Are you ok filling script?

## 2022-04-18 NOTE — TELEPHONE ENCOUNTER
Patient calling to see if Dr Elisha Menjivar wants her to take pregabalin 150 mg and if so she will need another rx sent to [facility]

## 2022-04-19 RX ORDER — PREGABALIN 150 MG/1
150 CAPSULE ORAL 2 TIMES DAILY
Qty: 180 CAPSULE | Refills: 1 | Status: SHIPPED | OUTPATIENT
Start: 2022-04-19 | End: 2022-08-02 | Stop reason: SDUPTHER

## 2022-05-10 ENCOUNTER — OFFICE VISIT (OUTPATIENT)
Dept: FAMILY MEDICINE CLINIC | Age: 82
End: 2022-05-10
Payer: MEDICARE

## 2022-05-10 VITALS
HEART RATE: 58 BPM | TEMPERATURE: 96.7 F | BODY MASS INDEX: 25.71 KG/M2 | RESPIRATION RATE: 20 BRPM | DIASTOLIC BLOOD PRESSURE: 70 MMHG | SYSTOLIC BLOOD PRESSURE: 122 MMHG | WEIGHT: 163.8 LBS | OXYGEN SATURATION: 96 % | HEIGHT: 67 IN

## 2022-05-10 DIAGNOSIS — R06.02 SHORTNESS OF BREATH: Primary | ICD-10-CM

## 2022-05-10 DIAGNOSIS — F33.1 MODERATE EPISODE OF RECURRENT MAJOR DEPRESSIVE DISORDER (HCC): ICD-10-CM

## 2022-05-10 DIAGNOSIS — M79.7 FIBROMYALGIA: ICD-10-CM

## 2022-05-10 DIAGNOSIS — Z13.31 POSITIVE DEPRESSION SCREENING: ICD-10-CM

## 2022-05-10 PROCEDURE — 93000 ELECTROCARDIOGRAM COMPLETE: CPT | Performed by: FAMILY MEDICINE

## 2022-05-10 PROCEDURE — 99214 OFFICE O/P EST MOD 30 MIN: CPT | Performed by: FAMILY MEDICINE

## 2022-05-10 ASSESSMENT — COLUMBIA-SUICIDE SEVERITY RATING SCALE - C-SSRS
2. HAVE YOU ACTUALLY HAD ANY THOUGHTS OF KILLING YOURSELF?: NO
1. WITHIN THE PAST MONTH, HAVE YOU WISHED YOU WERE DEAD OR WISHED YOU COULD GO TO SLEEP AND NOT WAKE UP?: YES
6. HAVE YOU EVER DONE ANYTHING, STARTED TO DO ANYTHING, OR PREPARED TO DO ANYTHING TO END YOUR LIFE?: NO

## 2022-05-10 ASSESSMENT — PATIENT HEALTH QUESTIONNAIRE - PHQ9
1. LITTLE INTEREST OR PLEASURE IN DOING THINGS: 1
3. TROUBLE FALLING OR STAYING ASLEEP: 0
SUM OF ALL RESPONSES TO PHQ QUESTIONS 1-9: 12
8. MOVING OR SPEAKING SO SLOWLY THAT OTHER PEOPLE COULD HAVE NOTICED. OR THE OPPOSITE, BEING SO FIGETY OR RESTLESS THAT YOU HAVE BEEN MOVING AROUND A LOT MORE THAN USUAL: 3
SUM OF ALL RESPONSES TO PHQ QUESTIONS 1-9: 12
10. IF YOU CHECKED OFF ANY PROBLEMS, HOW DIFFICULT HAVE THESE PROBLEMS MADE IT FOR YOU TO DO YOUR WORK, TAKE CARE OF THINGS AT HOME, OR GET ALONG WITH OTHER PEOPLE: 1
7. TROUBLE CONCENTRATING ON THINGS, SUCH AS READING THE NEWSPAPER OR WATCHING TELEVISION: 0
SUM OF ALL RESPONSES TO PHQ QUESTIONS 1-9: 12
5. POOR APPETITE OR OVEREATING: 0
4. FEELING TIRED OR HAVING LITTLE ENERGY: 3
9. THOUGHTS THAT YOU WOULD BE BETTER OFF DEAD, OR OF HURTING YOURSELF: 1
6. FEELING BAD ABOUT YOURSELF - OR THAT YOU ARE A FAILURE OR HAVE LET YOURSELF OR YOUR FAMILY DOWN: 3
SUM OF ALL RESPONSES TO PHQ9 QUESTIONS 1 & 2: 2
SUM OF ALL RESPONSES TO PHQ QUESTIONS 1-9: 11
2. FEELING DOWN, DEPRESSED OR HOPELESS: 1

## 2022-05-10 NOTE — PROGRESS NOTES
Otilia Gunter is a 78 y.o. female that presents for     Chief Complaint   Patient presents with    Shortness of Breath       /70   Pulse 58   Temp 96.7 °F (35.9 °C) (Infrared)   Resp 20   Ht 5' 7\" (1.702 m)   Wt 163 lb 12.8 oz (74.3 kg)   SpO2 96%   BMI 25.65 kg/m²       HPI    Shortness of Breath  Quality of Symptoms - 'Feels like I'm not getting enough air'  Inciting event prior to symptom onset? No  Symptoms have been present for at least several months, states that it is occurring more frequently   Symptoms are intermittent  Known Provocation? Yes - laying flat, but also can occur at other times  Known palliation? Yes - sitting up, can take several minutes for this to occur  Cough? No  Wheezing? No  Chest Pain, Presycnope or Palpitations? No  Fever or Increased sputum production?    No  Treatment tried and response - sitting up with relief    Previous problems with breathing (asthma, COPD)-  No    EKG - Rhythm - sinus, Rate - 50s, intervals wnl, no acute ST segment changes, no significant change from 3/13/21        Health Maintenance   Topic Date Due    Depression Monitoring  04/27/2022    Annual Wellness Visit (AWV)  04/28/2022    TSH  12/04/2022    Potassium  12/04/2022    Creatinine  12/04/2022    Pneumococcal 65+ years Vaccine (2 - PCV) 02/09/2023    DTaP/Tdap/Td vaccine (2 - Td or Tdap) 09/08/2031    DEXA (modify frequency per FRAX score)  Completed    Flu vaccine  Completed    Shingles vaccine  Completed    COVID-19 Vaccine  Completed    Hepatitis A vaccine  Aged Out    Hepatitis B vaccine  Aged Out    Hib vaccine  Aged Out    Meningococcal (ACWY) vaccine  Aged Out       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 Gail Marte GERD (gastroesophageal reflux disease)     Diverticulitis     Hiatal hernia     Hypertension     Hypothyroidism     Osteoarthritis        Past Surgical History:   Procedure Laterality Date    APPENDECTOMY  1958    BACK SURGERY      CARPAL TUNNEL RELEASE Bilateral 2013    w/cubital tunnel    COLON SURGERY  07/29/2016    Procedure: ATTEMPTED DAVINCI XI ROBOTIC ASSISTED SIGMOID COLON RESECTION, ROBOTIC ASSISTED MOBILIZATION OF RECTAL SIGMOID TO SPLENIC FLEXURE, CONVERTED TO OPEN LEFT HEMICOLECTOMY WITH COLOPROCTOCTOMY, SPLENORRHAPHY, RIGID SIGMOIDOSCOPY, LASER EVALUATION OF VASCULARITY WITH ICG; Surgeon: Wilmer Sarmiento MD; Location: [facility]; Service: General    COLONOSCOPY  2012    CYST REMOVAL  2010   [address], [address]    Cataract    FEMUR FRACTURE SURGERY Left 12/21/2020    LEFT FEMUR OPEN REDUCTION INTERNAL FIXATION performed by Luther Cadena MD at [address] Left 2001   [address] [facility] N/A 03/09/2021    ROBOTIC EXTENSIVE LYSIS OF ADHESIONS, ROBOTIC REDUCTION OF HIATAL HERNIA WITH GASTROPEXY performed by Jose Beltrán MD at [address] [facility]    w/bladder suspension   [address]  2002, 2008, 2009    rt hip(2002), lt knee(2009), lt hip(2008) Shoulder (2009)    OTHER SURGICAL HISTORY  04/21/2021    Zehra Merchant Josh CNP in the office   [address] [facility]    PAIN MANAGEMENT PROCEDURE Bilateral 7/13/2021    medial branch blocks at bilateral L4/L5 and L5/S1 performed by Reynaldo Wells DO [facility] [address] Bilateral 8/24/2021    confirmatory  medial branch blocks at bilateral L4/L5 and L5/S1 performed by Reynaldo Wells DO at [address] Right 10/19/2021    thermal radiofrequency at BILATERAL medial branches L4/L5 and L5/S1 was chosen.   We will start with the right side first performed by Reynaldo Wells DO at [address] MANAGEMENT PROCEDURE Left 2021    thermal radiofrequency at BILATERAL medial branches L4/L5 and L5/S1 was chosen. performed by Lawanda Montaño DO at [address] [place] SCRN NOT  [address] IND Left 09/15/2017    COLONOSCOPY performed by Traci Chapman MD at [address] Endoscopy   [address] [facility]    discectomy   [facility]    UPPER GASTROINTESTINAL ENDOSCOPY         Social History     Tobacco Use    Smoking status: Former Smoker     Packs/day: 2.00     Years: 11.00     Pack years: 22.00     Types: Cigarettes     Start date: 1957     Quit date: 1967     Years since quittin.5    Smokeless tobacco: Never Used   Vaping Use    Vaping Use: Never used   Substance Use Topics    Alcohol use: Yes     Comment: wine with dinner    Drug use: No       Family History   Problem Relation Age of Onset    Arthritis Mother     Hearing Loss Mother     High Cholesterol Mother     Hearing Loss Father     Heart Disease Father 76        CABG    Stroke Father     Arthritis Sister     Depression Sister     Diabetes Sister     Arthritis Brother     Depression Brother     Cancer Maternal Aunt     Early Death Maternal Aunt     Diabetes Maternal Grandmother     Heart Disease Paternal Grandmother          I have reviewed the patient's past medical history, past surgical history, allergies, medications, social and family history and I have made updates where appropriate. Review of Systems        PHYSICAL EXAM:  /70   Pulse 58   Temp 96.7 °F (35.9 °C) (Infrared)   Resp 20   Ht 5' 7\" (1.702 m)   Wt 163 lb 12.8 oz (74.3 kg)   SpO2 96%   BMI 25.65 kg/m²     Physical Exam  Vitals and nursing note reviewed. Constitutional:       General: She is not in acute distress. Appearance: She is well-developed. HENT:      Head: Normocephalic and atraumatic.       Right Ear: Hearing and external ear normal.      Left Ear: Hearing and external ear normal.      Nose: Nose normal.   Eyes:      General:         Right eye: No discharge. Left eye: No discharge. Conjunctiva/sclera: Conjunctivae normal.   Neck:      Thyroid: No thyromegaly. Trachea: No tracheal deviation. Cardiovascular:      Rate and Rhythm: Normal rate and regular rhythm. Heart sounds: Normal heart sounds. No murmur heard. No friction rub. No gallop. Pulmonary:      Effort: Pulmonary effort is normal. No respiratory distress. Breath sounds: No wheezing or rales. Chest:      Chest wall: No tenderness. Musculoskeletal:         General: No deformity. Cervical back: Normal range of motion and neck supple. Lymphadenopathy:      Cervical: No cervical adenopathy. Skin:     General: Skin is warm and dry. Findings: No erythema or rash. Neurological:      Mental Status: She is alert. Motor: No abnormal muscle tone. Coordination: Coordination normal.   Psychiatric:         Behavior: Behavior normal.         Thought Content: Thought content normal.         Judgment: Judgment normal.             ASSESSMENT & PLAN  Ellie Hadley was seen today for shortness of breath. Diagnoses and all orders for this visit:    Shortness of breath  -     EKG 12 Lead  -     XR CHEST (2 VW); Future  -     CBC with Auto Differential; Future  -     Comprehensive Metabolic Panel; Future  -     Full PFT Study With Bronchodilator; Future    Positive depression screening    Fibromyalgia    Moderate episode of recurrent major depressive disorder (HCC)        No follow-ups on file.     Symptoms consistent with a restrictive defect, sx are not consistent with a cardiac etiology, however, we could evaluate for this if testing is returning normal.  Will start with above testing and further w/u and treatment pending the results    The most recent results of the following tests were reviewed with the patient today: EKG     All copied or forwarded information in the progress note was verified by me to be accurate at the time of visit  Patient's past medical, surgical, social and family history were reviewed and updated     All patient questions answered. Patient voiced understanding. PHQ-9 score today: (PHQ-9 Total Score: 12), additional evaluation and assessment performed, follow-up plan includes but not limited to: Medication management and Referral to /Specialist  for evaluation and management.

## 2022-05-11 ENCOUNTER — OFFICE VISIT (OUTPATIENT)
Dept: PHYSICAL MEDICINE AND REHAB | Age: 82
End: 2022-05-11
Payer: MEDICARE

## 2022-05-11 VITALS
WEIGHT: 163 LBS | HEIGHT: 67 IN | DIASTOLIC BLOOD PRESSURE: 68 MMHG | BODY MASS INDEX: 25.58 KG/M2 | SYSTOLIC BLOOD PRESSURE: 122 MMHG
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
## 2022-05-11 DIAGNOSIS — M54.59 LUMBAR FACET JOINT PAIN: ICD-10-CM

## 2022-05-11 DIAGNOSIS — G89.4 CHRONIC PAIN SYNDROME: ICD-10-CM

## 2022-05-11 DIAGNOSIS — M47.896 OTHER SPONDYLOSIS, LUMBAR REGION: ICD-10-CM

## 2022-05-11 PROCEDURE — 99214 OFFICE O/P EST MOD 30 MIN: CPT | Performed by: ANESTHESIOLOGY

## 2022-05-11 RX ORDER — MELOXICAM 15 MG/1
15 TABLET ORAL DAILY
Qty: 90 TABLET | Refills: 1 | Status: ON HOLD | OUTPATIENT
Start: 2022-05-11 | End: 2022-08-18 | Stop reason: HOSPADM

## 2022-05-11 NOTE — PROGRESS NOTES
Chronic Pain/PM&R Clinic Note     Encounter Date: 5/11/22    Subjective:   Chief Complaint:   Chief Complaint   Patient presents with    Follow-up    Discuss Medications       History of Present Illness:   Saroj Gunter is a 78 y.o. female seen in the clinic initially on 06/23/21 upon request from Nikhil More DO (PCP) for her history of chronic low back pain. She has a medical history of scoliosis, neuropathy, fibromyalgia, and previous lumbar discectomy (over 50 years ago). She states that she has low back pain that has been progressively worse over the last 20 years. She reports the majority of her low back pain to be at her waistline with radiation across to her waist on both sides. She describes this pain is a constant aching, stabbing 5/10 pain that can get up to a 7-8/10 when severe. This pain is worse with any activities where she is standing upright such as cooking or when she is walking for any duration of time. Her pain is improved with rest and sitting in her motorized wheelchair. She denies any pain radiating down her legs but does endorse numbness in both feet from her neuropathy. She denies any saddle anesthesia or bowel/bladder incontinence. Of note, she has multiple joint replacements. She has bilateral total hip arthroplasties and a left knee total hip arthroplasty. In addition, she has a previous left femur fracture that has been fixated with hardware and a left total shoulder arthroplasty. She states that she had an EMG/NCS performed by Dr. Nae Vasquez which revealed idiopathic neuropathy. She states that she had a previous RFA in her low back done by Dr. Balbir Silva which did give her longstanding relief but this was done 8-10 years ago. Today, 5/11/2022, patient presents for planned follow-up for chronic low back pain. Overall, she reports doing the same in regard to her pain since her last visit. She continues take her meloxicam as prescribed without any side effects.   She does not feel like this medication is providing her much benefit overall. She is wonder if she can increase her dose of this medication. She has been noticing some worsening issues of leg weakness and falls. She denies any associated new onset numbness/tingling legs, saddle anesthesia, or bowel/bladder incontinence episodes. She continues to ambulate with the use of her walker in her home and uses a power wheelchair for community distance ambulation. History of Interventions:   Surgery: Previous discectomy in 30s, left femur IMN, left TKA  Injections: Previous lumabr RFA ~8-10 years ago by Dr. Rico Burton  Diagnostic lumbar MBBs  Confirmatory lumbar MBBs  Lumbar RFA (10/19/2021 and 12/7/21)-significant relief    Current Treatment Medications:   Aleve as needed  Heating pad PRN  Mishel 150 mg BID  Cymbalta 60 mg daily    Historical Treatment Medications:   Tramadol-ineffective  Norco-stopped (constipation)    Imaging:  XR L-spine (5/18/21)    PROCEDURE: XR LUMBAR SPINE (2-3 VIEWS)       CLINICAL INFORMATION: Spinal stenosis of lumbar region without neurogenic claudication       COMPARISON: August 2, 2019       TECHNIQUE: AP and lateral views performed.           FINDINGS:    ALIGNMENT: Levoscoliosis lumbar spine centered at the thoracolumbar junction. VERTEBRAL BODIES: Slight anterior wedging L1   DISKS: Multilevel degenerative disc disease with associated endplate degenerative changes, these are most advanced in the T12-L2 vertebral bodies. SOFT TISSUES: Unremarkable. OBLIQUE PROJECTIONS: Not performed. FLEXION/EXTENSION LATERAL: Not performed.       OTHER: None.               Impression   1. Levoscoliosis with compensatory osteoarthritic changes, predominantly at the thoracolumbar junction.          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 Mihai Pritchett GERD (gastroesophageal reflux disease)     Diverticulitis     Hiatal hernia     Hypertension     Hypothyroidism     Osteoarthritis        Past Surgical History:   Procedure Laterality Date    APPENDECTOMY  1958    BACK SURGERY      CARPAL TUNNEL RELEASE Bilateral 2013    w/cubital tunnel    COLON SURGERY  07/29/2016    Procedure: ATTEMPTED DAVINCI XI ROBOTIC ASSISTED SIGMOID COLON RESECTION, ROBOTIC ASSISTED MOBILIZATION OF RECTAL SIGMOID TO SPLENIC FLEXURE, CONVERTED TO OPEN LEFT HEMICOLECTOMY WITH COLOPROCTOCTOMY, SPLENORRHAPHY, RIGID SIGMOIDOSCOPY, LASER EVALUATION OF VASCULARITY WITH ICG; Surgeon: Mihai Pritchett MD; Location: [facility]; Service: General    COLONOSCOPY  2012    [address]OVAL  2010   [address]    Cataract    FEMUR FRACTURE SURGERY Left 12/21/2020    LEFT FEMUR OPEN REDUCTION INTERNAL FIXATION performed by Amie Michel MD at [address] [place] [address] Left 2001    HIATAL HERNIA REPAIR N/A 03/09/2021    ROBOTIC EXTENSIVE LYSIS OF ADHESIONS, ROBOTIC REDUCTION OF HIATAL HERNIA WITH GASTROPEXY performed by Maria L Horton MD at [address] [facility]    w/bladder suspension   [address]  2002, 2008, 2009    rt hip(2002), lt knee(2009), lt hip(2008) Shoulder (2009)    OTHER SURGICAL HISTORY  04/21/2021    Rolin Rubinstein Lyell Bur CNP in the office   [address] [facility]    PAIN MANAGEMENT PROCEDURE Bilateral 7/13/2021    medial branch blocks at bilateral L4/L5 and L5/S1 performed by Saloni Sol DO at [facility] [address] Bilateral 8/24/2021    confirmatory  medial branch blocks at bilateral L4/L5 and L5/S1 [facility] Right 10/19/2021    thermal radiofrequency at BILATERAL medial branches L4/L5 and L5/S1 was chosen.   We will start with the right side first performed by Marquis Regna DO at [address] Left 2021    thermal radiofrequency at BILATERAL medial branches L4/L5 and L5/S1 was chosen.  performed by Marquis Regan DO at [address] [place] SCRN NOT  [address] IND Left 09/15/2017    COLONOSCOPY performed by Paloma Rodas MD at [facility]    discectomy   [address]    UPPER GASTROINTESTINAL ENDOSCOPY         Family History   Problem Relation Age of Onset    Arthritis Mother     Hearing Loss Mother     High Cholesterol Mother     Hearing Loss Father     Heart Disease Father 76        CABG    Stroke Father     Arthritis Sister     Depression Sister     Diabetes Sister     Arthritis Brother     Depression Brother     Cancer Maternal Aunt     Early Death Maternal Aunt     Diabetes Maternal Grandmother     Heart Disease Paternal Grandmother        Social History     Socioeconomic History    Marital status:      Spouse name: Patrice Chavez Number of children: Not on file    Years of education: 15    Highest education level: Associate degree: academic program   Occupational History    Not on file   Tobacco Use    Smoking status: Former Smoker     Packs/day: 2.00     Years: 11.00     Pack years: 22.00     Types: Cigarettes     Start date: 1957     Quit date: 1967     Years since quittin.5    Smokeless tobacco: Never Used   Vaping Use    Vaping Use: Never used   Substance and Sexual Activity    Alcohol use: Yes     Comment: wine with dinner    Drug use: No    Sexual activity: Not on file   Other Topics Concern    Not on file   Social History Narrative    Referral to AAA placed    Referral placed to [facility]    No barriers with medication affordability    No barriers with transportation    Discussed support from [address] [place] [address] Determinants of Health     Financial Resource Strain: Low Risk     Difficulty of Paying Living Expenses: Not hard at all   Food Insecurity: No Food Insecurity    Worried About Running Out of Food in the Last Year: Never true    Jes of Food in the Last Year: Never true   Transportation Needs: No Transportation Needs    Lack of Transportation (Medical): No    Lack of Transportation (Non-Medical): No   Physical Activity: Inactive    Days of Exercise per Week: 0 days    Minutes of Exercise per Session: 0 min   Stress: Stress Concern Present    Feeling of Stress : To some extent   Social Connections: Socially Integrated    Frequency of Communication with Friends and Family: More than three times a week    Frequency of Social Gatherings with Friends and Family: More than three times a week    Attends Rastafarian Services: More than 4 times per year    Active Member of [address] [place] [address] [employer] or Organizations:  Yes    Attends Club or Organization Meetings: More than 4 times per year    Marital Status:    Intimate Partner Violence:     Fear of Current or Ex-Partner: Not on file    Emotionally Abused: Not on file    Physically Abused: Not on file    Sexually Abused: Not on file   Housing Stability:     Unable to Pay for Housing in the Last Year: Not on file    Number of Places Lived in the Last Year: Not on file    Unstable Housing in the Last Year: Not on file       Medications & Allergies:   Current Outpatient Medications   Medication Instructions    Biotin w/ Vitamins C & E (HAIR/SKIN/NAILS PO) Oral    calcium-vitamin D (OSCAL-500) 500-200 MG-UNIT per tablet 2 tablets, Oral, DAILY    Cranberry 500 MG TABS Oral    docusate sodium (COLACE) 100 mg, Oral, 2 TIMES DAILY PRN    enalapril (VASOTEC) 10 mg, Oral, NIGHTLY    FLUoxetine (PROZAC) 10 mg, Oral, DAILY    FLUoxetine (PROZAC) 20 mg, Oral, DAILY    fluticasone (FLONASE) 50 MCG/ACT nasal spray 1 spray, Nasal, PRN    guaiFENesin 1,200 mg, Oral, 2 TIMES DAILY    levothyroxine (SYNTHROID) 100 mcg, Oral, DAILY    meloxicam (MOBIC) 15 mg, Oral, DAILY    metoprolol succinate (TOPROL XL) 50 mg, Oral, DAILY    Misc. Devices (WALKER) MISC 1 each, Does not apply, DAILY    Misc. Devices MISC Mechanical lift for a [employer] 3-6-9 Fatty Acids (OMEGA 3-6-9 COMPLEX) CAPS 1 capsule, Oral, DAILY    pregabalin (LYRICA) 150 mg, Oral, 2 TIMES DAILY    Scooter MISC Does not apply, Power scooter    therapeutic multivitamin-minerals (THERAGRAN-M) tablet 1 tablet, Oral, NIGHTLY       Allergies   Allergen Reactions    Ampicillin     Morphine Other (See Comments)     Hallucinations     Pcn [Penicillins] Itching    Sulfa Antibiotics        Review of Systems:   Constitutional: negative for weight changes or fevers  Genitourinary: negative for bowel/bladder incontinence   Musculoskeletal: positive for diffuse arthritis pain  Neurological:  Positive for leg weakness and falls  Behavioral/Psych: negative for anxiety/depression   All other systems reviewed and are negative    Objective:     Vitals:    05/11/22 0841   BP: 122/68       Constitutional: Pleasant, no acute distress   Head: Normocephalic, atraumatic   Eyes: Conjunctivae normal   Neck: Supple, symmetrical   Lungs: Normal respiratory effort, non-labored breathing   Cardiovascular: Limbs warm and well perfused   Abdomen: Non-protruded   Musculoskeletal: Muscle bulk symmetric, no atrophy, no gross deformities   · Lumbar Spine: ROM severely reduced in extension. Scoliosis appreciated in thoracolumbar junction. Lumbar paraspinals tender bilaterally. SLR neg bilaterally. Neurological: Cranial nerves II-XII grossly intact. 1+ bilateral patellar and Achilles reflexes. Negative ankle clonus. · Gait - Severely antalgic gait. Ambulates with assistive device.    · Motor: No focal motor deficits appreciated in lower limbs  Skin: No rashes or lesions visible in low back  Psychological: Cooperative, no exaggerated pain behaviors Assessment:    Diagnosis Orders   1. Fall, sequela  MRI LUMBAR SPINE WO CONTRAST   2. Lumbar facet joint pain     3. Other spondylosis, lumbar region     4. Chronic pain syndrome           Ramandeep Gunter is a 78 y. o.female presenting to the pain clinic for evaluation of chronic low back pain. Her clinical history and physical examination are consistent with lumbar facet medial pain with associated myofascial pain. I had set the patient up for diagnostic lumbar medial branch blocks targeting the facet joints of L4/L5 and L5/S1. She continues to have a lot of breakthrough pain and I have increased her meloxicam to 15 mg daily. I have ordered an MRI lumbar spine for further evaluation of lumbar spinal stenosis causing falls. Plan: The following treatment recommendations and plan were discussed in detail with Viridiana Gunter and . Imaging:   I have reviewed patients imaging of XR L-spine. Analgesics:   Patient is taking Acetaminophen. Patient informed that the maximum amount of acetaminophen taken on a regular basis should only be 4000 mg per day. We will increase her meloxicam to 15 mg daily. I advised patient take this medication with food in the morning. Patient should stop any other NSAID therapies including her Aleve. Adjuvants: In light of the presence of a neuropathic component of pain, the patient should continue Lyrica and Cymbalta as prescribed. Interventions:   None    Anticoagulation/NPO Recommendations:   None    Multidisciplinary Pain Management:   In the presence of complex, chronic, and multi-factorial pain, the importance of a multidisciplinary approach to pain management in the patients management regimen was emphasized and discussed in great detail.    PHYSICAL THERAPY: Patient is advised to continue gentle low back ROM/stretching exercises     Referrals:  None    Prescriptions Written This Visit:   Meloxicam 15 mg     Follow-up: 4 weeks      Jared DO Suyapa  Interventional Pain Management/PM&R   [place]

## 2022-05-12 LAB
A/G RATIO: 1.4 (ref 1.5–2.5)
ABSOLUTE BASO #: 100 /CMM (ref 0–200)
ABSOLUTE EOS #: 100 /CMM (ref 0–500)
ABSOLUTE LYMPH #: 1000 /CMM (ref 1000–4800)
ABSOLUTE MONO #: 400 /CMM (ref 0–800)
ABSOLUTE NEUT #: 1500 /CMM (ref 1800–7700)
ALBUMIN SERPL-MCNC: 3.6 G/DL (ref 3.5–5)
ALP BLD-CCNC: 71 IU/L (ref 39–118)
ALT SERPL-CCNC: 12 IU/L (ref 10–40)
ANION GAP SERPL CALCULATED.3IONS-SCNC: 3 MMOL/L (ref 4–12)
AST SERPL-CCNC: 25 IU/L (ref 15–41)
BASOPHILS RELATIVE PERCENT: 3 % (ref 0–2)
BILIRUB SERPL-MCNC: 0.4 MG/DL (ref 0.2–1)
BUN BLDV-MCNC: 11 MG/DL (ref 7–20)
CALCIUM SERPL-MCNC: 9.1 MG/DL (ref 8.8–10.5)
CHLORIDE BLD-SCNC: 95 MEQ/L (ref 101–111)
CO2: 30 MEQ/L (ref 21–32)
CREAT SERPL-MCNC: 0.78 MG/DL (ref 0.6–1.3)
CREATININE CLEARANCE: >60
EOSINOPHILS RELATIVE PERCENT: 4.1 % (ref 0–6)
GLUCOSE: 91 MG/DL (ref 70–110)
HCT VFR BLD CALC: 31.9 % (ref 35–44)
HEMOGLOBIN: 10.2 GM/DL (ref 12–15)
HYPOCHROMIA: ABNORMAL
LYMPHOCYTES RELATIVE PERCENT: 31.2 % (ref 15–45)
MCH RBC QN AUTO: 24.5 PG (ref 27.5–33)
MCHC RBC AUTO-ENTMCNC: 31.9 GM/DL (ref 33–36)
MCV RBC AUTO: 76.9 CU MIC (ref 80–97)
MICROCYTOSIS: ABNORMAL
MONOCYTES RELATIVE PERCENT: 13.2 % (ref 2–10)
NEUTROPHILS RELATIVE PERCENT: 48.5 % (ref 40–70)
NUCLEATED RBCS: 0 /100 WBC
PDW BLD-RTO: 15.4 % (ref 12–16)
PLATELET # BLD: 221 TH/CMM (ref 150–400)
POTASSIUM SERPL-SCNC: 4.4 MEQ/L (ref 3.6–5)
RBC # BLD: 4.15 MIL/CMM (ref 4–5.1)
SODIUM BLD-SCNC: 128 MEQ/L (ref 135–145)
TOTAL PROTEIN: 6.1 G/DL (ref 6.2–8)
WBC # BLD: 3.1 TH/CMM (ref 4.4–10.5)

## 2022-05-13 ENCOUNTER — TELEPHONE (OUTPATIENT)
Dept: FAMILY MEDICINE CLINIC | Age: 82
End: 2022-05-13

## 2022-05-13 DIAGNOSIS — D50.9 MICROCYTIC ANEMIA: ICD-10-CM

## 2022-05-13 DIAGNOSIS — E87.1 HYPONATREMIA: Primary | ICD-10-CM

## 2022-05-13 NOTE — TELEPHONE ENCOUNTER
----- Message from Kale Reeves DO sent at 5/13/2022  8:43 AM EDT -----  CXR looked ok. Labs displayed several abnormalities. Her sodium was on the lower side. This could potentially be related to her prozac. Also, her red blood cell count is down. I would like to order some frther blood work to try to see if we can identify a possible cause. Orders placed.

## 2022-05-16 ENCOUNTER — APPOINTMENT (OUTPATIENT)
Dept: CT IMAGING | Age: 82
End: 2022-05-16
Payer: MEDICARE

## 2022-05-16 ENCOUNTER — HOSPITAL ENCOUNTER (EMERGENCY)
Age: 82
Discharge: HOME | End: 2022-05-16
Attending: EMERGENCY MEDICINE
Payer: MEDICARE

## 2022-05-16 ENCOUNTER — TELEPHONE (OUTPATIENT)
Dept: FAMILY MEDICINE CLINIC | Age: 82
End: 2022-05-16

## 2022-05-16 VITALS
DIASTOLIC BLOOD PRESSURE: 80 MMHG | HEIGHT: 67 IN | BODY MASS INDEX: 26.37 KG/M2 | WEIGHT: 168 LBS | SYSTOLIC BLOOD PRESSURE: 150 MMHG | OXYGEN SATURATION: 98 % | TEMPERATURE: 97.8 F | HEART RATE: 63 BPM | RESPIRATION RATE: 17 BRPM

## 2022-05-16 DIAGNOSIS — R55 NEAR SYNCOPE: Primary | ICD-10-CM

## 2022-05-16 DIAGNOSIS — D72.829 LEUKOCYTOSIS, UNSPECIFIED TYPE: ICD-10-CM

## 2022-05-16 DIAGNOSIS — K44.9 HIATAL HERNIA: ICD-10-CM

## 2022-05-16 DIAGNOSIS — E87.1 HYPONATREMIA: ICD-10-CM

## 2022-05-16 LAB
ALBUMIN SERPL-MCNC: 3.8 G/DL (ref 3.5–5.1)
ALP BLD-CCNC: 91 U/L (ref 38–126)
ALT SERPL-CCNC: 14 U/L (ref 11–66)
ANION GAP SERPL CALCULATED.3IONS-SCNC: 10 MEQ/L (ref 8–16)
AST SERPL-CCNC: 28 U/L (ref 5–40)
BASOPHILS # BLD: 1.5 %
BASOPHILS ABSOLUTE: 0 THOU/MM3 (ref 0–0.1)
BILIRUB SERPL-MCNC: 0.2 MG/DL (ref 0.3–1.2)
BILIRUBIN DIRECT: < 0.2 MG/DL (ref 0–0.3)
BUN BLDV-MCNC: 12 MG/DL (ref 7–22)
CALCIUM SERPL-MCNC: 9.1 MG/DL (ref 8.5–10.5)
CHLORIDE BLD-SCNC: 91 MEQ/L (ref 98–111)
CO2: 25 MEQ/L (ref 23–33)
CREAT SERPL-MCNC: 0.7 MG/DL (ref 0.4–1.2)
EKG ATRIAL RATE: 74 BPM
EKG P AXIS: 4 DEGREES
EKG P-R INTERVAL: 184 MS
EKG Q-T INTERVAL: 364 MS
EKG QRS DURATION: 80 MS
EKG QTC CALCULATION (BAZETT): 404 MS
EKG R AXIS: -30 DEGREES
EKG T AXIS: 71 DEGREES
EKG VENTRICULAR RATE: 74 BPM
EOSINOPHIL # BLD: 4 %
EOSINOPHILS ABSOLUTE: 0.1 THOU/MM3 (ref 0–0.4)
ERYTHROCYTE [DISTWIDTH] IN BLOOD BY AUTOMATED COUNT: 15 % (ref 11.5–14.5)
ERYTHROCYTE [DISTWIDTH] IN BLOOD BY AUTOMATED COUNT: 45.1 FL (ref 35–45)
FLU A ANTIGEN: NEGATIVE
FLU B ANTIGEN: NEGATIVE
GFR SERPL CREATININE-BSD FRML MDRD: 80 ML/MIN/1.73M2
GLUCOSE BLD-MCNC: 113 MG/DL (ref 70–108)
HCT VFR BLD CALC: 34.3 % (ref 37–47)
HEMOGLOBIN: 10.4 GM/DL (ref 12–16)
IMMATURE GRANS (ABS): 0.01 THOU/MM3 (ref 0–0.07)
IMMATURE GRANULOCYTES: 0.3 %
LYMPHOCYTES # BLD: 30.2 %
LYMPHOCYTES ABSOLUTE: 1 THOU/MM3 (ref 1–4.8)
MCH RBC QN AUTO: 24.9 PG (ref 26–33)
MCHC RBC AUTO-ENTMCNC: 30.3 GM/DL (ref 32.2–35.5)
MCV RBC AUTO: 82.3 FL (ref 81–99)
MONOCYTES # BLD: 12.5 %
MONOCYTES ABSOLUTE: 0.4 THOU/MM3 (ref 0.4–1.3)
NUCLEATED RED BLOOD CELLS: 0 /100 WBC
OSMOLALITY CALCULATION: 253.9 MOSMOL/KG (ref 275–300)
PLATELET # BLD: 205 THOU/MM3 (ref 130–400)
PLATELET ESTIMATE: ADEQUATE
PMV BLD AUTO: 10.3 FL (ref 9.4–12.4)
POTASSIUM SERPL-SCNC: 3.9 MEQ/L (ref 3.5–5.2)
PRO-BNP: 303 PG/ML (ref 0–1800)
PROCALCITONIN: 0.05 NG/ML (ref 0.01–0.09)
RBC # BLD: 4.17 MILL/MM3 (ref 4.2–5.4)
SARS-COV-2, NAAT: NOT  DETECTED
SCAN OF BLOOD SMEAR: NORMAL
SEG NEUTROPHILS: 51.5 %
SEGMENTED NEUTROPHILS ABSOLUTE COUNT: 1.7 THOU/MM3 (ref 1.8–7.7)
SODIUM BLD-SCNC: 126 MEQ/L (ref 135–145)
TOTAL PROTEIN: 6.5 G/DL (ref 6.1–8)
TROPONIN T: < 0.01 NG/ML
WBC # BLD: 3.3 THOU/MM3 (ref 4.8–10.8)

## 2022-05-16 PROCEDURE — 93005 ELECTROCARDIOGRAM TRACING: CPT | Performed by: EMERGENCY MEDICINE

## 2022-05-16 PROCEDURE — 87635 SARS-COV-2 COVID-19 AMP PRB: CPT

## 2022-05-16 PROCEDURE — 6360000004 HC RX CONTRAST MEDICATION: Performed by: EMERGENCY MEDICINE

## 2022-05-16 PROCEDURE — 93010 ELECTROCARDIOGRAM REPORT: CPT | Performed by: INTERNAL MEDICINE

## 2022-05-16 PROCEDURE — 2580000003 HC RX 258: Performed by: EMERGENCY MEDICINE

## 2022-05-16 PROCEDURE — 87804 INFLUENZA ASSAY W/OPTIC: CPT

## 2022-05-16 PROCEDURE — 99285 EMERGENCY DEPT VISIT HI MDM: CPT

## 2022-05-16 PROCEDURE — 84484 ASSAY OF TROPONIN QUANT: CPT

## 2022-05-16 PROCEDURE — 84145 PROCALCITONIN (PCT): CPT

## 2022-05-16 PROCEDURE — 80053 COMPREHEN METABOLIC PANEL: CPT

## 2022-05-16 PROCEDURE — 85025 COMPLETE CBC W/AUTO DIFF WBC: CPT

## 2022-05-16 PROCEDURE — 82248 BILIRUBIN DIRECT: CPT

## 2022-05-16 PROCEDURE — 83880 ASSAY OF NATRIURETIC PEPTIDE: CPT

## 2022-05-16 PROCEDURE — 71275 CT ANGIOGRAPHY CHEST: CPT

## 2022-05-16 PROCEDURE — 36415 COLL VENOUS BLD VENIPUNCTURE: CPT

## 2022-05-16 RX ORDER — 0.9 % SODIUM CHLORIDE 0.9 %
1000 INTRAVENOUS SOLUTION INTRAVENOUS ONCE
Status: COMPLETED | OUTPATIENT
Start: 2022-05-16 | End: 2022-05-16

## 2022-05-16 RX ORDER — LEVOTHYROXINE SODIUM 0.1 MG/1
TABLET ORAL
Qty: 90 TABLET | Refills: 3 | Status: SHIPPED | OUTPATIENT
Start: 2022-05-16

## 2022-05-16 RX ORDER — METOPROLOL SUCCINATE 50 MG/1
TABLET, EXTENDED RELEASE ORAL
Qty: 90 TABLET | Refills: 3 | Status: SHIPPED | OUTPATIENT
Start: 2022-05-16

## 2022-05-16 RX ADMIN — IOPAMIDOL 80 ML: 755 INJECTION, SOLUTION INTRAVENOUS at 17:56

## 2022-05-16 RX ADMIN — SODIUM CHLORIDE 1000 ML: 9 INJECTION, SOLUTION INTRAVENOUS at 17:33

## 2022-05-16 ASSESSMENT — PAIN DESCRIPTION - LOCATION: LOCATION: BACK

## 2022-05-16 ASSESSMENT — PAIN SCALES - GENERAL: PAINLEVEL_OUTOF10: 6

## 2022-05-16 ASSESSMENT — PAIN - FUNCTIONAL ASSESSMENT: PAIN_FUNCTIONAL_ASSESSMENT: 0-10

## 2022-05-16 ASSESSMENT — PAIN DESCRIPTION - ORIENTATION: ORIENTATION: LOWER

## 2022-05-16 NOTE — ED NOTES
ED nurse-to-nurse bedside report    Chief Complaint   Patient presents with    Dizziness      LOC: alert and orientated to name, place, date  Vital signs   Vitals:    05/16/22 1610 05/16/22 1734 05/16/22 1810   BP: (!) 174/97 (!) 166/83 (!) 168/85   Pulse: 77 63 68   Resp: 19 16 15   Temp: 97.8 °F (36.6 °C)     TempSrc: Oral     SpO2: 95% 97% 96%   Weight: 168 lb (76.2 kg)     Height: 5' 7\" (1.702 m)        Pain:    Pain Interventions: none  Pain Goal: 0  Oxygen: No    Current needs required none   Telemetry: Yes  LDAs:   Peripheral IV 05/16/22 Left Antecubital (Active)   Site Assessment Clean, dry & intact 05/16/22 1722     Continuous Infusions:   Mobility: Requires assistance * 2  Pacheco Fall Risk Score: Fall Risk 5/14/2021 4/27/2021 4/27/2021 3/10/2020 3/20/2019 2/20/2018 2/6/2017   2 or more falls in past year? yes no no yes yes no no   Fall with injury in past year? yes yes yes no yes no no     Report given to:  Autumn, RN Theadore Goldmann, RN  05/16/22 2747

## 2022-05-16 NOTE — ED TRIAGE NOTES
Presents to ED with c[facility] . States she was dizzy and almost passed out. Upon arrival to ED patient alert and oriented. Respirations easy and unlabored.

## 2022-05-16 NOTE — TELEPHONE ENCOUNTER
Ramon Muniz calling to inform provider that [place] the [place] and is on her way to ER by squkelvin

## 2022-05-16 NOTE — ED PROVIDER NOTES
[facility]  EMERGENCY DEPARTMENT ENCOUNTER      PATIENT NAME: Elena Gunter  MRN: [id number]  : 1940  CRUZ: 2022  PROVIDER: Yvan Haque MD      CHIEF COMPLAINT       Chief Complaint   Patient presents with    Dizziness       Patient is seen and evaluated in a timely fashion. Nurses Notes are reviewed and I agree except as noted in the HPI. HISTORY OF PRESENT ILLNESS    Elena Gunter is a 78 y.o. female who presents to Emergency Department with Dizziness     Patient presents to ED for evaluation after near syncopal episode while she was shopping at [employer] this afternoon. This happened around 3: 30 PM.      Patient was sitting in a wheelchair shopping, she suddenly became pale and diaphoretic. She said something was not right. Family tries to lay her down on the floor and she felt some improvement. She then tried to sit up, she felt fainting again. Family states patient has been having fatigue, shortness of breath, and intermittent dizziness over last 24 hours. Similar syncope episode a month ago but patient did not seek medical help. She has no fever or chills. She has been having SOB. She has been having retrosternal discomfort which she describes as esophageal pain and sensation of food stuck for the last 2 months. She has no nausea or vomiting. No abdominal pain. No diarrhea. No urinary symptoms. She had frequent bilateral lower extremity cramping. This HPI was provided by patient.     REVIEW OF SYSTEMS   Ten-point review of systems is negative except those documented in above HPI including constitutional, HEENT, respiratory, cardiovascular, gastrointestinal, genitourinary, musculoskeletal, skin, neurological, hematological and behavioral.      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 Elba Wheeler GERD (gastroesophageal reflux disease)     Diverticulitis     Hiatal hernia     Hypertension     Hypothyroidism     Osteoarthritis        SURGICAL HISTORY       Past Surgical History:   Procedure Laterality Date    APPENDECTOMY  1958    BACK SURGERY      CARPAL TUNNEL RELEASE Bilateral 2013    w/cubital tunnel    COLON SURGERY  07/29/2016    Procedure: ATTEMPTED DAVINCI XI ROBOTIC ASSISTED SIGMOID COLON RESECTION, ROBOTIC ASSISTED MOBILIZATION OF RECTAL SIGMOID TO SPLENIC FLEXURE, CONVERTED TO OPEN LEFT HEMICOLECTOMY WITH COLOPROCTOCTOMY, SPLENORRHAPHY, RIGID SIGMOIDOSCOPY, LASER EVALUATION OF VASCULARITY WITH ICG; Surgeon: Elba Wheeler MD; Location: [facility]; Service: General    COLONOSCOPY  2012    [address]ST REMOVAL  2010   [address]    Cataract    FEMUR FRACTURE SURGERY Left 12/21/2020    LEFT FEMUR OPEN REDUCTION INTERNAL FIXATION performed by Sen Waldron MD at [facility] 2001    HIATAL HERNIA REPAIR N/A 03/09/2021    ROBOTIC EXTENSIVE LYSIS OF ADHESIONS, ROBOTIC REDUCTION OF HIATAL HERNIA WITH GASTROPEXY performed by Austen Norman MD at [address] [place] [address]    w/bladder suspension   [address]  2002, 2008, 2009    rt hip(2002), lt knee(2009), lt hip(2008) Shoulder (2009)    OTHER SURGICAL HISTORY  04/21/2021    Trina Torres CNP in the office   [address]  Bilateral 7/13/2021    medial branch blocks at bilateral L4/L5 and L5/S1 performed by Krish Mahmood DO at [address] Bilateral 8/24/2021    confirmatory  medial branch blocks at bilateral L4/L5 and L5/S1 performed by Krish Mahmood DO at [address] Right 10/19/2021    thermal radiofrequency at BILATERAL medial branches L4/L5 and L5/S1 was chosen. We will start with the right side first [facility] Left 12/7/2021    thermal radiofrequency at BILATERAL medial branches L4/L5 and L5/S1 was chosen. performed by Dany Hernandez DO at [address] [place] SCRN NOT  [address] IND Left 09/15/2017    COLONOSCOPY performed by Seymour Yin MD at [facility]    discectomy   [address] [place] [address]    UPPER GASTROINTESTINAL ENDOSCOPY  2012       CURRENT MEDICATIONS       Previous Medications    BIOTIN W/ VITAMINS C & E (HAIR/SKIN/NAILS PO)    Take by mouth    CALCIUM-VITAMIN D (OSCAL-500) 500-200 MG-UNIT PER TABLET    Take 2 tablets by mouth daily    CRANBERRY 500 MG TABS    Take by mouth     DOCUSATE SODIUM (COLACE) 100 MG CAPSULE    Take 1 capsule by mouth 2 times daily as needed for Constipation    ENALAPRIL (VASOTEC) 10 MG TABLET    Take 1 tablet by mouth nightly    FLUOXETINE (PROZAC) 10 MG CAPSULE    Take 1 capsule by mouth daily    FLUOXETINE (PROZAC) 20 MG CAPSULE    Take 1 capsule by mouth daily    FLUTICASONE (FLONASE) 50 MCG/ACT NASAL SPRAY    1 spray by Nasal route as needed for Rhinitis     GUAIFENESIN 1200 MG TB12    Take 1,200 mg by mouth 2 times daily    LEVOTHYROXINE (SYNTHROID) 100 MCG TABLET    TAKE 1 TABLET DAILY    MELOXICAM (MOBIC) 15 MG TABLET    Take 1 tablet by mouth daily    METOPROLOL SUCCINATE (TOPROL XL) 50 MG EXTENDED RELEASE TABLET    TAKE 1 TABLET DAILY    MISC. DEVICES (WALKER) MISC    1 each by Does not apply route daily    MISC. DEVICES MISC    Mechanical lift for a Van    OMEGA 3-6-9 FATTY ACIDS (OMEGA 3-6-9 COMPLEX) CAPS    Take 1 capsule by mouth daily     PREGABALIN (LYRICA) 150 MG CAPSULE    Take 1 capsule by mouth 2 times daily for 180 days.     SCOOTER MISC    by Does not apply route Power scooter    THERAPEUTIC MULTIVITAMIN-MINERALS (THERAGRAN-M) TABLET    Take 1 tablet by mouth nightly        ALLERGIES     Ampicillin, Morphine, Pcn [penicillins], and Sulfa antibiotics    FAMILY HISTORY     She indicated that her mother is . She indicated that her father is . She indicated that the status of her sister is unknown. She indicated that the status of her brother is unknown. She indicated that the status of her maternal grandmother is unknown. She indicated that the status of her paternal grandmother is unknown. She indicated that the status of her maternal aunt is unknown.   family history includes Arthritis in her brother, mother, and sister; Cancer in her maternal aunt; Depression in her brother and sister; Diabetes in her maternal grandmother and sister; Early Death in her maternal aunt; Hearing Loss in her father and mother; Heart Disease in her paternal grandmother; Heart Disease (age of onset: 76) in her father; High Cholesterol in her mother; Stroke in her father. SOCIAL HISTORY      reports that she quit smoking about 54 years ago. Her smoking use included cigarettes. She started smoking about 65 years ago. She has a 22.00 pack-year smoking history. She has never used smokeless tobacco. She reports current alcohol use. She reports that she does not use drugs. PHYSICAL EXAM      height is 5' 7\" (1.702 m) and weight is 168 lb (76.2 kg). Her oral temperature is 97.8 °F (36.6 °C). Her blood pressure is 168/85 (abnormal) and her pulse is 68. Her respiration is 15 and oxygen saturation is 96%. Physical Exam  Vitals and nursing note reviewed. Constitutional:       Appearance: She is well-developed. She is not diaphoretic. HENT:      Head: Normocephalic and atraumatic. Nose: Nose normal.   Eyes:      General: No scleral icterus. Right eye: No discharge. Left eye: No discharge. Conjunctiva/sclera: Conjunctivae normal.      Pupils: Pupils are equal, round, and reactive to light.    Neck: Differential   Result Value Ref Range    WBC 3.3 (L) 4.8 - 10.8 thou/mm3    RBC 4.17 (L) 4.20 - 5.40 mill/mm3    Hemoglobin 10.4 (L) 12.0 - 16.0 gm/dl    Hematocrit 34.3 (L) 37.0 - 47.0 %    MCV 82.3 81.0 - 99.0 fL    MCH 24.9 (L) 26.0 - 33.0 pg    MCHC 30.3 (L) 32.2 - 35.5 gm/dl    RDW-CV 15.0 (H) 11.5 - 14.5 %    RDW-SD 45.1 (H) 35.0 - 45.0 fL    Platelets 877 738 - 346 thou/mm3    MPV 10.3 9.4 - 12.4 fL    Seg Neutrophils 51.5 %    Lymphocytes 30.2 %    Monocytes 12.5 %    Eosinophils 4.0 %    Basophils 1.5 %    Immature Granulocytes 0.3 %    Platelet Estimate ADEQUATE Adequate    Segs Absolute 1.7 (L) 1.8 - 7.7 thou/mm3    Lymphocytes Absolute 1.0 1.0 - 4.8 thou/mm3    Monocytes Absolute 0.4 0.4 - 1.3 thou/mm3    Eosinophils Absolute 0.1 0.0 - 0.4 thou/mm3    Basophils Absolute 0.0 0.0 - 0.1 thou/mm3    Immature Grans (Abs) 0.01 0.00 - 0.07 thou/mm3    nRBC 0 /100 wbc   Basic Metabolic Panel   Result Value Ref Range    Sodium 126 (L) 135 - 145 meq/L    Potassium 3.9 3.5 - 5.2 meq/L    Chloride 91 (L) 98 - 111 meq/L    CO2 25 23 - 33 meq/L    Glucose 113 (H) 70 - 108 mg/dL    BUN 12 7 - 22 mg/dL    CREATININE 0.7 0.4 - 1.2 mg/dL    Calcium 9.1 8.5 - 10.5 mg/dL   Troponin   Result Value Ref Range    Troponin T < 0.010 ng/ml   Anion Gap   Result Value Ref Range    Anion Gap 10.0 8.0 - 16.0 meq/L   Glomerular Filtration Rate, Estimated   Result Value Ref Range    Est, Glom Filt Rate 80 (A) ml/min/1.73m2   Osmolality   Result Value Ref Range    Osmolality Calc 253.9 (L) 275.0 - 300.0 mOsmol/kg   Hepatic Function Panel   Result Value Ref Range    Albumin 3.8 3.5 - 5.1 g/dL    Total Bilirubin 0.2 (L) 0.3 - 1.2 mg/dL    Bilirubin, Direct <0.2 0.0 - 0.3 mg/dL    Alkaline Phosphatase 91 38 - 126 U/L    AST 28 5 - 40 U/L    ALT 14 11 - 66 U/L    Total Protein 6.5 6.1 - 8.0 g/dL   Brain Natriuretic Peptide   Result Value Ref Range    Pro-.0 0.0 - 1800.0 pg/mL   Procalcitonin   Result Value Ref Range    Procalcitonin 0. 05 0.01 - 0.09 ng/mL   Scan of Blood Smear   Result Value Ref Range    SCAN OF BLOOD SMEAR see below    EKG Emergency   Result Value Ref Range    Ventricular Rate 74 BPM    Atrial Rate 74 BPM    P-R Interval 184 ms    QRS Duration 80 ms    Q-T Interval 364 ms    QTc Calculation (Bazett) 404 ms    P Axis 4 degrees    R Axis -30 degrees    T Axis 71 degrees       RADIOLOGY REPORTS  CTA CHEST W WO CONTRAST   Final Result   Impression:   No PE is identified. Large hiatal hernia. Prior granulomatous disease. This document has been electronically signed by: Mohamud Moncada MD on    05/16/2022 06:35 PM      All CTs at this facility use dose modulation techniques and iterative    reconstructions, and/or weight-based dosing   when appropriate to reduce radiation to a low as reasonably achievable. MEDICAL DEDISION MAKING (MDM)     Differential Diagnosis:   Syncope  Seizure  PE  ACS  Arrhythmia  Dehydration    Initial Plans:   Large bore IV  ECG  Labs  CTA Chest    Summary:    Stable ED stay. No more fainting episodes in ED. ECG and cardiac monitor do not show high degree AV block or other life-threatening arrhythmia. Labs are reviewed which are reassuring. Normal troponin. CTA chest: No PE, large hiatal hernia, no pneumonia. I feel patient's fainting episode is likely vasovagal.  Patient has a large recurrent hiatal hernia this can explain her chest pain or shortness of breath over last several months. No suspicion patient's syncope is secondary to [address], ACS, PE, aortic dissection, ruptured AAA, or other life-threatening events. She is discussed ED work-ups and reassured. Discharged with PCP, cardiology, GI, and GS follow-up.      ED Medications  Medications   0.9 % sodium chloride bolus (0 mLs IntraVENous Stopped 5/16/22 1833)   iopamidol (ISOVUE-370) 76 % injection 80 mL (80 mLs IntraVENous Given 5/16/22 1756)     Vital signs in ED  Vitals:    05/16/22 1610 05/16/22 1734 05/16/22 1810   BP: (!) 174/97 (!) 166/83 (!) 168/85   Pulse: 77 63 68   Resp: 19 16 15   Temp: 97.8 °F (36.6 °C)     TempSrc: Oral     SpO2: 95% 97% 96%   Weight: 168 lb (76.2 kg)     Height: 5' 7\" (1.702 m)         CRITICAL CARE   None    CONSULTS   None    PROCEDURES   None    FINAL IMPRESSION AND DISPOSITION      1. Near syncope    2. Hiatal hernia    3. Leukocytosis, unspecified type    4.  Hyponatremia        DISPOSITION Decision To Discharge 05/16/2022 07:43:40 PM        PATIENT REFERRED TO:  Cielo Aragon MD  [facility]  [address]  [place] [address] [address] [phone]    In 3 days  ED discharge follow-up for syncope    Jessa Carlisle [facility] [phone]    In 1 week  ED discharge follow-up for recurrent large hiatal hernia    Angel Cook MD  [address]  [phone]    In 1 week  ED discharge follow-up for recurrent large hiatal hernia    Carola Breath, DO  [address] [place] [address]  [phone]    In 3 days  ED discharge follow-up      DISCHARGE MEDICATIONS:  New Prescriptions    No medications on file       (Please note that portions of this note were completed with a voice recognition program.  Efforts were made to edit the dictations but occasionally words aremis-transcribed.)    MD Cam [address] MD  05/16/22 2159

## 2022-05-16 NOTE — ED NOTES
Patient resting in bed. Respirations easy and unlabored. No distress noted. Call light within reach.        Whitney Rodriguez RN  05/16/22 7753

## 2022-05-16 NOTE — ED NOTES
Patient resting in bed. Respirations easy and unlabored. No distress noted. Call light within reach.        Deanne Lee RN  05/16/22 7227

## 2022-05-19 ENCOUNTER — OFFICE VISIT (OUTPATIENT)
Dept: FAMILY MEDICINE CLINIC | Age: 82
End: 2022-05-19
Payer: MEDICARE

## 2022-05-19 ENCOUNTER — OFFICE VISIT (OUTPATIENT)
Dept: CARDIOLOGY CLINIC | Age: 82
End: 2022-05-19
Payer: MEDICARE

## 2022-05-19 VITALS
DIASTOLIC BLOOD PRESSURE: 72 MMHG | BODY MASS INDEX: 26.36 KG/M2 | SYSTOLIC BLOOD PRESSURE: 124 MMHG | TEMPERATURE: 97 F | RESPIRATION RATE: 14 BRPM | HEART RATE: 50 BPM | HEIGHT: 66 IN | OXYGEN SATURATION: 98 % | WEIGHT: 164 LBS

## 2022-05-19 VITALS
BODY MASS INDEX: 26.31 KG/M2 | SYSTOLIC BLOOD PRESSURE: 110 MMHG | HEIGHT: 67 IN | DIASTOLIC BLOOD PRESSURE: 62 MMHG | HEART RATE: 60 BPM

## 2022-05-19 DIAGNOSIS — R06.02 SOB (SHORTNESS OF BREATH): ICD-10-CM

## 2022-05-19 DIAGNOSIS — R55 NEAR SYNCOPE: ICD-10-CM

## 2022-05-19 DIAGNOSIS — I49.3 PVC (PREMATURE VENTRICULAR CONTRACTION): ICD-10-CM

## 2022-05-19 DIAGNOSIS — E87.1 HYPONATREMIA: Primary | ICD-10-CM

## 2022-05-19 DIAGNOSIS — R06.02 SHORTNESS OF BREATH: ICD-10-CM

## 2022-05-19 DIAGNOSIS — I10 PRIMARY HYPERTENSION: Primary | ICD-10-CM

## 2022-05-19 PROCEDURE — 3288F FALL RISK ASSESSMENT DOCD: CPT | Performed by: NURSE PRACTITIONER

## 2022-05-19 PROCEDURE — 99204 OFFICE O/P NEW MOD 45 MIN: CPT | Performed by: NUCLEAR MEDICINE

## 2022-05-19 PROCEDURE — 99214 OFFICE O/P EST MOD 30 MIN: CPT | Performed by: NURSE PRACTITIONER

## 2022-05-19 SDOH — ECONOMIC STABILITY: FOOD INSECURITY: WITHIN THE PAST 12 MONTHS, THE FOOD YOU BOUGHT JUST DIDN'T LAST AND YOU DIDN'T HAVE MONEY TO GET MORE.: NEVER TRUE

## 2022-05-19 SDOH — ECONOMIC STABILITY: FOOD INSECURITY: WITHIN THE PAST 12 MONTHS, YOU WORRIED THAT YOUR FOOD WOULD RUN OUT BEFORE YOU GOT MONEY TO BUY MORE.: NEVER TRUE

## 2022-05-19 ASSESSMENT — ENCOUNTER SYMPTOMS
BLOOD IN STOOL: 0
RECTAL PAIN: 0
CONSTIPATION: 0
DIARRHEA: 0
ANAL BLEEDING: 0
COLOR CHANGE: 0
NAUSEA: 0
BACK PAIN: 1
CHEST TIGHTNESS: 0
VOMITING: 0
ABDOMINAL PAIN: 0
SHORTNESS OF BREATH: 1
ABDOMINAL DISTENTION: 0

## 2022-05-19 ASSESSMENT — SOCIAL DETERMINANTS OF HEALTH (SDOH): HOW HARD IS IT FOR YOU TO PAY FOR THE VERY BASICS LIKE FOOD, HOUSING, MEDICAL CARE, AND HEATING?: NOT HARD AT ALL

## 2022-05-19 NOTE — PROGRESS NOTES
[address] [facility]  [address]  [place] [address]  Dept: [phone]  Dept Fax: [phone]  Loc: [phone]    Visit Date: 5/19/2022    Kar Gunter is a 78 y.o. female who presents todayfor:  Chief Complaint   Patient presents with    New Patient    Palpitations    Hypertension     Was in the ER lately   Not seen here in over 3 years   Was there for dyspnea  Has had more dyspnea lately   Associated fatigue and weakness  Near syncope  Dizziness   No chest pain per se  Has had known PVCs before  Known HTN on medical Rx  Runs okay   No known CAD  No smoking   Family history of CAD     HPI:  HPI  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 Gerhard Smith GERD (gastroesophageal reflux disease)     Diverticulitis     Hiatal hernia     Hypertension     Hypothyroidism     Osteoarthritis       Past Surgical History:   Procedure Laterality Date    APPENDECTOMY  1958    BACK SURGERY      CARPAL TUNNEL RELEASE Bilateral 2013    w/cubital tunnel    COLON SURGERY  07/29/2016    Procedure: ATTEMPTED DAVINCI XI ROBOTIC ASSISTED SIGMOID COLON RESECTION, ROBOTIC ASSISTED MOBILIZATION OF RECTAL SIGMOID TO SPLENIC FLEXURE, CONVERTED TO OPEN LEFT HEMICOLECTOMY WITH COLOPROCTOCTOMY, SPLENORRHAPHY, RIGID SIGMOIDOSCOPY, LASER EVALUATION OF VASCULARITY WITH ICG; Surgeon: Gerhard Smith MD; Location: [facility]; Service: General    COLONOSCOPY  2012    CYST REMOVAL  2010   [address], [address]    Cataract    FEMUR FRACTURE SURGERY Left 12/21/2020    LEFT FEMUR OPEN REDUCTION INTERNAL FIXATION performed by Sharon Azar MD at [place] [address] Left 2001   [address] [facility] N/A 03/09/2021 ROBOTIC EXTENSIVE LYSIS OF ADHESIONS, ROBOTIC REDUCTION OF HIATAL HERNIA WITH GASTROPEXY performed by Angel Cook MD at [address]    w/bladder suspension   [address]  2002, 2008, 2009    rt hip(2002), lt knee(2009), lt hip(2008) Shoulder (2009)    OTHER SURGICAL HISTORY  04/21/2021    Tank Pulse Sarai Current CNP in the office   [address] [facility]    PAIN MANAGEMENT PROCEDURE Bilateral 7/13/2021    medial branch blocks at bilateral L4/L5 and L5/S1 performed by Dl Jones DO at [address] Bilateral 8/24/2021    confirmatory  medial branch blocks at bilateral L4/L5 and L5/S1 [facility] Right 10/19/2021    thermal radiofrequency at BILATERAL medial branches L4/L5 and L5/S1 was chosen. We will start with the right side first performed by Dl Jones DO at [address] Left 12/7/2021    thermal radiofrequency at BILATERAL medial branches L4/L5 and L5/S1 was chosen.  performed by Dl Jones DO at [address] [place] SCRN NOT  [address] IND Left 09/15/2017    COLONOSCOPY performed by Hoa Skaggs MD at [facility]    discectomy   [address] [facility]    UPPER GASTROINTESTINAL ENDOSCOPY  2012     Family History   Problem Relation Age of Onset    Arthritis Mother     Hearing Loss Mother     High Cholesterol Mother    [place] Hearing Loss Father     Heart Disease Father 76        CABG    Stroke Father     Arthritis Sister     Depression Sister     Diabetes Sister     Arthritis Brother     Depression Brother     Cancer Maternal Aunt     Early Death Maternal Aunt     Diabetes Maternal Grandmother     Heart Disease Paternal Grandmother      Social History     Tobacco Use    Smoking status: Former Smoker     Packs/day: 2.00     Years: 11.00     Pack years: 22.00     Types: Cigarettes     Start date: 1957     Quit date: 1967     Years since quittin.6    Smokeless tobacco: Never Used   Substance Use Topics    Alcohol use: Yes     Comment: wine with dinner      Current Outpatient Medications   Medication Sig Dispense Refill    metoprolol succinate (TOPROL XL) 50 MG extended release tablet TAKE 1 TABLET DAILY 90 tablet 3    levothyroxine (SYNTHROID) 100 MCG tablet TAKE 1 TABLET DAILY 90 tablet 3    meloxicam (MOBIC) 15 MG tablet Take 1 tablet by mouth daily 90 tablet 1    pregabalin (LYRICA) 150 MG capsule Take 1 capsule by mouth 2 times daily for 180 days. 180 capsule 1    FLUoxetine (PROZAC) 20 MG capsule Take 1 capsule by mouth daily 90 capsule 3    docusate sodium (COLACE) 100 MG capsule Take 1 capsule by mouth 2 times daily as needed for Constipation 180 capsule 3    FLUoxetine (PROZAC) 10 MG capsule Take 1 capsule by mouth daily 90 capsule 0    enalapril (VASOTEC) 10 MG tablet Take 1 tablet by mouth nightly 90 tablet 3    Cranberry 500 MG TABS Take by mouth       Biotin w/ Vitamins C & E (HAIR/SKIN/NAILS PO) Take by mouth      calcium-vitamin D (OSCAL-500) 500-200 MG-UNIT per tablet Take 2 tablets by mouth daily      Misc. Devices (WALKER) MISC 1 each by Does not apply route daily 1 each 0    guaiFENesin 1200 MG TB12 Take 1,200 mg by mouth 2 times daily 20 tablet 0    Misc. Devices MISC Mechanical lift for a [employer] 1 Device 0    Scooter MISC by Does not apply route Power scooter 1 each 0    fluticasone (FLONASE) 50 MCG/ACT nasal spray 1 spray by Nasal route as needed for Rhinitis       Omega 3-6-9 Fatty Acids (OMEGA 3-6-9 COMPLEX) CAPS Take 1 capsule by mouth daily       therapeutic multivitamin-minerals (THERAGRAN-M) tablet Take 1 tablet by mouth nightly        No current facility-administered medications for this visit.      Allergies   Allergen Reactions    Ampicillin     Morphine Other (See Comments)     Hallucinations     Pcn [Penicillins] Itching    Sulfa Antibiotics      Health Maintenance   Topic Date Due    Annual Wellness Visit (AWV)  04/28/2022    Pneumococcal 65+ years Vaccine (2 - PCV) 02/09/2023    Depression Monitoring  05/10/2023    DTaP/Tdap/Td vaccine (2 - Td or Tdap) 09/08/2031    DEXA (modify frequency per FRAX score)  Completed    Flu vaccine  Completed    Shingles vaccine  Completed    COVID-19 Vaccine  Completed    Hepatitis A vaccine  Aged Out    Hepatitis B vaccine  Aged Out    Hib vaccine  Aged Out    Meningococcal (ACWY) vaccine  Aged Out       Subjective:  Review of Systems   Constitutional: Positive for fatigue. HENT: Negative for ear pain and mouth sores. Respiratory: Positive for shortness of breath. Negative for chest tightness. Cardiovascular: Positive for palpitations. Negative for chest pain. Gastrointestinal: Negative for abdominal distention, abdominal pain, anal bleeding, blood in stool, constipation, diarrhea, nausea, rectal pain and vomiting. Endocrine: Negative for polyphagia. Genitourinary: Negative for dysuria, frequency and urgency. Musculoskeletal: Positive for arthralgias and back pain. Negative for gait problem, joint swelling, myalgias, neck pain and neck stiffness. Skin: Negative for color change, pallor, rash and wound. Neurological: Positive for dizziness and light-headedness. Psychiatric/Behavioral: Negative for behavioral problems, confusion, decreased concentration, dysphoric mood and hallucinations. The patient is not nervous/anxious and is not hyperactive. Objective:  Physical Exam  HENT:      Head: Normocephalic. Right Ear: Tympanic membrane normal.      Nose: Nose normal.      Mouth/Throat:      Mouth: Mucous membranes are moist.   Eyes:      Pupils: Pupils are equal, round, and reactive to light.    Cardiovascular:      Rate and Rhythm: Normal rate and regular rhythm. Heart sounds: Murmur heard. No gallop. Pulmonary:      Effort: No respiratory distress. Breath sounds: No stridor. No wheezing, rhonchi or rales. Chest:      Chest wall: No tenderness. Abdominal:      General: There is no distension. Palpations: There is no mass. Tenderness: There is no abdominal tenderness. There is no right CVA tenderness, left CVA tenderness, guarding or rebound. Hernia: No hernia is present. Musculoskeletal:         General: No swelling, tenderness, deformity or signs of injury. Cervical back: Normal range of motion. Right lower leg: No edema. Left lower leg: No edema. Skin:     Coloration: Skin is not jaundiced or pale. Findings: No bruising, erythema, lesion or rash. Neurological:      Mental Status: She is alert and oriented to person, place, and time. Cranial Nerves: No cranial nerve deficit. Sensory: No sensory deficit. Motor: No weakness. Coordination: Coordination normal.      Gait: Gait normal.      Deep Tendon Reflexes: Reflexes normal.   Psychiatric:         Mood and Affect: Mood normal.         Thought Content: Thought content normal.       /62   Pulse 60   Ht 5' 7\" (1.702 m)   BMI 26.31 kg/m²     Assessment:      Diagnosis Orders   1. Primary hypertension     2. PVC (premature ventricular contraction)     as above  Symptoms as above  ? ? Angina   Vs hiatal hernia related   Vs low BP  Non specific ECG       Plan:  No follow-ups on file. As above  Follow the hgb as she is anemic  Consider a stress test   Monitor the BP  holter   Decide after that   Continue risk factor modification and medical management    Thank you for allowing me to participate in the care of your patient. Please don't hesitate to contact me regarding any further issues related to the patient care'    Orders Placed:  No orders of the defined types were placed in this encounter.       Medications Prescribed:  No orders of the defined types were placed in this encounter. Discussed use, benefit, and side effects of prescribed medications. All patient questions answered. Pt voicedunderstanding. Instructed to continue current medications, diet and exercise. Continue risk factor modification and medical management. Patient agreed with treatment plan. Follow up as directed.     Electronically signedby Becky Lantigua MD on 5/19/2022 at 10:08 AM

## 2022-05-19 NOTE — PROGRESS NOTES
Phyllis Gunter is a 78 y.o. female that presents for Follow-up        ER Follow Up:  Patient presents for ER follow up. Patient recently seen in ED for evaluation and treatment of dizziness and near syncope. Symptoms prior to presenting to ER:  Dizziness and near syncopal episode while she was shopping at [facility] on 5-. Can recall most events of that day. ER Course:  EKG and Cardiac monitor with no life threatening arrhythmia. Normal troponin, CTA chest was negative, large hiatal hernia. Sodium was 126. Medications/Treatments at discharge:  Seeing General Surgeon for hiatal hernia, and GI Monday. Clinical course since discharge:  Seen by Dr Bobby Stacy earlier today. Considering a stress test. Ordered holter monitor. Still a little shaky, but no further episodes of syncope. Eating ok. Having trouble breathing which is stable and food getting stuck now. REVIEW OF SYSTEMS:  General/Constitutional:  Negative for fever, chills, fatigue, recent weight gain or loss. HEENT:  Negative for changes in vision, ear pain, nose pain, sore throat, dysphagia or odynophagia. Cardiovascular:  Negative for palpitations, chest pain, dyspnea on exertion, or orthopnea   Respiratory:  Negative for  cough, hemoptysis, dyspnea or wheezing. Gastrointestinal:  Negative for abdominal pain, nausea, vomiting, diarrhea, constipation or changes in bowel habits. Genitourinary: Negative for dysuria or hematuria. No frequency, urgency, or hesitancy. Musculoskeletal: Negative for arthralgias, myalgias, or back pain. Neurological: Negative for dizziness, syncope, focal weakness, paresthesias or headaches. Psychiatric: Negative for depression, anxiety, SI/HI   Skin: Negative for acute skin lesions. PHYSICAL EXAM:  Blood pressure 124/72, pulse 50, temperature 97 °F (36.1 °C), temperature source Infrared, resp. rate 14, height 5' 6\" (1.676 m), weight 164 lb (74.4 kg), SpO2 98 %.   GEN: No acute distress  HEENT:  NCAT, PERRL, EOMI, Nares clear, turbinates pink, mucosa is moist.  Oropharynx  is clear. Hearing grossly intact. Dentition is normal for age. Heart: RRR. S1 and S2 normal, no murmurs, clicks, gallops or rubs. Lungs:  CTAB,  No wheezing, ronchi, or rales. Normal symmetric air entry throughout both lung fields. Abdomen:  Soft, non tender, non distended. No rebound or guarding. No organomegaly. Extremities:  No gross deformity, erythema or edema of the lower extremities. Skin: No pathologic lesions or significant rash. Psych:  Affect appropriate. Thought process is normal without evidence of depression or psychosis. Good insight and appropriae interaction. Cognition and memory appear to be intact. ASSESSMENT & PLAN  Trista Betancur was seen today for follow-up. Diagnoses and all orders for this visit:    Hyponatremia  -     Sodium; Future    Shortness of breath    Near syncope      Will get PFT results and Iron studies from [facility]  Continue follow up with specialist.  Recheck sodium next week. No follow-ups on file.

## 2022-05-23 LAB — SODIUM BLD-SCNC: 129 MEQ/L (ref 135–145)

## 2022-05-26 ENCOUNTER — TELEPHONE (OUTPATIENT)
Dept: FAMILY MEDICINE CLINIC | Age: 82
End: 2022-05-26

## 2022-05-26 NOTE — TELEPHONE ENCOUNTER
----- Message from FLORIDA Malin CNP sent at 5/24/2022  4:36 PM EDT -----  Sodium 129, going up since last week. Will monitor for now .

## 2022-06-07 ENCOUNTER — ANESTHESIA (OUTPATIENT)
Dept: ENDOSCOPY | Age: 82
End: 2022-06-07
Payer: MEDICARE

## 2022-06-07 ENCOUNTER — HOSPITAL ENCOUNTER (OUTPATIENT)
Age: 82
Setting detail: OUTPATIENT SURGERY
Discharge: HOME | End: 2022-06-07
Attending: INTERNAL MEDICINE | Admitting: INTERNAL MEDICINE
Payer: MEDICARE

## 2022-06-07 ENCOUNTER — ANESTHESIA EVENT (OUTPATIENT)
Dept: ENDOSCOPY | Age: 82
End: 2022-06-07
Payer: MEDICARE

## 2022-06-07 VITALS
HEART RATE: 59 BPM | WEIGHT: 161.4 LBS | SYSTOLIC BLOOD PRESSURE: 163 MMHG | RESPIRATION RATE: 18 BRPM | OXYGEN SATURATION: 95 % | TEMPERATURE: 96.4 F | HEIGHT: 66 IN | DIASTOLIC BLOOD PRESSURE: 78 MMHG | BODY MASS INDEX: 25.94 KG/M2

## 2022-06-07 PROCEDURE — 3700000000 HC ANESTHESIA ATTENDED CARE: Performed by: INTERNAL MEDICINE

## 2022-06-07 PROCEDURE — 6360000002 HC RX W HCPCS: Performed by: REGISTERED NURSE

## 2022-06-07 PROCEDURE — 2580000003 HC RX 258: Performed by: INTERNAL MEDICINE

## 2022-06-07 PROCEDURE — 7100000010 HC PHASE II RECOVERY - FIRST 15 MIN: Performed by: INTERNAL MEDICINE

## 2022-06-07 PROCEDURE — 2500000003 HC RX 250 WO HCPCS: Performed by: REGISTERED NURSE

## 2022-06-07 PROCEDURE — 2720000010 HC SURG SUPPLY STERILE: Performed by: INTERNAL MEDICINE

## 2022-06-07 PROCEDURE — 3609012400 HC EGD TRANSORAL BIOPSY SINGLE/MULTIPLE: Performed by: INTERNAL MEDICINE

## 2022-06-07 PROCEDURE — 3700000001 HC ADD 15 MINUTES (ANESTHESIA): Performed by: INTERNAL MEDICINE

## 2022-06-07 PROCEDURE — 88305 TISSUE EXAM BY PATHOLOGIST: CPT

## 2022-06-07 RX ORDER — SODIUM CHLORIDE 9 MG/ML
INJECTION, SOLUTION INTRAVENOUS CONTINUOUS
Status: DISCONTINUED | OUTPATIENT
Start: 2022-06-07 | End: 2022-06-07 | Stop reason: HOSPADM

## 2022-06-07 RX ORDER — SODIUM CHLORIDE 0.9 % (FLUSH) 0.9 %
5-40 SYRINGE (ML) INJECTION PRN
Status: DISCONTINUED | OUTPATIENT
Start: 2022-06-07 | End: 2022-06-07 | Stop reason: HOSPADM

## 2022-06-07 RX ORDER — LIDOCAINE HYDROCHLORIDE 20 MG/ML
INJECTION, SOLUTION INFILTRATION; PERINEURAL PRN
Status: DISCONTINUED | OUTPATIENT
Start: 2022-06-07 | End: 2022-06-07 | Stop reason: SDUPTHER

## 2022-06-07 RX ORDER — SODIUM CHLORIDE 0.9 % (FLUSH) 0.9 %
5-40 SYRINGE (ML) INJECTION EVERY 12 HOURS SCHEDULED
Status: DISCONTINUED | OUTPATIENT
Start: 2022-06-07 | End: 2022-06-07 | Stop reason: HOSPADM

## 2022-06-07 RX ORDER — SODIUM CHLORIDE 9 MG/ML
25 INJECTION, SOLUTION INTRAVENOUS PRN
Status: DISCONTINUED | OUTPATIENT
Start: 2022-06-07 | End: 2022-06-07 | Stop reason: HOSPADM

## 2022-06-07 RX ORDER — PROPOFOL 10 MG/ML
INJECTION, EMULSION INTRAVENOUS PRN
Status: DISCONTINUED | OUTPATIENT
Start: 2022-06-07 | End: 2022-06-07 | Stop reason: SDUPTHER

## 2022-06-07 RX ADMIN — SODIUM CHLORIDE: 9 INJECTION, SOLUTION INTRAVENOUS at 13:18

## 2022-06-07 RX ADMIN — PROPOFOL 20 MG: 10 INJECTION, EMULSION INTRAVENOUS at 13:29

## 2022-06-07 RX ADMIN — LIDOCAINE HYDROCHLORIDE 100 MG: 20 INJECTION, SOLUTION INFILTRATION; PERINEURAL at 13:26

## 2022-06-07 RX ADMIN — PROPOFOL 40 MG: 10 INJECTION, EMULSION INTRAVENOUS at 13:26

## 2022-06-07 ASSESSMENT — PAIN - FUNCTIONAL ASSESSMENT: PAIN_FUNCTIONAL_ASSESSMENT: 0-10

## 2022-06-07 NOTE — ANESTHESIA PRE PROCEDURE
Department of Anesthesiology  Preprocedure Note       Name:  Dede Dubon Height   Age:  78 y.o.  :  1940                                          MRN:  [id number]         Date:  2022      Surgeon: Briana Tyler):  Dana Diaz MD    Procedure: Procedure(s):  EGD DILATATION    Medications prior to admission:   Prior to Admission medications    Medication Sig Start Date End Date Taking? Authorizing Provider   metoprolol succinate (TOPROL XL) 50 MG extended release tablet TAKE 1 TABLET DAILY 22   Lebron Harness, DO   levothyroxine (SYNTHROID) 100 MCG tablet TAKE 1 TABLET DAILY 22   Lebron Harness, DO   meloxicam (MOBIC) 15 MG tablet Take 1 tablet by mouth daily 22   Jared Dale, DO   pregabalin (LYRICA) 150 MG capsule Take 1 capsule by mouth 2 times daily for 180 days. 4/19/22 10/16/22  Lebron Harness, DO   FLUoxetine (PROZAC) 20 MG capsule Take 1 capsule by mouth daily 22   Lebron Harness, DO   docusate sodium (COLACE) 100 MG capsule Take 1 capsule by mouth 2 times daily as needed for Constipation 3/15/22   Lebron Harness, DO   FLUoxetine (PROZAC) 10 MG capsule Take 1 capsule by mouth daily 22   FLORIDA Pham - CNP   enalapril (VASOTEC) 10 MG tablet Take 1 tablet by mouth nightly 21   Lebron Harness, DO   Cranberry 500 MG TABS Take by mouth     Historical Provider, MD   Biotin w/ Vitamins C & E (HAIR/SKIN/NAILS PO) Take by mouth    Historical Provider, MD   calcium-vitamin D (OSCAL-500) 500-200 MG-UNIT per tablet Take 2 tablets by mouth daily    Historical Provider, MD   Misc. Devices [facility]) MISC 1 each by Does not apply route daily 20   Lebron Harness, DO   guaiFENesin 1200 MG TB12 Take 1,200 mg by mouth 2 times daily 19   Cesar Martinez PA-C   Misc.  Devices MISC Mechanical lift for a [address]   Lebron Harness, DO   Scooter MISC by Does not apply route Power scooter 10/31/17   Lebron Harness, DO   fluticasone (FLONASE) 50 MCG/ACT nasal spray 1 spray by Nasal route as needed for Rhinitis     Historical Provider, MD   Omega 3-6-9 Fatty Acids (OMEGA 3-6-9 COMPLEX) CAPS Take 1 capsule by mouth daily     Historical Provider, MD   therapeutic multivitamin-minerals (THERAGRAN-M) tablet Take 1 tablet by mouth nightly     Historical Provider, MD       Current medications:    No current facility-administered medications for this visit. No current outpatient medications on file. Facility-Administered Medications Ordered in Other Visits   Medication Dose Route Frequency Provider Last Rate Last Admin    sodium chloride flush 0.9 % injection 5-40 mL  5-40 mL IntraVENous 2 times per day Damian Perkins MD        sodium chloride flush 0.9 % injection 5-40 mL  5-40 mL IntraVENous PRN Damian Perkins MD        0.9 % sodium chloride infusion  25 mL IntraVENous PRN Damian Perkins MD        0.9 % sodium chloride infusion   IntraVENous Continuous Damian Perkins MD           Allergies:     Allergies   Allergen Reactions    Ampicillin     Morphine Other (See Comments)     Hallucinations     Pcn [Penicillins] Itching    Sulfa Antibiotics        Problem List:    Patient Active Problem List   Diagnosis Code    Osteoarthritis M19.90    Fibromyalgia M79.7    Gastroesophageal reflux disease K21.9    Hypothyroidism E03.9    Patient is Gnosticist Z78.9    Back pain M54.9    H/O abdominal surgery Z98.890    Normocytic anemia D64.9    History of diverticulosis Z87.19    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.40, G89.29    Chest pain R07.9    Essential hypertension I10    Abnormal CT scan, chest R93.89    BRBPR (bright red blood per rectum) K62.5    GI bleed due to NSAIDs K92.2, T39.395A    Spinal stenosis of lumbar region without neurogenic claudication M48.061    Other idiopathic scoliosis, lumbar region M41.26    Closed fracture of distal end of left femur, initial encounter ([facility]) S72.402A    Pre-syncope R55    Weakness generalized R53.1    Large hiatal hernia K44.9    Hypo-osmolar hyponatremia E87.1    Esophageal dysphagia R13.19    Hypomagnesemia E83.42    Dysphagia R13.10    Bilateral lower extremity edema R60.0    Hx of fracture of leg Z87.81    Lung nodule R91.1    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 Valorie Goins GERD (gastroesophageal reflux disease)     Diverticulitis     Hiatal hernia     Hypertension     Hypothyroidism     Osteoarthritis        Past Surgical History:        Procedure Laterality Date    APPENDECTOMY  1958    BACK SURGERY      CARPAL TUNNEL RELEASE Bilateral 2013    w/cubital tunnel    COLON SURGERY  07/29/2016    Procedure: ATTEMPTED DAVINCI XI ROBOTIC ASSISTED SIGMOID COLON RESECTION, ROBOTIC ASSISTED MOBILIZATION OF RECTAL SIGMOID TO SPLENIC FLEXURE, CONVERTED TO OPEN LEFT HEMICOLECTOMY WITH COLOPROCTOCTOMY, SPLENORRHAPHY, RIGID SIGMOIDOSCOPY, LASER EVALUATION OF VASCULARITY WITH ICG; Surgeon: Valorie Goins MD; Location: [facility]; Service: General    COLONOSCOPY  2012    CYST REMOVAL  2010   [address], [address]    Cataract    FEMUR FRACTURE SURGERY Left 12/21/2020    LEFT FEMUR OPEN REDUCTION INTERNAL FIXATION performed by Kelsea Marquis MD at [place] [address] Left 2001    HIATAL HERNIA REPAIR N/A 03/09/2021    ROBOTIC EXTENSIVE LYSIS OF ADHESIONS, ROBOTIC REDUCTION OF HIATAL HERNIA WITH GASTROPEXY performed by Mya Sharif MD at [address] [place] [address]    w/bladder suspension   [address]/ [address]  2002, 2008, 2009    rt hip(), lt knee(), lt hip(2008) Shoulder ()    OTHER SURGICAL HISTORY  2021    Db Sessions Savana Gimenez CNP in the office   [facility]ED[facility] Bilateral 2021    medial branch blocks at bilateral L4/L5 and L5/S1 performed by Nolberto Sarah DO at [address] Bilateral 2021    confirmatory  medial branch blocks at bilateral L4/L5 and L5/S1 performed by Nolberto Sarah DO at [address] Right 10/19/2021    thermal radiofrequency at BILATERAL medial branches L4/L5 and L5/S1 was chosen. We will start with the right side first performed by Nolberto Sarah DO at [address] Left 2021    thermal radiofrequency at BILATERAL medial branches L4/L5 and L5/S1 was chosen. performed by Nolberto Sarah DO at [address] [place] SCRN NOT  [address] IND Left 09/15/2017    COLONOSCOPY performed by Jhony Woods MD at [facility]   [facility]    discectomy   [facility]    UPPER GASTROINTESTINAL ENDOSCOPY         Social History:    Social History     Tobacco Use    Smoking status: Former Smoker     Packs/day: 2.00     Years: 11.00     Pack years: 22.00     Types: Cigarettes     Start date: 1957     Quit date: 1967     Years since quittin.5    Smokeless tobacco: Never Used   Substance Use Topics    Alcohol use: Yes     Comment: wine with dinner                                Counseling given: Not Answered      Vital Signs (Current): There were no vitals filed for this visit.                                            BP Readings from Last 3 Encounters:   22 (!) 176/80   22 124/72   22 110/62       NPO Status:                                                                                 BMI:   Wt Readings from Last 3 Encounters:   06/07/22 161 lb 6.4 oz (73.2 kg)   05/19/22 164 lb (74.4 kg)   05/16/22 168 lb (76.2 kg)     There is no height or weight on file to calculate BMI.    CBC:   Lab Results   Component Value Date    WBC 3.3 05/16/2022    RBC 4.17 05/16/2022    HGB 10.4 05/16/2022    HCT 34.3 05/16/2022    MCV 82.3 05/16/2022    RDW 15.4 05/12/2022     05/16/2022       CMP:   Lab Results   Component Value Date     05/23/2022    K 3.9 05/16/2022    K 4.9 03/10/2021    CL 91 05/16/2022    CO2 25 05/16/2022    BUN 12 05/16/2022    CREATININE 0.7 05/16/2022    AGRATIO 1.4 05/12/2022    LABGLOM 80 05/16/2022    GLUCOSE 113 05/16/2022    GLUCOSE 91 05/12/2022    PROT 6.5 05/16/2022    CALCIUM 9.1 05/16/2022    BILITOT 0.2 05/16/2022    ALKPHOS 91 05/16/2022    AST 28 05/16/2022    ALT 14 05/16/2022       POC Tests: No results for input(s): POCGLU, POCNA, POCK, POCCL, POCBUN, POCHEMO, POCHCT in the last 72 hours. Coags:   Lab Results   Component Value Date    INR 0.90 02/27/2021    APTT 29.6 02/27/2021       HCG (If Applicable): No results found for: PREGTESTUR, PREGSERUM, HCG, HCGQUANT     ABGs: No results found for: PHART, PO2ART, ECV1DRD, VHZ4YRF, BEART, F7TSKTAH     Type & Screen (If Applicable):  No results found for: LABABO, LABRH    Drug/Infectious Status (If Applicable):  No results found for: HIV, HEPCAB    COVID-19 Screening (If Applicable):   Lab Results   Component Value Date    COVID19 NOT  DETECTED 05/16/2022    COVID19 Not Detected 10/06/2020         Anesthesia Evaluation   no history of anesthetic complications:   Airway: Mallampati: II  TM distance: >3 FB   Neck ROM: full  Mouth opening: > = 3 FB   Dental:    (+) upper dentures and lower dentures      Pulmonary:normal exam              Patient did not smoke on day of surgery.                  Cardiovascular:  Exercise tolerance: good (>4 METS),   (+) hypertension:,       ECG reviewed                        Neuro/Psych: Negative Neuro/Psych ROS              GI/Hepatic/Renal:   (+) hiatal hernia, GERD:,           Endo/Other:    (+) hypothyroidism::., .          Pt had no PAT visit       Abdominal:             Vascular: Other Findings:             Anesthesia Plan      MAC     ASA 3       Induction: intravenous. Anesthetic plan and risks discussed with patient, spouse and child/children. Use of blood products discussed with patient whom did not consent to blood products. Special considerations: Religious. Plan discussed with CRNA.     Attending anesthesiologist reviewed and agrees with [facility] [place] Pastor CRNA   6/7/2022

## 2022-06-07 NOTE — H&P
[facility]  Sedation/Analgesia History & Physical    Patient: Ailyn Gunter : 1940  Med Rec#: [id number] Acc#: [id number]   Provider Performing Procedure: Tim Cevallos MD  Primary Care Physician: Linda Irby DO    PRE-PROCEDURE   Full CODE [x]Yes  DNR-CCA/DNR-CC []Yes   Brief History/Pre-Procedure Diagnosisdysphagia          MEDICAL HISTORY  []CAD/Valve  []Liver Disease  []Lung Disease []Diabetes  []Hypertension []Renal Disease  []Additional information:       has a past medical history of Abnormal EKG, Anemia, Back pain, Blood circulation, collateral, Diverticulitis, Fibromyalgia, GERD (gastroesophageal reflux disease), Hiatal hernia, Hypertension, Hypothyroidism, and Osteoarthritis. SURGICAL HISTORY   has a past surgical history that includes Appendectomy (); Colonoscopy (); eye surgery (, ); Hysterectomy (); Spine surgery (); Upper gastrointestinal endoscopy (); cyst removal (); Tonsillectomy (); Tooth Extraction (); Ovary removal (); Foot surgery (Left, ); Carpal tunnel release (Bilateral, ); Small intestine surgery; back surgery; Colon surgery (2016); joint replacement (, , ); pr colon ca scrn not hi rsk ind (Left, 09/15/2017); Femur fracture surgery (Left, 2020); hiatal hernia repair (N/A, 2021); other surgical history (2021); Pain management procedure (Bilateral, 2021); Pain management procedure (Bilateral, 2021); Pain management procedure (Right, 10/19/2021); and Pain management procedure (Left, 2021).   Additional information:       ALLERGIES   Allergies as of 2022 - Fully Reviewed 2022   Allergen Reaction Noted    Ampicillin  2013    Morphine Other (See Comments) 2017    Pcn [penicillins] Itching 2013    Sulfa antibiotics  2013     Additional information:       MEDICATIONS   Coumadin Use Last 7 Days [x]No []Yes  Antiplatelet drug therapy use last 7 days  [x]No []Yes  Other anticoagulant use last 7 days  [x]No []Yes    Current Facility-Administered Medications:     sodium chloride flush 0.9 % injection 5-40 mL, 5-40 mL, IntraVENous, 2 times per day, Merissa Hayes MD    sodium chloride flush 0.9 % injection 5-40 mL, 5-40 mL, IntraVENous, PRN, Merissa Hayes MD    0.9 % sodium chloride infusion, 25 mL, IntraVENous, PRN, Merissa Hayes MD    0.9 % sodium chloride infusion, , IntraVENous, Continuous, Merissa Hayes MD  Prior to Admission medications    Medication Sig Start Date End Date Taking? Authorizing Provider   metoprolol succinate (TOPROL XL) 50 MG extended release tablet TAKE 1 TABLET DAILY 5/16/22   Jordi Bennett DO   levothyroxine (SYNTHROID) 100 MCG tablet TAKE 1 TABLET DAILY 5/16/22   Jordi Bennett DO   meloxicam (MOBIC) 15 MG tablet Take 1 tablet by mouth daily 5/11/22   Jared Dale DO   pregabalin (LYRICA) 150 MG capsule Take 1 capsule by mouth 2 times daily for 180 days. 4/19/22 10/16/22  Jordi Bennett DO   FLUoxetine (PROZAC) 20 MG capsule Take 1 capsule by mouth daily 4/4/22   Jordi Bennett DO   docusate sodium (COLACE) 100 MG capsule Take 1 capsule by mouth 2 times daily as needed for Constipation 3/15/22   Jordi Bennett DO   FLUoxetine (PROZAC) 10 MG capsule Take 1 capsule by mouth daily 2/9/22   FLORIDA Haynes CNP   enalapril (VASOTEC) 10 MG tablet Take 1 tablet by mouth nightly 6/28/21   Jordi Bennett DO   Cranberry 500 MG TABS Take by mouth     Historical Provider, MD   Biotin w/ Vitamins C & E (HAIR/SKIN/NAILS PO) Take by mouth    Historical Provider, MD   calcium-vitamin D (OSCAL-500) 500-200 MG-UNIT per tablet Take 2 tablets by mouth daily    Historical Provider, MD [place]. Devices [facility]) MISC 1 each by Does not apply route daily 2/11/20   Jordi Bennett DO   guaiFENesin 1200 MG TB12 Take 1,200 mg by mouth 2 times daily 12/21/19   FRED Swanc.  Devices MISC Mechanical lift for a Claire Railing 5/31/18   Carola Breath, DO   Scooter [address] [facility] by Does not apply route Power scooter 10/31/17   Carola Breath, DO   fluticasone (FLONASE) 50 MCG/ACT nasal spray 1 spray by Nasal route as needed for Rhinitis     Historical Provider, MD   Omega 3-6-9 Fatty Acids (OMEGA 3-6-9 COMPLEX) CAPS Take 1 capsule by mouth daily     Historical Provider, MD   therapeutic multivitamin-minerals (THERAGRAN-M) tablet Take 1 tablet by mouth nightly     Historical Provider, MD     Additional information:       PHYSICAL:   Heart:  [x]Regular rate and rhythm  []Other:    Lungs:  [x]Clear    []Other:    Abdomen: [x]Soft    []Other:    Mental Status: [x]Alert & Oriented  []Other:      VITAL SIGNS   Patient Vitals for the past 24 hrs:   BP Temp Temp src Pulse Resp SpO2 Height Weight   06/07/22 1206 (!) 176/80 (!) 96.7 °F (35.9 °C) Temporal 57 18 97 % 5' 6\" (1.676 m) 161 lb 6.4 oz (73.2 kg)       PLANNED PROCEDURE  [x]EGD  []Colonoscopy []Flex Sigmoid  []ERCP []EUS   []Cystoscopy  [] CATH [] BRONCH   Consent: I have discussed with the patient and/or the patient representative the indication, alternatives, and the possible risks and/or complications of the planned procedure and the anesthesia methods. The patient and/or patient representative appear to understand and agree to proceed. SEDATION  Planned agent:[]Midazolam []Meperidine []Sublimaze []Morphine  []Diazepam [x]Propofol  []Other:     ASA Classification: Class 3 - A patient with severe systemic disease that limits activity but is not incapacitating    Airway Assessment: normal    Monitoring and Safety: The patient will be placed on a cardiac monitor and vital signs, pulse oximetry and level of consciousness will be continuously evaluated throughout the procedure. The patient will be closely monitored until recovery from the medications is complete and the patient has returned to baseline status. Respiratory therapy will be on standby during the procedure.     [x]Pre-procedure diagnostic studies complete and results available. Comment:    [x]Previous sedation/anesthesia experiences assessed. Comment:    [x]The patient is an appropriate candidate to undergo the planned procedure sedation and anesthesia. (Refer to nursing sedation/analgesia documentation record)  [x]Formulation and discussion of sedation/procedure plan, risks, and expectations with patient and/or responsible adult completed. [x]Patient examined immediately prior to the procedure.  (Refer to nursing sedation/analgesia documentation record)    Doug Oshea MD, MD   Electronically signed 6/7/2022 at 12:34 PM

## 2022-06-07 NOTE — POST SEDATION
[address] [place] [address]  Sedation/Analgesia Post Sedation Record    Patient: Troy Gunter : 1940  Med Rec#: [id number] Acc#: [id number]   Procedure Performed By: Grace Fields MD  Primary Care Physician: Nola Talbert DO    POST-PROCEDURE    Physicians/Assistants: Grace Fields MD  Procedure Performed:    Sedation/Anesthesia:     Estimated Blood Loss:          ml  Specimens Removed:  []None [x]Other:      Disposition of Specimen:  [x]Pathology []Other      Complications:   [x]None Immediate []Other:     Post-procedure Diagnosis/Findings:             Recommendations:           hernia  Grace Fields MD, MD [facility]  Electronically signed 2022 at 1:35 PM

## 2022-06-07 NOTE — OP NOTE
[address] [id number]                                OPERATIVE REPORT    PATIENT NAME: Monty Ramirez                    :        1940  MED REC NO:   [id number]                           ROOM:  ACCOUNT NO:   [de-identified]                           ADMIT DATE: 2022  PROVIDER:     Maria Luisa Ibarra M.D.    DATE OF PROCEDURE:  2022    PROCEDURE:  EGD plus biopsy. SURGEON:  Maria Luisa Ibarra MD    INDICATION FOR THE PROCEDURE:  The patient with a history of dysphagia  and shortness of breath. The patient had a large paraesophageal hernia  with gastric volvulus that was surgically taken care and the patient had  improvement in symptoms. Lately, some of the same symptoms are coming  back. See the preop note from here and office note for rest of  clinicals. ASA CLASSIFICATION:  III. MEDICATIONS:  Per Anesthesia. BIOPSIES:  Yes. PHOTOGRAPHS:  Yes. BLOOD LOSS:  None. DESCRIPTION OF PROCEDURE:  Informed consent was obtained after  explaining risks and benefits of the procedure and conscious sedation. Possible complications including bleeding, perforation, reaction to  medicine, but not limiting to death were discussed. Afterwards, GIF-180 gastroscope was advanced through the oropharynx,  esophagus, stomach into the duodenum. Normal-looking duodenal bulb and  second portion. Scope was withdrawn. The patient having gastritis. Retroflex exam showed large hernia. The patient having some Mickey  ulceration. See photographs. Biopsies were taken. GE junction is  quite high and large hernia was seen, it seems to be fixed. The patient  having some presbyesophagus above that. Tolerated the procedure well. IMPRESSION:  1. Large fixed hernia which had improved with the surgery, but I am  afraid that endoscopically it looks like it again needs adjustment.   2.  Viridiana Borer ulceration, which unlikely the ischemic ulcers. RECOMMENDATIONS:  We will bring the patient back in the office in few  weeks to continue discussion and based on the patient and family wishes,  we may consider evaluation [facility], may need an esophagogram  before that. I will set up an office visit in few weeks.         Archana Gibson M.D.    D: 06/07/2022 13:45:20       T: 06/07/2022 14:47:43     [email or web address]  Job#: [id number]     Doc#: [id number]    CC:  Anita Quinones D.O.

## 2022-06-07 NOTE — PROGRESS NOTES
EGD completed. Tolerated well. Photos taken. Biopsies taken. One specimen jar labeled and sent to lab. Scope [id number].

## 2022-06-07 NOTE — PROGRESS NOTES
Recovery mode. Patient denies discomfort. Taking fluids.  discussed findings with patient and . Discharge instructions provided and understanding verbalized.

## 2022-06-07 NOTE — ANESTHESIA POSTPROCEDURE EVALUATION
Department of Anesthesiology  Postprocedure Note    Patient: Lito Gunter  MRN: [id number]  YOB: 1940  Date of evaluation: 6/7/2022  Time:  1:37 PM     Procedure Summary     Date: 06/07/22 Room / Location: [address] [place] [address] / [address] [place] [address]    Anesthesia Start: 0918 Anesthesia Stop: 9152    Procedure: EGD BIOPSY (Left Esophagus) Diagnosis:       Dysphagia, unspecified type      (Dysphagia, unspecified type [R13.10])    Surgeons: Jo Angel MD Responsible Provider: Nathanael Plata DO    Anesthesia Type: MAC ASA Status: 3          Anesthesia Type: No value filed. Mat Phase I: Mat Score: 10    Mat Phase II:      Last vitals: Reviewed and per EMR flowsheets.        Anesthesia Post Evaluation    Patient location during evaluation: bedside  Patient participation: complete - patient participated  Level of consciousness: awake and alert  Airway patency: patent  Nausea & Vomiting: no nausea and no vomiting  Complications: no  Cardiovascular status: hemodynamically stable  Respiratory status: acceptable, spontaneous ventilation and nasal cannula  Hydration status: stable

## 2022-06-08 ENCOUNTER — OFFICE VISIT (OUTPATIENT)
Dept: SURGERY | Age: 82
End: 2022-06-08
Payer: MEDICARE

## 2022-06-08 VITALS
OXYGEN SATURATION: 95 % | WEIGHT: 161 LBS | HEART RATE: 88 BPM | TEMPERATURE: 96.5 F | DIASTOLIC BLOOD PRESSURE: 62 MMHG | SYSTOLIC BLOOD PRESSURE: 120 MMHG | RESPIRATION RATE: 20 BRPM | BODY MASS INDEX: 25.88 KG/M2 | HEIGHT: 66 IN

## 2022-06-08 DIAGNOSIS — K44.9 LARGE HIATAL HERNIA: Primary | ICD-10-CM

## 2022-06-08 PROCEDURE — 1123F ACP DISCUSS/DSCN MKR DOCD: CPT | Performed by: SURGERY

## 2022-06-08 PROCEDURE — 99213 OFFICE O/P EST LOW 20 MIN: CPT | Performed by: SURGERY

## 2022-06-11 ENCOUNTER — HOSPITAL ENCOUNTER (OUTPATIENT)
Dept: MRI IMAGING | Age: 82
Discharge: HOME | End: 2022-06-11
Payer: MEDICARE
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
## 2022-06-11 PROCEDURE — 72148 MRI LUMBAR SPINE W/O DYE: CPT

## 2022-06-11 ASSESSMENT — ENCOUNTER SYMPTOMS
CONSTIPATION: 1
EYE ITCHING: 0
WHEEZING: 0
DIARRHEA: 1
PHOTOPHOBIA: 0
SINUS PRESSURE: 0
RHINORRHEA: 1
TROUBLE SWALLOWING: 1
ANAL BLEEDING: 0
BACK PAIN: 1
EYE DISCHARGE: 0
ABDOMINAL PAIN: 0
VOICE CHANGE: 1
RECTAL PAIN: 0
NAUSEA: 1
FACIAL SWELLING: 0
APNEA: 0
CHOKING: 0
VOMITING: 1
ABDOMINAL DISTENTION: 1
CHEST TIGHTNESS: 0
STRIDOR: 0
SHORTNESS OF BREATH: 1
BLOOD IN STOOL: 0
EYE REDNESS: 0
EYE PAIN: 0
COLOR CHANGE: 0
COUGH: 0
SORE THROAT: 0

## 2022-06-12 NOTE — PROGRESS NOTES
Laverne Aleman L Height (:  1940)     ASSESSMENT:  1.  Large hiatal/paraesophageal hernia  2. Shortness of breath  3. Mickey ulcers    PLAN:  1. Surgical intervention with G-tube insertion versus observation discussed in detail with patient and family. All questions answered. Patient and family are in agreement to proceed with conservative management only. 2.  PPI/antacid treatment as directed  3. Dietary and lifestyle modification/restrictions discussed with patient and family. All questions answered. Nutritional education given. 4.  Follow-up with GI and PCP as directed  5. Follow-up as needed. 6.  Signs and symptoms reviewed with patient that would be concerning and need her to return to office for re-evaluation. Patient states She will call if She has questions or concerns. SUBJECTIVE/OBJECTIVE:    Chief Complaint   Patient presents with    Surgical Consult     Est patient-last seen in the office on 2021-in the ER on 2022-Hiatal hernia     HPI  Laverne Aleman is an 78-year-old female who presents with family for follow-up after recent emergency department visit. During that visit CT chest obtained demonstrated large paraesophageal/hiatal hernia. She has a history of undergoing robotic repair of a large hiatal/paraesophageal hernia with volvulus and G-tube insertion 2021. During that time no formal repair was performed as hernia was only reduced to about 80% and G-tube insertion placed for gastropexy. Patient is a Scientologist patient. Patient had been doing well with nutrition intake and G-tube removed in the past.  She states she just underwent upper endoscopy with GI service yesterday. She thinks she has Bina Fraction ulcers but no active bleeding was found at that time. She states since the emergency department she has been doing better. Tolerating soft diet. Staying hydrated. Denies any chest or abdominal pain.   Is noted to have some worsening shortness of breath over the last several months. Normal bowel function. Some abdominal bloating. Intermittent constipation/diarrhea. No recent nausea or vomiting but admits she has had vomiting of food that gets stuck in the past.  Fatigue. She states she does not want to undergo any surgery including gastropexy/G-tube insertion again at this time unless her symptoms worsen again. Review of Systems   Constitutional: Positive for fatigue. Negative for activity change, appetite change, chills, diaphoresis, fever and unexpected weight change. HENT: Positive for hearing loss, postnasal drip, rhinorrhea, tinnitus, trouble swallowing and voice change. Negative for congestion, dental problem, drooling, ear discharge, ear pain, facial swelling, mouth sores, nosebleeds, sinus pressure, sneezing and sore throat. Eyes: Negative for photophobia, pain, discharge, redness, itching and visual disturbance. Respiratory: Positive for shortness of breath. Negative for apnea, cough, choking, chest tightness, wheezing and stridor. Cardiovascular: Positive for palpitations and leg swelling. Negative for chest pain. Gastrointestinal: Positive for abdominal distention, constipation, diarrhea, nausea and vomiting. Negative for abdominal pain, anal bleeding, blood in stool and rectal pain. Endocrine: Negative. Genitourinary: Positive for enuresis. Negative for decreased urine volume, difficulty urinating, dyspareunia, dysuria, flank pain, frequency, genital sores, hematuria, menstrual problem, pelvic pain, urgency, vaginal bleeding, vaginal discharge and vaginal pain. Musculoskeletal: Positive for arthralgias, back pain, gait problem, joint swelling and myalgias. Negative for neck pain and neck stiffness. Skin: Negative for color change, pallor, rash and wound. Allergic/Immunologic: Positive for environmental allergies. Negative for food allergies and immunocompromised state. Neurological: Positive for weakness.  Negative for dizziness, tremors, seizures, syncope, facial asymmetry, speech difficulty, light-headedness, numbness and headaches. Hematological: Negative for adenopathy. Does not bruise/bleed easily. Psychiatric/Behavioral: Positive for dysphoric mood. Negative for agitation, behavioral problems, confusion, decreased concentration, hallucinations, self-injury, sleep disturbance and suicidal ideas. The patient is not nervous/anxious and is not hyperactive.         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 Frances Pereira GERD (gastroesophageal reflux disease)     Diverticulitis     Hiatal hernia     Hypertension     BAKI    Hypothyroidism     Osteoarthritis        Past Surgical History:   Procedure Laterality Date    APPENDECTOMY  1958    BACK SURGERY      CARPAL TUNNEL RELEASE Bilateral 2013    w/cubital tunnel    COLON SURGERY  07/29/2016    Procedure: ATTEMPTED DAVINCI XI ROBOTIC ASSISTED SIGMOID COLON RESECTION, ROBOTIC ASSISTED MOBILIZATION OF RECTAL SIGMOID TO SPLENIC FLEXURE, CONVERTED TO OPEN LEFT HEMICOLECTOMY WITH COLOPROCTOCTOMY, SPLENORRHAPHY, RIGID SIGMOIDOSCOPY, LASER EVALUATION OF VASCULARITY WITH ICG; Surgeon: Frances Pereira MD; Location: [facility]; Service: General    COLONOSCOPY  2012    CYST REMOVAL  2010   [address], [address]    Cataract    FEMUR FRACTURE SURGERY Left 12/21/2020    LEFT FEMUR OPEN REDUCTION INTERNAL FIXATION performed by Graciela Plunkett [facility] at [facility] Left 2001    HIATAL HERNIA REPAIR N/A 03/09/2021    ROBOTIC EXTENSIVE LYSIS OF ADHESIONS, ROBOTIC REDUCTION OF HIATAL HERNIA WITH GASTROPEXY performed by Bryant Dennis MD at [address] ([facility] [address]    w/bladder suspension    JOINT REPLACEMENT 2002, 2008, 2009    rt hip(2002), lt knee(2009), lt hip(2008) Shoulder (2009)    OTHER SURGICAL HISTORY  04/21/2021    Sheri Miranda CNP in the office   [facility]    PAIN MANAGEMENT PROCEDURE Bilateral 7/13/2021    medial branch blocks at bilateral L4/L5 and L5/S1 performed by Pillo Givens DO at [address] Bilateral 8/24/2021    confirmatory  medial branch blocks at bilateral L4/L5 and L5/S1 [facility] Right 10/19/2021    thermal radiofrequency at BILATERAL medial branches L4/L5 and L5/S1 was chosen. We will start with the right side first performed [facility] Left 12/7/2021    thermal radiofrequency at BILATERAL medial branches L4/L5 and L5/S1 was chosen. performed by Pillo Givens DO at [address] [place] SCRN NOT  [address] IND Left 09/15/2017    COLONOSCOPY performed by Marlena Frazier MD at [facility]    discectomy   [address] [place] [address]    UPPER GASTROINTESTINAL ENDOSCOPY  2012    UPPER GASTROINTESTINAL ENDOSCOPY Left 6/7/2022    EGD BIOPSY performed by Brittny Prince MD at [facility]       Current Outpatient Medications   Medication Sig Dispense Refill    metoprolol succinate (TOPROL XL) 50 MG extended release tablet TAKE 1 TABLET DAILY 90 tablet 3    levothyroxine (SYNTHROID) 100 MCG tablet TAKE 1 TABLET DAILY 90 tablet 3    meloxicam (MOBIC) 15 MG tablet Take 1 tablet by mouth daily 90 tablet 1    pregabalin (LYRICA) 150 MG capsule Take 1 capsule by mouth 2 times daily for 180 days.  180 capsule 1    FLUoxetine (PROZAC) 20 MG capsule Take 1 capsule by mouth daily 90 capsule 3    docusate sodium (COLACE) 100 MG capsule Take 1 capsule by mouth 2 times daily as needed for Constipation 180 capsule 3    FLUoxetine (PROZAC) 10 MG capsule Take 1 capsule by mouth daily 90 capsule 0    enalapril (VASOTEC) 10 MG tablet Take 1 tablet by mouth nightly 90 tablet 3    Cranberry 500 MG TABS Take by mouth       Biotin w/ Vitamins C & E (HAIR/SKIN/NAILS PO) Take by mouth      calcium-vitamin D (OSCAL-500) 500-200 MG-UNIT per tablet Take 2 tablets by mouth daily      Misc. Devices (WALKER) MISC 1 each by Does not apply route daily 1 each 0    guaiFENesin 1200 MG TB12 Take 1,200 mg by mouth 2 times daily 20 tablet 0    Misc. Devices MISC Mechanical lift for a Jamas Joe 1 Device 0    Scooter MISC by Does not apply route Power scooter 1 each 0    fluticasone (FLONASE) 50 MCG/ACT nasal spray 1 spray by Nasal route as needed for Rhinitis       Omega 3-6-9 Fatty Acids (OMEGA 3-6-9 COMPLEX) CAPS Take 1 capsule by mouth daily       therapeutic multivitamin-minerals (THERAGRAN-M) tablet Take 1 tablet by mouth nightly        No current facility-administered medications for this visit.        Allergies   Allergen Reactions    Ampicillin     Morphine Other (See Comments)     Hallucinations     Pcn [Penicillins] Itching    Sulfa Antibiotics        Family History   Problem Relation Age of Onset    Arthritis Mother     Hearing Loss Mother     High Cholesterol Mother     Hearing Loss Father     Heart Disease Father 76        CABG    Stroke Father     Arthritis Sister     Depression Sister     Diabetes Sister     Arthritis Brother     Depression Brother     Cancer Maternal Aunt     Early Death Maternal Aunt     Diabetes Maternal Grandmother     Heart Disease Paternal Grandmother        Social History     Socioeconomic History    Marital status:      Spouse name: Marlawellington Parcel Number of children: Not on file    Years of education: 15    Highest education level: Associate degree: academic program   Occupational History    Not on file   Tobacco Use    Smoking status: Former Smoker Packs/day: 2.00     Years: 11.00     Pack years: 22.00     Types: Cigarettes     Start date: 1957     Quit date: 1967     Years since quittin.5    Smokeless tobacco: Never Used   Vaping Use    Vaping Use: Never used   Substance and Sexual Activity    Alcohol use: Yes     Comment: wine with dinner    Drug use: No    Sexual activity: Not on file   Other Topics Concern    Not on file   Social History Narrative    Referral to AAA placed    Referral placed to [facility]    No barriers with medication affordability    No barriers with transportation    Discussed support from insurance company     Social Determinants of Health     Financial Resource Strain: Low Risk     Difficulty of Paying Living Expenses: Not hard at all   Food Insecurity: No Food Insecurity    Worried About [address] in the Last Year: Never true    [address]  [address] in the Last Year: Never true   Transportation Needs:     Lack of Transportation (Medical): Not on file    Lack of Transportation (Non-Medical):  Not on file   Physical Activity:     Days of Exercise per Week: Not on file    Minutes of Exercise per Session: Not on file   Stress:     Feeling of Stress : Not on file   Social Connections:     Frequency of Communication with Friends and Family: Not on file    Frequency of Social Gatherings with Friends and Family: Not on file    Attends Holiness Services: Not on file    Active Member of [address] [place] [address] or Organizations: Not on file    Attends Club or Organization Meetings: Not on file    Marital Status: Not on file   Intimate Partner Violence:     Fear of Current or Ex-Partner: Not on file    Emotionally Abused: Not on file    Physically Abused: Not on file    Sexually Abused: Not on file   Housing Stability:     Unable to Pay for Housing in the Last Year: Not on file    Number of [place] in the Last Year: Not on file    Unstable Housing in the Last Year: Not on file     Vitals:    22 1253   BP: 120/62   Site: Left Upper Arm   Position: Sitting   Cuff Size: Medium Adult   Pulse: 88   Resp: 20   Temp: (!) 96.5 °F (35.8 °C)   TempSrc: Temporal   SpO2: 95%   Weight: 161 lb (73 kg)   Height: 5' 6\" (1.676 m)     Body mass index is 25.99 kg/m². Wt Readings from Last 3 Encounters:   06/08/22 161 lb (73 kg)   06/07/22 161 lb 6.4 oz (73.2 kg)   05/19/22 164 lb (74.4 kg)     Physical Exam  Vitals reviewed. Constitutional:       General: She is not in acute distress. Appearance: She is well-developed. She is not diaphoretic. HENT:      Head: Normocephalic and atraumatic. Right Ear: External ear normal.      Left Ear: External ear normal.      Nose: Nose normal.   Eyes:      General: No scleral icterus. Right eye: No discharge. Left eye: No discharge. Conjunctiva/sclera: Conjunctivae normal.   Cardiovascular:      Rate and Rhythm: Normal rate and regular rhythm. Heart sounds: Normal heart sounds. Pulmonary:      Effort: Pulmonary effort is normal. No respiratory distress. Breath sounds: Normal breath sounds. No wheezing or rales. Chest:      Chest wall: No tenderness. Abdominal:      General: Bowel sounds are normal. There is no distension. Palpations: Abdomen is soft. There is no mass. Tenderness: There is no abdominal tenderness. There is no guarding or rebound. Musculoskeletal:         General: No tenderness. Normal range of motion. Cervical back: Normal range of motion and neck supple. Skin:     General: Skin is warm and dry. Coloration: Skin is not pale. Findings: No erythema or rash. Neurological:      Mental Status: She is alert and oriented to person, place, and time. Cranial Nerves: No cranial nerve deficit. Motor: Weakness (generalized) present. Coordination: Coordination is intact. Psychiatric:         Behavior: Behavior normal.         Thought Content:  Thought content normal.         Judgment: Judgment normal.       Lab Results   Component Value Date    WBC 3.3 (L) 05/16/2022    HGB 10.4 (L) 05/16/2022    HCT 34.3 (L) 05/16/2022    MCV 82.3 05/16/2022     05/16/2022     Lab Results   Component Value Date     (L) 05/23/2022    K 3.9 05/16/2022    CL 91 (L) 05/16/2022    CO2 25 05/16/2022     Lab Results   Component Value Date    CREATININE 0.7 05/16/2022     Lab Results   Component Value Date    ALT 14 05/16/2022    AST 28 05/16/2022    ALKPHOS 91 05/16/2022    BILITOT 0.2 (L) 05/16/2022     Lab Results   Component Value Date    LIPASE 14.1 02/27/2021       Patient Active Problem List   Diagnosis    Osteoarthritis    Fibromyalgia    Gastroesophageal reflux disease    Hypothyroidism    Patient is Baptism    Back pain    H/O abdominal surgery    Normocytic anemia    History of diverticulosis    Chronic low back pain with sciatica    Chest pain    Essential hypertension    Abnormal CT scan, chest    BRBPR (bright red blood per rectum)    GI bleed due to NSAIDs    Spinal stenosis of lumbar region without neurogenic claudication    Other idiopathic scoliosis, lumbar region    Closed fracture of distal end of left femur, initial encounter ([facility])    Pre-syncope    Weakness generalized    Large hiatal hernia    Hypo-osmolar hyponatremia    Esophageal dysphagia    Hypomagnesemia    Dysphagia    Bilateral lower extremity edema    Hx of fracture of leg    Lung nodule    Paraesophageal hernia    S/P repair of paraesophageal hernia    Intermediate stage nonexudative age-related macular degeneration of both eyes    Pain syndrome, chronic     Narrative   CT angiogram of the chest.   Coronal MIPS were obtained.       Comparison 2/28/2021.       Findings:   No PE is identified. No dissection of the thoracic aorta is seen.       Large hiatal hernia. There is prior granulomatous disease. No pleural or pericardial effusion. Elevation of the right hemidiaphragm. There is patchy atelectasis or scarring bilaterally. No focal consolidation.       There is thoracolumbar scoliosis.         Impression   Impression:   No PE is identified. Large hiatal hernia. Prior granulomatous disease.       This document has been electronically signed by: Su Bloom MD on    2022 06:35 PM       All CTs at this facility use dose modulation techniques and iterative    reconstructions, and/or weight-based dosing   when appropriate to reduce radiation to a low as reasonably achievable. OPERATIVE REPORT     PATIENT NAME: Angelica Dye                    :        1940  MED REC NO:   [id number]                           ROOM:  ACCOUNT NO:   [de-identified]                           ADMIT DATE: 2022  PROVIDER:     Alma Rosa Messer M.D.     DATE OF PROCEDURE:  2022     PROCEDURE:  EGD plus biopsy.     SURGEON:  Alma Rosa Messer MD     INDICATION FOR THE PROCEDURE:  The patient with a history of dysphagia  and shortness of breath. The patient had a large paraesophageal hernia  with gastric volvulus that was surgically taken care and the patient had  improvement in symptoms. Lately, some of the same symptoms are coming  back. See the preop note from here and office note for rest of  clinicals.     ASA CLASSIFICATION:  III.     MEDICATIONS:  Per Anesthesia.     BIOPSIES:  Yes.     PHOTOGRAPHS:  Yes.     BLOOD LOSS:  None.     DESCRIPTION OF PROCEDURE:  Informed consent was obtained after  explaining risks and benefits of the procedure and conscious sedation. Possible complications including bleeding, perforation, reaction to  medicine, but not limiting to death were discussed.     Afterwards, GIF-180 gastroscope was advanced through the oropharynx,  esophagus, stomach into the duodenum. Normal-looking duodenal bulb and  second portion. Scope was withdrawn. The patient having gastritis. Retroflex exam showed large hernia.   The patient having some Mickey  ulceration. See photographs. Biopsies were taken. GE junction is  quite high and large hernia was seen, it seems to be fixed. The patient  having some presbyesophagus above that. Tolerated the procedure well.     IMPRESSION:  1. Large fixed hernia which had improved with the surgery, but I am  afraid that endoscopically it looks like it again needs adjustment. 2.  Chiquis Dice ulceration, which unlikely the ischemic ulcers.     RECOMMENDATIONS:  We will bring the patient back in the office in few  weeks to continue discussion and based on the patient and family wishes,  we may consider evaluation with Dr. Morales Cintron, may need an esophagogram  before that. I will set up an office visit in few weeks.  Natalee Hitchcock M.D. An electronic signature was used to authenticate this note.     --Salome Boswell MD

## 2022-06-13 ENCOUNTER — HOSPITAL ENCOUNTER (OUTPATIENT)
Dept: NON INVASIVE DIAGNOSTICS | Age: 82
Discharge: HOME | End: 2022-06-13
Payer: MEDICARE

## 2022-06-13 ENCOUNTER — OFFICE VISIT (OUTPATIENT)
Dept: PHYSICAL MEDICINE AND REHAB | Age: 82
End: 2022-06-13
Payer: MEDICARE

## 2022-06-13 ENCOUNTER — TELEPHONE (OUTPATIENT)
Dept: PHYSICAL MEDICINE AND REHAB | Age: 82
End: 2022-06-13

## 2022-06-13 VITALS
HEIGHT: 66 IN | WEIGHT: 161 LBS | BODY MASS INDEX: 25.88 KG/M2 | SYSTOLIC BLOOD PRESSURE: 130 MMHG | DIASTOLIC BLOOD PRESSURE: 64 MMHG

## 2022-06-13 DIAGNOSIS — M54.17 RADICULOPATHY, LUMBOSACRAL REGION: Primary | ICD-10-CM

## 2022-06-13 DIAGNOSIS — I10 PRIMARY HYPERTENSION: ICD-10-CM

## 2022-06-13 DIAGNOSIS — R06.02 SOB (SHORTNESS OF BREATH): ICD-10-CM

## 2022-06-13 DIAGNOSIS — M54.59 LUMBAR FACET JOINT PAIN: ICD-10-CM

## 2022-06-13 DIAGNOSIS — I49.3 PVC (PREMATURE VENTRICULAR CONTRACTION): ICD-10-CM

## 2022-06-13 DIAGNOSIS — M47.896 OTHER SPONDYLOSIS, LUMBAR REGION: ICD-10-CM
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
## 2022-06-13 PROCEDURE — 78452 HT MUSCLE IMAGE SPECT MULT: CPT

## 2022-06-13 PROCEDURE — 1123F ACP DISCUSS/DSCN MKR DOCD: CPT | Performed by: ANESTHESIOLOGY

## 2022-06-13 PROCEDURE — 99214 OFFICE O/P EST MOD 30 MIN: CPT | Performed by: ANESTHESIOLOGY

## 2022-06-13 PROCEDURE — A9500 TC99M SESTAMIBI: HCPCS | Performed by: NUCLEAR MEDICINE

## 2022-06-13 PROCEDURE — 6360000002 HC RX W HCPCS

## 2022-06-13 PROCEDURE — 93225 XTRNL ECG REC<48 HRS REC: CPT

## 2022-06-13 PROCEDURE — 3430000000 HC RX DIAGNOSTIC RADIOPHARMACEUTICAL: Performed by: NUCLEAR MEDICINE

## 2022-06-13 PROCEDURE — 93017 CV STRESS TEST TRACING ONLY: CPT | Performed by: NUCLEAR MEDICINE

## 2022-06-13 PROCEDURE — 93226 XTRNL ECG REC<48 HR SCAN A/R: CPT

## 2022-06-13 RX ORDER — ACETAMINOPHEN 160 MG
TABLET,DISINTEGRATING ORAL
COMMUNITY

## 2022-06-13 RX ADMIN — Medication 10 MILLICURIE: at 13:40

## 2022-06-13 RX ADMIN — Medication 35 MILLICURIE: at 14:35

## 2022-06-13 NOTE — PROGRESS NOTES
Chronic Pain/PM&R Clinic Note     Encounter Date: 6/13/22    Subjective:   Chief Complaint:   Chief Complaint   Patient presents with    Follow-up     back has been having some deep aches/pains. Discuss MRI        History of Present Illness:   Arcelia Gunter is a 78 y.o. female seen in the clinic initially on 06/23/21 upon request from Milton Barry DO (PCP) for her history of chronic low back pain. She has a medical history of scoliosis, neuropathy, fibromyalgia, and previous lumbar discectomy (over 50 years ago). She states that she has low back pain that has been progressively worse over the last 20 years. She reports the majority of her low back pain to be at her waistline with radiation across to her waist on both sides. She describes this pain is a constant aching, stabbing 5/10 pain that can get up to a 7-8/10 when severe. This pain is worse with any activities where she is standing upright such as cooking or when she is walking for any duration of time. Her pain is improved with rest and sitting in her motorized wheelchair. She denies any pain radiating down her legs but does endorse numbness in both feet from her neuropathy. She denies any saddle anesthesia or bowel/bladder incontinence. Of note, she has multiple joint replacements. She has bilateral total hip arthroplasties and a left knee total hip arthroplasty. In addition, she has a previous left femur fracture that has been fixated with hardware and a left total shoulder arthroplasty. She states that she had an EMG/NCS performed by Dr. Anatoliy Avila which revealed idiopathic neuropathy. She states that she had a previous RFA in her low back done by Dr. Meli Campos which did give her longstanding relief but this was done 8-10 years ago. Today, 6/13/2022, patient presents for planned follow-up for chronic low back pain. She states that she has been progressively feeling more more pain particularly down her legs.   She feels like it is very challenging for her to stand up without feeling like her legs to give out due to pain and weakness. She denies any associated bowel/bladder incontinence episodes. She is wonder about the results of her MRI of the lumbar spine and is wondering what else she can do to help with her overall pain and function. She continues take her medications as prescribed without any side effects. History of Interventions:   Surgery: Previous discectomy in 30s, left femur IMN, left TKA  Injections: Previous lumabr RFA ~8-10 years ago by Dr. Mirela Aleman  Diagnostic lumbar MBBs  Confirmatory lumbar MBBs  Lumbar RFA (10/19/2021 and 12/7/21)-significant relief    Current Treatment Medications:   Meloxicam 15 mg daily  Heating pad PRN  Mishel 150 mg BID  Cymbalta 60 mg daily    Historical Treatment Medications:   Tramadol-ineffective  Norco-stopped (constipation)    Imaging:  MRI L-spine  (6/11/22)    PROCEDURE: MRI LUMBAR SPINE WO CONTRAST       CLINICAL INFORMATION: Fall, sequela.       COMPARISON: Plain radiographs dated 18 May 2021. .       TECHNIQUE: Sagittal and axial T1 and T2-weighted images were obtained through the lumbar spine.       FINDINGS:           There is a levoscoliosis.  There is degenerative change in the vertebral body endplates adjacent to L62-12, T12-L1, L1-2, L2-3, L3-4 and L4-5 disc spaces. Valeen Ravens is no bone marrow edema.  There is no acute compression fracture.  No pars defects are    noted.  .       The distal spinal cord, conus medullaris and cauda equina are normal.        There are no gross abnormalities in the visualized aspects of the distal thoracic spine.       On the axial images, at T12-L1, there is no disc herniation or canal stenosis. There is moderate right and mild-to-moderate left-sided foraminal stenosis.       At L1-L2, there is a 1.9 mm bulging disc and facet hypertrophy.  This results in moderate right and mild-to-moderate left-sided foraminal stenosis with no canal stenosis.       At L2-L3, there is a 3.5 mm bulging disc and facet hypertrophy. This results in mild canal, moderate left and mild-to-moderate right-sided foraminal stenosis.       At L3-L4, there is a 2.4 mm bulging disc and facet hypertrophy. This results in mild canal and moderate bilateral foraminal stenosis.       At L4-L5, there are some 1.6 mm bulging disc and facet hypertrophy. This causes mild canal and moderate bilateral foraminal stenosis.       At L5-S1, there is no disc herniation or canal stenosis. There is mild-to-moderate bilateral foraminal stenosis.       There is degenerative change involving the sacroiliac joints bilaterally.       There is a perineural cyst on the left side at S2.       There is atrophy involving the paraspinal muscles in the lower lumbar spine.               Impression       1. Levoscoliosis. No evidence for an acute compression fracture. 2. Degenerative changes resulting in mild canal and moderate bilateral foraminal stenosis at L3-4 and L4-5.   3. There is moderate right and mild-to-moderate left-sided foraminal stenosis with no canal stenosis at T12-L1 and L1-2.   4. There is mild canal, moderate left and mild right-sided foraminal stenosis at L2-3.   5. There is mild-to-moderate bilateral from stenosis but no canal stenosis at L5-S1.   6. There is degenerative change involving the sacroiliac joints bilaterally. 7. There is a perineural cyst on the left side at S2.   8. There is atrophy in the paraspinal muscles.              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 Gail Marte GERD (gastroesophageal reflux disease)     Diverticulitis     Hiatal hernia     Hypertension     BAKI    Hypothyroidism     Osteoarthritis        Past Surgical History:   Procedure Laterality Date    APPENDECTOMY  1958    BACK SURGERY      CARPAL TUNNEL RELEASE Bilateral 2013    w/cubital tunnel    COLON SURGERY  07/29/2016    Procedure: ATTEMPTED DAVINCI XI ROBOTIC ASSISTED SIGMOID COLON RESECTION, ROBOTIC ASSISTED MOBILIZATION OF RECTAL SIGMOID TO SPLENIC FLEXURE, CONVERTED TO OPEN LEFT HEMICOLECTOMY WITH COLOPROCTOCTOMY, SPLENORRHAPHY, RIGID SIGMOIDOSCOPY, LASER EVALUATION OF VASCULARITY WITH ICG; Surgeon: Rene Hatch MD; Location: [facility]; Service: General    COLONOSCOPY  2012    CYST REMOVAL  2010   [address]    Cataract    FEMUR FRACTURE SURGERY Left 12/21/2020    LEFT FEMUR OPEN REDUCTION INTERNAL FIXATION performed by Santy Livingston MD at [address] Left 2001   [address] [facility] N/A 03/09/2021    ROBOTIC EXTENSIVE LYSIS OF ADHESIONS, ROBOTIC REDUCTION OF HIATAL HERNIA WITH GASTROPEXY performed by Angel Cook MD at [address] ([address] [facility])  1979    w/bladder suspension   [address]  2002, 2008, 2009    rt hip(2002), lt knee(2009), lt hip(2008) Shoulder (2009)    OTHER SURGICAL HISTORY  04/21/2021    Tank Pulse Sarai Current CNP in the office   [address] [facility]    PAIN MANAGEMENT PROCEDURE Bilateral 7/13/2021    medial branch blocks at bilateral L4/L5 and L5/S1 performed by Dl Jones DO at [address] Bilateral 8/24/2021    confirmatory  medial branch blocks at bilateral L4/L5 and L5/S1 performed by Dl Jones DO at [address] Right 10/19/2021    thermal radiofrequency at BILATERAL medial branches L4/L5 and L5/S1 was chosen. We will start with the right side first performed by Dl Jones DO at [address] Left 12/7/2021    thermal radiofrequency at BILATERAL medial branches L4/L5 and L5/S1 was chosen.  performed by Dl Jones DO at [address] COLON CA SCRN NOT  [address] Left 09/15/2017    COLONOSCOPY performed by Jennifer Craft MD at [facility]   [address]    discectomy   [address] EXTRACTION  1964    UPPER GASTROINTESTINAL ENDOSCOPY  2012    UPPER GASTROINTESTINAL ENDOSCOPY Left 2022    EGD BIOPSY performed by Marvin Akhtar MD at [address] [facility]       Family History   Problem Relation Age of Onset    Arthritis Mother     Hearing Loss Mother     High Cholesterol Mother     Hearing Loss Father     Heart Disease Father 76        CABG    Stroke Father     Arthritis Sister     Depression Sister     Diabetes Sister     Arthritis Brother     Depression Brother     Cancer Maternal Aunt     Early Death Maternal Aunt     Diabetes Maternal Grandmother     Heart Disease Paternal Grandmother        Social History     Socioeconomic History    Marital status:      Spouse name: Ofelia Enamorado Number of children: Not on file    Years of education: 15    Highest education level: Associate degree: academic program   Occupational History    Not on file   Tobacco Use    Smoking status: Former Smoker     Packs/day: 2.00     Years: 11.00     Pack years: 22.00     Types: Cigarettes     Start date: 1957     Quit date: 1967     Years since quittin.5    Smokeless tobacco: Never Used   Vaping Use    Vaping Use: Never used   Substance and Sexual Activity    Alcohol use: Yes     Comment: wine with dinner    Drug use: No    Sexual activity: Not on file   Other Topics Concern    Not on file   Social History Narrative    Referral to AAA placed    Referral placed to [facility]    No barriers with medication affordability    No barriers with transportation    Discussed support from insurance company     Social Determinants of Health     Financial Resource Strain: Low Risk     Difficulty of Paying Living Expenses: Not hard at all Food Insecurity: No Food Insecurity    Worried About Running Out of Food in the Last Year: Never true    Jes of Food in the Last Year: Never true   Transportation Needs:     Lack of Transportation (Medical): Not on file    Lack of Transportation (Non-Medical): Not on file   Physical Activity:     Days of Exercise per Week: Not on file    Minutes of Exercise per Session: Not on file   Stress:     Feeling of Stress : Not on file   Social Connections:     Frequency of Communication with Friends and Family: Not on file    Frequency of Social Gatherings with Friends and Family: Not on file    Attends Anglican Services: Not on file    Active Member of Clubs or Organizations: Not on file    Attends Club or Organization Meetings: Not on file    Marital Status: Not on file   Intimate Partner Violence:     Fear of Current or Ex-Partner: Not on file    Emotionally Abused: Not on file    Physically Abused: Not on file    Sexually Abused: Not on file   Housing Stability:     Unable to Pay for Housing in the Last Year: Not on file    Number of Places Lived in the Last Year: Not on file    Unstable Housing in the Last Year: Not on file       Medications & Allergies:   Current Outpatient Medications   Medication Instructions    calcium-vitamin D (OSCAL-500) 500-200 MG-UNIT per tablet 2 tablets, Oral, DAILY    Cholecalciferol (VITAMIN D3) 50 MCG (2000 UT) CAPS Oral    docusate sodium (COLACE) 100 mg, Oral, 2 TIMES DAILY PRN    enalapril (VASOTEC) 10 mg, Oral, NIGHTLY    FLUoxetine (PROZAC) 20 mg, Oral, DAILY    fluticasone (FLONASE) 50 MCG/ACT nasal spray 1 spray, PRN    levothyroxine (SYNTHROID) 100 MCG tablet TAKE 1 TABLET DAILY    meloxicam (MOBIC) 15 mg, Oral, DAILY    metoprolol succinate (TOPROL XL) 50 MG extended release tablet TAKE 1 TABLET DAILY    Misc. Devices (WALKER) MISC 1 each, Does not apply, DAILY    Misc.  Devices MISC Mechanical lift for a [employer] 3-6-9 Fatty Acids (OMEGA 3-6-9 COMPLEX) CAPS 1 capsule, Oral, DAILY    pregabalin (LYRICA) 150 mg, Oral, 2 TIMES DAILY    Scooter MISC Does not apply, Power scooter    therapeutic multivitamin-minerals (THERAGRAN-M) tablet 1 tablet, Oral, NIGHTLY       Allergies   Allergen Reactions    Ampicillin     Morphine Other (See Comments)     Hallucinations     Pcn [Penicillins] Itching    Sulfa Antibiotics        Review of Systems:   Constitutional: negative for weight changes or fevers  Genitourinary: negative for bowel/bladder incontinence   Musculoskeletal: positive for diffuse arthritis pain  Neurological:  Positive for leg weakness and falls  Behavioral/Psych: negative for anxiety/depression   All other systems reviewed and are negative    Objective:     Vitals:    06/13/22 1144   BP: 130/64       Constitutional: Pleasant, no acute distress   Head: Normocephalic, atraumatic   Eyes: Conjunctivae normal   Neck: Supple, symmetrical   Lungs: Normal respiratory effort, non-labored breathing   Cardiovascular: Limbs warm and well perfused   Abdomen: Non-protruded   Musculoskeletal: Muscle bulk symmetric, no atrophy, no gross deformities   · Lumbar Spine: ROM severely reduced in extension. Scoliosis appreciated in thoracolumbar junction. Lumbar paraspinals tender bilaterally. SLR positive right. Neurological: Cranial nerves II-XII grossly intact. 1+ bilateral patellar and Achilles reflexes. Negative ankle clonus. · Gait - Severely antalgic gait. Ambulates with assistive device. · Motor: No focal motor deficits appreciated in lower limbs  Skin: No rashes or lesions visible in low back  Psychological: Cooperative, no exaggerated pain behaviors       Assessment:    Diagnosis Orders   1. Radiculopathy, lumbosacral region  CHG FLUOR NEEDLE/CATH SPINE/PARASPINAL DX/THER ADDON    DC NJX DX/THER SBST INTRLMNR LMBR/SAC W/IMG GDN   2. Fall, sequela     3. Lumbar facet joint pain     4.  Other spondylosis, lumbar region           Ольга Barley L Height is a 78 y. o.female presenting to the pain clinic for evaluation of chronic low back pain. Her clinical history and physical examination are consistent with lumbar facet medial pain with associated myofascial pain. Her MRI of the lumbar spine does reveal multilevel degenerative changes and stenosis particularly in the lower lumbar spine. I set her up for lumbar epidural steroid injection at L5/S1. Plan: The following treatment recommendations and plan were discussed in detail with Princess Gunter and . Imaging:   I have reviewed patients imaging of MRI L-spine and results were discussed in detail with the patient. Analgesics:   Patient is taking Acetaminophen. Patient informed that the maximum amount of acetaminophen taken on a regular basis should only be 4000 mg per day. We will continue her meloxicam to 15 mg daily. I advised patient take this medication with food in the morning. Patient should stop any other NSAID therapies including her Aleve. Adjuvants: In light of the presence of a neuropathic component of pain, the patient should continue Lyrica and Cymbalta as prescribed. Interventions: In presence of lumbosacral radiating pain, the option of lumbar epidural steroid injection approach at L5/S1 was chosen. The risks and benefits were discussed in detail with the patient. Patient wants to proceed with the injection. Anticoagulation/NPO Recommendations:   Patient will need to hold meloxicam x 4 days prior to the procedure. Patient will need to be NPO x 8 hours prior to the procedure. Plan to start an IV prior to the procedure. Multidisciplinary Pain Management:   In the presence of complex, chronic, and multi-factorial pain, the importance of a multidisciplinary approach to pain management in the patients management regimen was emphasized and discussed in great detail.    PHYSICAL THERAPY: Patient is advised to continue gentle low back ROM/stretching exercises Referrals:  None    Prescriptions Written This Visit:   None    Follow-up: For BERTO Dale, DO  Interventional Pain Management/PM&R   [place]

## 2022-06-13 NOTE — PROCEDURES
24 hour Holter Monitor was applied to patient.  Patient was instructed to remove monitor on 06/14 at 1542 and return to the  of the hospital. Instructions were given on how to turn monitor off after removal. The serial number on the Holter Monitor is [id number]

## 2022-06-15 LAB
ACQUISITION DURATION: NORMAL S
AVERAGE HEART RATE: 55 BPM
HOOKUP DATE: NORMAL
HOOKUP TIME: NORMAL
MAX HEART RATE TIME/DATE: NORMAL
MAX HEART RATE: 69 BPM
MIN HEART RATE TIME/DATE: NORMAL
MIN HEART RATE: 48 BPM
NUMBER OF QRS COMPLEXES: NORMAL
NUMBER OF SUPRAVENTRICULAR COUPLETS: 0
NUMBER OF SUPRAVENTRICULAR ECTOPICS: 199
NUMBER OF SUPRAVENTRICULAR ISOLATED BEATS: 161
NUMBER OF VENTRICULAR BIGEMINAL CYCLES: 0
NUMBER OF VENTRICULAR COUPLETS: 0
NUMBER OF VENTRICULAR ECTOPICS: 392

## 2022-06-16 ENCOUNTER — TELEPHONE (OUTPATIENT)
Dept: PHYSICAL MEDICINE AND REHAB | Age: 82
End: 2022-06-16

## 2022-06-23 ENCOUNTER — PREP FOR PROCEDURE (OUTPATIENT)
Dept: PHYSICAL MEDICINE AND REHAB | Age: 82
End: 2022-06-23

## 2022-06-23 RX ORDER — ENALAPRIL MALEATE 10 MG/1
TABLET ORAL
Qty: 90 TABLET | Refills: 3 | Status: SHIPPED | OUTPATIENT
Start: 2022-06-23 | End: 2022-09-01 | Stop reason: DRUGHIGH

## 2022-06-23 NOTE — TELEPHONE ENCOUNTER
Recent Visits  Date Type Provider Dept   05/19/22 Office Visit Marvin Cosmedave, APRN - CNP [facility] [address]   05/10/22 Office Visit Beverlyazeb Escalonaory, DO [facility] [address]   03/15/22 Office Visit Beverlyazeb Escalonaory, DO [facility] [place] [facility]   10/20/21 Office Visit Yamini Ambrose, APRN - CNP [facility]   09/08/21 Office Visit Yamini Ambrose, APRN - CNP [facility] [place] [facility]   08/26/21 Office Visit Beverlyazeb Rosales, DO [facility] [place] [facility]   05/14/21 Office Visit Beverly Polaory, DO [facility] [place] [facility]   04/27/21 Office Visit Beverlyazeb Rosales, DO [facility] [place] [facility]   03/08/21 Office Visit Beverlyazeb Rosales, DO [facility] [place] [facility]   02/26/21 Office Visit Yamini Ambrose, APRN - CNP [facility] [address] recent visits within past 540 days with a meds authorizing provider and meeting all other requirements  Future Appointments  Date Type Provider Dept   09/06/22 Appointment Beverly Rosales, DO [facility] [address]   Showing future appointments within next 150 days with a meds authorizing provider and meeting all other requirements    Future Appointments   Date Time Provider Huber Little   9/1/2022  3:00 PM [address], MD N [facility]   9/6/2022  1:40 PM Beverly Rosales DO [place] [facility]   9/15/2022  1:00 PM DO GUILLERMINA Cano [facility]

## 2022-06-28 NOTE — DISCHARGE INSTRUCTIONS
Post procedure Instructions:     No driving or making significant decisions for the remainder of the day.  Be cautions with walking and activity for 24 hours, do not over exert yourself.  Ok to resume pre-procedure activity level today.  Apply ice to site of injection site if you have pain or discomfort.  Do not submerge sit of injection during bath or pool activities for 48 hours post-procedure.  Resume blood thinning medications in 24 hours.       Call office [phone] if you have:   Temperature greater than 100.4   Persistent nausea and vomiting   Severe uncontrolled pain   Redness, tenderness, or signs of infection (pain, swelling, redness, odor or green/yellow discharge around the site)   Difficulty breathing, headache or visual disturbances   Hives   Persistent dizziness or light-headedness   Extreme fatigue   Any other questions or concerns you may have after discharge      In an emergency, call 911 or go to an Emergency Department at a nearby hospital

## 2022-06-28 NOTE — H&P
post procedure directions / restrictions. All other questions and concerns addressed before patient discharge in ambulatory fashion. Chronic Pain/PM&R Clinic Note     Encounter Date: 6/13/22    Subjective:   Chief Complaint:   No chief complaint on file. History of Present Illness:   Otoniel Gunter is a 78 y.o. female seen in the clinic initially on 06/23/21 upon request from Marina Gee DO (PCP) for her history of chronic low back pain. She has a medical history of scoliosis, neuropathy, fibromyalgia, and previous lumbar discectomy (over 50 years ago). She states that she has low back pain that has been progressively worse over the last 20 years. She reports the majority of her low back pain to be at her waistline with radiation across to her waist on both sides. She describes this pain is a constant aching, stabbing 5/10 pain that can get up to a 7-8/10 when severe. This pain is worse with any activities where she is standing upright such as cooking or when she is walking for any duration of time. Her pain is improved with rest and sitting in her motorized wheelchair. She denies any pain radiating down her legs but does endorse numbness in both feet from her neuropathy. She denies any saddle anesthesia or bowel/bladder incontinence. Of note, she has multiple joint replacements. She has bilateral total hip arthroplasties and a left knee total hip arthroplasty. In addition, she has a previous left femur fracture that has been fixated with hardware and a left total shoulder arthroplasty. She states that she had an EMG/NCS performed by Dr. Abhi Donovan which revealed idiopathic neuropathy. She states that she had a previous RFA in her low back done by Dr. Vernon Westbrook which did give her longstanding relief but this was done 8-10 years ago. Today, 6/13/2022, patient presents for planned follow-up for chronic low back pain.   She states that she has been progressively feeling more more pain particularly down her legs. She feels like it is very challenging for her to stand up without feeling like her legs to give out due to pain and weakness. She denies any associated bowel/bladder incontinence episodes. She is wonder about the results of her MRI of the lumbar spine and is wondering what else she can do to help with her overall pain and function. She continues take her medications as prescribed without any side effects. History of Interventions:   Surgery: Previous discectomy in 30s, left femur IMN, left TKA  Injections: Previous lumabr RFA ~8-10 years ago by Dr. Montse Jung  Diagnostic lumbar MBBs  Confirmatory lumbar MBBs  Lumbar RFA (10/19/2021 and 12/7/21)-significant relief    Current Treatment Medications:   Meloxicam 15 mg daily  Heating pad PRN  Mishel 150 mg BID  Cymbalta 60 mg daily    Historical Treatment Medications:   Tramadol-ineffective  Norco-stopped (constipation)    Imaging:  MRI L-spine  (6/11/22)    PROCEDURE: MRI LUMBAR SPINE WO CONTRAST       CLINICAL INFORMATION: Fall, sequela.       COMPARISON: Plain radiographs dated 18 May 2021. .       TECHNIQUE: Sagittal and axial T1 and T2-weighted images were obtained through the lumbar spine.       FINDINGS:           There is a levoscoliosis.  There is degenerative change in the vertebral body endplates adjacent to I89-80, T12-L1, L1-2, L2-3, L3-4 and L4-5 disc spaces. Romeo Hurter is no bone marrow edema.  There is no acute compression fracture.  No pars defects are    noted.  .       The distal spinal cord, conus medullaris and cauda equina are normal.        There are no gross abnormalities in the visualized aspects of the distal thoracic spine.       On the axial images, at T12-L1, there is no disc herniation or canal stenosis. There is moderate right and mild-to-moderate left-sided foraminal stenosis.       At L1-L2, there is a 1.9 mm bulging disc and facet hypertrophy.  This results in moderate right and mild-to-moderate left-sided foraminal stenosis Past Surgical History:   Procedure Laterality Date    APPENDECTOMY  1958    BACK SURGERY      CARPAL TUNNEL RELEASE Bilateral 2013    w/cubital tunnel    COLON SURGERY  07/29/2016    Procedure: ATTEMPTED DAVINCI XI ROBOTIC ASSISTED SIGMOID COLON RESECTION, ROBOTIC ASSISTED MOBILIZATION OF RECTAL SIGMOID TO SPLENIC FLEXURE, CONVERTED TO OPEN LEFT HEMICOLECTOMY WITH COLOPROCTOCTOMY, SPLENORRHAPHY, RIGID SIGMOIDOSCOPY, LASER EVALUATION OF VASCULARITY WITH ICG; Surgeon: Arianne Maravilla MD; Location: [facility]; Service: General    COLONOSCOPY  2012    [address]  2010   [address]    Cataract    FEMUR FRACTURE SURGERY Left 12/21/2020    LEFT FEMUR OPEN REDUCTION INTERNAL FIXATION performed by Ramiro De Oliveira MD at [address] Left 2001   [facility] N/A 03/09/2021    ROBOTIC EXTENSIVE LYSIS OF ADHESIONS, ROBOTIC REDUCTION OF HIATAL HERNIA WITH GASTROPEXY performed by Rosendo Walsh MD at [address] ([address] [place] [address])  1979    w/bladder suspension   [address]  2002, 2008, 2009    rt hip(2002), lt knee(2009), lt hip(2008) Shoulder (2009)    OTHER SURGICAL HISTORY  04/21/2021    Layo Teresa CNP in the office   [facility]    PAIN MANAGEMENT PROCEDURE Bilateral 7/13/2021    medial branch blocks at bilateral L4/L5 and L5/S1 [facility] Bilateral 8/24/2021    confirmatory  medial branch blocks at bilateral L4/L5 and L5/S1 [facility] Right 10/19/2021    thermal radiofrequency at BILATERAL medial branches L4/L5 and L5/S1 was chosen.   We will start with the right side first [facility] Left 12/7/2021    thermal radiofrequency at BILATERAL medial branches L4/L5 and L5/S1 was chosen.  [facility] CA SCRN NOT  [address] IND Left 09/15/2017    COLONOSCOPY performed by Diana Perez MD at [facility]   [address]    discectomy   [address]    UPPER GASTROINTESTINAL ENDOSCOPY      UPPER GASTROINTESTINAL ENDOSCOPY Left 2022    EGD BIOPSY performed by Remy Archuleta MD at [facility]       Family History   Problem Relation Age of Onset    Arthritis Mother     Hearing Loss Mother     High Cholesterol Mother     Hearing Loss Father     Heart Disease Father 76        CABG    Stroke Father     Arthritis Sister     Depression Sister     Diabetes Sister     Arthritis Brother     Depression Brother     Cancer Maternal Aunt     Early Death Maternal Aunt     Diabetes Maternal Grandmother     Heart Disease Paternal Grandmother        Social History     Socioeconomic History    Marital status:      Spouse name: Chaim Menon Number of children: Not on file    Years of education: 15    Highest education level: Associate degree: academic program   Occupational History    Not on file   Tobacco Use    Smoking status: Former Smoker     Packs/day: 2.00     Years: 11.00     Pack years: 22.00     Types: Cigarettes     Start date: 1957     Quit date: 1967     Years since quittin.5    Smokeless tobacco: Never Used   Vaping Use    Vaping Use: Never used   Substance and Sexual Activity    Alcohol use: Yes     Comment: wine with dinner    Drug use: No    Sexual activity: Not on file   Other Topics Concern    Not on file   Social History Narrative    Referral to AAA placed    Referral placed to [facility]    No barriers with medication affordability    No barriers with transportation    Discussed support from insurance company     Social Determinants of Health     Financial Resource Strain: Low Risk     Difficulty of Paying Living Expenses: Not hard at all   Food Insecurity: No Food Insecurity    Worried About [facility] in the Last Year: Never true    Ran Out of Food in the Last Year: Never true   Transportation Needs:     Lack of Transportation (Medical): Not on file    Lack of Transportation (Non-Medical): Not on file   Physical Activity:     Days of Exercise per Week: Not on file    Minutes of Exercise per Session: Not on file   Stress:     Feeling of Stress : Not on file   Social Connections:     Frequency of Communication with Friends and Family: Not on file    Frequency of Social Gatherings with Friends and Family: Not on file    Attends Islam Services: Not on file    Active Member of Clubs or Organizations: Not on file    Attends Club or Organization Meetings: Not on file    Marital Status: Not on file   Intimate Partner Violence:     Fear of Current or Ex-Partner: Not on file    Emotionally Abused: Not on file    Physically Abused: Not on file    Sexually Abused: Not on file   Housing Stability:     Unable to Pay for Housing in the Last Year: Not on file    Number of Places Lived in the Last Year: Not on file    Unstable Housing in the Last Year: Not on file       Medications & Allergies:   Current Outpatient Medications   Medication Instructions    calcium-vitamin D (OSCAL-500) 500-200 MG-UNIT per tablet 2 tablets, Oral, DAILY    Cholecalciferol (VITAMIN D3) 50 MCG (2000 UT) CAPS Oral    docusate sodium (COLACE) 100 mg, Oral, 2 TIMES DAILY PRN    enalapril (VASOTEC) 10 MG tablet TAKE 1 TABLET NIGHTLY    FLUoxetine (PROZAC) 20 mg, Oral, DAILY    fluticasone (FLONASE) 50 MCG/ACT nasal spray 1 spray, PRN    levothyroxine (SYNTHROID) 100 MCG tablet TAKE 1 TABLET DAILY    meloxicam (MOBIC) 15 mg, Oral, DAILY    metoprolol succinate (TOPROL XL) 50 MG extended release tablet TAKE 1 TABLET DAILY    Misc. Devices (WALKER) MISC 1 each, Does not apply, DAILY    Misc.  Devices MISC Mechanical lift for a [employer] 3-6-9 Fatty Acids (OMEGA 3-6-9 COMPLEX) CAPS 1 capsule, Oral, DAILY    pregabalin (LYRICA) 150 mg, Oral, 2 TIMES DAILY    Scooter MISC Does not apply, Power scooter    therapeutic multivitamin-minerals (THERAGRAN-M) tablet 1 tablet, Oral, NIGHTLY       Allergies   Allergen Reactions    Ampicillin     Morphine Other (See Comments)     Hallucinations     Pcn [Penicillins] Itching    Sulfa Antibiotics        Review of Systems:   Constitutional: negative for weight changes or fevers  Genitourinary: negative for bowel/bladder incontinence   Musculoskeletal: positive for diffuse arthritis pain  Neurological:  Positive for leg weakness and falls  Behavioral/Psych: negative for anxiety/depression   All other systems reviewed and are negative    Objective: There were no vitals filed for this visit. Constitutional: Pleasant, no acute distress   Head: Normocephalic, atraumatic   Eyes: Conjunctivae normal   Neck: Supple, symmetrical   Lungs: Normal respiratory effort, non-labored breathing   Cardiovascular: Limbs warm and well perfused   Abdomen: Non-protruded   Musculoskeletal: Muscle bulk symmetric, no atrophy, no gross deformities   · Lumbar Spine: ROM severely reduced in extension. Scoliosis appreciated in thoracolumbar junction. Lumbar paraspinals tender bilaterally. SLR positive right. Neurological: Cranial nerves II-XII grossly intact. 1+ bilateral patellar and Achilles reflexes. Negative ankle clonus. · Gait - Severely antalgic gait. Ambulates with assistive device. · Motor: No focal motor deficits appreciated in lower limbs  Skin: No rashes or lesions visible in low back  Psychological: Cooperative, no exaggerated pain behaviors       Assessment:    Diagnosis Orders   1. Radiculopathy, lumbosacral region  CHG FLUOR NEEDLE/CATH SPINE/PARASPINAL DX/THER ADDON    [place] NJX DX/THER SBST INTRLMNR LMBR/SAC W/IMG GDN   2. Fall, sequela     3.  Lumbar facet joint pain 4. Other spondylosis, lumbar region           Hayes Gunter is a 78 y. o.female presenting to the pain clinic for evaluation of chronic low back pain. Her clinical history and physical examination are consistent with lumbar facet medial pain with associated myofascial pain. Her MRI of the lumbar spine does reveal multilevel degenerative changes and stenosis particularly in the lower lumbar spine. I set her up for lumbar epidural steroid injection at L5/S1. Plan: The following treatment recommendations and plan were discussed in detail with Hayes Gunter and . Imaging:   I have reviewed patients imaging of MRI L-spine and results were discussed in detail with the patient. Analgesics:   Patient is taking Acetaminophen. Patient informed that the maximum amount of acetaminophen taken on a regular basis should only be 4000 mg per day. We will continue her meloxicam to 15 mg daily. I advised patient take this medication with food in the morning. Patient should stop any other NSAID therapies including her Aleve. Adjuvants: In light of the presence of a neuropathic component of pain, the patient should continue Lyrica and Cymbalta as prescribed. Interventions: In presence of lumbosacral radiating pain, the option of lumbar epidural steroid injection approach at L5/S1 was chosen. The risks and benefits were discussed in detail with the patient. Patient wants to proceed with the injection. Anticoagulation/NPO Recommendations:   Patient will need to hold meloxicam x 4 days prior to the procedure. Patient will need to be NPO x 8 hours prior to the procedure. Plan to start an IV prior to the procedure. Multidisciplinary Pain Management:   In the presence of complex, chronic, and multi-factorial pain, the importance of a multidisciplinary approach to pain management in the patients management regimen was emphasized and discussed in great detail.    PHYSICAL THERAPY: Patient is advised to continue gentle low back ROM/stretching exercises     Referrals:  None    Prescriptions Written This Visit:   None    Follow-up: For BERTO Dale, DO  Interventional Pain Management/PM&R   [place]

## 2022-06-29 ENCOUNTER — TELEPHONE (OUTPATIENT)
Dept: PHYSICAL MEDICINE AND REHAB | Age: 82
End: 2022-06-29

## 2022-06-29 NOTE — TELEPHONE ENCOUNTER
Spoke with daughter Candace-(HIPPA). Pt. Unaware of pre-procedure instructions and took Mobic prior to procedure. Procedure rescheduled. Verbalized understanding. Pre-procedure instructions-mailed.

## 2022-07-20 ENCOUNTER — TELEPHONE (OUTPATIENT)
Dept: FAMILY MEDICINE CLINIC | Age: 82
End: 2022-07-20

## 2022-07-20 ENCOUNTER — PREP FOR PROCEDURE (OUTPATIENT)
Dept: PHYSICAL MEDICINE AND REHAB | Age: 82
End: 2022-07-20

## 2022-07-20 NOTE — TELEPHONE ENCOUNTER
Spoke to some one at facility stated to call Simi Mancini in the morning and to fax the forms to     [phone]

## 2022-07-20 NOTE — TELEPHONE ENCOUNTER
[address] [facility] Clinical Staff  Subject: Message to Provider     QUESTIONS   Information for Provider? Patient wants to know if her provider received   paperwork and filled out paper work from [facility]. Call Clem jo case [phone] Patient will move in by August 1st. Please call for fax number. ---------------------------------------------------------------------------   --------------   Shannan Hines [facility]   [phone]; OK to leave message on voicemail   ---------------------------------------------------------------------------   --------------   SCRIPT ANSWERS   Relationship to Patient?  Self

## 2022-07-26 ENCOUNTER — APPOINTMENT (OUTPATIENT)
Dept: GENERAL RADIOLOGY | Age: 82
End: 2022-07-26
Attending: ANESTHESIOLOGY
Payer: MEDICARE

## 2022-07-26 ENCOUNTER — HOSPITAL ENCOUNTER (OUTPATIENT)
Age: 82
Setting detail: OUTPATIENT SURGERY
Discharge: HOME | End: 2022-07-26
Attending: ANESTHESIOLOGY | Admitting: ANESTHESIOLOGY
Payer: MEDICARE

## 2022-07-26 VITALS
RESPIRATION RATE: 13 BRPM | SYSTOLIC BLOOD PRESSURE: 126 MMHG | TEMPERATURE: 97.6 F | HEIGHT: 67 IN | HEART RATE: 51 BPM | BODY MASS INDEX: 24.8 KG/M2 | WEIGHT: 158 LBS | OXYGEN SATURATION: 96 % | DIASTOLIC BLOOD PRESSURE: 73 MMHG

## 2022-07-26 PROCEDURE — 3600000054 HC PAIN LEVEL 3 BASE: Performed by: ANESTHESIOLOGY

## 2022-07-26 PROCEDURE — 62323 NJX INTERLAMINAR LMBR/SAC: CPT | Performed by: ANESTHESIOLOGY

## 2022-07-26 PROCEDURE — 6360000002 HC RX W HCPCS: Performed by: ANESTHESIOLOGY

## 2022-07-26 PROCEDURE — 2500000003 HC RX 250 WO HCPCS: Performed by: ANESTHESIOLOGY

## 2022-07-26 PROCEDURE — 2709999900 HC NON-CHARGEABLE SUPPLY: Performed by: ANESTHESIOLOGY

## 2022-07-26 PROCEDURE — 99152 MOD SED SAME PHYS/QHP 5/>YRS: CPT | Performed by: ANESTHESIOLOGY

## 2022-07-26 PROCEDURE — 7100000010 HC PHASE II RECOVERY - FIRST 15 MIN: Performed by: ANESTHESIOLOGY

## 2022-07-26 PROCEDURE — 3209999900 FLUORO FOR SURGICAL PROCEDURES

## 2022-07-26 PROCEDURE — 6360000004 HC RX CONTRAST MEDICATION: Performed by: ANESTHESIOLOGY

## 2022-07-26 PROCEDURE — 7100000011 HC PHASE II RECOVERY - ADDTL 15 MIN: Performed by: ANESTHESIOLOGY

## 2022-07-26 RX ORDER — LIDOCAINE HYDROCHLORIDE 10 MG/ML
INJECTION, SOLUTION EPIDURAL; INFILTRATION; INTRACAUDAL; PERINEURAL PRN
Status: DISCONTINUED | OUTPATIENT
Start: 2022-07-26 | End: 2022-07-26 | Stop reason: ALTCHOICE

## 2022-07-26 RX ORDER — FENTANYL CITRATE 50 UG/ML
INJECTION, SOLUTION INTRAMUSCULAR; INTRAVENOUS PRN
Status: DISCONTINUED | OUTPATIENT
Start: 2022-07-26 | End: 2022-07-26 | Stop reason: ALTCHOICE

## 2022-07-26 RX ORDER — MIDAZOLAM HYDROCHLORIDE 1 MG/ML
INJECTION INTRAMUSCULAR; INTRAVENOUS PRN
Status: DISCONTINUED | OUTPATIENT
Start: 2022-07-26 | End: 2022-07-26 | Stop reason: ALTCHOICE

## 2022-07-26 RX ORDER — DEXAMETHASONE SODIUM PHOSPHATE 4 MG/ML
INJECTION, SOLUTION INTRA-ARTICULAR; INTRALESIONAL; INTRAMUSCULAR; INTRAVENOUS; SOFT TISSUE PRN
Status: DISCONTINUED | OUTPATIENT
Start: 2022-07-26 | End: 2022-07-26 | Stop reason: ALTCHOICE

## 2022-07-26 NOTE — H&P
Today, patient presents for planned lumbar epidural steroid injection approach at L5/S1. This note is reflective of the patient's previous visit for evaluation. We will proceed with today's planned procedure. Since patient's last visit for evaluation, there have been no interval changes in medical history. Patient has no new numbness, weakness, or focal neurological deficit since evaluation. Patient has no contraindications to injection (no anticoagulation or recent antibiotic intake for active infections), and has a  present or is able to drive themselves (as discussed and cleared by physician). Allergies to latex, contrast dye, and steroid medications have been confirmed with the patient prior to the procedure. NPO necessity has been assessed and accepted based on procedure complexity. The risks and benefits of the procedure have been explained including but are not limited to infection, bleeding, paralysis, immediate post procedure weakness, and dizziness; the patient acknowledges understanding and desires to proceed with the procedure. Patient has signed consent for same procedure as discussed in previous clinic encounter. All other questions and concerns were addressed at bedside. See procedure note for full details. Post procedure Instructions: The patient was advised not to drive during the day of the procedure and not to engage in any significant decision making (unless otherwise states by physician). The patient was also advised to be cautious with walking/activity for 24 hours following today's visit and asked not to engage in over-exertion (unless otherwise states by physician). After this time, it is ok to resume pre-procedure level of activity. Patient advised to apply ice to site of injection in situations of pain and discomfort. Patient advised to not submerge site of injection during bath or pool activities for approximately 24 hours post-procedure.  Patient attested to understanding down her legs. She feels like it is very challenging for her to stand up without feeling like her legs to give out due to pain and weakness. She denies any associated bowel/bladder incontinence episodes. She is wonder about the results of her MRI of the lumbar spine and is wondering what else she can do to help with her overall pain and function. She continues take her medications as prescribed without any side effects. History of Interventions:   Surgery: Previous discectomy in 30s, left femur IMN, left TKA  Injections: Previous lumabr RFA ~8-10 years ago by Dr. Tia Schirmer  Diagnostic lumbar MBBs  Confirmatory lumbar MBBs  Lumbar RFA (10/19/2021 and 12/7/21)-significant relief    Current Treatment Medications:   Meloxicam 15 mg daily  Heating pad PRN  Mishel 150 mg BID  Cymbalta 60 mg daily    Historical Treatment Medications:   Tramadol-ineffective  Norco-stopped (constipation)    Imaging:  MRI L-spine  (6/11/22)    PROCEDURE: MRI LUMBAR SPINE WO CONTRAST       CLINICAL INFORMATION: Fall, sequela. COMPARISON: Plain radiographs dated 18 May 2021. .       TECHNIQUE: Sagittal and axial T1 and T2-weighted images were obtained through the lumbar spine. FINDINGS:           There is a levoscoliosis. There is degenerative change in the vertebral body endplates adjacent to V35-61, T12-L1, L1-2, L2-3, L3-4 and L4-5 disc spaces. .  There is no bone marrow edema. There is no acute compression fracture. No pars defects are    noted. .       The distal spinal cord, conus medullaris and cauda equina are normal.        There are no gross abnormalities in the visualized aspects of the distal thoracic spine. On the axial images, at T12-L1, there is no disc herniation or canal stenosis. There is moderate right and mild-to-moderate left-sided foraminal stenosis. At L1-L2, there is a 1.9 mm bulging disc and facet hypertrophy.  This results in moderate right and mild-to-moderate left-sided foraminal stenosis with no canal stenosis. At L2-L3, there is a 3.5 mm bulging disc and facet hypertrophy. This results in mild canal, moderate left and mild-to-moderate right-sided foraminal stenosis. At L3-L4, there is a 2.4 mm bulging disc and facet hypertrophy. This results in mild canal and moderate bilateral foraminal stenosis. At L4-L5, there are some 1.6 mm bulging disc and facet hypertrophy. This causes mild canal and moderate bilateral foraminal stenosis. At L5-S1, there is no disc herniation or canal stenosis. There is mild-to-moderate bilateral foraminal stenosis. There is degenerative change involving the sacroiliac joints bilaterally. There is a perineural cyst on the left side at S2. There is atrophy involving the paraspinal muscles in the lower lumbar spine. Impression       1. Levoscoliosis. No evidence for an acute compression fracture. 2. Degenerative changes resulting in mild canal and moderate bilateral foraminal stenosis at L3-4 and L4-5.   3. There is moderate right and mild-to-moderate left-sided foraminal stenosis with no canal stenosis at T12-L1 and L1-2.   4. There is mild canal, moderate left and mild right-sided foraminal stenosis at L2-3.   5. There is mild-to-moderate bilateral from stenosis but no canal stenosis at L5-S1.   6. There is degenerative change involving the sacroiliac joints bilaterally. 7. There is a perineural cyst on the left side at S2.   8. There is atrophy in the paraspinal muscles.              Past Medical History:   Diagnosis Date    Abnormal EKG     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 Xavi Monahan     GERD (gastroesophageal reflux disease)     Diverticulitis     Hiatal hernia     Hypertension     BAKI    Hypothyroidism     Osteoarthritis        Past Surgical History:   Procedure Laterality Date    APPENDECTOMY  1958    BACK SURGERY      CARPAL TUNNEL RELEASE Bilateral 2013    w/cubital tunnel    COLON SURGERY  07/29/2016    Procedure: ATTEMPTED DAVINCI XI ROBOTIC ASSISTED SIGMOID COLON RESECTION, ROBOTIC ASSISTED MOBILIZATION OF RECTAL SIGMOID TO SPLENIC FLEXURE, CONVERTED TO OPEN LEFT HEMICOLECTOMY WITH COLOPROCTOCTOMY, SPLENORRHAPHY, RIGID SIGMOIDOSCOPY, LASER EVALUATION OF VASCULARITY WITH ICG; Surgeon: Santos Wahl MD; Location: [place] [address]; Service: General    COLONOSCOPY  2012    CYST REMOVAL  2010    EYE SURGERY  1998, 1999    Cataract    FEMUR FRACTURE SURGERY Left 12/21/2020    LEFT FEMUR OPEN REDUCTION INTERNAL FIXATION performed by Manny Nyhan, MD at [facility] 11 Left 2001    [address] [place] [address] N/A 03/09/2021    ROBOTIC EXTENSIVE LYSIS OF ADHESIONS, ROBOTIC REDUCTION OF HIATAL HERNIA WITH GASTROPEXY performed by Chiara Roach MD at [id number] [place] [address] ([address] [place] [address])  1979    w/bladder suspension    JOINT REPLACEMENT  2002, 2008, 2009    rt hip(2002), lt knee(2009), lt hip(2008) Shoulder (2009)    OTHER SURGICAL HISTORY  04/21/2021    Kandace Ruffin Fred CNP in the office    [address] Bilateral 7/13/2021    medial branch blocks at bilateral L4/L5 and L5/S1 performed by Joel Nuñez DO at [facility] Bilateral 8/24/2021    confirmatory  medial branch blocks at bilateral L4/L5 and L5/S1 performed by Joel Nuñez DO at [facility] Right 10/19/2021    thermal radiofrequency at BILATERAL medial branches L4/L5 and L5/S1 was chosen. We will start with the right side first performed by Joel Nuñez DO at [facility] Left 12/7/2021    thermal radiofrequency at BILATERAL medial branches L4/L5 and L5/S1 was chosen.  performed by [facility]    Worried About Running Out of Food in the Last Year: Never true    Ran Out of Food in the Last Year: Never true   Transportation Needs: Not on file   Physical Activity: Not on file   Stress: Not on file   Social Connections: Not on file   Intimate Partner Violence: Not on file   Housing Stability: Not on file       Medications & Allergies:   Current Outpatient Medications   Medication Instructions    calcium-vitamin D (OSCAL-500) 500-200 MG-UNIT per tablet 2 tablets, Oral, DAILY    Cholecalciferol (VITAMIN D3) 50 MCG (2000 UT) CAPS Oral    docusate sodium (COLACE) 100 mg, Oral, 2 TIMES DAILY PRN    enalapril (VASOTEC) 10 MG tablet TAKE 1 TABLET NIGHTLY    FLUoxetine (PROZAC) 20 mg, Oral, DAILY    fluticasone (FLONASE) 50 MCG/ACT nasal spray 1 spray, PRN    levothyroxine (SYNTHROID) 100 MCG tablet TAKE 1 TABLET DAILY    meloxicam (MOBIC) 15 mg, Oral, DAILY    metoprolol succinate (TOPROL XL) 50 MG extended release tablet TAKE 1 TABLET DAILY    Misc. Devices (WALKER) MISC 1 each, Does not apply, DAILY    Misc. Devices MISC Mechanical lift for a Saloni Delgado Fatty Acids (OMEGA 3-6-9 COMPLEX) CAPS 1 capsule, Oral, DAILY    pregabalin (LYRICA) 150 mg, Oral, 2 TIMES DAILY    Scooter MISC Does not apply, Power scooter    therapeutic multivitamin-minerals (THERAGRAN-M) tablet 1 tablet, Oral, NIGHTLY       Allergies   Allergen Reactions    Ampicillin     Morphine Other (See Comments)     Hallucinations     Pcn [Penicillins] Itching    Sulfa Antibiotics        Review of Systems:   Constitutional: negative for weight changes or fevers  Genitourinary: negative for bowel/bladder incontinence   Musculoskeletal: positive for diffuse arthritis pain  Neurological:  Positive for leg weakness and falls  Behavioral/Psych: negative for anxiety/depression   All other systems reviewed and are negative    Objective: There were no vitals filed for this visit.     Constitutional: Pleasant, no acute distress   Head: Normocephalic, atraumatic   Eyes: Conjunctivae normal   Neck: Supple, symmetrical   Lungs: Normal respiratory effort, non-labored breathing   Cardiovascular: Limbs warm and well perfused   Abdomen: Non-protruded   Musculoskeletal: Muscle bulk symmetric, no atrophy, no gross deformities   · Lumbar Spine: ROM severely reduced in extension. Scoliosis appreciated in thoracolumbar junction. Lumbar paraspinals tender bilaterally. SLR positive right. Neurological: Cranial nerves II-XII grossly intact. 1+ bilateral patellar and Achilles reflexes. Negative ankle clonus. · Gait - Severely antalgic gait. Ambulates with assistive device. · Motor: No focal motor deficits appreciated in lower limbs  Skin: No rashes or lesions visible in low back  Psychological: Cooperative, no exaggerated pain behaviors       Assessment:    Diagnosis Orders   1. Radiculopathy, lumbosacral region  CHG FLUOR NEEDLE/CATH SPINE/PARASPINAL DX/THER ADDON    DE NJX DX/THER SBST INTRLMNR LMBR/SAC W/IMG GDN   2. Fall, sequela     3. Lumbar facet joint pain     4. Other spondylosis, lumbar region           Daniel Gunter is a 78 y. o.female presenting to the pain clinic for evaluation of chronic low back pain. Her clinical history and physical examination are consistent with lumbar facet medial pain with associated myofascial pain. Her MRI of the lumbar spine does reveal multilevel degenerative changes and stenosis particularly in the lower lumbar spine. I set her up for lumbar epidural steroid injection at L5/S1. Plan: The following treatment recommendations and plan were discussed in detail with Daniel Gunter and . Imaging:   I have reviewed patients imaging of MRI L-spine and results were discussed in detail with the patient. Analgesics:   Patient is taking Acetaminophen. Patient informed that the maximum amount of acetaminophen taken on a regular basis should only be 4000 mg per day.      We will continue her meloxicam to 15 mg daily. I advised patient take this medication with food in the morning. Patient should stop any other NSAID therapies including her Aleve. Adjuvants: In light of the presence of a neuropathic component of pain, the patient should continue Lyrica and Cymbalta as prescribed. Interventions: In presence of lumbosacral radiating pain, the option of lumbar epidural steroid injection approach at L5/S1 was chosen. The risks and benefits were discussed in detail with the patient. Patient wants to proceed with the injection. Anticoagulation/NPO Recommendations:   Patient will need to hold meloxicam x 4 days prior to the procedure. Patient will need to be NPO x 8 hours prior to the procedure. Plan to start an IV prior to the procedure. Multidisciplinary Pain Management:   In the presence of complex, chronic, and multi-factorial pain, the importance of a multidisciplinary approach to pain management in the patients management regimen was emphasized and discussed in great detail.    PHYSICAL THERAPY: Patient is advised to continue gentle low back ROM/stretching exercises     Referrals:  None    Prescriptions Written This Visit:   None    Follow-up: For BERTO Dale,   Interventional Pain Management/PM&R   [place]

## 2022-07-26 NOTE — PROCEDURES
Pre-operative Diagnosis: Radicular leg pain     Post-operative Diagnosis: Radicular leg pain     Procedure: Lumbar epidural steroid injection    Procedure Description:  After having obtained a signed informed consent, the patient was taken to the fluoroscopy suite and placed in the prone position. The patient's back was prepped with chloraprep and draped in a sterile fashion. A total of 1.5 cc of 1 % lidocaine was used to anesthetize the skin and underlying tissues. Under fluoroscopic guidance, a single 20G Tuohy needle was advanced using midline approach at the L5/S1 interspace until gaining the epidural space using the loss of resistance to saline syringe technique. There were no paresthesias, heme, or CSF aspiration. A total of 0.25 cc of Omnipaque 300 were injected having had adequate dye spread within the epidural space. Needle placement and contrast spread was confirmed using the AP and contralateral views. 10 mg of Dexamethasone with 1 cc of saline solution were injected in the epidural space. The needle was flushed and removed without any complication. The patient tolerated the procedure well and was transported to the recovery room. The patient was observed for 15 minutes to then discharged in an ambulatory fashion.     Procedural Complications: None  Estimated Blood Loss: 0 mL      IV sedation was used during the procedure:  - Moderate intravenous conscious sedation was supervised by Dr. Razia Alonso  - The patient was independently monitored by a Registered Nurse assigned to the procedure room  - Monitoring included automated blood pressure, continuous EKG, and continuous pulse oximetry  - The detailed conscious record is permanently stored in the [place] [address]  - The following is the conscious sedation record:  Start Time: 08:42  End Time : 08:57  Duration: 15 minutes   Medications Administered: 1 mg Versed, 50 mcg Fentanyl       Jared Dale DO  Interventional Pain Management/PM&R [facility]

## 2022-07-26 NOTE — POST SEDATION
[facility]  Sedation/Analgesia Post Sedation Record    Pt Name: Chantell Gunter  MRN: [id number]  YOB: 1940  Procedure Performed By: Vishal Lakhani DO  Primary Care Physician: Matias Alonzo DO    POST-PROCEDURE    Physicians/Assistants: Vishal Lakhani DO  Procedure Performed: See Procedure Note   Sedation/Anesthesia: Versed and Fentanyl (See procedure note for amount and duration)  Estimated Blood Loss:     0  ml  Specimens Removed: None        Complications: None           Jared Leon DO  Electronically signed 7/26/2022 at 10:45 AM

## 2022-07-26 NOTE — PROGRESS NOTES
0848: Patient to phase II. Report and chart received from [facility]. Patient drowsy. Wakes up when name is called. Patient placed on 4L per nc for pulse ox 83%. Patient encouraged to deep breath. Vital signs obtained. Cart in lowest position and locked. Patient given call light. HOB elevated  0855: Pt waking up more. Pulse ox improved. 0900: patient given snack and drink. Patient's daughter updated. 8636: Pt working on coffee. Not ready to leave at this time. Patient has call light and no other needs expressed at this time. 0915: Patient tolerating snack and drink. IV removed and patient assisted with getting dressed. Ride called. 2293: patient rode own motorized wheelchair to car passenger seat in stable condition with this RN by side.

## 2022-07-26 NOTE — PRE SEDATION
[facility]  Pre-Sedation/Analgesia History & Physical    Pt Name: Fany Gunter  MRN: [id number]  YOB: 1940  Provider Performing Procedure: James Macedo DO   Primary Care Physician: Ministerio Castaneda DO      MEDICAL HISTORY       has a past medical history of Abnormal EKG, Anemia, Back pain, Blood circulation, collateral, Diverticulitis, Fibromyalgia, GERD (gastroesophageal reflux disease), Hiatal hernia, Hypertension, Hypothyroidism, and Osteoarthritis. SURGICAL HISTORY   has a past surgical history that includes Appendectomy (1958); Colonoscopy (2012); eye surgery (1998, 1999); Hysterectomy (1979); Spine surgery (1968); Upper gastrointestinal endoscopy (2012); cyst removal (2010); Tonsillectomy (1943); Tooth Extraction (1964); Ovary removal (1999); Foot surgery (Left, 2001); Carpal tunnel release (Bilateral, 2013); Small intestine surgery; back surgery; Colon surgery (07/29/2016); joint replacement (2002, 2008, 2009); pr colon ca scrn not hi rsk ind (Left, 09/15/2017); Femur fracture surgery (Left, 12/21/2020); hiatal hernia repair (N/A, 03/09/2021); other surgical history (04/21/2021); Pain management procedure (Bilateral, 7/13/2021); Pain management procedure (Bilateral, 8/24/2021); Pain management procedure (Right, 10/19/2021); Pain management procedure (Left, 12/7/2021); and Upper gastrointestinal endoscopy (Left, 6/7/2022). ALLERGIES   Allergies as of 06/13/2022 - Fully Reviewed 06/13/2022   Allergen Reaction Noted    Ampicillin  05/17/2013    Morphine Other (See Comments) 09/14/2017    Pcn [penicillins] Itching 05/17/2013    Sulfa antibiotics  05/17/2013       MEDICATIONS   Prior to Admission medications    Medication Sig Start Date End Date Taking?  Authorizing Provider   enalapril (VASOTEC) 10 MG tablet TAKE 1 TABLET NIGHTLY 6/23/22   Ministerio Castaneda DO   Cholecalciferol (VITAMIN D3) 50 MCG (2000 UT) CAPS Take by mouth    Historical Provider, MD   metoprolol succinate (TOPROL XL) 50 MG extended release tablet TAKE 1 TABLET DAILY 5/16/22   Neela Barba DO   levothyroxine (SYNTHROID) 100 MCG tablet TAKE 1 TABLET DAILY 5/16/22   Neela Barba DO   meloxicam (MOBIC) 15 MG tablet Take 1 tablet by mouth daily  Patient taking differently: Take 7.5 mg by mouth in the morning. 5/11/22   Jared Dale, DO   pregabalin (LYRICA) 150 MG capsule Take 1 capsule by mouth 2 times daily for 180 days. 4/19/22 10/16/22  Neela Barba DO   FLUoxetine (PROZAC) 20 MG capsule Take 1 capsule by mouth daily 4/4/22   Neela Barba DO   docusate sodium (COLACE) 100 MG capsule Take 1 capsule by mouth 2 times daily as needed for Constipation 3/15/22   Neela Barba DO   calcium-vitamin D (OSCAL-500) 500-200 MG-UNIT per tablet Take 2 tablets by mouth daily    Historical Provider, MD   Misc. Devices [facility]) MISC 1 each by Does not apply route daily 2/11/20   Neela Barba DO   Misc. Devices MISC Mechanical lift for a Berl Sear 5/31/18   Neela Barba DO   Scooter MISC by Does not apply route Power scooter 10/31/17   Neela Barba DO   fluticasone (FLONASE) 50 MCG/ACT nasal spray 1 spray by Nasal route as needed for Rhinitis   Patient not taking: No sig reported    Historical Provider, MD   Omega 3-6-9 Fatty Acids (OMEGA 3-6-9 COMPLEX) CAPS Take 1 capsule by mouth daily     Historical Provider, MD   therapeutic multivitamin-minerals (THERAGRAN-M) tablet Take 1 tablet by mouth nightly     Historical Provider, MD     PHYSICAL:   Vitals:    07/26/22 0903   BP:    Pulse:    Resp:    Temp:    SpO2: 96%     PLANNED PROCEDURE   See procedure note  SEDATION  Planned agent: Versed and Fentanyl  ASA Classification: 2  Class 1: A normal healthy patient  Class 2: Pt with mild to moderate systemic disease  Class 3: Severe systemic disease or disturbance  Class 4: Severe systemic disorders that are already life threatening. Class 5: Moribund pt with little chances of survival, for more than 24 hours.   Mallampati I Airway Classification: 2    1. Pre-procedure diagnostic studies complete and results available. 2. Previous sedation/anesthesia experiences assessed. 3. The patient is an appropriate candidate to undergo the planned procedure sedation and anesthesia. (Refer to nursing sedation/analgesia documentation record)  4. Formulation and discussion of sedation/procedure plan, risks, and expectations with patient and/or responsible adult completed. 5. Patient examined immediately prior to the procedure.  (Refer to nursing sedation/analgesia documentation record)    Zaira Gallegos DO  Electronically signed 7/26/2022 at 10:45 AM

## 2022-08-02 DIAGNOSIS — M79.7 FIBROMYALGIA: ICD-10-CM

## 2022-08-02 RX ORDER — PREGABALIN 150 MG/1
150 CAPSULE ORAL 2 TIMES DAILY
Qty: 180 CAPSULE | Refills: 1 | Status: SHIPPED | OUTPATIENT
Start: 2022-08-02 | End: 2023-01-29

## 2022-08-02 NOTE — TELEPHONE ENCOUNTER
Stormy Poole wanted you to know they will be moving to [facility] assisted living on Friday

## 2022-08-12 ENCOUNTER — TELEPHONE (OUTPATIENT)
Dept: FAMILY MEDICINE CLINIC | Age: 82
End: 2022-08-12

## 2022-08-12 ENCOUNTER — TELEPHONE (OUTPATIENT)
Dept: PHYSICAL MEDICINE AND REHAB | Age: 82
End: 2022-08-12

## 2022-08-12 DIAGNOSIS — M79.7 FIBROMYALGIA: Primary | ICD-10-CM

## 2022-08-12 DIAGNOSIS — G89.4 CHRONIC PAIN SYNDROME: ICD-10-CM

## 2022-08-12 RX ORDER — HYDROCODONE BITARTRATE AND ACETAMINOPHEN 5; 325 MG/1; MG/1
1 TABLET ORAL EVERY 6 HOURS PRN
Qty: 12 TABLET | Refills: 0 | Status: SHIPPED | OUTPATIENT
Start: 2022-08-12 | End: 2022-08-15

## 2022-08-12 RX ORDER — HYDROCODONE BITARTRATE AND ACETAMINOPHEN 5; 325 MG/1; MG/1
1 TABLET ORAL EVERY 6 HOURS PRN
Qty: 12 TABLET | Refills: 0 | Status: SHIPPED | OUTPATIENT
Start: 2022-08-12 | End: 2022-08-12 | Stop reason: SDUPTHER

## 2022-08-12 NOTE — TELEPHONE ENCOUNTER
Patient reports that she is having lower back pain and that she called Dr Lina Ngo office today but their office was already closed   Patient states that she received a steroid shot last week and it has not helped her at all and needing pain meds sent to her pharmacy

## 2022-08-12 NOTE — TELEPHONE ENCOUNTER
Pt .called to report injection done July 26 has not helped at all and requesting something for pain.  NEXT F/U IS 9/15

## 2022-08-15 ENCOUNTER — TELEPHONE (OUTPATIENT)
Dept: FAMILY MEDICINE CLINIC | Age: 82
End: 2022-08-15

## 2022-08-15 DIAGNOSIS — R11.0 NAUSEA: Primary | ICD-10-CM

## 2022-08-15 RX ORDER — ONDANSETRON 4 MG/1
4 TABLET, FILM COATED ORAL 3 TIMES DAILY PRN
Qty: 30 TABLET | Refills: 0 | Status: SHIPPED | OUTPATIENT
Start: 2022-08-15 | End: 2022-10-24 | Stop reason: SDUPTHER

## 2022-08-15 NOTE — TELEPHONE ENCOUNTER
Left message on answering machine informing her that zofran was sent to her pharmacy and to call  back if issues persist.

## 2022-08-16 ENCOUNTER — HOSPITAL ENCOUNTER (INPATIENT)
Age: 82
LOS: 8 days | Discharge: NURSING, REHAB OR LONG-TERM CARE | DRG: 378 | End: 2022-08-24
Attending: HOSPITALIST | Admitting: HOSPITALIST
Payer: MEDICARE

## 2022-08-16 ENCOUNTER — APPOINTMENT (OUTPATIENT)
Dept: GENERAL RADIOLOGY | Age: 82
DRG: 378 | End: 2022-08-16
Payer: MEDICARE

## 2022-08-16 DIAGNOSIS — E87.1 HYPONATREMIA: ICD-10-CM

## 2022-08-16 DIAGNOSIS — R06.89 DYSPNEA AND RESPIRATORY ABNORMALITIES: Primary | ICD-10-CM

## 2022-08-16 DIAGNOSIS — R06.00 DYSPNEA AND RESPIRATORY ABNORMALITIES: Primary | ICD-10-CM

## 2022-08-16 DIAGNOSIS — J18.9 PNEUMONIA OF LEFT LOWER LOBE DUE TO INFECTIOUS ORGANISM: ICD-10-CM

## 2022-08-16 DIAGNOSIS — D50.0 IRON DEFICIENCY ANEMIA DUE TO CHRONIC BLOOD LOSS: ICD-10-CM

## 2022-08-16 DIAGNOSIS — R06.02 SHORTNESS OF BREATH: ICD-10-CM

## 2022-08-16 LAB
ABSOLUTE RETIC #: 64 THOU/MM3 (ref 20–115)
ALBUMIN SERPL-MCNC: 3.7 G/DL (ref 3.5–5.1)
ALP BLD-CCNC: 64 U/L (ref 38–126)
ALT SERPL-CCNC: 12 U/L (ref 11–66)
ANION GAP SERPL CALCULATED.3IONS-SCNC: 8 MEQ/L (ref 8–16)
AST SERPL-CCNC: 26 U/L (ref 5–40)
BACTERIA: ABNORMAL /HPF
BASOPHILS # BLD: 1.1 %
BASOPHILS ABSOLUTE: 0 THOU/MM3 (ref 0–0.1)
BILIRUB SERPL-MCNC: 0.3 MG/DL (ref 0.3–1.2)
BILIRUBIN DIRECT: < 0.2 MG/DL (ref 0–0.3)
BILIRUBIN URINE: NEGATIVE
BLOOD, URINE: NEGATIVE
BUN BLDV-MCNC: 19 MG/DL (ref 7–22)
CALCIUM SERPL-MCNC: 8.6 MG/DL (ref 8.5–10.5)
CASTS 2: ABNORMAL /LPF
CASTS UA: ABNORMAL /LPF
CHARACTER, URINE: CLEAR
CHLORIDE BLD-SCNC: 94 MEQ/L (ref 98–111)
CO2: 25 MEQ/L (ref 23–33)
COLOR: YELLOW
CREAT SERPL-MCNC: 0.5 MG/DL (ref 0.4–1.2)
CRYSTALS, UA: ABNORMAL
D-DIMER QUANTITATIVE: 664 NG/ML FEU (ref 0–500)
EOSINOPHIL # BLD: 0.8 %
EOSINOPHILS ABSOLUTE: 0 THOU/MM3 (ref 0–0.4)
EPITHELIAL CELLS, UA: ABNORMAL /HPF
ERYTHROCYTE [DISTWIDTH] IN BLOOD BY AUTOMATED COUNT: 17.2 % (ref 11.5–14.5)
ERYTHROCYTE [DISTWIDTH] IN BLOOD BY AUTOMATED COUNT: 48.8 FL (ref 35–45)
GFR SERPL CREATININE-BSD FRML MDRD: > 90 ML/MIN/1.73M2
GLUCOSE BLD-MCNC: 104 MG/DL (ref 70–108)
GLUCOSE URINE: NEGATIVE MG/DL
HCT VFR BLD CALC: 25.5 % (ref 37–47)
HEMOCCULT STL QL: POSITIVE
HEMOGLOBIN: 7.7 GM/DL (ref 12–16)
IMMATURE GRANS (ABS): 0.01 THOU/MM3 (ref 0–0.07)
IMMATURE GRANULOCYTES: 0.3 %
IMMATURE RETIC FRACT: 19.5 % (ref 3–15.9)
KETONES, URINE: 15
LACTIC ACID, SEPSIS: 1.2 MMOL/L (ref 0.5–1.9)
LEUKOCYTE ESTERASE, URINE: ABNORMAL
LYMPHOCYTES # BLD: 23.9 %
LYMPHOCYTES ABSOLUTE: 0.9 THOU/MM3 (ref 1–4.8)
MCH RBC QN AUTO: 23.6 PG (ref 26–33)
MCHC RBC AUTO-ENTMCNC: 30.2 GM/DL (ref 32.2–35.5)
MCV RBC AUTO: 78.2 FL (ref 81–99)
MISCELLANEOUS 2: ABNORMAL
MONOCYTES # BLD: 8.9 %
MONOCYTES ABSOLUTE: 0.3 THOU/MM3 (ref 0.4–1.3)
NITRITE, URINE: NEGATIVE
NUCLEATED RED BLOOD CELLS: 0 /100 WBC
OSMOLALITY CALCULATION: 257.8 MOSMOL/KG (ref 275–300)
PH UA: 7 (ref 5–9)
PLATELET # BLD: 213 THOU/MM3 (ref 130–400)
PMV BLD AUTO: 9.9 FL (ref 9.4–12.4)
POTASSIUM REFLEX MAGNESIUM: 3.9 MEQ/L (ref 3.5–5.2)
PRO-BNP: 314.6 PG/ML (ref 0–1800)
PROCALCITONIN: 0.08 NG/ML (ref 0.01–0.09)
PROTEIN UA: NEGATIVE
RBC # BLD: 3.26 MILL/MM3 (ref 4.2–5.4)
RBC URINE: ABNORMAL /HPF
RENAL EPITHELIAL, UA: ABNORMAL
RETIC HEMOGLOBIN: 24.7 PG (ref 28.2–35.7)
RETICULOCYTE ABSOLUTE COUNT: 2 % (ref 0.5–2)
SARS-COV-2, NAAT: NOT  DETECTED
SEG NEUTROPHILS: 65 %
SEGMENTED NEUTROPHILS ABSOLUTE COUNT: 2.4 THOU/MM3 (ref 1.8–7.7)
SODIUM BLD-SCNC: 127 MEQ/L (ref 135–145)
SPECIFIC GRAVITY, URINE: 1.02 (ref 1–1.03)
TOTAL PROTEIN: 6.1 G/DL (ref 6.1–8)
TROPONIN T: < 0.01 NG/ML
UROBILINOGEN, URINE: 1 EU/DL (ref 0–1)
WBC # BLD: 3.7 THOU/MM3 (ref 4.8–10.8)
WBC UA: ABNORMAL /HPF
YEAST: ABNORMAL

## 2022-08-16 PROCEDURE — 36415 COLL VENOUS BLD VENIPUNCTURE: CPT

## 2022-08-16 PROCEDURE — 83550 IRON BINDING TEST: CPT

## 2022-08-16 PROCEDURE — 84484 ASSAY OF TROPONIN QUANT: CPT

## 2022-08-16 PROCEDURE — 87086 URINE CULTURE/COLONY COUNT: CPT

## 2022-08-16 PROCEDURE — 96375 TX/PRO/DX INJ NEW DRUG ADDON: CPT

## 2022-08-16 PROCEDURE — 6360000002 HC RX W HCPCS: Performed by: PHYSICIAN ASSISTANT

## 2022-08-16 PROCEDURE — 85379 FIBRIN DEGRADATION QUANT: CPT

## 2022-08-16 PROCEDURE — 83605 ASSAY OF LACTIC ACID: CPT

## 2022-08-16 PROCEDURE — 85046 RETICYTE/HGB CONCENTRATE: CPT

## 2022-08-16 PROCEDURE — 82272 OCCULT BLD FECES 1-3 TESTS: CPT

## 2022-08-16 PROCEDURE — 80048 BASIC METABOLIC PNL TOTAL CA: CPT

## 2022-08-16 PROCEDURE — 2580000003 HC RX 258: Performed by: STUDENT IN AN ORGANIZED HEALTH CARE EDUCATION/TRAINING PROGRAM

## 2022-08-16 PROCEDURE — 1200000003 HC TELEMETRY R&B

## 2022-08-16 PROCEDURE — 87040 BLOOD CULTURE FOR BACTERIA: CPT

## 2022-08-16 PROCEDURE — 6360000002 HC RX W HCPCS: Performed by: STUDENT IN AN ORGANIZED HEALTH CARE EDUCATION/TRAINING PROGRAM

## 2022-08-16 PROCEDURE — 87635 SARS-COV-2 COVID-19 AMP PRB: CPT

## 2022-08-16 PROCEDURE — 96365 THER/PROPH/DIAG IV INF INIT: CPT

## 2022-08-16 PROCEDURE — 99285 EMERGENCY DEPT VISIT HI MDM: CPT

## 2022-08-16 PROCEDURE — 87077 CULTURE AEROBIC IDENTIFY: CPT

## 2022-08-16 PROCEDURE — 83880 ASSAY OF NATRIURETIC PEPTIDE: CPT

## 2022-08-16 PROCEDURE — 85025 COMPLETE CBC W/AUTO DIFF WBC: CPT

## 2022-08-16 PROCEDURE — 2580000003 HC RX 258: Performed by: PHYSICIAN ASSISTANT

## 2022-08-16 PROCEDURE — 83540 ASSAY OF IRON: CPT

## 2022-08-16 PROCEDURE — A4216 STERILE WATER/SALINE, 10 ML: HCPCS | Performed by: STUDENT IN AN ORGANIZED HEALTH CARE EDUCATION/TRAINING PROGRAM

## 2022-08-16 PROCEDURE — 71045 X-RAY EXAM CHEST 1 VIEW: CPT

## 2022-08-16 PROCEDURE — 84145 PROCALCITONIN (PCT): CPT

## 2022-08-16 PROCEDURE — 80076 HEPATIC FUNCTION PANEL: CPT

## 2022-08-16 PROCEDURE — 81001 URINALYSIS AUTO W/SCOPE: CPT

## 2022-08-16 PROCEDURE — 93005 ELECTROCARDIOGRAM TRACING: CPT | Performed by: STUDENT IN AN ORGANIZED HEALTH CARE EDUCATION/TRAINING PROGRAM

## 2022-08-16 PROCEDURE — 82728 ASSAY OF FERRITIN: CPT

## 2022-08-16 PROCEDURE — 6370000000 HC RX 637 (ALT 250 FOR IP): Performed by: STUDENT IN AN ORGANIZED HEALTH CARE EDUCATION/TRAINING PROGRAM

## 2022-08-16 PROCEDURE — C9113 INJ PANTOPRAZOLE SODIUM, VIA: HCPCS | Performed by: STUDENT IN AN ORGANIZED HEALTH CARE EDUCATION/TRAINING PROGRAM

## 2022-08-16 RX ORDER — SODIUM CHLORIDE 0.9 % (FLUSH) 0.9 %
5-40 SYRINGE (ML) INJECTION PRN
Status: DISCONTINUED | OUTPATIENT
Start: 2022-08-16 | End: 2022-08-24 | Stop reason: HOSPADM

## 2022-08-16 RX ORDER — SODIUM CHLORIDE 0.9 % (FLUSH) 0.9 %
5-40 SYRINGE (ML) INJECTION EVERY 12 HOURS SCHEDULED
Status: DISCONTINUED | OUTPATIENT
Start: 2022-08-16 | End: 2022-08-24 | Stop reason: HOSPADM

## 2022-08-16 RX ORDER — ONDANSETRON 2 MG/ML
4 INJECTION INTRAMUSCULAR; INTRAVENOUS ONCE
Status: COMPLETED | OUTPATIENT
Start: 2022-08-16 | End: 2022-08-16

## 2022-08-16 RX ORDER — ONDANSETRON 4 MG/1
4 TABLET, ORALLY DISINTEGRATING ORAL EVERY 8 HOURS PRN
Status: DISCONTINUED | OUTPATIENT
Start: 2022-08-16 | End: 2022-08-24 | Stop reason: HOSPADM

## 2022-08-16 RX ORDER — SCOLOPAMINE TRANSDERMAL SYSTEM 1 MG/1
1 PATCH, EXTENDED RELEASE TRANSDERMAL
Status: DISCONTINUED | OUTPATIENT
Start: 2022-08-17 | End: 2022-08-24 | Stop reason: HOSPADM

## 2022-08-16 RX ORDER — ONDANSETRON 2 MG/ML
4 INJECTION INTRAMUSCULAR; INTRAVENOUS EVERY 6 HOURS PRN
Status: DISCONTINUED | OUTPATIENT
Start: 2022-08-16 | End: 2022-08-24 | Stop reason: HOSPADM

## 2022-08-16 RX ORDER — SODIUM CHLORIDE 9 MG/ML
INJECTION, SOLUTION INTRAVENOUS PRN
Status: DISCONTINUED | OUTPATIENT
Start: 2022-08-16 | End: 2022-08-24 | Stop reason: HOSPADM

## 2022-08-16 RX ORDER — PREGABALIN 75 MG/1
150 CAPSULE ORAL 2 TIMES DAILY
Status: DISCONTINUED | OUTPATIENT
Start: 2022-08-16 | End: 2022-08-24 | Stop reason: HOSPADM

## 2022-08-16 RX ORDER — ACETAMINOPHEN 325 MG/1
650 TABLET ORAL EVERY 6 HOURS PRN
Status: DISCONTINUED | OUTPATIENT
Start: 2022-08-16 | End: 2022-08-24 | Stop reason: HOSPADM

## 2022-08-16 RX ORDER — KETOROLAC TROMETHAMINE 30 MG/ML
15 INJECTION, SOLUTION INTRAMUSCULAR; INTRAVENOUS ONCE
Status: COMPLETED | OUTPATIENT
Start: 2022-08-16 | End: 2022-08-16

## 2022-08-16 RX ORDER — FLUOXETINE HYDROCHLORIDE 20 MG/1
20 CAPSULE ORAL DAILY
Status: DISCONTINUED | OUTPATIENT
Start: 2022-08-17 | End: 2022-08-24 | Stop reason: HOSPADM

## 2022-08-16 RX ORDER — LEVOTHYROXINE SODIUM 0.1 MG/1
100 TABLET ORAL DAILY
Status: DISCONTINUED | OUTPATIENT
Start: 2022-08-17 | End: 2022-08-24 | Stop reason: HOSPADM

## 2022-08-16 RX ORDER — DOCUSATE SODIUM 100 MG/1
100 CAPSULE, LIQUID FILLED ORAL 2 TIMES DAILY PRN
Status: DISCONTINUED | OUTPATIENT
Start: 2022-08-16 | End: 2022-08-24 | Stop reason: HOSPADM

## 2022-08-16 RX ORDER — ACETAMINOPHEN 650 MG/1
650 SUPPOSITORY RECTAL EVERY 6 HOURS PRN
Status: DISCONTINUED | OUTPATIENT
Start: 2022-08-16 | End: 2022-08-24 | Stop reason: HOSPADM

## 2022-08-16 RX ADMIN — PREGABALIN 150 MG: 75 CAPSULE ORAL at 23:28

## 2022-08-16 RX ADMIN — SODIUM CHLORIDE, PRESERVATIVE FREE 10 ML: 5 INJECTION INTRAVENOUS at 23:15

## 2022-08-16 RX ADMIN — ONDANSETRON 4 MG: 2 INJECTION INTRAMUSCULAR; INTRAVENOUS at 20:59

## 2022-08-16 RX ADMIN — KETOROLAC TROMETHAMINE 15 MG: 30 INJECTION, SOLUTION INTRAMUSCULAR; INTRAVENOUS at 20:59

## 2022-08-16 RX ADMIN — SODIUM CHLORIDE 80 MG: 9 INJECTION INTRAMUSCULAR; INTRAVENOUS; SUBCUTANEOUS at 23:27

## 2022-08-16 RX ADMIN — CEFTRIAXONE SODIUM 1000 MG: 1 INJECTION, POWDER, FOR SOLUTION INTRAMUSCULAR; INTRAVENOUS at 21:03

## 2022-08-16 ASSESSMENT — ENCOUNTER SYMPTOMS
SHORTNESS OF BREATH: 1
BACK PAIN: 1
ABDOMINAL PAIN: 1
VOMITING: 0
ALLERGIC/IMMUNOLOGIC NEGATIVE: 1
COUGH: 0
BLOOD IN STOOL: 0
DIARRHEA: 0
NAUSEA: 1
EYES NEGATIVE: 1
SINUS PAIN: 0
ABDOMINAL DISTENTION: 0
WHEEZING: 0
COLOR CHANGE: 0
CHEST TIGHTNESS: 0

## 2022-08-16 ASSESSMENT — PAIN SCALES - GENERAL
PAINLEVEL_OUTOF10: 5
PAINLEVEL_OUTOF10: 0

## 2022-08-16 ASSESSMENT — PAIN - FUNCTIONAL ASSESSMENT: PAIN_FUNCTIONAL_ASSESSMENT: 0-10

## 2022-08-16 ASSESSMENT — PAIN DESCRIPTION - PAIN TYPE: TYPE: ACUTE PAIN

## 2022-08-16 ASSESSMENT — PAIN DESCRIPTION - LOCATION: LOCATION: BACK

## 2022-08-16 ASSESSMENT — LIFESTYLE VARIABLES
HOW OFTEN DO YOU HAVE A DRINK CONTAINING ALCOHOL: NEVER
HOW MANY STANDARD DRINKS CONTAINING ALCOHOL DO YOU HAVE ON A TYPICAL DAY: PATIENT DOES NOT DRINK

## 2022-08-16 ASSESSMENT — PAIN DESCRIPTION - FREQUENCY: FREQUENCY: CONTINUOUS

## 2022-08-16 ASSESSMENT — PAIN DESCRIPTION - DESCRIPTORS: DESCRIPTORS: ACHING

## 2022-08-16 ASSESSMENT — PAIN DESCRIPTION - ORIENTATION: ORIENTATION: LOWER

## 2022-08-16 NOTE — ED TRIAGE NOTES
Patient presents to the ED via [facility] from [address] for concerns of SOB and generalized not feeling well. Patient presents alert and oriented x4. Respirations easy and regular. No signs of distress noted. spouse and daughter at bedside states patient forgot to take her medications yesterday and today, so she took her morning medications before arrival to our facility. Patient rates lower back at a 5/10. Patient has a healing bruise on the right side of her face. Daughter states patient has a hiatal hernia that has been pressing up on her lungs for a few months causing her SOB concerns.

## 2022-08-16 NOTE — ED NOTES
Bedside shift report received from JENNIFER Matute at this time.       José Luis Davis RN  08/16/22 9350

## 2022-08-17 ENCOUNTER — ANESTHESIA EVENT (OUTPATIENT)
Dept: ENDOSCOPY | Age: 82
DRG: 378 | End: 2022-08-17
Payer: MEDICARE

## 2022-08-17 ENCOUNTER — ANESTHESIA (OUTPATIENT)
Dept: ENDOSCOPY | Age: 82
DRG: 378 | End: 2022-08-17
Payer: MEDICARE

## 2022-08-17 PROBLEM — K92.2 GI BLEED DUE TO NSAIDS: Status: RESOLVED | Noted: 2017-09-14 | Resolved: 2022-08-17

## 2022-08-17 PROBLEM — T39.395A GI BLEED DUE TO NSAIDS: Status: RESOLVED | Noted: 2017-09-14 | Resolved: 2022-08-17

## 2022-08-17 LAB
ANION GAP SERPL CALCULATED.3IONS-SCNC: 7 MEQ/L (ref 8–16)
APTT: 26.4 SECONDS (ref 22–38)
BUN BLDV-MCNC: 17 MG/DL (ref 7–22)
CALCIUM SERPL-MCNC: 8.7 MG/DL (ref 8.5–10.5)
CHLORIDE BLD-SCNC: 97 MEQ/L (ref 98–111)
CO2: 26 MEQ/L (ref 23–33)
CREAT SERPL-MCNC: 0.6 MG/DL (ref 0.4–1.2)
EKG ATRIAL RATE: 60 BPM
EKG P AXIS: 41 DEGREES
EKG P-R INTERVAL: 170 MS
EKG Q-T INTERVAL: 400 MS
EKG QRS DURATION: 74 MS
EKG QTC CALCULATION (BAZETT): 400 MS
EKG R AXIS: 64 DEGREES
EKG T AXIS: 63 DEGREES
EKG VENTRICULAR RATE: 60 BPM
FERRITIN: 24 NG/ML (ref 10–291)
GFR SERPL CREATININE-BSD FRML MDRD: > 90 ML/MIN/1.73M2
GLUCOSE BLD-MCNC: 104 MG/DL (ref 70–108)
HCT VFR BLD CALC: 24.1 % (ref 37–47)
HCT VFR BLD CALC: 24.1 % (ref 37–47)
HCT VFR BLD CALC: 24.2 % (ref 37–47)
HEMOGLOBIN: 7 GM/DL (ref 12–16)
HEMOGLOBIN: 7.1 GM/DL (ref 12–16)
HEMOGLOBIN: 7.3 GM/DL (ref 12–16)
INR BLD: 0.93 (ref 0.85–1.13)
IRON SATURATION: 5 % (ref 20–50)
IRON: 18 UG/DL (ref 50–170)
POTASSIUM REFLEX MAGNESIUM: 4.6 MEQ/L (ref 3.5–5.2)
SODIUM BLD-SCNC: 130 MEQ/L (ref 135–145)
TOTAL IRON BINDING CAPACITY: 373 UG/DL (ref 171–450)

## 2022-08-17 PROCEDURE — C9113 INJ PANTOPRAZOLE SODIUM, VIA: HCPCS | Performed by: NURSE PRACTITIONER

## 2022-08-17 PROCEDURE — 2580000003 HC RX 258: Performed by: STUDENT IN AN ORGANIZED HEALTH CARE EDUCATION/TRAINING PROGRAM

## 2022-08-17 PROCEDURE — 80048 BASIC METABOLIC PNL TOTAL CA: CPT

## 2022-08-17 PROCEDURE — 3609012400 HC EGD TRANSORAL BIOPSY SINGLE/MULTIPLE: Performed by: INTERNAL MEDICINE

## 2022-08-17 PROCEDURE — 85730 THROMBOPLASTIN TIME PARTIAL: CPT

## 2022-08-17 PROCEDURE — 2580000003 HC RX 258: Performed by: INTERNAL MEDICINE

## 2022-08-17 PROCEDURE — 36415 COLL VENOUS BLD VENIPUNCTURE: CPT

## 2022-08-17 PROCEDURE — 3700000001 HC ADD 15 MINUTES (ANESTHESIA): Performed by: INTERNAL MEDICINE

## 2022-08-17 PROCEDURE — 7100000010 HC PHASE II RECOVERY - FIRST 15 MIN: Performed by: INTERNAL MEDICINE

## 2022-08-17 PROCEDURE — 85610 PROTHROMBIN TIME: CPT

## 2022-08-17 PROCEDURE — 99233 SBSQ HOSP IP/OBS HIGH 50: CPT | Performed by: INTERNAL MEDICINE

## 2022-08-17 PROCEDURE — 0DB68ZX EXCISION OF STOMACH, VIA NATURAL OR ARTIFICIAL OPENING ENDOSCOPIC, DIAGNOSTIC: ICD-10-PCS | Performed by: INTERNAL MEDICINE

## 2022-08-17 PROCEDURE — 85014 HEMATOCRIT: CPT

## 2022-08-17 PROCEDURE — 6360000002 HC RX W HCPCS: Performed by: NURSE PRACTITIONER

## 2022-08-17 PROCEDURE — 2709999900 HC NON-CHARGEABLE SUPPLY: Performed by: INTERNAL MEDICINE

## 2022-08-17 PROCEDURE — 2500000003 HC RX 250 WO HCPCS

## 2022-08-17 PROCEDURE — A4216 STERILE WATER/SALINE, 10 ML: HCPCS | Performed by: NURSE PRACTITIONER

## 2022-08-17 PROCEDURE — 88305 TISSUE EXAM BY PATHOLOGIST: CPT

## 2022-08-17 PROCEDURE — 6360000002 HC RX W HCPCS: Performed by: STUDENT IN AN ORGANIZED HEALTH CARE EDUCATION/TRAINING PROGRAM

## 2022-08-17 PROCEDURE — 6370000000 HC RX 637 (ALT 250 FOR IP): Performed by: STUDENT IN AN ORGANIZED HEALTH CARE EDUCATION/TRAINING PROGRAM

## 2022-08-17 PROCEDURE — 3700000000 HC ANESTHESIA ATTENDED CARE: Performed by: INTERNAL MEDICINE

## 2022-08-17 PROCEDURE — 6360000002 HC RX W HCPCS

## 2022-08-17 PROCEDURE — 6370000000 HC RX 637 (ALT 250 FOR IP): Performed by: NURSE PRACTITIONER

## 2022-08-17 PROCEDURE — 85018 HEMOGLOBIN: CPT

## 2022-08-17 PROCEDURE — 93010 ELECTROCARDIOGRAM REPORT: CPT | Performed by: INTERNAL MEDICINE

## 2022-08-17 PROCEDURE — 1200000003 HC TELEMETRY R&B

## 2022-08-17 PROCEDURE — 2580000003 HC RX 258: Performed by: NURSE PRACTITIONER

## 2022-08-17 PROCEDURE — 7100000011 HC PHASE II RECOVERY - ADDTL 15 MIN: Performed by: INTERNAL MEDICINE

## 2022-08-17 PROCEDURE — 2580000003 HC RX 258

## 2022-08-17 RX ORDER — GLYCOPYRROLATE 1 MG/5 ML
SYRINGE (ML) INTRAVENOUS PRN
Status: DISCONTINUED | OUTPATIENT
Start: 2022-08-17 | End: 2022-08-17 | Stop reason: SDUPTHER

## 2022-08-17 RX ORDER — LIDOCAINE HYDROCHLORIDE 20 MG/ML
INJECTION, SOLUTION INFILTRATION; PERINEURAL PRN
Status: DISCONTINUED | OUTPATIENT
Start: 2022-08-17 | End: 2022-08-17 | Stop reason: SDUPTHER

## 2022-08-17 RX ORDER — PANTOPRAZOLE SODIUM 40 MG/1
40 TABLET, DELAYED RELEASE ORAL
Status: DISCONTINUED | OUTPATIENT
Start: 2022-08-17 | End: 2022-08-24 | Stop reason: HOSPADM

## 2022-08-17 RX ORDER — PANTOPRAZOLE SODIUM 40 MG/1
40 TABLET, DELAYED RELEASE ORAL
Qty: 30 TABLET | Refills: 2 | Status: SHIPPED | OUTPATIENT
Start: 2022-08-17 | End: 2022-10-03 | Stop reason: SDUPTHER

## 2022-08-17 RX ORDER — POLYETHYLENE GLYCOL 3350 17 G/17G
17 POWDER, FOR SOLUTION ORAL DAILY
Status: DISCONTINUED | OUTPATIENT
Start: 2022-08-17 | End: 2022-08-24 | Stop reason: HOSPADM

## 2022-08-17 RX ORDER — 0.9 % SODIUM CHLORIDE 0.9 %
1000 INTRAVENOUS SOLUTION INTRAVENOUS ONCE
Status: COMPLETED | OUTPATIENT
Start: 2022-08-17 | End: 2022-08-17

## 2022-08-17 RX ORDER — SODIUM CHLORIDE 9 MG/ML
INJECTION, SOLUTION INTRAVENOUS CONTINUOUS PRN
Status: DISCONTINUED | OUTPATIENT
Start: 2022-08-17 | End: 2022-08-17 | Stop reason: SDUPTHER

## 2022-08-17 RX ORDER — PROPOFOL 10 MG/ML
INJECTION, EMULSION INTRAVENOUS PRN
Status: DISCONTINUED | OUTPATIENT
Start: 2022-08-17 | End: 2022-08-17 | Stop reason: SDUPTHER

## 2022-08-17 RX ORDER — POLYETHYLENE GLYCOL 3350 17 G/17G
17 POWDER, FOR SOLUTION ORAL DAILY
Qty: 527 G | Refills: 2 | Status: SHIPPED | OUTPATIENT
Start: 2022-08-17 | End: 2022-09-16

## 2022-08-17 RX ADMIN — FLUOXETINE HYDROCHLORIDE 20 MG: 20 CAPSULE ORAL at 10:31

## 2022-08-17 RX ADMIN — IRON SUCROSE 200 MG: 20 INJECTION, SOLUTION INTRAVENOUS at 04:08

## 2022-08-17 RX ADMIN — Medication 0.2 MG: at 13:35

## 2022-08-17 RX ADMIN — PANTOPRAZOLE SODIUM 40 MG: 40 TABLET, DELAYED RELEASE ORAL at 18:37

## 2022-08-17 RX ADMIN — SODIUM CHLORIDE: 9 INJECTION, SOLUTION INTRAVENOUS at 13:27

## 2022-08-17 RX ADMIN — SODIUM CHLORIDE 40 MG: 9 INJECTION INTRAMUSCULAR; INTRAVENOUS; SUBCUTANEOUS at 14:59

## 2022-08-17 RX ADMIN — SODIUM CHLORIDE, PRESERVATIVE FREE 10 ML: 5 INJECTION INTRAVENOUS at 20:12

## 2022-08-17 RX ADMIN — PROPOFOL 40 MG: 10 INJECTION, EMULSION INTRAVENOUS at 13:42

## 2022-08-17 RX ADMIN — PREGABALIN 150 MG: 75 CAPSULE ORAL at 10:31

## 2022-08-17 RX ADMIN — PREGABALIN 150 MG: 75 CAPSULE ORAL at 20:11

## 2022-08-17 RX ADMIN — Medication 0.2 MG: at 13:57

## 2022-08-17 RX ADMIN — LIDOCAINE HYDROCHLORIDE 60 MG: 20 INJECTION, SOLUTION INFILTRATION; PERINEURAL at 13:35

## 2022-08-17 RX ADMIN — SODIUM CHLORIDE, PRESERVATIVE FREE 10 ML: 5 INJECTION INTRAVENOUS at 20:11

## 2022-08-17 RX ADMIN — SODIUM CHLORIDE, PRESERVATIVE FREE 10 ML: 5 INJECTION INTRAVENOUS at 10:30

## 2022-08-17 RX ADMIN — LEVOTHYROXINE SODIUM 100 MCG: 0.1 TABLET ORAL at 08:45

## 2022-08-17 RX ADMIN — PROPOFOL 50 MG: 10 INJECTION, EMULSION INTRAVENOUS at 13:38

## 2022-08-17 RX ADMIN — PROPOFOL 20 MG: 10 INJECTION, EMULSION INTRAVENOUS at 13:45

## 2022-08-17 RX ADMIN — PHENYLEPHRINE HYDROCHLORIDE 50 MCG: 10 INJECTION INTRAVENOUS at 13:57

## 2022-08-17 RX ADMIN — SODIUM CHLORIDE 1000 ML: 9 INJECTION, SOLUTION INTRAVENOUS at 10:41

## 2022-08-17 RX ADMIN — PROPOFOL 30 MG: 10 INJECTION, EMULSION INTRAVENOUS at 13:48

## 2022-08-17 ASSESSMENT — PAIN SCALES - GENERAL
PAINLEVEL_OUTOF10: 0

## 2022-08-17 NOTE — PROGRESS NOTES
Physician Progress Note      Reggie Mcgregor  CSN #:                  [id number]  :                       1940  ADMIT DATE:       2022 6:25 PM  [address] [place] DATE:  RESPONDING  PROVIDER #:        Bria Romero MD          QUERY TEXT:    Patient admitted with ABLA noted to have sodium 127, 130. If possible, please   respond below and document in the progress notes and discharge summary if you   are evaluating and/or treating any of the following: The medical record reflects the following:  Risk Factors: Prozac, advanced age  Clinical Indicators: sodium 127, 130  Treatment: labs    Thank you! Abbey Garnett RN, BSN, RHIT, CCDS  Clinical   Options provided:  -- Hyponatremia  -- Hyponatremia not clinically significant  -- Other - I will add my own diagnosis  -- Disagree - Not applicable / Not valid  -- Disagree - Clinically unable to determine / Unknown  -- Refer to Clinical Documentation Reviewer    PROVIDER RESPONSE TEXT:    This patient has hyponatremia.     Query created by: Piter Carrion on 2022 10:36 AM      Electronically signed by:  Bria Romero MD 2022   11:51 AM

## 2022-08-17 NOTE — PLAN OF CARE
Problem: Discharge Planning  Goal: Discharge to home or other facility with appropriate resources  Outcome: Progressing  Flowsheets (Taken 8/17/2022 9028)  Discharge to home or other facility with appropriate resources: Identify barriers to discharge with patient and caregiver     Problem: Pain  Goal: Verbalizes/displays adequate comfort level or baseline comfort level  Outcome: Progressing     Problem: Safety - Adult  Goal: Free from fall injury  Outcome: Progressing

## 2022-08-17 NOTE — ED NOTES
Patient transferred [facility] 019 nurse informed.       Jd Mercury  08/16/22 7917

## 2022-08-17 NOTE — ANESTHESIA POSTPROCEDURE EVALUATION
Department of Anesthesiology  Postprocedure Note    Patient: Sachin Gunter  MRN: [id number]  YOB: 1940  Date of evaluation: 8/17/2022      Procedure Summary     Date: 08/17/22 Room / Location: [address] [place] [address]    Anesthesia Start: 1335 Anesthesia Stop: 4075    Procedure: EGD BIOPSY Diagnosis:       Melena      Other iron deficiency anemia      (Melena [K92.1])      (Other iron deficiency anemia [D50.8])    Surgeons: Delmer Ley MD Responsible Provider: Dvaid Mariano MD    Anesthesia Type: MAC ASA Status: 3 - Emergent          Anesthesia Type: No value filed.     Mat Phase I:      Mat Phase II:        Anesthesia Post Evaluation    Patient location during evaluation: bedside  Patient participation: waiting for patient participation  Level of consciousness: awake and alert and sleepy but conscious  Pain score: 0  Airway patency: patent  Nausea & Vomiting: no nausea and no vomiting  Complications: no  Cardiovascular status: hemodynamically stable and blood pressure returned to baseline  Respiratory status: acceptable  Hydration status: euvolemic

## 2022-08-17 NOTE — ANESTHESIA PRE PROCEDURE
Department of Anesthesiology  Preprocedure Note       Name:  Karen Guadalupe Height   Age:  78 y.o.  :  1940                                          MRN:  [id number]         Date:  2022      Surgeon: Suman Stark):  Carlee Bell MD    Procedure: Procedure(s):  EGD    Medications prior to admission:   Prior to Admission medications    Medication Sig Start Date End Date Taking? Authorizing Provider   ondansetron (ZOFRAN) 4 MG tablet Take 1 tablet by mouth 3 times daily as needed for Nausea or Vomiting 8/15/22   Sole Alanis, DO   pregabalin (LYRICA) 150 MG capsule Take 1 capsule by mouth in the morning and 1 capsule before bedtime. Do all this for 180 days. 22  Sole Alanis, DO   enalapril (VASOTEC) 10 MG tablet TAKE 1 TABLET NIGHTLY 22   Sole Alanis, DO   Cholecalciferol (VITAMIN D3) 50 MCG ( UT) CAPS Take by mouth    Historical Provider, MD   metoprolol succinate (TOPROL XL) 50 MG extended release tablet TAKE 1 TABLET DAILY 22   Sole Alanis, DO   levothyroxine (SYNTHROID) 100 MCG tablet TAKE 1 TABLET DAILY 22   Sole Alanis, DO   meloxicam (MOBIC) 15 MG tablet Take 1 tablet by mouth daily  Patient taking differently: Take 7.5 mg by mouth in the morning. 22   Jared Dale, DO   FLUoxetine (PROZAC) 20 MG capsule Take 1 capsule by mouth daily 22   Sole Alanis, DO   docusate sodium (COLACE) 100 MG capsule Take 1 capsule by mouth 2 times daily as needed for Constipation 3/15/22   Sole Alanis, DO   calcium-vitamin D (OSCAL-500) 500-200 MG-UNIT per tablet Take 2 tablets by mouth daily    Historical Provider, MD [employer] [facility]) MISC 1 each by Does not apply route daily 20   Sole Alanis, DO   Misc.  Devices MISC Mechanical lift for a [place] 18   Sole Alanis, DO   Scooter MISC by Does not apply route Power scooter 10/31/17   Sole Alanis, DO   fluticasone (FLONASE) 50 MCG/ACT nasal spray 1 spray by Nasal route as needed for Rhinitis   Patient not taking: No sig reported    Historical Provider, MD   Omega 3-6-9 Fatty Acids (OMEGA 3-6-9 COMPLEX) CAPS Take 1 capsule by mouth daily     Historical Provider, MD   therapeutic multivitamin-minerals (THERAGRAN-M) tablet Take 1 tablet by mouth nightly     Historical Provider, MD       Current medications:    Current Facility-Administered Medications   Medication Dose Route Frequency Provider Last Rate Last Admin    iron sucrose (VENOFER) 200 mg in sodium chloride 0.9 % 100 mL IVPB  200 mg IntraVENous Q24H Alexa Bux, DO   Stopped at 08/17/22 0508    pantoprazole (PROTONIX) 40 mg in sodium chloride (PF) 10 mL injection  40 mg IntraVENous Q12H Echo Commander, APRN - CNP        polyethylene glycol (GLYCOLAX) packet 17 g  17 g Oral Daily Echo Commander, APRN - CNP        sodium chloride flush 0.9 % injection 5-40 mL  5-40 mL IntraVENous 2 times per day Alexa Bux, DO   10 mL at 08/16/22 2315    sodium chloride flush 0.9 % injection 5-40 mL  5-40 mL IntraVENous PRN Alexa Bux, DO        0.9 % sodium chloride infusion   IntraVENous PRN Alexa Bux, DO        ondansetron (ZOFRAN-ODT) disintegrating tablet 4 mg  4 mg Oral Q8H PRN Alexa Bux, DO        Or    ondansetron (ZOFRAN) injection 4 mg  4 mg IntraVENous Q6H PRN Alexa Bux, DO        acetaminophen (TYLENOL) tablet 650 mg  650 mg Oral Q6H PRN Alexa Bux, DO        Or    acetaminophen (TYLENOL) suppository 650 mg  650 mg Rectal Q6H PRN Alexa Bux, DO        FLUoxetine (PROZAC) capsule 20 mg  20 mg Oral Daily Alexa Bux, DO   20 mg at 08/17/22 1031    levothyroxine (SYNTHROID) tablet 100 mcg  100 mcg Oral Daily Alexa Bux, DO   100 mcg at 08/17/22 0845    pregabalin (LYRICA) capsule 150 mg  150 mg Oral BID Alexa Bux, DO   150 mg at 08/17/22 1031    docusate sodium (COLACE) capsule 100 mg  100 mg Oral BID PRN Alexa Bux, DO        scopolamine (TRANSDERM-SCOP) transdermal patch 1 patch  1 patch TransDERmal Q72H Alexa Bux, DO   1 patch at 08/17/22 0153 Allergies:     Allergies   Allergen Reactions    Ampicillin     Morphine Other (See Comments)     Hallucinations     Pcn [Penicillins] Itching    Sulfa Antibiotics        Problem List:    Patient Active Problem List   Diagnosis Code    Osteoarthritis M19.90    Fibromyalgia M79.7    Gastroesophageal reflux disease K21.9    Hypothyroidism E03.9    Patient is Episcopal Z78.9    Back pain M54.9    H/O abdominal surgery Z98.890    Normocytic anemia D64.9    History of diverticulosis Z87.19    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.40, G89.29    Chest pain R07.9    Essential hypertension I10    Abnormal CT scan, chest R93.89    BRBPR (bright red blood per rectum) K62.5    GI bleed due to NSAIDs K92.2, T39.395A    Spinal stenosis of lumbar region without neurogenic claudication M48.061    Other idiopathic scoliosis, lumbar region M41.26    Closed fracture of distal end of left femur, initial encounter ([facility]) S72.402A    Pre-syncope R55    Weakness generalized R53.1    Large hiatal hernia K44.9    Hypo-osmolar hyponatremia E87.1    Esophageal dysphagia R13.19    Hypomagnesemia E83.42    Dysphagia R13.10    Bilateral lower extremity edema R60.0    Hx of fracture of leg Z87.81    Lung nodule R91.1    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 Ramy Buckley GERD (gastroesophageal reflux disease)     Diverticulitis     Hiatal hernia     Hypertension     BAKI    Hypothyroidism     Osteoarthritis Past Surgical History:        Procedure Laterality Date    APPENDECTOMY  1958    BACK SURGERY      CARPAL TUNNEL RELEASE Bilateral 2013    w/cubital tunnel    COLON SURGERY  07/29/2016    Procedure: ATTEMPTED DAVINCI XI ROBOTIC ASSISTED SIGMOID COLON RESECTION, ROBOTIC ASSISTED MOBILIZATION OF RECTAL SIGMOID TO SPLENIC FLEXURE, CONVERTED TO OPEN LEFT HEMICOLECTOMY WITH COLOPROCTOCTOMY, SPLENORRHAPHY, RIGID SIGMOIDOSCOPY, LASER EVALUATION OF VASCULARITY WITH ICG; Surgeon: Navin Byrnes MD; Location: [facility]; Service: General    COLONOSCOPY  2012    CYST REMOVAL  2010   [address], [address]    Cataract    FEMUR FRACTURE SURGERY Left 12/21/2020    LEFT FEMUR OPEN REDUCTION INTERNAL FIXATION performed by Diego Chiu MD at [facility] 2001   [address] [facility] N/A 03/09/2021    ROBOTIC EXTENSIVE LYSIS OF ADHESIONS, ROBOTIC REDUCTION OF HIATAL HERNIA WITH GASTROPEXY performed by Edmond Christianson MD at [facility] ([address] [place] [address])  1979    w/bladder suspension   [address]  2002, 2008, 2009    rt hip(2002), lt knee(2009), lt hip(2008) Shoulder (2009)    OTHER SURGICAL HISTORY  04/21/2021    Tez Ferrell CNP in the office   [address] [facility]    PAIN MANAGEMENT PROCEDURE Bilateral 7/13/2021    medial branch blocks at bilateral L4/L5 and L5/S1 performed by Erin Gurrola DO at [facility] Bilateral 8/24/2021    confirmatory  medial branch blocks at bilateral L4/L5 and L5/S1 performed by Erin Gurrola DO at [facility] Right 10/19/2021    thermal radiofrequency at BILATERAL medial branches L4/L5 and L5/S1 was chosen.   We will start with the right side first performed by Erin Gurrola DO at [facility] Left 12/7/2021    thermal radiofrequency at BILATERAL medial branches L4/L5 and L5/S1 was chosen. performed by [address] DO at [address] N/A 2022    lumbar epidural steroid injection Lumbar 5 Sacral 1 performed by [address], DO at [address] IND Left 09/15/2017    COLONOSCOPY performed by Fermin Montero MD at [facility]    discectomy   [address] [place] [address] ENDOSCOPY  2012    UPPER GASTROINTESTINAL ENDOSCOPY Left 2022    EGD BIOPSY performed by Luisana Haynes MD at [facility]       Social History:    Social History     Tobacco Use    Smoking status: Former     Packs/day: 2.00     Years: 11.00     Pack years: 22.00     Types: Cigarettes     Start date: 1957     Quit date: 1967     Years since quittin.8    Smokeless tobacco: Never   Substance Use Topics    Alcohol use: Yes     Comment: wine with dinner                                Counseling given: Not Answered      Vital Signs (Current):   Vitals:    22 2214 22 0330 22 0738 22 1217   BP: (!) 145/67 128/83 (!) 106/50 (!) 152/69   Pulse: 63 74 59 52   Resp:  17 18    Temp: 36.6 °C (97.9 °F) 36.3 °C (97.4 °F) 36.4 °C (97.6 °F)    TempSrc: Oral Oral Oral    SpO2: 97% 95% 97%    Weight:                                                  BP Readings from Last 3 Encounters:   22 (!) 152/69   22 126/73   22 130/64       NPO Status:                                                                                 BMI:   Wt Readings from Last 3 Encounters:   22 158 lb (71.7 kg)   22 158 lb (71.7 kg)   22 161 lb (73 kg)     Body mass index is 24.75 kg/m².     CBC:   Lab Results   Component Value Date/Time    WBC 3.7 2022 06:20 PM    RBC 3.26 2022 06:20 PM    HGB 7.1 2022 11:18 AM    HCT 24.1 2022 11:18 AM    MCV 78.2 2022 06:20 PM RDW 15.4 05/12/2022 10:49 AM     08/16/2022 06:20 PM       CMP:   Lab Results   Component Value Date/Time     08/17/2022 09:30 AM    K 4.6 08/17/2022 09:30 AM    CL 97 08/17/2022 09:30 AM    CO2 26 08/17/2022 09:30 AM    BUN 17 08/17/2022 09:30 AM    CREATININE 0.6 08/17/2022 09:30 AM    AGRATIO 1.4 05/12/2022 10:49 AM    LABGLOM >90 08/17/2022 09:30 AM    GLUCOSE 104 08/17/2022 09:30 AM    GLUCOSE 91 05/12/2022 10:49 AM    PROT 6.1 08/16/2022 06:20 PM    CALCIUM 8.7 08/17/2022 09:30 AM    BILITOT 0.3 08/16/2022 06:20 PM    ALKPHOS 64 08/16/2022 06:20 PM    AST 26 08/16/2022 06:20 PM    ALT 12 08/16/2022 06:20 PM       POC Tests: No results for input(s): POCGLU, POCNA, POCK, POCCL, POCBUN, POCHEMO, POCHCT in the last 72 hours. Coags:   Lab Results   Component Value Date/Time    INR 0.93 08/17/2022 05:41 AM    APTT 26.4 08/17/2022 05:41 AM       HCG (If Applicable): No results found for: PREGTESTUR, PREGSERUM, HCG, HCGQUANT     ABGs: No results found for: PHART, PO2ART, DEB1PZY, YHN2GGG, BEART, P8RFNALR     Type & Screen (If Applicable):  No results found for: LABABO, LABRH    Drug/Infectious Status (If Applicable):  No results found for: HIV, HEPCAB    COVID-19 Screening (If Applicable):   Lab Results   Component Value Date/Time    COVID19 NOT  DETECTED 08/16/2022 07:40 PM    COVID19 Not Detected 10/06/2020 10:23 AM           Anesthesia Evaluation  Patient summary reviewed and Nursing notes reviewed  Airway: Mallampati: II  TM distance: <3 FB   Neck ROM: full  Mouth opening: > = 3 FB   Dental:          Pulmonary:   (+) decreased breath sounds                            Cardiovascular:            Rhythm: regular  Rate: normal                    Neuro/Psych:               GI/Hepatic/Renal:             Endo/Other:                     Abdominal:       Abdomen: soft. Vascular: Other Findings: Bruising on right lower face. Poor historian. Questionable dementia.           Anesthesia Plan      MAC     ASA 3 - emergent       Induction: intravenous. Anesthetic plan and risks discussed with patient.                         FLORIDA Bonilla - CRNA   8/17/2022

## 2022-08-17 NOTE — PROGRESS NOTES
Hospitalist Progress Note      Patient:  Lorenza Gomez Height    Unit/Bed:[facility] ENDO POOL RM/NONE  YOB: 1940  MRN: [id number]   Acct: [de-identified]     PCP: Rusty Murcia DO  Date of Admission: 8/16/2022         ASSESSMENT / PLAN:    Acute Blood Loss Anemia-likely from upper GI bleed-planning for EGD today  H&H every 6 hours-patient is a [employer] and hence no blood transfusion    Orthostatic hypotension-likely secondary to GI bleed-add 1 L bolus-8/17-recheck blood pressures 1 hour after the bolus      Large Hiatal/Paraesophageal Hernia: History of undergoing robotic repair with obvious G-tube insertion in March 2021. During that time no formal repair was performed as hernia was only reduced to about 80%. G-tube has been removed in the past.  EGD done on 6/2022 showed a large fixed hernia which looked improved however may need some readjustment. Patient saw general surgery and surgical intervention for G-tube insertion versus observation was discussed. Patient and family wanted to proceed with conservative management only. Hx of HTN: Controlled. Resume home meds. Recently seen by cardiology for shortness of breath and PVCs. Assessed and was negative for any ischemia. Holter monitor was unremarkable. Hx of Hypothyroidism: Resume home synthroid    Hx of Anxiety/Depression: Resume Prozac    Hx of Fibromyalgia: Resume Lyrica     Disposition plan per GI    Chief Complaint: Hartness of breath    History of Present Illness:  78 y.o. female who presented to [address] [place] [address] with shortness of breath and fatigue. She states for the last few days her shortness of breath has worsened. Kids have also noticed more pallor. Patient also has some bruising and on her right side of the face however family states it was from a mechanical fall from her electric chair.   She has history of iron deficiency anemia and has received iron transfusions in the past.  She states her stool has been darker in the last few days. Does take Mobic daily at home. Subjective:- (Last 24 hours)    Still with melena  Hemoglobin slowly dropping  Tenriism cannot have blood transfusion  No dizziness however has orthostatic hypotension    Past medical history, family history, social history and allergies reviewed again and is unchanged since admission. ROS (All review of systems completed. Pertinent positives noted. Otherwise All other systems reviewed and negative.)     Scheduled Meds:   iron sucrose  200 mg IntraVENous Q24H    polyethylene glycol  17 g Oral Daily    pantoprazole  40 mg Oral BID AC    sodium chloride flush  5-40 mL IntraVENous 2 times per day    FLUoxetine  20 mg Oral Daily    levothyroxine  100 mcg Oral Daily    pregabalin  150 mg Oral BID    scopolamine  1 patch TransDERmal Q72H     Continuous Infusions:   sodium chloride       PRN Meds:.sodium chloride flush, sodium chloride, ondansetron **OR** ondansetron, acetaminophen **OR** acetaminophen, docusate sodium     PHYSICAL EXAM:  Vitals:    08/17/22 1404 08/17/22 1406 08/17/22 1415 08/17/22 1419   BP: 126/71 (!) 105/53 (!) 104/56 (!) 108/56   Pulse: 76 78 77 77   Resp: 18 16 16 16   Temp:       TempSrc:       SpO2: 94% 93% 94% 94%   Weight:         General appearance: Alert / well-appearing. Cooperative. NAD. HEENT:  Normocephalic / atraumatic. PERRL. EOM intact. Pale conjunctiva  Neck: Supple. No JVD. Respiratory: Normal respiratory effort on RA. CTAB. No wheezes / rales / rhonchi. Cardiovascular: RRR. Normal S1/S2. No murmurs / rubs / gallops. Abdomen: Soft / non-tender / non-distended. BS present. Musculoskeletal: No cyanosis or edema. Skin: Warm / dry. Normal turgor. Pallor; bruising noted on right side of the face  Neurologic: A/O x 3. Speech normal. Answers questions appropriately. CN intact. No obvious focal neurologic deficits. Psychiatric: Thought content / judgment / insight appear appropriate.   Capillary refill: Brisk bilaterally. Peripheral pulses: +2 bilaterally.     Labs:   Results for orders placed or performed during the hospital encounter of 08/16/22   COVID-19, Rapid    Specimen: Nasopharyngeal Swab   Result Value Ref Range    SARS-CoV-2, NAAT NOT  DETECTED NOT DETECTED   Culture, Reflexed, Urine    Specimen: Urine, catheter   Result Value Ref Range    Organism gram positive cocci (A)     Urine Culture Reflex [place] count: >100,000 CFU/mL     Basic Metabolic Panel w/ Reflex to MG   Result Value Ref Range    Sodium 127 (L) 135 - 145 meq/L    Potassium reflex Magnesium 3.9 3.5 - 5.2 meq/L    Chloride 94 (L) 98 - 111 meq/L    CO2 25 23 - 33 meq/L    Glucose 104 70 - 108 mg/dL    BUN 19 7 - 22 mg/dL    Creatinine 0.5 0.4 - 1.2 mg/dL    Calcium 8.6 8.5 - 10.5 mg/dL   Brain Natriuretic Peptide   Result Value Ref Range    Pro-.6 0.0 - 1800.0 pg/mL   CBC with Auto Differential   Result Value Ref Range    WBC 3.7 (L) 4.8 - 10.8 thou/mm3    RBC 3.26 (L) 4.20 - 5.40 mill/mm3    Hemoglobin 7.7 (L) 12.0 - 16.0 gm/dl    Hematocrit 25.5 (L) 37.0 - 47.0 %    MCV 78.2 (L) 81.0 - 99.0 fL    MCH 23.6 (L) 26.0 - 33.0 pg    MCHC 30.2 (L) 32.2 - 35.5 gm/dl    RDW-CV 17.2 (H) 11.5 - 14.5 %    RDW-SD 48.8 (H) 35.0 - 45.0 fL    Platelets 187 757 - 692 thou/mm3    MPV 9.9 9.4 - 12.4 fL    Seg Neutrophils 65.0 %    Lymphocytes 23.9 %    Monocytes 8.9 %    Eosinophils 0.8 %    Basophils 1.1 %    Immature Granulocytes 0.3 %    Segs Absolute 2.4 1.8 - 7.7 thou/mm3    Lymphocytes Absolute 0.9 (L) 1.0 - 4.8 thou/mm3    Monocytes Absolute 0.3 (L) 0.4 - 1.3 thou/mm3    Eosinophils Absolute 0.0 0.0 - 0.4 thou/mm3    Basophils Absolute 0.0 0.0 - 0.1 thou/mm3    Immature Grans (Abs) 0.01 0.00 - 0.07 thou/mm3    nRBC 0 /100 wbc   Troponin   Result Value Ref Range    Troponin T < 0.010 ng/ml   Anion Gap   Result Value Ref Range    Anion Gap 8.0 8.0 - 16.0 meq/L   Glomerular Filtration Rate, Estimated   Result Value Ref Range    Est, Glom Filt Rate >90 ml/min/1.73m2   Osmolality   Result Value Ref Range    Osmolality Calc 257.8 (L) 275.0 - 300.0 mOsmol/kg   D-Dimer, Quantitative   Result Value Ref Range    D-Dimer, Quant 664.00 (H) 0.00 - 500.00 ng/ml FEU   Hepatic Function Panel   Result Value Ref Range    Albumin 3.7 3.5 - 5.1 g/dL    Total Bilirubin 0.3 0.3 - 1.2 mg/dL    Bilirubin, Direct <0.2 0.0 - 0.3 mg/dL    Alkaline Phosphatase 64 38 - 126 U/L    AST 26 5 - 40 U/L    ALT 12 11 - 66 U/L    Total Protein 6.1 6.1 - 8.0 g/dL   Procalcitonin   Result Value Ref Range    Procalcitonin 0.08 0.01 - 0.09 ng/mL   Lactate, Sepsis   Result Value Ref Range    Lactic Acid, Sepsis 1.2 0.5 - 1.9 mmol/L   Blood occult stool screen #1   Result Value Ref Range    OCCULT BLOOD FECAL Positive    Urine with Reflexed Micro   Result Value Ref Range    Glucose, Ur NEGATIVE NEGATIVE mg/dl    Bilirubin Urine NEGATIVE NEGATIVE    Ketones, Urine 15 (A) NEGATIVE    Specific Gravity, Urine 1.017 1.002 - 1.030    Blood, Urine NEGATIVE NEGATIVE    pH, UA 7.0 5.0 - 9.0    Protein, UA NEGATIVE NEGATIVE    Urobilinogen, Urine 1.0 0.0 - 1.0 eu/dl    Nitrite, Urine NEGATIVE NEGATIVE    Leukocyte Esterase, Urine MODERATE (A) NEGATIVE    Color, UA YELLOW STRAW-YELLOW    Character, Urine CLEAR CLEAR-SL CLOUD    RBC, UA 0-2 0-2/hpf /hpf    WBC, UA 25-50 0-4/hpf /hpf    Epithelial Cells, UA NONE SEEN 3-5/hpf /hpf    Bacteria, UA MANY FEW/NONE SEEN /hpf    Casts UA NONE SEEN NONE SEEN /lpf    Crystals, UA NONE SEEN NONE SEEN    Renal Epithelial, UA NONE SEEN NONE SEEN    Yeast, UA NONE SEEN NONE SEEN    CASTS 2 NONE SEEN NONE SEEN /lpf    MISCELLANEOUS 2 NONE SEEN    Ferritin   Result Value Ref Range    Ferritin 24 10 - 291 ng/mL   Iron   Result Value Ref Range    Iron 18 (L) 50 - 170 ug/dL   Iron binding capacity   Result Value Ref Range    TIBC 373 171 - 450 ug/dL   Reticulocytes   Result Value Ref Range    Retic Ct Abs 2.0 0.5 - 2.0 %    Absolute Retic # 64.0 20.0 - 115.0 thou/mm3    Immature 07:01 PM    FLUORO FOR SURGICAL PROCEDURES    Result Date: 7/26/2022  Radiology exam is complete. No Radiologist dictation. Please follow up with ordering provider.      Electronically signed by Melva Shine MD on 8/17/2022 at 2:51 PM

## 2022-08-17 NOTE — PROGRESS NOTES
ENDOSCOPY POSTPROCEDURE REPORT     PT NAME: Clau Gunter  MEDICAL RECORD NUMBER: [id number]  YOB: 1940    PROCEDURE PERFORMED BY : Sukhdev Rodriguez MD    PROCEDURE TYPE:   EGD ___x___   COLONOSCOPY _______   ERCP ________  MEDICATIONS USED :  VERSED _____   FENTANYL_____   PROPOFOL ____x__  Patient tolerated procedure well. No apparent complications. Estimated blood loss:  Nil  Specimens removed:  Yes__x___  No______  Disposition of Specimens:  To Lab      BRIEF  FINDINGS:  Erosive gastritis and also gastric ulcers. Mickey erosions and very large HH. No active bleeding  2.o/w neg      PRELIMINARY PLAN:  Double dose PPI x 8 weeks  2. Repeat EGD in 8 weeks to confirm healing of ulcers  3. May begin diet today. For complete findings and plan, see dictated report.      Sukhdev Rodriguez MD, MD  8/17/2022  2:05 PM

## 2022-08-17 NOTE — H&P
History & Physical       Patient: Avila Gunter  YOB: 1940    MRN: [id number]     Acct: [de-identified]    PCP: Michael Loya DO    Date of Admission: 8/16/2022    Date of Service: Patient seen / examined on 08/17/22 and admitted to Inpatient with expected LOS greater than two midnights due to medical therapy. ASSESSMENT / PLAN:    Acute Blood Loss Anemia: Secondary to Melena Vs Iron Deficiency; patient reports dark stools for the past few days. Associate symptoms of shortness of breath and pallor. Previously has history of iron deficiency anemia however does not take any iron supplements. Does have history of receiving iron transfusions. FOBT positive in ED. EGD done in 6/2022 showed some gastritis and Mickey ulceration. Patient also takes Mobic daily. Not on any blood thinners.  -Obtain iron studies  -IV Protonix bolus and then IV twice daily  -Hemoglobin checks every 6 hours  -GI consult for possible EGD  -N.p.o.  -Patient is Confucianist therefore will not accept any blood transfusion    Large Hiatal/Paraesophageal Hernia: History of undergoing robotic repair with obvious G-tube insertion in March 2021. During that time no formal repair was performed as hernia was only reduced to about 80%. G-tube has been removed in the past.  EGD done on 6/2022 showed a large fixed hernia which looked improved however may need some readjustment. Patient saw general surgery and surgical intervention for G-tube insertion versus observation was discussed. Patient and family wanted to proceed with conservative management only. Hx of HTN: Controlled. Resume home meds. Recently seen by cardiology for shortness of breath and PVCs. Assessed and was negative for any ischemia. Holter monitor was unremarkable.     Hx of Hypothyroidism: Resume home synthroid    Hx of Anxiety/Depression: Resume Prozac    Hx of Fibromyalgia: Resume Lyrica     Chief Complaint: Hartness of breath    History of Present Illness:  78 y.o. female who presented to [facility] with shortness of breath and fatigue. She states for the last few days her shortness of breath has worsened. Kids have also noticed more pallor. Patient also has some bruising and on her right side of the face however family states it was from a mechanical fall from her electric chair. She has history of iron deficiency anemia and has received iron transfusions in the past.  She states her stool has been darker in the last few days. Does take Mobic daily at home.       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 Vic Perez     GERD (gastroesophageal reflux disease)     Diverticulitis     Hiatal hernia     Hypertension     BAKI    Hypothyroidism     Osteoarthritis      Past Surgical History:    Past Surgical History:   Procedure Laterality Date    [address] Bilateral 2013    w/cubital tunnel    COLON SURGERY  07/29/2016    Procedure: ATTEMPTED DAVINCI XI ROBOTIC ASSISTED SIGMOID COLON RESECTION, ROBOTIC ASSISTED MOBILIZATION OF RECTAL SIGMOID TO SPLENIC FLEXURE, CONVERTED TO OPEN LEFT HEMICOLECTOMY WITH COLOPROCTOCTOMY, SPLENORRHAPHY, RIGID SIGMOIDOSCOPY, LASER EVALUATION OF VASCULARITY WITH ICG; Surgeon: Pat Jones MD; Location: [place] [address]; Service: General    COLONOSCOPY  2012    CYST REMOVAL  2010    [facility]    Cataract    FEMUR FRACTURE SURGERY Left 12/21/2020    LEFT FEMUR OPEN REDUCTION INTERNAL FIXATION performed by Jose L Tellez MD at [facility] Left 2001    [facility] N/A 03/09/2021    ROBOTIC EXTENSIVE LYSIS OF ADHESIONS, ROBOTIC REDUCTION OF HIATAL HERNIA WITH GASTROPEXY performed by Gina Vasquez MD at [address] ([address])  1979    w/bladder suspension    JOINT REPLACEMENT  2002, 2008, 2009    rt hip(2002), lt knee(2009), lt hip(2008) Shoulder (2009)    OTHER SURGICAL HISTORY  04/21/2021    Marquis Gray CNP in the office    [facility]. Bilateral 7/13/2021    medial branch blocks at bilateral L4/L5 and L5/S1 performed by Vishal Lakhani DO at [facility] Bilateral 8/24/2021    confirmatory  medial branch blocks at bilateral L4/L5 and L5/S1 performed by Vishal Lakhani DO at [facility] Right 10/19/2021    thermal radiofrequency at BILATERAL medial branches L4/L5 and L5/S1 was chosen. We will start with the right side first performed by Vishal Lakhani DO at [facility] Left 12/7/2021    thermal radiofrequency at BILATERAL medial branches L4/L5 and L5/S1 was chosen. performed by Vishal Lakhani DO at [facility] N/A 7/26/2022    lumbar epidural steroid injection Lumbar 5 Sacral 1 performed by Vishal Lakhani DO at [facility]  [address] IND Left 09/15/2017    COLONOSCOPY performed by Digna Wiggins MD at [address] [facility] [address] ENDOSCOPY  2012    UPPER GASTROINTESTINAL ENDOSCOPY Left 6/7/2022    EGD BIOPSY performed by Royann Epley, MD at [facility]      Medications Prior to Admission:   No current facility-administered medications on file prior to encounter.      Current Outpatient Medications on File Prior to Encounter   Medication Sig Dispense Refill    ondansetron (ZOFRAN) 4 MG tablet Take 1 tablet by mouth 3 times daily as needed for Nausea or Vomiting 30 tablet 0    pregabalin (LYRICA) 150 MG capsule Take 1 capsule by mouth in the morning and 1 capsule before bedtime. Do all this for 180 days. 180 capsule 1    enalapril (VASOTEC) 10 MG tablet TAKE 1 TABLET NIGHTLY 90 tablet 3    Cholecalciferol (VITAMIN D3) 50 MCG (2000 UT) CAPS Take by mouth      metoprolol succinate (TOPROL XL) 50 MG extended release tablet TAKE 1 TABLET DAILY 90 tablet 3    levothyroxine (SYNTHROID) 100 MCG tablet TAKE 1 TABLET DAILY 90 tablet 3    meloxicam (MOBIC) 15 MG tablet Take 1 tablet by mouth daily (Patient taking differently: Take 7.5 mg by mouth in the morning.) 90 tablet 1    FLUoxetine (PROZAC) 20 MG capsule Take 1 capsule by mouth daily 90 capsule 3    docusate sodium (COLACE) 100 MG capsule Take 1 capsule by mouth 2 times daily as needed for Constipation 180 capsule 3    calcium-vitamin D (OSCAL-500) 500-200 MG-UNIT per tablet Take 2 tablets by mouth daily      Misc. Devices (WALKER) MISC 1 each by Does not apply route daily 1 each 0    Misc.  Devices MISC Mechanical lift for a Van 1 Device 0    Scooter MISC by Does not apply route Power scooter 1 each 0    fluticasone (FLONASE) 50 MCG/ACT nasal spray 1 spray by Nasal route as needed for Rhinitis  (Patient not taking: No sig reported)      Omega 3-6-9 Fatty Acids (OMEGA 3-6-9 COMPLEX) CAPS Take 1 capsule by mouth daily       therapeutic multivitamin-minerals (THERAGRAN-M) tablet Take 1 tablet by mouth nightly        Allergies:   Ampicillin, Morphine, Pcn [penicillins], and Sulfa antibiotics    Social History:   Social History     Socioeconomic History    Marital status:      Spouse name: Rica Collado    Number of children: Not on file    Years of education: 14    Highest education level: Associate degree: academic program   Occupational History    Not on file   Tobacco Use    Smoking status: Former     Packs/day: 2.00     Years: 11.00     Pack years: 22.00     Types: Cigarettes     Start date: 1/9/1957     Quit date: 11/1/1967     Years since quittin.8    Smokeless tobacco: Never   Vaping Use    Vaping Use: Never used   Substance and Sexual Activity    Alcohol use: Yes     Comment: wine with dinner    Drug use: No    Sexual activity: Not on file   Other Topics Concern    Not on file   Social History Narrative    Referral to AAA placed    Referral placed to [facility]    No barriers with medication affordability    No barriers with transportation    Discussed support from insurance company     Social Determinants of Health     Financial Resource Strain: Low Risk     Difficulty of Paying Living Expenses: Not hard at all   Food Insecurity: No Food Insecurity    Worried About Running Out of Food in the Last Year: Never true    Ran Out of Food in the Last Year: Never true   Transportation Needs: Not on file   Physical Activity: Not on file   Stress: Not on file   Social Connections: Not on file   Intimate Partner Violence: Not on file   Housing Stability: Not on file     Family History:    Family History   Problem Relation Age of Onset    Arthritis Mother     Hearing Loss Mother     High Cholesterol Mother     Hearing Loss Father     Heart Disease Father 76        CABG    Stroke Father     Arthritis Sister     Depression Sister     Diabetes Sister     Arthritis Brother     Depression Brother     Cancer Maternal Aunt     Early Death Maternal Aunt     Diabetes Maternal Grandmother     Heart Disease Paternal Grandmother      REVIEW OF SYSTEMS:  A 14-point ROS was obtained and negative, with the exception of pertinent positives as listed below:    PHYSICAL EXAM:  Vitals:    22 1829 22 1834 22 2214   BP:  (!) 143/79 (!) 145/67   Pulse:  62 63   Resp:  18    Temp:  98.5 °F (36.9 °C) 97.9 °F (36.6 °C)   TempSrc: Oral Oral Oral   SpO2:  96% 97%   Weight:  158 lb (71.7 kg)      General appearance: Alert / well-appearing. Cooperative. NAD. HEENT:  Normocephalic / atraumatic. PERRL. EOM intact. Pale conjunctiva  Neck: Supple.  No Absolute 0.0 0.0 - 0.4 thou/mm3    Basophils Absolute 0.0 0.0 - 0.1 thou/mm3    Immature Grans (Abs) 0.01 0.00 - 0.07 thou/mm3    nRBC 0 /100 wbc   Troponin   Result Value Ref Range    Troponin T < 0.010 ng/ml   Anion Gap   Result Value Ref Range    Anion Gap 8.0 8.0 - 16.0 meq/L   Glomerular Filtration Rate, Estimated   Result Value Ref Range    Est, Glom Filt Rate >90 ml/min/1.73m2   Osmolality   Result Value Ref Range    Osmolality Calc 257.8 (L) 275.0 - 300.0 mOsmol/kg   D-Dimer, Quantitative   Result Value Ref Range    D-Dimer, Quant 664.00 (H) 0.00 - 500.00 ng/ml FEU   Hepatic Function Panel   Result Value Ref Range    Albumin 3.7 3.5 - 5.1 g/dL    Total Bilirubin 0.3 0.3 - 1.2 mg/dL    Bilirubin, Direct <0.2 0.0 - 0.3 mg/dL    Alkaline Phosphatase 64 38 - 126 U/L    AST 26 5 - 40 U/L    ALT 12 11 - 66 U/L    Total Protein 6.1 6.1 - 8.0 g/dL   Procalcitonin   Result Value Ref Range    Procalcitonin 0.08 0.01 - 0.09 ng/mL   Lactate, Sepsis   Result Value Ref Range    Lactic Acid, Sepsis 1.2 0.5 - 1.9 mmol/L   Blood occult stool screen #1   Result Value Ref Range    OCCULT BLOOD FECAL Positive    Urine with Reflexed Micro   Result Value Ref Range    Glucose, Ur NEGATIVE NEGATIVE mg/dl    Bilirubin Urine NEGATIVE NEGATIVE    Ketones, Urine 15 (A) NEGATIVE    Specific Gravity, Urine 1.017 1.002 - 1.030    Blood, Urine NEGATIVE NEGATIVE    pH, UA 7.0 5.0 - 9.0    Protein, UA NEGATIVE NEGATIVE    Urobilinogen, Urine 1.0 0.0 - 1.0 eu/dl    Nitrite, Urine NEGATIVE NEGATIVE    Leukocyte Esterase, Urine MODERATE (A) NEGATIVE    Color, UA YELLOW STRAW-YELLOW    Character, Urine CLEAR CLEAR-SL CLOUD    RBC, UA 0-2 0-2/hpf /hpf    WBC, UA 25-50 0-4/hpf /hpf    Epithelial Cells, UA NONE SEEN 3-5/hpf /hpf    Bacteria, UA MANY FEW/NONE SEEN /hpf    Casts UA NONE SEEN NONE SEEN /lpf    Crystals, UA NONE SEEN NONE SEEN    Renal Epithelial, UA NONE SEEN NONE SEEN    Yeast, UA NONE SEEN NONE SEEN    CASTS 2 NONE SEEN NONE SEEN /lpf    MISCELLANEOUS 2 NONE SEEN    Ferritin   Result Value Ref Range    Ferritin 24 10 - 291 ng/mL   Iron   Result Value Ref Range    Iron 18 (L) 50 - 170 ug/dL   Iron binding capacity   Result Value Ref Range    TIBC 373 171 - 450 ug/dL   Reticulocytes   Result Value Ref Range    Retic Ct Abs 2.0 0.5 - 2.0 %    Absolute Retic # 64.0 20.0 - 115.0 thou/mm3    Immature Retic Fract 19.5 (H) 3.0 - 15.9 %    Retic Hemoglobin 24.7 (L) 28.2 - 35.7 pg   IRON SATURATION   Result Value Ref Range    Iron Saturation 5 (L) 20 - 50 %   EKG 12 Lead   Result Value Ref Range    Ventricular Rate 60 BPM    Atrial Rate 60 BPM    P-R Interval 170 ms    QRS Duration 74 ms    Q-T Interval 400 ms    QTc Calculation (Bazett) 400 ms    P Axis 41 degrees    R Axis 64 degrees    T Axis 63 degrees       EKG / Radiology:     EKG:  Reviewed by me --    CXR:   Reviewed by me --    XR CHEST PORTABLE    Result Date: 8/16/2022  Single view of the chest Comparison:  CR,SR - XR CHEST 1 VW - 03/16/2021 10:13 AM EDT Findings: Elevation right hemidiaphragm. Opacity left lung base. This could represent aspiration, pneumonia or volume loss. Overall this has decreased. Stable pulmonary nodule right perihilar region measuring 7 mm. The heart is normal size. Impression: Opacity left lung base. This could represent aspiration, pneumonia and/or volume loss. This document has been electronically signed by: Brent Hutson MD on 08/16/2022 07:01 PM    [facility] FOR SURGICAL PROCEDURES    Result Date: 7/26/2022  Radiology exam is complete. No Radiologist dictation. Please follow up with ordering provider. FEN/GI/DVT:  IVF: None  Electrolytes: Monitor and replace per protocols  Diet: NPO  GI PPX: Yes  DVT Prophylaxis: SCDs    CODE STATUS:  Full    Thank you Willi Melgoza DO for the opportunity to be involved in this patient's care.     Electronically signed by Pam Buitrago DO on 8/17/2022 at 2:18 AM

## 2022-08-17 NOTE — ED PROVIDER NOTES
[facility] EMERGENCY DEPT      CHIEF COMPLAINT       Chief Complaint   Patient presents with    Shortness of Breath    Generalized Body Aches       Nurses Notes reviewed and I agree except as noted in the HPI. HISTORY OF PRESENT ILLNESS    Nurys Gunter is a 78 y.o. female who presents for exacerbation of SOB and generalized body aches. Patient states she has baseline SOB, however this has worsened over the past 3 days and the staff at her assisted living suggested she come to the ED today. She states her SOB worsens when laying down and walking. She has some early satiety and nausea which she feels is secondary to her hiatal hernia. She states she has had some increased lower extremity weakness and tenderness which she attributes to her chronic back pain. She reports she has had some increased swelling in legs and intolerance to cold. She reports constant sinus drainage but denies sinus pressure, sore throat, fevers, blood in stool, vomiting, diarrhea, and dizziness. She denies PMH of COPD, asthma, and heart failure. She reports vaccination against COVID, flu, and pneumonia. REVIEW OF SYSTEMS     Review of Systems   Constitutional:  Positive for activity change, appetite change, chills and fatigue. Negative for fever. HENT:  Positive for postnasal drip. Negative for congestion and sinus pain. Eyes: Negative. Respiratory:  Positive for shortness of breath. Negative for cough, chest tightness and wheezing. Cardiovascular:  Positive for leg swelling. Negative for chest pain and palpitations. Gastrointestinal:  Positive for abdominal pain and nausea. Negative for abdominal distention, blood in stool, diarrhea and vomiting. Endocrine: Positive for cold intolerance. Genitourinary:  Positive for frequency. Negative for decreased urine volume, difficulty urinating and dysuria. Musculoskeletal:  Positive for back pain and myalgias. Skin:  Negative for color change and pallor. Associated Diagnoses: Fibromyalgia      enalapril (VASOTEC) 10 MG tablet TAKE 1 TABLET NIGHTLY  Qty: 90 tablet, Refills: 3      Cholecalciferol (VITAMIN D3) 50 MCG (2000 UT) CAPS Take by mouth      metoprolol succinate (TOPROL XL) 50 MG extended release tablet TAKE 1 TABLET DAILY  Qty: 90 tablet, Refills: 3      levothyroxine (SYNTHROID) 100 MCG tablet TAKE 1 TABLET DAILY  Qty: 90 tablet, Refills: 3      FLUoxetine (PROZAC) 20 MG capsule Take 1 capsule by mouth daily  Qty: 90 capsule, Refills: 3    Associated Diagnoses: Moderate episode of recurrent major depressive disorder (HCC)      docusate sodium (COLACE) 100 MG capsule Take 1 capsule by mouth 2 times daily as needed for Constipation  Qty: 180 capsule, Refills: 3    Associated Diagnoses: Constipation, unspecified constipation type      calcium-vitamin D (OSCAL-500) 500-200 MG-UNIT per tablet Take 2 tablets by mouth daily      !! Misc. Devices (WALKER) MISC 1 each by Does not apply route daily  Qty: 1 each, Refills: 0    Associated Diagnoses: Spinal stenosis of lumbar region without neurogenic claudication      !! Misc. Devices MISC Mechanical lift for a [employer]: 1 Device, Refills: 0    Associated Diagnoses: Chronic bilateral low back pain with bilateral sciatica      Scooter MISC by Does not apply route Power scooter  Qty: 1 each, Refills: 0    Associated Diagnoses: Spinal stenosis of lumbar region without neurogenic claudication; Other idiopathic scoliosis, lumbar region; Chronic pain syndrome      fluticasone (FLONASE) 50 MCG/ACT nasal spray 1 spray by Nasal route as needed for Rhinitis       Omega 3-6-9 Fatty Acids (OMEGA 3-6-9 COMPLEX) CAPS Take 1 capsule by mouth daily       therapeutic multivitamin-minerals (THERAGRAN-M) tablet Take 1 tablet by mouth nightly        !! - Potential duplicate medications found. Please discuss with provider. ALLERGIES     is allergic to ampicillin, morphine, pcn [penicillins], and sulfa antibiotics.     FAMILY round, and reactive to light. Neck:      Trachea: No tracheal deviation. Cardiovascular:      Rate and Rhythm: Normal rate and regular rhythm. Heart sounds: Normal heart sounds. No murmur heard. Pulmonary:      Effort: Pulmonary effort is normal. No respiratory distress. Breath sounds: No stridor. Examination of the left-lower field reveals rhonchi. Rhonchi present. No decreased breath sounds, wheezing or rales. Abdominal:      General: Bowel sounds are normal. There is no distension. Palpations: Abdomen is soft. Abdomen is not rigid. Tenderness: There is abdominal tenderness. There is no guarding or rebound. Genitourinary:     Rectum: Guaiac result positive. Musculoskeletal:         General: Normal range of motion. Cervical back: Normal range of motion and neck supple. No rigidity. Right lower leg: No edema. Left lower leg: No edema. Comments: Movement normal as observed   Lymphadenopathy:      Cervical: No cervical adenopathy. Skin:     General: Skin is warm and dry. Capillary Refill: Capillary refill takes less than 2 seconds. Coloration: Skin is not pale. Findings: No rash. Neurological:      General: No focal deficit present. Mental Status: She is alert and oriented to person, place, and time. Gait: Gait normal.      Comments: No gross deficits observed   Psychiatric:         Mood and Affect: Mood normal.         Speech: Speech normal.         Behavior: Behavior normal.         Thought Content:  Thought content normal.       DIFFERENTIAL DIAGNOSIS:   Including but not limited to:   Pneumonia  Pulmonary embolism   Acute CHF exacerbation   COVID   Influenza     DIAGNOSTIC RESULTS     EKG: All E[facility]'s are [facility] who either signs or Co-signs this chart in the absence of a cardiologist.  Normal sinus rhythm  Old septal infarct  Right shifted axis   No STEMI    RADIOLOGY: non-plain film images(s) such as CT,Ultrasound and MRI are read by the radiologist.  Plain radiographic images are visualized and preliminarily interpreted by the emergency physician unless otherwise stated below. XR CHEST PORTABLE   Final Result   Similar left lower lung consolidation. Nonemergent/incidental findings in the report. This document has been electronically signed by: Irlanda Velásquez MD on    08/20/2022 07:02 AM      XR CHEST PORTABLE   Final Result   Impression:   Opacity left lung base. This could represent aspiration, pneumonia and/or    volume loss.       This document has been electronically signed by: Tegan Holland MD on    08/16/2022 07:01 PM          LABS:   Labs Reviewed   CULTURE, REFLEXED, URINE - Abnormal; Notable for the following components:       Result Value    Organism Aerococcus urinae (*)     All other components within normal limits    Narrative:     Source: cath urine       Site:           Current Antibiotics: not stated   BASIC METABOLIC PANEL W/ REFLEX TO MG FOR LOW K - Abnormal; Notable for the following components:    Sodium 127 (*)     Chloride 94 (*)     All other components within normal limits   CBC WITH AUTO DIFFERENTIAL - Abnormal; Notable for the following components:    WBC 3.7 (*)     RBC 3.26 (*)     Hemoglobin 7.7 (*)     Hematocrit 25.5 (*)     MCV 78.2 (*)     MCH 23.6 (*)     MCHC 30.2 (*)     RDW-CV 17.2 (*)     RDW-SD 48.8 (*)     Lymphocytes Absolute 0.9 (*)     Monocytes Absolute 0.3 (*)     All other components within normal limits   OSMOLALITY - Abnormal; Notable for the following components:    Osmolality Calc 257.8 (*)     All other components within normal limits   D-DIMER, QUANTITATIVE - Abnormal; Notable for the following components:    D-Dimer, Quant 664.00 (*)     All other components within normal limits   URINE WITH REFLEXED MICRO - Abnormal; Notable for the following components:    Ketones, Urine 15 (*)     Leukocyte Esterase, Urine MODERATE (*)     All other components within normal limits   IRON - Abnormal; Notable for the following components:    Iron 18 (*)     All other components within normal limits   RETICULOCYTES - Abnormal; Notable for the following components:    Immature Retic Fract 19.5 (*)     Retic Hemoglobin 24.7 (*)     All other components within normal limits   HEMOGLOBIN AND HEMATOCRIT - Abnormal; Notable for the following components:    Hemoglobin 7.3 (*)     Hematocrit 24.1 (*)     All other components within normal limits   HEMOGLOBIN AND HEMATOCRIT - Abnormal; Notable for the following components:    Hemoglobin 7.1 (*)     Hematocrit 24.1 (*)     All other components within normal limits   IRON SATURATION - Abnormal; Notable for the following components:    Iron Saturation 5 (*)     All other components within normal limits   BASIC METABOLIC PANEL W/ REFLEX TO MG FOR LOW K - Abnormal; Notable for the following components:    Sodium 130 (*)     Chloride 97 (*)     All other components within normal limits   ANION GAP - Abnormal; Notable for the following components:    Anion Gap 7.0 (*)     All other components within normal limits   HEMOGLOBIN AND HEMATOCRIT - Abnormal; Notable for the following components:    Hemoglobin 7.0 (*)     Hematocrit 24.2 (*)     All other components within normal limits   HEMOGLOBIN AND HEMATOCRIT - Abnormal; Notable for the following components:    Hemoglobin 7.4 (*)     Hematocrit 25.8 (*)     All other components within normal limits   HEMOGLOBIN AND HEMATOCRIT - Abnormal; Notable for the following components:    Hemoglobin 8.1 (*)     Hematocrit 27.9 (*)     All other components within normal limits   HEMOGLOBIN AND HEMATOCRIT - Abnormal; Notable for the following components:    Hemoglobin 7.8 (*)     Hematocrit 26.8 (*)     All other components within normal limits   HEMOGLOBIN AND HEMATOCRIT - Abnormal; Notable for the following components:    Hemoglobin 8.5 (*)     Hematocrit 29.4 (*)     All other components within normal limits   HEMOGLOBIN AND HEMATOCRIT - Abnormal; Notable for the following components:    Hemoglobin 7.8 (*)     Hematocrit 26.4 (*)     All other components within normal limits   COVID-19, RAPID   CULTURE, BLOOD 1    Narrative:     Source: blood-Adult-suboptimal <5.5oz./set volume       Site: Peripheral Vein            Current Antibiotics: not stated   CULTURE, BLOOD 2    Narrative:     Source: blood-Adult-suboptimal <5.5oz./set volume       Site: Peripheral Vein            Current Antibiotics: not stated   BRAIN NATRIURETIC PEPTIDE   TROPONIN   ANION GAP   GLOMERULAR FILTRATION RATE, ESTIMATED   HEPATIC FUNCTION PANEL   PROCALCITONIN   LACTATE, SEPSIS   BLOOD OCCULT STOOL SCREEN #1   FERRITIN   IRON BINDING CAPACITY   PROTIME-INR   APTT   GLOMERULAR FILTRATION RATE, ESTIMATED   SURGICAL PATHOLOGY    Narrative:     [facility]      [facility]                  [phone]  [address] [place], [address]                                                      PROC: 2022  [place]                                    RECV: 2022  [address] [employer]                                    RPTD: 2022  ARIE TALLEY IIMENDEZEL, [address]                      MRN:  [id number]    LOC: 8A                      ACCT: [de-identified]  SEX: F                      : 1940  AGE: 78 Y                         PATHOLOGY REPORT                      ATTN: Prem Le                      REQ: Jose Cancel      Copies To:   Petty Mead; JOHN NIXON      Clinical Information: MELENA, OTHER IRON DEFICIENCY ANEMIA    FINAL DIAGNOSIS:  Stomach, biopsy:    Foveolar hyperplasia. Negative for Helicobacter organisms. Specimen:  STOMACH BIOPSY, GASTRIC, R/O H. PYLORI      Gross Examination:  The container is labeled [facility], gastric biopsy. Received in  formalin are two 1 mm bits of tan soft tissue. 1 ns.   PCF/DKR:[id number]    Microscopic Examination:  Sections consist of foveolar epithelium only. There is no significant  inflammation of the limited lamina propria present for evaluation. Helicobacter organisms are not seen. [id number]                                                      <Sign Out Dr. Marlena Hussein M.D., F.C. A. P      [facility]/ [address] [address]  Printed on:  8/19/2022  [address] [facility], [facility]  Original print date: 08/19/2022   IRON BINDING CAPACITY   IRON SATURATION   FERRITIN   IRON   RETICULOCYTES   SURGICAL PATHOLOGY       EMERGENCY DEPARTMENT COURSE:   Vitals:    Vitals:    08/20/22 0146 08/20/22 0410 08/20/22 0815 08/20/22 1225   BP: (!) 189/81 (!) 162/79 (!) 160/95 (!) 130/11   Pulse: 87 85 81 84   Resp: 18 18 18 16   Temp: 97.9 °F (36.6 °C) 98.3 °F (36.8 °C) 98.4 °F (36.9 °C) 98.4 °F (36.9 °C)   TempSrc: Oral Oral Oral Oral   SpO2: 97%  96% 97%   Weight:               MDM:  The patient was seen by me in the emergency room for shortness of breath. Physical exam revealed rhonchi in left lower lung base but was otherwise unremarkable. Vital signs reviewed and noted stable. Old records were reviewed. Appropriate laboratory and imaging studies were ordered and reviewed upon completion. Pertinent findings:   Chest Xray showed opacity in left lower lung base  CBC showed hgb of 7.7 and hct of 25.5  A subsequent fecal occult was performed which was positive for blood in the stool. BNP and troponin WNL   COVID-19 rapid negative     Interventions:   Patient is a Sabianist and denies blood products  Decision made to admit for further workup    Results were discussed with the patient concerning pneumonia on CXR and positive fecal occult test, as well as desire for admission and they were amenable. Dr. El Trevino of our hospitalists' service was consulted and graciously accepted admission. The patient was admitted to the hospital in fair condition. CRITICAL CARE:   During my workup of this patient, it did become clear to me the critical nature of this patient's illness which did require my constant attention, and a critical care time 30 minutes was given    CONSULTS:  2035: Dr. Dickson Cabral (hospitalist)    PROCEDURES:  None    FINAL IMPRESSION      1. Dyspnea and respiratory abnormalities    2. Pneumonia of left lower lobe due to infectious organism    3. Iron deficiency anemia due to chronic blood loss    4. Hyponatremia    5. Shortness of breath          DISPOSITION/PLAN     1. Dyspnea and respiratory abnormalities    2. Pneumonia of left lower lobe due to infectious organism    3. Iron deficiency anemia due to chronic blood loss    4. Hyponatremia    5.  Shortness of breath    Admission      (Please note that portions of this note were completed with a voice recognition program.  Efforts were made to edit the dictations but occasionally words are mis-transcribed.)    JULIETTE Cantu PA-C 08/20/22 1:31 PM  FRED Liriano PA-C  08/20/22 [id number]

## 2022-08-17 NOTE — CONSULTS
Consult History & Physical      Patient:  Kayla Irene Height  YOB: 1940  MRN: [id number]     Acct: [de-identified]    Chief Complaint:  Melena & anemia    Date of Service: Pt seen/examined in consultation on 8/17/2022    History Of Present Illness:      78 y.o. female who we are asked to see/evaluate by Jailyn Lee MD for medical management of melena & anemia. She came to the ED yesterday for SOB, fatigue, generalized weakness. Her daughter states the patient and her  recently moved into an assisted living facility. She has chronic SOB, but has noticed it being worse with lying down flat and exertion. Denies abdominal pain, vomiting, diarrhea, constipation, and hematochezia. She has chronic intermittent nausea due to her large hiatal hernia. She states her stools are \"always dark, but have been darker over the past couple weeks. \" She denies anticoagulation use. She is on Mobic daily. She has a history of JOSSE, but is not on iron supplementation due to constipation. She states she used to take Miralax daily, but quit taking it with the move because she \"did not feel she needed it. \" Initial Hgb 7.7, down from 10.4 on 5/16/22. She has a history of a very large hiatal hernia with chronic intermittent dysphagia and nausea. She was recently seen by Dr. Paulo Hopkins, general surgery, and decided to do conservative management, no surgical intervention. She had a recent EGD 06/11/22 that demonstrated a large fixed hernia which has been surgically fixed before but endoscopically looks like it needs adjustment again, Mickey ulceration.      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 Char Lyons     GERD (gastroesophageal reflux disease)     Diverticulitis tablet Take 1 tablet by mouth nightly     Historical Provider, MD       Surgical History:  Past Surgical History:   Procedure Laterality Date    [address] Bilateral 2013    w/cubital tunnel    COLON SURGERY  07/29/2016    Procedure: ATTEMPTED DAVINCI XI ROBOTIC ASSISTED SIGMOID COLON RESECTION, ROBOTIC ASSISTED MOBILIZATION OF RECTAL SIGMOID TO SPLENIC FLEXURE, CONVERTED TO OPEN LEFT HEMICOLECTOMY WITH COLOPROCTOCTOMY, SPLENORRHAPHY, RIGID SIGMOIDOSCOPY, LASER EVALUATION OF VASCULARITY WITH ICG; Surgeon: Brielle Bermeo MD; Location: [place] [address]; Service: General    COLONOSCOPY  2012    CYST REMOVAL  2010    EYE SURGERY  1998, 1999    Cataract    FEMUR FRACTURE SURGERY Left 12/21/2020    LEFT FEMUR OPEN REDUCTION INTERNAL FIXATION performed by Carleen Larry MD at [address] Left 2001    [facility] N/A 03/09/2021    ROBOTIC EXTENSIVE LYSIS OF ADHESIONS, ROBOTIC REDUCTION OF HIATAL HERNIA WITH GASTROPEXY performed by Maurice Luevano MD at [facility] ([facility])  1979    w/bladder suspension    JOINT REPLACEMENT  2002, 2008, 2009    rt hip(2002), lt knee(2009), lt hip(2008) Shoulder (2009)    OTHER SURGICAL HISTORY  04/21/2021    Daisy Zhang CNP in the office    [facility]    medial branch blocks at bilateral L4/L5 and L5/S1 performed by [facility] Bilateral 8/24/2021    confirmatory  medial branch blocks at bilateral L4/L5 and L5/S1 performed by Jessica Gilmore DO at [facility] Right 10/19/2021    thermal radiofrequency at BILATERAL medial branches L4/L5 and L5/S1 was chosen.   We will start with the right side first performed by Jessica Gilmore DO at [facility] Left 12/7/2021 thermal radiofrequency at BILATERAL medial branches L4/L5 and L5/S1 was chosen. performed by Kendrick Jones DO at [facility] N/A 7/26/2022    lumbar epidural steroid injection Lumbar 5 Sacral 1 performed by Kendrick Jones DO at [address] [place] [address] Dr DELONTE KWON NOT  [address] IND Left 09/15/2017    COLONOSCOPY performed by Ashli Fleming MD at [place] [address]    discectomy    [facility] ENDOSCOPY  2012    UPPER GASTROINTESTINAL ENDOSCOPY Left 6/7/2022    EGD BIOPSY performed by Ferdie Gilford, MD at [address] [facility]       Family History:  Family History   Problem Relation Age of Onset    Arthritis Mother     Hearing Loss Mother     High Cholesterol Mother     Hearing Loss Father     Heart Disease Father 76        CABG    Stroke Father     Arthritis Sister     Depression Sister     Diabetes Sister     Arthritis Brother     Depression Brother     Cancer Maternal Aunt     Early Death Maternal Aunt     Diabetes Maternal Grandmother     Heart Disease Paternal Grandmother        Past GI History:  Large hiatal hernia, prior hiatal hernia repair, Mickey ulcer, JOSSE, chronic dysphagia, chronic intermittent nausea, EGD, colonoscopy, chronic constipation, diverticulosis, colon resection 2016, esophageal dilation, diverticular bleed    Dr. Carrizales Height patient/Birdie Ferreira patient    Allergies:  Ampicillin, Morphine, Pcn [penicillins], and Sulfa antibiotics    Social History:   TOBACCO:   reports that she quit smoking about 54 years ago. Her smoking use included cigarettes. She started smoking about 65 years ago. She has a 22.00 pack-year smoking history. She has never used smokeless tobacco.  ETOH:   reports current alcohol use. Review Of Systems  GENERAL: +fatigue  EYES:  No blurred vision, double vision   CARDIOVASCULAR: No chest pain or palpitations. thou/mm3 64.0   Retic Hemoglobin 28.2 - 35.7 pg 24.7 (L)       Radiology:   CXR 08/16/22      Impression   Impression:   Opacity left lung base. This could represent aspiration, pneumonia and/or    volume loss. Code Status: Full Code    ASSESSMENT:  Generalized weakness & fatigue  Acute on chronic SOB  JOSSE  Acute on chronic microcytic anemia  Melena  Chronic NSAID use- on Mobic  Leukopenia  Chronic constipation  Hyponatremia  H/O HTN  Large hiatal hernia with h/o of repair- needing repair again, patient refusing surgical intervention  H/O colon resection  Thyroid disease  H/O fibromyalgia  H/O anxiety & depression  Chronic intermittent nausea secondary to #11  Chronic dysphagia secondary to #11    PLAN:    Monitor H & H, transfuse prn  Continue IV Venofer  Increase IVP PPI to BID  Nursing to monitor stool output & document  NPO except sips with meds  EGD with risks & benefits discussed, patient agreeable to proceed  Will schedule for urgent EGD today with Dr Laura Braun  Will need PO iron at discharge  Miralax daily, can increase to BID if PO iron causes worsening constipation  RN updated  Supportive care per primary team  Will follow       Case reviewed and impression/plan reviewed in collaboration with Dr. Laura Braun  Electronically signed by FLORIDA Rothman CNP on 8/17/2022 at 12:57 PM    [employer]  Thank you for the consultation.

## 2022-08-17 NOTE — PROGRESS NOTES
Recovery mode. Denies discomfort.  discussed findings and plan of care with patient.      Report called to primary RN

## 2022-08-17 NOTE — PLAN OF CARE
EGD 08/17/22 by Dr. Shannon Priest  Impression: erosive gastritis & gastric ulcers, [place] erosions & a very large hiatal hernia, no active bleeding, otherwise negative    - PO PPI BID for 8 weeks, the daily, script sent  - PUD diet as tolerated  - Will need PO iron at discharge  - Continue Miralax daily, can increase to BID if constipated from iron  - Will need a 4 week follow-up with Cassie Schilder APRN  - EGD 10/25/22 with Dr. Sofia Padilla signing off

## 2022-08-17 NOTE — PROGRESS NOTES
BRIEF H&P//fENDOSCOPY PREPROCEDURE REPORT     Patient: Samy Gunter  : 1940  Acct#: [de-identified]    Date: 2022  Indications/Brief History: UGI bleeding  Anticipated Procedure: EGD    ASA class:  2  Airway Adequate? Yes__x___   No_______    Preprocedure Exam:   Neuro: Alert, oriented. Heart:  Regular rate and rhythm   Lungs: Clear to ausculation bilaterally, no evidence respiratory distress  Abdomen:  soft.   NT    PLAN:  EGD__x___   COLONOSCOPY ______     ERCP________    Navneet Floyd MD MD  2022, 2:04 PM

## 2022-08-17 NOTE — PROGRESS NOTES
Patient admitted from E. Dept s/p fall at home a few days ago. Currently with complaints of nausea and general feeling \"of not feeling well\". Blodd count is low . Pt reconfirmed that she does not want blood transfusions as she is a Jehovah Witness. Son, Ashley Major, stayed briefly and then left for the evening. Orientation to room and call light provided. Strongly encouraged pt to call before getting out of bed by self and going to bathroom. Wait for help to arrive. Pt agreed.

## 2022-08-18 LAB
HCT VFR BLD CALC: 25.8 % (ref 37–47)
HCT VFR BLD CALC: 26.8 % (ref 37–47)
HCT VFR BLD CALC: 27.9 % (ref 37–47)
HEMOGLOBIN: 7.4 GM/DL (ref 12–16)
HEMOGLOBIN: 7.8 GM/DL (ref 12–16)
HEMOGLOBIN: 8.1 GM/DL (ref 12–16)
ORGANISM: ABNORMAL
URINE CULTURE REFLEX: ABNORMAL

## 2022-08-18 PROCEDURE — 36415 COLL VENOUS BLD VENIPUNCTURE: CPT

## 2022-08-18 PROCEDURE — 6360000002 HC RX W HCPCS: Performed by: STUDENT IN AN ORGANIZED HEALTH CARE EDUCATION/TRAINING PROGRAM

## 2022-08-18 PROCEDURE — 6370000000 HC RX 637 (ALT 250 FOR IP): Performed by: INTERNAL MEDICINE

## 2022-08-18 PROCEDURE — 6370000000 HC RX 637 (ALT 250 FOR IP): Performed by: STUDENT IN AN ORGANIZED HEALTH CARE EDUCATION/TRAINING PROGRAM

## 2022-08-18 PROCEDURE — 97110 THERAPEUTIC EXERCISES: CPT

## 2022-08-18 PROCEDURE — 6370000000 HC RX 637 (ALT 250 FOR IP): Performed by: NURSE PRACTITIONER

## 2022-08-18 PROCEDURE — 97166 OT EVAL MOD COMPLEX 45 MIN: CPT

## 2022-08-18 PROCEDURE — 99233 SBSQ HOSP IP/OBS HIGH 50: CPT | Performed by: INTERNAL MEDICINE

## 2022-08-18 PROCEDURE — 1200000003 HC TELEMETRY R&B

## 2022-08-18 PROCEDURE — 85014 HEMATOCRIT: CPT

## 2022-08-18 PROCEDURE — 2580000003 HC RX 258: Performed by: INTERNAL MEDICINE

## 2022-08-18 PROCEDURE — 2580000003 HC RX 258: Performed by: STUDENT IN AN ORGANIZED HEALTH CARE EDUCATION/TRAINING PROGRAM

## 2022-08-18 PROCEDURE — 85018 HEMOGLOBIN: CPT

## 2022-08-18 PROCEDURE — 97535 SELF CARE MNGMENT TRAINING: CPT

## 2022-08-18 RX ORDER — FERROUS SULFATE 325(65) MG
325 TABLET ORAL 2 TIMES DAILY
Qty: 180 TABLET | Refills: 1 | Status: SHIPPED | OUTPATIENT
Start: 2022-08-18

## 2022-08-18 RX ORDER — DOXYCYCLINE HYCLATE 100 MG
100 TABLET ORAL EVERY 12 HOURS SCHEDULED
Qty: 20 TABLET | Refills: 0 | Status: SHIPPED | OUTPATIENT
Start: 2022-08-18 | End: 2022-08-28

## 2022-08-18 RX ORDER — FLUDROCORTISONE ACETATE 0.1 MG/1
0.1 TABLET ORAL DAILY
Status: DISCONTINUED | OUTPATIENT
Start: 2022-08-18 | End: 2022-08-24 | Stop reason: HOSPADM

## 2022-08-18 RX ORDER — DOXYCYCLINE HYCLATE 100 MG
100 TABLET ORAL EVERY 12 HOURS SCHEDULED
Status: COMPLETED | OUTPATIENT
Start: 2022-08-18 | End: 2022-08-22

## 2022-08-18 RX ORDER — SODIUM CHLORIDE 9 MG/ML
INJECTION, SOLUTION INTRAVENOUS CONTINUOUS
Status: ACTIVE | OUTPATIENT
Start: 2022-08-18 | End: 2022-08-19

## 2022-08-18 RX ORDER — 0.9 % SODIUM CHLORIDE 0.9 %
500 INTRAVENOUS SOLUTION INTRAVENOUS ONCE
Status: COMPLETED | OUTPATIENT
Start: 2022-08-18 | End: 2022-08-18

## 2022-08-18 RX ADMIN — PREGABALIN 150 MG: 75 CAPSULE ORAL at 08:05

## 2022-08-18 RX ADMIN — FLUOXETINE HYDROCHLORIDE 20 MG: 20 CAPSULE ORAL at 08:05

## 2022-08-18 RX ADMIN — PANTOPRAZOLE SODIUM 40 MG: 40 TABLET, DELAYED RELEASE ORAL at 06:56

## 2022-08-18 RX ADMIN — SODIUM CHLORIDE 500 ML: 9 INJECTION, SOLUTION INTRAVENOUS at 12:27

## 2022-08-18 RX ADMIN — FLUDROCORTISONE ACETATE 0.1 MG: 0.1 TABLET ORAL at 15:33

## 2022-08-18 RX ADMIN — SODIUM CHLORIDE: 9 INJECTION, SOLUTION INTRAVENOUS at 15:28

## 2022-08-18 RX ADMIN — ONDANSETRON 4 MG: 4 TABLET, ORALLY DISINTEGRATING ORAL at 22:42

## 2022-08-18 RX ADMIN — SODIUM CHLORIDE, PRESERVATIVE FREE 10 ML: 5 INJECTION INTRAVENOUS at 20:36

## 2022-08-18 RX ADMIN — DOXYCYCLINE HYCLATE 100 MG: 100 TABLET, COATED ORAL at 10:46

## 2022-08-18 RX ADMIN — DOXYCYCLINE HYCLATE 100 MG: 100 TABLET, COATED ORAL at 20:31

## 2022-08-18 RX ADMIN — PREGABALIN 150 MG: 75 CAPSULE ORAL at 20:31

## 2022-08-18 RX ADMIN — IRON SUCROSE 200 MG: 20 INJECTION, SOLUTION INTRAVENOUS at 02:54

## 2022-08-18 RX ADMIN — ONDANSETRON 4 MG: 2 INJECTION INTRAMUSCULAR; INTRAVENOUS at 15:25

## 2022-08-18 RX ADMIN — PANTOPRAZOLE SODIUM 40 MG: 40 TABLET, DELAYED RELEASE ORAL at 15:33

## 2022-08-18 RX ADMIN — LEVOTHYROXINE SODIUM 100 MCG: 0.1 TABLET ORAL at 06:55

## 2022-08-18 RX ADMIN — SODIUM CHLORIDE: 9 INJECTION, SOLUTION INTRAVENOUS at 18:33

## 2022-08-18 RX ADMIN — DOCUSATE SODIUM 100 MG: 100 CAPSULE, LIQUID FILLED ORAL at 10:49

## 2022-08-18 RX ADMIN — SODIUM CHLORIDE, PRESERVATIVE FREE 10 ML: 5 INJECTION INTRAVENOUS at 08:05

## 2022-08-18 ASSESSMENT — PAIN SCALES - GENERAL
PAINLEVEL_OUTOF10: 0
PAINLEVEL_OUTOF10: 0
PAINLEVEL_OUTOF10: 3

## 2022-08-18 ASSESSMENT — PAIN DESCRIPTION - PAIN TYPE: TYPE: CHRONIC PAIN

## 2022-08-18 ASSESSMENT — PAIN DESCRIPTION - LOCATION: LOCATION: BACK

## 2022-08-18 NOTE — PROGRESS NOTES
[facility]  INPATIENT OCCUPATIONAL THERAPY  STRZ MED SURG 8A  EVALUATION    Time:    Time In: 1450  Time Out: 1524  Timed Code Treatment Minutes: 10 Minutes  Minutes: 34          Date: 2022  Patient Name: Ramos Gunter,   Gender: female      MRN: [id number]  : 1940  (78 y.o.)  Referring Practitioner: Noel Watts MD  Diagnosis: SOB  Additional Pertinent Hx: Per MD note:81 y.o. female who presented to [address] [place] [address] with shortness of breath and fatigue. She states for the last few days her shortness of breath has worsened. Kids have also noticed more pallor. Patient also has some bruising and on her right side of the face however family states it was from a mechanical fall from her electric chair. She has history of iron deficiency anemia and has received iron transfusions in the past.  She states her stool has been darker in the last few days. Presented to ER on 2022    Restrictions/Precautions:  Restrictions/Precautions: Fall Risk  Position Activity Restriction  Other position/activity restrictions: per nurse + for ortho BP    Subjective  Chart Reviewed: Yes, Orders, Progress Notes, History and Physical  Patient assessed for rehabilitation services?: Yes  Family / Caregiver Present: Yes (spouse)    Subjective: cooperative, requesting to go to the bathroom-nurse reports Pt is + for ortho BP okayed to [facility] no ambulation on this date.  Pt reporting she is not feeling well sitting up upon arrival of therapist-nurse had just checked BP-see flowsheet for details    Pain: 0/10: no c/o pain during session    Vitals: Nurse checked vitals prior to session    Social/Functional History:  Lives With: Spouse  Type of Home: Facility ([facility] [address])  Home Layout: One level  Home Access: Level entry  Home Equipment: Walker, rolling, Drew Janus, standard, Walker, 4 wheeled   Bathroom Shower/Tub: Walk-in shower  Bathroom Toilet: Standard  Bathroom Equipment: Shower chair, Grab bars in shower       ADL Assistance: Independent  Homemaking Assistance: Needs assistance  Ambulation Assistance: Independent  Transfer Assistance: Independent    Active : No  Patient's  Info: daughter gets Pts to appointments     Additional Comments: Pt reports using walker at AL, but not getting around [employer] distances. 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 For slipper socks  Toileting: Moderate Assistance. For completeness of wiping after using BSC  Toilet Transfer: Minimal Assistance. BSC .  **had TEDs on    BALANCE:  Standing Balance: Contact Guard Assistance. BED MOBILITY:  Supine to Sit: Minimal Assistance    Sit to Supine: Moderate Assistance      TRANSFERS:  Sit to Stand:  Minimal Assistance. From EOB  Stand to Sit: Contact Guard Assistance. To EOB    FUNCTIONAL MOBILITY:  Assistive Device: None  Assist Level:  Minimal Assistance. Distance:  2 steps to/from [facility]  Did not use RW d/t Pt feeling dizzy & having orthostatic BP issues   **initiated education for improving safety     Activity Tolerance:  Patient tolerance of  treatment: fair. Assessment:  Assessment: Pt demo decreased ADL & functional mobility indep over PLOF s/p admission with SOB & hx of recent fall. Continued OT recommended to educate Pt on safety & adaptive strategies for increasing indep & safety with returning to ADLs at AL. Performance deficits / Impairments: Decreased functional mobility , Decreased endurance, Decreased safe awareness, Decreased ADL status, Decreased balance, Decreased strength  Prognosis: Fair  REQUIRES OT FOLLOW-UP: Yes  Decision Making: Medium Complexity    Treatment Initiated: Treatment and education initiated within context of evaluation. Evaluation time included review of current medical information, gathering information related to past medical, social and functional history, completion of standardized testing, formal and informal observation of tasks, assessment of data and development of plan of care and goals. Treatment time included skilled education and facilitation of tasks to increase safety and independence with ADL's for improved functional independence and quality of life. Discharge Recommendations:  Home with Home health OT, Home with assist PRN, Continue to assess pending progress    Patient Education:     Patient Education  Education Given To: Patient  Education Provided: Role of Therapy, Plan of Care, Transfer Training, ADL Adaptive Strategies  Education Method: Demonstration, Verbal  Barriers to Learning: None  Education Outcome: Continued education needed    Equipment Recommendations: Other: monitor pending progress    Plan:  Times per Week: 5x  Current Treatment Recommendations: Functional mobility training, Balance training, Endurance training, Self-Care / ADL, Safety education & training. See long-term goal time frame for expected duration of plan of care. If no long-term goals established, a short length of stay is anticipated. Goals:  Patient goals : get feeling better  Short Term Goals  Time Frame for Short term goals: until discharge  Short Term Goal 1: Pt will complete various sit-stand t/fs including BSC with 0>CGA & 0-2 vcs for safety  Short Term Goal 2: Pt will tolerate standing 2-3 min with CGA for increased ease of sinkside grooming  Short Term Goal 3: Pt will complete tolerate mobility to/from bathroom with RW, CGA, & 0-2 vcs for safety  Short Term Goal 4: Pt will complete BADL routine with  min A & min vcs for safety  Long Term Goals  Time Frame for Long term goals : No LTG set d/t short ELOS         Following session, patient left in safe position with all fall risk precautions in place.

## 2022-08-18 NOTE — PROGRESS NOTES
Hospitalist Progress Note      Patient:  Tammi Clemens Height    Unit/Bed:8A-19/019-A  YOB: 1940  MRN: [id number]   Acct: [de-identified]     PCP: Elias Neumann DO  Date of Admission: 8/16/2022         ASSESSMENT / PLAN:    Acute Blood Loss Anemia-likely from upper GI bleed-planning for EGD today 8/17-had EGD showed multiple [place] erosions around the large hyperchromia, did have prepyloric antrum also. No interventions needed PPI twice daily for 8 weeks hemoglobin stable around 7.4 patient is a [employer]ond[employer] cannot have blood transfusion    Orthostatic hypotension-likely secondary to GI bleed-add 1 L bolus-8/17-recheck blood pressures 1 hour after the bolus    8/18 still orthostatic and dizzy while standing up-we will give a bolus again as well as continue IV fluids compression stockings, added Vasyl      Large Hiatal/Paraesophageal Hernia: History of undergoing robotic repair with obvious G-tube insertion in March 2021. During that time no formal repair was performed as hernia was only reduced to about 80%. G-tube has been removed in the past.  EGD done on 6/2022 showed a large fixed hernia which looked improved however may need some readjustment. Patient saw general surgery and surgical intervention for G-tube insertion versus observation was discussed. Patient and family wanted to proceed with conservative management only. Hx of HTN: Controlled. Resume home meds. Recently seen by cardiology for shortness of breath and PVCs. Assessed and was negative for any ischemia. Holter monitor was unremarkable. Hx of Hypothyroidism: Resume home synthroid    Hx of Anxiety/Depression: Resume Prozac    Hx of Fibromyalgia: Resume Lyrica       -Plan for discharge tomorrow if clinically stable and hemoglobin stable    Chief Complaint: Hartness of breath    History of Present Illness:  78 y.o. female who presented to [facility] with shortness of breath and fatigue.   She states for the last few days her shortness of breath has worsened. Kids have also noticed more pallor. Patient also has some bruising and on her right side of the face however family states it was from a mechanical fall from her electric chair. She has history of iron deficiency anemia and has received iron transfusions in the past.  She states her stool has been darker in the last few days. Does take Mobic daily at home. Subjective:- (Last 24 hours)    Hemoglobin stable around 7.4 no melena    However dizzy while standing up orthostats are positive  No chest pain    Past medical history, family history, social history and allergies reviewed again and is unchanged since admission. ROS (All review of systems completed. Pertinent positives noted. Otherwise All other systems reviewed and negative.)     Scheduled Meds:   doxycycline hyclate  100 mg Oral 2 times per day    fludrocortisone  0.1 mg Oral Daily    iron sucrose  200 mg IntraVENous Q24H    polyethylene glycol  17 g Oral Daily    pantoprazole  40 mg Oral BID AC    sodium chloride flush  5-40 mL IntraVENous 2 times per day    FLUoxetine  20 mg Oral Daily    levothyroxine  100 mcg Oral Daily    pregabalin  150 mg Oral BID    scopolamine  1 patch TransDERmal Q72H     Continuous Infusions:   sodium chloride      sodium chloride       PRN Meds:.sodium chloride flush, sodium chloride, ondansetron **OR** ondansetron, acetaminophen **OR** acetaminophen, docusate sodium     PHYSICAL EXAM:  Vitals:    08/18/22 0005 08/18/22 0316 08/18/22 0345 08/18/22 0745   BP: 118/63 (!) 123/58 (!) 105/55 (!) 141/80   Pulse: 62 72 55 62   Resp: 17 16 16 18   Temp: 98 °F (36.7 °C) 97.7 °F (36.5 °C) 98 °F (36.7 °C) 98 °F (36.7 °C)   TempSrc: Oral Oral Oral Oral   SpO2:  94%  94%   Weight:         General appearance: Alert / well-appearing. Cooperative. NAD. HEENT:  Normocephalic / atraumatic. PERRL. EOM intact. Pale conjunctiva  Neck: Supple. No JVD.   Respiratory: Normal respiratory effort on RA. CTAB. No wheezes / rales / rhonchi. Cardiovascular: RRR. Normal S1/S2. No murmurs / rubs / gallops. Abdomen: Soft / non-tender / non-distended. BS present. Musculoskeletal: No cyanosis or edema. Skin: Warm / dry. Normal turgor. Pallor; bruising noted on right side of the face  Neurologic: A/O x 3. Speech normal. Answers questions appropriately. CN intact. No obvious focal neurologic deficits. Psychiatric: Thought content / judgment / insight appear appropriate. Capillary refill: Brisk bilaterally. Peripheral pulses: +2 bilaterally. Labs:   Results for orders placed or performed during the hospital encounter of 08/16/22   COVID-19, Rapid    Specimen: Nasopharyngeal Swab   Result Value Ref Range    SARS-CoV-2, NAAT NOT  DETECTED NOT DETECTED   Culture, Blood 1    Specimen: Blood   Result Value Ref Range    Blood Culture, Routine No growth 24 hours. Culture, Blood 2    Specimen: Blood   Result Value Ref Range    Blood Culture, Routine No growth 24 hours. Culture, Reflexed, Urine    Specimen: Urine, catheter   Result Value Ref Range    Organism Aerococcus urinae (A)     Urine Culture Reflex       Monroe count: >100,000 CFU/mL A. urinae is generally susceptible to ampicillin, penicillin, amoxicillin, beta lactams, cephalosporins and doxycycline. This organism has variable susceptibility to levofloxacin, vancomycin and trimethoprim-sulfamethoxazole.       Basic Metabolic Panel w/ Reflex to MG   Result Value Ref Range    Sodium 127 (L) 135 - 145 meq/L    Potassium reflex Magnesium 3.9 3.5 - 5.2 meq/L    Chloride 94 (L) 98 - 111 meq/L    CO2 25 23 - 33 meq/L    Glucose 104 70 - 108 mg/dL    BUN 19 7 - 22 mg/dL    Creatinine 0.5 0.4 - 1.2 mg/dL    Calcium 8.6 8.5 - 10.5 mg/dL   Brain Natriuretic Peptide   Result Value Ref Range    Pro-.6 0.0 - 1800.0 pg/mL   CBC with Auto Differential   Result Value Ref Range    WBC 3.7 (L) 4.8 - 10.8 thou/mm3    RBC 3.26 (L) 4.20 - 5.40 mill/mm3 Hemoglobin 7.7 (L) 12.0 - 16.0 gm/dl    Hematocrit 25.5 (L) 37.0 - 47.0 %    MCV 78.2 (L) 81.0 - 99.0 fL    MCH 23.6 (L) 26.0 - 33.0 pg    MCHC 30.2 (L) 32.2 - 35.5 gm/dl    RDW-CV 17.2 (H) 11.5 - 14.5 %    RDW-SD 48.8 (H) 35.0 - 45.0 fL    Platelets 978 306 - 017 thou/mm3    MPV 9.9 9.4 - 12.4 fL    Seg Neutrophils 65.0 %    Lymphocytes 23.9 %    Monocytes 8.9 %    Eosinophils 0.8 %    Basophils 1.1 %    Immature Granulocytes 0.3 %    Segs Absolute 2.4 1.8 - 7.7 thou/mm3    Lymphocytes Absolute 0.9 (L) 1.0 - 4.8 thou/mm3    Monocytes Absolute 0.3 (L) 0.4 - 1.3 thou/mm3    Eosinophils Absolute 0.0 0.0 - 0.4 thou/mm3    Basophils Absolute 0.0 0.0 - 0.1 thou/mm3    Immature Grans (Abs) 0.01 0.00 - 0.07 thou/mm3    nRBC 0 /100 wbc   Troponin   Result Value Ref Range    Troponin T < 0.010 ng/ml   Anion Gap   Result Value Ref Range    Anion Gap 8.0 8.0 - 16.0 meq/L   Glomerular Filtration Rate, Estimated   Result Value Ref Range    Est, Glom Filt Rate >90 ml/min/1.73m2   Osmolality   Result Value Ref Range    Osmolality Calc 257.8 (L) 275.0 - 300.0 mOsmol/kg   D-Dimer, Quantitative   Result Value Ref Range    D-Dimer, Quant 664.00 (H) 0.00 - 500.00 ng/ml FEU   Hepatic Function Panel   Result Value Ref Range    Albumin 3.7 3.5 - 5.1 g/dL    Total Bilirubin 0.3 0.3 - 1.2 mg/dL    Bilirubin, Direct <0.2 0.0 - 0.3 mg/dL    Alkaline Phosphatase 64 38 - 126 U/L    AST 26 5 - 40 U/L    ALT 12 11 - 66 U/L    Total Protein 6.1 6.1 - 8.0 g/dL   Procalcitonin   Result Value Ref Range    Procalcitonin 0.08 0.01 - 0.09 ng/mL   Lactate, Sepsis   Result Value Ref Range    Lactic Acid, Sepsis 1.2 0.5 - 1.9 mmol/L   Blood occult stool screen #1   Result Value Ref Range    OCCULT BLOOD FECAL Positive    Urine with Reflexed Micro   Result Value Ref Range    Glucose, Ur NEGATIVE NEGATIVE mg/dl    Bilirubin Urine NEGATIVE NEGATIVE    Ketones, Urine 15 (A) NEGATIVE    Specific Gravity, Urine 1.017 1.002 - 1.030    Blood, Urine NEGATIVE Estimated   Result Value Ref Range    Est, Glom Filt Rate >90 ml/min/1.73m2   Hemoglobin and Hematocrit   Result Value Ref Range    Hemoglobin 7.0 (LL) 12.0 - 16.0 gm/dl    Hematocrit 24.2 (L) 37.0 - 47.0 %   Hemoglobin and Hematocrit   Result Value Ref Range    Hemoglobin 7.4 (L) 12.0 - 16.0 gm/dl    Hematocrit 25.8 (L) 37.0 - 47.0 %   Hemoglobin and Hematocrit   Result Value Ref Range    Hemoglobin 8.1 (L) 12.0 - 16.0 gm/dl    Hematocrit 27.9 (L) 37.0 - 47.0 %   EKG 12 Lead   Result Value Ref Range    Ventricular Rate 60 BPM    Atrial Rate 60 BPM    P-R Interval 170 ms    QRS Duration 74 ms    Q-T Interval 400 ms    QTc Calculation (Bazett) 400 ms    P Axis 41 degrees    R Axis 64 degrees    T Axis 63 degrees       EKG / Radiology:     EKG:  Reviewed by me --    CXR:   Reviewed by me --    XR CHEST PORTABLE    Result Date: 8/16/2022  Single view of the chest Comparison:  [facility] - XR CHEST 1 VW - 03/16/2021 10:13 AM EDT Findings: Elevation right hemidiaphragm. Opacity left lung base. This could represent aspiration, pneumonia or volume loss. Overall this has decreased. Stable pulmonary nodule right perihilar region measuring 7 mm. The heart is normal size. Impression: Opacity left lung base. This could represent aspiration, pneumonia and/or volume loss.  This document has been electronically signed by: Blaine Hernandez MD on 08/16/2022 07:01 PM      Electronically signed by Luis Angel Valentin MD on 8/18/2022 at 2:48 PM

## 2022-08-18 NOTE — CARE COORDINATION
DISCHARGE/PLANNING EVALUATION  8/18/22, 4:43 PM EDT    Reason for Referral: Discharge needs. From AL  Mental Status: Patient is alert and oriented  Decision Making: Patient makes own decisions  Family/Social/Home Environment: Spoke with patient and spouse, assessment completed. Patient lives with spouse at [place] [address]. She is independent with ADL's but showering is getting more difficult. She has macular degeneration, difficulty seeing. They eat supper in the dinning room. Neither her or  drives, daughter Kyra House takes to appointments and sets up medications. Current Services including food security, transportation and housekeeping: AL, see above  Current Equipment:walker, merlyn and power wheelchair, shower seat  Payment Source: [employer] Medicare  Concerns or Barriers to Discharge: Pending PT/OT eval, return to AL with therapy vs [address] acute provider list with quality measures, geographic area and applicable managed care information provided. Questions regarding selection process answered:declined    Teach Back Method used with patient regarding care plan and discharge planning. Patient and  verbalize understanding of the plan of care and contribute to goal setting. Patient goals, treatment preferences and discharge plan: Plan to return to AL with  and new outpatient PT/OT vs skilled at [facility]. Spoke with Li Webb at [facility], discharge plan pending medical course and PT/OT eval.  Hope to have PT/OT eval tomorrow if patient is medically able to do therapy. Will update.     Electronically signed by KARLIE Kline on 8/18/2022 at 4:43 PM

## 2022-08-18 NOTE — PROCEDURES
capacious. Otherwise, the bulb  was normal.  In fact, the scope could be retroflexed in the bulb, it was  large. The scope was brought back across pylorus into the prepyloric  antrum. There was an ulcer in the immediate prepyloric antrum. This  was moderately deep, white colored based ulcer. This is shown in  photographs #11 and 12. Endoscopic appearance is benign. There were  scattered erosions and punctate ulcers in the antrum as well as patchy  erythema, otherwise normal.  Scope was retroflexed and the patient was  again noted to have a very large hiatal hernia. The folds in the  proximal stomach consistent with previous surgery as shown in photograph  16. Also, there were Mickey erosions present. There was erythema. The scope was straightened. The gastric body visualized in forward  view. Small ulcer, patchy erythema and scattered erosions all  consistent with erosive gastritis. Additionally, there were Mickey  erosions. Small punctate ulcer shown in the last photograph on page 3. Also, there was erosion right at the EG junction on the gastric side as  shown in photograph #20. Endoscopic appearance of all ulcers, erosions  benign. Subsequently, biopsies obtained from a couple of ulcers, these  were sent for Pathology. Then, air was withdrawn and the scope was  removed. The patient tolerated the procedure well. No apparent  complications.     PHOTOGRAPHS: #1 is EG junction retroflexion; 2 and 3 show large hiatal  hernia with erythema and Mickey erosions, also seen in photograph 4; 5  is hiatal hernia in forward view; 6 is prepyloric; 7 is duodenal bulb; 8  is descending duodenum; 9 is periampullary diverticulum; 10 is in  retroflexion in the duodenal bulb; 11 and 12 show prepyloric antral  ulcer with white base, moderately deep; photograph #13 is antrum; 14,  15, 16, 17 all on retroflexion; 17 shows Mickey erosions; 18 is patchy  erythema and scattered erosions in the body; [address] erosions; 20 is  erosion at the EG junction; 21 is distal esophagus; 22 is ulcer in the  body. IMPRESSION:  1. Very large hiatal hernia with Valeen Argue erosions. 2.  Patchy erythema throughout the body with scattered erosions and a  small punctate ulcer. 3.  Approximately 4 mm white colored based ulcer in the prepyloric  antrum, moderately deep. Endoscopic appearance benign. Biopsies  obtained. 4.  Focal erosion at EG junction. 5.  Dilated duodenal bulb. 6.  Incidental note of periampullary diverticulum. ASSESSMENT:  As described above, upper endoscopy demonstrates appearance  consistent with diffuse erosive gastritis as well as Mickey erosions  and some small punctate ulcers as well as a 4 mm moderately deep ulcer  in the prepyloric antrum. Biopsies obtained. Rule out H. Pylori. Endoscopic appearance is benign. I suspect that the patient's recent  drop in hemoglobin is related to these findings. PLAN:  1. Follow up biopsy results. 2.  Continue twice daily PPI at discharge for 8 weeks. 3.  Repeat EGD as outpatient in 8 weeks in order to evaluate for healing  of the erosions and ulcers. 4.  Any further plans after above and based on clinical course. 5.  Begin diet.         Sophia Wong M.D.    D: 08/17/2022 14:14:07       T: 08/17/2022 14:16:38     RN/ELLIOT_Mary  Job#: [id number]     Doc#: [id number]    CC:

## 2022-08-18 NOTE — CARE COORDINATION
8/18/22, 8:21 AM EDT    DISCHARGE ON [facility] day: 2  Location: 8A-19/019-A Reason for admit: Shortness of breath [R06.02]  Hyponatremia [E87.1]  Iron deficiency anemia due to chronic blood loss [D50.0]  Dyspnea and respiratory abnormalities [R06.00, R06.89]  Pneumonia of left lower lobe due to infectious organism [J18.9]   Procedure: 8-17-22 EGD with bx. Ulcers and erosions. Barriers to Discharge: EGD performed. PPI bid x 8 wks. PUD type diet. IV Iron daily. PCP: Ministerio Castaneda DO  Readmission Risk Score: 13.8%  Patient Goals/Plan/Treatment Preferences: SW following for return to [facility]. Possible discharge today.

## 2022-08-18 NOTE — PLAN OF CARE
Problem: Discharge Planning  Goal: Discharge to home or other facility with appropriate resources  8/17/2022 1734 by Jose Dykes RN  Outcome: Progressing  Flowsheets (Taken 8/17/2022 9110)  Discharge to home or other facility with appropriate resources: Identify barriers to discharge with patient and caregiver     Problem: Pain  Goal: Verbalizes/displays adequate comfort level or baseline comfort level  8/17/2022 1734 by Jose Dykes RN  Outcome: Progressing     Problem: Safety - Adult  Goal: Free from fall injury  8/17/2022 1734 by Jose Dykes RN  Outcome: Progressing

## 2022-08-19 LAB
HCT VFR BLD CALC: 26.4 % (ref 37–47)
HCT VFR BLD CALC: 29.4 % (ref 37–47)
HEMOGLOBIN: 7.8 GM/DL (ref 12–16)
HEMOGLOBIN: 8.5 GM/DL (ref 12–16)

## 2022-08-19 PROCEDURE — 6370000000 HC RX 637 (ALT 250 FOR IP): Performed by: STUDENT IN AN ORGANIZED HEALTH CARE EDUCATION/TRAINING PROGRAM

## 2022-08-19 PROCEDURE — 1200000003 HC TELEMETRY R&B

## 2022-08-19 PROCEDURE — 99232 SBSQ HOSP IP/OBS MODERATE 35: CPT | Performed by: INTERNAL MEDICINE

## 2022-08-19 PROCEDURE — 97530 THERAPEUTIC ACTIVITIES: CPT

## 2022-08-19 PROCEDURE — 2580000003 HC RX 258: Performed by: INTERNAL MEDICINE

## 2022-08-19 PROCEDURE — 85018 HEMOGLOBIN: CPT

## 2022-08-19 PROCEDURE — 6370000000 HC RX 637 (ALT 250 FOR IP): Performed by: NURSE PRACTITIONER

## 2022-08-19 PROCEDURE — 6360000002 HC RX W HCPCS: Performed by: STUDENT IN AN ORGANIZED HEALTH CARE EDUCATION/TRAINING PROGRAM

## 2022-08-19 PROCEDURE — 97110 THERAPEUTIC EXERCISES: CPT

## 2022-08-19 PROCEDURE — 97162 PT EVAL MOD COMPLEX 30 MIN: CPT

## 2022-08-19 PROCEDURE — 6360000002 HC RX W HCPCS

## 2022-08-19 PROCEDURE — 36415 COLL VENOUS BLD VENIPUNCTURE: CPT

## 2022-08-19 PROCEDURE — 6370000000 HC RX 637 (ALT 250 FOR IP): Performed by: INTERNAL MEDICINE

## 2022-08-19 PROCEDURE — 85014 HEMATOCRIT: CPT

## 2022-08-19 PROCEDURE — 2580000003 HC RX 258: Performed by: STUDENT IN AN ORGANIZED HEALTH CARE EDUCATION/TRAINING PROGRAM

## 2022-08-19 RX ORDER — PROMETHAZINE HYDROCHLORIDE 25 MG/ML
6.25 INJECTION, SOLUTION INTRAMUSCULAR; INTRAVENOUS ONCE
Status: COMPLETED | OUTPATIENT
Start: 2022-08-19 | End: 2022-08-19

## 2022-08-19 RX ORDER — PROMETHAZINE HYDROCHLORIDE 25 MG/ML
6.25 INJECTION, SOLUTION INTRAMUSCULAR; INTRAVENOUS EVERY 6 HOURS PRN
Status: DISCONTINUED | OUTPATIENT
Start: 2022-08-19 | End: 2022-08-24 | Stop reason: HOSPADM

## 2022-08-19 RX ADMIN — DOXYCYCLINE HYCLATE 100 MG: 100 TABLET, COATED ORAL at 20:30

## 2022-08-19 RX ADMIN — FLUDROCORTISONE ACETATE 0.1 MG: 0.1 TABLET ORAL at 08:56

## 2022-08-19 RX ADMIN — SODIUM CHLORIDE: 9 INJECTION, SOLUTION INTRAVENOUS at 14:39

## 2022-08-19 RX ADMIN — PANTOPRAZOLE SODIUM 40 MG: 40 TABLET, DELAYED RELEASE ORAL at 16:02

## 2022-08-19 RX ADMIN — SODIUM CHLORIDE, PRESERVATIVE FREE 10 ML: 5 INJECTION INTRAVENOUS at 20:30

## 2022-08-19 RX ADMIN — PREGABALIN 150 MG: 75 CAPSULE ORAL at 08:56

## 2022-08-19 RX ADMIN — POLYETHYLENE GLYCOL 3350 17 G: 17 POWDER, FOR SOLUTION ORAL at 08:56

## 2022-08-19 RX ADMIN — LEVOTHYROXINE SODIUM 100 MCG: 0.1 TABLET ORAL at 08:56

## 2022-08-19 RX ADMIN — DOXYCYCLINE HYCLATE 100 MG: 100 TABLET, COATED ORAL at 08:56

## 2022-08-19 RX ADMIN — PROMETHAZINE HYDROCHLORIDE 6.25 MG: 25 INJECTION INTRAMUSCULAR; INTRAVENOUS at 06:31

## 2022-08-19 RX ADMIN — PANTOPRAZOLE SODIUM 40 MG: 40 TABLET, DELAYED RELEASE ORAL at 06:31

## 2022-08-19 RX ADMIN — SODIUM CHLORIDE: 9 INJECTION, SOLUTION INTRAVENOUS at 04:20

## 2022-08-19 RX ADMIN — PREGABALIN 150 MG: 75 CAPSULE ORAL at 20:30

## 2022-08-19 RX ADMIN — FLUOXETINE HYDROCHLORIDE 20 MG: 20 CAPSULE ORAL at 08:56

## 2022-08-19 RX ADMIN — IRON SUCROSE 200 MG: 20 INJECTION, SOLUTION INTRAVENOUS at 02:52

## 2022-08-19 NOTE — CARE COORDINATION
8/19/22, 1:50 PM EDT    DISCHARGE PLANNING EVALUATION    Spoke with patient after OT/OT and discussion with Dr. Jessie Ann. Recommending SNF, patient is agreeable to [facility]. Spoke with Montse at [facility], pre-cert has been started. Could possibly be approved this weekend. Advised to call the unit at [phone] if approved. If patient is ready medically for discharge the nurses will be able to take care of the discharge. Spoke with patient regarding above. Daughter will transport if patient is able to get into a vehicle.

## 2022-08-19 NOTE — CARE COORDINATION
8/19/22, 1:21 PM EDT    DISCHARGE ON GOING [address] day: 3  Location: 8A-19/019-A Reason for admit: Shortness of breath [R06.02]  Hyponatremia [E87.1]  Iron deficiency anemia due to chronic blood loss [D50.0]  Dyspnea and respiratory abnormalities [R06.00, R06.89]  Pneumonia of left lower lobe due to infectious organism [J18.9]   Procedure: 8-17-22 EGD with bx. Ulcers and erosions. Barriers to Discharge: awaiting PT/OT evaluation for skilled vs AL-pt lightheaded with standing, PO Protonix BID  PCP: Sole Alanis DO  Readmission Risk Score: 13.8%  Patient Goals/Plan/Treatment Preferences: SW following. Pt from [place] [address].  [facility] on PT/OT jocelyne

## 2022-08-19 NOTE — DISCHARGE INSTR - COC
Continuity of Care Form    Patient Name: Lorenza Gunter   :  1940  MRN:  [id number]    Admit date:  2022  Discharge date:  22    Code Status Order: Full Code   Advance Directives:     Admitting Physician:  Abdirashid Alvarado MD  PCP: Rusty Murcia DO    Discharging Nurse: [facility] Unit/Room#: 8A-19/019-A  Discharging Unit Phone Number: [phone]    Emergency Contact:   Extended Emergency Contact Information  Primary Emergency Contact: Jyotsna Gunter  Address: [address] [place] [address]           [address] [place] [address] of [address] [place] [address] Phone: [phone]  Mobile Phone: [phone]  Relation: Spouse  Secondary Emergency Contact: Andrew Jackson  Address: [place] [address]            ERVIN, [place] [address] [place] [address] Phone: [phone]  Mobile Phone: [phone]  Relation: Child    Past Surgical History:  Past Surgical History:   Procedure Laterality Date    [address] Bilateral 2013    w/cubital tunnel    COLON SURGERY  2016    Procedure: ATTEMPTED DAVINCI XI ROBOTIC ASSISTED SIGMOID COLON RESECTION, ROBOTIC ASSISTED MOBILIZATION OF RECTAL SIGMOID TO SPLENIC FLEXURE, CONVERTED TO OPEN LEFT HEMICOLECTOMY WITH COLOPROCTOCTOMY, SPLENORRHAPHY, RIGID SIGMOIDOSCOPY, LASER EVALUATION OF VASCULARITY WITH ICG; Surgeon: Ramy Buckley MD; Location: [facility]; Service: General    COLONOSCOPY  2012    CYST REMOVAL      [address] [place] [address], Critical access hospital    Cataract    FEMUR FRACTURE SURGERY Left 2020    LEFT FEMUR OPEN REDUCTION INTERNAL FIXATION performed by Cheryl Chambers MD at [facility]     [address] [place] [address] N/A 2021    ROBOTIC EXTENSIVE LYSIS OF ADHESIONS, ROBOTIC REDUCTION OF HIATAL HERNIA WITH GASTROPEXY performed by Gaye Ponce MD at [address] [place] [address] ([address] [place] [address])      w/bladder suspension    JOINT REPLACEMENT  , ,     rt hip(), lt knee(2009), lt hip(2008) Shoulder (2009)    OTHER SURGICAL HISTORY  04/21/2021    Nkechi Ortiz CNP in the office    [facility]    PAIN MANAGEMENT PROCEDURE Bilateral 7/13/2021    medial branch blocks at bilateral L4/L5 and L5/S1 performed by Joel Nuñez DO at [facility] Bilateral 8/24/2021    confirmatory  medial branch blocks at bilateral L4/L5 and L5/S1 performed by Joel Nuñez DO at [facility] Right 10/19/2021    thermal radiofrequency at BILATERAL medial branches L4/L5 and L5/S1 was chosen. We will start with the right side first performed by Joel Nuñez DO at [facility] Left 12/7/2021    thermal radiofrequency at BILATERAL medial branches L4/L5 and L5/S1 was chosen.  performed by Joel Nuñez DO at [facility] N/A 7/26/2022    lumbar epidural steroid injection Lumbar 5 Sacral 1 performed by Joel Nuñez DO at [facility]  [address] IND Left 09/15/2017    COLONOSCOPY performed by Chhaya Estrada MD at [place] [address]    discectomy    [facility] ENDOSCOPY  2012    UPPER GASTROINTESTINAL ENDOSCOPY Left 6/7/2022    EGD BIOPSY performed by Kayla Finley MD at [facility]    UPPER GASTROINTESTINAL ENDOSCOPY N/A 8/17/2022    EGD BIOPSY performed by Glory Ochoa MD at [facility]       Immunization History:   Immunization History   Administered Date(s) Administered    COVID-19, PFIZER PURPLE top, DILUTE for use, (age 15 y+), 30mcg/0.3mL 04/07/2021, 04/28/2021, 11/24/2021    Influenza Vaccine, unspecified formulation 09/26/2014, 10/13/2015, 10/01/2016    Influenza Virus Vaccine 09/26/2014, 10/01/2016    Influenza, FLUAD, (age 72 y+), Adjuvanted 10/20/2020, 09/08/2021    Influenza, FLUARIX, FLULAVAL, (age 10 mo+) AND AFLURIA, Tarri Maffucci (age 1 y+), PF 10/13/2015, 10/20/2020    Influenza, High Dose (Fluzone 65 yrs and older) 10/24/2017, 09/29/2018, 10/20/2020    Influenza, Triv, inactivated, subunit, adjuvanted, IM (Fluad 65 yrs and older) 09/29/2018, 10/12/2019    MMR 10/24/1995    Pneumococcal Polysaccharide ([id number]) 11/02/2004, 10/12/2010    Tdap (Boostrix, Adacel) 09/08/2021    Zoster Live (Zostavax) 10/16/2013       Active Problems:  Patient Active Problem List   Diagnosis Code    Osteoarthritis M19.90    Fibromyalgia M79.7    Gastroesophageal reflux disease K21.9    Hypothyroidism E03.9    Patient is [employer] Z78.9    Back pain M54.9    H/O abdominal surgery Z98.890    Normocytic anemia D64.9    History of diverticulosis Z87.19    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.40, G89.29    Chest pain R07.9    Essential hypertension I10    Abnormal CT scan, chest R93.89    BRBPR (bright red blood per rectum) K62.5    Spinal stenosis of lumbar region without neurogenic claudication M48.061    Other idiopathic scoliosis, lumbar region M41.26    Closed fracture of distal end of left femur, [facility] S72.402A    Pre-syncope R55    Weakness generalized R53.1    Large hiatal hernia K44.9    Hypo-osmolar hyponatremia E87.1    Esophageal dysphagia R13.19    Hypomagnesemia E83.42    Dysphagia R13.10    Bilateral lower extremity edema R60.0    Hx of fracture of leg Z87.81    Lung nodule R91.1    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    S/P repair of paraesophageal hernia Z98.890, Z87.19    Intermediate stage nonexudative age-related macular degeneration of both eyes H35.3132    Pain syndrome, chronic G89.4    Shortness of breath R06.02       Isolation/Infection:   Isolation            Contact          Patient Infection Status       Infection Onset Added Last Indicated Last Indicated By Review Planned Expiration Resolved Resolved By    VRE  10/05/16 Concerns:     Intermittent confusion at night    Impairments/Disabilities:      Hard of hearing    Nutrition Therapy:  Current Nutrition Therapy:       Routes of Feeding: Oral  Liquids: Thin Liquids  Daily Fluid Restriction: no  Last Modified Barium Swallow with Video (Video Swallowing Test): not done    Treatments at the Time of Hospital Discharge:   Respiratory Treatments: N/a  Oxygen Therapy:  is not on home oxygen therapy. Ventilator:    - No ventilator support    Rehab Therapies: Physical Therapy, Occupational Therapy, and Speech/Language Therapy  Weight Bearing Status/Restrictions: No weight bearing restrictions  Other Medical Equipment (for information only, NOT a DME order):  walker  Other Treatments: N/a    Patient's personal belongings (please select all that are sent with patient):  Glasses, Hearing Aides bilateral    RN SIGNATURE:  Electronically signed by Patricio Valladares RN on 8/24/22 at 10:37 AM EDT    CASE MANAGEMENT/SOCIAL WORK SECTION    Inpatient Status Date: 8/16/2022    Readmission Risk Assessment Score:  Readmission Risk              Risk of Unplanned Readmission:  15           Discharging to Facility/ Agency   Name: [address], [address] [place] [address] Main  Phone: [phone]  Fax: [phone]    Dialysis Facility (if applicable)   Name:  Address:  Dialysis Schedule:  Phone:  Fax:    / signature: Electronically signed by KARLIE Purvis on 8/19/22 at 1:58 PM EDT    PHYSICIAN SECTION    Prognosis: Good    Condition at Discharge: Stable    Rehab Potential (if transferring to Rehab): Good    Recommended Labs or Other Treatments After Discharge: see above    Physician Certification: I certify the above information and transfer of Sharath Gunter  is necessary for the continuing treatment of the diagnosis listed and that she requires [facility] for greater 30 days.      Update Admission H&P: No change in H&P    PHYSICIAN SIGNATURE:  Electronically signed by Eligio Smith MD on 8/24/2022 at 8:09 AM

## 2022-08-19 NOTE — PROGRESS NOTES
Hospitalist Progress Note      Patient:  Orlin Badillo Height    Unit/Bed:8A-19/019-A  YOB: 1940  MRN: [id number]   Acct: [de-identified]     PCP: Pawan Peraza DO  Date of Admission: 8/16/2022         ASSESSMENT / PLAN:    Acute Blood Loss Anemia-likely from upper GI bleed-planning for EGD today 8/17-had EGD showed multiple [place] erosions around the large hyperchromia, did have prepyloric antrum also. No interventions needed PPI twice daily for 8 weeks hemoglobin stable around 7.4 patient is a Quaker cannot have blood transfusion    Orthostatic hypotension-likely secondary to GI bleed-add 1 L bolus-8/17-recheck blood pressures 1 hour after the bolus    8/18 still orthostatic and dizzy while standing up-we will give a bolus again as well as continue IV fluids compression stockings, added Florinef    8/19- better with iv fluids      Large Hiatal/Paraesophageal Hernia: History of undergoing robotic repair with obvious G-tube insertion in March 2021. During that time no formal repair was performed as hernia was only reduced to about 80%. G-tube has been removed in the past.  EGD done on 6/2022 showed a large fixed hernia which looked improved however may need some readjustment. Patient saw general surgery and surgical intervention for G-tube insertion versus observation was discussed. Patient and family wanted to proceed with conservative management only. Hx of HTN: Controlled. Resume home meds. Recently seen by cardiology for shortness of breath and PVCs. Assessed and was negative for any ischemia. Holter monitor was unremarkable.     Hx of Hypothyroidism: Resume home synthroid    Hx of Anxiety/Depression: Resume Prozac    Hx of Fibromyalgia: Resume Lyrica     Dispo plan- likely willneed nursing home , per PT OT  PRECERT PENDING    Chief Complaint: Hartness of breath    History of Present Illness:  78 y.o. female who presented to [facility] with shortness of breath and fatigue. She states for the last few days her shortness of breath has worsened. Kids have also noticed more pallor. Patient also has some bruising and on her right side of the face however family states it was from a mechanical fall from her electric chair. She has history of iron deficiency anemia and has received iron transfusions in the past.  She states her stool has been darker in the last few days. Does take Mobic daily at home. Subjective:- (Last 24 hours)    HBG stable  However deconditioned  No fever or chills  tired    Past medical history, family history, social history and allergies reviewed again and is unchanged since admission. ROS (All review of systems completed. Pertinent positives noted. Otherwise All other systems reviewed and negative.)     Scheduled Meds:   doxycycline hyclate  100 mg Oral 2 times per day    fludrocortisone  0.1 mg Oral Daily    polyethylene glycol  17 g Oral Daily    pantoprazole  40 mg Oral BID AC    sodium chloride flush  5-40 mL IntraVENous 2 times per day    FLUoxetine  20 mg Oral Daily    levothyroxine  100 mcg Oral Daily    pregabalin  150 mg Oral BID    scopolamine  1 patch TransDERmal Q72H     Continuous Infusions:   sodium chloride 100 mL/hr at 08/19/22 0420    sodium chloride       PRN Meds:.promethazine, sodium chloride flush, sodium chloride, ondansetron **OR** ondansetron, acetaminophen **OR** acetaminophen, docusate sodium     PHYSICAL EXAM:  Vitals:    08/18/22 2337 08/19/22 0323 08/19/22 0847 08/19/22 1210   BP: (!) 145/65 132/62 (!) 149/79 (!) 147/71   Pulse: 89 82 93 79   Resp: 16 18 18 18   Temp: 98.5 °F (36.9 °C) 98.3 °F (36.8 °C) 99 °F (37.2 °C) 98.8 °F (37.1 °C)   TempSrc: Oral Oral Oral Oral   SpO2:  94% 92% 94%   Weight:  180 lb 12.8 oz (82 kg)       General appearance: Alert / well-appearing. Cooperative. NAD. HEENT:  Normocephalic / atraumatic. PERRL. EOM intact. Pale conjunctiva  Neck: Supple. No JVD.   Respiratory: Normal respiratory effort on RA. CTAB. No wheezes / rales / rhonchi. Cardiovascular: RRR. Normal S1/S2. No murmurs / rubs / gallops. Abdomen: Soft / non-tender / non-distended. BS present. Musculoskeletal: No cyanosis or edema. Skin: Warm / dry. Normal turgor. Pallor; bruising noted on right side of the face  Neurologic: A/O x 3. Speech normal. Answers questions appropriately. CN intact. No obvious focal neurologic deficits. Psychiatric: Thought content / judgment / insight appear appropriate. Capillary refill: Brisk bilaterally. Peripheral pulses: +2 bilaterally. Labs:   Results for orders placed or performed during the hospital encounter of 08/16/22   COVID-19, Rapid    Specimen: Nasopharyngeal Swab   Result Value Ref Range    SARS-CoV-2, NAAT NOT  DETECTED NOT DETECTED   Culture, Blood 1    Specimen: Blood   Result Value Ref Range    Blood Culture, Routine No growth 24 hours. No growth 48 hours. Culture, Blood 2    Specimen: Blood   Result Value Ref Range    Blood Culture, Routine No growth 24 hours. No growth 48 hours. Culture, Reflexed, Urine    Specimen: Urine, catheter   Result Value Ref Range    Organism Aerococcus urinae (A)     Urine Culture Reflex       [place] count: >100,000 CFU/mL A. urinae is generally susceptible to ampicillin, penicillin, amoxicillin, beta lactams, cephalosporins and doxycycline. This organism has variable susceptibility to levofloxacin, vancomycin and trimethoprim-sulfamethoxazole.       Basic Metabolic Panel w/ Reflex to MG   Result Value Ref Range    Sodium 127 (L) 135 - 145 meq/L    Potassium reflex Magnesium 3.9 3.5 - 5.2 meq/L    Chloride 94 (L) 98 - 111 meq/L    CO2 25 23 - 33 meq/L    Glucose 104 70 - 108 mg/dL    BUN 19 7 - 22 mg/dL    Creatinine 0.5 0.4 - 1.2 mg/dL    Calcium 8.6 8.5 - 10.5 mg/dL   Brain Natriuretic Peptide   Result Value Ref Range    Pro-.6 0.0 - 1800.0 pg/mL   CBC with Auto Differential   Result Value Ref Range    WBC 3.7 (L) 4.8 - 10.8 thou/mm3 RBC 3.26 (L) 4.20 - 5.40 mill/mm3    Hemoglobin 7.7 (L) 12.0 - 16.0 gm/dl    Hematocrit 25.5 (L) 37.0 - 47.0 %    MCV 78.2 (L) 81.0 - 99.0 fL    MCH 23.6 (L) 26.0 - 33.0 pg    MCHC 30.2 (L) 32.2 - 35.5 gm/dl    RDW-CV 17.2 (H) 11.5 - 14.5 %    RDW-SD 48.8 (H) 35.0 - 45.0 fL    Platelets 885 254 - 389 thou/mm3    MPV 9.9 9.4 - 12.4 fL    Seg Neutrophils 65.0 %    Lymphocytes 23.9 %    Monocytes 8.9 %    Eosinophils 0.8 %    Basophils 1.1 %    Immature Granulocytes 0.3 %    Segs Absolute 2.4 1.8 - 7.7 thou/mm3    Lymphocytes Absolute 0.9 (L) 1.0 - 4.8 thou/mm3    Monocytes Absolute 0.3 (L) 0.4 - 1.3 thou/mm3    Eosinophils Absolute 0.0 0.0 - 0.4 thou/mm3    Basophils Absolute 0.0 0.0 - 0.1 thou/mm3    Immature Grans (Abs) 0.01 0.00 - 0.07 thou/mm3    nRBC 0 /100 wbc   Troponin   Result Value Ref Range    Troponin T < 0.010 ng/ml   Anion Gap   Result Value Ref Range    Anion Gap 8.0 8.0 - 16.0 meq/L   Glomerular Filtration Rate, Estimated   Result Value Ref Range    Est, Glom Filt Rate >90 ml/min/1.73m2   Osmolality   Result Value Ref Range    Osmolality Calc 257.8 (L) 275.0 - 300.0 mOsmol/kg   D-Dimer, Quantitative   Result Value Ref Range    D-Dimer, Quant 664.00 (H) 0.00 - 500.00 ng/ml FEU   Hepatic Function Panel   Result Value Ref Range    Albumin 3.7 3.5 - 5.1 g/dL    Total Bilirubin 0.3 0.3 - 1.2 mg/dL    Bilirubin, Direct <0.2 0.0 - 0.3 mg/dL    Alkaline Phosphatase 64 38 - 126 U/L    AST 26 5 - 40 U/L    ALT 12 11 - 66 U/L    Total Protein 6.1 6.1 - 8.0 g/dL   Procalcitonin   Result Value Ref Range    Procalcitonin 0.08 0.01 - 0.09 ng/mL   Lactate, Sepsis   Result Value Ref Range    Lactic Acid, Sepsis 1.2 0.5 - 1.9 mmol/L   Blood occult stool screen #1   Result Value Ref Range    OCCULT BLOOD FECAL Positive    Urine with Reflexed Micro   Result Value Ref Range    Glucose, Ur NEGATIVE NEGATIVE mg/dl    Bilirubin Urine NEGATIVE NEGATIVE    Ketones, Urine 15 (A) NEGATIVE    Specific Gravity, Urine 1.017 1.002 - 1. 030    Blood, Urine NEGATIVE NEGATIVE    pH, UA 7.0 5.0 - 9.0    Protein, UA NEGATIVE NEGATIVE    Urobilinogen, Urine 1.0 0.0 - 1.0 eu/dl    Nitrite, Urine NEGATIVE NEGATIVE    Leukocyte Esterase, Urine MODERATE (A) NEGATIVE    Color, UA YELLOW STRAW-YELLOW    Character, Urine CLEAR CLEAR-SL CLOUD    RBC, UA 0-2 0-2/hpf /hpf    WBC, UA 25-50 0-4/hpf /hpf    Epithelial Cells, UA NONE SEEN 3-5/hpf /hpf    Bacteria, UA MANY FEW/NONE SEEN /hpf    Casts UA NONE SEEN NONE SEEN /lpf    Crystals, UA NONE SEEN NONE SEEN    Renal Epithelial, UA NONE SEEN NONE SEEN    Yeast, UA NONE SEEN NONE SEEN    CASTS 2 NONE SEEN NONE SEEN /lpf    MISCELLANEOUS 2 NONE SEEN    Ferritin   Result Value Ref Range    Ferritin 24 10 - 291 ng/mL   Iron   Result Value Ref Range    Iron 18 (L) 50 - 170 ug/dL   Iron binding capacity   Result Value Ref Range    TIBC 373 171 - 450 ug/dL   Reticulocytes   Result Value Ref Range    Retic Ct Abs 2.0 0.5 - 2.0 %    Absolute Retic # 64.0 20.0 - 115.0 thou/mm3    Immature Retic Fract 19.5 (H) 3.0 - 15.9 %    Retic Hemoglobin 24.7 (L) 28.2 - 35.7 pg   Hemoglobin and Hematocrit   Result Value Ref Range    Hemoglobin 7.3 (L) 12.0 - 16.0 gm/dl    Hematocrit 24.1 (L) 37.0 - 47.0 %   Protime-INR   Result Value Ref Range    INR 0.93 0.85 - 1.13   APTT   Result Value Ref Range    aPTT 26.4 22.0 - 38.0 seconds   Hemoglobin and Hematocrit   Result Value Ref Range    Hemoglobin 7.1 (L) 12.0 - 16.0 gm/dl    Hematocrit 24.1 (L) 37.0 - 47.0 %   IRON SATURATION   Result Value Ref Range    Iron Saturation 5 (L) 20 - 50 %   Basic Metabolic Panel w/ Reflex to MG   Result Value Ref Range    Sodium 130 (L) 135 - 145 meq/L    Potassium reflex Magnesium 4.6 3.5 - 5.2 meq/L    Chloride 97 (L) 98 - 111 meq/L    CO2 26 23 - 33 meq/L    Glucose 104 70 - 108 mg/dL    BUN 17 7 - 22 mg/dL    Creatinine 0.6 0.4 - 1.2 mg/dL    Calcium 8.7 8.5 - 10.5 mg/dL   Anion Gap   Result Value Ref Range    Anion Gap 7.0 (L) 8.0 - 16.0 meq/L

## 2022-08-19 NOTE — PROGRESS NOTES
[address] [place] [address]  INPATIENT PHYSICAL THERAPY  EVALUATION  STR MED SURG 8A - 8A-19/019-A    Time In:   Time Out:   Timed Code Treatment Minutes: 10 Minutes  Minutes: 23          Date: 2022  Patient Name: Junius Sandifer Height,  Gender:  female        MRN: [id number]  : 1940  (78 y.o.)      Referring Practitioner: Ran Reddy MD  Diagnosis: Shortness of breath  Additional Pertinent Hx: 78 y.o. female who presented to [address] [place] [address] with shortness of breath and fatigue. She states for the last few days her shortness of breath has worsened. Kids have also noticed more pallor. Patient also has some bruising and on her right side of the face however family states it was from a mechanical fall from her electric chair. She has history of iron deficiency anemia and has received iron transfusions in the past.  She states her stool has been darker in the last few days. Does take Mobic daily at home. EGD  shows rosive gastritis & gastric ulcers, [place] erosions & a very large hiatal hernia, no active bleeding, otherwise negative. Restrictions/Precautions:  Restrictions/Precautions: Fall Risk  Position Activity Restriction  Other position/activity restrictions: per nurse + for ortho BP    Subjective:  Chart Reviewed: Yes  Patient assessed for rehabilitation services?: Yes  Family / Caregiver Present: No  Subjective: Pt is supine in bed upon arrival to room, confused, only oriented to person and year, talking tangentialy, sleepy. Pt requires multiple cues to keep eyes open, c/o being weak, dizzy and nauseous when standing, +orthostatics.     General:  Overall Orientation Status: Impaired  Orientation Level: Oriented to person, Disoriented to place, Disoriented to situation, Disoriented to time  Vision: Within Functional Limits  Hearing: Within functional limits       Pain: c/o back pain, does not quantify    Vitals: Orthostatic Blood Pressure: Supine: 135/74, Sittin/66, Standin/51    Social/Functional History:    Lives With: Spouse  Type of Home: Facility ([address] [facility])  Home Layout: One level  Home Access: Level entry  Home Equipment: [place], rolling, [place], standard, Walker, 4 wheeled     Bathroom Shower/Tub: Walk-in shower  Bathroom Toilet: Standard  Bathroom Equipment: Shower chair, Grab bars in shower       ADL Assistance: [address]: Needs assistance  Ambulation Assistance: Independent  Transfer Assistance: Independent    Active : No     Additional Comments: Pt reports using walker at [place], but not getting around [employer] distances. OBJECTIVE:  Range of Motion:  Bilateral Lower Extremity: WFL    Strength:  Bilateral Lower Extremity: Impaired - min A for B heelslides and hip abd/add    Balance:  Static Sitting Balance:  Stand By Assistance, Minimal Assistance, X 1; increased sway, occasional min A to keep upright, cues to keep eyes open  Static Standing Balance: Minimal Assistance, Moderate Assistance, X 1; with RW, increased assist due to c/o nausea and LE weakness    Bed Mobility:  Supine to Sit: Moderate Assistance, X 1, with head of bed flat, with rail, with increased time for completion  Sit to Supine: Minimal Assistance, X 1, with head of bed flat, with rail, with increased time for completion     Transfers:  Sit to Stand: Minimal Assistance, X 1  Stand to Sit:Minimal Assistance, X 1  **Pt transfers from EOB with RW, stands ~20 seconds, c/o nausea and LE weakness, requests to sit back down; pt declines a second transfer and walking due to nausea and fatigue    Ambulation:  Pt unable    Exercise:  Patient was guided in 1 set(s) 10 reps of exercise to both lower extremities. Ankle pumps, Heelslides, and Hip abduction/adduction. Exercises were completed for increased independence with functional mobility. Multiple verbal cues for proper technique, min A for B heelslides and hip abd/add.     Functional Outcome Measures: Completed  AM-PAC Inpatient Mobility without Stair Climbing Raw Score : 10  AM-PAC Inpatient without Stair Climbing T-Scale Score : 34.07    ASSESSMENT:  Activity Tolerance:  Patient tolerance of  treatment: fair. Treatment Initiated: Treatment and education initiated within context of evaluation. Evaluation time included review of current medical information, gathering information related to past medical, social and functional history, completion of standardized testing, formal and informal observation of tasks, assessment of data and development of plan of care and goals. Treatment time included skilled education and facilitation of tasks to increase safety and independence with functional mobility for improved independence and quality of life. Assessment: Body Structures, Functions, Activity Limitations Requiring Skilled Therapeutic Intervention: Decreased functional mobility , Decreased cognition, Decreased endurance, Decreased balance, Decreased strength, Decreased safe awareness  Assessment: Pt tolerates session fair, limited by nausea, weakness, is confused and +orthostatics. PT to continue to progress strength and functional mobility. Therapy Prognosis: Good    Requires PT Follow-Up: Yes    Discharge Recommendations:  Discharge Recommendations: [address]    Patient Education:      .     Patient Education  Education Given To: Patient  Education Provided: Role of Therapy, Plan of Care  Education Method: Verbal  Barriers to Learning: Cognition, Hearing       Equipment Recommendations:  Equipment Needed: No    Plan:  Current Treatment Recommendations: Strengthening, Balance training, Functional mobility training, Transfer training, Endurance training, Neuromuscular re-education, Safety education & training, Patient/Caregiver education & training, Therapeutic activities  Plan:  (3-5x GM)    Goals:  Patient goals : does not state  Short Term Goals  Time Frame for Short term goals: by discharge  Short term goal 1: Pt to transfer supine <--> sit SBA to enable pt to get in/out of bed. Short term goal 2: Pt to transfer sit <--> stand SBA for increased functional mobility. Short term goal 3: PT to assess gait. Long Term Goals  Time Frame for Long term goals : NA due to short length of stay. Following session, patient left in safe position with all fall risk precautions in place.

## 2022-08-19 NOTE — PROGRESS NOTES
[facility]  Occupational Therapy  Daily Note  Time:   Time In: 1345  Time Out: 9968  Timed Code Treatment Minutes: 26 Minutes  Minutes: 26          Date: 2022  Patient Name: Pricilla Gunter,   Gender: female      Room: [facility]019-A  MRN: [id number]  : 1940  (78 y.o.)  Referring Practitioner: Brigitte Slater MD  Diagnosis: SOB  Additional Pertinent Hx: Per MD note:81 y.o. female who presented to [address] [place] [address] with shortness of breath and fatigue. She states for the last few days her shortness of breath has worsened. Kids have also noticed more pallor. Patient also has some bruising and on her right side of the face however family states it was from a mechanical fall from her electric chair. She has history of iron deficiency anemia and has received iron transfusions in the past.  She states her stool has been darker in the last few days. Presented to ER on 2022    Restrictions/Precautions:  Restrictions/Precautions: Fall Risk  Position Activity Restriction  Other position/activity restrictions: per nurse + for ortho BP     SUBJECTIVE: Pt seated upright in bed upon arrival, agreeable to OT session. PAIN: Did not rate: Back (chronic)    Vitals: Orthostatic Blood Pressure: Supine: 129/65, Sittin/68    COGNITION: Slow Processing, Decreased Recall, and Decreased Insight    ADL:   Lower Extremity Dressing: Dependent. Adjusting slipper socks . BALANCE:  Sitting Balance:  Contact Guard Assistance, Minimal Assistance. Pt with posterior leaning d/t kyphotic posture. Standing Balance:  Not Tested . Pt declined d/t lightheadedness    BED MOBILITY:  Supine to Sit: Minimal Assistance Raising trunk to seated position EOB. Sit to Supine: Minimal Assistance Raising BLE onto bed. Scooting: Contact Guard Assistance EOB    TRANSFERS & FUNCTIONAL MOBILITY:  Not completed- pt declined d/t diziness.      ADDITIONAL ACTIVITIES:  Patient identified a personal goal to increase UB strength and improve overall endurance so they can complete their toilet & shower transfers; skilled edu on UE strengthening. Completed BUE AROM exercises x10 reps x1 set in all joints/planes to increase strength and endurance required for ADLs. Pt required min rest break between each exercise and min v/c for proper technique. ASSESSMENT:     Activity Tolerance:  Patient tolerance of  treatment: fair. Discharge Recommendations: Subacute/skilled nursing facility vs. Return to [facility]. Equipment Recommendations: Other: monitor pending progress  Plan: Times per Week: 5x  Current Treatment Recommendations: Functional mobility training, Balance training, Endurance training, Self-Care / ADL, Safety education & training    Patient Education  Patient Education: Home Exercise Program, Importance of Increasing Activity, and sitting balance, and orthostatics. Goals  Short Term Goals  Time Frame for Short term goals: until discharge  Short Term Goal 1: Pt will complete various sit-stand t/fs including BSC with 0>CGA & 0-2 vcs for safety  Short Term Goal 2: Pt will tolerate standing 2-3 min with CGA for increased ease of sinkside grooming  Short Term Goal 3: Pt will complete tolerate mobility to/from bathroom with RW, CGA, & 0-2 vcs for safety  Short Term Goal 4: Pt will complete BADL routine with  min A & min vcs for safety  Long Term Goals  Time Frame for Long term goals : No LTG set d/t short ELOS    Following session, patient left in safe position with all fall risk precautions in place.

## 2022-08-20 ENCOUNTER — APPOINTMENT (OUTPATIENT)
Dept: GENERAL RADIOLOGY | Age: 82
DRG: 378 | End: 2022-08-20
Payer: MEDICARE

## 2022-08-20 PROCEDURE — 6370000000 HC RX 637 (ALT 250 FOR IP): Performed by: INTERNAL MEDICINE

## 2022-08-20 PROCEDURE — 2580000003 HC RX 258: Performed by: STUDENT IN AN ORGANIZED HEALTH CARE EDUCATION/TRAINING PROGRAM

## 2022-08-20 PROCEDURE — 2500000003 HC RX 250 WO HCPCS: Performed by: INTERNAL MEDICINE

## 2022-08-20 PROCEDURE — 6370000000 HC RX 637 (ALT 250 FOR IP): Performed by: NURSE PRACTITIONER

## 2022-08-20 PROCEDURE — 71045 X-RAY EXAM CHEST 1 VIEW: CPT

## 2022-08-20 PROCEDURE — 6370000000 HC RX 637 (ALT 250 FOR IP): Performed by: STUDENT IN AN ORGANIZED HEALTH CARE EDUCATION/TRAINING PROGRAM

## 2022-08-20 PROCEDURE — 99232 SBSQ HOSP IP/OBS MODERATE 35: CPT | Performed by: INTERNAL MEDICINE

## 2022-08-20 PROCEDURE — 1200000003 HC TELEMETRY R&B

## 2022-08-20 RX ORDER — LIDOCAINE 4 G/G
1 PATCH TOPICAL ONCE
Status: COMPLETED | OUTPATIENT
Start: 2022-08-20 | End: 2022-08-20

## 2022-08-20 RX ORDER — BUMETANIDE 0.25 MG/ML
1 INJECTION, SOLUTION INTRAMUSCULAR; INTRAVENOUS 2 TIMES DAILY
Status: COMPLETED | OUTPATIENT
Start: 2022-08-20 | End: 2022-08-21

## 2022-08-20 RX ADMIN — FLUOXETINE HYDROCHLORIDE 20 MG: 20 CAPSULE ORAL at 09:57

## 2022-08-20 RX ADMIN — PANTOPRAZOLE SODIUM 40 MG: 40 TABLET, DELAYED RELEASE ORAL at 06:01

## 2022-08-20 RX ADMIN — LEVOTHYROXINE SODIUM 100 MCG: 0.1 TABLET ORAL at 06:01

## 2022-08-20 RX ADMIN — SODIUM CHLORIDE, PRESERVATIVE FREE 10 ML: 5 INJECTION INTRAVENOUS at 21:02

## 2022-08-20 RX ADMIN — DOXYCYCLINE HYCLATE 100 MG: 100 TABLET, COATED ORAL at 09:57

## 2022-08-20 RX ADMIN — PREGABALIN 150 MG: 75 CAPSULE ORAL at 20:54

## 2022-08-20 RX ADMIN — FLUDROCORTISONE ACETATE 0.1 MG: 0.1 TABLET ORAL at 09:57

## 2022-08-20 RX ADMIN — POLYETHYLENE GLYCOL 3350 17 G: 17 POWDER, FOR SOLUTION ORAL at 09:57

## 2022-08-20 RX ADMIN — ACETAMINOPHEN 650 MG: 325 TABLET ORAL at 01:46

## 2022-08-20 RX ADMIN — BUMETANIDE 1 MG: 0.25 INJECTION, SOLUTION INTRAMUSCULAR; INTRAVENOUS at 20:54

## 2022-08-20 RX ADMIN — DOXYCYCLINE HYCLATE 100 MG: 100 TABLET, COATED ORAL at 20:54

## 2022-08-20 RX ADMIN — PANTOPRAZOLE SODIUM 40 MG: 40 TABLET, DELAYED RELEASE ORAL at 17:00

## 2022-08-20 RX ADMIN — PREGABALIN 150 MG: 75 CAPSULE ORAL at 09:56

## 2022-08-20 RX ADMIN — SODIUM CHLORIDE, PRESERVATIVE FREE 10 ML: 5 INJECTION INTRAVENOUS at 09:59

## 2022-08-20 ASSESSMENT — PAIN - FUNCTIONAL ASSESSMENT
PAIN_FUNCTIONAL_ASSESSMENT: PREVENTS OR INTERFERES SOME ACTIVE ACTIVITIES AND ADLS
PAIN_FUNCTIONAL_ASSESSMENT: PREVENTS OR INTERFERES SOME ACTIVE ACTIVITIES AND ADLS

## 2022-08-20 ASSESSMENT — PAIN SCALES - GENERAL
PAINLEVEL_OUTOF10: 7
PAINLEVEL_OUTOF10: 7

## 2022-08-20 ASSESSMENT — PAIN DESCRIPTION - DESCRIPTORS
DESCRIPTORS: DULL
DESCRIPTORS: DULL

## 2022-08-20 ASSESSMENT — PAIN DESCRIPTION - LOCATION
LOCATION: RIB CAGE
LOCATION: RIB CAGE

## 2022-08-20 ASSESSMENT — PAIN DESCRIPTION - PAIN TYPE: TYPE: ACUTE PAIN

## 2022-08-20 ASSESSMENT — PAIN DESCRIPTION - ORIENTATION
ORIENTATION: RIGHT
ORIENTATION: RIGHT

## 2022-08-20 ASSESSMENT — PAIN DESCRIPTION - FREQUENCY: FREQUENCY: INTERMITTENT

## 2022-08-20 NOTE — PROGRESS NOTES
Pt is an 81y. o. female, lying in bed on her side conversing with her son Addison Rodriguez, in 5A-25. Renetta Rose is softspoken and in some distress with her breaths. Renetta Rose shared that a hernia she has is pressuring her lungs, making it difficult to breathe. She said this has been an issue over time, but not quite to this degree.  provided active listening, brief conversation and encouragement. Elen preferred not to be prayed for, sharing that her  and others in her [facility] are praying for her as well. Renetta Rose and Addison Rodriguez expressed gratitude for the visit, however.  prayed for her in the hallway following the visit.     08/19/22 2138   Encounter Summary   Encounter Overview/Reason  Initial Encounter   Service Provided For: Patient and family together   Referral/Consult From: [address] [place] [address]; Children   Last Encounter  08/19/22   Complexity of Encounter Low   Begin Time 1900   End Time  1914   Total Time Calculated 14 min   Encounter    Type Initial Screen/Assessment   Assessment/Intervention/Outcome   Assessment Impaired resilience; Compromised coping;Peaceful   Intervention Active listening;Discussed illness injury and its impact;Nurtured Hope;Prayer (assurance of)/[place];Explored/Affirmed feelings, thoughts, concerns   Outcome Engaged in conversation;Receptive; Expressed Gratitude

## 2022-08-20 NOTE — FLOWSHEET NOTE
08/20/22 0210   Treatment Team Notification   Reason for Communication Abnormal vitals; Review case   Team Member Name William Mccall   Treatment Team Role Advanced Practice Nurse   Method of Communication Call   Response See orders   Notification Time 0210     Paged William Mccall, MELL  Patient reported right rib pain 7/10-dull with every breath taken. VS; BP-189/81 and Hr-87.     Downtime  New orders per NP  -Lidocaine 4% external patch x1 time  -Portal Chest Xray

## 2022-08-20 NOTE — PROGRESS NOTES
Hospitalist Progress Note      Patient:  Clau Jones Height    Unit/Bed:8A-19/019-A  YOB: 1940  MRN: [id number]   Acct: [de-identified]     PCP: Willi Melgoza DO  Date of Admission: 8/16/2022         ASSESSMENT / PLAN:    Acute Blood Loss Anemia-likely from upper GI bleed-planning for EGD today 8/17-had EGD showed multiple [place] erosions around the large hyperchromia, did have prepyloric antrum also. No interventions needed PPI twice daily for 8 weeks hemoglobin stable around 7.4 patient is a [employer] cannot have blood transfusion    Orthostatic hypotension-likely secondary to GI bleed-add 1 L bolus-8/17-recheck blood pressures 1 hour after the bolus    8/18 still orthostatic and dizzy while standing up-we will give a bolus again as well as continue IV fluids compression stockings, added Florinef    8/19- better with iv fluids    Dyspnea likely related to IV fluid, no hypoxia, no CHF- will do trial of BUMEX IV 1 MG x 2 doses- 8/20      Large Hiatal/Paraesophageal Hernia: History of undergoing robotic repair with obvious G-tube insertion in March 2021. During that time no formal repair was performed as hernia was only reduced to about 80%. G-tube has been removed in the past.  EGD done on 6/2022 showed a large fixed hernia which looked improved however may need some readjustment. Patient saw general surgery and surgical intervention for G-tube insertion versus observation was discussed. Patient and family wanted to proceed with conservative management only. Hx of HTN: Controlled. Resume home meds. Recently seen by cardiology for shortness of breath and PVCs. Assessed and was negative for any ischemia. Holter monitor was unremarkable.     Hx of Hypothyroidism: Resume home synthroid    Hx of Anxiety/Depression: Resume Prozac    Hx of Fibromyalgia: Resume Lyrica     Dispo plan- likely willneed nursing home , per PT OT  [address] PENDING    Chief Complaint: Hartness of breath    History of Present Illness:  78 y.o. female who presented to [facility] with shortness of breath and fatigue. She states for the last few days her shortness of breath has worsened. Kids have also noticed more pallor. Patient also has some bruising and on her right side of the face however family states it was from a mechanical fall from her electric chair. She has history of iron deficiency anemia and has received iron transfusions in the past.  She states her stool has been darker in the last few days. Does take Mobic daily at home. Subjective:- (Last 24 hours)    HBG stable  However deconditioned  Sob with exertion- new  Not hypoxic  Not in pain    Past medical history, family history, social history and allergies reviewed again and is unchanged since admission. ROS (All review of systems completed. Pertinent positives noted. Otherwise All other systems reviewed and negative.)     Scheduled Meds:   bumetanide  1 mg IntraVENous BID    doxycycline hyclate  100 mg Oral 2 times per day    fludrocortisone  0.1 mg Oral Daily    polyethylene glycol  17 g Oral Daily    pantoprazole  40 mg Oral BID AC    sodium chloride flush  5-40 mL IntraVENous 2 times per day    FLUoxetine  20 mg Oral Daily    levothyroxine  100 mcg Oral Daily    pregabalin  150 mg Oral BID    scopolamine  1 patch TransDERmal Q72H     Continuous Infusions:   sodium chloride       PRN Meds:.promethazine, sodium chloride flush, sodium chloride, ondansetron **OR** ondansetron, acetaminophen **OR** acetaminophen, docusate sodium     PHYSICAL EXAM:  Vitals:    08/20/22 0146 08/20/22 0410 08/20/22 0815 08/20/22 1225   BP: (!) 189/81 (!) 162/79 (!) 160/95 (!) 130/11   Pulse: 87 85 81 84   Resp: 18 18 18 16   Temp: 97.9 °F (36.6 °C) 98.3 °F (36.8 °C) 98.4 °F (36.9 °C) 98.4 °F (36.9 °C)   TempSrc: Oral Oral Oral Oral   SpO2: 97%  96% 97%   Weight:         General appearance: Alert / well-appearing. Cooperative. NAD.   HEENT:  Normocephalic / atraumatic. PERRL. EOM intact. Pale conjunctiva  Neck: Supple. No JVD. Respiratory: Normal respiratory effort on RA. CTAB. No wheezes / rales / rhonchi. Cardiovascular: RRR. Normal S1/S2. No murmurs / rubs / gallops. Abdomen: Soft / non-tender / non-distended. BS present. Musculoskeletal: No cyanosis or edema. Skin: Warm / dry. Normal turgor. Pallor; bruising noted on right side of the face  Neurologic: A/O x 3. Speech normal. Answers questions appropriately. CN intact. No obvious focal neurologic deficits. Psychiatric: Thought content / judgment / insight appear appropriate. Capillary refill: Brisk bilaterally. Peripheral pulses: +2 bilaterally. Labs:   Results for orders placed or performed during the hospital encounter of 08/16/22   COVID-19, Rapid    Specimen: Nasopharyngeal Swab   Result Value Ref Range    SARS-CoV-2, NAAT NOT  DETECTED NOT DETECTED   Culture, Blood 1    Specimen: Blood   Result Value Ref Range    Blood Culture, Routine No growth 24 hours. No growth 48 hours. Culture, Blood 2    Specimen: Blood   Result Value Ref Range    Blood Culture, Routine No growth 24 hours. No growth 48 hours. Culture, Reflexed, Urine    Specimen: Urine, catheter   Result Value Ref Range    Organism Aerococcus urinae (A)     Urine Culture Reflex       [place] count: >100,000 CFU/mL A. urinae is generally susceptible to ampicillin, penicillin, amoxicillin, beta lactams, cephalosporins and doxycycline. This organism has variable susceptibility to levofloxacin, vancomycin and trimethoprim-sulfamethoxazole.       Basic Metabolic Panel w/ Reflex to MG   Result Value Ref Range    Sodium 127 (L) 135 - 145 meq/L    Potassium reflex Magnesium 3.9 3.5 - 5.2 meq/L    Chloride 94 (L) 98 - 111 meq/L    CO2 25 23 - 33 meq/L    Glucose 104 70 - 108 mg/dL    BUN 19 7 - 22 mg/dL    Creatinine 0.5 0.4 - 1.2 mg/dL    Calcium 8.6 8.5 - 10.5 mg/dL   Brain Natriuretic Peptide   Result Value Ref Range    Pro-.6 0.0 - Bilirubin Urine NEGATIVE NEGATIVE    Ketones, Urine 15 (A) NEGATIVE    Specific Gravity, Urine 1.017 1.002 - 1.030    Blood, Urine NEGATIVE NEGATIVE    pH, UA 7.0 5.0 - 9.0    Protein, UA NEGATIVE NEGATIVE    Urobilinogen, Urine 1.0 0.0 - 1.0 eu/dl    Nitrite, Urine NEGATIVE NEGATIVE    Leukocyte Esterase, Urine MODERATE (A) NEGATIVE    Color, UA YELLOW STRAW-YELLOW    Character, Urine CLEAR CLEAR-SL CLOUD    RBC, UA 0-2 0-2/hpf /hpf    WBC, UA 25-50 0-4/hpf /hpf    Epithelial Cells, UA NONE SEEN 3-5/hpf /hpf    Bacteria, UA MANY FEW/NONE SEEN /hpf    Casts UA NONE SEEN NONE SEEN /lpf    Crystals, UA NONE SEEN NONE SEEN    Renal Epithelial, UA NONE SEEN NONE SEEN    Yeast, UA NONE SEEN NONE SEEN    CASTS 2 NONE SEEN NONE SEEN /lpf    MISCELLANEOUS 2 NONE SEEN    Ferritin   Result Value Ref Range    Ferritin 24 10 - 291 ng/mL   Iron   Result Value Ref Range    Iron 18 (L) 50 - 170 ug/dL   Iron binding capacity   Result Value Ref Range    TIBC 373 171 - 450 ug/dL   Reticulocytes   Result Value Ref Range    Retic Ct Abs 2.0 0.5 - 2.0 %    Absolute Retic # 64.0 20.0 - 115.0 thou/mm3    Immature Retic Fract 19.5 (H) 3.0 - 15.9 %    Retic Hemoglobin 24.7 (L) 28.2 - 35.7 pg   Hemoglobin and Hematocrit   Result Value Ref Range    Hemoglobin 7.3 (L) 12.0 - 16.0 gm/dl    Hematocrit 24.1 (L) 37.0 - 47.0 %   Protime-INR   Result Value Ref Range    INR 0.93 0.85 - 1.13   APTT   Result Value Ref Range    aPTT 26.4 22.0 - 38.0 seconds   Hemoglobin and Hematocrit   Result Value Ref Range    Hemoglobin 7.1 (L) 12.0 - 16.0 gm/dl    Hematocrit 24.1 (L) 37.0 - 47.0 %   IRON SATURATION   Result Value Ref Range    Iron Saturation 5 (L) 20 - 50 %   Basic Metabolic Panel w/ Reflex to MG   Result Value Ref Range    Sodium 130 (L) 135 - 145 meq/L    Potassium reflex Magnesium 4.6 3.5 - 5.2 meq/L    Chloride 97 (L) 98 - 111 meq/L    CO2 26 23 - 33 meq/L    Glucose 104 70 - 108 mg/dL    BUN 17 7 - 22 mg/dL    Creatinine 0.6 0.4 - 1.2 mg/dL Calcium 8.7 8.5 - 10.5 mg/dL   Anion Gap   Result Value Ref Range    Anion Gap 7.0 (L) 8.0 - 16.0 meq/L   Glomerular Filtration Rate, Estimated   Result Value Ref Range    Est, Glom Filt Rate >90 ml/min/1.73m2   Hemoglobin and Hematocrit   Result Value Ref Range    Hemoglobin 7.0 (LL) 12.0 - 16.0 gm/dl    Hematocrit 24.2 (L) 37.0 - 47.0 %   Hemoglobin and Hematocrit   Result Value Ref Range    Hemoglobin 7.4 (L) 12.0 - 16.0 gm/dl    Hematocrit 25.8 (L) 37.0 - 47.0 %   Hemoglobin and Hematocrit   Result Value Ref Range    Hemoglobin 8.1 (L) 12.0 - 16.0 gm/dl    Hematocrit 27.9 (L) 37.0 - 47.0 %   Hemoglobin and Hematocrit   Result Value Ref Range    Hemoglobin 7.8 (L) 12.0 - 16.0 gm/dl    Hematocrit 26.8 (L) 37.0 - 47.0 %   Hemoglobin and Hematocrit   Result Value Ref Range    Hemoglobin 8.5 (L) 12.0 - 16.0 gm/dl    Hematocrit 29.4 (L) 37.0 - 47.0 %   Hemoglobin and Hematocrit   Result Value Ref Range    Hemoglobin 7.8 (L) 12.0 - 16.0 gm/dl    Hematocrit 26.4 (L) 37.0 - 47.0 %   EKG 12 Lead   Result Value Ref Range    Ventricular Rate 60 BPM    Atrial Rate 60 BPM    P-R Interval 170 ms    QRS Duration 74 ms    Q-T Interval 400 ms    QTc Calculation (Bazett) 400 ms    P Axis 41 degrees    R Axis 64 degrees    T Axis 63 degrees       EKG / Radiology:     EKG:  Reviewed by me --    CXR:   Reviewed by me --    XR CHEST PORTABLE    Result Date: 8/16/2022  Single view of the chest Comparison:  [facility] - XR CHEST 1 VW - 03/16/2021 10:13 AM EDT Findings: Elevation right hemidiaphragm. Opacity left lung base. This could represent aspiration, pneumonia or volume loss. Overall this has decreased. Stable pulmonary nodule right perihilar region measuring 7 mm. The heart is normal size. Impression: Opacity left lung base. This could represent aspiration, pneumonia and/or volume loss.  This document has been electronically signed by: Raghav Chisholm MD on 08/16/2022 07:01 PM      Electronically signed by Merilynn Kayser, MD on 8/20/2022 at 2:31 PM

## 2022-08-21 LAB
ANION GAP SERPL CALCULATED.3IONS-SCNC: 10 MEQ/L (ref 8–16)
BASOPHILS # BLD: 0.6 %
BASOPHILS ABSOLUTE: 0 THOU/MM3 (ref 0–0.1)
BLOOD CULTURE, ROUTINE: NORMAL
BLOOD CULTURE, ROUTINE: NORMAL
BUN BLDV-MCNC: 3 MG/DL (ref 7–22)
CALCIUM SERPL-MCNC: 8.5 MG/DL (ref 8.5–10.5)
CHLORIDE BLD-SCNC: 95 MEQ/L (ref 98–111)
CO2: 29 MEQ/L (ref 23–33)
CREAT SERPL-MCNC: 0.5 MG/DL (ref 0.4–1.2)
EOSINOPHIL # BLD: 2.4 %
EOSINOPHILS ABSOLUTE: 0.1 THOU/MM3 (ref 0–0.4)
ERYTHROCYTE [DISTWIDTH] IN BLOOD BY AUTOMATED COUNT: 18.6 % (ref 11.5–14.5)
ERYTHROCYTE [DISTWIDTH] IN BLOOD BY AUTOMATED COUNT: 49 FL (ref 35–45)
GFR SERPL CREATININE-BSD FRML MDRD: > 90 ML/MIN/1.73M2
GLUCOSE BLD-MCNC: 108 MG/DL (ref 70–108)
HCT VFR BLD CALC: 27.1 % (ref 37–47)
HEMOGLOBIN: 8.4 GM/DL (ref 12–16)
IMMATURE GRANS (ABS): 0.02 THOU/MM3 (ref 0–0.07)
IMMATURE GRANULOCYTES: 0.4 %
LYMPHOCYTES # BLD: 10.6 %
LYMPHOCYTES ABSOLUTE: 0.5 THOU/MM3 (ref 1–4.8)
MAGNESIUM: 1.4 MG/DL (ref 1.6–2.4)
MCH RBC QN AUTO: 24.6 PG (ref 26–33)
MCHC RBC AUTO-ENTMCNC: 31 GM/DL (ref 32.2–35.5)
MCV RBC AUTO: 79.2 FL (ref 81–99)
MONOCYTES # BLD: 8.8 %
MONOCYTES ABSOLUTE: 0.4 THOU/MM3 (ref 0.4–1.3)
NUCLEATED RED BLOOD CELLS: 0 /100 WBC
PLATELET # BLD: 230 THOU/MM3 (ref 130–400)
PMV BLD AUTO: 10.1 FL (ref 9.4–12.4)
POTASSIUM SERPL-SCNC: 2.9 MEQ/L (ref 3.5–5.2)
RBC # BLD: 3.42 MILL/MM3 (ref 4.2–5.4)
SEG NEUTROPHILS: 77.2 %
SEGMENTED NEUTROPHILS ABSOLUTE COUNT: 3.9 THOU/MM3 (ref 1.8–7.7)
SODIUM BLD-SCNC: 134 MEQ/L (ref 135–145)
WBC # BLD: 5 THOU/MM3 (ref 4.8–10.8)

## 2022-08-21 PROCEDURE — 6370000000 HC RX 637 (ALT 250 FOR IP): Performed by: INTERNAL MEDICINE

## 2022-08-21 PROCEDURE — 6370000000 HC RX 637 (ALT 250 FOR IP): Performed by: NURSE PRACTITIONER

## 2022-08-21 PROCEDURE — 6360000002 HC RX W HCPCS: Performed by: INTERNAL MEDICINE

## 2022-08-21 PROCEDURE — 6370000000 HC RX 637 (ALT 250 FOR IP): Performed by: STUDENT IN AN ORGANIZED HEALTH CARE EDUCATION/TRAINING PROGRAM

## 2022-08-21 PROCEDURE — 36415 COLL VENOUS BLD VENIPUNCTURE: CPT

## 2022-08-21 PROCEDURE — 99233 SBSQ HOSP IP/OBS HIGH 50: CPT | Performed by: INTERNAL MEDICINE

## 2022-08-21 PROCEDURE — 83735 ASSAY OF MAGNESIUM: CPT

## 2022-08-21 PROCEDURE — 2500000003 HC RX 250 WO HCPCS: Performed by: INTERNAL MEDICINE

## 2022-08-21 PROCEDURE — 85025 COMPLETE CBC W/AUTO DIFF WBC: CPT

## 2022-08-21 PROCEDURE — 2580000003 HC RX 258: Performed by: INTERNAL MEDICINE

## 2022-08-21 PROCEDURE — 2580000003 HC RX 258: Performed by: STUDENT IN AN ORGANIZED HEALTH CARE EDUCATION/TRAINING PROGRAM

## 2022-08-21 PROCEDURE — 1200000003 HC TELEMETRY R&B

## 2022-08-21 PROCEDURE — 80048 BASIC METABOLIC PNL TOTAL CA: CPT

## 2022-08-21 RX ORDER — BUMETANIDE 0.25 MG/ML
1 INJECTION, SOLUTION INTRAMUSCULAR; INTRAVENOUS ONCE
Status: COMPLETED | OUTPATIENT
Start: 2022-08-21 | End: 2022-08-21

## 2022-08-21 RX ORDER — POTASSIUM CHLORIDE 7.45 MG/ML
10 INJECTION INTRAVENOUS
Status: COMPLETED | OUTPATIENT
Start: 2022-08-21 | End: 2022-08-21

## 2022-08-21 RX ORDER — MAGNESIUM SULFATE IN WATER 40 MG/ML
2000 INJECTION, SOLUTION INTRAVENOUS PRN
Status: DISCONTINUED | OUTPATIENT
Start: 2022-08-21 | End: 2022-08-24 | Stop reason: HOSPADM

## 2022-08-21 RX ORDER — POTASSIUM CHLORIDE 20 MEQ/1
20 TABLET, EXTENDED RELEASE ORAL 2 TIMES DAILY WITH MEALS
Status: DISCONTINUED | OUTPATIENT
Start: 2022-08-21 | End: 2022-08-24

## 2022-08-21 RX ADMIN — SODIUM CHLORIDE, PRESERVATIVE FREE 10 ML: 5 INJECTION INTRAVENOUS at 08:30

## 2022-08-21 RX ADMIN — POTASSIUM CHLORIDE 20 MEQ: 1500 TABLET, EXTENDED RELEASE ORAL at 13:53

## 2022-08-21 RX ADMIN — BUMETANIDE 1 MG: 0.25 INJECTION, SOLUTION INTRAMUSCULAR; INTRAVENOUS at 13:53

## 2022-08-21 RX ADMIN — PREGABALIN 150 MG: 75 CAPSULE ORAL at 08:37

## 2022-08-21 RX ADMIN — POTASSIUM CHLORIDE 20 MEQ: 1500 TABLET, EXTENDED RELEASE ORAL at 16:06

## 2022-08-21 RX ADMIN — POTASSIUM CHLORIDE 10 MEQ: 7.46 INJECTION, SOLUTION INTRAVENOUS at 13:48

## 2022-08-21 RX ADMIN — POTASSIUM CHLORIDE 10 MEQ: 7.46 INJECTION, SOLUTION INTRAVENOUS at 14:49

## 2022-08-21 RX ADMIN — FLUOXETINE HYDROCHLORIDE 20 MG: 20 CAPSULE ORAL at 11:32

## 2022-08-21 RX ADMIN — POTASSIUM CHLORIDE 10 MEQ: 7.46 INJECTION, SOLUTION INTRAVENOUS at 12:00

## 2022-08-21 RX ADMIN — DOXYCYCLINE HYCLATE 100 MG: 100 TABLET, COATED ORAL at 08:30

## 2022-08-21 RX ADMIN — PANTOPRAZOLE SODIUM 40 MG: 40 TABLET, DELAYED RELEASE ORAL at 16:06

## 2022-08-21 RX ADMIN — POTASSIUM CHLORIDE 10 MEQ: 7.46 INJECTION, SOLUTION INTRAVENOUS at 17:42

## 2022-08-21 RX ADMIN — MAGNESIUM SULFATE HEPTAHYDRATE 3000 MG: 500 INJECTION, SOLUTION INTRAMUSCULAR; INTRAVENOUS at 14:43

## 2022-08-21 RX ADMIN — BUMETANIDE 1 MG: 0.25 INJECTION, SOLUTION INTRAMUSCULAR; INTRAVENOUS at 08:37

## 2022-08-21 RX ADMIN — POLYETHYLENE GLYCOL 3350 17 G: 17 POWDER, FOR SOLUTION ORAL at 08:37

## 2022-08-21 RX ADMIN — DOXYCYCLINE HYCLATE 100 MG: 100 TABLET, COATED ORAL at 22:24

## 2022-08-21 RX ADMIN — LEVOTHYROXINE SODIUM 100 MCG: 0.1 TABLET ORAL at 05:05

## 2022-08-21 RX ADMIN — PANTOPRAZOLE SODIUM 40 MG: 40 TABLET, DELAYED RELEASE ORAL at 05:04

## 2022-08-21 RX ADMIN — MAGNESIUM SULFATE HEPTAHYDRATE 2000 MG: 40 INJECTION, SOLUTION INTRAVENOUS at 12:54

## 2022-08-21 RX ADMIN — SODIUM CHLORIDE, PRESERVATIVE FREE 10 ML: 5 INJECTION INTRAVENOUS at 20:00

## 2022-08-21 RX ADMIN — PREGABALIN 150 MG: 75 CAPSULE ORAL at 22:24

## 2022-08-21 RX ADMIN — FLUDROCORTISONE ACETATE 0.1 MG: 0.1 TABLET ORAL at 08:30

## 2022-08-21 ASSESSMENT — PAIN SCALES - GENERAL: PAINLEVEL_OUTOF10: 0

## 2022-08-21 NOTE — PROGRESS NOTES
Hospitalist Progress Note      Patient:  Keara Reilly Height    Unit/Bed:8A-19/019-A  YOB: 1940  MRN: [id number]   Acct: [de-identified]     PCP: Selena Baxter DO  Date of Admission: 8/16/2022         ASSESSMENT / PLAN:    Acute Blood Loss Anemia-likely from upper GI bleed-planning for EGD today 8/17-had EGD showed multiple [place] erosions around the large hyperchromia, did have prepyloric antrum also. No interventions needed PPI twice daily for 8 weeks hemoglobin stable around 7.4 patient is a Orthodox cannot have blood transfusion    8/21-hemoglobin has been stable around 8.4 no more melena no gross bleeds    Orthostatic hypotension-likely secondary to GI bleed-add 1 L bolus-8/17-recheck blood pressures 1 hour after the bolus    8/18 still orthostatic and dizzy while standing up-we will give a bolus again as well as continue IV fluids compression stockings, added Florinef    8/19- better with iv fluids    Dyspnea likely related to IV fluid, no hypoxia, no CHF- will do trial of BUMEX IV 1 MG x 2 doses- 8/20 8/21-diuresing well will need potassium and magnesium replaced today added 60 mmol of potassium and 3 g of magnesium today recheck in few hours we will add 1 more dose of IV Lasix just for volume overload not in CHF      Large Hiatal/Paraesophageal Hernia: History of undergoing robotic repair with obvious G-tube insertion in March 2021. During that time no formal repair was performed as hernia was only reduced to about 80%. G-tube has been removed in the past.  EGD done on 6/2022 showed a large fixed hernia which looked improved however may need some readjustment. Patient saw general surgery and surgical intervention for G-tube insertion versus observation was discussed. Patient and family wanted to proceed with conservative management only. Hx of HTN: Controlled. Resume home meds. Recently seen by cardiology for shortness of breath and PVCs.   Assessed and was negative for any ischemia. Holter monitor was unremarkable. Hx of Hypothyroidism: Resume home synthroid    Hx of Anxiety/Depression: Resume Prozac    Hx of Fibromyalgia: Resume Lyrica     Dispo plan- likely willneed nursing home , per PT OT  PRECERT PENDING    Chief Complaint: Hartness of breath    History of Present Illness:  78 y.o. female who presented to [facility] with shortness of breath and fatigue. She states for the last few days her shortness of breath has worsened. Kids have also noticed more pallor. Patient also has some bruising and on her right side of the face however family states it was from a mechanical fall from her electric chair. She has history of iron deficiency anemia and has received iron transfusions in the past.  She states her stool has been darker in the last few days. Does take Mobic daily at home. Subjective:- (Last 24 hours)    HBG stable  However deconditioned  Sob with exertion- new however improving  Not hypoxic  Not in pain    Past medical history, family history, social history and allergies reviewed again and is unchanged since admission. ROS (All review of systems completed. Pertinent positives noted.  Otherwise All other systems reviewed and negative.)     Scheduled Meds:   potassium chloride  10 mEq IntraVENous Q1H    magnesium sulfate  3,000 mg IntraVENous Once    bumetanide  1 mg IntraVENous Once    potassium chloride  20 mEq Oral BID WC    doxycycline hyclate  100 mg Oral 2 times per day    fludrocortisone  0.1 mg Oral Daily    polyethylene glycol  17 g Oral Daily    pantoprazole  40 mg Oral BID AC    sodium chloride flush  5-40 mL IntraVENous 2 times per day    FLUoxetine  20 mg Oral Daily    levothyroxine  100 mcg Oral Daily    pregabalin  150 mg Oral BID    scopolamine  1 patch TransDERmal Q72H     Continuous Infusions:   sodium chloride       PRN Meds:.magnesium sulfate, promethazine, sodium chloride flush, sodium chloride, ondansetron **OR** ondansetron, acetaminophen **OR** acetaminophen, docusate sodium     PHYSICAL EXAM:  Vitals:    08/20/22 2052 08/20/22 2324 08/21/22 0400 08/21/22 1137   BP: 130/63 125/66 (!) 151/86 130/81   Pulse: 91 89 88 96   Resp: 16 16 16 18   Temp:  97.7 °F (36.5 °C) 98 °F (36.7 °C) 98 °F (36.7 °C)   TempSrc:  Oral Oral Oral   SpO2: 95% 97%  98%   Weight:         General appearance: Alert / well-appearing. Cooperative. NAD. HEENT:  Normocephalic / atraumatic. PERRL. EOM intact. Pale conjunctiva  Neck: Supple. No JVD. Respiratory: Normal respiratory effort on RA. CTAB. No wheezes / rales / rhonchi. Cardiovascular: RRR. Normal S1/S2. No murmurs / rubs / gallops. Abdomen: Soft / non-tender / non-distended. BS present. Musculoskeletal: No cyanosis or edema. Skin: Warm / dry. Normal turgor. Pallor; bruising noted on right side of the face  Neurologic: A/O x 3. Speech normal. Answers questions appropriately. CN intact. No obvious focal neurologic deficits. Psychiatric: Thought content / judgment / insight appear appropriate. Capillary refill: Brisk bilaterally. Peripheral pulses: +2 bilaterally. Labs:   Results for orders placed or performed during the hospital encounter of 08/16/22   COVID-19, Rapid    Specimen: Nasopharyngeal Swab   Result Value Ref Range    SARS-CoV-2, NAAT NOT  DETECTED NOT DETECTED   Culture, Blood 1    Specimen: Blood   Result Value Ref Range    Blood Culture, Routine No growth 24 hours. No growth 48 hours. Culture, Blood 2    Specimen: Blood   Result Value Ref Range    Blood Culture, Routine No growth 24 hours. No growth 48 hours. Culture, Reflexed, Urine    Specimen: Urine, catheter   Result Value Ref Range    Organism Aerococcus urinae (A)     Urine Culture Reflex       [place] count: >100,000 CFU/mL A. urinae is generally susceptible to ampicillin, penicillin, amoxicillin, beta lactams, cephalosporins and doxycycline.  This organism has variable susceptibility to levofloxacin, vancomycin and trimethoprim-sulfamethoxazole.       Basic Metabolic Panel w/ Reflex to MG   Result Value Ref Range    Sodium 127 (L) 135 - 145 meq/L    Potassium reflex Magnesium 3.9 3.5 - 5.2 meq/L    Chloride 94 (L) 98 - 111 meq/L    CO2 25 23 - 33 meq/L    Glucose 104 70 - 108 mg/dL    BUN 19 7 - 22 mg/dL    Creatinine 0.5 0.4 - 1.2 mg/dL    Calcium 8.6 8.5 - 10.5 mg/dL   Brain Natriuretic Peptide   Result Value Ref Range    Pro-.6 0.0 - 1800.0 pg/mL   CBC with Auto Differential   Result Value Ref Range    WBC 3.7 (L) 4.8 - 10.8 thou/mm3    RBC 3.26 (L) 4.20 - 5.40 mill/mm3    Hemoglobin 7.7 (L) 12.0 - 16.0 gm/dl    Hematocrit 25.5 (L) 37.0 - 47.0 %    MCV 78.2 (L) 81.0 - 99.0 fL    MCH 23.6 (L) 26.0 - 33.0 pg    MCHC 30.2 (L) 32.2 - 35.5 gm/dl    RDW-CV 17.2 (H) 11.5 - 14.5 %    RDW-SD 48.8 (H) 35.0 - 45.0 fL    Platelets 305 512 - 633 thou/mm3    MPV 9.9 9.4 - 12.4 fL    Seg Neutrophils 65.0 %    Lymphocytes 23.9 %    Monocytes 8.9 %    Eosinophils 0.8 %    Basophils 1.1 %    Immature Granulocytes 0.3 %    Segs Absolute 2.4 1.8 - 7.7 thou/mm3    Lymphocytes Absolute 0.9 (L) 1.0 - 4.8 thou/mm3    Monocytes Absolute 0.3 (L) 0.4 - 1.3 thou/mm3    Eosinophils Absolute 0.0 0.0 - 0.4 thou/mm3    Basophils Absolute 0.0 0.0 - 0.1 thou/mm3    Immature Grans (Abs) 0.01 0.00 - 0.07 thou/mm3    nRBC 0 /100 wbc   Troponin   Result Value Ref Range    Troponin T < 0.010 ng/ml   Anion Gap   Result Value Ref Range    Anion Gap 8.0 8.0 - 16.0 meq/L   Glomerular Filtration Rate, Estimated   Result Value Ref Range    Est, Glom Filt Rate >90 ml/min/1.73m2   Osmolality   Result Value Ref Range    Osmolality Calc 257.8 (L) 275.0 - 300.0 mOsmol/kg   D-Dimer, Quantitative   Result Value Ref Range    D-Dimer, Quant 664.00 (H) 0.00 - 500.00 ng/ml FEU   Hepatic Function Panel   Result Value Ref Range    Albumin 3.7 3.5 - 5.1 g/dL    Total Bilirubin 0.3 0.3 - 1.2 mg/dL    Bilirubin, Direct <0.2 0.0 - 0.3 mg/dL    Alkaline Phosphatase 64 38 - 126 (L) 12.0 - 16.0 gm/dl    Hematocrit 24.1 (L) 37.0 - 47.0 %   IRON SATURATION   Result Value Ref Range    Iron Saturation 5 (L) 20 - 50 %   Basic Metabolic Panel w/ Reflex to MG   Result Value Ref Range    Sodium 130 (L) 135 - 145 meq/L    Potassium reflex Magnesium 4.6 3.5 - 5.2 meq/L    Chloride 97 (L) 98 - 111 meq/L    CO2 26 23 - 33 meq/L    Glucose 104 70 - 108 mg/dL    BUN 17 7 - 22 mg/dL    Creatinine 0.6 0.4 - 1.2 mg/dL    Calcium 8.7 8.5 - 10.5 mg/dL   Anion Gap   Result Value Ref Range    Anion Gap 7.0 (L) 8.0 - 16.0 meq/L   Glomerular Filtration Rate, Estimated   Result Value Ref Range    Est, Glom Filt Rate >90 ml/min/1.73m2   Hemoglobin and Hematocrit   Result Value Ref Range    Hemoglobin 7.0 (LL) 12.0 - 16.0 gm/dl    Hematocrit 24.2 (L) 37.0 - 47.0 %   Hemoglobin and Hematocrit   Result Value Ref Range    Hemoglobin 7.4 (L) 12.0 - 16.0 gm/dl    Hematocrit 25.8 (L) 37.0 - 47.0 %   Hemoglobin and Hematocrit   Result Value Ref Range    Hemoglobin 8.1 (L) 12.0 - 16.0 gm/dl    Hematocrit 27.9 (L) 37.0 - 47.0 %   Hemoglobin and Hematocrit   Result Value Ref Range    Hemoglobin 7.8 (L) 12.0 - 16.0 gm/dl    Hematocrit 26.8 (L) 37.0 - 47.0 %   Hemoglobin and Hematocrit   Result Value Ref Range    Hemoglobin 8.5 (L) 12.0 - 16.0 gm/dl    Hematocrit 29.4 (L) 37.0 - 47.0 %   Hemoglobin and Hematocrit   Result Value Ref Range    Hemoglobin 7.8 (L) 12.0 - 16.0 gm/dl    Hematocrit 26.4 (L) 37.0 - 47.0 %   CBC with Auto Differential   Result Value Ref Range    WBC 5.0 4.8 - 10.8 thou/mm3    RBC 3.42 (L) 4.20 - 5.40 mill/mm3    Hemoglobin 8.4 (L) 12.0 - 16.0 gm/dl    Hematocrit 27.1 (L) 37.0 - 47.0 %    MCV 79.2 (L) 81.0 - 99.0 fL    MCH 24.6 (L) 26.0 - 33.0 pg    MCHC 31.0 (L) 32.2 - 35.5 gm/dl    RDW-CV 18.6 (H) 11.5 - 14.5 %    RDW-SD 49.0 (H) 35.0 - 45.0 fL    Platelets 226 248 - 496 thou/mm3    MPV 10.1 9.4 - 12.4 fL    Seg Neutrophils 77.2 %    Lymphocytes 10.6 %    Monocytes 8.8 %    Eosinophils 2.4 % Basophils 0.6 %    Immature Granulocytes 0.4 %    Segs Absolute 3.9 1.8 - 7.7 thou/mm3    Lymphocytes Absolute 0.5 (L) 1.0 - 4.8 thou/mm3    Monocytes Absolute 0.4 0.4 - 1.3 thou/mm3    Eosinophils Absolute 0.1 0.0 - 0.4 thou/mm3    Basophils Absolute 0.0 0.0 - 0.1 thou/mm3    Immature Grans (Abs) 0.02 0.00 - 0.07 thou/mm3    nRBC 0 /100 wbc   Basic Metabolic Panel   Result Value Ref Range    Sodium 134 (L) 135 - 145 meq/L    Potassium 2.9 (L) 3.5 - 5.2 meq/L    Chloride 95 (L) 98 - 111 meq/L    CO2 29 23 - 33 meq/L    Glucose 108 70 - 108 mg/dL    BUN 3 (L) 7 - 22 mg/dL    Creatinine 0.5 0.4 - 1.2 mg/dL    Calcium 8.5 8.5 - 10.5 mg/dL   Anion Gap   Result Value Ref Range    Anion Gap 10.0 8.0 - 16.0 meq/L   Glomerular Filtration Rate, Estimated   Result Value Ref Range    Est, Glom Filt Rate >90 ml/min/1.73m2   Magnesium   Result Value Ref Range    Magnesium 1.4 (L) 1.6 - 2.4 mg/dL   EKG 12 Lead   Result Value Ref Range    Ventricular Rate 60 BPM    Atrial Rate 60 BPM    P-R Interval 170 ms    QRS Duration 74 ms    Q-T Interval 400 ms    QTc Calculation (Bazett) 400 ms    P Axis 41 degrees    R Axis 64 degrees    T Axis 63 degrees       EKG / Radiology:     EKG:  Reviewed by me --    CXR:   Reviewed by me --    XR CHEST PORTABLE    Result Date: 8/16/2022  Single view of the chest Comparison:  [facility] - XR CHEST 1 VW - 03/16/2021 10:13 AM EDT Findings: Elevation right hemidiaphragm. Opacity left lung base. This could represent aspiration, pneumonia or volume loss. Overall this has decreased. Stable pulmonary nodule right perihilar region measuring 7 mm. The heart is normal size. Impression: Opacity left lung base. This could represent aspiration, pneumonia and/or volume loss.  This document has been electronically signed by: Ezequiel Melendez MD on 08/16/2022 07:01 PM      Electronically signed by Tamia Evangelista MD on 8/21/2022 at 1:15 PM

## 2022-08-22 ENCOUNTER — TELEPHONE (OUTPATIENT)
Dept: FAMILY MEDICINE CLINIC | Age: 82
End: 2022-08-22

## 2022-08-22 LAB
ANION GAP SERPL CALCULATED.3IONS-SCNC: 12 MEQ/L (ref 8–16)
BASOPHILS # BLD: 0.8 %
BASOPHILS ABSOLUTE: 0 THOU/MM3 (ref 0–0.1)
BUN BLDV-MCNC: 5 MG/DL (ref 7–22)
CALCIUM SERPL-MCNC: 8.3 MG/DL (ref 8.5–10.5)
CHLORIDE BLD-SCNC: 93 MEQ/L (ref 98–111)
CO2: 24 MEQ/L (ref 23–33)
CREAT SERPL-MCNC: 0.5 MG/DL (ref 0.4–1.2)
EOSINOPHIL # BLD: 6.3 %
EOSINOPHILS ABSOLUTE: 0.3 THOU/MM3 (ref 0–0.4)
ERYTHROCYTE [DISTWIDTH] IN BLOOD BY AUTOMATED COUNT: 19.1 % (ref 11.5–14.5)
ERYTHROCYTE [DISTWIDTH] IN BLOOD BY AUTOMATED COUNT: 52.3 FL (ref 35–45)
GFR SERPL CREATININE-BSD FRML MDRD: > 90 ML/MIN/1.73M2
GLUCOSE BLD-MCNC: 103 MG/DL (ref 70–108)
HCT VFR BLD CALC: 27.5 % (ref 37–47)
HEMOGLOBIN: 8.1 GM/DL (ref 12–16)
IMMATURE GRANS (ABS): 0.01 THOU/MM3 (ref 0–0.07)
IMMATURE GRANULOCYTES: 0.2 %
LYMPHOCYTES # BLD: 19.3 %
LYMPHOCYTES ABSOLUTE: 0.9 THOU/MM3 (ref 1–4.8)
MAGNESIUM: 1.8 MG/DL (ref 1.6–2.4)
MAGNESIUM: 1.8 MG/DL (ref 1.6–2.4)
MAGNESIUM: 2 MG/DL (ref 1.6–2.4)
MCH RBC QN AUTO: 24.2 PG (ref 26–33)
MCHC RBC AUTO-ENTMCNC: 29.5 GM/DL (ref 32.2–35.5)
MCV RBC AUTO: 82.1 FL (ref 81–99)
MONOCYTES # BLD: 12 %
MONOCYTES ABSOLUTE: 0.6 THOU/MM3 (ref 0.4–1.3)
NUCLEATED RED BLOOD CELLS: 0 /100 WBC
PLATELET # BLD: 222 THOU/MM3 (ref 130–400)
PMV BLD AUTO: 9.9 FL (ref 9.4–12.4)
POTASSIUM SERPL-SCNC: 3.2 MEQ/L (ref 3.5–5.2)
POTASSIUM SERPL-SCNC: 3.6 MEQ/L (ref 3.5–5.2)
POTASSIUM SERPL-SCNC: 3.6 MEQ/L (ref 3.5–5.2)
RBC # BLD: 3.35 MILL/MM3 (ref 4.2–5.4)
SEG NEUTROPHILS: 61.4 %
SEGMENTED NEUTROPHILS ABSOLUTE COUNT: 2.9 THOU/MM3 (ref 1.8–7.7)
SODIUM BLD-SCNC: 129 MEQ/L (ref 135–145)
WBC # BLD: 4.8 THOU/MM3 (ref 4.8–10.8)

## 2022-08-22 PROCEDURE — 97110 THERAPEUTIC EXERCISES: CPT

## 2022-08-22 PROCEDURE — 85025 COMPLETE CBC W/AUTO DIFF WBC: CPT

## 2022-08-22 PROCEDURE — 6370000000 HC RX 637 (ALT 250 FOR IP): Performed by: INTERNAL MEDICINE

## 2022-08-22 PROCEDURE — 84132 ASSAY OF SERUM POTASSIUM: CPT

## 2022-08-22 PROCEDURE — 97116 GAIT TRAINING THERAPY: CPT

## 2022-08-22 PROCEDURE — 83735 ASSAY OF MAGNESIUM: CPT

## 2022-08-22 PROCEDURE — 6370000000 HC RX 637 (ALT 250 FOR IP): Performed by: NURSE PRACTITIONER

## 2022-08-22 PROCEDURE — 6370000000 HC RX 637 (ALT 250 FOR IP): Performed by: STUDENT IN AN ORGANIZED HEALTH CARE EDUCATION/TRAINING PROGRAM

## 2022-08-22 PROCEDURE — 2580000003 HC RX 258: Performed by: STUDENT IN AN ORGANIZED HEALTH CARE EDUCATION/TRAINING PROGRAM

## 2022-08-22 PROCEDURE — 51798 US URINE CAPACITY MEASURE: CPT

## 2022-08-22 PROCEDURE — 99232 SBSQ HOSP IP/OBS MODERATE 35: CPT | Performed by: INTERNAL MEDICINE

## 2022-08-22 PROCEDURE — 97530 THERAPEUTIC ACTIVITIES: CPT

## 2022-08-22 PROCEDURE — 80048 BASIC METABOLIC PNL TOTAL CA: CPT

## 2022-08-22 PROCEDURE — 36415 COLL VENOUS BLD VENIPUNCTURE: CPT

## 2022-08-22 PROCEDURE — 97535 SELF CARE MNGMENT TRAINING: CPT

## 2022-08-22 PROCEDURE — 6360000002 HC RX W HCPCS: Performed by: INTERNAL MEDICINE

## 2022-08-22 PROCEDURE — 1200000003 HC TELEMETRY R&B

## 2022-08-22 PROCEDURE — 51701 INSERT BLADDER CATHETER: CPT

## 2022-08-22 RX ORDER — MAGNESIUM SULFATE IN WATER 40 MG/ML
2000 INJECTION, SOLUTION INTRAVENOUS ONCE
Status: COMPLETED | OUTPATIENT
Start: 2022-08-22 | End: 2022-08-22

## 2022-08-22 RX ADMIN — PANTOPRAZOLE SODIUM 40 MG: 40 TABLET, DELAYED RELEASE ORAL at 06:43

## 2022-08-22 RX ADMIN — FLUDROCORTISONE ACETATE 0.1 MG: 0.1 TABLET ORAL at 09:19

## 2022-08-22 RX ADMIN — MAGNESIUM SULFATE HEPTAHYDRATE 2000 MG: 40 INJECTION, SOLUTION INTRAVENOUS at 14:11

## 2022-08-22 RX ADMIN — PANTOPRAZOLE SODIUM 40 MG: 40 TABLET, DELAYED RELEASE ORAL at 16:56

## 2022-08-22 RX ADMIN — POTASSIUM CHLORIDE 20 MEQ: 1500 TABLET, EXTENDED RELEASE ORAL at 16:56

## 2022-08-22 RX ADMIN — PREGABALIN 150 MG: 75 CAPSULE ORAL at 09:18

## 2022-08-22 RX ADMIN — DOXYCYCLINE HYCLATE 100 MG: 100 TABLET, COATED ORAL at 20:40

## 2022-08-22 RX ADMIN — SODIUM CHLORIDE, PRESERVATIVE FREE 10 ML: 5 INJECTION INTRAVENOUS at 20:40

## 2022-08-22 RX ADMIN — PREGABALIN 150 MG: 75 CAPSULE ORAL at 20:40

## 2022-08-22 RX ADMIN — FLUOXETINE HYDROCHLORIDE 20 MG: 20 CAPSULE ORAL at 09:18

## 2022-08-22 RX ADMIN — DOXYCYCLINE HYCLATE 100 MG: 100 TABLET, COATED ORAL at 09:18

## 2022-08-22 RX ADMIN — SODIUM CHLORIDE, PRESERVATIVE FREE 10 ML: 5 INJECTION INTRAVENOUS at 09:20

## 2022-08-22 RX ADMIN — POLYETHYLENE GLYCOL 3350 17 G: 17 POWDER, FOR SOLUTION ORAL at 09:19

## 2022-08-22 RX ADMIN — LEVOTHYROXINE SODIUM 100 MCG: 0.1 TABLET ORAL at 06:43

## 2022-08-22 RX ADMIN — DOCUSATE SODIUM 100 MG: 100 CAPSULE, LIQUID FILLED ORAL at 16:34

## 2022-08-22 RX ADMIN — POTASSIUM CHLORIDE 20 MEQ: 1500 TABLET, EXTENDED RELEASE ORAL at 09:18

## 2022-08-22 RX ADMIN — ACETAMINOPHEN 650 MG: 325 TABLET ORAL at 18:32

## 2022-08-22 ASSESSMENT — PAIN SCALES - GENERAL
PAINLEVEL_OUTOF10: 5
PAINLEVEL_OUTOF10: 6

## 2022-08-22 ASSESSMENT — PAIN DESCRIPTION - PAIN TYPE: TYPE: ACUTE PAIN

## 2022-08-22 ASSESSMENT — PAIN DESCRIPTION - ORIENTATION: ORIENTATION: LOWER

## 2022-08-22 ASSESSMENT — PAIN DESCRIPTION - LOCATION: LOCATION: BACK

## 2022-08-22 NOTE — TELEPHONE ENCOUNTER
Bryan Lazaro came into the office today. She stated that Leonor Maldonado was in  the hospital and was put on some medications for a month. She was wondering if she should continue these medications and if so [facility]      Meloxicam gage 15mg      Ferrous sulfate  325 mg      Pantoprazole 40 mg       Please see attachment

## 2022-08-22 NOTE — PROGRESS NOTES
[address] [place] [address]  INPATIENT PHYSICAL THERAPY  DAILY NOTE  [facility] MED SURG 8A - 4D-52/470-Y    Time In: 7538  Time Out:   Timed Code Treatment Minutes: 23 Minutes  Minutes: 23          Date: 2022  Patient Name: Jose Gunter,  Gender:  female        MRN: [id number]  : 1940  (78 y.o.)     Referring Practitioner: Ofelia Cobos MD  Diagnosis: Shortness of breath  Additional Pertinent Hx: 78 y.o. female who presented to [address] [place] [address] with shortness of breath and fatigue. She states for the last few days her shortness of breath has worsened. Kids have also noticed more pallor. Patient also has some bruising and on her right side of the face however family states it was from a mechanical fall from her electric chair. She has history of iron deficiency anemia and has received iron transfusions in the past.  She states her stool has been darker in the last few days. Does take Mobic daily at home. EGD  shows rosive gastritis & gastric ulcers, [place] erosions & a very large hiatal hernia, no active bleeding, otherwise negative. Prior Level of Function:  Lives With: Spouse  Type of Home: Facility ([address] [place] [address])  Home Layout: One level  Home Access: Level entry  Home Equipment: Theador Corpus, rolling, Theador Corpus, standard, Walker, 4 wheeled   Bathroom Shower/Tub: Walk-in shower  Bathroom Toilet: Standard  Bathroom Equipment: Shower chair, Grab bars in shower    ADL Assistance: [address] [place] [address] Avenue: Needs assistance  Ambulation Assistance: Independent  Transfer Assistance: Independent  Active : No  Additional Comments: Pt reports using walker at [place], but not getting around TabbedOut distances.     Restrictions/Precautions:  Restrictions/Precautions: Fall Risk  Position Activity Restriction  Other position/activity restrictions: per nurse + for ortho BP     SUBJECTIVE: pleasant and cooperative, per RN-pt +orthostatic hypotension this am, pt stated is symptomatic    PAIN: no pain per pt    Vitals:  per RN, pt +orthostatics, pt up in chair during session    OBJECTIVE:  Bed Mobility:  Not Tested    Transfers:  Sit to Stand: Contact Guard Assistance  Stand to Sit:Contact Guard Assistance    Ambulation:  Contact Guard Assistance  Distance: 10steps  Surface: Level Tile  Device:Rolling Walker  Gait Deviations: Forward Flexed Posture, Slow [place], Unsteady Gait, and limited due to pt inc lightheadedness and symptomatic hypotension        Exercise:  Patient was guided in 1 set(s) 10 reps of exercise to both lower extremities. Glut sets, Seated marches, Seated hamstring curls, Seated heel/toe raises, Long arc quads, and Seated abduction/adduction. Exercises were completed for increased independence with functional mobility. Functional Outcome Measures: Completed  -PAC Inpatient Mobility without Stair Climbing Raw Score : 14  AM-PAC Inpatient without Stair Climbing T-Scale Score : 40.85    ASSESSMENT:  Assessment: Patient progressing toward established goals. and pt limited by orthostatic hypotension and symptomatic with WB activity  Activity Tolerance:  Patient tolerance of  treatment: fair. -     Equipment Recommendations:Equipment Needed: No  Discharge Recommendations: Continue to assess pending progress  Plan: Current Treatment Recommendations: Strengthening, Balance training, Functional mobility training, Transfer training, Endurance training, Neuromuscular re-education, Safety education & training, Patient/Caregiver education & training, Therapeutic activities, Gait training  Plan:  (3-5x GM)    Patient Education  Patient Education: Home Exercise Program, Transfers, Gait    Goals:  Patient goals : does not state  Short Term Goals  Time Frame for Short term goals: by discharge  Short term goal 1: Pt to transfer supine <--> sit SBA to enable pt to get in/out of bed. Short term goal 2: Pt to transfer sit <--> stand SBA for increased functional mobility.   Short term goal 3: amb 100'x1 with LRAD and SBA to walk safely in home  Long Term Goals  Time Frame for Long term goals : NA due to short length of stay. Following session, patient left in safe position with all fall risk precautions in place.

## 2022-08-22 NOTE — PROGRESS NOTES
Perfect Serve message sent regarding post bladder scan results of 999 ml. Pt complains she has to void but unable. Adding, \"it's been building all day\".

## 2022-08-22 NOTE — PROGRESS NOTES
Hospitalist Progress Note      Patient:  Lorenza Gomez Height    Unit/Bed:8A-19/019-A  YOB: 1940  MRN: [id number]   Acct: [de-identified]     PCP: Rusty Murcia DO  Date of Admission: 8/16/2022      ASSESSMENT / PLAN:    Acute Blood Loss Anemia-likely from upper GI bleed-planning for EGD today 8/17-had EGD showed multiple [place] erosions around the large hyperchromia, did have prepyloric antrum also. No interventions needed PPI twice daily for 8 weeks hemoglobin stable around 7.4 patient is a Confucianism cannot have blood transfusion    8/21-hemoglobin has been stable around 8.4 no more melena no gross bleeds    Orthostatic hypotension-likely secondary to GI bleed-add 1 L bolus-8/17-recheck blood pressures 1 hour after the bolus    8/18 still orthostatic and dizzy while standing up-we will give a bolus again as well as continue IV fluids compression stockings, added Florinef    8/19- better with iv fluids    Dyspnea likely related to IV fluid, no hypoxia, no CHF- will do trial of BUMEX IV 1 MG x 2 doses- 8/20 8/21-diuresing well will need potassium and magnesium replaced today added 60 mmol of potassium and 3 g of magnesium today recheck in few hours we will add 1 more dose of IV Lasix just for volume overload not in CHF      Large Hiatal/Paraesophageal Hernia: History of undergoing robotic repair with obvious G-tube insertion in March 2021. During that time no formal repair was performed as hernia was only reduced to about 80%. G-tube has been removed in the past.  EGD done on 6/2022 showed a large fixed hernia which looked improved however may need some readjustment. Patient saw general surgery and surgical intervention for G-tube insertion versus observation was discussed. Patient and family wanted to proceed with conservative management only. Hx of HTN: Controlled. Resume home meds. Recently seen by cardiology for shortness of breath and PVCs.   Assessed and was negative for any ischemia. Holter monitor was unremarkable. Hx of Hypothyroidism: Resume home synthroid    Hx of Anxiety/Depression: Resume Prozac    Hx of Fibromyalgia: Resume Lyrica     Dispo plan- likely willneed nursing home , per PT OT  PRECERT PENDING    Needs electrolytes replaced today-added magnesium and potassium-8/22    Chief Complaint: Hartness of breath    History of Present Illness:  78 y.o. female who presented to [address] [place] [address] with shortness of breath and fatigue. She states for the last few days her shortness of breath has worsened. Kids have also noticed more pallor. Patient also has some bruising and on her right side of the face however family states it was from a mechanical fall from her electric chair. She has history of iron deficiency anemia and has received iron transfusions in the past.  She states her stool has been darker in the last few days. Does take Mobic daily at home. Subjective:- (Last 24 hours)    HBG stable  However deconditioned  Shortness of breath improving eating well, participating with PT OT  Not hypoxic  Not in pain    Past medical history, family history, social history and allergies reviewed again and is unchanged since admission. ROS (All review of systems completed. Pertinent positives noted.  Otherwise All other systems reviewed and negative.)     Scheduled Meds:   magnesium sulfate  2,000 mg IntraVENous Once    potassium chloride  20 mEq Oral BID WC    doxycycline hyclate  100 mg Oral 2 times per day    fludrocortisone  0.1 mg Oral Daily    polyethylene glycol  17 g Oral Daily    pantoprazole  40 mg Oral BID AC    sodium chloride flush  5-40 mL IntraVENous 2 times per day    FLUoxetine  20 mg Oral Daily    levothyroxine  100 mcg Oral Daily    pregabalin  150 mg Oral BID    scopolamine  1 patch TransDERmal Q72H     Continuous Infusions:   sodium chloride       PRN Meds:.magnesium sulfate, promethazine, sodium chloride flush, sodium chloride, ondansetron **OR** ondansetron, acetaminophen **OR** acetaminophen, docusate sodium     PHYSICAL EXAM:  Vitals:    08/22/22 0030 08/22/22 0419 08/22/22 0909 08/22/22 1131   BP: (!) 140/88 (!) 158/91 112/77 126/87   Pulse: 89 81 92 86   Resp: 19 16 16 16   Temp: 97.7 °F (36.5 °C) 97.9 °F (36.6 °C) 98.2 °F (36.8 °C) 98.3 °F (36.8 °C)   TempSrc: Oral Oral Oral Oral   SpO2: 98% 96% 97% 97%   Weight:         General appearance: Alert / well-appearing. Cooperative. NAD. HEENT:  Normocephalic / atraumatic. PERRL. EOM intact. Pale conjunctiva  Neck: Supple. No JVD. Respiratory: Normal respiratory effort on RA. CTAB. No wheezes / rales / rhonchi. Cardiovascular: RRR. Normal S1/S2. No murmurs / rubs / gallops. Abdomen: Soft / non-tender / non-distended. BS present. Musculoskeletal: No cyanosis or edema. Skin: Warm / dry. Normal turgor. Pallor; bruising noted on right side of the face  Neurologic: A/O x 3. Speech normal. Answers questions appropriately. CN intact. No obvious focal neurologic deficits. Psychiatric: Thought content / judgment / insight appear appropriate. Capillary refill: Brisk bilaterally. Peripheral pulses: +2 bilaterally. Labs:   Results for orders placed or performed during the hospital encounter of 08/16/22   COVID-19, Rapid    Specimen: Nasopharyngeal Swab   Result Value Ref Range    SARS-CoV-2, NAAT NOT  DETECTED NOT DETECTED   Culture, Blood 1    Specimen: Blood   Result Value Ref Range    Blood Culture, Routine       No growth 24 hours. No growth 48 hours. No growth at 5 days    Culture, Blood 2    Specimen: Blood   Result Value Ref Range    Blood Culture, Routine       No growth 24 hours. No growth 48 hours.  No growth at 5 days    Culture, Reflexed, Urine    Specimen: Urine, catheter   Result Value Ref Range    Organism Aerococcus urinae (A)     Urine Culture Reflex       [place] count: >100,000 CFU/mL A. urinae is generally susceptible to ampicillin, penicillin, amoxicillin, beta lactams, cephalosporins and doxycycline. This organism has variable susceptibility to levofloxacin, vancomycin and trimethoprim-sulfamethoxazole.       Basic Metabolic Panel w/ Reflex to MG   Result Value Ref Range    Sodium 127 (L) 135 - 145 meq/L    Potassium reflex Magnesium 3.9 3.5 - 5.2 meq/L    Chloride 94 (L) 98 - 111 meq/L    CO2 25 23 - 33 meq/L    Glucose 104 70 - 108 mg/dL    BUN 19 7 - 22 mg/dL    Creatinine 0.5 0.4 - 1.2 mg/dL    Calcium 8.6 8.5 - 10.5 mg/dL   Brain Natriuretic Peptide   Result Value Ref Range    Pro-.6 0.0 - 1800.0 pg/mL   CBC with Auto Differential   Result Value Ref Range    WBC 3.7 (L) 4.8 - 10.8 thou/mm3    RBC 3.26 (L) 4.20 - 5.40 mill/mm3    Hemoglobin 7.7 (L) 12.0 - 16.0 gm/dl    Hematocrit 25.5 (L) 37.0 - 47.0 %    MCV 78.2 (L) 81.0 - 99.0 fL    MCH 23.6 (L) 26.0 - 33.0 pg    MCHC 30.2 (L) 32.2 - 35.5 gm/dl    RDW-CV 17.2 (H) 11.5 - 14.5 %    RDW-SD 48.8 (H) 35.0 - 45.0 fL    Platelets 175 323 - 310 thou/mm3    MPV 9.9 9.4 - 12.4 fL    Seg Neutrophils 65.0 %    Lymphocytes 23.9 %    Monocytes 8.9 %    Eosinophils 0.8 %    Basophils 1.1 %    Immature Granulocytes 0.3 %    Segs Absolute 2.4 1.8 - 7.7 thou/mm3    Lymphocytes Absolute 0.9 (L) 1.0 - 4.8 thou/mm3    Monocytes Absolute 0.3 (L) 0.4 - 1.3 thou/mm3    Eosinophils Absolute 0.0 0.0 - 0.4 thou/mm3    Basophils Absolute 0.0 0.0 - 0.1 thou/mm3    Immature Grans (Abs) 0.01 0.00 - 0.07 thou/mm3    nRBC 0 /100 wbc   Troponin   Result Value Ref Range    Troponin T < 0.010 ng/ml   Anion Gap   Result Value Ref Range    Anion Gap 8.0 8.0 - 16.0 meq/L   Glomerular Filtration Rate, Estimated   Result Value Ref Range    Est, Glom Filt Rate >90 ml/min/1.73m2   Osmolality   Result Value Ref Range    Osmolality Calc 257.8 (L) 275.0 - 300.0 mOsmol/kg   D-Dimer, Quantitative   Result Value Ref Range    D-Dimer, Quant 664.00 (H) 0.00 - 500.00 ng/ml FEU   Hepatic Function Panel   Result Value Ref Range    Albumin 3.7 3.5 - 5.1 g/dL    Total Bilirubin 0.3 0.3 - 1.2 mg/dL    Bilirubin, Direct <0.2 0.0 - 0.3 mg/dL    Alkaline Phosphatase 64 38 - 126 U/L    AST 26 5 - 40 U/L    ALT 12 11 - 66 U/L    Total Protein 6.1 6.1 - 8.0 g/dL   Procalcitonin   Result Value Ref Range    Procalcitonin 0.08 0.01 - 0.09 ng/mL   Lactate, Sepsis   Result Value Ref Range    Lactic Acid, Sepsis 1.2 0.5 - 1.9 mmol/L   Blood occult stool screen #1   Result Value Ref Range    OCCULT BLOOD FECAL Positive    Urine with Reflexed Micro   Result Value Ref Range    Glucose, Ur NEGATIVE NEGATIVE mg/dl    Bilirubin Urine NEGATIVE NEGATIVE    Ketones, Urine 15 (A) NEGATIVE    Specific Gravity, Urine 1.017 1.002 - 1.030    Blood, Urine NEGATIVE NEGATIVE    pH, UA 7.0 5.0 - 9.0    Protein, UA NEGATIVE NEGATIVE    Urobilinogen, Urine 1.0 0.0 - 1.0 eu/dl    Nitrite, Urine NEGATIVE NEGATIVE    Leukocyte Esterase, Urine MODERATE (A) NEGATIVE    Color, UA YELLOW STRAW-YELLOW    Character, Urine CLEAR CLEAR-SL CLOUD    RBC, UA 0-2 0-2/hpf /hpf    WBC, UA 25-50 0-4/hpf /hpf    Epithelial Cells, UA NONE SEEN 3-5/hpf /hpf    Bacteria, UA MANY FEW/NONE SEEN /hpf    Casts UA NONE SEEN NONE SEEN /lpf    Crystals, UA NONE SEEN NONE SEEN    Renal Epithelial, UA NONE SEEN NONE SEEN    Yeast, UA NONE SEEN NONE SEEN    CASTS 2 NONE SEEN NONE SEEN /lpf    MISCELLANEOUS 2 NONE SEEN    Ferritin   Result Value Ref Range    Ferritin 24 10 - 291 ng/mL   Iron   Result Value Ref Range    Iron 18 (L) 50 - 170 ug/dL   Iron binding capacity   Result Value Ref Range    TIBC 373 171 - 450 ug/dL   Reticulocytes   Result Value Ref Range    Retic Ct Abs 2.0 0.5 - 2.0 %    Absolute Retic # 64.0 20.0 - 115.0 thou/mm3    Immature Retic Fract 19.5 (H) 3.0 - 15.9 %    Retic Hemoglobin 24.7 (L) 28.2 - 35.7 pg   Hemoglobin and Hematocrit   Result Value Ref Range    Hemoglobin 7.3 (L) 12.0 - 16.0 gm/dl    Hematocrit 24.1 (L) 37.0 - 47.0 %   Protime-INR   Result Value Ref Range    INR 0.93 0.85 - 1.13   APTT   Result Value Ref Range    aPTT 26.4 22.0 - 38.0 seconds   Hemoglobin and Hematocrit   Result Value Ref Range    Hemoglobin 7.1 (L) 12.0 - 16.0 gm/dl    Hematocrit 24.1 (L) 37.0 - 47.0 %   IRON SATURATION   Result Value Ref Range    Iron Saturation 5 (L) 20 - 50 %   Basic Metabolic Panel w/ Reflex to MG   Result Value Ref Range    Sodium 130 (L) 135 - 145 meq/L    Potassium reflex Magnesium 4.6 3.5 - 5.2 meq/L    Chloride 97 (L) 98 - 111 meq/L    CO2 26 23 - 33 meq/L    Glucose 104 70 - 108 mg/dL    BUN 17 7 - 22 mg/dL    Creatinine 0.6 0.4 - 1.2 mg/dL    Calcium 8.7 8.5 - 10.5 mg/dL   Anion Gap   Result Value Ref Range    Anion Gap 7.0 (L) 8.0 - 16.0 meq/L   Glomerular Filtration Rate, Estimated   Result Value Ref Range    Est, Glom Filt Rate >90 ml/min/1.73m2   Hemoglobin and Hematocrit   Result Value Ref Range    Hemoglobin 7.0 (LL) 12.0 - 16.0 gm/dl    Hematocrit 24.2 (L) 37.0 - 47.0 %   Hemoglobin and Hematocrit   Result Value Ref Range    Hemoglobin 7.4 (L) 12.0 - 16.0 gm/dl    Hematocrit 25.8 (L) 37.0 - 47.0 %   Hemoglobin and Hematocrit   Result Value Ref Range    Hemoglobin 8.1 (L) 12.0 - 16.0 gm/dl    Hematocrit 27.9 (L) 37.0 - 47.0 %   Hemoglobin and Hematocrit   Result Value Ref Range    Hemoglobin 7.8 (L) 12.0 - 16.0 gm/dl    Hematocrit 26.8 (L) 37.0 - 47.0 %   Hemoglobin and Hematocrit   Result Value Ref Range    Hemoglobin 8.5 (L) 12.0 - 16.0 gm/dl    Hematocrit 29.4 (L) 37.0 - 47.0 %   Hemoglobin and Hematocrit   Result Value Ref Range    Hemoglobin 7.8 (L) 12.0 - 16.0 gm/dl    Hematocrit 26.4 (L) 37.0 - 47.0 %   CBC with Auto Differential   Result Value Ref Range    WBC 5.0 4.8 - 10.8 thou/mm3    RBC 3.42 (L) 4.20 - 5.40 mill/mm3    Hemoglobin 8.4 (L) 12.0 - 16.0 gm/dl    Hematocrit 27.1 (L) 37.0 - 47.0 %    MCV 79.2 (L) 81.0 - 99.0 fL    MCH 24.6 (L) 26.0 - 33.0 pg    MCHC 31.0 (L) 32.2 - 35.5 gm/dl    RDW-CV 18.6 (H) 11.5 - 14.5 %    RDW-SD 49.0 (H) 35.0 - 45.0 fL    Platelets 089 411 - 015 thou/mm3    MPV 10.1 9.4 - 12.4 fL    Seg Neutrophils 77.2 %    Lymphocytes 10.6 %    Monocytes 8.8 %    Eosinophils 2.4 %    Basophils 0.6 %    Immature Granulocytes 0.4 %    Segs Absolute 3.9 1.8 - 7.7 thou/mm3    Lymphocytes Absolute 0.5 (L) 1.0 - 4.8 thou/mm3    Monocytes Absolute 0.4 0.4 - 1.3 thou/mm3    Eosinophils Absolute 0.1 0.0 - 0.4 thou/mm3    Basophils Absolute 0.0 0.0 - 0.1 thou/mm3    Immature Grans (Abs) 0.02 0.00 - 0.07 thou/mm3    nRBC 0 /100 wbc   Basic Metabolic Panel   Result Value Ref Range    Sodium 134 (L) 135 - 145 meq/L    Potassium 2.9 (L) 3.5 - 5.2 meq/L    Chloride 95 (L) 98 - 111 meq/L    CO2 29 23 - 33 meq/L    Glucose 108 70 - 108 mg/dL    BUN 3 (L) 7 - 22 mg/dL    Creatinine 0.5 0.4 - 1.2 mg/dL    Calcium 8.5 8.5 - 10.5 mg/dL   Anion Gap   Result Value Ref Range    Anion Gap 10.0 8.0 - 16.0 meq/L   Glomerular Filtration Rate, Estimated   Result Value Ref Range    Est, Glom Filt Rate >90 ml/min/1.73m2   Magnesium   Result Value Ref Range    Magnesium 1.4 (L) 1.6 - 2.4 mg/dL   Potassium   Result Value Ref Range    Potassium 3.2 (L) 3.5 - 5.2 meq/L   Magnesium   Result Value Ref Range    Magnesium 1.8 1.6 - 2.4 mg/dL   CBC with Auto Differential   Result Value Ref Range    WBC 4.8 4.8 - 10.8 thou/mm3    RBC 3.35 (L) 4.20 - 5.40 mill/mm3    Hemoglobin 8.1 (L) 12.0 - 16.0 gm/dl    Hematocrit 27.5 (L) 37.0 - 47.0 %    MCV 82.1 81.0 - 99.0 fL    MCH 24.2 (L) 26.0 - 33.0 pg    MCHC 29.5 (L) 32.2 - 35.5 gm/dl    RDW-CV 19.1 (H) 11.5 - 14.5 %    RDW-SD 52.3 (H) 35.0 - 45.0 fL    Platelets 800 072 - 374 thou/mm3    MPV 9.9 9.4 - 12.4 fL    Seg Neutrophils 61.4 %    Lymphocytes 19.3 %    Monocytes 12.0 %    Eosinophils 6.3 %    Basophils 0.8 %    Immature Granulocytes 0.2 %    Segs Absolute 2.9 1.8 - 7.7 thou/mm3    Lymphocytes Absolute 0.9 (L) 1.0 - 4.8 thou/mm3    Monocytes Absolute 0.6 0.4 - 1.3 thou/mm3    Eosinophils Absolute 0.3 0.0 - 0.4 thou/mm3    Basophils Absolute 0.0 0.0 - 0.1 thou/mm3    Immature Grans (Abs) 0.01 0.00 - 0.07 thou/mm3    nRBC 0 /100 wbc   Basic Metabolic Panel   Result Value Ref Range    Sodium 129 (L) 135 - 145 meq/L    Potassium 3.6 3.5 - 5.2 meq/L    Chloride 93 (L) 98 - 111 meq/L    CO2 24 23 - 33 meq/L    Glucose 103 70 - 108 mg/dL    BUN 5 (L) 7 - 22 mg/dL    Creatinine 0.5 0.4 - 1.2 mg/dL    Calcium 8.3 (L) 8.5 - 10.5 mg/dL   Potassium   Result Value Ref Range    Potassium 3.6 3.5 - 5.2 meq/L   Magnesium   Result Value Ref Range    Magnesium 1.8 1.6 - 2.4 mg/dL   Magnesium   Result Value Ref Range    Magnesium 2.0 1.6 - 2.4 mg/dL   Anion Gap   Result Value Ref Range    Anion Gap 12.0 8.0 - 16.0 meq/L   Glomerular Filtration Rate, Estimated   Result Value Ref Range    Est, Glom Filt Rate >90 ml/min/1.73m2   EKG 12 Lead   Result Value Ref Range    Ventricular Rate 60 BPM    Atrial Rate 60 BPM    P-R Interval 170 ms    QRS Duration 74 ms    Q-T Interval 400 ms    QTc Calculation (Bazett) 400 ms    P Axis 41 degrees    R Axis 64 degrees    T Axis 63 degrees       EKG / Radiology:     EKG:  Reviewed by me --    CXR:   Reviewed by me --    XR CHEST PORTABLE    Result Date: 8/16/2022  Single view of the chest Comparison:  SR [facility] - XR CHEST 1 VW - 03/16/2021 10:13 AM EDT Findings: Elevation right hemidiaphragm. Opacity left lung base. This could represent aspiration, pneumonia or volume loss. Overall this has decreased. Stable pulmonary nodule right perihilar region measuring 7 mm. The heart is normal size. Impression: Opacity left lung base. This could represent aspiration, pneumonia and/or volume loss.  This document has been electronically signed by: Gracia Gee MD on 08/16/2022 07:01 PM      Electronically signed by Noel Watts MD on 8/22/2022 at 1:18 PM

## 2022-08-22 NOTE — PROGRESS NOTES
[address] [place] [address]  Occupational Therapy  Daily Note  Time:   Time In: 4500  Time Out: 9675  Timed Code Treatment Minutes: 39 Minutes  Minutes: 45          Date: 2022  Patient Name: Jose Gunter,   Gender: female      Room: [facility]-A  MRN: [id number]  : 1940  (78 y.o.)  Referring Practitioner: Ofelia Cobos MD  Diagnosis: SOB  Additional Pertinent Hx: Per MD note:81 y.o. female who presented to [address] [place] [address] with shortness of breath and fatigue. She states for the last few days her shortness of breath has worsened. Kids have also noticed more pallor. Patient also has some bruising and on her right side of the face however family states it was from a mechanical fall from her electric chair. She has history of iron deficiency anemia and has received iron transfusions in the past.  She states her stool has been darker in the last few days. Presented to ER on 2022    Restrictions/Precautions:  Restrictions/Precautions: Fall Risk  Position Activity Restriction  Other position/activity restrictions: per nurse + for ortho BP     SUBJECTIVE: RN okayed OT treatment. Pt. In room and agreeable to participate in OT session. Pt. Pleasant and cooperative throughout. Pt. Reported some dizziness upon sitting at EOB and when stood initially. Orthostatics taken and RN notified and aware. PAIN:chronic arthritis    Vitals: Orthostatic Blood Pressure: Supine: 165/90, Sittin/92, Standin/69  Oxygen: 96%  Heart Rate: 86    COGNITION: Slow Processing, Decreased Recall, and Decreased Safety Awareness    ADL:   Grooming: Stand By Assistance and with set-up. While seated in chair . BALANCE:  Sitting Balance:  Stand By Assistance, [employer]. Pt. Sat at EOB unsupported for 12 mins requiring occasional assistance with use of grab rail prn to maintain posture. Standing Balance: Contact Guard Assistance. Unsteadiness noted, Pt. Stood for 1 min. Became dizzy required seated rest break. BED MOBILITY:  Supine to Sit: Stand By Assistance    Scooting: Stand By Assistance      TRANSFERS:  Sit to Stand:  [employer]. Stand to Sit: Contact Guard Assistance. FUNCTIONAL MOBILITY:  Assistive Device: Rolling Walker  Assist Level:  Contact Guard Assistance. Distance:  4 feet in room with CGA slow pace, no LOB       ADDITIONAL ACTIVITIES:  Patient completed BUE strengthening exercises with skilled education on HEP: completed x10 reps x1 set with AROM in all joints and all planes in order to improve UE strength and activity tolerance required for BADL routine and toilet / shower transfers. Patient tolerated , requiring brief rest breaks. Patient also required verbal and visual  cues for technique. ASSESSMENT:     Activity Tolerance:  Patient tolerance of  treatment: fair. Fatigues easily      Discharge Recommendations: Subacute/skilled nursing facility  Equipment Recommendations: Other: monitor pending progress  Plan: Times per Week: 5x  Current Treatment Recommendations: Functional mobility training, Balance training, Endurance training, Self-Care / ADL, Safety education & training    Patient Education  Patient Education: ADL's, Home Exercise Program, and Assistive Device Safety and safety with functional mobility and transfers.      Goals  Short Term Goals  Time Frame for Short term goals: until discharge  Short Term Goal 1: Pt will complete various sit-stand t/fs including BSC with 0>CGA & 0-2 vcs for safety  Short Term Goal 2: Pt will tolerate standing 2-3 min with CGA for increased ease of sinkside grooming  Short Term Goal 3: Pt will complete tolerate mobility to/from bathroom with RW, CGA, & 0-2 vcs for safety  Short Term Goal 4: Pt will complete BADL routine with  min A & min vcs for safety  Long Term Goals  Time Frame for Long term goals : No LTG set d/t short ELOS    Following session, patient left in safe position with all fall risk precautions in place.

## 2022-08-22 NOTE — TELEPHONE ENCOUNTER
Looks like she is still admitted, I would wait until she is officially discharged to see what medications they recommend at D/C.

## 2022-08-22 NOTE — PLAN OF CARE
Problem: Safety - Adult  Goal: Free from fall injury  Recent Flowsheet Documentation  Taken 8/21/2022 2230 by Courtney Rod RN  Free From Fall Injury: Instruct family/caregiver on patient safety     Problem: ABCDS Injury Assessment  Goal: Absence of physical injury  Recent Flowsheet Documentation  Taken 8/21/2022 2230 by Courtney Rod RN  Absence of Physical Injury: Implement safety measures based on patient assessment     Problem: Discharge Planning  Goal: Discharge to home or other facility with appropriate resources  Outcome: Progressing

## 2022-08-23 LAB
ANION GAP SERPL CALCULATED.3IONS-SCNC: 6 MEQ/L (ref 8–16)
BASOPHILS # BLD: 1.1 %
BASOPHILS ABSOLUTE: 0.1 THOU/MM3 (ref 0–0.1)
BUN BLDV-MCNC: 7 MG/DL (ref 7–22)
CALCIUM SERPL-MCNC: 8.4 MG/DL (ref 8.5–10.5)
CHLORIDE BLD-SCNC: 93 MEQ/L (ref 98–111)
CO2: 27 MEQ/L (ref 23–33)
CREAT SERPL-MCNC: 0.5 MG/DL (ref 0.4–1.2)
EOSINOPHIL # BLD: 7.6 %
EOSINOPHILS ABSOLUTE: 0.3 THOU/MM3 (ref 0–0.4)
ERYTHROCYTE [DISTWIDTH] IN BLOOD BY AUTOMATED COUNT: 19.6 % (ref 11.5–14.5)
ERYTHROCYTE [DISTWIDTH] IN BLOOD BY AUTOMATED COUNT: 52.2 FL (ref 35–45)
GFR SERPL CREATININE-BSD FRML MDRD: > 90 ML/MIN/1.73M2
GLUCOSE BLD-MCNC: 100 MG/DL (ref 70–108)
HCT VFR BLD CALC: 27.5 % (ref 37–47)
HEMOGLOBIN: 8.3 GM/DL (ref 12–16)
IMMATURE GRANS (ABS): 0.02 THOU/MM3 (ref 0–0.07)
IMMATURE GRANULOCYTES: 0.4 %
LYMPHOCYTES # BLD: 16.6 %
LYMPHOCYTES ABSOLUTE: 0.8 THOU/MM3 (ref 1–4.8)
MAGNESIUM: 1.8 MG/DL (ref 1.6–2.4)
MAGNESIUM: 1.9 MG/DL (ref 1.6–2.4)
MAGNESIUM: 2 MG/DL (ref 1.6–2.4)
MCH RBC QN AUTO: 24.2 PG (ref 26–33)
MCHC RBC AUTO-ENTMCNC: 30.2 GM/DL (ref 32.2–35.5)
MCV RBC AUTO: 80.2 FL (ref 81–99)
MONOCYTES # BLD: 10 %
MONOCYTES ABSOLUTE: 0.5 THOU/MM3 (ref 0.4–1.3)
NUCLEATED RED BLOOD CELLS: 0 /100 WBC
OSMOLALITY URINE: 189 MOSMOL/KG (ref 250–750)
PLATELET # BLD: 233 THOU/MM3 (ref 130–400)
PMV BLD AUTO: 9.6 FL (ref 9.4–12.4)
POTASSIUM SERPL-SCNC: 4.1 MEQ/L (ref 3.5–5.2)
POTASSIUM SERPL-SCNC: 4.3 MEQ/L (ref 3.5–5.2)
POTASSIUM SERPL-SCNC: 4.6 MEQ/L (ref 3.5–5.2)
RBC # BLD: 3.43 MILL/MM3 (ref 4.2–5.4)
SEG NEUTROPHILS: 64.3 %
SEGMENTED NEUTROPHILS ABSOLUTE COUNT: 3 THOU/MM3 (ref 1.8–7.7)
SODIUM BLD-SCNC: 126 MEQ/L (ref 135–145)
SODIUM BLD-SCNC: 126 MEQ/L (ref 135–145)
SODIUM BLD-SCNC: 127 MEQ/L (ref 135–145)
SODIUM URINE: 36 MEQ/L
WBC # BLD: 4.6 THOU/MM3 (ref 4.8–10.8)

## 2022-08-23 PROCEDURE — 36415 COLL VENOUS BLD VENIPUNCTURE: CPT

## 2022-08-23 PROCEDURE — 51701 INSERT BLADDER CATHETER: CPT

## 2022-08-23 PROCEDURE — 83735 ASSAY OF MAGNESIUM: CPT

## 2022-08-23 PROCEDURE — 51798 US URINE CAPACITY MEASURE: CPT

## 2022-08-23 PROCEDURE — 2580000003 HC RX 258: Performed by: INTERNAL MEDICINE

## 2022-08-23 PROCEDURE — 6370000000 HC RX 637 (ALT 250 FOR IP): Performed by: STUDENT IN AN ORGANIZED HEALTH CARE EDUCATION/TRAINING PROGRAM

## 2022-08-23 PROCEDURE — 84132 ASSAY OF SERUM POTASSIUM: CPT

## 2022-08-23 PROCEDURE — 1200000003 HC TELEMETRY R&B

## 2022-08-23 PROCEDURE — 83935 ASSAY OF URINE OSMOLALITY: CPT

## 2022-08-23 PROCEDURE — 6370000000 HC RX 637 (ALT 250 FOR IP): Performed by: NURSE PRACTITIONER

## 2022-08-23 PROCEDURE — 2580000003 HC RX 258: Performed by: STUDENT IN AN ORGANIZED HEALTH CARE EDUCATION/TRAINING PROGRAM

## 2022-08-23 PROCEDURE — 6370000000 HC RX 637 (ALT 250 FOR IP): Performed by: INTERNAL MEDICINE

## 2022-08-23 PROCEDURE — 99232 SBSQ HOSP IP/OBS MODERATE 35: CPT | Performed by: INTERNAL MEDICINE

## 2022-08-23 PROCEDURE — 84300 ASSAY OF URINE SODIUM: CPT

## 2022-08-23 PROCEDURE — 85025 COMPLETE CBC W/AUTO DIFF WBC: CPT

## 2022-08-23 PROCEDURE — 80048 BASIC METABOLIC PNL TOTAL CA: CPT

## 2022-08-23 PROCEDURE — 51702 INSERT TEMP BLADDER CATH: CPT

## 2022-08-23 PROCEDURE — 84295 ASSAY OF SERUM SODIUM: CPT

## 2022-08-23 PROCEDURE — 6370000000 HC RX 637 (ALT 250 FOR IP): Performed by: PHYSICIAN ASSISTANT

## 2022-08-23 RX ORDER — SODIUM CHLORIDE 9 MG/ML
INJECTION, SOLUTION INTRAVENOUS CONTINUOUS
Status: DISCONTINUED | OUTPATIENT
Start: 2022-08-23 | End: 2022-08-24 | Stop reason: HOSPADM

## 2022-08-23 RX ORDER — CALCIUM CARBONATE 200(500)MG
500 TABLET,CHEWABLE ORAL 3 TIMES DAILY PRN
Status: DISCONTINUED | OUTPATIENT
Start: 2022-08-23 | End: 2022-08-24 | Stop reason: HOSPADM

## 2022-08-23 RX ADMIN — PREGABALIN 150 MG: 75 CAPSULE ORAL at 20:15

## 2022-08-23 RX ADMIN — FLUOXETINE HYDROCHLORIDE 20 MG: 20 CAPSULE ORAL at 08:38

## 2022-08-23 RX ADMIN — POLYETHYLENE GLYCOL 3350 17 G: 17 POWDER, FOR SOLUTION ORAL at 08:37

## 2022-08-23 RX ADMIN — SODIUM CHLORIDE: 9 INJECTION, SOLUTION INTRAVENOUS at 11:13

## 2022-08-23 RX ADMIN — SODIUM CHLORIDE: 9 INJECTION, SOLUTION INTRAVENOUS at 23:27

## 2022-08-23 RX ADMIN — POTASSIUM CHLORIDE 20 MEQ: 1500 TABLET, EXTENDED RELEASE ORAL at 08:38

## 2022-08-23 RX ADMIN — FLUDROCORTISONE ACETATE 0.1 MG: 0.1 TABLET ORAL at 08:38

## 2022-08-23 RX ADMIN — LEVOTHYROXINE SODIUM 100 MCG: 0.1 TABLET ORAL at 05:36

## 2022-08-23 RX ADMIN — PANTOPRAZOLE SODIUM 40 MG: 40 TABLET, DELAYED RELEASE ORAL at 18:58

## 2022-08-23 RX ADMIN — SODIUM CHLORIDE, PRESERVATIVE FREE 10 ML: 5 INJECTION INTRAVENOUS at 08:38

## 2022-08-23 RX ADMIN — ANTACID TABLETS 500 MG: 500 TABLET, CHEWABLE ORAL at 21:39

## 2022-08-23 RX ADMIN — PANTOPRAZOLE SODIUM 40 MG: 40 TABLET, DELAYED RELEASE ORAL at 05:36

## 2022-08-23 RX ADMIN — PREGABALIN 150 MG: 75 CAPSULE ORAL at 08:38

## 2022-08-23 RX ADMIN — POTASSIUM CHLORIDE 20 MEQ: 1500 TABLET, EXTENDED RELEASE ORAL at 18:58

## 2022-08-23 ASSESSMENT — PAIN DESCRIPTION - LOCATION: LOCATION: BACK

## 2022-08-23 ASSESSMENT — PAIN SCALES - GENERAL: PAINLEVEL_OUTOF10: 5

## 2022-08-23 ASSESSMENT — PAIN DESCRIPTION - ORIENTATION: ORIENTATION: LOWER

## 2022-08-23 ASSESSMENT — PAIN DESCRIPTION - DESCRIPTORS: DESCRIPTORS: ACHING

## 2022-08-23 NOTE — PROGRESS NOTES
Comprehensive Nutrition Assessment    Type and Reason for Visit:  Initial, RD Nutrition Re-Screen/LOS (LOS day 7)    Nutrition Recommendations/Plan:   Continue diet as ordered per MD and encourage PO intake at best efforts. Recommend obtaining a more recent weight for this admit. Ensure Enlive ordered TID to promote adequate intake. Monitor nutrition related lab values, PO intake, GI status, and provide further nutrition recommendations as necessary. Malnutrition Assessment:  Malnutrition Status: At risk for malnutrition (Comment) (08/23/22 1142)    Context:  Acute Illness     Findings of the 6 clinical characteristics of malnutrition:  Energy Intake:  Mild decrease in energy intake (Comment) (does not like the food here)  Weight Loss:  No significant weight loss     Body Fat Loss:  Mild body fat loss Orbital   Muscle Mass Loss:  Mild muscle mass loss Temples (temporalis)  Fluid Accumulation:  No significant fluid accumulation     Strength:  Not Performed    Nutrition Assessment:     Pt. nutritionally compromised AEB decreased PO intake during this admit. At risk for further nutrition compromise r/t admit d/t SOB and fatigue; iron deficiency anemia; large hiatal/paraesophageal hernia; advanced age; hyponatremia and underlying medical condition (PMH: anemia, diverticulitis, GERD, hiatal hernia, HTN, osteoarthritis). Nutrition Related Findings:      Wound Type: None     Pt. Report/Treatments/Miscellaneous: RN reports pt has been eating well- RN reports pt has been having some urinary retention; Spoke with patient at bedside this morning~ daughter and  present; pt reports struggling with hernias for a long time; pt reports that the location of the hernia causes SOB when eating and limits her intake; encouraged use of ONS and to sip on these throughout the day and encouraged small meals. Dr. Mauricio Landers patient to eat potato chips and more salty foods per pt report.    GI Status: pt reports last BM 8/22; denies nausea at this time  Pertinent Labs: Na 126, K 4.3, BUN 7, creatinine 0.5, magnesium 1.8, hgb 8.3  Pertinent Meds: protonix, glycolax, colace    Current Nutrition Intake & Therapies:    Average Meal Intake: 51-75%, %  Average Supplements Intake:  (to start)  ADULT DIET; Regular; GI [place] (GERD/Peptic Ulcer)  ADULT ORAL NUTRITION SUPPLEMENT; Breakfast, Lunch, Dinner; Standard High Calorie/High Protein Oral Supplement    Anthropometric Measures:  Height: 5' 7\" (170.2 cm)  Ideal Body Weight (IBW): 135 lbs (61 kg)    Admission Body Weight: 180 lb 12.8 oz (82 kg) (8/19)  Current Body Weight: 180 lb 12.8 oz (82 kg) (8/19: no edema), 133.9 % IBW. Weight Source: Bed Scale  Current BMI (kg/m2): 28.3  Usual Body Weight:  (per pt report ~160-162 lbs)                       BMI Categories: Overweight (BMI 25.0-29. 9)    Estimated Daily Nutrient Needs:  Energy Requirements Based On: Kcal/kg  Weight Used for Energy Requirements: Current  Energy (kcal/day): 7210-7831 kcals/day (20-25 kcals/kg)  Weight Used for Protein Requirements: Ideal  Protein (g/day): 61-73 g/day (1.0-1.2 g/kg IBW)       Nutrition Diagnosis:   Inadequate protein intake related to altered taste perception, early satiety as evidenced by intake 51-75%    Nutrition Interventions:   Food and/or Nutrient Delivery: Continue Current Diet, Start Oral Nutrition Supplement  Nutrition Education/Counseling: Education initiated (Encouraged PO intake at best efforts and use of ONS)  Coordination of Nutrition Care: Continue to monitor while inpatient       Goals:     Goals: by next RD assessment, Meet at least 75% of estimated needs       Nutrition Monitoring and Evaluation:      Food/Nutrient Intake Outcomes: Diet Advancement/Tolerance, Food and Nutrient Intake, Supplement Intake  Physical Signs/Symptoms Outcomes: Biochemical Data, GI Status, Fluid Status or Edema, Nutrition Focused Physical Findings, Skin, Weight, Meal Time Behavior    Discharge Planning:    Continue Oral Nutrition Supplement     Francheska Cleveland RD  Contact: [phone]

## 2022-08-23 NOTE — CARE COORDINATION
8/23/22, 10:07 AM EDT    DISCHARGE PLANNING EVALUATION    8:25 AM  Paulina Sykes from [facility].VIERTEL Con called, pre-cert approved. 10:10 AM  Randa Wilson at [facility].VIERTEL Con a message, patient will be ready for discharge tomorrow. Daughter will be transporting. Will follow up with him tomorrow after morning meeting.

## 2022-08-23 NOTE — PROGRESS NOTES
Hospitalist Progress Note      Patient:  Avila Sanchez Height    Unit/Bed:8A-19/019-A  YOB: 1940  MRN: [id number]   Acct: [de-identified]     PCP: Michael Loya,   Date of Admission: 8/16/2022      ASSESSMENT / PLAN:    Acute Blood Loss Anemia-likely from upper GI bleed-planning for EGD today 8/17-had EGD showed multiple [place] erosions around the large hyperchromia, did have prepyloric antrum also. No interventions needed PPI twice daily for 8 weeks hemoglobin stable around 7.4 patient is a Christian cannot have blood transfusion    8/21-hemoglobin has been stable around 8.4 no more melena no gross bleeds    Orthostatic hypotension-likely secondary to GI bleed-add 1 L bolus-8/17-recheck blood pressures 1 hour after the bolus    8/18 still orthostatic and dizzy while standing up-we will give a bolus again as well as continue IV fluids compression stockings, added Florinef    8/19- better with iv fluids    Dyspnea likely related to IV fluid, no hypoxia, no CHF- will do trial of BUMEX IV 1 MG x 2 doses- 8/20 8/21-diuresing well will need potassium and magnesium replaced today added 60 mmol of potassium and 3 g of magnesium today recheck in few hours we will add 1 more dose of IV Lasix just for volume overload not in CHF      Acute on chronic hyponatremia-check urine sodium and urine osmolality likely secondary to IV diuretics that she had we will add IV fluids for few hours check sodium level every 8 hours-hold off the discharge today 8/23    Large Hiatal/Paraesophageal Hernia: History of undergoing robotic repair with obvious G-tube insertion in March 2021. During that time no formal repair was performed as hernia was only reduced to about 80%. G-tube has been removed in the past.  EGD done on 6/2022 showed a large fixed hernia which looked improved however may need some readjustment.   Patient saw general surgery and surgical intervention for G-tube insertion versus observation was discussed. Patient and family wanted to proceed with conservative management only. Hx of HTN: Controlled. Resume home meds. Recently seen by cardiology for shortness of breath and PVCs. Assessed and was negative for any ischemia. Holter monitor was unremarkable. Hx of Hypothyroidism: Resume home synthroid    Hx of Anxiety/Depression: Resume Prozac    Hx of Fibromyalgia: Resume Lyrica       Pre-CERT is approved-however sodium levels are dropping currently at 126-see plan above. Discharge today is held off      Chief Complaint: Hartness of breath    History of Present Illness:  78 y.o. female who presented to [facility] with shortness of breath and fatigue. She states for the last few days her shortness of breath has worsened. Kids have also noticed more pallor. Patient also has some bruising and on her right side of the face however family states it was from a mechanical fall from her electric chair. She has history of iron deficiency anemia and has received iron transfusions in the past.  She states her stool has been darker in the last few days. Does take Mobic daily at home. Subjective:- (Last 24 hours)    HBG stable  However deconditioned  Shortness of breath improving eating well, participating with PT OT  No fever no chills no chest pain       Past medical history, family history, social history and allergies reviewed again and is unchanged since admission. ROS (All review of systems completed. Pertinent positives noted.  Otherwise All other systems reviewed and negative.)     Scheduled Meds:   potassium chloride  20 mEq Oral BID WC    fludrocortisone  0.1 mg Oral Daily    polyethylene glycol  17 g Oral Daily    pantoprazole  40 mg Oral BID AC    sodium chloride flush  5-40 mL IntraVENous 2 times per day    FLUoxetine  20 mg Oral Daily    levothyroxine  100 mcg Oral Daily    pregabalin  150 mg Oral BID    scopolamine  1 patch TransDERmal Q72H     Continuous Infusions:   sodium chloride 75 mL/hr at 08/23/22 1113    sodium chloride       PRN Meds:.magnesium sulfate, promethazine, sodium chloride flush, sodium chloride, ondansetron **OR** ondansetron, acetaminophen **OR** acetaminophen, docusate sodium     PHYSICAL EXAM:  Vitals:    08/22/22 1949 08/22/22 2321 08/23/22 0348 08/23/22 0751   BP: (!) 145/95 139/89 126/79 (!) 171/97   Pulse: 81 84 86 82   Resp: 18 18 18 16   Temp: 98.4 °F (36.9 °C) 98.1 °F (36.7 °C) 97.4 °F (36.3 °C) 98.4 °F (36.9 °C)   TempSrc: Oral Oral Oral Oral   SpO2: 96% 97% 95% 96%   Weight:         General appearance: Alert / well-appearing. Cooperative. NAD. HEENT:  Normocephalic / atraumatic. PERRL. EOM intact. Pale conjunctiva  Neck: Supple. No JVD. Respiratory: Normal respiratory effort on RA. CTAB. No wheezes / rales / rhonchi. Cardiovascular: RRR. Normal S1/S2. No murmurs / rubs / gallops. Abdomen: Soft / non-tender / non-distended. BS present. Musculoskeletal: No cyanosis or edema. Skin: Warm / dry. Normal turgor. Pallor; bruising noted on right side of the face  Neurologic: A/O x 3. Speech normal. Answers questions appropriately. CN intact. No obvious focal neurologic deficits. Psychiatric: Thought content / judgment / insight appear appropriate. Capillary refill: Brisk bilaterally. Peripheral pulses: +2 bilaterally. Labs:   Results for orders placed or performed during the hospital encounter of 08/16/22   COVID-19, Rapid    Specimen: Nasopharyngeal Swab   Result Value Ref Range    SARS-CoV-2, NAAT NOT  DETECTED NOT DETECTED   Culture, Blood 1    Specimen: Blood   Result Value Ref Range    Blood Culture, Routine       No growth 24 hours. No growth 48 hours. No growth at 5 days    Culture, Blood 2    Specimen: Blood   Result Value Ref Range    Blood Culture, Routine       No growth 24 hours. No growth 48 hours.  No growth at 5 days    Culture, Reflexed, Urine    Specimen: Urine, catheter   Result Value Ref Range    Organism Aerococcus urinae (A) Urine Culture Reflex       [place] count: >100,000 CFU/mL A. urinae is generally susceptible to ampicillin, penicillin, amoxicillin, beta lactams, cephalosporins and doxycycline. This organism has variable susceptibility to levofloxacin, vancomycin and trimethoprim-sulfamethoxazole.       Basic Metabolic Panel w/ Reflex to MG   Result Value Ref Range    Sodium 127 (L) 135 - 145 meq/L    Potassium reflex Magnesium 3.9 3.5 - 5.2 meq/L    Chloride 94 (L) 98 - 111 meq/L    CO2 25 23 - 33 meq/L    Glucose 104 70 - 108 mg/dL    BUN 19 7 - 22 mg/dL    Creatinine 0.5 0.4 - 1.2 mg/dL    Calcium 8.6 8.5 - 10.5 mg/dL   Brain Natriuretic Peptide   Result Value Ref Range    Pro-.6 0.0 - 1800.0 pg/mL   CBC with Auto Differential   Result Value Ref Range    WBC 3.7 (L) 4.8 - 10.8 thou/mm3    RBC 3.26 (L) 4.20 - 5.40 mill/mm3    Hemoglobin 7.7 (L) 12.0 - 16.0 gm/dl    Hematocrit 25.5 (L) 37.0 - 47.0 %    MCV 78.2 (L) 81.0 - 99.0 fL    MCH 23.6 (L) 26.0 - 33.0 pg    MCHC 30.2 (L) 32.2 - 35.5 gm/dl    RDW-CV 17.2 (H) 11.5 - 14.5 %    RDW-SD 48.8 (H) 35.0 - 45.0 fL    Platelets 052 325 - 311 thou/mm3    MPV 9.9 9.4 - 12.4 fL    Seg Neutrophils 65.0 %    Lymphocytes 23.9 %    Monocytes 8.9 %    Eosinophils 0.8 %    Basophils 1.1 %    Immature Granulocytes 0.3 %    Segs Absolute 2.4 1.8 - 7.7 thou/mm3    Lymphocytes Absolute 0.9 (L) 1.0 - 4.8 thou/mm3    Monocytes Absolute 0.3 (L) 0.4 - 1.3 thou/mm3    Eosinophils Absolute 0.0 0.0 - 0.4 thou/mm3    Basophils Absolute 0.0 0.0 - 0.1 thou/mm3    Immature Grans (Abs) 0.01 0.00 - 0.07 thou/mm3    nRBC 0 /100 wbc   Troponin   Result Value Ref Range    Troponin T < 0.010 ng/ml   Anion Gap   Result Value Ref Range    Anion Gap 8.0 8.0 - 16.0 meq/L   Glomerular Filtration Rate, Estimated   Result Value Ref Range    Est, Glom Filt Rate >90 ml/min/1.73m2   Osmolality   Result Value Ref Range    Osmolality Calc 257.8 (L) 275.0 - 300.0 mOsmol/kg   D-Dimer, Quantitative   Result Value Ref Range D-Dimer, Quant 664.00 (H) 0.00 - 500.00 ng/ml FEU   Hepatic Function Panel   Result Value Ref Range    Albumin 3.7 3.5 - 5.1 g/dL    Total Bilirubin 0.3 0.3 - 1.2 mg/dL    Bilirubin, Direct <0.2 0.0 - 0.3 mg/dL    Alkaline Phosphatase 64 38 - 126 U/L    AST 26 5 - 40 U/L    ALT 12 11 - 66 U/L    Total Protein 6.1 6.1 - 8.0 g/dL   Procalcitonin   Result Value Ref Range    Procalcitonin 0.08 0.01 - 0.09 ng/mL   Lactate, Sepsis   Result Value Ref Range    Lactic Acid, Sepsis 1.2 0.5 - 1.9 mmol/L   Blood occult stool screen #1   Result Value Ref Range    OCCULT BLOOD FECAL Positive    Urine with Reflexed Micro   Result Value Ref Range    Glucose, Ur NEGATIVE NEGATIVE mg/dl    Bilirubin Urine NEGATIVE NEGATIVE    Ketones, Urine 15 (A) NEGATIVE    Specific Gravity, Urine 1.017 1.002 - 1.030    Blood, Urine NEGATIVE NEGATIVE    pH, UA 7.0 5.0 - 9.0    Protein, UA NEGATIVE NEGATIVE    Urobilinogen, Urine 1.0 0.0 - 1.0 eu/dl    Nitrite, Urine NEGATIVE NEGATIVE    Leukocyte Esterase, Urine MODERATE (A) NEGATIVE    Color, UA YELLOW STRAW-YELLOW    Character, Urine CLEAR CLEAR-SL CLOUD    RBC, UA 0-2 0-2/hpf /hpf    WBC, UA 25-50 0-4/hpf /hpf    Epithelial Cells, UA NONE SEEN 3-5/hpf /hpf    Bacteria, UA MANY FEW/NONE SEEN /hpf    Casts UA NONE SEEN NONE SEEN /lpf    Crystals, UA NONE SEEN NONE SEEN    Renal Epithelial, UA NONE SEEN NONE SEEN    Yeast, UA NONE SEEN NONE SEEN    CASTS 2 NONE SEEN NONE SEEN /lpf    MISCELLANEOUS 2 NONE SEEN    Ferritin   Result Value Ref Range    Ferritin 24 10 - 291 ng/mL   Iron   Result Value Ref Range    Iron 18 (L) 50 - 170 ug/dL   Iron binding capacity   Result Value Ref Range    TIBC 373 171 - 450 ug/dL   Reticulocytes   Result Value Ref Range    Retic Ct Abs 2.0 0.5 - 2.0 %    Absolute Retic # 64.0 20.0 - 115.0 thou/mm3    Immature Retic Fract 19.5 (H) 3.0 - 15.9 %    Retic Hemoglobin 24.7 (L) 28.2 - 35.7 pg   Hemoglobin and Hematocrit   Result Value Ref Range    Hemoglobin 7.3 (L) 12.0 - 16.0 gm/dl    Hematocrit 24.1 (L) 37.0 - 47.0 %   Protime-INR   Result Value Ref Range    INR 0.93 0.85 - 1.13   APTT   Result Value Ref Range    aPTT 26.4 22.0 - 38.0 seconds   Hemoglobin and Hematocrit   Result Value Ref Range    Hemoglobin 7.1 (L) 12.0 - 16.0 gm/dl    Hematocrit 24.1 (L) 37.0 - 47.0 %   IRON SATURATION   Result Value Ref Range    Iron Saturation 5 (L) 20 - 50 %   Basic Metabolic Panel w/ Reflex to MG   Result Value Ref Range    Sodium 130 (L) 135 - 145 meq/L    Potassium reflex Magnesium 4.6 3.5 - 5.2 meq/L    Chloride 97 (L) 98 - 111 meq/L    CO2 26 23 - 33 meq/L    Glucose 104 70 - 108 mg/dL    BUN 17 7 - 22 mg/dL    Creatinine 0.6 0.4 - 1.2 mg/dL    Calcium 8.7 8.5 - 10.5 mg/dL   Anion Gap   Result Value Ref Range    Anion Gap 7.0 (L) 8.0 - 16.0 meq/L   Glomerular Filtration Rate, Estimated   Result Value Ref Range    Est, Glom Filt Rate >90 ml/min/1.73m2   Hemoglobin and Hematocrit   Result Value Ref Range    Hemoglobin 7.0 (LL) 12.0 - 16.0 gm/dl    Hematocrit 24.2 (L) 37.0 - 47.0 %   Hemoglobin and Hematocrit   Result Value Ref Range    Hemoglobin 7.4 (L) 12.0 - 16.0 gm/dl    Hematocrit 25.8 (L) 37.0 - 47.0 %   Hemoglobin and Hematocrit   Result Value Ref Range    Hemoglobin 8.1 (L) 12.0 - 16.0 gm/dl    Hematocrit 27.9 (L) 37.0 - 47.0 %   Hemoglobin and Hematocrit   Result Value Ref Range    Hemoglobin 7.8 (L) 12.0 - 16.0 gm/dl    Hematocrit 26.8 (L) 37.0 - 47.0 %   Hemoglobin and Hematocrit   Result Value Ref Range    Hemoglobin 8.5 (L) 12.0 - 16.0 gm/dl    Hematocrit 29.4 (L) 37.0 - 47.0 %   Hemoglobin and Hematocrit   Result Value Ref Range    Hemoglobin 7.8 (L) 12.0 - 16.0 gm/dl    Hematocrit 26.4 (L) 37.0 - 47.0 %   CBC with Auto Differential   Result Value Ref Range    WBC 5.0 4.8 - 10.8 thou/mm3    RBC 3.42 (L) 4.20 - 5.40 mill/mm3    Hemoglobin 8.4 (L) 12.0 - 16.0 gm/dl    Hematocrit 27.1 (L) 37.0 - 47.0 %    MCV 79.2 (L) 81.0 - 99.0 fL    MCH 24.6 (L) 26.0 - 33.0 pg    MCHC 31.0 (L) 32.2 - 35.5 gm/dl    RDW-CV 18.6 (H) 11.5 - 14.5 %    RDW-SD 49.0 (H) 35.0 - 45.0 fL    Platelets 101 542 - 172 thou/mm3    MPV 10.1 9.4 - 12.4 fL    Seg Neutrophils 77.2 %    Lymphocytes 10.6 %    Monocytes 8.8 %    Eosinophils 2.4 %    Basophils 0.6 %    Immature Granulocytes 0.4 %    Segs Absolute 3.9 1.8 - 7.7 thou/mm3    Lymphocytes Absolute 0.5 (L) 1.0 - 4.8 thou/mm3    Monocytes Absolute 0.4 0.4 - 1.3 thou/mm3    Eosinophils Absolute 0.1 0.0 - 0.4 thou/mm3    Basophils Absolute 0.0 0.0 - 0.1 thou/mm3    Immature Grans (Abs) 0.02 0.00 - 0.07 thou/mm3    nRBC 0 /100 wbc   Basic Metabolic Panel   Result Value Ref Range    Sodium 134 (L) 135 - 145 meq/L    Potassium 2.9 (L) 3.5 - 5.2 meq/L    Chloride 95 (L) 98 - 111 meq/L    CO2 29 23 - 33 meq/L    Glucose 108 70 - 108 mg/dL    BUN 3 (L) 7 - 22 mg/dL    Creatinine 0.5 0.4 - 1.2 mg/dL    Calcium 8.5 8.5 - 10.5 mg/dL   Anion Gap   Result Value Ref Range    Anion Gap 10.0 8.0 - 16.0 meq/L   Glomerular Filtration Rate, Estimated   Result Value Ref Range    Est, Glom Filt Rate >90 ml/min/1.73m2   Magnesium   Result Value Ref Range    Magnesium 1.4 (L) 1.6 - 2.4 mg/dL   Potassium   Result Value Ref Range    Potassium 3.2 (L) 3.5 - 5.2 meq/L   Magnesium   Result Value Ref Range    Magnesium 1.8 1.6 - 2.4 mg/dL   CBC with Auto Differential   Result Value Ref Range    WBC 4.8 4.8 - 10.8 thou/mm3    RBC 3.35 (L) 4.20 - 5.40 mill/mm3    Hemoglobin 8.1 (L) 12.0 - 16.0 gm/dl    Hematocrit 27.5 (L) 37.0 - 47.0 %    MCV 82.1 81.0 - 99.0 fL    MCH 24.2 (L) 26.0 - 33.0 pg    MCHC 29.5 (L) 32.2 - 35.5 gm/dl    RDW-CV 19.1 (H) 11.5 - 14.5 %    RDW-SD 52.3 (H) 35.0 - 45.0 fL    Platelets 305 016 - 449 thou/mm3    MPV 9.9 9.4 - 12.4 fL    Seg Neutrophils 61.4 %    Lymphocytes 19.3 %    Monocytes 12.0 %    Eosinophils 6.3 %    Basophils 0.8 %    Immature Granulocytes 0.2 %    Segs Absolute 2.9 1.8 - 7.7 thou/mm3    Lymphocytes Absolute 0.9 (L) 1.0 - 4.8 thou/mm3    Monocytes Absolute 0.6 0.4 - 1.3 thou/mm3    Eosinophils Absolute 0.3 0.0 - 0.4 thou/mm3    Basophils Absolute 0.0 0.0 - 0.1 thou/mm3    Immature Grans (Abs) 0.01 0.00 - 0.07 thou/mm3    nRBC 0 /100 wbc   Basic Metabolic Panel   Result Value Ref Range    Sodium 129 (L) 135 - 145 meq/L    Potassium 3.6 3.5 - 5.2 meq/L    Chloride 93 (L) 98 - 111 meq/L    CO2 24 23 - 33 meq/L    Glucose 103 70 - 108 mg/dL    BUN 5 (L) 7 - 22 mg/dL    Creatinine 0.5 0.4 - 1.2 mg/dL    Calcium 8.3 (L) 8.5 - 10.5 mg/dL   Potassium   Result Value Ref Range    Potassium 3.6 3.5 - 5.2 meq/L   Magnesium   Result Value Ref Range    Magnesium 1.8 1.6 - 2.4 mg/dL   Magnesium   Result Value Ref Range    Magnesium 2.0 1.6 - 2.4 mg/dL   Anion Gap   Result Value Ref Range    Anion Gap 12.0 8.0 - 16.0 meq/L   Glomerular Filtration Rate, Estimated   Result Value Ref Range    Est, Glom Filt Rate >90 ml/min/1.73m2   Potassium   Result Value Ref Range    Potassium 4.1 3.5 - 5.2 meq/L   Magnesium   Result Value Ref Range    Magnesium 2.0 1.6 - 2.4 mg/dL   CBC with Auto Differential   Result Value Ref Range    WBC 4.6 (L) 4.8 - 10.8 thou/mm3    RBC 3.43 (L) 4.20 - 5.40 mill/mm3    Hemoglobin 8.3 (L) 12.0 - 16.0 gm/dl    Hematocrit 27.5 (L) 37.0 - 47.0 %    MCV 80.2 (L) 81.0 - 99.0 fL    MCH 24.2 (L) 26.0 - 33.0 pg    MCHC 30.2 (L) 32.2 - 35.5 gm/dl    RDW-CV 19.6 (H) 11.5 - 14.5 %    RDW-SD 52.2 (H) 35.0 - 45.0 fL    Platelets 973 639 - 426 thou/mm3    MPV 9.6 9.4 - 12.4 fL    Seg Neutrophils 64.3 %    Lymphocytes 16.6 %    Monocytes 10.0 %    Eosinophils 7.6 %    Basophils 1.1 %    Immature Granulocytes 0.4 %    Segs Absolute 3.0 1.8 - 7.7 thou/mm3    Lymphocytes Absolute 0.8 (L) 1.0 - 4.8 thou/mm3    Monocytes Absolute 0.5 0.4 - 1.3 thou/mm3    Eosinophils Absolute 0.3 0.0 - 0.4 thou/mm3    Basophils Absolute 0.1 0.0 - 0.1 thou/mm3    Immature Grans (Abs) 0.02 0.00 - 0.07 thou/mm3    nRBC 0 /100 wbc   Basic Metabolic Panel   Result Value Ref Range    Sodium 126 (L) 135 - 145 meq/L    Potassium 4.3 3.5 - 5.2 meq/L    Chloride 93 (L) 98 - 111 meq/L    CO2 27 23 - 33 meq/L    Glucose 100 70 - 108 mg/dL    BUN 7 7 - 22 mg/dL    Creatinine 0.5 0.4 - 1.2 mg/dL    Calcium 8.4 (L) 8.5 - 10.5 mg/dL   Magnesium   Result Value Ref Range    Magnesium 1.8 1.6 - 2.4 mg/dL   Anion Gap   Result Value Ref Range    Anion Gap 6.0 (L) 8.0 - 16.0 meq/L   Glomerular Filtration Rate, Estimated   Result Value Ref Range    Est, Glom Filt Rate >90 ml/min/1.73m2   EKG 12 Lead   Result Value Ref Range    Ventricular Rate 60 BPM    Atrial Rate 60 BPM    P-R Interval 170 ms    QRS Duration 74 ms    Q-T Interval 400 ms    QTc Calculation (Bazett) 400 ms    P Axis 41 degrees    R Axis 64 degrees    T Axis 63 degrees       EKG / Radiology:     EKG:  Reviewed by me --    CXR:   Reviewed by me --    XR CHEST PORTABLE    Result Date: 8/16/2022  Single view of the chest Comparison:  ORLIN,SR - XR CHEST 1 VW - 03/16/2021 10:13 AM EDT Findings: Elevation right hemidiaphragm. Opacity left lung base. This could represent aspiration, pneumonia or volume loss. Overall this has decreased. Stable pulmonary nodule right perihilar region measuring 7 mm. The heart is normal size. Impression: Opacity left lung base. This could represent aspiration, pneumonia and/or volume loss.  This document has been electronically signed by: Garcia Gee MD on 08/16/2022 07:01 PM      Electronically signed by Noel Watts MD on 8/23/2022 at 11:32 AM

## 2022-08-23 NOTE — CARE COORDINATION
8/23/22, 1:17 PM EDT    DISCHARGE ON GOING [address] day: 7  Location: 8A-19/019-A Reason for admit: Shortness of breath [R06.02]  Hyponatremia [E87.1]  Iron deficiency anemia due to chronic blood loss [D50.0]  Dyspnea and respiratory abnormalities [R06.00, R06.89]  Pneumonia of left lower lobe due to infectious organism [J18.9]   Procedure: 8-17-22 EGD with bx. Ulcers and erosions. Barriers to Discharge: Na+ low again today. 126 today from 134 two days ago. Pt with difficulty voiding last evening. Straight cath ordered. PT/OT following, pt deconditioned. PCP: Asia Trevizo DO  Readmission Risk Score: 14.1%  Patient Goals/Plan/Treatment Preferences: Pt approved for [facility]. Possible discharge tomorrow pending labs. SW following.

## 2022-08-23 NOTE — PLAN OF CARE
Problem: Discharge Planning  Goal: Discharge to home or other facility with appropriate resources  8/22/2022 2144 by Mansoor Horvath, RN  Outcome: Progressing-Patient educated on barriers to discharge such as precerpt and placement. Will continue to monitor. 8/22/2022 0755 by Willy Christianson RN  Outcome: Progressing     Problem: Pain  Goal: Verbalizes/displays adequate comfort level or baseline comfort level  Outcome: Progressing-Patient is not verbalizing pain at this time. Will continue to monitor and medicate accordingly. Problem: Safety - Adult  Goal: Free from fall injury  Outcome: Progressing-No falls this stay. Patient in fall precautions and educated, will continue to monitor. Problem: Skin/Tissue Integrity  Goal: Absence of new skin breakdown  Description: 1. Monitor for areas of redness and/or skin breakdown  2. Assess vascular access sites hourly  3. Every 4-6 hours minimum:  Change oxygen saturation probe site  4. Every 4-6 hours:  If on nasal continuous positive airway pressure, respiratory therapy assess nares and determine need for appliance change or resting period. Outcome: Progressing-No new signs of skin breakdown noted. Will continue to monitor. Problem: ABCDS Injury Assessment  Goal: Absence of physical injury  Outcome: Progressing-No injury this stay. Patient in fall precautions and educated, will continue to monitor.

## 2022-08-24 VITALS
DIASTOLIC BLOOD PRESSURE: 88 MMHG | SYSTOLIC BLOOD PRESSURE: 142 MMHG | BODY MASS INDEX: 28.38 KG/M2 | OXYGEN SATURATION: 97 % | HEIGHT: 67 IN | WEIGHT: 180.8 LBS | HEART RATE: 92 BPM | RESPIRATION RATE: 16 BRPM | TEMPERATURE: 98.3 F

## 2022-08-24 LAB
BUN BLDV-MCNC: 5 MG/DL (ref 7–22)
CALCIUM SERPL-MCNC: 8.4 MG/DL (ref 8.5–10.5)
CHLORIDE BLD-SCNC: 99 MEQ/L (ref 98–111)
CO2: 22 MEQ/L (ref 23–33)
CREAT SERPL-MCNC: 0.5 MG/DL (ref 0.4–1.2)
GFR SERPL CREATININE-BSD FRML MDRD: > 90 ML/MIN/1.73M2
GLUCOSE BLD-MCNC: 93 MG/DL (ref 70–108)
MAGNESIUM: 1.7 MG/DL (ref 1.6–2.4)
MAGNESIUM: 1.8 MG/DL (ref 1.6–2.4)
POTASSIUM SERPL-SCNC: 4 MEQ/L (ref 3.5–5.2)
POTASSIUM SERPL-SCNC: 4.8 MEQ/L (ref 3.5–5.2)
SARS-COV-2, NAAT: NOT  DETECTED
SODIUM BLD-SCNC: 130 MEQ/L (ref 135–145)
SODIUM BLD-SCNC: 130 MEQ/L (ref 135–145)

## 2022-08-24 PROCEDURE — 6370000000 HC RX 637 (ALT 250 FOR IP): Performed by: NURSE PRACTITIONER

## 2022-08-24 PROCEDURE — 99239 HOSP IP/OBS DSCHRG MGMT >30: CPT | Performed by: INTERNAL MEDICINE

## 2022-08-24 PROCEDURE — 80048 BASIC METABOLIC PNL TOTAL CA: CPT

## 2022-08-24 PROCEDURE — 97535 SELF CARE MNGMENT TRAINING: CPT

## 2022-08-24 PROCEDURE — 83735 ASSAY OF MAGNESIUM: CPT

## 2022-08-24 PROCEDURE — 36415 COLL VENOUS BLD VENIPUNCTURE: CPT

## 2022-08-24 PROCEDURE — 6370000000 HC RX 637 (ALT 250 FOR IP): Performed by: STUDENT IN AN ORGANIZED HEALTH CARE EDUCATION/TRAINING PROGRAM

## 2022-08-24 PROCEDURE — 84295 ASSAY OF SERUM SODIUM: CPT

## 2022-08-24 PROCEDURE — 6370000000 HC RX 637 (ALT 250 FOR IP): Performed by: INTERNAL MEDICINE

## 2022-08-24 PROCEDURE — 84132 ASSAY OF SERUM POTASSIUM: CPT

## 2022-08-24 PROCEDURE — 87635 SARS-COV-2 COVID-19 AMP PRB: CPT

## 2022-08-24 RX ORDER — SODIUM CHLORIDE 1000 MG
1 TABLET, SOLUBLE MISCELLANEOUS DAILY
Qty: 30 TABLET | Refills: 0 | DISCHARGE
Start: 2022-08-24 | End: 2022-10-03

## 2022-08-24 RX ADMIN — PREGABALIN 150 MG: 75 CAPSULE ORAL at 08:44

## 2022-08-24 RX ADMIN — FLUOXETINE HYDROCHLORIDE 20 MG: 20 CAPSULE ORAL at 08:44

## 2022-08-24 RX ADMIN — LEVOTHYROXINE SODIUM 100 MCG: 0.1 TABLET ORAL at 06:05

## 2022-08-24 RX ADMIN — POLYETHYLENE GLYCOL 3350 17 G: 17 POWDER, FOR SOLUTION ORAL at 08:45

## 2022-08-24 RX ADMIN — PANTOPRAZOLE SODIUM 40 MG: 40 TABLET, DELAYED RELEASE ORAL at 06:05

## 2022-08-24 RX ADMIN — FLUDROCORTISONE ACETATE 0.1 MG: 0.1 TABLET ORAL at 08:44

## 2022-08-24 NOTE — DISCHARGE SUMMARY
Hospitalist discharge note      Patient:  Lorenza Gomez Height    Unit/Bed:8A-19/019-A  YOB: 1940  MRN: [id number]   Acct: [de-identified]     PCP: Rusty Murcia DO  Date of Admission: 8/16/2022      ASSESSMENT / PLAN:    Acute Blood Loss Anemia-likely from upper GI bleed-planning for EGD today 8/17-had EGD showed multiple [place] erosions around the large hyperchromia, did have prepyloric antrum also. No interventions needed PPI twice daily for 8 weeks hemoglobin stable around 7.4 patient is a [employer] cannot have blood transfusion    8/21-hemoglobin has been stable around 8.4 no more melena no gross bleeds    8/24-hemoglobin has been stable    Orthostatic hypotension-likely secondary to GI bleed-add 1 L bolus-8/17-recheck blood pressures 1 hour after the bolus    8/18 still orthostatic and dizzy while standing up-we will give a bolus again as well as continue IV fluids compression stockings, added Florinef    8/19- better with iv fluids     Dyspnea likely related to IV fluid, no hypoxia, no CHF- will do trial of BUMEX IV 1 MG x 2 doses- 8/20 8/21-diuresing well will need potassium and magnesium replaced today added 60 mmol of potassium and 3 g of magnesium today recheck in few hours we will add 1 more dose of IV Lasix just for volume overload not in CHF      Acute on chronic hyponatremia-check urine sodium and urine osmolality likely secondary to IV diuretics that she had we will add IV fluids for few hours check sodium level every 8 hours-hold off the discharge today 8/23 8/24-sodium levels improved with IV fluid, will add salt tablets as per the family patient is not using any form of salt at home or while she was at the assisted living and has been told by the PCP to use salt previously however patient stays away from this    Large Hiatal/Paraesophageal Hernia: History of undergoing robotic repair with obvious G-tube insertion in March 2021.   During that time no formal repair was performed as hernia was only reduced to about 80%. G-tube has been removed in the past.  EGD done on 6/2022 showed a large fixed hernia which looked improved however may need some readjustment. Patient saw general surgery and surgical intervention for G-tube insertion versus observation was discussed. Patient and family wanted to proceed with conservative management only. Hx of HTN: Controlled. Resume home meds. Recently seen by cardiology for shortness of breath and PVCs. Assessed and was negative for any ischemia. Holter monitor was unremarkable. Hx of Hypothyroidism: Resume home synthroid    Hx of Anxiety/Depression: Resume Prozac    Hx of Fibromyalgia: Resume Lyrica     Plan for discharge today    DISCHARGE DETAILS:-    Date of Admission: 8/16/2022  Date of Discharge : 8/24/2022      Stable for discharge to nursing home  PCP in one week after discharge. GI follow-up as outpatient as scheduled    Time spent -35 minutes       Chief Complaint: Hartness of breath    History of Present Illness:  78 y.o. female who presented to [address] [place] [address] with shortness of breath and fatigue. She states for the last few days her shortness of breath has worsened. Kids have also noticed more pallor. Patient also has some bruising and on her right side of the face however family states it was from a mechanical fall from her electric chair. She has history of iron deficiency anemia and has received iron transfusions in the past.  She states her stool has been darker in the last few days. Does take Mobic daily at home.        Medication List        START taking these medications      doxycycline hyclate 100 MG tablet  Commonly known as: VIBRA-TABS  Take 1 tablet by mouth every 12 hours for 10 days     ferrous sulfate 325 (65 Fe) MG tablet  Commonly known as: IRON 325  Take 1 tablet by mouth 2 times daily     fluticasone 50 MCG/ACT nasal spray  Commonly known as: FLONASE     pantoprazole 40 MG tablet  Commonly known as: PROTONIX  Take 1 tablet by mouth 2 times daily (before meals)     polyethylene glycol 17 g packet  Commonly known as: GLYCOLAX  Take 17 g by mouth daily     sodium chloride 1 g tablet  Take 1 tablet by mouth daily            CONTINUE taking these medications      calcium-vitamin D 500-200 MG-UNIT per tablet  Commonly known as: OSCAL-500     docusate sodium 100 MG capsule  Commonly known as: COLACE  Take 1 capsule by mouth 2 times daily as needed for Constipation     enalapril 10 MG tablet  Commonly known as: VASOTEC  TAKE 1 TABLET NIGHTLY     FLUoxetine 20 MG capsule  Commonly known as: PROZAC  Take 1 capsule by mouth daily     levothyroxine 100 MCG tablet  Commonly known as: SYNTHROID  TAKE 1 TABLET DAILY     metoprolol succinate 50 MG extended release tablet  Commonly known as: TOPROL XL  TAKE 1 TABLET DAILY     * Misc. Devices Misc  Mechanical lift for a [employer] Misc  1 each by Does not apply route daily     Omega 3-6-9 Complex Caps     ondansetron 4 MG tablet  Commonly known as: ZOFRAN  Take 1 tablet by mouth 3 times daily as needed for Nausea or Vomiting     pregabalin 150 MG capsule  Commonly known as: LYRICA  Take 1 capsule by mouth in the morning and 1 capsule before bedtime. Do all this for 180 days. Scooter Misc  by Does not apply route Power scooter     therapeutic multivitamin-minerals tablet     Vitamin D3 50 MCG (2000 UT) Caps           * This list has 2 medication(s) that are the same as other medications prescribed for you. Read the directions carefully, and ask your doctor or other care provider to review them with you.                 STOP taking these medications      meloxicam 15 MG tablet  Commonly known as: MOBIC               Where to Get Your Medications        These medications were sent to [address] [place] [address] [id number] [address] [address]      Phone: [phone]   doxycycline hyclate 100 MG tablet  ferrous sulfate 325 (65 Fe) MG tablet  pantoprazole 40 MG tablet  polyethylene glycol 17 g packet       Information about where to get these medications is not yet available    Ask your nurse or doctor about these medications  sodium chloride 1 g tablet        PHYSICAL EXAM:  Vitals:    08/23/22 2315 08/24/22 0303 08/24/22 0830 08/24/22 1113   BP: (!) 148/69 (!) 147/72 137/75 (!) 142/88   Pulse: 85 79 83 92   Resp: 16 16 16 16   Temp: 98.1 °F (36.7 °C) 98.1 °F (36.7 °C) 98.6 °F (37 °C) 98.3 °F (36.8 °C)   TempSrc: Oral Oral Oral Oral   SpO2: 96% 93% 96% 97%   Weight:       Height:         General appearance: Alert / well-appearing. Cooperative. NAD. HEENT:  Normocephalic / atraumatic. PERRL. EOM intact. Pale conjunctiva  Neck: Supple. No JVD. Respiratory: Normal respiratory effort on RA. CTAB. No wheezes / rales / rhonchi. Cardiovascular: RRR. Normal S1/S2. No murmurs / rubs / gallops. Abdomen: Soft / non-tender / non-distended. BS present. Musculoskeletal: No cyanosis or edema. Skin: Warm / dry. Normal turgor. Pallor; bruising noted on right side of the face  Neurologic: A/O x 3. Speech normal. Answers questions appropriately. CN intact. No obvious focal neurologic deficits. Psychiatric: Thought content / judgment / insight appear appropriate. Capillary refill: Brisk bilaterally. Peripheral pulses: +2 bilaterally. Labs:   Results for orders placed or performed during the hospital encounter of 08/16/22   COVID-19, Rapid    Specimen: Nasopharyngeal Swab   Result Value Ref Range    SARS-CoV-2, NAAT NOT  DETECTED NOT DETECTED   Culture, Blood 1    Specimen: Blood   Result Value Ref Range    Blood Culture, Routine       No growth 24 hours. No growth 48 hours. No growth at 5 days    Culture, Blood 2    Specimen: Blood   Result Value Ref Range    Blood Culture, Routine       No growth 24 hours. No growth 48 hours.  No growth at 5 days    Culture, Reflexed, Urine    Specimen: Urine, catheter   Result Value Ref Range    Organism Aerococcus urinae (A)     Urine Culture Reflex       [place] count: >100,000 CFU/mL A. urinae is generally susceptible to ampicillin, penicillin, amoxicillin, beta lactams, cephalosporins and doxycycline. This organism has variable susceptibility to levofloxacin, vancomycin and trimethoprim-sulfamethoxazole.       COVID-19, Rapid    Specimen: Nasopharyngeal Swab   Result Value Ref Range    SARS-CoV-2, NAAT NOT  DETECTED NOT DETECTED   Basic Metabolic Panel w/ Reflex to MG   Result Value Ref Range    Sodium 127 (L) 135 - 145 meq/L    Potassium reflex Magnesium 3.9 3.5 - 5.2 meq/L    Chloride 94 (L) 98 - 111 meq/L    CO2 25 23 - 33 meq/L    Glucose 104 70 - 108 mg/dL    BUN 19 7 - 22 mg/dL    Creatinine 0.5 0.4 - 1.2 mg/dL    Calcium 8.6 8.5 - 10.5 mg/dL   Brain Natriuretic Peptide   Result Value Ref Range    Pro-.6 0.0 - 1800.0 pg/mL   CBC with Auto Differential   Result Value Ref Range    WBC 3.7 (L) 4.8 - 10.8 thou/mm3    RBC 3.26 (L) 4.20 - 5.40 mill/mm3    Hemoglobin 7.7 (L) 12.0 - 16.0 gm/dl    Hematocrit 25.5 (L) 37.0 - 47.0 %    MCV 78.2 (L) 81.0 - 99.0 fL    MCH 23.6 (L) 26.0 - 33.0 pg    MCHC 30.2 (L) 32.2 - 35.5 gm/dl    RDW-CV 17.2 (H) 11.5 - 14.5 %    RDW-SD 48.8 (H) 35.0 - 45.0 fL    Platelets 622 446 - 958 thou/mm3    MPV 9.9 9.4 - 12.4 fL    Seg Neutrophils 65.0 %    Lymphocytes 23.9 %    Monocytes 8.9 %    Eosinophils 0.8 %    Basophils 1.1 %    Immature Granulocytes 0.3 %    Segs Absolute 2.4 1.8 - 7.7 thou/mm3    Lymphocytes Absolute 0.9 (L) 1.0 - 4.8 thou/mm3    Monocytes Absolute 0.3 (L) 0.4 - 1.3 thou/mm3    Eosinophils Absolute 0.0 0.0 - 0.4 thou/mm3    Basophils Absolute 0.0 0.0 - 0.1 thou/mm3    Immature Grans (Abs) 0.01 0.00 - 0.07 thou/mm3    nRBC 0 /100 wbc   Troponin   Result Value Ref Range    Troponin T < 0.010 ng/ml   Anion Gap   Result Value Ref Range    Anion Gap 8.0 8.0 - 16.0 meq/L   Glomerular Filtration Rate, Estimated   Result Value Ref Range Est, Glom Filt Rate >90 ml/min/1.73m2   Osmolality   Result Value Ref Range    Osmolality Calc 257.8 (L) 275.0 - 300.0 mOsmol/kg   D-Dimer, Quantitative   Result Value Ref Range    D-Dimer, Quant 664.00 (H) 0.00 - 500.00 ng/ml FEU   Hepatic Function Panel   Result Value Ref Range    Albumin 3.7 3.5 - 5.1 g/dL    Total Bilirubin 0.3 0.3 - 1.2 mg/dL    Bilirubin, Direct <0.2 0.0 - 0.3 mg/dL    Alkaline Phosphatase 64 38 - 126 U/L    AST 26 5 - 40 U/L    ALT 12 11 - 66 U/L    Total Protein 6.1 6.1 - 8.0 g/dL   Procalcitonin   Result Value Ref Range    Procalcitonin 0.08 0.01 - 0.09 ng/mL   Lactate, Sepsis   Result Value Ref Range    Lactic Acid, Sepsis 1.2 0.5 - 1.9 mmol/L   Blood occult stool screen #1   Result Value Ref Range    OCCULT BLOOD FECAL Positive    Urine with Reflexed Micro   Result Value Ref Range    Glucose, Ur NEGATIVE NEGATIVE mg/dl    Bilirubin Urine NEGATIVE NEGATIVE    Ketones, Urine 15 (A) NEGATIVE    Specific Gravity, Urine 1.017 1.002 - 1.030    Blood, Urine NEGATIVE NEGATIVE    pH, UA 7.0 5.0 - 9.0    Protein, UA NEGATIVE NEGATIVE    Urobilinogen, Urine 1.0 0.0 - 1.0 eu/dl    Nitrite, Urine NEGATIVE NEGATIVE    Leukocyte Esterase, Urine MODERATE (A) NEGATIVE    Color, UA YELLOW STRAW-YELLOW    Character, Urine CLEAR CLEAR-SL CLOUD    RBC, UA 0-2 0-2/hpf /hpf    WBC, UA 25-50 0-4/hpf /hpf    Epithelial Cells, UA NONE SEEN 3-5/hpf /hpf    Bacteria, UA MANY FEW/NONE SEEN /hpf    Casts UA NONE SEEN NONE SEEN /lpf    Crystals, UA NONE SEEN NONE SEEN    Renal Epithelial, UA NONE SEEN NONE SEEN    Yeast, UA NONE SEEN NONE SEEN    CASTS 2 NONE SEEN NONE SEEN /lpf    MISCELLANEOUS 2 NONE SEEN    Ferritin   Result Value Ref Range    Ferritin 24 10 - 291 ng/mL   Iron   Result Value Ref Range    Iron 18 (L) 50 - 170 ug/dL   Iron binding capacity   Result Value Ref Range    TIBC 373 171 - 450 ug/dL   Reticulocytes   Result Value Ref Range    Retic Ct Abs 2.0 0.5 - 2.0 %    Absolute Retic # 64.0 20.0 - 115.0 thou/mm3    Immature Retic Fract 19.5 (H) 3.0 - 15.9 %    Retic Hemoglobin 24.7 (L) 28.2 - 35.7 pg   Hemoglobin and Hematocrit   Result Value Ref Range    Hemoglobin 7.3 (L) 12.0 - 16.0 gm/dl    Hematocrit 24.1 (L) 37.0 - 47.0 %   Protime-INR   Result Value Ref Range    INR 0.93 0.85 - 1.13   APTT   Result Value Ref Range    aPTT 26.4 22.0 - 38.0 seconds   Hemoglobin and Hematocrit   Result Value Ref Range    Hemoglobin 7.1 (L) 12.0 - 16.0 gm/dl    Hematocrit 24.1 (L) 37.0 - 47.0 %   IRON SATURATION   Result Value Ref Range    Iron Saturation 5 (L) 20 - 50 %   Basic Metabolic Panel w/ Reflex to MG   Result Value Ref Range    Sodium 130 (L) 135 - 145 meq/L    Potassium reflex Magnesium 4.6 3.5 - 5.2 meq/L    Chloride 97 (L) 98 - 111 meq/L    CO2 26 23 - 33 meq/L    Glucose 104 70 - 108 mg/dL    BUN 17 7 - 22 mg/dL    Creatinine 0.6 0.4 - 1.2 mg/dL    Calcium 8.7 8.5 - 10.5 mg/dL   Anion Gap   Result Value Ref Range    Anion Gap 7.0 (L) 8.0 - 16.0 meq/L   Glomerular Filtration Rate, Estimated   Result Value Ref Range    Est, Glom Filt Rate >90 ml/min/1.73m2   Hemoglobin and Hematocrit   Result Value Ref Range    Hemoglobin 7.0 (LL) 12.0 - 16.0 gm/dl    Hematocrit 24.2 (L) 37.0 - 47.0 %   Hemoglobin and Hematocrit   Result Value Ref Range    Hemoglobin 7.4 (L) 12.0 - 16.0 gm/dl    Hematocrit 25.8 (L) 37.0 - 47.0 %   Hemoglobin and Hematocrit   Result Value Ref Range    Hemoglobin 8.1 (L) 12.0 - 16.0 gm/dl    Hematocrit 27.9 (L) 37.0 - 47.0 %   Hemoglobin and Hematocrit   Result Value Ref Range    Hemoglobin 7.8 (L) 12.0 - 16.0 gm/dl    Hematocrit 26.8 (L) 37.0 - 47.0 %   Hemoglobin and Hematocrit   Result Value Ref Range    Hemoglobin 8.5 (L) 12.0 - 16.0 gm/dl    Hematocrit 29.4 (L) 37.0 - 47.0 %   Hemoglobin and Hematocrit   Result Value Ref Range    Hemoglobin 7.8 (L) 12.0 - 16.0 gm/dl    Hematocrit 26.4 (L) 37.0 - 47.0 %   CBC with Auto Differential   Result Value Ref Range    WBC 5.0 4.8 - 10.8 thou/mm3    RBC 3.42 (L) 4.20 - 5.40 mill/mm3    Hemoglobin 8.4 (L) 12.0 - 16.0 gm/dl    Hematocrit 27.1 (L) 37.0 - 47.0 %    MCV 79.2 (L) 81.0 - 99.0 fL    MCH 24.6 (L) 26.0 - 33.0 pg    MCHC 31.0 (L) 32.2 - 35.5 gm/dl    RDW-CV 18.6 (H) 11.5 - 14.5 %    RDW-SD 49.0 (H) 35.0 - 45.0 fL    Platelets 815 216 - 302 thou/mm3    MPV 10.1 9.4 - 12.4 fL    Seg Neutrophils 77.2 %    Lymphocytes 10.6 %    Monocytes 8.8 %    Eosinophils 2.4 %    Basophils 0.6 %    Immature Granulocytes 0.4 %    Segs Absolute 3.9 1.8 - 7.7 thou/mm3    Lymphocytes Absolute 0.5 (L) 1.0 - 4.8 thou/mm3    Monocytes Absolute 0.4 0.4 - 1.3 thou/mm3    Eosinophils Absolute 0.1 0.0 - 0.4 thou/mm3    Basophils Absolute 0.0 0.0 - 0.1 thou/mm3    Immature Grans (Abs) 0.02 0.00 - 0.07 thou/mm3    nRBC 0 /100 wbc   Basic Metabolic Panel   Result Value Ref Range    Sodium 134 (L) 135 - 145 meq/L    Potassium 2.9 (L) 3.5 - 5.2 meq/L    Chloride 95 (L) 98 - 111 meq/L    CO2 29 23 - 33 meq/L    Glucose 108 70 - 108 mg/dL    BUN 3 (L) 7 - 22 mg/dL    Creatinine 0.5 0.4 - 1.2 mg/dL    Calcium 8.5 8.5 - 10.5 mg/dL   Anion Gap   Result Value Ref Range    Anion Gap 10.0 8.0 - 16.0 meq/L   Glomerular Filtration Rate, Estimated   Result Value Ref Range    Est, Glom Filt Rate >90 ml/min/1.73m2   Magnesium   Result Value Ref Range    Magnesium 1.4 (L) 1.6 - 2.4 mg/dL   Potassium   Result Value Ref Range    Potassium 3.2 (L) 3.5 - 5.2 meq/L   Magnesium   Result Value Ref Range    Magnesium 1.8 1.6 - 2.4 mg/dL   CBC with Auto Differential   Result Value Ref Range    WBC 4.8 4.8 - 10.8 thou/mm3    RBC 3.35 (L) 4.20 - 5.40 mill/mm3    Hemoglobin 8.1 (L) 12.0 - 16.0 gm/dl    Hematocrit 27.5 (L) 37.0 - 47.0 %    MCV 82.1 81.0 - 99.0 fL    MCH 24.2 (L) 26.0 - 33.0 pg    MCHC 29.5 (L) 32.2 - 35.5 gm/dl    RDW-CV 19.1 (H) 11.5 - 14.5 %    RDW-SD 52.3 (H) 35.0 - 45.0 fL    Platelets 938 827 - 397 thou/mm3    MPV 9.9 9.4 - 12.4 fL    Seg Neutrophils 61.4 %    Lymphocytes 19.3 %    Monocytes 12.0 % Basophils Absolute 0.1 0.0 - 0.1 thou/mm3    Immature Grans (Abs) 0.02 0.00 - 0.07 thou/mm3    nRBC 0 /100 wbc   Basic Metabolic Panel   Result Value Ref Range    Sodium 126 (L) 135 - 145 meq/L    Potassium 4.3 3.5 - 5.2 meq/L    Chloride 93 (L) 98 - 111 meq/L    CO2 27 23 - 33 meq/L    Glucose 100 70 - 108 mg/dL    BUN 7 7 - 22 mg/dL    Creatinine 0.5 0.4 - 1.2 mg/dL    Calcium 8.4 (L) 8.5 - 10.5 mg/dL   Magnesium   Result Value Ref Range    Magnesium 1.8 1.6 - 2.4 mg/dL   Potassium   Result Value Ref Range    Potassium 4.6 3.5 - 5.2 meq/L   Magnesium   Result Value Ref Range    Magnesium 1.9 1.6 - 2.4 mg/dL   Anion Gap   Result Value Ref Range    Anion Gap 6.0 (L) 8.0 - 16.0 meq/L   Glomerular Filtration Rate, Estimated   Result Value Ref Range    Est, Glom Filt Rate >90 ml/min/1.73m2   Sodium, urine, random   Result Value Ref Range    Sodium, Ur 36 meq/l   Osmolality, urine   Result Value Ref Range    Osmolality, Ur 189 (L) 250 - 750 mosmol/kg   Sodium   Result Value Ref Range    Sodium 126 (L) 135 - 145 meq/L   Potassium   Result Value Ref Range    Potassium 4.8 3.5 - 5.2 meq/L   Magnesium   Result Value Ref Range    Magnesium 1.8 1.6 - 2.4 mg/dL   Sodium   Result Value Ref Range    Sodium 130 (L) 135 - 145 meq/L   Sodium   Result Value Ref Range    Sodium 127 (L) 135 - 145 meq/L   Chloride   Result Value Ref Range    Chloride 99 98 - 111 meq/L   CO2, Total   Result Value Ref Range    CO2 22 (L) 23 - 33 meq/L   Glucose, Random   Result Value Ref Range    Glucose 93 70 - 108 mg/dL   BUN   Result Value Ref Range    BUN 5 (L) 7 - 22 mg/dL   Creatinine   Result Value Ref Range    Creatinine 0.5 0.4 - 1.2 mg/dL   Calcium   Result Value Ref Range    Calcium 8.4 (L) 8.5 - 10.5 mg/dL   Glomerular Filtration Rate, Estimated   Result Value Ref Range    Est, Glom Filt Rate >90 ml/min/1.73m2   EKG 12 Lead   Result Value Ref Range    Ventricular Rate 60 BPM    Atrial Rate 60 BPM    P-R Interval 170 ms    QRS Duration 74 ms Q-T Interval 400 ms    QTc Calculation (Bazett) 400 ms    P Axis 41 degrees    R Axis 64 degrees    T Axis 63 degrees       EKG / Radiology:     EKG:  Reviewed by me --    CXR:   Reviewed by me --    XR CHEST PORTABLE    Result Date: 8/16/2022  Single view of the chest Comparison:  ORLIN,SR - XR CHEST 1 VW - 03/16/2021 10:13 AM EDT Findings: Elevation right hemidiaphragm. Opacity left lung base. This could represent aspiration, pneumonia or volume loss. Overall this has decreased. Stable pulmonary nodule right perihilar region measuring 7 mm. The heart is normal size. Impression: Opacity left lung base. This could represent aspiration, pneumonia and/or volume loss.  This document has been electronically signed by: Adrianne Jarquin MD on 08/16/2022 07:01 PM      Electronically signed by Yen Levine MD on 8/24/2022 at 12:05 PM

## 2022-08-24 NOTE — PROGRESS NOTES
Report called to Wilman Jauregui RN at [facility]. All questions and concerns addressed. Patient ready for discharge and awaiting daughter's arrival. All forms faxed to [facility]. Went over AVS discharge with the patient and daughter. All questions and concerns addressed prior to discharge. Patient successfully discharged to [facility] with daughter transport.

## 2022-08-24 NOTE — PROGRESS NOTES
[facility]  Occupational Therapy  Daily Note  Time:   Time In: 14  Time Out: 7  Timed Code Treatment Minutes: 37 Minutes  Minutes: 43          Date: 2022  Patient Name: Ramos Pena Height,   Gender: female      Room: [facility]019-A  MRN: [id number]  : 1940  (78 y.o.)  Referring Practitioner: Noel Watts MD  Diagnosis: SOB  Additional Pertinent Hx: Per MD note:81 y.o. female who presented to [address] [place] [address] with shortness of breath and fatigue. She states for the last few days her shortness of breath has worsened. Kids have also noticed more pallor. Patient also has some bruising and on her right side of the face however family states it was from a mechanical fall from her electric chair. She has history of iron deficiency anemia and has received iron transfusions in the past.  She states her stool has been darker in the last few days. Presented to ER on 2022    Restrictions/Precautions:  Restrictions/Precautions: Fall Risk  Position Activity Restriction  Other position/activity restrictions: per nurse + for ortho BP     SUBJECTIVE: RN okayed OT treatment. Pt. In room supine in bed pleasant and agreeable to participate in OT session. PAIN: states \"chronic aching all over due to age\"    Vitals: Vitals not assessed per clinical judgement, see nursing flowsheet    COGNITION: Slow Processing, Decreased Recall, and Decreased Safety Awareness    ADL:   Grooming: Stand By Assistance and with set-up. While seated to comb hair and to brush teeth  Bathing: Stand By Assistance, [employer], with set-up, and with verbal cues . CGA for standing components  Upper Extremity Dressing: Minimal Assistance. To don gown due to IV  Lower Extremity Dressing: Minimal Assistance. To don undergarment due to catheter bag. Esthela Mejias BALANCE:  Sitting Balance:  Stand By Assistance.  While seated on EOB  Standing Balance: Contact Guard Assistance, with cues for safety. BED MOBILITY:  Supine to Sit: Stand By Assistance    Scooting: Stand By Assistance      TRANSFERS:  Sit to Stand:  [employer]. Stand to Sit: Contact Guard Assistance. FUNCTIONAL MOBILITY:  Assistive Device: Rolling Walker  Assist Level:  Contact Guard Assistance. Distance:  from bed to chair, no LOB         ASSESSMENT:     Activity Tolerance:  Patient tolerance of  treatment: fair. Discharge Recommendations: Subacute/skilled nursing facility  Equipment Recommendations: Other: monitor pending progress  Plan: Times per Week: 5x  Current Treatment Recommendations: Functional mobility training, Balance training, Endurance training, Self-Care / ADL, Safety education & training    Patient Education  Patient Education: ADL's and Assistive Device Safety and safety with functional mobility and transfers. Goals  Short Term Goals  Time Frame for Short term goals: until discharge  Short Term Goal 1: Pt will complete various sit-stand t/fs including BSC with 0>CGA & 0-2 vcs for safety  Short Term Goal 2: Pt will tolerate standing 2-3 min with CGA for increased ease of sinkside grooming  Short Term Goal 3: Pt will complete tolerate mobility to/from bathroom with RW, CGA, & 0-2 vcs for safety  Short Term Goal 4: Pt will complete BADL routine with  min A & min vcs for safety  Long Term Goals  Time Frame for Long term goals : No LTG set d/t short ELOS    Following session, patient left in safe position with all fall risk precautions in place.

## 2022-08-25 NOTE — CARE COORDINATION
8/25/22, 3:32 PM EDT   Late Entry    Patient goals/plan/ treatment preferences discussed by  and . Patient goals/plan/ treatment preferences reviewed with patient/ family. Patient/ family verbalize understanding of discharge plan and are in agreement with goal/plan/treatment preferences. Understanding was demonstrated using the teach back method. AVS provided by RN at time of discharge, which includes all necessary medical information pertaining to the patients current course of illness, treatment, post-discharge goals of care, and treatment preferences. Services At/After Discharge: [facility] (SNF), Aide services, Nursing service, OT, and PT       IMM Letter  IMM Letter given to Patient/Family/Significant other/Guardian/POA/by[de-identified] Jessica RODRIGUEZ Case Manager. IMM Letter date given[de-identified] 08/24/22  IMM Letter time given[de-identified] 0830     Discharged 8/24 to [address] skilled bed. [facility] aware of discharge, faxed AVS and JENNIFER SULLIVAN called report. Daughter transported.

## 2022-09-01 ENCOUNTER — OFFICE VISIT (OUTPATIENT)
Dept: CARDIOLOGY CLINIC | Age: 82
End: 2022-09-01
Payer: MEDICARE

## 2022-09-01 VITALS — HEART RATE: 58 BPM | DIASTOLIC BLOOD PRESSURE: 54 MMHG | SYSTOLIC BLOOD PRESSURE: 103 MMHG

## 2022-09-01 DIAGNOSIS — I10 PRIMARY HYPERTENSION: ICD-10-CM

## 2022-09-01 DIAGNOSIS — R06.02 SHORTNESS OF BREATH: Primary | ICD-10-CM

## 2022-09-01 PROCEDURE — 99214 OFFICE O/P EST MOD 30 MIN: CPT | Performed by: NUCLEAR MEDICINE

## 2022-09-01 PROCEDURE — 1123F ACP DISCUSS/DSCN MKR DOCD: CPT | Performed by: NUCLEAR MEDICINE

## 2022-09-01 RX ORDER — ENALAPRIL MALEATE 5 MG/1
5 TABLET ORAL DAILY
Qty: 90 TABLET | Refills: 3 | Status: SHIPPED | OUTPATIENT
Start: 2022-09-01

## 2022-09-01 RX ORDER — ENALAPRIL MALEATE 5 MG/1
5 TABLET ORAL DAILY
COMMUNITY
End: 2022-09-01 | Stop reason: SDUPTHER

## 2022-09-01 RX ORDER — ASCORBIC ACID 500 MG
500 TABLET ORAL 2 TIMES DAILY
COMMUNITY

## 2022-09-01 RX ORDER — ZINC SULFATE 50(220)MG
50 CAPSULE ORAL DAILY
COMMUNITY

## 2022-09-01 NOTE — PROGRESS NOTES
[address] [facility] [address]  [address]  [facility] [address]  Dept: [phone]  Dept Fax: [phone]  Loc: [phone]    Visit Date: 9/1/2022    Ricco Gunter is a 78 y.o. 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 Feng Model     GERD (gastroesophageal reflux disease)     Diverticulitis     Hiatal hernia     Hypertension     BAKI    Hypothyroidism     Osteoarthritis       Past Surgical History:   Procedure Laterality Date    APPENDECTOMY  1958    BACK SURGERY      CARPAL TUNNEL RELEASE Bilateral 2013    w/cubital tunnel    COLON SURGERY  07/29/2016    Procedure: ATTEMPTED DAVINCI XI ROBOTIC ASSISTED SIGMOID COLON RESECTION, ROBOTIC ASSISTED MOBILIZATION OF RECTAL SIGMOID TO SPLENIC FLEXURE, CONVERTED TO OPEN LEFT HEMICOLECTOMY WITH COLOPROCTOCTOMY, SPLENORRHAPHY, RIGID SIGMOIDOSCOPY, LASER EVALUATION OF VASCULARITY WITH ICG; Surgeon: Mounika Faith MD; Location: [place] [address]; Service: General    COLONOSCOPY  2012    CYST REMOVAL  2010    [address] [place] [address]    Cataract    FEMUR FRACTURE SURGERY Left 12/21/2020    LEFT FEMUR OPEN REDUCTION INTERNAL FIXATION performed by Josue Quevedo MD at [address]    [address] [place] [address] N/A 03/09/2021    ROBOTIC EXTENSIVE LYSIS OF ADHESIONS, ROBOTIC REDUCTION OF HIATAL HERNIA WITH GASTROPEXY performed by Venkat Stuart MD at [facility] HYSTERECTOMY (CERVIX STATUS UNKNOWN)  1979    w/bladder suspension    JOINT REPLACEMENT  2002, 2008, 2009    rt hip(2002), lt knee(2009), lt hip(2008) Shoulder (2009)    OTHER SURGICAL HISTORY  04/21/2021    [facility] Doc DANIELS in the office    [facility] 7/13/2021    medial branch blocks at bilateral L4/L5 and L5/S1 performed by Claire Mendoza DO at [facility] Bilateral 8/24/2021    confirmatory  medial branch blocks at bilateral L4/L5 and L5/S1 performed by Claire Mendoza DO at [facility] Right 10/19/2021    thermal radiofrequency at BILATERAL medial branches L4/L5 and L5/S1 was chosen. We will start with the right side first performed by Claire Mendoza DO at [facility] Left 12/7/2021    thermal radiofrequency at BILATERAL medial branches L4/L5 and L5/S1 was chosen.  performed by Claire Mendoza DO at [facility] N/A 7/26/2022    lumbar epidural steroid injection Lumbar 5 Sacral 1 performed by Claire Mendoza DO at [facility]  [address] IND Left 09/15/2017    COLONOSCOPY performed by Aguila Silva MD at [place] [address]    discectomy    [facility] ENDOSCOPY  2012    UPPER GASTROINTESTINAL ENDOSCOPY Left 6/7/2022    EGD BIOPSY performed by Jessica Aponte MD at [facility]    UPPER GASTROINTESTINAL ENDOSCOPY N/A 8/17/2022    EGD BIOPSY performed by Simi Heller MD at [facility]     Family History   Problem Relation Age of Onset    Arthritis Mother     Hearing Loss Mother     High Cholesterol Mother     Hearing Loss Father     Heart Disease Father 76        CABG    Stroke Father     Arthritis Sister     Depression Sister Diabetes Sister     Arthritis Brother     Depression Brother     Cancer Maternal Aunt     Early Death Maternal Aunt     Diabetes Maternal Grandmother     Heart Disease Paternal Grandmother      Social History     Tobacco Use    Smoking status: Former     Packs/day: 2.00     Years: 11.00     Pack years: 22.00     Types: Cigarettes     Start date: 1957     Quit date: 1967     Years since quittin.8    Smokeless tobacco: Never   Substance Use Topics    Alcohol use: Yes     Comment: wine with dinner      Current Outpatient Medications   Medication Sig Dispense Refill    zinc sulfate (ZINCATE) 220 (50 Zn) MG capsule Take 50 mg by mouth daily      vitamin C (ASCORBIC ACID) 500 MG tablet Take 500 mg by mouth 2 times daily      sodium chloride 1 g tablet Take 1 tablet by mouth daily 30 tablet 0    ferrous sulfate (IRON 325) 325 (65 Fe) MG tablet Take 1 tablet by mouth 2 times daily 180 tablet 1    polyethylene glycol (GLYCOLAX) 17 g packet Take 17 g by mouth daily 527 g 2    pantoprazole (PROTONIX) 40 MG tablet Take 1 tablet by mouth 2 times daily (before meals) 30 tablet 2    pregabalin (LYRICA) 150 MG capsule Take 1 capsule by mouth in the morning and 1 capsule before bedtime. Do all this for 180 days. 180 capsule 1    enalapril (VASOTEC) 10 MG tablet TAKE 1 TABLET NIGHTLY 90 tablet 3    Cholecalciferol (VITAMIN D3) 50 MCG (2000 UT) CAPS Take by mouth      metoprolol succinate (TOPROL XL) 50 MG extended release tablet TAKE 1 TABLET DAILY 90 tablet 3    levothyroxine (SYNTHROID) 100 MCG tablet TAKE 1 TABLET DAILY 90 tablet 3    FLUoxetine (PROZAC) 20 MG capsule Take 1 capsule by mouth daily 90 capsule 3    docusate sodium (COLACE) 100 MG capsule Take 1 capsule by mouth 2 times daily as needed for Constipation 180 capsule 3    calcium-vitamin D (OSCAL-500) 500-200 MG-UNIT per tablet Take 2 tablets by mouth daily      Misc. Devices (WALKER) MISC 1 each by Does not apply route daily 1 each 0    Misc.  Devices MISC Mechanical lift for a [employer] 1 Device 0    Scooter MISC by Does not apply route Power scooter 1 each 0    fluticasone (FLONASE) 50 MCG/ACT nasal spray 1 spray by Nasal route as needed for Rhinitis       Omega 3-6-9 Fatty Acids (OMEGA 3-6-9 COMPLEX) CAPS Take 1 capsule by mouth daily       therapeutic multivitamin-minerals (THERAGRAN-M) tablet Take 1 tablet by mouth nightly       ondansetron (ZOFRAN) 4 MG tablet Take 1 tablet by mouth 3 times daily as needed for Nausea or Vomiting (Patient not taking: Reported on 9/1/2022) 30 tablet 0     No current facility-administered medications for this visit. Allergies   Allergen Reactions    Ampicillin     Morphine Other (See Comments)     Hallucinations     Pcn [Penicillins] Itching    Sulfa Antibiotics      Health Maintenance   Topic Date Due    COVID-19 Vaccine (4 - Booster for Pfizer series) 03/24/2022    Annual Wellness Visit (AWV)  04/28/2022    Flu vaccine (1) 09/01/2022    Pneumococcal 65+ years Vaccine (2 - PCV) 02/09/2023    Depression Monitoring  05/10/2023    DTaP/Tdap/Td vaccine (2 - Td or Tdap) 09/08/2031    DEXA (modify frequency per FRAX score)  Completed    Shingles vaccine  Completed    Hepatitis A vaccine  Aged Out    Hepatitis B vaccine  Aged Out    Hib vaccine  Aged Out    Meningococcal (ACWY) vaccine  Aged Out       Subjective:  Review of Systems  General:   No fever, no chills, No fatigue or weight loss  Pulmonary:    some dyspnea, no wheezing  Cardiac:    Denies recent chest pain,   GI:     No nausea or vomiting, no abdominal pain  Neuro:    No dizziness or light headedness,   Musculoskeletal:  No recent active issues  Extremities:   No edema, no obvious claudication     Objective:  Physical Exam  BP (!) 103/54   Pulse 58   General:   Well developed, well nourished  Lungs:   Clear to auscultation  Heart:    Normal S1 S2, Slight murmur.  no rubs, no gallops  Abdomen:   Soft, non tender, no organomegalies, positive bowel sounds  Extremities:   No edema, no cyanosis, good peripheral pulses  Neurological:   Awake, alert, oriented. No obvious focal deficits  Musculoskelatal:  No obvious deformities    Assessment:      Diagnosis Orders   1. Shortness of breath        2. Primary hypertension        Low BP   Symptoms are likely anemia and low BP  Normal stress test lately   Not the best candidate for cath given GI bleed that is active  Jahova witness      Plan:  No follow-ups on file. Discussed  Build up the Hgb   Cut down lisinopril dose  Monitor the BP  Continue risk factor modification and medical management    Thank you for allowing me to participate in the care of your patient. Please don't hesitate to contact me regarding any further issues related to the patient care    Orders Placed:  No orders of the defined types were placed in this encounter. Medications Prescribed:  No orders of the defined types were placed in this encounter. Discussed use, benefit, and side effects of prescribed medications. All patient questions answered. Pt voicedunderstanding. Instructed to continue current medications, diet and exercise. Continue risk factor modification and medical management. Patient agreed with treatment plan. Follow up as directed.     Electronically signedby Lynnette Lee MD on 9/1/2022 at 3:10 PM

## 2022-09-06 ENCOUNTER — OFFICE VISIT (OUTPATIENT)
Dept: FAMILY MEDICINE CLINIC | Age: 82
End: 2022-09-06
Payer: MEDICARE

## 2022-09-06 VITALS
TEMPERATURE: 98.4 F | DIASTOLIC BLOOD PRESSURE: 78 MMHG | WEIGHT: 155 LBS | RESPIRATION RATE: 14 BRPM | OXYGEN SATURATION: 96 % | HEART RATE: 53 BPM | SYSTOLIC BLOOD PRESSURE: 138 MMHG | BODY MASS INDEX: 24.33 KG/M2 | HEIGHT: 67 IN

## 2022-09-06 DIAGNOSIS — G89.4 PAIN SYNDROME, CHRONIC: ICD-10-CM

## 2022-09-06 DIAGNOSIS — Z00.00 MEDICARE ANNUAL WELLNESS VISIT, SUBSEQUENT: Primary | ICD-10-CM

## 2022-09-06 DIAGNOSIS — K92.2 GASTROINTESTINAL HEMORRHAGE, UNSPECIFIED GASTROINTESTINAL HEMORRHAGE TYPE: ICD-10-CM

## 2022-09-06 PROCEDURE — 1123F ACP DISCUSS/DSCN MKR DOCD: CPT | Performed by: FAMILY MEDICINE

## 2022-09-06 PROCEDURE — G0439 PPPS, SUBSEQ VISIT: HCPCS | Performed by: FAMILY MEDICINE

## 2022-09-06 ASSESSMENT — PATIENT HEALTH QUESTIONNAIRE - PHQ9
1. LITTLE INTEREST OR PLEASURE IN DOING THINGS: 0
SUM OF ALL RESPONSES TO PHQ QUESTIONS 1-9: 0
SUM OF ALL RESPONSES TO PHQ QUESTIONS 1-9: 0
2. FEELING DOWN, DEPRESSED OR HOPELESS: 0
SUM OF ALL RESPONSES TO PHQ QUESTIONS 1-9: 0
SUM OF ALL RESPONSES TO PHQ QUESTIONS 1-9: 0
SUM OF ALL RESPONSES TO PHQ9 QUESTIONS 1 & 2: 0

## 2022-09-06 ASSESSMENT — LIFESTYLE VARIABLES
HOW OFTEN DO YOU HAVE A DRINK CONTAINING ALCOHOL: 2-3 TIMES A WEEK
HAVE YOU OR SOMEONE ELSE BEEN INJURED AS A RESULT OF YOUR DRINKING: 0
HOW OFTEN DURING THE LAST YEAR HAVE YOU NEEDED AN ALCOHOLIC DRINK FIRST THING IN THE MORNING TO GET YOURSELF GOING AFTER A NIGHT OF HEAVY DRINKING: 0
HOW OFTEN DURING THE LAST YEAR HAVE YOU BEEN UNABLE TO REMEMBER WHAT HAPPENED THE NIGHT BEFORE BECAUSE YOU HAD BEEN DRINKING: 0
HOW OFTEN DURING THE LAST YEAR HAVE YOU HAD A FEELING OF GUILT OR REMORSE AFTER DRINKING: 0
HOW OFTEN DURING THE LAST YEAR HAVE YOU FOUND THAT YOU WERE NOT ABLE TO STOP DRINKING ONCE YOU HAD STARTED: 0
HOW MANY STANDARD DRINKS CONTAINING ALCOHOL DO YOU HAVE ON A TYPICAL DAY: 1 OR 2
HAS A RELATIVE, FRIEND, DOCTOR, OR ANOTHER HEALTH PROFESSIONAL EXPRESSED CONCERN ABOUT YOUR DRINKING OR SUGGESTED YOU CUT DOWN: 0
HOW OFTEN DURING THE LAST YEAR HAVE YOU FAILED TO DO WHAT WAS NORMALLY EXPECTED FROM YOU BECAUSE OF DRINKING: 0

## 2022-09-06 NOTE — PATIENT INSTRUCTIONS
Personalized Preventive Plan for [facility] - 9/6/2022  Medicare offers a range of preventive health benefits. Some of the tests and screenings are paid in full while other may be subject to a deductible, co-insurance, and/or copay. Some of these benefits include a comprehensive review of your medical history including lifestyle, illnesses that may run in your family, and various assessments and screenings as appropriate. After reviewing your medical record and screening and assessments performed today your provider may have ordered immunizations, labs, imaging, and/or referrals for you. A list of these orders (if applicable) as well as your Preventive Care list are included within your After Visit Summary for your review. Other Preventive Recommendations:    A preventive eye exam performed by an eye specialist is recommended every 1-2 years to screen for glaucoma; cataracts, macular degeneration, and other eye disorders. A preventive dental visit is recommended every 6 months. Try to get at least 150 minutes of exercise per week or 10,000 steps per day on a pedometer . Order or download the FREE \"Exercise & Physical Activity: Your Everyday Guide\" from [employer]. Call 8-871.558.6339 or search [employer] online. You need 9568-0526 mg of calcium and 5772-3103 IU of vitamin D per day. It is possible to meet your calcium requirement with diet alone, but a vitamin D supplement is usually necessary to meet this goal.  When exposed to the sun, use a sunscreen that protects against both UVA and UVB radiation with an SPF of 30 or greater. Reapply every 2 to 3 hours or after sweating, drying off with a towel, or swimming. Always wear a seat belt when traveling in a car. Always wear a helmet when riding a bicycle or motorcycle.

## 2022-09-06 NOTE — PROGRESS NOTES
Renzo Gunter is a 78 y.o. female that presents for     Chief Complaint   Patient presents with    Medicare AWV       /78   Pulse 53   Temp 98.4 °F (36.9 °C) (Temporal)   Resp 14   Ht 5' 7\" (1.702 m)   Wt 155 lb (70.3 kg)   SpO2 96%   BMI 24.28 kg/m²       HPI    Recently admitted to [facility] for anemia. Following with Dr Elan Hurley. No blood in the stool or black tarry stools. Appetite has been 'ok'. Denies abdominal pain, nausea or vomiting. On PO iron. Pain is about the same. 5/10 is tolerable for her. Will take a pain medication PRN. Sleep is doing 'really well'.         Health Maintenance   Topic Date Due    COVID-19 Vaccine (4 - Booster for Pfizer series) 03/24/2022    Flu vaccine (1) 09/01/2022    Pneumococcal 65+ years Vaccine (2 - PCV) 02/09/2023    Depression Monitoring  05/10/2023    Annual Wellness Visit (AWV)  09/07/2023    DTaP/Tdap/Td vaccine (2 - Td or Tdap) 09/08/2031    DEXA (modify frequency per FRAX score)  Completed    Shingles vaccine  Completed    Hepatitis A vaccine  Aged Out    Hepatitis B vaccine  Aged Out    Hib vaccine  Aged Out    Meningococcal (ACWY) vaccine  Aged Out       Past Medical History:   Diagnosis Date    Abnormal EKG     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 Ilda Bernstein     GERD (gastroesophageal reflux disease)     Diverticulitis     Hiatal hernia     Hypertension     BAKI    Hypothyroidism     Osteoarthritis        Past Surgical History:   Procedure Laterality Date    APPENDECTOMY  1958    BACK SURGERY      CARPAL TUNNEL RELEASE Bilateral 2013    w/cubital tunnel    COLON SURGERY  07/29/2016    Procedure: ATTEMPTED DAVINCI XI ROBOTIC ASSISTED SIGMOID COLON RESECTION, ROBOTIC ASSISTED MOBILIZATION OF RECTAL SIGMOID TO SPLENIC FLEXURE, CONVERTED TO OPEN LEFT HEMICOLECTOMY WITH COLOPROCTOCTOMY, SPLENORRHAPHY, RIGID SIGMOIDOSCOPY, LASER EVALUATION OF VASCULARITY WITH ICG; Surgeon: Fer Grimm MD; Location: [place] [address]; Service: General    COLONOSCOPY  2012    CYST REMOVAL  2010    EYE SURGERY  1998, 1999    Cataract    FEMUR FRACTURE SURGERY Left 12/21/2020    LEFT FEMUR OPEN REDUCTION INTERNAL FIXATION performed by Justin Orta MD at [facility] 2001    [facility] N/A 03/09/2021    ROBOTIC EXTENSIVE LYSIS OF ADHESIONS, ROBOTIC REDUCTION OF HIATAL HERNIA WITH GASTROPEXY performed by José Luis Underwood MD at [address] [place] [address] ([address])  1979    w/bladder suspension    JOINT REPLACEMENT  2002, 2008, 2009    rt hip(2002), lt knee(2009), lt hip(2008) Shoulder (2009)    OTHER SURGICAL HISTORY  04/21/2021    Everett Headley CNP in the office    [address] Bilateral 7/13/2021    medial branch blocks at bilateral L4/L5 and L5/S1 performed by Wesley Leon DO at [facility][1] Bilateral 8/24/2021    confirmatory  medial branch blocks at bilateral L4/L5 and L5/S1 performed by Wesley Leon DO at [facility][1] Right 10/19/2021    thermal radiofrequency at BILATERAL medial branches L4/L5 and L5/S1 was chosen. We will start with the right side first performed by Wesley Leon DO at [facility][1] Left 12/7/2021    thermal radiofrequency at BILATERAL medial branches L4/L5 and L5/S1 was chosen.  performed by Wesley Leon DO at [facility][1] N/A 7/26/2022    lumbar epidural steroid injection Lumbar 5 Sacral 1 performed by Wesley Leon DO at [facility]  [address] IND Left 09/15/2017    COLONOSCOPY performed by Leilani Land MD at [place] [address] discectomy    TONSILLECTOMY      TOOTH EXTRACTION  1964    UPPER GASTROINTESTINAL ENDOSCOPY  2012    UPPER GASTROINTESTINAL ENDOSCOPY Left 2022    EGD BIOPSY performed by Sukhjinder Shearer MD at [facility] [address] N/A 2022    EGD BIOPSY performed by Kennedy Tavera MD at [facility]       Social History     Tobacco Use    Smoking status: Former     Packs/day: 2.00     Years: 11.00     Pack years: 22.00     Types: Cigarettes     Start date: 1957     Quit date: 1967     Years since quittin.8    Smokeless tobacco: Never   Vaping Use    Vaping Use: Never used   Substance Use Topics    Alcohol use: Yes     Comment: wine with dinner    Drug use: No       Family History   Problem Relation Age of Onset    Arthritis Mother     Hearing Loss Mother     High Cholesterol Mother     Hearing Loss Father     Heart Disease Father 76        CABG    Stroke Father     Arthritis Sister     Depression Sister     Diabetes Sister     Arthritis Brother     Depression Brother     Cancer Maternal Aunt     Early Death Maternal Aunt     Diabetes Maternal Grandmother     Heart Disease Paternal Grandmother          I have reviewed the patient's past medical history, past surgical history, allergies, medications, social and family history and I have made updates where appropriate. Review of Systems        PHYSICAL EXAM:  /78   Pulse 53   Temp 98.4 °F (36.9 °C) (Temporal)   Resp 14   Ht 5' 7\" (1.702 m)   Wt 155 lb (70.3 kg)   SpO2 96%   BMI 24.28 kg/m²     Physical Exam  Vitals and nursing note reviewed. Constitutional:       General: She is not in acute distress. Appearance: She is well-developed. HENT:      Head: Normocephalic and atraumatic. Right Ear: Hearing and external ear normal.      Left Ear: Hearing and external ear normal.      Nose: Nose normal.   Eyes:      General:         Right eye: No discharge. Left eye: No discharge.

## 2022-09-06 NOTE — PROGRESS NOTES
Medicare Annual Wellness Visit    Avila Gunter is here for Medicare AWV    Assessment & Plan   Medicare annual wellness visit, subsequent    Recommendations for Preventive Services Due: see orders and patient instructions/AVS.  Recommended screening schedule for the next 5-10 years is provided to the patient in written form: see Patient Instructions/AVS.     No follow-ups on file. Subjective       Patient's complete Health Risk Assessment and screening values have been reviewed and are found in Flowsheets. The following problems were reviewed today and where indicated follow up appointments were made and/or referrals ordered. Positive Risk Factor Screenings with Interventions:    Fall Risk:  Do you feel unsteady or are you worried about falling? : (!) yes  2 or more falls in past year?: (!) yes  Fall with injury in past year?: (!) yes   Fall Risk Interventions:    Home safety tips provided  Uses wheelchair and walker       Alcohol Screening:  Alcohol Use: Heavy Drinker    Frequency of Alcohol Consumption: 2-3 times a week    Average Number of Drinks: 1 or 2    Frequency of Binge Drinking: Weekly     AUDIT-C Score: 6  AUDIT Total Score: 6  An AUDIT-C score of 3-7 indicates potential alcohol risk. An AUDIT Total score of 8 or more is associated with harmful or hazardous drinking. A score of 13 or more in women, and 15 or more in men, is likely to indicate alcohol dependence.   Substance Use - Alcohol Interventions:  Patient is not ready to change his/her alcohol consumption behavior at this time         Health Habits/Nutrition:  Physical Activity: Inactive    Days of Exercise per Week: 0 days    Minutes of Exercise per Session: 0 min     Have you lost any weight without trying in the past 3 months?: (!) Yes  Body mass index: 24.27  Have you seen the dentist within the past year?: N/A - wear dentures  Health Habits/Nutrition Interventions:  Nutritional issues:  doing better since moving to [place]    Hearing/Vision:  Do you or your family notice any trouble with your hearing that hasn't been managed with hearing aids?: (!) Yes  Do you have difficulty driving, watching TV, or doing any of your daily activities because of your eyesight?: (!) Yes  Have you had an eye exam within the past year?: Yes  No results found. Hearing/Vision Interventions:  Hearing concerns:  patient declines any further evaluation/treatment for hearing issues  Vision concerns:  patient encouraged to make appointment with his/her eye specialist    Safety:  Do you have working smoke detectors?: Yes  Do you have any tripping hazards - loose or unsecured carpets or rugs?: No  Do you have any tripping hazards - clutter in doorways, halls, or stairs?: No  Do all of your stairways have a railing or banister?: (!) No  Do you always fasten your seatbelt when you are in a car?: Yes  Safety Interventions:  Home safety tips provided    ADLs:  In the past 7 days, did you need help from others to perform any of the following everyday activities: Eating, dressing, grooming, bathing, toileting, or walking/balance?: (!) Yes  Select all that apply: (!) Dressing, Walking/Balance  In the past 7 days, did you need help from others to take care of any of the following: Laundry, housekeeping, banking/finances, shopping, telephone use, food preparation, transportation, or taking medications?: (!) Yes  Select all that apply: Mingo Hopping, Housekeeping, Shopping, Taking Medications, Transportation  ADL Interventions:  Lives in [address] [place] [address]          Objective   Vitals:    09/06/22 1349   BP: 138/78   Pulse: 53   Resp: 14   Temp: 98.4 °F (36.9 °C)   TempSrc: Temporal   SpO2: 96%   Weight: 155 lb (70.3 kg)   Height: 5' 7\" (1.702 m)      Body mass index is 24.28 kg/m².              Allergies   Allergen Reactions    Ampicillin     Morphine Other (See Comments)     Hallucinations     Pcn [Penicillins] Itching    Sulfa Antibiotics      Prior to Visit Medications Medication Sig Taking? Authorizing Provider   zinc sulfate (ZINCATE) 220 (50 Zn) MG capsule Take 50 mg by mouth daily  Historical Provider, MD   vitamin C (ASCORBIC ACID) 500 MG tablet Take 500 mg by mouth 2 times daily  Historical Provider, MD   enalapril (VASOTEC) 5 MG tablet Take 1 tablet by mouth daily  Akhil Muniz MD   sodium chloride 1 g tablet Take 1 tablet by mouth daily  Brigitte Slater MD   ferrous sulfate (IRON 325) 325 (65 Fe) MG tablet Take 1 tablet by mouth 2 times daily  Brigitte Slater MD   polyethylene glycol (GLYCOLAX) 17 g packet Take 17 g by mouth daily  FLORIDA Reynaga CNP   pantoprazole (PROTONIX) 40 MG tablet Take 1 tablet by mouth 2 times daily (before meals)  FLORIDA Reynaga CNP   ondansetron (ZOFRAN) 4 MG tablet Take 1 tablet by mouth 3 times daily as needed for Nausea or Vomiting  Patient not taking: Reported on 9/1/2022  Rhett Santos, DO   pregabalin (LYRICA) 150 MG capsule Take 1 capsule by mouth in the morning and 1 capsule before bedtime. Do all this for 180 days. Rhett Santos, DO   Cholecalciferol (VITAMIN D3) 50 MCG (2000 UT) CAPS Take by mouth  Historical Provider, MD   metoprolol succinate (TOPROL XL) 50 MG extended release tablet TAKE 1 TABLET DAILY  Carey Pierce,    levothyroxine (SYNTHROID) 100 MCG tablet TAKE 1 TABLET DAILY  Ascencion Pierce,    meloxicam (MOBIC) 15 MG tablet Take 1 tablet by mouth daily  Patient taking differently: Take 7.5 mg by mouth in the morning. Jared Dale, DO   FLUoxetine (PROZAC) 20 MG capsule Take 1 capsule by mouth daily  Rhett Santos DO   docusate sodium (COLACE) 100 MG capsule Take 1 capsule by mouth 2 times daily as needed for Constipation  Rhett Santos, DO   calcium-vitamin D (OSCAL-500) 500-200 MG-UNIT per tablet Take 2 tablets by mouth daily  Historical Provider, MD   Misc. Devices (WALKER) MISC 1 each by Does not apply route daily  Rhett Santos, DO   Misc.  Devices MISC Mechanical lift for a Fleurette Hurl, DO Scooter MISC by Does not apply route Power scooter  [employer]   fluticasone (FLONASE) 50 MCG/ACT nasal spray 1 spray by Nasal route as needed for Rhinitis   Historical Provider, MD   Omega 3-6-9 Fatty Acids (OMEGA 3-6-9 COMPLEX) CAPS Take 1 capsule by mouth daily   Historical Provider, MD   therapeutic multivitamin-minerals (THERAGRAN-M) tablet Take 1 tablet by mouth nightly   Historical Provider, MD [place] (Including outside providers/suppliers regularly involved in providing care):   Patient Care Team:  Compass[employer]DO as PCP - [address] [place], [place] [address]DO as PCP - [facility][1] Empaneled Provider  Elba Arizmendi MD as Cardiologist (Cardiology)     Reviewed and updated this visit:  Tobacco  Allergies  Meds  Med Hx  Surg Hx  Soc Hx  Fam Hx

## 2022-09-08 ENCOUNTER — APPOINTMENT (OUTPATIENT)
Dept: CT IMAGING | Age: 82
DRG: 689 | End: 2022-09-08
Payer: MEDICARE

## 2022-09-08 ENCOUNTER — HOSPITAL ENCOUNTER (INPATIENT)
Age: 82
LOS: 2 days | Discharge: ANOTHER HOSPITAL | DRG: 689 | End: 2022-09-10
Attending: EMERGENCY MEDICINE | Admitting: INTERNAL MEDICINE
Payer: MEDICARE

## 2022-09-08 DIAGNOSIS — N30.91 CYSTITIS WITH HEMATURIA: ICD-10-CM

## 2022-09-08 DIAGNOSIS — M54.50 CHRONIC BILATERAL LOW BACK PAIN WITHOUT SCIATICA: ICD-10-CM

## 2022-09-08 DIAGNOSIS — R40.4 TRANSIENT ALTERATION OF AWARENESS: Primary | ICD-10-CM

## 2022-09-08 DIAGNOSIS — G89.29 CHRONIC BILATERAL LOW BACK PAIN WITHOUT SCIATICA: ICD-10-CM

## 2022-09-08 PROBLEM — N39.0 UTI (URINARY TRACT INFECTION): Status: ACTIVE | Noted: 2022-09-08

## 2022-09-08 LAB
ALBUMIN SERPL-MCNC: 4 G/DL (ref 3.5–5.1)
ALP BLD-CCNC: 90 U/L (ref 38–126)
ALT SERPL-CCNC: 12 U/L (ref 11–66)
ANION GAP SERPL CALCULATED.3IONS-SCNC: 11 MEQ/L (ref 8–16)
ANISOCYTOSIS: PRESENT
AST SERPL-CCNC: 30 U/L (ref 5–40)
BACTERIA: ABNORMAL /HPF
BASOPHILS # BLD: 1 %
BASOPHILS ABSOLUTE: 0.1 THOU/MM3 (ref 0–0.1)
BILIRUB SERPL-MCNC: 0.4 MG/DL (ref 0.3–1.2)
BILIRUBIN URINE: NEGATIVE
BLOOD, URINE: ABNORMAL
BUN BLDV-MCNC: 10 MG/DL (ref 7–22)
C-REACTIVE PROTEIN: 0.58 MG/DL (ref 0–1)
CALCIUM SERPL-MCNC: 9.4 MG/DL (ref 8.5–10.5)
CASTS 2: ABNORMAL /LPF
CASTS UA: ABNORMAL /LPF
CHARACTER, URINE: ABNORMAL
CHLORIDE BLD-SCNC: 101 MEQ/L (ref 98–111)
CO2: 26 MEQ/L (ref 23–33)
COLOR: YELLOW
CREAT SERPL-MCNC: 0.5 MG/DL (ref 0.4–1.2)
CRYSTALS, UA: ABNORMAL
EOSINOPHIL # BLD: 0.7 %
EOSINOPHILS ABSOLUTE: 0 THOU/MM3 (ref 0–0.4)
EPITHELIAL CELLS, UA: ABNORMAL /HPF
ERYTHROCYTE [DISTWIDTH] IN BLOOD BY AUTOMATED COUNT: 24.7 % (ref 11.5–14.5)
ERYTHROCYTE [DISTWIDTH] IN BLOOD BY AUTOMATED COUNT: 74.9 FL (ref 35–45)
GFR SERPL CREATININE-BSD FRML MDRD: > 90 ML/MIN/1.73M2
GLUCOSE BLD-MCNC: 105 MG/DL (ref 70–108)
GLUCOSE URINE: NEGATIVE MG/DL
HCT VFR BLD CALC: 37.8 % (ref 37–47)
HEMOGLOBIN: 11.7 GM/DL (ref 12–16)
IMMATURE GRANS (ABS): 0.02 THOU/MM3 (ref 0–0.07)
IMMATURE GRANULOCYTES: 0.3 %
KETONES, URINE: ABNORMAL
LEUKOCYTE ESTERASE, URINE: ABNORMAL
LYMPHOCYTES # BLD: 15.1 %
LYMPHOCYTES ABSOLUTE: 0.9 THOU/MM3 (ref 1–4.8)
MCH RBC QN AUTO: 26.9 PG (ref 26–33)
MCHC RBC AUTO-ENTMCNC: 31 GM/DL (ref 32.2–35.5)
MCV RBC AUTO: 86.9 FL (ref 81–99)
MISCELLANEOUS 2: ABNORMAL
MONOCYTES # BLD: 4.3 %
MONOCYTES ABSOLUTE: 0.2 THOU/MM3 (ref 0.4–1.3)
NITRITE, URINE: NEGATIVE
NUCLEATED RED BLOOD CELLS: 0 /100 WBC
OSMOLALITY CALCULATION: 275.1 MOSMOL/KG (ref 275–300)
PH UA: 8 (ref 5–9)
PLATELET # BLD: 178 THOU/MM3 (ref 130–400)
PLATELET ESTIMATE: ADEQUATE
PMV BLD AUTO: 9.9 FL (ref 9.4–12.4)
POTASSIUM SERPL-SCNC: 3.6 MEQ/L (ref 3.5–5.2)
PROTEIN UA: 30
RBC # BLD: 4.35 MILL/MM3 (ref 4.2–5.4)
RBC URINE: ABNORMAL /HPF
RENAL EPITHELIAL, UA: ABNORMAL
SCAN OF BLOOD SMEAR: NORMAL
SEDIMENTATION RATE, ERYTHROCYTE: 15 MM/HR (ref 0–20)
SEG NEUTROPHILS: 78.6 %
SEGMENTED NEUTROPHILS ABSOLUTE COUNT: 4.6 THOU/MM3 (ref 1.8–7.7)
SODIUM BLD-SCNC: 138 MEQ/L (ref 135–145)
SPECIFIC GRAVITY, URINE: > 1.03 (ref 1–1.03)
TOTAL PROTEIN: 6.8 G/DL (ref 6.1–8)
UROBILINOGEN, URINE: 1 EU/DL (ref 0–1)
WBC # BLD: 5.8 THOU/MM3 (ref 4.8–10.8)
WBC UA: > 200 /HPF
YEAST: ABNORMAL

## 2022-09-08 PROCEDURE — 6360000002 HC RX W HCPCS: Performed by: PHYSICIAN ASSISTANT

## 2022-09-08 PROCEDURE — 96375 TX/PRO/DX INJ NEW DRUG ADDON: CPT

## 2022-09-08 PROCEDURE — 87086 URINE CULTURE/COLONY COUNT: CPT

## 2022-09-08 PROCEDURE — 6360000004 HC RX CONTRAST MEDICATION: Performed by: STUDENT IN AN ORGANIZED HEALTH CARE EDUCATION/TRAINING PROGRAM

## 2022-09-08 PROCEDURE — G0378 HOSPITAL OBSERVATION PER HR: HCPCS

## 2022-09-08 PROCEDURE — 96374 THER/PROPH/DIAG INJ IV PUSH: CPT

## 2022-09-08 PROCEDURE — 96376 TX/PRO/DX INJ SAME DRUG ADON: CPT

## 2022-09-08 PROCEDURE — 81001 URINALYSIS AUTO W/SCOPE: CPT

## 2022-09-08 PROCEDURE — 96372 THER/PROPH/DIAG INJ SC/IM: CPT

## 2022-09-08 PROCEDURE — 70450 CT HEAD/BRAIN W/O DYE: CPT

## 2022-09-08 PROCEDURE — 74177 CT ABD & PELVIS W/CONTRAST: CPT

## 2022-09-08 PROCEDURE — 76376 3D RENDER W/INTRP POSTPROCES: CPT

## 2022-09-08 PROCEDURE — 99285 EMERGENCY DEPT VISIT HI MDM: CPT

## 2022-09-08 PROCEDURE — 1200000003 HC TELEMETRY R&B

## 2022-09-08 PROCEDURE — 85025 COMPLETE CBC W/AUTO DIFF WBC: CPT

## 2022-09-08 PROCEDURE — 99223 1ST HOSP IP/OBS HIGH 75: CPT | Performed by: PHYSICIAN ASSISTANT

## 2022-09-08 PROCEDURE — 86140 C-REACTIVE PROTEIN: CPT

## 2022-09-08 PROCEDURE — 87186 SC STD MICRODIL/AGAR DIL: CPT

## 2022-09-08 PROCEDURE — 2580000003 HC RX 258: Performed by: PHYSICIAN ASSISTANT

## 2022-09-08 PROCEDURE — 2580000003 HC RX 258: Performed by: STUDENT IN AN ORGANIZED HEALTH CARE EDUCATION/TRAINING PROGRAM

## 2022-09-08 PROCEDURE — 6370000000 HC RX 637 (ALT 250 FOR IP): Performed by: PHYSICIAN ASSISTANT

## 2022-09-08 PROCEDURE — 87077 CULTURE AEROBIC IDENTIFY: CPT

## 2022-09-08 PROCEDURE — 96365 THER/PROPH/DIAG IV INF INIT: CPT

## 2022-09-08 PROCEDURE — 6360000002 HC RX W HCPCS

## 2022-09-08 PROCEDURE — 6370000000 HC RX 637 (ALT 250 FOR IP): Performed by: STUDENT IN AN ORGANIZED HEALTH CARE EDUCATION/TRAINING PROGRAM

## 2022-09-08 PROCEDURE — 85651 RBC SED RATE NONAUTOMATED: CPT

## 2022-09-08 PROCEDURE — 80053 COMPREHEN METABOLIC PANEL: CPT

## 2022-09-08 PROCEDURE — 6360000002 HC RX W HCPCS: Performed by: STUDENT IN AN ORGANIZED HEALTH CARE EDUCATION/TRAINING PROGRAM

## 2022-09-08 RX ORDER — FERROUS SULFATE 325(65) MG
325 TABLET ORAL 2 TIMES DAILY
Status: DISCONTINUED | OUTPATIENT
Start: 2022-09-08 | End: 2022-09-10 | Stop reason: HOSPADM

## 2022-09-08 RX ORDER — METOPROLOL SUCCINATE 50 MG/1
50 TABLET, EXTENDED RELEASE ORAL DAILY
Status: DISCONTINUED | OUTPATIENT
Start: 2022-09-08 | End: 2022-09-10 | Stop reason: HOSPADM

## 2022-09-08 RX ORDER — SODIUM CHLORIDE 9 MG/ML
INJECTION, SOLUTION INTRAVENOUS PRN
Status: DISCONTINUED | OUTPATIENT
Start: 2022-09-08 | End: 2022-09-10 | Stop reason: HOSPADM

## 2022-09-08 RX ORDER — BISACODYL 10 MG
10 SUPPOSITORY, RECTAL RECTAL DAILY
Status: DISCONTINUED | OUTPATIENT
Start: 2022-09-08 | End: 2022-09-09

## 2022-09-08 RX ORDER — ENALAPRIL MALEATE 5 MG/1
5 TABLET ORAL DAILY
Status: DISCONTINUED | OUTPATIENT
Start: 2022-09-08 | End: 2022-09-10 | Stop reason: HOSPADM

## 2022-09-08 RX ORDER — ONDANSETRON 4 MG/1
4 TABLET, ORALLY DISINTEGRATING ORAL EVERY 8 HOURS PRN
Status: DISCONTINUED | OUTPATIENT
Start: 2022-09-08 | End: 2022-09-10 | Stop reason: HOSPADM

## 2022-09-08 RX ORDER — SODIUM CHLORIDE 0.9 % (FLUSH) 0.9 %
10 SYRINGE (ML) INJECTION EVERY 12 HOURS SCHEDULED
Status: DISCONTINUED | OUTPATIENT
Start: 2022-09-08 | End: 2022-09-10 | Stop reason: HOSPADM

## 2022-09-08 RX ORDER — FLUOXETINE HYDROCHLORIDE 20 MG/1
20 CAPSULE ORAL DAILY
Status: DISCONTINUED | OUTPATIENT
Start: 2022-09-08 | End: 2022-09-10 | Stop reason: HOSPADM

## 2022-09-08 RX ORDER — ACETAMINOPHEN 325 MG/1
650 TABLET ORAL EVERY 6 HOURS PRN
Status: DISCONTINUED | OUTPATIENT
Start: 2022-09-08 | End: 2022-09-10 | Stop reason: HOSPADM

## 2022-09-08 RX ORDER — ONDANSETRON 2 MG/ML
4 INJECTION INTRAMUSCULAR; INTRAVENOUS EVERY 6 HOURS PRN
Status: DISCONTINUED | OUTPATIENT
Start: 2022-09-08 | End: 2022-09-10 | Stop reason: HOSPADM

## 2022-09-08 RX ORDER — LEVOTHYROXINE SODIUM 0.1 MG/1
100 TABLET ORAL DAILY
Status: DISCONTINUED | OUTPATIENT
Start: 2022-09-08 | End: 2022-09-10 | Stop reason: HOSPADM

## 2022-09-08 RX ORDER — SODIUM CHLORIDE 1000 MG
1 TABLET, SOLUBLE MISCELLANEOUS DAILY
Status: DISCONTINUED | OUTPATIENT
Start: 2022-09-08 | End: 2022-09-10 | Stop reason: HOSPADM

## 2022-09-08 RX ORDER — ENOXAPARIN SODIUM 100 MG/ML
40 INJECTION SUBCUTANEOUS DAILY
Status: DISCONTINUED | OUTPATIENT
Start: 2022-09-08 | End: 2022-09-10 | Stop reason: HOSPADM

## 2022-09-08 RX ORDER — POLYETHYLENE GLYCOL 3350 17 G/17G
17 POWDER, FOR SOLUTION ORAL DAILY
Status: DISCONTINUED | OUTPATIENT
Start: 2022-09-08 | End: 2022-09-10 | Stop reason: HOSPADM

## 2022-09-08 RX ORDER — ONDANSETRON 2 MG/ML
INJECTION INTRAMUSCULAR; INTRAVENOUS
Status: COMPLETED
Start: 2022-09-08 | End: 2022-09-08

## 2022-09-08 RX ORDER — ACETAMINOPHEN 650 MG/1
650 SUPPOSITORY RECTAL EVERY 6 HOURS PRN
Status: DISCONTINUED | OUTPATIENT
Start: 2022-09-08 | End: 2022-09-10 | Stop reason: HOSPADM

## 2022-09-08 RX ORDER — POLYETHYLENE GLYCOL 3350 17 G/17G
17 POWDER, FOR SOLUTION ORAL DAILY PRN
Status: DISCONTINUED | OUTPATIENT
Start: 2022-09-08 | End: 2022-09-10 | Stop reason: HOSPADM

## 2022-09-08 RX ORDER — SODIUM CHLORIDE 0.9 % (FLUSH) 0.9 %
10 SYRINGE (ML) INJECTION PRN
Status: DISCONTINUED | OUTPATIENT
Start: 2022-09-08 | End: 2022-09-10 | Stop reason: HOSPADM

## 2022-09-08 RX ORDER — ONDANSETRON 2 MG/ML
4 INJECTION INTRAMUSCULAR; INTRAVENOUS ONCE
Status: COMPLETED | OUTPATIENT
Start: 2022-09-08 | End: 2022-09-08

## 2022-09-08 RX ORDER — FLUTICASONE PROPIONATE 50 MCG
1 SPRAY, SUSPENSION (ML) NASAL DAILY PRN
Status: DISCONTINUED | OUTPATIENT
Start: 2022-09-08 | End: 2022-09-10 | Stop reason: HOSPADM

## 2022-09-08 RX ORDER — SODIUM CHLORIDE, SODIUM LACTATE, POTASSIUM CHLORIDE, CALCIUM CHLORIDE 600; 310; 30; 20 MG/100ML; MG/100ML; MG/100ML; MG/100ML
INJECTION, SOLUTION INTRAVENOUS CONTINUOUS
Status: DISCONTINUED | OUTPATIENT
Start: 2022-09-08 | End: 2022-09-10 | Stop reason: HOSPADM

## 2022-09-08 RX ORDER — PREGABALIN 50 MG/1
150 CAPSULE ORAL 2 TIMES DAILY
Status: DISCONTINUED | OUTPATIENT
Start: 2022-09-08 | End: 2022-09-10 | Stop reason: HOSPADM

## 2022-09-08 RX ORDER — PANTOPRAZOLE SODIUM 40 MG/1
40 TABLET, DELAYED RELEASE ORAL
Status: DISCONTINUED | OUTPATIENT
Start: 2022-09-09 | End: 2022-09-10 | Stop reason: HOSPADM

## 2022-09-08 RX ORDER — HYDROCODONE BITARTRATE AND ACETAMINOPHEN 5; 325 MG/1; MG/1
1 TABLET ORAL ONCE
Status: COMPLETED | OUTPATIENT
Start: 2022-09-08 | End: 2022-09-08

## 2022-09-08 RX ADMIN — ENOXAPARIN SODIUM 40 MG: 100 INJECTION SUBCUTANEOUS at 21:23

## 2022-09-08 RX ADMIN — POLYETHYLENE GLYCOL 3350 17 G: 17 POWDER, FOR SOLUTION ORAL at 21:22

## 2022-09-08 RX ADMIN — LEVOTHYROXINE SODIUM 100 MCG: 0.1 TABLET ORAL at 21:22

## 2022-09-08 RX ADMIN — METOPROLOL SUCCINATE 50 MG: 50 TABLET, FILM COATED, EXTENDED RELEASE ORAL at 21:22

## 2022-09-08 RX ADMIN — ONDANSETRON 4 MG: 2 INJECTION INTRAMUSCULAR; INTRAVENOUS at 10:37

## 2022-09-08 RX ADMIN — HYDROCODONE BITARTRATE AND ACETAMINOPHEN 1 TABLET: 5; 325 TABLET ORAL at 11:37

## 2022-09-08 RX ADMIN — PREGABALIN 150 MG: 50 CAPSULE ORAL at 21:22

## 2022-09-08 RX ADMIN — SODIUM CHLORIDE, POTASSIUM CHLORIDE, SODIUM LACTATE AND CALCIUM CHLORIDE: 600; 310; 30; 20 INJECTION, SOLUTION INTRAVENOUS at 18:23

## 2022-09-08 RX ADMIN — ENALAPRIL MALEATE 5 MG: 5 TABLET ORAL at 21:22

## 2022-09-08 RX ADMIN — FERROUS SULFATE TAB 325 MG (65 MG ELEMENTAL FE) 325 MG: 325 (65 FE) TAB at 21:22

## 2022-09-08 RX ADMIN — BISACODYL 10 MG: 10 SUPPOSITORY RECTAL at 21:22

## 2022-09-08 RX ADMIN — SODIUM CHLORIDE 1 G: 1 TABLET ORAL at 21:22

## 2022-09-08 RX ADMIN — IOPAMIDOL 80 ML: 755 INJECTION, SOLUTION INTRAVENOUS at 12:15

## 2022-09-08 RX ADMIN — FLUOXETINE 20 MG: 20 CAPSULE ORAL at 21:22

## 2022-09-08 RX ADMIN — CEFTRIAXONE SODIUM 1000 MG: 1 INJECTION, POWDER, FOR SOLUTION INTRAMUSCULAR; INTRAVENOUS at 16:11

## 2022-09-08 RX ADMIN — ONDANSETRON 4 MG: 2 INJECTION INTRAMUSCULAR; INTRAVENOUS at 18:43

## 2022-09-08 ASSESSMENT — ENCOUNTER SYMPTOMS
WHEEZING: 0
ABDOMINAL DISTENTION: 1
BACK PAIN: 1
NAUSEA: 1
VOMITING: 0
SORE THROAT: 0
ABDOMINAL PAIN: 0
DIARRHEA: 0
COLOR CHANGE: 0
SHORTNESS OF BREATH: 0
CONSTIPATION: 0
SINUS PAIN: 0
COUGH: 0
CHEST TIGHTNESS: 0
SINUS PRESSURE: 0

## 2022-09-08 ASSESSMENT — PAIN SCALES - GENERAL
PAINLEVEL_OUTOF10: 4
PAINLEVEL_OUTOF10: 5
PAINLEVEL_OUTOF10: 2
PAINLEVEL_OUTOF10: 5
PAINLEVEL_OUTOF10: 2

## 2022-09-08 ASSESSMENT — PAIN - FUNCTIONAL ASSESSMENT
PAIN_FUNCTIONAL_ASSESSMENT: 0-10
PAIN_FUNCTIONAL_ASSESSMENT: NONE - DENIES PAIN
PAIN_FUNCTIONAL_ASSESSMENT: NONE - DENIES PAIN
PAIN_FUNCTIONAL_ASSESSMENT: 0-10

## 2022-09-08 ASSESSMENT — PAIN SCALES - WONG BAKER: WONGBAKER_NUMERICALRESPONSE: 8;6

## 2022-09-08 ASSESSMENT — PAIN DESCRIPTION - LOCATION
LOCATION: BACK
LOCATION: BACK
LOCATION: ABDOMEN;BACK

## 2022-09-08 ASSESSMENT — PAIN DESCRIPTION - FREQUENCY: FREQUENCY: INTERMITTENT

## 2022-09-08 ASSESSMENT — PAIN DESCRIPTION - ORIENTATION: ORIENTATION: LOWER

## 2022-09-08 NOTE — ED NOTES
ED to inpatient nurses report    Chief Complaint   Patient presents with    Back Pain     lower    Emesis      Present to ED from nursing home  LOC: alert and orientated to name and place  Vital signs   Vitals:    09/08/22 1323 09/08/22 1442 09/08/22 1548 09/08/22 1613   BP: (!) 151/74 (!) 152/87 137/76 (!) 151/74   Pulse: 60 62 60 80   Resp: 18 18 18 18   Temp:       TempSrc:       SpO2: 98% 96% 96% 98%      Oxygen Baseline room air     Current needs required none   LDAs:   Peripheral IV 09/08/22 Left Antecubital (Active)   Site Assessment Clean, dry & intact 09/08/22 1613   Line Status Normal saline locked 09/08/22 1613   Phlebitis Assessment No symptoms 09/08/22 1613   Infiltration Assessment 0 09/08/22 1613     Mobility: Requires assistance * 2  Pending ED orders: none  Present condition: stable      Electronically signed by Meri Jane RN on 9/8/2022 at [address], RN  09/08/22 1614

## 2022-09-08 NOTE — ED PROVIDER NOTES
[place] ENCOUNTER          Pt Name: Dwayne Gunter  MRN: [id number]  [place] 1940  Date of evaluation: 9/8/2022  Treating Resident Physician: Brionna Navarro DO  Supervising Physician: Shawna Nuñez    History obtained from chart review, the patient, and  and children. CHIEF COMPLAINT       Chief Complaint   Patient presents with    Back Pain     lower    Emesis           HISTORY OF PRESENT ILLNESS    HPI  Dwayen Gunter is a 78 y.o. female who presents to the emergency department for evaluation of back pain and nausea. This occurred at 2 am this morning and persisted throughout the day. Additionally,  who is bedside states that she seems to be a little more confused thatn normal. She is having difficulty recalling events and objects names. Patient notes history of lumbar surgery and spinal steroid injections. She ambulates with a walker normally secondary to some chronic right leg weakness. She denies any numbness, tingling, new weakness, chest pain, or shortness of breath. Andrade Mcdowell believes that her abdomen is a bit larger than it normally is. The patient has no other acute complaints at this time. REVIEW OF SYSTEMS   Review of Systems   Constitutional:  Negative for chills and fever. HENT:  Negative for sinus pressure, sinus pain and sore throat. Respiratory:  Negative for cough, chest tightness, shortness of breath and wheezing. Cardiovascular:  Negative for chest pain, palpitations and leg swelling. Gastrointestinal:  Positive for abdominal distention and nausea. Negative for abdominal pain, constipation, diarrhea and vomiting. Endocrine: Negative for cold intolerance and heat intolerance. Genitourinary:  Negative for difficulty urinating and dysuria. Musculoskeletal:  Positive for arthralgias, back pain and gait problem. Negative for neck pain and neck stiffness. Skin:  Negative for color change and rash. Neurological:  Negative for dizziness, syncope, weakness, light-headedness, numbness and headaches.        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 Jelani Boston     GERD (gastroesophageal reflux disease)     Diverticulitis     Hiatal hernia     Hypertension     BAKI    Hypothyroidism     Osteoarthritis      Past Surgical History:   Procedure Laterality Date    [address] Bilateral 2013    w/cubital tunnel    COLON SURGERY  07/29/2016    Procedure: ATTEMPTED DAVINCI XI ROBOTIC ASSISTED SIGMOID COLON RESECTION, ROBOTIC ASSISTED MOBILIZATION OF RECTAL SIGMOID TO SPLENIC FLEXURE, CONVERTED TO OPEN LEFT HEMICOLECTOMY WITH COLOPROCTOCTOMY, SPLENORRHAPHY, RIGID SIGMOIDOSCOPY, LASER EVALUATION OF VASCULARITY WITH ICG; Surgeon: Caroline Jorge MD; Location: [place] [address]; Service: General    COLONOSCOPY  2012    CYST REMOVAL  2010    [address] [place] [address], [facility]    Cataract    FEMUR FRACTURE SURGERY Left 12/21/2020    LEFT FEMUR OPEN REDUCTION INTERNAL FIXATION performed by Reena Lovett MD at [facility] 2001    [address] [place] [address] N/A 03/09/2021    ROBOTIC EXTENSIVE LYSIS OF ADHESIONS, ROBOTIC REDUCTION OF HIATAL HERNIA WITH GASTROPEXY performed by Jennifer Orlando MD at [address] [place] [address] ([facility])  1979    w/bladder suspension    JOINT REPLACEMENT  2002, 2008, 2009    rt hip(2002), lt knee(2009), lt hip(2008) Shoulder (2009)    OTHER SURGICAL HISTORY  04/21/2021    Tank Lopez CNP in the office    [facility]. Bilateral 7/13/2021    medial branch blocks at bilateral L4/L5 and L5/S1 performed by Lacho Jordan DO at [place] OR    PAIN MANAGEMENT PROCEDURE Bilateral 8/24/2021    confirmatory  medial branch blocks at bilateral L4/L5 and L5/S1 performed by Johana Mcgee DO at [address] Right 10/19/2021    thermal radiofrequency at BILATERAL medial branches L4/L5 and L5/S1 was chosen. We will start with the right side first performed by Johana Mcgee DO at [address] Left 12/7/2021    thermal radiofrequency at BILATERAL medial branches L4/L5 and L5/S1 was chosen.  [facility]A 7/26/2022    lumbar epidural steroid injection Lumbar 5 Sacral 1 performed by Johana Mcgee DO at [place] NOT  [address] IND Left 09/15/2017    COLONOSCOPY performed by Alicia Viramontes MD at [place] [address]    discectomy    [facility] ENDOSCOPY  2012    UPPER GASTROINTESTINAL ENDOSCOPY Left 6/7/2022    EGD BIOPSY performed by Carolyn Dale MD at [facility] N/A 8/17/2022    EGD BIOPSY performed by Carlee Bell MD at [facility]         MEDICATIONS     Current Facility-Administered Medications:     enalapril (VASOTEC) tablet 5 mg, 5 mg, Oral, Daily, MAURICIO Jamil    ferrous sulfate (IRON 325) tablet 325 mg, 325 mg, Oral, BID, MAURICIO Jamil    FLUoxetine (PROZAC) capsule 20 mg, 20 mg, Oral, Daily, MAURICIO Jamil    fluticasone (FLONASE) 50 MCG/ACT nasal spray 1 spray, 1 spray, Nasal, Daily PRN, MAURICIO Jamil    levothyroxine (SYNTHROID) tablet 100 mcg, 100 mcg, Oral, Daily, MAURICIO Jamil    metoprolol succinate (TOPROL XL) extended release tablet 50 mg, 50 mg, Oral, Daily, Bill Farley    [START ON 9/9/2022] pantoprazole (PROTONIX) tablet 40 mg, 40 mg, Oral, BID AC, MAURICIO Jamil    polyethylene glycol (GLYCOLAX) packet 17 g, 17 g, Oral, Daily, MAURICIO Jamil    pregabalin (LYRICA) capsule 150 mg, 150 mg, Oral, BID, MAURICIO Jamil    sodium chloride tablet 1 g, 1 g, Oral, Daily, MAURICIO Jamil    sodium chloride flush 0.9 % injection 10 mL, 10 mL, IntraVENous, 2 times per day, MAURICIO Jamil    sodium chloride flush 0.9 % injection 10 mL, 10 mL, IntraVENous, PRN, MAURICIO Jamil    0.9 % sodium chloride infusion, , IntraVENous, PRN, MAURICIO Jamil    enoxaparin (LOVENOX) injection 40 mg, 40 mg, SubCUTAneous, Daily, MAURICIO Jamil    ondansetron (ZOFRAN-ODT) disintegrating tablet 4 mg, 4 mg, Oral, Q8H PRN **OR** ondansetron (ZOFRAN) injection 4 mg, 4 mg, IntraVENous, Q6H PRN, MAURICIO Jamil, 4 mg at 22 1843    polyethylene glycol (GLYCOLAX) packet 17 g, 17 g, Oral, Daily PRN, MAURICIO Jamil    acetaminophen (TYLENOL) tablet 650 mg, 650 mg, Oral, Q6H PRN **OR** acetaminophen (TYLENOL) suppository 650 mg, 650 mg, Rectal, Q6H PRN, MAURICIO Jamil    lactated ringers infusion, , IntraVENous, Continuous, Bill Farley, Last Rate: 75 mL/hr at 22 1823, New Bag at 22 1823    bisacodyl (DULCOLAX) suppository 10 mg, 10 mg, Rectal, Daily, iBll Farley    [START ON 2022] cefTRIAXone (ROCEPHIN) 1,000 mg in dextrose 5 % 50 mL IVPB mini-bag, 1,000 mg, IntraVENous, Q24H, MAURICIO Jamil      SOCIAL HISTORY     Social History     Social History Narrative    Referral to [employer] placed    Referral placed to [facility]    No barriers with medication affordability    No barriers with transportation    Discussed support from insurance company     Social History     Tobacco Use    Smoking status: Former     Packs/day: 2.00     Years: 11.00     Pack years: 22.00     Types: Cigarettes     Start date: 1957     Quit date: 1967     Years since quittin.8    Smokeless tobacco: Never Vaping Use    Vaping Use: Never used   Substance Use Topics    Alcohol use: Yes     Comment: wine with dinner    Drug use: No         ALLERGIES     Allergies   Allergen Reactions    Ampicillin     Morphine Other (See Comments)     Hallucinations     Pcn [Penicillins] Itching    Sulfa Antibiotics          FAMILY HISTORY     Family History   Problem Relation Age of Onset    Arthritis Mother     Hearing Loss Mother     High Cholesterol Mother     Hearing Loss Father     Heart Disease Father 76        CABG    Stroke Father     Arthritis Sister     Depression Sister     Diabetes Sister     Arthritis Brother     Depression Brother     Cancer Maternal Aunt     Early Death Maternal Aunt     Diabetes Maternal Grandmother     Heart Disease Paternal Grandmother          PREVIOUS RECORDS   Previous records reviewed:  last discharged 3 weeks ago . PHYSICAL EXAM     ED Triage Vitals [09/08/22 1010]   BP Temp Temp Source Heart Rate Resp SpO2 Height Weight   (!) 156/77 98.1 °F (36.7 °C) Oral 63 16 91 % -- --     Initial vital signs and nursing assessment reviewed and normal. Body mass index is 25.09 kg/m². Pulsoximetry is normal per my interpretation. Additional Vital Signs:  Vitals:    09/08/22 1815   BP: (!) 158/76   Pulse:    Resp:    Temp: 98.7 °F (37.1 °C)   SpO2: 98%       Physical Exam  Constitutional:       General: She is not in acute distress. Appearance: She is ill-appearing (chronically). She is not toxic-appearing or diaphoretic. HENT:      Head: Normocephalic and atraumatic. Mouth/Throat:      Mouth: Mucous membranes are moist.      Pharynx: Oropharynx is clear. No oropharyngeal exudate or posterior oropharyngeal erythema. Eyes:      General: No scleral icterus. Right eye: No discharge. Left eye: No discharge. Extraocular Movements: Extraocular movements intact. Conjunctiva/sclera: Conjunctivae normal.      Pupils: Pupils are equal, round, and reactive to light. Cardiovascular:      Rate and Rhythm: Normal rate and regular rhythm. Pulses: Normal pulses. Heart sounds: No murmur heard. No friction rub. No gallop. Pulmonary:      Effort: Pulmonary effort is normal.      Breath sounds: No wheezing, rhonchi or rales. Abdominal:      Palpations: Abdomen is soft. Tenderness: There is no abdominal tenderness. There is no guarding or rebound. Musculoskeletal:      Cervical back: Normal range of motion. No rigidity. Skin:     General: Skin is warm and dry. Capillary Refill: Capillary refill takes less than 2 seconds. Neurological:      Mental Status: She is alert and oriented to person, place, and time. GCS: GCS eye subscore is 4. GCS verbal subscore is 4. GCS motor subscore is 6. Cranial Nerves: No cranial nerve deficit or dysarthria. Motor: Weakness (right hip flexion and right foot plantar flexion) present. Comments: Difficulty with wordfinding and object identification. Occasionally tells erroneous stories. MEDICAL DECISION MAKING   Initial Assessment:   Pleasant 79 yo female with reported acute on chronic back pain and associated nausea. Norco at home provides relief, but she does not like to take pain medications. Chronic right leg weakness reported by patient following spinal surgery which is why she ambulates with walker. LNW 3 weeks ago. Has been worsening from a mental status standpoint since her last admission. Vitals stable without fever, hypotension, tachycardia. No recent fall or trauma. Evaluation for a cause of patients back pain, abdominal bloating, and ams. Suspect UTI, constipation, dementia, metabolic encephalopathy, unlikely stroke, acs. Plan:   CT head, abd, lumbar spine  Labs including inflammatory markers  UA  Analgesia  Reassesment  Dipo pending lab and imaging findings as well as patients comfort.          ED RESULTS   Laboratory results:  Labs Reviewed   CBC WITH AUTO DIFFERENTIAL - Abnormal; Notable for the following components:       Result Value    Hemoglobin 11.7 (*)     MCHC 31.0 (*)     RDW-CV 24.7 (*)     RDW-SD 74.9 (*)     Lymphocytes Absolute 0.9 (*)     Monocytes Absolute 0.2 (*)     All other components within normal limits   URINE WITH REFLEXED MICRO - Abnormal; Notable for the following components:    Ketones, Urine TRACE (*)     Specific Gravity, Urine > 1.030 (*)     Blood, Urine TRACE (*)     Protein, UA 30 (*)     Leukocyte Esterase, Urine LARGE (*)     Character, Urine CLOUDY (*)     All other components within normal limits   CULTURE, REFLEXED, URINE   COMPREHENSIVE METABOLIC PANEL   ANION GAP   GLOMERULAR FILTRATION RATE, ESTIMATED   OSMOLALITY   SCAN OF BLOOD SMEAR   SEDIMENTATION RATE   C-REACTIVE PROTEIN   LACTIC ACID   COMPREHENSIVE METABOLIC PANEL W/ REFLEX TO MG FOR LOW K   CBC WITH AUTO DIFFERENTIAL       Radiologic studies results:  CT HEAD WO CONTRAST   Final Result   No acute intracranial pathology. Exam is limited for subarachnoid hemorrhage because of presence of contrast            **This report has been created using voice recognition software. It may contain minor errors which are inherent in voice recognition technology. **      Final report electronically signed by Dr. Toan Cormier on 9/8/2022 2:54 PM      CT ABDOMEN PELVIS W IV CONTRAST Additional Contrast? None   Final Result   1. Moderate to large amount of stool is seen in the colon. 2. No acute fracture of the lumbar spine. 3. Multilevel degenerative changes of the lumbar spine. 4. Other findings as described above. Final report electronically signed by Dr Adalid Coronado on 9/8/2022 12:47 PM      CT LUMBAR RECONSTRUCTION WO POST PROCESS   Final Result   1. Moderate to large amount of stool is seen in the colon. 2. No acute fracture of the lumbar spine. 3. Multilevel degenerative changes of the lumbar spine. 4. Other findings as described above.       Final report electronically signed by  range)   ondansetron (ZOFRAN) injection 4 mg (4 mg IntraVENous Given 9/8/22 1037)   HYDROcodone-acetaminophen (NORCO) 5-325 MG per tablet 1 tablet (1 tablet Oral Given 9/8/22 1137)   iopamidol (ISOVUE-370) 76 % injection 80 mL (80 mLs IntraVENous Given 9/8/22 1215)   cefTRIAXone (ROCEPHIN) 1,000 mg in dextrose 5 % 50 mL IVPB mini-bag (0 mg IntraVENous Stopped 9/8/22 1641)         ED COURSE     ED Course as of 09/08/22 2009   Thu Sep 08, 2022   1256 CT ABDOMEN PELVIS W IV CONTRAST Additional Contrast? None  Stool burden [EM]   1532 CT HEAD WO CONTRAST  Low index suspicion based on story for subarachnoid hemorrhage however wanted to evaluate for possible cause of delirium on dementia or acute encephalopathy with which there is no current evidence on CT imaging. [EM]      ED Course User Index  [EM] Ame June DO           MEDICATION CHANGES     Current Discharge Medication List            FINAL DISPOSITION     Final diagnoses:   Transient alteration of awareness   Cystitis with hematuria   Chronic bilateral low back pain without sciatica     Condition: condition: stable  Dispo: Admit to telemetry      This transcription was electronically signed. Parts of this transcriptions may have been dictated by use of voice recognition software and electronically transcribed, and parts may have been transcribed with the assistance of an ED scribe. The transcription may contain errors not detected in proofreading. Please refer to my supervising physician's documentation if my documentation differs.     Electronically Signed: Ame June DO, 09/08/22, 8:09 PM        Ame June DO  Resident  09/08/22 2009

## 2022-09-08 NOTE — ED NOTES
Pt transported to [facility]. Floor contacted.      Gabriela Wells  09/08/22 9067

## 2022-09-08 NOTE — ED NOTES
Pt resting comfortably on cot. Denies pain at this time. Vitals stable with telemetry in place.  Family at [address], JENNIFER  09/08/22 3016

## 2022-09-08 NOTE — H&P
Hospitalist - History & Physical      Patient: Avila Sanchez Height    Unit/Bed:39/039A  YOB: 1940  MRN: [id number]   Acct: [de-identified]   PCP: Michael Loya DO    Date of Service: Pt seen/examined on 09/08/22  and Admitted to inpatient with expected LOS greater than two midnights due to medical therapy. Chief Complaint:  Confusion     Assessment and Plan:-  UTI (POA): UA suspicious- LE, Bacteria, >200 WBC. Pt has confusion. Urine culture pending. IV Ceftriaxone ordered and given in the ED. Continue Ceftriaxone. FOllow cultures. Constipation with Nausea & Vomiting: CT A/P showed large stool burden. Dulcolax suppository ordered daily. KUB in the AM. IVF. Acute metabolic encephalopathy: Likely 2/2 UTI. Family states that she has been this way since she left the hospital 3 weeks ago. SLP consulted for [facility]. Monitor. Sleep wake cycles are crucial.   Physical Deconditioning: PT/OT. Pt states that she has been weak since she was in the hospital previously. Essential HTN: BP stable. Continue home medications. Hypothyroidism: Synthroid. Anxiety/Depression: Prozac. Fibromyalgia: Lyrica. History Of Present Illness: This is a 78-year-old female who presented [address] [address] emergency department on 9/8 due to back pain and nausea. Patient was recently in the hospital 3 weeks ago for GI bleed. Patient states that she has had 2-3 episodes of emesis today and just \"feels funny\" in the head. Per , \"her brain is just not working right. \" Pt does not seem to be able to find her words as quickly as she would like. Pt denies CP, SOB, abdominal pain, HA. Pt states that she has not had a BM in a few days. Pt described her back pain as 2/10, constant dull pain. Pt states that she came to the hospital for the nausea, but the  states that they came due to her brain. In the ED, patient was found to have UTI. CT A/P showed large stool burden. CT head negative for acute issues. CT lumbar showed no acute issues; degenerative changes. Hospitalist asked to admit for UTI and confusion.        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 Feng Model     GERD (gastroesophageal reflux disease)     Diverticulitis     Hiatal hernia     Hypertension     BAKI    Hypothyroidism     Osteoarthritis        Past Surgical History:        Procedure Laterality Date    [address] Bilateral 2013    w/cubital tunnel    COLON SURGERY  07/29/2016    Procedure: ATTEMPTED DAVINCI XI ROBOTIC ASSISTED SIGMOID COLON RESECTION, ROBOTIC ASSISTED MOBILIZATION OF RECTAL SIGMOID TO SPLENIC FLEXURE, CONVERTED TO OPEN LEFT HEMICOLECTOMY WITH COLOPROCTOCTOMY, SPLENORRHAPHY, RIGID SIGMOIDOSCOPY, LASER EVALUATION OF VASCULARITY WITH ICG; Surgeon: Mounika Faith MD; Location: [place] [address]; Service: General    COLONOSCOPY  2012    CYST REMOVAL  2010    [facility], 1999    Cataract    FEMUR FRACTURE SURGERY Left 12/21/2020    LEFT FEMUR OPEN REDUCTION INTERNAL FIXATION performed by Josue Quevedo MD at [facility] 2001    [address] [place] [address] N/A 03/09/2021    ROBOTIC EXTENSIVE LYSIS OF ADHESIONS, ROBOTIC REDUCTION OF HIATAL HERNIA WITH GASTROPEXY performed by Venkat Stuart MD at [address] ([facility])  1979    w/bladder suspension    JOINT REPLACEMENT  2002, 2008, 2009    rt hip(2002), lt knee(2009), lt hip(2008) Shoulder (2009)    OTHER SURGICAL HISTORY  04/21/2021    Johny Quinones CNP in the office    [address] Bilateral 7/13/2021    medial branch blocks at bilateral L4/L5 and L5/S1 performed by Lee Ann Andrade DO at [address] [facility] PAIN MANAGEMENT PROCEDURE Bilateral 8/24/2021    confirmatory  medial branch blocks at bilateral L4/L5 and L5/S1 performed by Huey Hassan DO at [facility] Right 10/19/2021    thermal radiofrequency at BILATERAL medial branches L4/L5 and L5/S1 was chosen. We will start with the right side first performed by Huey Hassan DO at [facility] Left 12/7/2021    thermal radiofrequency at BILATERAL medial branches L4/L5 and L5/S1 was chosen. performed by Huey Hassan DO at [facility] N/A 7/26/2022    lumbar epidural steroid injection Lumbar 5 Sacral 1 performed by Huey Hassan DO at [facility]  [address] IND Left 09/15/2017    COLONOSCOPY performed by Jesu Vyas MD at [place] [address]    discectomy    [facility] ENDOSCOPY  2012    UPPER GASTROINTESTINAL ENDOSCOPY Left 6/7/2022    EGD BIOPSY performed by Sim Amezquita MD at [facility] 8/17/2022    EGD BIOPSY performed by Lashawn Wong MD at [facility]       Home Medications:   No current facility-administered medications on file prior to encounter.      Current Outpatient Medications on File Prior to Encounter   Medication Sig Dispense Refill    zinc sulfate (ZINCATE) 220 (50 Zn) MG capsule Take 50 mg by mouth daily      vitamin C (ASCORBIC ACID) 500 MG tablet Take 500 mg by mouth 2 times daily      enalapril (VASOTEC) 5 MG tablet Take 1 tablet by mouth daily 90 tablet 3    sodium chloride 1 g tablet Take 1 tablet by mouth daily 30 tablet 0    ferrous sulfate (IRON 325) 325 (65 Fe) MG tablet Take 1 tablet by mouth 2 times daily 180 tablet 1    polyethylene glycol (GLYCOLAX) 17 g packet Take 17 g by mouth daily 527 g 2    pantoprazole (PROTONIX) 40 MG tablet Take 1 tablet by mouth 2 times daily (before meals) 30 tablet 2    ondansetron (ZOFRAN) 4 MG tablet Take 1 tablet by mouth 3 times daily as needed for Nausea or Vomiting (Patient not taking: Reported on 9/1/2022) 30 tablet 0    pregabalin (LYRICA) 150 MG capsule Take 1 capsule by mouth in the morning and 1 capsule before bedtime. Do all this for 180 days. 180 capsule 1    Cholecalciferol (VITAMIN D3) 50 MCG (2000 UT) CAPS Take by mouth      metoprolol succinate (TOPROL XL) 50 MG extended release tablet TAKE 1 TABLET DAILY 90 tablet 3    levothyroxine (SYNTHROID) 100 MCG tablet TAKE 1 TABLET DAILY 90 tablet 3    [DISCONTINUED] meloxicam (MOBIC) 15 MG tablet Take 1 tablet by mouth daily (Patient taking differently: Take 7.5 mg by mouth in the morning.) 90 tablet 1    FLUoxetine (PROZAC) 20 MG capsule Take 1 capsule by mouth daily 90 capsule 3    docusate sodium (COLACE) 100 MG capsule Take 1 capsule by mouth 2 times daily as needed for Constipation 180 capsule 3    calcium-vitamin D (OSCAL-500) 500-200 MG-UNIT per tablet Take 2 tablets by mouth daily      Misc. Devices (WALKER) MISC 1 each by Does not apply route daily 1 each 0    Misc. Devices MISC Mechanical lift for a [employer] 1 Device 0    Scooter MISC by Does not apply route Power scooter 1 each 0    fluticasone (FLONASE) 50 MCG/ACT nasal spray 1 spray by Nasal route as needed for Rhinitis       Omega 3-6-9 Fatty Acids (OMEGA 3-6-9 COMPLEX) CAPS Take 1 capsule by mouth daily       therapeutic multivitamin-minerals (THERAGRAN-M) tablet Take 1 tablet by mouth nightly          Allergies:    Ampicillin, Morphine, Pcn [penicillins], and Sulfa antibiotics    Social History:    reports that she quit smoking about 54 years ago. Her smoking use included cigarettes. She started smoking about 65 years ago. She has a 22.00 pack-year smoking history. She has never used smokeless tobacco. She reports current alcohol use.  She reports that she does not use drugs. Family History:       Problem Relation Age of Onset    Arthritis Mother     Hearing Loss Mother     High Cholesterol Mother     Hearing Loss Father     Heart Disease Father 76        CABG    Stroke Father     Arthritis Sister     Depression Sister     Diabetes Sister     Arthritis Brother     Depression Brother     Cancer Maternal Aunt     Early Death Maternal Aunt     Diabetes Maternal Grandmother     Heart Disease Paternal Grandmother        Diet:  No diet orders on file    Review of systems:   Pertinent positives as noted in the HPI. All other systems reviewed and negative. PHYSICAL EXAM:  BP (!) 151/74   Pulse 80   Temp 98.1 °F (36.7 °C) (Oral)   Resp 18   SpO2 98%   General appearance: No apparent distress, appears stated age and cooperative. Chronically ill appearing white female, dry mucous membranes   HEENT: Normal cephalic, atraumatic without obvious deformity. Pupils equal, round, and reactive to light. Extra ocular muscles intact. Conjunctivae/corneas clear. Neck: Supple, with full range of motion. No jugular venous distention. Trachea midline. Respiratory:  Normal respiratory effort on RA. Clear to auscultation, bilaterally without Rales/Wheezes/Rhonchi. Cardiovascular: Regular rate and rhythm with normal S1/S2 without murmurs, rubs or gallops. Abdomen: Soft, non-tender, non-distended with normal bowel sounds. Musculoskeletal:  No clubbing, cyanosis or edema bilaterally. Skin: Skin color, texture, turgor normal.  No rashes or lesions. Neurologic:  Neurovascularly intact without any focal sensory/motor deficits.  Cranial nerves: II-XII intact, grossly non-focal.  Psychiatric: Alert and oriented, conversationally confused  Capillary Refill: Brisk,< 3 seconds   Peripheral Pulses: +2 palpable, equal bilaterally     Labs:   Recent Labs     09/08/22  1025   WBC 5.8   HGB 11.7*   HCT 37.8        Recent Labs     09/08/22  1025      K 3.6      CO2 26   BUN 10 CREATININE 0.5   CALCIUM 9.4     Recent Labs     09/08/22  1025   AST 30   ALT 12   BILITOT 0.4   ALKPHOS 90     No results for input(s): INR in the last 72 hours. No results for input(s): Patrisha Meigs in the last 72 hours. Urinalysis:    Lab Results   Component Value Date/Time    NITRU NEGATIVE 09/08/2022 03:10 PM    WBCUA > 200 09/08/2022 03:10 PM    BACTERIA MODERATE 09/08/2022 03:10 PM    RBCUA 3-5 09/08/2022 03:10 PM    BLOODU TRACE 09/08/2022 03:10 PM    SPECGRAV 1.015 04/12/2019 11:49 AM    GLUCOSEU NEGATIVE 09/08/2022 03:10 PM       Radiology:   CT HEAD WO CONTRAST   Final Result   No acute intracranial pathology. Exam is limited for subarachnoid hemorrhage because of presence of contrast            **This report has been created using voice recognition software. It may contain minor errors which are inherent in voice recognition technology. **      Final report electronically signed by Dr. Chad Pruitt on 9/8/2022 2:54 PM      CT ABDOMEN PELVIS W IV CONTRAST Additional Contrast? None   Final Result   1. Moderate to large amount of stool is seen in the colon. 2. No acute fracture of the lumbar spine. 3. Multilevel degenerative changes of the lumbar spine. 4. Other findings as described above. Final report electronically signed by Dr Clarence Marqeus on 9/8/2022 12:47 PM      CT LUMBAR RECONSTRUCTION WO POST PROCESS   Final Result   1. Moderate to large amount of stool is seen in the colon. 2. No acute fracture of the lumbar spine. 3. Multilevel degenerative changes of the lumbar spine. 4. Other findings as described above.       Final report electronically signed by Dr Clarence Marques on 9/8/2022 12:47 PM        Electronically signed by MAURICIO Jade on 9/8/2022 at 4:35 PM

## 2022-09-08 NOTE — ED NOTES
Patient presents to the ED via [facility] for vomiting and lower back pain (bulgin disk). Patient states that she was seen here a couple days ago for the vomiting and it hasn't gotten any better. She states that she vomiting around 2 am this morning and now she is just nauseated. She has no other complaints at this time.       Kendrick Goncalves, ANTHONY  39/82/11 [id number]

## 2022-09-08 NOTE — ED NOTES
Pt reassessed at this time. Pt states that pain is more tolerable at this time, rates pain 2/10. Pt appears more comfortable. Vitals stable with telemetry in place. Call light in reach. Family at bedside.       Emeterio Boast, RN  09/08/22 [id number]

## 2022-09-08 NOTE — ED NOTES
Pt resting on cot. Pain controlled. Pt denying need for urination. Vitals stable. Family at bedside.       Davon Zambrano RN  09/08/22 [id number]

## 2022-09-09 ENCOUNTER — APPOINTMENT (OUTPATIENT)
Dept: GENERAL RADIOLOGY | Age: 82
DRG: 689 | End: 2022-09-09
Payer: MEDICARE

## 2022-09-09 LAB
ALBUMIN SERPL-MCNC: 3.2 G/DL (ref 3.5–5.1)
ALP BLD-CCNC: 73 U/L (ref 38–126)
ALT SERPL-CCNC: 10 U/L (ref 11–66)
ANION GAP SERPL CALCULATED.3IONS-SCNC: 9 MEQ/L (ref 8–16)
ANISOCYTOSIS: PRESENT
AST SERPL-CCNC: 24 U/L (ref 5–40)
BASOPHILS # BLD: 0.9 %
BASOPHILS ABSOLUTE: 0.1 THOU/MM3 (ref 0–0.1)
BILIRUB SERPL-MCNC: 0.4 MG/DL (ref 0.3–1.2)
BUN BLDV-MCNC: 10 MG/DL (ref 7–22)
CALCIUM SERPL-MCNC: 8.6 MG/DL (ref 8.5–10.5)
CHLORIDE BLD-SCNC: 105 MEQ/L (ref 98–111)
CO2: 25 MEQ/L (ref 23–33)
CREAT SERPL-MCNC: 0.6 MG/DL (ref 0.4–1.2)
EOSINOPHIL # BLD: 0.4 %
EOSINOPHILS ABSOLUTE: 0 THOU/MM3 (ref 0–0.4)
ERYTHROCYTE [DISTWIDTH] IN BLOOD BY AUTOMATED COUNT: 24.5 % (ref 11.5–14.5)
ERYTHROCYTE [DISTWIDTH] IN BLOOD BY AUTOMATED COUNT: 76.1 FL (ref 35–45)
GFR SERPL CREATININE-BSD FRML MDRD: > 90 ML/MIN/1.73M2
GLUCOSE BLD-MCNC: 96 MG/DL (ref 70–108)
HCT VFR BLD CALC: 33.5 % (ref 37–47)
HEMOGLOBIN: 10.4 GM/DL (ref 12–16)
IMMATURE GRANS (ABS): 0.02 THOU/MM3 (ref 0–0.07)
IMMATURE GRANULOCYTES: 0.3 %
LACTIC ACID: 0.8 MMOL/L (ref 0.5–2)
LYMPHOCYTES # BLD: 8.8 %
LYMPHOCYTES ABSOLUTE: 0.6 THOU/MM3 (ref 1–4.8)
MCH RBC QN AUTO: 27.7 PG (ref 26–33)
MCHC RBC AUTO-ENTMCNC: 31 GM/DL (ref 32.2–35.5)
MCV RBC AUTO: 89.3 FL (ref 81–99)
MONOCYTES # BLD: 6.8 %
MONOCYTES ABSOLUTE: 0.5 THOU/MM3 (ref 0.4–1.3)
NUCLEATED RED BLOOD CELLS: 0 /100 WBC
PLATELET # BLD: 152 THOU/MM3 (ref 130–400)
PMV BLD AUTO: 10.8 FL (ref 9.4–12.4)
POTASSIUM REFLEX MAGNESIUM: 3.7 MEQ/L (ref 3.5–5.2)
RBC # BLD: 3.75 MILL/MM3 (ref 4.2–5.4)
SEG NEUTROPHILS: 82.8 %
SEGMENTED NEUTROPHILS ABSOLUTE COUNT: 5.6 THOU/MM3 (ref 1.8–7.7)
SODIUM BLD-SCNC: 139 MEQ/L (ref 135–145)
TOTAL PROTEIN: 5.2 G/DL (ref 6.1–8)
WBC # BLD: 6.8 THOU/MM3 (ref 4.8–10.8)

## 2022-09-09 PROCEDURE — 74018 RADEX ABDOMEN 1 VIEW: CPT

## 2022-09-09 PROCEDURE — 36415 COLL VENOUS BLD VENIPUNCTURE: CPT

## 2022-09-09 PROCEDURE — 99233 SBSQ HOSP IP/OBS HIGH 50: CPT | Performed by: PHYSICIAN ASSISTANT

## 2022-09-09 PROCEDURE — 6360000002 HC RX W HCPCS: Performed by: PHYSICIAN ASSISTANT

## 2022-09-09 PROCEDURE — 6370000000 HC RX 637 (ALT 250 FOR IP): Performed by: PHYSICIAN ASSISTANT

## 2022-09-09 PROCEDURE — 2580000003 HC RX 258: Performed by: PHYSICIAN ASSISTANT

## 2022-09-09 PROCEDURE — G0378 HOSPITAL OBSERVATION PER HR: HCPCS

## 2022-09-09 PROCEDURE — 97165 OT EVAL LOW COMPLEX 30 MIN: CPT

## 2022-09-09 PROCEDURE — 80053 COMPREHEN METABOLIC PANEL: CPT

## 2022-09-09 PROCEDURE — 96366 THER/PROPH/DIAG IV INF ADDON: CPT

## 2022-09-09 PROCEDURE — 97530 THERAPEUTIC ACTIVITIES: CPT

## 2022-09-09 PROCEDURE — 97162 PT EVAL MOD COMPLEX 30 MIN: CPT

## 2022-09-09 PROCEDURE — 85025 COMPLETE CBC W/AUTO DIFF WBC: CPT

## 2022-09-09 PROCEDURE — 97535 SELF CARE MNGMENT TRAINING: CPT

## 2022-09-09 PROCEDURE — 83605 ASSAY OF LACTIC ACID: CPT

## 2022-09-09 PROCEDURE — 97129 THER IVNTJ 1ST 15 MIN: CPT

## 2022-09-09 PROCEDURE — 96372 THER/PROPH/DIAG INJ SC/IM: CPT

## 2022-09-09 PROCEDURE — 1200000003 HC TELEMETRY R&B

## 2022-09-09 PROCEDURE — 92523 SPEECH SOUND LANG COMPREHEN: CPT

## 2022-09-09 RX ORDER — SENNA AND DOCUSATE SODIUM 50; 8.6 MG/1; MG/1
1 TABLET, FILM COATED ORAL NIGHTLY
Status: DISCONTINUED | OUTPATIENT
Start: 2022-09-09 | End: 2022-09-10 | Stop reason: HOSPADM

## 2022-09-09 RX ORDER — BISACODYL 10 MG
10 SUPPOSITORY, RECTAL RECTAL DAILY PRN
Status: DISCONTINUED | OUTPATIENT
Start: 2022-09-09 | End: 2022-09-10 | Stop reason: HOSPADM

## 2022-09-09 RX ORDER — TRAMADOL HYDROCHLORIDE 50 MG/1
25 TABLET ORAL ONCE
Status: COMPLETED | OUTPATIENT
Start: 2022-09-09 | End: 2022-09-09

## 2022-09-09 RX ADMIN — PREGABALIN 150 MG: 50 CAPSULE ORAL at 09:03

## 2022-09-09 RX ADMIN — METOPROLOL SUCCINATE 50 MG: 50 TABLET, FILM COATED, EXTENDED RELEASE ORAL at 09:06

## 2022-09-09 RX ADMIN — FERROUS SULFATE TAB 325 MG (65 MG ELEMENTAL FE) 325 MG: 325 (65 FE) TAB at 09:06

## 2022-09-09 RX ADMIN — ACETAMINOPHEN 650 MG: 325 TABLET ORAL at 16:34

## 2022-09-09 RX ADMIN — PANTOPRAZOLE SODIUM 40 MG: 40 TABLET, DELAYED RELEASE ORAL at 16:34

## 2022-09-09 RX ADMIN — FLUOXETINE 20 MG: 20 CAPSULE ORAL at 09:06

## 2022-09-09 RX ADMIN — LEVOTHYROXINE SODIUM 100 MCG: 0.1 TABLET ORAL at 09:06

## 2022-09-09 RX ADMIN — ENOXAPARIN SODIUM 40 MG: 100 INJECTION SUBCUTANEOUS at 09:03

## 2022-09-09 RX ADMIN — ENALAPRIL MALEATE 5 MG: 5 TABLET ORAL at 09:05

## 2022-09-09 RX ADMIN — SODIUM CHLORIDE, POTASSIUM CHLORIDE, SODIUM LACTATE AND CALCIUM CHLORIDE: 600; 310; 30; 20 INJECTION, SOLUTION INTRAVENOUS at 06:37

## 2022-09-09 RX ADMIN — FLUTICASONE PROPIONATE 1 SPRAY: 50 SPRAY, METERED NASAL at 09:05

## 2022-09-09 RX ADMIN — PREGABALIN 150 MG: 50 CAPSULE ORAL at 21:15

## 2022-09-09 RX ADMIN — ONDANSETRON 4 MG: 4 TABLET, ORALLY DISINTEGRATING ORAL at 11:13

## 2022-09-09 RX ADMIN — FERROUS SULFATE TAB 325 MG (65 MG ELEMENTAL FE) 325 MG: 325 (65 FE) TAB at 21:15

## 2022-09-09 RX ADMIN — CEFTRIAXONE SODIUM 1000 MG: 1 INJECTION, POWDER, FOR SOLUTION INTRAMUSCULAR; INTRAVENOUS at 16:31

## 2022-09-09 RX ADMIN — SODIUM CHLORIDE 1 G: 1 TABLET ORAL at 09:06

## 2022-09-09 RX ADMIN — TRAMADOL HYDROCHLORIDE 25 MG: 50 TABLET, COATED ORAL at 18:31

## 2022-09-09 RX ADMIN — PANTOPRAZOLE SODIUM 40 MG: 40 TABLET, DELAYED RELEASE ORAL at 06:36

## 2022-09-09 RX ADMIN — SENNOSIDES AND DOCUSATE SODIUM 1 TABLET: 50; 8.6 TABLET ORAL at 21:15

## 2022-09-09 RX ADMIN — SODIUM CHLORIDE, POTASSIUM CHLORIDE, SODIUM LACTATE AND CALCIUM CHLORIDE: 600; 310; 30; 20 INJECTION, SOLUTION INTRAVENOUS at 21:21

## 2022-09-09 ASSESSMENT — PAIN SCALES - GENERAL
PAINLEVEL_OUTOF10: 7
PAINLEVEL_OUTOF10: 5

## 2022-09-09 ASSESSMENT — PAIN DESCRIPTION - LOCATION: LOCATION: HEAD

## 2022-09-09 NOTE — PROGRESS NOTES
[facility]  INPATIENT PHYSICAL THERAPY  EVALUATION  [facility] ONC MED 5K - 5K-15/015-A    Time In: 5074  Time Out: 0307  Timed Code Treatment Minutes: 25 Minutes  Minutes: 36          Date: 2022  Patient Name: Eugene Gunter,  Gender:  female        MRN: [id number]  : 1940  (78 y.o.)      Referring Practitioner: MAURICIO Ortega  Diagnosis: Transient alteration of awareness  Additional Pertinent Hx: \"This is a 78-year-old female who presented [facility] emergency department on  due to back pain and nausea. Patient was recently in the hospital 3 weeks ago for GI bleed. Patient states that she has had 2-3 episodes of emesis today and just \"feels funny\" in the head. Per , \"her brain is just not working right. \" Pt does not seem to be able to find her words as quickly as she would like. Pt denies CP, SOB, abdominal pain, HA. Pt states that she has not had a BM in a few days. Pt described her back pain as 2/10, constant dull pain. Pt states that she came to the hospital for the nausea, but the  states that they came due to her brain. In the ED, patient was found to have UTI. CT A/P showed large stool burden. CT head negative for acute issues. CT lumbar showed no acute issues; degenerative changes. Hospitalist asked to admit for UTI and confusion. \"     Restrictions/Precautions:  Restrictions/Precautions: Fall Risk, General Precautions, Contact Precautions  Position Activity Restriction  Other position/activity restrictions: R LE shorter than L LE. Subjective:  Chart Reviewed: Yes  Patient assessed for rehabilitation services?: Yes  Family / Caregiver Present: Yes  Subjective: RN approved session, pt is supine in bed, incontinent of urine and asking for assistance, pt assisted with pericare and to the chair.  present. Pt oriented to person, not to place, confused.     General:  Overall Orientation Status: Impaired  Orientation Level: Oriented to person, Disoriented to place  Vision: Within Functional Limits  Hearing: Within functional limits       Pain: denies    Vitals: Vitals not assessed per clinical judgement, see nursing flowsheet    Social/Functional History:    Lives With: Spouse  Type of Home: Facility ([facility])  Home Layout: Multi-level  Home Access: Elevator, Level entry (patient lives on first floor)  Home Equipment: Nicolle Hanly, rolling, [employer]     Bathroom Shower/Tub: Walk-in shower  Bathroom Toilet: Handicap height  Bathroom Equipment: Grab bars in shower, Shower chair, Grab bars around toilet  Bathroom Accessibility: Walker accessible    [employer] Help From: Other (comment), Family (facility staff for housekeeping,  supervises bathing)  ADL Assistance: Independent  Homemaking Assistance: Needs assistance  Ambulation Assistance: Independent  Transfer Assistance: Independent    Active : No     Additional Comments: Pt amb with RW    OBJECTIVE:    Balance:  Static Sitting Balance:  Stand By Assistance; sits EOB at least 10 minutes to get cleaned up  Static Standing Balance: Contact Guard Assistance  Dynamic Standing Balance: Contact Guard Assistance    Bed Mobility:  Supine to Sit: Stand By Assistance, X 1, with head of bed raised, with rail, with increased time for completion  Scooting: Stand By Assistance, X 1    Transfers:  Sit to Stand: Contact Guard Assistance, X 1; from EOB and recliner  Stand to [place] [address], X 1    Ambulation:  Contact Guard Assistance, X 1  Distance: 3 feet to recliner, 25 feet around room  Surface: Level Tile  Device:Rolling Walker  Gait Deviations: Forward Flexed Posture, Slow Thania, Decreased Step Length on Left, and Decreased Gait Speed  **Slow pace, steady    Functional Outcome Measures: Not completed       ASSESSMENT:  Activity Tolerance:  Patient tolerance of  treatment: good. Treatment Initiated: Treatment and education initiated within context of evaluation. Evaluation time included review of current medical information, gathering information related to past medical, social and functional history, completion of standardized testing, formal and informal observation of tasks, assessment of data and development of plan of care and goals. Treatment time included skilled education and facilitation of tasks to increase safety and independence with functional mobility for improved independence and quality of life. Assessment: Body Structures, Functions, Activity Limitations Requiring Skilled Therapeutic Intervention: Decreased functional mobility , Decreased cognition, Decreased balance, Decreased strength, Decreased safe awareness  Assessment: Pt tolerates session well, limited by generalized weakness, confusion. PT to continue to progress strength and functional mobility. Therapy Prognosis: Good    Requires PT Follow-Up: Yes    Discharge Recommendations:  Discharge Recommendations: 24 hour supervision or assist, Home with Home health PT    Patient Education:      . Patient Education  Education Given To: Patient, Family  Education Provided: Role of Therapy, Plan of Care  Education Method: Verbal  Barriers to Learning: Cognition  Education Outcome: Verbalized understanding, Continued education needed       Equipment Recommendations:  Equipment Needed: No    Plan:  Current Treatment Recommendations: Strengthening, Balance training, Functional mobility training, Transfer training, Patient/Caregiver education & training, Therapeutic activities, Neuromuscular re-education, Gait training, Safety education & training  Plan:  (3-5x GM)    Goals:  Patient goals : to go home  Short Term Goals  Time Frame for Short term goals: by discharge  Short term goal 1: Pt to transfer supine <--> sit SBA to enable pt to get in/out of bed. Short term goal 2: Pt to transfer sit <--> stand SBA for increased functional mobility.   Short term goal 3: Pt to ambulate >75 feet with RW SBA for household ambulation. Long Term Goals  Time Frame for Long term goals : NA due to short length of stay. Following session, patient left in safe position with all fall risk precautions in place.

## 2022-09-09 NOTE — PROGRESS NOTES
[facility]  SPEECH THERAPY  STRZ ONC MED 5K  Speech - Language - Cognitive Evaluation   & Treatment    SLP Individual Minutes  Time In: 7925  Time Out: 8914  Minutes: 26  Timed Code Treatment Minutes: 10 Minutes       Date: 2022  Patient Name: Eugene Gunter      CSN: [id number]   : 1940  (78 y.o.)  Gender: female   Referring Physician:  MAURICIO Ortega  Diagnosis: Transient alteration of awareness   Precautions: fall risk, contact isolation   History of Present Illness/Injury: Per chart review; Eugene Gunter  is a 79 y/o female who was admitted to [facility] with the above medical dx. Please refer to full H&P; \"Paulina Gunter is a 78 y.o. female who presents to the emergency department for evaluation of back pain and nausea. This occurred at 2 am this morning and persisted throughout the day. Additionally,  who is bedside states that she seems to be a little more confused thatn normal. She is having difficulty recalling events and objects names. Patient notes history of lumbar surgery and spinal steroid injections. She ambulates with a walker normally secondary to some chronic right leg weakness. She denies any numbness, tingling, new weakness, chest pain, or shortness of breath. Brent Aragon believes that her abdomen is a bit larger than it normally is. \"    Patient was referred for a cognitive evaluation d/t acute confusion upon admission. 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 Cabrera Silence     GERD (gastroesophageal reflux disease)     Diverticulitis     Hiatal hernia     Hypertension     BAKI    Hypothyroidism     Osteoarthritis        Pain: 2/10 - Pain location: back pain     Subjective:  Patient seen upright in bed with JENNIFER Parikh Pill permission.  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   Education Level: HS + 2 year associates degree   Driving Status: Does not drive  Finance Management: Dependent/Unable *   Medication Management: Dependent/Unable *daughter   ADL's: Independent. Hobbies: reading, wbut cant anymore d/t vision   Vision Status: macular degeneration *glasses required   Hearing: WFL in quiet setting *bilateral aids charging at the time of the evaluation   Type of [facility]: [facility] [place] [address] [facility]  Home Layout: Multi-level  Home Access: Elevator, Level entry (patient lives on first floor)  Home Equipment: [employer], rolling, Mitali [place] / VOICE:  Speech and Voice appear to be grossly intact for basic and complex daily communication    LANGUAGE:  Receptive:  Receptive language skills appear to be grossly intact for basic and complex daily communication. Expressive:  Expressive language skills appear to be grossly intact for basic and complex daily communication. COGNITION:  ntreal Cognitive Assessment (MOCA) version 7.1 completed. Pt scored 17/25. Normal is greater than or equal to 26/30. Orientation: 5/6   Immediate Recall: Trial 1: 4/5, Trial 2: 5/5   Short-Term Recall: 0/5 indep, 3/5 min cues, 2/5 MAX cues   Divergent Naming: 10 word/min (target of 11)  Reasoning: verbal reasonin/2   Insight: good   Attention: good basic sustained and alternating. Suspected complex higher level attention impairments.    Math Computation: 2/3 on serial 7's   Executive Functioning: unable to complete d/t visual loss *macular degeneration     SWALLOWING:  Current Diet: regular solids, thin liquids   Not Tested         RECOMMENDATIONS/ASSESSMENT:  DIAGNOSTIC IMPRESSIONS:  Patient presented with mild cognitive linguistic impairments as outlined above and indicated with 17/25 on the [address] Unable to complete the executive functioning portion d/t significant visual impairments d/t macular degeneration. Pt stated visual changes declining rapidly. Cognitive deficits are in the domain basic delayed recall and working memory, suspected higher level attention deficits (selective and divided), higher level organization and reasoning skilled. Receptive and expressive language remains intact for basic and complex communication of daily wants/needs. No dysarthria or dysphonia. Given lower cognitive demands at [place] patient would benefit from brief continuation of skilled ST to assist in reasoning, STM, and problem solving skills re: safety in environment and ADLs/iADLs as vision progressively declines. Rehabilitation Potential: good  Discharge Recommendations: Continue to Assess Pending Progress    EDUCATION:  Learner: Patient and Significant Other  Education:  Reviewed results and recommendations of this evaluation, Reviewed ST goals and Plan of Care, and Reviewed recommendations for follow-up  Evaluation of Education: Verbalizes understanding    PLAN:  Skilled SLP intervention on acute care 3-5 x per week or until goals met and/or pt plateaus in function. Specific interventions for next session may include: cognitive tasks. Areli Sharma PATIENT GOAL:    Did not state. Will further assess during treatment. SHORT TERM GOALS:  Short-term Goals  Timeframe for Short-term Goals: 2 weeks  Goal 1: Patient will complete basic and mildly complex STM tasks with 3-5 units: immediate, delayed, adn working memory with 60% accuracy given MIN cues to improve retention of newly learned information. Intervention:   Completed review of STM strategies using the acronym WRAP--Write it down, Repeat it, Associate it, and Pictures it. *pt unable to read written cues, however, provided on patient board for staff and her family to repeat and assist in reinforcement.    *pt stated she previously used a calender, however, as her vision has declined, she has to rely on her  and other to read daily activities and events. *repetition is effective with immediate recall    Goal 2: Patient will complete moderately complex visual/verbal reasoning, problem solving, and basic executive functioning with 80% accuracy given MIN cues to improve safe return to PLOF with ADLs/iADLs as visual loss progresses. Intervention:   Call light: pt independent to locate remove, and independent to ID RN call light     Goal 3: Patient will complete higher level organizational naming tasks (divergent, convergent, abstract thinking) with 80% accuracy given MIN cues to assist in safe return to ADLs/iADLs. Intervention:   ID target of 3 - rationale for utilizing call light: 2/3 mod I additional time, 1/3 mod cues   *decreased mental flexibility   *decreased higher level organizational naming. LONG TERM GOALS:  No LTM Goals recommended, due to anticipated short ELOS. Chago Yang MA., [id number] [facility] / [phone]

## 2022-09-09 NOTE — PLAN OF CARE
Problem: Pain  Goal: Verbalizes/displays adequate comfort level or baseline comfort level  Outcome: Progressing  Flowsheets (Taken 9/9/2022 0316)  Verbalizes/displays adequate comfort level or baseline comfort level:   Encourage patient to monitor pain and request assistance   Assess pain using appropriate pain scale   Administer analgesics based on type and severity of pain and evaluate response   Implement non-pharmacological measures as appropriate and evaluate response     Problem: Skin/Tissue Integrity  Goal: Absence of new skin breakdown  Description: 1. Monitor for areas of redness and/or skin breakdown  2. Assess vascular access sites hourly  3. Every 4-6 hours minimum:  Change oxygen saturation probe site  4. Every 4-6 hours:  If on nasal continuous positive airway pressure, respiratory therapy assess nares and determine need for appliance change or resting period.   Outcome: Progressing     Problem: Safety - Adult  Goal: Free from fall injury  Outcome: Progressing  Flowsheets (Taken 9/9/2022 0316)  Free From Fall Injury: Instruct family/caregiver on patient safety     Problem: ABCDS Injury Assessment  Goal: Absence of physical injury  Outcome: Progressing  Flowsheets (Taken 9/9/2022 0316)  Absence of Physical Injury: Implement safety measures based on patient assessment

## 2022-09-09 NOTE — PROGRESS NOTES
Hospitalist Progress Note    Patient:  Chuy Lee [facility]      Unit/Bed:5K-15/015-A    YOB: 1940    MRN: [id number]       Acct: [de-identified]     PCP: Olga Lidia Mariano DO    Date of Admission: 9/8/2022    Assessment/Plan:    UTI (POA):   UA suspicious- LE, Bacteria, >200 WBC. Urine culture pending. IV Ceftriaxone - de-escalate pending C and S   Acute toxic encephalopathy secondary to UTI  Significant improvements 9/9  Subacute microcytic anemia  Recent hospitalization for GI bleed 3 weeks ago  Hgb 11.7 on arrival- currently 10.4  CBC daily   Continue with iron supplementation  Cognitive impairment  Most likely dementia  SLP cognitive eval with MOCA of 17   Sleep wake cycles are crucial.   Constipation with Nausea & Vomiting- resolved   CT A/P showed large stool burden. Patient had large bowel movement overnight- N and V resolved  Sennakot daily   Dulcolax suppository PRN  IVF. Physical Deconditioning: PT/OT. Pt states that she has been weak since she was in the hospital previously. Essential HTN: BP stable. Continue home medications. Hypothyroidism: Synthroid. Anxiety/Depression: Prozac. Fibromyalgia: Lyrica. Expected discharge date:  9/11/2022    Disposition:    [] Home       [] TCU       [] Rehab       [] Psych       [x] SNF       [] [place]       [] Other-    Chief Complaint: Confusion    Hospital Course:   Initial HPI   \"This is a 27-year-old female who presented [address] [address] emergency department on 9/8 due to back pain and nausea. Patient was recently in the hospital 3 weeks ago for GI bleed. Patient states that she has had 2-3 episodes of emesis today and just \"feels funny\" in the head. Per , \"her brain is just not working right. \" Pt does not seem to be able to find her words as quickly as she would like. Pt denies CP, SOB, abdominal pain, HA. Pt states that she has not had a BM in a few days.  Pt described her back pain as 2/10, constant dull pain. Pt states that she came to the hospital for the nausea, but the  states that they came due to her brain. In the ED, patient was found to have UTI. CT A/P showed large stool burden. CT head negative for acute issues. CT lumbar showed no acute issues; degenerative changes. Hospitalist asked to admit for UTI and confusion. \"    9/9/2022  Patients mentation has much improved according to family. At time of exam, patient A and Ox4. PT and OT evals are pending. Patient did have BM last night  Continue with Abx for UTI         Medications:  Reviewed    Infusion Medications    sodium chloride      lactated ringers 75 mL/hr at 09/09/22 9937     Scheduled Medications    enalapril  5 mg Oral Daily    ferrous sulfate  325 mg Oral BID    FLUoxetine  20 mg Oral Daily    levothyroxine  100 mcg Oral Daily    metoprolol succinate  50 mg Oral Daily    pantoprazole  40 mg Oral BID AC    polyethylene glycol  17 g Oral Daily    pregabalin  150 mg Oral BID    sodium chloride  1 g Oral Daily    sodium chloride flush  10 mL IntraVENous 2 times per day    enoxaparin  40 mg SubCUTAneous Daily    bisacodyl  10 mg Rectal Daily    cefTRIAXone (ROCEPHIN) IV  1,000 mg IntraVENous Q24H     PRN Meds: fluticasone, sodium chloride flush, sodium chloride, ondansetron **OR** ondansetron, polyethylene glycol, acetaminophen **OR** acetaminophen      Intake/Output Summary (Last 24 hours) at 9/9/2022 1340  Last data filed at 9/9/2022 0526  Gross per 24 hour   Intake 796.79 ml   Output 450 ml   Net 346.79 ml       Diet:  ADULT DIET; Regular    Exam:  /78   Pulse 66   Temp 98.1 °F (36.7 °C) (Oral)   Resp 20   Ht 5' 7\" (1.702 m)   Wt 160 lb 3.2 oz (72.7 kg)   SpO2 98%   BMI 25.09 kg/m²     General appearance: No apparent distress, appears stated age and cooperative. HEENT: Normal cephalic, atraumatic without obvious deformity. Pupils equal, round, and reactive to light. Extra ocular muscles intact. Conjunctivae/corneas clear. Neck: Supple, with full range of motion. No jugular venous distention. Trachea midline. Respiratory:  Normal respiratory effort on RA. Clear to auscultation, bilaterally without Rales/Wheezes/Rhonchi. Cardiovascular: Regular rate and rhythm with normal S1/S2 without murmurs, rubs or gallops. Abdomen: Soft, non-tender, non-distended with normal bowel sounds. Musculoskeletal:  No clubbing, cyanosis or edema bilaterally. Skin: Skin color, texture, turgor normal.  No rashes or lesions. Neurologic:  Neurovascularly intact without any focal sensory/motor deficits. Cranial nerves: II-XII intact, grossly non-focal.  Psychiatric: Alert and oriented, conversationally confused  Capillary Refill: Brisk,< 3 seconds   Peripheral Pulses: +2 palpable, equal bilaterally       Labs:   Recent Labs     09/08/22  1025 09/09/22  0443   WBC 5.8 6.8   HGB 11.7* 10.4*   HCT 37.8 33.5*    152     Recent Labs     09/08/22  1025 09/09/22  0444    139   K 3.6 3.7    105   CO2 26 25   BUN 10 10   CREATININE 0.5 0.6   CALCIUM 9.4 8.6     Recent Labs     09/08/22  1025 09/09/22  0444   AST 30 24   ALT 12 10*   BILITOT 0.4 0.4   ALKPHOS 90 73     No results for input(s): INR in the last 72 hours. No results for input(s): Patrisha Meigs in the last 72 hours. Microbiology:      Urinalysis:      Lab Results   Component Value Date/Time    NITRU NEGATIVE 09/08/2022 03:10 PM    WBCUA > 200 09/08/2022 03:10 PM    BACTERIA MODERATE 09/08/2022 03:10 PM    RBCUA 3-5 09/08/2022 03:10 PM    BLOODU TRACE 09/08/2022 03:10 PM    SPECGRAV 1.015 04/12/2019 11:49 AM    GLUCOSEU NEGATIVE 09/08/2022 03:10 PM       Radiology:  CT HEAD WO CONTRAST    Result Date: 9/8/2022  PROCEDURE: CT HEAD WO CONTRAST CLINICAL INFORMATION: ams, confusion, no evidence of metabolic causes .  TECHNIQUE: 2-D multiplanar noncontrast CT brain All CT scans at this facility use dose modulation, iterative reconstruction, and/or weight-based dosing when appropriate to reduce radiation dose to as low as reasonably achievable. COMPARISON: 3/15/2019 FINDINGS: Presence of subarachnoid hemorrhage cannot be completely excluded because of presence of contrast in both arterial and venous structures secondary to yesterday's contrast enhanced exam. Generalized cerebral volume loss is present. This is stable in appearance. Periventricular white matter diminished attenuation is also stable and compatible with chronic small vessel ischemic disease. Ventricles are consistent with a degree of atrophy. There is no midline shift or mass effect. There is ethmoid sinus disease. Sphenoid sinus disease. Maxillary sinuses and mastoid air cells are clear. Changes right parotid. No acute intracranial pathology. Exam is limited for subarachnoid hemorrhage because of presence of contrast **This report has been created using voice recognition software. It may contain minor errors which are inherent in voice recognition technology. ** Final report electronically signed by Dr. Dylan Talbot on 9/8/2022 2:54 PM    CT ABDOMEN PELVIS W IV CONTRAST Additional Contrast? None    Result Date: 9/8/2022  PROCEDURE: CT ABDOMEN PELVIS W IV CONTRAST, CT LUMBAR RECONSTRUCTION WO POST PROCESS CLINICAL INFORMATION: abdominal bloating, back pain COMPARISON: CT abdomen and pelvis 9/13/2017 TECHNIQUE: 1. Helical CT acquisition of the abdomen and pelvis was performed with  administration of intravenous contrast. Multiplanar reformats are provided. 2. Reconstructed CT images through the lumbar spine from the CT abdomen and pelvis. Multiplanar reformats are provided. All CT scans at this facility use dose modulation, iterative reconstruction, and/or weight based dosing when appropriate to reduce the radiation dose to as low as reasonably achievable. CONTRAST: 80  cc of Isovue-370  intravenously FINDINGS: CT ABDOMEN AND PELVIS: A moderate paraesophageal hiatal hernia is seen.  Platelike atelectasis seen in the lung bases. The heart is not enlarged. Calcified splenic granulomas are seen. Mild atrophy of the pancreas. Moderate to large amount of stool is seen in the colon. Aortoiliac calcifications. There is diastases of the rectus abdominis along with a anterior abdominal wall hernia. Otherwise, the liver, gallbladder, biliary tree, adrenal glands, and kidneys are unremarkable. A 1.1 cm left renal cyst is seen No bowel obstruction or acute inflammatory bowel process. The appendix is not readily identified. No inflammatory changes in the right lower quadrant. The abdominal aorta is not aneurysmal. No significantly enlarged lymph nodes are seen. The bladder is markedly distended. Uterus is surgically absent. Bones: Marked levoscoliosis of the lumbar spine. Bilateral hip prostheses noted. The bones are demineralized. Degenerative changes of the thoracic spine. Degenerative changes of the SI joints. CT LUMBAR SPINE: ALIGNMENT: The lumbar lordosis is maintained. . FRACTURE: None DISC SPACES: The lack of intrathecal contrast limits the evaluation of the spinal canal.  Mild disc osteophyte complex is seen at L1-L2 with mild central canal narrowing and mild right neuroforaminal narrowing. Mild disc osteophyte complex is seen at L2-L3, and L3-L4 and L4-5. Mild central canal narrowing at L3-L4, L4-5. Mild right neuroforaminal narrowing at L4-L5     1. Moderate to large amount of stool is seen in the colon. 2. No acute fracture of the lumbar spine. 3. Multilevel degenerative changes of the lumbar spine. 4. Other findings as described above. Final report electronically signed by Dr Davian Farah on 9/8/2022 12:47 PM    CT LUMBAR RECONSTRUCTION WO POST PROCESS    Result Date: 9/8/2022  PROCEDURE: CT ABDOMEN PELVIS W IV CONTRAST, CT LUMBAR RECONSTRUCTION WO POST PROCESS CLINICAL INFORMATION: abdominal bloating, back pain COMPARISON: CT abdomen and pelvis 9/13/2017 TECHNIQUE: 1.  Helical CT acquisition of the abdomen and pelvis was performed with  administration of intravenous contrast. Multiplanar reformats are provided. 2. Reconstructed CT images through the lumbar spine from the CT abdomen and pelvis. Multiplanar reformats are provided. All CT scans at this facility use dose modulation, iterative reconstruction, and/or weight based dosing when appropriate to reduce the radiation dose to as low as reasonably achievable. CONTRAST: 80  cc of Isovue-370  intravenously FINDINGS: CT ABDOMEN AND PELVIS: A moderate paraesophageal hiatal hernia is seen. Platelike atelectasis seen in the lung bases. The heart is not enlarged. Calcified splenic granulomas are seen. Mild atrophy of the pancreas. Moderate to large amount of stool is seen in the colon. Aortoiliac calcifications. There is diastases of the rectus abdominis along with a anterior abdominal wall hernia. Otherwise, the liver, gallbladder, biliary tree, adrenal glands, and kidneys are unremarkable. A 1.1 cm left renal cyst is seen No bowel obstruction or acute inflammatory bowel process. The appendix is not readily identified. No inflammatory changes in the right lower quadrant. The abdominal aorta is not aneurysmal. No significantly enlarged lymph nodes are seen. The bladder is markedly distended. Uterus is surgically absent. Bones: Marked levoscoliosis of the lumbar spine. Bilateral hip prostheses noted. The bones are demineralized. Degenerative changes of the thoracic spine. Degenerative changes of the SI joints. CT LUMBAR SPINE: ALIGNMENT: The lumbar lordosis is maintained. . FRACTURE: None DISC SPACES: The lack of intrathecal contrast limits the evaluation of the spinal canal.  Mild disc osteophyte complex is seen at L1-L2 with mild central canal narrowing and mild right neuroforaminal narrowing. Mild disc osteophyte complex is seen at L2-L3, and L3-L4 and L4-5. Mild central canal narrowing at L3-L4, L4-5. Mild right neuroforaminal narrowing at L4-L5     1.  Moderate to large amount of stool is seen in the colon. 2. No acute fracture of the lumbar spine. 3. Multilevel degenerative changes of the lumbar spine. 4. Other findings as described above.  Final report electronically signed by Dr Minda Sanchez on 9/8/2022 12:47 PM      DVT prophylaxis: [x] Lovenox                                 [] SCDs                                 [] SQ Heparin                                 [] Encourage ambulation           [] Already on Anticoagulation     Code Status: Full Code    PT/OT Eval Status: PT and OT ordered    Tele:   [] yes             [x] no    Active Hospital Problems    Diagnosis Date Noted    UTI (urinary tract infection) [N39.0] 09/08/2022     Priority: Medium       Electronically signed by Dalila Ng PA-C on 9/9/2022 at 1:40 PM

## 2022-09-09 NOTE — PROGRESS NOTES
Flor Marie 58  INPATIENT OCCUPATIONAL THERAPY  [facility] ONC MED 5K  EVALUATION    Time:   Time In: 6349  Time Out: 1038  Timed Code Treatment Minutes: 16 Minutes  Minutes: 26          Date: 2022  Patient Name: Chuy Gunter,   Gender: female      MRN: [id number]  : 1940  (78 y.o.)  Referring Practitioner: MAURICIO Holt  Diagnosis: UTI  Additional Pertinent Hx: per chart review; Chuy Gunter is a 78 y.o. female who presents to the emergency department for evaluation of back pain and nausea. This occurred at 2 am this morning and persisted throughout the day. Additionally,  who is bedside states that she seems to be a little more confused thatn normal. She is having difficulty recalling events and objects names. Patient notes history of lumbar surgery and spinal steroid injections. She ambulates with a walker normally secondary to some chronic right leg weakness. She denies any numbness, tingling, new weakness, chest pain, or shortness of breath. Philomena Kehr believes that her abdomen is a bit larger than it normally is. Restrictions/Precautions:  Restrictions/Precautions: Fall Risk, General Precautions, Contact Precautions  Position Activity Restriction  Other position/activity restrictions: R LE shorter than L LE. Subjective  Chart Reviewed: Yes, Progress Notes, Orders, History and Physical  Patient assessed for rehabilitation services?: Yes  Family / Caregiver Present: Yes (, daughter and two family friends)    Subjective: RN approved session, patient supine in bed with head elevated upon OT arrival and pleasantly agreeable to eval. multiple family members and visitors present in room. patient A & O x 4. patient's family members frequently interjecting t/o eval to provide information asked of patient. patient's family talkative and distracting patient.     Pain: 0/10:     Vitals: Vitals not assessed per clinical judgement, see nursing flowsheet    Social/Functional History:  Lives With: Spouse  Type of Home: Facility ([address] apartment)  Home Layout: Multi-level  Home Access: Elevator, Level entry (patient lives on first floor)  Home Equipment: Theador Corpus, rolling, Wheelchair-manual   Bathroom Shower/Tub: Walk-in shower  Bathroom Toilet: Handicap height  Bathroom Equipment: Grab bars in shower, Shower chair, Grab bars around toilet  Bathroom Accessibility: Walker accessible    [address] Help From: Other (comment), Family (facility staff for housekeeping,  supervises bathing)  ADL Assistance: Independent  Homemaking Assistance: Needs assistance  Ambulation Assistance: Independent  Transfer Assistance: Independent    Active : No  Patient's  Info: daughter gets Pts to appointments          VISION:Corrected    HEARING:  WFL    COGNITION: WFL    RANGE OF MOTION:  Bilateral Lower Extremity: WFL    STRENGTH:  Right Upper Extremity: shldr 4/5 elbow flex 4+/5 ext 4/5  (F)  Left Upper Extremity:  shldr 4/5 elbow flex and ext 4/5  (F)    SENSATION:   WFL    ADL:   Grooming: Contact Guard Assistance. To stand at sink with cues for 2 w/w placement to wash hands   Lower Extremity Dressing: Stand By Assistance. To doff/don slipper socks seated EOB  Toileting: Contact Guard Assistance. For clothing mgmt and toilet hygiene for BM in sitting and increased time on toilet  Toilet Transfer: [employer]. With use of 2 w/w for support and use of grab bars . Encouraged patient to not use external catheter due to walking to/from BR well with 2 w/w and need to increase activity to progress toward PLOF with patient demo understanding. BALANCE:  Sitting Balance:  Stand By Assistance. Seated EOB  Standing Balance: Contact Guard Assistance. With use of 2 w/w for support    BED MOBILITY:  Supine to Sit: Stand By Assistance with use of bedrails  Sit to Supine: Minimal Assistance for LE s and use of hercules bed to pull up into bed.     TRANSFERS:  Sit to Stand: Contact Guard Assistance. Cues for hand placement    FUNCTIONAL MOBILITY:  Assistive Device: Rolling Walker  Assist Level:  Contact Guard Assistance. Distance: To and from bathroom  Demonstrated uneven gait due to leg-length difference with R LE shorter than L LE. Activity Tolerance:  Patient tolerance of  treatment: good. Assessment:  Assessment: patient demonstrates overall de-conditioning secondary to a UTI impacting her ability to complete ADLs and functional transfers as prior. patient would benefit from continued, skilled OT to increase activity tolerance, UB strength, ease and (I) with ADLs and functional transfers to safely transition to prior living environment, decrease caregiver burden and increase occupational performance. Performance deficits / Impairments: Decreased functional mobility , Decreased ADL status, Decreased endurance, Decreased strength, Decreased balance  Prognosis: Good  REQUIRES OT FOLLOW-UP: Yes  Decision Making: Low Complexity    Treatment Initiated: Treatment and education initiated within context of evaluation. Evaluation time included review of current medical information, gathering information related to past medical, social and functional history, completion of standardized testing, formal and informal observation of tasks, assessment of data and development of plan of care and goals. Treatment time included skilled education and facilitation of tasks to increase safety and independence with ADL's for improved functional independence and quality of life.     Discharge Recommendations:  Continue to assess pending progress, Patient would benefit from continued therapy after discharge    Patient Education:     Patient Education  Education Given To: Patient  Education Provided: Role of Therapy, Transfer Training, Plan of Care, Fall Prevention Strategies, Precautions, ADL Adaptive Strategies  Education Provided Comments: importance of increasing activity  Barriers to Learning: None    Equipment Recommendations:  Equipment Needed: No    Plan:  Times per Week: 5x  Times per Day: Daily  Current Treatment Recommendations: Strengthening, Balance training, Functional mobility training, Endurance training, Safety education & training, Neuromuscular re-education, Patient/Caregiver education & training, Self-Care / ADL. See long-term goal time frame for expected duration of plan of care. If no long-term goals established, a short length of stay is anticipated. Goals:  Patient goals : return home at [facility]  Short Term Goals  Time Frame for Short term goals: by discharge  Short Term Goal 1: patient will tolerate 5 min functional standing with two hand release with (S) to increase activity tolerance for toileting and grooming. Short Term Goal 2: patient will participate in moderate resistive UB exer to increase UB strength for functional transfers. Short Term Goal 3: patient will functionally ambulate to / from BR with (S) and 0-1 verbal cues for safety and sequencing. Short Term Goal 4: patient will complete ADL routine with (S). Following session, patient left in safe position with all fall risk precautions in place.

## 2022-09-09 NOTE — CARE COORDINATION
9/9/22, 11:06 AM EDT    DISCHARGE PLANNING EVALUATION       Spoke with patient and spouse at the bedside. She stated that she is from [place] and plans to return to her apartment. She stated that she feels she is getting around well. Spouse in the room confirms plan. Patient stated that her daughter will transport her home. Spoke with Lavera Kehr at [place] and updated of plan. No concerns voiced and she is aware discharge could happen over the weekend.

## 2022-09-09 NOTE — CARE COORDINATION
09/09/22 0908   Readmission Assessment   Number of Days since last admission? 8-30 days   Previous Disposition SNF   Who is being Interviewed Patient   What was the patient's/caregiver's perception as to why they think they needed to return back to the hospital?   (AMS)   Did you visit your Primary Care Physician after you left the hospital, before you returned this time? Yes   Did you see a specialist, such as Cardiac, Pulmonary, Orthopedic Physician, etc. after you left the hospital? No   Who advised the patient to return to the hospital? Self-referral   In our efforts to provide the best possible care to you and others like you, can you think of anything that we could have done to help you after you left the hospital the first time, so that you might not have needed to return so soon?  Other (Comment)  (Patient states she was [facility])

## 2022-09-10 VITALS
BODY MASS INDEX: 25.15 KG/M2 | HEIGHT: 67 IN | HEART RATE: 60 BPM | SYSTOLIC BLOOD PRESSURE: 119 MMHG | OXYGEN SATURATION: 96 % | RESPIRATION RATE: 20 BRPM | WEIGHT: 160.2 LBS | DIASTOLIC BLOOD PRESSURE: 59 MMHG | TEMPERATURE: 97.7 F

## 2022-09-10 LAB
ERYTHROCYTE [DISTWIDTH] IN BLOOD BY AUTOMATED COUNT: 24.3 % (ref 11.5–14.5)
ERYTHROCYTE [DISTWIDTH] IN BLOOD BY AUTOMATED COUNT: 72.4 FL (ref 35–45)
HCT VFR BLD CALC: 36.2 % (ref 37–47)
HEMOGLOBIN: 11.4 GM/DL (ref 12–16)
MCH RBC QN AUTO: 27.1 PG (ref 26–33)
MCHC RBC AUTO-ENTMCNC: 31.5 GM/DL (ref 32.2–35.5)
MCV RBC AUTO: 86 FL (ref 81–99)
ORGANISM: ABNORMAL
PLATELET # BLD: 156 THOU/MM3 (ref 130–400)
PMV BLD AUTO: 10.9 FL (ref 9.4–12.4)
RBC # BLD: 4.21 MILL/MM3 (ref 4.2–5.4)
URINE CULTURE REFLEX: ABNORMAL
WBC # BLD: 3.3 THOU/MM3 (ref 4.8–10.8)

## 2022-09-10 PROCEDURE — 36415 COLL VENOUS BLD VENIPUNCTURE: CPT

## 2022-09-10 PROCEDURE — 2580000003 HC RX 258: Performed by: PHYSICIAN ASSISTANT

## 2022-09-10 PROCEDURE — 6360000002 HC RX W HCPCS: Performed by: PHYSICIAN ASSISTANT

## 2022-09-10 PROCEDURE — 85027 COMPLETE CBC AUTOMATED: CPT

## 2022-09-10 PROCEDURE — 6370000000 HC RX 637 (ALT 250 FOR IP): Performed by: PHYSICIAN ASSISTANT

## 2022-09-10 PROCEDURE — 96372 THER/PROPH/DIAG INJ SC/IM: CPT

## 2022-09-10 PROCEDURE — G0378 HOSPITAL OBSERVATION PER HR: HCPCS

## 2022-09-10 PROCEDURE — 99239 HOSP IP/OBS DSCHRG MGMT >30: CPT | Performed by: PHYSICIAN ASSISTANT

## 2022-09-10 RX ORDER — NITROFURANTOIN 25; 75 MG/1; MG/1
100 CAPSULE ORAL 2 TIMES DAILY
Qty: 20 CAPSULE | Refills: 0 | Status: SHIPPED | OUTPATIENT
Start: 2022-09-10 | End: 2022-09-20

## 2022-09-10 RX ORDER — HYDROCODONE BITARTRATE AND ACETAMINOPHEN 5; 325 MG/1; MG/1
1 TABLET ORAL EVERY 6 HOURS PRN
Status: DISCONTINUED | OUTPATIENT
Start: 2022-09-10 | End: 2022-09-10 | Stop reason: HOSPADM

## 2022-09-10 RX ORDER — SENNA AND DOCUSATE SODIUM 50; 8.6 MG/1; MG/1
1 TABLET, FILM COATED ORAL DAILY PRN
Qty: 30 TABLET | Refills: 1 | Status: SHIPPED | OUTPATIENT
Start: 2022-09-10 | End: 2022-10-03

## 2022-09-10 RX ADMIN — POLYETHYLENE GLYCOL 3350 17 G: 17 POWDER, FOR SOLUTION ORAL at 08:30

## 2022-09-10 RX ADMIN — SODIUM CHLORIDE 1 G: 1 TABLET ORAL at 08:31

## 2022-09-10 RX ADMIN — ENOXAPARIN SODIUM 40 MG: 100 INJECTION SUBCUTANEOUS at 08:30

## 2022-09-10 RX ADMIN — HYDROCODONE BITARTRATE AND ACETAMINOPHEN 1 TABLET: 5; 325 TABLET ORAL at 05:38

## 2022-09-10 RX ADMIN — SODIUM CHLORIDE, POTASSIUM CHLORIDE, SODIUM LACTATE AND CALCIUM CHLORIDE: 600; 310; 30; 20 INJECTION, SOLUTION INTRAVENOUS at 09:14

## 2022-09-10 RX ADMIN — FERROUS SULFATE TAB 325 MG (65 MG ELEMENTAL FE) 325 MG: 325 (65 FE) TAB at 08:32

## 2022-09-10 RX ADMIN — METOPROLOL SUCCINATE 50 MG: 50 TABLET, FILM COATED, EXTENDED RELEASE ORAL at 08:31

## 2022-09-10 RX ADMIN — LEVOTHYROXINE SODIUM 100 MCG: 0.1 TABLET ORAL at 08:31

## 2022-09-10 RX ADMIN — ENALAPRIL MALEATE 5 MG: 5 TABLET ORAL at 08:32

## 2022-09-10 RX ADMIN — FLUOXETINE 20 MG: 20 CAPSULE ORAL at 08:31

## 2022-09-10 RX ADMIN — ONDANSETRON 4 MG: 2 INJECTION INTRAMUSCULAR; INTRAVENOUS at 09:01

## 2022-09-10 RX ADMIN — PREGABALIN 150 MG: 50 CAPSULE ORAL at 08:31

## 2022-09-10 RX ADMIN — PANTOPRAZOLE SODIUM 40 MG: 40 TABLET, DELAYED RELEASE ORAL at 04:43

## 2022-09-10 ASSESSMENT — PAIN SCALES - GENERAL
PAINLEVEL_OUTOF10: 0
PAINLEVEL_OUTOF10: 7
PAINLEVEL_OUTOF10: 3

## 2022-09-10 ASSESSMENT — PAIN DESCRIPTION - ONSET: ONSET: GRADUAL

## 2022-09-10 ASSESSMENT — PAIN DESCRIPTION - PAIN TYPE: TYPE: CHRONIC PAIN

## 2022-09-10 ASSESSMENT — PAIN DESCRIPTION - LOCATION
LOCATION: BACK
LOCATION: BACK

## 2022-09-10 ASSESSMENT — PAIN DESCRIPTION - ORIENTATION
ORIENTATION: LOWER
ORIENTATION: LOWER

## 2022-09-10 ASSESSMENT — PAIN DESCRIPTION - FREQUENCY: FREQUENCY: CONTINUOUS

## 2022-09-10 ASSESSMENT — PAIN DESCRIPTION - DESCRIPTORS: DESCRIPTORS: ACHING

## 2022-09-10 NOTE — CARE COORDINATION
9/10/22, 11:41 AM EDT    Patient goals/plan/ treatment preferences discussed by  and . Patient goals/plan/ treatment preferences reviewed with patient/ family. Patient/ family verbalize understanding of discharge plan and are in agreement with goal/plan/treatment preferences. Understanding was demonstrated using the teach back method. AVS provided by RN at time of discharge, which includes all necessary medical information pertaining to the patients current course of illness, treatment, post-discharge goals of care, and treatment preferences.      Services At/After Discharge: Assisted Living       IMM Letter  IMM Letter given to Patient/Family/Significant other/Guardian/POA/by[de-identified] GHULAM Jacobs  IMM Letter date given[de-identified] 09/10/22  IMM Letter time given[de-identified] 21

## 2022-09-10 NOTE — DISCHARGE SUMMARY
Hospital Medicine Discharge Summary      Patient Identification:   Orlin Gunter   : 1940  MRN: [id number]   Account: [de-identified]      Patient's PCP: Pawan Peraza DO    Admit Date: 2022     Discharge Date:   9/10/2022    Admitting Physician: No admitting provider for patient encounter. Discharging Physician Assistant: Isma Avendano PA-C     Discharge Diagnoses with Assessment/Plan:    UTI (POA):   Urine culture growing [facility] [employer] [facility]  Discharge home with macrobid BID for 10 days. Was given 2 days of rocephin 1g QD  Acute toxic encephalopathy secondary to UTI  Significant improvements   Back to patients baseline  Subacute microcytic anemia  Recent hospitalization for GI bleed 3 weeks ago  Hgb 11.7 on arrival- stable  Continue with iron supplementation  Cognitive impairment  Most likely dementia  SLP cognitive eval with MOCA of 17   Sleep wake cycles are crucial.   Constipation with Nausea & Vomiting- resolved   CT A/P showed large stool burden. Patient had two large Bms since admission   Miralax daily. Senna-docusate daily PRN    Physical Deconditioning: PT/OT- continue with services as already scheduled at [facility] on discharge. Essential HTN: BP stable. Continue home medications. Hypothyroidism: Synthroid. Anxiety/Depression: Prozac. Fibromyalgia: Lyrica. The patient was seen and examined on day of discharge and this discharge summary is in conjunction with any daily progress note from day of discharge. Hospital Course:   Initial HPI   \"This is a 78-year-old female who presented [facility] emergency department on  due to back pain and nausea. Patient was recently in the hospital 3 weeks ago for GI bleed. Patient states that she has had 2-3 episodes of emesis today and just \"feels funny\" in the head. Per , \"her brain is just not working right. \" Pt does not seem to be able to find her words as quickly as she would like.  Pt denies CP, SOB, abdominal pain, HA. Pt states that she has not had a BM in a few days. Pt described her back pain as 2/10, constant dull pain. Pt states that she came to the hospital for the nausea, but the  states that they came due to her brain. In the ED, patient was found to have UTI. CT A/P showed large stool burden. CT head negative for acute issues. CT lumbar showed no acute issues; degenerative changes. Hospitalist asked to admit for UTI and confusion. \"     9/9/2022  Patients mentation has much improved according to family. At time of exam, patient A and Ox4. PT and OT evals are pending. Patient did have BM last night  Continue with Abx for UTI     9/10/2022  Patients mentation back to baseline. Urine C and S demonstrating E Faecalis sensitive to macrobid. Patient stable and ready for discharge  Exam:     Vitals:  Vitals:    09/09/22 2100 09/10/22 0419 09/10/22 0831 09/10/22 0832   BP: 133/76 (!) 142/78 (!) 119/59 (!) 119/59   Pulse: 52 56 60    Resp: 18 18 20    Temp: 97.5 °F (36.4 °C) 98.3 °F (36.8 °C) 97.7 °F (36.5 °C)    TempSrc: Oral  Oral    SpO2: 97% 96% 96%    Weight:       Height:         Weight: Weight: 160 lb 3.2 oz (72.7 kg)     24 hour intake/output:  Intake/Output Summary (Last 24 hours) at 9/10/2022 1014  Last data filed at 9/10/2022 0785  Gross per 24 hour   Intake 2034.69 ml   Output 300 ml   Net 1734.69 ml         General appearance: No apparent distress, appears stated age and cooperative. HEENT: Normal cephalic, atraumatic without obvious deformity. Pupils equal, round, and reactive to light. Extra ocular muscles intact. Conjunctivae/corneas clear. Neck: Supple, with full range of motion. No jugular venous distention. Trachea midline. Respiratory:  Normal respiratory effort on RA. Clear to auscultation, bilaterally without Rales/Wheezes/Rhonchi. Cardiovascular: Regular rate and rhythm with normal S1/S2 without murmurs, rubs or gallops.   Abdomen: Soft, non-tender, non-distended with normal bowel sounds. Musculoskeletal:  No clubbing, cyanosis or edema bilaterally. Skin: Skin color, texture, turgor normal.  No rashes or lesions. Neurologic:  Neurovascularly intact without any focal sensory/motor deficits. Cranial nerves: II-XII intact, grossly non-focal.  Psychiatric: Alert and oriented, conversationally confused  Capillary Refill: Brisk,< 3 seconds   Peripheral Pulses: +2 palpable, equal bilaterally     Labs: For convenience and continuity at follow-up the following most recent labs are provided:      CBC:    Lab Results   Component Value Date/Time    WBC 3.3 09/10/2022 04:44 AM    HGB 11.4 09/10/2022 04:44 AM    HCT 36.2 09/10/2022 04:44 AM     09/10/2022 04:44 AM       Renal:    Lab Results   Component Value Date/Time     09/09/2022 04:44 AM    K 3.7 09/09/2022 04:44 AM     09/09/2022 04:44 AM    CO2 25 09/09/2022 04:44 AM    BUN 10 09/09/2022 04:44 AM    CREATININE 0.6 09/09/2022 04:44 AM    CALCIUM 8.6 09/09/2022 04:44 AM    PHOS 3.5 02/28/2021 05:21 AM       Cardiac: No results for input(s): Eliza Tucker in the last 72 hours. Significant Diagnostic Studies    Radiology:   XR ABDOMEN (KUB) (SINGLE AP VIEW)   Final Result      Nonobstructive bowel gas pattern with a large amount of retained stool in the colon. Final report electronically signed by Dr. Yamila Sauer on 9/9/2022 2:27 PM      CT HEAD WO CONTRAST   Final Result   No acute intracranial pathology. Exam is limited for subarachnoid hemorrhage because of presence of contrast            **This report has been created using voice recognition software. It may contain minor errors which are inherent in voice recognition technology. **      Final report electronically signed by Dr. Benjamin Wharton on 9/8/2022 2:54 PM      CT ABDOMEN PELVIS W IV CONTRAST Additional Contrast? None   Final Result   1. Moderate to large amount of stool is seen in the colon.    2. No acute fracture of the lumbar spine. 3. Multilevel degenerative changes of the lumbar spine. 4. Other findings as described above. Final report electronically signed by Dr Elier Dubon on 9/8/2022 12:47 PM      CT LUMBAR RECONSTRUCTION WO POST PROCESS   Final Result   1. Moderate to large amount of stool is seen in the colon. 2. No acute fracture of the lumbar spine. 3. Multilevel degenerative changes of the lumbar spine. 4. Other findings as described above. Final report electronically signed by Dr Elier Dubon on 9/8/2022 12:47 PM             Consults:     IP CONSULT TO SOCIAL WORK    Disposition:    [] Home       [] TCU       [] Rehab       [] Psych       [] SNF       [x] [place]       [] Other-    Condition at Discharge: Stable    Code Status:  Full Code     Pending tests at discharge:          Patient Instructions:    Discharge lab work: Activity: activity as tolerated  Diet: ADULT DIET;  Regular      Follow-up visits:   Shadia Garibay DO  [address] [place] [address]. [address]  [phone]    Follow up in 1 week(s)           Discharge Medications:        Medication List        START taking these medications      nitrofurantoin (macrocrystal-monohydrate) 100 MG capsule  Commonly known as: MACROBID  Take 1 capsule by mouth 2 times daily for 10 days     sennosides-docusate sodium 8.6-50 MG tablet  Commonly known as: SENOKOT-S  Take 1 tablet by mouth daily as needed for Constipation            CONTINUE taking these medications      calcium-vitamin D 500-200 MG-UNIT per tablet  Commonly known as: OSCAL-500     enalapril 5 MG tablet  Commonly known as: VASOTEC  Take 1 tablet by mouth daily     ferrous sulfate 325 (65 Fe) MG tablet  Commonly known as: IRON 325  Take 1 tablet by mouth 2 times daily     FLUoxetine 20 MG capsule  Commonly known as: PROZAC  Take 1 capsule by mouth daily     fluticasone 50 MCG/ACT nasal spray  Commonly known as: FLONASE     levothyroxine 100 MCG tablet  Commonly known as: SYNTHROID  TAKE 1 TABLET DAILY     metoprolol succinate 50 MG extended release tablet  Commonly known as: TOPROL XL  TAKE 1 TABLET DAILY     * Misc. Devices Misc  Mechanical lift for a [employer] Misc  1 each by Does not apply route daily     Omega 3-6-9 Complex Caps     ondansetron 4 MG tablet  Commonly known as: ZOFRAN  Take 1 tablet by mouth 3 times daily as needed for Nausea or Vomiting     pantoprazole 40 MG tablet  Commonly known as: PROTONIX  Take 1 tablet by mouth 2 times daily (before meals)     polyethylene glycol 17 g packet  Commonly known as: GLYCOLAX  Take 17 g by mouth daily     pregabalin 150 MG capsule  Commonly known as: LYRICA  Take 1 capsule by mouth in the morning and 1 capsule before bedtime. Do all this for 180 days. Scooter Misc  by Does not apply route Power scooter     sodium chloride 1 g tablet  Take 1 tablet by mouth daily     therapeutic multivitamin-minerals tablet     vitamin C 500 MG tablet  Commonly known as: ASCORBIC ACID     Vitamin D3 50 MCG (2000 UT) Caps     zinc sulfate 220 (50 Zn) MG capsule  Commonly known as: ZINCATE           * This list has 2 medication(s) that are the same as other medications prescribed for you. Read the directions carefully, and ask your doctor or other care provider to review them with you.                 STOP taking these medications      docusate sodium 100 MG capsule  Commonly known as: COLACE     meloxicam 15 MG tablet  Commonly known as: MOBIC               Where to Get Your Medications        These medications were sent to [address] [place] [address] [id number] [facility], [address] [address]      Phone: [phone]   nitrofurantoin (macrocrystal-monohydrate) 100 MG capsule  sennosides-docusate sodium 8.6-50 MG tablet         Time Spent on discharge is more than 45 minutes in the examination, evaluation, counseling and review of medications and discharge plan.    =      Signed: Thank you Elias Neumann DO for the opportunity to be involved in this patient's care.     Electronically signed by Jayson Paz PA-C on 9/10/2022 at 10:14 AM

## 2022-09-10 NOTE — DISCHARGE INSTR - COC
Continuity of Care Form    Patient Name: Ana Gunter   :  1940  MRN:  [id number]    Admit date:  2022  Discharge date:  9/10/22    Code Status Order: Full Code   Advance Directives:     Admitting Physician:  No admitting provider for patient encounter.   PCP: Bettie Nix DO    Discharging Nurse: DR MAGALY PORTER [facility] Unit/Room#: 5K-15/015-A  Discharging Unit Phone Number: [phone]    Emergency Contact:   Extended Emergency Contact Information  Primary Emergency Contact: Bartolome Gunter  Address: [address] Phone: [phone]  Mobile Phone: [phone]  Relation: Spouse  Secondary Emergency Contact: Delfina Lopez  Address: [facility]            [place], [place] [address] [place] [address] Phone: [phone]  Mobile Phone: [phone]  Relation: Child    Past Surgical History:  Past Surgical History:   Procedure Laterality Date    [address] Bilateral     w/cubital tunnel    COLON SURGERY  2016    Procedure: ATTEMPTED DAVINCI XI ROBOTIC ASSISTED SIGMOID COLON RESECTION, ROBOTIC ASSISTED MOBILIZATION OF RECTAL SIGMOID TO SPLENIC FLEXURE, CONVERTED TO OPEN LEFT HEMICOLECTOMY WITH COLOPROCTOCTOMY, SPLENORRHAPHY, RIGID SIGMOIDOSCOPY, LASER EVALUATION OF VASCULARITY WITH ICG; Surgeon: Jose Pelletier MD; Location: [place] [address]; Service: General    COLONOSCOPY  2012    CYST REMOVAL      [address] [place] [address], [facility]    Cataract    FEMUR FRACTURE SURGERY Left 2020    LEFT FEMUR OPEN REDUCTION INTERNAL FIXATION performed by Noah Taylor MD at [facility] 35 Left     [address] [place] [address] N/A 2021    ROBOTIC EXTENSIVE LYSIS OF ADHESIONS, ROBOTIC REDUCTION OF HIATAL HERNIA WITH GASTROPEXY performed by Rafael Jorge MD at [address] [place] [address] ([address] [place] [address])      w/bladder suspension    JOINT REPLACEMENT  , ,  rt hip(2002), lt knee(2009), lt hip(2008) Shoulder (2009)    OTHER SURGICAL HISTORY  04/21/2021    Nick Jc CNP in the office    [facility]. Bilateral 7/13/2021    medial branch blocks at bilateral L4/L5 and L5/S1 performed by Josselyn Garcia DO at [facility] Bilateral 8/24/2021    confirmatory  medial branch blocks at bilateral L4/L5 and L5/S1 performed by Josselyn Garcia DO at [facility] Right 10/19/2021    thermal radiofrequency at BILATERAL medial branches L4/L5 and L5/S1 was chosen. We will start with the right side first performed by Josselyn Garcia DO at [facility] Left 12/7/2021    thermal radiofrequency at BILATERAL medial branches L4/L5 and L5/S1 was chosen.  performed by Josselyn Garcia DO at [facility] N/A 7/26/2022    lumbar epidural steroid injection Lumbar 5 Sacral 1 performed by Josselyn Garcia DO at [facility]  [address] IND Left 09/15/2017    COLONOSCOPY performed by Ciaran Andres MD at [facility] ENDOSCOPY  2012    UPPER GASTROINTESTINAL ENDOSCOPY Left 6/7/2022    EGD BIOPSY performed by Marcelo Price MD at [facility]    UPPER GASTROINTESTINAL ENDOSCOPY N/A 8/17/2022    EGD BIOPSY performed by Ena Paul MD at [facility]       Immunization History:   Immunization History   Administered Date(s) Administered    COVID-19, PFIZER PURPLE top, DILUTE for use, (age 15 y+), 30mcg/0.3mL 04/07/2021, 04/28/2021, 11/24/2021    Influenza Vaccine, unspecified formulation 09/26/2014, 10/13/2015, 10/01/2016    Influenza Virus Vaccine 09/26/2014, 10/01/2016    Influenza, FLUAD, (age 72 y+), Adjuvanted, 0.5mL 10/20/2020, 09/08/2021    Influenza, FLUARIX, FLULAVAL, FLUZONE (age 10 mo+) AND AFLURIA, (age 1 y+), PF, 0.5mL 10/13/2015, 10/20/2020    Influenza, High Dose (Fluzone 65 yrs and older) 10/24/2017, 09/29/2018, 10/20/2020    Influenza, Triv, inactivated, subunit, adjuvanted, IM (Fluad 65 yrs and older) 09/29/2018, 10/12/2019    MMR 10/24/1995    Pneumococcal Polysaccharide (Rgudilivc25) 11/02/2004, 10/12/2010    Tdap (Boostrix, Adacel) 09/08/2021    Zoster Live (Zostavax) 10/16/2013       Active Problems:  Patient Active Problem List   Diagnosis Code    Osteoarthritis M19.90    Fibromyalgia M79.7    Gastroesophageal reflux disease K21.9    Hypothyroidism E03.9    Patient is Yarsani Z78.9    Back pain M54.9    H/O abdominal surgery Z98.890    Normocytic anemia D64.9    History of diverticulosis Z87.19    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.40, G89.29    Chest pain R07.9    Essential hypertension I10    Abnormal CT scan, chest R93.89    BRBPR (bright red blood per rectum) K62.5    Spinal stenosis of lumbar region without neurogenic claudication M48.061    Other idiopathic scoliosis, lumbar region M41.26    Closed fracture of distal end of left femur, initial encounter ([facility] [address] S72.402A    Pre-syncope R55    Weakness generalized R53.1    Large hiatal hernia K44.9    Hypo-osmolar hyponatremia E87.1    Esophageal dysphagia R13.19    Hypomagnesemia E83.42    Dysphagia R13.10    Bilateral lower extremity edema R60.0    Hx of fracture of leg Z87.81    Lung nodule R91.1    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    S/P repair of paraesophageal hernia Z98.890, Z87.19    Intermediate stage nonexudative age-related macular degeneration of both eyes H35.3132    Pain syndrome, chronic G89.4    Shortness of breath R06.02    UTI (urinary tract infection) N39.0       Isolation/Infection:   Isolation            Contact          Patient Infection Status       Infection Onset Added Last Indicated Last Indicated By Review Planned Expiration Resolved Resolved By    VRE  10/05/16 10/05/16 Ac Rivera RN        MRSA  10/03/16 10/03/16 Ac Rivera RN        Resolved    COVID-19 (Rule Out) 08/16/22 08/16/22 08/16/22 COVID-19, Rapid (Ordered)   08/16/22 Rule-Out Test Resulted    COVID-19 (Rule Out) 05/16/22 05/16/22 05/16/22 COVID-19, Rapid (Ordered)   05/16/22 Rule-Out Test Resulted    COVID-19 (Rule Out) 02/28/21 02/28/21 03/01/21 COVID-19, Rapid (Ordered)   03/01/21 Rule-Out Test Resulted    COVID-19 (Rule Out) 12/21/20 12/21/20 12/21/20 COVID-19 (Ordered)   12/21/20 Rule-Out Test Resulted    COVID-19 (Rule Out) 10/06/20 10/06/20 10/06/20 Covid-19 Ambulatory (Ordered)   10/07/20 Rule-Out Test Resulted            Nurse Assessment:  Last Vital Signs: BP (!) 119/59   Pulse 60   Temp 97.7 °F (36.5 °C) (Oral)   Resp 20   Ht 5' 7\" (1.702 m)   Wt 160 lb 3.2 oz (72.7 kg)   SpO2 96%   BMI 25.09 kg/m²     Last documented pain score (0-10 scale): Pain Level: 0  Last Weight:   Wt Readings from Last 1 Encounters:   09/08/22 160 lb 3.2 oz (72.7 kg)     Mental Status:  oriented, alert, coherent, logical, thought processes intact, and able to concentrate and follow conversation    IV Access:  - None    Nursing Mobility/ADLs:  Walking   Assisted  Transfer  Assisted  Bathing  Assisted  Dressing  Assisted  Toileting  Assisted  Feeding  Independent  Med Admin  Independent  Med Delivery   whole    Wound Care Documentation and Therapy:  Incision 03/09/21 Abdomen Lower;Medial (Active)   Number of days: 549       Incision 03/09/21 Abdomen Lateral;Lower;Right (Active)   Number of days: 549       Incision 10/19/21 Back Right (Active)   Number of days: 325        Elimination:  Continence:    Bowel: Yes  Bladder: Yes  Urinary Catheter: None   Colostomy/Ileostomy/Ileal Conduit: No       Date of Last BM: 9/10/22    Intake/Output Summary (Last 24 hours) at 9/10/2022 1012  Last data filed at 9/10/2022 0619  Gross per 24 hour   Intake 2034.69 ml   Output 300 ml   Net 1734.69 ml     I/O last 3 completed shifts: In: 3131.5 [P.O.:1080; I.V.:2006.2; IV Piggyback:45.3]  Out: 750 [Urine:750]    Safety Concerns: At Risk for Falls    Impairments/Disabilities:      Hearing    Nutrition Therapy:  Current Nutrition Therapy:   - Oral Diet:  General    Routes of Feeding: Oral  Liquids: No Restrictions  Daily Fluid Restriction: no  Last Modified Barium Swallow with Video (Video Swallowing Test): not done    Treatments at the Time of Hospital Discharge:   Respiratory Treatments: NONE  Oxygen Therapy:  is not on home oxygen therapy. Ventilator:    - No ventilator support    Rehab Therapies: Physical Therapy and Occupational Therapy  Weight Bearing Status/Restrictions: No weight bearing restrictions  Other Medical Equipment (for information only, NOT a DME order):  walker  Other Treatments: NONE    Patient's personal belongings (please select all that are sent with patient):  Glasses, Hearing Aides bilateral    RN SIGNATURE:  Electronically signed by David Melgar RN on 9/10/22 at 10:48 AM EDT    CASE MANAGEMENT/SOCIAL WORK SECTION    Inpatient Status Date: ***    Readmission Risk Assessment Score:  Readmission Risk              Risk of Unplanned Readmission:  20           Discharging to Facility/ Agency   Name:   Address:  Phone:  Fax:    Dialysis Facility (if applicable)   Name:  Address:  Dialysis Schedule:  Phone:  Fax:    / signature: {Esignature:[id number]}    PHYSICIAN SECTION    Prognosis: Good    Condition at Discharge: Stable    Rehab Potential (if transferring to Rehab): Fair    Recommended Labs or Other Treatments After Discharge: Continue with PT and OT    Physician Certification: I certify the above information and transfer of Chantell Gunter  is necessary for the continuing treatment of the diagnosis listed and that she requires Assisted Living for greater 30 days.      Update Admission H&P: No change in H&P    PHYSICIAN SIGNATURE: Electronically signed by Caden Bellamy PA-C on 9/10/22 at 10:12 AM EDT (3) adequate

## 2022-09-12 ENCOUNTER — CARE COORDINATION (OUTPATIENT)
Dept: CASE MANAGEMENT | Age: 82
End: 2022-09-12

## 2022-09-12 NOTE — CARE COORDINATION
Care Transitions Outreach Attempt    Call within 2 business days of discharge: Yes   Patient: Ana Gunter Patient : 1940 MRN: <[id number]>    Last Discharge [facility]       Date Complaint Diagnosis Description Type Department Provider    22 Back Pain; Emesis Transient alteration of awareness . .. ED to Hosp-Admission (Discharged) (ADMITTED) TOMMIE Mayfield PA-C; Kale Dominguez . .. Was this an external facility discharge? No Discharge Facility: [facility]    Noted following upcoming appointments from discharge chart review:   [address]ah[address] Muniz Dr follow up appointment(s):   Future Appointments   Date Time Provider Huber Little   9/15/2022  1:00 PM Earnest Gordillo DO N [facility] Pain [facility] - [address] [place] [address]   3/7/2023  1:40 PM Bettie Nix DO [place] [facility] [address]   3/16/2023  3:15 PM Martin Cuello MD N SRPX Heart [facility] Found briefly w/ Amandeep Frank- Nurse @ [place] [address]. Amandeep Frank reports Patient care for her own healthcare needs, manages her own meds etc. Reports Patient Benjiman Dakin voiced no complaints to us\". Recommends calling Patient directly at [phone].    1st attempt to reach for Care Transition discharge call unsuccessful. HIPAA compliant message left requesting call back. Spoke briefly w/ Patient's Dtr who reports Patient doing well. Requested call back from Patient.      [address], [facility] [phone]

## 2022-09-13 ENCOUNTER — CARE COORDINATION (OUTPATIENT)
Dept: CASE MANAGEMENT | Age: 82
End: 2022-09-13

## 2022-09-13 NOTE — CARE COORDINATION
Care Transitions Outreach Attempt    Call within 2 business days of discharge: Yes   Patient: Tammi Gunter Patient : 1940 MRN: <[id number]>    Last Discharge [address] [place] [address]       Date Complaint Diagnosis Description Type Department Provider    22 Back Pain; Emesis Transient alteration of awareness . .. ED to Hosp-Admission (Discharged) (ADMITTED) TOMMIE 5K Jayson Paz PA-C; Kelechi Bess . .. Was this an external facility discharge? No Discharge Facility: [facility]    Noted following upcoming appointments from discharge chart review:   [facility] follow up appointment(s):   Future Appointments   Date Time Provider Huber Little   9/15/2022  1:00 PM Josselyn Garcia DO N [facility] Pain [facility] [place]   3/7/2023  1:40 PM Elias Neumann DO [place] [facility] [place]   3/16/2023  3:15 PM Martin Sun MD N [facility] Heart [facility] [address] to be reviewed by the provider   Additional needs identified to be addressed with provider:     22-9/10/22 [facility] for UTI    Please contact Patient for 7 day TCM hosp f/u appt     Method of communication with provider : chart routing to clinical pool    2nd unsuccessful attempt to reach for Care Transition discharge call. HIPAA compliant message left requesting call back. Episode closed per protocol, no further outreach scheduled. Email to [place], , requesting the following letter be mailed to Patient home:    Deaalyx Zuluaga    My name is Derrell Chapman and I am a Care Transition Nurse who partners with Dr Madison Granado to improve patients' health. I've been trying to reach you via phone to let you know that, as a member of your care team, I will work with other providers involved in your care, offer education for your specific health conditions, and connect you with more resources as needed.  This program is designed to provide you with the opportunity to have a ([facility]) care manager partner with you for the following situations:     1) if you come home from the hospital or emergency room   2) to help manage your disease   3) when you would like assistance coordinating services or appointments    This added support is provided at no additional cost to you. My primary focus is to help you achieve specific goals and improve your health. Please call me at [phone] to further discuss how I can support your health care needs.     Sincerely,    [address] [place] [address], [facility] [phone]

## 2022-09-14 ENCOUNTER — CARE COORDINATION (OUTPATIENT)
Dept: CARE COORDINATION | Age: 82
End: 2022-09-14

## 2022-09-15 ENCOUNTER — OFFICE VISIT (OUTPATIENT)
Dept: PHYSICAL MEDICINE AND REHAB | Age: 82
End: 2022-09-15
Payer: MEDICARE

## 2022-09-15 VITALS
SYSTOLIC BLOOD PRESSURE: 126 MMHG | DIASTOLIC BLOOD PRESSURE: 72 MMHG | WEIGHT: 160 LBS | HEIGHT: 67 IN | BODY MASS INDEX: 25.11 KG/M2
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
## 2022-09-15 DIAGNOSIS — M54.17 RADICULOPATHY, LUMBOSACRAL REGION: ICD-10-CM

## 2022-09-15 DIAGNOSIS — M79.18 MYOFASCIAL PAIN: ICD-10-CM

## 2022-09-15 DIAGNOSIS — M47.816 SPONDYLOSIS OF LUMBAR SPINE: Primary | ICD-10-CM

## 2022-09-15 PROCEDURE — 99214 OFFICE O/P EST MOD 30 MIN: CPT | Performed by: ANESTHESIOLOGY

## 2022-09-15 PROCEDURE — 1123F ACP DISCUSS/DSCN MKR DOCD: CPT | Performed by: ANESTHESIOLOGY

## 2022-09-15 RX ORDER — MELOXICAM 15 MG/1
15 TABLET ORAL DAILY
COMMUNITY
End: 2022-10-03

## 2022-09-15 RX ORDER — DOCUSATE SODIUM 100 MG/1
100 CAPSULE, LIQUID FILLED ORAL 2 TIMES DAILY
COMMUNITY

## 2022-09-15 RX ORDER — HYDROCODONE BITARTRATE AND ACETAMINOPHEN 5; 325 MG/1; MG/1
1 TABLET ORAL EVERY 6 HOURS PRN
COMMUNITY
End: 2022-10-31

## 2022-09-20 ENCOUNTER — OFFICE VISIT (OUTPATIENT)
Dept: FAMILY MEDICINE CLINIC | Age: 82
End: 2022-09-20
Payer: MEDICARE

## 2022-09-20 VITALS
RESPIRATION RATE: 16 BRPM | WEIGHT: 155 LBS | OXYGEN SATURATION: 98 % | DIASTOLIC BLOOD PRESSURE: 70 MMHG | HEART RATE: 58 BPM | BODY MASS INDEX: 24.33 KG/M2 | TEMPERATURE: 97.8 F | HEIGHT: 67 IN | SYSTOLIC BLOOD PRESSURE: 138 MMHG

## 2022-09-20 DIAGNOSIS — Z09 HOSPITAL DISCHARGE FOLLOW-UP: Primary | ICD-10-CM

## 2022-09-20 PROCEDURE — 99214 OFFICE O/P EST MOD 30 MIN: CPT | Performed by: NURSE PRACTITIONER

## 2022-09-20 PROCEDURE — 1111F DSCHRG MED/CURRENT MED MERGE: CPT | Performed by: NURSE PRACTITIONER

## 2022-09-20 NOTE — PATIENT INSTRUCTIONS
UTI Prevention:    Drink plenty of water daily   *helps flush the bladder out and keep bacteria counts low    Start taking these medications daily:  Probiotic (with lactobacillus acidophilus) 1 capsule daily  D-mannose 1500 mg daily   *probably the most important one of the three   *easiest place to get it is [facility], not all pharmacies carry it    During course of infection try to limit bladder irritating foods/drinks:  -caffeine containing beverages (coffee, tea, energy drinks, soda)  -citrus containing food and beverages (jessie, limes, flavored drinks, etc)  -tomato based products (tomato juice, pizza sauce, spaghetti sauce, etc)

## 2022-09-20 NOTE — PROGRESS NOTES
Post-Discharge Transitional Care Follow Up      Chuy Gunter   YOB: 1940    Date of Office Visit:  2022  Date of Hospital Admission: 22  Date of Hospital Discharge: 9/10/22  Readmission Risk Score (high >=14%. Medium >=10%):Readmission Risk Score: 21      Care management risk score Rising risk (score 2-5) and Complex Care (Scores >=6): No Risk Score On File     Non face to face  following discharge, date last encounter closed (first attempt may have been earlier): 2022     Call initiated 2 business days of discharge: Yes     Hospital discharge follow-up  -     OK DISCHARGE MEDS RECONCILED W/ CURRENT OUTPATIENT MED LIST    Medical Decision Making: straightforward  No follow-ups on file. On this date 2022 I have spent 16 minutes reviewing previous notes, test results and face to face with the patient discussing the diagnosis and importance of compliance with the treatment plan as well as documenting on the day of the visit. Subjective:   HPI    Inpatient course: Discharge summary reviewed- see chart. Patient Identification:   Chuy Gunter   : 1940  MRN: [id number]   Account: [de-identified]                 Patient's PCP: Olga Lidia Mariano DO     Admit Date: 2022      Discharge Date:   9/10/2022     Admitting Physician: No admitting provider for patient encounter. Discharging Physician Assistant: Marv Centeno PA-C      Discharge Diagnoses with Assessment/Plan:     UTI (POA):   Urine culture growing E [employer]  Discharge home with macrobid BID for 10 days.    Was given 2 days of rocephin 1g QD  Acute toxic encephalopathy secondary to UTI  Significant improvements   Back to patients baseline  Subacute microcytic anemia  Recent hospitalization for GI bleed 3 weeks ago  Hgb 11.7 on arrival- stable  Continue with iron supplementation  Cognitive impairment  Most likely dementia  SLP cognitive eval with MOCA of 17   Sleep wake cycles are crucial. finished atb. No fever, chills, chest pain, sob, abd pain, hematuria. Confusion has improved. Urinary symptoms improved. No complaints today. Patient Active Problem List   Diagnosis    Osteoarthritis    Fibromyalgia    Gastroesophageal reflux disease    Hypothyroidism    Patient is Yarsanism    Back pain    H/O abdominal surgery    Normocytic anemia    History of diverticulosis    Chronic low back pain with sciatica    Chest pain    Essential hypertension    Abnormal CT scan, chest    BRBPR (bright red blood per rectum)    Spinal stenosis of lumbar region without neurogenic claudication    Other idiopathic scoliosis, lumbar region    Closed fracture of distal end of left femur, initial encounter ([facility])    Pre-syncope    Weakness generalized    Large hiatal hernia    Hypo-osmolar hyponatremia    Esophageal dysphagia    Hypomagnesemia    Dysphagia    Bilateral lower extremity edema    Hx of fracture of leg    Lung nodule    Paraesophageal hernia    S/P repair of paraesophageal hernia    Intermediate stage nonexudative age-related macular degeneration of both eyes    Pain syndrome, chronic    Shortness of breath    UTI (urinary tract infection)       Medications listed as ordered at the time of discharge from hospital     Medication List            Accurate as of September 20, 2022  3:17 PM. If you have any questions, ask your nurse or doctor.                 CONTINUE taking these medications      calcium-vitamin D 500-200 MG-UNIT per tablet  Commonly known as: OSCAL-500     docusate sodium 100 MG capsule  Commonly known as: COLACE     enalapril 5 MG tablet  Commonly known as: VASOTEC  Take 1 tablet by mouth daily     ferrous sulfate 325 (65 Fe) MG tablet  Commonly known as: IRON 325  Take 1 tablet by mouth 2 times daily     FLUoxetine 20 MG capsule  Commonly known as: PROZAC  Take 1 capsule by mouth daily     fluticasone 50 MCG/ACT nasal spray  Commonly known as: FLONASE     HYDROcodone-acetaminophen 5-325 MG per tablet  Commonly known as: NORCO     levothyroxine 100 MCG tablet  Commonly known as: SYNTHROID  TAKE 1 TABLET DAILY     meloxicam 15 MG tablet  Commonly known as: MOBIC     metoprolol succinate 50 MG extended release tablet  Commonly known as: TOPROL XL  TAKE 1 TABLET DAILY     * Misc. Devices Misc  Mechanical lift for a [employer] Misc  1 each by Does not apply route daily     nitrofurantoin (macrocrystal-monohydrate) 100 MG capsule  Commonly known as: MACROBID  Take 1 capsule by mouth 2 times daily for 10 days     Omega 3-6-9 Complex Caps     ondansetron 4 MG tablet  Commonly known as: ZOFRAN  Take 1 tablet by mouth 3 times daily as needed for Nausea or Vomiting     pantoprazole 40 MG tablet  Commonly known as: PROTONIX  Take 1 tablet by mouth 2 times daily (before meals)     pregabalin 150 MG capsule  Commonly known as: LYRICA  Take 1 capsule by mouth in the morning and 1 capsule before bedtime. Do all this for 180 days. Scooter Misc  by Does not apply route Power scooter     sennosides-docusate sodium 8.6-50 MG tablet  Commonly known as: SENOKOT-S  Take 1 tablet by mouth daily as needed for Constipation     sodium chloride 1 g tablet  Take 1 tablet by mouth daily     therapeutic multivitamin-minerals tablet     vitamin C 500 MG tablet  Commonly known as: ASCORBIC ACID     Vitamin D3 50 MCG (2000 UT) Caps     zinc sulfate 220 (50 Zn) MG capsule  Commonly known as: ZINCATE           * This list has 2 medication(s) that are the same as other medications prescribed for you. Read the directions carefully, and ask your doctor or other care provider to review them with you. Medications marked \"taking\" at this time  Outpatient Medications Marked as Taking for the 9/20/22 encounter (Office Visit) with [place]ORIDA Barr CNP   Medication Sig Dispense Refill    HYDROcodone-acetaminophen (NORCO) 5-325 MG per tablet Take 1 tablet by mouth every 6 hours as needed for Pain. docusate sodium (COLACE) 100 MG capsule Take 100 mg by mouth 2 times daily      meloxicam (MOBIC) 15 MG tablet Take 15 mg by mouth daily      sennosides-docusate sodium (SENOKOT-S) 8.6-50 MG tablet Take 1 tablet by mouth daily as needed for Constipation 30 tablet 1    nitrofurantoin, macrocrystal-monohydrate, (MACROBID) 100 MG capsule Take 1 capsule by mouth 2 times daily for 10 days 20 capsule 0    enalapril (VASOTEC) 5 MG tablet Take 1 tablet by mouth daily 90 tablet 3    sodium chloride 1 g tablet Take 1 tablet by mouth daily 30 tablet 0    ferrous sulfate (IRON 325) 325 (65 Fe) MG tablet Take 1 tablet by mouth 2 times daily 180 tablet 1    pantoprazole (PROTONIX) 40 MG tablet Take 1 tablet by mouth 2 times daily (before meals) 30 tablet 2    ondansetron (ZOFRAN) 4 MG tablet Take 1 tablet by mouth 3 times daily as needed for Nausea or Vomiting 30 tablet 0    pregabalin (LYRICA) 150 MG capsule Take 1 capsule by mouth in the morning and 1 capsule before bedtime. Do all this for 180 days. 180 capsule 1    Cholecalciferol (VITAMIN D3) 50 MCG (2000 UT) CAPS Take by mouth      metoprolol succinate (TOPROL XL) 50 MG extended release tablet TAKE 1 TABLET DAILY 90 tablet 3    levothyroxine (SYNTHROID) 100 MCG tablet TAKE 1 TABLET DAILY 90 tablet 3    FLUoxetine (PROZAC) 20 MG capsule Take 1 capsule by mouth daily 90 capsule 3    calcium-vitamin D (OSCAL-500) 500-200 MG-UNIT per tablet Take 2 tablets by mouth daily      Misc. Devices (WALKER) MISC 1 each by Does not apply route daily 1 each 0    Misc.  Devices MISC Mechanical lift for a Sayre Sitter 1 Device 0    Scooter MISC by Does not apply route Power scooter 1 each 0    fluticasone (FLONASE) 50 MCG/ACT nasal spray 1 spray by Nasal route as needed for Rhinitis       Omega 3-6-9 Fatty Acids (OMEGA 3-6-9 COMPLEX) CAPS Take 1 capsule by mouth daily       therapeutic multivitamin-minerals (THERAGRAN-M) tablet Take 1 tablet by mouth nightly          Medications patient taking as of now reconciled against medications ordered at time of hospital discharge: Yes    Review of Systems    Objective:    /70 (Site: Right Upper Arm, Position: Sitting, Cuff Size: Large Adult)   Pulse 58   Temp 97.8 °F (36.6 °C)   Resp 16   Ht 5' 7\" (1.702 m)   Wt 155 lb (70.3 kg)   SpO2 98%   BMI 24.28 kg/m²   Physical Exam  Constitutional:       Appearance: Normal appearance. HENT:      Head: Normocephalic and atraumatic. Right Ear: External ear normal.      Left Ear: External ear normal.      Nose: Nose normal.      Mouth/Throat:      Mouth: Mucous membranes are moist.   Eyes:      Conjunctiva/sclera: Conjunctivae normal.   Cardiovascular:      Rate and Rhythm: Normal rate and regular rhythm. Pulses: Normal pulses. Heart sounds: Normal heart sounds. Pulmonary:      Effort: Pulmonary effort is normal.      Breath sounds: Normal breath sounds. Abdominal:      General: Bowel sounds are normal.      Palpations: Abdomen is soft. Musculoskeletal:         General: Normal range of motion. Cervical back: Normal range of motion. Skin:     General: Skin is warm and dry. Neurological:      General: No focal deficit present. Mental Status: She is alert and oriented to person, place, and time. Psychiatric:         Mood and Affect: Mood normal.         Behavior: Behavior normal.       An electronic signature was used to authenticate this note.   --Sol Bland, [place] - CNP

## 2022-09-29 ENCOUNTER — HOSPITAL ENCOUNTER (OUTPATIENT)
Age: 82
Discharge: HOME | End: 2022-09-29
Payer: MEDICARE

## 2022-09-29 ENCOUNTER — HOSPITAL ENCOUNTER (OUTPATIENT)
Dept: GENERAL RADIOLOGY | Age: 82
Discharge: HOME | End: 2022-09-29
Payer: MEDICARE

## 2022-09-29 ENCOUNTER — OFFICE VISIT (OUTPATIENT)
Dept: FAMILY MEDICINE CLINIC | Age: 82
End: 2022-09-29
Payer: MEDICARE

## 2022-09-29 VITALS
WEIGHT: 155 LBS | TEMPERATURE: 98.2 F | HEIGHT: 67 IN | DIASTOLIC BLOOD PRESSURE: 80 MMHG | BODY MASS INDEX: 24.33 KG/M2 | SYSTOLIC BLOOD PRESSURE: 130 MMHG | HEART RATE: 56 BPM | RESPIRATION RATE: 14 BRPM | OXYGEN SATURATION: 98 %

## 2022-09-29 DIAGNOSIS — K59.00 CONSTIPATION, UNSPECIFIED CONSTIPATION TYPE: ICD-10-CM

## 2022-09-29 DIAGNOSIS — R11.0 NAUSEA: ICD-10-CM

## 2022-09-29 DIAGNOSIS — K59.00 CONSTIPATION, UNSPECIFIED CONSTIPATION TYPE: Primary | ICD-10-CM

## 2022-09-29 DIAGNOSIS — K92.2 GASTROINTESTINAL HEMORRHAGE, UNSPECIFIED GASTROINTESTINAL HEMORRHAGE TYPE: ICD-10-CM

## 2022-09-29 DIAGNOSIS — G89.4 PAIN SYNDROME, CHRONIC: ICD-10-CM

## 2022-09-29 DIAGNOSIS — D50.9 MICROCYTIC ANEMIA: ICD-10-CM

## 2022-09-29 PROCEDURE — 99214 OFFICE O/P EST MOD 30 MIN: CPT | Performed by: NURSE PRACTITIONER

## 2022-09-29 PROCEDURE — 85027 COMPLETE CBC AUTOMATED: CPT

## 2022-09-29 PROCEDURE — 81001 URINALYSIS AUTO W/SCOPE: CPT

## 2022-09-29 PROCEDURE — 74018 RADEX ABDOMEN 1 VIEW: CPT

## 2022-09-29 PROCEDURE — 36415 COLL VENOUS BLD VENIPUNCTURE: CPT

## 2022-09-29 PROCEDURE — 1123F ACP DISCUSS/DSCN MKR DOCD: CPT | Performed by: NURSE PRACTITIONER

## 2022-09-29 PROCEDURE — 87086 URINE CULTURE/COLONY COUNT: CPT

## 2022-09-29 ASSESSMENT — ENCOUNTER SYMPTOMS
SHORTNESS OF BREATH: 0
NAUSEA: 1
CONSTIPATION: 1
BACK PAIN: 1
ABDOMINAL DISTENTION: 1

## 2022-09-29 NOTE — PROGRESS NOTES
Keara Gunter is a 78 y.o. female thatpresents for Nausea (Pt states that she has not been feeling well she has had a headache and nausea. ) and Melena (Pt c/o of black stool but states that she is on iron)      History obtained today from Patient. HPI    Pt here with  and son. \"Not feeling well\"  The last 4 days. Stopped stool softeners Saturday but reports she is having bowel movements every day. +nausea, abdominal bloating  No vomiting  Chicken noodle soup for lunch  Crackers and water  [place] last night    +black stools recently, takes iron tablets but stopped them yesterday. Shortness of breath, but at baseline  No chest pain  Sleeping ok, taking Tylenol PM last night. Mild headache, fell in August, still with knot on head. No urinary symptoms but has hx of urinary retention in hospital in the past, she did void earlier today. Recently admitted to hospital for constipation and UTI. Back pain- getting worse, but chronic. Chills, no fever    I have reviewed the patient's past medical history, past surgical history, allergies,medications, social and family history and I have made updates where appropriate.     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 Viridiana Robles     GERD (gastroesophageal reflux disease)     Diverticulitis     Hiatal hernia     Hypertension     BAKI    Hypothyroidism     Osteoarthritis        Social History     Tobacco Use    Smoking status: Former     Packs/day: 2.00     Years: 11.00     Pack years: 22.00     Types: Cigarettes     Start date: 1957     Quit date: 1967     Years since quittin.9    Smokeless tobacco: Never   Vaping Use    Vaping Use: Never used   Substance Use Topics    Alcohol use: Yes     Comment: wine with dinner Drug use: No       Family History   Problem Relation Age of Onset    Arthritis Mother     Hearing Loss Mother     High Cholesterol Mother     Hearing Loss Father     Heart Disease Father 76        CABG    Stroke Father     Arthritis Sister     Depression Sister     Diabetes Sister     Arthritis Brother     Depression Brother     Cancer Maternal Aunt     Early Death Maternal Aunt     Diabetes Maternal Grandmother     Heart Disease Paternal Grandmother          Review of Systems   Constitutional:  Positive for activity change, appetite change, chills and fatigue. Respiratory:  Negative for shortness of breath. Cardiovascular:  Negative for chest pain. Gastrointestinal:  Positive for abdominal distention, constipation and nausea. Genitourinary:  Negative for difficulty urinating, dysuria, flank pain, genital sores and urgency. Musculoskeletal:  Positive for back pain. PHYSICAL EXAM:  /80   Pulse 56   Temp 98.2 °F (36.8 °C) (Oral)   Resp 14   Ht 5' 7\" (1.702 m)   Wt 155 lb (70.3 kg)   SpO2 98%   BMI 24.28 kg/m²     Physical Exam  Constitutional:       Appearance: Normal appearance. Cardiovascular:      Heart sounds: Normal heart sounds. Abdominal:      General: Bowel sounds are normal.      Palpations: Abdomen is soft. Tenderness: There is no abdominal tenderness. Skin:     General: Skin is warm and dry. Neurological:      Mental Status: She is alert and oriented to person, place, and time. Psychiatric:         Mood and Affect: Mood normal.         Behavior: Behavior normal.         Thought Content: Thought content normal.         Judgment: Judgment normal.         ASSESSMENT & PLAN  Ping Thompson was seen today for nausea and melena. Diagnoses and all orders for this visit:    Constipation, unspecified constipation type  -     XR ABDOMEN (KUB) (SINGLE AP VIEW); Future    Nausea  -     Culture, Urine  -     Urinalysis with Microscopic;  Future    Gastrointestinal hemorrhage, unspecified gastrointestinal hemorrhage type    Pain syndrome, chronic    Microcytic anemia  -     CBC; Future    Attempted to give urine sample but unable to go in office. Will give pt dose of IM phenergan to help with nausea. Will call with results of imaging and urine tests and blood work if obtained. If condition worsens, recommend ER evaluation. No red flag sx, Start above treatments, Obtain above testing, and ER precautions given. Advised to seek care immediately if any new or worsening symptoms. All copied or forwarded information in the progress note was verified by me to be accurate at the time of visit  Patient's past medical, surgical, social and family history were reviewed and updated     All patient questions answered. Patient voiced understanding.

## 2022-09-30 ENCOUNTER — TELEPHONE (OUTPATIENT)
Dept: FAMILY MEDICINE CLINIC | Age: 82
End: 2022-09-30

## 2022-09-30 DIAGNOSIS — N30.90 CYSTITIS: Primary | ICD-10-CM

## 2022-09-30 DIAGNOSIS — R30.0 DYSURIA: ICD-10-CM

## 2022-09-30 LAB
BACTERIA: ABNORMAL
BILIRUBIN URINE: NEGATIVE
BLOOD, URINE: ABNORMAL
CASTS: ABNORMAL /LPF
CASTS: ABNORMAL /LPF
CHARACTER, URINE: CLEAR
COLOR: YELLOW
CRYSTALS: ABNORMAL
EPITHELIAL CELLS, UA: ABNORMAL /HPF
ERYTHROCYTE [DISTWIDTH] IN BLOOD BY AUTOMATED COUNT: 21.7 % (ref 11.5–14.5)
ERYTHROCYTE [DISTWIDTH] IN BLOOD BY AUTOMATED COUNT: 69.9 FL (ref 35–45)
GLUCOSE, URINE: NEGATIVE MG/DL
HCT VFR BLD CALC: 40.7 % (ref 37–47)
HEMOGLOBIN: 12.9 GM/DL (ref 12–16)
KETONES, URINE: NEGATIVE
LEUKOCYTE ESTERASE, URINE: ABNORMAL
MCH RBC QN AUTO: 28.5 PG (ref 26–33)
MCHC RBC AUTO-ENTMCNC: 31.7 GM/DL (ref 32.2–35.5)
MCV RBC AUTO: 90 FL (ref 81–99)
MISCELLANEOUS LAB TEST RESULT: ABNORMAL
NITRITE, URINE: NEGATIVE
PH UA: 8 (ref 5–9)
PLATELET # BLD: 185 THOU/MM3 (ref 130–400)
PMV BLD AUTO: 11.8 FL (ref 9.4–12.4)
PROTEIN UA: NEGATIVE MG/DL
RBC # BLD: 4.52 MILL/MM3 (ref 4.2–5.4)
RBC URINE: ABNORMAL /HPF
RENAL EPITHELIAL, UA: ABNORMAL
SPECIFIC GRAVITY UA: 1.01 (ref 1–1.03)
UROBILINOGEN, URINE: 0.2 EU/DL (ref 0–1)
WBC # BLD: 3.8 THOU/MM3 (ref 4.8–10.8)
WBC UA: ABNORMAL /HPF
YEAST: ABNORMAL

## 2022-09-30 RX ORDER — NITROFURANTOIN 25; 75 MG/1; MG/1
100 CAPSULE ORAL 2 TIMES DAILY
Qty: 10 CAPSULE | Refills: 0 | Status: SHIPPED | OUTPATIENT
Start: 2022-09-30 | End: 2022-10-05

## 2022-09-30 NOTE — TELEPHONE ENCOUNTER
Patient informed and verbalized understanding. Patient would like the medication to go to [facility] on [facility].

## 2022-10-01 LAB
ORGANISM: ABNORMAL
URINE CULTURE, ROUTINE: ABNORMAL

## 2022-10-03 ENCOUNTER — CLINICAL DOCUMENTATION (OUTPATIENT)
Dept: FAMILY MEDICINE CLINIC | Age: 82
End: 2022-10-03

## 2022-10-03 DIAGNOSIS — K21.9 GASTROESOPHAGEAL REFLUX DISEASE WITHOUT ESOPHAGITIS: Primary | ICD-10-CM

## 2022-10-03 RX ORDER — PANTOPRAZOLE SODIUM 40 MG/1
40 TABLET, DELAYED RELEASE ORAL
Qty: 180 TABLET | Refills: 3 | Status: SHIPPED | OUTPATIENT
Start: 2022-10-03

## 2022-10-03 RX ORDER — MELOXICAM 15 MG/1
15 TABLET ORAL DAILY
Qty: 60 TABLET | Refills: 0 | Status: SHIPPED | OUTPATIENT
Start: 2022-10-03 | End: 2022-12-02

## 2022-10-03 NOTE — PROGRESS NOTES
Patient's daughter Henri Melara), presents today to review her medications. Her daughter manages her medications. Has noted some duplicate medications when organizing this. Reviewed in detail today. Stop meloxicam due to anemia. Other meds reconciled with what she is taking now. All questions answered.

## 2022-10-03 NOTE — TELEPHONE ENCOUNTER
Patients daughter said that if this prescription is going to be an on going, maintenance script, [facility]    Orvis Rolling L Height called requesting a refill on the following medications:  Requested Prescriptions     Pending Prescriptions Disp Refills    meloxicam (MOBIC) 15 MG tablet 30 tablet      Sig: Take 1 tablet by mouth daily     Pharm[facility]    Date of last visit: 0915/2022  Date of next visit (if applicable): 39/24/6556

## 2022-10-04 ENCOUNTER — TELEPHONE (OUTPATIENT)
Dept: PHYSICAL MEDICINE AND REHAB | Age: 82
End: 2022-10-04

## 2022-10-04 ENCOUNTER — TELEPHONE (OUTPATIENT)
Dept: FAMILY MEDICINE CLINIC | Age: 82
End: 2022-10-04

## 2022-10-04 NOTE — TELEPHONE ENCOUNTER
Criselda denial received. Denied due not able to have 2 procedures per section of spine in a rolling 12 month period. Pt. Contacted. Procedure cancelled. Follow up rescheduled to 12/1/2022 @ 10:40am. Verbalized understanding. No

## 2022-10-04 NOTE — TELEPHONE ENCOUNTER
----- Message from FLORIDA Humphries CNP sent at 9/30/2022 12:22 PM EDT -----  Abd xray was negative

## 2022-10-08 PROBLEM — N39.0 UTI (URINARY TRACT INFECTION): Status: RESOLVED | Noted: 2022-09-08 | Resolved: 2022-10-08

## 2022-10-24 ENCOUNTER — ANESTHESIA EVENT (OUTPATIENT)
Dept: ENDOSCOPY | Age: 82
End: 2022-10-24
Payer: MEDICARE

## 2022-10-24 DIAGNOSIS — R11.0 NAUSEA: ICD-10-CM

## 2022-10-24 DIAGNOSIS — K59.00 CONSTIPATION, UNSPECIFIED CONSTIPATION TYPE: ICD-10-CM

## 2022-10-24 RX ORDER — ONDANSETRON 4 MG/1
4 TABLET, FILM COATED ORAL 3 TIMES DAILY PRN
Qty: 30 TABLET | Refills: 0 | Status: SHIPPED | OUTPATIENT
Start: 2022-10-24

## 2022-10-24 RX ORDER — DOCUSATE SODIUM 100 MG/1
100 CAPSULE, LIQUID FILLED ORAL 2 TIMES DAILY PRN
Qty: 180 CAPSULE | Refills: 3 | Status: SHIPPED | OUTPATIENT
Start: 2022-10-24

## 2022-10-24 NOTE — TELEPHONE ENCOUNTER
Ced Like requesting the [facility] please provide 90 day supplies with refills   Please approve or refuse Rx request:  Requested Prescriptions     Pending Prescriptions Disp Refills    docusate sodium (COLACE) 100 MG capsule 180 capsule 3     Sig: Take 1 capsule by mouth 2 times daily as needed for Constipation    ondansetron (ZOFRAN) 4 MG tablet 30 tablet 0     Sig: Take 1 tablet by mouth 3 times daily as needed for Nausea or Vomiting       Next appointment:  3/7/2023

## 2022-10-25 ENCOUNTER — HOSPITAL ENCOUNTER (OUTPATIENT)
Age: 82
Setting detail: OUTPATIENT SURGERY
Discharge: HOME | End: 2022-10-25
Attending: INTERNAL MEDICINE | Admitting: INTERNAL MEDICINE
Payer: MEDICARE

## 2022-10-25 ENCOUNTER — ANESTHESIA (OUTPATIENT)
Dept: ENDOSCOPY | Age: 82
End: 2022-10-25
Payer: MEDICARE

## 2022-10-25 VITALS
DIASTOLIC BLOOD PRESSURE: 70 MMHG | BODY MASS INDEX: 23.62 KG/M2 | OXYGEN SATURATION: 68 % | RESPIRATION RATE: 16 BRPM | WEIGHT: 150.5 LBS | HEART RATE: 67 BPM | SYSTOLIC BLOOD PRESSURE: 148 MMHG | HEIGHT: 67 IN

## 2022-10-25 PROCEDURE — 7100000011 HC PHASE II RECOVERY - ADDTL 15 MIN: Performed by: INTERNAL MEDICINE

## 2022-10-25 PROCEDURE — 2720000010 HC SURG SUPPLY STERILE: Performed by: INTERNAL MEDICINE

## 2022-10-25 PROCEDURE — 3700000000 HC ANESTHESIA ATTENDED CARE: Performed by: INTERNAL MEDICINE

## 2022-10-25 PROCEDURE — 2500000003 HC RX 250 WO HCPCS: Performed by: NURSE ANESTHETIST, CERTIFIED REGISTERED

## 2022-10-25 PROCEDURE — 6360000002 HC RX W HCPCS: Performed by: NURSE ANESTHETIST, CERTIFIED REGISTERED

## 2022-10-25 PROCEDURE — 7100000010 HC PHASE II RECOVERY - FIRST 15 MIN: Performed by: INTERNAL MEDICINE

## 2022-10-25 PROCEDURE — 2580000003 HC RX 258: Performed by: INTERNAL MEDICINE

## 2022-10-25 PROCEDURE — 3609013200 HC EGD W/ ABLATION: Performed by: INTERNAL MEDICINE

## 2022-10-25 PROCEDURE — 3700000001 HC ADD 15 MINUTES (ANESTHESIA): Performed by: INTERNAL MEDICINE

## 2022-10-25 RX ORDER — SODIUM CHLORIDE 0.9 % (FLUSH) 0.9 %
5-40 SYRINGE (ML) INJECTION PRN
Status: DISCONTINUED | OUTPATIENT
Start: 2022-10-25 | End: 2022-10-25 | Stop reason: HOSPADM

## 2022-10-25 RX ORDER — LIDOCAINE HYDROCHLORIDE 20 MG/ML
INJECTION, SOLUTION EPIDURAL; INFILTRATION; INTRACAUDAL; PERINEURAL PRN
Status: DISCONTINUED | OUTPATIENT
Start: 2022-10-25 | End: 2022-10-25 | Stop reason: SDUPTHER

## 2022-10-25 RX ORDER — SODIUM CHLORIDE 9 MG/ML
25 INJECTION, SOLUTION INTRAVENOUS PRN
Status: DISCONTINUED | OUTPATIENT
Start: 2022-10-25 | End: 2022-10-25 | Stop reason: HOSPADM

## 2022-10-25 RX ORDER — SODIUM CHLORIDE 0.9 % (FLUSH) 0.9 %
5-40 SYRINGE (ML) INJECTION EVERY 12 HOURS SCHEDULED
Status: DISCONTINUED | OUTPATIENT
Start: 2022-10-25 | End: 2022-10-25 | Stop reason: HOSPADM

## 2022-10-25 RX ORDER — PROPOFOL 10 MG/ML
INJECTION, EMULSION INTRAVENOUS PRN
Status: DISCONTINUED | OUTPATIENT
Start: 2022-10-25 | End: 2022-10-25 | Stop reason: SDUPTHER

## 2022-10-25 RX ADMIN — SODIUM CHLORIDE 1000 ML: 9 INJECTION, SOLUTION INTRAVENOUS at 11:56

## 2022-10-25 RX ADMIN — LIDOCAINE HYDROCHLORIDE 40 MG: 20 INJECTION, SOLUTION EPIDURAL; INFILTRATION; INTRACAUDAL; PERINEURAL at 12:10

## 2022-10-25 RX ADMIN — PROPOFOL 50 MG: 10 INJECTION, EMULSION INTRAVENOUS at 12:10

## 2022-10-25 ASSESSMENT — PAIN - FUNCTIONAL ASSESSMENT: PAIN_FUNCTIONAL_ASSESSMENT: 0-10

## 2022-10-25 ASSESSMENT — PAIN DESCRIPTION - DESCRIPTORS: DESCRIPTORS: ACHING

## 2022-10-25 ASSESSMENT — PAIN SCALES - GENERAL
PAINLEVEL_OUTOF10: 0
PAINLEVEL_OUTOF10: 0

## 2022-10-25 NOTE — PROGRESS NOTES
EGD completed. Tolerated well. Photos taken. No biopsies. APC used on gastric settings. Scope [id number].
Patient is in phase 2  taking fluids.  discussed findings, plan of care, discharge instructions with pt and .
Pt in endo consent form signed and teaching completed. Verbalized understanding.
[place]

## 2022-10-25 NOTE — ANESTHESIA POSTPROCEDURE EVALUATION
Department of Anesthesiology  Postprocedure Note    Patient: Rancho Gunter  MRN: [id number]  YOB: 1940  Date of evaluation: 10/25/2022      Procedure Summary     Date: 10/25/22 Room / Location: [address] [place] [address]    Anesthesia Start: 1245 Anesthesia Stop: 1222    Procedure: EGD POLYP ABLATION/OTHER Diagnosis:       Gastric ulcer, unspecified chronicity, unspecified whether gastric ulcer hemorrhage or perforation present      (Gastric ulcer, unspecified chronicity, unspecified whether gastric ulcer hemorrhage or perforation present [K25.9])    Surgeons: Dinah Ramsay MD Responsible Provider: Tammie Mcmahan MD    Anesthesia Type: MAC ASA Status: 3          Anesthesia Type: No value filed.     Mat Phase I:      Mat Phase II:        Anesthesia Post Evaluation    Patient location during evaluation: bedside  Patient participation: complete - patient participated  Level of consciousness: awake and alert  Airway patency: patent  Nausea & Vomiting: no nausea and no vomiting  Complications: no  Cardiovascular status: hemodynamically stable  Respiratory status: spontaneous ventilation and acceptable  Hydration status: euvolemic

## 2022-10-25 NOTE — POST SEDATION
[address] [address]  Sedation/Analgesia Post Sedation Record    Patient: Carey Gunter : 1940  Med Rec#: [id number] Acc#: [id number]   Procedure Performed By: Lalo Sims MD  Primary Care Physician: Ti Bhatti DO    POST-PROCEDURE    Physicians/Assistants: Lalo Sims MD  Procedure Performed:    Sedation/Anesthesia:     Estimated Blood Loss:          ml  Specimens Removed:  [x]None []Other:      Disposition of Specimen:  []Pathology [x]Other      Complications:   [x]None Immediate []Other:     Post-procedure Diagnosis/Findings:             Recommendations:     gave          Lalo Sims MD, MD [facility]  Electronically signed 10/25/2022 at 12:16 PM

## 2022-10-25 NOTE — ANESTHESIA PRE PROCEDURE
Department of Anesthesiology  Preprocedure Note       Name:  Emeka Prasad Height   Age:  78 y.o.  :  1940                                          MRN:  [id number]         Date:  10/25/2022      Surgeon: Pravin Li):  Na Wilkerson MD    Procedure: Procedure(s):  EGD POLYP ABLATION/OTHER    Medications prior to admission:   Prior to Admission medications    Medication Sig Start Date End Date Taking? Authorizing Provider   docusate sodium (COLACE) 100 MG capsule Take 1 capsule by mouth 2 times daily as needed for Constipation 10/24/22   Ju Drummond, DO   ondansetron (ZOFRAN) 4 MG tablet Take 1 tablet by mouth 3 times daily as needed for Nausea or Vomiting 10/24/22   Ju Drummond, DO   meloxicam (MOBIC) 15 MG tablet Take 1 tablet by mouth daily 10/3/22 12/2/22  Jared Dale, DO   pantoprazole (PROTONIX) 40 MG tablet Take 1 tablet by mouth 2 times daily (before meals) 10/3/22   Ju Drummond, DO   HYDROcodone-acetaminophen (NORCO) 5-325 MG per tablet Take 1 tablet by mouth every 6 hours as needed for Pain. Historical Provider, MD   docusate sodium (COLACE) 100 MG capsule Take 100 mg by mouth 2 times daily    Historical Provider, MD   zinc sulfate (ZINCATE) 220 (50 Zn) MG capsule Take 50 mg by mouth daily    Historical Provider, MD   vitamin C (ASCORBIC ACID) 500 MG tablet Take 500 mg by mouth 2 times daily  Patient not taking: No sig reported    Historical Provider, MD   enalapril (VASOTEC) 5 MG tablet Take 1 tablet by mouth daily 22   Anat Lara MD   ferrous sulfate (IRON 325) 325 (65 Fe) MG tablet Take 1 tablet by mouth 2 times daily 22   Harjinder Olmstead MD   pregabalin (LYRICA) 150 MG capsule Take 1 capsule by mouth in the morning and 1 capsule before bedtime. Do all this for 180 days.  22  Ju Drummond, DO   Cholecalciferol (VITAMIN D3) 50 MCG (2000 UT) CAPS Take by mouth    Historical Provider, MD   metoprolol succinate (TOPROL XL) 50 MG extended release tablet TAKE 1 TABLET DAILY 5/16/22   Blanca Harry, DO   levothyroxine (SYNTHROID) 100 MCG tablet TAKE 1 TABLET DAILY 5/16/22   Blanca [place], DO   FLUoxetine (PROZAC) 20 MG capsule Take 1 capsule by mouth daily 4/4/22   Blanca [place], DO   calcium-vitamin D (OSCAL-500) 500-200 MG-UNIT per tablet Take 2 tablets by mouth daily    Historical Provider, MD   Misc. Devices [facility]) MISC 1 each by Does not apply route daily 2/11/20   Blanca Mcdonald, DO   Misc. Devices MISC Mechanical lift for a [place] 5/31/18   Blanca Mcdonald, DO   Scooter MISC by Does not apply route Power scooter 10/31/17   Blanca Mcdonald, DO   fluticasone (FLONASE) 50 MCG/ACT nasal spray 1 spray by Nasal route as needed for Rhinitis     Historical Provider, MD   therapeutic multivitamin-minerals (THERAGRAN-M) tablet Take 1 tablet by mouth nightly    Historical Provider, MD       Current medications:    Current Facility-Administered Medications   Medication Dose Route Frequency Provider Last Rate Last Admin    sodium chloride flush 0.9 % injection 5-40 mL  5-40 mL IntraVENous 2 times per day Janey Hernández MD        sodium chloride flush 0.9 % injection 5-40 mL  5-40 mL IntraVENous PRN Janey Hernández MD        0.9 % sodium chloride infusion  25 mL IntraVENous PRN Janey Hernández MD 20 mL/hr at 10/25/22 1156 1,000 mL at 10/25/22 1156       Allergies:     Allergies   Allergen Reactions    Ampicillin     Morphine Other (See Comments)     Hallucinations     Pcn [Penicillins] Itching    Sulfa Antibiotics        Problem List:    Patient Active Problem List   Diagnosis Code    Osteoarthritis M19.90    Fibromyalgia M79.7    Gastroesophageal reflux disease K21.9    Hypothyroidism E03.9    Patient is [employer] [id number]    Back pain M54.9    H/O abdominal surgery Z98.890    Normocytic anemia D64.9    History of diverticulosis Z87.19    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.40, G89.29    Chest pain R07.9    Essential hypertension I10    Abnormal CT scan, chest R93.89  BRBPR (bright red blood per rectum) K62.5    Spinal stenosis of lumbar region without neurogenic claudication M48.061    Other idiopathic scoliosis, lumbar region M41.26    Closed fracture of distal end of left femur, initial encounter ([facility] [address] S72.402A    Pre-syncope R55    Weakness generalized R53.1    Large hiatal hernia K44.9    Hypo-osmolar hyponatremia E87.1    Esophageal dysphagia R13.19    Hypomagnesemia E83.42    Dysphagia R13.10    Bilateral lower extremity edema R60.0    Hx of fracture of leg Z87.81    Lung nodule R91.1    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 Kayla Moore GERD (gastroesophageal reflux disease)     Diverticulitis     Hiatal hernia     Hx of blood clots     Hypertension     BAKI    Hypothyroidism     Osteoarthritis        Past Surgical History:        Procedure Laterality Date    APPENDECTOMY  1958    BACK SURGERY      CARPAL TUNNEL RELEASE Bilateral 2013    w/cubital tunnel    COLON SURGERY  07/29/2016    Procedure: ATTEMPTED DAVINCI XI ROBOTIC ASSISTED SIGMOID COLON RESECTION, ROBOTIC ASSISTED MOBILIZATION OF RECTAL SIGMOID TO SPLENIC FLEXURE, CONVERTED TO OPEN LEFT HEMICOLECTOMY WITH COLOPROCTOCTOMY, SPLENORRHAPHY, RIGID SIGMOIDOSCOPY, LASER EVALUATION OF VASCULARITY WITH ICG; Surgeon: Kayla Moore MD; Location: [facility]; Service: General    COLONOSCOPY  2012    CYST REMOVAL  2010   [address], [address]    Cataract    FEMUR FRACTURE SURGERY Left 12/21/2020 LEFT FEMUR OPEN REDUCTION INTERNAL FIXATION performed by Jose Maria Quach MD at [facility] Left 2001    HIATAL HERNIA REPAIR N/A 03/09/2021    ROBOTIC EXTENSIVE LYSIS OF ADHESIONS, ROBOTIC REDUCTION OF HIATAL HERNIA WITH GASTROPEXY performed by Lara Pradhan MD at [facility] ([address])  1979    w/bladder suspension   [address]  2002, 2008, 2009    rt hip(2002), lt knee(2009), lt hip(2008) Shoulder (2009)    OTHER SURGICAL HISTORY  04/21/2021    Tj Anderson CNP in the office   [address] [facility]    PAIN MANAGEMENT PROCEDURE Bilateral 7/13/2021    medial branch blocks at bilateral L4/L5 and L5/S1 performed by Sydnie Manzanares DO at [address] Bilateral 8/24/2021    confirmatory  medial branch blocks at bilateral L4/L5 and L5/S1 performed by Sydnie Manzanares DO at [address] Right 10/19/2021    thermal radiofrequency at BILATERAL medial branches L4/L5 and L5/S1 was chosen. We will start with the right side first performed by Sydnie Manzanares DO at [address] Left 12/7/2021    thermal radiofrequency at BILATERAL medial branches L4/L5 and L5/S1 was chosen.  performed by Sydnie Manzanares DO at [address] N/A 7/26/2022    lumbar epidural steroid injection Lumbar 5 Sacral 1 performed by Sydnie Manzanares DO at [address]  [address] IND Left 09/15/2017    COLONOSCOPY performed by Barbara Monson MD at [facility]    discectomy   [facility] ENDOSCOPY  2012    UPPER GASTROINTESTINAL ENDOSCOPY Left 6/7/2022    EGD BIOPSY performed by Amanda Cruz MD at [facility] End[facility]    UPPER GASTROINTESTINAL ENDOSCOPY N/A 8/17/2022    EGD BIOPSY performed by Yasmine Kahn MD at [facility] End[facility]       Social History:    Social History     Tobacco Use    Smoking status: Former     Packs/day: 2.00     Years: 11.00     Pack years: 22.00     Types: Cigarettes     Start date: 1957     Quit date: 1967     Years since quittin.0    Smokeless tobacco: Never   Substance Use Topics    Alcohol use: Yes     Comment: wine with dinner                                Counseling given: Not Answered      Vital Signs (Current):   Vitals:    10/21/22 1042 10/25/22 1144 10/25/22 1149   BP:  128/85    Pulse:  81 82   Resp:  16    SpO2:  98%    Weight: 158 lb (71.7 kg) 150 lb 8 oz (68.3 kg)    Height: 5' 7\" (1.702 m) 5' 7\" (1.702 m)                                               BP Readings from Last 3 Encounters:   10/25/22 128/85   22 130/80   22 138/70       NPO Status: Time of last liquid consumption: 0330                        Time of last solid consumption: 1700                        Date of last liquid consumption: 10/25/22                        Date of last solid food consumption: 10/24/22    BMI:   Wt Readings from Last 3 Encounters:   10/25/22 150 lb 8 oz (68.3 kg)   22 155 lb (70.3 kg)   22 155 lb (70.3 kg)     Body mass index is 23.57 kg/m².     CBC:   Lab Results   Component Value Date/Time    WBC 3.8 2022 03:46 PM    RBC 4.52 2022 03:46 PM    HGB 12.9 2022 03:46 PM    HCT 40.7 2022 03:46 PM    MCV 90.0 2022 03:46 PM    RDW 15.4 2022 10:49 AM     2022 03:46 PM       CMP:   Lab Results   Component Value Date/Time     2022 04:44 AM    K 3.7 2022 04:44 AM     2022 04:44 AM    CO2 25 2022 04:44 AM    BUN 10 2022 04:44 AM    CREATININE 0.6 2022 04:44 AM    AGRATIO 1.4 2022 10:49 AM    LABGLOM >90 2022 04:44 AM    GLUCOSE 96 2022 04:44 AM    GLUCOSE 91 2022 10:49 AM    PROT 5.2 2022 04:44 AM CALCIUM 8.6 09/09/2022 04:44 AM    BILITOT 0.4 09/09/2022 04:44 AM    ALKPHOS 73 09/09/2022 04:44 AM    AST 24 09/09/2022 04:44 AM    ALT 10 09/09/2022 04:44 AM       POC Tests: No results for input(s): POCGLU, POCNA, POCK, POCCL, POCBUN, POCHEMO, POCHCT in the last 72 hours. Coags:   Lab Results   Component Value Date/Time    INR 0.93 08/17/2022 05:41 AM    APTT 26.4 08/17/2022 05:41 AM       HCG (If Applicable): No results found for: PREGTESTUR, PREGSERUM, HCG, HCGQUANT     ABGs: No results found for: PHART, PO2ART, KGT9KQF, OFB7ZEM, BEART, E1EAWIJL     Type & Screen (If Applicable):  No results found for: LABABO, LABRH    Drug/Infectious Status (If Applicable):  No results found for: HIV, HEPCAB    COVID-19 Screening (If Applicable):   Lab Results   Component Value Date/Time    COVID19 NOT  DETECTED 08/24/2022 09:13 AM    COVID19 Not Detected 10/06/2020 10:23 AM           Anesthesia Evaluation  Patient summary reviewed no history of anesthetic complications:   Airway: Mallampati: II  TM distance: >3 FB   Neck ROM: full  Mouth opening: > = 3 FB   Dental: normal exam         Pulmonary:       (-) COPD                           Cardiovascular:    (+) hypertension:,                   Neuro/Psych:   (+) neuromuscular disease:,    (-) seizures and CVA            ROS comment: Fibromyalgia  GI/Hepatic/Renal:   (+) hiatal hernia, GERD:,           Endo/Other:    (+) hypothyroidism::., .                 Abdominal:             Vascular: negative vascular ROS. Other Findings:           Anesthesia Plan      MAC     ASA 3       Induction: intravenous. Anesthetic plan and risks discussed with patient. Plan discussed with CRNA.     Attending anesthesiologist reviewed and agrees with Preprocedure content                FLORIDA Darden - MOY   10/25/2022

## 2022-10-25 NOTE — H&P
[facility]  Sedation/Analgesia History & Physical    Patient: Kimmy Gunter : 1940  Med Rec#: [id number] Acc#: [id number]   Provider Performing Procedure: Manjeet Arreguin MD  Primary Care Physician: Nikolai Stephen DO    PRE-PROCEDURE   Full CODE [x]Yes  DNR-CCA/DNR-CC []Yes   Brief History/Pre-Procedure Diagnosis:anemia          MEDICAL HISTORY  []CAD/Valve  []Liver Disease  []Lung Disease []Diabetes  []Hypertension []Renal Disease  []Additional information:       has a past medical history of Abnormal EKG, Anemia, Back pain, Blood circulation, collateral, Diverticulitis, Fibromyalgia, GERD (gastroesophageal reflux disease), Hiatal hernia, Hx of blood clots, Hypertension, Hypothyroidism, and Osteoarthritis. SURGICAL HISTORY   has a past surgical history that includes Appendectomy (); Colonoscopy (); eye surgery (, ); Hysterectomy (); Spine surgery (); Upper gastrointestinal endoscopy (); cyst removal (); Tonsillectomy (); Tooth Extraction (); Ovary removal (); Foot surgery (Left, ); Carpal tunnel release (Bilateral, ); Small intestine surgery; back surgery; Colon surgery (2016); joint replacement (, , ); pr colon ca scrn not hi rsk ind (Left, 09/15/2017); Femur fracture surgery (Left, 2020); hiatal hernia repair (N/A, 2021); other surgical history (2021); Pain management procedure (Bilateral, 2021); Pain management procedure (Bilateral, 2021); Pain management procedure (Right, 10/19/2021); Pain management procedure (Left, 2021); Upper gastrointestinal endoscopy (Left, 2022); Pain management procedure (N/A, 2022); and Upper gastrointestinal endoscopy (N/A, 2022).   Additional information:       ALLERGIES   Allergies as of 2022 - Fully Reviewed 2022   Allergen Reaction Noted    Ampicillin  2013    Morphine Other (See Comments) 2017    Pcn [penicillins] Itching 05/17/2013    Sulfa antibiotics  05/17/2013     Additional information:       MEDICATIONS   Coumadin Use Last 7 Days [x]No []Yes  Antiplatelet drug therapy use last 7 days  [x]No []Yes  Other anticoagulant use last 7 days  [x]No []Yes    Current Facility-Administered Medications:     sodium chloride flush 0.9 % injection 5-40 mL, 5-40 mL, IntraVENous, 2 times per day, Marv Naranjo MD    sodium chloride flush 0.9 % injection 5-40 mL, 5-40 mL, IntraVENous, PRN, Marv Naranjo MD    0.9 % sodium chloride infusion, 25 mL, IntraVENous, PRN, Marv Naranjo MD, Last Rate: 20 mL/hr at 10/25/22 1156, 1,000 mL at 10/25/22 1156  Prior to Admission medications    Medication Sig Start Date End Date Taking? Authorizing Provider   docusate sodium (COLACE) 100 MG capsule Take 1 capsule by mouth 2 times daily as needed for Constipation 10/24/22   Sushma Ross, DO   ondansetron (ZOFRAN) 4 MG tablet Take 1 tablet by mouth 3 times daily as needed for Nausea or Vomiting 10/24/22   Sushma Ross, DO   meloxicam (MOBIC) 15 MG tablet Take 1 tablet by mouth daily 10/3/22 12/2/22  Jared Dale,    pantoprazole (PROTONIX) 40 MG tablet Take 1 tablet by mouth 2 times daily (before meals) 10/3/22   Sushma Ross, DO   HYDROcodone-acetaminophen (NORCO) 5-325 MG per tablet Take 1 tablet by mouth every 6 hours as needed for Pain.     Historical Provider, MD   docusate sodium (COLACE) 100 MG capsule Take 100 mg by mouth 2 times daily    Historical Provider, MD   zinc sulfate (ZINCATE) 220 (50 Zn) MG capsule Take 50 mg by mouth daily    Historical Provider, MD   vitamin C (ASCORBIC ACID) 500 MG tablet Take 500 mg by mouth 2 times daily  Patient not taking: No sig reported    Historical Provider, MD   enalapril (VASOTEC) 5 MG tablet Take 1 tablet by mouth daily 9/1/22   Elijah Dee MD   ferrous sulfate (IRON 325) 325 (65 Fe) MG tablet Take 1 tablet by mouth 2 times daily 8/18/22   Carlton Ojeda MD   pregabalin (LYRICA) 150 MG capsule Take 1 capsule by mouth in the morning and 1 capsule before bedtime. Do all this for 180 days. 8/2/22 1/29/23  Lucillie Earnest, DO   Cholecalciferol (VITAMIN D3) 50 MCG (2000 UT) CAPS Take by mouth    Historical Provider, MD   metoprolol succinate (TOPROL XL) 50 MG extended release tablet TAKE 1 TABLET DAILY 5/16/22   Lucillie Earnest, DO   levothyroxine (SYNTHROID) 100 MCG tablet TAKE 1 TABLET DAILY 5/16/22   Lucillie Earnest, DO   FLUoxetine (PROZAC) 20 MG capsule Take 1 capsule by mouth daily 4/4/22   Lucillie Earnest, DO   calcium-vitamin D (OSCAL-500) 500-200 MG-UNIT per tablet Take 2 tablets by mouth daily    Historical Provider, MD   Misc. Devices [facility]) MISC 1 each by Does not apply route daily 2/11/20   Lucillie Earnest, DO   Misc. Devices MISC Mechanical lift for a Akanksha Juli 5/31/18   Lucillie Earnest, DO   Scooter MISC by Does not apply route Power scooter 10/31/17   [facility], DO   fluticasone (FLONASE) 50 MCG/ACT nasal spray 1 spray by Nasal route as needed for Rhinitis     Historical Provider, MD   therapeutic multivitamin-minerals (THERAGRAN-M) tablet Take 1 tablet by mouth nightly    Historical Provider, MD     Additional information:       PHYSICAL:   Heart:  [x]Regular rate and rhythm  []Other:    Lungs:  [x]Clear    []Other:    Abdomen: [x]Soft    []Other:    Mental Status: [x]Alert & Oriented  []Other:      VITAL SIGNS   Patient Vitals for the past 24 hrs:   BP Pulse Resp SpO2 Height Weight   10/25/22 1149 -- 82 -- -- -- --   10/25/22 1144 128/85 81 16 98 % 5' 7\" (1.702 m) 150 lb 8 oz (68.3 kg)       PLANNED PROCEDURE  [x]EGD  []Colonoscopy []Flex Sigmoid  []ERCP []EUS   []Cystoscopy  [] CATH [] BRONCH   Consent: I have discussed with the patient and/or the patient representative the indication, alternatives, and the possible risks and/or complications of the planned procedure and the anesthesia methods. The patient and/or patient representative appear to understand and agree to proceed.   SEDATION  Planned agent:[]Midazolam []Meperidine []Sublimaze []Morphine  []Diazepam [x]Propofol  []Other:     ASA Classification: Class 3 - A patient with severe systemic disease that limits activity but is not incapacitating    Airway Assessment: normal    Monitoring and Safety: The patient will be placed on a cardiac monitor and vital signs, pulse oximetry and level of consciousness will be continuously evaluated throughout the procedure. The patient will be closely monitored until recovery from the medications is complete and the patient has returned to baseline status. Respiratory therapy will be on standby during the procedure. [x]Pre-procedure diagnostic studies complete and results available. Comment:    [x]Previous sedation/anesthesia experiences assessed. Comment:    [x]The patient is an appropriate candidate to undergo the planned procedure sedation and anesthesia. (Refer to nursing sedation/analgesia documentation record)  [x]Formulation and discussion of sedation/procedure plan, risks, and expectations with patient and/or responsible adult completed. [x]Patient examined immediately prior to the procedure.  (Refer to nursing sedation/analgesia documentation record)    Kristie Ascencio MD, MD   Electronically signed 10/25/2022 at 12:02 PM

## 2022-10-26 NOTE — OP NOTE
[address] [place] [address]                                OPERATIVE REPORT    PATIENT NAME: Karie Olivares                    :        1940  MED REC NO:   [id number]                           ROOM:  ACCOUNT NO:   [de-identified]                           ADMIT DATE: 10/25/2022  PROVIDER:     Luna Alvarez M.D.    Quinn Alec:  10/25/2022    PROCEDURE:  EGD plus APC application. SURGEON:  Luna Alvarez MD    INDICATIONS FOR PROCEDURE:  The patient with a history of vascular  ectatic lesion in the stomach and blood loss, iron loss anemia from that  that had been treated. The patient is otherwise having a good  symptomatic control at the moment. No new GI symptoms and heartburn and  I questioning, sees a list of medicine attached. See the preop note for  rest of clinicals. ASA CLASSIFICATION:  III. MEDICATIONS:  Per Anesthesia. BIOPSY:  None. PHOTOGRAPHS:  Yes. DESCRIPTION OF PROCEDURE:  Informed consent was obtained after  explaining risks and benefits of the procedure and conscious sedation. Possible complications including bleeding, perforation, reaction to  medicine, but not limiting to death, were discussed. Afterwards, the  GIF-180 gastroscope was advanced through oropharynx, esophagus, and  stomach into the duodenum. Normal-looking duodenal bulb and second  portion. Scope was withdrawn. The patient was having two or three  vascular ectatic lesions that were treated with APC. One of them  started bleeding and some more application was done. It was done in the  standard fashion and it controlled the bleeding afterwards. Retroflexed  exam showed normal angularis, body of the stomach, fundus, and cardia. Scope was withdrawn. Lower esophageal incompetence. Scope was  withdrawn. Normal-looking GE junction. Scope was withdrawn. The  patient tolerated the procedure well.     IMPRESSION:  Mild GAVE, endoscopically better. Two of those spots were  having very little trickle and they were treated with the APC. RECOMMENDATIONS:  I feel that her GAVE disease has responded to the  treatment, and we will watch her in the office, but there may not be  need for future EGD and APC treatment. We will decide based on her  clinical course and her labs, hemoglobin and iron, over the time. I  will set up an office visit with Leila Baker C.N.P., in six months. Blood loss is none.         Chloe Caldwell M.D.    D: 10/25/2022 12:31:24       T: 10/25/2022 20:39:45     [email or web address]  Job#: [id number]     Doc#: [id number]    CC:  Chiquis Bellamy D.O.

## 2022-10-31 ENCOUNTER — OFFICE VISIT (OUTPATIENT)
Dept: FAMILY MEDICINE CLINIC | Age: 82
End: 2022-10-31
Payer: MEDICARE

## 2022-10-31 VITALS
WEIGHT: 150.8 LBS | RESPIRATION RATE: 14 BRPM | BODY MASS INDEX: 23.67 KG/M2 | HEIGHT: 67 IN | SYSTOLIC BLOOD PRESSURE: 136 MMHG | DIASTOLIC BLOOD PRESSURE: 70 MMHG | HEART RATE: 58 BPM | TEMPERATURE: 98 F | OXYGEN SATURATION: 96 %

## 2022-10-31 DIAGNOSIS — G89.4 PAIN SYNDROME, CHRONIC: Primary | ICD-10-CM

## 2022-10-31 PROCEDURE — 99214 OFFICE O/P EST MOD 30 MIN: CPT | Performed by: FAMILY MEDICINE

## 2022-10-31 PROCEDURE — 3074F SYST BP LT 130 MM HG: CPT | Performed by: FAMILY MEDICINE

## 2022-10-31 PROCEDURE — 1123F ACP DISCUSS/DSCN MKR DOCD: CPT | Performed by: FAMILY MEDICINE

## 2022-10-31 PROCEDURE — 3078F DIAST BP <80 MM HG: CPT | Performed by: FAMILY MEDICINE

## 2022-10-31 RX ORDER — HYDROCODONE BITARTRATE AND ACETAMINOPHEN 5; 325 MG/1; MG/1
1 TABLET ORAL EVERY 6 HOURS PRN
Qty: 120 TABLET | Refills: 0 | Status: SHIPPED | OUTPATIENT
Start: 2022-10-31 | End: 2022-11-30

## 2022-10-31 NOTE — PROGRESS NOTES
Akosua Gunter is a 78 y.o. female that presents for     Chief Complaint   Patient presents with    Back Pain    Medication Refill     norco       /70   Pulse 58   Temp 98 °F (36.7 °C) (Oral)   Resp 14   Ht 5' 7\" (1.702 m)   Wt 150 lb 12.8 oz (68.4 kg)   SpO2 96%   BMI 23.62 kg/m²       HPI    Chronic Pain    HPI:    Patient has chronic pain of the low back. Reports that her pain is worsening over time. Does not radiate. Limiting her function more. Norco helps to 'take the edge off'. Pain control: inadequate  Improvement in function and ADLs? yes, with medication  Current Medications:  Norco (had several short scripts recenlty)  and lyrica  Escalation of dosage recently?  no    Evidence for abuse or diversion?  no   Seen specialist or pain clinic?: yes, planning for an epidural soon    Controlled Substances Monitoring:        Health Maintenance   Topic Date Due    COVID-19 Vaccine (4 - Booster for Pfizer series) 01/19/2022    Flu vaccine (1) 08/01/2022    Pneumococcal 65+ years Vaccine (2 - PCV) 02/09/2023    Depression Monitoring  09/06/2023    Annual Wellness Visit (AWV)  09/07/2023    DTaP/Tdap/Td vaccine (2 - Td or Tdap) 09/08/2031    DEXA (modify frequency per FRAX score)  Completed    Shingles vaccine  Completed    Hepatitis A vaccine  Aged Out    Hib vaccine  Aged Out    Meningococcal (ACWY) vaccine  Aged Out       Past Medical History:   Diagnosis Date    Abnormal EKG     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 Nathan Rodriguez     GERD (gastroesophageal reflux disease)     Diverticulitis     Hiatal hernia     Hx of blood clots     Hypertension     BAKI    Hypothyroidism     Osteoarthritis        Past Surgical History:   Procedure Laterality Date    [address] Bilateral 2013 w/cubital tunnel    COLON SURGERY  07/29/2016    Procedure: ATTEMPTED DAVINCI XI ROBOTIC ASSISTED SIGMOID COLON RESECTION, ROBOTIC ASSISTED MOBILIZATION OF RECTAL SIGMOID TO SPLENIC FLEXURE, CONVERTED TO OPEN LEFT HEMICOLECTOMY WITH COLOPROCTOCTOMY, SPLENORRHAPHY, RIGID SIGMOIDOSCOPY, LASER EVALUATION OF VASCULARITY WITH ICG; Surgeon: Leilani Briggs MD; Location: [place] [address]; Service: General    COLONOSCOPY  2012    CYST REMOVAL  2010    EYE SURGERY  1998, 1999    Cataract    FEMUR FRACTURE SURGERY Left 12/21/2020    LEFT FEMUR OPEN REDUCTION INTERNAL FIXATION performed by Kvng Rivera MD at [facility] 2001    [facility] N/A 03/09/2021    ROBOTIC EXTENSIVE LYSIS OF ADHESIONS, ROBOTIC REDUCTION OF HIATAL HERNIA WITH GASTROPEXY performed by Eduin Marvin MD at [facility] ([address] [place] [address])  [facility]    w/bladder suspension    JOINT REPLACEMENT  2002, 2008, 2009    rt hip(2002), lt knee(2009), lt hip(2008) Shoulder (2009)    OTHER SURGICAL HISTORY  04/21/2021    Johanna Lino CNP in the office    [address] Bilateral 7/13/2021    medial branch blocks at bilateral L4/L5 and L5/S1 performed by Leonides Tabares DO at [facility] Bilateral 8/24/2021    confirmatory  medial branch blocks at bilateral L4/L5 and L5/S1 performed by Leonides Tabares DO at [facility] Right 10/19/2021    thermal radiofrequency at BILATERAL medial branches L4/L5 and L5/S1 was chosen. We will start with the right side first performed by Leonides Tabares DO at [facility] Left 12/7/2021    thermal radiofrequency at BILATERAL medial branches L4/L5 and L5/S1 was chosen.  performed by Leonides Tabares DO at [facility] N/A 7/26/2022    lumbar epidural steroid injection Lumbar 5 Sacral 1 performed by Xochilt Villalobos DO at [place] NOT  [address] IND Left 09/15/2017    COLONOSCOPY performed by Agatha Lawson MD at [place] [address]    discectomy    [address] [facility] ENDOSCOPY      UPPER GASTROINTESTINAL ENDOSCOPY Left 2022    EGD BIOPSY performed by Manjeet Arreguin MD at [facility] [place] [place] [facility]    UPPER GASTROINTESTINAL ENDOSCOPY N/A 2022    EGD BIOPSY performed by Cheryl Balbuena MD at [place] [address] N/A 10/25/2022    EGD POLYP ABLATION/OTHER [facility] [place] [facility]       Social History     Tobacco Use    Smoking status: Former     Packs/day: 2.00     Years: 11.00     Pack years: 22.00     Types: Cigarettes     Start date: 1957     Quit date: 1967     Years since quittin.0    Smokeless tobacco: Never   Vaping Use    Vaping Use: Never used   Substance Use Topics    Alcohol use: Yes     Comment: wine with dinner    Drug use: No       Family History   Problem Relation Age of Onset    Arthritis Mother     Hearing Loss Mother     High Cholesterol Mother     Hearing Loss Father     Heart Disease Father 76        CABG    Stroke Father     Arthritis Sister     Depression Sister     Diabetes Sister     Arthritis Brother     Depression Brother     Cancer Maternal Aunt     Early Death Maternal Aunt     Diabetes Maternal Grandmother     Heart Disease Paternal Grandmother          I have reviewed the patient's past medical history, past surgical history, allergies, medications, social and family history and I have made updates where appropriate. Review of Systems        PHYSICAL EXAM:  /70   Pulse 58   Temp 98 °F (36.7 °C) (Oral)   Resp 14   Ht 5' 7\" (1.702 m)   Wt 150 lb 12.8 oz (68.4 kg)   SpO2 96%   BMI 23.62 kg/m²     Physical Exam  Vitals and nursing note reviewed. Constitutional:       General: She is not in acute distress. Appearance: She is well-developed and normal weight. She is not toxic-appearing or diaphoretic. HENT:      Head: Normocephalic and atraumatic. Right Ear: Hearing and external ear normal.      Left Ear: Hearing and external ear normal.      Nose: Nose normal.      Mouth/Throat:      Mouth: Mucous membranes are moist.      Dentition: Normal dentition. Eyes:      General: Lids are normal. No scleral icterus. Right eye: No discharge. Left eye: No discharge. Conjunctiva/sclera: Conjunctivae normal.   Neck:      Thyroid: No thyromegaly. Trachea: No tracheal deviation. Pulmonary:      Effort: Pulmonary effort is normal. No respiratory distress. Breath sounds: Normal breath sounds. Abdominal:      General: There is no distension. Palpations: Abdomen is soft. Tenderness: There is no guarding. Musculoskeletal:         General: No tenderness. Normal range of motion. Cervical back: Normal range of motion. Right lower leg: No edema. Left lower leg: No edema. Skin:     General: Skin is dry. Findings: No erythema or rash. Neurological:      General: No focal deficit present. Mental Status: She is alert. Cranial Nerves: No cranial nerve deficit. Motor: No tremor or abnormal muscle tone. Coordination: Coordination normal.      Gait: Gait normal.   Psychiatric:         Attention and Perception: Attention normal.         Mood and Affect: Mood and affect normal.         Behavior: Behavior normal.         Thought Content: Thought content normal.         Judgment: Judgment normal.           ASSESSMENT & PLAN  Elvira Brown was seen today for back pain and medication refill. Diagnoses and all orders for this visit:    Pain syndrome, chronic  -     HYDROcodone-acetaminophen (NORCO) 5-325 MG per tablet;  Take 1 tablet by mouth every 6 hours as needed for Pain for up to 30 days.    -Restart norco for uncontrolled pain, follow up with Dr Merlinda Eke, let me know if any issues. Return in about 6 months (around 4/30/2023), or if symptoms worsen or fail to improve. No red flag sx    The most recent results of the following tests were reviewed with the patient today: none     All copied or forwarded information in the progress note was verified by me to be accurate at the time of visit  Patient's past medical, surgical, social and family history were reviewed and updated     All patient questions answered. Patient voiced understanding.

## 2022-11-17 RX ORDER — FERROUS SULFATE 325(65) MG
325 TABLET ORAL 2 TIMES DAILY
Qty: 180 TABLET | Refills: 1 | Status: SHIPPED | OUTPATIENT
Start: 2022-11-17

## 2022-11-17 NOTE — TELEPHONE ENCOUNTER
Patient reports wanting to let Dr Shiva Garza to know that she was recently diagnosed with covid   Did not request any meds other than to have the following sent to [facility]  Please approve or refuse Rx request:  Requested Prescriptions     Pending Prescriptions Disp Refills    ferrous sulfate (IRON 325) 325 (65 Fe) MG tablet 180 tablet 1     Sig: Take 1 tablet by mouth 2 times daily       Next appointment:  3/7/2023

## 2022-11-18 ENCOUNTER — TELEPHONE (OUTPATIENT)
Dept: PHYSICAL MEDICINE AND REHAB | Age: 82
End: 2022-11-18

## 2022-11-18 NOTE — TELEPHONE ENCOUNTER
Pt. Called and lvm that is positive for covid and needs to talk to scheduling about ablation scheduled for the 29th and if will still be able to do. Please call.

## 2022-12-14 ENCOUNTER — PREP FOR PROCEDURE (OUTPATIENT)
Dept: PHYSICAL MEDICINE AND REHAB | Age: 82
End: 2022-12-14

## 2022-12-15 NOTE — DISCHARGE INSTRUCTIONS
Post procedure Instructions:    No driving or making significant decisions for the remainder of the day. Be cautions with walking and activity for 24 hours, do not over exert yourself. Ok to resume pre-procedure activity level today. Apply ice to site of injection site if you have pain or discomfort. Do not submerge sit of injection during bath or pool activities for 48 hours post-procedure. Resume blood thinning medications in 24 hours.      Call office [phone] if you have:  Temperature greater than 100.4  Persistent nausea and vomiting  Severe uncontrolled pain  Redness, tenderness, or signs of infection (pain, swelling, redness, odor or green/yellow discharge around the site)  Difficulty breathing, headache or visual disturbances  Hives  Persistent dizziness or light-headedness  Extreme fatigue  Any other questions or concerns you may have after discharge    In an emergency, call 911 or go to an Emergency Department at a nearby hospital

## 2022-12-19 ENCOUNTER — TELEPHONE (OUTPATIENT)
Dept: PHYSICAL MEDICINE AND REHAB | Age: 82
End: 2022-12-19

## 2022-12-19 NOTE — H&P
Today, patient presents for planned thermal radiofrequency at RIGHT medial branches L4/L5 and L5/S1    This note is reflective of the patient's previous visit for evaluation. We will proceed with today's planned procedure. Since patient's last visit for evaluation, there have been no interval changes in medical history. Patient has no new numbness, weakness, or focal neurological deficit since evaluation. Patient has no contraindications to injection (no anticoagulation or recent antibiotic intake for active infections), and has a  present or is able to drive themselves (as discussed and cleared by physician). Allergies to latex, contrast dye, and steroid medications have been confirmed with the patient prior to the procedure. NPO necessity has been assessed and accepted based on procedure complexity. The risks and benefits of the procedure have been explained including but are not limited to infection, bleeding, paralysis, immediate post procedure weakness, and dizziness; the patient acknowledges understanding and desires to proceed with the procedure. Patient has signed consent for same procedure as discussed in previous clinic encounter. All other questions and concerns were addressed at bedside. See procedure note for full details. Post procedure Instructions: The patient was advised not to drive during the day of the procedure and not to engage in any significant decision making (unless otherwise states by physician). The patient was also advised to be cautious with walking/activity for 24 hours following today's visit and asked not to engage in over-exertion (unless otherwise states by physician). After this time, it is ok to resume pre-procedure level of activity. Patient advised to apply ice to site of injection in situations of pain and discomfort. Patient advised to not submerge site of injection during bath or pool activities for approximately 24 hours post-procedure.  Patient attested to understanding post procedure directions / restrictions. All other questions and concerns addressed before patient discharge in ambulatory fashion. Chronic Pain/PM&R Clinic Note     Encounter Date: 9/15/22    Subjective:   Chief Complaint:   No chief complaint on file. History of Present Illness:   Justin Gunter is a 78 y.o. female seen in the clinic initially on 06/23/21 upon request from Marquis Marko DO (PCP) for her history of chronic low back pain. She has a medical history of scoliosis, neuropathy, fibromyalgia, and previous lumbar discectomy (over 50 years ago). She states that she has low back pain that has been progressively worse over the last 20 years. She reports the majority of her low back pain to be at her waistline with radiation across to her waist on both sides. She describes this pain is a constant aching, stabbing 5/10 pain that can get up to a 7-8/10 when severe. This pain is worse with any activities where she is standing upright such as cooking or when she is walking for any duration of time. Her pain is improved with rest and sitting in her motorized wheelchair. She denies any pain radiating down her legs but does endorse numbness in both feet from her neuropathy. She denies any saddle anesthesia or bowel/bladder incontinence. Of note, she has multiple joint replacements. She has bilateral total hip arthroplasties and a left knee total hip arthroplasty. In addition, she has a previous left femur fracture that has been fixated with hardware and a left total shoulder arthroplasty. She states that she had an EMG/NCS performed by Dr. Mandeep Shipley which revealed idiopathic neuropathy. She states that she had a previous RFA in her low back done by Dr. Nazia Orosco which did give her longstanding relief but this was done 8-10 years ago. Today, 9/15/2022, patient presents for planned follow-up for chronic low back pain and leg pain.   She underwent a lumbar epidural steroid injection on 7/26/2022. She reports since her last visit doing worse overall. She actually was admitted to the hospital for altered mental status secondary to a UTI and severe anemia. She feels like since coming to the hospital she has noticed severe low back pain and feels like she has lost a lot of overall function with her ability to stand upright because of her low back pain hurting. She needs take her Lyrica, Mobic, and breakthrough Norco as prescribed without any side effects. She is wondering what else can be done to help out with her pain. She is being enrolled in physical therapy to help out with some of her low back pain. History of Interventions:   Surgery: Previous discectomy in 30s, left femur IMN, left TKA  Injections: Previous lumabr RFA ~8-10 years ago by Dr. Noel العراقي  Diagnostic lumbar MBBs  Confirmatory lumbar MBBs  Lumbar RFA (10/19/2021 and 12/7/21)-significant relief (>85%) x 10 months    Current Treatment Medications:   Meloxicam 15 mg daily  Heating pad PRN  Mishel 150 mg BID  Cymbalta 60 mg daily    Historical Treatment Medications:   Tramadol-ineffective  Norco-stopped (constipation)    Imaging:  MRI L-spine  (6/11/22)    PROCEDURE: MRI LUMBAR SPINE WO CONTRAST       CLINICAL INFORMATION: Fall, sequela. COMPARISON: Plain radiographs dated 18 May 2021. .       TECHNIQUE: Sagittal and axial T1 and T2-weighted images were obtained through the lumbar spine. FINDINGS:           There is a levoscoliosis. There is degenerative change in the vertebral body endplates adjacent to D32-58, T12-L1, L1-2, L2-3, L3-4 and L4-5 disc spaces. .  There is no bone marrow edema. There is no acute compression fracture. No pars defects are    noted. .       The distal spinal cord, conus medullaris and cauda equina are normal.        There are no gross abnormalities in the visualized aspects of the distal thoracic spine. On the axial images, at T12-L1, there is no disc herniation or canal stenosis. There is moderate right and mild-to-moderate left-sided foraminal stenosis. At L1-L2, there is a 1.9 mm bulging disc and facet hypertrophy. This results in moderate right and mild-to-moderate left-sided foraminal stenosis with no canal stenosis. At L2-L3, there is a 3.5 mm bulging disc and facet hypertrophy. This results in mild canal, moderate left and mild-to-moderate right-sided foraminal stenosis. At L3-L4, there is a 2.4 mm bulging disc and facet hypertrophy. This results in mild canal and moderate bilateral foraminal stenosis. At L4-L5, there are some 1.6 mm bulging disc and facet hypertrophy. This causes mild canal and moderate bilateral foraminal stenosis. At L5-S1, there is no disc herniation or canal stenosis. There is mild-to-moderate bilateral foraminal stenosis. There is degenerative change involving the sacroiliac joints bilaterally. There is a perineural cyst on the left side at S2. There is atrophy involving the paraspinal muscles in the lower lumbar spine. Impression       1. Levoscoliosis. No evidence for an acute compression fracture. 2. Degenerative changes resulting in mild canal and moderate bilateral foraminal stenosis at L3-4 and L4-5.   3. There is moderate right and mild-to-moderate left-sided foraminal stenosis with no canal stenosis at T12-L1 and L1-2.   4. There is mild canal, moderate left and mild right-sided foraminal stenosis at L2-3.   5. There is mild-to-moderate bilateral from stenosis but no canal stenosis at L5-S1.   6. There is degenerative change involving the sacroiliac joints bilaterally. 7. There is a perineural cyst on the left side at S2.   8. There is atrophy in the paraspinal muscles.              Past Medical History:   Diagnosis Date    Abnormal EKG     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 Charlee Benitez     GERD (gastroesophageal reflux disease)     Diverticulitis     Hiatal hernia     Hx of blood clots     Hypertension     BAKI    Hypothyroidism     Osteoarthritis        Past Surgical History:   Procedure Laterality Date    APPENDECTOMY  1958    BACK SURGERY      CARPAL TUNNEL RELEASE Bilateral 2013    w/cubital tunnel    COLON SURGERY  07/29/2016    Procedure: ATTEMPTED DAVINCI XI ROBOTIC ASSISTED SIGMOID COLON RESECTION, ROBOTIC ASSISTED MOBILIZATION OF RECTAL SIGMOID TO SPLENIC FLEXURE, CONVERTED TO OPEN LEFT HEMICOLECTOMY WITH COLOPROCTOCTOMY, SPLENORRHAPHY, RIGID SIGMOIDOSCOPY, LASER EVALUATION OF VASCULARITY WITH ICG; Surgeon: Chika Guzmán MD; Location: [facility]; Service: General    COLONOSCOPY  2012    CYST REMOVAL  2010    EYE SURGERY  1998, 1999    Cataract    FEMUR FRACTURE SURGERY Left 12/21/2020    LEFT FEMUR OPEN REDUCTION INTERNAL FIXATION performed by Kg Morales MD at [facility] Left 2001    [facility] N/A 03/09/2021    ROBOTIC EXTENSIVE LYSIS OF ADHESIONS, ROBOTIC REDUCTION OF HIATAL HERNIA WITH GASTROPEXY performed by Joselyn Wade MD at [address] [facility] ([facility])  1979    w/bladder suspension    JOINT REPLACEMENT  2002, 2008, 2009    rt hip(2002), lt knee(2009), lt hip(2008) Shoulder (2009)    OTHER SURGICAL HISTORY  04/21/2021    Aryan Sprague CNP in the office    [facility]    medial branch blocks at bilateral L4/L5 and L5/S1 performed by Christian Marion DO at [facility] Bilateral 8/24/2021    confirmatory  medial branch blocks at bilateral L4/L5 and L5/S1 [facility] Right 10/19/2021    thermal radiofrequency at BILATERAL medial branches L4/L5 and L5/S1 was chosen. We will start with the right side first performed by Ayala Recinos DO at [facility] Left 12/7/2021    thermal radiofrequency at BILATERAL medial branches L4/L5 and L5/S1 was chosen.  performed by Ayala Recinos DO at [facility] N/A 7/26/2022    lumbar epidural steroid injection Lumbar 5 Sacral 1 performed by Ayala Recinos DO at [address] IND Left 09/15/2017    COLONOSCOPY performed by Iesha East MD at [place] [address]    discectomy    [facility] ENDOSCOPY  2012    UPPER GASTROINTESTINAL ENDOSCOPY Left 6/7/2022    EGD BIOPSY performed by Toi Brown MD at [facility]    UPPER GASTROINTESTINAL ENDOSCOPY N/A 8/17/2022    EGD BIOPSY performed by Jean Garcia MD at [place] [address] N/A 10/25/2022    EGD POLYP ABLATION/OTHER performed by Toi Brown MD at [facility]       Family History   Problem Relation Age of Onset    Arthritis Mother     Hearing Loss Mother     High Cholesterol Mother     Hearing Loss Father     Heart Disease Father 76        CABG    Stroke Father     Arthritis Sister     Depression Sister     Diabetes Sister     Arthritis Brother     Depression Brother     Cancer Maternal Aunt     Early Death Maternal Aunt     Diabetes Maternal Grandmother     Heart Disease Paternal Grandmother        Social History     Socioeconomic History    Marital status:      Spouse name: Tye Gamble    Number of children: Not on file    Years of education: 15    Highest education level: Associate degree: academic program   Occupational History    Not on file   Tobacco Use    Smoking status: Former     Packs/day: 2.00     Years: 11.00     Pack years: 22.00     Types: Cigarettes     Start date: 1/9/1957     Quit date: 11/1/1967 Years since quittin.1    Smokeless tobacco: Never   Vaping Use    Vaping Use: Never used   Substance and Sexual Activity    Alcohol use: Yes     Comment: wine with dinner    Drug use: No    Sexual activity: Not on file   Other Topics Concern    Not on file   Social History Narrative    Referral to AAA placed    Referral placed to [facility]    No barriers with medication affordability    No barriers with transportation    Discussed support from insurance company     Social Determinants of Health     Financial Resource Strain: Low Risk     Difficulty of Paying Living Expenses: Not hard at all   Food Insecurity: No Food Insecurity    Worried About Running Out of Food in the Last Year: Never true    Ran Out of Food in the Last Year: Never true   Transportation Needs: Not on file   Physical Activity: Inactive    Days of Exercise per Week: 0 days    Minutes of Exercise per Session: 0 min   Stress: Not on file   Social Connections: Not on file   Intimate Partner Violence: Not on file   Housing Stability: Not on file       Medications & Allergies:   Current Outpatient Medications   Medication Instructions    calcium-vitamin D (OSCAL-500) 500-200 MG-UNIT per tablet 2 tablets, Oral, DAILY    Cholecalciferol (VITAMIN D3) 50 MCG ( UT) CAPS Oral    docusate sodium (COLACE) 100 mg, Oral, 2 TIMES DAILY    docusate sodium (COLACE) 100 mg, Oral, 2 TIMES DAILY PRN    enalapril (VASOTEC) 5 mg, Oral, DAILY    ferrous sulfate (IRON 325) 325 mg, Oral, 2 TIMES DAILY    FLUoxetine (PROZAC) 20 mg, Oral, DAILY    fluticasone (FLONASE) 50 MCG/ACT nasal spray 1 spray, Nasal, PRN    levothyroxine (SYNTHROID) 100 MCG tablet TAKE 1 TABLET DAILY    meloxicam (MOBIC) 15 mg, Oral, DAILY    metoprolol succinate (TOPROL XL) 50 MG extended release tablet TAKE 1 TABLET DAILY    Misc. Devices (WALKER) MISC 1 each, Does not apply, DAILY    Misc.  Devices MISC Mechanical lift for a Van    ondansetron (ZOFRAN) 4 mg, Oral, 3 TIMES DAILY PRN    pantoprazole (PROTONIX) 40 mg, Oral, 2 TIMES DAILY BEFORE MEALS    pregabalin (LYRICA) 150 mg, Oral, 2 TIMES DAILY    Scooter MISC Does not apply, Power scooter    therapeutic multivitamin-minerals (THERAGRAN-M) tablet 1 tablet, Oral, NIGHTLY    vitamin C (ASCORBIC ACID) 500 mg, Oral, 2 TIMES DAILY    zinc sulfate (ZINCATE) 50 mg, Oral, DAILY       Allergies   Allergen Reactions    Ampicillin     Morphine Other (See Comments)     Hallucinations     Pcn [Penicillins] Itching    Sulfa Antibiotics        Review of Systems:   Constitutional: negative for weight changes or fevers  Genitourinary: negative for bowel/bladder incontinence   Musculoskeletal: positive for low back pain  Neurological:  Positive for leg weakness and falls  Behavioral/Psych: negative for anxiety/depression   All other systems reviewed and are negative    Objective: There were no vitals filed for this visit. Constitutional: Pleasant, no acute distress   Head: Normocephalic, atraumatic   Eyes: Conjunctivae normal   Neck: Supple, symmetrical   Lungs: Normal respiratory effort, non-labored breathing   Cardiovascular: Limbs warm and well perfused   Abdomen: Non-protruded   Musculoskeletal: Muscle bulk symmetric, no atrophy, no gross deformities   · Lumbar Spine: ROM severely reduced in extension. Scoliosis appreciated in thoracolumbar junction. Lumbar paraspinals tender bilaterally. Positive facet loading bilaterally. Neurological: Cranial nerves II-XII grossly intact. 1+ bilateral patellar and Achilles reflexes. Negative ankle clonus. · Gait - Severely antalgic gait. Ambulates with assistive device. · Motor: No focal motor deficits appreciated in lower limbs  Skin: No rashes or lesions visible in low back  Psychological: Cooperative, no exaggerated pain behaviors       Assessment:    Diagnosis Orders   1.  Spondylosis of lumbar spine  CHG FLUOR NEEDLE/CATH SPINE/PARASPINAL DX/THER ADDON    LA RADIOFREQUENCY NEUROTOMY LUMBAR OR SACRAL, W IMAGE GUIDANCE, SINGLE    IL RADIOFREQ NEUROTOMY LUMBAR OR SACRAL, W IMAGE GUIDE,EA ADDL LEVEL      2. Radiculopathy, lumbosacral region        3. Fall, sequela        4. Myofascial pain                Pauly Gunter is a 78 y. o.female presenting to the pain clinic for evaluation of chronic low back pain. Her clinical history and physical examination are consistent with lumbar facet medial pain with associated myofascial pain. She responded significantly to a lumbar radiofrequency ablation back in 2021. She was hospitalized for an extended period of time and has some worsening low back pain secondary to deconditioning but also her ablations have probably worn off. I set her up for repeat lumbar radiofrequency ablations targeting bilateral L4/L5 and L5/S1 facet joints. Plan: The following treatment recommendations and plan were discussed in detail with Suzy Gunter and . Imaging:   I have reviewed patients imaging of MRI L-spine and results were discussed in detail with the patient. Analgesics:   Patient is taking Acetaminophen. Patient informed that the maximum amount of acetaminophen taken on a regular basis should only be 4000 mg per day. We will continue her meloxicam to 15 mg daily. I advised patient take this medication with food in the morning. Patient should stop any other NSAID therapies including her Aleve. Adjuvants: In light of the presence of a neuropathic component of pain, the patient should continue Lyrica and Cymbalta as prescribed. Interventions: In presence of lumbar axial back pain and with physical exam consistent for facetal pain and significant response to diagnostic and confirmatory lumbar medial branch blocks, thermal radiofrequency at BILATERAL medial branches L4/L5 and L5/S1 was chosen. We will start with the right side first and proceed with a left-sided later date.   The risks and benefits were discussed in detail with the patient. Patient wants to proceed with the injection. Anticoagulation/NPO Recommendations:   Patient will need to hold meloxicam x 4 days prior to the procedure. Patient will need to be NPO x 8 hours prior to the procedure. Plan to start an IV prior to the procedure. Multidisciplinary Pain Management:   In the presence of complex, chronic, and multi-factorial pain, the importance of a multidisciplinary approach to pain management in the patients management regimen was emphasized and discussed in great detail.    PHYSICAL THERAPY: Patient is advised to continue gentle low back ROM/stretching exercises     Referrals:  None    Prescriptions Written This Visit:   None    Follow-up: For Right lumbar RFA      Jared Dale DO  Interventional Pain Management/PM&R   [place]

## 2022-12-20 ENCOUNTER — APPOINTMENT (OUTPATIENT)
Dept: GENERAL RADIOLOGY | Age: 82
End: 2022-12-20
Attending: ANESTHESIOLOGY
Payer: MEDICARE

## 2022-12-20 ENCOUNTER — HOSPITAL ENCOUNTER (OUTPATIENT)
Age: 82
Setting detail: OUTPATIENT SURGERY
Discharge: HOME | End: 2022-12-20
Attending: ANESTHESIOLOGY | Admitting: ANESTHESIOLOGY
Payer: MEDICARE

## 2022-12-20 VITALS
WEIGHT: 157.6 LBS | RESPIRATION RATE: 16 BRPM | SYSTOLIC BLOOD PRESSURE: 174 MMHG | HEART RATE: 57 BPM | OXYGEN SATURATION: 97 % | TEMPERATURE: 96.6 F | BODY MASS INDEX: 24.74 KG/M2 | HEIGHT: 67 IN | DIASTOLIC BLOOD PRESSURE: 93 MMHG

## 2022-12-20 PROCEDURE — 3209999900 FLUORO FOR SURGICAL PROCEDURES

## 2022-12-20 PROCEDURE — 3600000054 HC PAIN LEVEL 3 BASE: Performed by: ANESTHESIOLOGY

## 2022-12-20 PROCEDURE — 99152 MOD SED SAME PHYS/QHP 5/>YRS: CPT | Performed by: ANESTHESIOLOGY

## 2022-12-20 PROCEDURE — 3600000055 HC PAIN LEVEL 3 ADDL 15 MIN: Performed by: ANESTHESIOLOGY

## 2022-12-20 PROCEDURE — 2709999900 HC NON-CHARGEABLE SUPPLY: Performed by: ANESTHESIOLOGY

## 2022-12-20 PROCEDURE — 2500000003 HC RX 250 WO HCPCS: Performed by: ANESTHESIOLOGY

## 2022-12-20 PROCEDURE — 7100000011 HC PHASE II RECOVERY - ADDTL 15 MIN: Performed by: ANESTHESIOLOGY

## 2022-12-20 PROCEDURE — 7100000010 HC PHASE II RECOVERY - FIRST 15 MIN: Performed by: ANESTHESIOLOGY

## 2022-12-20 PROCEDURE — 6360000002 HC RX W HCPCS: Performed by: ANESTHESIOLOGY

## 2022-12-20 RX ORDER — HYDROCODONE BITARTRATE AND ACETAMINOPHEN 5; 325 MG/1; MG/1
1 TABLET ORAL EVERY 6 HOURS PRN
COMMUNITY

## 2022-12-20 RX ORDER — FENTANYL CITRATE 50 UG/ML
INJECTION, SOLUTION INTRAMUSCULAR; INTRAVENOUS PRN
Status: DISCONTINUED | OUTPATIENT
Start: 2022-12-20 | End: 2022-12-20 | Stop reason: ALTCHOICE

## 2022-12-20 RX ORDER — MIDAZOLAM HYDROCHLORIDE 1 MG/ML
INJECTION INTRAMUSCULAR; INTRAVENOUS PRN
Status: DISCONTINUED | OUTPATIENT
Start: 2022-12-20 | End: 2022-12-20 | Stop reason: ALTCHOICE

## 2022-12-20 RX ORDER — LIDOCAINE HYDROCHLORIDE 10 MG/ML
INJECTION, SOLUTION EPIDURAL; INFILTRATION; INTRACAUDAL; PERINEURAL PRN
Status: DISCONTINUED | OUTPATIENT
Start: 2022-12-20 | End: 2022-12-20 | Stop reason: ALTCHOICE

## 2022-12-20 RX ORDER — LIDOCAINE HYDROCHLORIDE 20 MG/ML
INJECTION, SOLUTION EPIDURAL; INFILTRATION; INTRACAUDAL; PERINEURAL PRN
Status: DISCONTINUED | OUTPATIENT
Start: 2022-12-20 | End: 2022-12-20 | Stop reason: ALTCHOICE

## 2022-12-20 ASSESSMENT — PAIN SCALES - GENERAL: PAINLEVEL_OUTOF10: 0

## 2022-12-20 ASSESSMENT — PAIN - FUNCTIONAL ASSESSMENT: PAIN_FUNCTIONAL_ASSESSMENT: 0-10

## 2022-12-20 NOTE — PROCEDURES
Pre-operative Diagnosis: Lumbar facet pain     Post-operative Diagnosis: Lumbar facet pain     Procedure: RIGHT lumbar thermal radiofrequency ablation targeting facet joints L4/L5 and L5/S1    Procedure Description:  After consent was obtained, the patient was placed in the prone position with a pillow under the abdomen to reduce the lordotic curve of the lumbar spine. The lower back was prepped with chloraprep and draped in a sterile fashion. Then, 0.5 cc of 1 % lidocaine was used for local anesthesia of the skin and superficial subcutaneous tissues. Three 20-gauge 100mm SMK cannulas with 10-mm active tips were advanced under fluoroscopic guidance in an AP view to the junction of the right superior articular process and the transverse process of the L4 and L5 vertebra and at the sacral ala. There were no paresthesias, heme or CSF obtained. Needle placement was confirmed using AP and oblique views. Sensory and motor stimulation at 50Hz and 2Hz and impedance measurements were carried out having reached threshold at:     RIGHT  L4: 0.2V/3V/150-300 Ohms  L5: 0.2V/3V/150-300 Ohms  SA: 0.2V/3V/150-300 Ohms        Then, 1cc of 2% Lidocaine was injected at the site. Temperature was then raised to 80 degrees centigrade for 90 seconds with a 15 second temperature ramp. No pain was reported during the lesioning. The needles were then withdrawn without complications. The patient tolerated the procedure well. The patient was transported to the recovery room and discharged in ambulatory fashion.     Procedural Complications: None  Estimated Blood Loss: 0 mL    IV sedation was used during the procedure:  - Moderate intravenous conscious sedation was supervised by Dr. Huey Aquino  - The patient was independently monitored by a Registered Nurse assigned to the procedure room  - Monitoring included automated blood pressure, continuous EKG, and continuous pulse oximetry  - The detailed conscious record is permanently stored in the [address] [place] [address] following is the conscious sedation record:  Start Time: 12:50  End Time : 13:05  Duration: 15 minutes   Medications Administered: 1 mg Versed, 50 mcg Fentanyl        Jared Dale DO  Interventional Pain Management/PM&R   [place]

## 2022-12-20 NOTE — PROGRESS NOTES
1303: Patient arrives to recovery room via cart. Spontaneous respirations, vss, report received from surgical RN. Patient denies pain, numbness, tingling , nausea. Injection site clean, dry, intact. HOB elevated. IV capped. Snack and drink given. Call light within reach. 1320: patient denies needs, patient getting dressed, daughter at bedside helping. 1340: patient wheeled to discharge lobby in stable condition. Patient discharged home with family.

## 2022-12-20 NOTE — POST SEDATION
[address] [place] [address]  Sedation/Analgesia Post Sedation Record    Pt Name: Juan Jose Gunter  MRN: [id number]  YOB: 1940  Procedure Performed By: Katheleen Hamman, DO  Primary Care Physician: Jeanne Roberto DO    POST-PROCEDURE    Physicians/Assistants: Katheleen Hamman, DO  Procedure Performed: See Procedure Note   Sedation/Anesthesia: Versed and Fentanyl (See procedure note for amount and duration)  Estimated Blood Loss:     0  ml  Specimens Removed: None        Complications: None           Jared Lara DO  Electronically signed 12/20/2022 at 1:40 PM

## 2022-12-20 NOTE — PRE SEDATION
[facility]  Pre-Sedation/Analgesia History & Physical    Pt Name: Carlyle Gunter  MRN: [id number]  YOB: 1940  Provider Performing Procedure: Cinthya Elliott DO   Primary Care Physician: Ayad Ramirez DO      MEDICAL HISTORY       has a past medical history of Abnormal EKG, Anemia, Back pain, Blood circulation, collateral, Diverticulitis, Fibromyalgia, GERD (gastroesophageal reflux disease), Hiatal hernia, Hx of blood clots, Hypertension, Hypothyroidism, and Osteoarthritis. SURGICAL HISTORY   has a past surgical history that includes Appendectomy (1958); Colonoscopy (2012); eye surgery (1998, 1999); Hysterectomy (1979); Spine surgery (1968); Upper gastrointestinal endoscopy (2012); cyst removal (2010); Tonsillectomy (1943); Tooth Extraction (1964); Ovary removal (1999); Foot surgery (Left, 2001); Carpal tunnel release (Bilateral, 2013); Small intestine surgery; back surgery; Colon surgery (07/29/2016); joint replacement (2002, 2008, 2009); pr colon ca scrn not hi rsk ind (Left, 09/15/2017); Femur fracture surgery (Left, 12/21/2020); hiatal hernia repair (N/A, 03/09/2021); other surgical history (04/21/2021); Pain management procedure (Bilateral, 7/13/2021); Pain management procedure (Bilateral, 8/24/2021); Pain management procedure (Right, 10/19/2021); Pain management procedure (Left, 12/7/2021); Upper gastrointestinal endoscopy (Left, 6/7/2022); Pain management procedure (N/A, 7/26/2022); Upper gastrointestinal endoscopy (N/A, 8/17/2022); and Upper gastrointestinal endoscopy (N/A, 10/25/2022). ALLERGIES   Allergies as of 10/24/2022 - Fully Reviewed 10/21/2022   Allergen Reaction Noted    Ampicillin  05/17/2013    Morphine Other (See Comments) 09/14/2017    Pcn [penicillins] Itching 05/17/2013    Sulfa antibiotics  05/17/2013       MEDICATIONS   Prior to Admission medications    Medication Sig Start Date End Date Taking?  Authorizing Provider   HYDROcodone-acetaminophen [facility]) 5-325 MG per tablet Take 1 tablet by mouth every 6 hours as needed for Pain. Yes Historical Provider, MD   ferrous sulfate (IRON 325) 325 (65 Fe) MG tablet Take 1 tablet by mouth 2 times daily 11/17/22   Blanca Fife, DO   docusate sodium (COLACE) 100 MG capsule Take 1 capsule by mouth 2 times daily as needed for Constipation 10/24/22   Blanca Fife, DO   ondansetron (ZOFRAN) 4 MG tablet Take 1 tablet by mouth 3 times daily as needed for Nausea or Vomiting 10/24/22   Blanca Clover, DO   meloxicam (MOBIC) 15 MG tablet Take 1 tablet by mouth daily 10/3/22 12/2/22  Jared Dale,    pantoprazole (PROTONIX) 40 MG tablet Take 1 tablet by mouth 2 times daily (before meals) 10/3/22   Blanca Clover, DO   docusate sodium (COLACE) 100 MG capsule Take 100 mg by mouth 2 times daily    Historical Provider, MD   zinc sulfate (ZINCATE) 220 (50 Zn) MG capsule Take 50 mg by mouth daily    Historical Provider, MD   vitamin C (ASCORBIC ACID) 500 MG tablet Take 500 mg by mouth 2 times daily    Historical Provider, MD   enalapril (VASOTEC) 5 MG tablet Take 1 tablet by mouth daily  Patient taking differently: Take 5 mg by mouth every evening 9/1/22   Kellie Hernandes MD   pregabalin (LYRICA) 150 MG capsule Take 1 capsule by mouth in the morning and 1 capsule before bedtime. Do all this for 180 days. 8/2/22 1/29/23  Blanca Mcdonald, DO   Cholecalciferol (VITAMIN D3) 50 MCG (2000 UT) CAPS Take by mouth    Historical Provider, MD   metoprolol succinate (TOPROL XL) 50 MG extended release tablet TAKE 1 TABLET DAILY 5/16/22 Eartha Fife, DO   levothyroxine (SYNTHROID) 100 MCG tablet TAKE 1 TABLET DAILY 5/16/22 Eartha Fife, DO   FLUoxetine (PROZAC) 20 MG capsule Take 1 capsule by mouth daily 4/4/22 Eartha Fife, DO   calcium-vitamin D (OSCAL-500) 500-200 MG-UNIT per tablet Take 2 tablets by mouth daily    Historical Provider, MD   Misc. Devices [facility]) MISC 1 each by Does not apply route daily 2/11/20 Eartha Clover, DO   Misc. Devices MISC Mechanical lift for a Elray Specter 5/31/18   Leane Harder, DO   Scooter MISC by Does not apply route Power scooter 10/31/17   Leane Harder, DO   fluticasone (FLONASE) 50 MCG/ACT nasal spray 1 spray by Nasal route as needed for Rhinitis     Historical Provider, MD   therapeutic multivitamin-minerals (THERAGRAN-M) tablet Take 1 tablet by mouth nightly    Historical Provider, MD     PHYSICAL:   Vitals:    12/20/22 1303   BP: (!) 174/93   Pulse: 57   Resp: 16   Temp: (!) 96.6 °F (35.9 °C)   SpO2: 97%     PLANNED PROCEDURE   See procedure note  SEDATION  Planned agent: Versed and Fentanyl  ASA Classification: 2  Class 1: A normal healthy patient  Class 2: Pt with mild to moderate systemic disease  Class 3: Severe systemic disease or disturbance  Class 4: Severe systemic disorders that are already life threatening. Class 5: Moribund pt with little chances of survival, for more than 24 hours. Mallampati I Airway Classification: 2    1. Pre-procedure diagnostic studies complete and results available. 2. Previous sedation/anesthesia experiences assessed. 3. The patient is an appropriate candidate to undergo the planned procedure sedation and anesthesia. (Refer to nursing sedation/analgesia documentation record)  4. Formulation and discussion of sedation/procedure plan, risks, and expectations with patient and/or responsible adult completed. 5. Patient examined immediately prior to the procedure.  (Refer to nursing sedation/analgesia documentation record)    Yojana Noe DO  Electronically signed 12/20/2022 at 1:40 PM

## 2023-01-10 ENCOUNTER — TELEPHONE (OUTPATIENT)
Dept: FAMILY MEDICINE CLINIC | Age: 83
End: 2023-01-10

## 2023-01-10 DIAGNOSIS — F33.1 MODERATE EPISODE OF RECURRENT MAJOR DEPRESSIVE DISORDER (HCC): ICD-10-CM

## 2023-01-10 RX ORDER — FLUOXETINE HYDROCHLORIDE 40 MG/1
40 CAPSULE ORAL DAILY
Qty: 90 CAPSULE | Refills: 3 | Status: SHIPPED | OUTPATIENT
Start: 2023-01-10

## 2023-01-10 NOTE — TELEPHONE ENCOUNTER
Pt states she has been having episodes of depression lasting 4-5 days, she would like to know if she can increase the FLUoxetine

## 2023-01-11 ENCOUNTER — OFFICE VISIT (OUTPATIENT)
Dept: PHYSICAL MEDICINE AND REHAB | Age: 83
End: 2023-01-11
Payer: MEDICARE

## 2023-01-11 VITALS
DIASTOLIC BLOOD PRESSURE: 84 MMHG | WEIGHT: 157 LBS | SYSTOLIC BLOOD PRESSURE: 142 MMHG | BODY MASS INDEX: 24.64 KG/M2 | HEIGHT: 67 IN

## 2023-01-11 DIAGNOSIS — M47.816 SPONDYLOSIS OF LUMBAR SPINE: Primary | ICD-10-CM

## 2023-01-11 DIAGNOSIS — M54.59 LUMBAR FACET JOINT PAIN: ICD-10-CM

## 2023-01-11 DIAGNOSIS — M79.18 MYOFASCIAL PAIN: ICD-10-CM

## 2023-01-11 DIAGNOSIS — M54.17 RADICULOPATHY, LUMBOSACRAL REGION: ICD-10-CM

## 2023-01-11 PROCEDURE — 1123F ACP DISCUSS/DSCN MKR DOCD: CPT | Performed by: ANESTHESIOLOGY

## 2023-01-11 PROCEDURE — 99214 OFFICE O/P EST MOD 30 MIN: CPT | Performed by: ANESTHESIOLOGY

## 2023-01-11 PROCEDURE — 3077F SYST BP >= 140 MM HG: CPT | Performed by: ANESTHESIOLOGY

## 2023-01-11 PROCEDURE — 3079F DIAST BP 80-89 MM HG: CPT | Performed by: ANESTHESIOLOGY

## 2023-01-11 NOTE — PROGRESS NOTES
Chronic Pain/PM&R Clinic Note     Encounter Date: 1/11/23    Subjective:   Chief Complaint:   Chief Complaint   Patient presents with    Follow Up After Procedure     right-sided lumbar radiofrequency ablation targeting RIGHT Lumbar 4/5, 5/Sacral 1 12/20/22         History of Present Illness:   Ad Gunter is a 78 y.o. female seen in the clinic initially on 06/23/21 upon request from Annalee Bai DO (PCP) for her history of chronic low back pain. She has a medical history of scoliosis, neuropathy, fibromyalgia, and previous lumbar discectomy (over 50 years ago). She states that she has low back pain that has been progressively worse over the last 20 years. She reports the majority of her low back pain to be at her waistline with radiation across to her waist on both sides. She describes this pain is a constant aching, stabbing 5/10 pain that can get up to a 7-8/10 when severe. This pain is worse with any activities where she is standing upright such as cooking or when she is walking for any duration of time. Her pain is improved with rest and sitting in her motorized wheelchair. She denies any pain radiating down her legs but does endorse numbness in both feet from her neuropathy. She denies any saddle anesthesia or bowel/bladder incontinence. Of note, she has multiple joint replacements. She has bilateral total hip arthroplasties and a left knee total hip arthroplasty. In addition, she has a previous left femur fracture that has been fixated with hardware and a left total shoulder arthroplasty. She states that she had an EMG/NCS performed by Dr. Latricia Rene which revealed idiopathic neuropathy. She states that she had a previous RFA in her low back done by Dr. Elisa Marshall which did give her longstanding relief but this was done 8-10 years ago. Today, 1/11/2023, patient presents for planned follow-up for chronic low back pain.   She states that she has completed her right-sided lumbar radiofrequency ablation with good relief on the right side low back. She has noticed her left side to be worse since completing the procedure. She states that she would like to proceed with a left-sided ablation as previously discussed. She denies any changes in her symptoms. She denies any changes in her medications used to treat her pain. She continues to obtain good relief from her meloxicam, Lyrica, and Cymbalta. History of Interventions:   Surgery: Previous discectomy in 30s, left femur IMN, left TKA  Injections: Previous lumabr RFA ~8-10 years ago by Dr. Cleaning Safer  Diagnostic lumbar MBBs  Confirmatory lumbar MBBs  Lumbar RFA (10/19/2021 and 12/7/21)-significant relief (>85%) x 10 months    Current Treatment Medications:   Meloxicam 15 mg daily  Heating pad PRN  Mishel 150 mg BID  Cymbalta 60 mg daily    Historical Treatment Medications:   Tramadol-ineffective  Norco-stopped (constipation)    Imaging:  MRI L-spine  (6/11/22)    PROCEDURE: MRI LUMBAR SPINE WO CONTRAST       CLINICAL INFORMATION: Fall, sequela. COMPARISON: Plain radiographs dated 18 May 2021. .       TECHNIQUE: Sagittal and axial T1 and T2-weighted images were obtained through the lumbar spine. FINDINGS:           There is a levoscoliosis. There is degenerative change in the vertebral body endplates adjacent to Q25-40, T12-L1, L1-2, L2-3, L3-4 and L4-5 disc spaces. .  There is no bone marrow edema. There is no acute compression fracture. No pars defects are    noted. .       The distal spinal cord, conus medullaris and cauda equina are normal.        There are no gross abnormalities in the visualized aspects of the distal thoracic spine. On the axial images, at T12-L1, there is no disc herniation or canal stenosis. There is moderate right and mild-to-moderate left-sided foraminal stenosis. At L1-L2, there is a 1.9 mm bulging disc and facet hypertrophy.  This results in moderate right and mild-to-moderate left-sided foraminal stenosis with no canal stenosis. At L2-L3, there is a 3.5 mm bulging disc and facet hypertrophy. This results in mild canal, moderate left and mild-to-moderate right-sided foraminal stenosis. At L3-L4, there is a 2.4 mm bulging disc and facet hypertrophy. This results in mild canal and moderate bilateral foraminal stenosis. At L4-L5, there are some 1.6 mm bulging disc and facet hypertrophy. This causes mild canal and moderate bilateral foraminal stenosis. At L5-S1, there is no disc herniation or canal stenosis. There is mild-to-moderate bilateral foraminal stenosis. There is degenerative change involving the sacroiliac joints bilaterally. There is a perineural cyst on the left side at S2. There is atrophy involving the paraspinal muscles in the lower lumbar spine. Impression       1. Levoscoliosis. No evidence for an acute compression fracture. 2. Degenerative changes resulting in mild canal and moderate bilateral foraminal stenosis at L3-4 and L4-5.   3. There is moderate right and mild-to-moderate left-sided foraminal stenosis with no canal stenosis at T12-L1 and L1-2.   4. There is mild canal, moderate left and mild right-sided foraminal stenosis at L2-3.   5. There is mild-to-moderate bilateral from stenosis but no canal stenosis at L5-S1.   6. There is degenerative change involving the sacroiliac joints bilaterally. 7. There is a perineural cyst on the left side at S2.   8. There is atrophy in the paraspinal muscles.              Past Medical History:   Diagnosis Date    Abnormal EKG     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 Cristel Strickland     GERD (gastroesophageal reflux disease)     Diverticulitis     Hiatal hernia     Hx of blood clots     Hypertension     BAKI    Hypothyroidism Osteoarthritis        Past Surgical History:   Procedure Laterality Date    [address] Bilateral 2013    w/cubital tunnel    COLON SURGERY  07/29/2016    Procedure: ATTEMPTED DAVINCI XI ROBOTIC ASSISTED SIGMOID COLON RESECTION, ROBOTIC ASSISTED MOBILIZATION OF RECTAL SIGMOID TO SPLENIC FLEXURE, CONVERTED TO OPEN LEFT HEMICOLECTOMY WITH COLOPROCTOCTOMY, SPLENORRHAPHY, RIGID SIGMOIDOSCOPY, LASER EVALUATION OF VASCULARITY WITH ICG; Surgeon: Samantha Elliott MD; Location: [facility]; Service: General    COLONOSCOPY  2012    CYST REMOVAL  2010    EYE SURGERY  1998, 1999    Cataract    FEMUR FRACTURE SURGERY Left 12/21/2020    LEFT FEMUR OPEN REDUCTION INTERNAL FIXATION performed by Janie Renee MD at [facility] 2001    [address] [place] [address] N/A 03/09/2021    ROBOTIC EXTENSIVE LYSIS OF ADHESIONS, ROBOTIC REDUCTION OF HIATAL HERNIA WITH GASTROPEXY performed by Noah Morin MD at . [facility]a [address] ([facility])  1979    w/bladder suspension    JOINT REPLACEMENT  2002, 2008, 2009    rt hip(2002), lt knee(2009), lt hip(2008) Shoulder (2009)    OTHER SURGICAL HISTORY  04/21/2021    Mima Platt CNP in the office    [facility]. Bilateral 7/13/2021    medial branch blocks at bilateral L4/L5 and L5/S1 performed by Phillip Holden DO at [facility] Bilateral 8/24/2021    confirmatory  medial branch blocks at bilateral L4/L5 and L5/S1 performed by Phillip Holden DO at [facility] Right 10/19/2021    thermal radiofrequency at BILATERAL medial branches L4/L5 and L5/S1 was chosen.   We will start with the right side first performed by Phillip Holden DO at [facility] Left 12/7/2021    thermal radiofrequency at BILATERAL medial branches L4/L5 and L5/S1 was chosen.  performed by Sam Gardner DO at [facility] N/A 2022    lumbar epidural steroid injection Lumbar 5 Sacral 1 performed by Sam Gardner DO at [facility] Right 2022    right-sided lumbar radiofrequency ablation targeting RIGHT Lumbar 4/5, 5/Sacral 1 performed by Sam Gardner, DO at [facility] NOT  [address] IND Left 09/15/2017    COLONOSCOPY performed by Madeleine Daniels MD at [place] [address]    discectomy    [facility] ENDOSCOPY  2012    UPPER GASTROINTESTINAL ENDOSCOPY Left 2022    EGD BIOPSY performed by Kadi Wagner MD at [facility]    UPPER GASTROINTESTINAL ENDOSCOPY N/A 2022    EGD BIOPSY performed by Liam Dumont MD at [place] [address] N/A 10/25/2022    EGD POLYP ABLATION/OTHER performed by Kadi Wagner MD at [facility]       Family History   Problem Relation Age of Onset    Arthritis Mother     Hearing Loss Mother     High Cholesterol Mother     Hearing Loss Father     Heart Disease Father 76        CABG    Stroke Father     Arthritis Sister     Depression Sister     Diabetes Sister     Arthritis Brother     Depression Brother     Cancer Maternal Aunt     Early Death Maternal Aunt     Diabetes Maternal Grandmother     Heart Disease Paternal Grandmother        Social History     Socioeconomic History    Marital status:      Spouse name: Minus [place]    Number of children: Not on file    Years of education: 15    Highest education level: Associate degree: academic program   Occupational History    Not on file   Tobacco Use    Smoking status: Former     Packs/day: 2.00     Years: 11.00     Pack years: 22.00     Types: Cigarettes     Start date: 1957     Quit date: 1967     Years since quittin.2 Smokeless tobacco: Never   Vaping Use    Vaping Use: Never used   Substance and Sexual Activity    Alcohol use: Yes     Comment: wine with dinner    Drug use: No    Sexual activity: Not on file   Other Topics Concern    Not on file   Social History Narrative    Referral to AAA placed    Referral placed to [facility]    No barriers with medication affordability    No barriers with transportation    Discussed support from insurance company     Social Determinants of Health     Financial Resource Strain: Low Risk     Difficulty of Paying Living Expenses: Not hard at all   Food Insecurity: No Food Insecurity    Worried About Running Out of Food in the Last Year: Never true    Ran Out of Food in the Last Year: Never true   Transportation Needs: Not on file   Physical Activity: Inactive    Days of Exercise per Week: 0 days    Minutes of Exercise per Session: 0 min   Stress: Not on file   Social Connections: Not on file   Intimate Partner Violence: Not on file   Housing Stability: Not on file       Medications & Allergies:   Current Outpatient Medications   Medication Instructions    calcium-vitamin D (OSCAL-500) 500-200 MG-UNIT per tablet 2 tablets, Oral, DAILY    Cholecalciferol (VITAMIN D3) 50 MCG (2000 UT) CAPS Oral    docusate sodium (COLACE) 100 mg, Oral, 2 TIMES DAILY    docusate sodium (COLACE) 100 mg, Oral, 2 TIMES DAILY PRN    enalapril (VASOTEC) 5 mg, Oral, DAILY    ferrous sulfate (IRON 325) 325 mg, Oral, 2 TIMES DAILY    FLUoxetine (PROZAC) 40 mg, Oral, DAILY    fluticasone (FLONASE) 50 MCG/ACT nasal spray 1 spray, Nasal, PRN    HYDROcodone-acetaminophen (NORCO) 5-325 MG per tablet 1 tablet, Oral, EVERY 6 HOURS PRN    levothyroxine (SYNTHROID) 100 MCG tablet TAKE 1 TABLET DAILY    meloxicam (MOBIC) 15 mg, Oral, DAILY    metoprolol succinate (TOPROL XL) 50 MG extended release tablet TAKE 1 TABLET DAILY    Misc. Devices (WALKER) MISC 1 each, Does not apply, DAILY    Misc.  Devices MISC Mechanical lift for a Van    ondansetron (ZOFRAN) 4 mg, Oral, 3 TIMES DAILY PRN    pantoprazole (PROTONIX) 40 mg, Oral, 2 TIMES DAILY BEFORE MEALS    pregabalin (LYRICA) 150 mg, Oral, 2 TIMES DAILY    Scooter MISC Does not apply, Power scooter    therapeutic multivitamin-minerals (THERAGRAN-M) tablet 1 tablet, Oral, NIGHTLY    vitamin C (ASCORBIC ACID) 500 mg, Oral, 2 TIMES DAILY    zinc sulfate (ZINCATE) 50 mg, Oral, DAILY       Allergies   Allergen Reactions    Ampicillin     Morphine Other (See Comments)     Hallucinations     Pcn [Penicillins] Itching    Sulfa Antibiotics        Review of Systems:   Constitutional: negative for weight changes or fevers  Genitourinary: negative for bowel/bladder incontinence   Musculoskeletal: positive for low back pain  Neurological:  Positive for leg weakness and falls  Behavioral/Psych: negative for anxiety/depression   All other systems reviewed and are negative    Objective:     Vitals:    01/11/23 1502   BP: (!) 142/84         Constitutional: Pleasant, no acute distress   Head: Normocephalic, atraumatic   Eyes: Conjunctivae normal   Neck: Supple, symmetrical   Lungs: Normal respiratory effort, non-labored breathing   Cardiovascular: Limbs warm and well perfused   Abdomen: Non-protruded   Musculoskeletal: Muscle bulk symmetric, no atrophy, no gross deformities   · Lumbar Spine: ROM severely reduced in extension. Scoliosis appreciated in thoracolumbar junction. Lumbar paraspinals tender bilaterally. Positive facet loading bilaterally. Neurological: Cranial nerves II-XII grossly intact. 1+ bilateral patellar and Achilles reflexes. Negative ankle clonus. · Gait - Severely antalgic gait. Ambulates with assistive device. · Motor: No focal motor deficits appreciated in lower limbs  Skin: No rashes or lesions visible in low back  Psychological: Cooperative, no exaggerated pain behaviors       Assessment:    Diagnosis Orders   1.  Spondylosis of lumbar spine  CHG FLUOR NEEDLE/CATH SPINE/PARASPINAL DX/THER ADDON    UT DSTR NROLYTC AGNT PARVERTEB FCT SNGL LMBR/SACRAL    UT DSTR NROLYTC AGNT PARVERTEB FCT ADDL LMBR/SACRAL      2. Radiculopathy, lumbosacral region        3. Myofascial pain        4. Lumbar facet joint pain                  Adina Gunter is a 78 y. o.female presenting to the pain clinic for evaluation of chronic low back pain. Her clinical history and physical examination are consistent with lumbar facet medial pain with associated myofascial pain. She responded significantly to a lumbar radiofrequency ablation back in 2021. She was hospitalized for an extended period of time and has some worsening low back pain secondary to deconditioning but also her ablations have probably worn off. I set her up for repeat lumbar radiofrequency ablations targeting bilateral L4/L5 and L5/S1 facet joints. Plan: The following treatment recommendations and plan were discussed in detail with Adina Gunter and . Imaging:   I have reviewed patients imaging of MRI L-spine and results were discussed in detail with the patient. Analgesics:   Patient is taking Acetaminophen. Patient informed that the maximum amount of acetaminophen taken on a regular basis should only be 4000 mg per day. We will continue her meloxicam to 15 mg daily. I advised patient take this medication with food in the morning. Patient should stop any other NSAID therapies including her Aleve. Adjuvants: In light of the presence of a neuropathic component of pain, the patient should continue Lyrica and Cymbalta as prescribed. Interventions: In presence of lumbar axial back pain and with physical exam consistent for facetal pain and significant response to diagnostic and confirmatory lumbar medial branch blocks, thermal radiofrequency at LEFT medial branches L4/L5 and L5/S1 was chosen. The risks and benefits were discussed in detail with the patient.  Patient wants to proceed with the injection. Anticoagulation/NPO Recommendations:   Patient will need to hold meloxicam x 4 days prior to the procedure. Patient will need to be NPO x 8 hours prior to the procedure. Plan to start an IV prior to the procedure. Multidisciplinary Pain Management:   In the presence of complex, chronic, and multi-factorial pain, the importance of a multidisciplinary approach to pain management in the patients management regimen was emphasized and discussed in great detail.    PHYSICAL THERAPY: Patient is advised to continue gentle low back ROM/stretching exercises     Referrals:  None    Prescriptions Written This Visit:   None    Follow-up: For LEFT lumbar RFA      Jared Dale DO  Interventional Pain Management/PM&R   [place]

## 2023-01-13 DIAGNOSIS — K59.00 CONSTIPATION, UNSPECIFIED CONSTIPATION TYPE: ICD-10-CM

## 2023-01-13 DIAGNOSIS — M79.7 FIBROMYALGIA: ICD-10-CM

## 2023-01-13 RX ORDER — PREGABALIN 150 MG/1
150 CAPSULE ORAL 2 TIMES DAILY
Qty: 180 CAPSULE | Refills: 1 | Status: SHIPPED | OUTPATIENT
Start: 2023-01-13 | End: 2023-07-12

## 2023-01-13 RX ORDER — DOCUSATE SODIUM 100 MG/1
100 CAPSULE, LIQUID FILLED ORAL 2 TIMES DAILY PRN
Qty: 180 CAPSULE | Refills: 3 | Status: CANCELLED | OUTPATIENT
Start: 2023-01-13

## 2023-01-13 RX ORDER — ENALAPRIL MALEATE 5 MG/1
5 TABLET ORAL DAILY
Qty: 90 TABLET | Refills: 3 | Status: CANCELLED | OUTPATIENT
Start: 2023-01-13

## 2023-01-13 NOTE — TELEPHONE ENCOUNTER
Future Appointments   Date Time Provider [facility] [place]   3/7/2023  1:40 PM DO ARCADIO Das PCT [facility] [place] [address]   3/23/2023  2:45 PM MD GUILLERMINA Rojas Heart [facility] AM [place] [address]   6/1/2023  1:40 PM DO GUILLERMINA Rios Pain [facility] [place] [address]

## 2023-01-13 NOTE — TELEPHONE ENCOUNTER
Catracho has refills at the pharmacy, just needs to call them  Dr. Nahun Cartagena prescribes her Enalapril  Lyrica sent

## 2023-01-16 NOTE — TELEPHONE ENCOUNTER
I called and spoke with Pillo Perez and she verbalized understanding and had no questions at this time.

## 2023-01-20 ENCOUNTER — PREP FOR PROCEDURE (OUTPATIENT)
Dept: PHYSICAL MEDICINE AND REHAB | Age: 83
End: 2023-01-20

## 2023-01-23 RX ORDER — ENALAPRIL MALEATE 5 MG/1
5 TABLET ORAL DAILY
Qty: 90 TABLET | Refills: 1 | Status: SHIPPED | OUTPATIENT
Start: 2023-01-23

## 2023-01-23 RX ORDER — ENALAPRIL MALEATE 5 MG/1
5 TABLET ORAL DAILY
Qty: 90 TABLET | Refills: 0 | Status: SHIPPED | OUTPATIENT
Start: 2023-01-23

## 2023-01-23 RX ORDER — ENALAPRIL MALEATE 5 MG/1
5 TABLET ORAL DAILY
Qty: 90 TABLET | Refills: 3 | Status: CANCELLED | OUTPATIENT
Start: 2023-01-23

## 2023-01-23 NOTE — TELEPHONE ENCOUNTER
Patient was last seen 9/1/2022 and her next appt is 3/23/2023 with Dr Jonna Elizabeth. She is out of her enalapril 5 mg once daily. She is out of medication and would like short supply sent to [facility] on [address]. And she needs long term 90 day supplies sent to her new mail order pharmacy, [facility]. Please advise.

## 2023-01-24 NOTE — DISCHARGE INSTRUCTIONS
Post procedure Instructions:    No driving or making significant decisions for the remainder of the day. Be cautions with walking and activity for 24 hours, do not over exert yourself. Ok to resume pre-procedure activity level today. Apply ice to site of injection site if you have pain or discomfort. Do not submerge sit of injection during bath or pool activities for 48 hours post-procedure. Resume blood thinning medications in 24 hours. Call office [phone] if you have:  Temperature greater than 100.4  Persistent nausea and vomiting  Severe uncontrolled pain  Redness, tenderness, or signs of infection (pain, swelling, redness, odor or green/yellow discharge around the site)  Difficulty breathing, headache or visual disturbances  Hives  Persistent dizziness or light-headedness  Extreme fatigue  Any other questions or concerns you may have after discharge    In an emergency, call 911 or go to an Emergency Department at a nearby hospital    Surgical Site Infections      How can we work together to prevent Surgical Site Infections? We would like to thank you for choosing [facility] for your Surgical Care. Below you will find helpful information on how we can work together to prevent Surgical Site Infections. What is a Surgical Site Infection (SSI)? A surgical site infection is an infection that occurs after surgery in the part of the body where the surgery took place. Most patients who have surgery do not develop an infection. However, infections develop in about 1 to 3 out of every 100 patients who have surgery. Some of the common symptoms of a surgical site infection are:  Redness and pain around the area where you had surgery  Drainage of cloudy fluid from your surgical wound  Fever    Can SSIs be treated? Yes. Most surgical site infections can be treated with antibiotics. The antibiotic given to you depends on the bacteria (germs) causing the infection.  Sometimes patients with SSIs also need another surgery to treat the infection. What are some of the things that hospitals are doing to prevent SSIs? To prevent SSIs, doctors, nurses, and other healthcare providers: May remove some of your hair immediately before your surgery using electric clippers if the hair is in the same area where the procedure will occur. They should not shave you with a razor. Give you antibiotics before your surgery starts. In most cases, you should get antibiotics within 60 minutes before the surgery starts and the antibiotics should be stopped within 24 hours after surgery. Clean the skin at the site of your surgery with a special soap that kills germs. Clean their hands and arms up to their elbows with an antiseptic agent just before the surgery. Wear special hair covers, masks, gowns, and gloves during surgery to  keep the surgery area clean. Clean their hands with soap and water or an alcohol-based hand rub before and after caring for each patient. If you do not see your providers clean their hands, please     ask  them to do so. What can I do to help prevent SSIs? Before your surgery:  Tell your doctor about other medical problems you may have. Health problems such as allergies, diabetes, and obesity could affect your surgery and your treatment. Quit smoking. Patients who smoke get more infections. Talk to your doctor about how you can quit before your surgery. Do not shave near where you will have surgery. Shaving with a razor can irritate your skin and make it easier to develop an infection. At the time of your surgery:  Speak up if someone tries to shave you with a razor before surgery. Ask why you need to be shaved and talk with your surgeon if you have any concerns. Ask if you will get antibiotics before surgery.   After your surgery:  Make sure that your healthcare providers clean their hands before examining you, either with soap and water or an alcohol-based hand rub.  Family and friends who visit you should not touch the surgical wound or dressings. Family and friends should clean their hands with soap and water or an alcohol-based hand rub before and after visiting you. If you do not see them clean their hands, ask them to clean their hands. What do I need to do when I go home from the hospital?  Before you go home, your doctor or nurse should explain everything you need to know about taking care of your wound. Make sure you understand how to care for your wound before you leave the hospital.  Always clean your hands before and after caring for your wound. Before you go home, make sure you know who to contact if you have questions or problems after you get home. If you have any symptoms of an infection, such as redness and pain at the surgery site, drainage, or fever, call your doctor immediately. If you have additional questions, please ask your doctor or nurse.

## 2023-01-25 NOTE — H&P
Today, patient presents for planned thermal radiofrequency at LEFT medial branches L4/L5 and L5/S1    This note is reflective of the patient's previous visit for evaluation. We will proceed with today's planned procedure. Since patient's last visit for evaluation, there have been no interval changes in medical history. Patient has no new numbness, weakness, or focal neurological deficit since evaluation. Patient has no contraindications to injection (no anticoagulation or recent antibiotic intake for active infections), and has a  present or is able to drive themselves (as discussed and cleared by physician). Allergies to latex, contrast dye, and steroid medications have been confirmed with the patient prior to the procedure. NPO necessity has been assessed and accepted based on procedure complexity. The risks and benefits of the procedure have been explained including but are not limited to infection, bleeding, paralysis, immediate post procedure weakness, and dizziness; the patient acknowledges understanding and desires to proceed with the procedure. Patient has signed consent for same procedure as discussed in previous clinic encounter. All other questions and concerns were addressed at bedside. See procedure note for full details. Post procedure Instructions: The patient was advised not to drive during the day of the procedure and not to engage in any significant decision making (unless otherwise states by physician). The patient was also advised to be cautious with walking/activity for 24 hours following today's visit and asked not to engage in over-exertion (unless otherwise states by physician). After this time, it is ok to resume pre-procedure level of activity. Patient advised to apply ice to site of injection in situations of pain and discomfort. Patient advised to not submerge site of injection during bath or pool activities for approximately 24 hours post-procedure.  Patient attested to understanding post procedure directions / restrictions. All other questions and concerns addressed before patient discharge in ambulatory fashion. Chronic Pain/PM&R Clinic Note     Encounter Date: 1/11/23    Subjective:   Chief Complaint:   No chief complaint on file. History of Present Illness:   Okey Dakin Height is a 78 y.o. female seen in the clinic initially on 06/23/21 upon request from Leia Hidalgo DO (PCP) for her history of chronic low back pain. She has a medical history of scoliosis, neuropathy, fibromyalgia, and previous lumbar discectomy (over 50 years ago). She states that she has low back pain that has been progressively worse over the last 20 years. She reports the majority of her low back pain to be at her waistline with radiation across to her waist on both sides. She describes this pain is a constant aching, stabbing 5/10 pain that can get up to a 7-8/10 when severe. This pain is worse with any activities where she is standing upright such as cooking or when she is walking for any duration of time. Her pain is improved with rest and sitting in her motorized wheelchair. She denies any pain radiating down her legs but does endorse numbness in both feet from her neuropathy. She denies any saddle anesthesia or bowel/bladder incontinence. Of note, she has multiple joint replacements. She has bilateral total hip arthroplasties and a left knee total hip arthroplasty. In addition, she has a previous left femur fracture that has been fixated with hardware and a left total shoulder arthroplasty. She states that she had an EMG/NCS performed by Dr. Rick Barragan which revealed idiopathic neuropathy. She states that she had a previous RFA in her low back done by Dr. Manfred Hidalgo which did give her longstanding relief but this was done 8-10 years ago. Today, 1/11/2023, patient presents for planned follow-up for chronic low back pain.   She states that she has completed her right-sided lumbar radiofrequency ablation with good relief on the right side low back. She has noticed her left side to be worse since completing the procedure. She states that she would like to proceed with a left-sided ablation as previously discussed. She denies any changes in her symptoms. She denies any changes in her medications used to treat her pain. She continues to obtain good relief from her meloxicam, Lyrica, and Cymbalta. History of Interventions:   Surgery: Previous discectomy in 30s, left femur IMN, left TKA  Injections: Previous lumabr RFA ~8-10 years ago by Dr. Georgianna Kayser  Diagnostic lumbar MBBs  Confirmatory lumbar MBBs  Lumbar RFA (10/19/2021 and 12/7/21)-significant relief (>85%) x 10 months    Current Treatment Medications:   Meloxicam 15 mg daily  Heating pad PRN  Mishel 150 mg BID  Cymbalta 60 mg daily    Historical Treatment Medications:   Tramadol-ineffective  Norco-stopped (constipation)    Imaging:  MRI L-spine  (6/11/22)    PROCEDURE: MRI LUMBAR SPINE WO CONTRAST       CLINICAL INFORMATION: Fall, sequela. COMPARISON: Plain radiographs dated 18 May 2021. .       TECHNIQUE: Sagittal and axial T1 and T2-weighted images were obtained through the lumbar spine. FINDINGS:           There is a levoscoliosis. There is degenerative change in the vertebral body endplates adjacent to J87-49, T12-L1, L1-2, L2-3, L3-4 and L4-5 disc spaces. .  There is no bone marrow edema. There is no acute compression fracture. No pars defects are    noted. .       The distal spinal cord, conus medullaris and cauda equina are normal.        There are no gross abnormalities in the visualized aspects of the distal thoracic spine. On the axial images, at T12-L1, there is no disc herniation or canal stenosis. There is moderate right and mild-to-moderate left-sided foraminal stenosis. At L1-L2, there is a 1.9 mm bulging disc and facet hypertrophy.  This results in moderate right and mild-to-moderate left-sided foraminal stenosis with no canal stenosis.       At L2-L3, there is a 3.5 mm bulging disc and facet hypertrophy. This results in mild canal, moderate left and mild-to-moderate right-sided foraminal stenosis.       At L3-L4, there is a 2.4 mm bulging disc and facet hypertrophy. This results in mild canal and moderate bilateral foraminal stenosis.       At L4-L5, there are some 1.6 mm bulging disc and facet hypertrophy. This causes mild canal and moderate bilateral foraminal stenosis.       At L5-S1, there is no disc herniation or canal stenosis. There is mild-to-moderate bilateral foraminal stenosis.       There is degenerative change involving the sacroiliac joints bilaterally.       There is a perineural cyst on the left side at S2.       There is atrophy involving the paraspinal muscles in the lower lumbar spine.               Impression       1. Levoscoliosis. No evidence for an acute compression fracture.   2. Degenerative changes resulting in mild canal and moderate bilateral foraminal stenosis at L3-4 and L4-5.   3. There is moderate right and mild-to-moderate left-sided foraminal stenosis with no canal stenosis at T12-L1 and L1-2.   4. There is mild canal, moderate left and mild right-sided foraminal stenosis at L2-3.   5. There is mild-to-moderate bilateral from stenosis but no canal stenosis at L5-S1.   6. There is degenerative change involving the sacroiliac joints bilaterally.   7. There is a perineural cyst on the left side at S2.   8. There is atrophy in the paraspinal muscles.             Past Medical History:   Diagnosis Date    Abnormal EKG     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 Isabel    Fibromyalgia     GERD (gastroesophageal reflux disease)     Diverticulitis     Hiatal hernia     Hx of blood clots     Hypertension     KATIEI     Hypothyroidism     Osteoarthritis        Past Surgical History:   Procedure Laterality Date    APPENDECTOMY  1958    BACK SURGERY      CARPAL TUNNEL RELEASE Bilateral 2013    w/cubital tunnel    COLON SURGERY  07/29/2016    Procedure: ATTEMPTED DAVINCI XI ROBOTIC ASSISTED SIGMOID COLON RESECTION, ROBOTIC ASSISTED MOBILIZATION OF RECTAL SIGMOID TO SPLENIC FLEXURE, CONVERTED TO OPEN LEFT HEMICOLECTOMY WITH COLOPROCTOCTOMY, SPLENORRHAPHY, RIGID SIGMOIDOSCOPY, LASER EVALUATION OF VASCULARITY WITH ICG; Surgeon: Anatoliy Ny MD; Location: [place] [address]; Service: General    COLONOSCOPY  2012    CYST REMOVAL  2010    EYE SURGERY  1998, 1999    Cataract    FEMUR FRACTURE SURGERY Left 12/21/2020    LEFT FEMUR OPEN REDUCTION INTERNAL FIXATION performed by Kelvin Null MD at [facility] 2001    [address] [place] [address] N/A 03/09/2021    ROBOTIC EXTENSIVE LYSIS OF ADHESIONS, ROBOTIC REDUCTION OF HIATAL HERNIA WITH GASTROPEXY performed by Gloria Hutchison MD at [address] [place] [address] ([address] [place] [address])  1979    w/bladder suspension    JOINT REPLACEMENT  2002, 2008, 2009    rt hip(2002), lt knee(2009), lt hip(2008) Shoulder (2009)    OTHER SURGICAL HISTORY  04/21/2021    Olamide Talamantes CNP in the office    [facility]. Bilateral 7/13/2021    medial branch blocks at bilateral L4/L5 and L5/S1 performed by Alessio Salinas DO at [facility] Bilateral 8/24/2021    confirmatory  medial branch blocks at bilateral L4/L5 and L5/S1 performed by Alessio Salinas DO at [facility] Right 10/19/2021    thermal radiofrequency at BILATERAL medial branches L4/L5 and L5/S1 was chosen.   We will start with the right side first performed by Alessio Salinas DO at [facility] Left 12/7/2021    thermal radiofrequency at BILATERAL medial branches L4/L5 and L5/S1 was chosen.  performed by Vishal Lakhani DO at [facility] N/A 7/26/2022    lumbar epidural steroid injection Lumbar 5 Sacral 1 performed by Vishal Lakhani DO at [facility] Right 12/20/2022    right-sided lumbar radiofrequency ablation targeting RIGHT Lumbar 4/5, 5/Sacral 1 performed by Vishal Lakhani DO at [facility]  [address] [facility] Left 09/15/2017    COLONOSCOPY performed by Digna Wiggins MD at [place] [address]    discectomy    [facility] ENDOSCOPY  2012    UPPER GASTROINTESTINAL ENDOSCOPY Left 6/7/2022    EGD BIOPSY performed by Royann Epley, MD at [facility]    UPPER GASTROINTESTINAL ENDOSCOPY N/A 8/17/2022    EGD BIOPSY performed by Walter Gross MD at [place] [address] N/A 10/25/2022    EGD POLYP ABLATION/OTHER performed by Royann Epley, MD at [facility]       Family History   Problem Relation Age of Onset    Arthritis Mother     Hearing Loss Mother     High Cholesterol Mother     Hearing Loss Father     Heart Disease Father 76        CABG    Stroke Father     Arthritis Sister     Depression Sister     Diabetes Sister     Arthritis Brother     Depression Brother     Cancer Maternal Aunt     Early Death Maternal Aunt     Diabetes Maternal Grandmother     Heart Disease Paternal Grandmother        Social History     Socioeconomic History    Marital status:      Spouse name: Kolton Pérez    Number of children: Not on file    Years of education: 15    Highest education level: Associate degree: academic program   Occupational History    Not on file   Tobacco Use    Smoking status: Former     Packs/day: 2.00     Years: 11.00     Pack years: 22.00     Types: Cigarettes     Start date: 1/9/1957     Quit date: 11/1/1967     Years since quittin.2    Smokeless tobacco: Never   Vaping Use    Vaping Use: Never used   Substance and Sexual Activity    Alcohol use: Yes     Comment: wine with dinner    Drug use: No    Sexual activity: Not on file   Other Topics Concern    Not on file   Social History Narrative    Referral to AAA placed    Referral placed to [facility]    No barriers with medication affordability    No barriers with transportation    Discussed support from insurance company     Social Determinants of Health     Financial Resource Strain: Low Risk     Difficulty of Paying Living Expenses: Not hard at all   Food Insecurity: No Food Insecurity    Worried About Running Out of Food in the Last Year: Never true    Ran Out of Food in the Last Year: Never true   Transportation Needs: Not on file   Physical Activity: Inactive    Days of Exercise per Week: 0 days    Minutes of Exercise per Session: 0 min   Stress: Not on file   Social Connections: Not on file   Intimate Partner Violence: Not on file   Housing Stability: Not on file       Medications & Allergies:   Current Outpatient Medications   Medication Instructions    calcium-vitamin D (OSCAL-500) 500-200 MG-UNIT per tablet 2 tablets, Oral, DAILY    Cholecalciferol (VITAMIN D3) 50 MCG (2000 UT) CAPS Oral    docusate sodium (COLACE) 100 mg, Oral, 2 TIMES DAILY    docusate sodium (COLACE) 100 mg, Oral, 2 TIMES DAILY PRN    enalapril (VASOTEC) 5 mg, Oral, DAILY    enalapril (VASOTEC) 5 mg, Oral, DAILY    ferrous sulfate (IRON 325) 325 mg, Oral, 2 TIMES DAILY    FLUoxetine (PROZAC) 40 mg, Oral, DAILY    fluticasone (FLONASE) 50 MCG/ACT nasal spray 1 spray, Nasal, PRN    HYDROcodone-acetaminophen (NORCO) 5-325 MG per tablet 1 tablet, Oral, EVERY 6 HOURS PRN    levothyroxine (SYNTHROID) 100 MCG tablet TAKE 1 TABLET DAILY    meloxicam (MOBIC) 15 mg, Oral, DAILY    metoprolol succinate (TOPROL XL) 50 MG extended release tablet TAKE 1 TABLET DAILY    Misc.  Devices (WALKER) MISC 1 each, Does not apply, DAILY    Misc. Devices MISC Mechanical lift for a Van    ondansetron (ZOFRAN) 4 mg, Oral, 3 TIMES DAILY PRN    pantoprazole (PROTONIX) 40 mg, Oral, 2 TIMES DAILY BEFORE MEALS    pregabalin (LYRICA) 150 mg, Oral, 2 TIMES DAILY    Scooter MISC Does not apply, Power scooter    therapeutic multivitamin-minerals (THERAGRAN-M) tablet 1 tablet, Oral, NIGHTLY    vitamin C (ASCORBIC ACID) 500 mg, Oral, 2 TIMES DAILY    zinc sulfate (ZINCATE) 50 mg, Oral, DAILY       Allergies   Allergen Reactions    Ampicillin     Morphine Other (See Comments)     Hallucinations     Pcn [Penicillins] Itching    Sulfa Antibiotics        Review of Systems:   Constitutional: negative for weight changes or fevers  Genitourinary: negative for bowel/bladder incontinence   Musculoskeletal: positive for low back pain  Neurological:  Positive for leg weakness and falls  Behavioral/Psych: negative for anxiety/depression   All other systems reviewed and are negative    Objective: There were no vitals filed for this visit. Constitutional: Pleasant, no acute distress   Head: Normocephalic, atraumatic   Eyes: Conjunctivae normal   Neck: Supple, symmetrical   Lungs: Normal respiratory effort, non-labored breathing   Cardiovascular: Limbs warm and well perfused   Abdomen: Non-protruded   Musculoskeletal: Muscle bulk symmetric, no atrophy, no gross deformities   · Lumbar Spine: ROM severely reduced in extension. Scoliosis appreciated in thoracolumbar junction. Lumbar paraspinals tender bilaterally. Positive facet loading bilaterally. Neurological: Cranial nerves II-XII grossly intact. 1+ bilateral patellar and Achilles reflexes. Negative ankle clonus. · Gait - Severely antalgic gait. Ambulates with assistive device.    · Motor: No focal motor deficits appreciated in lower limbs  Skin: No rashes or lesions visible in low back  Psychological: Cooperative, no exaggerated pain behaviors       Assessment:    Diagnosis Orders 1. Spondylosis of lumbar spine  CHG FLUOR NEEDLE/CATH SPINE/PARASPINAL DX/THER ADDON    MS DSTR NROLYTC AGNT PARVERTEB FCT SNGL LMBR/SACRAL    MS DSTR NROLYTC AGNT PARVERTEB FCT ADDL LMBR/SACRAL      2. Radiculopathy, lumbosacral region        3. Myofascial pain        4. Lumbar facet joint pain                  Maninder Gunter is a 78 y. o.female presenting to the pain clinic for evaluation of chronic low back pain. Her clinical history and physical examination are consistent with lumbar facet medial pain with associated myofascial pain. She responded significantly to a lumbar radiofrequency ablation back in 2021. She was hospitalized for an extended period of time and has some worsening low back pain secondary to deconditioning but also her ablations have probably worn off. I set her up for repeat lumbar radiofrequency ablations targeting bilateral L4/L5 and L5/S1 facet joints. Plan: The following treatment recommendations and plan were discussed in detail with Maninder Gunter and . Imaging:   I have reviewed patients imaging of MRI L-spine and results were discussed in detail with the patient. Analgesics:   Patient is taking Acetaminophen. Patient informed that the maximum amount of acetaminophen taken on a regular basis should only be 4000 mg per day. We will continue her meloxicam to 15 mg daily. I advised patient take this medication with food in the morning. Patient should stop any other NSAID therapies including her Aleve. Adjuvants: In light of the presence of a neuropathic component of pain, the patient should continue Lyrica and Cymbalta as prescribed. Interventions: In presence of lumbar axial back pain and with physical exam consistent for facetal pain and significant response to diagnostic and confirmatory lumbar medial branch blocks, thermal radiofrequency at LEFT medial branches L4/L5 and L5/S1 was chosen.   The risks and benefits were discussed in detail with the patient. Patient wants to proceed with the injection.    Anticoagulation/NPO Recommendations:   Patient will need to hold meloxicam x 4 days prior to the procedure.  Patient will need to be NPO x 8 hours prior to the procedure.  Plan to start an IV prior to the procedure.    Multidisciplinary Pain Management:   In the presence of complex, chronic, and multi-factorial pain, the importance of a multidisciplinary approach to pain management in the patient’s management regimen was emphasized and discussed in great detail.   PHYSICAL THERAPY: Patient is advised to continue gentle low back ROM/stretching exercises     Referrals:  None    Prescriptions Written This Visit:   None    Follow-up: For LEFT lumbar RFA      Jared Dale DO  Interventional Pain Management/PM&R   [facility] [place]

## 2023-01-26 ENCOUNTER — APPOINTMENT (OUTPATIENT)
Dept: GENERAL RADIOLOGY | Age: 83
End: 2023-01-26
Attending: ANESTHESIOLOGY
Payer: MEDICARE

## 2023-01-26 ENCOUNTER — HOSPITAL ENCOUNTER (OUTPATIENT)
Age: 83
Setting detail: OUTPATIENT SURGERY
Discharge: NURSING, REHAB OR LONG-TERM CARE | End: 2023-01-26
Attending: ANESTHESIOLOGY | Admitting: ANESTHESIOLOGY
Payer: MEDICARE

## 2023-01-26 VITALS
HEART RATE: 55 BPM | DIASTOLIC BLOOD PRESSURE: 75 MMHG | TEMPERATURE: 98 F | BODY MASS INDEX: 24.96 KG/M2 | OXYGEN SATURATION: 92 % | HEIGHT: 67 IN | SYSTOLIC BLOOD PRESSURE: 158 MMHG | WEIGHT: 159 LBS | RESPIRATION RATE: 16 BRPM

## 2023-01-26 PROCEDURE — 7100000010 HC PHASE II RECOVERY - FIRST 15 MIN: Performed by: ANESTHESIOLOGY

## 2023-01-26 PROCEDURE — 3600000054 HC PAIN LEVEL 3 BASE: Performed by: ANESTHESIOLOGY

## 2023-01-26 PROCEDURE — 2500000003 HC RX 250 WO HCPCS: Performed by: ANESTHESIOLOGY

## 2023-01-26 PROCEDURE — 6360000002 HC RX W HCPCS: Performed by: ANESTHESIOLOGY

## 2023-01-26 PROCEDURE — 2709999900 HC NON-CHARGEABLE SUPPLY: Performed by: ANESTHESIOLOGY

## 2023-01-26 PROCEDURE — 3209999900 FLUORO FOR SURGICAL PROCEDURES

## 2023-01-26 PROCEDURE — 99152 MOD SED SAME PHYS/QHP 5/>YRS: CPT | Performed by: ANESTHESIOLOGY

## 2023-01-26 PROCEDURE — 7100000011 HC PHASE II RECOVERY - ADDTL 15 MIN: Performed by: ANESTHESIOLOGY

## 2023-01-26 PROCEDURE — 3600000055 HC PAIN LEVEL 3 ADDL 15 MIN: Performed by: ANESTHESIOLOGY

## 2023-01-26 RX ORDER — MIDAZOLAM HYDROCHLORIDE 1 MG/ML
INJECTION INTRAMUSCULAR; INTRAVENOUS PRN
Status: DISCONTINUED | OUTPATIENT
Start: 2023-01-26 | End: 2023-01-26 | Stop reason: ALTCHOICE

## 2023-01-26 RX ORDER — LIDOCAINE HYDROCHLORIDE 10 MG/ML
INJECTION, SOLUTION EPIDURAL; INFILTRATION; INTRACAUDAL; PERINEURAL PRN
Status: DISCONTINUED | OUTPATIENT
Start: 2023-01-26 | End: 2023-01-26 | Stop reason: ALTCHOICE

## 2023-01-26 RX ORDER — ACETAMINOPHEN/DIPHENHYDRAMINE 500MG-25MG
2 TABLET ORAL NIGHTLY PRN
COMMUNITY

## 2023-01-26 RX ORDER — LIDOCAINE HYDROCHLORIDE 20 MG/ML
INJECTION, SOLUTION EPIDURAL; INFILTRATION; INTRACAUDAL; PERINEURAL PRN
Status: DISCONTINUED | OUTPATIENT
Start: 2023-01-26 | End: 2023-01-26 | Stop reason: ALTCHOICE

## 2023-01-26 RX ORDER — FENTANYL CITRATE 50 UG/ML
INJECTION, SOLUTION INTRAMUSCULAR; INTRAVENOUS PRN
Status: DISCONTINUED | OUTPATIENT
Start: 2023-01-26 | End: 2023-01-26 | Stop reason: ALTCHOICE

## 2023-01-26 ASSESSMENT — PAIN SCALES - GENERAL: PAINLEVEL_OUTOF10: 2

## 2023-01-26 ASSESSMENT — PAIN - FUNCTIONAL ASSESSMENT: PAIN_FUNCTIONAL_ASSESSMENT: 0-10

## 2023-01-26 NOTE — PROGRESS NOTES
0900 Patient to room, report from [facility]. Patient is alert, oriented, appropriate. States pain is at pain goal. Injection site clean, dry, intact. 2604 Call light in reach, snack and drink given per patient preference. 0920 IV taken out and dressing applied with no complications. [id number] Patient assisted in getting dressed. 0930 Patient taken out to discharge vehicle in personal wheelchair. Discharged in stable condition with all belongings.

## 2023-01-26 NOTE — PRE SEDATION
[facility]  Pre-Sedation/Analgesia History & Physical    Pt Name: Sharath Gunter  MRN: [id number]  YOB: 1940  Provider Performing Procedure: Ulysses Caldron, DO   Primary Care Physician: Nuris Zurita DO      MEDICAL HISTORY       has a past medical history of Abnormal EKG, Anemia, Back pain, Blood circulation, collateral, Diverticulitis, Fibromyalgia, GERD (gastroesophageal reflux disease), Hiatal hernia, Hx of blood clots, Hypertension, Hypothyroidism, and Osteoarthritis. SURGICAL HISTORY   has a past surgical history that includes Appendectomy (1958); Colonoscopy (2012); eye surgery (1998, 1999); Hysterectomy (1979); Spine surgery (1968); Upper gastrointestinal endoscopy (2012); cyst removal (2010); Tonsillectomy (1943); Tooth Extraction (1964); Ovary removal (1999); Foot surgery (Left, 2001); Carpal tunnel release (Bilateral, 2013); Small intestine surgery; back surgery; Colon surgery (07/29/2016); joint replacement (2002, 2008, 2009); pr colon ca scrn not hi rsk ind (Left, 09/15/2017); Femur fracture surgery (Left, 12/21/2020); hiatal hernia repair (N/A, 03/09/2021); other surgical history (04/21/2021); Pain management procedure (Bilateral, 7/13/2021); Pain management procedure (Bilateral, 8/24/2021); Pain management procedure (Right, 10/19/2021); Pain management procedure (Left, 12/7/2021); Upper gastrointestinal endoscopy (Left, 6/7/2022); Pain management procedure (N/A, 7/26/2022); Upper gastrointestinal endoscopy (N/A, 8/17/2022); Upper gastrointestinal endoscopy (N/A, 10/25/2022); and Pain management procedure (Right, 12/20/2022). ALLERGIES   Allergies as of 01/11/2023 - Fully Reviewed 01/11/2023   Allergen Reaction Noted    Ampicillin  05/17/2013    Morphine Other (See Comments) 09/14/2017    Pcn [penicillins] Itching 05/17/2013    Sulfa antibiotics  05/17/2013       MEDICATIONS   Prior to Admission medications    Medication Sig Start Date End Date Taking?  Authorizing Provider   diphenhydrAMINE-APAP, sleep, (TYLENOL PM EXTRA STRENGTH)  MG tablet Take 2 tablets by mouth nightly as needed for Sleep   Yes Historical Provider, MD   enalapril (VASOTEC) 5 MG tablet Take 1 tablet by mouth daily 1/23/23   Wiliam Reeder MD   enalapril (VASOTEC) 5 MG tablet Take 1 tablet by mouth daily 1/23/23   Wiliam Reeder MD   pregabalin (LYRICA) 150 MG capsule Take 1 capsule by mouth 2 times daily for 180 days. 1/13/23 7/12/23  FLORIDA Tinajero Do - CNP   FLUoxetine (PROZAC) 40 MG capsule Take 1 capsule by mouth daily 1/10/23   Michael Waldron DO   HYDROcodone-acetaminophen (NORCO) 5-325 MG per tablet Take 1 tablet by mouth every 6 hours as needed for Pain.     Historical Provider, MD   ferrous sulfate (IRON 325) 325 (65 Fe) MG tablet Take 1 tablet by mouth 2 times daily 11/17/22   Michael Waldron DO   docusate sodium (COLACE) 100 MG capsule Take 1 capsule by mouth 2 times daily as needed for Constipation 10/24/22   Michael Waldron DO   ondansetron (ZOFRAN) 4 MG tablet Take 1 tablet by mouth 3 times daily as needed for Nausea or Vomiting 10/24/22   Michael Waldron DO   meloxicam (MOBIC) 15 MG tablet Take 1 tablet by mouth daily  Patient not taking: Reported on 1/11/2023 10/3/22 12/2/22  Jared Dale DO   pantoprazole (PROTONIX) 40 MG tablet Take 1 tablet by mouth 2 times daily (before meals) 10/3/22   Michael Waldron DO   docusate sodium (COLACE) 100 MG capsule Take 100 mg by mouth 2 times daily    Historical Provider, MD   zinc sulfate (ZINCATE) 220 (50 Zn) MG capsule Take 50 mg by mouth daily    Historical Provider, MD   vitamin C (ASCORBIC ACID) 500 MG tablet Take 500 mg by mouth 2 times daily    Historical Provider, MD   Cholecalciferol (VITAMIN D3) 50 MCG (2000 UT) CAPS Take by mouth    Historical Provider, MD   metoprolol succinate (TOPROL XL) 50 MG extended release tablet TAKE 1 TABLET DAILY 5/16/22   Michael Waldron DO   levothyroxine (SYNTHROID) 100 MCG tablet TAKE 1 TABLET DAILY 5/16/22   Ascencion Pierce, DO   calcium-vitamin D (OSCAL-500) 500-200 MG-UNIT per tablet Take 2 tablets by mouth daily    Historical Provider, MD   Misc. Devices (WALKER) MISC 1 each by Does not apply route daily 2/11/20   Ascencion Pierce DO   Misc. Devices MISC Mechanical lift for a Van 5/31/18   Ascencion Pierce, DO   Scooter MISC by Does not apply route Power scooter 10/31/17   Ascencion Pierce, DO   fluticasone (FLONASE) 50 MCG/ACT nasal spray 1 spray by Nasal route as needed for Rhinitis     Historical Provider, MD   therapeutic multivitamin-minerals (THERAGRAN-M) tablet Take 1 tablet by mouth nightly    Historical Provider, MD     PHYSICAL:   Vitals:    01/26/23 0900   BP: (!) 158/75   Pulse: 55   Resp: 16   Temp: 98 °F (36.7 °C)   SpO2: 92%     PLANNED PROCEDURE   See procedure note  SEDATION  Planned agent: Versed and Fentanyl  ASA Classification: 2  Class 1: A normal healthy patient  Class 2: Pt with mild to moderate systemic disease  Class 3: Severe systemic disease or disturbance  Class 4: Severe systemic disorders that are already life threatening.  Class 5: Moribund pt with little chances of survival, for more than 24 hours.  Mallampati I Airway Classification: 2    1. Pre-procedure diagnostic studies complete and results available.  2. Previous sedation/anesthesia experiences assessed.  3. The patient is an appropriate candidate to undergo the planned procedure sedation and anesthesia. (Refer to nursing sedation/analgesia documentation record)  4. Formulation and discussion of sedation/procedure plan, risks, and expectations with patient and/or responsible adult completed.  5. Patient examined immediately prior to the procedure. (Refer to nursing sedation/analgesia documentation record)    Jared Dale DO  Electronically signed 1/26/2023 at 10:07 AM

## 2023-01-26 NOTE — POST SEDATION
[address] [address]  Sedation/Analgesia Post Sedation Record    Pt Name: Hernan Gunter  MRN: [id number]  YOB: 1940  Procedure Performed By: David Stark DO  Primary Care Physician: Desiree Wilson DO    POST-PROCEDURE    Physicians/Assistants: David Stark DO  Procedure Performed: See Procedure Note   Sedation/Anesthesia: Versed and Fentanyl (See procedure note for amount and duration)  Estimated Blood Loss:     0  ml  Specimens Removed: None        Complications: None           Jared Bustamante DO  Electronically signed 1/26/2023 at 10:07 AM

## 2023-01-26 NOTE — PROCEDURES
Pre-operative Diagnosis: Lumbar facet pain     Post-operative Diagnosis: Lumbar facet pain     Procedure: LEFT lumbar thermal radiofrequency ablation targeting facet joints L4/L5 and L5/S1    Procedure Description:  After consent was obtained, the patient was placed in the prone position with a pillow under the abdomen to reduce the lordotic curve of the lumbar spine. The lower back was prepped with chloraprep and draped in a sterile fashion.  Then, 0.5 cc of 1 % lidocaine was used for local anesthesia of the skin and superficial subcutaneous tissues.  Three 20-gauge 100mm SMK cannulas with 10-mm active tips were advanced under fluoroscopic guidance in an AP view to the junction of the LEFT superior articular process and the transverse process of the L4 and L5 vertebra and at the sacral ala. There were no paresthesias, heme or CSF obtained.  Needle placement was confirmed using AP and oblique views.      Sensory and motor stimulation at 50Hz and 2Hz and impedance measurements were carried out having reached threshold at:     LEFT  L4: 0.2V/3V/150-300 Ohms  L5: 0.2V/3V/150-300 Ohms  SA: 0.2V/3V/150-300 Ohms        Then, 1cc of 2% Lidocaine was injected at the site. Temperature was then raised to 80 degrees centigrade for 90 seconds with a 15 second temperature ramp. No pain was reported during the lesioning. The needles were then withdrawn without complications. The patient tolerated the procedure well. The patient was transported to the recovery room and discharged in ambulatory fashion.    Procedural Complications: None  Estimated Blood Loss: 0 mL    IV sedation was used during the procedure:  - Moderate intravenous conscious sedation was supervised by Dr. Dale  - The patient was independently monitored by a Registered Nurse assigned to the procedure room  - Monitoring included automated blood pressure, continuous EKG, and continuous pulse oximetry  - The detailed conscious record is permanently stored in the  [facility][note 3] following is the conscious sedation record:  Start Time: 08:48  End Time : 09:03  Duration: 15 minutes   Medications Administered: 1 mg Versed, 50 mcg Fentanyl        Jared Dale DO  Interventional Pain Management/PM&R   [place]

## 2023-02-06 DIAGNOSIS — K59.00 CONSTIPATION, UNSPECIFIED CONSTIPATION TYPE: ICD-10-CM

## 2023-02-06 RX ORDER — DOCUSATE SODIUM 100 MG/1
100 CAPSULE, LIQUID FILLED ORAL 2 TIMES DAILY PRN
Qty: 180 CAPSULE | Refills: 3 | Status: SHIPPED | OUTPATIENT
Start: 2023-02-06

## 2023-02-06 NOTE — TELEPHONE ENCOUNTER
Patient requesting a split rx of the following temporary supply to go to [facility]  90 day supply with 3 refills to go to [facility]  Please approve or refuse Rx request:  Requested Prescriptions     Pending Prescriptions Disp Refills    docusate sodium (COLACE) 100 MG capsule 180 capsule 3     Sig: Take 1 capsule by mouth 2 times daily as needed for Constipation       Next appointment:  3/7/2023

## 2023-02-07 RX ORDER — DOCUSATE SODIUM 100 MG/1
100 CAPSULE, LIQUID FILLED ORAL 2 TIMES DAILY PRN
Qty: 60 CAPSULE | Refills: 0 | Status: SHIPPED | OUTPATIENT
Start: 2023-02-07 | End: 2023-03-09

## 2023-02-07 NOTE — TELEPHONE ENCOUNTER
This was to be a split order   A temporary rx sent to [address] [place] [address] 90 day supply twith 3 refills to go to [facility] home delivery  Daughter called to check the status of the rx to [facility]

## 2023-02-23 ENCOUNTER — TELEPHONE (OUTPATIENT)
Dept: FAMILY MEDICINE CLINIC | Age: 83
End: 2023-02-23

## 2023-02-23 DIAGNOSIS — R30.0 DYSURIA: Primary | ICD-10-CM

## 2023-02-23 RX ORDER — CIPROFLOXACIN 500 MG/1
500 TABLET, FILM COATED ORAL 2 TIMES DAILY
Qty: 6 TABLET | Refills: 0 | Status: SHIPPED | OUTPATIENT
Start: 2023-02-23 | End: 2023-02-26

## 2023-02-23 NOTE — TELEPHONE ENCOUNTER
Cipro sent  If not improvement after atb let us know  If any worsening over the weekend (fever, chills, flank pain, vomiting, bloody urine) needs to go to the ER

## 2023-02-23 NOTE — TELEPHONE ENCOUNTER
Patient reports a-lot of pain when she voids that is worsening over the last 6 days and a bit of frequency as well  Patient is asking that something be sent to [facility] [address]     Please advise

## 2023-02-28 ENCOUNTER — OFFICE VISIT (OUTPATIENT)
Dept: FAMILY MEDICINE CLINIC | Age: 83
End: 2023-02-28

## 2023-02-28 VITALS
HEART RATE: 64 BPM | SYSTOLIC BLOOD PRESSURE: 132 MMHG | HEIGHT: 67 IN | TEMPERATURE: 97.8 F | RESPIRATION RATE: 14 BRPM | WEIGHT: 159.4 LBS | BODY MASS INDEX: 25.02 KG/M2 | DIASTOLIC BLOOD PRESSURE: 72 MMHG | OXYGEN SATURATION: 96 %

## 2023-02-28 DIAGNOSIS — R30.0 DYSURIA: Primary | ICD-10-CM

## 2023-02-28 RX ORDER — METOPROLOL SUCCINATE 50 MG/1
TABLET, EXTENDED RELEASE ORAL
Qty: 90 TABLET | Refills: 3 | Status: SHIPPED | OUTPATIENT
Start: 2023-02-28

## 2023-02-28 RX ORDER — LEVOTHYROXINE SODIUM 0.1 MG/1
TABLET ORAL
Qty: 90 TABLET | Refills: 3 | Status: SHIPPED | OUTPATIENT
Start: 2023-02-28

## 2023-02-28 RX ORDER — DOCUSATE SODIUM 100 MG/1
100 CAPSULE, LIQUID FILLED ORAL 2 TIMES DAILY
Qty: 180 CAPSULE | Refills: 3 | Status: SHIPPED | OUTPATIENT
Start: 2023-02-28 | End: 2023-05-29

## 2023-02-28 RX ORDER — ENALAPRIL MALEATE 5 MG/1
5 TABLET ORAL DAILY
Qty: 90 TABLET | Refills: 3 | Status: SHIPPED | OUTPATIENT
Start: 2023-02-28

## 2023-02-28 RX ORDER — CIPROFLOXACIN 500 MG/1
500 TABLET, FILM COATED ORAL 2 TIMES DAILY
Qty: 14 TABLET | Refills: 0 | Status: SHIPPED | OUTPATIENT
Start: 2023-02-28 | End: 2023-03-07

## 2023-02-28 RX ORDER — FLUOXETINE HYDROCHLORIDE 20 MG/1
CAPSULE ORAL
Qty: 90 CAPSULE | Refills: 3 | Status: SHIPPED | OUTPATIENT
Start: 2023-02-28

## 2023-02-28 NOTE — TELEPHONE ENCOUNTER
Recent Visits  Date Type Provider Dept   10/31/22 Office Visit [facility] Edgar [facility]   09/29/22 Office Visit Michael Alonso, APRN - CNP [facility] [place] [facility]   09/20/22 Office Visit Charitone Anand, APRN - CNP [facility] [place] [facility]   09/06/22 Office Visit Coyshirley Howardkenya, DO [facility] [place] [facility]   05/19/22 Office Visit Michael Alonso, APRN - CNP [facility] [place] [facility]   05/10/22 Office Visit Coyshirley Howardkenya, DO [facility] [place] [facility]   03/15/22 Office Visit Coyshirley Laura, DO [facility] [place] [facility]   10/20/21 Office Visit Rosio Michel, APRN - CNP [facility] [place] [facility]   09/08/21 Office Visit Rosio Michel, APRN - CNP [facility] [address] recent visits within past 540 days with a meds authorizing provider and meeting all other requirements  Future Appointments  Date Type Provider Dept   03/07/23 Appointment Rosio Michel APRN - CNP [facility] [place]   Showing future appointments within next 150 days with a meds authorizing provider and meeting all other requirements      Future Appointments   Date Time Provider Huber Little   3/7/2023  1:40 PM Rosio Michel APRN - CNP [facility] [place]   3/23/2023  2:45 PM MD GUILLERMINA Freitas [facility] [place]   6/1/2023  1:40 PM Jared Walton DO N [facility] [place]

## 2023-02-28 NOTE — PROGRESS NOTES
SUBJECTIVE:  Sherly Pearson Height is a 78 y.o. female for   Chief Complaint   Patient presents with    Urinary Pain     Pt states that she has urinary odor. Dtr states that she has confusion        HPI:    Symptoms present for 1 weeks. Symptoms are  mildly improved  since they initially started. Dysuria? Yes  Hematuria? No  Increased urinary frequency? Yes  Abdominal discomfort? Yes  CVA pain? No  Hx of UTIs? Yes    Started Cipro last week for 3 days, symptoms were starting to improve. No fevers  Hx of constipation      Patient Active Problem List   Diagnosis    Osteoarthritis    Fibromyalgia    Gastroesophageal reflux disease    Hypothyroidism    Patient is Denominational    Back pain    H/O abdominal surgery    Normocytic anemia    History of diverticulosis    Chronic low back pain with sciatica    Chest pain    Essential hypertension    Abnormal CT scan, chest    BRBPR (bright red blood per rectum)    Spinal stenosis of lumbar region without neurogenic claudication    Other idiopathic scoliosis, lumbar region    Closed fracture of distal end of left femur, initial encounter ([facility])    Pre-syncope    Weakness generalized    Large hiatal hernia    Hypo-osmolar hyponatremia    Esophageal dysphagia    Hypomagnesemia    Dysphagia    Bilateral lower extremity edema    Hx of fracture of leg    Lung nodule    Paraesophageal hernia    S/P repair of paraesophageal hernia    Intermediate stage nonexudative age-related macular degeneration of both eyes    Pain syndrome, chronic    Shortness of breath           OBJECTIVE:  /72   Pulse 64   Temp 97.8 °F (36.6 °C) (Temporal)   Resp 14   Ht 5' 7\" (1.702 m)   Wt 159 lb 6.4 oz (72.3 kg)   SpO2 96%   BMI 24.97 kg/m²   She appears well; non-toxic and in no apparent distress.    Physical Examination: Chest - clear to auscultation, no wheezes, rales or rhonchi, symmetric air entry  Abdomen - soft, nontender, nondistended, no masses or organomegaly, no CVA tenderness  Extremities - peripheral pulses normal, no pedal edema, no clubbing or cyanosis  Psych -  Affect appropriate.  Thought process is normal without evidence of depression or psychosis.    Good insight and appropriae interaction.  Cognition and memory appear to be intact.      ASSESSMENT & PLAN  Paulina was seen today for urinary pain.    Diagnoses and all orders for this visit:    Dysuria  -     docusate sodium (COLACE) 100 MG capsule; Take 1 capsule by mouth 2 times daily  -     Culture, Urine  -     ciprofloxacin (CIPRO) 500 MG tablet; Take 1 tablet by mouth 2 times daily for 7 days      No follow-ups on file.  Unable to give urine in office.  Will send supplies to obtain urine at assisted living.    -Start above treatments  -Urine culture was not sent today  -Patient advised to contact our office immediately if symptoms worsen or persist  -Patient counseled on conservative care including fluids, rest and OTC meds      All patient questions answered.  Patient voiced understanding.

## 2023-03-16 ENCOUNTER — TELEPHONE (OUTPATIENT)
Dept: FAMILY MEDICINE CLINIC | Age: 83
End: 2023-03-16

## 2023-03-16 DIAGNOSIS — N30.90 CYSTITIS: Primary | ICD-10-CM

## 2023-03-16 RX ORDER — NITROFURANTOIN 25; 75 MG/1; MG/1
100 CAPSULE ORAL 2 TIMES DAILY
Qty: 10 CAPSULE | Refills: 0 | Status: SHIPPED | OUTPATIENT
Start: 2023-03-16 | End: 2023-03-21

## 2023-03-16 NOTE — TELEPHONE ENCOUNTER
Culture back   Looks like she was treated with Cipro  Came back resistant to Cipro  Will send in Mirian Godwin  How is she feeling?

## 2023-03-17 NOTE — TELEPHONE ENCOUNTER
Patient informed and verbalized understanding. No questions at this time.  Patient is feeling pretty good no concerns

## 2023-03-23 ENCOUNTER — OFFICE VISIT (OUTPATIENT)
Dept: CARDIOLOGY CLINIC | Age: 83
End: 2023-03-23
Payer: MEDICARE

## 2023-03-23 VITALS
BODY MASS INDEX: 24.97 KG/M2 | DIASTOLIC BLOOD PRESSURE: 76 MMHG | SYSTOLIC BLOOD PRESSURE: 140 MMHG | HEIGHT: 67 IN | HEART RATE: 56 BPM

## 2023-03-23 DIAGNOSIS — I10 PRIMARY HYPERTENSION: Primary | ICD-10-CM

## 2023-03-23 PROCEDURE — 1123F ACP DISCUSS/DSCN MKR DOCD: CPT | Performed by: NUCLEAR MEDICINE

## 2023-03-23 PROCEDURE — 3077F SYST BP >= 140 MM HG: CPT | Performed by: NUCLEAR MEDICINE

## 2023-03-23 PROCEDURE — 99213 OFFICE O/P EST LOW 20 MIN: CPT | Performed by: NUCLEAR MEDICINE

## 2023-03-23 PROCEDURE — 3078F DIAST BP <80 MM HG: CPT | Performed by: NUCLEAR MEDICINE

## 2023-03-23 RX ORDER — TRAMADOL HYDROCHLORIDE 50 MG/1
50 TABLET ORAL EVERY 6 HOURS PRN
COMMUNITY

## 2023-03-23 RX ORDER — BISACODYL 10 MG
10 SUPPOSITORY, RECTAL RECTAL DAILY
COMMUNITY

## 2023-03-23 RX ORDER — LOPERAMIDE HYDROCHLORIDE 2 MG/1
2 CAPSULE ORAL 4 TIMES DAILY PRN
COMMUNITY

## 2023-03-23 NOTE — PROGRESS NOTES
sulfate (IRON 325) 325 (65 Fe) MG tablet Take 1 tablet by mouth 2 times daily 180 tablet 1    ondansetron (ZOFRAN) 4 MG tablet Take 1 tablet by mouth 3 times daily as needed for Nausea or Vomiting 30 tablet 0    meloxicam (MOBIC) 15 MG tablet Take 1 tablet by mouth daily 60 tablet 0    pantoprazole (PROTONIX) 40 MG tablet Take 1 tablet by mouth 2 times daily (before meals) 180 tablet 3    Cholecalciferol (VITAMIN D3) 50 MCG (2000 UT) CAPS Take by mouth      calcium-vitamin D (OSCAL-500) 500-200 MG-UNIT per tablet Take 2 tablets by mouth daily      Misc. Devices (WALKER) MISC 1 each by Does not apply route daily 1 each 0    Misc. Devices MISC Mechanical lift for a [employer] 1 Device 0    Scooter MISC by Does not apply route Power scooter 1 each 0    fluticasone (FLONASE) 50 MCG/ACT nasal spray 1 spray by Nasal route as needed for Rhinitis       therapeutic multivitamin-minerals (THERAGRAN-M) tablet Take 1 tablet by mouth nightly       No current facility-administered medications for this visit.      Allergies   Allergen Reactions    Ampicillin     Morphine Other (See Comments)     Hallucinations     Pcn [Penicillins] Itching    Sulfa Antibiotics      Health Maintenance   Topic Date Due    COVID-19 Vaccine (4 - Booster for Pfizer series) 01/19/2022    Flu vaccine (1) 08/01/2022    Pneumococcal 65+ years Vaccine (2 - PCV) 02/09/2023    Depression Monitoring  09/06/2023    Annual Wellness Visit (AWV)  09/07/2023    DTaP/Tdap/Td vaccine (2 - Td or Tdap) 09/08/2031    DEXA (modify frequency per FRAX score)  Completed    Shingles vaccine  Completed    Hepatitis A vaccine  Aged Out    Hib vaccine  Aged Out    Meningococcal (ACWY) vaccine  Aged Out       Subjective:  General:   No fever, no chills, No fatigue or weight loss  Pulmonary:    No dyspnea, no wheezing  Cardiac:    Denies recent chest pain,   GI:     No nausea or vomiting, no abdominal pain  Neuro:    No dizziness or light headedness,   Musculoskeletal:  No recent active

## 2023-03-27 ENCOUNTER — OFFICE VISIT (OUTPATIENT)
Dept: FAMILY MEDICINE CLINIC | Age: 83
End: 2023-03-27
Payer: MEDICARE

## 2023-03-27 VITALS
DIASTOLIC BLOOD PRESSURE: 94 MMHG | WEIGHT: 159.4 LBS | RESPIRATION RATE: 16 BRPM | HEIGHT: 67 IN | HEART RATE: 58 BPM | OXYGEN SATURATION: 94 % | BODY MASS INDEX: 25.02 KG/M2 | SYSTOLIC BLOOD PRESSURE: 138 MMHG

## 2023-03-27 DIAGNOSIS — G89.4 PAIN SYNDROME, CHRONIC: ICD-10-CM

## 2023-03-27 DIAGNOSIS — K21.9 GASTROESOPHAGEAL REFLUX DISEASE WITHOUT ESOPHAGITIS: ICD-10-CM

## 2023-03-27 DIAGNOSIS — M89.9 DISORDER OF BONE, UNSPECIFIED: ICD-10-CM

## 2023-03-27 DIAGNOSIS — E87.1 HYPONATREMIA: ICD-10-CM

## 2023-03-27 DIAGNOSIS — D50.9 MICROCYTIC ANEMIA: ICD-10-CM

## 2023-03-27 DIAGNOSIS — I10 ESSENTIAL HYPERTENSION: ICD-10-CM

## 2023-03-27 DIAGNOSIS — M79.7 FIBROMYALGIA: ICD-10-CM

## 2023-03-27 DIAGNOSIS — F33.1 MODERATE EPISODE OF RECURRENT MAJOR DEPRESSIVE DISORDER (HCC): ICD-10-CM

## 2023-03-27 DIAGNOSIS — N39.0 RECURRENT UTI: Primary | ICD-10-CM

## 2023-03-27 DIAGNOSIS — E03.9 ACQUIRED HYPOTHYROIDISM: ICD-10-CM

## 2023-03-27 PROCEDURE — 3075F SYST BP GE 130 - 139MM HG: CPT | Performed by: NURSE PRACTITIONER

## 2023-03-27 PROCEDURE — 1123F ACP DISCUSS/DSCN MKR DOCD: CPT | Performed by: NURSE PRACTITIONER

## 2023-03-27 PROCEDURE — 3080F DIAST BP >= 90 MM HG: CPT | Performed by: NURSE PRACTITIONER

## 2023-03-27 PROCEDURE — 99214 OFFICE O/P EST MOD 30 MIN: CPT | Performed by: NURSE PRACTITIONER

## 2023-03-27 SDOH — ECONOMIC STABILITY: INCOME INSECURITY: HOW HARD IS IT FOR YOU TO PAY FOR THE VERY BASICS LIKE FOOD, HOUSING, MEDICAL CARE, AND HEATING?: NOT HARD AT ALL

## 2023-03-27 SDOH — ECONOMIC STABILITY: FOOD INSECURITY: WITHIN THE PAST 12 MONTHS, THE FOOD YOU BOUGHT JUST DIDN'T LAST AND YOU DIDN'T HAVE MONEY TO GET MORE.: NEVER TRUE

## 2023-03-27 SDOH — ECONOMIC STABILITY: HOUSING INSECURITY
IN THE LAST 12 MONTHS, WAS THERE A TIME WHEN YOU DID NOT HAVE A STEADY PLACE TO SLEEP OR SLEPT IN A SHELTER (INCLUDING NOW)?: NO

## 2023-03-27 SDOH — ECONOMIC STABILITY: FOOD INSECURITY: WITHIN THE PAST 12 MONTHS, YOU WORRIED THAT YOUR FOOD WOULD RUN OUT BEFORE YOU GOT MONEY TO BUY MORE.: NEVER TRUE

## 2023-03-27 ASSESSMENT — PATIENT HEALTH QUESTIONNAIRE - PHQ9
SUM OF ALL RESPONSES TO PHQ QUESTIONS 1-9: 12
5. POOR APPETITE OR OVEREATING: 0
8. MOVING OR SPEAKING SO SLOWLY THAT OTHER PEOPLE COULD HAVE NOTICED. OR THE OPPOSITE, BEING SO FIGETY OR RESTLESS THAT YOU HAVE BEEN MOVING AROUND A LOT MORE THAN USUAL: 0
2. FEELING DOWN, DEPRESSED OR HOPELESS: 3
4. FEELING TIRED OR HAVING LITTLE ENERGY: 3
SUM OF ALL RESPONSES TO PHQ QUESTIONS 1-9: 12
1. LITTLE INTEREST OR PLEASURE IN DOING THINGS: 0
3. TROUBLE FALLING OR STAYING ASLEEP: 3
10. IF YOU CHECKED OFF ANY PROBLEMS, HOW DIFFICULT HAVE THESE PROBLEMS MADE IT FOR YOU TO DO YOUR WORK, TAKE CARE OF THINGS AT HOME, OR GET ALONG WITH OTHER PEOPLE: 2
SUM OF ALL RESPONSES TO PHQ9 QUESTIONS 1 & 2: 3
SUM OF ALL RESPONSES TO PHQ QUESTIONS 1-9: 12
7. TROUBLE CONCENTRATING ON THINGS, SUCH AS READING THE NEWSPAPER OR WATCHING TELEVISION: 0
6. FEELING BAD ABOUT YOURSELF - OR THAT YOU ARE A FAILURE OR HAVE LET YOURSELF OR YOUR FAMILY DOWN: 3
9. THOUGHTS THAT YOU WOULD BE BETTER OFF DEAD, OR OF HURTING YOURSELF: 0
SUM OF ALL RESPONSES TO PHQ QUESTIONS 1-9: 12

## 2023-03-27 NOTE — PROGRESS NOTES
effects, treatment options with the patient, performing an exam, and developing a plan of care. All questions answered. Patient and family verbalized understanding. Obtain above testing    All copied or forwarded information in the progress note was verified by me to be accurate at the time of visit  Patient's past medical, surgical, social and family history were reviewed and updated     All patient questions answered. Patient voiced understanding.

## 2023-03-27 NOTE — PATIENT INSTRUCTIONS
UTI Prevention:    Drink plenty of water each day   *helps flush the bladder out and keep bacteria counts low    Start taking these medications daily:  Probiotic (with lactobacillus acidophilus) 1 capsule daily  Cranberry Extract 1 tablet daily    *would have to drink 1 gallon of Cranberry Juice to get the equivalent of 1 tab   *doesn't have all of the sugar and acid that the juice does, which can be irritating to the bladder  D-mannose 1500 mg daily   *probably the most important one of the three   *[facility], not all pharmacies carry it    During course of infection try to limit bladder irritating foods/drinks:  -caffeine containing beverages (coffee, tea, energy drinks, soda)  -citrus containing food and beverages (jessie, limes, flavored drinks, etc)  -tomato based products (tomato juice, pizza sauce, spaghetti sauce, etc)

## 2023-04-03 ENCOUNTER — TELEPHONE (OUTPATIENT)
Dept: FAMILY MEDICINE CLINIC | Age: 83
End: 2023-04-03

## 2023-04-03 DIAGNOSIS — R26.89 IMPAIRED GAIT AND MOBILITY: Primary | ICD-10-CM

## 2023-04-03 DIAGNOSIS — R53.81 PHYSICAL DECONDITIONING: ICD-10-CM

## 2023-05-09 DIAGNOSIS — N30.90 CYSTITIS: ICD-10-CM

## 2023-05-09 DIAGNOSIS — N39.0 RECURRENT UTI: Primary | ICD-10-CM

## 2023-05-09 RX ORDER — NITROFURANTOIN 25; 75 MG/1; MG/1
100 CAPSULE ORAL 2 TIMES DAILY
Qty: 10 CAPSULE | Refills: 0 | Status: SHIPPED | OUTPATIENT
Start: 2023-05-09 | End: 2023-05-14

## 2023-05-09 NOTE — PROGRESS NOTES
Received fax from [address] [place] [address] dysuria, freq, urgency  Send urine for culture  Start macrobid, rx sent to [facility] signed by FLORIDA Ruiz CNP on 5/9/2023 at 3:08 PM

## 2023-05-09 NOTE — PROGRESS NOTES
Spoke with Florinda villasenor. Verbal orders were given for the UA culture and the macrobid. Orders for the UA culture and script for [facility] faxed to [phone].

## 2023-05-15 DIAGNOSIS — R30.0 DYSURIA: ICD-10-CM

## 2023-05-15 RX ORDER — GRANULES FOR ORAL 3 G/1
3 POWDER ORAL
Qty: 2 EACH | Refills: 0 | Status: SHIPPED | OUTPATIENT
Start: 2023-05-15 | End: 2023-05-19

## 2023-05-29 ENCOUNTER — HOSPITAL ENCOUNTER (EMERGENCY)
Age: 83
Discharge: HOME | End: 2023-05-29
Payer: MEDICARE

## 2023-05-29 VITALS
TEMPERATURE: 98.2 F | OXYGEN SATURATION: 93 % | WEIGHT: 159 LBS | DIASTOLIC BLOOD PRESSURE: 80 MMHG | RESPIRATION RATE: 16 BRPM | BODY MASS INDEX: 24.9 KG/M2 | HEART RATE: 63 BPM | SYSTOLIC BLOOD PRESSURE: 143 MMHG

## 2023-05-29 DIAGNOSIS — N30.01 ACUTE CYSTITIS WITH HEMATURIA: Primary | ICD-10-CM

## 2023-05-29 LAB
BILIRUB UR STRIP.AUTO-MCNC: ABNORMAL MG/DL
CHARACTER UR: ABNORMAL
COLOR: ABNORMAL
GLUCOSE UR QL STRIP.AUTO: NEGATIVE MG/DL
KETONES UR QL STRIP.AUTO: ABNORMAL
NITRITE UR QL STRIP.AUTO: NEGATIVE
PH UR STRIP.AUTO: 8.5 [PH] (ref 5–9)
PROT UR STRIP.AUTO-MCNC: >= 300 MG/DL
RBC #/AREA URNS HPF: ABNORMAL /[HPF]
SP GR UR STRIP.AUTO: 1.01 (ref 1–1.03)
UROBILINOGEN, URINE: 0.2 EU/DL (ref 0.2–1)
WBC #/AREA URNS HPF: ABNORMAL /[HPF]

## 2023-05-29 PROCEDURE — 81003 URINALYSIS AUTO W/O SCOPE: CPT

## 2023-05-29 PROCEDURE — 87077 CULTURE AEROBIC IDENTIFY: CPT

## 2023-05-29 PROCEDURE — 99213 OFFICE O/P EST LOW 20 MIN: CPT

## 2023-05-29 PROCEDURE — 87186 SC STD MICRODIL/AGAR DIL: CPT

## 2023-05-29 PROCEDURE — 99213 OFFICE O/P EST LOW 20 MIN: CPT | Performed by: NURSE PRACTITIONER

## 2023-05-29 PROCEDURE — 87086 URINE CULTURE/COLONY COUNT: CPT

## 2023-05-29 RX ORDER — CEPHALEXIN 500 MG/1
500 CAPSULE ORAL 2 TIMES DAILY
Qty: 14 CAPSULE | Refills: 0 | Status: SHIPPED | OUTPATIENT
Start: 2023-05-29 | End: 2023-06-02

## 2023-05-29 RX ORDER — CEPHALEXIN 500 MG/1
500 CAPSULE ORAL 2 TIMES DAILY
Qty: 14 CAPSULE | Refills: 0 | Status: SHIPPED | OUTPATIENT
Start: 2023-05-29 | End: 2023-05-29

## 2023-05-29 ASSESSMENT — PAIN DESCRIPTION - PAIN TYPE: TYPE: ACUTE PAIN

## 2023-05-29 ASSESSMENT — PAIN DESCRIPTION - FREQUENCY: FREQUENCY: CONTINUOUS

## 2023-05-29 ASSESSMENT — PAIN SCALES - GENERAL: PAINLEVEL_OUTOF10: 5

## 2023-05-29 ASSESSMENT — PAIN - FUNCTIONAL ASSESSMENT: PAIN_FUNCTIONAL_ASSESSMENT: 0-10

## 2023-05-29 ASSESSMENT — PAIN DESCRIPTION - ORIENTATION: ORIENTATION: MID;LOWER

## 2023-05-29 ASSESSMENT — PAIN DESCRIPTION - LOCATION: LOCATION: ABDOMEN

## 2023-05-29 ASSESSMENT — PAIN DESCRIPTION - ONSET: ONSET: ON-GOING

## 2023-05-29 ASSESSMENT — PAIN DESCRIPTION - DESCRIPTORS: DESCRIPTORS: SHARP;DISCOMFORT

## 2023-05-29 NOTE — ED PROVIDER NOTES
[facility] Encounter      [facility]       Chief Complaint   Patient presents with    Frequent/Recurrent UTI     Reports Dysuria, Weakness        Nurses Notes reviewed and I agree except as noted in the HPI. HISTORY OF PRESENT ILLNESS   Vincent Gunter is a 78 y.o. female who presents to urgent care today with complaints of frequent recurrent UTI. She complains of burning and frequency with urination. Denies fever body aches chills. REVIEW OF SYSTEMS     Review of Systems   Constitutional:  Negative for chills, fatigue, fever and unexpected weight change. HENT:  Negative for congestion, postnasal drip, rhinorrhea, sinus pressure, sinus pain, sneezing and sore throat. Eyes: Negative. Respiratory:  Negative for shortness of breath and wheezing. Cardiovascular:  Negative for chest pain. Gastrointestinal:  Negative for abdominal pain, constipation, diarrhea, nausea and vomiting. Endocrine: Negative. Genitourinary:  Positive for dysuria, frequency and urgency. Negative for difficulty urinating, dyspareunia, hematuria, vaginal bleeding, vaginal discharge and vaginal pain. Musculoskeletal:  Negative for myalgias. Skin:  Negative for rash. Neurological:  Negative for dizziness, light-headedness and headaches. Hematological:  Negative for adenopathy. Psychiatric/Behavioral:  Negative for agitation.       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 Bashir Lazo     GERD (gastroesophageal reflux disease)     Diverticulitis     Hiatal hernia     Hx of blood clots     Hypertension     BAKI    Hypothyroidism     Osteoarthritis     Urinary incontinence     UTI (urinary tract infection)        SURGICAL HISTORY

## 2023-05-29 NOTE — ED TRIAGE NOTES
Arrives to [facility] for the evaluation of recurrent UTI. For 3-4 days patient has been experiencing dysuria. Was treated for UTI a couple months ago with Cipro and then Macrobid. Reports \"bladder pain\" rated 5/10 in severity. Described as a discomfort and can be sharp at times. Urine specimen provided on arrival and brought to lab. Reports history of Urinary incontinence and \"dribbles\". Has also been experiencing weakness which is common for her when has infection. Resides at [facility].  and daughter in room with patient. Waiting provider to assess.

## 2023-06-01 ENCOUNTER — OFFICE VISIT (OUTPATIENT)
Dept: PHYSICAL MEDICINE AND REHAB | Age: 83
End: 2023-06-01
Payer: MEDICARE

## 2023-06-01 VITALS
BODY MASS INDEX: 24.96 KG/M2 | WEIGHT: 159 LBS | SYSTOLIC BLOOD PRESSURE: 132 MMHG | DIASTOLIC BLOOD PRESSURE: 78 MMHG | HEIGHT: 67 IN

## 2023-06-01 DIAGNOSIS — M79.18 MYOFASCIAL PAIN: ICD-10-CM

## 2023-06-01 DIAGNOSIS — M47.816 SPONDYLOSIS OF LUMBAR SPINE: Primary | ICD-10-CM

## 2023-06-01 DIAGNOSIS — M54.59 LUMBAR FACET JOINT PAIN: ICD-10-CM

## 2023-06-01 DIAGNOSIS — M54.17 RADICULOPATHY, LUMBOSACRAL REGION: ICD-10-CM

## 2023-06-01 LAB
BACTERIA UR CULT: ABNORMAL
BACTERIA UR CULT: ABNORMAL
ORGANISM: ABNORMAL

## 2023-06-01 PROCEDURE — 3075F SYST BP GE 130 - 139MM HG: CPT | Performed by: ANESTHESIOLOGY

## 2023-06-01 PROCEDURE — 3078F DIAST BP <80 MM HG: CPT | Performed by: ANESTHESIOLOGY

## 2023-06-01 PROCEDURE — 1123F ACP DISCUSS/DSCN MKR DOCD: CPT | Performed by: ANESTHESIOLOGY

## 2023-06-01 PROCEDURE — 99214 OFFICE O/P EST MOD 30 MIN: CPT | Performed by: ANESTHESIOLOGY

## 2023-06-01 NOTE — PROGRESS NOTES
Chronic Pain/PM&R Clinic Note     Encounter Date: 6/1/23    Subjective:   Chief Complaint:   Chief Complaint   Patient presents with    Follow-up     Discuss procedures and PT told her to use her wheelchair at all times          History of Present Illness:   Gabriele Gunter is a 78 y.o. female seen in the clinic initially on 06/23/21 upon request from Khalida Nuñez DO (PCP) for her history of chronic low back pain. She has a medical history of scoliosis, neuropathy, fibromyalgia, and previous lumbar discectomy (over 50 years ago). She states that she has low back pain that has been progressively worse over the last 20 years. She reports the majority of her low back pain to be at her waistline with radiation across to her waist on both sides. She describes this pain is a constant aching, stabbing 5/10 pain that can get up to a 7-8/10 when severe. This pain is worse with any activities where she is standing upright such as cooking or when she is walking for any duration of time. Her pain is improved with rest and sitting in her motorized wheelchair. She denies any pain radiating down her legs but does endorse numbness in both feet from her neuropathy. She denies any saddle anesthesia or bowel/bladder incontinence. Of note, she has multiple joint replacements. She has bilateral total hip arthroplasties and a left knee total hip arthroplasty. In addition, she has a previous left femur fracture that has been fixated with hardware and a left total shoulder arthroplasty. She states that she had an EMG/NCS performed by Dr. Mable Estrada which revealed idiopathic neuropathy. She states that she had a previous RFA in her low back done by Dr. Cole Asher which did give her longstanding relief but this was done 8-10 years ago. Today, 6/1/2023, patient presents for planned follow-up for chronic low back pain.   She states that she has been unable to stay consistent with her physical therapy due to her urinary tract

## 2023-06-02 RX ORDER — DOXYCYCLINE HYCLATE 100 MG
100 TABLET ORAL 2 TIMES DAILY
Qty: 14 TABLET | Refills: 0 | Status: SHIPPED | OUTPATIENT
Start: 2023-06-02 | End: 2023-06-09

## 2023-06-03 ASSESSMENT — ENCOUNTER SYMPTOMS
ABDOMINAL PAIN: 0
SHORTNESS OF BREATH: 0
VOMITING: 0
SORE THROAT: 0
WHEEZING: 0
EYES NEGATIVE: 1
SINUS PAIN: 0
NAUSEA: 0
RHINORRHEA: 0
SINUS PRESSURE: 0
CONSTIPATION: 0
DIARRHEA: 0

## 2023-06-14 NOTE — PROGRESS NOTES
Scope GIF. EGD completed. Biopsies obtained per forceps stomach-labelled per verbal request Dr Marlee Donald. Sent to lab for processing. Tolerated well. Pictures taken. Well controlled, hold ACEi due to renal function, monitor blood pressure, adjust as needed.

## 2023-07-10 NOTE — TELEPHONE ENCOUNTER
I don't go to \[facility]\"" OF THE [facility], so I am not able to write orders for there. If they have concerns, I recommend that they talk with the nurse and they can get ahold of the medical director there. No past medical history on file.    No past surgical history on file.    Review of patient's allergies indicates:  No Known Allergies    No current facility-administered medications on file prior to encounter.     No current outpatient medications on file prior to encounter.     Family History    None       Tobacco Use    Smoking status: Not on file    Smokeless tobacco: Not on file   Substance and Sexual Activity    Alcohol use: Not on file    Drug use: Not on file    Sexual activity: Not on file     Review of Systems   Constitutional: Negative for decreased appetite.   HENT:  Negative for ear discharge.    Eyes:  Negative for blurred vision.   Respiratory:  Negative for hemoptysis.    Endocrine: Negative for polyphagia.   Hematologic/Lymphatic: Negative for adenopathy.   Skin:  Negative for color change.   Musculoskeletal:  Negative for joint swelling.   Genitourinary:  Negative for bladder incontinence.   Neurological:  Negative for brief paralysis.   Psychiatric/Behavioral:  Negative for hallucinations.    Allergic/Immunologic: Negative for hives.   Objective:     Vital Signs (Most Recent):  Temp: 97.8 °F (36.6 °C) (07/10/23 0539)  Pulse: 105 (07/10/23 0842)  Resp: (!) 21 (07/10/23 0842)  BP: 119/82 (07/10/23 0842)  SpO2: 97 % (07/10/23 0842) Vital Signs (24h Range):  Temp:  [97.8 °F (36.6 °C)] 97.8 °F (36.6 °C)  Pulse:  [] 105  Resp:  [10-21] 21  SpO2:  [95 %-98 %] 97 %  BP: (110-133)/(56-92) 119/82     Weight: 80.7 kg (178 lb)  Body mass index is 31.53 kg/m².    SpO2: 97 %       No intake or output data in the 24 hours ending 07/10/23 0845    Lines/Drains/Airways       Peripheral Intravenous Line  Duration                  Peripheral IV - Single Lumen 07/10/23 0607 20 G;1 in Anterior;Proximal;Right Forearm <1 day                     Physical Exam  Constitutional:       Appearance: She is well-developed.   HENT:      Head: Normocephalic and atraumatic.   Eyes:      Conjunctiva/sclera: Conjunctivae normal.       Pupils: Pupils are equal, round, and reactive to light.   Cardiovascular:      Rate and Rhythm: Tachycardia present. Rhythm irregular.      Pulses: Intact distal pulses.      Heart sounds: Normal heart sounds.   Pulmonary:      Effort: Pulmonary effort is normal.      Breath sounds: Normal breath sounds.   Abdominal:      General: Bowel sounds are normal.      Palpations: Abdomen is soft.   Musculoskeletal:         General: Normal range of motion.      Cervical back: Normal range of motion and neck supple.   Skin:     General: Skin is warm and dry.   Neurological:      Mental Status: She is alert and oriented to person, place, and time.        Significant Labs: All pertinent lab results from the last 24 hours have been reviewed.    Significant Imaging: Echocardiogram: 2D echo with color flow doppler: No results found for this or any previous visit.

## 2023-07-24 RX ORDER — FLUOXETINE HYDROCHLORIDE 40 MG/1
40 CAPSULE ORAL DAILY
Qty: 90 CAPSULE | Refills: 3 | Status: SHIPPED | OUTPATIENT
Start: 2023-07-24

## 2023-08-07 ENCOUNTER — APPOINTMENT (OUTPATIENT)
Dept: CT IMAGING | Age: 83
End: 2023-08-07
Payer: MEDICARE

## 2023-08-07 ENCOUNTER — TELEPHONE (OUTPATIENT)
Dept: FAMILY MEDICINE CLINIC | Age: 83
End: 2023-08-07

## 2023-08-07 ENCOUNTER — HOSPITAL ENCOUNTER (EMERGENCY)
Age: 83
Discharge: HOME | End: 2023-08-07
Attending: STUDENT IN AN ORGANIZED HEALTH CARE EDUCATION/TRAINING PROGRAM
Payer: MEDICARE

## 2023-08-07 VITALS
TEMPERATURE: 98.7 F | OXYGEN SATURATION: 99 % | BODY MASS INDEX: 22.66 KG/M2 | WEIGHT: 153 LBS | RESPIRATION RATE: 17 BRPM | SYSTOLIC BLOOD PRESSURE: 145 MMHG | HEIGHT: 69 IN | HEART RATE: 69 BPM | DIASTOLIC BLOOD PRESSURE: 80 MMHG

## 2023-08-07 DIAGNOSIS — R11.0 NAUSEA: ICD-10-CM

## 2023-08-07 DIAGNOSIS — N39.0 URINARY TRACT INFECTION WITHOUT HEMATURIA, SITE UNSPECIFIED: Primary | ICD-10-CM

## 2023-08-07 DIAGNOSIS — I10 ESSENTIAL HYPERTENSION: Primary | ICD-10-CM

## 2023-08-07 LAB
ALBUMIN SERPL BCG-MCNC: 3.5 G/DL (ref 3.5–5.1)
ALP SERPL-CCNC: 124 U/L (ref 38–126)
ALT SERPL W/O P-5'-P-CCNC: 20 U/L (ref 11–66)
ANION GAP SERPL CALC-SCNC: 11 MEQ/L (ref 8–16)
AST SERPL-CCNC: 34 U/L (ref 5–40)
BACTERIA URNS QL MICRO: ABNORMAL /HPF
BASOPHILS ABSOLUTE: 0.1 THOU/MM3 (ref 0–0.1)
BASOPHILS NFR BLD AUTO: 1.3 %
BILIRUB SERPL-MCNC: 0.4 MG/DL (ref 0.3–1.2)
BILIRUB UR QL STRIP.AUTO: NEGATIVE
BUN SERPL-MCNC: 10 MG/DL (ref 7–22)
CALCIUM SERPL-MCNC: 9.1 MG/DL (ref 8.5–10.5)
CASTS #/AREA URNS LPF: ABNORMAL /LPF
CASTS 2: ABNORMAL /LPF
CHARACTER UR: ABNORMAL
CHLORIDE SERPL-SCNC: 94 MEQ/L (ref 98–111)
CO2 SERPL-SCNC: 26 MEQ/L (ref 23–33)
COLOR: YELLOW
CREAT SERPL-MCNC: 0.6 MG/DL (ref 0.4–1.2)
CRYSTALS URNS MICRO: ABNORMAL
DEPRECATED RDW RBC AUTO: 49.4 FL (ref 35–45)
EOSINOPHIL NFR BLD AUTO: 4.4 %
EOSINOPHILS ABSOLUTE: 0.2 THOU/MM3 (ref 0–0.4)
EPITHELIAL CELLS, UA: ABNORMAL /HPF
ERYTHROCYTE [DISTWIDTH] IN BLOOD BY AUTOMATED COUNT: 13.8 % (ref 11.5–14.5)
GFR SERPL CREATININE-BSD FRML MDRD: > 60 ML/MIN/1.73M2
GLUCOSE SERPL-MCNC: 108 MG/DL (ref 70–108)
GLUCOSE UR QL STRIP.AUTO: NEGATIVE MG/DL
HCT VFR BLD AUTO: 41.5 % (ref 37–47)
HGB BLD-MCNC: 13.9 GM/DL (ref 12–16)
HGB UR QL STRIP.AUTO: ABNORMAL
IMM GRANULOCYTES # BLD AUTO: 0.01 THOU/MM3 (ref 0–0.07)
IMM GRANULOCYTES NFR BLD AUTO: 0.3 %
KETONES UR QL STRIP.AUTO: NEGATIVE
LIPASE SERPL-CCNC: 21.9 U/L (ref 5.6–51.3)
LYMPHOCYTES ABSOLUTE: 0.8 THOU/MM3 (ref 1–4.8)
LYMPHOCYTES NFR BLD AUTO: 21.2 %
MCH RBC QN AUTO: 32.7 PG (ref 26–33)
MCHC RBC AUTO-ENTMCNC: 33.5 GM/DL (ref 32.2–35.5)
MCV RBC AUTO: 97.6 FL (ref 81–99)
MISCELLANEOUS 2: ABNORMAL
MONOCYTES ABSOLUTE: 0.4 THOU/MM3 (ref 0.4–1.3)
MONOCYTES NFR BLD AUTO: 9.3 %
NEUTROPHILS NFR BLD AUTO: 63.5 %
NITRITE UR QL STRIP: POSITIVE
NRBC BLD AUTO-RTO: 0 /100 WBC
NT-PROBNP SERPL IA-MCNC: 88.9 PG/ML (ref 0–449)
OSMOLALITY SERPL CALC.SUM OF ELEC: 262.2 MOSMOL/KG (ref 275–300)
PH UR STRIP.AUTO: 6.5 [PH] (ref 5–9)
PLATELET # BLD AUTO: 168 THOU/MM3 (ref 130–400)
PMV BLD AUTO: 10.6 FL (ref 9.4–12.4)
POTASSIUM SERPL-SCNC: 4 MEQ/L (ref 3.5–5.2)
PROT SERPL-MCNC: 6.9 G/DL (ref 6.1–8)
PROT UR STRIP.AUTO-MCNC: NEGATIVE MG/DL
RBC # BLD AUTO: 4.25 MILL/MM3 (ref 4.2–5.4)
RBC URINE: ABNORMAL /HPF
RENAL EPI CELLS #/AREA URNS HPF: ABNORMAL /[HPF]
SEGMENTED NEUTROPHILS ABSOLUTE COUNT: 2.5 THOU/MM3 (ref 1.8–7.7)
SODIUM SERPL-SCNC: 131 MEQ/L (ref 135–145)
SP GR UR REFRACT.AUTO: 1.01 (ref 1–1.03)
TROPONIN, HIGH SENSITIVITY: 9 NG/L (ref 0–12)
UROBILINOGEN, URINE: 0.2 EU/DL (ref 0–1)
WBC # BLD AUTO: 3.9 THOU/MM3 (ref 4.8–10.8)
WBC #/AREA URNS HPF: > 200 /HPF
WBC #/AREA URNS HPF: ABNORMAL /[HPF]
YEAST LIKE FUNGI URNS QL MICRO: ABNORMAL

## 2023-08-07 PROCEDURE — 74177 CT ABD & PELVIS W/CONTRAST: CPT

## 2023-08-07 PROCEDURE — 87186 SC STD MICRODIL/AGAR DIL: CPT

## 2023-08-07 PROCEDURE — 2580000003 HC RX 258: Performed by: STUDENT IN AN ORGANIZED HEALTH CARE EDUCATION/TRAINING PROGRAM

## 2023-08-07 PROCEDURE — 80053 COMPREHEN METABOLIC PANEL: CPT

## 2023-08-07 PROCEDURE — 6370000000 HC RX 637 (ALT 250 FOR IP)

## 2023-08-07 PROCEDURE — 93005 ELECTROCARDIOGRAM TRACING: CPT | Performed by: STUDENT IN AN ORGANIZED HEALTH CARE EDUCATION/TRAINING PROGRAM

## 2023-08-07 PROCEDURE — 99285 EMERGENCY DEPT VISIT HI MDM: CPT

## 2023-08-07 PROCEDURE — 83880 ASSAY OF NATRIURETIC PEPTIDE: CPT

## 2023-08-07 PROCEDURE — 81001 URINALYSIS AUTO W/SCOPE: CPT

## 2023-08-07 PROCEDURE — 96360 HYDRATION IV INFUSION INIT: CPT

## 2023-08-07 PROCEDURE — 87086 URINE CULTURE/COLONY COUNT: CPT

## 2023-08-07 PROCEDURE — 83690 ASSAY OF LIPASE: CPT

## 2023-08-07 PROCEDURE — 87077 CULTURE AEROBIC IDENTIFY: CPT

## 2023-08-07 PROCEDURE — 6360000004 HC RX CONTRAST MEDICATION: Performed by: STUDENT IN AN ORGANIZED HEALTH CARE EDUCATION/TRAINING PROGRAM

## 2023-08-07 PROCEDURE — 84484 ASSAY OF TROPONIN QUANT: CPT

## 2023-08-07 PROCEDURE — 85025 COMPLETE CBC W/AUTO DIFF WBC: CPT

## 2023-08-07 PROCEDURE — 36415 COLL VENOUS BLD VENIPUNCTURE: CPT

## 2023-08-07 RX ORDER — ONDANSETRON 4 MG/1
4 TABLET, ORALLY DISINTEGRATING ORAL ONCE
Status: COMPLETED | OUTPATIENT
Start: 2023-08-07 | End: 2023-08-07

## 2023-08-07 RX ORDER — 0.9 % SODIUM CHLORIDE 0.9 %
500 INTRAVENOUS SOLUTION INTRAVENOUS ONCE
Status: COMPLETED | OUTPATIENT
Start: 2023-08-07 | End: 2023-08-07

## 2023-08-07 RX ORDER — GRANULES FOR ORAL 3 G/1
3 POWDER ORAL ONCE
Status: COMPLETED | OUTPATIENT
Start: 2023-08-07 | End: 2023-08-07

## 2023-08-07 RX ORDER — ENALAPRIL MALEATE 10 MG/1
10 TABLET ORAL DAILY
Qty: 90 TABLET | Refills: 3 | Status: SHIPPED | OUTPATIENT
Start: 2023-08-07

## 2023-08-07 RX ORDER — ONDANSETRON 4 MG/1
4 TABLET, ORALLY DISINTEGRATING ORAL EVERY 8 HOURS PRN
Qty: 21 TABLET | Refills: 0 | Status: SHIPPED | OUTPATIENT
Start: 2023-08-07

## 2023-08-07 RX ORDER — ONDANSETRON 4 MG/1
4 TABLET, FILM COATED ORAL 3 TIMES DAILY PRN
Qty: 30 TABLET | Refills: 0 | Status: SHIPPED | OUTPATIENT
Start: 2023-08-07 | End: 2023-08-07

## 2023-08-07 RX ORDER — GRANULES FOR ORAL 3 G/1
3 POWDER ORAL
Qty: 2 EACH | Refills: 0 | Status: SHIPPED | OUTPATIENT
Start: 2023-08-07 | End: 2023-08-10

## 2023-08-07 RX ADMIN — ONDANSETRON 4 MG: 4 TABLET, ORALLY DISINTEGRATING ORAL at 17:15

## 2023-08-07 RX ADMIN — SODIUM CHLORIDE 500 ML: 9 INJECTION, SOLUTION INTRAVENOUS at 17:17

## 2023-08-07 RX ADMIN — IOPAMIDOL 80 ML: 755 INJECTION, SOLUTION INTRAVENOUS at 17:52

## 2023-08-07 RX ADMIN — GRANULES FOR ORAL SOLUTION 1 PACKET: 3 POWDER ORAL at 19:36

## 2023-08-07 ASSESSMENT — PAIN - FUNCTIONAL ASSESSMENT
PAIN_FUNCTIONAL_ASSESSMENT: NONE - DENIES PAIN
PAIN_FUNCTIONAL_ASSESSMENT: 0-10

## 2023-08-07 ASSESSMENT — PAIN SCALES - GENERAL: PAINLEVEL_OUTOF10: 0

## 2023-08-07 NOTE — ED PROVIDER NOTES
[facility] DEPT  EMERGENCY DEPARTMENT ENCOUNTER          Pt Name: Yaw Gunter  MRN: [id number]  [address] [facility] 1940  Date of evaluation: 8/7/2023  Physician: Arabella Craft MD  Supervising Attending Physician: Tye Mendes DO      CHIEF COMPLAINT       Chief Complaint   Patient presents with    Nausea    Dizziness         HISTORY OF PRESENT ILLNESS    HPI  Yaw Gunter is a 78 y.o. female who presents to the emergency department from assisted living facility, by private vehicle for evaluation of Nausea    Patient has been complaining of nausea for the past 2 weeks she had some Zofran at home and final written out so she came to the ED. She states that the nausea is present all day and is not related to food intake. She denies any vomiting. She did report being constipated. Although her last bowel movement was this morning prior to presentation the 1 before that was several days ago. She is passing gas. She has history of multiple abdominal surgeries. She does report also increased urine frequency. She has a history of recurrent l UTIs with multidrug-resistant bacteria. The patient has no other acute complaints at [place] time.       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 Bill Lyons     GERD (gastroesophageal reflux disease)     Diverticulitis     Hiatal hernia     Hx of blood clots     Hypertension     BAKI    Hypothyroidism     Osteoarthritis     Urinary incontinence     UTI (urinary tract infection)      Past Surgical History:   Procedure Laterality Date    APPENDECTOMY  1958    BACK SURGERY      CARPAL TUNNEL RELEASE Bilateral START taking these medications    Details   fosfomycin tromethamine (MONUROL) 3 g PACK Take 1 packet by mouth every 48 hours for 2 doses, Disp-2 each, R-0Print      ondansetron (ZOFRAN-ODT) 4 MG disintegrating tablet Take 1 tablet by mouth every 8 hours as needed for Nausea or Vomiting, Disp-21 tablet, R-0Print      !! Misc. Devices MISC Disp-1 each, R-0, PrintCheck BP daily. Update office with BP readings on 8/10/23       ! ! - Potential duplicate medications found. Please discuss with provider. FINAL DISPOSITION   Medical Decision Making  61-year-old female presents complaining of nausea and urine frequency. Found to have UTI. Will be discharged on fosfomycin orally, she is known for multidrug-resistant UTIs with Klebsiella. CRITICAL CARE:  None    Shared Decision-Making was performed, disposition discussed with the patient/family and questions answered. Outpatient follow up (If applicable):  Mignon Mckeon, [place] - CNP  [address] [facility]   [facility] [id number] [phone]    Call   As needed        FINAL DIAGNOSES:  Final diagnoses:   Urinary tract infection without hematuria, site unspecified   Nausea       Condition: condition: good  Dispo: Discharge to nursing home  DISPOSITION Decision To Discharge 08/07/2023 07:57:51 PM      This transcription was electronically signed. It was dictated by use of voice recognition software and electronically transcribed. The transcription may contain errors not detected in proofreading.        Nyla Garcia MD  Resident  08/07/23 2304

## 2023-08-07 NOTE — TELEPHONE ENCOUNTER
Pt's daughter called with concern's of Elen's blood pressure. Elen's BP yesterday was 172/105 p 61 on left arm and 173/91 p 59 on right arm. Pt is currently taking  metoprolol succinate 50 mg 1 tablet daily. Spoke with pt's nurse, Rivka Corral, due to pt residing in assistant living BP is taken once monthly unless there is an order from the pt's PCP.       Please advise

## 2023-08-07 NOTE — TELEPHONE ENCOUNTER
Please call [facility] and ask them to check it daily   Lets increase her Enalapril to 10 mg daily  Continue Metoprolol at current dose  Is she having any chest pain, HA?

## 2023-08-07 NOTE — DISCHARGE INSTRUCTIONS
Take antibiotic as prescribed, every 48 hours with the first dose being given here in the ED. and for total of 3 doses.     Take Zofran as needed for nausea    Follow-up with PCP as needed, to discuss constipation     Return to ED in case of worsening symptoms, fevers, chills, confusion

## 2023-08-07 NOTE — ED TRIAGE NOTES
Pt presents to ED via private car from [facility] with chief complaint of nausea and dizziness. Pt states her nausea started a week ago and dizziness started prior to arrival. Denies vomiting. Denies pain. EKG completed.

## 2023-08-08 PROCEDURE — 93010 ELECTROCARDIOGRAM REPORT: CPT | Performed by: INTERNAL MEDICINE

## 2023-08-10 LAB
BACTERIA UR CULT: ABNORMAL
BACTERIA UR CULT: ABNORMAL
ORGANISM: ABNORMAL
ORGANISM: ABNORMAL

## 2023-08-11 LAB
EKG ATRIAL RATE: 84 BPM
EKG P AXIS: 110 DEGREES
EKG P-R INTERVAL: 162 MS
EKG Q-T INTERVAL: 356 MS
EKG QRS DURATION: 72 MS
EKG QTC CALCULATION (BAZETT): 420 MS
EKG R AXIS: 7 DEGREES
EKG T AXIS: 58 DEGREES
EKG VENTRICULAR RATE: 84 BPM

## 2023-08-14 DIAGNOSIS — M79.7 FIBROMYALGIA: ICD-10-CM

## 2023-08-14 NOTE — TELEPHONE ENCOUNTER
OARRS reviewed. UDS n/a. Last seen: 6/1/2023.  Follow-up:   Future Appointments   Date Time Provider [address]   9/27/2023 12:00 PM Loida Plunkett APRN - [address] [facility]   10/19/2023  2:40 PM DO GUILLERMINA Pina SRPX Pain [facility]   4/12/2024  1:30 PM Martin Pitts MD N [address] [facility],  [address]

## 2023-08-14 NOTE — TELEPHONE ENCOUNTER
This medication refill is regarding a telephone request. Refill requested by family member. Requested Prescriptions     Pending Prescriptions Disp Refills    pregabalin (LYRICA) 150 MG capsule 180 capsule 1     Sig: Take 1 capsule by mouth 2 times daily for 180 days. Date of last visit: 3/27/2023   Date of next visit: 9/27/2023  Date of last refill: 1/13/2023  180/1    Last CMP:   Lab Results   Component Value Date     (L) 08/07/2023    K 4.0 08/07/2023    CL 94 (L) 08/07/2023    CO2 26 08/07/2023    BUN 10 08/07/2023    CREATININE 0.6 08/07/2023    GLUCOSE 108 08/07/2023    CALCIUM 9.1 08/07/2023    PROT 6.9 08/07/2023    LABALBU 3.5 08/07/2023    BILITOT 0.4 08/07/2023    ALKPHOS 124 08/07/2023    AST 34 08/07/2023    ALT 20 08/07/2023    LABGLOM >60 08/07/2023    AGRATIO 1.4 (L) 05/12/2022       Last Thyroid:    Lab Results   Component Value Date    TSH 3.430 12/04/2021    T4FREE 1.38 12/04/2021     Rx verified, ordered and set to EP.

## 2023-08-14 NOTE — TELEPHONE ENCOUNTER
Jc BERNAL Height called requesting a refill on the following medications:  Requested Prescriptions     Pending Prescriptions Disp Refills    diclofenac (VOLTAREN) 50 MG EC tablet 90 tablet 2     Sig: Take 1 tablet by mouth 3 times daily (with meals)     [facility]   . pv      Date of last visit:   Date of next visit (if applicable): 24/93/2706

## 2023-08-15 RX ORDER — PREGABALIN 150 MG/1
150 CAPSULE ORAL 2 TIMES DAILY
Qty: 180 CAPSULE | Refills: 1 | Status: SHIPPED | OUTPATIENT
Start: 2023-08-15 | End: 2024-02-11

## 2023-08-15 NOTE — PROGRESS NOTES
Pharmacy Note  ED Culture Follow-up    Edgar Gunter is a 78 y.o. female. Allergies: Ampicillin, Morphine, Pcn [penicillins], and Sulfa antibiotics     Current antimicrobials:   Reviewed patient's urine culture - culture positive for 2 strains of Kleb Pneumonia. Patient was discharged on fosfomycin. Antibiotic prescribed at discharge is appropriate - no changes made to antibiotic regimen. Please call with any questions.  Sonny Tucker, [address] [place] [address], PharmD 5:34 PM 8/15/2023

## 2023-08-18 ENCOUNTER — HOSPITAL ENCOUNTER (EMERGENCY)
Age: 83
Discharge: HOME | End: 2023-08-18
Attending: FAMILY MEDICINE
Payer: MEDICARE

## 2023-08-18 ENCOUNTER — APPOINTMENT (OUTPATIENT)
Dept: GENERAL RADIOLOGY | Age: 83
End: 2023-08-18
Payer: MEDICARE

## 2023-08-18 VITALS
DIASTOLIC BLOOD PRESSURE: 87 MMHG | RESPIRATION RATE: 16 BRPM | TEMPERATURE: 97.8 F | OXYGEN SATURATION: 100 % | WEIGHT: 150 LBS | BODY MASS INDEX: 22.15 KG/M2 | HEART RATE: 60 BPM | SYSTOLIC BLOOD PRESSURE: 183 MMHG

## 2023-08-18 DIAGNOSIS — N39.0 URINARY TRACT INFECTION WITH HEMATURIA, SITE UNSPECIFIED: Primary | ICD-10-CM

## 2023-08-18 DIAGNOSIS — R31.9 URINARY TRACT INFECTION WITH HEMATURIA, SITE UNSPECIFIED: Primary | ICD-10-CM

## 2023-08-18 DIAGNOSIS — R33.9 URINARY RETENTION: ICD-10-CM

## 2023-08-18 DIAGNOSIS — Z16.24 MULTIPLE DRUG RESISTANT ORGANISM (MDRO) CULTURE POSITIVE: ICD-10-CM

## 2023-08-18 DIAGNOSIS — R10.84 GENERALIZED ABDOMINAL PAIN: ICD-10-CM

## 2023-08-18 LAB
ALBUMIN SERPL BCG-MCNC: 3.5 G/DL (ref 3.5–5.1)
ALP SERPL-CCNC: 112 U/L (ref 38–126)
ALT SERPL W/O P-5'-P-CCNC: 25 U/L (ref 11–66)
ANION GAP SERPL CALC-SCNC: 11 MEQ/L (ref 8–16)
AST SERPL-CCNC: 41 U/L (ref 5–40)
BACTERIA URNS QL MICRO: ABNORMAL /HPF
BASOPHILS ABSOLUTE: 0.1 THOU/MM3 (ref 0–0.1)
BASOPHILS NFR BLD AUTO: 2.4 %
BILIRUB CONJ SERPL-MCNC: < 0.2 MG/DL (ref 0–0.3)
BILIRUB SERPL-MCNC: 0.4 MG/DL (ref 0.3–1.2)
BILIRUB UR QL STRIP.AUTO: NEGATIVE
BUN SERPL-MCNC: 17 MG/DL (ref 7–22)
CALCIUM SERPL-MCNC: 9.2 MG/DL (ref 8.5–10.5)
CASTS #/AREA URNS LPF: ABNORMAL /LPF
CASTS 2: ABNORMAL /LPF
CHARACTER UR: ABNORMAL
CHLORIDE SERPL-SCNC: 94 MEQ/L (ref 98–111)
CO2 SERPL-SCNC: 25 MEQ/L (ref 23–33)
COLOR: YELLOW
CREAT SERPL-MCNC: 0.6 MG/DL (ref 0.4–1.2)
CRYSTALS URNS MICRO: ABNORMAL
DEPRECATED RDW RBC AUTO: 49.6 FL (ref 35–45)
EKG ATRIAL RATE: 56 BPM
EKG P AXIS: 51 DEGREES
EKG P-R INTERVAL: 180 MS
EKG Q-T INTERVAL: 434 MS
EKG QRS DURATION: 70 MS
EKG QTC CALCULATION (BAZETT): 418 MS
EKG R AXIS: 16 DEGREES
EKG T AXIS: 21 DEGREES
EKG VENTRICULAR RATE: 56 BPM
EOSINOPHIL NFR BLD AUTO: 4.6 %
EOSINOPHILS ABSOLUTE: 0.2 THOU/MM3 (ref 0–0.4)
EPITHELIAL CELLS, UA: ABNORMAL /HPF
ERYTHROCYTE [DISTWIDTH] IN BLOOD BY AUTOMATED COUNT: 13.4 % (ref 11.5–14.5)
GFR SERPL CREATININE-BSD FRML MDRD: > 60 ML/MIN/1.73M2
GLUCOSE SERPL-MCNC: 92 MG/DL (ref 70–108)
GLUCOSE UR QL STRIP.AUTO: NEGATIVE MG/DL
HCT VFR BLD AUTO: 41.5 % (ref 37–47)
HGB BLD-MCNC: 13.6 GM/DL (ref 12–16)
HGB UR QL STRIP.AUTO: NEGATIVE
IMM GRANULOCYTES # BLD AUTO: 0.01 THOU/MM3 (ref 0–0.07)
IMM GRANULOCYTES NFR BLD AUTO: 0.2 %
INR PPP: 0.83 (ref 0.85–1.13)
KETONES UR QL STRIP.AUTO: ABNORMAL
LACTIC ACID, SEPSIS: 0.8 MMOL/L (ref 0.5–1.9)
LIPASE SERPL-CCNC: 25.3 U/L (ref 5.6–51.3)
LYMPHOCYTES ABSOLUTE: 0.9 THOU/MM3 (ref 1–4.8)
LYMPHOCYTES NFR BLD AUTO: 19.6 %
MCH RBC QN AUTO: 32.9 PG (ref 26–33)
MCHC RBC AUTO-ENTMCNC: 32.8 GM/DL (ref 32.2–35.5)
MCV RBC AUTO: 100.2 FL (ref 81–99)
MISCELLANEOUS 2: ABNORMAL
MONOCYTES ABSOLUTE: 0.5 THOU/MM3 (ref 0.4–1.3)
MONOCYTES NFR BLD AUTO: 10.6 %
NEUTROPHILS NFR BLD AUTO: 62.6 %
NITRITE UR QL STRIP: NEGATIVE
NRBC BLD AUTO-RTO: 0 /100 WBC
NT-PROBNP SERPL IA-MCNC: 88.3 PG/ML (ref 0–449)
OSMOLALITY SERPL CALC.SUM OF ELEC: 262 MOSMOL/KG (ref 275–300)
PH UR STRIP.AUTO: 6.5 [PH] (ref 5–9)
PLATELET # BLD AUTO: 138 THOU/MM3 (ref 130–400)
PMV BLD AUTO: 10.6 FL (ref 9.4–12.4)
POTASSIUM SERPL-SCNC: 4.1 MEQ/L (ref 3.5–5.2)
PROT SERPL-MCNC: 6.7 G/DL (ref 6.1–8)
PROT UR STRIP.AUTO-MCNC: NEGATIVE MG/DL
RBC # BLD AUTO: 4.14 MILL/MM3 (ref 4.2–5.4)
RBC URINE: ABNORMAL /HPF
RENAL EPI CELLS #/AREA URNS HPF: ABNORMAL /[HPF]
SEGMENTED NEUTROPHILS ABSOLUTE COUNT: 2.8 THOU/MM3 (ref 1.8–7.7)
SODIUM SERPL-SCNC: 130 MEQ/L (ref 135–145)
SP GR UR REFRACT.AUTO: 1.01 (ref 1–1.03)
TROPONIN, HIGH SENSITIVITY: 9 NG/L (ref 0–12)
UROBILINOGEN, URINE: 1 EU/DL (ref 0–1)
WBC # BLD AUTO: 4.5 THOU/MM3 (ref 4.8–10.8)
WBC #/AREA URNS HPF: ABNORMAL /HPF
WBC #/AREA URNS HPF: ABNORMAL /[HPF]
YEAST LIKE FUNGI URNS QL MICRO: ABNORMAL

## 2023-08-18 PROCEDURE — 99285 EMERGENCY DEPT VISIT HI MDM: CPT

## 2023-08-18 PROCEDURE — 83690 ASSAY OF LIPASE: CPT

## 2023-08-18 PROCEDURE — 84484 ASSAY OF TROPONIN QUANT: CPT

## 2023-08-18 PROCEDURE — 36415 COLL VENOUS BLD VENIPUNCTURE: CPT

## 2023-08-18 PROCEDURE — 87086 URINE CULTURE/COLONY COUNT: CPT

## 2023-08-18 PROCEDURE — 83605 ASSAY OF LACTIC ACID: CPT

## 2023-08-18 PROCEDURE — 87186 SC STD MICRODIL/AGAR DIL: CPT

## 2023-08-18 PROCEDURE — 81001 URINALYSIS AUTO W/SCOPE: CPT

## 2023-08-18 PROCEDURE — 2580000003 HC RX 258: Performed by: FAMILY MEDICINE

## 2023-08-18 PROCEDURE — 85610 PROTHROMBIN TIME: CPT

## 2023-08-18 PROCEDURE — 82248 BILIRUBIN DIRECT: CPT

## 2023-08-18 PROCEDURE — 93005 ELECTROCARDIOGRAM TRACING: CPT | Performed by: FAMILY MEDICINE

## 2023-08-18 PROCEDURE — 71045 X-RAY EXAM CHEST 1 VIEW: CPT

## 2023-08-18 PROCEDURE — 85025 COMPLETE CBC W/AUTO DIFF WBC: CPT

## 2023-08-18 PROCEDURE — 87077 CULTURE AEROBIC IDENTIFY: CPT

## 2023-08-18 PROCEDURE — 83880 ASSAY OF NATRIURETIC PEPTIDE: CPT

## 2023-08-18 PROCEDURE — 93010 ELECTROCARDIOGRAM REPORT: CPT | Performed by: INTERNAL MEDICINE

## 2023-08-18 PROCEDURE — 80053 COMPREHEN METABOLIC PANEL: CPT

## 2023-08-18 PROCEDURE — 6370000000 HC RX 637 (ALT 250 FOR IP): Performed by: FAMILY MEDICINE

## 2023-08-18 RX ORDER — GRANULES FOR ORAL 3 G/1
POWDER ORAL
Qty: 2 EACH | Refills: 0 | Status: ON HOLD | OUTPATIENT
Start: 2023-08-18

## 2023-08-18 RX ORDER — GRANULES FOR ORAL 3 G/1
3 POWDER ORAL ONCE
Status: COMPLETED | OUTPATIENT
Start: 2023-08-18 | End: 2023-08-18

## 2023-08-18 RX ORDER — 0.9 % SODIUM CHLORIDE 0.9 %
500 INTRAVENOUS SOLUTION INTRAVENOUS ONCE
Status: COMPLETED | OUTPATIENT
Start: 2023-08-18 | End: 2023-08-18

## 2023-08-18 RX ADMIN — GRANULES FOR ORAL SOLUTION 1 PACKET: 3 POWDER ORAL at 16:54

## 2023-08-18 RX ADMIN — SODIUM CHLORIDE 500 ML: 9 INJECTION, SOLUTION INTRAVENOUS at 15:23

## 2023-08-18 ASSESSMENT — PAIN SCALES - GENERAL: PAINLEVEL_OUTOF10: 2

## 2023-08-18 ASSESSMENT — PAIN - FUNCTIONAL ASSESSMENT: PAIN_FUNCTIONAL_ASSESSMENT: 0-10

## 2023-08-18 NOTE — DISCHARGE INSTRUCTIONS
WE DRAINED ALMOST A LITER OF URINE FROM YOUR BLADDER. TAKE FOSFOMYCIN ANTIBIOTICS (3 grams pack) ON Sunday, AND THEN AGAIN ON Monday.

## 2023-08-18 NOTE — ED NOTES
Bladder scan completed with 700 ml noted in bladder. Pt straight cath'd for urine sample at this time. Pt states she has not peed since she went to bed yesterday. States she also did not have urge to void. Pt in stable condition with call light in reach.       Shanna Hadley RN  08/18/23 1548

## 2023-08-18 NOTE — ED PROVIDER NOTES
capsule by mouth 2 times daily as needed for Constipation 180 capsule 3    ferrous sulfate (IRON 325) 325 (65 Fe) MG tablet Take 1 tablet by mouth 2 times daily 180 tablet 1    meloxicam (MOBIC) 15 MG tablet Take 1 tablet by mouth daily 60 tablet 0    pantoprazole (PROTONIX) 40 MG tablet Take 1 tablet by mouth 2 times daily (before meals) 180 tablet 3    Cholecalciferol (VITAMIN D3) 50 MCG (2000 UT) CAPS Take by mouth      calcium-vitamin D (OSCAL-500) 500-200 MG-UNIT per tablet Take 2 tablets by mouth daily      Misc. Devices (WALKER) MISC 1 each by Does not apply route daily 1 each 0    Misc.  Devices MISC Mechanical lift for a Van 1 Device 0    Scooter MISC by Does not apply route Power scooter 1 each 0    fluticasone (FLONASE) 50 MCG/ACT nasal spray 1 spray by Nasal route as needed for Rhinitis      therapeutic multivitamin-minerals (THERAGRAN-M) tablet Take 1 tablet by mouth nightly          ALLERGIES   Allergies   Allergen Reactions    Ampicillin     Morphine Other (See Comments)     Hallucinations     Pcn [Penicillins] Itching    Sulfa Antibiotics         SOCIAL AND FAMILY HISTORY   Social History     Socioeconomic History    Marital status:      Spouse name: Almita Staton    Number of children: None    Years of education: 14    Highest education level: Associate degree: academic program   Tobacco Use    Smoking status: Former     Packs/day: 2.00     Years: 11.00     Pack years: 22.00     Types: Cigarettes     Start date: 1957     Quit date: 1967     Years since quittin.8    Smokeless tobacco: Never   Vaping Use    Vaping Use: Never used   Substance and Sexual Activity    Alcohol use: Yes     Comment: wine with dinner    Drug use: No    Sexual activity: Not Currently     Partners: Male   Social History Narrative    Referral to AAA placed    Referral placed to [facility]    No barriers with medication affordability    No barriers with transportation    Discussed support from insurance changed from prior study. Old healed granulomatous disease. Left shoulder prosthesis. 2. Increased parenchymal density left lung base along left hemidiaphragm, consistent with moderate atelectasis/pneumonia. Small effusion left side. **This report has been created using voice recognition software. It may contain minor errors which are inherent in voice recognition technology. **      Final report electronically signed by Dr. Jaycob Herrera on 8/18/2023 3:02 PM          LABS  Labs Reviewed   BASIC METABOLIC PANEL - Abnormal; Notable for the following components:       Result Value    Sodium 130 (*)     Chloride 94 (*)     All other components within normal limits   CBC WITH AUTO DIFFERENTIAL - Abnormal; Notable for the following components:    WBC 4.5 (*)     RBC 4.14 (*)     .2 (*)     RDW-SD 49.6 (*)     Lymphocytes Absolute 0.9 (*)     All other components within normal limits   HEPATIC FUNCTION PANEL - Abnormal; Notable for the following components:    AST 41 (*)     All other components within normal limits   PROTIME-INR - Abnormal; Notable for the following components:    INR 0.83 (*)     All other components within normal limits   OSMOLALITY - Abnormal; Notable for the following components:    Osmolality Calc 262.0 (*)     All other components within normal limits   URINE WITH REFLEXED MICRO - Abnormal; Notable for the following components:    Ketones, Urine TRACE (*)     Leukocyte Esterase, Urine MODERATE (*)     Character, Urine CLOUDY (*)     All other components within normal limits   CULTURE, REFLEXED, URINE    Narrative:     Source: urine, clean catch       Site:           Current Antibiotics: not stated   LACTATE, SEPSIS   TROPONIN   BRAIN NATRIURETIC PEPTIDE   LIPASE   ANION GAP   GLOMERULAR FILTRATION RATE, ESTIMATED       INITIAL IMPRESSION:  Possible hiatal hernia vs obstruction vs pancreatitis (without vomiting).  Pt has known resistant UTI, so unclear if she has worsening

## 2023-08-19 ENCOUNTER — APPOINTMENT (OUTPATIENT)
Dept: CT IMAGING | Age: 83
DRG: 690 | End: 2023-08-19
Payer: MEDICARE

## 2023-08-19 ENCOUNTER — HOSPITAL ENCOUNTER (INPATIENT)
Age: 83
LOS: 7 days | Discharge: NURSING, REHAB OR LONG-TERM CARE | DRG: 690 | End: 2023-08-29
Attending: EMERGENCY MEDICINE | Admitting: STUDENT IN AN ORGANIZED HEALTH CARE EDUCATION/TRAINING PROGRAM
Payer: MEDICARE

## 2023-08-19 ENCOUNTER — APPOINTMENT (OUTPATIENT)
Dept: GENERAL RADIOLOGY | Age: 83
DRG: 690 | End: 2023-08-19
Payer: MEDICARE

## 2023-08-19 DIAGNOSIS — R31.9 URINARY TRACT INFECTION WITH HEMATURIA, SITE UNSPECIFIED: ICD-10-CM

## 2023-08-19 DIAGNOSIS — R33.8 ACUTE URINARY RETENTION: Primary | ICD-10-CM

## 2023-08-19 DIAGNOSIS — N39.0 URINARY TRACT INFECTION WITH HEMATURIA, SITE UNSPECIFIED: ICD-10-CM

## 2023-08-19 LAB
ALBUMIN SERPL BCG-MCNC: 3.5 G/DL (ref 3.5–5.1)
ALP SERPL-CCNC: 104 U/L (ref 38–126)
ALT SERPL W/O P-5'-P-CCNC: 24 U/L (ref 11–66)
ANION GAP SERPL CALC-SCNC: 10 MEQ/L (ref 8–16)
AST SERPL-CCNC: 39 U/L (ref 5–40)
BACTERIA URNS QL MICRO: ABNORMAL /HPF
BASOPHILS ABSOLUTE: 0.1 THOU/MM3 (ref 0–0.1)
BASOPHILS NFR BLD AUTO: 1.9 %
BILIRUB CONJ SERPL-MCNC: < 0.2 MG/DL (ref 0–0.3)
BILIRUB SERPL-MCNC: 0.5 MG/DL (ref 0.3–1.2)
BILIRUB UR QL STRIP.AUTO: NEGATIVE
BUN SERPL-MCNC: 10 MG/DL (ref 7–22)
CALCIUM SERPL-MCNC: 8.9 MG/DL (ref 8.5–10.5)
CASTS #/AREA URNS LPF: ABNORMAL /LPF
CASTS 2: ABNORMAL /LPF
CHARACTER UR: CLEAR
CHLORIDE SERPL-SCNC: 95 MEQ/L (ref 98–111)
CO2 SERPL-SCNC: 27 MEQ/L (ref 23–33)
COLOR: YELLOW
CREAT SERPL-MCNC: 0.4 MG/DL (ref 0.4–1.2)
CRYSTALS URNS MICRO: ABNORMAL
DEPRECATED RDW RBC AUTO: 47.8 FL (ref 35–45)
EOSINOPHIL NFR BLD AUTO: 5 %
EOSINOPHILS ABSOLUTE: 0.2 THOU/MM3 (ref 0–0.4)
EPITHELIAL CELLS, UA: ABNORMAL /HPF
ERYTHROCYTE [DISTWIDTH] IN BLOOD BY AUTOMATED COUNT: 13.2 % (ref 11.5–14.5)
GFR SERPL CREATININE-BSD FRML MDRD: > 60 ML/MIN/1.73M2
GLUCOSE SERPL-MCNC: 86 MG/DL (ref 70–108)
GLUCOSE UR QL STRIP.AUTO: NEGATIVE MG/DL
HCT VFR BLD AUTO: 40.9 % (ref 37–47)
HGB BLD-MCNC: 13.6 GM/DL (ref 12–16)
HGB UR QL STRIP.AUTO: ABNORMAL
IMM GRANULOCYTES # BLD AUTO: 0.01 THOU/MM3 (ref 0–0.07)
IMM GRANULOCYTES NFR BLD AUTO: 0.3 %
KETONES UR QL STRIP.AUTO: ABNORMAL
LIPASE SERPL-CCNC: 20.6 U/L (ref 5.6–51.3)
LYMPHOCYTES ABSOLUTE: 0.8 THOU/MM3 (ref 1–4.8)
LYMPHOCYTES NFR BLD AUTO: 25.4 %
MAGNESIUM SERPL-MCNC: 1.4 MG/DL (ref 1.6–2.4)
MCH RBC QN AUTO: 32.6 PG (ref 26–33)
MCHC RBC AUTO-ENTMCNC: 33.3 GM/DL (ref 32.2–35.5)
MCV RBC AUTO: 98.1 FL (ref 81–99)
MISCELLANEOUS 2: ABNORMAL
MONOCYTES ABSOLUTE: 0.4 THOU/MM3 (ref 0.4–1.3)
MONOCYTES NFR BLD AUTO: 11.5 %
NEUTROPHILS NFR BLD AUTO: 55.9 %
NITRITE UR QL STRIP: POSITIVE
NRBC BLD AUTO-RTO: 0 /100 WBC
NT-PROBNP SERPL IA-MCNC: 193.7 PG/ML (ref 0–449)
OSMOLALITY SERPL CALC.SUM OF ELEC: 262.9 MOSMOL/KG (ref 275–300)
PH UR STRIP.AUTO: 6.5 [PH] (ref 5–9)
PLATELET # BLD AUTO: 140 THOU/MM3 (ref 130–400)
PMV BLD AUTO: 10.6 FL (ref 9.4–12.4)
POTASSIUM SERPL-SCNC: 3.9 MEQ/L (ref 3.5–5.2)
PROT SERPL-MCNC: 6.6 G/DL (ref 6.1–8)
PROT UR STRIP.AUTO-MCNC: NEGATIVE MG/DL
RBC # BLD AUTO: 4.17 MILL/MM3 (ref 4.2–5.4)
RBC URINE: ABNORMAL /HPF
RENAL EPI CELLS #/AREA URNS HPF: ABNORMAL /[HPF]
SEGMENTED NEUTROPHILS ABSOLUTE COUNT: 1.8 THOU/MM3 (ref 1.8–7.7)
SODIUM SERPL-SCNC: 132 MEQ/L (ref 135–145)
SP GR UR REFRACT.AUTO: 1.01 (ref 1–1.03)
TROPONIN, HIGH SENSITIVITY: 10 NG/L (ref 0–12)
UROBILINOGEN, URINE: 0.2 EU/DL (ref 0–1)
WBC # BLD AUTO: 3.2 THOU/MM3 (ref 4.8–10.8)
WBC #/AREA URNS HPF: ABNORMAL /HPF
WBC #/AREA URNS HPF: ABNORMAL /[HPF]
YEAST LIKE FUNGI URNS QL MICRO: ABNORMAL

## 2023-08-19 PROCEDURE — 87086 URINE CULTURE/COLONY COUNT: CPT

## 2023-08-19 PROCEDURE — 36415 COLL VENOUS BLD VENIPUNCTURE: CPT

## 2023-08-19 PROCEDURE — 51798 US URINE CAPACITY MEASURE: CPT

## 2023-08-19 PROCEDURE — 80053 COMPREHEN METABOLIC PANEL: CPT

## 2023-08-19 PROCEDURE — 83690 ASSAY OF LIPASE: CPT

## 2023-08-19 PROCEDURE — 93005 ELECTROCARDIOGRAM TRACING: CPT | Performed by: EMERGENCY MEDICINE

## 2023-08-19 PROCEDURE — 2580000003 HC RX 258: Performed by: STUDENT IN AN ORGANIZED HEALTH CARE EDUCATION/TRAINING PROGRAM

## 2023-08-19 PROCEDURE — 87040 BLOOD CULTURE FOR BACTERIA: CPT

## 2023-08-19 PROCEDURE — 84484 ASSAY OF TROPONIN QUANT: CPT

## 2023-08-19 PROCEDURE — 85025 COMPLETE CBC W/AUTO DIFF WBC: CPT

## 2023-08-19 PROCEDURE — 82248 BILIRUBIN DIRECT: CPT

## 2023-08-19 PROCEDURE — 6360000002 HC RX W HCPCS: Performed by: STUDENT IN AN ORGANIZED HEALTH CARE EDUCATION/TRAINING PROGRAM

## 2023-08-19 PROCEDURE — 99285 EMERGENCY DEPT VISIT HI MDM: CPT

## 2023-08-19 PROCEDURE — 87186 SC STD MICRODIL/AGAR DIL: CPT

## 2023-08-19 PROCEDURE — 81001 URINALYSIS AUTO W/SCOPE: CPT

## 2023-08-19 PROCEDURE — G0378 HOSPITAL OBSERVATION PER HR: HCPCS

## 2023-08-19 PROCEDURE — 83735 ASSAY OF MAGNESIUM: CPT

## 2023-08-19 PROCEDURE — 6360000004 HC RX CONTRAST MEDICATION: Performed by: EMERGENCY MEDICINE

## 2023-08-19 PROCEDURE — 99223 1ST HOSP IP/OBS HIGH 75: CPT | Performed by: STUDENT IN AN ORGANIZED HEALTH CARE EDUCATION/TRAINING PROGRAM

## 2023-08-19 PROCEDURE — 87077 CULTURE AEROBIC IDENTIFY: CPT

## 2023-08-19 PROCEDURE — 71046 X-RAY EXAM CHEST 2 VIEWS: CPT

## 2023-08-19 PROCEDURE — 74177 CT ABD & PELVIS W/CONTRAST: CPT

## 2023-08-19 PROCEDURE — 83880 ASSAY OF NATRIURETIC PEPTIDE: CPT

## 2023-08-19 RX ORDER — LEVOTHYROXINE SODIUM 0.1 MG/1
100 TABLET ORAL DAILY
Status: DISCONTINUED | OUTPATIENT
Start: 2023-08-20 | End: 2023-08-21

## 2023-08-19 RX ORDER — CIPROFLOXACIN 2 MG/ML
INJECTION, SOLUTION INTRAVENOUS
Status: DISCONTINUED
Start: 2023-08-19 | End: 2023-08-19 | Stop reason: WASHOUT

## 2023-08-19 RX ORDER — MELOXICAM 15 MG/1
15 TABLET ORAL DAILY
Status: ON HOLD | COMMUNITY
End: 2023-08-29 | Stop reason: HOSPADM

## 2023-08-19 RX ORDER — ACETAMINOPHEN 650 MG/1
650 SUPPOSITORY RECTAL EVERY 6 HOURS PRN
Status: DISCONTINUED | OUTPATIENT
Start: 2023-08-19 | End: 2023-08-29 | Stop reason: HOSPADM

## 2023-08-19 RX ORDER — GUAIFENESIN/DEXTROMETHORPHAN 100-10MG/5
5 SYRUP ORAL 3 TIMES DAILY PRN
COMMUNITY

## 2023-08-19 RX ORDER — ACETAMINOPHEN 325 MG/1
650 TABLET ORAL EVERY 6 HOURS PRN
Status: DISCONTINUED | OUTPATIENT
Start: 2023-08-19 | End: 2023-08-29 | Stop reason: HOSPADM

## 2023-08-19 RX ORDER — METOPROLOL SUCCINATE 50 MG/1
50 TABLET, EXTENDED RELEASE ORAL DAILY
Status: DISCONTINUED | OUTPATIENT
Start: 2023-08-20 | End: 2023-08-29 | Stop reason: HOSPADM

## 2023-08-19 RX ORDER — CIPROFLOXACIN 2 MG/ML
400 INJECTION, SOLUTION INTRAVENOUS ONCE
Status: DISCONTINUED | OUTPATIENT
Start: 2023-08-19 | End: 2023-08-19

## 2023-08-19 RX ORDER — FLUTICASONE PROPIONATE 50 MCG
1 SPRAY, SUSPENSION (ML) NASAL DAILY PRN
Status: DISCONTINUED | OUTPATIENT
Start: 2023-08-19 | End: 2023-08-29 | Stop reason: HOSPADM

## 2023-08-19 RX ORDER — HYDROCODONE BITARTRATE AND ACETAMINOPHEN 5; 325 MG/1; MG/1
1 TABLET ORAL EVERY 6 HOURS PRN
Status: DISCONTINUED | OUTPATIENT
Start: 2023-08-19 | End: 2023-08-19

## 2023-08-19 RX ORDER — ENALAPRIL MALEATE 10 MG/1
10 TABLET ORAL DAILY
Status: DISCONTINUED | OUTPATIENT
Start: 2023-08-20 | End: 2023-08-19

## 2023-08-19 RX ORDER — TRAMADOL HYDROCHLORIDE 50 MG/1
50 TABLET ORAL EVERY 6 HOURS PRN
Status: DISCONTINUED | OUTPATIENT
Start: 2023-08-19 | End: 2023-08-19

## 2023-08-19 RX ORDER — BISACODYL 10 MG
10 SUPPOSITORY, RECTAL RECTAL DAILY
Status: DISCONTINUED | OUTPATIENT
Start: 2023-08-20 | End: 2023-08-19

## 2023-08-19 RX ORDER — ENOXAPARIN SODIUM 100 MG/ML
40 INJECTION SUBCUTANEOUS DAILY
Status: DISCONTINUED | OUTPATIENT
Start: 2023-08-20 | End: 2023-08-29 | Stop reason: HOSPADM

## 2023-08-19 RX ORDER — SODIUM CHLORIDE 1 G/1
1 TABLET ORAL DAILY
Status: ON HOLD | COMMUNITY
End: 2023-08-29 | Stop reason: HOSPADM

## 2023-08-19 RX ORDER — ONDANSETRON 2 MG/ML
4 INJECTION INTRAMUSCULAR; INTRAVENOUS EVERY 6 HOURS PRN
Status: DISCONTINUED | OUTPATIENT
Start: 2023-08-19 | End: 2023-08-29 | Stop reason: HOSPADM

## 2023-08-19 RX ORDER — MULTIVITAMIN WITH IRON
1 TABLET ORAL NIGHTLY
Status: DISCONTINUED | OUTPATIENT
Start: 2023-08-19 | End: 2023-08-29 | Stop reason: HOSPADM

## 2023-08-19 RX ORDER — FERROUS SULFATE 325(65) MG
325 TABLET ORAL 2 TIMES DAILY
Status: DISCONTINUED | OUTPATIENT
Start: 2023-08-20 | End: 2023-08-29 | Stop reason: HOSPADM

## 2023-08-19 RX ORDER — ONDANSETRON 4 MG/1
4 TABLET, ORALLY DISINTEGRATING ORAL EVERY 8 HOURS PRN
Status: DISCONTINUED | OUTPATIENT
Start: 2023-08-19 | End: 2023-08-29 | Stop reason: HOSPADM

## 2023-08-19 RX ORDER — SODIUM CHLORIDE 0.9 % (FLUSH) 0.9 %
5-40 SYRINGE (ML) INJECTION PRN
Status: DISCONTINUED | OUTPATIENT
Start: 2023-08-19 | End: 2023-08-29 | Stop reason: HOSPADM

## 2023-08-19 RX ORDER — ENALAPRIL MALEATE 5 MG/1
5 TABLET ORAL DAILY
Status: DISCONTINUED | OUTPATIENT
Start: 2023-08-19 | End: 2023-08-20

## 2023-08-19 RX ORDER — FLUOXETINE HYDROCHLORIDE 20 MG/1
40 CAPSULE ORAL DAILY
Status: DISCONTINUED | OUTPATIENT
Start: 2023-08-20 | End: 2023-08-29 | Stop reason: HOSPADM

## 2023-08-19 RX ORDER — MAGNESIUM SULFATE IN WATER 40 MG/ML
2000 INJECTION, SOLUTION INTRAVENOUS PRN
Status: DISCONTINUED | OUTPATIENT
Start: 2023-08-19 | End: 2023-08-29 | Stop reason: HOSPADM

## 2023-08-19 RX ORDER — DOCUSATE SODIUM 100 MG/1
100 CAPSULE, LIQUID FILLED ORAL 2 TIMES DAILY PRN
Status: DISCONTINUED | OUTPATIENT
Start: 2023-08-19 | End: 2023-08-20

## 2023-08-19 RX ORDER — MELOXICAM 7.5 MG/1
15 TABLET ORAL DAILY
Status: DISCONTINUED | OUTPATIENT
Start: 2023-08-20 | End: 2023-08-19

## 2023-08-19 RX ORDER — SODIUM CHLORIDE, SODIUM LACTATE, POTASSIUM CHLORIDE, CALCIUM CHLORIDE 600; 310; 30; 20 MG/100ML; MG/100ML; MG/100ML; MG/100ML
INJECTION, SOLUTION INTRAVENOUS CONTINUOUS
Status: ACTIVE | OUTPATIENT
Start: 2023-08-19 | End: 2023-08-20

## 2023-08-19 RX ORDER — PANTOPRAZOLE SODIUM 40 MG/1
40 TABLET, DELAYED RELEASE ORAL
Status: DISCONTINUED | OUTPATIENT
Start: 2023-08-20 | End: 2023-08-29 | Stop reason: HOSPADM

## 2023-08-19 RX ORDER — SODIUM CHLORIDE 1 G/1
1 TABLET ORAL DAILY
Status: DISCONTINUED | OUTPATIENT
Start: 2023-08-20 | End: 2023-08-19

## 2023-08-19 RX ORDER — POLYETHYLENE GLYCOL 3350 17 G/17G
17 POWDER, FOR SOLUTION ORAL DAILY PRN
Status: DISCONTINUED | OUTPATIENT
Start: 2023-08-19 | End: 2023-08-29 | Stop reason: HOSPADM

## 2023-08-19 RX ORDER — HYDROCODONE BITARTRATE AND ACETAMINOPHEN 5; 325 MG/1; MG/1
1 TABLET ORAL EVERY 6 HOURS PRN
Status: ON HOLD | COMMUNITY
End: 2023-08-29 | Stop reason: HOSPADM

## 2023-08-19 RX ORDER — IBUPROFEN 200 MG
1 CAPSULE ORAL DAILY
COMMUNITY

## 2023-08-19 RX ORDER — BLACK COHOSH ROOT EXTRACT 80 MG
1 CAPSULE ORAL DAILY
Status: DISCONTINUED | OUTPATIENT
Start: 2023-08-20 | End: 2023-08-19 | Stop reason: RX

## 2023-08-19 RX ORDER — ENALAPRIL MALEATE 10 MG/1
10 TABLET ORAL DAILY
Status: DISCONTINUED | OUTPATIENT
Start: 2023-08-19 | End: 2023-08-19

## 2023-08-19 RX ORDER — VITAMIN B COMPLEX
2000 TABLET ORAL DAILY
Status: DISCONTINUED | OUTPATIENT
Start: 2023-08-20 | End: 2023-08-29 | Stop reason: HOSPADM

## 2023-08-19 RX ORDER — GUAIFENESIN/DEXTROMETHORPHAN 100-10MG/5
5 SYRUP ORAL 3 TIMES DAILY PRN
Status: DISCONTINUED | OUTPATIENT
Start: 2023-08-19 | End: 2023-08-19

## 2023-08-19 RX ORDER — SODIUM CHLORIDE 0.9 % (FLUSH) 0.9 %
5-40 SYRINGE (ML) INJECTION EVERY 12 HOURS SCHEDULED
Status: DISCONTINUED | OUTPATIENT
Start: 2023-08-19 | End: 2023-08-29 | Stop reason: HOSPADM

## 2023-08-19 RX ORDER — SODIUM CHLORIDE 9 MG/ML
INJECTION, SOLUTION INTRAVENOUS PRN
Status: DISCONTINUED | OUTPATIENT
Start: 2023-08-19 | End: 2023-08-29 | Stop reason: HOSPADM

## 2023-08-19 RX ADMIN — ENALAPRIL MALEATE 5 MG: 5 TABLET ORAL at 23:52

## 2023-08-19 RX ADMIN — SODIUM CHLORIDE, POTASSIUM CHLORIDE, SODIUM LACTATE AND CALCIUM CHLORIDE: 600; 310; 30; 20 INJECTION, SOLUTION INTRAVENOUS at 23:21

## 2023-08-19 RX ADMIN — SODIUM CHLORIDE, PRESERVATIVE FREE 10 ML: 5 INJECTION INTRAVENOUS at 23:21

## 2023-08-19 RX ADMIN — IOPAMIDOL 80 ML: 755 INJECTION, SOLUTION INTRAVENOUS at 20:22

## 2023-08-19 ASSESSMENT — PAIN DESCRIPTION - DESCRIPTORS: DESCRIPTORS: ACHING

## 2023-08-19 ASSESSMENT — ENCOUNTER SYMPTOMS
DIARRHEA: 0
COUGH: 0
CONSTIPATION: 0
SINUS PRESSURE: 0
CHEST TIGHTNESS: 0
EYE PAIN: 0
SORE THROAT: 0
WHEEZING: 0
VOICE CHANGE: 0
PHOTOPHOBIA: 0
EYE ITCHING: 0
BLOOD IN STOOL: 0
EYE REDNESS: 0
SHORTNESS OF BREATH: 0
NAUSEA: 0
VOMITING: 0
EYE DISCHARGE: 0
CHOKING: 0
RHINORRHEA: 0
ABDOMINAL DISTENTION: 0
TROUBLE SWALLOWING: 0
BACK PAIN: 0
ABDOMINAL PAIN: 0

## 2023-08-19 ASSESSMENT — PAIN SCALES - GENERAL: PAINLEVEL_OUTOF10: 5

## 2023-08-19 ASSESSMENT — PAIN DESCRIPTION - PAIN TYPE: TYPE: CHRONIC PAIN

## 2023-08-19 ASSESSMENT — PAIN DESCRIPTION - FREQUENCY: FREQUENCY: CONTINUOUS

## 2023-08-19 ASSESSMENT — PAIN DESCRIPTION - LOCATION: LOCATION: BACK

## 2023-08-19 NOTE — ED PROVIDER NOTES
[facility]  eMERGENCY dEPARTMENT eNCOUnter          CHIEF COMPLAINT       Chief Complaint   Patient presents with    Urinary Tract Infection       Nurses Notes reviewed and I agree except as noted in the HPI. HISTORY OF PRESENT ILLNESS    Montse Gunter is a 78 y.o. female who presents for complaints of shortness of breath fatigue and chronic urinary tract infection. Apparently she was here yesterday for the same thing. Patient states he is always short of breath she has a hiatal hernia. She has had repaired once but it slid back up into her chest.  She is known to have her stomach mostly in her chest.  Patient states that she is having lower abdominal pain fatigue, she has a urinary tract infection. Cultures from 2 visits ago on 8/7/2023 do so that she has E. coli and Klebsiella, susceptible to Bactrim. She was given fosfomycin while she was here yesterday. Patient does have a history of repeat urinary tract infections. Patient has had bladder sling performed 2 times. Here today patient is otherwise resting on the cot no apparent distress. No other physical complaints at this time. Bladder scan here performed by myself reveals 753 mL of urine in the bladder. I had a long discussion with the family at bedside, I told him this is most likely due to her post operative condition. She probably has had recurrent retention and infections, and it is now finally manifesting itself. I did discuss the fact that she may need a Timmons catheter, and she would need to follow-up with urology as an outpatient. REVIEW OF SYSTEMS     Review of Systems   Constitutional:  Negative for activity change, appetite change, diaphoresis, fatigue and unexpected weight change. HENT:  Negative for congestion, ear discharge, ear pain, hearing loss, rhinorrhea, sinus pressure, sore throat, trouble swallowing and voice change. Eyes:  Negative for photophobia, pain, discharge, redness and itching.

## 2023-08-19 NOTE — ED NOTES
Pt to the ED via EMS. Patient presents with complaints of worsening symptoms related to a chronic UTI. Patient states that the provider prescribed fosfomycin in which they stated that it has not worked in the past. Patient is alert and oriented x 4. Respirations are regular and unlabored. Patient provided blanket. Family at the bedside and call light within reach.      Marcia Pires RN  08/19/23 2514

## 2023-08-19 NOTE — ED NOTES
Patient in bed and talking with family at the bedside. Patient provided education regarding current situation with pt plan of care. Patient expresses no new needs at this time. Patient VSS and patient does not appear to be in any current distress at this time. Will continue to monitor. Call light within reach.        Candelaria Galicia RN  08/19/23 8968

## 2023-08-20 PROBLEM — R41.89 COGNITIVE IMPAIRMENT: Status: ACTIVE | Noted: 2023-08-20

## 2023-08-20 PROBLEM — R53.81 PHYSICAL DECONDITIONING: Status: ACTIVE | Noted: 2023-08-20

## 2023-08-20 PROBLEM — R33.9 URINARY RETENTION: Status: ACTIVE | Noted: 2023-08-20

## 2023-08-20 PROBLEM — J98.11 ATELECTASIS: Status: ACTIVE | Noted: 2023-08-20

## 2023-08-20 PROBLEM — R33.8 ACUTE URINARY RETENTION: Status: ACTIVE | Noted: 2023-08-20

## 2023-08-20 PROBLEM — K59.00 CONSTIPATION: Status: ACTIVE | Noted: 2023-08-20

## 2023-08-20 PROBLEM — D72.819 LEUKOPENIA: Status: ACTIVE | Noted: 2023-08-20

## 2023-08-20 PROBLEM — D50.9 IRON DEFICIENCY ANEMIA: Status: ACTIVE | Noted: 2023-08-20

## 2023-08-20 PROBLEM — F41.8 DEPRESSION WITH ANXIETY: Status: ACTIVE | Noted: 2023-08-20

## 2023-08-20 LAB
ANION GAP SERPL CALC-SCNC: 9 MEQ/L (ref 8–16)
BACTERIA UR CULT: ABNORMAL
BACTERIA UR CULT: ABNORMAL
BASOPHILS ABSOLUTE: 0.1 THOU/MM3 (ref 0–0.1)
BASOPHILS NFR BLD AUTO: 1.7 %
BUN SERPL-MCNC: 8 MG/DL (ref 7–22)
CALCIUM SERPL-MCNC: 8.7 MG/DL (ref 8.5–10.5)
CHLORIDE SERPL-SCNC: 94 MEQ/L (ref 98–111)
CO2 SERPL-SCNC: 29 MEQ/L (ref 23–33)
CREAT SERPL-MCNC: 0.5 MG/DL (ref 0.4–1.2)
DEPRECATED RDW RBC AUTO: 48 FL (ref 35–45)
EOSINOPHIL NFR BLD AUTO: 3.4 %
EOSINOPHILS ABSOLUTE: 0.1 THOU/MM3 (ref 0–0.4)
ERYTHROCYTE [DISTWIDTH] IN BLOOD BY AUTOMATED COUNT: 13.2 % (ref 11.5–14.5)
GFR SERPL CREATININE-BSD FRML MDRD: > 60 ML/MIN/1.73M2
GLUCOSE SERPL-MCNC: 95 MG/DL (ref 70–108)
HCT VFR BLD AUTO: 40.6 % (ref 37–47)
HGB BLD-MCNC: 13.5 GM/DL (ref 12–16)
IMM GRANULOCYTES # BLD AUTO: 0.01 THOU/MM3 (ref 0–0.07)
IMM GRANULOCYTES NFR BLD AUTO: 0.2 %
LYMPHOCYTES ABSOLUTE: 0.8 THOU/MM3 (ref 1–4.8)
LYMPHOCYTES NFR BLD AUTO: 20 %
MCH RBC QN AUTO: 32.8 PG (ref 26–33)
MCHC RBC AUTO-ENTMCNC: 33.3 GM/DL (ref 32.2–35.5)
MCV RBC AUTO: 98.8 FL (ref 81–99)
MONOCYTES ABSOLUTE: 0.5 THOU/MM3 (ref 0.4–1.3)
MONOCYTES NFR BLD AUTO: 11.7 %
NEUTROPHILS NFR BLD AUTO: 63 %
NRBC BLD AUTO-RTO: 0 /100 WBC
ORGANISM: ABNORMAL
ORGANISM: ABNORMAL
PLATELET # BLD AUTO: 132 THOU/MM3 (ref 130–400)
PMV BLD AUTO: 10.5 FL (ref 9.4–12.4)
POTASSIUM SERPL-SCNC: 3.6 MEQ/L (ref 3.5–5.2)
RBC # BLD AUTO: 4.11 MILL/MM3 (ref 4.2–5.4)
SEGMENTED NEUTROPHILS ABSOLUTE COUNT: 2.6 THOU/MM3 (ref 1.8–7.7)
SODIUM SERPL-SCNC: 132 MEQ/L (ref 135–145)
T4 FREE SERPL-MCNC: 1.27 NG/DL (ref 0.93–1.76)
TSH SERPL DL<=0.005 MIU/L-ACNC: 19.87 UIU/ML (ref 0.4–4.2)
WBC # BLD AUTO: 4.1 THOU/MM3 (ref 4.8–10.8)

## 2023-08-20 PROCEDURE — 6370000000 HC RX 637 (ALT 250 FOR IP): Performed by: NURSE PRACTITIONER

## 2023-08-20 PROCEDURE — G0378 HOSPITAL OBSERVATION PER HR: HCPCS

## 2023-08-20 PROCEDURE — 84443 ASSAY THYROID STIM HORMONE: CPT

## 2023-08-20 PROCEDURE — 94669 MECHANICAL CHEST WALL OSCILL: CPT

## 2023-08-20 PROCEDURE — 96361 HYDRATE IV INFUSION ADD-ON: CPT

## 2023-08-20 PROCEDURE — 36415 COLL VENOUS BLD VENIPUNCTURE: CPT

## 2023-08-20 PROCEDURE — 84439 ASSAY OF FREE THYROXINE: CPT

## 2023-08-20 PROCEDURE — 93005 ELECTROCARDIOGRAM TRACING: CPT | Performed by: STUDENT IN AN ORGANIZED HEALTH CARE EDUCATION/TRAINING PROGRAM

## 2023-08-20 PROCEDURE — 96372 THER/PROPH/DIAG INJ SC/IM: CPT

## 2023-08-20 PROCEDURE — 2500000003 HC RX 250 WO HCPCS: Performed by: STUDENT IN AN ORGANIZED HEALTH CARE EDUCATION/TRAINING PROGRAM

## 2023-08-20 PROCEDURE — 96367 TX/PROPH/DG ADDL SEQ IV INF: CPT

## 2023-08-20 PROCEDURE — 99221 1ST HOSP IP/OBS SF/LOW 40: CPT | Performed by: UROLOGY

## 2023-08-20 PROCEDURE — 85025 COMPLETE CBC W/AUTO DIFF WBC: CPT

## 2023-08-20 PROCEDURE — 6360000002 HC RX W HCPCS: Performed by: STUDENT IN AN ORGANIZED HEALTH CARE EDUCATION/TRAINING PROGRAM

## 2023-08-20 PROCEDURE — 96365 THER/PROPH/DIAG IV INF INIT: CPT

## 2023-08-20 PROCEDURE — 2580000003 HC RX 258: Performed by: NURSE PRACTITIONER

## 2023-08-20 PROCEDURE — 99232 SBSQ HOSP IP/OBS MODERATE 35: CPT | Performed by: NURSE PRACTITIONER

## 2023-08-20 PROCEDURE — 80048 BASIC METABOLIC PNL TOTAL CA: CPT

## 2023-08-20 PROCEDURE — 6370000000 HC RX 637 (ALT 250 FOR IP): Performed by: STUDENT IN AN ORGANIZED HEALTH CARE EDUCATION/TRAINING PROGRAM

## 2023-08-20 PROCEDURE — 96366 THER/PROPH/DIAG IV INF ADDON: CPT

## 2023-08-20 PROCEDURE — 2580000003 HC RX 258: Performed by: STUDENT IN AN ORGANIZED HEALTH CARE EDUCATION/TRAINING PROGRAM

## 2023-08-20 RX ORDER — HYDROCODONE BITARTRATE AND ACETAMINOPHEN 5; 325 MG/1; MG/1
1 TABLET ORAL EVERY 6 HOURS PRN
Status: DISCONTINUED | OUTPATIENT
Start: 2023-08-20 | End: 2023-08-25

## 2023-08-20 RX ORDER — HYDRALAZINE HYDROCHLORIDE 20 MG/ML
10 INJECTION INTRAMUSCULAR; INTRAVENOUS ONCE
Status: COMPLETED | OUTPATIENT
Start: 2023-08-21 | End: 2023-08-21

## 2023-08-20 RX ORDER — DOXYCYCLINE HYCLATE 100 MG
100 TABLET ORAL EVERY 12 HOURS SCHEDULED
Status: DISCONTINUED | OUTPATIENT
Start: 2023-08-20 | End: 2023-08-22

## 2023-08-20 RX ORDER — CYCLOBENZAPRINE HCL 10 MG
5 TABLET ORAL ONCE
Status: COMPLETED | OUTPATIENT
Start: 2023-08-20 | End: 2023-08-20

## 2023-08-20 RX ORDER — SENNA AND DOCUSATE SODIUM 50; 8.6 MG/1; MG/1
1 TABLET, FILM COATED ORAL DAILY
Status: DISCONTINUED | OUTPATIENT
Start: 2023-08-20 | End: 2023-08-29 | Stop reason: HOSPADM

## 2023-08-20 RX ORDER — SODIUM CHLORIDE, SODIUM LACTATE, POTASSIUM CHLORIDE, CALCIUM CHLORIDE 600; 310; 30; 20 MG/100ML; MG/100ML; MG/100ML; MG/100ML
INJECTION, SOLUTION INTRAVENOUS CONTINUOUS
Status: DISCONTINUED | OUTPATIENT
Start: 2023-08-20 | End: 2023-08-20

## 2023-08-20 RX ORDER — CALCIUM POLYCARBOPHIL 625 MG 625 MG/1
625 TABLET ORAL DAILY
Status: DISCONTINUED | OUTPATIENT
Start: 2023-08-20 | End: 2023-08-29 | Stop reason: HOSPADM

## 2023-08-20 RX ORDER — ENALAPRIL MALEATE 10 MG/1
10 TABLET ORAL DAILY
Status: DISCONTINUED | OUTPATIENT
Start: 2023-08-21 | End: 2023-08-29 | Stop reason: HOSPADM

## 2023-08-20 RX ADMIN — DOXYCYCLINE HYCLATE 100 MG: 100 TABLET, COATED ORAL at 16:42

## 2023-08-20 RX ADMIN — PANTOPRAZOLE SODIUM 40 MG: 40 TABLET, DELAYED RELEASE ORAL at 16:43

## 2023-08-20 RX ADMIN — Medication 2000 UNITS: at 10:23

## 2023-08-20 RX ADMIN — LEVOTHYROXINE SODIUM 100 MCG: 0.1 TABLET ORAL at 07:27

## 2023-08-20 RX ADMIN — FERROUS SULFATE TAB 325 MG (65 MG ELEMENTAL FE) 325 MG: 325 (65 FE) TAB at 17:41

## 2023-08-20 RX ADMIN — PANTOPRAZOLE SODIUM 40 MG: 40 TABLET, DELAYED RELEASE ORAL at 07:27

## 2023-08-20 RX ADMIN — METOPROLOL SUCCINATE 50 MG: 50 TABLET, EXTENDED RELEASE ORAL at 10:29

## 2023-08-20 RX ADMIN — SODIUM CHLORIDE, POTASSIUM CHLORIDE, SODIUM LACTATE AND CALCIUM CHLORIDE: 600; 310; 30; 20 INJECTION, SOLUTION INTRAVENOUS at 12:32

## 2023-08-20 RX ADMIN — ENOXAPARIN SODIUM 40 MG: 100 INJECTION SUBCUTANEOUS at 10:25

## 2023-08-20 RX ADMIN — DOXYCYCLINE 100 MG: 100 INJECTION, POWDER, LYOPHILIZED, FOR SOLUTION INTRAVENOUS at 10:40

## 2023-08-20 RX ADMIN — CYCLOBENZAPRINE 5 MG: 10 TABLET, FILM COATED ORAL at 12:14

## 2023-08-20 RX ADMIN — DOXYCYCLINE 100 MG: 100 INJECTION, POWDER, LYOPHILIZED, FOR SOLUTION INTRAVENOUS at 01:37

## 2023-08-20 RX ADMIN — SENNOSIDES AND DOCUSATE SODIUM 1 TABLET: 50; 8.6 TABLET ORAL at 10:25

## 2023-08-20 RX ADMIN — FLUOXETINE HYDROCHLORIDE 40 MG: 20 CAPSULE ORAL at 10:29

## 2023-08-20 RX ADMIN — CALCIUM POLYCARBOPHIL 625 MG: 625 TABLET, FILM COATED ORAL at 10:24

## 2023-08-20 RX ADMIN — ENALAPRIL MALEATE 5 MG: 5 TABLET ORAL at 10:28

## 2023-08-20 RX ADMIN — MAGNESIUM SULFATE HEPTAHYDRATE 2000 MG: 40 INJECTION, SOLUTION INTRAVENOUS at 02:53

## 2023-08-20 RX ADMIN — FERROUS SULFATE TAB 325 MG (65 MG ELEMENTAL FE) 325 MG: 325 (65 FE) TAB at 10:23

## 2023-08-20 ASSESSMENT — PAIN SCALES - GENERAL: PAINLEVEL_OUTOF10: 0

## 2023-08-20 NOTE — CONSULTS
[address] [facility] RENAL TELEMETRY 6K  [address] [id number]  Dept: [phone]  Loc: [phone]  Visit Date: 8/19/2023    Urology Consult Note    Reason for Consult: Urinary retention and recurrent UTI  Requesting Physician / CHAPIN:  Medicine Service  Date Seen: 8/20/2023  ______________________________________________    Urologic Impression: Patient with recurrent UTI and urinary retention. Urinary retention is likely increasing her risk of recurrent UTI. Unclear etiology and duration of her urinary retention. Could be secondary to a neurogenic bladder. 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 Urologic Plans / Recommendations: Leave Timmons in place to allow bladder decompression. Adequately treat current UTI and follow-up in the urology office for voiding trial and further work-up.    ______________________________________________    History Obtained From:  patient and family    Reason for Consult / Chief Complaint: Urine retention and UTI    HISTORY OF PRESENT ILLNESS:      Patient says he had \"4\" urinary tract infections this year. She is found to have urinary retention. She currently has a catheter in place. She is not a clear historian with regards to her bladder emptying. She does have a history of lumbar spine pathology and according to the record she has had nerve ablations earlier this year. Patient does not recall these procedures. She was admitted to the medicine service and is currently on IV antibiotics.   DATA:  CBC:   Lab Results   Component Value Date/Time    WBC 4.1 08/20/2023 06:25 AM    HGB 13.5 08/20/2023 06:25 AM     08/20/2023 06:25 AM     BMP:  Lab Results   Component Value Date/Time     08/20/2023 06:25 AM    K 3.6 08/20/2023 06:25 AM    K 3.7 09/09/2022 04:44 AM    CL 94 08/20/2023 06:25 AM    CO2 29 08/20/2023 06:25 AM    BUN 8 08/20/2023 06:25 AM    CREATININE 0.5

## 2023-08-20 NOTE — ED NOTES
Patient in bed and talking with Dr. Janay Jackson at the bedside. Patient provided education regarding admission. Patient VSS and patient does not appear to be in any current distress at this time. Will continue to monitor. Call light within reach.        Iván Wolf RN  08/19/23 1545

## 2023-08-20 NOTE — PROCEDURES
12 lead EKG completed. Results handed to [facility].  Cadence Dye CET

## 2023-08-21 ENCOUNTER — TELEPHONE (OUTPATIENT)
Dept: UROLOGY | Age: 83
End: 2023-08-21

## 2023-08-21 PROBLEM — R41.0 ACUTE DELIRIUM: Status: ACTIVE | Noted: 2023-08-21

## 2023-08-21 LAB
ANION GAP SERPL CALC-SCNC: 10 MEQ/L (ref 8–16)
BACTERIA UR CULT: ABNORMAL
BACTERIA UR CULT: ABNORMAL
BASOPHILS ABSOLUTE: 0.1 THOU/MM3 (ref 0–0.1)
BASOPHILS NFR BLD AUTO: 1.7 %
BUN SERPL-MCNC: 6 MG/DL (ref 7–22)
CALCIUM SERPL-MCNC: 8.8 MG/DL (ref 8.5–10.5)
CHLORIDE SERPL-SCNC: 97 MEQ/L (ref 98–111)
CO2 SERPL-SCNC: 26 MEQ/L (ref 23–33)
CREAT SERPL-MCNC: 0.4 MG/DL (ref 0.4–1.2)
DEPRECATED RDW RBC AUTO: 47.7 FL (ref 35–45)
EOSINOPHIL NFR BLD AUTO: 2.3 %
EOSINOPHILS ABSOLUTE: 0.1 THOU/MM3 (ref 0–0.4)
ERYTHROCYTE [DISTWIDTH] IN BLOOD BY AUTOMATED COUNT: 13.2 % (ref 11.5–14.5)
GFR SERPL CREATININE-BSD FRML MDRD: > 60 ML/MIN/1.73M2
GLUCOSE SERPL-MCNC: 105 MG/DL (ref 70–108)
HCT VFR BLD AUTO: 41.1 % (ref 37–47)
HGB BLD-MCNC: 13.4 GM/DL (ref 12–16)
IMM GRANULOCYTES # BLD AUTO: 0.02 THOU/MM3 (ref 0–0.07)
IMM GRANULOCYTES NFR BLD AUTO: 0.6 %
LYMPHOCYTES ABSOLUTE: 0.7 THOU/MM3 (ref 1–4.8)
LYMPHOCYTES NFR BLD AUTO: 18.8 %
MCH RBC QN AUTO: 32.1 PG (ref 26–33)
MCHC RBC AUTO-ENTMCNC: 32.6 GM/DL (ref 32.2–35.5)
MCV RBC AUTO: 98.3 FL (ref 81–99)
MONOCYTES ABSOLUTE: 0.4 THOU/MM3 (ref 0.4–1.3)
MONOCYTES NFR BLD AUTO: 11.8 %
NEUTROPHILS NFR BLD AUTO: 64.8 %
NRBC BLD AUTO-RTO: 0 /100 WBC
ORGANISM: ABNORMAL
ORGANISM: ABNORMAL
PLATELET # BLD AUTO: 129 THOU/MM3 (ref 130–400)
PMV BLD AUTO: 10.9 FL (ref 9.4–12.4)
POTASSIUM SERPL-SCNC: 3.6 MEQ/L (ref 3.5–5.2)
RBC # BLD AUTO: 4.18 MILL/MM3 (ref 4.2–5.4)
SEGMENTED NEUTROPHILS ABSOLUTE COUNT: 2.3 THOU/MM3 (ref 1.8–7.7)
SODIUM SERPL-SCNC: 133 MEQ/L (ref 135–145)
WBC # BLD AUTO: 3.5 THOU/MM3 (ref 4.8–10.8)

## 2023-08-21 PROCEDURE — 97166 OT EVAL MOD COMPLEX 45 MIN: CPT

## 2023-08-21 PROCEDURE — 96372 THER/PROPH/DIAG INJ SC/IM: CPT

## 2023-08-21 PROCEDURE — 6360000002 HC RX W HCPCS: Performed by: NURSE PRACTITIONER

## 2023-08-21 PROCEDURE — 96376 TX/PRO/DX INJ SAME DRUG ADON: CPT

## 2023-08-21 PROCEDURE — G0378 HOSPITAL OBSERVATION PER HR: HCPCS

## 2023-08-21 PROCEDURE — 36415 COLL VENOUS BLD VENIPUNCTURE: CPT

## 2023-08-21 PROCEDURE — 6360000002 HC RX W HCPCS: Performed by: STUDENT IN AN ORGANIZED HEALTH CARE EDUCATION/TRAINING PROGRAM

## 2023-08-21 PROCEDURE — 92610 EVALUATE SWALLOWING FUNCTION: CPT

## 2023-08-21 PROCEDURE — 96375 TX/PRO/DX INJ NEW DRUG ADDON: CPT

## 2023-08-21 PROCEDURE — 6370000000 HC RX 637 (ALT 250 FOR IP): Performed by: NURSE PRACTITIONER

## 2023-08-21 PROCEDURE — 80048 BASIC METABOLIC PNL TOTAL CA: CPT

## 2023-08-21 PROCEDURE — 85025 COMPLETE CBC W/AUTO DIFF WBC: CPT

## 2023-08-21 PROCEDURE — 92523 SPEECH SOUND LANG COMPREHEN: CPT

## 2023-08-21 PROCEDURE — 99232 SBSQ HOSP IP/OBS MODERATE 35: CPT | Performed by: NURSE PRACTITIONER

## 2023-08-21 PROCEDURE — 99231 SBSQ HOSP IP/OBS SF/LOW 25: CPT | Performed by: UROLOGY

## 2023-08-21 PROCEDURE — 2580000003 HC RX 258: Performed by: STUDENT IN AN ORGANIZED HEALTH CARE EDUCATION/TRAINING PROGRAM

## 2023-08-21 PROCEDURE — 93010 ELECTROCARDIOGRAM REPORT: CPT | Performed by: INTERNAL MEDICINE

## 2023-08-21 PROCEDURE — 6370000000 HC RX 637 (ALT 250 FOR IP): Performed by: STUDENT IN AN ORGANIZED HEALTH CARE EDUCATION/TRAINING PROGRAM

## 2023-08-21 PROCEDURE — 97535 SELF CARE MNGMENT TRAINING: CPT

## 2023-08-21 RX ORDER — LEVOTHYROXINE SODIUM 112 UG/1
112 TABLET ORAL DAILY
Status: DISCONTINUED | OUTPATIENT
Start: 2023-08-22 | End: 2023-08-29 | Stop reason: HOSPADM

## 2023-08-21 RX ORDER — PROCHLORPERAZINE EDISYLATE 5 MG/ML
10 INJECTION INTRAMUSCULAR; INTRAVENOUS ONCE
Status: COMPLETED | OUTPATIENT
Start: 2023-08-21 | End: 2023-08-21

## 2023-08-21 RX ADMIN — ENOXAPARIN SODIUM 40 MG: 100 INJECTION SUBCUTANEOUS at 09:24

## 2023-08-21 RX ADMIN — LEVOTHYROXINE SODIUM 100 MCG: 0.1 TABLET ORAL at 05:47

## 2023-08-21 RX ADMIN — Medication 1 TABLET: at 20:31

## 2023-08-21 RX ADMIN — ENALAPRIL MALEATE 10 MG: 10 TABLET ORAL at 09:24

## 2023-08-21 RX ADMIN — FLUOXETINE HYDROCHLORIDE 40 MG: 20 CAPSULE ORAL at 09:24

## 2023-08-21 RX ADMIN — SENNOSIDES AND DOCUSATE SODIUM 1 TABLET: 50; 8.6 TABLET ORAL at 09:24

## 2023-08-21 RX ADMIN — DOXYCYCLINE HYCLATE 100 MG: 100 TABLET, COATED ORAL at 22:08

## 2023-08-21 RX ADMIN — ONDANSETRON 4 MG: 2 INJECTION INTRAMUSCULAR; INTRAVENOUS at 13:04

## 2023-08-21 RX ADMIN — ONDANSETRON 4 MG: 2 INJECTION INTRAMUSCULAR; INTRAVENOUS at 01:40

## 2023-08-21 RX ADMIN — DOXYCYCLINE HYCLATE 100 MG: 100 TABLET, COATED ORAL at 09:24

## 2023-08-21 RX ADMIN — PROCHLORPERAZINE EDISYLATE 10 MG: 5 INJECTION INTRAMUSCULAR; INTRAVENOUS at 15:46

## 2023-08-21 RX ADMIN — FERROUS SULFATE TAB 325 MG (65 MG ELEMENTAL FE) 325 MG: 325 (65 FE) TAB at 09:24

## 2023-08-21 RX ADMIN — SODIUM CHLORIDE, PRESERVATIVE FREE 10 ML: 5 INJECTION INTRAVENOUS at 09:26

## 2023-08-21 RX ADMIN — PANTOPRAZOLE SODIUM 40 MG: 40 TABLET, DELAYED RELEASE ORAL at 05:47

## 2023-08-21 RX ADMIN — HYDRALAZINE HYDROCHLORIDE 10 MG: 20 INJECTION, SOLUTION INTRAMUSCULAR; INTRAVENOUS at 00:03

## 2023-08-21 RX ADMIN — SODIUM CHLORIDE, PRESERVATIVE FREE 10 ML: 5 INJECTION INTRAVENOUS at 20:32

## 2023-08-21 RX ADMIN — METOPROLOL SUCCINATE 50 MG: 50 TABLET, EXTENDED RELEASE ORAL at 09:24

## 2023-08-21 RX ADMIN — Medication 2000 UNITS: at 09:24

## 2023-08-21 RX ADMIN — CALCIUM POLYCARBOPHIL 625 MG: 625 TABLET, FILM COATED ORAL at 09:24

## 2023-08-21 NOTE — PALLIATIVE CARE
Initial Evaluation        Patient:   Rodriguez Gunter  YOB: 1940  Age:  78 y.o. Room:  [facility]  MRN:  [id number]   Acct: [de-identified]    Date of Admission:  8/19/2023  6:28 PM  Date of Service:  8/21/2023  Completed By:  Alexei Ferris RN                 Reason for Palliative Care Evaluation:-               [x] Code Status Discussion              [x] Goals of Care              [] Pain/Symptom Management               [] Emotional Support              [] Other:                    Current Issues:-   []  Pain  [x]  Fatigue  []  Nausea  []  Anxiety  []  Depression  []  Shortness of Breath  []  Constipation  []  Appetite  [x]  Other:UTI              Advance Directives:-    [] [place] DNR Form  [] Living Will  [x] Medical POA              Current Code Status:-     [] Full Resuscitation  [] DNR-Comfort Care-Arrest  [] DNR-Comfort Care       [x] Limited Resuscitation             [x] No CPR            [x] No shock            [x] No ET intubation/reintubation            [x] No resuscitative medications            [] Other limitation:               Palliative Performance Status:-      [] 100%  Full ambulation; normal activity and work; no evidence of disease; able to do own self care; normal intake; fully conscious     [] 90%   Full ambulation; normal activity and work; some evidence of disease; able to do own self care; normal intake; fully conscious    [] 80%   Full ambulation; normal activity with effort; some evidence of disease; able to do own self care; normal or reduced intake; fully conscious    [] 70%  Ambulation reduced; unable to perform normal job/work; significant  disease; able to do own self care; normal or reduced intake; fully conscious    [x] 60%  Ambulation reduced; Significant disease;Can't do hobbies/housework; intake normal or reduced; occasional assist; LOC full/confusion    [] 50%  Mainly sit/lie;  Extensive disease; Can't do any work; Considerable assist; intake normal or

## 2023-08-21 NOTE — PROGRESS NOTES
Pharmacy Note  ED Culture Follow-up    Jillian Gunter is a 78 y.o. female. Allergies: Ampicillin, Morphine, Pcn [penicillins], and Sulfa antibiotics     Current antimicrobials:   Reviewed patient's urine culture - culture positive for Klebsiella pneumoniae. Patient was discharged on fosfomycin, and culture is presumed sensitive to prescribed medication. Antibiotic prescribed at discharge is appropriate - no changes made to antibiotic regimen. Please call with any questions.  [address] [place] [address]    IFEOMA Geiger FERNANDO [facility], PharmD 4:33 PM 8/21/2023

## 2023-08-21 NOTE — TELEPHONE ENCOUNTER
Consult for urinary retention, UTI  Seen by Dr Adina Sun over the wkend  Needs OV in 1-2 wks for VT after infection clears and having reg BM with CHAPIN

## 2023-08-21 NOTE — FLOWSHEET NOTE
08/21/23 1323   Safe Environment   Safety Measures   (VN safety round complete.)     Patient is working with therapy.

## 2023-08-21 NOTE — CARE COORDINATION
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 Alexia Maria     GERD (gastroesophageal reflux disease)     Diverticulitis     Hiatal hernia     Hx of blood clots     Hypertension     BAKI    Hypothyroidism     Osteoarthritis     Urinary incontinence     UTI (urinary tract infection)        Procedure:   8/19 CT abdomen:   No acute findings. 8/19 CXR:   Opacities near the left lung base which could be due to atelectasis or infiltrate. Barriers to Discharge: Admitted with UTI. Hospitalist and urology following. Timmons-plan to keep at discharge. Doxycyline. PT/OT. PCP: FLORIDA Luciano CNP    Readmission Risk Low 0-14, Mod 15-19), High > 20: Readmission Risk Score: 21      Advance Directives:      Code Status: Limited   Patient's Primary Decision Maker is:      Primary Decision Maker: Camelia Gunter Spouse - [phone]    Secondary Decision Maker: Paty Genia - Child - [phone]    Patient Goals/Plan/Treatment Preferences: From [facility]. Complete assessment deferred to . Transportation/Food Security/Housekeeping Addressed: No issues identified.

## 2023-08-21 NOTE — PALLIATIVE CARE
Follow Up / Progress Note        Patient:   Montse Gunter  YOB: 1940  Age:  78 y.o. Room:  [address] [place] [address]  MRN:  [id number]         Family/Patient Discussion:  Revisited Kimberlyn Frank and her  and daughter. Kimberlyn Frank was in bed being tended to by the nurse and getting medicated for nausea. Daughter reports they fell asleep and has not had a chance to discuss code status wishes since I left them. Informed them palliative care will follow up tomorrow. Plan/Follow-Up:  Revisit tomorrow to assess for questions regarding code status and DNR form.          Electronically signed by Linda Mock RN on 8/21/2023 at 3:51 PM             Palliative Care Office: [phone]

## 2023-08-21 NOTE — CARE COORDINATION
8/21/23, 10:44 AM EDT  Discharge Planning Evaluation  Social work consult received, patient from [facility]. Patient/Family preference is to return to [facility]. Would patient be willing to go to a skilled facility if needed: yes if needed. Is there skilled care available at current facility: yes. Barriers to return to current living situation: n/a  Spoke with Kaelyn Arias at the facility. Patient bed hold: yes  Anticipated transport plan: daughter present in room  Patient's Healthcare Decision Maker: Named in [address]     Readmission Risk Low 0-14, Mod 15-19), High > 20: Readmission Risk Score: 21    Current PCP: FLORIDA Watson CNP  PCP verified by CM? Yes    Patient Orientation: Alert and Oriented    Patient Cognition: Short Term Memory Deficit  History Provided by: Patient    Advance Directives:      Code Status: Limited   Patient's Primary Decision Maker is: Named in Scanned ACP Document    Primary Decision Maker: Height, [address] [phone]    Secondary Decision Maker: [facility] - [phone]     Discharge Planning:    Patient lives with: Other (Comment) ([facility] residents) Type of Home: Assisted living  Primary Care Giver: Self  Patient Support Systems include: Children   Current Financial resources: None  Current community resources: Assisted Living  Current services prior to admission: Other (Comment) (Physical therapy)            Current DME:              Type of Home Care services:  PT, OT    ADLS  Prior functional level: Assistance with the following:, Cooking, Housework, Shopping, Mobility  Current functional level: Assistance with the following:, Cooking, Housework, Shopping, Mobility    Family can provide assistance at DC: Yes  Would you like Case Management to discuss the discharge plan with any other family members/significant others, and if so, who?  No  Plans to Return to Present Housing: Yes  Other Identified Issues/Barriers to RETURNING to current

## 2023-08-22 PROBLEM — N39.0 UTI (URINARY TRACT INFECTION): Status: ACTIVE | Noted: 2023-08-22

## 2023-08-22 LAB
ANION GAP SERPL CALC-SCNC: 9 MEQ/L (ref 8–16)
BASOPHILS ABSOLUTE: 0.1 THOU/MM3 (ref 0–0.1)
BASOPHILS NFR BLD AUTO: 2.3 %
BUN SERPL-MCNC: 8 MG/DL (ref 7–22)
CALCIUM SERPL-MCNC: 8.9 MG/DL (ref 8.5–10.5)
CHLORIDE SERPL-SCNC: 98 MEQ/L (ref 98–111)
CO2 SERPL-SCNC: 24 MEQ/L (ref 23–33)
CREAT SERPL-MCNC: 0.5 MG/DL (ref 0.4–1.2)
DEPRECATED RDW RBC AUTO: 48 FL (ref 35–45)
EOSINOPHIL NFR BLD AUTO: 4.9 %
EOSINOPHILS ABSOLUTE: 0.2 THOU/MM3 (ref 0–0.4)
ERYTHROCYTE [DISTWIDTH] IN BLOOD BY AUTOMATED COUNT: 13.4 % (ref 11.5–14.5)
GFR SERPL CREATININE-BSD FRML MDRD: > 60 ML/MIN/1.73M2
GLUCOSE SERPL-MCNC: 103 MG/DL (ref 70–108)
HCT VFR BLD AUTO: 40.2 % (ref 37–47)
HGB BLD-MCNC: 13.4 GM/DL (ref 12–16)
IMM GRANULOCYTES # BLD AUTO: 0.01 THOU/MM3 (ref 0–0.07)
IMM GRANULOCYTES NFR BLD AUTO: 0.3 %
LYMPHOCYTES ABSOLUTE: 0.8 THOU/MM3 (ref 1–4.8)
LYMPHOCYTES NFR BLD AUTO: 21.9 %
MAGNESIUM SERPL-MCNC: 1.6 MG/DL (ref 1.6–2.4)
MCH RBC QN AUTO: 32.4 PG (ref 26–33)
MCHC RBC AUTO-ENTMCNC: 33.3 GM/DL (ref 32.2–35.5)
MCV RBC AUTO: 97.3 FL (ref 81–99)
MONOCYTES ABSOLUTE: 0.4 THOU/MM3 (ref 0.4–1.3)
MONOCYTES NFR BLD AUTO: 12.7 %
NEUTROPHILS NFR BLD AUTO: 57.9 %
NRBC BLD AUTO-RTO: 0 /100 WBC
PLATELET # BLD AUTO: 132 THOU/MM3 (ref 130–400)
PMV BLD AUTO: 10.8 FL (ref 9.4–12.4)
POTASSIUM SERPL-SCNC: 3.4 MEQ/L (ref 3.5–5.2)
RBC # BLD AUTO: 4.13 MILL/MM3 (ref 4.2–5.4)
SEGMENTED NEUTROPHILS ABSOLUTE COUNT: 2 THOU/MM3 (ref 1.8–7.7)
SODIUM SERPL-SCNC: 131 MEQ/L (ref 135–145)
WBC # BLD AUTO: 3.5 THOU/MM3 (ref 4.8–10.8)

## 2023-08-22 PROCEDURE — 94669 MECHANICAL CHEST WALL OSCILL: CPT

## 2023-08-22 PROCEDURE — 36415 COLL VENOUS BLD VENIPUNCTURE: CPT

## 2023-08-22 PROCEDURE — 0DB98ZX EXCISION OF DUODENUM, VIA NATURAL OR ARTIFICIAL OPENING ENDOSCOPIC, DIAGNOSTIC: ICD-10-PCS | Performed by: INTERNAL MEDICINE

## 2023-08-22 PROCEDURE — G0378 HOSPITAL OBSERVATION PER HR: HCPCS

## 2023-08-22 PROCEDURE — 83735 ASSAY OF MAGNESIUM: CPT

## 2023-08-22 PROCEDURE — 6370000000 HC RX 637 (ALT 250 FOR IP): Performed by: STUDENT IN AN ORGANIZED HEALTH CARE EDUCATION/TRAINING PROGRAM

## 2023-08-22 PROCEDURE — 6360000002 HC RX W HCPCS: Performed by: NURSE PRACTITIONER

## 2023-08-22 PROCEDURE — 99232 SBSQ HOSP IP/OBS MODERATE 35: CPT | Performed by: NURSE PRACTITIONER

## 2023-08-22 PROCEDURE — 96376 TX/PRO/DX INJ SAME DRUG ADON: CPT

## 2023-08-22 PROCEDURE — 2580000003 HC RX 258: Performed by: STUDENT IN AN ORGANIZED HEALTH CARE EDUCATION/TRAINING PROGRAM

## 2023-08-22 PROCEDURE — 80048 BASIC METABOLIC PNL TOTAL CA: CPT

## 2023-08-22 PROCEDURE — 96372 THER/PROPH/DIAG INJ SC/IM: CPT

## 2023-08-22 PROCEDURE — 97530 THERAPEUTIC ACTIVITIES: CPT

## 2023-08-22 PROCEDURE — 6360000002 HC RX W HCPCS: Performed by: STUDENT IN AN ORGANIZED HEALTH CARE EDUCATION/TRAINING PROGRAM

## 2023-08-22 PROCEDURE — 97162 PT EVAL MOD COMPLEX 30 MIN: CPT

## 2023-08-22 PROCEDURE — 85025 COMPLETE CBC W/AUTO DIFF WBC: CPT

## 2023-08-22 PROCEDURE — 1200000000 HC SEMI PRIVATE

## 2023-08-22 PROCEDURE — 0DB68ZX EXCISION OF STOMACH, VIA NATURAL OR ARTIFICIAL OPENING ENDOSCOPIC, DIAGNOSTIC: ICD-10-PCS | Performed by: INTERNAL MEDICINE

## 2023-08-22 PROCEDURE — 6370000000 HC RX 637 (ALT 250 FOR IP): Performed by: NURSE PRACTITIONER

## 2023-08-22 PROCEDURE — 6360000002 HC RX W HCPCS: Performed by: PHYSICIAN ASSISTANT

## 2023-08-22 RX ORDER — PROCHLORPERAZINE EDISYLATE 5 MG/ML
10 INJECTION INTRAMUSCULAR; INTRAVENOUS EVERY 6 HOURS PRN
Status: DISCONTINUED | OUTPATIENT
Start: 2023-08-22 | End: 2023-08-22

## 2023-08-22 RX ORDER — SUCRALFATE 1 G/1
1 TABLET ORAL
Status: DISCONTINUED | OUTPATIENT
Start: 2023-08-22 | End: 2023-08-29 | Stop reason: HOSPADM

## 2023-08-22 RX ORDER — PROCHLORPERAZINE EDISYLATE 5 MG/ML
10 INJECTION INTRAMUSCULAR; INTRAVENOUS EVERY 6 HOURS PRN
Status: DISCONTINUED | OUTPATIENT
Start: 2023-08-22 | End: 2023-08-29 | Stop reason: HOSPADM

## 2023-08-22 RX ORDER — POTASSIUM CHLORIDE 20 MEQ/1
40 TABLET, EXTENDED RELEASE ORAL PRN
Status: DISCONTINUED | OUTPATIENT
Start: 2023-08-22 | End: 2023-08-29 | Stop reason: HOSPADM

## 2023-08-22 RX ORDER — CIPROFLOXACIN 500 MG/1
500 TABLET, FILM COATED ORAL EVERY 12 HOURS SCHEDULED
Status: DISCONTINUED | OUTPATIENT
Start: 2023-08-22 | End: 2023-08-28

## 2023-08-22 RX ORDER — POTASSIUM CHLORIDE 7.45 MG/ML
10 INJECTION INTRAVENOUS PRN
Status: DISCONTINUED | OUTPATIENT
Start: 2023-08-22 | End: 2023-08-29 | Stop reason: HOSPADM

## 2023-08-22 RX ORDER — PROCHLORPERAZINE EDISYLATE 5 MG/ML
10 INJECTION INTRAMUSCULAR; INTRAVENOUS ONCE
Status: COMPLETED | OUTPATIENT
Start: 2023-08-22 | End: 2023-08-22

## 2023-08-22 RX ADMIN — PANTOPRAZOLE SODIUM 40 MG: 40 TABLET, DELAYED RELEASE ORAL at 06:54

## 2023-08-22 RX ADMIN — SUCRALFATE 1 G: 1 TABLET ORAL at 20:42

## 2023-08-22 RX ADMIN — DOXYCYCLINE HYCLATE 100 MG: 100 TABLET, COATED ORAL at 10:32

## 2023-08-22 RX ADMIN — LEVOTHYROXINE SODIUM 112 MCG: 112 TABLET ORAL at 06:54

## 2023-08-22 RX ADMIN — Medication 2000 UNITS: at 10:32

## 2023-08-22 RX ADMIN — CIPROFLOXACIN HYDROCHLORIDE 500 MG: 500 TABLET, FILM COATED ORAL at 20:42

## 2023-08-22 RX ADMIN — ONDANSETRON 4 MG: 2 INJECTION INTRAMUSCULAR; INTRAVENOUS at 15:53

## 2023-08-22 RX ADMIN — PROCHLORPERAZINE EDISYLATE 10 MG: 5 INJECTION INTRAMUSCULAR; INTRAVENOUS at 12:23

## 2023-08-22 RX ADMIN — FLUOXETINE HYDROCHLORIDE 40 MG: 20 CAPSULE ORAL at 10:34

## 2023-08-22 RX ADMIN — FERROUS SULFATE TAB 325 MG (65 MG ELEMENTAL FE) 325 MG: 325 (65 FE) TAB at 10:32

## 2023-08-22 RX ADMIN — SODIUM CHLORIDE, PRESERVATIVE FREE 10 ML: 5 INJECTION INTRAVENOUS at 20:42

## 2023-08-22 RX ADMIN — ENOXAPARIN SODIUM 40 MG: 100 INJECTION SUBCUTANEOUS at 10:34

## 2023-08-22 RX ADMIN — PROCHLORPERAZINE EDISYLATE 10 MG: 5 INJECTION INTRAMUSCULAR; INTRAVENOUS at 17:27

## 2023-08-22 RX ADMIN — PANTOPRAZOLE SODIUM 40 MG: 40 TABLET, DELAYED RELEASE ORAL at 15:58

## 2023-08-22 RX ADMIN — SUCRALFATE 1 G: 1 TABLET ORAL at 16:34

## 2023-08-22 RX ADMIN — SENNOSIDES AND DOCUSATE SODIUM 1 TABLET: 50; 8.6 TABLET ORAL at 10:32

## 2023-08-22 RX ADMIN — CALCIUM POLYCARBOPHIL 625 MG: 625 TABLET, FILM COATED ORAL at 10:32

## 2023-08-22 RX ADMIN — ONDANSETRON 4 MG: 2 INJECTION INTRAMUSCULAR; INTRAVENOUS at 09:09

## 2023-08-22 RX ADMIN — PROCHLORPERAZINE EDISYLATE 10 MG: 5 INJECTION INTRAMUSCULAR; INTRAVENOUS at 04:46

## 2023-08-22 RX ADMIN — Medication 1 TABLET: at 20:42

## 2023-08-22 RX ADMIN — POTASSIUM CHLORIDE 40 MEQ: 1500 TABLET, EXTENDED RELEASE ORAL at 13:41

## 2023-08-22 RX ADMIN — SODIUM CHLORIDE, PRESERVATIVE FREE 10 ML: 5 INJECTION INTRAVENOUS at 09:10

## 2023-08-22 RX ADMIN — FERROUS SULFATE TAB 325 MG (65 MG ELEMENTAL FE) 325 MG: 325 (65 FE) TAB at 16:35

## 2023-08-22 RX ADMIN — POTASSIUM CHLORIDE 40 MEQ: 1500 TABLET, EXTENDED RELEASE ORAL at 23:20

## 2023-08-22 RX ADMIN — ONDANSETRON 4 MG: 2 INJECTION INTRAMUSCULAR; INTRAVENOUS at 02:24

## 2023-08-22 NOTE — CARE COORDINATION
8/22/23, 11:33 AM EDT    DISCHARGE ON [address] [place] [address] day: 0  Location: -15/015-A Reason for admit: UTI (urinary tract infection) [N39.0]  Acute urinary retention [R33.8]  Urinary tract infection with hematuria, site unspecified [N39.0, R31.9]   Procedure:   Procedure:   8/19 CT abdomen:   No acute findings. 8/19 CXR:   Opacities near the left lung base which could be due to atelectasis or infiltrate. Barriers to Discharge: Hospitalist following. PT/OT. Timmons at discharge. Reports nausea. UTI. Doxycyline. Protonix. PRN zofran given.    PCP: FLORIDA Conte - CNP   %  Patient Goals/Plan/Treatment Preferences: Return to [facility].

## 2023-08-22 NOTE — FLOWSHEET NOTE
08/22/23 1340   Safe Environment   Safety Measures Standard Safety Measures; Side rails X 2;Call light within reach; Bed in low position; Family at bedside  (virtual safety round complete)     Pt resting in bed with eyes closed. Did not respond to voice, camera turned on for safety. No apparent signs of distress.  Family at bedside

## 2023-08-22 NOTE — CARE COORDINATION
8/22/23, 12:01 PM EDT    DISCHARGE PLANNING EVALUATION     SW notified of Patient discharge with carroll and returning to AL. SW spoke with Patient, daughter Perry Calix and spouse regarding carroll care. Spouse assists with bathing Patient and is able to empty her carroll at the AL. SHABNAM spoke with AL RN Rasheed Shah and she indicated that care would have to be done by the patient and family and they are in agreement with the care at this time. SW discussed home health and they would prefer an order for home health at discharge. Referral provided to [address] [place] [address] at [facility] as Patient has had them in the past and prefers [facility].

## 2023-08-22 NOTE — PALLIATIVE CARE
Follow Up / Progress Note        Patient:   Yazmin Gunter  YOB: 1940  Age:  78 y.o. Room:  [facility]  MRN:  [id number]         Family/Patient Discussion:  Patient resting in bed. No family at the bedside. Follow up to yesterday's conversation and patient denies any questions at this time. Patient c/o nausea. Primary RN at the bedside. Plan/Follow-Up:  Primary RN may call palliative care if family has questions. Palliative care contact information provided to patient on patient's whiteboard.            Electronically signed by Clara Wasserman RN on 8/22/2023 at 11:10 AM             Palliative Care Office: [phone]

## 2023-08-23 LAB
ANION GAP SERPL CALC-SCNC: 8 MEQ/L (ref 8–16)
BUN SERPL-MCNC: 9 MG/DL (ref 7–22)
CALCIUM SERPL-MCNC: 8.9 MG/DL (ref 8.5–10.5)
CHLORIDE SERPL-SCNC: 101 MEQ/L (ref 98–111)
CO2 SERPL-SCNC: 23 MEQ/L (ref 23–33)
CREAT SERPL-MCNC: 0.5 MG/DL (ref 0.4–1.2)
GFR SERPL CREATININE-BSD FRML MDRD: > 60 ML/MIN/1.73M2
GLUCOSE SERPL-MCNC: 100 MG/DL (ref 70–108)
POTASSIUM SERPL-SCNC: 4.7 MEQ/L (ref 3.5–5.2)
SODIUM SERPL-SCNC: 132 MEQ/L (ref 135–145)

## 2023-08-23 PROCEDURE — 6370000000 HC RX 637 (ALT 250 FOR IP): Performed by: NURSE PRACTITIONER

## 2023-08-23 PROCEDURE — 99233 SBSQ HOSP IP/OBS HIGH 50: CPT

## 2023-08-23 PROCEDURE — 6360000002 HC RX W HCPCS: Performed by: STUDENT IN AN ORGANIZED HEALTH CARE EDUCATION/TRAINING PROGRAM

## 2023-08-23 PROCEDURE — 97110 THERAPEUTIC EXERCISES: CPT

## 2023-08-23 PROCEDURE — 36415 COLL VENOUS BLD VENIPUNCTURE: CPT

## 2023-08-23 PROCEDURE — 1200000000 HC SEMI PRIVATE

## 2023-08-23 PROCEDURE — 6360000002 HC RX W HCPCS: Performed by: NURSE PRACTITIONER

## 2023-08-23 PROCEDURE — 80048 BASIC METABOLIC PNL TOTAL CA: CPT

## 2023-08-23 PROCEDURE — 6370000000 HC RX 637 (ALT 250 FOR IP): Performed by: STUDENT IN AN ORGANIZED HEALTH CARE EDUCATION/TRAINING PROGRAM

## 2023-08-23 PROCEDURE — 97535 SELF CARE MNGMENT TRAINING: CPT

## 2023-08-23 PROCEDURE — 2580000003 HC RX 258: Performed by: STUDENT IN AN ORGANIZED HEALTH CARE EDUCATION/TRAINING PROGRAM

## 2023-08-23 RX ADMIN — SUCRALFATE 1 G: 1 TABLET ORAL at 06:15

## 2023-08-23 RX ADMIN — CALCIUM POLYCARBOPHIL 625 MG: 625 TABLET, FILM COATED ORAL at 08:25

## 2023-08-23 RX ADMIN — SODIUM CHLORIDE, PRESERVATIVE FREE 10 ML: 5 INJECTION INTRAVENOUS at 08:19

## 2023-08-23 RX ADMIN — FERROUS SULFATE TAB 325 MG (65 MG ELEMENTAL FE) 325 MG: 325 (65 FE) TAB at 17:15

## 2023-08-23 RX ADMIN — FERROUS SULFATE TAB 325 MG (65 MG ELEMENTAL FE) 325 MG: 325 (65 FE) TAB at 08:25

## 2023-08-23 RX ADMIN — CIPROFLOXACIN HYDROCHLORIDE 500 MG: 500 TABLET, FILM COATED ORAL at 20:08

## 2023-08-23 RX ADMIN — PANTOPRAZOLE SODIUM 40 MG: 40 TABLET, DELAYED RELEASE ORAL at 17:16

## 2023-08-23 RX ADMIN — Medication 2000 UNITS: at 08:25

## 2023-08-23 RX ADMIN — SENNOSIDES AND DOCUSATE SODIUM 1 TABLET: 50; 8.6 TABLET ORAL at 08:25

## 2023-08-23 RX ADMIN — SUCRALFATE 1 G: 1 TABLET ORAL at 17:15

## 2023-08-23 RX ADMIN — ENALAPRIL MALEATE 10 MG: 10 TABLET ORAL at 08:20

## 2023-08-23 RX ADMIN — PROCHLORPERAZINE EDISYLATE 10 MG: 5 INJECTION INTRAMUSCULAR; INTRAVENOUS at 09:21

## 2023-08-23 RX ADMIN — HYDROCODONE BITARTRATE AND ACETAMINOPHEN 1 TABLET: 5; 325 TABLET ORAL at 20:11

## 2023-08-23 RX ADMIN — METOPROLOL SUCCINATE 50 MG: 50 TABLET, EXTENDED RELEASE ORAL at 08:22

## 2023-08-23 RX ADMIN — FLUOXETINE HYDROCHLORIDE 40 MG: 20 CAPSULE ORAL at 08:22

## 2023-08-23 RX ADMIN — ENOXAPARIN SODIUM 40 MG: 100 INJECTION SUBCUTANEOUS at 08:25

## 2023-08-23 RX ADMIN — SUCRALFATE 1 G: 1 TABLET ORAL at 20:08

## 2023-08-23 RX ADMIN — Medication 1 TABLET: at 20:08

## 2023-08-23 RX ADMIN — CIPROFLOXACIN HYDROCHLORIDE 500 MG: 500 TABLET, FILM COATED ORAL at 08:25

## 2023-08-23 RX ADMIN — SODIUM CHLORIDE, PRESERVATIVE FREE 10 ML: 5 INJECTION INTRAVENOUS at 20:35

## 2023-08-23 RX ADMIN — SUCRALFATE 1 G: 1 TABLET ORAL at 11:44

## 2023-08-23 RX ADMIN — LEVOTHYROXINE SODIUM 112 MCG: 112 TABLET ORAL at 06:16

## 2023-08-23 RX ADMIN — PANTOPRAZOLE SODIUM 40 MG: 40 TABLET, DELAYED RELEASE ORAL at 06:16

## 2023-08-23 ASSESSMENT — PAIN DESCRIPTION - PAIN TYPE: TYPE: CHRONIC PAIN

## 2023-08-23 ASSESSMENT — PAIN DESCRIPTION - DESCRIPTORS: DESCRIPTORS: ACHING

## 2023-08-23 ASSESSMENT — PAIN SCALES - GENERAL
PAINLEVEL_OUTOF10: 7
PAINLEVEL_OUTOF10: 7

## 2023-08-23 ASSESSMENT — PAIN DESCRIPTION - LOCATION: LOCATION: BACK

## 2023-08-23 NOTE — FLOWSHEET NOTE
08/23/23 5462   Safe Environment   Safety Measures Standard Safety Measures; Family at bedside;Call light within reach; Bed in low position  (virtual safety round complete)     Pt resting in bed with family at bedside. Reminded pt to utilize call light when needed. No apparent signs of distress.

## 2023-08-23 NOTE — CARE COORDINATION
8/23/23, 2:23 PM EDT    [facility]  [facility] Reason for admit: UTI (urinary tract infection) [N39.0]  Acute urinary retention [R33.8]  Urinary tract infection with hematuria, site unspecified [N39.0, R31.9]   Procedure:   8/19 CT abdomen:   No acute findings. 8/19 CXR:   Opacities near the left lung base which could be due to atelectasis or infiltrate. Barriers to Discharge: Hospitalist following. Change in d/c plan. PT/OT. Nausea. PCP: FLORIDA Hylton CNP  Readmission Risk Score: 18.7%  Patient Goals/Plan/Treatment Preferences: Now planning [facility], debbieert required.

## 2023-08-23 NOTE — DISCHARGE INSTR - COC
Endoscopy    UPPER GASTROINTESTINAL ENDOSCOPY N/A 10/25/2022    EGD POLYP ABLATION/OTHER performed by Pino Caceres MD at [facility] N/A 8/24/2023    EGD performed by Shashi Holt MD at [facility] Left 8/24/2023    EGD BIOPSY performed by Shashi Holt MD at [facility]       Immunization History:   Immunization History   Administered Date(s) Administered    COVID-19, PFIZER Bivalent, DO NOT Dilute, (age 12y+), IM, 30 mcg/0.3 mL 05/10/2023    Influenza Vaccine, unspecified formulation 09/26/2014, 10/13/2015, 10/01/2016    Influenza Virus Vaccine 09/26/2014, 10/01/2016    Influenza, FLUAD, (age 72 y+), Adjuvanted, 0.5mL 10/20/2020, 09/08/2021    Influenza, FLUARIX, FLULAVAL, FLUZONE (age 10 mo+) AND AFLURIA, (age 1 y+), PF, 0.5mL 10/13/2015, 10/20/2020    Influenza, High Dose (Fluzone 65 yrs and older) 10/24/2017, 09/29/2018, 10/20/2020    Influenza, Triv, inactivated, subunit, adjuvanted, IM (Fluad 65 yrs and older) 09/29/2018, 10/12/2019    MMR, Yesenia Cuna, M-M-R II, (age 12m+), SC, 0.5mL 10/24/1995    Pneumococcal, PPSV23, PNEUMOVAX 21, (age 2y+), SC/IM, 0.5mL 11/02/2004, 10/12/2010    TDaP, ADACEL (age 6y-58y), BOOSTRIX (age 10y+), IM, 0.5mL 09/08/2021    Zoster Live (Zostavax) 10/16/2013       Active Problems:  Patient Active Problem List   Diagnosis Code    Osteoarthritis M19.90    Fibromyalgia M79.7    Gastroesophageal reflux disease K21.9    Hypothyroidism E03.9    Patient is Jainism [id number]    Back pain M54.9    H/O abdominal surgery Z98.890    Normocytic anemia D64.9    History of diverticulosis Z87.19    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.40, G89.29    Chest pain R07.9    Primary hypertension I10    Abnormal CT scan, chest R93.89    BRBPR (bright red blood per rectum) K62.5    Spinal stenosis of lumbar region without neurogenic claudication M48.061    Other idiopathic scoliosis, lumbar region M41.26

## 2023-08-23 NOTE — CARE COORDINATION
8/23/23, 11:12 AM EDT    DISCHARGE PLANNING EVALUATION    SW was advised by attending that pt and spouse would like to discuss possible ECF. SW spoke with pt, spouse and daughter. Discussed [facility] for rehab. SW spoke with Jen Faulkner with [facility], they are not in network but will try a one time contract. SW also updated pt that she would be in a semi-private room. Pt is agreeable. Referral completed with Jen Faulkner, requested pre-cert be started today.

## 2023-08-23 NOTE — CONSULTS
[facility] 8-23-23     Seven Gunter  [id number]  1940  female      Requesting provider: Mir Berrios   Indications CC: Epigastric pain, persistent nausea   Dictating for Dr Jc Martel     HPI: This is an 78year old female seen as a hospital consult for persistent nausea. She was admitted 8-19-23 fatigue, nausea, dysuria, abdominal pain and urinary retention. She has history of constipation, GERD, iron deficiency  anemia and large hiatal hernia. S/P robotic reduction of large paraesophageal/hiatal hernia and gastric volvulus with gastropexy and G tube in March 2021. She continues to have persistent nausea and GI consulted is requested. She had EGD with Dr Primo Vanegas 8-17-22 with large hiatal hernia with Broad Top Minus erosions. Patchy erythema throughout the body with scattered erosions and small punctate ulcer. 4mm white color based ulcer in the prepyloric antrum moderately deep. Focal erosions at EG junction. Dilated duodenal bulb. Incidental periampullary diverticulum. Recommendations for bid bbi. Repeat EGD 8 weeks. Repeat EGD 10-25-22 with mild GAVE endoscopically better. Two areas treated with APC. Recommendations to monitor clinically and based on labs if repeat EGD is needed.           Patient Active Problem List   Diagnosis    Osteoarthritis    Fibromyalgia    Gastroesophageal reflux disease    Hypothyroidism    Patient is Faith    Back pain    H/O abdominal surgery    Normocytic anemia    History of diverticulosis    Chronic low back pain with sciatica    Chest pain    Primary hypertension    Abnormal CT scan, chest    BRBPR (bright red blood per rectum)    Spinal stenosis of lumbar region without neurogenic claudication    Other idiopathic scoliosis, lumbar region    Closed fracture of distal end of left femur, initial encounter ([facility])    Pre-syncope    Weakness generalized    Large hiatal hernia    Hyponatremia    Esophageal dysphagia    Hypomagnesemia    Dysphagia    Bilateral

## 2023-08-23 NOTE — PALLIATIVE CARE
Follow Up / Progress Note        Patient:   Woodrow Gunter  YOB: 1940  Age:  78 y.o. [facility]  MRN:  [id number]         Family/Patient Discussion:  Spoke with Spring Roque and family at bedside. They denied questions or needs from palliative care at this time. Contact information provided.                Electronically signed by Daylin Alvarado RN on 8/23/2023 at 1:41 PM             Palliative Care Office: [phone]

## 2023-08-24 ENCOUNTER — ANESTHESIA (OUTPATIENT)
Dept: ENDOSCOPY | Age: 83
End: 2023-08-24
Payer: MEDICARE

## 2023-08-24 ENCOUNTER — ANESTHESIA EVENT (OUTPATIENT)
Dept: ENDOSCOPY | Age: 83
End: 2023-08-24
Payer: MEDICARE

## 2023-08-24 LAB
BACTERIA BLD AEROBE CULT: NORMAL
BACTERIA BLD AEROBE CULT: NORMAL
GLUCOSE BLD STRIP.AUTO-MCNC: 88 MG/DL (ref 70–108)

## 2023-08-24 PROCEDURE — 6360000002 HC RX W HCPCS: Performed by: STUDENT IN AN ORGANIZED HEALTH CARE EDUCATION/TRAINING PROGRAM

## 2023-08-24 PROCEDURE — 2500000003 HC RX 250 WO HCPCS: Performed by: REGISTERED NURSE

## 2023-08-24 PROCEDURE — 1200000000 HC SEMI PRIVATE

## 2023-08-24 PROCEDURE — 2580000003 HC RX 258: Performed by: NURSE PRACTITIONER

## 2023-08-24 PROCEDURE — 7100000010 HC PHASE II RECOVERY - FIRST 15 MIN: Performed by: INTERNAL MEDICINE

## 2023-08-24 PROCEDURE — 3700000001 HC ADD 15 MINUTES (ANESTHESIA): Performed by: INTERNAL MEDICINE

## 2023-08-24 PROCEDURE — 3700000000 HC ANESTHESIA ATTENDED CARE: Performed by: INTERNAL MEDICINE

## 2023-08-24 PROCEDURE — 3609012400 HC EGD TRANSORAL BIOPSY SINGLE/MULTIPLE: Performed by: INTERNAL MEDICINE

## 2023-08-24 PROCEDURE — 97116 GAIT TRAINING THERAPY: CPT

## 2023-08-24 PROCEDURE — 2580000003 HC RX 258: Performed by: STUDENT IN AN ORGANIZED HEALTH CARE EDUCATION/TRAINING PROGRAM

## 2023-08-24 PROCEDURE — 82948 REAGENT STRIP/BLOOD GLUCOSE: CPT

## 2023-08-24 PROCEDURE — 94669 MECHANICAL CHEST WALL OSCILL: CPT

## 2023-08-24 PROCEDURE — 6360000002 HC RX W HCPCS: Performed by: NURSE PRACTITIONER

## 2023-08-24 PROCEDURE — 97110 THERAPEUTIC EXERCISES: CPT

## 2023-08-24 PROCEDURE — 99232 SBSQ HOSP IP/OBS MODERATE 35: CPT

## 2023-08-24 PROCEDURE — 6370000000 HC RX 637 (ALT 250 FOR IP): Performed by: STUDENT IN AN ORGANIZED HEALTH CARE EDUCATION/TRAINING PROGRAM

## 2023-08-24 PROCEDURE — 6370000000 HC RX 637 (ALT 250 FOR IP): Performed by: NURSE PRACTITIONER

## 2023-08-24 PROCEDURE — 7100000011 HC PHASE II RECOVERY - ADDTL 15 MIN: Performed by: INTERNAL MEDICINE

## 2023-08-24 PROCEDURE — 6360000002 HC RX W HCPCS: Performed by: REGISTERED NURSE

## 2023-08-24 PROCEDURE — 3609017100 HC EGD: Performed by: INTERNAL MEDICINE

## 2023-08-24 PROCEDURE — 88342 IMHCHEM/IMCYTCHM 1ST ANTB: CPT

## 2023-08-24 PROCEDURE — 88305 TISSUE EXAM BY PATHOLOGIST: CPT

## 2023-08-24 RX ORDER — SODIUM CHLORIDE 450 MG/100ML
INJECTION, SOLUTION INTRAVENOUS CONTINUOUS
Status: DISCONTINUED | OUTPATIENT
Start: 2023-08-24 | End: 2023-08-25

## 2023-08-24 RX ORDER — LIDOCAINE HYDROCHLORIDE 20 MG/ML
INJECTION, SOLUTION EPIDURAL; INFILTRATION; INTRACAUDAL; PERINEURAL PRN
Status: DISCONTINUED | OUTPATIENT
Start: 2023-08-24 | End: 2023-08-24 | Stop reason: SDUPTHER

## 2023-08-24 RX ORDER — SODIUM CHLORIDE 0.9 % (FLUSH) 0.9 %
5-40 SYRINGE (ML) INJECTION EVERY 12 HOURS SCHEDULED
Status: DISCONTINUED | OUTPATIENT
Start: 2023-08-24 | End: 2023-08-24 | Stop reason: HOSPADM

## 2023-08-24 RX ORDER — SODIUM CHLORIDE 9 MG/ML
25 INJECTION, SOLUTION INTRAVENOUS PRN
Status: DISCONTINUED | OUTPATIENT
Start: 2023-08-24 | End: 2023-08-24 | Stop reason: HOSPADM

## 2023-08-24 RX ORDER — SODIUM CHLORIDE 0.9 % (FLUSH) 0.9 %
5-40 SYRINGE (ML) INJECTION PRN
Status: DISCONTINUED | OUTPATIENT
Start: 2023-08-24 | End: 2023-08-24 | Stop reason: HOSPADM

## 2023-08-24 RX ADMIN — SUCRALFATE 1 G: 1 TABLET ORAL at 19:47

## 2023-08-24 RX ADMIN — PROPOFOL 80 MG: 10 INJECTION, EMULSION INTRAVENOUS at 14:20

## 2023-08-24 RX ADMIN — SODIUM CHLORIDE: 4.5 INJECTION, SOLUTION INTRAVENOUS at 23:27

## 2023-08-24 RX ADMIN — SUCRALFATE 1 G: 1 TABLET ORAL at 11:27

## 2023-08-24 RX ADMIN — CIPROFLOXACIN HYDROCHLORIDE 500 MG: 500 TABLET, FILM COATED ORAL at 09:46

## 2023-08-24 RX ADMIN — HYDROCODONE BITARTRATE AND ACETAMINOPHEN 1 TABLET: 5; 325 TABLET ORAL at 11:18

## 2023-08-24 RX ADMIN — SODIUM CHLORIDE, PRESERVATIVE FREE 10 ML: 5 INJECTION INTRAVENOUS at 23:58

## 2023-08-24 RX ADMIN — METOPROLOL SUCCINATE 50 MG: 50 TABLET, EXTENDED RELEASE ORAL at 11:25

## 2023-08-24 RX ADMIN — PROCHLORPERAZINE EDISYLATE 10 MG: 5 INJECTION INTRAMUSCULAR; INTRAVENOUS at 11:28

## 2023-08-24 RX ADMIN — ONDANSETRON 4 MG: 2 INJECTION INTRAMUSCULAR; INTRAVENOUS at 04:56

## 2023-08-24 RX ADMIN — PANTOPRAZOLE SODIUM 40 MG: 40 TABLET, DELAYED RELEASE ORAL at 16:48

## 2023-08-24 RX ADMIN — HYDROCODONE BITARTRATE AND ACETAMINOPHEN 1 TABLET: 5; 325 TABLET ORAL at 19:47

## 2023-08-24 RX ADMIN — SUCRALFATE 1 G: 1 TABLET ORAL at 16:47

## 2023-08-24 RX ADMIN — ONDANSETRON 4 MG: 2 INJECTION INTRAMUSCULAR; INTRAVENOUS at 23:58

## 2023-08-24 RX ADMIN — CIPROFLOXACIN HYDROCHLORIDE 500 MG: 500 TABLET, FILM COATED ORAL at 19:48

## 2023-08-24 RX ADMIN — FLUOXETINE HYDROCHLORIDE 40 MG: 20 CAPSULE ORAL at 11:25

## 2023-08-24 RX ADMIN — FERROUS SULFATE TAB 325 MG (65 MG ELEMENTAL FE) 325 MG: 325 (65 FE) TAB at 16:47

## 2023-08-24 RX ADMIN — SODIUM CHLORIDE, PRESERVATIVE FREE 10 ML: 5 INJECTION INTRAVENOUS at 04:57

## 2023-08-24 RX ADMIN — SODIUM CHLORIDE: 4.5 INJECTION, SOLUTION INTRAVENOUS at 12:11

## 2023-08-24 RX ADMIN — ENALAPRIL MALEATE 10 MG: 10 TABLET ORAL at 11:18

## 2023-08-24 RX ADMIN — SODIUM CHLORIDE, PRESERVATIVE FREE 10 ML: 5 INJECTION INTRAVENOUS at 09:46

## 2023-08-24 RX ADMIN — Medication 1 TABLET: at 19:48

## 2023-08-24 RX ADMIN — LIDOCAINE HYDROCHLORIDE 100 MG: 20 INJECTION, SOLUTION EPIDURAL; INFILTRATION; INTRACAUDAL; PERINEURAL at 14:20

## 2023-08-24 RX ADMIN — Medication 2000 UNITS: at 09:46

## 2023-08-24 RX ADMIN — FERROUS SULFATE TAB 325 MG (65 MG ELEMENTAL FE) 325 MG: 325 (65 FE) TAB at 09:46

## 2023-08-24 ASSESSMENT — PAIN DESCRIPTION - DESCRIPTORS: DESCRIPTORS: ACHING

## 2023-08-24 ASSESSMENT — PAIN DESCRIPTION - FREQUENCY: FREQUENCY: CONTINUOUS

## 2023-08-24 ASSESSMENT — PAIN SCALES - GENERAL
PAINLEVEL_OUTOF10: 0
PAINLEVEL_OUTOF10: 8
PAINLEVEL_OUTOF10: 0
PAINLEVEL_OUTOF10: 7
PAINLEVEL_OUTOF10: 0

## 2023-08-24 ASSESSMENT — PAIN DESCRIPTION - LOCATION
LOCATION: BACK
LOCATION: BACK

## 2023-08-24 ASSESSMENT — PAIN - FUNCTIONAL ASSESSMENT: PAIN_FUNCTIONAL_ASSESSMENT: NONE - DENIES PAIN

## 2023-08-24 NOTE — FLOWSHEET NOTE
08/24/23 1043   Safe Environment   Safety Measures Bed in low position;Call light within reach; Fall prevention (comment); Family at bedside;Side rails X 2;Standard Safety Measures  (Virtual nurse safety round completed.)     Patient is awake and  alert and answers questions. No distress noted. Call light use explained for fall prevention.

## 2023-08-24 NOTE — ANESTHESIA POSTPROCEDURE EVALUATION
Department of Anesthesiology  Postprocedure Note    Patient: Carmelita Gunter  MRN: [id number]  YOB: 1940  Date of evaluation: 8/24/2023      Procedure Summary     Date: 08/24/23 Room / Location: [address] / [address] [place] [address]    Anesthesia Start: 1410 Anesthesia Stop: 8572    Procedures:       EGD      EGD BIOPSY (Left: Esophagus) Diagnosis:       Nausea      History of stomach ulcers      (Nausea [R11.0])      (History of stomach ulcers [Z87.11])    Surgeons: Carrie Sanchez MD Responsible Provider: Tony Bryan DO    Anesthesia Type: MAC ASA Status: 3          Anesthesia Type: No value filed.     Mat Phase I:      Mat Phase II: Mat Score: 8      Anesthesia Post Evaluation    Patient location during evaluation: bedside  Patient participation: complete - patient participated  Level of consciousness: awake and alert  Airway patency: patent  Nausea & Vomiting: no nausea and no vomiting  Complications: no  Cardiovascular status: hemodynamically stable and blood pressure returned to baseline  Respiratory status: acceptable, spontaneous ventilation, nonlabored ventilation and nasal cannula  Hydration status: stable  Pain management: adequate

## 2023-08-24 NOTE — CARE COORDINATION
8/24/23, 1:35 PM EDT    DISCHARGE  [facility] day: 2  Location: [address] [place] [address] Reason for admit: UTI (urinary tract infection) [N39.0]  Acute urinary retention [R33.8]  Urinary tract infection with hematuria, site unspecified [N39.0, R31.9]     Procedure:   8/19 CT abdomen:   No acute findings. 8/19 CXR:   Opacities near the left lung base which could be due to atelectasis or infiltrate. 8/24 EGD in progress. Barriers to Discharge: EGD today. New meds for nausea. PCP: FLORIDA Polo CNP  Readmission Risk Score: 19.1%    Patient Goals/Plan/Treatment Preferences: Precert approved to d/c to [facility]. See SW note. Discharge anticipated tomorrow.

## 2023-08-24 NOTE — CARE COORDINATION
8/24/23, 10:55 AM EDT    DISCHARGE PLANNING EVALUATION    Received call from Teofilo Crouch at [facility], they have received insurance approval for admission. Pt is having EGD this afternoon. Anticipated discharge for tomorrow. SW updated attending, pt, spouse and daughter. Family will transport.

## 2023-08-24 NOTE — ANESTHESIA PRE PROCEDURE
Department of Anesthesiology  Preprocedure Note       Name:  Daisy Kang Height   Age:  78 y.o.  :  1940                                          MRN:  [id number]         Date:  2023      Surgeon: Doug Brooks):  Reuben Ashley MD    Procedure: Procedure(s):  EGD    Medications prior to admission:   Prior to Admission medications    Medication Sig Start Date End Date Taking? Authorizing Provider   meloxicam (MOBIC) 15 MG tablet Take 1 tablet by mouth daily  Patient not taking: Reported on 2023    Historical Provider, MD   HYDROcodone-acetaminophen (NORCO) 5-325 MG per tablet Take 1 tablet by mouth every 6 hours as needed for Pain. Max Daily Amount: 4 tablets  Patient not taking: Reported on 2023    Historical Provider, MD   Omega 3-6-9 Fatty Acids (OMEGA 3-6-9 COMPLEX PO) Take by mouth daily    Historical Provider, MD   sodium chloride 1 g tablet Take 1 tablet by mouth daily  Patient not taking: Reported on 2023    Historical Provider, MD   guaiFENesin-dextromethorphan (ROBITUSSIN DM) 100-10 MG/5ML syrup Take 5 mLs by mouth 3 times daily as needed for Cough  Patient not taking: Reported on 2023    Historical Provider, MD   BISACODYL PO Take by mouth    Historical Provider, MD   calcium carbonate (OYSTER SHELL CALCIUM 500 MG) 1250 (500 Ca) MG tablet Take 1 tablet by mouth daily    Historical Provider, MD   Multiple Vitamins-Minerals (EYE HEALTH PO) Take by mouth daily    Historical Provider, MD   fosfomycin tromethamine (MONUROL) 3 g PACK Take 3 g at 48 hours, and 3g at 72 hours  Patient not taking: Reported on 2023   Breonna Hickey MD   pregabalin (LYRICA) 150 MG capsule Take 1 capsule by mouth 2 times daily for 180 days. 8/15/23 2/11/24  FLORIDA Melvin - CNP   diclofenac (VOLTAREN) 50 MG EC tablet Take 1 tablet by mouth 3 times daily (with meals) 23   Derik Amada Boast, DO   Misc. Devices MISC Check BP daily.   Update office with BP readings on 8/10/23 8/7/23

## 2023-08-25 ENCOUNTER — APPOINTMENT (OUTPATIENT)
Dept: GENERAL RADIOLOGY | Age: 83
DRG: 690 | End: 2023-08-25
Payer: MEDICARE

## 2023-08-25 LAB
ANION GAP SERPL CALC-SCNC: 10 MEQ/L (ref 8–16)
BUN SERPL-MCNC: 9 MG/DL (ref 7–22)
CALCIUM SERPL-MCNC: 8.4 MG/DL (ref 8.5–10.5)
CHLORIDE SERPL-SCNC: 96 MEQ/L (ref 98–111)
CO2 SERPL-SCNC: 22 MEQ/L (ref 23–33)
CREAT SERPL-MCNC: 0.4 MG/DL (ref 0.4–1.2)
GFR SERPL CREATININE-BSD FRML MDRD: > 60 ML/MIN/1.73M2
GLUCOSE SERPL-MCNC: 92 MG/DL (ref 70–108)
POTASSIUM SERPL-SCNC: 3.9 MEQ/L (ref 3.5–5.2)
SODIUM SERPL-SCNC: 128 MEQ/L (ref 135–145)

## 2023-08-25 PROCEDURE — 6370000000 HC RX 637 (ALT 250 FOR IP): Performed by: NURSE PRACTITIONER

## 2023-08-25 PROCEDURE — 36415 COLL VENOUS BLD VENIPUNCTURE: CPT

## 2023-08-25 PROCEDURE — 6370000000 HC RX 637 (ALT 250 FOR IP): Performed by: STUDENT IN AN ORGANIZED HEALTH CARE EDUCATION/TRAINING PROGRAM

## 2023-08-25 PROCEDURE — 99232 SBSQ HOSP IP/OBS MODERATE 35: CPT

## 2023-08-25 PROCEDURE — 97535 SELF CARE MNGMENT TRAINING: CPT

## 2023-08-25 PROCEDURE — 1200000000 HC SEMI PRIVATE

## 2023-08-25 PROCEDURE — 6360000002 HC RX W HCPCS: Performed by: STUDENT IN AN ORGANIZED HEALTH CARE EDUCATION/TRAINING PROGRAM

## 2023-08-25 PROCEDURE — 80048 BASIC METABOLIC PNL TOTAL CA: CPT

## 2023-08-25 PROCEDURE — 2580000003 HC RX 258: Performed by: STUDENT IN AN ORGANIZED HEALTH CARE EDUCATION/TRAINING PROGRAM

## 2023-08-25 PROCEDURE — 97530 THERAPEUTIC ACTIVITIES: CPT

## 2023-08-25 PROCEDURE — 94669 MECHANICAL CHEST WALL OSCILL: CPT

## 2023-08-25 PROCEDURE — 74018 RADEX ABDOMEN 1 VIEW: CPT

## 2023-08-25 PROCEDURE — 6370000000 HC RX 637 (ALT 250 FOR IP)

## 2023-08-25 PROCEDURE — 97110 THERAPEUTIC EXERCISES: CPT

## 2023-08-25 RX ORDER — LIDOCAINE 4 G/G
2 PATCH TOPICAL DAILY
Status: DISCONTINUED | OUTPATIENT
Start: 2023-08-25 | End: 2023-08-26

## 2023-08-25 RX ORDER — SUCRALFATE 1 G/1
1 TABLET ORAL
Qty: 120 TABLET | Refills: 1 | DISCHARGE
Start: 2023-08-25

## 2023-08-25 RX ORDER — PREGABALIN 75 MG/1
150 CAPSULE ORAL 2 TIMES DAILY
Status: DISCONTINUED | OUTPATIENT
Start: 2023-08-25 | End: 2023-08-29 | Stop reason: HOSPADM

## 2023-08-25 RX ADMIN — CIPROFLOXACIN HYDROCHLORIDE 500 MG: 500 TABLET, FILM COATED ORAL at 20:35

## 2023-08-25 RX ADMIN — FERROUS SULFATE TAB 325 MG (65 MG ELEMENTAL FE) 325 MG: 325 (65 FE) TAB at 08:02

## 2023-08-25 RX ADMIN — PREGABALIN 150 MG: 75 CAPSULE ORAL at 20:35

## 2023-08-25 RX ADMIN — SUCRALFATE 1 G: 1 TABLET ORAL at 20:35

## 2023-08-25 RX ADMIN — FERROUS SULFATE TAB 325 MG (65 MG ELEMENTAL FE) 325 MG: 325 (65 FE) TAB at 17:22

## 2023-08-25 RX ADMIN — ENALAPRIL MALEATE 10 MG: 10 TABLET ORAL at 08:03

## 2023-08-25 RX ADMIN — SUCRALFATE 1 G: 1 TABLET ORAL at 11:24

## 2023-08-25 RX ADMIN — PANTOPRAZOLE SODIUM 40 MG: 40 TABLET, DELAYED RELEASE ORAL at 17:23

## 2023-08-25 RX ADMIN — Medication 1 TABLET: at 20:35

## 2023-08-25 RX ADMIN — CIPROFLOXACIN HYDROCHLORIDE 500 MG: 500 TABLET, FILM COATED ORAL at 08:02

## 2023-08-25 RX ADMIN — HYDROCODONE BITARTRATE AND ACETAMINOPHEN 1 TABLET: 5; 325 TABLET ORAL at 04:17

## 2023-08-25 RX ADMIN — ENOXAPARIN SODIUM 40 MG: 100 INJECTION SUBCUTANEOUS at 08:02

## 2023-08-25 RX ADMIN — SUCRALFATE 1 G: 1 TABLET ORAL at 17:22

## 2023-08-25 RX ADMIN — ONDANSETRON 4 MG: 2 INJECTION INTRAMUSCULAR; INTRAVENOUS at 07:55

## 2023-08-25 RX ADMIN — SODIUM CHLORIDE, PRESERVATIVE FREE 10 ML: 5 INJECTION INTRAVENOUS at 20:36

## 2023-08-25 RX ADMIN — SENNOSIDES AND DOCUSATE SODIUM 1 TABLET: 50; 8.6 TABLET ORAL at 08:02

## 2023-08-25 RX ADMIN — METOPROLOL SUCCINATE 50 MG: 50 TABLET, EXTENDED RELEASE ORAL at 08:03

## 2023-08-25 RX ADMIN — FLUOXETINE HYDROCHLORIDE 40 MG: 20 CAPSULE ORAL at 08:06

## 2023-08-25 ASSESSMENT — PAIN DESCRIPTION - LOCATION
LOCATION: BACK
LOCATION: BACK

## 2023-08-25 ASSESSMENT — PAIN DESCRIPTION - ORIENTATION
ORIENTATION: LOWER
ORIENTATION: LOWER

## 2023-08-25 ASSESSMENT — PAIN SCALES - GENERAL
PAINLEVEL_OUTOF10: 4
PAINLEVEL_OUTOF10: 0
PAINLEVEL_OUTOF10: 8
PAINLEVEL_OUTOF10: 0

## 2023-08-25 ASSESSMENT — PAIN DESCRIPTION - DESCRIPTORS
DESCRIPTORS: ACHING
DESCRIPTORS: ACHING

## 2023-08-25 NOTE — PALLIATIVE CARE
Follow Up / Progress Note        Patient:   Gio Gunter  YOB: 1940  Age:  78 y.o. [facility]  MRN:  [id number]         Family/Patient Discussion:  Spoke with [facility] and her  at bedside. She has decided that she would like to have a DNR form completed. She does not want to be intubated. I completed an DNR form, marked DNRCCA and script for DO NOT INTUBATE and placed it on her chart for provider to sign. Plan/Follow-Up:  Primary nurse updated and aware of form on chart. Perfect serve message sent to DEVIN Olson CNP to update on forms needing a signature as well.          Electronically signed by Rena Severance, RN on 8/25/2023 at 10:05 AM             Palliative Care Office: [phone]

## 2023-08-25 NOTE — CARE COORDINATION
8/25/23, 4:03 PM EDT    Patient goals/plan/ treatment preferences discussed by  and . Patient goals/plan/ treatment preferences reviewed with patient/ family. Patient/ family verbalize understanding of discharge plan and are in agreement with goal/plan/treatment preferences. Understanding was demonstrated using the teach back method. AVS provided by RN at time of discharge, which includes all necessary medical information pertaining to the patients current course of illness, treatment, post-discharge goals of care, and treatment preferences.      {Services At/After Discharge:49041}       IMM Letter  IMM Letter given to Patient/Family/Significant other/Guardian/POA/by[de-identified] 2nd IMM delivered by Efrain Dodson RN CM  IMM Letter date given[de-identified] 08/25/23  IMM Letter time given[de-identified] 1  Observation Status Letter date given[de-identified] 08/19/23  Observation Status Letter time given[de-identified] 2127  Observation Status Letter given to Patient/Family/Significant other/Guardian/POA/by[de-identified] Pt Access

## 2023-08-25 NOTE — CARE COORDINATION
23, 11:53 AM EDT    DISCHARGE PLANNING EVALUATION    SW received call from Luis Conn with [facility], insurance pre-cert has , they will need to restart. Attending updated.

## 2023-08-25 NOTE — CONSULTS
[facility]  General Surgery Consult Note  NATASHA Layne for Dr. Kei Jackson MD FACS    Pt Name: Michoacano Gunter  MRN: [id number]  YOB: 1940  Date of evaluation: 8/25/2023  Primary Care Physician: FLORIDA Verdin CNP  Patient evaluated at the request of  Lorine Cockayne, APRN-CNP  Reason for evaluation: large hiatal hernia  IMPRESSIONS:   Recurrent large hiatal hernia  Chronic nausea secondary to #2  EGD 8/24 with Dr. 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 PLANS:   Continue soft diet as tolerated  Antiemetics as needed  PT/OT  DVT prophylaxis with Lovenox   Antibiotics for UTI  Medical management  GI following. Scope results reviewed. Carafate and PPI recommended. Discharge planning - ECF  Abdomen benign. Patient tolerating diet per her normal. No vomiting issues just intermittent nausea. No acute surgery needed. Patient unsure if she would consider surgery again at this time or not. Discussed last surgery with patient that at that time no formal repair was performed as hernia was only reduced to about 80% and G-tube insertion placed for gastropexy. Recommendation would be for tertiary center evaluation if she wants to consider surgery again. Patient and  understand this. Spoke with son over the phone. Follow up in our office to discuss plan in 2 weeks. SUBJECTIVE:   Chief complaint: Nausea    History of present illness:  Caron Garnett is a 78-year old female patient who we were asked to see for recurrent large hiatal hernia with chronic nausea. She was admitted originally to [address] [place] [address] on August 19th, 23 for fatigue, nausea, dysuria, abdominal pain and urinary retention. Found to have a UTI present on admission.  Now has carroll catheter in

## 2023-08-25 NOTE — DISCHARGE INSTRUCTIONS
Avoid spicy foods, tomatoes & tomato products, chocolate, coffee, caffeine, alcohol, citrus fruits & juices, peppermint, carbonated beverages    No NSAIDs- no diclofenac, ibuprofen, aleve, advil, motrin,  celebrex, mobic, meloxicam, naproxen, naprosyn, clinoril, excedrin, or toradol

## 2023-08-25 NOTE — FLOWSHEET NOTE
08/25/23 1715   Safe Environment   Safety Measures Nurse at bedside  (Virtual Nurse Safety Round Complete)     Call placed to patients room, bedside RN at bedside.

## 2023-08-26 LAB
ANION GAP SERPL CALC-SCNC: 9 MEQ/L (ref 8–16)
BASOPHILS ABSOLUTE: 0 THOU/MM3 (ref 0–0.1)
BASOPHILS NFR BLD AUTO: 1.4 %
BUN SERPL-MCNC: 8 MG/DL (ref 7–22)
CALCIUM SERPL-MCNC: 8.9 MG/DL (ref 8.5–10.5)
CHLORIDE SERPL-SCNC: 99 MEQ/L (ref 98–111)
CO2 SERPL-SCNC: 26 MEQ/L (ref 23–33)
CREAT SERPL-MCNC: 0.6 MG/DL (ref 0.4–1.2)
DEPRECATED RDW RBC AUTO: 45.8 FL (ref 35–45)
EKG ATRIAL RATE: 56 BPM
EKG ATRIAL RATE: 66 BPM
EKG ATRIAL RATE: 75 BPM
EKG P AXIS: -11 DEGREES
EKG P AXIS: 33 DEGREES
EKG P AXIS: 51 DEGREES
EKG P-R INTERVAL: 150 MS
EKG P-R INTERVAL: 172 MS
EKG P-R INTERVAL: 180 MS
EKG Q-T INTERVAL: 398 MS
EKG Q-T INTERVAL: 410 MS
EKG Q-T INTERVAL: 434 MS
EKG QRS DURATION: 70 MS
EKG QRS DURATION: 78 MS
EKG QRS DURATION: 80 MS
EKG QTC CALCULATION (BAZETT): 418 MS
EKG QTC CALCULATION (BAZETT): 429 MS
EKG QTC CALCULATION (BAZETT): 444 MS
EKG R AXIS: -20 DEGREES
EKG R AXIS: -3 DEGREES
EKG R AXIS: 16 DEGREES
EKG T AXIS: 13 DEGREES
EKG T AXIS: 21 DEGREES
EKG T AXIS: 40 DEGREES
EKG VENTRICULAR RATE: 56 BPM
EKG VENTRICULAR RATE: 66 BPM
EKG VENTRICULAR RATE: 75 BPM
EOSINOPHIL NFR BLD AUTO: 6 %
EOSINOPHILS ABSOLUTE: 0.2 THOU/MM3 (ref 0–0.4)
ERYTHROCYTE [DISTWIDTH] IN BLOOD BY AUTOMATED COUNT: 12.8 % (ref 11.5–14.5)
GFR SERPL CREATININE-BSD FRML MDRD: > 60 ML/MIN/1.73M2
GLUCOSE SERPL-MCNC: 92 MG/DL (ref 70–108)
HCT VFR BLD AUTO: 38.3 % (ref 37–47)
HGB BLD-MCNC: 13 GM/DL (ref 12–16)
IMM GRANULOCYTES # BLD AUTO: 0.01 THOU/MM3 (ref 0–0.07)
IMM GRANULOCYTES NFR BLD AUTO: 0.3 %
LYMPHOCYTES ABSOLUTE: 0.7 THOU/MM3 (ref 1–4.8)
LYMPHOCYTES NFR BLD AUTO: 19.1 %
MCH RBC QN AUTO: 32.7 PG (ref 26–33)
MCHC RBC AUTO-ENTMCNC: 33.9 GM/DL (ref 32.2–35.5)
MCV RBC AUTO: 96.5 FL (ref 81–99)
MONOCYTES ABSOLUTE: 0.5 THOU/MM3 (ref 0.4–1.3)
MONOCYTES NFR BLD AUTO: 12.9 %
NEUTROPHILS NFR BLD AUTO: 60.3 %
NRBC BLD AUTO-RTO: 0 /100 WBC
PLATELET # BLD AUTO: 142 THOU/MM3 (ref 130–400)
PMV BLD AUTO: 9.9 FL (ref 9.4–12.4)
POTASSIUM SERPL-SCNC: 4 MEQ/L (ref 3.5–5.2)
RBC # BLD AUTO: 3.97 MILL/MM3 (ref 4.2–5.4)
SEGMENTED NEUTROPHILS ABSOLUTE COUNT: 2.1 THOU/MM3 (ref 1.8–7.7)
SODIUM SERPL-SCNC: 134 MEQ/L (ref 135–145)
WBC # BLD AUTO: 3.5 THOU/MM3 (ref 4.8–10.8)

## 2023-08-26 PROCEDURE — 80048 BASIC METABOLIC PNL TOTAL CA: CPT

## 2023-08-26 PROCEDURE — 6370000000 HC RX 637 (ALT 250 FOR IP)

## 2023-08-26 PROCEDURE — 1200000000 HC SEMI PRIVATE

## 2023-08-26 PROCEDURE — 6370000000 HC RX 637 (ALT 250 FOR IP): Performed by: NURSE PRACTITIONER

## 2023-08-26 PROCEDURE — 6370000000 HC RX 637 (ALT 250 FOR IP): Performed by: STUDENT IN AN ORGANIZED HEALTH CARE EDUCATION/TRAINING PROGRAM

## 2023-08-26 PROCEDURE — 2580000003 HC RX 258: Performed by: STUDENT IN AN ORGANIZED HEALTH CARE EDUCATION/TRAINING PROGRAM

## 2023-08-26 PROCEDURE — 99232 SBSQ HOSP IP/OBS MODERATE 35: CPT

## 2023-08-26 PROCEDURE — 85025 COMPLETE CBC W/AUTO DIFF WBC: CPT

## 2023-08-26 PROCEDURE — 36415 COLL VENOUS BLD VENIPUNCTURE: CPT

## 2023-08-26 RX ORDER — LIDOCAINE 4 G/G
1 PATCH TOPICAL ONCE
Status: COMPLETED | OUTPATIENT
Start: 2023-08-26 | End: 2023-08-27

## 2023-08-26 RX ORDER — LIDOCAINE 4 G/G
3 PATCH TOPICAL DAILY
Status: DISCONTINUED | OUTPATIENT
Start: 2023-08-27 | End: 2023-08-29 | Stop reason: HOSPADM

## 2023-08-26 RX ADMIN — SENNOSIDES AND DOCUSATE SODIUM 1 TABLET: 50; 8.6 TABLET ORAL at 09:49

## 2023-08-26 RX ADMIN — PANTOPRAZOLE SODIUM 40 MG: 40 TABLET, DELAYED RELEASE ORAL at 05:50

## 2023-08-26 RX ADMIN — Medication 2000 UNITS: at 09:49

## 2023-08-26 RX ADMIN — Medication 1 TABLET: at 20:32

## 2023-08-26 RX ADMIN — SODIUM CHLORIDE, PRESERVATIVE FREE 10 ML: 5 INJECTION INTRAVENOUS at 20:32

## 2023-08-26 RX ADMIN — METOPROLOL SUCCINATE 50 MG: 50 TABLET, EXTENDED RELEASE ORAL at 09:56

## 2023-08-26 RX ADMIN — PREGABALIN 150 MG: 75 CAPSULE ORAL at 09:56

## 2023-08-26 RX ADMIN — SODIUM CHLORIDE, PRESERVATIVE FREE 10 ML: 5 INJECTION INTRAVENOUS at 09:57

## 2023-08-26 RX ADMIN — PREGABALIN 150 MG: 75 CAPSULE ORAL at 20:33

## 2023-08-26 RX ADMIN — FERROUS SULFATE TAB 325 MG (65 MG ELEMENTAL FE) 325 MG: 325 (65 FE) TAB at 17:13

## 2023-08-26 RX ADMIN — FERROUS SULFATE TAB 325 MG (65 MG ELEMENTAL FE) 325 MG: 325 (65 FE) TAB at 09:49

## 2023-08-26 RX ADMIN — SUCRALFATE 1 G: 1 TABLET ORAL at 05:50

## 2023-08-26 RX ADMIN — ENALAPRIL MALEATE 10 MG: 10 TABLET ORAL at 09:55

## 2023-08-26 RX ADMIN — SUCRALFATE 1 G: 1 TABLET ORAL at 09:57

## 2023-08-26 RX ADMIN — LEVOTHYROXINE SODIUM 112 MCG: 112 TABLET ORAL at 05:50

## 2023-08-26 RX ADMIN — SUCRALFATE 1 G: 1 TABLET ORAL at 20:32

## 2023-08-26 RX ADMIN — CALCIUM POLYCARBOPHIL 625 MG: 625 TABLET, FILM COATED ORAL at 09:49

## 2023-08-26 RX ADMIN — PANTOPRAZOLE SODIUM 40 MG: 40 TABLET, DELAYED RELEASE ORAL at 17:13

## 2023-08-26 RX ADMIN — SUCRALFATE 1 G: 1 TABLET ORAL at 17:13

## 2023-08-26 RX ADMIN — CIPROFLOXACIN HYDROCHLORIDE 500 MG: 500 TABLET, FILM COATED ORAL at 20:32

## 2023-08-26 RX ADMIN — CIPROFLOXACIN HYDROCHLORIDE 500 MG: 500 TABLET, FILM COATED ORAL at 09:59

## 2023-08-26 RX ADMIN — FLUOXETINE HYDROCHLORIDE 40 MG: 20 CAPSULE ORAL at 09:56

## 2023-08-26 NOTE — FLOWSHEET NOTE
08/26/23 7478   Safe Environment   Safety Measures Standard Safety Measures; Family at bedside  (VN safety round complete.)

## 2023-08-27 PROCEDURE — 6370000000 HC RX 637 (ALT 250 FOR IP)

## 2023-08-27 PROCEDURE — 2580000003 HC RX 258: Performed by: STUDENT IN AN ORGANIZED HEALTH CARE EDUCATION/TRAINING PROGRAM

## 2023-08-27 PROCEDURE — 1200000000 HC SEMI PRIVATE

## 2023-08-27 PROCEDURE — 99232 SBSQ HOSP IP/OBS MODERATE 35: CPT

## 2023-08-27 PROCEDURE — 6370000000 HC RX 637 (ALT 250 FOR IP): Performed by: STUDENT IN AN ORGANIZED HEALTH CARE EDUCATION/TRAINING PROGRAM

## 2023-08-27 PROCEDURE — 6370000000 HC RX 637 (ALT 250 FOR IP): Performed by: NURSE PRACTITIONER

## 2023-08-27 RX ADMIN — SODIUM CHLORIDE, PRESERVATIVE FREE 10 ML: 5 INJECTION INTRAVENOUS at 20:20

## 2023-08-27 RX ADMIN — SENNOSIDES AND DOCUSATE SODIUM 1 TABLET: 50; 8.6 TABLET ORAL at 10:19

## 2023-08-27 RX ADMIN — SODIUM CHLORIDE, PRESERVATIVE FREE 10 ML: 5 INJECTION INTRAVENOUS at 10:20

## 2023-08-27 RX ADMIN — CIPROFLOXACIN HYDROCHLORIDE 500 MG: 500 TABLET, FILM COATED ORAL at 20:20

## 2023-08-27 RX ADMIN — FLUOXETINE HYDROCHLORIDE 40 MG: 20 CAPSULE ORAL at 10:16

## 2023-08-27 RX ADMIN — SUCRALFATE 1 G: 1 TABLET ORAL at 10:19

## 2023-08-27 RX ADMIN — FERROUS SULFATE TAB 325 MG (65 MG ELEMENTAL FE) 325 MG: 325 (65 FE) TAB at 10:19

## 2023-08-27 RX ADMIN — PANTOPRAZOLE SODIUM 40 MG: 40 TABLET, DELAYED RELEASE ORAL at 16:45

## 2023-08-27 RX ADMIN — Medication 1 TABLET: at 20:20

## 2023-08-27 RX ADMIN — SUCRALFATE 1 G: 1 TABLET ORAL at 05:37

## 2023-08-27 RX ADMIN — SUCRALFATE 1 G: 1 TABLET ORAL at 20:20

## 2023-08-27 RX ADMIN — FERROUS SULFATE TAB 325 MG (65 MG ELEMENTAL FE) 325 MG: 325 (65 FE) TAB at 16:45

## 2023-08-27 RX ADMIN — PANTOPRAZOLE SODIUM 40 MG: 40 TABLET, DELAYED RELEASE ORAL at 05:38

## 2023-08-27 RX ADMIN — ENALAPRIL MALEATE 10 MG: 10 TABLET ORAL at 10:16

## 2023-08-27 RX ADMIN — PREGABALIN 150 MG: 75 CAPSULE ORAL at 10:19

## 2023-08-27 RX ADMIN — CIPROFLOXACIN HYDROCHLORIDE 500 MG: 500 TABLET, FILM COATED ORAL at 10:19

## 2023-08-27 RX ADMIN — CALCIUM POLYCARBOPHIL 625 MG: 625 TABLET, FILM COATED ORAL at 10:19

## 2023-08-27 RX ADMIN — Medication 2000 UNITS: at 10:20

## 2023-08-27 RX ADMIN — METOPROLOL SUCCINATE 50 MG: 50 TABLET, EXTENDED RELEASE ORAL at 10:16

## 2023-08-27 RX ADMIN — LEVOTHYROXINE SODIUM 112 MCG: 112 TABLET ORAL at 05:38

## 2023-08-27 RX ADMIN — PREGABALIN 150 MG: 75 CAPSULE ORAL at 20:20

## 2023-08-27 RX ADMIN — SUCRALFATE 1 G: 1 TABLET ORAL at 16:45

## 2023-08-28 PROCEDURE — 6370000000 HC RX 637 (ALT 250 FOR IP)

## 2023-08-28 PROCEDURE — 97129 THER IVNTJ 1ST 15 MIN: CPT

## 2023-08-28 PROCEDURE — 97110 THERAPEUTIC EXERCISES: CPT

## 2023-08-28 PROCEDURE — 99231 SBSQ HOSP IP/OBS SF/LOW 25: CPT

## 2023-08-28 PROCEDURE — 97530 THERAPEUTIC ACTIVITIES: CPT

## 2023-08-28 PROCEDURE — 6370000000 HC RX 637 (ALT 250 FOR IP): Performed by: NURSE PRACTITIONER

## 2023-08-28 PROCEDURE — 6370000000 HC RX 637 (ALT 250 FOR IP): Performed by: STUDENT IN AN ORGANIZED HEALTH CARE EDUCATION/TRAINING PROGRAM

## 2023-08-28 PROCEDURE — 97130 THER IVNTJ EA ADDL 15 MIN: CPT

## 2023-08-28 PROCEDURE — 1200000000 HC SEMI PRIVATE

## 2023-08-28 PROCEDURE — 97535 SELF CARE MNGMENT TRAINING: CPT

## 2023-08-28 PROCEDURE — 2580000003 HC RX 258: Performed by: STUDENT IN AN ORGANIZED HEALTH CARE EDUCATION/TRAINING PROGRAM

## 2023-08-28 RX ORDER — LANOLIN ALCOHOL/MO/W.PET/CERES
3 CREAM (GRAM) TOPICAL NIGHTLY PRN
Status: DISCONTINUED | OUTPATIENT
Start: 2023-08-28 | End: 2023-08-29 | Stop reason: HOSPADM

## 2023-08-28 RX ORDER — CIPROFLOXACIN 500 MG/1
500 TABLET, FILM COATED ORAL EVERY 12 HOURS SCHEDULED
Status: DISCONTINUED | OUTPATIENT
Start: 2023-08-28 | End: 2023-08-29 | Stop reason: HOSPADM

## 2023-08-28 RX ADMIN — SODIUM CHLORIDE, PRESERVATIVE FREE 10 ML: 5 INJECTION INTRAVENOUS at 09:14

## 2023-08-28 RX ADMIN — FERROUS SULFATE TAB 325 MG (65 MG ELEMENTAL FE) 325 MG: 325 (65 FE) TAB at 09:12

## 2023-08-28 RX ADMIN — SODIUM CHLORIDE, PRESERVATIVE FREE 10 ML: 5 INJECTION INTRAVENOUS at 20:25

## 2023-08-28 RX ADMIN — SUCRALFATE 1 G: 1 TABLET ORAL at 17:59

## 2023-08-28 RX ADMIN — PREGABALIN 150 MG: 75 CAPSULE ORAL at 20:25

## 2023-08-28 RX ADMIN — Medication 2000 UNITS: at 09:15

## 2023-08-28 RX ADMIN — SUCRALFATE 1 G: 1 TABLET ORAL at 06:18

## 2023-08-28 RX ADMIN — ACETAMINOPHEN 650 MG: 325 TABLET ORAL at 17:59

## 2023-08-28 RX ADMIN — Medication 1 TABLET: at 20:25

## 2023-08-28 RX ADMIN — SUCRALFATE 1 G: 1 TABLET ORAL at 12:32

## 2023-08-28 RX ADMIN — CIPROFLOXACIN HYDROCHLORIDE 500 MG: 500 TABLET, FILM COATED ORAL at 09:11

## 2023-08-28 RX ADMIN — PANTOPRAZOLE SODIUM 40 MG: 40 TABLET, DELAYED RELEASE ORAL at 17:59

## 2023-08-28 RX ADMIN — PREGABALIN 150 MG: 75 CAPSULE ORAL at 09:14

## 2023-08-28 RX ADMIN — LEVOTHYROXINE SODIUM 112 MCG: 112 TABLET ORAL at 06:18

## 2023-08-28 RX ADMIN — Medication 3 MG: at 23:32

## 2023-08-28 RX ADMIN — FLUOXETINE HYDROCHLORIDE 40 MG: 20 CAPSULE ORAL at 09:11

## 2023-08-28 RX ADMIN — CALCIUM POLYCARBOPHIL 625 MG: 625 TABLET, FILM COATED ORAL at 09:15

## 2023-08-28 RX ADMIN — PANTOPRAZOLE SODIUM 40 MG: 40 TABLET, DELAYED RELEASE ORAL at 06:18

## 2023-08-28 RX ADMIN — SENNOSIDES AND DOCUSATE SODIUM 1 TABLET: 50; 8.6 TABLET ORAL at 09:14

## 2023-08-28 RX ADMIN — FERROUS SULFATE TAB 325 MG (65 MG ELEMENTAL FE) 325 MG: 325 (65 FE) TAB at 17:59

## 2023-08-28 RX ADMIN — CIPROFLOXACIN HYDROCHLORIDE 500 MG: 500 TABLET, FILM COATED ORAL at 20:25

## 2023-08-28 RX ADMIN — ENALAPRIL MALEATE 10 MG: 10 TABLET ORAL at 09:11

## 2023-08-28 RX ADMIN — SUCRALFATE 1 G: 1 TABLET ORAL at 20:25

## 2023-08-28 RX ADMIN — METOPROLOL SUCCINATE 50 MG: 50 TABLET, EXTENDED RELEASE ORAL at 09:11

## 2023-08-28 ASSESSMENT — PAIN SCALES - GENERAL
PAINLEVEL_OUTOF10: 0
PAINLEVEL_OUTOF10: 0
PAINLEVEL_OUTOF10: 3

## 2023-08-28 ASSESSMENT — PAIN DESCRIPTION - ORIENTATION: ORIENTATION: MID

## 2023-08-28 ASSESSMENT — PAIN DESCRIPTION - DESCRIPTORS: DESCRIPTORS: ACHING

## 2023-08-28 ASSESSMENT — PAIN DESCRIPTION - LOCATION: LOCATION: NECK

## 2023-08-28 NOTE — CARE COORDINATION
8/28/23, 2:08 PM EDT    DISCHARGE PLANNING EVALUATION    SHABNAM spoke with Abimael Yates with [facility], requested pre-cert be re-started today.

## 2023-08-28 NOTE — CARE COORDINATION
8/28/23, 12:01 PM EDT    DISCHARGE ON [address] [place] [address] day: 6  Location: [facility]15/015-A Reason for admit: UTI (urinary tract infection) [N39.0]  Acute urinary retention [R33.8]  Urinary tract infection with hematuria, site unspecified [N39.0, R31.9]   Barriers to Discharge: medically stable for discharge. Precert needs restarted, PT/OT to see patient today for updated notes. PCP: FLORIDA Liang CNP  Readmission Risk Score: 17.9%  Patient Goals/Plan/Treatment Preferences: new [place] [facility]. Group Therapy Documentation    PATIENT'S NAME: Mame Bruner  MRN:   [id number]  :   2008  ACCT. NUMBER: [id number]  DATE OF SERVICE: 22  START TIME: 11:00 AM  END TIME: 12:00 PM  FACILITATOR(S): Flaca Jones; Rafaela Sanders LADC  TOPIC: BEH Group Therapy  Number of patients attending the group:  11  Group Length:  1 Hours  Interactive Complexity: No    Dimensions addressed 3, 4, and 6    Summary of Group / Topics Discussed:    Group Therapy/Process Group:  Dual Process Group  Clients engaged in one hour dual process group focusing on peer introductions. Clients were encouraged to share some information about themselves to welcome their new peer into the group. The new peer also shared information about themselves and was open to questions from the group to learn more about the peer's mental health and substance use.      Group Attendance:  Attended group session  Interactive Complexity: No    Patient's response to the group topic/interactions:  cooperative with task    Patient appeared to be Attentive and Engaged.       Client specific details:  Client engaged in dual process group. Client engaged in peer introductions for new peer.

## 2023-08-29 VITALS
DIASTOLIC BLOOD PRESSURE: 63 MMHG | RESPIRATION RATE: 16 BRPM | TEMPERATURE: 97.8 F | WEIGHT: 152.12 LBS | HEART RATE: 61 BPM | SYSTOLIC BLOOD PRESSURE: 119 MMHG | HEIGHT: 68 IN | BODY MASS INDEX: 23.05 KG/M2 | OXYGEN SATURATION: 95 %

## 2023-08-29 PROCEDURE — 97130 THER IVNTJ EA ADDL 15 MIN: CPT

## 2023-08-29 PROCEDURE — 97129 THER IVNTJ 1ST 15 MIN: CPT

## 2023-08-29 PROCEDURE — 6370000000 HC RX 637 (ALT 250 FOR IP)

## 2023-08-29 PROCEDURE — 6360000002 HC RX W HCPCS: Performed by: STUDENT IN AN ORGANIZED HEALTH CARE EDUCATION/TRAINING PROGRAM

## 2023-08-29 PROCEDURE — 6370000000 HC RX 637 (ALT 250 FOR IP): Performed by: STUDENT IN AN ORGANIZED HEALTH CARE EDUCATION/TRAINING PROGRAM

## 2023-08-29 PROCEDURE — 2580000003 HC RX 258: Performed by: STUDENT IN AN ORGANIZED HEALTH CARE EDUCATION/TRAINING PROGRAM

## 2023-08-29 PROCEDURE — 6370000000 HC RX 637 (ALT 250 FOR IP): Performed by: NURSE PRACTITIONER

## 2023-08-29 RX ORDER — POLYETHYLENE GLYCOL 3350 17 G/17G
17 POWDER, FOR SOLUTION ORAL DAILY PRN
Qty: 30 PACKET | Refills: 0 | DISCHARGE
Start: 2023-08-29 | End: 2023-09-28

## 2023-08-29 RX ORDER — LANOLIN ALCOHOL/MO/W.PET/CERES
3 CREAM (GRAM) TOPICAL NIGHTLY PRN
Qty: 30 TABLET | Refills: 0 | DISCHARGE
Start: 2023-08-29

## 2023-08-29 RX ORDER — CIPROFLOXACIN 500 MG/1
500 TABLET, FILM COATED ORAL EVERY 12 HOURS SCHEDULED
Qty: 6 TABLET | Refills: 0 | DISCHARGE
Start: 2023-08-29 | End: 2023-09-01

## 2023-08-29 RX ORDER — SENNA AND DOCUSATE SODIUM 50; 8.6 MG/1; MG/1
1 TABLET, FILM COATED ORAL DAILY
Qty: 30 TABLET | Refills: 0 | DISCHARGE
Start: 2023-08-30

## 2023-08-29 RX ORDER — LIDOCAINE 4 G/G
3 PATCH TOPICAL DAILY
Qty: 30 EACH | Refills: 0 | DISCHARGE
Start: 2023-08-30

## 2023-08-29 RX ORDER — CALCIUM POLYCARBOPHIL 625 MG 625 MG/1
625 TABLET ORAL DAILY
Refills: 4 | COMMUNITY
Start: 2023-08-30

## 2023-08-29 RX ADMIN — SODIUM CHLORIDE, PRESERVATIVE FREE 10 ML: 5 INJECTION INTRAVENOUS at 09:38

## 2023-08-29 RX ADMIN — LEVOTHYROXINE SODIUM 112 MCG: 112 TABLET ORAL at 04:07

## 2023-08-29 RX ADMIN — ENALAPRIL MALEATE 10 MG: 10 TABLET ORAL at 09:37

## 2023-08-29 RX ADMIN — CALCIUM POLYCARBOPHIL 625 MG: 625 TABLET, FILM COATED ORAL at 09:34

## 2023-08-29 RX ADMIN — SUCRALFATE 1 G: 1 TABLET ORAL at 09:36

## 2023-08-29 RX ADMIN — FERROUS SULFATE TAB 325 MG (65 MG ELEMENTAL FE) 325 MG: 325 (65 FE) TAB at 09:35

## 2023-08-29 RX ADMIN — METOPROLOL SUCCINATE 50 MG: 50 TABLET, EXTENDED RELEASE ORAL at 09:38

## 2023-08-29 RX ADMIN — SENNOSIDES AND DOCUSATE SODIUM 1 TABLET: 50; 8.6 TABLET ORAL at 09:42

## 2023-08-29 RX ADMIN — PANTOPRAZOLE SODIUM 40 MG: 40 TABLET, DELAYED RELEASE ORAL at 04:08

## 2023-08-29 RX ADMIN — CIPROFLOXACIN HYDROCHLORIDE 500 MG: 500 TABLET, FILM COATED ORAL at 09:34

## 2023-08-29 RX ADMIN — SUCRALFATE 1 G: 1 TABLET ORAL at 11:29

## 2023-08-29 RX ADMIN — Medication 2000 UNITS: at 09:34

## 2023-08-29 RX ADMIN — FLUOXETINE HYDROCHLORIDE 40 MG: 20 CAPSULE ORAL at 09:37

## 2023-08-29 RX ADMIN — PREGABALIN 150 MG: 75 CAPSULE ORAL at 09:42

## 2023-08-29 RX ADMIN — ENOXAPARIN SODIUM 40 MG: 100 INJECTION SUBCUTANEOUS at 09:35

## 2023-08-29 ASSESSMENT — PAIN DESCRIPTION - ORIENTATION: ORIENTATION: LOWER

## 2023-08-29 ASSESSMENT — PAIN SCALES - GENERAL
PAINLEVEL_OUTOF10: 0
PAINLEVEL_OUTOF10: 5

## 2023-08-29 ASSESSMENT — PAIN DESCRIPTION - LOCATION: LOCATION: BACK

## 2023-08-29 ASSESSMENT — PAIN DESCRIPTION - DESCRIPTORS: DESCRIPTORS: ACHING

## 2023-08-29 NOTE — CARE COORDINATION
8/29/23, 10:36 AM EDT    DISCHARGE PLANNING EVALUATION    SW spoke with Ivory Mcgowan with [facility], have not heard from insurance for pre-cert.

## 2023-08-29 NOTE — DISCHARGE SUMMARY
Patient is resting in bed. Reports feeling well. Denies chest pain, shortness of breath, abdominal pain, nausea. Pre-CERT was approved to go to [facility]. Patient is excited to be discharged. Follow-up as scheduled with general surgery, Dr. Yari Betancourt in September. Will need 1 month follow-up with GI. Also needs follow-up with urology. Recommendation to plan for voiding trial in 1 to 2 weeks in office after infection clears and on regular bowel regimen. Will complete 10-day course of Cipro on 9/1.

## 2023-08-29 NOTE — CARE COORDINATION
8/29/23, 10:51 AM EDT    Patient goals/plan/ treatment preferences discussed by  and . Patient goals/plan/ treatment preferences reviewed with patient/ family. Patient/ family verbalize understanding of discharge plan and are in agreement with goal/plan/treatment preferences. Understanding was demonstrated using the teach back method. AVS provided by RN at time of discharge, which includes all necessary medical information pertaining to the patients current course of illness, treatment, post-discharge goals of care, and treatment preferences. Services At/After Discharge: [address] [facility]"" (SNF), Aide services, Nursing service, OT, and PT       IMM Letter  IMM Letter given to Patient/Family/Significant other/Guardian/POA/by[de-identified] Thelma Galvan   IMM Letter date given[de-identified] 08/29/23  IMM Letter time given[de-identified] 7244  Observation Status Letter date given[de-identified] 08/19/23  Observation Status Letter time given[de-identified] 2127  Observation Status Letter given to Patient/Family/Significant other/Guardian/POA/by[de-identified] Pt Access     Received call from Val Isabel with [facility], pre-cert has been approved for admission today. Pt and daughter updated. Daughter will transport pt. Attending updated. RN Ariadne aware. SW will fax AVS when completed.      11:26 AM update:  AVS faxed

## 2023-08-29 NOTE — PLAN OF CARE
Nausea seems to be chronic and patient came to the hospital with two bottles of zofran upon admission. Problem: Gastrointestinal - Adult  Goal: Minimal or absence of nausea and vomiting  8/23/2023 1054 by Emiliana Wills RN  Outcome: Not Progressing  8/23/2023 1054 by Emiliana Wills RN  Outcome: Progressing  Flowsheets (Taken 8/23/2023 0800)  Minimal or absence of nausea and vomiting: Administer IV fluids as ordered to ensure adequate hydration  8/23/2023 0036 by India Pierce RN  Outcome: Progressing  Flowsheets (Taken 8/23/2023 0036)  Minimal or absence of nausea and vomiting:   Administer IV fluids as ordered to ensure adequate hydration   Administer ordered antiemetic medications as needed       Problem: Discharge Planning  Goal: Discharge to home or other facility with appropriate resources  8/23/2023 1054 by Emiliana Wills RN  Outcome: Progressing     Problem: Skin/Tissue Integrity  Goal: Absence of new skin breakdown  Description: 1. Monitor for areas of redness and/or skin breakdown  2. Assess vascular access sites hourly  3. Every 4-6 hours minimum:  Change oxygen saturation probe site  4. Every 4-6 hours:  If on nasal continuous positive airway pressure, respiratory therapy assess nares and determine need for appliance change or resting period.   8/23/2023 1054 by Emiliana Wills RN  Outcome: Progressing  8/23/2023 1054 by Emiliana Wills RN  Outcome: Progressing     Problem: Safety - Adult  Goal: Free from fall injury  8/23/2023 1054 by Emiliana Wills RN  Outcome: Progressing     Problem: Pain  Goal: Verbalizes/displays adequate comfort level or baseline comfort level  8/23/2023 1054 by Emiliana Wills RN  Outcome: Progressing     Problem: Cardiovascular - Adult  Goal: Maintains optimal cardiac output and hemodynamic stability  8/23/2023 1054 by Emiliana Wills RN  Outcome: Progressing  8/23/2023 1054 by Emiliana Wills RN  Outcome: Progressing  Flowsheets (Taken 8/23/2023
Problem: Discharge Planning  Goal: Discharge to home or other facility with appropriate resources  8/21/2023 1411 by Noemy Pineda MSW, LSW  Outcome: Progressing  Flowsheets (Taken 8/21/2023 1411)  Discharge to home or other facility with appropriate resources: Identify barriers to discharge with patient and caregiver  Note: Return to [facility], See  notes 8/21/23.  8/21/2023 0051 by Hasmukh Pablo RN  Outcome: Progressing
Problem: Discharge Planning  Goal: Discharge to home or other facility with appropriate resources  8/22/2023 0038 by Terry Bolden RN  Outcome: Progressing  Flowsheets (Taken 8/22/2023 0038)  Discharge to home or other facility with appropriate resources: Identify barriers to discharge with patient and caregiver     Problem: Skin/Tissue Integrity  Goal: Absence of new skin breakdown  Description: 1. Monitor for areas of redness and/or skin breakdown  2. Assess vascular access sites hourly  3. Every 4-6 hours minimum:  Change oxygen saturation probe site  4. Every 4-6 hours:  If on nasal continuous positive airway pressure, respiratory therapy assess nares and determine need for appliance change or resting period.   Outcome: Progressing     Problem: Safety - Adult  Goal: Free from fall injury  Outcome: Progressing     Problem: Pain  Goal: Verbalizes/displays adequate comfort level or baseline comfort level  Outcome: Progressing     Problem: Cardiovascular - Adult  Goal: Maintains optimal cardiac output and hemodynamic stability  Outcome: Progressing     Problem: Skin/Tissue Integrity - Adult  Goal: Skin integrity remains intact  Outcome: Progressing  Flowsheets (Taken 8/21/2023 5632)  Skin Integrity Remains Intact:   Monitor for areas of redness and/or skin breakdown   Assess vascular access sites hourly
Problem: Discharge Planning  Goal: Discharge to home or other facility with appropriate resources  8/23/2023 0036 by Hasmukh Pablo RN  Outcome: Progressing  Flowsheets (Taken 8/23/2023 0036)  Discharge to home or other facility with appropriate resources: Identify barriers to discharge with patient and caregiver     Problem: Safety - Adult  Goal: Free from fall injury  8/23/2023 0036 by Hasmukh Pablo RN  Outcome: Progressing  Flowsheets (Taken 8/23/2023 0036)  Free From Fall Injury:   Instruct family/caregiver on patient safety   Based on caregiver fall risk screen, instruct family/caregiver to ask for assistance with transferring infant if caregiver noted to have fall risk factors     Problem: Pain  Goal: Verbalizes/displays adequate comfort level or baseline comfort level  8/23/2023 0036 by Hasmukh Pablo RN  Outcome: Progressing  Flowsheets  Taken 8/23/2023 0036 by Hasmukh Pablo RN  Verbalizes/displays adequate comfort level or baseline comfort level: Encourage patient to monitor pain and request assistance    Problem: Gastrointestinal - Adult  Goal: Maintains adequate nutritional intake  8/23/2023 0036 by Hasmukh Pablo RN  Outcome: Progressing  Flowsheets (Taken 8/23/2023 0036)  Maintains adequate nutritional intake:   Monitor percentage of each meal consumed   Assist with meals as needed     Problem: Infection - Adult  Goal: Absence of infection at discharge  8/23/2023 0036 by Hasmukh Pablo RN  Outcome: Progressing  Flowsheets (Taken 8/23/2023 0036)  Absence of infection at discharge:   Assess and monitor for signs and symptoms of infection   Monitor lab/diagnostic results     Problem: Infection - Adult  Goal: Absence of infection during hospitalization  8/23/2023 0036 by Hasmukh Pablo RN  Outcome: Progressing  Flowsheets (Taken 8/23/2023 0036)  Absence of infection during hospitalization:   Assess and monitor for signs and symptoms of infection   Monitor lab/diagnostic results     Problem:
Problem: Discharge Planning  Goal: Discharge to home or other facility with appropriate resources  8/27/2023 1235 by Olaf Marino RN  Outcome: Progressing  Flowsheets (Taken 8/27/2023 1235)  Discharge to home or other facility with appropriate resources:   Identify barriers to discharge with patient and caregiver   Arrange for needed discharge resources and transportation as appropriate   Identify discharge learning needs (meds, wound care, etc)     Problem: Skin/Tissue Integrity  Goal: Absence of new skin breakdown  Description: 1. Monitor for areas of redness and/or skin breakdown  2. Assess vascular access sites hourly  3. Every 4-6 hours minimum:  Change oxygen saturation probe site  4. Every 4-6 hours:  If on nasal continuous positive airway pressure, respiratory therapy assess nares and determine need for appliance change or resting period. 8/27/2023 1235 by Olaf Marino RN  Outcome: Progressing  Note: Ongoing assessment & interventions provided throughout shift. Skin assessments provided. Encouraging/assisting patient to turn as needed.        Problem: Safety - Adult  Goal: Free from fall injury  8/27/2023 1235 by Olaf Marino RN  Outcome: Progressing  Flowsheets (Taken 8/27/2023 1235)  Free From Fall Injury: Instruct family/caregiver on patient safety     Problem: Pain  Goal: Verbalizes/displays adequate comfort level or baseline comfort level  8/27/2023 1235 by Olaf Marino RN  Outcome: Progressing  Flowsheets (Taken 8/27/2023 1235)  Verbalizes/displays adequate comfort level or baseline comfort level:   Encourage patient to monitor pain and request assistance   Assess pain using appropriate pain scale   Administer analgesics based on type and severity of pain and evaluate response     Problem: Cardiovascular - Adult  Goal: Maintains optimal cardiac output and hemodynamic stability  Outcome: Progressing  Flowsheets (Taken 8/27/2023 1235)  Maintains optimal cardiac output and hemodynamic
Problem: Discharge Planning  Goal: Discharge to home or other facility with appropriate resources  Outcome: Progressing     Problem: Skin/Tissue Integrity  Goal: Absence of new skin breakdown  Description: 1. Monitor for areas of redness and/or skin breakdown  2. Assess vascular access sites hourly  3. Every 4-6 hours minimum:  Change oxygen saturation probe site  4. Every 4-6 hours:  If on nasal continuous positive airway pressure, respiratory therapy assess nares and determine need for appliance change or resting period.   Outcome: Progressing     Problem: Gastrointestinal - Adult  Goal: Maintains adequate nutritional intake  Outcome: Progressing     Problem: Infection - Adult  Goal: Absence of infection during hospitalization  Outcome: Progressing     Problem: Metabolic/Fluid and Electrolytes - Adult  Goal: Hemodynamic stability and optimal renal function maintained  Outcome: Progressing
Problem: Discharge Planning  Goal: Discharge to home or other facility with appropriate resources  Outcome: Progressing     Problem: Skin/Tissue Integrity  Goal: Absence of new skin breakdown  Description: 1. Monitor for areas of redness and/or skin breakdown  2. Assess vascular access sites hourly  3. Every 4-6 hours minimum:  Change oxygen saturation probe site  4. Every 4-6 hours:  If on nasal continuous positive airway pressure, respiratory therapy assess nares and determine need for appliance change or resting period.   Outcome: Progressing     Problem: Safety - Adult  Goal: Free from fall injury  Outcome: Progressing     Problem: Pain  Goal: Verbalizes/displays adequate comfort level or baseline comfort level  Outcome: Progressing     Problem: Cardiovascular - Adult  Goal: Maintains optimal cardiac output and hemodynamic stability  Outcome: Progressing     Problem: Skin/Tissue Integrity - Adult  Goal: Skin integrity remains intact  Outcome: Progressing     Problem: Skin/Tissue Integrity - Adult  Goal: Incisions, wounds, or drain sites healing without S/S of infection  Outcome: Progressing
Problem: Discharge Planning  Goal: Discharge to home or other facility with appropriate resources  Outcome: Progressing  Flowsheets (Taken 8/24/2023 2000)  Discharge to home or other facility with appropriate resources: Identify barriers to discharge with patient and caregiver     Problem: Skin/Tissue Integrity  Goal: Absence of new skin breakdown  Description: 1. Monitor for areas of redness and/or skin breakdown  2. Assess vascular access sites hourly  3. Every 4-6 hours minimum:  Change oxygen saturation probe site  4. Every 4-6 hours:  If on nasal continuous positive airway pressure, respiratory therapy assess nares and determine need for appliance change or resting period.   Outcome: Progressing     Problem: Safety - Adult  Goal: Free from fall injury  Outcome: Progressing  Flowsheets (Taken 8/23/2023 0820 by Mickey Art RN)  Free From Fall Injury: Instruct family/caregiver on patient safety     Problem: Pain  Goal: Verbalizes/displays adequate comfort level or baseline comfort level  Outcome: Progressing  Flowsheets (Taken 8/23/2023 1630 by Mickey Art RN)  Verbalizes/displays adequate comfort level or baseline comfort level: Encourage patient to monitor pain and request assistance     Problem: Cardiovascular - Adult  Goal: Maintains optimal cardiac output and hemodynamic stability  Outcome: Progressing  Flowsheets (Taken 8/23/2023 0800 by Mickey Art RN)  Maintains optimal cardiac output and hemodynamic stability: Monitor blood pressure and heart rate     Problem: Skin/Tissue Integrity - Adult  Goal: Skin integrity remains intact  Outcome: Progressing  Flowsheets (Taken 8/23/2023 2330)  Skin Integrity Remains Intact:   Monitor for areas of redness and/or skin breakdown   Assess vascular access sites hourly  Goal: Incisions, wounds, or drain sites healing without S/S of infection  Outcome: Progressing  Flowsheets (Taken 8/23/2023 2000)  Incisions, Wounds, or Drain Sites Healing Without Sign
Problem: Discharge Planning  Goal: Discharge to home or other facility with appropriate resources  Outcome: Progressing  Flowsheets (Taken 8/25/2023 2035)  Discharge to home or other facility with appropriate resources: Identify barriers to discharge with patient and caregiver     Problem: Skin/Tissue Integrity  Goal: Absence of new skin breakdown  Description: 1. Monitor for areas of redness and/or skin breakdown  2. Assess vascular access sites hourly  3. Every 4-6 hours minimum:  Change oxygen saturation probe site  4. Every 4-6 hours:  If on nasal continuous positive airway pressure, respiratory therapy assess nares and determine need for appliance change or resting period.   Outcome: Progressing     Problem: Safety - Adult  Goal: Free from fall injury  Outcome: Progressing     Problem: Pain  Goal: Verbalizes/displays adequate comfort level or baseline comfort level  Outcome: Progressing     Problem: Gastrointestinal - Adult  Goal: Minimal or absence of nausea and vomiting  Outcome: Progressing  Goal: Maintains adequate nutritional intake  Outcome: Progressing  Flowsheets (Taken 8/25/2023 2035)  Maintains adequate nutritional intake: Monitor percentage of each meal consumed     Problem: Infection - Adult  Goal: Absence of infection at discharge  Outcome: Progressing  Flowsheets (Taken 8/25/2023 2035)  Absence of infection at discharge: Assess and monitor for signs and symptoms of infection     Problem: Metabolic/Fluid and Electrolytes - Adult  Goal: Hemodynamic stability and optimal renal function maintained  Outcome: Progressing  Flowsheets (Taken 8/25/2023 2035)  Hemodynamic stability and optimal renal function maintained: Monitor labs and assess for signs and symptoms of volume excess or deficit
Problem: Discharge Planning  Goal: Discharge to home or other facility with appropriate resources  Outcome: Progressing  Flowsheets (Taken 8/26/2023 2027)  Discharge to home or other facility with appropriate resources: Identify barriers to discharge with patient and caregiver     Problem: Skin/Tissue Integrity  Goal: Absence of new skin breakdown  Description: 1. Monitor for areas of redness and/or skin breakdown  2. Assess vascular access sites hourly  3. Every 4-6 hours minimum:  Change oxygen saturation probe site  4. Every 4-6 hours:  If on nasal continuous positive airway pressure, respiratory therapy assess nares and determine need for appliance change or resting period.   Outcome: Progressing     Problem: Safety - Adult  Goal: Free from fall injury  Outcome: Progressing     Problem: Pain  Goal: Verbalizes/displays adequate comfort level or baseline comfort level  Outcome: Progressing  Flowsheets (Taken 8/26/2023 2027)  Verbalizes/displays adequate comfort level or baseline comfort level: Encourage patient to monitor pain and request assistance     Problem: Skin/Tissue Integrity - Adult  Goal: Skin integrity remains intact  Outcome: Progressing  Flowsheets (Taken 8/26/2023 2027)  Skin Integrity Remains Intact: Monitor for areas of redness and/or skin breakdown
Adult  Goal: Incisions, wounds, or drain sites healing without S/S of infection  Outcome: Progressing  Flowsheets  Taken 8/22/2023 1520  Incisions, Wounds, or Drain Sites Healing Without Sign and Symptoms of Infection: ADMISSION and DAILY: Assess and document risk factors for pressure ulcer development  Taken 8/22/2023 0915  Incisions, Wounds, or Drain Sites Healing Without Sign and Symptoms of Infection: ADMISSION and DAILY: Assess and document risk factors for pressure ulcer development     Problem: Skin/Tissue Integrity - Adult  Goal: Oral mucous membranes remain intact  Outcome: Progressing  Flowsheets  Taken 8/22/2023 1520  Oral Mucous Membranes Remain Intact: Assess oral mucosa and hygiene practices  Taken 8/22/2023 0915  Oral Mucous Membranes Remain Intact: Assess oral mucosa and hygiene practices     Problem: Gastrointestinal - Adult  Goal: Minimal or absence of nausea and vomiting  Outcome: Progressing  Flowsheets (Taken 8/22/2023 0915)  Minimal or absence of nausea and vomiting: Administer IV fluids as ordered to ensure adequate hydration     Problem: Gastrointestinal - Adult  Goal: Maintains adequate nutritional intake  Outcome: Progressing  Flowsheets (Taken 8/22/2023 0915)  Maintains adequate nutritional intake: Monitor percentage of each meal consumed     Problem: Infection - Adult  Goal: Absence of infection at discharge  Outcome: Progressing  Flowsheets (Taken 8/22/2023 0915)  Absence of infection at discharge: Assess and monitor for signs and symptoms of infection     Problem: Infection - Adult  Goal: Absence of infection during hospitalization  Outcome: Progressing  Flowsheets (Taken 8/22/2023 0915)  Absence of infection during hospitalization: Assess and monitor for signs and symptoms of infection     Problem: Metabolic/Fluid and Electrolytes - Adult  Goal: Hemodynamic stability and optimal renal function maintained  Outcome: Progressing  Flowsheets (Taken 8/22/2023 0915)  Hemodynamic stability
Skin/Tissue Integrity - Adult  Goal: Incisions, wounds, or drain sites healing without S/S of infection  Outcome: Progressing  Flowsheets (Taken 8/28/2023 0636)  Incisions, Wounds, or Drain Sites Healing Without Sign and Symptoms of Infection: TWICE DAILY: Assess and document skin integrity     Problem: Gastrointestinal - Adult  Goal: Minimal or absence of nausea and vomiting  Outcome: Progressing  Flowsheets (Taken 8/28/2023 0636)  Minimal or absence of nausea and vomiting: Administer IV fluids as ordered to ensure adequate hydration     Problem: Gastrointestinal - Adult  Goal: Maintains adequate nutritional intake  Outcome: Progressing  Flowsheets (Taken 8/28/2023 0636)  Maintains adequate nutritional intake: Monitor percentage of each meal consumed     Problem: Infection - Adult  Goal: Absence of infection at discharge  Outcome: Progressing  Flowsheets (Taken 8/28/2023 0636)  Absence of infection at discharge:   Assess and monitor for signs and symptoms of infection   Monitor lab/diagnostic results   Monitor all insertion sites i.e., indwelling lines, tubes and drains     Problem: Infection - Adult  Goal: Absence of infection during hospitalization  Outcome: Progressing  Flowsheets (Taken 8/28/2023 0636)  Absence of infection during hospitalization:   Assess and monitor for signs and symptoms of infection   Monitor lab/diagnostic results   Monitor all insertion sites i.e., indwelling lines, tubes and drains     Problem: Metabolic/Fluid and Electrolytes - Adult  Goal: Hemodynamic stability and optimal renal function maintained  Outcome: Progressing  Flowsheets (Taken 8/28/2023 0636)  Hemodynamic stability and optimal renal function maintained:   Monitor labs and assess for signs and symptoms of volume excess or deficit   Monitor intake, output and patient weight   Encourage oral intake as appropriate   Care plan reviewed with patient.   Patient verbalizes understanding of the plan of care and contribute to goal
maintained  Outcome: Progressing  Flowsheets (Taken 8/28/2023 7536 by Gage Ferrari RN)  Hemodynamic stability and optimal renal function maintained:   Monitor labs and assess for signs and symptoms of volume excess or deficit   Monitor intake, output and patient weight   Encourage oral intake as appropriate
absence of nausea and vomiting: Administer IV fluids as ordered to ensure adequate hydration  Goal: Maintains adequate nutritional intake  8/23/2023 1054 by Moy Stockton RN  Outcome: Progressing  8/23/2023 1054 by Moy Stockton RN  Outcome: Progressing  Flowsheets (Taken 8/23/2023 0800)  Maintains adequate nutritional intake: Monitor percentage of each meal consumed     Problem: Infection - Adult  Goal: Absence of infection at discharge  8/23/2023 2331 by Yvette Lovett RN  Outcome: Progressing  Flowsheets (Taken 8/23/2023 2000)  Absence of infection at discharge: Assess and monitor for signs and symptoms of infection  8/23/2023 1054 by Moy Stockton RN  Outcome: Progressing  8/23/2023 1054 by Moy Stockton RN  Outcome: Progressing  Flowsheets (Taken 8/23/2023 0800)  Absence of infection at discharge: Assess and monitor for signs and symptoms of infection  Goal: Absence of infection during hospitalization  8/23/2023 2331 by Yvette Lovett RN  Outcome: Progressing  Flowsheets (Taken 8/23/2023 2000)  Absence of infection during hospitalization: Assess and monitor for signs and symptoms of infection  8/23/2023 1054 by Moy Stockton RN  Outcome: Progressing  8/23/2023 1054 by Moy Stockton RN  Outcome: Progressing  Flowsheets (Taken 8/23/2023 0800)  Absence of infection during hospitalization: Assess and monitor for signs and symptoms of infection     Problem: Metabolic/Fluid and Electrolytes - Adult  Goal: Hemodynamic stability and optimal renal function maintained  8/23/2023 2331 by Yvette Lovett RN  Outcome: Progressing  Flowsheets (Taken 8/23/2023 2000)  Hemodynamic stability and optimal renal function maintained: Monitor labs and assess for signs and symptoms of volume excess or deficit  8/23/2023 1054 by Moy Stockton RN  Outcome: Progressing  8/23/2023 1054 by Moy Stockton RN  Outcome: Progressing  Flowsheets (Taken 8/23/2023 0800)  Hemodynamic stability and optimal renal function

## 2023-09-14 ENCOUNTER — OFFICE VISIT (OUTPATIENT)
Dept: UROLOGY | Age: 83
End: 2023-09-14
Payer: MEDICARE

## 2023-09-14 VITALS — BODY MASS INDEX: 23.04 KG/M2 | RESPIRATION RATE: 16 BRPM | HEIGHT: 68 IN | WEIGHT: 152 LBS

## 2023-09-14 DIAGNOSIS — R33.9 URINARY RETENTION: ICD-10-CM

## 2023-09-14 DIAGNOSIS — N39.0 RECURRENT UTI: ICD-10-CM

## 2023-09-14 DIAGNOSIS — N39.0 URINARY TRACT INFECTION IN FEMALE: Primary | ICD-10-CM

## 2023-09-14 PROCEDURE — 1090F PRES/ABSN URINE INCON ASSESS: CPT

## 2023-09-14 PROCEDURE — G8427 DOCREV CUR MEDS BY ELIG CLIN: HCPCS

## 2023-09-14 PROCEDURE — 99203 OFFICE O/P NEW LOW 30 MIN: CPT

## 2023-09-14 PROCEDURE — 1123F ACP DISCUSS/DSCN MKR DOCD: CPT

## 2023-09-14 PROCEDURE — 1036F TOBACCO NON-USER: CPT

## 2023-09-14 PROCEDURE — G8420 CALC BMI NORM PARAMETERS: HCPCS

## 2023-09-14 PROCEDURE — 1111F DSCHRG MED/CURRENT MED MERGE: CPT

## 2023-09-14 PROCEDURE — G8399 PT W/DXA RESULTS DOCUMENT: HCPCS

## 2023-09-14 RX ORDER — METHENAMINE HIPPURATE 1000 MG/1
1 TABLET ORAL 2 TIMES DAILY WITH MEALS
Qty: 180 TABLET | Refills: 3 | Status: SHIPPED | OUTPATIENT
Start: 2023-09-14

## 2023-09-14 RX ORDER — ENALAPRIL MALEATE 5 MG/1
TABLET ORAL
COMMUNITY
Start: 2023-09-04

## 2023-09-14 NOTE — PROGRESS NOTES
[id number]  [facility] [address]  Dept: [phone]  Loc: [phone]  Visit Date: 9/14/2023      HPI:     Blaise Gunter is a 78 y.o. female with past medical history as listed below who presents today in follow-up for urinary retention, UTI. Patient hospitalized from 8/19/23 to 8/29/23 for treatment of UTI with acute urinary retention, history of MDRO UTI. Treated with IV Doxycycline then switched to oral Ciprofloxacin following culture finalization. Sent home with carroll catheter in place with plan to undergo VT as OP. Tolerated Cipro well and symptoms have since completely resolved. Carroll has been draining appropriately and stayed clear following her hospitalization. History of Recurrent UTI's 3-4x per year. Unable to tolerate Cranberry (GI symptoms). Has not been tried on D-Mannose, Estrace, or Hiprex. Labs   UA and PVR: Carroll catheter in place and draining light yellow urine without blood or sedimentation.     Current Outpatient Medications   Medication Sig Dispense Refill    lidocaine 4 % external patch Place 3 patches onto the skin daily 30 each 0    polycarbophil (FIBERCON) 625 MG tablet Take 1 tablet by mouth daily  4    sennosides-docusate sodium (SENOKOT-S) 8.6-50 MG tablet Take 1 tablet by mouth daily 30 tablet 0    polyethylene glycol (GLYCOLAX) 17 g packet Take 1 packet by mouth daily as needed for Constipation 30 packet 0    melatonin 3 MG TABS tablet Take 1 tablet by mouth nightly as needed (sleep) 30 tablet 0    sucralfate (CARAFATE) 1 GM tablet Take 1 tablet by mouth 4 times daily (before meals and nightly) 120 tablet 1    Omega 3-6-9 Fatty Acids (OMEGA 3-6-9 COMPLEX PO) Take by mouth daily      guaiFENesin-dextromethorphan (ROBITUSSIN DM) 100-10 MG/5ML syrup Take 5 mLs by mouth 3 times daily as needed for Cough (Patient not taking: Reported on 8/19/2023)      calcium carbonate (OYSTER SHELL

## 2023-09-15 ENCOUNTER — APPOINTMENT (OUTPATIENT)
Dept: CT IMAGING | Age: 83
End: 2023-09-15
Payer: MEDICARE

## 2023-09-15 ENCOUNTER — APPOINTMENT (OUTPATIENT)
Dept: GENERAL RADIOLOGY | Age: 83
End: 2023-09-15
Payer: MEDICARE

## 2023-09-15 ENCOUNTER — HOSPITAL ENCOUNTER (EMERGENCY)
Age: 83
Discharge: HOME | End: 2023-09-15
Attending: EMERGENCY MEDICINE
Payer: MEDICARE

## 2023-09-15 VITALS
TEMPERATURE: 98.3 F | WEIGHT: 152 LBS | SYSTOLIC BLOOD PRESSURE: 147 MMHG | BODY MASS INDEX: 23.11 KG/M2 | DIASTOLIC BLOOD PRESSURE: 75 MMHG | RESPIRATION RATE: 21 BRPM | HEART RATE: 53 BPM | OXYGEN SATURATION: 94 %

## 2023-09-15 DIAGNOSIS — S70.01XA CONTUSION OF RIGHT HIP, INITIAL ENCOUNTER: ICD-10-CM
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
## 2023-09-15 DIAGNOSIS — S51.819A SKIN TEAR OF FOREARM WITHOUT COMPLICATION, INITIAL ENCOUNTER: ICD-10-CM

## 2023-09-15 LAB
ANION GAP SERPL CALC-SCNC: 10 MEQ/L (ref 8–16)
BASOPHILS ABSOLUTE: 0.1 THOU/MM3 (ref 0–0.1)
BASOPHILS NFR BLD AUTO: 1.5 %
BUN SERPL-MCNC: 15 MG/DL (ref 7–22)
CALCIUM SERPL-MCNC: 9.2 MG/DL (ref 8.5–10.5)
CHLORIDE SERPL-SCNC: 97 MEQ/L (ref 98–111)
CO2 SERPL-SCNC: 26 MEQ/L (ref 23–33)
CREAT SERPL-MCNC: 0.6 MG/DL (ref 0.4–1.2)
DEPRECATED RDW RBC AUTO: 46.2 FL (ref 35–45)
EOSINOPHIL NFR BLD AUTO: 5.4 %
EOSINOPHILS ABSOLUTE: 0.2 THOU/MM3 (ref 0–0.4)
ERYTHROCYTE [DISTWIDTH] IN BLOOD BY AUTOMATED COUNT: 12.8 % (ref 11.5–14.5)
GFR SERPL CREATININE-BSD FRML MDRD: > 60 ML/MIN/1.73M2
GLUCOSE SERPL-MCNC: 103 MG/DL (ref 70–108)
HCT VFR BLD AUTO: 39.7 % (ref 37–47)
HGB BLD-MCNC: 13.2 GM/DL (ref 12–16)
IMM GRANULOCYTES # BLD AUTO: 0.01 THOU/MM3 (ref 0–0.07)
IMM GRANULOCYTES NFR BLD AUTO: 0.3 %
LYMPHOCYTES ABSOLUTE: 0.9 THOU/MM3 (ref 1–4.8)
LYMPHOCYTES NFR BLD AUTO: 22.5 %
MCH RBC QN AUTO: 32.6 PG (ref 26–33)
MCHC RBC AUTO-ENTMCNC: 33.2 GM/DL (ref 32.2–35.5)
MCV RBC AUTO: 98 FL (ref 81–99)
MONOCYTES ABSOLUTE: 0.4 THOU/MM3 (ref 0.4–1.3)
MONOCYTES NFR BLD AUTO: 10 %
NEUTROPHILS NFR BLD AUTO: 60.3 %
NRBC BLD AUTO-RTO: 0 /100 WBC
OSMOLALITY SERPL CALC.SUM OF ELEC: 267.5 MOSMOL/KG (ref 275–300)
PLATELET # BLD AUTO: 129 THOU/MM3 (ref 130–400)
PMV BLD AUTO: 10.9 FL (ref 9.4–12.4)
POTASSIUM SERPL-SCNC: 4.1 MEQ/L (ref 3.5–5.2)
RBC # BLD AUTO: 4.05 MILL/MM3 (ref 4.2–5.4)
SEGMENTED NEUTROPHILS ABSOLUTE COUNT: 2.4 THOU/MM3 (ref 1.8–7.7)
SODIUM SERPL-SCNC: 133 MEQ/L (ref 135–145)
WBC # BLD AUTO: 3.9 THOU/MM3 (ref 4.8–10.8)

## 2023-09-15 PROCEDURE — 73502 X-RAY EXAM HIP UNI 2-3 VIEWS: CPT

## 2023-09-15 PROCEDURE — 90471 IMMUNIZATION ADMIN: CPT | Performed by: EMERGENCY MEDICINE

## 2023-09-15 PROCEDURE — 99284 EMERGENCY DEPT VISIT MOD MDM: CPT

## 2023-09-15 PROCEDURE — 72125 CT NECK SPINE W/O DYE: CPT

## 2023-09-15 PROCEDURE — 80048 BASIC METABOLIC PNL TOTAL CA: CPT

## 2023-09-15 PROCEDURE — 73090 X-RAY EXAM OF FOREARM: CPT

## 2023-09-15 PROCEDURE — 85025 COMPLETE CBC W/AUTO DIFF WBC: CPT

## 2023-09-15 PROCEDURE — 36415 COLL VENOUS BLD VENIPUNCTURE: CPT

## 2023-09-15 PROCEDURE — 70450 CT HEAD/BRAIN W/O DYE: CPT

## 2023-09-15 PROCEDURE — 90715 TDAP VACCINE 7 YRS/> IM: CPT | Performed by: EMERGENCY MEDICINE

## 2023-09-15 PROCEDURE — 6360000002 HC RX W HCPCS: Performed by: EMERGENCY MEDICINE

## 2023-09-15 RX ADMIN — TETANUS TOXOID, REDUCED DIPHTHERIA TOXOID AND ACELLULAR PERTUSSIS VACCINE, ADSORBED 0.5 ML: 5; 2.5; 8; 8; 2.5 SUSPENSION INTRAMUSCULAR at 18:37

## 2023-09-15 ASSESSMENT — PAIN DESCRIPTION - LOCATION: LOCATION: BACK;HEAD

## 2023-09-15 ASSESSMENT — PAIN - FUNCTIONAL ASSESSMENT
PAIN_FUNCTIONAL_ASSESSMENT: 0-10

## 2023-09-15 ASSESSMENT — PAIN DESCRIPTION - ONSET: ONSET: SUDDEN

## 2023-09-15 ASSESSMENT — PAIN DESCRIPTION - FREQUENCY: FREQUENCY: CONTINUOUS

## 2023-09-15 ASSESSMENT — PAIN DESCRIPTION - PAIN TYPE: TYPE: ACUTE PAIN

## 2023-09-15 ASSESSMENT — PAIN SCALES - GENERAL
PAINLEVEL_OUTOF10: 4

## 2023-09-15 ASSESSMENT — PAIN DESCRIPTION - DESCRIPTORS: DESCRIPTORS: ACHING

## 2023-09-15 NOTE — ED PROVIDER NOTES
[facility] ENCOUNTER          Pt Name: Yessica Gunter  MRN: [id number]  [address] [facility] 1940  Date of evaluation: 9/15/2023    CHIEF COMPLAINT       Chief Complaint   Patient presents with    Fall       Nurses Notes reviewed and I agree except as noted in the HPI. HISTORY OF PRESENT ILLNESS    Yessica Gunter is a 78 y.o. pleasant female who presents to the emergency department for evaluation of fall. Patient was sitting on a stool in the bathroom at her living facility. She was cleaning her dentures when the stool came out from under her causing her to fall down to the ground. She reports that she bumped the right side of her head, also has a skin tear to the right arm and reports right hip pain. She felt fine prior to this incident, no preceding dizziness or chest pain. She is not on blood thinners. The patient has no other acute complaints at this time.         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 Chaudhary Ridluis     GERD (gastroesophageal reflux disease)     Diverticulitis     Hiatal hernia     Hx of blood clots     Hypertension     BAKI    Hypothyroidism     Osteoarthritis     Urinary incontinence     UTI (urinary tract infection)      Past Surgical History:   Procedure Laterality Date    APPENDECTOMY  1958    BACK SURGERY      CARPAL TUNNEL RELEASE Bilateral 2013    w/cubital tunnel    COLON SURGERY  07/29/2016    Procedure: ATTEMPTED DAVINCI XI ROBOTIC ASSISTED SIGMOID COLON RESECTION, ROBOTIC ASSISTED MOBILIZATION OF RECTAL SIGMOID TO SPLENIC FLEXURE, CONVERTED TO OPEN LEFT HEMICOLECTOMY WITH COLOPROCTOCTOMY, SPLENORRHAPHY,

## 2023-09-15 NOTE — ED NOTES
Patient to the ED after accidentally slipping and falling off a stool while at [facility]. Patient states that she was brushing her dentures when this occurred. She states that she struck her head on the way down. Denies LOC or use of blood thinners. No visible trauma is noted. She complains of a mild headache and low back pain. Patient is resting in bed with easy and unlabored respirations. Call light in reach. Side rails up x2. Patient denies further complaints or concerns. Will monitor.         Jo Ann Lazo RN  09/15/23 3204

## 2023-09-19 ENCOUNTER — TELEPHONE (OUTPATIENT)
Dept: FAMILY MEDICINE CLINIC | Age: 83
End: 2023-09-19

## 2023-09-19 DIAGNOSIS — K21.9 GASTROESOPHAGEAL REFLUX DISEASE WITHOUT ESOPHAGITIS: ICD-10-CM

## 2023-09-19 RX ORDER — PANTOPRAZOLE SODIUM 40 MG/1
40 TABLET, DELAYED RELEASE ORAL
Qty: 180 TABLET | Refills: 3 | Status: SHIPPED | OUTPATIENT
Start: 2023-09-19

## 2023-09-19 NOTE — TELEPHONE ENCOUNTER
Recent Visits  Date Type Provider Dept   03/27/23 Office Visit FLORIDA Irizarry CNP [facility]   02/28/23 Office Visit FLORIDA Harris CNP [facility]   10/31/22 Office Visit DO Neal Vale [facility]   09/20/22 Office Visit FLORIDA Irizarry CNP [facility]  [address]   09/06/22 Office Visit Karri Arora, [address] [facility]   05/10/22 Office Visit Karri Arora, [address] [facility] recent visits within past 540 days with a meds authorizing provider and meeting all other requirements  Future Appointments  Date Type Provider Dept   09/27/23 Appointment FLORIDA Irizarry CNP [facility]   Showing future appointments within next 150 days with a meds authorizing provider and meeting all other requirements

## 2023-09-19 NOTE — TELEPHONE ENCOUNTER
Marleny Jennings, [address] [place] [address], informed she would follow for home health and orders were faxed back.

## 2023-09-19 NOTE — TELEPHONE ENCOUNTER
Penny Osuna, with [address] called to see if we received fax for provider to sign to follow for home health. Paper located and on Claire's desk for signature if she will follow. Please advise.

## 2023-09-21 PROBLEM — N39.0 UTI (URINARY TRACT INFECTION): Status: RESOLVED | Noted: 2023-08-22 | Resolved: 2023-09-21

## 2023-09-26 ENCOUNTER — APPOINTMENT (OUTPATIENT)
Dept: CT IMAGING | Age: 83
DRG: 690 | End: 2023-09-26
Payer: MEDICARE

## 2023-09-26 ENCOUNTER — APPOINTMENT (OUTPATIENT)
Dept: GENERAL RADIOLOGY | Age: 83
DRG: 690 | End: 2023-09-26
Payer: MEDICARE

## 2023-09-26 ENCOUNTER — HOSPITAL ENCOUNTER (INPATIENT)
Age: 83
LOS: 8 days | Discharge: NURSING, REHAB OR LONG-TERM CARE | DRG: 690 | End: 2023-10-04
Attending: EMERGENCY MEDICINE | Admitting: HOSPITALIST
Payer: MEDICARE

## 2023-09-26 DIAGNOSIS — R09.02 HYPOXIA: Primary | ICD-10-CM

## 2023-09-26 PROBLEM — N39.0 UTI (URINARY TRACT INFECTION): Status: ACTIVE | Noted: 2023-09-26

## 2023-09-26 LAB
ANION GAP SERPL CALC-SCNC: 14 MEQ/L (ref 8–16)
APTT PPP: 28.7 SECONDS (ref 22–38)
BACTERIA: ABNORMAL
BASOPHILS ABSOLUTE: 0.1 THOU/MM3 (ref 0–0.1)
BASOPHILS NFR BLD AUTO: 1 %
BILIRUB UR QL STRIP: NEGATIVE
BUN SERPL-MCNC: 14 MG/DL (ref 7–22)
CALCIUM SERPL-MCNC: 9.2 MG/DL (ref 8.5–10.5)
CASTS #/AREA URNS LPF: ABNORMAL /LPF
CASTS #/AREA URNS LPF: ABNORMAL /LPF
CHARACTER UR: ABNORMAL
CHARCOAL URNS QL MICRO: ABNORMAL
CHLORIDE SERPL-SCNC: 99 MEQ/L (ref 98–111)
CO2 SERPL-SCNC: 23 MEQ/L (ref 23–33)
COLOR UR: YELLOW
CREAT SERPL-MCNC: 0.5 MG/DL (ref 0.4–1.2)
CRYSTALS URNS QL MICRO: ABNORMAL
DEPRECATED RDW RBC AUTO: 47.5 FL (ref 35–45)
EOSINOPHIL NFR BLD AUTO: 1.4 %
EOSINOPHILS ABSOLUTE: 0.1 THOU/MM3 (ref 0–0.4)
EPITHELIAL CELLS, UA: ABNORMAL /HPF
ERYTHROCYTE [DISTWIDTH] IN BLOOD BY AUTOMATED COUNT: 13.2 % (ref 11.5–14.5)
FLUAV RNA RESP QL NAA+PROBE: NOT DETECTED
FLUBV RNA RESP QL NAA+PROBE: NOT DETECTED
GFR SERPL CREATININE-BSD FRML MDRD: > 60 ML/MIN/1.73M2
GLUCOSE SERPL-MCNC: 107 MG/DL (ref 70–108)
GLUCOSE UR QL STRIP.AUTO: NEGATIVE MG/DL
HCT VFR BLD AUTO: 39.6 % (ref 37–47)
HGB BLD-MCNC: 13.2 GM/DL (ref 12–16)
HGB UR QL STRIP.AUTO: ABNORMAL
IMM GRANULOCYTES # BLD AUTO: 0.01 THOU/MM3 (ref 0–0.07)
IMM GRANULOCYTES NFR BLD AUTO: 0.2 %
INR PPP: 0.88 (ref 0.85–1.13)
KETONES UR QL STRIP.AUTO: NEGATIVE
LEUKOCYTE ESTERASE UR QL STRIP.AUTO: ABNORMAL
LYMPHOCYTES ABSOLUTE: 0.6 THOU/MM3 (ref 1–4.8)
LYMPHOCYTES NFR BLD AUTO: 12.4 %
MCH RBC QN AUTO: 32.3 PG (ref 26–33)
MCHC RBC AUTO-ENTMCNC: 33.3 GM/DL (ref 32.2–35.5)
MCV RBC AUTO: 96.8 FL (ref 81–99)
MONOCYTES ABSOLUTE: 0.5 THOU/MM3 (ref 0.4–1.3)
MONOCYTES NFR BLD AUTO: 10 %
NEUTROPHILS NFR BLD AUTO: 75 %
NITRITE UR QL STRIP.AUTO: NEGATIVE
NRBC BLD AUTO-RTO: 0 /100 WBC
NT-PROBNP SERPL IA-MCNC: 128.9 PG/ML (ref 0–449)
OSMOLALITY SERPL CALC.SUM OF ELEC: 272.9 MOSMOL/KG (ref 275–300)
PH UR STRIP.AUTO: 7 [PH] (ref 5–9)
PLATELET # BLD AUTO: 147 THOU/MM3 (ref 130–400)
PMV BLD AUTO: 10.3 FL (ref 9.4–12.4)
POTASSIUM SERPL-SCNC: 4 MEQ/L (ref 3.5–5.2)
PROT UR STRIP.AUTO-MCNC: 30 MG/DL
RBC # BLD AUTO: 4.09 MILL/MM3 (ref 4.2–5.4)
RBC #/AREA URNS HPF: ABNORMAL /HPF
RENAL EPI CELLS #/AREA URNS HPF: ABNORMAL /[HPF]
SARS-COV-2 RNA RESP QL NAA+PROBE: NOT DETECTED
SEGMENTED NEUTROPHILS ABSOLUTE COUNT: 3.8 THOU/MM3 (ref 1.8–7.7)
SODIUM SERPL-SCNC: 136 MEQ/L (ref 135–145)
SP GR UR REFRACT.AUTO: 1.02 (ref 1–1.03)
T4 FREE SERPL-MCNC: 1.71 NG/DL (ref 0.93–1.76)
TROPONIN, HIGH SENSITIVITY: 12 NG/L (ref 0–12)
TSH SERPL DL<=0.005 MIU/L-ACNC: 13.34 UIU/ML (ref 0.4–4.2)
UROBILINOGEN UR QL STRIP.AUTO: 0.2 EU/DL (ref 0–1)
WBC # BLD AUTO: 5 THOU/MM3 (ref 4.8–10.8)
WBC #/AREA URNS HPF: > 200 /HPF
YEAST LIKE FUNGI URNS QL MICRO: ABNORMAL

## 2023-09-26 PROCEDURE — 85610 PROTHROMBIN TIME: CPT

## 2023-09-26 PROCEDURE — 84443 ASSAY THYROID STIM HORMONE: CPT

## 2023-09-26 PROCEDURE — 84439 ASSAY OF FREE THYROXINE: CPT

## 2023-09-26 PROCEDURE — 96365 THER/PROPH/DIAG IV INF INIT: CPT

## 2023-09-26 PROCEDURE — G0378 HOSPITAL OBSERVATION PER HR: HCPCS

## 2023-09-26 PROCEDURE — 81001 URINALYSIS AUTO W/SCOPE: CPT

## 2023-09-26 PROCEDURE — 71046 X-RAY EXAM CHEST 2 VIEWS: CPT

## 2023-09-26 PROCEDURE — 93010 ELECTROCARDIOGRAM REPORT: CPT | Performed by: INTERNAL MEDICINE

## 2023-09-26 PROCEDURE — 6370000000 HC RX 637 (ALT 250 FOR IP): Performed by: PHYSICIAN ASSISTANT

## 2023-09-26 PROCEDURE — 84484 ASSAY OF TROPONIN QUANT: CPT

## 2023-09-26 PROCEDURE — 87636 SARSCOV2 & INF A&B AMP PRB: CPT

## 2023-09-26 PROCEDURE — 85025 COMPLETE CBC W/AUTO DIFF WBC: CPT

## 2023-09-26 PROCEDURE — 2580000003 HC RX 258: Performed by: STUDENT IN AN ORGANIZED HEALTH CARE EDUCATION/TRAINING PROGRAM

## 2023-09-26 PROCEDURE — 1200000003 HC TELEMETRY R&B

## 2023-09-26 PROCEDURE — 1200000000 HC SEMI PRIVATE

## 2023-09-26 PROCEDURE — 6360000002 HC RX W HCPCS: Performed by: PHYSICIAN ASSISTANT

## 2023-09-26 PROCEDURE — 83880 ASSAY OF NATRIURETIC PEPTIDE: CPT

## 2023-09-26 PROCEDURE — 6370000000 HC RX 637 (ALT 250 FOR IP): Performed by: STUDENT IN AN ORGANIZED HEALTH CARE EDUCATION/TRAINING PROGRAM

## 2023-09-26 PROCEDURE — 71275 CT ANGIOGRAPHY CHEST: CPT

## 2023-09-26 PROCEDURE — 2580000003 HC RX 258: Performed by: PHYSICIAN ASSISTANT

## 2023-09-26 PROCEDURE — 96374 THER/PROPH/DIAG INJ IV PUSH: CPT

## 2023-09-26 PROCEDURE — 85730 THROMBOPLASTIN TIME PARTIAL: CPT

## 2023-09-26 PROCEDURE — 99223 1ST HOSP IP/OBS HIGH 75: CPT | Performed by: PHYSICIAN ASSISTANT

## 2023-09-26 PROCEDURE — 36415 COLL VENOUS BLD VENIPUNCTURE: CPT

## 2023-09-26 PROCEDURE — 94640 AIRWAY INHALATION TREATMENT: CPT

## 2023-09-26 PROCEDURE — 80048 BASIC METABOLIC PNL TOTAL CA: CPT

## 2023-09-26 PROCEDURE — 96361 HYDRATE IV INFUSION ADD-ON: CPT

## 2023-09-26 PROCEDURE — 96372 THER/PROPH/DIAG INJ SC/IM: CPT

## 2023-09-26 PROCEDURE — 93005 ELECTROCARDIOGRAM TRACING: CPT | Performed by: EMERGENCY MEDICINE

## 2023-09-26 PROCEDURE — 6360000004 HC RX CONTRAST MEDICATION: Performed by: STUDENT IN AN ORGANIZED HEALTH CARE EDUCATION/TRAINING PROGRAM

## 2023-09-26 PROCEDURE — 96375 TX/PRO/DX INJ NEW DRUG ADDON: CPT

## 2023-09-26 PROCEDURE — 99285 EMERGENCY DEPT VISIT HI MDM: CPT

## 2023-09-26 PROCEDURE — 6360000002 HC RX W HCPCS: Performed by: STUDENT IN AN ORGANIZED HEALTH CARE EDUCATION/TRAINING PROGRAM

## 2023-09-26 RX ORDER — ACETAMINOPHEN 650 MG/1
650 SUPPOSITORY RECTAL EVERY 6 HOURS PRN
Status: DISCONTINUED | OUTPATIENT
Start: 2023-09-26 | End: 2023-10-04 | Stop reason: HOSPADM

## 2023-09-26 RX ORDER — SODIUM CHLORIDE 0.9 % (FLUSH) 0.9 %
5-40 SYRINGE (ML) INJECTION EVERY 12 HOURS SCHEDULED
Status: DISCONTINUED | OUTPATIENT
Start: 2023-09-26 | End: 2023-10-04 | Stop reason: HOSPADM

## 2023-09-26 RX ORDER — PANTOPRAZOLE SODIUM 40 MG/1
40 TABLET, DELAYED RELEASE ORAL
Status: DISCONTINUED | OUTPATIENT
Start: 2023-09-27 | End: 2023-10-04 | Stop reason: HOSPADM

## 2023-09-26 RX ORDER — FERROUS SULFATE 325(65) MG
325 TABLET ORAL 2 TIMES DAILY
Status: DISCONTINUED | OUTPATIENT
Start: 2023-09-26 | End: 2023-10-04 | Stop reason: HOSPADM

## 2023-09-26 RX ORDER — GUAIFENESIN 600 MG/1
600 TABLET, EXTENDED RELEASE ORAL 2 TIMES DAILY
Status: DISCONTINUED | OUTPATIENT
Start: 2023-09-26 | End: 2023-10-04 | Stop reason: HOSPADM

## 2023-09-26 RX ORDER — SODIUM CHLORIDE 9 MG/ML
INJECTION, SOLUTION INTRAVENOUS CONTINUOUS
Status: DISCONTINUED | OUTPATIENT
Start: 2023-09-26 | End: 2023-09-30

## 2023-09-26 RX ORDER — ONDANSETRON 2 MG/ML
4 INJECTION INTRAMUSCULAR; INTRAVENOUS EVERY 6 HOURS PRN
Status: DISCONTINUED | OUTPATIENT
Start: 2023-09-26 | End: 2023-10-04 | Stop reason: HOSPADM

## 2023-09-26 RX ORDER — MULTIVITAMIN WITH IRON
1 TABLET ORAL NIGHTLY
Status: DISCONTINUED | OUTPATIENT
Start: 2023-09-26 | End: 2023-10-04 | Stop reason: HOSPADM

## 2023-09-26 RX ORDER — METOPROLOL SUCCINATE 50 MG/1
50 TABLET, EXTENDED RELEASE ORAL DAILY
Status: DISCONTINUED | OUTPATIENT
Start: 2023-09-26 | End: 2023-10-04 | Stop reason: HOSPADM

## 2023-09-26 RX ORDER — POLYETHYLENE GLYCOL 3350 17 G/17G
17 POWDER, FOR SOLUTION ORAL DAILY PRN
Status: DISCONTINUED | OUTPATIENT
Start: 2023-09-26 | End: 2023-10-04 | Stop reason: HOSPADM

## 2023-09-26 RX ORDER — FLUTICASONE PROPIONATE 50 MCG
1 SPRAY, SUSPENSION (ML) NASAL DAILY PRN
Status: DISCONTINUED | OUTPATIENT
Start: 2023-09-27 | End: 2023-10-04 | Stop reason: HOSPADM

## 2023-09-26 RX ORDER — ENALAPRIL MALEATE 10 MG/1
10 TABLET ORAL DAILY
Status: DISCONTINUED | OUTPATIENT
Start: 2023-09-26 | End: 2023-09-27

## 2023-09-26 RX ORDER — SUCRALFATE 1 G/1
1 TABLET ORAL
Status: DISCONTINUED | OUTPATIENT
Start: 2023-09-26 | End: 2023-10-04 | Stop reason: HOSPADM

## 2023-09-26 RX ORDER — FLUOXETINE HYDROCHLORIDE 20 MG/1
40 CAPSULE ORAL DAILY
Status: DISCONTINUED | OUTPATIENT
Start: 2023-09-26 | End: 2023-10-04 | Stop reason: HOSPADM

## 2023-09-26 RX ORDER — IPRATROPIUM BROMIDE AND ALBUTEROL SULFATE 2.5; .5 MG/3ML; MG/3ML
1 SOLUTION RESPIRATORY (INHALATION) ONCE
Status: COMPLETED | OUTPATIENT
Start: 2023-09-26 | End: 2023-09-26

## 2023-09-26 RX ORDER — 0.9 % SODIUM CHLORIDE 0.9 %
500 INTRAVENOUS SOLUTION INTRAVENOUS ONCE
Status: COMPLETED | OUTPATIENT
Start: 2023-09-26 | End: 2023-09-26

## 2023-09-26 RX ORDER — SODIUM CHLORIDE 0.9 % (FLUSH) 0.9 %
5-40 SYRINGE (ML) INJECTION PRN
Status: DISCONTINUED | OUTPATIENT
Start: 2023-09-26 | End: 2023-10-04 | Stop reason: HOSPADM

## 2023-09-26 RX ORDER — ACETAMINOPHEN 325 MG/1
650 TABLET ORAL EVERY 6 HOURS PRN
Status: DISCONTINUED | OUTPATIENT
Start: 2023-09-26 | End: 2023-10-04 | Stop reason: HOSPADM

## 2023-09-26 RX ORDER — LEVOTHYROXINE SODIUM 0.1 MG/1
100 TABLET ORAL
Status: DISCONTINUED | OUTPATIENT
Start: 2023-09-27 | End: 2023-10-04 | Stop reason: HOSPADM

## 2023-09-26 RX ORDER — ONDANSETRON 4 MG/1
4 TABLET, ORALLY DISINTEGRATING ORAL EVERY 8 HOURS PRN
Status: DISCONTINUED | OUTPATIENT
Start: 2023-09-26 | End: 2023-10-04 | Stop reason: HOSPADM

## 2023-09-26 RX ORDER — SODIUM CHLORIDE 9 MG/ML
INJECTION, SOLUTION INTRAVENOUS PRN
Status: DISCONTINUED | OUTPATIENT
Start: 2023-09-26 | End: 2023-10-04 | Stop reason: HOSPADM

## 2023-09-26 RX ORDER — ENOXAPARIN SODIUM 100 MG/ML
40 INJECTION SUBCUTANEOUS NIGHTLY
Status: DISCONTINUED | OUTPATIENT
Start: 2023-09-26 | End: 2023-10-04 | Stop reason: HOSPADM

## 2023-09-26 RX ADMIN — ENOXAPARIN SODIUM 40 MG: 100 INJECTION SUBCUTANEOUS at 22:53

## 2023-09-26 RX ADMIN — MEROPENEM 1000 MG: 1 INJECTION, POWDER, FOR SOLUTION INTRAVENOUS at 22:53

## 2023-09-26 RX ADMIN — GUAIFENESIN 600 MG: 600 TABLET, EXTENDED RELEASE ORAL at 22:35

## 2023-09-26 RX ADMIN — Medication 1 TABLET: at 22:35

## 2023-09-26 RX ADMIN — SODIUM CHLORIDE, PRESERVATIVE FREE 10 ML: 5 INJECTION INTRAVENOUS at 22:35

## 2023-09-26 RX ADMIN — IOPAMIDOL 80 ML: 755 INJECTION, SOLUTION INTRAVENOUS at 15:22

## 2023-09-26 RX ADMIN — SODIUM CHLORIDE 500 ML: 9 INJECTION, SOLUTION INTRAVENOUS at 15:15

## 2023-09-26 RX ADMIN — SUCRALFATE 1 G: 1 TABLET ORAL at 22:35

## 2023-09-26 RX ADMIN — IPRATROPIUM BROMIDE AND ALBUTEROL SULFATE 1 DOSE: .5; 3 SOLUTION RESPIRATORY (INHALATION) at 14:39

## 2023-09-26 RX ADMIN — WATER 40 MG: 1 INJECTION INTRAMUSCULAR; INTRAVENOUS; SUBCUTANEOUS at 15:48

## 2023-09-26 RX ADMIN — SODIUM CHLORIDE: 9 INJECTION, SOLUTION INTRAVENOUS at 22:51

## 2023-09-26 ASSESSMENT — PAIN SCALES - GENERAL: PAINLEVEL_OUTOF10: 3

## 2023-09-26 NOTE — ED PROVIDER NOTES
[facility] EMERGENCY DEPT      EMERGENCY MEDICINE     Pt Name: Tiffani Gunter  MRN: [id number]  [address] [facility] 1940  Date of evaluation: 9/26/2023  Provider: Cici Hunt MD    CHIEF COMPLAINT       Chief Complaint   Patient presents with    Shortness of Breath     HISTORY OF PRESENT ILLNESS   Tiffani Gunter is a pleasant 78 y.o. female who presents to the emergency department from from nursing home, brought in by EMS for evaluation of shortness of breath. Patient presents emergency department complaint of at least 2 weeks of worsening shortness of breath associated with productive cough, fatigue; patient denies chest pain, no fever, no orthopnea, no lightheadedness, no pedal edema. At arriving patient with hypoxemia reason why she was placed in oxygen cannula 2 L. 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 Henrry Dale     GERD (gastroesophageal reflux disease)     Diverticulitis     Hiatal hernia     Hx of blood clots     Hypertension     BAKI    Hypothyroidism     Osteoarthritis     Urinary incontinence     UTI (urinary tract infection)        Patient Active Problem List   Diagnosis Code    Osteoarthritis M19.90    Fibromyalgia M79.7    Gastroesophageal reflux disease K21.9    Hypothyroidism E03.9    Patient is Gnosticist [id number]    Back pain M54.9    H/O abdominal surgery Z98.890    Normocytic anemia D64.9    History of diverticulosis Z87.19    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.40, G89.29    Chest pain R07.9    Primary hypertension I10    Abnormal CT scan, chest R93.89    BRBPR (bright red blood per rectum) K62.5    Spinal stenosis of lumbar region without neurogenic claudication M48.061    Other idiopathic scoliosis,

## 2023-09-26 NOTE — ED NOTES
Pt medicated per MAR. Remains in stable condition with call light in reach. Denies any further needs at this time.        Michael Cabello RN  09/26/23 5972

## 2023-09-26 NOTE — H&P
Hospitalist - History & Physical      Patient: Radha Villa Height    Unit/Bed:23/023A  YOB: 1940  MRN: [id number]   Acct: [de-identified]   PCP: FLORIDA Velasco CNP    Date of Service: Pt seen/examined on 09/26/23  and Admitted to Inpatient with expected LOS greater than two midnights due to medical therapy. Chief Complaint:  SOB    Assessment and Plan:-  Recurrent UTI: UA noted to have moderate bacteria, negative nitrites, large leukocyte esterase. WBCs greater than 200. We will use broad-spectrum with Merrem pending final culture results. She had recently finished her Cipro course 9/1. Started on Hiprex at home. Urology consultation. Dyspnea, unclear etiology: suspect viral infection with sore throat, dry cough. Patient reports this improved following a breathing treatment in the ED. CTA neg for PE or pulmonary infiltrates. Exam unr. Pro-BNP wnl, no overt fluid overload. Deconditioning ? HH: known, did not wish for surgery and therefore Gen Surg consultation was not completed last admission. Continue PPI and Carafate. Essential hypertension: Continue with Vasotec, Toprol. Continue to monitor BP. Acquired hypothyroidism: Continue with home daily Synthroid check TSH. Anxiety / depression: continue with Prozac        History Of Present Illness:    Patient is a 78-year-old female with past medical history of anemia, constipation, [place], hypertension, urinary incontinence and recurrent UTIs who presents to [facility] ED with chief complaint of shortness of breath for the past 2 weeks. History is obtained via patient and chart review in addition to daughter who is at bedside. Patient reports that she has been having worsening shortness of breath, to the point where today she was unable to find her words according to the daughter at bedside. She has been having a cough that is nonproductive in nature in addition to a sore throat. No fever or chills.  She does admit to

## 2023-09-26 NOTE — ED NOTES
Pt straight cath'd with 500 ml christophe urine returned. Pt in stable condition.       Tobi Ordonez RN  09/26/23 9696

## 2023-09-26 NOTE — ED NOTES
Pt arrived to ED via EMS with complaints of shortness of breath and urinary retention. States symptoms started today. She has not been taking prescribed medications for several days. EKG completed and IV access obtained. Telemetry applied. Bladder scan completed to show 307 mls in bladder. Pt appears to be in stable condition with respirations even and unlabored. Pts call light in reach.        Seth Markham RN  09/26/23 8953

## 2023-09-27 DIAGNOSIS — K21.9 GASTROESOPHAGEAL REFLUX DISEASE WITHOUT ESOPHAGITIS: Primary | ICD-10-CM

## 2023-09-27 PROCEDURE — 97166 OT EVAL MOD COMPLEX 45 MIN: CPT

## 2023-09-27 PROCEDURE — 97530 THERAPEUTIC ACTIVITIES: CPT

## 2023-09-27 PROCEDURE — 96366 THER/PROPH/DIAG IV INF ADDON: CPT

## 2023-09-27 PROCEDURE — 2580000003 HC RX 258: Performed by: PHYSICIAN ASSISTANT

## 2023-09-27 PROCEDURE — 96372 THER/PROPH/DIAG INJ SC/IM: CPT

## 2023-09-27 PROCEDURE — 6360000002 HC RX W HCPCS: Performed by: PHYSICIAN ASSISTANT

## 2023-09-27 PROCEDURE — 6370000000 HC RX 637 (ALT 250 FOR IP): Performed by: PHYSICIAN ASSISTANT

## 2023-09-27 PROCEDURE — 1200000000 HC SEMI PRIVATE

## 2023-09-27 PROCEDURE — G0378 HOSPITAL OBSERVATION PER HR: HCPCS

## 2023-09-27 PROCEDURE — 99232 SBSQ HOSP IP/OBS MODERATE 35: CPT | Performed by: PHYSICIAN ASSISTANT

## 2023-09-27 PROCEDURE — 51798 US URINE CAPACITY MEASURE: CPT

## 2023-09-27 PROCEDURE — 87086 URINE CULTURE/COLONY COUNT: CPT

## 2023-09-27 PROCEDURE — 96361 HYDRATE IV INFUSION ADD-ON: CPT

## 2023-09-27 PROCEDURE — 99221 1ST HOSP IP/OBS SF/LOW 40: CPT | Performed by: NURSE PRACTITIONER

## 2023-09-27 PROCEDURE — 1200000003 HC TELEMETRY R&B

## 2023-09-27 RX ORDER — SUCRALFATE 1 G/1
1 TABLET ORAL
Qty: 270 TABLET | Refills: 1 | Status: SHIPPED | OUTPATIENT
Start: 2023-09-27

## 2023-09-27 RX ORDER — ENALAPRIL MALEATE 5 MG/1
5 TABLET ORAL DAILY
Status: DISCONTINUED | OUTPATIENT
Start: 2023-09-27 | End: 2023-09-28

## 2023-09-27 RX ORDER — ACETAMINOPHEN 325 MG/1
650 TABLET ORAL EVERY 6 HOURS PRN
COMMUNITY

## 2023-09-27 RX ADMIN — GUAIFENESIN 600 MG: 600 TABLET, EXTENDED RELEASE ORAL at 22:05

## 2023-09-27 RX ADMIN — ENALAPRIL MALEATE 5 MG: 5 TABLET ORAL at 12:56

## 2023-09-27 RX ADMIN — METOPROLOL SUCCINATE 50 MG: 50 TABLET, EXTENDED RELEASE ORAL at 10:02

## 2023-09-27 RX ADMIN — LEVOTHYROXINE SODIUM 100 MCG: 0.1 TABLET ORAL at 10:01

## 2023-09-27 RX ADMIN — Medication 1 TABLET: at 22:05

## 2023-09-27 RX ADMIN — MEROPENEM 1000 MG: 1 INJECTION, POWDER, FOR SOLUTION INTRAVENOUS at 22:08

## 2023-09-27 RX ADMIN — GUAIFENESIN 600 MG: 600 TABLET, EXTENDED RELEASE ORAL at 10:01

## 2023-09-27 RX ADMIN — SUCRALFATE 1 G: 1 TABLET ORAL at 22:05

## 2023-09-27 RX ADMIN — SODIUM CHLORIDE: 9 INJECTION, SOLUTION INTRAVENOUS at 13:02

## 2023-09-27 RX ADMIN — PANTOPRAZOLE SODIUM 40 MG: 40 TABLET, DELAYED RELEASE ORAL at 16:51

## 2023-09-27 RX ADMIN — SUCRALFATE 1 G: 1 TABLET ORAL at 10:02

## 2023-09-27 RX ADMIN — MEROPENEM 1000 MG: 1 INJECTION, POWDER, FOR SOLUTION INTRAVENOUS at 12:55

## 2023-09-27 RX ADMIN — MEROPENEM 1000 MG: 1 INJECTION, POWDER, FOR SOLUTION INTRAVENOUS at 04:39

## 2023-09-27 RX ADMIN — FLUOXETINE 40 MG: 20 CAPSULE ORAL at 10:01

## 2023-09-27 RX ADMIN — PANTOPRAZOLE SODIUM 40 MG: 40 TABLET, DELAYED RELEASE ORAL at 10:02

## 2023-09-27 RX ADMIN — FERROUS SULFATE TAB 325 MG (65 MG ELEMENTAL FE) 325 MG: 325 (65 FE) TAB at 22:05

## 2023-09-27 RX ADMIN — ENOXAPARIN SODIUM 40 MG: 100 INJECTION SUBCUTANEOUS at 22:05

## 2023-09-27 RX ADMIN — SUCRALFATE 1 G: 1 TABLET ORAL at 12:36

## 2023-09-27 RX ADMIN — SUCRALFATE 1 G: 1 TABLET ORAL at 16:51

## 2023-09-27 ASSESSMENT — PAIN DESCRIPTION - DESCRIPTORS: DESCRIPTORS: ACHING

## 2023-09-27 ASSESSMENT — PAIN DESCRIPTION - LOCATION
LOCATION: BACK
LOCATION: BACK

## 2023-09-27 ASSESSMENT — PAIN SCALES - GENERAL
PAINLEVEL_OUTOF10: 7
PAINLEVEL_OUTOF10: 4

## 2023-09-27 ASSESSMENT — PAIN DESCRIPTION - ORIENTATION: ORIENTATION: RIGHT;LOWER

## 2023-09-27 NOTE — CARE COORDINATION
09/27/23 0744   Readmission Assessment   Number of Days since last admission? Previous Disposition SNF   (Went to [facility] then went back to [facility] approx 9/16 per patient)   Who is being Interviewed Patient   What was the patient's/caregiver's perception as to why they think they needed to return back to the hospital? Other (Comment)  (Increased shortness of breath for 2 weeks)   Did you visit your Primary Care Physician after you left the hospital, before you returned this time? No  Appointment with PCP not until today 9/27)   Did you see a specialist, such as Cardiac, Pulmonary, Orthopedic Physician, etc. after you left the hospital? Yes   (Urology)   Who advised the patient to return to the hospital? Skilled Unit  (From [facility])    Does the patient report anything that got in the way of taking their medications? No   In our efforts to provide the best possible care to you and others like you, can you think of anything that we could have done to help you after you left the hospital the first time, so that you might not have needed to return so soon? Other (Comment)  (\"I can't think of anything.  I have chronic issues\")

## 2023-09-27 NOTE — PROCEDURES
Bladder scan was completed by Steffanie Ríos at 8333.  The residual amount of 367 ml was resulted to [employer]

## 2023-09-27 NOTE — CARE COORDINATION
9/27/23, 1:43 PM EDT  Discharge Planning Evaluation  Social work consult received, patient from Loc. Patient/Family preference is to return to assisted living. Would patient be willing to go to a skilled facility if needed: yes. Is there skilled care available at current facility: yes, pre-cert required. Barriers to return to current living situation: needing increased rehab  Spoke with Myla Conrad at the facility. Patient bed hold: yes  Anticipated transport plan: family vs ambulette  Patient's Healthcare Decision Maker: Named in [facility] met with pt, spouse and daughter, Nirav Menon. Pt and family would like pt to [facility] if possible. Pt is currently receiving therapy through [facility]. Pt is agreeable to [facility] if needed. SW to check back 9/28 on POC. Readmission Risk Low 0-14, Mod 15-19), High > 20: Readmission Risk Score: 16.2    Current PCP: FLORIDA Valente CNP  PCP verified by CM? Yes    Patient Orientation: Alert and Oriented, Person, Place, Situation    Patient Cognition: Alert  History Provided by: Patient, Child/Family (daughter Nirav Menon)    Advance Directives:      Code Status: Limited   Patient's Primary Decision Maker is: Named in Scanned ACP Document    Primary Decision Maker: Height, [address] [phone]    Secondary Decision Maker: Pardeep Pena Child - [phone]     Discharge Planning:    Patient lives with: Other (Comment) Type of Home: Assisted living  Primary Care Giver:  Other (Comment) ([facility] staff)  Patient Support Systems include: Spouse/Significant Other, Children, Other (Comment) (Spouse, daughter and [facility] Staff)   Current Financial resources: Medicare  Current community resources: Assisted Living  Current services prior to admission: Home Care, Other (Comment) ([facility] )            Current DME:              Type of Home Care services:  None    ADLS  Prior functional level: Assistance

## 2023-09-27 NOTE — PROCEDURES
Bladder scan was completed by Jayne Motley at 1515.  The residual amount of 404 ml was resulted to [employer]

## 2023-09-27 NOTE — FLOWSHEET NOTE
09/27/23 1401   Safe Environment   Safety Measures Standard Safety Measures;Call light within reach; Bed in low position; Family at bedside  (virtual safety round)     Pt resting in bed. Alert and oriented. Reminded to utilize call light when needed.

## 2023-09-28 LAB
ANION GAP SERPL CALC-SCNC: 8 MEQ/L (ref 8–16)
BUN SERPL-MCNC: 7 MG/DL (ref 7–22)
CALCIUM SERPL-MCNC: 8.4 MG/DL (ref 8.5–10.5)
CHLORIDE SERPL-SCNC: 97 MEQ/L (ref 98–111)
CO2 SERPL-SCNC: 25 MEQ/L (ref 23–33)
CREAT SERPL-MCNC: 0.6 MG/DL (ref 0.4–1.2)
DEPRECATED RDW RBC AUTO: 47.8 FL (ref 35–45)
ERYTHROCYTE [DISTWIDTH] IN BLOOD BY AUTOMATED COUNT: 13.4 % (ref 11.5–14.5)
GFR SERPL CREATININE-BSD FRML MDRD: > 60 ML/MIN/1.73M2
GLUCOSE SERPL-MCNC: 110 MG/DL (ref 70–108)
HCT VFR BLD AUTO: 41.1 % (ref 37–47)
HGB BLD-MCNC: 13.8 GM/DL (ref 12–16)
MCH RBC QN AUTO: 32.5 PG (ref 26–33)
MCHC RBC AUTO-ENTMCNC: 33.6 GM/DL (ref 32.2–35.5)
MCV RBC AUTO: 96.7 FL (ref 81–99)
PLATELET # BLD AUTO: 126 THOU/MM3 (ref 130–400)
PMV BLD AUTO: 10.7 FL (ref 9.4–12.4)
POTASSIUM SERPL-SCNC: 4.2 MEQ/L (ref 3.5–5.2)
RBC # BLD AUTO: 4.25 MILL/MM3 (ref 4.2–5.4)
SODIUM SERPL-SCNC: 130 MEQ/L (ref 135–145)
WBC # BLD AUTO: 4 THOU/MM3 (ref 4.8–10.8)

## 2023-09-28 PROCEDURE — 85027 COMPLETE CBC AUTOMATED: CPT

## 2023-09-28 PROCEDURE — 96361 HYDRATE IV INFUSION ADD-ON: CPT

## 2023-09-28 PROCEDURE — 96375 TX/PRO/DX INJ NEW DRUG ADDON: CPT

## 2023-09-28 PROCEDURE — 97162 PT EVAL MOD COMPLEX 30 MIN: CPT

## 2023-09-28 PROCEDURE — 96366 THER/PROPH/DIAG IV INF ADDON: CPT

## 2023-09-28 PROCEDURE — 1200000000 HC SEMI PRIVATE

## 2023-09-28 PROCEDURE — 2580000003 HC RX 258: Performed by: PHYSICIAN ASSISTANT

## 2023-09-28 PROCEDURE — 51798 US URINE CAPACITY MEASURE: CPT

## 2023-09-28 PROCEDURE — G0378 HOSPITAL OBSERVATION PER HR: HCPCS

## 2023-09-28 PROCEDURE — 96372 THER/PROPH/DIAG INJ SC/IM: CPT

## 2023-09-28 PROCEDURE — 36415 COLL VENOUS BLD VENIPUNCTURE: CPT

## 2023-09-28 PROCEDURE — 51702 INSERT TEMP BLADDER CATH: CPT

## 2023-09-28 PROCEDURE — 6360000002 HC RX W HCPCS: Performed by: PHYSICIAN ASSISTANT

## 2023-09-28 PROCEDURE — 97110 THERAPEUTIC EXERCISES: CPT

## 2023-09-28 PROCEDURE — 6370000000 HC RX 637 (ALT 250 FOR IP): Performed by: PHYSICIAN ASSISTANT

## 2023-09-28 PROCEDURE — 80048 BASIC METABOLIC PNL TOTAL CA: CPT

## 2023-09-28 PROCEDURE — 99231 SBSQ HOSP IP/OBS SF/LOW 25: CPT | Performed by: UROLOGY

## 2023-09-28 RX ORDER — DOXYCYCLINE HYCLATE 100 MG
100 TABLET ORAL EVERY 12 HOURS SCHEDULED
Status: DISCONTINUED | OUTPATIENT
Start: 2023-09-28 | End: 2023-10-03

## 2023-09-28 RX ORDER — ENALAPRIL MALEATE 10 MG/1
10 TABLET ORAL DAILY
Status: DISCONTINUED | OUTPATIENT
Start: 2023-09-29 | End: 2023-10-04 | Stop reason: HOSPADM

## 2023-09-28 RX ORDER — ENALAPRIL MALEATE 5 MG/1
5 TABLET ORAL ONCE
Status: COMPLETED | OUTPATIENT
Start: 2023-09-28 | End: 2023-09-29

## 2023-09-28 RX ADMIN — GUAIFENESIN 600 MG: 600 TABLET, EXTENDED RELEASE ORAL at 09:11

## 2023-09-28 RX ADMIN — SUCRALFATE 1 G: 1 TABLET ORAL at 21:05

## 2023-09-28 RX ADMIN — FLUOXETINE 40 MG: 20 CAPSULE ORAL at 09:11

## 2023-09-28 RX ADMIN — PANTOPRAZOLE SODIUM 40 MG: 40 TABLET, DELAYED RELEASE ORAL at 18:29

## 2023-09-28 RX ADMIN — PANTOPRAZOLE SODIUM 40 MG: 40 TABLET, DELAYED RELEASE ORAL at 06:34

## 2023-09-28 RX ADMIN — FERROUS SULFATE TAB 325 MG (65 MG ELEMENTAL FE) 325 MG: 325 (65 FE) TAB at 21:13

## 2023-09-28 RX ADMIN — SUCRALFATE 1 G: 1 TABLET ORAL at 11:18

## 2023-09-28 RX ADMIN — Medication 1 TABLET: at 21:08

## 2023-09-28 RX ADMIN — DOXYCYCLINE HYCLATE 100 MG: 100 TABLET, COATED ORAL at 21:07

## 2023-09-28 RX ADMIN — LEVOTHYROXINE SODIUM 100 MCG: 0.1 TABLET ORAL at 06:34

## 2023-09-28 RX ADMIN — SODIUM CHLORIDE: 9 INJECTION, SOLUTION INTRAVENOUS at 15:28

## 2023-09-28 RX ADMIN — ACETAMINOPHEN 650 MG: 325 TABLET ORAL at 09:10

## 2023-09-28 RX ADMIN — FERROUS SULFATE TAB 325 MG (65 MG ELEMENTAL FE) 325 MG: 325 (65 FE) TAB at 09:12

## 2023-09-28 RX ADMIN — MEROPENEM 1000 MG: 1 INJECTION, POWDER, FOR SOLUTION INTRAVENOUS at 05:04

## 2023-09-28 RX ADMIN — GUAIFENESIN 600 MG: 600 TABLET, EXTENDED RELEASE ORAL at 21:13

## 2023-09-28 RX ADMIN — SUCRALFATE 1 G: 1 TABLET ORAL at 06:34

## 2023-09-28 RX ADMIN — METOPROLOL SUCCINATE 50 MG: 50 TABLET, EXTENDED RELEASE ORAL at 09:12

## 2023-09-28 RX ADMIN — DOXYCYCLINE HYCLATE 100 MG: 100 TABLET, COATED ORAL at 11:40

## 2023-09-28 RX ADMIN — SODIUM CHLORIDE: 9 INJECTION, SOLUTION INTRAVENOUS at 01:42

## 2023-09-28 RX ADMIN — SUCRALFATE 1 G: 1 TABLET ORAL at 18:29

## 2023-09-28 RX ADMIN — ONDANSETRON 4 MG: 2 INJECTION INTRAMUSCULAR; INTRAVENOUS at 10:22

## 2023-09-28 RX ADMIN — ENOXAPARIN SODIUM 40 MG: 100 INJECTION SUBCUTANEOUS at 21:03

## 2023-09-28 RX ADMIN — ENALAPRIL MALEATE 5 MG: 5 TABLET ORAL at 09:11

## 2023-09-28 ASSESSMENT — PAIN DESCRIPTION - LOCATION: LOCATION: BACK;HEAD

## 2023-09-28 ASSESSMENT — PAIN DESCRIPTION - DESCRIPTORS: DESCRIPTORS: ACHING

## 2023-09-28 ASSESSMENT — PAIN DESCRIPTION - ORIENTATION: ORIENTATION: LOWER

## 2023-09-28 ASSESSMENT — PAIN SCALES - GENERAL: PAINLEVEL_OUTOF10: 4

## 2023-09-28 NOTE — FLOWSHEET NOTE
09/28/23 1105   Safe Environment   Safety Measures Other (comment)  (VN safety round)     VN called into patients room and introduced myself and role. Patient did not answer. Video activated for safety check. . Patient has staff at bedside being put on commode. No further interruptions at this time.

## 2023-09-28 NOTE — CARE COORDINATION
9/28/23, 8:10 AM EDT    DISCHARGE  [facility] day: 2  Location: [facility]19/019-A Reason for admit: UTI (urinary tract infection) [N39.0]  Hypoxia [R09.02]   Procedure: none  Barriers to Discharge: Na+130. Last /92. Bladder scans q8h per urology. IVF, IV merrem. PT/OT. PCP: FLORIDA Verdin CNP  Readmission Risk Score: 22.3%  Patient Goals/Plan/Treatment Preferences: return to [address] [place] [address] with [facility]. Open to [facility] if needed.

## 2023-09-29 ENCOUNTER — APPOINTMENT (OUTPATIENT)
Dept: GENERAL RADIOLOGY | Age: 83
DRG: 690 | End: 2023-09-29
Payer: MEDICARE

## 2023-09-29 LAB
ANION GAP SERPL CALC-SCNC: 9 MEQ/L (ref 8–16)
BACTERIA UR CULT: ABNORMAL
BUN SERPL-MCNC: 6 MG/DL (ref 7–22)
CALCIUM SERPL-MCNC: 8.4 MG/DL (ref 8.5–10.5)
CHLORIDE SERPL-SCNC: 98 MEQ/L (ref 98–111)
CO2 SERPL-SCNC: 23 MEQ/L (ref 23–33)
CREAT SERPL-MCNC: 0.4 MG/DL (ref 0.4–1.2)
DEPRECATED RDW RBC AUTO: 46.8 FL (ref 35–45)
ERYTHROCYTE [DISTWIDTH] IN BLOOD BY AUTOMATED COUNT: 12.9 % (ref 11.5–14.5)
GFR SERPL CREATININE-BSD FRML MDRD: > 60 ML/MIN/1.73M2
GLUCOSE SERPL-MCNC: 109 MG/DL (ref 70–108)
HCT VFR BLD AUTO: 40.7 % (ref 37–47)
HGB BLD-MCNC: 13.5 GM/DL (ref 12–16)
MCH RBC QN AUTO: 32.8 PG (ref 26–33)
MCHC RBC AUTO-ENTMCNC: 33.2 GM/DL (ref 32.2–35.5)
MCV RBC AUTO: 99 FL (ref 81–99)
ORGANISM: ABNORMAL
PLATELET # BLD AUTO: 143 THOU/MM3 (ref 130–400)
PMV BLD AUTO: 10.5 FL (ref 9.4–12.4)
POTASSIUM SERPL-SCNC: 3.4 MEQ/L (ref 3.5–5.2)
PROCALCITONIN SERPL IA-MCNC: 0.04 NG/ML (ref 0.01–0.09)
RBC # BLD AUTO: 4.11 MILL/MM3 (ref 4.2–5.4)
REASON FOR REJECTION: NORMAL
REASON FOR REJECTION: NORMAL
REJECTED TEST: NORMAL
REJECTED TEST: NORMAL
SODIUM SERPL-SCNC: 130 MEQ/L (ref 135–145)
WBC # BLD AUTO: 4.5 THOU/MM3 (ref 4.8–10.8)

## 2023-09-29 PROCEDURE — 84145 PROCALCITONIN (PCT): CPT

## 2023-09-29 PROCEDURE — 36415 COLL VENOUS BLD VENIPUNCTURE: CPT

## 2023-09-29 PROCEDURE — 51702 INSERT TEMP BLADDER CATH: CPT

## 2023-09-29 PROCEDURE — 6370000000 HC RX 637 (ALT 250 FOR IP)

## 2023-09-29 PROCEDURE — 6360000002 HC RX W HCPCS: Performed by: PHYSICIAN ASSISTANT

## 2023-09-29 PROCEDURE — 85027 COMPLETE CBC AUTOMATED: CPT

## 2023-09-29 PROCEDURE — 80048 BASIC METABOLIC PNL TOTAL CA: CPT

## 2023-09-29 PROCEDURE — G0378 HOSPITAL OBSERVATION PER HR: HCPCS

## 2023-09-29 PROCEDURE — 2580000003 HC RX 258: Performed by: PHYSICIAN ASSISTANT

## 2023-09-29 PROCEDURE — 6370000000 HC RX 637 (ALT 250 FOR IP): Performed by: PHYSICIAN ASSISTANT

## 2023-09-29 PROCEDURE — 71046 X-RAY EXAM CHEST 2 VIEWS: CPT

## 2023-09-29 PROCEDURE — 1200000000 HC SEMI PRIVATE

## 2023-09-29 PROCEDURE — 96361 HYDRATE IV INFUSION ADD-ON: CPT

## 2023-09-29 PROCEDURE — 96372 THER/PROPH/DIAG INJ SC/IM: CPT

## 2023-09-29 PROCEDURE — 87205 SMEAR GRAM STAIN: CPT

## 2023-09-29 PROCEDURE — 94761 N-INVAS EAR/PLS OXIMETRY MLT: CPT

## 2023-09-29 PROCEDURE — 94640 AIRWAY INHALATION TREATMENT: CPT

## 2023-09-29 PROCEDURE — 99232 SBSQ HOSP IP/OBS MODERATE 35: CPT | Performed by: PHYSICIAN ASSISTANT

## 2023-09-29 RX ORDER — POTASSIUM CHLORIDE 7.45 MG/ML
10 INJECTION INTRAVENOUS PRN
Status: DISCONTINUED | OUTPATIENT
Start: 2023-09-29 | End: 2023-10-04 | Stop reason: HOSPADM

## 2023-09-29 RX ORDER — IPRATROPIUM BROMIDE AND ALBUTEROL SULFATE 2.5; .5 MG/3ML; MG/3ML
1 SOLUTION RESPIRATORY (INHALATION) EVERY 4 HOURS PRN
Status: DISCONTINUED | OUTPATIENT
Start: 2023-09-29 | End: 2023-10-04 | Stop reason: HOSPADM

## 2023-09-29 RX ORDER — AMLODIPINE BESYLATE 5 MG/1
5 TABLET ORAL DAILY
Status: DISCONTINUED | OUTPATIENT
Start: 2023-09-29 | End: 2023-09-30

## 2023-09-29 RX ORDER — BENZONATATE 100 MG/1
100 CAPSULE ORAL 3 TIMES DAILY PRN
Status: DISCONTINUED | OUTPATIENT
Start: 2023-09-29 | End: 2023-10-04 | Stop reason: HOSPADM

## 2023-09-29 RX ORDER — POTASSIUM CHLORIDE 20 MEQ/1
40 TABLET, EXTENDED RELEASE ORAL PRN
Status: DISCONTINUED | OUTPATIENT
Start: 2023-09-29 | End: 2023-10-04 | Stop reason: HOSPADM

## 2023-09-29 RX ORDER — IPRATROPIUM BROMIDE AND ALBUTEROL SULFATE 2.5; .5 MG/3ML; MG/3ML
1 SOLUTION RESPIRATORY (INHALATION)
Status: DISCONTINUED | OUTPATIENT
Start: 2023-09-29 | End: 2023-09-29

## 2023-09-29 RX ADMIN — GUAIFENESIN 600 MG: 600 TABLET, EXTENDED RELEASE ORAL at 22:09

## 2023-09-29 RX ADMIN — SUCRALFATE 1 G: 1 TABLET ORAL at 22:09

## 2023-09-29 RX ADMIN — ENALAPRIL MALEATE 5 MG: 5 TABLET ORAL at 00:14

## 2023-09-29 RX ADMIN — ENOXAPARIN SODIUM 40 MG: 100 INJECTION SUBCUTANEOUS at 22:10

## 2023-09-29 RX ADMIN — IPRATROPIUM BROMIDE AND ALBUTEROL SULFATE 1 DOSE: .5; 3 SOLUTION RESPIRATORY (INHALATION) at 11:34

## 2023-09-29 RX ADMIN — Medication 1 TABLET: at 22:09

## 2023-09-29 RX ADMIN — SODIUM CHLORIDE: 9 INJECTION, SOLUTION INTRAVENOUS at 04:26

## 2023-09-29 RX ADMIN — DOXYCYCLINE HYCLATE 100 MG: 100 TABLET, COATED ORAL at 10:34

## 2023-09-29 RX ADMIN — POTASSIUM CHLORIDE 40 MEQ: 1500 TABLET, EXTENDED RELEASE ORAL at 12:45

## 2023-09-29 RX ADMIN — ENALAPRIL MALEATE 10 MG: 10 TABLET ORAL at 10:34

## 2023-09-29 RX ADMIN — DOXYCYCLINE HYCLATE 100 MG: 100 TABLET, COATED ORAL at 22:09

## 2023-09-29 RX ADMIN — FERROUS SULFATE TAB 325 MG (65 MG ELEMENTAL FE) 325 MG: 325 (65 FE) TAB at 10:34

## 2023-09-29 RX ADMIN — SUCRALFATE 1 G: 1 TABLET ORAL at 10:34

## 2023-09-29 RX ADMIN — LEVOTHYROXINE SODIUM 100 MCG: 0.1 TABLET ORAL at 06:05

## 2023-09-29 RX ADMIN — METOPROLOL SUCCINATE 50 MG: 50 TABLET, EXTENDED RELEASE ORAL at 10:34

## 2023-09-29 RX ADMIN — AMLODIPINE BESYLATE 5 MG: 5 TABLET ORAL at 12:47

## 2023-09-29 RX ADMIN — SUCRALFATE 1 G: 1 TABLET ORAL at 16:13

## 2023-09-29 RX ADMIN — PANTOPRAZOLE SODIUM 40 MG: 40 TABLET, DELAYED RELEASE ORAL at 16:13

## 2023-09-29 RX ADMIN — GUAIFENESIN 600 MG: 600 TABLET, EXTENDED RELEASE ORAL at 10:34

## 2023-09-29 RX ADMIN — FLUOXETINE 40 MG: 20 CAPSULE ORAL at 10:34

## 2023-09-29 RX ADMIN — ACETAMINOPHEN 650 MG: 325 TABLET ORAL at 12:43

## 2023-09-29 RX ADMIN — FERROUS SULFATE TAB 325 MG (65 MG ELEMENTAL FE) 325 MG: 325 (65 FE) TAB at 22:09

## 2023-09-29 RX ADMIN — PANTOPRAZOLE SODIUM 40 MG: 40 TABLET, DELAYED RELEASE ORAL at 06:05

## 2023-09-29 RX ADMIN — SUCRALFATE 1 G: 1 TABLET ORAL at 06:05

## 2023-09-29 RX ADMIN — SODIUM CHLORIDE: 9 INJECTION, SOLUTION INTRAVENOUS at 18:43

## 2023-09-29 ASSESSMENT — PAIN DESCRIPTION - LOCATION
LOCATION: BACK
LOCATION: BACK

## 2023-09-29 ASSESSMENT — PAIN - FUNCTIONAL ASSESSMENT: PAIN_FUNCTIONAL_ASSESSMENT: PREVENTS OR INTERFERES SOME ACTIVE ACTIVITIES AND ADLS

## 2023-09-29 ASSESSMENT — PAIN SCALES - GENERAL
PAINLEVEL_OUTOF10: 5
PAINLEVEL_OUTOF10: 6
PAINLEVEL_OUTOF10: 8

## 2023-09-29 ASSESSMENT — PAIN DESCRIPTION - DESCRIPTORS: DESCRIPTORS: ACHING

## 2023-09-29 ASSESSMENT — PAIN DESCRIPTION - ORIENTATION: ORIENTATION: RIGHT;LEFT;POSTERIOR;LOWER

## 2023-09-29 NOTE — CARE COORDINATION
9/29/23, 12:06 PM EDT    DISCHARGE PLANNING EVALUATION       SW spoke with Patient regarding need for rehab vs returning to [facility]. Patient feels she is able to return to [facility] and that is her preference at this time. SW updated Val Isabel at [facility].

## 2023-09-30 LAB
ANION GAP SERPL CALC-SCNC: 9 MEQ/L (ref 8–16)
BUN SERPL-MCNC: 7 MG/DL (ref 7–22)
CALCIUM SERPL-MCNC: 8.2 MG/DL (ref 8.5–10.5)
CHLORIDE SERPL-SCNC: 97 MEQ/L (ref 98–111)
CO2 SERPL-SCNC: 22 MEQ/L (ref 23–33)
CREAT SERPL-MCNC: 0.4 MG/DL (ref 0.4–1.2)
CREAT UR-MCNC: 101.4 MG/DL
DEPRECATED RDW RBC AUTO: 48.4 FL (ref 35–45)
ERYTHROCYTE [DISTWIDTH] IN BLOOD BY AUTOMATED COUNT: 13.1 % (ref 11.5–14.5)
GFR SERPL CREATININE-BSD FRML MDRD: > 60 ML/MIN/1.73M2
GLUCOSE SERPL-MCNC: 113 MG/DL (ref 70–108)
HCT VFR BLD AUTO: 42.8 % (ref 37–47)
HGB BLD-MCNC: 13.9 GM/DL (ref 12–16)
MCH RBC QN AUTO: 32.6 PG (ref 26–33)
MCHC RBC AUTO-ENTMCNC: 32.5 GM/DL (ref 32.2–35.5)
MCV RBC AUTO: 100.5 FL (ref 81–99)
PLATELET # BLD AUTO: 131 THOU/MM3 (ref 130–400)
PMV BLD AUTO: 10.2 FL (ref 9.4–12.4)
POTASSIUM SERPL-SCNC: 3.5 MEQ/L (ref 3.5–5.2)
RBC # BLD AUTO: 4.26 MILL/MM3 (ref 4.2–5.4)
SODIUM SERPL-SCNC: 128 MEQ/L (ref 135–145)
SODIUM UR-SCNC: 155 MEQ/L
WBC # BLD AUTO: 3.8 THOU/MM3 (ref 4.8–10.8)

## 2023-09-30 PROCEDURE — 1200000000 HC SEMI PRIVATE

## 2023-09-30 PROCEDURE — 94669 MECHANICAL CHEST WALL OSCILL: CPT

## 2023-09-30 PROCEDURE — 96376 TX/PRO/DX INJ SAME DRUG ADON: CPT

## 2023-09-30 PROCEDURE — 36415 COLL VENOUS BLD VENIPUNCTURE: CPT

## 2023-09-30 PROCEDURE — 6370000000 HC RX 637 (ALT 250 FOR IP)

## 2023-09-30 PROCEDURE — 82570 ASSAY OF URINE CREATININE: CPT

## 2023-09-30 PROCEDURE — 84300 ASSAY OF URINE SODIUM: CPT

## 2023-09-30 PROCEDURE — 6360000002 HC RX W HCPCS: Performed by: PHYSICIAN ASSISTANT

## 2023-09-30 PROCEDURE — 2580000003 HC RX 258: Performed by: PHYSICIAN ASSISTANT

## 2023-09-30 PROCEDURE — G0378 HOSPITAL OBSERVATION PER HR: HCPCS

## 2023-09-30 PROCEDURE — 80048 BASIC METABOLIC PNL TOTAL CA: CPT

## 2023-09-30 PROCEDURE — 6370000000 HC RX 637 (ALT 250 FOR IP): Performed by: PHYSICIAN ASSISTANT

## 2023-09-30 PROCEDURE — 96361 HYDRATE IV INFUSION ADD-ON: CPT

## 2023-09-30 PROCEDURE — 85027 COMPLETE CBC AUTOMATED: CPT

## 2023-09-30 PROCEDURE — 99232 SBSQ HOSP IP/OBS MODERATE 35: CPT | Performed by: PHYSICIAN ASSISTANT

## 2023-09-30 RX ORDER — AMLODIPINE BESYLATE 10 MG/1
10 TABLET ORAL DAILY
Status: DISCONTINUED | OUTPATIENT
Start: 2023-10-01 | End: 2023-10-04 | Stop reason: HOSPADM

## 2023-09-30 RX ADMIN — BENZONATATE 100 MG: 100 CAPSULE ORAL at 17:50

## 2023-09-30 RX ADMIN — SUCRALFATE 1 G: 1 TABLET ORAL at 05:36

## 2023-09-30 RX ADMIN — DOXYCYCLINE HYCLATE 100 MG: 100 TABLET, COATED ORAL at 21:28

## 2023-09-30 RX ADMIN — FLUOXETINE 40 MG: 20 CAPSULE ORAL at 08:02

## 2023-09-30 RX ADMIN — Medication 1 TABLET: at 21:27

## 2023-09-30 RX ADMIN — DOXYCYCLINE HYCLATE 100 MG: 100 TABLET, COATED ORAL at 08:02

## 2023-09-30 RX ADMIN — SUCRALFATE 1 G: 1 TABLET ORAL at 12:07

## 2023-09-30 RX ADMIN — PANTOPRAZOLE SODIUM 40 MG: 40 TABLET, DELAYED RELEASE ORAL at 05:36

## 2023-09-30 RX ADMIN — PANTOPRAZOLE SODIUM 40 MG: 40 TABLET, DELAYED RELEASE ORAL at 17:50

## 2023-09-30 RX ADMIN — FERROUS SULFATE TAB 325 MG (65 MG ELEMENTAL FE) 325 MG: 325 (65 FE) TAB at 21:28

## 2023-09-30 RX ADMIN — POTASSIUM BICARBONATE 40 MEQ: 782 TABLET, EFFERVESCENT ORAL at 08:02

## 2023-09-30 RX ADMIN — SUCRALFATE 1 G: 1 TABLET ORAL at 21:27

## 2023-09-30 RX ADMIN — GUAIFENESIN 600 MG: 600 TABLET, EXTENDED RELEASE ORAL at 08:02

## 2023-09-30 RX ADMIN — AMLODIPINE BESYLATE 5 MG: 5 TABLET ORAL at 08:02

## 2023-09-30 RX ADMIN — FERROUS SULFATE TAB 325 MG (65 MG ELEMENTAL FE) 325 MG: 325 (65 FE) TAB at 08:02

## 2023-09-30 RX ADMIN — LEVOTHYROXINE SODIUM 100 MCG: 0.1 TABLET ORAL at 05:35

## 2023-09-30 RX ADMIN — SODIUM CHLORIDE: 9 INJECTION, SOLUTION INTRAVENOUS at 08:09

## 2023-09-30 RX ADMIN — ONDANSETRON 4 MG: 2 INJECTION INTRAMUSCULAR; INTRAVENOUS at 17:43

## 2023-09-30 RX ADMIN — GUAIFENESIN 600 MG: 600 TABLET, EXTENDED RELEASE ORAL at 21:28

## 2023-09-30 RX ADMIN — ENALAPRIL MALEATE 10 MG: 10 TABLET ORAL at 08:02

## 2023-09-30 RX ADMIN — SUCRALFATE 1 G: 1 TABLET ORAL at 17:50

## 2023-09-30 RX ADMIN — METOPROLOL SUCCINATE 50 MG: 50 TABLET, EXTENDED RELEASE ORAL at 08:02

## 2023-09-30 RX ADMIN — ACETAMINOPHEN 650 MG: 325 TABLET ORAL at 21:27

## 2023-09-30 ASSESSMENT — PAIN DESCRIPTION - LOCATION: LOCATION: HAND

## 2023-09-30 ASSESSMENT — PAIN SCALES - GENERAL
PAINLEVEL_OUTOF10: 0
PAINLEVEL_OUTOF10: 2

## 2023-09-30 NOTE — FLOWSHEET NOTE
09/30/23 1716   Safe Environment   Safety Measures Side rails X 2;Visitors at bedside; Bed in low position;Call light within reach  (Virtual Nurse Safety Round Complete)     Call placed to patients room, patient responds to audio, permitted video. Visitor/Family member answered, stated patient is ok, patient is laying down in bed with eyes closed but she is awake. Patient denied any voiced conscerns/complaints at this time. Patient educated on utiliz call light. Call light within reach, no signs or symtpoms of distress noted.

## 2023-10-01 LAB
ANION GAP SERPL CALC-SCNC: 11 MEQ/L (ref 8–16)
BUN SERPL-MCNC: 9 MG/DL (ref 7–22)
CALCIUM SERPL-MCNC: 8.5 MG/DL (ref 8.5–10.5)
CHLORIDE SERPL-SCNC: 92 MEQ/L (ref 98–111)
CO2 SERPL-SCNC: 23 MEQ/L (ref 23–33)
CREAT SERPL-MCNC: 0.4 MG/DL (ref 0.4–1.2)
GFR SERPL CREATININE-BSD FRML MDRD: > 60 ML/MIN/1.73M2
GLUCOSE SERPL-MCNC: 100 MG/DL (ref 70–108)
OSMOLALITY SERPL: 265 MOSMOL/KG (ref 275–295)
OSMOLALITY UR: 622 MOSMOL/KG (ref 250–750)
POTASSIUM SERPL-SCNC: 3.7 MEQ/L (ref 3.5–5.2)
SODIUM SERPL-SCNC: 120 MEQ/L (ref 135–145)
SODIUM SERPL-SCNC: 124 MEQ/L (ref 135–145)
SODIUM SERPL-SCNC: 125 MEQ/L (ref 135–145)
SODIUM SERPL-SCNC: 126 MEQ/L (ref 135–145)
SODIUM SERPL-SCNC: 127 MEQ/L (ref 135–145)
SODIUM UR-SCNC: 50 MEQ/L

## 2023-10-01 PROCEDURE — 2580000003 HC RX 258: Performed by: PHYSICIAN ASSISTANT

## 2023-10-01 PROCEDURE — 83930 ASSAY OF BLOOD OSMOLALITY: CPT

## 2023-10-01 PROCEDURE — 94640 AIRWAY INHALATION TREATMENT: CPT

## 2023-10-01 PROCEDURE — 1200000000 HC SEMI PRIVATE

## 2023-10-01 PROCEDURE — 94760 N-INVAS EAR/PLS OXIMETRY 1: CPT

## 2023-10-01 PROCEDURE — 6360000002 HC RX W HCPCS: Performed by: PHYSICIAN ASSISTANT

## 2023-10-01 PROCEDURE — 83935 ASSAY OF URINE OSMOLALITY: CPT

## 2023-10-01 PROCEDURE — G0378 HOSPITAL OBSERVATION PER HR: HCPCS

## 2023-10-01 PROCEDURE — 96372 THER/PROPH/DIAG INJ SC/IM: CPT

## 2023-10-01 PROCEDURE — 84295 ASSAY OF SERUM SODIUM: CPT

## 2023-10-01 PROCEDURE — 99232 SBSQ HOSP IP/OBS MODERATE 35: CPT | Performed by: PHYSICIAN ASSISTANT

## 2023-10-01 PROCEDURE — 6370000000 HC RX 637 (ALT 250 FOR IP): Performed by: PHYSICIAN ASSISTANT

## 2023-10-01 PROCEDURE — 36415 COLL VENOUS BLD VENIPUNCTURE: CPT

## 2023-10-01 PROCEDURE — 2700000000 HC OXYGEN THERAPY PER DAY

## 2023-10-01 PROCEDURE — 84300 ASSAY OF URINE SODIUM: CPT

## 2023-10-01 PROCEDURE — 6370000000 HC RX 637 (ALT 250 FOR IP)

## 2023-10-01 PROCEDURE — 80048 BASIC METABOLIC PNL TOTAL CA: CPT

## 2023-10-01 RX ORDER — PREDNISONE 20 MG/1
40 TABLET ORAL DAILY
Status: DISCONTINUED | OUTPATIENT
Start: 2023-10-01 | End: 2023-10-04 | Stop reason: HOSPADM

## 2023-10-01 RX ORDER — PREGABALIN 75 MG/1
150 CAPSULE ORAL 2 TIMES DAILY
Status: DISCONTINUED | OUTPATIENT
Start: 2023-10-01 | End: 2023-10-04 | Stop reason: HOSPADM

## 2023-10-01 RX ORDER — GUAIFENESIN/DEXTROMETHORPHAN 100-10MG/5
5 SYRUP ORAL EVERY 4 HOURS PRN
Status: DISCONTINUED | OUTPATIENT
Start: 2023-10-01 | End: 2023-10-04 | Stop reason: HOSPADM

## 2023-10-01 RX ADMIN — PREGABALIN 150 MG: 75 CAPSULE ORAL at 15:09

## 2023-10-01 RX ADMIN — DOXYCYCLINE HYCLATE 100 MG: 100 TABLET, COATED ORAL at 09:05

## 2023-10-01 RX ADMIN — GUAIFENESIN 600 MG: 600 TABLET, EXTENDED RELEASE ORAL at 21:51

## 2023-10-01 RX ADMIN — Medication 1 TABLET: at 21:50

## 2023-10-01 RX ADMIN — SUCRALFATE 1 G: 1 TABLET ORAL at 09:05

## 2023-10-01 RX ADMIN — METOPROLOL SUCCINATE 50 MG: 50 TABLET, EXTENDED RELEASE ORAL at 05:38

## 2023-10-01 RX ADMIN — SUCRALFATE 1 G: 1 TABLET ORAL at 21:50

## 2023-10-01 RX ADMIN — PREGABALIN 150 MG: 75 CAPSULE ORAL at 21:50

## 2023-10-01 RX ADMIN — PREDNISONE 40 MG: 20 TABLET ORAL at 15:09

## 2023-10-01 RX ADMIN — SODIUM CHLORIDE, PRESERVATIVE FREE 10 ML: 5 INJECTION INTRAVENOUS at 21:54

## 2023-10-01 RX ADMIN — SODIUM CHLORIDE, PRESERVATIVE FREE 10 ML: 5 INJECTION INTRAVENOUS at 09:06

## 2023-10-01 RX ADMIN — ENOXAPARIN SODIUM 40 MG: 100 INJECTION SUBCUTANEOUS at 00:29

## 2023-10-01 RX ADMIN — FERROUS SULFATE TAB 325 MG (65 MG ELEMENTAL FE) 325 MG: 325 (65 FE) TAB at 09:05

## 2023-10-01 RX ADMIN — FLUOXETINE 40 MG: 20 CAPSULE ORAL at 09:05

## 2023-10-01 RX ADMIN — AMLODIPINE BESYLATE 10 MG: 10 TABLET ORAL at 15:09

## 2023-10-01 RX ADMIN — IPRATROPIUM BROMIDE AND ALBUTEROL SULFATE 1 DOSE: .5; 3 SOLUTION RESPIRATORY (INHALATION) at 09:20

## 2023-10-01 RX ADMIN — GUAIFENESIN AND DEXTROMETHORPHAN 5 ML: 100; 10 SYRUP ORAL at 09:06

## 2023-10-01 RX ADMIN — ENALAPRIL MALEATE 10 MG: 10 TABLET ORAL at 09:05

## 2023-10-01 RX ADMIN — DOXYCYCLINE HYCLATE 100 MG: 100 TABLET, COATED ORAL at 21:51

## 2023-10-01 RX ADMIN — FERROUS SULFATE TAB 325 MG (65 MG ELEMENTAL FE) 325 MG: 325 (65 FE) TAB at 21:51

## 2023-10-01 RX ADMIN — SODIUM CHLORIDE, PRESERVATIVE FREE 10 ML: 5 INJECTION INTRAVENOUS at 00:29

## 2023-10-01 RX ADMIN — PANTOPRAZOLE SODIUM 40 MG: 40 TABLET, DELAYED RELEASE ORAL at 09:05

## 2023-10-01 RX ADMIN — SUCRALFATE 1 G: 1 TABLET ORAL at 15:10

## 2023-10-01 RX ADMIN — BENZONATATE 100 MG: 100 CAPSULE ORAL at 05:38

## 2023-10-01 RX ADMIN — GUAIFENESIN 600 MG: 600 TABLET, EXTENDED RELEASE ORAL at 09:05

## 2023-10-01 RX ADMIN — ACETAMINOPHEN 650 MG: 325 TABLET ORAL at 21:50

## 2023-10-01 RX ADMIN — LEVOTHYROXINE SODIUM 100 MCG: 0.1 TABLET ORAL at 09:05

## 2023-10-01 RX ADMIN — ACETAMINOPHEN 650 MG: 325 TABLET ORAL at 09:05

## 2023-10-01 RX ADMIN — ENOXAPARIN SODIUM 40 MG: 100 INJECTION SUBCUTANEOUS at 21:51

## 2023-10-01 ASSESSMENT — PAIN SCALES - GENERAL
PAINLEVEL_OUTOF10: 0
PAINLEVEL_OUTOF10: 5
PAINLEVEL_OUTOF10: 0

## 2023-10-01 NOTE — FLOWSHEET NOTE
VN completed safety rounds. No answer from the pt. Daughter answered VN call. Camera accessed for safety. pt resting in bed with eyes closed. Rise and fall of chest noted. Respirations are easy nonlabored. Call bell and personal belongings within reach.

## 2023-10-02 LAB
ANION GAP SERPL CALC-SCNC: 12 MEQ/L (ref 8–16)
BUN SERPL-MCNC: 16 MG/DL (ref 7–22)
CALCIUM SERPL-MCNC: 9.2 MG/DL (ref 8.5–10.5)
CHLORIDE SERPL-SCNC: 93 MEQ/L (ref 98–111)
CO2 SERPL-SCNC: 23 MEQ/L (ref 23–33)
CREAT SERPL-MCNC: 0.6 MG/DL (ref 0.4–1.2)
CREAT UR-MCNC: 150.8 MG/DL
DEPRECATED RDW RBC AUTO: 44.2 FL (ref 35–45)
EKG ATRIAL RATE: 76 BPM
EKG P AXIS: 43 DEGREES
EKG P-R INTERVAL: 166 MS
EKG Q-T INTERVAL: 368 MS
EKG QRS DURATION: 72 MS
EKG QTC CALCULATION (BAZETT): 414 MS
EKG R AXIS: -13 DEGREES
EKG T AXIS: 70 DEGREES
EKG VENTRICULAR RATE: 76 BPM
ERYTHROCYTE [DISTWIDTH] IN BLOOD BY AUTOMATED COUNT: 12.6 % (ref 11.5–14.5)
GFR SERPL CREATININE-BSD FRML MDRD: > 60 ML/MIN/1.73M2
GLUCOSE SERPL-MCNC: 106 MG/DL (ref 70–108)
HCT VFR BLD AUTO: 40.9 % (ref 37–47)
HGB BLD-MCNC: 14.3 GM/DL (ref 12–16)
MCH RBC QN AUTO: 33.2 PG (ref 26–33)
MCHC RBC AUTO-ENTMCNC: 35 GM/DL (ref 32.2–35.5)
MCV RBC AUTO: 94.9 FL (ref 81–99)
OSMOLALITY UR: 478 MOSMOL/KG (ref 250–750)
PLATELET # BLD AUTO: 162 THOU/MM3 (ref 130–400)
PMV BLD AUTO: 10.7 FL (ref 9.4–12.4)
POTASSIUM SERPL-SCNC: 3.8 MEQ/L (ref 3.5–5.2)
RBC # BLD AUTO: 4.31 MILL/MM3 (ref 4.2–5.4)
SODIUM SERPL-SCNC: 122 MEQ/L (ref 135–145)
SODIUM SERPL-SCNC: 128 MEQ/L (ref 135–145)
SODIUM UR-SCNC: 24 MEQ/L
UUN 24H UR-MCNC: 803 MG/DL
WBC # BLD AUTO: 4.3 THOU/MM3 (ref 4.8–10.8)

## 2023-10-02 PROCEDURE — 51702 INSERT TEMP BLADDER CATH: CPT

## 2023-10-02 PROCEDURE — 84300 ASSAY OF URINE SODIUM: CPT

## 2023-10-02 PROCEDURE — 84295 ASSAY OF SERUM SODIUM: CPT

## 2023-10-02 PROCEDURE — 99222 1ST HOSP IP/OBS MODERATE 55: CPT | Performed by: INTERNAL MEDICINE

## 2023-10-02 PROCEDURE — 84540 ASSAY OF URINE/UREA-N: CPT

## 2023-10-02 PROCEDURE — 99233 SBSQ HOSP IP/OBS HIGH 50: CPT | Performed by: PHYSICIAN ASSISTANT

## 2023-10-02 PROCEDURE — 97110 THERAPEUTIC EXERCISES: CPT

## 2023-10-02 PROCEDURE — 92610 EVALUATE SWALLOWING FUNCTION: CPT

## 2023-10-02 PROCEDURE — 51798 US URINE CAPACITY MEASURE: CPT

## 2023-10-02 PROCEDURE — 1200000000 HC SEMI PRIVATE

## 2023-10-02 PROCEDURE — 80048 BASIC METABOLIC PNL TOTAL CA: CPT

## 2023-10-02 PROCEDURE — 82570 ASSAY OF URINE CREATININE: CPT

## 2023-10-02 PROCEDURE — 6360000002 HC RX W HCPCS: Performed by: PHYSICIAN ASSISTANT

## 2023-10-02 PROCEDURE — 6370000000 HC RX 637 (ALT 250 FOR IP)

## 2023-10-02 PROCEDURE — 85027 COMPLETE CBC AUTOMATED: CPT

## 2023-10-02 PROCEDURE — 97530 THERAPEUTIC ACTIVITIES: CPT

## 2023-10-02 PROCEDURE — 96372 THER/PROPH/DIAG INJ SC/IM: CPT

## 2023-10-02 PROCEDURE — 36415 COLL VENOUS BLD VENIPUNCTURE: CPT

## 2023-10-02 PROCEDURE — 6370000000 HC RX 637 (ALT 250 FOR IP): Performed by: PHYSICIAN ASSISTANT

## 2023-10-02 PROCEDURE — 2580000003 HC RX 258: Performed by: PHYSICIAN ASSISTANT

## 2023-10-02 PROCEDURE — G0378 HOSPITAL OBSERVATION PER HR: HCPCS

## 2023-10-02 PROCEDURE — 83935 ASSAY OF URINE OSMOLALITY: CPT

## 2023-10-02 RX ADMIN — ENOXAPARIN SODIUM 40 MG: 100 INJECTION SUBCUTANEOUS at 21:12

## 2023-10-02 RX ADMIN — GUAIFENESIN 600 MG: 600 TABLET, EXTENDED RELEASE ORAL at 10:15

## 2023-10-02 RX ADMIN — DOXYCYCLINE HYCLATE 100 MG: 100 TABLET, COATED ORAL at 10:15

## 2023-10-02 RX ADMIN — FLUOXETINE 40 MG: 20 CAPSULE ORAL at 10:15

## 2023-10-02 RX ADMIN — SUCRALFATE 1 G: 1 TABLET ORAL at 06:34

## 2023-10-02 RX ADMIN — AMLODIPINE BESYLATE 10 MG: 10 TABLET ORAL at 10:15

## 2023-10-02 RX ADMIN — SODIUM CHLORIDE, PRESERVATIVE FREE 10 ML: 5 INJECTION INTRAVENOUS at 21:13

## 2023-10-02 RX ADMIN — PREDNISONE 40 MG: 20 TABLET ORAL at 10:15

## 2023-10-02 RX ADMIN — ENALAPRIL MALEATE 10 MG: 10 TABLET ORAL at 10:15

## 2023-10-02 RX ADMIN — GUAIFENESIN 600 MG: 600 TABLET, EXTENDED RELEASE ORAL at 23:47

## 2023-10-02 RX ADMIN — SUCRALFATE 1 G: 1 TABLET ORAL at 12:38

## 2023-10-02 RX ADMIN — PANTOPRAZOLE SODIUM 40 MG: 40 TABLET, DELAYED RELEASE ORAL at 17:00

## 2023-10-02 RX ADMIN — PREGABALIN 150 MG: 75 CAPSULE ORAL at 08:05

## 2023-10-02 RX ADMIN — METOPROLOL SUCCINATE 50 MG: 50 TABLET, EXTENDED RELEASE ORAL at 10:15

## 2023-10-02 RX ADMIN — SUCRALFATE 1 G: 1 TABLET ORAL at 21:14

## 2023-10-02 RX ADMIN — FERROUS SULFATE TAB 325 MG (65 MG ELEMENTAL FE) 325 MG: 325 (65 FE) TAB at 11:17

## 2023-10-02 RX ADMIN — PREGABALIN 150 MG: 75 CAPSULE ORAL at 21:12

## 2023-10-02 RX ADMIN — DOXYCYCLINE HYCLATE 100 MG: 100 TABLET, COATED ORAL at 23:47

## 2023-10-02 RX ADMIN — FERROUS SULFATE TAB 325 MG (65 MG ELEMENTAL FE) 325 MG: 325 (65 FE) TAB at 23:47

## 2023-10-02 RX ADMIN — PANTOPRAZOLE SODIUM 40 MG: 40 TABLET, DELAYED RELEASE ORAL at 06:34

## 2023-10-02 RX ADMIN — LEVOTHYROXINE SODIUM 100 MCG: 0.1 TABLET ORAL at 08:04

## 2023-10-02 RX ADMIN — SUCRALFATE 1 G: 1 TABLET ORAL at 17:01

## 2023-10-02 RX ADMIN — Medication 1 TABLET: at 23:47

## 2023-10-02 RX ADMIN — SODIUM CHLORIDE, PRESERVATIVE FREE 10 ML: 5 INJECTION INTRAVENOUS at 08:05

## 2023-10-02 ASSESSMENT — ENCOUNTER SYMPTOMS
EYE PAIN: 0
ABDOMINAL PAIN: 0
COUGH: 0
BACK PAIN: 0
EYES NEGATIVE: 1
NAUSEA: 0
VOMITING: 0
SHORTNESS OF BREATH: 1
SORE THROAT: 0
DIARRHEA: 0

## 2023-10-02 ASSESSMENT — PAIN SCALES - GENERAL
PAINLEVEL_OUTOF10: 0
PAINLEVEL_OUTOF10: 0

## 2023-10-02 NOTE — CONSULTS
[facility]          [address] [address]        [address]        [facility], [address] [facility],[phone]           Inpatient Initial consult note         10/2/2023 9:57 AM      Patient Name:   Thierno Gunter  YOB: 1940  Primary Care Physician:  FLORIDA Olsen CNP   Admit Date:    9/26/2023  1:06 PM    Consultation requested by : Shavonne Poole PA-C    Reason for Consult : Nephrology following for hyponatremia. History of presenting illness :Thierno Gunter is a 78 y.o.   female with Past Medical History as stated below presented with a chief complaint of Shortness of Breath   on 9/26/2023 . Patient initially presented with chief complaints of shortness of breath which has been ongoing for the last 2 weeks she was apparently unable to find her words according to the daughter at bedside she was having some cough which was nonproductive in nature and some sore throat no fever or chills she was having some intermittent dysuria    No other complaints at this time. She has a history of recurrent urinary tract infections and did have Timmons catheter placed during her last admission and again this time. Patient's sodium was 127 on 10/1/2020  in the morning and went down to 120 last night and is back up to 128 this morning. Patient does admit to drinking a lot of liquids.     Past History:  Past Medical History:   Diagnosis Date    Abnormal EKG     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 Minerva Kim     GERD (gastroesophageal reflux disease)     Diverticulitis     Hiatal hernia     Hx of blood clots     Hypertension     BAKI    Hypothyroidism     Osteoarthritis     Urinary incontinence     UTI (urinary tract infection)      Past Surgical History:   Procedure Laterality Date
LABGLOM >60 09/26/2023 01:15 PM    GLUCOSE 107 09/26/2023 01:15 PM    GLUCOSE 91 05/12/2022 10:49 AM     BUN/Creatinine:    Lab Results   Component Value Date/Time    BUN 14 09/26/2023 01:15 PM    CREATININE 0.5 09/26/2023 01:15 PM     Magnesium:    Lab Results   Component Value Date/Time    MG 1.6 08/22/2023 04:49 AM     Phosphorus:    Lab Results   Component Value Date/Time    PHOS 3.5 02/28/2021 05:21 AM     PT/INR:    Lab Results   Component Value Date/Time    INR 0.88 09/26/2023 01:15 PM     U/A:    Lab Results   Component Value Date/Time    NITRITE negative 04/12/2019 11:49 AM    COLORU YELLOW 09/26/2023 03:45 PM    PHUR 7.0 09/26/2023 03:45 PM    LABCAST NONE SEEN 09/26/2023 03:45 PM    LABCAST NONE SEEN 09/26/2023 03:45 PM    WBCUA > 200 09/26/2023 03:45 PM    RBCUA 5-10 09/26/2023 03:45 PM    YEAST NONE SEEN 09/26/2023 03:45 PM    BACTERIA MODERATE 09/26/2023 03:45 PM    CLARITYU cloudy 04/12/2019 11:49 AM    SPECGRAV 1.016 09/26/2023 03:45 PM    LEUKOCYTESUR LARGE 09/26/2023 03:45 PM    LEUKOCYTESUR MODERATE 08/19/2023 07:15 PM    UROBILINOGEN 0.2 09/26/2023 03:45 PM    BILIRUBINUR NEGATIVE 09/26/2023 03:45 PM    BILIRUBINUR negative 04/12/2019 11:49 AM    BLOODU SMALL 09/26/2023 03:45 PM    GLUCOSEU NEGATIVE 08/19/2023 07:15 PM       IMPRESSION:   UTI, recurrent. Large Luek. WBC greater than 200. Moderate bacteria. Patient hospitalized from 8/19/23 to 8/29/23 for treatment of UTI with acute urinary retention, history of MDRO UTI. Treated with IV Doxycycline then switched to oral Ciprofloxacin following culture finalization. Sent home with carroll catheter in place and was removed 9-14-23 in office. Urinary Retention. 300 in ER on bladder scan. Dyspnea, resolved per pt    Plan:    Hiprex can be resumed when off antibiotics  Urine culture ordered  Continue Antibiotic, merrum  Bladder scan every 8 hours (staff aware) starting now.  If continue to have retention will need to place carroll

## 2023-10-03 LAB
ANION GAP SERPL CALC-SCNC: 11 MEQ/L (ref 8–16)
BUN SERPL-MCNC: 16 MG/DL (ref 7–22)
CALCIUM SERPL-MCNC: 8.9 MG/DL (ref 8.5–10.5)
CHLORIDE SERPL-SCNC: 95 MEQ/L (ref 98–111)
CO2 SERPL-SCNC: 22 MEQ/L (ref 23–33)
CREAT SERPL-MCNC: 0.6 MG/DL (ref 0.4–1.2)
DEPRECATED RDW RBC AUTO: 45.1 FL (ref 35–45)
ERYTHROCYTE [DISTWIDTH] IN BLOOD BY AUTOMATED COUNT: 12.7 % (ref 11.5–14.5)
GFR SERPL CREATININE-BSD FRML MDRD: > 60 ML/MIN/1.73M2
GLUCOSE SERPL-MCNC: 101 MG/DL (ref 70–108)
HCT VFR BLD AUTO: 41.1 % (ref 37–47)
HGB BLD-MCNC: 13.9 GM/DL (ref 12–16)
MCH RBC QN AUTO: 32.6 PG (ref 26–33)
MCHC RBC AUTO-ENTMCNC: 33.8 GM/DL (ref 32.2–35.5)
MCV RBC AUTO: 96.3 FL (ref 81–99)
PLATELET # BLD AUTO: 174 THOU/MM3 (ref 130–400)
PMV BLD AUTO: 10.3 FL (ref 9.4–12.4)
POTASSIUM SERPL-SCNC: 3.6 MEQ/L (ref 3.5–5.2)
RBC # BLD AUTO: 4.27 MILL/MM3 (ref 4.2–5.4)
SODIUM SERPL-SCNC: 128 MEQ/L (ref 135–145)
WBC # BLD AUTO: 4.6 THOU/MM3 (ref 4.8–10.8)

## 2023-10-03 PROCEDURE — 96372 THER/PROPH/DIAG INJ SC/IM: CPT

## 2023-10-03 PROCEDURE — G0378 HOSPITAL OBSERVATION PER HR: HCPCS

## 2023-10-03 PROCEDURE — 6360000002 HC RX W HCPCS: Performed by: PHYSICIAN ASSISTANT

## 2023-10-03 PROCEDURE — 99232 SBSQ HOSP IP/OBS MODERATE 35: CPT | Performed by: INTERNAL MEDICINE

## 2023-10-03 PROCEDURE — 6370000000 HC RX 637 (ALT 250 FOR IP)

## 2023-10-03 PROCEDURE — 97535 SELF CARE MNGMENT TRAINING: CPT

## 2023-10-03 PROCEDURE — 2580000003 HC RX 258: Performed by: PHYSICIAN ASSISTANT

## 2023-10-03 PROCEDURE — 85027 COMPLETE CBC AUTOMATED: CPT

## 2023-10-03 PROCEDURE — 80048 BASIC METABOLIC PNL TOTAL CA: CPT

## 2023-10-03 PROCEDURE — 36415 COLL VENOUS BLD VENIPUNCTURE: CPT

## 2023-10-03 PROCEDURE — 6370000000 HC RX 637 (ALT 250 FOR IP): Performed by: PHYSICIAN ASSISTANT

## 2023-10-03 PROCEDURE — 92526 ORAL FUNCTION THERAPY: CPT

## 2023-10-03 PROCEDURE — 97530 THERAPEUTIC ACTIVITIES: CPT

## 2023-10-03 PROCEDURE — 1200000000 HC SEMI PRIVATE

## 2023-10-03 RX ORDER — SODIUM CHLORIDE 1 G/1
2 TABLET ORAL 2 TIMES DAILY WITH MEALS
Status: DISCONTINUED | OUTPATIENT
Start: 2023-10-03 | End: 2023-10-04 | Stop reason: HOSPADM

## 2023-10-03 RX ADMIN — ENALAPRIL MALEATE 10 MG: 10 TABLET ORAL at 08:24

## 2023-10-03 RX ADMIN — LEVOTHYROXINE SODIUM 100 MCG: 0.1 TABLET ORAL at 05:43

## 2023-10-03 RX ADMIN — SODIUM CHLORIDE 2 G: 1 TABLET ORAL at 16:10

## 2023-10-03 RX ADMIN — METOPROLOL SUCCINATE 50 MG: 50 TABLET, EXTENDED RELEASE ORAL at 08:24

## 2023-10-03 RX ADMIN — FERROUS SULFATE TAB 325 MG (65 MG ELEMENTAL FE) 325 MG: 325 (65 FE) TAB at 22:28

## 2023-10-03 RX ADMIN — FERROUS SULFATE TAB 325 MG (65 MG ELEMENTAL FE) 325 MG: 325 (65 FE) TAB at 08:24

## 2023-10-03 RX ADMIN — PREDNISONE 40 MG: 20 TABLET ORAL at 08:23

## 2023-10-03 RX ADMIN — Medication 1 TABLET: at 22:28

## 2023-10-03 RX ADMIN — GUAIFENESIN 600 MG: 600 TABLET, EXTENDED RELEASE ORAL at 22:28

## 2023-10-03 RX ADMIN — SUCRALFATE 1 G: 1 TABLET ORAL at 05:41

## 2023-10-03 RX ADMIN — SUCRALFATE 1 G: 1 TABLET ORAL at 13:20

## 2023-10-03 RX ADMIN — SUCRALFATE 1 G: 1 TABLET ORAL at 22:28

## 2023-10-03 RX ADMIN — PREGABALIN 150 MG: 75 CAPSULE ORAL at 08:23

## 2023-10-03 RX ADMIN — PANTOPRAZOLE SODIUM 40 MG: 40 TABLET, DELAYED RELEASE ORAL at 16:10

## 2023-10-03 RX ADMIN — AMLODIPINE BESYLATE 10 MG: 10 TABLET ORAL at 08:24

## 2023-10-03 RX ADMIN — PREGABALIN 150 MG: 75 CAPSULE ORAL at 22:28

## 2023-10-03 RX ADMIN — DOXYCYCLINE HYCLATE 100 MG: 100 TABLET, COATED ORAL at 08:24

## 2023-10-03 RX ADMIN — SUCRALFATE 1 G: 1 TABLET ORAL at 16:09

## 2023-10-03 RX ADMIN — SODIUM CHLORIDE 2 G: 1 TABLET ORAL at 13:21

## 2023-10-03 RX ADMIN — PANTOPRAZOLE SODIUM 40 MG: 40 TABLET, DELAYED RELEASE ORAL at 05:40

## 2023-10-03 RX ADMIN — ENOXAPARIN SODIUM 40 MG: 100 INJECTION SUBCUTANEOUS at 22:28

## 2023-10-03 RX ADMIN — FLUOXETINE 40 MG: 20 CAPSULE ORAL at 08:24

## 2023-10-03 RX ADMIN — GUAIFENESIN 600 MG: 600 TABLET, EXTENDED RELEASE ORAL at 08:24

## 2023-10-03 ASSESSMENT — PAIN SCALES - GENERAL: PAINLEVEL_OUTOF10: 0

## 2023-10-03 NOTE — FLOWSHEET NOTE
10/03/23 133   Safe Environment   Safety Measures Bed in low position;Call light within reach; Fall prevention (comment); Side rails X 2;Standard Safety Measures  (Virtual nurse safety round completed.)     Patient is awake and  alert and answers questions. No distress noted. Referred patient to page 13 of the handbook for fall prevention review. Educated on call light use. Call light in reach.

## 2023-10-03 NOTE — CARE COORDINATION
10/3/23, 9:08 AM EDT    [address] day: 7  Location: [facility]/019- Reason for admit: UTI (urinary tract infection) [N39.0]  Hypoxia [R09.02]   Barriers to Discharge: Na 128. PO doxycycline, prednisone, duonebs. Fluid restriction.    PCP: FLORIDA Webb CNP  Readmission Risk Score: 20.5%  Patient Goals/Plan/Treatment Preferences: return to [facility] with [facility]

## 2023-10-03 NOTE — FLOWSHEET NOTE
10/03/23 1210   Safe Environment   Safety Measures Bed in low position;Call light within reach; Side rails X 2;Standard Safety Measures  (Virtual nurse safety round completed.)     No distress noted.

## 2023-10-03 NOTE — CARE COORDINATION
10/3/23, 11:39 AM EDT    DISCHARGE PLANNING EVALUATION       SW received phone call from Twila at [facility] and they feel that Patient need to return to skilled rehab at discharge due to her decline with therapy. SHABNAM spoke with Patient and is in agreement with this plan. SHABNAM updated Twila at [facility] and they will initiate pre-cert today.

## 2023-10-03 NOTE — FLOWSHEET NOTE
10/03/23 1100   Safe Environment   Safety Measures Standard Safety Measures;Caregiver at bedside  (Virtual nurse safety round completed.)     Tech denies camera permission due to patient care.

## 2023-10-03 NOTE — DISCHARGE INSTR - COC
Continuity of Care Form    Patient Name: Elias Gunter   :  1940  MRN:  [id number]    Admit date:  2023  Discharge date:  10/4/2023    Code Status Order: Limited   Advance Directives:     Admitting Physician:  Pardeep Cee PA-C  PCP: FLORIDA Loera CNP    Discharging Nurse: Pam Neumann RN  [facility] Unit/Room#: 6K-19/019-A  Discharging Unit Phone Number: [phone]    Emergency Contact:   Extended Emergency Contact Information  Primary Emergency Contact: Kelly Gunter  Address: [address] [place] [address], [address] [facility] [address] [place] Phone: [phone]  Mobile Phone: [phone]  Relation: Spouse  Secondary Emergency Contact: Hedy Gonzalez  Address: [address] [address] [phone] [place] [address] of [address] [place] Phone: [phone]  Mobile Phone: [phone]  Relation: Child    Past Surgical History:  Past Surgical History:   Procedure Laterality Date     [employer] [address] Bilateral     w/cubital tunnel    COLON SURGERY  2016    Procedure: ATTEMPTED DAVINCI XI ROBOTIC ASSISTED SIGMOID COLON RESECTION, ROBOTIC ASSISTED MOBILIZATION OF RECTAL SIGMOID TO SPLENIC FLEXURE, CONVERTED TO OPEN LEFT HEMICOLECTOMY WITH COLOPROCTOCTOMY, SPLENORRHAPHY, RIGID SIGMOIDOSCOPY, LASER EVALUATION OF VASCULARITY WITH ICG; Surgeon: Chely Ramey MD; Location: [address] [place] [address]; Service: General    COLONOSCOPY  2012    CYST REMOVAL      Marcela,     Cataract    FEMUR FRACTURE SURGERY Left 2020    LEFT FEMUR OPEN REDUCTION INTERNAL FIXATION performed by Tank Rayo MD at [facility] Left     [address] [facility] N/A 2021    ROBOTIC EXTENSIVE LYSIS OF ADHESIONS, ROBOTIC REDUCTION OF HIATAL HERNIA WITH GASTROPEXY performed by Kirsty Fritz MD at [address] [facility])      w/bladder suspension    JOINT REPLACEMENT  , , 2009

## 2023-10-04 VITALS
HEIGHT: 68 IN | HEART RATE: 58 BPM | DIASTOLIC BLOOD PRESSURE: 72 MMHG | RESPIRATION RATE: 18 BRPM | WEIGHT: 158.29 LBS | OXYGEN SATURATION: 93 % | BODY MASS INDEX: 23.99 KG/M2 | TEMPERATURE: 98.2 F | SYSTOLIC BLOOD PRESSURE: 158 MMHG

## 2023-10-04 LAB
ANION GAP SERPL CALC-SCNC: 8 MEQ/L (ref 8–16)
BUN SERPL-MCNC: 14 MG/DL (ref 7–22)
CALCIUM SERPL-MCNC: 8.8 MG/DL (ref 8.5–10.5)
CHLORIDE SERPL-SCNC: 99 MEQ/L (ref 98–111)
CO2 SERPL-SCNC: 27 MEQ/L (ref 23–33)
CREAT SERPL-MCNC: 0.6 MG/DL (ref 0.4–1.2)
DEPRECATED RDW RBC AUTO: 45.4 FL (ref 35–45)
ERYTHROCYTE [DISTWIDTH] IN BLOOD BY AUTOMATED COUNT: 12.7 % (ref 11.5–14.5)
GFR SERPL CREATININE-BSD FRML MDRD: > 60 ML/MIN/1.73M2
GLUCOSE SERPL-MCNC: 93 MG/DL (ref 70–108)
HCT VFR BLD AUTO: 41.5 % (ref 37–47)
HGB BLD-MCNC: 13.9 GM/DL (ref 12–16)
MCH RBC QN AUTO: 32.6 PG (ref 26–33)
MCHC RBC AUTO-ENTMCNC: 33.5 GM/DL (ref 32.2–35.5)
MCV RBC AUTO: 97.4 FL (ref 81–99)
PLATELET # BLD AUTO: 181 THOU/MM3 (ref 130–400)
PMV BLD AUTO: 10 FL (ref 9.4–12.4)
POTASSIUM SERPL-SCNC: 3.5 MEQ/L (ref 3.5–5.2)
RBC # BLD AUTO: 4.26 MILL/MM3 (ref 4.2–5.4)
SODIUM SERPL-SCNC: 134 MEQ/L (ref 135–145)
WBC # BLD AUTO: 4.4 THOU/MM3 (ref 4.8–10.8)

## 2023-10-04 PROCEDURE — 97535 SELF CARE MNGMENT TRAINING: CPT

## 2023-10-04 PROCEDURE — 6370000000 HC RX 637 (ALT 250 FOR IP): Performed by: PHYSICIAN ASSISTANT

## 2023-10-04 PROCEDURE — 85027 COMPLETE CBC AUTOMATED: CPT

## 2023-10-04 PROCEDURE — 80048 BASIC METABOLIC PNL TOTAL CA: CPT

## 2023-10-04 PROCEDURE — 36415 COLL VENOUS BLD VENIPUNCTURE: CPT

## 2023-10-04 PROCEDURE — 6370000000 HC RX 637 (ALT 250 FOR IP)

## 2023-10-04 PROCEDURE — 99232 SBSQ HOSP IP/OBS MODERATE 35: CPT | Performed by: INTERNAL MEDICINE

## 2023-10-04 PROCEDURE — G0378 HOSPITAL OBSERVATION PER HR: HCPCS

## 2023-10-04 PROCEDURE — 97110 THERAPEUTIC EXERCISES: CPT

## 2023-10-04 PROCEDURE — 99239 HOSP IP/OBS DSCHRG MGMT >30: CPT

## 2023-10-04 RX ORDER — SODIUM CHLORIDE 1 G/1
2 TABLET ORAL 2 TIMES DAILY WITH MEALS
Qty: 90 TABLET | Refills: 3 | DISCHARGE
Start: 2023-10-04

## 2023-10-04 RX ORDER — POLYETHYLENE GLYCOL 3350 17 G/17G
17 POWDER, FOR SOLUTION ORAL DAILY PRN
Qty: 30 PACKET | Refills: 0 | DISCHARGE
Start: 2023-10-04 | End: 2023-11-03

## 2023-10-04 RX ORDER — AMLODIPINE BESYLATE 10 MG/1
10 TABLET ORAL DAILY
Qty: 30 TABLET | Refills: 3 | DISCHARGE
Start: 2023-10-05

## 2023-10-04 RX ORDER — GUAIFENESIN 600 MG/1
600 TABLET, EXTENDED RELEASE ORAL 2 TIMES DAILY
Refills: 0 | DISCHARGE
Start: 2023-10-04

## 2023-10-04 RX ORDER — SUCRALFATE 1 G/1
1 TABLET ORAL
Qty: 120 TABLET | Refills: 3 | DISCHARGE
Start: 2023-10-04

## 2023-10-04 RX ADMIN — ENALAPRIL MALEATE 10 MG: 10 TABLET ORAL at 08:25

## 2023-10-04 RX ADMIN — METOPROLOL SUCCINATE 50 MG: 50 TABLET, EXTENDED RELEASE ORAL at 08:25

## 2023-10-04 RX ADMIN — PANTOPRAZOLE SODIUM 40 MG: 40 TABLET, DELAYED RELEASE ORAL at 06:44

## 2023-10-04 RX ADMIN — SODIUM CHLORIDE 2 G: 1 TABLET ORAL at 08:24

## 2023-10-04 RX ADMIN — GUAIFENESIN 600 MG: 600 TABLET, EXTENDED RELEASE ORAL at 08:25

## 2023-10-04 RX ADMIN — LEVOTHYROXINE SODIUM 100 MCG: 0.1 TABLET ORAL at 06:44

## 2023-10-04 RX ADMIN — AMLODIPINE BESYLATE 10 MG: 10 TABLET ORAL at 08:25

## 2023-10-04 RX ADMIN — FERROUS SULFATE TAB 325 MG (65 MG ELEMENTAL FE) 325 MG: 325 (65 FE) TAB at 08:25

## 2023-10-04 RX ADMIN — FLUOXETINE 40 MG: 20 CAPSULE ORAL at 08:25

## 2023-10-04 RX ADMIN — SUCRALFATE 1 G: 1 TABLET ORAL at 11:18

## 2023-10-04 RX ADMIN — SUCRALFATE 1 G: 1 TABLET ORAL at 06:44

## 2023-10-04 RX ADMIN — PREDNISONE 40 MG: 20 TABLET ORAL at 08:25

## 2023-10-04 RX ADMIN — PREGABALIN 150 MG: 75 CAPSULE ORAL at 08:25

## 2023-10-04 NOTE — DISCHARGE SUMMARY
Hospitalist Discharge Summary    Patient: Toño Gunter  YOB: 1940  MRN: [id number]   Acct: [de-identified]    Primary Care Physician: FLORIDA South CNP    Admit date  9/26/2023    Discharge date: 10/4/2023     Discharge Assessment and Plan: Moderate hyponatremia: could be on the basis of fluid overload, continue to monitor with BMP. Pre-renal picture per urine electrolytes. Nephrology consulted. Recommend fluid restriction 1500 mL and monitor Na. Salt tabs added 10/3. Sodium improved to 134. Discussed with nephrology. Okay for discharge from their standpoint. Follow-up with Dr. Elizabeth Saravia in 4 weeks with repeat BMP. Generalized weakness/ Physical deconditioning: PT/OT, continues to decline. SNF. Recurrent UTI: UA noted to have moderate bacteria, negative nitrites, large leukocyte esterase. WBCs greater than 200. We will use broad-spectrum with Merrem pending final culture results. She had recently finished her Cipro course 9/1. Had carroll removed in office 9/14. Started on Hiprex at home at that time. Urology consultation. 10/3: recent urine cx with sens to Doxy, stop Merrem and transition to this. Completed treatment. Current culture with mixed growth. Acute urinary retention: 300 noted in ER on bladder scan. Chronic, see above. Q8 hr bladder scans per Urology ordered. Replace carroll catheter pending this. 9/29: Carroll placed. F/u with Urology outpatient. Dysphagia: SLP consult - pt was coughing after taking her pills per RN report. Changed to dysphagia diet for now until eval.   Dyspnea, unclear etiology, resolved: suspect viral infection with sore throat, dry cough. PCT completely normal. Patient reports this improved following a breathing treatment in the ED. CTA neg for PE or pulmonary infiltrates. Exam unr. Pro-BNP wnl, no overt fluid overload. Deconditioning ? Anxiety ? 9/ 30: repeat CXR without acute process, check PNA panel-rejected specimen.  Procalcitonin

## 2023-10-04 NOTE — CARE COORDINATION
10/4/23, 8:31 AM EDT    DISCHARGE ON [address] [place] [address] day: 8  Location: [facility]19/019-A Reason for admit: UTI (urinary tract infection) [N39.0]  Hypoxia [R09.02]   Barriers to Discharge: Patient now requesting SNF. Precert initiated 03/1. PCP: FLORIDA Rollins CNP  Readmission Risk Score: 20%  Patient Goals/Plan/Treatment Preferences: [facility]. Update: Precert approved. Pt verbalized understanding and gave permission for possible discharge within 4 hours of receiving IMM.

## 2023-10-18 ENCOUNTER — OFFICE VISIT (OUTPATIENT)
Dept: UROLOGY | Age: 83
End: 2023-10-18
Payer: MEDICARE

## 2023-10-18 ENCOUNTER — TELEPHONE (OUTPATIENT)
Dept: FAMILY MEDICINE CLINIC | Age: 83
End: 2023-10-18

## 2023-10-18 ENCOUNTER — TELEPHONE (OUTPATIENT)
Dept: UROLOGY | Age: 83
End: 2023-10-18

## 2023-10-18 VITALS
WEIGHT: 155 LBS | DIASTOLIC BLOOD PRESSURE: 70 MMHG | SYSTOLIC BLOOD PRESSURE: 126 MMHG | HEIGHT: 67 IN | BODY MASS INDEX: 24.33 KG/M2

## 2023-10-18 DIAGNOSIS — R33.8 ACUTE URINARY RETENTION: ICD-10-CM

## 2023-10-18 DIAGNOSIS — N39.0 RECURRENT UTI: Primary | ICD-10-CM

## 2023-10-18 PROCEDURE — 3074F SYST BP LT 130 MM HG: CPT

## 2023-10-18 PROCEDURE — 1111F DSCHRG MED/CURRENT MED MERGE: CPT

## 2023-10-18 PROCEDURE — 3078F DIAST BP <80 MM HG: CPT

## 2023-10-18 PROCEDURE — G8427 DOCREV CUR MEDS BY ELIG CLIN: HCPCS

## 2023-10-18 PROCEDURE — 1123F ACP DISCUSS/DSCN MKR DOCD: CPT

## 2023-10-18 PROCEDURE — G8420 CALC BMI NORM PARAMETERS: HCPCS

## 2023-10-18 PROCEDURE — 99214 OFFICE O/P EST MOD 30 MIN: CPT

## 2023-10-18 PROCEDURE — 1036F TOBACCO NON-USER: CPT

## 2023-10-18 PROCEDURE — G8399 PT W/DXA RESULTS DOCUMENT: HCPCS

## 2023-10-18 PROCEDURE — G8484 FLU IMMUNIZE NO ADMIN: HCPCS

## 2023-10-18 PROCEDURE — 1090F PRES/ABSN URINE INCON ASSESS: CPT

## 2023-10-18 NOTE — TELEPHONE ENCOUNTER
Spoke to Jaimee Handy and pt is coming in 10.24.23 for an appointment.      She will be faxing over the pt's records

## 2023-10-18 NOTE — PROGRESS NOTES
[address]  [place] [address]  Dept: [phone]  Loc: [phone]  Visit Date: 10/18/2023      HPI:   Tiffani Gunter is a 78 y.o. female with past medical history as listed below who presents today in follow-up for UTI and urinary retention. Beuford Pouch went into urinary retention while IP coinciding with acute cystitis. Treated with Merrem, UTI resolved and patient was sent home with carroll catheter in place. No prior history of retention. She does however, have a longstanding history of insidious UTI's. Today, the patient is accompanied by her  and daughter for further evaluation and recommendations regarding her carroll and UTI history. Family and patient denies any episodes of confusion since her IP stay. Carroll has remained clear and patent. Labs   UA and PVR: Carroll catheter in place and draining straw-colored urine.     Current Outpatient Medications   Medication Sig Dispense Refill    amLODIPine (NORVASC) 10 MG tablet Take 1 tablet by mouth daily 30 tablet 3    guaiFENesin (MUCINEX) 600 MG extended release tablet Take 1 tablet by mouth 2 times daily  0    sodium chloride 1 g tablet Take 2 tablets by mouth 2 times daily (with meals) 90 tablet 3    sucralfate (CARAFATE) 1 GM tablet Take 1 tablet by mouth 4 times daily (before meals and nightly) 120 tablet 3    acetaminophen (TYLENOL) 325 MG tablet Take 2 tablets by mouth every 6 hours as needed for Pain      pantoprazole (PROTONIX) 40 MG tablet Take 1 tablet by mouth 2 times daily (before meals) 180 tablet 3    methenamine (HIPREX) 1 g tablet Take 1 tablet by mouth 2 times daily (with meals) 180 tablet 3    polycarbophil (FIBERCON) 625 MG tablet Take 1 tablet by mouth daily  4    sennosides-docusate sodium (SENOKOT-S) 8.6-50 MG tablet Take 1 tablet by mouth daily 30 tablet 0    melatonin 3 MG TABS tablet Take 1 tablet by mouth nightly as needed (sleep) 30

## 2023-10-18 NOTE — PROGRESS NOTES
Patient has given me verbal consent to perform carroll removal  Yes    10 cc of water deflated from carroll balloon. 16 Fr carroll removed without difficulty. Pt will drink fluids and report to ER in 6-8 hours if patient unable to urinate.       F/u with provider Supa Mason PA-C.

## 2023-10-18 NOTE — TELEPHONE ENCOUNTER
[address] [employer] called to get orders if patient is unable to void to reinsert catheter. After speaking to Nette Doan CNP, nursing communication order and urine order in incidence of malodorous/cloudy urine (given the patient's UTI's are often without symptoms preliminarily). faxed to [phone].

## 2023-10-18 NOTE — TELEPHONE ENCOUNTER
----- Message from Julieta Ortizan sent at 10/17/2023 11:53 AM EDT -----  Subject: Hospital Follow Up    QUESTIONS  What hospital was the Patient Discharged from? [facility]  Date of Discharge? 2023-10-19  Discharge Location? Other - Assisted living  Reason for hospitalization as patient stated? MRSA  What question does the patient have, if applicable? [address] [place] [address] from [place] is scheduling [phone]  ---------------------------------------------------------------------------  --------------  [place] [employer] INFO  What is the best way for the office to contact you? OK to leave message on   voicemail  Preferred Call Back Phone Number? [phone]  ---------------------------------------------------------------------------  --------------  SCRIPT ANSWERS  Relationship to Patient? Covered Entity  Covered Entity Type? Nursing Home? Representative Name?  [address] [place] [address]

## 2023-10-19 ENCOUNTER — OFFICE VISIT (OUTPATIENT)
Dept: PHYSICAL MEDICINE AND REHAB | Age: 83
End: 2023-10-19

## 2023-10-19 VITALS
HEIGHT: 67 IN | BODY MASS INDEX: 24.33 KG/M2 | DIASTOLIC BLOOD PRESSURE: 76 MMHG | WEIGHT: 154.98 LBS | SYSTOLIC BLOOD PRESSURE: 138 MMHG

## 2023-10-19 DIAGNOSIS — M47.816 SPONDYLOSIS OF LUMBAR SPINE: Primary | ICD-10-CM

## 2023-10-19 DIAGNOSIS — G89.4 CHRONIC PAIN SYNDROME: ICD-10-CM

## 2023-10-19 DIAGNOSIS — M79.18 MYOFASCIAL PAIN: ICD-10-CM

## 2023-10-19 DIAGNOSIS — M47.896 OTHER SPONDYLOSIS, LUMBAR REGION: ICD-10-CM

## 2023-10-24 ENCOUNTER — HOSPITAL ENCOUNTER (EMERGENCY)
Age: 83
Discharge: HOME | End: 2023-10-24
Attending: STUDENT IN AN ORGANIZED HEALTH CARE EDUCATION/TRAINING PROGRAM
Payer: MEDICARE

## 2023-10-24 VITALS
HEART RATE: 77 BPM | TEMPERATURE: 98.3 F | RESPIRATION RATE: 18 BRPM | DIASTOLIC BLOOD PRESSURE: 91 MMHG | OXYGEN SATURATION: 94 % | SYSTOLIC BLOOD PRESSURE: 154 MMHG

## 2023-10-24 DIAGNOSIS — N39.0 PYURIA DUE TO BACTERIAL URINARY TRACT INFECTION: Primary | ICD-10-CM

## 2023-10-24 LAB
ANION GAP SERPL CALC-SCNC: 12 MEQ/L (ref 8–16)
BACTERIA URNS QL MICRO: ABNORMAL /HPF
BASOPHILS ABSOLUTE: 0 THOU/MM3 (ref 0–0.1)
BASOPHILS NFR BLD AUTO: 0.7 %
BILIRUB UR QL STRIP.AUTO: NEGATIVE
BUN SERPL-MCNC: 7 MG/DL (ref 7–22)
CALCIUM SERPL-MCNC: 9.3 MG/DL (ref 8.5–10.5)
CASTS #/AREA URNS LPF: ABNORMAL /LPF
CASTS 2: ABNORMAL /LPF
CHARACTER UR: ABNORMAL
CHLORIDE SERPL-SCNC: 97 MEQ/L (ref 98–111)
CO2 SERPL-SCNC: 27 MEQ/L (ref 23–33)
COLOR: YELLOW
CREAT SERPL-MCNC: 0.5 MG/DL (ref 0.4–1.2)
CRYSTALS URNS MICRO: ABNORMAL
DEPRECATED RDW RBC AUTO: 47.8 FL (ref 35–45)
EOSINOPHIL NFR BLD AUTO: 0.9 %
EOSINOPHILS ABSOLUTE: 0 THOU/MM3 (ref 0–0.4)
EPITHELIAL CELLS, UA: ABNORMAL /HPF
ERYTHROCYTE [DISTWIDTH] IN BLOOD BY AUTOMATED COUNT: 13.2 % (ref 11.5–14.5)
GFR SERPL CREATININE-BSD FRML MDRD: > 60 ML/MIN/1.73M2
GLUCOSE SERPL-MCNC: 105 MG/DL (ref 70–108)
GLUCOSE UR QL STRIP.AUTO: NEGATIVE MG/DL
HCT VFR BLD AUTO: 42.2 % (ref 37–47)
HGB BLD-MCNC: 13.9 GM/DL (ref 12–16)
HGB UR QL STRIP.AUTO: NEGATIVE
IMM GRANULOCYTES # BLD AUTO: 0.01 THOU/MM3 (ref 0–0.07)
IMM GRANULOCYTES NFR BLD AUTO: 0.2 %
KETONES UR QL STRIP.AUTO: ABNORMAL
LYMPHOCYTES ABSOLUTE: 0.7 THOU/MM3 (ref 1–4.8)
LYMPHOCYTES NFR BLD AUTO: 15.6 %
MCH RBC QN AUTO: 32.3 PG (ref 26–33)
MCHC RBC AUTO-ENTMCNC: 32.9 GM/DL (ref 32.2–35.5)
MCV RBC AUTO: 98.1 FL (ref 81–99)
MISCELLANEOUS 2: ABNORMAL
MONOCYTES ABSOLUTE: 0.4 THOU/MM3 (ref 0.4–1.3)
MONOCYTES NFR BLD AUTO: 9.9 %
NEUTROPHILS NFR BLD AUTO: 72.7 %
NITRITE UR QL STRIP: NEGATIVE
NRBC BLD AUTO-RTO: 0 /100 WBC
OSMOLALITY SERPL CALC.SUM OF ELEC: 270.3 MOSMOL/KG (ref 275–300)
PH UR STRIP.AUTO: >= 9 [PH] (ref 5–9)
PLATELET # BLD AUTO: 156 THOU/MM3 (ref 130–400)
PMV BLD AUTO: 10.8 FL (ref 9.4–12.4)
POTASSIUM SERPL-SCNC: 4.4 MEQ/L (ref 3.5–5.2)
PROT UR STRIP.AUTO-MCNC: ABNORMAL MG/DL
RBC # BLD AUTO: 4.3 MILL/MM3 (ref 4.2–5.4)
RBC URINE: ABNORMAL /HPF
RENAL EPI CELLS #/AREA URNS HPF: ABNORMAL /[HPF]
SEGMENTED NEUTROPHILS ABSOLUTE COUNT: 3.2 THOU/MM3 (ref 1.8–7.7)
SODIUM SERPL-SCNC: 136 MEQ/L (ref 135–145)
SP GR UR REFRACT.AUTO: 1.01 (ref 1–1.03)
UROBILINOGEN, URINE: 0.2 EU/DL (ref 0–1)
WBC # BLD AUTO: 4.4 THOU/MM3 (ref 4.8–10.8)
WBC #/AREA URNS HPF: > 200 /HPF
WBC #/AREA URNS HPF: ABNORMAL /[HPF]
YEAST LIKE FUNGI URNS QL MICRO: ABNORMAL

## 2023-10-24 PROCEDURE — 87077 CULTURE AEROBIC IDENTIFY: CPT

## 2023-10-24 PROCEDURE — 99283 EMERGENCY DEPT VISIT LOW MDM: CPT

## 2023-10-24 PROCEDURE — 81001 URINALYSIS AUTO W/SCOPE: CPT

## 2023-10-24 PROCEDURE — 87186 SC STD MICRODIL/AGAR DIL: CPT

## 2023-10-24 PROCEDURE — 80048 BASIC METABOLIC PNL TOTAL CA: CPT

## 2023-10-24 PROCEDURE — 85025 COMPLETE CBC W/AUTO DIFF WBC: CPT

## 2023-10-24 PROCEDURE — 6370000000 HC RX 637 (ALT 250 FOR IP): Performed by: STUDENT IN AN ORGANIZED HEALTH CARE EDUCATION/TRAINING PROGRAM

## 2023-10-24 PROCEDURE — 87086 URINE CULTURE/COLONY COUNT: CPT

## 2023-10-24 PROCEDURE — 36415 COLL VENOUS BLD VENIPUNCTURE: CPT

## 2023-10-24 RX ORDER — GRANULES FOR ORAL 3 G/1
3 POWDER ORAL
Qty: 2 EACH | Refills: 0 | Status: SHIPPED | OUTPATIENT
Start: 2023-10-24 | End: 2023-10-27

## 2023-10-24 RX ORDER — GRANULES FOR ORAL 3 G/1
3 POWDER ORAL ONCE
Status: COMPLETED | OUTPATIENT
Start: 2023-10-24 | End: 2023-10-24

## 2023-10-24 RX ADMIN — GRANULES FOR ORAL SOLUTION 1 PACKET: 3 POWDER ORAL at 15:19

## 2023-10-24 ASSESSMENT — PAIN - FUNCTIONAL ASSESSMENT
PAIN_FUNCTIONAL_ASSESSMENT: NONE - DENIES PAIN
PAIN_FUNCTIONAL_ASSESSMENT: NONE - DENIES PAIN

## 2023-10-24 NOTE — ED NOTES
Patient resting in bed. Respirations easy and unlabored. No distress noted. Medicated per order. Call light within reach.        Marty Muñoz RN  10/24/23 [id number]

## 2023-10-24 NOTE — ED TRIAGE NOTES
Pt to the Ed with c/o possible UTI. Pt states her bladder feels full but she is having trouble urinating. Reports burning with urination as well.  Pt has recurring UTI hx.

## 2023-10-24 NOTE — DISCHARGE INSTRUCTIONS
Take your medication as indicated and prescribed. If you are given an antibiotic then, make sure you get the prescription filled and take the antibiotics until finished. Drink plenty of water while taking the antibiotics. For pain use acetaminophen (Tylenol). PLEASE RETURN TO THE EMERGENCY DEPARTMENT IMMEDIATELY for worsening symptoms, inability to urinate, worsening of blood in your urine, or if you develop any concerning symptoms such as: high fever not relieved by acetaminophen (Tylenol) and/or ibuprofen (Motrin / Advil), chills, shortness of breath, chest pain, feeling of your heart fluttering or racing, persistent nausea and/or vomiting, vomiting up blood, blood in your stool, loss of consciousness, numbness, weakness or tingling in the arms or legs or change in color of the extremities, changes in mental status, persistent headache, blurry vision, loss of bladder / bowel.

## 2023-10-24 NOTE — ED PROVIDER NOTES
Current Antibiotics: not stated   ANION GAP   GLOMERULAR FILTRATION RATE, ESTIMATED       (Any cultures that may have been sent were not resulted at the time of this patient visit)    [place]     1) Number and Complexity of Problems            Problem List This Visit:         Chief Complaint   Patient presents with    Urinary Tract Infection            Differential Diagnosis includes (but not limited to):  Recurrent urinary tract infection, pyelonephritis, constipation, low index suspicion for significant intra-abdominal infection such as appendicitis, cholecystitis, pancreatitis. Unlikely bowel obstruction, aortic/other vascular injury. Pertinent Comorbid Conditions:    History of multidrug-resistant recurrent urinary tract infections, large hiatal hernia. 2)  Data Reviewed (none if blank or additional interpretation can be found in ED Course)          My Independent interpretations:     EKG:          Imaging: None indicated based on patient's physical exam and vital sign stability    Labs:      No kidney injury or emergent findings. Decision Rules/Clinical Scores utilized:              External Documentation Reviewed:   Previous patient encounter documents & history available on EMR was reviewed as available. 3)  Treatment and Disposition         ED Reassessment: Patient continues rest comfortably feeling well without any significant recurrent symptoms. Case discussed with consulting clinician:           Shared Decision-Making was performed and disposition discussed with the        patient/caregiver at bedside with all questions answered. Social determinants of health impacting treatment or disposition: Patient has close outpatient follow-up as she lives in a nursing facility.          Code Status: Not addressed      Summary of Patient Presentation:      MDM  Number of Diagnoses or Management Options  Pyuria due to bacterial urinary tract

## 2023-10-25 LAB
BACTERIA UR CULT: ABNORMAL
ORGANISM: ABNORMAL

## 2023-10-26 ENCOUNTER — TELEPHONE (OUTPATIENT)
Dept: UROLOGY | Age: 83
End: 2023-10-26

## 2023-10-26 PROBLEM — N39.0 UTI (URINARY TRACT INFECTION): Status: RESOLVED | Noted: 2023-09-26 | Resolved: 2023-10-26

## 2023-10-26 NOTE — PROGRESS NOTES
Pharmacy Note  ED Culture Follow-up    Jacobo Gunter is a 78 y.o. female. Allergies: Ampicillin, Flexeril [cyclobenzaprine], Morphine, Pcn [penicillins], and Sulfa antibiotics     Current antimicrobials:   Reviewed patient's urine culture - culture positive for E. faecalis. Patient was discharged on fosfomycin 3 g Q48H x 3 doses, and organism is sensitive to prescribed medication. Antibiotic prescribed at discharge is appropriate - no changes made to antibiotic regimen. Please call with any questions.  [facility]    IFEOMA Miller [facility], PharmD 3:50 PM 10/26/2023

## 2023-10-26 NOTE — TELEPHONE ENCOUNTER
Daughter called and said zachary has now developed stomach issues and her urologist recommends to not take anymore nsaids and to stop them. Diclofenac was being requested in this encounter so may want to cancel. Daughter wanting to know when will address using a different med for her pain. Advise not till see in f/u after ablation

## 2023-10-26 NOTE — TELEPHONE ENCOUNTER
Patient daughter stated the ECF was given her diclofenac. She was under the understanding it was to be discontinued. She is at [facility] to check with GI regarding NSAIDS. She voiced understanding and will call Dr Edd Reis office.

## 2023-10-30 NOTE — DISCHARGE INSTRUCTIONS
Post procedure Instructions:    No driving or making significant decisions for the remainder of the day. Be cautions with walking and activity for 24 hours, do not over exert yourself. Ok to resume pre-procedure activity level today. Apply ice to site of injection site if you have pain or discomfort. Do not submerge sit of injection during bath or pool activities for 48 hours post-procedure. Resume blood thinning medications in 24 hours. Call office [phone] if you have:  Temperature greater than 100.4  Persistent nausea and vomiting  Severe uncontrolled pain  Redness, tenderness, or signs of infection (pain, swelling, redness, odor or green/yellow discharge around the site)  Difficulty breathing, headache or visual disturbances  Hives  Persistent dizziness or light-headedness  Extreme fatigue  Any other questions or concerns you may have after discharge    In an emergency, call 911 or go to an Emergency Department at a nearby hospital    How Do you Prevent Surgical Site Infections? *HAND WASHING     *STOP SHAVING   Wash your hands multiple times daily  Do not shave incisional area 48 hours before   with soap for 20 seconds. or until 2 weeks after surgery. This can   Before eating and wound care. cause tiny cuts in the skin which can lead to infection. After using the bathroom or touching pets. *BALANCE      Times of activity and rest.      Stay away from people who may be sick. *QUIT SMOKING      Cigarette smoking leads to slow wound      healing. [facility] YOUR BODY  Follow your doctor's instructions on washing the night before and the day of your surgery. You may use products like:  Dial antibacterial soap, Hibiclens, Oly-hex. Do not share personal items such as towels, wash cloths, or toothbrush.      *BLOOD SUGAR CONTROL                  Lower blood sugars help to prevent                          infections, scarring, and

## 2023-10-31 NOTE — H&P
Today, patient presents for planned thermal radiofrequency at BILATERAL medial branches L4/L5 and L5/S1    This note is reflective of the patient's previous visit for evaluation. We will proceed with today's planned procedure. Since patient's last visit for evaluation, there have been no interval changes in medical history. Patient has no new numbness, weakness, or focal neurological deficit since evaluation. Patient has no contraindications to injection (no anticoagulation or recent antibiotic intake for active infections), and has a  present or is able to drive themselves (as discussed and cleared by physician). Allergies to latex, contrast dye, and steroid medications have been confirmed with the patient prior to the procedure. NPO necessity has been assessed and accepted based on procedure complexity. The risks and benefits of the procedure have been explained including but are not limited to infection, bleeding, paralysis, immediate post procedure weakness, and dizziness; the patient acknowledges understanding and desires to proceed with the procedure. Patient has signed consent for same procedure as discussed in previous clinic encounter. All other questions and concerns were addressed at bedside. See procedure note for full details. Post procedure Instructions: The patient was advised not to drive during the day of the procedure and not to engage in any significant decision making (unless otherwise states by physician). The patient was also advised to be cautious with walking/activity for 24 hours following today's visit and asked not to engage in over-exertion (unless otherwise states by physician). After this time, it is ok to resume pre-procedure level of activity. Patient advised to apply ice to site of injection in situations of pain and discomfort. Patient advised to not submerge site of injection during bath or pool activities for approximately 24 hours post-procedure.  Patient attested to

## 2023-11-01 ENCOUNTER — TELEPHONE (OUTPATIENT)
Dept: PHYSICAL MEDICINE AND REHAB | Age: 83
End: 2023-11-01

## 2023-11-07 ENCOUNTER — APPOINTMENT (OUTPATIENT)
Dept: GENERAL RADIOLOGY | Age: 83
End: 2023-11-07
Attending: ANESTHESIOLOGY
Payer: MEDICARE

## 2023-11-07 ENCOUNTER — HOSPITAL ENCOUNTER (OUTPATIENT)
Age: 83
Setting detail: OUTPATIENT SURGERY
Discharge: HOME | End: 2023-11-07
Attending: ANESTHESIOLOGY | Admitting: ANESTHESIOLOGY
Payer: MEDICARE

## 2023-11-07 VITALS
WEIGHT: 152 LBS | DIASTOLIC BLOOD PRESSURE: 81 MMHG | TEMPERATURE: 98 F | OXYGEN SATURATION: 93 % | HEIGHT: 67 IN | SYSTOLIC BLOOD PRESSURE: 158 MMHG | BODY MASS INDEX: 23.86 KG/M2 | RESPIRATION RATE: 10 BRPM | HEART RATE: 65 BPM

## 2023-11-07 PROCEDURE — 6360000002 HC RX W HCPCS: Performed by: ANESTHESIOLOGY

## 2023-11-07 PROCEDURE — 99152 MOD SED SAME PHYS/QHP 5/>YRS: CPT | Performed by: ANESTHESIOLOGY

## 2023-11-07 PROCEDURE — 7100000010 HC PHASE II RECOVERY - FIRST 15 MIN: Performed by: ANESTHESIOLOGY

## 2023-11-07 PROCEDURE — 3600000057 HC PAIN LEVEL 4 ADDL 15 MIN: Performed by: ANESTHESIOLOGY

## 2023-11-07 PROCEDURE — 2709999900 HC NON-CHARGEABLE SUPPLY: Performed by: ANESTHESIOLOGY

## 2023-11-07 PROCEDURE — 64635 DESTROY LUMB/SAC FACET JNT: CPT | Performed by: ANESTHESIOLOGY

## 2023-11-07 PROCEDURE — 3600000056 HC PAIN LEVEL 4 BASE: Performed by: ANESTHESIOLOGY

## 2023-11-07 PROCEDURE — 7100000011 HC PHASE II RECOVERY - ADDTL 15 MIN: Performed by: ANESTHESIOLOGY

## 2023-11-07 PROCEDURE — 2500000003 HC RX 250 WO HCPCS: Performed by: ANESTHESIOLOGY

## 2023-11-07 PROCEDURE — 64636 DESTROY L/S FACET JNT ADDL: CPT | Performed by: ANESTHESIOLOGY

## 2023-11-07 RX ORDER — FENTANYL CITRATE 50 UG/ML
INJECTION, SOLUTION INTRAMUSCULAR; INTRAVENOUS PRN
Status: DISCONTINUED | OUTPATIENT
Start: 2023-11-07 | End: 2023-11-07 | Stop reason: ALTCHOICE

## 2023-11-07 RX ORDER — LIDOCAINE HYDROCHLORIDE 20 MG/ML
INJECTION, SOLUTION EPIDURAL; INFILTRATION; INTRACAUDAL; PERINEURAL PRN
Status: DISCONTINUED | OUTPATIENT
Start: 2023-11-07 | End: 2023-11-07 | Stop reason: ALTCHOICE

## 2023-11-07 RX ORDER — LIDOCAINE HYDROCHLORIDE 10 MG/ML
INJECTION, SOLUTION EPIDURAL; INFILTRATION; INTRACAUDAL; PERINEURAL PRN
Status: DISCONTINUED | OUTPATIENT
Start: 2023-11-07 | End: 2023-11-07 | Stop reason: ALTCHOICE

## 2023-11-07 RX ORDER — MIDAZOLAM HYDROCHLORIDE 1 MG/ML
INJECTION INTRAMUSCULAR; INTRAVENOUS PRN
Status: DISCONTINUED | OUTPATIENT
Start: 2023-11-07 | End: 2023-11-07 | Stop reason: ALTCHOICE

## 2023-11-07 ASSESSMENT — PAIN - FUNCTIONAL ASSESSMENT: PAIN_FUNCTIONAL_ASSESSMENT: 0-10

## 2023-11-07 NOTE — PROGRESS NOTES
1410-Patient to Phase II via cart. Report received from [facility]. Patient drowsy but responsive. Vitals obtained and stable. Respirations even and unlabored on room air. Patient denies pain, nausea, numbness and tingling. Patient able to move all extremities. No drainage noted at injection sites. Patient instructed to stay in bed. Instructed on call light use. 1414-Patient provided with snack and drink. Denies needs. Family in room. 1420-Patient denies needs. Resting in bed. 1435-Patient meets discharge criteria. Discharged in stable condition. Patient brought to car via wheelchair with assistance from RN. Patient tolerated well. All belongings sent with patient.

## 2023-11-08 NOTE — PROCEDURES
included automated blood pressure, continuous EKG, and continuous pulse oximetry  - The detailed conscious record is permanently stored in the [address]  - The following is the conscious sedation record:  Start Time: 13:55  End Time : 14:10  Duration: 15 minutes   Medications Administered: 1 mg Versed, 50 mcg Fentanyl        Jared Dale DO  Interventional Pain Management/PM&R   [address]

## 2023-11-08 NOTE — PRE SEDATION
[address]  Pre-Sedation/Analgesia History & Physical    Pt Name: Ledy Gunter  MRN: [id number]  YOB: 1940  Provider Performing Procedure: Rima Cook DO   Primary Care Physician: FLORIDA Rogers - JEREMIAH      MEDICAL HISTORY       has a past medical history of Abnormal EKG, Anemia, Back pain, Blood circulation, collateral, Constipation, Diverticulitis, Fibromyalgia, GERD (gastroesophageal reflux disease), Hiatal hernia, Hx of blood clots, Hypertension, Hypothyroidism, Macular degeneration, Osteoarthritis, Urinary incontinence, and UTI (urinary tract infection). SURGICAL HISTORY   has a past surgical history that includes Appendectomy (1958); Colonoscopy (2012); eye surgery (1998, 1999); Hysterectomy (1979); Spine surgery (1968); Upper gastrointestinal endoscopy (2012); cyst removal (2010); Tonsillectomy (1943); Tooth Extraction (1964); Ovary removal (1999); Foot surgery (Left, 2001); Carpal tunnel release (Bilateral, 2013); Small intestine surgery; back surgery; Colon surgery (07/29/2016); joint replacement (2002, 2008, 2009); pr colon ca scrn not hi rsk ind (Left, 09/15/2017); Femur fracture surgery (Left, 12/21/2020); hiatal hernia repair (N/A, 03/09/2021); other surgical history (04/21/2021); Pain management procedure (Bilateral, 7/13/2021); Pain management procedure (Bilateral, 8/24/2021); Pain management procedure (Right, 10/19/2021); Pain management procedure (Left, 12/7/2021); Upper gastrointestinal endoscopy (Left, 6/7/2022); Pain management procedure (N/A, 7/26/2022); Upper gastrointestinal endoscopy (N/A, 8/17/2022); Upper gastrointestinal endoscopy (N/A, 10/25/2022); Pain management procedure (Right, 12/20/2022); Pain management procedure (Left, 1/26/2023); Upper gastrointestinal endoscopy (N/A, 8/24/2023); and Upper gastrointestinal endoscopy (Left, 8/24/2023).     ALLERGIES   Allergies as of 10/19/2023 - Fully Reviewed 10/19/2023   Allergen Reaction Noted

## 2023-11-08 NOTE — POST SEDATION
[address]  Sedation/Analgesia Post Sedation Record    Pt Name: Thierno Gunter  MRN: [id number]  YOB: 1940  Procedure Performed By: José Hayden DO  Primary Care Physician: FLORIDA Olsen - JEREMIAH    POST-PROCEDURE    Physicians/Assistants: José Hayden DO  Procedure Performed: See Procedure Note   Sedation/Anesthesia: Versed and Fentanyl (See procedure note for amount and duration)  Estimated Blood Loss:     0  ml  Specimens Removed: None        Complications: None           Jared Ware DO  Electronically signed 11/7/2023 at 7:01 PM

## 2023-11-17 RX ORDER — AMLODIPINE BESYLATE 10 MG/1
10 TABLET ORAL DAILY
Qty: 90 TABLET | Refills: 3 | Status: SHIPPED | OUTPATIENT
Start: 2023-11-17

## 2024-01-02 DIAGNOSIS — K21.9 GASTROESOPHAGEAL REFLUX DISEASE WITHOUT ESOPHAGITIS: Primary | ICD-10-CM

## 2024-01-02 RX ORDER — SUCRALFATE 1 G/1
1 TABLET ORAL
Qty: 120 TABLET | Refills: 3 | Status: SHIPPED | OUTPATIENT
Start: 2024-01-02

## 2024-01-09 DIAGNOSIS — G89.4 CHRONIC PAIN SYNDROME: Primary | ICD-10-CM

## 2024-01-09 RX ORDER — HYDROCODONE BITARTRATE AND ACETAMINOPHEN 5; 325 MG/1; MG/1
1 TABLET ORAL EVERY 8 HOURS PRN
Qty: 15 TABLET | Refills: 0 | Status: SHIPPED | OUTPATIENT
Start: 2024-01-09 | End: 2024-01-14

## 2024-01-09 NOTE — PROGRESS NOTES
Ajay Connolly sent for chronic back pain  Electronically signed by FLORIDA Cabrera CNP on 1/9/2024 at 7:51 AM

## 2024-01-22 ENCOUNTER — NURSE ONLY (OUTPATIENT)
Dept: LAB | Age: 84
End: 2024-01-22

## 2024-01-22 DIAGNOSIS — D50.9 IRON DEFICIENCY ANEMIA, UNSPECIFIED IRON DEFICIENCY ANEMIA TYPE: ICD-10-CM

## 2024-01-22 PROBLEM — N31.9 NEUROMUSCULAR DYSFUNCTION OF BLADDER, UNSPECIFIED: Status: ACTIVE | Noted: 2023-08-29

## 2024-01-22 PROBLEM — Z96.0 PRESENCE OF UROGENITAL IMPLANTS: Status: ACTIVE | Noted: 2023-08-29

## 2024-01-22 PROBLEM — J44.9 CHRONIC OBSTRUCTIVE PULMONARY DISEASE, UNSPECIFIED (HCC): Status: ACTIVE | Noted: 2023-10-04

## 2024-01-22 PROBLEM — F43.23 ADJUSTMENT DISORDER WITH MIXED ANXIETY AND DEPRESSED MOOD: Status: ACTIVE | Noted: 2023-08-29

## 2024-01-22 PROBLEM — R09.02 HYPOXEMIA: Status: ACTIVE | Noted: 2023-10-04

## 2024-01-22 PROBLEM — R10.13 EPIGASTRIC PAIN: Status: ACTIVE | Noted: 2023-08-29

## 2024-01-22 PROBLEM — S72.452D: Status: ACTIVE | Noted: 2024-01-22

## 2024-01-22 PROBLEM — M97.12XA PERIPROSTHETIC FRACTURE AROUND INTERNAL PROSTHETIC LEFT KNEE JOINT: Status: ACTIVE | Noted: 2024-01-22

## 2024-01-22 PROBLEM — R53.81 OTHER MALAISE: Status: ACTIVE | Noted: 2023-10-04

## 2024-01-22 PROBLEM — F32.9 MAJOR DEPRESSIVE DISORDER, SINGLE EPISODE, UNSPECIFIED: Status: ACTIVE | Noted: 2022-08-24

## 2024-01-22 PROBLEM — M18.0 ARTHRITIS OF CARPOMETACARPAL (CMC) JOINT OF BOTH THUMBS: Status: ACTIVE | Noted: 2024-01-22

## 2024-01-22 LAB
BASOPHILS ABSOLUTE: 0.1 THOU/MM3 (ref 0–0.1)
BASOPHILS NFR BLD AUTO: 2.2 %
DEPRECATED RDW RBC AUTO: 45.1 FL (ref 35–45)
EOSINOPHIL NFR BLD AUTO: 4 %
EOSINOPHILS ABSOLUTE: 0.1 THOU/MM3 (ref 0–0.4)
ERYTHROCYTE [DISTWIDTH] IN BLOOD BY AUTOMATED COUNT: 13.1 % (ref 11.5–14.5)
FERRITIN SERPL IA-MCNC: 369 NG/ML (ref 10–291)
HCT VFR BLD AUTO: 43.3 % (ref 37–47)
HGB BLD-MCNC: 14.1 GM/DL (ref 12–16)
IMM GRANULOCYTES # BLD AUTO: 0 THOU/MM3 (ref 0–0.07)
IMM GRANULOCYTES NFR BLD AUTO: 0 %
IRON SERPL-MCNC: 83 UG/DL (ref 50–170)
LYMPHOCYTES ABSOLUTE: 0.8 THOU/MM3 (ref 1–4.8)
LYMPHOCYTES NFR BLD AUTO: 30.5 %
MCH RBC QN AUTO: 30.7 PG (ref 26–33)
MCHC RBC AUTO-ENTMCNC: 32.6 GM/DL (ref 32.2–35.5)
MCV RBC AUTO: 94.3 FL (ref 81–99)
MONOCYTES ABSOLUTE: 0.2 THOU/MM3 (ref 0.4–1.3)
MONOCYTES NFR BLD AUTO: 8.8 %
NEUTROPHILS NFR BLD AUTO: 54.5 %
NRBC BLD AUTO-RTO: 0 /100 WBC
PLATELET # BLD AUTO: 142 THOU/MM3 (ref 130–400)
PMV BLD AUTO: 10.9 FL (ref 9.4–12.4)
RBC # BLD AUTO: 4.59 MILL/MM3 (ref 4.2–5.4)
SEGMENTED NEUTROPHILS ABSOLUTE COUNT: 1.5 THOU/MM3 (ref 1.8–7.7)
TIBC SERPL-MCNC: 281 UG/DL (ref 171–450)
WBC # BLD AUTO: 2.7 THOU/MM3 (ref 4.8–10.8)

## 2024-01-22 RX ORDER — CHOLECALCIFEROL (VITAMIN D3) 125 MCG
125 CAPSULE ORAL DAILY
Status: ON HOLD | COMMUNITY
Start: 2023-10-05

## 2024-01-23 ENCOUNTER — OFFICE VISIT (OUTPATIENT)
Dept: FAMILY MEDICINE CLINIC | Age: 84
End: 2024-01-23
Payer: MEDICARE

## 2024-01-23 VITALS
WEIGHT: 151 LBS | DIASTOLIC BLOOD PRESSURE: 78 MMHG | HEART RATE: 82 BPM | OXYGEN SATURATION: 95 % | TEMPERATURE: 98.2 F | RESPIRATION RATE: 12 BRPM | HEIGHT: 67 IN | SYSTOLIC BLOOD PRESSURE: 120 MMHG | BODY MASS INDEX: 23.7 KG/M2

## 2024-01-23 DIAGNOSIS — D70.9 NEUTROPENIA, UNSPECIFIED TYPE (HCC): Primary | ICD-10-CM

## 2024-01-23 DIAGNOSIS — R25.1 TREMOR: ICD-10-CM

## 2024-01-23 PROCEDURE — 1123F ACP DISCUSS/DSCN MKR DOCD: CPT | Performed by: NURSE PRACTITIONER

## 2024-01-23 PROCEDURE — 3074F SYST BP LT 130 MM HG: CPT | Performed by: NURSE PRACTITIONER

## 2024-01-23 PROCEDURE — 3078F DIAST BP <80 MM HG: CPT | Performed by: NURSE PRACTITIONER

## 2024-01-23 PROCEDURE — 99214 OFFICE O/P EST MOD 30 MIN: CPT | Performed by: NURSE PRACTITIONER

## 2024-01-23 ASSESSMENT — PATIENT HEALTH QUESTIONNAIRE - PHQ9
SUM OF ALL RESPONSES TO PHQ QUESTIONS 1-9: 0
SUM OF ALL RESPONSES TO PHQ QUESTIONS 1-9: 0
10. IF YOU CHECKED OFF ANY PROBLEMS, HOW DIFFICULT HAVE THESE PROBLEMS MADE IT FOR YOU TO DO YOUR WORK, TAKE CARE OF THINGS AT HOME, OR GET ALONG WITH OTHER PEOPLE: 0
6. FEELING BAD ABOUT YOURSELF - OR THAT YOU ARE A FAILURE OR HAVE LET YOURSELF OR YOUR FAMILY DOWN: 0
1. LITTLE INTEREST OR PLEASURE IN DOING THINGS: 0
SUM OF ALL RESPONSES TO PHQ9 QUESTIONS 1 & 2: 0
7. TROUBLE CONCENTRATING ON THINGS, SUCH AS READING THE NEWSPAPER OR WATCHING TELEVISION: 0
8. MOVING OR SPEAKING SO SLOWLY THAT OTHER PEOPLE COULD HAVE NOTICED. OR THE OPPOSITE, BEING SO FIGETY OR RESTLESS THAT YOU HAVE BEEN MOVING AROUND A LOT MORE THAN USUAL: 0
5. POOR APPETITE OR OVEREATING: 0
9. THOUGHTS THAT YOU WOULD BE BETTER OFF DEAD, OR OF HURTING YOURSELF: 0
SUM OF ALL RESPONSES TO PHQ QUESTIONS 1-9: 0
2. FEELING DOWN, DEPRESSED OR HOPELESS: 0
3. TROUBLE FALLING OR STAYING ASLEEP: 0
SUM OF ALL RESPONSES TO PHQ QUESTIONS 1-9: 0
4. FEELING TIRED OR HAVING LITTLE ENERGY: 0

## 2024-01-23 NOTE — PROGRESS NOTES
Diabetes Maternal Grandmother     Heart Disease Paternal Grandmother          Review of Systems        PHYSICAL EXAM:  /78   Pulse 82   Temp 98.2 °F (36.8 °C) (Oral)   Resp 12   Ht 1.702 m (5' 7\")   Wt 68.5 kg (151 lb)   SpO2 95%   BMI 23.65 kg/m²     Physical Exam  Constitutional:       Appearance: Normal appearance.   HENT:      Head: Normocephalic and atraumatic.      Right Ear: External ear normal.      Left Ear: External ear normal.      Nose: Nose normal.      Mouth/Throat:      Mouth: Mucous membranes are moist.   Eyes:      Conjunctiva/sclera: Conjunctivae normal.   Cardiovascular:      Rate and Rhythm: Normal rate and regular rhythm.      Pulses: Normal pulses.      Heart sounds: Normal heart sounds.   Pulmonary:      Effort: Pulmonary effort is normal.      Breath sounds: Normal breath sounds.   Abdominal:      General: Bowel sounds are normal.      Palpations: Abdomen is soft.   Musculoskeletal:         General: Normal range of motion.      Cervical back: Normal range of motion.   Skin:     General: Skin is warm and dry.   Neurological:      General: No focal deficit present.      Mental Status: She is alert and oriented to person, place, and time.   Psychiatric:         Mood and Affect: Mood normal.         Behavior: Behavior normal.           ASSESSMENT & PLAN  Paulina was seen today for hands shaking, and head shaking .    Diagnoses and all orders for this visit:    Neutropenia, unspecified type (HCC)  -     Claire Johnson MD, Hematology & Oncology, [place]    Tremor  -     External Referral To Neurology      I spent 30+ minutes reviewing previous notes and testing, discussing symptoms, medications and side effects, treatment options with the patient, performing an exam, and developing a plan of care.  All questions answered.  Patient and daugther verbalized understanding.        No follow-ups on file.        All copied or forwarded information in the progress note was verified by

## 2024-01-25 RX ORDER — ENALAPRIL MALEATE 5 MG/1
5 TABLET ORAL DAILY
Qty: 90 TABLET | Refills: 3 | OUTPATIENT
Start: 2024-01-25

## 2024-01-26 ENCOUNTER — APPOINTMENT (OUTPATIENT)
Dept: GENERAL RADIOLOGY | Age: 84
End: 2024-01-26
Payer: MEDICARE

## 2024-01-26 ENCOUNTER — APPOINTMENT (OUTPATIENT)
Dept: CT IMAGING | Age: 84
End: 2024-01-26
Payer: MEDICARE

## 2024-01-26 ENCOUNTER — HOSPITAL ENCOUNTER (INPATIENT)
Age: 84
LOS: 8 days | Discharge: OTHER INSTITUTION | End: 2024-02-03
Attending: EMERGENCY MEDICINE | Admitting: INTERNAL MEDICINE
Payer: MEDICARE

## 2024-01-26 DIAGNOSIS — E87.1 HYPONATREMIA: ICD-10-CM

## 2024-01-26 DIAGNOSIS — K56.7 ILEUS (HCC): ICD-10-CM

## 2024-01-26 DIAGNOSIS — E83.42 HYPOMAGNESEMIA: ICD-10-CM
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
## 2024-01-26 DIAGNOSIS — N32.89 BLADDER DISTENSION: ICD-10-CM

## 2024-01-26 DIAGNOSIS — R29.90 STROKE-LIKE EPISODE: ICD-10-CM

## 2024-01-26 DIAGNOSIS — M25.551 ACUTE RIGHT HIP PAIN: Primary | ICD-10-CM

## 2024-01-26 PROBLEM — R54 AGE-RELATED PHYSICAL DEBILITY: Status: ACTIVE | Noted: 2024-01-26

## 2024-01-26 LAB
ANION GAP SERPL CALC-SCNC: 7 MEQ/L (ref 8–16)
ANISOCYTOSIS BLD QL SMEAR: PRESENT
BACTERIA URNS QL MICRO: ABNORMAL /HPF
BASOPHILS ABSOLUTE: 0.1 THOU/MM3 (ref 0–0.1)
BASOPHILS NFR BLD AUTO: 1.5 %
BILIRUB UR QL STRIP.AUTO: NEGATIVE
BUN SERPL-MCNC: 10 MG/DL (ref 7–22)
CALCIUM SERPL-MCNC: 9.3 MG/DL (ref 8.5–10.5)
CASTS #/AREA URNS LPF: ABNORMAL /LPF
CASTS 2: ABNORMAL /LPF
CHARACTER UR: CLEAR
CHLORIDE SERPL-SCNC: 94 MEQ/L (ref 98–111)
CO2 SERPL-SCNC: 30 MEQ/L (ref 23–33)
COLOR: YELLOW
CREAT SERPL-MCNC: 0.5 MG/DL (ref 0.4–1.2)
CRYSTALS URNS MICRO: ABNORMAL
DEPRECATED RDW RBC AUTO: 45 FL (ref 35–45)
EOSINOPHIL NFR BLD AUTO: 4.3 %
EOSINOPHILS ABSOLUTE: 0.2 THOU/MM3 (ref 0–0.4)
EPITHELIAL CELLS, UA: ABNORMAL /HPF
ERYTHROCYTE [DISTWIDTH] IN BLOOD BY AUTOMATED COUNT: 12.9 % (ref 11.5–14.5)
GFR SERPL CREATININE-BSD FRML MDRD: > 60 ML/MIN/1.73M2
GLUCOSE SERPL-MCNC: 99 MG/DL (ref 70–108)
GLUCOSE UR QL STRIP.AUTO: NEGATIVE MG/DL
HCT VFR BLD AUTO: 41.2 % (ref 37–47)
HGB BLD-MCNC: 13.6 GM/DL (ref 12–16)
HGB UR QL STRIP.AUTO: ABNORMAL
IMM GRANULOCYTES # BLD AUTO: 0.01 THOU/MM3 (ref 0–0.07)
IMM GRANULOCYTES NFR BLD AUTO: 0.3 %
KETONES UR QL STRIP.AUTO: NEGATIVE
LYMPHOCYTES ABSOLUTE: 1.3 THOU/MM3 (ref 1–4.8)
LYMPHOCYTES NFR BLD AUTO: 32.7 %
MAGNESIUM SERPL-MCNC: 1.5 MG/DL (ref 1.6–2.4)
MCH RBC QN AUTO: 31.1 PG (ref 26–33)
MCHC RBC AUTO-ENTMCNC: 33 GM/DL (ref 32.2–35.5)
MCV RBC AUTO: 94.1 FL (ref 81–99)
MISCELLANEOUS 2: ABNORMAL
MONOCYTES ABSOLUTE: 0.4 THOU/MM3 (ref 0.4–1.3)
MONOCYTES NFR BLD AUTO: 10.4 %
NEUTROPHILS NFR BLD AUTO: 50.8 %
NITRITE UR QL STRIP: NEGATIVE
NRBC BLD AUTO-RTO: 0 /100 WBC
OSMOLALITY SERPL CALC.SUM OF ELEC: 261.7 MOSMOL/KG (ref 275–300)
PH UR STRIP.AUTO: 7.5 [PH] (ref 5–9)
PLATELET # BLD AUTO: 147 THOU/MM3 (ref 130–400)
PLATELET BLD QL SMEAR: ADEQUATE
PMV BLD AUTO: 10.4 FL (ref 9.4–12.4)
POTASSIUM SERPL-SCNC: 4.4 MEQ/L (ref 3.5–5.2)
PROT UR STRIP.AUTO-MCNC: NEGATIVE MG/DL
RBC # BLD AUTO: 4.38 MILL/MM3 (ref 4.2–5.4)
RBC URINE: ABNORMAL /HPF
RENAL EPI CELLS #/AREA URNS HPF: ABNORMAL /[HPF]
SCAN OF BLOOD SMEAR: NORMAL
SEGMENTED NEUTROPHILS ABSOLUTE COUNT: 2 THOU/MM3 (ref 1.8–7.7)
SODIUM SERPL-SCNC: 131 MEQ/L (ref 135–145)
SP GR UR REFRACT.AUTO: 1.01 (ref 1–1.03)
TROPONIN, HIGH SENSITIVITY: 9 NG/L (ref 0–12)
UROBILINOGEN, URINE: 0.2 EU/DL (ref 0–1)
VARIANT LYMPHS BLD QL SMEAR: ABNORMAL %
WBC # BLD AUTO: 4 THOU/MM3 (ref 4.8–10.8)
WBC #/AREA URNS HPF: ABNORMAL /HPF
WBC #/AREA URNS HPF: ABNORMAL /[HPF]
YEAST LIKE FUNGI URNS QL MICRO: ABNORMAL

## 2024-01-26 PROCEDURE — 96361 HYDRATE IV INFUSION ADD-ON: CPT

## 2024-01-26 PROCEDURE — 93010 ELECTROCARDIOGRAM REPORT: CPT | Performed by: INTERNAL MEDICINE

## 2024-01-26 PROCEDURE — 99285 EMERGENCY DEPT VISIT HI MDM: CPT

## 2024-01-26 PROCEDURE — 93005 ELECTROCARDIOGRAM TRACING: CPT

## 2024-01-26 PROCEDURE — 81001 URINALYSIS AUTO W/SCOPE: CPT

## 2024-01-26 PROCEDURE — 6360000004 HC RX CONTRAST MEDICATION: Performed by: EMERGENCY MEDICINE

## 2024-01-26 PROCEDURE — 70498 CT ANGIOGRAPHY NECK: CPT

## 2024-01-26 PROCEDURE — 87186 SC STD MICRODIL/AGAR DIL: CPT

## 2024-01-26 PROCEDURE — 74174 CTA ABD&PLVS W/CONTRAST: CPT

## 2024-01-26 PROCEDURE — 96374 THER/PROPH/DIAG INJ IV PUSH: CPT

## 2024-01-26 PROCEDURE — 83735 ASSAY OF MAGNESIUM: CPT

## 2024-01-26 PROCEDURE — 80048 BASIC METABOLIC PNL TOTAL CA: CPT

## 2024-01-26 PROCEDURE — 85025 COMPLETE CBC W/AUTO DIFF WBC: CPT

## 2024-01-26 PROCEDURE — 6360000002 HC RX W HCPCS

## 2024-01-26 PROCEDURE — 73502 X-RAY EXAM HIP UNI 2-3 VIEWS: CPT

## 2024-01-26 PROCEDURE — 36415 COLL VENOUS BLD VENIPUNCTURE: CPT

## 2024-01-26 PROCEDURE — 87077 CULTURE AEROBIC IDENTIFY: CPT

## 2024-01-26 PROCEDURE — 87086 URINE CULTURE/COLONY COUNT: CPT

## 2024-01-26 PROCEDURE — 70496 CT ANGIOGRAPHY HEAD: CPT

## 2024-01-26 PROCEDURE — 70450 CT HEAD/BRAIN W/O DYE: CPT

## 2024-01-26 PROCEDURE — 2580000003 HC RX 258

## 2024-01-26 PROCEDURE — 96376 TX/PRO/DX INJ SAME DRUG ADON: CPT

## 2024-01-26 PROCEDURE — 84484 ASSAY OF TROPONIN QUANT: CPT

## 2024-01-26 PROCEDURE — 1200000000 HC SEMI PRIVATE

## 2024-01-26 RX ORDER — ACETAMINOPHEN 650 MG/1
650 SUPPOSITORY RECTAL EVERY 6 HOURS PRN
Status: DISCONTINUED | OUTPATIENT
Start: 2024-01-26 | End: 2024-01-28

## 2024-01-26 RX ORDER — ENALAPRIL MALEATE 10 MG/1
10 TABLET ORAL DAILY
Status: DISCONTINUED | OUTPATIENT
Start: 2024-01-27 | End: 2024-02-03 | Stop reason: HOSPADM

## 2024-01-26 RX ORDER — CALCIUM CARBONATE 500(1250)
500 TABLET ORAL DAILY
Status: DISCONTINUED | OUTPATIENT
Start: 2024-01-27 | End: 2024-02-03 | Stop reason: HOSPADM

## 2024-01-26 RX ORDER — SODIUM CHLORIDE 0.9 % (FLUSH) 0.9 %
5-40 SYRINGE (ML) INJECTION PRN
Status: DISCONTINUED | OUTPATIENT
Start: 2024-01-26 | End: 2024-02-03 | Stop reason: HOSPADM

## 2024-01-26 RX ORDER — GUAIFENESIN 600 MG/1
600 TABLET, EXTENDED RELEASE ORAL 2 TIMES DAILY
Status: DISCONTINUED | OUTPATIENT
Start: 2024-01-26 | End: 2024-01-26

## 2024-01-26 RX ORDER — METHENAMINE HIPPURATE 1000 MG/1
1 TABLET ORAL 2 TIMES DAILY WITH MEALS
Status: DISCONTINUED | OUTPATIENT
Start: 2024-01-27 | End: 2024-02-03 | Stop reason: HOSPADM

## 2024-01-26 RX ORDER — MAGNESIUM SULFATE IN WATER 40 MG/ML
2000 INJECTION, SOLUTION INTRAVENOUS PRN
Status: DISCONTINUED | OUTPATIENT
Start: 2024-01-26 | End: 2024-02-03 | Stop reason: HOSPADM

## 2024-01-26 RX ORDER — ONDANSETRON 2 MG/ML
4 INJECTION INTRAMUSCULAR; INTRAVENOUS ONCE
Status: COMPLETED | OUTPATIENT
Start: 2024-01-26 | End: 2024-01-26

## 2024-01-26 RX ORDER — POTASSIUM CHLORIDE 20 MEQ/1
40 TABLET, EXTENDED RELEASE ORAL PRN
Status: DISCONTINUED | OUTPATIENT
Start: 2024-01-26 | End: 2024-02-03 | Stop reason: HOSPADM

## 2024-01-26 RX ORDER — SODIUM CHLORIDE 0.9 % (FLUSH) 0.9 %
5-40 SYRINGE (ML) INJECTION EVERY 12 HOURS SCHEDULED
Status: DISCONTINUED | OUTPATIENT
Start: 2024-01-26 | End: 2024-02-03 | Stop reason: HOSPADM

## 2024-01-26 RX ORDER — IBUPROFEN 600 MG/1
600 TABLET ORAL
Status: DISCONTINUED | OUTPATIENT
Start: 2024-01-27 | End: 2024-02-03 | Stop reason: HOSPADM

## 2024-01-26 RX ORDER — POLYETHYLENE GLYCOL 3350 17 G/17G
17 POWDER, FOR SOLUTION ORAL DAILY PRN
Status: DISCONTINUED | OUTPATIENT
Start: 2024-01-26 | End: 2024-01-27

## 2024-01-26 RX ORDER — GRANULES FOR ORAL 3 G/1
3 POWDER ORAL
Status: DISCONTINUED | OUTPATIENT
Start: 2024-01-27 | End: 2024-01-27

## 2024-01-26 RX ORDER — IBUPROFEN 600 MG/1
1 TABLET ORAL PRN
Status: DISCONTINUED | OUTPATIENT
Start: 2024-01-26 | End: 2024-02-03 | Stop reason: HOSPADM

## 2024-01-26 RX ORDER — LEVOTHYROXINE SODIUM 0.1 MG/1
100 TABLET ORAL
Status: DISCONTINUED | OUTPATIENT
Start: 2024-01-27 | End: 2024-02-03 | Stop reason: HOSPADM

## 2024-01-26 RX ORDER — AMLODIPINE BESYLATE 10 MG/1
10 TABLET ORAL DAILY
Status: DISCONTINUED | OUTPATIENT
Start: 2024-01-27 | End: 2024-02-03 | Stop reason: HOSPADM

## 2024-01-26 RX ORDER — SODIUM CHLORIDE 1 G/1
2 TABLET ORAL 2 TIMES DAILY WITH MEALS
Status: DISCONTINUED | OUTPATIENT
Start: 2024-01-27 | End: 2024-01-29

## 2024-01-26 RX ORDER — ENOXAPARIN SODIUM 100 MG/ML
40 INJECTION SUBCUTANEOUS DAILY
Status: DISCONTINUED | OUTPATIENT
Start: 2024-01-27 | End: 2024-02-03 | Stop reason: HOSPADM

## 2024-01-26 RX ORDER — FERROUS SULFATE 325(65) MG
325 TABLET ORAL 2 TIMES DAILY
Status: DISCONTINUED | OUTPATIENT
Start: 2024-01-27 | End: 2024-02-03 | Stop reason: HOSPADM

## 2024-01-26 RX ORDER — SENNA AND DOCUSATE SODIUM 50; 8.6 MG/1; MG/1
1 TABLET, FILM COATED ORAL DAILY
Status: DISCONTINUED | OUTPATIENT
Start: 2024-01-27 | End: 2024-02-03 | Stop reason: HOSPADM

## 2024-01-26 RX ORDER — LIDOCAINE 4 G/G
1 PATCH TOPICAL DAILY
Status: DISCONTINUED | OUTPATIENT
Start: 2024-01-27 | End: 2024-02-01

## 2024-01-26 RX ORDER — POTASSIUM CHLORIDE 7.45 MG/ML
10 INJECTION INTRAVENOUS PRN
Status: DISCONTINUED | OUTPATIENT
Start: 2024-01-26 | End: 2024-02-03 | Stop reason: HOSPADM

## 2024-01-26 RX ORDER — PANTOPRAZOLE SODIUM 40 MG/1
40 TABLET, DELAYED RELEASE ORAL
Status: DISCONTINUED | OUTPATIENT
Start: 2024-01-27 | End: 2024-02-03 | Stop reason: HOSPADM

## 2024-01-26 RX ORDER — ONDANSETRON 4 MG/1
4 TABLET, ORALLY DISINTEGRATING ORAL EVERY 8 HOURS PRN
Status: DISCONTINUED | OUTPATIENT
Start: 2024-01-26 | End: 2024-02-03 | Stop reason: HOSPADM

## 2024-01-26 RX ORDER — SODIUM CHLORIDE 9 MG/ML
INJECTION, SOLUTION INTRAVENOUS PRN
Status: DISCONTINUED | OUTPATIENT
Start: 2024-01-26 | End: 2024-02-03 | Stop reason: HOSPADM

## 2024-01-26 RX ORDER — ACETAMINOPHEN 325 MG/1
650 TABLET ORAL EVERY 6 HOURS PRN
Status: DISCONTINUED | OUTPATIENT
Start: 2024-01-26 | End: 2024-01-28

## 2024-01-26 RX ORDER — FLUOXETINE HYDROCHLORIDE 20 MG/1
40 CAPSULE ORAL DAILY
Status: DISCONTINUED | OUTPATIENT
Start: 2024-01-27 | End: 2024-02-03 | Stop reason: HOSPADM

## 2024-01-26 RX ORDER — ONDANSETRON 2 MG/ML
4 INJECTION INTRAMUSCULAR; INTRAVENOUS EVERY 6 HOURS PRN
Status: DISCONTINUED | OUTPATIENT
Start: 2024-01-26 | End: 2024-02-03 | Stop reason: HOSPADM

## 2024-01-26 RX ORDER — MAGNESIUM SULFATE IN WATER 40 MG/ML
4000 INJECTION, SOLUTION INTRAVENOUS ONCE
Status: COMPLETED | OUTPATIENT
Start: 2024-01-26 | End: 2024-01-26

## 2024-01-26 RX ORDER — DOCUSATE SODIUM 100 MG/1
100 CAPSULE, LIQUID FILLED ORAL 2 TIMES DAILY PRN
Status: DISCONTINUED | OUTPATIENT
Start: 2024-01-26 | End: 2024-02-01

## 2024-01-26 RX ORDER — GUAIFENESIN 600 MG/1
600 TABLET, EXTENDED RELEASE ORAL 2 TIMES DAILY PRN
Status: DISCONTINUED | OUTPATIENT
Start: 2024-01-26 | End: 2024-02-03 | Stop reason: HOSPADM

## 2024-01-26 RX ORDER — SODIUM CHLORIDE, SODIUM LACTATE, POTASSIUM CHLORIDE, CALCIUM CHLORIDE 600; 310; 30; 20 MG/100ML; MG/100ML; MG/100ML; MG/100ML
INJECTION, SOLUTION INTRAVENOUS CONTINUOUS
Status: DISCONTINUED | OUTPATIENT
Start: 2024-01-26 | End: 2024-01-28

## 2024-01-26 RX ORDER — PREGABALIN 150 MG/1
150 CAPSULE ORAL 2 TIMES DAILY
Status: DISCONTINUED | OUTPATIENT
Start: 2024-01-26 | End: 2024-02-03 | Stop reason: HOSPADM

## 2024-01-26 RX ORDER — SUCRALFATE 1 G/1
1 TABLET ORAL
Status: DISCONTINUED | OUTPATIENT
Start: 2024-01-27 | End: 2024-02-03 | Stop reason: HOSPADM

## 2024-01-26 RX ORDER — METOPROLOL SUCCINATE 50 MG/1
50 TABLET, EXTENDED RELEASE ORAL DAILY
Status: DISCONTINUED | OUTPATIENT
Start: 2024-01-27 | End: 2024-02-03 | Stop reason: HOSPADM

## 2024-01-26 RX ORDER — DEXTROSE MONOHYDRATE 100 MG/ML
INJECTION, SOLUTION INTRAVENOUS CONTINUOUS PRN
Status: DISCONTINUED | OUTPATIENT
Start: 2024-01-26 | End: 2024-02-03 | Stop reason: HOSPADM

## 2024-01-26 RX ADMIN — SODIUM CHLORIDE, POTASSIUM CHLORIDE, SODIUM LACTATE AND CALCIUM CHLORIDE: 600; 310; 30; 20 INJECTION, SOLUTION INTRAVENOUS at 18:04

## 2024-01-26 RX ADMIN — HYDROMORPHONE HYDROCHLORIDE 0.5 MG: 1 INJECTION, SOLUTION INTRAMUSCULAR; INTRAVENOUS; SUBCUTANEOUS at 16:19

## 2024-01-26 RX ADMIN — IOPAMIDOL 80 ML: 755 INJECTION, SOLUTION INTRAVENOUS at 19:34

## 2024-01-26 RX ADMIN — ONDANSETRON 4 MG: 2 INJECTION INTRAMUSCULAR; INTRAVENOUS at 21:28

## 2024-01-26 RX ADMIN — MAGNESIUM SULFATE HEPTAHYDRATE 4000 MG: 40 INJECTION, SOLUTION INTRAVENOUS at 19:12

## 2024-01-26 RX ADMIN — HYDROMORPHONE HYDROCHLORIDE 0.5 MG: 1 INJECTION, SOLUTION INTRAMUSCULAR; INTRAVENOUS; SUBCUTANEOUS at 21:18

## 2024-01-26 ASSESSMENT — PAIN - FUNCTIONAL ASSESSMENT
PAIN_FUNCTIONAL_ASSESSMENT: 0-10

## 2024-01-26 ASSESSMENT — PAIN SCALES - GENERAL
PAINLEVEL_OUTOF10: 6
PAINLEVEL_OUTOF10: 7
PAINLEVEL_OUTOF10: 4
PAINLEVEL_OUTOF10: 4
PAINLEVEL_OUTOF10: 5
PAINLEVEL_OUTOF10: 5

## 2024-01-26 ASSESSMENT — PAIN DESCRIPTION - ORIENTATION: ORIENTATION: RIGHT

## 2024-01-26 ASSESSMENT — PAIN DESCRIPTION - LOCATION: LOCATION: HIP;GROIN

## 2024-01-26 NOTE — ED PROVIDER NOTES
PATIENT NAME: Paulina Gunter  MRN: [id number]  : 1940  CRUZ: 2024    I performed a history and physical examination of the patient and discussed management with the Resident. I reviewed the Resident's note and agree with the documented findings and plan of care. Any areas of disagreement are noted on the chart. I was personally present for the key portions of any procedures and have documented in the chart those procedures where I was not present during the key portions. I have reviewed the emergency nurses triage note and agree with the chief complaint, past medical history, past surgical history, allergies, medications, social and family history as documented unless otherwise noted below.    MEDICAL DEDISION MAKING (MDM)     Paulina Gunter is a 83 y.o. female who presents to Emergency Department with Hip Pain and Groin Pain       She is brought in from assisted living for right hip pain. While she was in the bathroom trying to get up, she noticed her right leg buckle and give out. PMH of B/L hip replacement. Also having vague RUE pain since this morning when she woke up (History is non-reliable due to her dementia).     EKG interpreted by me as sinus bradycardia with PAC, ventricular rate 58 bpm, QRS duration 78 ms,  ms, no acute ischemic changes.    No convincing stroke symptoms though she state RUE weakness. B/L UE grasp strength grade 5. No need to call stroke alert. ED workups show significant urine pretension (Timmons is inserted), otherwise reassuring. She is unable to ambulate due to severe right hip and right pelvis pain despite imaging studies neg for acute injuries.     D/w and admitted to Hospitalist service.     Vitals:    24 2134 24 2215 24 2259 24 0003   BP: (!) 81/51 137/73 122/63 121/83   Pulse:  52 57 63   Resp:  16 16 16   Temp:    97.3 °F (36.3 °C)   TempSrc:    Oral   SpO2:  100% 95% 99%     Labs Reviewed   BASIC METABOLIC PANEL - Abnormal; Notable for

## 2024-01-26 NOTE — ED PROVIDER NOTES
[facility] EMERGENCY DEPARTMENT    EMERGENCY MEDICINE     Patient Name: Paulina Gunter  MRN: [id number]  YOB: 1940  Date of Evaluation: 1/26/2024  Treating Resident Physician: Timoteo Benton DO  Supervising Physician: Dr. Jcarlos Martins MD    CHIEF COMPLAINT       Chief Complaint   Patient presents with    Hip Pain    Groin Pain       HISTORY OF PRESENT ILLNESS    HPI    History obtained from chart review and the patient.    Paulina Gunter is a 83 y.o. female who presents to the emergency department from assisted living brought in by EMS for evaluation of Hip pain.  Patient states she has had bilateral hip replacement with revisions with a history of scoliosis.  She states throughout the day she noticed some right arm weakness. Originally patient and  states patient had right UE weakness that started this morning when she work up. Upon re-questioning patient, she states her right arm UE weakness started at 2:30pm but states she had similar episodes in the past that has been ongoing for one week. Last episode was 2 days ago which resolved spontaneously. Today 1/26/24, she was on the commode trying to get up when she noticed her right leg to buckle and give out.  She was able to lift herself up with the left leg on her wheelchair and call for help.  She notes she had similar pains in the past prior to her hip replacement surgery however has never been this bad.  She rates her pain on was 7 out of 10.  She notes her pain is sharp and radiates towards her groin. Patient does state she has a history of TIAs. She denies any chest pain shortness of breath headaches dizziness dysuria urinary symptoms blurry vision.    Pertinent previous and/or external records reviewed:  N/A    REVIEW OF SYSTEMS   Review of Systems  Negative unless documented in HPI    PAST MEDICAL AND SURGICAL HISTORY     Past Medical History:   Diagnosis Date    Abnormal EKG     inferior infarct on EKG - last stress test 2004

## 2024-01-26 NOTE — ED NOTES
Pt to ED via [facility] from [facility] c/o right sided groin/hip pain. Pt states it started today when she got up to use the restroom and was unable to bear weight on it. Pt denies any recent injury or fall. VSS. Pt rates pain 7/10 if she doesn't move her right leg.

## 2024-01-27 ENCOUNTER — APPOINTMENT (OUTPATIENT)
Dept: MRI IMAGING | Age: 84
End: 2024-01-27
Payer: MEDICARE
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
## 2024-01-27 PROBLEM — N30.00 ACUTE CYSTITIS WITHOUT HEMATURIA: Status: ACTIVE | Noted: 2024-01-27

## 2024-01-27 PROBLEM — R29.898 RUE WEAKNESS: Status: ACTIVE | Noted: 2024-01-27

## 2024-01-27 PROBLEM — M25.551 ACUTE RIGHT HIP PAIN: Status: ACTIVE | Noted: 2024-01-27

## 2024-01-27 LAB
ANION GAP SERPL CALC-SCNC: 13 MEQ/L (ref 8–16)
BUN SERPL-MCNC: 8 MG/DL (ref 7–22)
CALCIUM SERPL-MCNC: 8.8 MG/DL (ref 8.5–10.5)
CHLORIDE SERPL-SCNC: 95 MEQ/L (ref 98–111)
CO2 SERPL-SCNC: 24 MEQ/L (ref 23–33)
CREAT SERPL-MCNC: 0.5 MG/DL (ref 0.4–1.2)
DEPRECATED RDW RBC AUTO: 45.9 FL (ref 35–45)
ERYTHROCYTE [DISTWIDTH] IN BLOOD BY AUTOMATED COUNT: 13.2 % (ref 11.5–14.5)
GFR SERPL CREATININE-BSD FRML MDRD: > 60 ML/MIN/1.73M2
GLUCOSE SERPL-MCNC: 98 MG/DL (ref 70–108)
HCT VFR BLD AUTO: 40.2 % (ref 37–47)
HGB BLD-MCNC: 13 GM/DL (ref 12–16)
MAGNESIUM SERPL-MCNC: 1.9 MG/DL (ref 1.6–2.4)
MCH RBC QN AUTO: 30.8 PG (ref 26–33)
MCHC RBC AUTO-ENTMCNC: 32.3 GM/DL (ref 32.2–35.5)
MCV RBC AUTO: 95.3 FL (ref 81–99)
PLATELET # BLD AUTO: 144 THOU/MM3 (ref 130–400)
PMV BLD AUTO: 11.1 FL (ref 9.4–12.4)
POTASSIUM SERPL-SCNC: 3.8 MEQ/L (ref 3.5–5.2)
RBC # BLD AUTO: 4.22 MILL/MM3 (ref 4.2–5.4)
SODIUM SERPL-SCNC: 132 MEQ/L (ref 135–145)
T4 FREE SERPL-MCNC: 1.14 NG/DL (ref 0.93–1.76)
TSH SERPL DL<=0.005 MIU/L-ACNC: 9.79 UIU/ML (ref 0.4–4.2)
WBC # BLD AUTO: 4.5 THOU/MM3 (ref 4.8–10.8)

## 2024-01-27 PROCEDURE — 1200000000 HC SEMI PRIVATE

## 2024-01-27 PROCEDURE — 83735 ASSAY OF MAGNESIUM: CPT

## 2024-01-27 PROCEDURE — 2580000003 HC RX 258

## 2024-01-27 PROCEDURE — 6370000000 HC RX 637 (ALT 250 FOR IP): Performed by: INTERNAL MEDICINE

## 2024-01-27 PROCEDURE — 99223 1ST HOSP IP/OBS HIGH 75: CPT | Performed by: INTERNAL MEDICINE

## 2024-01-27 PROCEDURE — 36415 COLL VENOUS BLD VENIPUNCTURE: CPT

## 2024-01-27 PROCEDURE — 85027 COMPLETE CBC AUTOMATED: CPT

## 2024-01-27 PROCEDURE — 99223 1ST HOSP IP/OBS HIGH 75: CPT | Performed by: PHYSICIAN ASSISTANT

## 2024-01-27 PROCEDURE — 84439 ASSAY OF FREE THYROXINE: CPT

## 2024-01-27 PROCEDURE — 6360000002 HC RX W HCPCS

## 2024-01-27 PROCEDURE — 6360000002 HC RX W HCPCS: Performed by: INTERNAL MEDICINE

## 2024-01-27 PROCEDURE — 72141 MRI NECK SPINE W/O DYE: CPT

## 2024-01-27 PROCEDURE — 6370000000 HC RX 637 (ALT 250 FOR IP)

## 2024-01-27 PROCEDURE — 80048 BASIC METABOLIC PNL TOTAL CA: CPT

## 2024-01-27 PROCEDURE — 70551 MRI BRAIN STEM W/O DYE: CPT

## 2024-01-27 PROCEDURE — 84443 ASSAY THYROID STIM HORMONE: CPT

## 2024-01-27 RX ORDER — GRANULES FOR ORAL 3 G/1
3 POWDER ORAL
Status: COMPLETED | OUTPATIENT
Start: 2024-01-27 | End: 2024-02-02

## 2024-01-27 RX ORDER — POLYETHYLENE GLYCOL 3350 17 G/17G
17 POWDER, FOR SOLUTION ORAL DAILY
Status: DISCONTINUED | OUTPATIENT
Start: 2024-01-27 | End: 2024-02-03 | Stop reason: HOSPADM

## 2024-01-27 RX ORDER — ONDANSETRON 4 MG/1
4 TABLET, FILM COATED ORAL EVERY 8 HOURS PRN
Status: ON HOLD | COMMUNITY
End: 2024-02-03 | Stop reason: HOSPADM

## 2024-01-27 RX ORDER — HYDROCODONE BITARTRATE AND ACETAMINOPHEN 5; 325 MG/1; MG/1
1 TABLET ORAL EVERY 8 HOURS PRN
COMMUNITY

## 2024-01-27 RX ADMIN — SUCRALFATE 1 G: 1 TABLET ORAL at 20:55

## 2024-01-27 RX ADMIN — PREGABALIN 150 MG: 150 CAPSULE ORAL at 21:00

## 2024-01-27 RX ADMIN — POLYETHYLENE GLYCOL 3350 17 G: 17 POWDER, FOR SOLUTION ORAL at 16:56

## 2024-01-27 RX ADMIN — DOCUSATE SODIUM 50 MG AND SENNOSIDES 8.6 MG 1 TABLET: 8.6; 5 TABLET, FILM COATED ORAL at 10:26

## 2024-01-27 RX ADMIN — ENALAPRIL MALEATE 10 MG: 10 TABLET ORAL at 10:26

## 2024-01-27 RX ADMIN — SODIUM CHLORIDE 2 G: 1 TABLET ORAL at 16:56

## 2024-01-27 RX ADMIN — SUCRALFATE 1 G: 1 TABLET ORAL at 10:26

## 2024-01-27 RX ADMIN — FERROUS SULFATE TAB 325 MG (65 MG ELEMENTAL FE) 325 MG: 325 (65 FE) TAB at 20:55

## 2024-01-27 RX ADMIN — METHENAMINE HIPPURATE 1 G: 1 TABLET ORAL at 16:56

## 2024-01-27 RX ADMIN — DICLOFENAC SODIUM 2 G: 10 GEL TOPICAL at 12:52

## 2024-01-27 RX ADMIN — SODIUM CHLORIDE, PRESERVATIVE FREE 10 ML: 5 INJECTION INTRAVENOUS at 03:27

## 2024-01-27 RX ADMIN — GRANULES FOR ORAL SOLUTION 1 PACKET: 3 POWDER ORAL at 16:56

## 2024-01-27 RX ADMIN — PANTOPRAZOLE SODIUM 40 MG: 40 TABLET, DELAYED RELEASE ORAL at 16:57

## 2024-01-27 RX ADMIN — PREGABALIN 150 MG: 150 CAPSULE ORAL at 10:26

## 2024-01-27 RX ADMIN — HYDROMORPHONE HYDROCHLORIDE 0.5 MG: 1 INJECTION, SOLUTION INTRAMUSCULAR; INTRAVENOUS; SUBCUTANEOUS at 17:11

## 2024-01-27 RX ADMIN — FERROUS SULFATE TAB 325 MG (65 MG ELEMENTAL FE) 325 MG: 325 (65 FE) TAB at 10:26

## 2024-01-27 RX ADMIN — SODIUM CHLORIDE, POTASSIUM CHLORIDE, SODIUM LACTATE AND CALCIUM CHLORIDE: 600; 310; 30; 20 INJECTION, SOLUTION INTRAVENOUS at 00:55

## 2024-01-27 RX ADMIN — CALCIUM 500 MG: 500 TABLET ORAL at 10:26

## 2024-01-27 RX ADMIN — DICLOFENAC SODIUM 2 G: 10 GEL TOPICAL at 20:55

## 2024-01-27 RX ADMIN — HYDROMORPHONE HYDROCHLORIDE 0.5 MG: 1 INJECTION, SOLUTION INTRAMUSCULAR; INTRAVENOUS; SUBCUTANEOUS at 06:08

## 2024-01-27 RX ADMIN — PANTOPRAZOLE SODIUM 40 MG: 40 TABLET, DELAYED RELEASE ORAL at 10:26

## 2024-01-27 RX ADMIN — SODIUM CHLORIDE 2 G: 1 TABLET ORAL at 10:26

## 2024-01-27 RX ADMIN — METHENAMINE HIPPURATE 1 G: 1 TABLET ORAL at 10:26

## 2024-01-27 RX ADMIN — ENOXAPARIN SODIUM 40 MG: 100 INJECTION SUBCUTANEOUS at 10:24

## 2024-01-27 RX ADMIN — SODIUM CHLORIDE, POTASSIUM CHLORIDE, SODIUM LACTATE AND CALCIUM CHLORIDE: 600; 310; 30; 20 INJECTION, SOLUTION INTRAVENOUS at 21:04

## 2024-01-27 RX ADMIN — AMLODIPINE BESYLATE 10 MG: 10 TABLET ORAL at 10:27

## 2024-01-27 RX ADMIN — SODIUM CHLORIDE, PRESERVATIVE FREE 10 ML: 5 INJECTION INTRAVENOUS at 10:24

## 2024-01-27 RX ADMIN — LEVOTHYROXINE SODIUM 100 MCG: 0.1 TABLET ORAL at 06:18

## 2024-01-27 RX ADMIN — SODIUM CHLORIDE, PRESERVATIVE FREE 10 ML: 5 INJECTION INTRAVENOUS at 20:55

## 2024-01-27 RX ADMIN — METOPROLOL SUCCINATE 50 MG: 50 TABLET, EXTENDED RELEASE ORAL at 10:26

## 2024-01-27 RX ADMIN — FLUOXETINE 40 MG: 20 CAPSULE ORAL at 10:27

## 2024-01-27 RX ADMIN — ONDANSETRON 4 MG: 2 INJECTION INTRAMUSCULAR; INTRAVENOUS at 06:19

## 2024-01-27 RX ADMIN — ONDANSETRON 4 MG: 2 INJECTION INTRAMUSCULAR; INTRAVENOUS at 12:22

## 2024-01-27 ASSESSMENT — PAIN DESCRIPTION - DESCRIPTORS
DESCRIPTORS: ACHING
DESCRIPTORS: ACHING

## 2024-01-27 ASSESSMENT — PAIN SCALES - GENERAL
PAINLEVEL_OUTOF10: 4
PAINLEVEL_OUTOF10: 4
PAINLEVEL_OUTOF10: 7
PAINLEVEL_OUTOF10: 7
PAINLEVEL_OUTOF10: 0
PAINLEVEL_OUTOF10: 9
PAINLEVEL_OUTOF10: 2

## 2024-01-27 ASSESSMENT — PAIN DESCRIPTION - LOCATION
LOCATION: BACK;HIP
LOCATION: HIP
LOCATION: BACK

## 2024-01-27 ASSESSMENT — PAIN DESCRIPTION - ORIENTATION
ORIENTATION: LOWER
ORIENTATION: LOWER
ORIENTATION: RIGHT

## 2024-01-27 ASSESSMENT — PAIN DESCRIPTION - PAIN TYPE: TYPE: ACUTE PAIN

## 2024-01-27 ASSESSMENT — PAIN DESCRIPTION - FREQUENCY: FREQUENCY: INTERMITTENT

## 2024-01-27 ASSESSMENT — PAIN DESCRIPTION - ONSET: ONSET: ON-GOING

## 2024-01-27 ASSESSMENT — PAIN - FUNCTIONAL ASSESSMENT: PAIN_FUNCTIONAL_ASSESSMENT: ACTIVITIES ARE NOT PREVENTED

## 2024-01-27 NOTE — PROGRESS NOTES
Internal Medicine Resident Progress Note    Name: Paulina Gunter, female, : 1940, MRN: [id number]    PCP: Claire Castillo APRN - CNP    Date of Admission: 2024  Date of Service: Pt seen/examined on 24      Assessment/Plan:  Acute on chronic urinary retention, iso Hx of recurrent UTIs: patient noted for markedly distended bladder on admission, carroll placed and draining 2.5L overnight. Patient follows with urology outpatient and noted for history of repeated UTI as noted below. Noted for vasovagal episode in ED 2/2 bladder distension.  Carroll placed  Strict I's & O's  Will attempt void trial at future time closer to discharge, with possible need for carroll at discharge if not successful, and then plans to follow up with outpatient urology.  New onset fatigue: Patient noted for significant fatigue this morning, difficulty staying awake during examination. Repeat examination hours late noted for similar findings. Of note, patient was started on Dilaudid for pain management inpatient, possibly 2/2 medication.  TSH 9.790. fT4 1.14.  Dilaudid changed to q6hr  Will consider decreasing Lyrica if fatigue continues  Progressively worsening generalized weakness/physical deconditioning, 2/2 aging, fibromyalgia, chronic back pain,f multiple joint replacements: Patient unable to transfer self from wheelchair to other seats, was previously able to do so. Difficult bearing weight on right lower extremity. Reporting subjective weakness and worsening hip pain. Currently resides at assisted living facility.   PT/OT consulted  Possible consideration for SNF placement at discharge  Neurology following, recommendations appreciated  Acute on chronic pain, 2/2 fibromyalgia, chronic back pain, and multiple joint replacements:   PT/OT consulted  Pain management with Dilaudid and Lyrica  Asymptomatic bacteriuria: patient noted for history of recurrent UTI with MDRO. Prior urine culture on 2023 grew pan-resistant  when appropriate to reduce radiation to a low as reasonably achievable.    XR HIP 2-3 VW W PELVIS RIGHT    Result Date: 1/26/2024  2 views of the right hip and pelvis Comparison: CR/SR - XR HIP 2-3 VW W PELVIS RIGHT - 09/15/2023 05:23 PM EDT Findings: Status post right hip arthroplasty. No hardware complication. No fractures. Normal alignment. Status post left total hip arthroplasty without complication. Dilated colon within the pelvis. This could represent prominent colonic ileus versus early colonic obstruction. Clinical correlation is suggested.     Impression: Status post right hip arthroplasty, no hardware complication. Dilated colon within the pelvis. This could represent prominent colonic ileus versus early colonic obstruction. Clinical correlation is suggested. This document has been electronically signed by: Lauro Orozco MD on 01/26/2024 05:29 PM      DVT prophylaxis:    [x] Lovenox  [] SCDs  [] SQ Heparin  [] Encourage ambulation   [] Already on Anticoagulation  Diet: ADULT DIET; Regular; 5 carb choices (75 gm/meal); Low Fat/Low Chol/High Fiber/2 gm Na  Code Status: Limited  PT/OT: Consulted  Tele: No  IVF: LR @ 125 ml/hr    Electronically signed by Omar Salomon MD on 1/27/2024 at 12:53 PM    Case was discussed with Attending, Dr. Schneider

## 2024-01-27 NOTE — CONSULTS
liver. There are multiple calcified granulomas within the spleen. The pancreas and adrenal glands are unremarkable. There is a small left kidney cyst. There is mild bilateral ureterectasis but no hydronephrosis. There is severe diastasis of rectus musculature without pouching of the peritoneum. No bowel inflammation or bowel obstruction. Relative gaseous distention of bowel raising the possibility of ileus. Evaluation of pelvic contents is limited by metallic artifact. There is severe distention of the urinary bladder. There has been a prior hysterectomy. The appendix is not definitively seen. There are no secondary findings to suggest appendicitis. No acute fracture.     1. Severe distention of the urinary bladder. Mild bilateral ureterectasis. 2. Relative gaseous distention of bowel suggesting possible ileus. No bowel inflammation or bowel obstruction. 3. Large hiatal hernia. This document has been electronically signed by: Imelda Gilmore MD on 01/26/2024 08:28 PM All CTs at this facility use dose modulation techniques and iterative reconstructions, and/or weight-based dosing when appropriate to reduce radiation to a low as reasonably achievable. 3D Post-processing was performed on this study.    CTA HEAD W WO CONTRAST    Result Date: 1/26/2024  CT angiography head with contrast. 3D Postprocessing. Comparison: CT/SR - CT HEAD WO CONTRAST - 01/26/2024 04:49 PM EST Findings: Intracranial arteries are patent. No aneurysm, dissection, or occlusion. No abnormal intracranial enhancement. No intracranial mass, midline shift, hydrocephalus, or acute hemorrhage. No skull fracture.     Patent head CTA. This document has been electronically signed by: Imelda Gilmore MD on 01/26/2024 08:20 PM All CTs at this facility use dose modulation techniques and iterative reconstructions, and/or weight-based dosing when appropriate to reduce radiation to a low as reasonably achievable. 3D Post-processing was performed on this

## 2024-01-27 NOTE — H&P
Prior to Admission medications    Medication Sig Start Date End Date Taking? Authorizing Provider   Cholecalciferol (VITAMIN D3) 50 MCG (2000 UT) TABS Take 50 mcg by mouth 10/5/23   Omid Chatman MD   sucralfate (CARAFATE) 1 GM tablet Take 1 tablet by mouth 4 times daily (before meals and nightly) 1/2/24   Claire Castillo APRN - CNP   amLODIPine (NORVASC) 10 MG tablet Take 1 tablet by mouth daily 11/17/23   Claire Castillo APRN - CNP   Polyethylene Glycol 3350 (MIRALAX PO) Take by mouth daily    Omid Chatman MD   diclofenac (VOLTAREN) 50 MG EC tablet TAKE 1 TABLET BY MOUTH 3 TIMES  DAILY WITH MEALS 10/26/23 2/23/24  Jared Dale DO   guaiFENesin (MUCINEX) 600 MG extended release tablet Take 1 tablet by mouth 2 times daily 10/4/23   Magnolia Olson APRN - CNP   sodium chloride 1 g tablet Take 2 tablets by mouth 2 times daily (with meals) 10/4/23   Magnolia Olson APRN - CNP   acetaminophen (TYLENOL) 325 MG tablet Take 2 tablets by mouth every 6 hours as needed for Pain    Omid Chatman MD   pantoprazole (PROTONIX) 40 MG tablet Take 1 tablet by mouth 2 times daily (before meals) 9/19/23   Claire Castillo APRN - CNP   methenamine (HIPREX) 1 g tablet Take 1 tablet by mouth 2 times daily (with meals) 9/14/23   Antoine Mason PA-C   polycarbophil (FIBERCON) 625 MG tablet Take 1 tablet by mouth daily 8/30/23   Magnolia Olson APRN - CNP   sennosides-docusate sodium (SENOKOT-S) 8.6-50 MG tablet Take 1 tablet by mouth daily 8/30/23   Magnolia Olson APRN - CNP   melatonin 3 MG TABS tablet Take 1 tablet by mouth nightly as needed (sleep) 8/29/23   Magnolia Olson APRN - CNP   Omega 3-6-9 Fatty Acids (OMEGA 3-6-9 COMPLEX PO) Take by mouth daily    Omid Chatman MD   guaiFENesin-dextromethorphan (ROBITUSSIN DM) 100-10 MG/5ML syrup Take 5 mLs by mouth 3 times daily as needed for Cough    Provider, MD Omid   calcium carbonate (OYSTER SHELL CALCIUM 500 MG) 1250 (500  complication.      Dilated colon within the pelvis. This could represent prominent colonic    ileus versus early colonic obstruction. Clinical correlation is suggested.      This document has been electronically signed by: Lauro Orozco MD on    01/26/2024 05:29 PM      CT HEAD WO CONTRAST   Final Result   Impression:   1. No acute findings      This document has been electronically signed by: Imelda Gilmore MD on    01/26/2024 05:48 PM      All CTs at this facility use dose modulation techniques and iterative    reconstructions, and/or weight-based dosing   when appropriate to reduce radiation to a low as reasonably achievable.             Past Social History  The patient currently lives at assisted living facility.   Tobacco use:   reports that she quit smoking about 56 years ago. Her smoking use included cigarettes. She started smoking about 67 years ago. She has a 22.0 pack-year smoking history. She has never used smokeless tobacco.  Alcohol use:   reports current alcohol use.  Drug use:  reports no history of drug use.     Medical Hx      Diagnosis Date    Abnormal EKG     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 Isabel    Fibromyalgia     GERD (gastroesophageal reflux disease)     Diverticulitis     Hiatal hernia     Hx of blood clots     Hypertension     BAKI    Hypothyroidism     Macular degeneration     Osteoarthritis     Urinary incontinence     UTI (urinary tract infection)        Surgical Hx      Procedure Laterality Date    APPENDECTOMY  1958    BACK SURGERY      CARPAL TUNNEL RELEASE Bilateral 2013    w/cubital tunnel    COLON SURGERY  07/29/2016    Procedure: ATTEMPTED DAVINCI XI ROBOTIC ASSISTED SIGMOID COLON RESECTION, ROBOTIC ASSISTED MOBILIZATION OF RECTAL SIGMOID TO SPLENIC FLEXURE, CONVERTED TO OPEN LEFT HEMICOLECTOMY

## 2024-01-27 NOTE — PLAN OF CARE
Problem: Discharge Planning  Goal: Discharge to home or other facility with appropriate resources  1/27/2024 1758 by Jeny Luna RN  Outcome: Progressing  Flowsheets (Taken 1/27/2024 1758)  Discharge to home or other facility with appropriate resources:   Identify barriers to discharge with patient and caregiver   Arrange for needed discharge resources and transportation as appropriate   Identify discharge learning needs (meds, wound care, etc)     Problem: Pain  Goal: Verbalizes/displays adequate comfort level or baseline comfort level  1/27/2024 1758 by Jeny Luna RN  Outcome: Progressing  Flowsheets (Taken 1/27/2024 1758)  Verbalizes/displays adequate comfort level or baseline comfort level:   Encourage patient to monitor pain and request assistance   Assess pain using appropriate pain scale   Administer analgesics based on type and severity of pain and evaluate response     Problem: Safety - Adult  Goal: Free from fall injury  1/27/2024 1758 by Jeny Luna RN  Outcome: Progressing  Flowsheets  Taken 1/27/2024 1758  Free From Fall Injury: Instruct family/caregiver on patient safety  Taken 1/27/2024 1757       Problem: ABCDS Injury Assessment  Goal: Absence of physical injury  1/27/2024 1758 by Jeny Luna RN  Outcome: Progressing  Flowsheets  Taken 1/27/2024 1758  Absence of Physical Injury: Implement safety measures based on patient assessment  Care plan reviewed with patient.  Patient  verbalize understanding of the plan of care and contribute to goal setting.

## 2024-01-27 NOTE — ED NOTES
ED to inpatient nurses report      Chief Complaint:  Chief Complaint   Patient presents with    Hip Pain    Groin Pain     Present to ED from: home    MOA:     LOC: alert and orientated to name and place  Mobility: Requires assistance * 2  Oxygen Baseline: ra    Current needs required: ra     Code Status:   Limited    What abnormal results were found and what did you give/do to treat them? Acute hip pain, bladder distension   Any procedures or intervention occur? carroll    Mental Status:  Level of Consciousness: Alert (0)    Psych Assessment:        Vitals:  Patient Vitals for the past 24 hrs:   BP Temp Temp src Pulse Resp SpO2   01/26/24 2215 137/73 -- -- 52 16 100 %   01/26/24 2134 (!) 81/51 -- -- -- -- --   01/26/24 2131 (!) 68/50 -- -- 51 -- 95 %   01/26/24 2038 (!) 130/91 -- -- 64 16 94 %   01/26/24 1914 (!) 140/79 -- -- 58 16 93 %   01/26/24 1745 (!) 144/80 -- -- 70 16 95 %   01/26/24 1705 136/75 -- -- 55 18 94 %   01/26/24 1522 134/84 97.8 °F (36.6 °C) Oral 57 18 95 %        LDAs:   Peripheral IV 01/26/24 Left;Anterior Hand (Active)   Site Assessment Clean, dry & intact 01/26/24 2219       Peripheral IV 01/26/24 Right;Anterior Forearm (Active)   Site Assessment Clean, dry & intact 01/26/24 2219   Line Status Infusing 01/26/24 2041       Ambulatory Status:  No data recorded    Diagnosis:  DISPOSITION Admitted 01/26/2024 09:26:55 PM   Final diagnoses:   Acute right hip pain   Stroke-like episode   Ileus (HCC)   Bladder distension        Consults:  IP CONSULT TO NEUROLOGY     Pain Score:  Pain Assessment  Pain Assessment: 0-10  Pain Level: 5  Pain Location: Hip, Groin  Pain Orientation: Right    C-SSRS:   Risk of Suicide: No Risk    Sepsis Screening:  Sepsis Risk Score: 0.75    Everly Fall Risk:       Swallow Screening        Preferred Language:   English      ALLERGIES     Ampicillin, Flexeril [cyclobenzaprine], Morphine, Pcn [penicillins], and Sulfa antibiotics    SURGICAL HISTORY       Past Surgical History:

## 2024-01-27 NOTE — PLAN OF CARE
Problem: Skin/Tissue Integrity  Goal: Absence of new skin breakdown  Description: 1.  Monitor for areas of redness and/or skin breakdown  2.  Assess vascular access sites hourly  3.  Every 4-6 hours minimum:  Change oxygen saturation probe site  4.  Every 4-6 hours:  If on nasal continuous positive airway pressure, respiratory therapy assess nares and determine need for appliance change or resting period.  Outcome: Progressing     Problem: Pain  Goal: Verbalizes/displays adequate comfort level or baseline comfort level  Outcome: Progressing     Problem: Safety - Adult  Goal: Free from fall injury  Outcome: Progressing

## 2024-01-27 NOTE — PLAN OF CARE
Problem: Discharge Planning  Goal: Discharge to home or other facility with appropriate resources  1/27/2024 1806 by Yovana Prince LPN  Outcome: Progressing  Flowsheets (Taken 1/27/2024 1806)  Discharge to home or other facility with appropriate resources: Identify barriers to discharge with patient and caregiver     Problem: Pain  Goal: Verbalizes/displays adequate comfort level or baseline comfort level  1/27/2024 1806 by Yovana Prince LPN  Outcome: Progressing  Flowsheets (Taken 1/27/2024 1806)  Verbalizes/displays adequate comfort level or baseline comfort level:   Assess pain using appropriate pain scale   Encourage patient to monitor pain and request assistance     Problem: Skin/Tissue Integrity  Goal: Absence of new skin breakdown  Description: 1.  Monitor for areas of redness and/or skin breakdown  2.  Assess vascular access sites hourly  3.  Every 4-6 hours minimum:  Change oxygen saturation probe site  4.  Every 4-6 hours:  If on nasal continuous positive airway pressure, respiratory therapy assess nares and determine need for appliance change or resting period.  1/27/2024 1806 by Yovana Prince LPN  Outcome: Progressing     Problem: Safety - Adult  Goal: Free from fall injury  1/27/2024 1806 by Yovana Prince LPN  Outcome: Progressing  Flowsheets (Taken 1/27/2024 1806)  Free From Fall Injury: Instruct family/caregiver on patient safety     Problem: ABCDS Injury Assessment  Goal: Absence of physical injury  1/27/2024 1806 by Yovana Prince LPN  Outcome: Progressing  Flowsheets (Taken 1/27/2024 1758 by Jeny Luna, RN)  Absence of Physical Injury: Implement safety measures based on patient assessment   Care plan reviewed with patient.  Patient verbalized understanding of the plan of care and contribute to goal setting.

## 2024-01-28 ENCOUNTER — APPOINTMENT (OUTPATIENT)
Dept: GENERAL RADIOLOGY | Age: 84
End: 2024-01-28
Payer: MEDICARE

## 2024-01-28 LAB
ANION GAP SERPL CALC-SCNC: 8 MEQ/L (ref 8–16)
BUN SERPL-MCNC: 11 MG/DL (ref 7–22)
CALCIUM SERPL-MCNC: 8.8 MG/DL (ref 8.5–10.5)
CHLORIDE 24H UR-SRATE: 147 MEQ/L
CHLORIDE SERPL-SCNC: 98 MEQ/L (ref 98–111)
CO2 SERPL-SCNC: 24 MEQ/L (ref 23–33)
CREAT SERPL-MCNC: 0.5 MG/DL (ref 0.4–1.2)
DEPRECATED RDW RBC AUTO: 49.9 FL (ref 35–45)
ERYTHROCYTE [DISTWIDTH] IN BLOOD BY AUTOMATED COUNT: 13.4 % (ref 11.5–14.5)
GFR SERPL CREATININE-BSD FRML MDRD: > 60 ML/MIN/1.73M2
GLUCOSE SERPL-MCNC: 123 MG/DL (ref 70–108)
HCT VFR BLD AUTO: 42 % (ref 37–47)
HGB BLD-MCNC: 13.1 GM/DL (ref 12–16)
MAGNESIUM SERPL-MCNC: 1.5 MG/DL (ref 1.6–2.4)
MCH RBC QN AUTO: 31.3 PG (ref 26–33)
MCHC RBC AUTO-ENTMCNC: 31.2 GM/DL (ref 32.2–35.5)
MCV RBC AUTO: 100.5 FL (ref 81–99)
OSMOLALITY SERPL: 278 MOSMOL/KG (ref 275–295)
OSMOLALITY UR: 433 MOSMOL/KG (ref 250–750)
PLATELET # BLD AUTO: 129 THOU/MM3 (ref 130–400)
PMV BLD AUTO: 10.4 FL (ref 9.4–12.4)
POTASSIUM SERPL-SCNC: 4.6 MEQ/L (ref 3.5–5.2)
RBC # BLD AUTO: 4.18 MILL/MM3 (ref 4.2–5.4)
SODIUM SERPL-SCNC: 130 MEQ/L (ref 135–145)
SODIUM UR-SCNC: 136 MEQ/L
WBC # BLD AUTO: 4.2 THOU/MM3 (ref 4.8–10.8)

## 2024-01-28 PROCEDURE — 6360000002 HC RX W HCPCS: Performed by: INTERNAL MEDICINE

## 2024-01-28 PROCEDURE — 85027 COMPLETE CBC AUTOMATED: CPT

## 2024-01-28 PROCEDURE — 6360000002 HC RX W HCPCS

## 2024-01-28 PROCEDURE — 2580000003 HC RX 258

## 2024-01-28 PROCEDURE — 84300 ASSAY OF URINE SODIUM: CPT

## 2024-01-28 PROCEDURE — 6370000000 HC RX 637 (ALT 250 FOR IP)

## 2024-01-28 PROCEDURE — 1200000000 HC SEMI PRIVATE

## 2024-01-28 PROCEDURE — 6370000000 HC RX 637 (ALT 250 FOR IP): Performed by: INTERNAL MEDICINE

## 2024-01-28 PROCEDURE — 99233 SBSQ HOSP IP/OBS HIGH 50: CPT | Performed by: PHYSICIAN ASSISTANT

## 2024-01-28 PROCEDURE — 80048 BASIC METABOLIC PNL TOTAL CA: CPT

## 2024-01-28 PROCEDURE — 51702 INSERT TEMP BLADDER CATH: CPT

## 2024-01-28 PROCEDURE — 83935 ASSAY OF URINE OSMOLALITY: CPT

## 2024-01-28 PROCEDURE — 36415 COLL VENOUS BLD VENIPUNCTURE: CPT

## 2024-01-28 PROCEDURE — 72040 X-RAY EXAM NECK SPINE 2-3 VW: CPT

## 2024-01-28 PROCEDURE — 82436 ASSAY OF URINE CHLORIDE: CPT

## 2024-01-28 PROCEDURE — 83930 ASSAY OF BLOOD OSMOLALITY: CPT

## 2024-01-28 PROCEDURE — 83735 ASSAY OF MAGNESIUM: CPT

## 2024-01-28 PROCEDURE — 99232 SBSQ HOSP IP/OBS MODERATE 35: CPT | Performed by: INTERNAL MEDICINE

## 2024-01-28 RX ORDER — TRAMADOL HYDROCHLORIDE 50 MG/1
50 TABLET ORAL EVERY 6 HOURS PRN
Status: DISCONTINUED | OUTPATIENT
Start: 2024-01-28 | End: 2024-01-29

## 2024-01-28 RX ORDER — ACETAMINOPHEN 650 MG/1
650 SUPPOSITORY RECTAL EVERY 6 HOURS PRN
Status: DISCONTINUED | OUTPATIENT
Start: 2024-01-28 | End: 2024-02-03 | Stop reason: HOSPADM

## 2024-01-28 RX ORDER — ACETAMINOPHEN 500 MG
1000 TABLET ORAL EVERY 6 HOURS PRN
Status: DISCONTINUED | OUTPATIENT
Start: 2024-01-28 | End: 2024-02-03 | Stop reason: HOSPADM

## 2024-01-28 RX ORDER — TRAMADOL HYDROCHLORIDE 50 MG/1
25 TABLET ORAL EVERY 6 HOURS PRN
Status: DISCONTINUED | OUTPATIENT
Start: 2024-01-28 | End: 2024-01-29

## 2024-01-28 RX ORDER — MAGNESIUM SULFATE IN WATER 40 MG/ML
2000 INJECTION, SOLUTION INTRAVENOUS ONCE
Status: COMPLETED | OUTPATIENT
Start: 2024-01-28 | End: 2024-01-28

## 2024-01-28 RX ADMIN — SODIUM CHLORIDE: 9 INJECTION, SOLUTION INTRAVENOUS at 13:58

## 2024-01-28 RX ADMIN — MAGNESIUM SULFATE HEPTAHYDRATE 2000 MG: 40 INJECTION, SOLUTION INTRAVENOUS at 10:21

## 2024-01-28 RX ADMIN — SODIUM CHLORIDE 2 G: 1 TABLET ORAL at 10:10

## 2024-01-28 RX ADMIN — FERROUS SULFATE TAB 325 MG (65 MG ELEMENTAL FE) 325 MG: 325 (65 FE) TAB at 10:05

## 2024-01-28 RX ADMIN — TRAMADOL HYDROCHLORIDE 50 MG: 50 TABLET, COATED ORAL at 20:53

## 2024-01-28 RX ADMIN — POLYETHYLENE GLYCOL 3350 17 G: 17 POWDER, FOR SOLUTION ORAL at 10:16

## 2024-01-28 RX ADMIN — FERROUS SULFATE TAB 325 MG (65 MG ELEMENTAL FE) 325 MG: 325 (65 FE) TAB at 20:43

## 2024-01-28 RX ADMIN — HYDROMORPHONE HYDROCHLORIDE 0.5 MG: 1 INJECTION, SOLUTION INTRAMUSCULAR; INTRAVENOUS; SUBCUTANEOUS at 04:46

## 2024-01-28 RX ADMIN — SUCRALFATE 1 G: 1 TABLET ORAL at 20:43

## 2024-01-28 RX ADMIN — DOCUSATE SODIUM 50 MG AND SENNOSIDES 8.6 MG 1 TABLET: 8.6; 5 TABLET, FILM COATED ORAL at 10:09

## 2024-01-28 RX ADMIN — DICLOFENAC SODIUM 2 G: 10 GEL TOPICAL at 20:44

## 2024-01-28 RX ADMIN — METHENAMINE HIPPURATE 1 G: 1 TABLET ORAL at 16:48

## 2024-01-28 RX ADMIN — ENALAPRIL MALEATE 10 MG: 10 TABLET ORAL at 10:05

## 2024-01-28 RX ADMIN — SODIUM CHLORIDE, PRESERVATIVE FREE 10 ML: 5 INJECTION INTRAVENOUS at 10:11

## 2024-01-28 RX ADMIN — PANTOPRAZOLE SODIUM 40 MG: 40 TABLET, DELAYED RELEASE ORAL at 16:55

## 2024-01-28 RX ADMIN — TRAMADOL HYDROCHLORIDE 50 MG: 50 TABLET, COATED ORAL at 13:01

## 2024-01-28 RX ADMIN — DICLOFENAC SODIUM 2 G: 10 GEL TOPICAL at 10:02

## 2024-01-28 RX ADMIN — FLUOXETINE 40 MG: 20 CAPSULE ORAL at 10:05

## 2024-01-28 RX ADMIN — SODIUM CHLORIDE, PRESERVATIVE FREE 10 ML: 5 INJECTION INTRAVENOUS at 20:43

## 2024-01-28 RX ADMIN — CALCIUM 500 MG: 500 TABLET ORAL at 10:04

## 2024-01-28 RX ADMIN — PANTOPRAZOLE SODIUM 40 MG: 40 TABLET, DELAYED RELEASE ORAL at 04:45

## 2024-01-28 RX ADMIN — ENOXAPARIN SODIUM 40 MG: 100 INJECTION SUBCUTANEOUS at 10:15

## 2024-01-28 RX ADMIN — PREGABALIN 150 MG: 150 CAPSULE ORAL at 20:53

## 2024-01-28 RX ADMIN — PREGABALIN 150 MG: 150 CAPSULE ORAL at 10:15

## 2024-01-28 RX ADMIN — SUCRALFATE 1 G: 1 TABLET ORAL at 16:49

## 2024-01-28 RX ADMIN — SUCRALFATE 1 G: 1 TABLET ORAL at 10:20

## 2024-01-28 RX ADMIN — AMLODIPINE BESYLATE 10 MG: 10 TABLET ORAL at 10:04

## 2024-01-28 RX ADMIN — SUCRALFATE 1 G: 1 TABLET ORAL at 04:45

## 2024-01-28 RX ADMIN — METHENAMINE HIPPURATE 1 G: 1 TABLET ORAL at 10:06

## 2024-01-28 RX ADMIN — SODIUM CHLORIDE 2 G: 1 TABLET ORAL at 16:49

## 2024-01-28 RX ADMIN — SODIUM CHLORIDE, POTASSIUM CHLORIDE, SODIUM LACTATE AND CALCIUM CHLORIDE: 600; 310; 30; 20 INJECTION, SOLUTION INTRAVENOUS at 04:40

## 2024-01-28 RX ADMIN — DOCUSATE SODIUM 100 MG: 100 CAPSULE, LIQUID FILLED ORAL at 10:15

## 2024-01-28 RX ADMIN — LEVOTHYROXINE SODIUM 100 MCG: 0.1 TABLET ORAL at 04:46

## 2024-01-28 ASSESSMENT — PAIN DESCRIPTION - ORIENTATION
ORIENTATION: MID
ORIENTATION: RIGHT;LOWER
ORIENTATION: RIGHT;LOWER
ORIENTATION: RIGHT

## 2024-01-28 ASSESSMENT — PAIN DESCRIPTION - LOCATION
LOCATION: GROIN;BACK
LOCATION: BACK
LOCATION: GROIN;BACK
LOCATION: GROIN;BACK

## 2024-01-28 ASSESSMENT — PAIN DESCRIPTION - ONSET
ONSET: ON-GOING
ONSET: ON-GOING

## 2024-01-28 ASSESSMENT — PAIN SCALES - GENERAL
PAINLEVEL_OUTOF10: 7
PAINLEVEL_OUTOF10: 8
PAINLEVEL_OUTOF10: 8
PAINLEVEL_OUTOF10: 9

## 2024-01-28 ASSESSMENT — PAIN DESCRIPTION - DESCRIPTORS
DESCRIPTORS: CRAMPING;DISCOMFORT
DESCRIPTORS: ACHING;CRAMPING;DISCOMFORT
DESCRIPTORS: ACHING;CRAMPING;DISCOMFORT
DESCRIPTORS: ACHING;DISCOMFORT

## 2024-01-28 ASSESSMENT — PAIN - FUNCTIONAL ASSESSMENT
PAIN_FUNCTIONAL_ASSESSMENT: PREVENTS OR INTERFERES SOME ACTIVE ACTIVITIES AND ADLS

## 2024-01-28 ASSESSMENT — PAIN DESCRIPTION - FREQUENCY
FREQUENCY: INTERMITTENT
FREQUENCY: INTERMITTENT

## 2024-01-28 ASSESSMENT — PAIN DESCRIPTION - PAIN TYPE
TYPE: ACUTE PAIN
TYPE: ACUTE PAIN

## 2024-01-28 NOTE — PROGRESS NOTES
Neurology Progress Note    Date:1/28/2024       Room:[facility]06/006-A  Patient Name:Paulina Gunter     YOB: 1940     Age:83 y.o.    CC:   Chief Complaint   Patient presents with    Hip Pain    Groin Pain        Subjective      Neurology consulted for RUE weakness    Paulina Gunter is a 83 y.o. female with a history of probable dementia, anemia, chronic back pain, fibromyalgia, GERD, hiatal hernia, h/o blood clots, HTN, hypothyroidism, macular degeneration, OA, recurrent UTIs etc who presented to the ER status post fall. She was able to get herself up into her wheel chair but had increased hip and R groin pain to the intensity where she was unable to transfer. She called for help and was brought to the ER. She also c/o RUE weakness that she states has been coming and going all week. She had an episode of hypotension, 68/50, while in the ER. CT a/p showed severe bladder distention with est >1L urine retention and ?ileus. Neurology was consulted for RUE weakness.  Patient is a poor historian and continues to c/o nausea throughout interview and exam.     Interval history 1/28/24:  No acute overnight events  Patient appears more comfortable but denies this- c/o back discomfort  MRI brain and C-spine completed    Review of Systems   Review of Systems   Constitutional:  Positive for fatigue.   HENT:  Positive for hearing loss.    Eyes: Negative.    Respiratory: Negative.     Cardiovascular: Negative.    Endocrine: Negative.    Genitourinary:  Positive for difficulty urinating.   Musculoskeletal:  Positive for arthralgias, back pain, myalgias, neck pain and neck stiffness.   Skin: Negative.    Neurological:  Positive for weakness.       Medications   Scheduled Meds:    magnesium sulfate  2,000 mg IntraVENous Once    polyethylene glycol  17 g Oral Daily    fosfomycin tromethamine  3 g Oral Q3 Days    amLODIPine  10 mg Oral Daily    calcium elemental  500 mg Oral Daily    [Held by provider] ibuprofen  600 mg Oral  1/26/2024  CT angiography head with contrast. 3D Postprocessing. Comparison: CT/SR - CT HEAD WO CONTRAST - 01/26/2024 04:49 PM EST Findings: Intracranial arteries are patent. No aneurysm, dissection, or occlusion. No abnormal intracranial enhancement. No intracranial mass, midline shift, hydrocephalus, or acute hemorrhage. No skull fracture.     Patent head CTA. This document has been electronically signed by: Imelda Gilmore MD on 01/26/2024 08:20 PM All CTs at this facility use dose modulation techniques and iterative reconstructions, and/or weight-based dosing when appropriate to reduce radiation to a low as reasonably achievable. 3D Post-processing was performed on this study.    CT HEAD WO CONTRAST    Result Date: 1/26/2024  CT head without contrast Comparison: CT/SR - CT HEAD WO CONTRAST - 09/15/2023 05:55 PM EDT Findings: Involutional change and nonspecific white matter hypodensity. No intracranial mass, midline shift, hydrocephalus, or acute hemorrhage. Orbits, paranasal sinuses, and mastoid air cells are unremarkable. No skull fracture     Impression: 1. No acute findings This document has been electronically signed by: Imelda Gilmore MD on 01/26/2024 05:48 PM All CTs at this facility use dose modulation techniques and iterative reconstructions, and/or weight-based dosing when appropriate to reduce radiation to a low as reasonably achievable.    XR HIP 2-3 VW W PELVIS RIGHT    Result Date: 1/26/2024  2 views of the right hip and pelvis Comparison: CR/SR - XR HIP 2-3 VW W PELVIS RIGHT - 09/15/2023 05:23 PM EDT Findings: Status post right hip arthroplasty. No hardware complication. No fractures. Normal alignment. Status post left total hip arthroplasty without complication. Dilated colon within the pelvis. This could represent prominent colonic ileus versus early colonic obstruction. Clinical correlation is suggested.     Impression: Status post right hip arthroplasty, no hardware complication. Dilated colon

## 2024-01-29 ENCOUNTER — APPOINTMENT (OUTPATIENT)
Dept: GENERAL RADIOLOGY | Age: 84
End: 2024-01-29
Payer: MEDICARE

## 2024-01-29 LAB
ANION GAP SERPL CALC-SCNC: 10 MEQ/L (ref 8–16)
BACTERIA UR CULT: ABNORMAL
BACTERIA UR CULT: ABNORMAL
BUN SERPL-MCNC: 10 MG/DL (ref 7–22)
CALCIUM SERPL-MCNC: 8.6 MG/DL (ref 8.5–10.5)
CHLORIDE SERPL-SCNC: 96 MEQ/L (ref 98–111)
CO2 SERPL-SCNC: 26 MEQ/L (ref 23–33)
CREAT SERPL-MCNC: 0.4 MG/DL (ref 0.4–1.2)
DEPRECATED RDW RBC AUTO: 45.4 FL (ref 35–45)
EKG Q-T INTERVAL: 424 MS
EKG QRS DURATION: 78 MS
EKG QTC CALCULATION (BAZETT): 416 MS
EKG R AXIS: -12 DEGREES
EKG T AXIS: 11 DEGREES
EKG VENTRICULAR RATE: 58 BPM
ERYTHROCYTE [DISTWIDTH] IN BLOOD BY AUTOMATED COUNT: 13 % (ref 11.5–14.5)
GFR SERPL CREATININE-BSD FRML MDRD: > 60 ML/MIN/1.73M2
GLUCOSE SERPL-MCNC: 95 MG/DL (ref 70–108)
HCT VFR BLD AUTO: 38 % (ref 37–47)
HGB BLD-MCNC: 12.4 GM/DL (ref 12–16)
MAGNESIUM SERPL-MCNC: 1.3 MG/DL (ref 1.6–2.4)
MAGNESIUM SERPL-MCNC: 2 MG/DL (ref 1.6–2.4)
MCH RBC QN AUTO: 30.9 PG (ref 26–33)
MCHC RBC AUTO-ENTMCNC: 32.6 GM/DL (ref 32.2–35.5)
MCV RBC AUTO: 94.8 FL (ref 81–99)
ORGANISM: ABNORMAL
ORGANISM: ABNORMAL
PHOSPHATE SERPL-MCNC: 2.9 MG/DL (ref 2.4–4.7)
PLATELET # BLD AUTO: 122 THOU/MM3 (ref 130–400)
PMV BLD AUTO: 11.1 FL (ref 9.4–12.4)
POTASSIUM SERPL-SCNC: 3.8 MEQ/L (ref 3.5–5.2)
RBC # BLD AUTO: 4.01 MILL/MM3 (ref 4.2–5.4)
SODIUM SERPL-SCNC: 132 MEQ/L (ref 135–145)
WBC # BLD AUTO: 3.7 THOU/MM3 (ref 4.8–10.8)

## 2024-01-29 PROCEDURE — 84100 ASSAY OF PHOSPHORUS: CPT

## 2024-01-29 PROCEDURE — 6370000000 HC RX 637 (ALT 250 FOR IP)

## 2024-01-29 PROCEDURE — 6360000002 HC RX W HCPCS

## 2024-01-29 PROCEDURE — 1200000000 HC SEMI PRIVATE

## 2024-01-29 PROCEDURE — 6370000000 HC RX 637 (ALT 250 FOR IP): Performed by: INTERNAL MEDICINE

## 2024-01-29 PROCEDURE — 97530 THERAPEUTIC ACTIVITIES: CPT

## 2024-01-29 PROCEDURE — 99231 SBSQ HOSP IP/OBS SF/LOW 25: CPT | Performed by: INTERNAL MEDICINE

## 2024-01-29 PROCEDURE — 85027 COMPLETE CBC AUTOMATED: CPT

## 2024-01-29 PROCEDURE — 97166 OT EVAL MOD COMPLEX 45 MIN: CPT

## 2024-01-29 PROCEDURE — 80048 BASIC METABOLIC PNL TOTAL CA: CPT

## 2024-01-29 PROCEDURE — 74018 RADEX ABDOMEN 1 VIEW: CPT

## 2024-01-29 PROCEDURE — 36415 COLL VENOUS BLD VENIPUNCTURE: CPT

## 2024-01-29 PROCEDURE — 83735 ASSAY OF MAGNESIUM: CPT

## 2024-01-29 PROCEDURE — 2580000003 HC RX 258

## 2024-01-29 RX ORDER — MAGNESIUM SULFATE IN WATER 40 MG/ML
4000 INJECTION, SOLUTION INTRAVENOUS ONCE
Status: COMPLETED | OUTPATIENT
Start: 2024-01-29 | End: 2024-01-29

## 2024-01-29 RX ORDER — TRAMADOL HYDROCHLORIDE 50 MG/1
100 TABLET ORAL EVERY 6 HOURS PRN
Status: DISCONTINUED | OUTPATIENT
Start: 2024-01-29 | End: 2024-02-01

## 2024-01-29 RX ORDER — TRAMADOL HYDROCHLORIDE 50 MG/1
50 TABLET ORAL EVERY 6 HOURS PRN
Status: DISCONTINUED | OUTPATIENT
Start: 2024-01-29 | End: 2024-02-01

## 2024-01-29 RX ADMIN — SODIUM CHLORIDE, PRESERVATIVE FREE 10 ML: 5 INJECTION INTRAVENOUS at 19:46

## 2024-01-29 RX ADMIN — FERROUS SULFATE TAB 325 MG (65 MG ELEMENTAL FE) 325 MG: 325 (65 FE) TAB at 07:52

## 2024-01-29 RX ADMIN — DICLOFENAC SODIUM 2 G: 10 GEL TOPICAL at 07:55

## 2024-01-29 RX ADMIN — SUCRALFATE 1 G: 1 TABLET ORAL at 11:28

## 2024-01-29 RX ADMIN — SUCRALFATE 1 G: 1 TABLET ORAL at 17:36

## 2024-01-29 RX ADMIN — LEVOTHYROXINE SODIUM 100 MCG: 0.1 TABLET ORAL at 06:02

## 2024-01-29 RX ADMIN — SUCRALFATE 1 G: 1 TABLET ORAL at 19:46

## 2024-01-29 RX ADMIN — METHENAMINE HIPPURATE 1 G: 1 TABLET ORAL at 07:52

## 2024-01-29 RX ADMIN — POLYETHYLENE GLYCOL 3350 17 G: 17 POWDER, FOR SOLUTION ORAL at 07:53

## 2024-01-29 RX ADMIN — ENOXAPARIN SODIUM 40 MG: 100 INJECTION SUBCUTANEOUS at 07:53

## 2024-01-29 RX ADMIN — SODIUM CHLORIDE 2 G: 1 TABLET ORAL at 07:51

## 2024-01-29 RX ADMIN — CALCIUM 500 MG: 500 TABLET ORAL at 07:52

## 2024-01-29 RX ADMIN — TRAMADOL HYDROCHLORIDE 100 MG: 50 TABLET, COATED ORAL at 21:17

## 2024-01-29 RX ADMIN — SODIUM CHLORIDE, PRESERVATIVE FREE 10 ML: 5 INJECTION INTRAVENOUS at 07:56

## 2024-01-29 RX ADMIN — METHENAMINE HIPPURATE 1 G: 1 TABLET ORAL at 17:36

## 2024-01-29 RX ADMIN — AMLODIPINE BESYLATE 10 MG: 10 TABLET ORAL at 07:55

## 2024-01-29 RX ADMIN — PANTOPRAZOLE SODIUM 40 MG: 40 TABLET, DELAYED RELEASE ORAL at 07:50

## 2024-01-29 RX ADMIN — DOCUSATE SODIUM 50 MG AND SENNOSIDES 8.6 MG 1 TABLET: 8.6; 5 TABLET, FILM COATED ORAL at 07:52

## 2024-01-29 RX ADMIN — FLUOXETINE 40 MG: 20 CAPSULE ORAL at 07:50

## 2024-01-29 RX ADMIN — ENALAPRIL MALEATE 10 MG: 10 TABLET ORAL at 07:52

## 2024-01-29 RX ADMIN — PREGABALIN 150 MG: 150 CAPSULE ORAL at 19:46

## 2024-01-29 RX ADMIN — SUCRALFATE 1 G: 1 TABLET ORAL at 07:50

## 2024-01-29 RX ADMIN — MAGNESIUM SULFATE HEPTAHYDRATE 4000 MG: 4 INJECTION, SOLUTION INTRAVENOUS at 08:37

## 2024-01-29 RX ADMIN — DICLOFENAC SODIUM 2 G: 10 GEL TOPICAL at 19:46

## 2024-01-29 RX ADMIN — PREGABALIN 150 MG: 150 CAPSULE ORAL at 07:53

## 2024-01-29 RX ADMIN — METOPROLOL SUCCINATE 50 MG: 50 TABLET, EXTENDED RELEASE ORAL at 07:52

## 2024-01-29 RX ADMIN — PANTOPRAZOLE SODIUM 40 MG: 40 TABLET, DELAYED RELEASE ORAL at 16:09

## 2024-01-29 ASSESSMENT — PAIN DESCRIPTION - LOCATION
LOCATION: BACK
LOCATION: LEG
LOCATION: LEG

## 2024-01-29 ASSESSMENT — PAIN DESCRIPTION - ORIENTATION
ORIENTATION: RIGHT
ORIENTATION: RIGHT

## 2024-01-29 ASSESSMENT — PAIN SCALES - GENERAL
PAINLEVEL_OUTOF10: 7
PAINLEVEL_OUTOF10: 0
PAINLEVEL_OUTOF10: 8
PAINLEVEL_OUTOF10: 6
PAINLEVEL_OUTOF10: 0
PAINLEVEL_OUTOF10: 5

## 2024-01-29 ASSESSMENT — PAIN DESCRIPTION - ONSET
ONSET: GRADUAL
ONSET: GRADUAL

## 2024-01-29 ASSESSMENT — PAIN - FUNCTIONAL ASSESSMENT
PAIN_FUNCTIONAL_ASSESSMENT: PREVENTS OR INTERFERES WITH MANY ACTIVE NOT PASSIVE ACTIVITIES
PAIN_FUNCTIONAL_ASSESSMENT: PREVENTS OR INTERFERES WITH MANY ACTIVE NOT PASSIVE ACTIVITIES

## 2024-01-29 ASSESSMENT — PAIN DESCRIPTION - DESCRIPTORS
DESCRIPTORS: ACHING
DESCRIPTORS: ACHING

## 2024-01-29 ASSESSMENT — PAIN DESCRIPTION - FREQUENCY
FREQUENCY: CONTINUOUS
FREQUENCY: CONTINUOUS

## 2024-01-29 ASSESSMENT — PAIN DESCRIPTION - PAIN TYPE: TYPE: ACUTE PAIN

## 2024-01-29 NOTE — CARE COORDINATION
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 Isabel    Fibromyalgia     GERD (gastroesophageal reflux disease)     Diverticulitis     Hiatal hernia     Hx of blood clots     Hypertension     BAKI    Hypothyroidism     Macular degeneration     Osteoarthritis     Urinary incontinence     UTI (urinary tract infection)        Procedure:   1/26 CTA A/P: Severe distention of the urinary bladder. Mild bilateral ureterectasis.  2. Relative gaseous distention of bowel suggesting possible ileus. No  bowel inflammation or bowel obstruction. 3. Large hiatal hernia  1/28 MRI Brain WO: There are areas of abnormal signal intensity in the white matter, radha, basal ganglia and thalami bilaterally most likely representing ischemic changes.  2. There is no evidence for an acute infarct. 3. There is mild atrophy. 4. There are inflammatory changes in the ethmoid air cells and mastoid air cells bilaterally and in the sphenoid sinus  1/28 MRI Cervical: Straightening of the normal cervical lordosis with superimposed cervical spondylosis at C4-5, C5-6 and C6-7. There is no significant cord compression or foraminal stenosis at any level. 2. Increased signal intensity in the radha most likely representing ischemic changes.  1/29 XR Cervical: Straightening of the normal cervical lordosis. 2. Minimal anterolisthesis of C2 relative to C3. Cervical spondylosis at C3-4, C4-5, C5-6 and to lesser extent C6-7. 3. Limited  mobility on the flexion and extension lateral views. No instability noted.    Barriers to Discharge: From AL for hip pain. Stated pain started when she got up to use restroom and was unable to bear weight. C/o RUE weakness. Hospitalist and Neurology following. PT/OT. Orthostatics. Timmons care. IVF stopped. Voltaren gel. Lidocaine patch. Bowel regimen. Tramadol prn.     PCP: Claire Castillo APRN - CNP    Readmission Risk Low 0-14, Mod 15-19), High > 20: Readmission Risk Score: 16.4      Advance Directives:      Code Status: Limited   Patient's Primary Decision Maker is:      Primary Decision Maker: Bruce Gunter - Spouse - [phone]    Secondary Decision Maker: SORAYA POSADA Child - [phone]    Patient Goals/Plan/Treatment Preferences: From [facility]. CM assessment deferred to SW. SW following.     Transportation/Food Security/Housekeeping Addressed: No issues identified.     If patient is discharged prior to next notation, then this note serves as note for discharge by case management.

## 2024-01-29 NOTE — PROGRESS NOTES
[facility]  INPATIENT OCCUPATIONAL THERAPY  [facility] 7K  EVALUATION    Time:   Time In: 829  Time Out: 854  Timed Code Treatment Minutes: 16 Minutes  Minutes: 25          Date: 2024  Patient Name: Paulina BERNAL Height,   Gender: female      MRN: [id number]  : 1940  (83 y.o.)  Referring Practitioner: Latricia Cristina MD  Diagnosis: age related physical debility  Additional Pertinent Hx: per chart review; Today while she was in the bathroom trying to get up, she noticed her right leg buckle and give out. She was able to get herself up using her wheelchair and call for help. States that she has had similar pains before and has chronic pain, but this episode was worse than usual and she is unable to transfer herself between wheelchair and other seats. Also states that this morning she was having some subjective right arm weakness that began this afternoon, but had been having similar episodes throughout the week. On exam strength is 5/5 in all extremities, no drift noted. She denies chest pain, SOB, headaches, N/V, dizziness, dysuria, changes in vision.   Upon return to ED to speak with pt, RN notified me that pt had sudden onset lightheadedness, nausea, diaphoresis, and BP dropped to 68/50 and 81/51. Notably, CT A/P showed severe bladder distention, with estimated >1L urinary retention. Pt only had external catheter in place at that time. Dr. Lakshmi kapadia as well, instructed RN to place Timmons catheter as this was likely a vasovagal response to severe urinary retention. Timmons placed, pt reporting improvement in symptoms.      ED course: Tmax 97.8, RR 18, HR 57, /84, SpO2 95% RA. Na 131, Cl 94, Mg 1.5, WBC 4.0. UA - small amount blood (0-2), small amount LE, WBC (15-25), few bacteria. Xray right hip and pelvis - negative for fracture, no hardware complication. CT H+N - no acute findings. CTA head - negative for abnormalities. CT A/P - severe bladder distention with mild bilateral

## 2024-01-29 NOTE — PLAN OF CARE
Problem: Discharge Planning  Goal: Discharge to home or other facility with appropriate resources  Outcome: Progressing  Flowsheets (Taken 1/29/2024 0222)  Discharge to home or other facility with appropriate resources:   Identify barriers to discharge with patient and caregiver   Arrange for needed discharge resources and transportation as appropriate   Identify discharge learning needs (meds, wound care, etc)   Refer to discharge planning if patient needs post-hospital services based on physician order or complex needs related to functional status, cognitive ability or social support system     Problem: Pain  Goal: Verbalizes/displays adequate comfort level or baseline comfort level  Outcome: Progressing  Flowsheets (Taken 1/29/2024 0222)  Verbalizes/displays adequate comfort level or baseline comfort level:   Encourage patient to monitor pain and request assistance   Assess pain using appropriate pain scale   Administer analgesics based on type and severity of pain and evaluate response   Implement non-pharmacological measures as appropriate and evaluate response     Problem: Skin/Tissue Integrity  Goal: Absence of new skin breakdown  Description: 1.  Monitor for areas of redness and/or skin breakdown  2.  Assess vascular access sites hourly  3.  Every 4-6 hours minimum:  Change oxygen saturation probe site  4.  Every 4-6 hours:  If on nasal continuous positive airway pressure, respiratory therapy assess nares and determine need for appliance change or resting period.  Outcome: Progressing  Note: See flowsheets for skin integrity documentation.      Problem: Safety - Adult  Goal: Free from fall injury  Outcome: Progressing  Flowsheets  Taken 1/29/2024 0222  Free From Fall Injury: Instruct family/caregiver on patient safety  Taken 1/28/2024 1933  Free From Fall Injury: Instruct family/caregiver on patient safety     Problem: ABCDS Injury Assessment  Goal: Absence of physical injury  Outcome:

## 2024-01-29 NOTE — PROGRESS NOTES
Internal Medicine Resident Progress Note    Name: Paulina Gunter, female, : 1940, MRN: [id number]    PCP: Claire Castillo APRN - CNP    Date of Admission: 2024  Date of Service: Pt seen/examined on 24      Assessment/Plan:  Acute on chronic urinary retention, iso Hx of recurrent UTIs: patient noted for markedly distended bladder on admission, carroll placed and draining 2.5L overnight. Patient follows with urology outpatient and noted for history of repeated UTI as noted below. Noted for vasovagal episode in ED 2/2 bladder distension.  Carroll placed, plans for void trial   Strict I's & O's  Follow up with outpatient urology.  Progressively worsening generalized weakness/physical deconditioning, 2/2 aging, fibromyalgia, chronic back pain,f multiple joint replacements: Patient unable to transfer self from wheelchair to other seats, was previously able to do so. Difficult bearing weight on right lower extremity. Reporting subjective weakness and worsening hip pain.  Ultimately left/right upper extremity weakness.  Currently resides at assisted living facility.   PT/OT consulted  Possible consideration for SNF placement at discharge  Neurology following  MRI cervical spine noted for straightening cervical lordosis between C4 and C5, C5 and C6, C6 and C7.  Increased signal intensity in radha possibly significant for ischemia.  MRI brain no acute findings.  Multiple  regions of increased signal intensity possibly significant for ischemia.  Neck flexion and extension imaging noted for straightening of normal cervical lordosis, minimal anterolisthesis of C2 relative to C3, cervical spondylosis at C3-C4, C4-C5, C5-C6, and C6-C7. Limited flexion/extension.  Acute on chronic pain, 2/2 fibromyalgia, chronic back pain, and multiple joint replacements:   PT/OT consulted  Pain management with tramadol and Lyrica  Hypotonic euvolemic hyponatremia, likely SIADH: Urine Osm of 278. Patient noted to be on  with mild bilateral ureterectasis, relative gaseous distention of bowel (no bowel inflammation or obstruction), large hiatal hernia. \"    1/27: Patient noted to be extremely fatigued during examination this morning, and also noted for right sided hip/groin pain. Patient also noted for nausea but no emesis, stating that it was 2/2 medication. Patient fell asleep multiple time during interview, but was able to be woken up and answer appropriately. Patient also notes numbness in right arm at this time, but otherwise has no complaints of chest pain, shortness of breath, or fevers/chills at this time.  1/28: Patient noted to be significant more alert and awake this morning.  Patient complaining of continued right hip pain and right leg weakness 2/2 pain patient otherwise no complaints this morning, no shortness of breath, chest pain, headache.    Subjective (past 24 hours):   Patient seen and examined at bedside. No acute overnight events.  Patient noted for continue right hip/groin pain, with radiation of pain to anteromedial aspect of thigh. Patient notes decreased strength with hip flexion, but has normal plantar and dorsiflexion.    ROS: reviewed complete ROS unchanged unless otherwise stated in hospital course/subjective portion.       Medications:  Reviewed    polyethylene glycol, 17 g, Oral, Daily    fosfomycin tromethamine, 3 g, Oral, Q3 Days    amLODIPine, 10 mg, Oral, Daily    calcium elemental, 500 mg, Oral, Daily    [Held by provider] ibuprofen, 600 mg, Oral, TID WC    enalapril, 10 mg, Oral, Daily    [Held by provider] ferrous sulfate, 325 mg, Oral, BID    [Held by provider] FLUoxetine, 40 mg, Oral, Daily    levothyroxine, 100 mcg, Oral, QAM AC    methenamine, 1 g, Oral, BID WC    metoprolol succinate, 50 mg, Oral, Daily    pantoprazole, 40 mg, Oral, BID AC    pregabalin, 150 mg, Oral, BID    sennosides-docusate sodium, 1 tablet, Oral, Daily    sucralfate, 1 g, Oral, 4x Daily AC & HS    sodium chloride

## 2024-01-29 NOTE — PROGRESS NOTES
07/25/20 2004   Oxygen Therapy   O2 Sat (%) 99 %   Pulse via Oximetry 147 beats per minute   O2 Device Room air   Baby remains on RA. Color pink. No apparent distress noted. O2 sat limits set %. HR set . O2 sat probe site changed to R foot by RN cord on bottom of foot. Pt is A & O*4. Speech is clear and understandable, hearing intact, good hygiene, yellowing of the toe nails. Pt stated 8/10 pain in right leg upon movement, stated it was sudden, sharp, as movement increased, stated it radiates to right upper thigh. PERRLA, sclera white, conjunctivitis pink and moist. Oral cavity was clear, notable dentures in the patients mouth in good repair. Tongue is pink and moist, buccal mucosa is pink and moist. Tongue has FROM. No hyperplasia noted. Trachea is midline, no presence of JVD. Irregular heart sounds noted. Apical pulse was 52. Lungs sounds are clear and diminished. Chest is symmetrical, skin is dry, warm, and tan. No cough noted.  No dyspnea, or retractions. Abdomen contour was distended, hard to palpate due to the distention of the abdomen, no pain upon light palpation. Bowel sounds in all four quadrants. Left leg has grade 5 strength, right leg has grade 4 strength. Grade 5 strength in hand grasp bilaterally. Pt has positive sensation in upper extremities. Chronic neuropathy in lower extremities bilaterally. Skin of upper extremities are warm, dry, tan, and smooth. Lower extremities are cool, dry, and pale. Cap refill was <3 seconds on all extremities. No abnormalities in skin turgor of hand. No edema in lower extremities. Pt has two lines, one located in the right forearm, and one located in the left hand. Urinary catheter was noted on pt. Not presence of wounds, incisions, or irritation noted on the pt's skin.

## 2024-01-29 NOTE — PROGRESS NOTES
[facility]  PHYSICAL THERAPY MISSED TREATMENT NOTE  [facility] 7K    Date: 2024  Patient Name: Paulina Gunter        MRN: [id number]   : 1940  (83 y.o.)  Gender: female                REASON FOR MISSED TREATMENT:  Pt eating dinner and was only recently returned to bed. Pt prefers to wait until tomorrow to complete PT evaluation.      Sofia Iverson, MPT [id number]

## 2024-01-29 NOTE — PROGRESS NOTES
pelvis with contrast. 3-D post processing. Comparison: CT/SR - CT ABDOMEN PELVIS W IV CONTRAST - 08/19/2023 08:25 PM EDT Findings: Aorta, mesenteric/renal arteries, and iliofemoral systems are patent with no stenosis. No aneurysm or dissection. Vascular tortuosity is present. Large hiatal hernia. Linear and bandlike foci of atelectasis at the lung bases. Small calcified granuloma within the liver. Two subcentimeter cysts are present within the left lobe of the liver. There are multiple calcified granulomas within the spleen. The pancreas and adrenal glands are unremarkable. There is a small left kidney cyst. There is mild bilateral ureterectasis but no hydronephrosis. There is severe diastasis of rectus musculature without pouching of the peritoneum. No bowel inflammation or bowel obstruction. Relative gaseous distention of bowel raising the possibility of ileus. Evaluation of pelvic contents is limited by metallic artifact. There is severe distention of the urinary bladder. There has been a prior hysterectomy. The appendix is not definitively seen. There are no secondary findings to suggest appendicitis. No acute fracture.     1. Severe distention of the urinary bladder. Mild bilateral ureterectasis. 2. Relative gaseous distention of bowel suggesting possible ileus. No bowel inflammation or bowel obstruction. 3. Large hiatal hernia. This document has been electronically signed by: Imelda Gilmore MD on 01/26/2024 08:28 PM All CTs at this facility use dose modulation techniques and iterative reconstructions, and/or weight-based dosing when appropriate to reduce radiation to a low as reasonably achievable. 3D Post-processing was performed on this study.    CTA HEAD W WO CONTRAST    Result Date: 1/26/2024  CT angiography head with contrast. 3D Postprocessing. Comparison: CT/SR - CT HEAD WO CONTRAST - 01/26/2024 04:49 PM EST Findings: Intracranial arteries are patent. No aneurysm, dissection, or occlusion. No abnormal

## 2024-01-29 NOTE — CARE COORDINATION
1/29/24, 1:30 PM EST  Discharge Planning Evaluation  Social work consult received, patient from [facility] assisted living.    Spoke with patient, her  and daughter.  Confirmed patient is from assisted living, agreeable to snf   Patient/Family preference is to return to [facility] or go to [facility].    Would patient be willing to go to a skilled facility if needed: yes.  Is there skilled care available at current facility: yes.  Barriers to return to current living situation: will need precert for ecf or increased services in AL  Spoke with admissions at the facility.  Patient bed hold: no  Anticipated transport plan: undetermined   Patient's Healthcare Decision Maker: Named in Scanned ACP Document    Post-acute (PAC) provider list was provided to patient. Patient was informed of their freedom to choose PAC provider. Discussed and offered to show the patient the relevant PAC Providers quality and resource use measures on Medicare Compare web site via computer based on patient's goals of care and treatment preferences. Questions regarding selection process were answered..  Readmission Risk Low 0-14, Mod 15-19), High > 20: Readmission Risk Score: 16.4    Current PCP: Claire Castillo APRN - CNP  PCP verified by CM? Yes    Patient Orientation: Alert and Oriented    Patient Cognition: Dementia / Early Alzheimer's  History Provided by: Significant Other, Patient    Advance Directives:      Code Status: Limited   Patient's Primary Decision Maker is:      Primary Decision Maker: Bruce Gunter - Spouse - [phone]    Secondary Decision Maker: SORAYA POSADA - Child - [phone]     Discharge Planning:    Patient lives with: Spouse/Significant Other Type of Home: Assisted living  Primary Care Giver: Other (Comment) (assisted living)  Patient Support Systems include: Spouse/Significant Other   Current Financial resources: has insurance   Current community resources: Assisted Living  Current services prior to

## 2024-01-30 ENCOUNTER — APPOINTMENT (OUTPATIENT)
Dept: CT IMAGING | Age: 84
End: 2024-01-30
Payer: MEDICARE

## 2024-01-30 LAB
ANION GAP SERPL CALC-SCNC: 10 MEQ/L (ref 8–16)
ANION GAP SERPL CALC-SCNC: 10 MEQ/L (ref 8–16)
ANION GAP SERPL CALC-SCNC: 11 MEQ/L (ref 8–16)
BUN SERPL-MCNC: 10 MG/DL (ref 7–22)
BUN SERPL-MCNC: 10 MG/DL (ref 7–22)
BUN SERPL-MCNC: 9 MG/DL (ref 7–22)
CALCIUM SERPL-MCNC: 8.7 MG/DL (ref 8.5–10.5)
CALCIUM SERPL-MCNC: 8.9 MG/DL (ref 8.5–10.5)
CALCIUM SERPL-MCNC: 9 MG/DL (ref 8.5–10.5)
CHLORIDE SERPL-SCNC: 92 MEQ/L (ref 98–111)
CHLORIDE SERPL-SCNC: 92 MEQ/L (ref 98–111)
CHLORIDE SERPL-SCNC: 94 MEQ/L (ref 98–111)
CO2 SERPL-SCNC: 23 MEQ/L (ref 23–33)
CO2 SERPL-SCNC: 23 MEQ/L (ref 23–33)
CO2 SERPL-SCNC: 24 MEQ/L (ref 23–33)
CREAT SERPL-MCNC: 0.5 MG/DL (ref 0.4–1.2)
CRP SERPL-MCNC: 0.5 MG/DL (ref 0–1)
DEPRECATED RDW RBC AUTO: 45.9 FL (ref 35–45)
ERYTHROCYTE [DISTWIDTH] IN BLOOD BY AUTOMATED COUNT: 12.9 % (ref 11.5–14.5)
ERYTHROCYTE [SEDIMENTATION RATE] IN BLOOD BY WESTERGREN METHOD: 18 MM/HR (ref 0–20)
GFR SERPL CREATININE-BSD FRML MDRD: > 60 ML/MIN/1.73M2
GLUCOSE SERPL-MCNC: 89 MG/DL (ref 70–108)
GLUCOSE SERPL-MCNC: 95 MG/DL (ref 70–108)
GLUCOSE SERPL-MCNC: 95 MG/DL (ref 70–108)
HCT VFR BLD AUTO: 41.2 % (ref 37–47)
HGB BLD-MCNC: 13.4 GM/DL (ref 12–16)
MAGNESIUM SERPL-MCNC: 1.6 MG/DL (ref 1.6–2.4)
MCH RBC QN AUTO: 31.1 PG (ref 26–33)
MCHC RBC AUTO-ENTMCNC: 32.5 GM/DL (ref 32.2–35.5)
MCV RBC AUTO: 95.6 FL (ref 81–99)
PLATELET # BLD AUTO: 126 THOU/MM3 (ref 130–400)
PMV BLD AUTO: 10.3 FL (ref 9.4–12.4)
POTASSIUM SERPL-SCNC: 4 MEQ/L (ref 3.5–5.2)
POTASSIUM SERPL-SCNC: 4.2 MEQ/L (ref 3.5–5.2)
POTASSIUM SERPL-SCNC: 4.2 MEQ/L (ref 3.5–5.2)
RBC # BLD AUTO: 4.31 MILL/MM3 (ref 4.2–5.4)
SODIUM SERPL-SCNC: 125 MEQ/L (ref 135–145)
SODIUM SERPL-SCNC: 127 MEQ/L (ref 135–145)
SODIUM SERPL-SCNC: 127 MEQ/L (ref 135–145)
WBC # BLD AUTO: 3.3 THOU/MM3 (ref 4.8–10.8)

## 2024-01-30 PROCEDURE — 97535 SELF CARE MNGMENT TRAINING: CPT

## 2024-01-30 PROCEDURE — 6370000000 HC RX 637 (ALT 250 FOR IP)

## 2024-01-30 PROCEDURE — 83735 ASSAY OF MAGNESIUM: CPT

## 2024-01-30 PROCEDURE — 86140 C-REACTIVE PROTEIN: CPT

## 2024-01-30 PROCEDURE — 72132 CT LUMBAR SPINE W/DYE: CPT

## 2024-01-30 PROCEDURE — 1200000000 HC SEMI PRIVATE

## 2024-01-30 PROCEDURE — 85651 RBC SED RATE NONAUTOMATED: CPT

## 2024-01-30 PROCEDURE — 2580000003 HC RX 258

## 2024-01-30 PROCEDURE — 99232 SBSQ HOSP IP/OBS MODERATE 35: CPT | Performed by: INTERNAL MEDICINE

## 2024-01-30 PROCEDURE — 97530 THERAPEUTIC ACTIVITIES: CPT

## 2024-01-30 PROCEDURE — 85027 COMPLETE CBC AUTOMATED: CPT

## 2024-01-30 PROCEDURE — 72193 CT PELVIS W/DYE: CPT

## 2024-01-30 PROCEDURE — 97162 PT EVAL MOD COMPLEX 30 MIN: CPT

## 2024-01-30 PROCEDURE — 6370000000 HC RX 637 (ALT 250 FOR IP): Performed by: INTERNAL MEDICINE

## 2024-01-30 PROCEDURE — 6360000004 HC RX CONTRAST MEDICATION

## 2024-01-30 PROCEDURE — 6360000002 HC RX W HCPCS

## 2024-01-30 PROCEDURE — 51798 US URINE CAPACITY MEASURE: CPT

## 2024-01-30 PROCEDURE — 97110 THERAPEUTIC EXERCISES: CPT

## 2024-01-30 PROCEDURE — 36415 COLL VENOUS BLD VENIPUNCTURE: CPT

## 2024-01-30 PROCEDURE — 80048 BASIC METABOLIC PNL TOTAL CA: CPT

## 2024-01-30 RX ORDER — 0.9 % SODIUM CHLORIDE 0.9 %
1000 INTRAVENOUS SOLUTION INTRAVENOUS ONCE
Status: COMPLETED | OUTPATIENT
Start: 2024-01-30 | End: 2024-01-30

## 2024-01-30 RX ORDER — SODIUM CHLORIDE 9 MG/ML
INJECTION, SOLUTION INTRAVENOUS CONTINUOUS
Status: ACTIVE | OUTPATIENT
Start: 2024-01-30 | End: 2024-01-31

## 2024-01-30 RX ADMIN — SUCRALFATE 1 G: 1 TABLET ORAL at 11:19

## 2024-01-30 RX ADMIN — SODIUM CHLORIDE: 9 INJECTION, SOLUTION INTRAVENOUS at 22:33

## 2024-01-30 RX ADMIN — PREGABALIN 150 MG: 150 CAPSULE ORAL at 20:13

## 2024-01-30 RX ADMIN — TRAMADOL HYDROCHLORIDE 100 MG: 50 TABLET, COATED ORAL at 03:42

## 2024-01-30 RX ADMIN — SUCRALFATE 1 G: 1 TABLET ORAL at 15:51

## 2024-01-30 RX ADMIN — DICLOFENAC SODIUM 2 G: 10 GEL TOPICAL at 20:14

## 2024-01-30 RX ADMIN — ENOXAPARIN SODIUM 40 MG: 100 INJECTION SUBCUTANEOUS at 07:55

## 2024-01-30 RX ADMIN — TRAMADOL HYDROCHLORIDE 100 MG: 50 TABLET, COATED ORAL at 22:34

## 2024-01-30 RX ADMIN — ENALAPRIL MALEATE 10 MG: 10 TABLET ORAL at 07:54

## 2024-01-30 RX ADMIN — GRANULES FOR ORAL SOLUTION 1 PACKET: 3 POWDER ORAL at 14:20

## 2024-01-30 RX ADMIN — METOPROLOL SUCCINATE 50 MG: 50 TABLET, EXTENDED RELEASE ORAL at 07:55

## 2024-01-30 RX ADMIN — SUCRALFATE 1 G: 1 TABLET ORAL at 20:13

## 2024-01-30 RX ADMIN — PREGABALIN 150 MG: 150 CAPSULE ORAL at 07:54

## 2024-01-30 RX ADMIN — POLYETHYLENE GLYCOL 3350 17 G: 17 POWDER, FOR SOLUTION ORAL at 07:55

## 2024-01-30 RX ADMIN — DOCUSATE SODIUM 50 MG AND SENNOSIDES 8.6 MG 1 TABLET: 8.6; 5 TABLET, FILM COATED ORAL at 07:54

## 2024-01-30 RX ADMIN — SUCRALFATE 1 G: 1 TABLET ORAL at 07:54

## 2024-01-30 RX ADMIN — DICLOFENAC SODIUM 2 G: 10 GEL TOPICAL at 07:55

## 2024-01-30 RX ADMIN — SODIUM CHLORIDE 1000 ML: 9 INJECTION, SOLUTION INTRAVENOUS at 12:23

## 2024-01-30 RX ADMIN — SODIUM CHLORIDE, PRESERVATIVE FREE 10 ML: 5 INJECTION INTRAVENOUS at 07:56

## 2024-01-30 RX ADMIN — LEVOTHYROXINE SODIUM 100 MCG: 0.1 TABLET ORAL at 05:45

## 2024-01-30 RX ADMIN — CALCIUM 500 MG: 500 TABLET ORAL at 07:55

## 2024-01-30 RX ADMIN — PANTOPRAZOLE SODIUM 40 MG: 40 TABLET, DELAYED RELEASE ORAL at 07:54

## 2024-01-30 RX ADMIN — METHENAMINE HIPPURATE 1 G: 1 TABLET ORAL at 07:54

## 2024-01-30 RX ADMIN — IOPAMIDOL 80 ML: 755 INJECTION, SOLUTION INTRAVENOUS at 13:02

## 2024-01-30 RX ADMIN — METHENAMINE HIPPURATE 1 G: 1 TABLET ORAL at 15:51

## 2024-01-30 RX ADMIN — AMLODIPINE BESYLATE 10 MG: 10 TABLET ORAL at 07:54

## 2024-01-30 RX ADMIN — PANTOPRAZOLE SODIUM 40 MG: 40 TABLET, DELAYED RELEASE ORAL at 15:51

## 2024-01-30 RX ADMIN — TRAMADOL HYDROCHLORIDE 100 MG: 50 TABLET, COATED ORAL at 12:08

## 2024-01-30 RX ADMIN — SODIUM CHLORIDE, PRESERVATIVE FREE 10 ML: 5 INJECTION INTRAVENOUS at 20:19

## 2024-01-30 ASSESSMENT — PAIN SCALES - GENERAL
PAINLEVEL_OUTOF10: 7
PAINLEVEL_OUTOF10: 9
PAINLEVEL_OUTOF10: 5
PAINLEVEL_OUTOF10: 7

## 2024-01-30 ASSESSMENT — PAIN DESCRIPTION - LOCATION: LOCATION: LEG

## 2024-01-30 ASSESSMENT — PAIN DESCRIPTION - ORIENTATION: ORIENTATION: RIGHT

## 2024-01-30 ASSESSMENT — PAIN - FUNCTIONAL ASSESSMENT: PAIN_FUNCTIONAL_ASSESSMENT: ACTIVITIES ARE NOT PREVENTED

## 2024-01-30 ASSESSMENT — PAIN DESCRIPTION - DESCRIPTORS: DESCRIPTORS: ACHING

## 2024-01-30 NOTE — DISCHARGE INSTR - COC
Continuity of Care Form    Patient Name: Paulina Gunter   :  1940  MRN:  [id number]    Admit date:  2024  Discharge date:  2/3/24    Code Status Order: Limited   Advance Directives:     Admitting Physician:  Juan Schneider DO  PCP: Claire Castillo APRN - CNP    Discharging Nurse: catrachita  Discharging Hospital Unit/Room#: 7K-06/006-A  Discharging Unit Phone Number: [phone]    Emergency Contact:   Extended Emergency Contact Information  Primary Emergency Contact: Bruce Gunter  Address: [address] [place] [id number] [facility]  Home Phone: [phone]  Mobile Phone: [phone]  Relation: Spouse  Secondary Emergency Contact: SORAYA POSADA  Address: [place]            [facility]  Home Phone: [phone]  Mobile Phone: [phone]  Relation: Child    Past Surgical History:  Past Surgical History:   Procedure Laterality Date    APPENDECTOMY      BACK SURGERY      CARPAL TUNNEL RELEASE Bilateral     w/cubital tunnel    COLON SURGERY  2016    Procedure: ATTEMPTED DAVINCI XI ROBOTIC ASSISTED SIGMOID COLON RESECTION, ROBOTIC ASSISTED MOBILIZATION OF RECTAL SIGMOID TO SPLENIC FLEXURE, CONVERTED TO OPEN LEFT HEMICOLECTOMY WITH COLOPROCTOCTOMY, SPLENORRHAPHY, RIGID SIGMOIDOSCOPY, LASER EVALUATION OF VASCULARITY WITH ICG; Surgeon: Drew Martines MD; Location: [facility]; Service: General    COLONOSCOPY  2012    CYST REMOVAL      EYE SURGERY  ,     Cataract    FEMUR FRACTURE SURGERY Left 2020    LEFT FEMUR OPEN REDUCTION INTERNAL FIXATION performed by Sudhir Austin MD at [facility] OR    FOOT SURGERY Left     HIATAL HERNIA REPAIR N/A 2021    ROBOTIC EXTENSIVE LYSIS OF ADHESIONS, ROBOTIC REDUCTION OF HIATAL HERNIA WITH GASTROPEXY performed by Lauro Grullon MD at [facility] OR    HYSTERECTOMY (CERVIX STATUS UNKNOWN)      w/bladder suspension    JOINT REPLACEMENT  , , 2009    rt hip(), lt

## 2024-01-30 NOTE — CARE COORDINATION
1/30/24, 1:22 PM EST    DISCHARGE PLANNING EVALUATION    This SW received a call from Vicente with [facility], states that patient's precert has been approved through 2/01. Per provider, patient is not ready to discharge today. Will continue to follow.

## 2024-01-30 NOTE — PROGRESS NOTES
[facility]  INPATIENT PHYSICAL THERAPY  EVALUATION  [facility] ORTHOPEDICS 7K - 7K-06/006-A    Time In: 1552  Time Out: 1623  Timed Code Treatment Minutes: 23 Minutes  Minutes: 31          Date: 2024  Patient Name: Paulina BERNAL Height,  Gender:  female        MRN: [id number]  : 1940  (83 y.o.)      Referring Practitioner: Latricia Cristina MD  Diagnosis: Age related physical debility  Additional Pertinent Hx: per chart review; Today while she was in the bathroom trying to get up, she noticed her right leg buckle and give out. She was able to get herself up using her wheelchair and call for help. States that she has had similar pains before and has chronic pain, but this episode was worse than usual and she is unable to transfer herself between wheelchair and other seats. Also states that this morning she was having some subjective right arm weakness that began this afternoon, but had been having similar episodes throughout the week. On exam strength is 5/5 in all extremities, no drift noted. She denies chest pain, SOB, headaches, N/V, dizziness, dysuria, changes in vision.   Upon return to ED to speak with pt, RN notified me that pt had sudden onset lightheadedness, nausea, diaphoresis, and BP dropped to 68/50 and 81/51. Notably, CT A/P showed severe bladder distention, with estimated >1L urinary retention. Pt only had external catheter in place at that time. Dr. Lakshmi kapadia as well, instructed RN to place Timmons catheter as this was likely a vasovagal response to severe urinary retention. Timmons placed, pt reporting improvement in symptoms.     Restrictions/Precautions:  Restrictions/Precautions: General Precautions, Contact Precautions, Fall Risk  Position Activity Restriction  Other position/activity restrictions: low vision. states she has a leg length discrepancy.    Subjective:  Chart Reviewed: Yes  Patient assessed for rehabilitation services?: Yes  Subjective: Pt resting in bed and agrees

## 2024-01-30 NOTE — PROGRESS NOTES
Internal Medicine Resident Progress Note    Name: Paulina Gunter, female, : 1940, MRN: [id number]    PCP: Claire Castillo APRN - CNP    Date of Admission: 2024  Date of Service: Pt seen/examined on 24      Assessment/Plan:  Progressively worsening generalized weakness/physical deconditioning, 2/2 aging, fibromyalgia, chronic back pain,f multiple joint replacements: Patient unable to transfer self from wheelchair to other seats, was previously able to do so. Difficult bearing weight on right lower extremity. Reporting subjective weakness and worsening hip pain.  Ultimately left/right upper extremity weakness.  Currently resides at assisted living facility.   PT/OT consulted  Possible consideration for SNF placement at discharge  Neurology following  MRI cervical spine noted for straightening cervical lordosis between C4 and C5, C5 and C6, C6 and C7.  Increased signal intensity in radha possibly significant for ischemia.  MRI brain no acute findings.  Multiple  regions of increased signal intensity possibly significant for ischemia.  Neck flexion and extension imaging noted for straightening of normal cervical lordosis, minimal anterolisthesis of C2 relative to C3, cervical spondylosis at C3-C4, C4-C5, C5-C6, and C6-C7. Limited flexion/extension.  Patient noted for worsening weakness in right leg , no hip flexion, knee flexion/extension, dorsi/plantarflexion.  CT pelvis, negative for fracture/hardware complications. Bilateral hip arthroplasties intact.  CT lumbar spine noted for foraminal stenosis T11-S1, right worse than left.  Recommend continued PT/OT, patient noted to have previously declined any surgery   Acute on chronic urinary retention, iso Hx of recurrent UTIs: patient noted for markedly distended bladder on admission, carroll placed and draining 2.5L overnight. Patient follows with urology outpatient and noted for history of repeated UTI as noted below. Noted for vasovagal  episode in ED 2/2 bladder distension.  Timmons removed, will void trial today  Strict I's & O's  Follow up with outpatient urology.  Hypotonic euvolemic hyponatremia, likely SIADH: Urine Osm of 278. Patient noted to be on fluoxetine and NSAIDs which may be triggers.  Holding ibuprofen and fluoxetine  Patient hyponatremia not improved with fluid restriction, will try 1L fluid challenge and repeat BMP four hours later  Acute on chronic pain, 2/2 fibromyalgia, chronic back pain, and multiple joint replacements:   PT/OT consulted  Pain management with tramadol and Lyrica  Asymptomatic bacteriuria: patient noted for history of recurrent UTI with MDRO. Prior urine culture on 8/19/2023 grew pan-resistant Klebsiella sensitive to Merrem, Zosyn, and gentamicin, and 10/24/2023 grew E faecalis sensitive to ampicillin, PCN, and nitrofurantoin.  UA positive for blood and leukocyte esterase.   Urine cultures positive for Corynebacterium and enteric gram negative bacilli  Continue Fosfomycin and Hiprex  Tylenol as needed  Hypomagnesemia: Patient noted for continue hypomagnesemia throughout admission.  Patient noted for large volume diarrhea overnight 1/29  Decrease bowel regimen  Patient also on PPI which may result in hypomagnesemia. If still present tomorrow, will consider changing from Protonix to Nexium or H2 blocker  New onset fatigue, improving: Patient noted for significant fatigue this morning, difficulty staying awake during examination. Repeat examination hours late noted for similar findings. Of note, patient was started on Dilaudid for pain management inpatient, possibly 2/2 medication.  TSH 9.790. fT4 1.14.  From Dilaudid to tramadol  Will consider decreasing Lyrica if fatigue continues  Neutropenia: previously noted by PCP, patient to follow up with Dr. Cruz for further management  Will continue to monitor  Resting tremor: previously noted by PCP and patient has outpatient referral to neurology  Will continue to

## 2024-01-30 NOTE — CARE COORDINATION
1/30/24, 1:20 PM EST    DISCHARGE ON GOING EVALUATION    Paulina Gunter       Hospital day: 4  Location: -06/006-A Reason for admit: Bladder distension [N32.89]  Ileus (HCC) [K56.7]  Age-related physical debility [R54]  Acute right hip pain [M25.551]  Stroke-like episode [R29.90]   Procedure:   1/26 CTA A/P: Severe distention of the urinary bladder. Mild bilateral ureterectasis.  2. Relative gaseous distention of bowel suggesting possible ileus. No  bowel inflammation or bowel obstruction. 3. Large hiatal hernia  1/28 MRI Brain WO: There are areas of abnormal signal intensity in the white matter, radha, basal ganglia and thalami bilaterally most likely representing ischemic changes.  2. There is no evidence for an acute infarct. 3. There is mild atrophy. 4. There are inflammatory changes in the ethmoid air cells and mastoid air cells bilaterally and in the sphenoid sinus  1/28 MRI Cervical: Straightening of the normal cervical lordosis with superimposed cervical spondylosis at C4-5, C5-6 and C6-7. There is no significant cord compression or foraminal stenosis at any level. 2. Increased signal intensity in the radha most likely representing ischemic changes.  1/29 XR Cervical: Straightening of the normal cervical lordosis. 2. Minimal anterolisthesis of C2 relative to C3. Cervical spondylosis at C3-4, C4-5, C5-6 and to lesser extent C6-7. 3. Limited mobility on the flexion and extension lateral views. No instability noted.  1/29 KUB: Mildly gassy abdomen. No evidence for constipation or ileus.   1/30 CT Lumbar Spine: ordered   1/30 CT Pelvis: ordered     Barriers to Discharge: Hospitalist and Neurology following. PT/OT. Timmons removed today. C/o increased leg pain/weakness today CT Lumbar Spine and Pelvis complete; results pending. Voltaren gel. Monurol pack. Lidocaine patch. Bowel regimen. Tramadol prn. 1L fluid bolus today.     PCP: Claire Castillo APRN - CNP  Readmission Risk Score: 15.4%  Patient  Goals/Plan/Treatment Preferences: From [facility]. Planning [facility]. Precert approved. Will need transport. SW following.

## 2024-01-30 NOTE — PROGRESS NOTES
[facility] ORTHOPEDICS 7K  Occupational Therapy  Daily Note  Time:   Time In: 810  Time Out: 833  Timed Code Treatment Minutes: 23 Minutes  Minutes: 23          Date: 2024  Patient Name: Paulina BERNAL Height,   Gender: female      Room: [facility]006-A  MRN: [id number]  : 1940  (83 y.o.)  Referring Practitioner: Latricia Cristina MD  Diagnosis: age related physical debility  Additional Pertinent Hx: per chart review; Today while she was in the bathroom trying to get up, she noticed her right leg buckle and give out. She was able to get herself up using her wheelchair and call for help. States that she has had similar pains before and has chronic pain, but this episode was worse than usual and she is unable to transfer herself between wheelchair and other seats. Also states that this morning she was having some subjective right arm weakness that began this afternoon, but had been having similar episodes throughout the week. On exam strength is 5/5 in all extremities, no drift noted. She denies chest pain, SOB, headaches, N/V, dizziness, dysuria, changes in vision.   Upon return to ED to speak with pt, RN notified me that pt had sudden onset lightheadedness, nausea, diaphoresis, and BP dropped to 68/50 and 81/51. Notably, CT A/P showed severe bladder distention, with estimated >1L urinary retention. Pt only had external catheter in place at that time. Dr. Lakshmi kapadia as well, instructed RN to place Timmons catheter as this was likely a vasovagal response to severe urinary retention. Timmons placed, pt reporting improvement in symptoms.      ED course: Tmax 97.8, RR 18, HR 57, /84, SpO2 95% RA. Na 131, Cl 94, Mg 1.5, WBC 4.0. UA - small amount blood (0-2), small amount LE, WBC (15-25), few bacteria. Xray right hip and pelvis - negative for fracture, no hardware complication. CT H+N - no acute findings. CTA head - negative for abnormalities. CT A/P - severe bladder distention with mild

## 2024-01-30 NOTE — PLAN OF CARE
Problem: Skin/Tissue Integrity  Goal: Absence of new skin breakdown  Description: 1.  Monitor for areas of redness and/or skin breakdown  2.  Assess vascular access sites hourly  3.  Every 4-6 hours minimum:  Change oxygen saturation probe site  4.  Every 4-6 hours:  If on nasal continuous positive airway pressure, respiratory therapy assess nares and determine need for appliance change or resting period.  Outcome: Progressing     Problem: Chronic Conditions and Co-morbidities  Goal: Patient's chronic conditions and co-morbidity symptoms are monitored and maintained or improved  Outcome: Progressing  Flowsheets (Taken 1/29/2024 1942)  Care Plan - Patient's Chronic Conditions and Co-Morbidity Symptoms are Monitored and Maintained or Improved: Monitor and assess patient's chronic conditions and comorbid symptoms for stability, deterioration, or improvement     Problem: Musculoskeletal - Adult  Goal: Return mobility to safest level of function  Outcome: Progressing  Flowsheets (Taken 1/29/2024 1942)  Return Mobility to Safest Level of Function: Assess patient stability and activity tolerance for standing, transferring and ambulating with or without assistive devices

## 2024-01-31 LAB
ANION GAP SERPL CALC-SCNC: 11 MEQ/L (ref 8–16)
BUN SERPL-MCNC: 9 MG/DL (ref 7–22)
CALCIUM SERPL-MCNC: 8.7 MG/DL (ref 8.5–10.5)
CHLORIDE SERPL-SCNC: 96 MEQ/L (ref 98–111)
CO2 SERPL-SCNC: 24 MEQ/L (ref 23–33)
CORTIS SERPL-MCNC: 8.29 UG/DL
CORTISOL COLLECTION INFO: NORMAL
CREAT SERPL-MCNC: 0.4 MG/DL (ref 0.4–1.2)
DEPRECATED RDW RBC AUTO: 44.7 FL (ref 35–45)
ERYTHROCYTE [DISTWIDTH] IN BLOOD BY AUTOMATED COUNT: 13 % (ref 11.5–14.5)
ERYTHROCYTE [SEDIMENTATION RATE] IN BLOOD BY WESTERGREN METHOD: 16 MM/HR (ref 0–20)
GFR SERPL CREATININE-BSD FRML MDRD: > 60 ML/MIN/1.73M2
GLUCOSE SERPL-MCNC: 90 MG/DL (ref 70–108)
HCT VFR BLD AUTO: 39.8 % (ref 37–47)
HGB BLD-MCNC: 13.3 GM/DL (ref 12–16)
MAGNESIUM SERPL-MCNC: 1.4 MG/DL (ref 1.6–2.4)
MCH RBC QN AUTO: 31.4 PG (ref 26–33)
MCHC RBC AUTO-ENTMCNC: 33.4 GM/DL (ref 32.2–35.5)
MCV RBC AUTO: 94.1 FL (ref 81–99)
PLATELET # BLD AUTO: 145 THOU/MM3 (ref 130–400)
PMV BLD AUTO: 10.8 FL (ref 9.4–12.4)
POTASSIUM SERPL-SCNC: 4 MEQ/L (ref 3.5–5.2)
RBC # BLD AUTO: 4.23 MILL/MM3 (ref 4.2–5.4)
SODIUM SERPL-SCNC: 131 MEQ/L (ref 135–145)
WBC # BLD AUTO: 3.2 THOU/MM3 (ref 4.8–10.8)

## 2024-01-31 PROCEDURE — 6360000002 HC RX W HCPCS

## 2024-01-31 PROCEDURE — 97110 THERAPEUTIC EXERCISES: CPT

## 2024-01-31 PROCEDURE — 80048 BASIC METABOLIC PNL TOTAL CA: CPT

## 2024-01-31 PROCEDURE — 6370000000 HC RX 637 (ALT 250 FOR IP): Performed by: INTERNAL MEDICINE

## 2024-01-31 PROCEDURE — 6370000000 HC RX 637 (ALT 250 FOR IP)

## 2024-01-31 PROCEDURE — 85651 RBC SED RATE NONAUTOMATED: CPT

## 2024-01-31 PROCEDURE — 1200000000 HC SEMI PRIVATE

## 2024-01-31 PROCEDURE — 97530 THERAPEUTIC ACTIVITIES: CPT

## 2024-01-31 PROCEDURE — 85027 COMPLETE CBC AUTOMATED: CPT

## 2024-01-31 PROCEDURE — 82533 TOTAL CORTISOL: CPT

## 2024-01-31 PROCEDURE — 99232 SBSQ HOSP IP/OBS MODERATE 35: CPT | Performed by: STUDENT IN AN ORGANIZED HEALTH CARE EDUCATION/TRAINING PROGRAM

## 2024-01-31 PROCEDURE — 2580000003 HC RX 258

## 2024-01-31 PROCEDURE — 83735 ASSAY OF MAGNESIUM: CPT

## 2024-01-31 PROCEDURE — 36415 COLL VENOUS BLD VENIPUNCTURE: CPT

## 2024-01-31 PROCEDURE — 84295 ASSAY OF SERUM SODIUM: CPT

## 2024-01-31 PROCEDURE — 6360000002 HC RX W HCPCS: Performed by: STUDENT IN AN ORGANIZED HEALTH CARE EDUCATION/TRAINING PROGRAM

## 2024-01-31 RX ORDER — MAGNESIUM SULFATE IN WATER 40 MG/ML
4000 INJECTION, SOLUTION INTRAVENOUS ONCE
Status: COMPLETED | OUTPATIENT
Start: 2024-01-31 | End: 2024-01-31

## 2024-01-31 RX ORDER — METOCLOPRAMIDE HYDROCHLORIDE 5 MG/ML
10 INJECTION INTRAMUSCULAR; INTRAVENOUS ONCE
Status: COMPLETED | OUTPATIENT
Start: 2024-01-31 | End: 2024-01-31

## 2024-01-31 RX ADMIN — SUCRALFATE 1 G: 1 TABLET ORAL at 20:26

## 2024-01-31 RX ADMIN — DICLOFENAC SODIUM 2 G: 10 GEL TOPICAL at 10:40

## 2024-01-31 RX ADMIN — DOCUSATE SODIUM 100 MG: 100 CAPSULE, LIQUID FILLED ORAL at 10:43

## 2024-01-31 RX ADMIN — METOPROLOL SUCCINATE 50 MG: 50 TABLET, EXTENDED RELEASE ORAL at 10:43

## 2024-01-31 RX ADMIN — CALCIUM 500 MG: 500 TABLET ORAL at 10:43

## 2024-01-31 RX ADMIN — SUCRALFATE 1 G: 1 TABLET ORAL at 05:59

## 2024-01-31 RX ADMIN — LEVOTHYROXINE SODIUM 100 MCG: 0.1 TABLET ORAL at 04:32

## 2024-01-31 RX ADMIN — DOCUSATE SODIUM 50 MG AND SENNOSIDES 8.6 MG 1 TABLET: 8.6; 5 TABLET, FILM COATED ORAL at 10:42

## 2024-01-31 RX ADMIN — PREGABALIN 150 MG: 150 CAPSULE ORAL at 10:43

## 2024-01-31 RX ADMIN — TRAMADOL HYDROCHLORIDE 100 MG: 50 TABLET, COATED ORAL at 04:32

## 2024-01-31 RX ADMIN — PANTOPRAZOLE SODIUM 40 MG: 40 TABLET, DELAYED RELEASE ORAL at 15:58

## 2024-01-31 RX ADMIN — PREGABALIN 150 MG: 150 CAPSULE ORAL at 20:26

## 2024-01-31 RX ADMIN — MAGNESIUM SULFATE HEPTAHYDRATE 4000 MG: 40 INJECTION, SOLUTION INTRAVENOUS at 10:54

## 2024-01-31 RX ADMIN — METOCLOPRAMIDE 10 MG: 5 INJECTION, SOLUTION INTRAMUSCULAR; INTRAVENOUS at 20:26

## 2024-01-31 RX ADMIN — METHENAMINE HIPPURATE 1 G: 1 TABLET ORAL at 15:58

## 2024-01-31 RX ADMIN — PANTOPRAZOLE SODIUM 40 MG: 40 TABLET, DELAYED RELEASE ORAL at 05:59

## 2024-01-31 RX ADMIN — AMLODIPINE BESYLATE 10 MG: 10 TABLET ORAL at 10:43

## 2024-01-31 RX ADMIN — SODIUM CHLORIDE, PRESERVATIVE FREE 10 ML: 5 INJECTION INTRAVENOUS at 20:26

## 2024-01-31 RX ADMIN — TRAMADOL HYDROCHLORIDE 100 MG: 50 TABLET, COATED ORAL at 17:46

## 2024-01-31 RX ADMIN — SODIUM CHLORIDE, PRESERVATIVE FREE 10 ML: 5 INJECTION INTRAVENOUS at 10:39

## 2024-01-31 RX ADMIN — SUCRALFATE 1 G: 1 TABLET ORAL at 10:43

## 2024-01-31 RX ADMIN — TRAMADOL HYDROCHLORIDE 100 MG: 50 TABLET, COATED ORAL at 10:42

## 2024-01-31 RX ADMIN — POLYETHYLENE GLYCOL 3350 17 G: 17 POWDER, FOR SOLUTION ORAL at 10:46

## 2024-01-31 RX ADMIN — DICLOFENAC SODIUM 2 G: 10 GEL TOPICAL at 20:27

## 2024-01-31 RX ADMIN — METHENAMINE HIPPURATE 1 G: 1 TABLET ORAL at 10:43

## 2024-01-31 RX ADMIN — ENALAPRIL MALEATE 10 MG: 10 TABLET ORAL at 10:43

## 2024-01-31 ASSESSMENT — PAIN SCALES - GENERAL
PAINLEVEL_OUTOF10: 7
PAINLEVEL_OUTOF10: 6
PAINLEVEL_OUTOF10: 7
PAINLEVEL_OUTOF10: 6
PAINLEVEL_OUTOF10: 7

## 2024-01-31 ASSESSMENT — PAIN - FUNCTIONAL ASSESSMENT
PAIN_FUNCTIONAL_ASSESSMENT: PREVENTS OR INTERFERES SOME ACTIVE ACTIVITIES AND ADLS
PAIN_FUNCTIONAL_ASSESSMENT: ACTIVITIES ARE NOT PREVENTED
PAIN_FUNCTIONAL_ASSESSMENT: PREVENTS OR INTERFERES SOME ACTIVE ACTIVITIES AND ADLS
PAIN_FUNCTIONAL_ASSESSMENT: PREVENTS OR INTERFERES SOME ACTIVE ACTIVITIES AND ADLS
PAIN_FUNCTIONAL_ASSESSMENT: ACTIVITIES ARE NOT PREVENTED

## 2024-01-31 ASSESSMENT — PAIN DESCRIPTION - LOCATION
LOCATION: LEG
LOCATION: BACK
LOCATION: LEG
LOCATION: BACK

## 2024-01-31 ASSESSMENT — PAIN DESCRIPTION - ORIENTATION
ORIENTATION: POSTERIOR;RIGHT;LOWER
ORIENTATION: RIGHT
ORIENTATION: POSTERIOR;LOWER;RIGHT
ORIENTATION: RIGHT

## 2024-01-31 ASSESSMENT — PAIN DESCRIPTION - PAIN TYPE
TYPE: CHRONIC PAIN

## 2024-01-31 ASSESSMENT — PAIN DESCRIPTION - DESCRIPTORS
DESCRIPTORS: ACHING

## 2024-01-31 ASSESSMENT — PAIN DESCRIPTION - ONSET: ONSET: ON-GOING

## 2024-01-31 ASSESSMENT — PAIN DESCRIPTION - FREQUENCY: FREQUENCY: CONTINUOUS

## 2024-01-31 NOTE — PLAN OF CARE
Problem: Skin/Tissue Integrity  Goal: Absence of new skin breakdown  Description: 1.  Monitor for areas of redness and/or skin breakdown  2.  Assess vascular access sites hourly  3.  Every 4-6 hours minimum:  Change oxygen saturation probe site  4.  Every 4-6 hours:  If on nasal continuous positive airway pressure, respiratory therapy assess nares and determine need for appliance change or resting period.  Outcome: Progressing     Problem: Chronic Conditions and Co-morbidities  Goal: Patient's chronic conditions and co-morbidity symptoms are monitored and maintained or improved  Outcome: Progressing  Flowsheets (Taken 1/30/2024 2009)  Care Plan - Patient's Chronic Conditions and Co-Morbidity Symptoms are Monitored and Maintained or Improved: Monitor and assess patient's chronic conditions and comorbid symptoms for stability, deterioration, or improvement     Problem: Musculoskeletal - Adult  Goal: Return mobility to safest level of function  Outcome: Progressing  Flowsheets (Taken 1/30/2024 2009)  Return Mobility to Safest Level of Function: Assist with transfers and ambulation using safe patient handling equipment as needed

## 2024-01-31 NOTE — PROGRESS NOTES
[facility]  INPATIENT PHYSICAL THERAPY  DAILY NOTE  [facility] ORTHOPEDICS 7K - 7K-06/006-A     Time In: 1510  Time Out: 1528  Timed Code Treatment Minutes: 18 Minutes  Minutes: 18          Date: 2024  Patient Name: Pualina Gunter,  Gender:  female        MRN: [id number]  : 1940  (83 y.o.)     Referring Practitioner: Latricia Cristina MD  Diagnosis: Age related physical debility  Additional Pertinent Hx: per chart review; Today while she was in the bathroom trying to get up, she noticed her right leg buckle and give out. She was able to get herself up using her wheelchair and call for help. States that she has had similar pains before and has chronic pain, but this episode was worse than usual and she is unable to transfer herself between wheelchair and other seats. Also states that this morning she was having some subjective right arm weakness that began this afternoon, but had been having similar episodes throughout the week. On exam strength is 5/5 in all extremities, no drift noted. She denies chest pain, SOB, headaches, N/V, dizziness, dysuria, changes in vision.   Upon return to ED to speak with pt, RN notified me that pt had sudden onset lightheadedness, nausea, diaphoresis, and BP dropped to 68/50 and 81/51. Notably, CT A/P showed severe bladder distention, with estimated >1L urinary retention. Pt only had external catheter in place at that time. Dr. Lakshmi kapadia as well, instructed RN to place Timmons catheter as this was likely a vasovagal response to severe urinary retention. Timmons placed, pt reporting improvement in symptoms.     Prior Level of Function:  Lives With: Spouse  Type of Home: Assisted living  Home Layout: Multi-level (lives on first floor)  Home Access: Level entry  Home Equipment: Wheelchair-manual, Wheelchair-electric, Walker, rolling   Bathroom Shower/Tub: Walk-in shower  Bathroom Toilet: Handicap height  Bathroom Equipment: Grab bars in shower, Shower chair, Grab

## 2024-01-31 NOTE — PROGRESS NOTES
2350: Notified resident on call of patient not voiding in 8 hours. Bladder scan showed 999+mls  in patients bladder. Received verbal order to re anchor carroll.

## 2024-01-31 NOTE — CARE COORDINATION
1/31/24, 9:14 AM EST    DISCHARGE PLANNING EVALUATION    Spoke with Dr. Suazo on the phone this morning about patient's discharge, states that she is not quite ready today due to orthostatic hypertension. Reports maybe being ready tomorrow. Will continue to follow.

## 2024-01-31 NOTE — PROGRESS NOTES
[facility] ORTHOPEDICS 7K  Occupational Therapy  Daily Note  Time:   Time In: 1417  Time Out: 1500  Timed Code Treatment Minutes: 43 Minutes  Minutes: 43          Date: 2024  Patient Name: Paulina BERNAL Height,   Gender: female      Room: [facility]006-A  MRN: [id number]  : 1940  (83 y.o.)  Referring Practitioner: Latricia Cristina MD  Diagnosis: age related physical debility  Additional Pertinent Hx: per chart review; Today while she was in the bathroom trying to get up, she noticed her right leg buckle and give out. She was able to get herself up using her wheelchair and call for help. States that she has had similar pains before and has chronic pain, but this episode was worse than usual and she is unable to transfer herself between wheelchair and other seats. Also states that this morning she was having some subjective right arm weakness that began this afternoon, but had been having similar episodes throughout the week. On exam strength is 5/5 in all extremities, no drift noted. She denies chest pain, SOB, headaches, N/V, dizziness, dysuria, changes in vision.   Upon return to ED to speak with pt, RN notified me that pt had sudden onset lightheadedness, nausea, diaphoresis, and BP dropped to 68/50 and 81/51. Notably, CT A/P showed severe bladder distention, with estimated >1L urinary retention. Pt only had external catheter in place at that time. Dr. Lakshmi kapadia as well, instructed RN to place Timmons catheter as this was likely a vasovagal response to severe urinary retention. Timmons placed, pt reporting improvement in symptoms.      ED course: Tmax 97.8, RR 18, HR 57, /84, SpO2 95% RA. Na 131, Cl 94, Mg 1.5, WBC 4.0. UA - small amount blood (0-2), small amount LE, WBC (15-25), few bacteria. Xray right hip and pelvis - negative for fracture, no hardware complication. CT H+N - no acute findings. CTA head - negative for abnormalities. CT A/P - severe bladder distention with mild  bilateral ureterectasis, relative gaseous distention of bowel (no bowel inflammation or obstruction), large hiatal hernia    Restrictions/Precautions:  Restrictions/Precautions: General Precautions, Contact Precautions, Fall Risk  Position Activity Restriction  Other position/activity restrictions: low vision. states she has a leg length discrepancy.     SUBJECTIVE: RH okayed OT session.  Pt. In room and agreeable to participate.  Pt. Daughter and spouse present.  Pt.'s daughter provided Pt. PLOF.     PAIN: 6/10: L groin    Vitals: Nurse checked vitals prior to session    COGNITION: Slow Processing    ADL:   Grooming: Stand By Assistance.  While seated combed hair .    BALANCE:  Sitting Balance:  Stand By Assistance. Seated on EOB  Standing Balance: Moderate Assistance, X 1, with cues for safety.      BED MOBILITY:  Supine to Sit: Moderate Assistance, X 1, with head of bed raised, with rail, with verbal cues , with increased time for completion    Scooting: Not tested      TRANSFERS:  Sit to Stand:  Maximum Assistance, X 1, CGA x 1 .    Stand to Sit: Moderate Assistance, X 1, with increased time for completion, cues for hand placement.    Stand Pivot: Maximum Assistance, X 1, cues for hand placement, with verbal cues. And CGA x another for safety      ADDITIONAL ACTIVITIES:  Patient completed BUE strengthening exercises with skilled education on HEP: completed x10 reps x1 set with AROM in all joints and all planes in order to improve UE strength and activity tolerance required for BADL routine and toilet / shower transfers. Patient tolerated , requiring  rest breaks. Patient also required verbal and visual cues for technique.      AM-[facility] Inpatient Daily Activity Raw Score: 16  AM-[facility] Inpatient ADL T-Scale Score : 35.96  ADL Inpatient CMS 0-100% Score: 53.32    Modified Nitro Scale:  Not Applicable    ASSESSMENT:     Activity Tolerance:  Patient tolerance of  treatment: Good treatment tolerance      Discharge

## 2024-01-31 NOTE — PROCEDURES
Bladder scan completed at 11:45PM and results told to RN. Scan showed 999+ mL in the patients bladder. Sulma

## 2024-01-31 NOTE — PLAN OF CARE
Problem: Discharge Planning  Goal: Discharge to home or other facility with appropriate resources  1/31/2024 1430 by Genie Rothman RN  Outcome: Progressing  Flowsheets (Taken 1/29/2024 0222 by Jigar Zafar, RN)  Discharge to home or other facility with appropriate resources:   Identify barriers to discharge with patient and caregiver   Arrange for needed discharge resources and transportation as appropriate   Identify discharge learning needs (meds, wound care, etc)   Refer to discharge planning if patient needs post-hospital services based on physician order or complex needs related to functional status, cognitive ability or social support system     Problem: Pain  Goal: Verbalizes/displays adequate comfort level or baseline comfort level  1/31/2024 1430 by Genie Rothman RN  Outcome: Progressing  Flowsheets (Taken 1/29/2024 0222 by Jigar Zafar, RN)  Verbalizes/displays adequate comfort level or baseline comfort level:   Encourage patient to monitor pain and request assistance   Assess pain using appropriate pain scale   Administer analgesics based on type and severity of pain and evaluate response   Implement non-pharmacological measures as appropriate and evaluate response     Problem: Skin/Tissue Integrity  Goal: Absence of new skin breakdown  Description: 1.  Monitor for areas of redness and/or skin breakdown  2.  Assess vascular access sites hourly  3.  Every 4-6 hours minimum:  Change oxygen saturation probe site  4.  Every 4-6 hours:  If on nasal continuous positive airway pressure, respiratory therapy assess nares and determine need for appliance change or resting period.  1/31/2024 1430 by Genie Rothman RN  Outcome: Progressing  Note: No new skin breakdown this shift     Problem: Safety - Adult  Goal: Free from fall injury  1/31/2024 1430 by Genie Rothman RN  Outcome: Progressing  Flowsheets (Taken 1/29/2024 0222 by Jigar Zafar, RN)  Free From Fall Injury: Instruct family/caregiver

## 2024-01-31 NOTE — CARE COORDINATION
1/31/24, 7:39 AM EST    DISCHARGE ON GOING EVALUATION    Paulina Gunter       Hospital day: 5  Location: -06/006-A Reason for admit: Bladder distension [N32.89]  Ileus (HCC) [K56.7]  Age-related physical debility [R54]  Acute right hip pain [M25.551]  Stroke-like episode [R29.90]   Procedure:   1/26 CTA A/P: Severe distention of the urinary bladder. Mild bilateral ureterectasis.  2. Relative gaseous distention of bowel suggesting possible ileus. No  bowel inflammation or bowel obstruction. 3. Large hiatal hernia  1/28 MRI Brain WO: There are areas of abnormal signal intensity in the white matter, radha, basal ganglia and thalami bilaterally most likely representing ischemic changes.  2. There is no evidence for an acute infarct. 3. There is mild atrophy. 4. There are inflammatory changes in the ethmoid air cells and mastoid air cells bilaterally and in the sphenoid sinus  1/28 MRI Cervical: Straightening of the normal cervical lordosis with superimposed cervical spondylosis at C4-5, C5-6 and C6-7. There is no significant cord compression or foraminal stenosis at any level. 2. Increased signal intensity in the radha most likely representing ischemic changes.  1/29 XR Cervical: Straightening of the normal cervical lordosis. 2. Minimal anterolisthesis of C2 relative to C3. Cervical spondylosis at C3-4, C4-5, C5-6 and to lesser extent C6-7. 3. Limited mobility on the flexion and extension lateral views. No instability noted.  1/29 KUB: Mildly gassy abdomen. No evidence for constipation or ileus.   1/30 CT Lumbar Spine:   Levoscoliosis. Diffuse osteopenia. Degenerative change between T11 and L5.   1/30 CT Pelvis: No fracture or hardware complication   Barriers to Discharge: Hospitalist and Neurology following. Bladder scan 999+. Timmons reinserted 1300 mL. IVF stopped.  PCP: Claire Castillo APRN - CNP  Readmission Risk Score: 15.3%  Patient Goals/Plan/Treatment Preferences: planning [facility] Home; SW

## 2024-02-01 PROBLEM — M79.18 MYOFASCIAL PAIN: Status: ACTIVE | Noted: 2024-02-01

## 2024-02-01 PROBLEM — R52 ACUTE PAIN: Status: ACTIVE | Noted: 2024-02-01

## 2024-02-01 PROBLEM — M47.816 LUMBAR SPONDYLOSIS: Status: ACTIVE | Noted: 2024-02-01

## 2024-02-01 PROBLEM — M79.2 NEUROPATHIC PAIN: Status: ACTIVE | Noted: 2024-02-01

## 2024-02-01 PROBLEM — R53.81 DEBILITY: Status: ACTIVE | Noted: 2021-02-28

## 2024-02-01 LAB
ANION GAP SERPL CALC-SCNC: 9 MEQ/L (ref 8–16)
BUN SERPL-MCNC: 8 MG/DL (ref 7–22)
CALCIUM SERPL-MCNC: 8.8 MG/DL (ref 8.5–10.5)
CHLORIDE SERPL-SCNC: 95 MEQ/L (ref 98–111)
CO2 SERPL-SCNC: 27 MEQ/L (ref 23–33)
CREAT SERPL-MCNC: 0.6 MG/DL (ref 0.4–1.2)
DEPRECATED RDW RBC AUTO: 44.4 FL (ref 35–45)
ERYTHROCYTE [DISTWIDTH] IN BLOOD BY AUTOMATED COUNT: 13 % (ref 11.5–14.5)
GFR SERPL CREATININE-BSD FRML MDRD: > 60 ML/MIN/1.73M2
GLUCOSE BLD STRIP.AUTO-MCNC: 88 MG/DL (ref 70–108)
GLUCOSE SERPL-MCNC: 79 MG/DL (ref 70–108)
HCT VFR BLD AUTO: 39.3 % (ref 37–47)
HGB BLD-MCNC: 13.1 GM/DL (ref 12–16)
MAGNESIUM SERPL-MCNC: 1.6 MG/DL (ref 1.6–2.4)
MCH RBC QN AUTO: 31.3 PG (ref 26–33)
MCHC RBC AUTO-ENTMCNC: 33.3 GM/DL (ref 32.2–35.5)
MCV RBC AUTO: 93.8 FL (ref 81–99)
PLATELET # BLD AUTO: 153 THOU/MM3 (ref 130–400)
PMV BLD AUTO: 10.6 FL (ref 9.4–12.4)
POTASSIUM SERPL-SCNC: 4.5 MEQ/L (ref 3.5–5.2)
RBC # BLD AUTO: 4.19 MILL/MM3 (ref 4.2–5.4)
SODIUM SERPL-SCNC: 127 MEQ/L (ref 135–145)
SODIUM SERPL-SCNC: 131 MEQ/L (ref 135–145)
WBC # BLD AUTO: 3.5 THOU/MM3 (ref 4.8–10.8)

## 2024-02-01 PROCEDURE — 99233 SBSQ HOSP IP/OBS HIGH 50: CPT | Performed by: NURSE PRACTITIONER

## 2024-02-01 PROCEDURE — 83735 ASSAY OF MAGNESIUM: CPT

## 2024-02-01 PROCEDURE — 1200000000 HC SEMI PRIVATE

## 2024-02-01 PROCEDURE — 6370000000 HC RX 637 (ALT 250 FOR IP)

## 2024-02-01 PROCEDURE — 36415 COLL VENOUS BLD VENIPUNCTURE: CPT

## 2024-02-01 PROCEDURE — 80048 BASIC METABOLIC PNL TOTAL CA: CPT

## 2024-02-01 PROCEDURE — 6360000002 HC RX W HCPCS

## 2024-02-01 PROCEDURE — 85027 COMPLETE CBC AUTOMATED: CPT

## 2024-02-01 PROCEDURE — 6370000000 HC RX 637 (ALT 250 FOR IP): Performed by: NURSE PRACTITIONER

## 2024-02-01 PROCEDURE — 97535 SELF CARE MNGMENT TRAINING: CPT

## 2024-02-01 PROCEDURE — 99232 SBSQ HOSP IP/OBS MODERATE 35: CPT | Performed by: STUDENT IN AN ORGANIZED HEALTH CARE EDUCATION/TRAINING PROGRAM

## 2024-02-01 PROCEDURE — 2580000003 HC RX 258: Performed by: STUDENT IN AN ORGANIZED HEALTH CARE EDUCATION/TRAINING PROGRAM

## 2024-02-01 PROCEDURE — 82948 REAGENT STRIP/BLOOD GLUCOSE: CPT

## 2024-02-01 PROCEDURE — 6370000000 HC RX 637 (ALT 250 FOR IP): Performed by: INTERNAL MEDICINE

## 2024-02-01 PROCEDURE — 2580000003 HC RX 258

## 2024-02-01 RX ORDER — HYDROCODONE BITARTRATE AND ACETAMINOPHEN 5; 325 MG/1; MG/1
1 TABLET ORAL EVERY 6 HOURS PRN
Status: DISCONTINUED | OUTPATIENT
Start: 2024-02-01 | End: 2024-02-03 | Stop reason: HOSPADM

## 2024-02-01 RX ORDER — MAGNESIUM SULFATE IN WATER 40 MG/ML
2000 INJECTION, SOLUTION INTRAVENOUS ONCE
Status: COMPLETED | OUTPATIENT
Start: 2024-02-01 | End: 2024-02-01

## 2024-02-01 RX ORDER — NALOXONE HYDROCHLORIDE 0.4 MG/ML
0.4 INJECTION, SOLUTION INTRAMUSCULAR; INTRAVENOUS; SUBCUTANEOUS PRN
Status: DISCONTINUED | OUTPATIENT
Start: 2024-02-01 | End: 2024-02-03 | Stop reason: HOSPADM

## 2024-02-01 RX ORDER — HYDROCODONE BITARTRATE AND ACETAMINOPHEN 5; 325 MG/1; MG/1
0.5 TABLET ORAL EVERY 6 HOURS PRN
Status: DISCONTINUED | OUTPATIENT
Start: 2024-02-01 | End: 2024-02-03 | Stop reason: HOSPADM

## 2024-02-01 RX ORDER — DOCUSATE SODIUM 100 MG/1
100 CAPSULE, LIQUID FILLED ORAL 2 TIMES DAILY
Status: DISCONTINUED | OUTPATIENT
Start: 2024-02-01 | End: 2024-02-03 | Stop reason: HOSPADM

## 2024-02-01 RX ORDER — LIDOCAINE 4 G/G
3 PATCH TOPICAL DAILY
Status: DISCONTINUED | OUTPATIENT
Start: 2024-02-02 | End: 2024-02-03 | Stop reason: HOSPADM

## 2024-02-01 RX ORDER — COSYNTROPIN 0.25 MG/ML
250 INJECTION, POWDER, FOR SOLUTION INTRAMUSCULAR; INTRAVENOUS ONCE
Status: COMPLETED | OUTPATIENT
Start: 2024-02-02 | End: 2024-02-02

## 2024-02-01 RX ORDER — SODIUM CHLORIDE 9 MG/ML
INJECTION, SOLUTION INTRAVENOUS CONTINUOUS
Status: DISCONTINUED | OUTPATIENT
Start: 2024-02-01 | End: 2024-02-03 | Stop reason: HOSPADM

## 2024-02-01 RX ADMIN — CALCIUM 500 MG: 500 TABLET ORAL at 10:39

## 2024-02-01 RX ADMIN — DOCUSATE SODIUM 100 MG: 100 CAPSULE, LIQUID FILLED ORAL at 10:38

## 2024-02-01 RX ADMIN — SODIUM CHLORIDE, PRESERVATIVE FREE 10 ML: 5 INJECTION INTRAVENOUS at 10:40

## 2024-02-01 RX ADMIN — PANTOPRAZOLE SODIUM 40 MG: 40 TABLET, DELAYED RELEASE ORAL at 16:29

## 2024-02-01 RX ADMIN — LEVOTHYROXINE SODIUM 100 MCG: 0.1 TABLET ORAL at 06:28

## 2024-02-01 RX ADMIN — POLYETHYLENE GLYCOL 3350 17 G: 17 POWDER, FOR SOLUTION ORAL at 10:38

## 2024-02-01 RX ADMIN — DICLOFENAC SODIUM 2 G: 10 GEL TOPICAL at 20:26

## 2024-02-01 RX ADMIN — SUCRALFATE 1 G: 1 TABLET ORAL at 18:41

## 2024-02-01 RX ADMIN — AMLODIPINE BESYLATE 10 MG: 10 TABLET ORAL at 10:39

## 2024-02-01 RX ADMIN — SUCRALFATE 1 G: 1 TABLET ORAL at 10:38

## 2024-02-01 RX ADMIN — METHENAMINE HIPPURATE 1 G: 1 TABLET ORAL at 10:38

## 2024-02-01 RX ADMIN — HYDROCODONE BITARTRATE AND ACETAMINOPHEN 0.5 TABLET: 5; 325 TABLET ORAL at 20:25

## 2024-02-01 RX ADMIN — METOPROLOL SUCCINATE 50 MG: 50 TABLET, EXTENDED RELEASE ORAL at 10:38

## 2024-02-01 RX ADMIN — SODIUM CHLORIDE, PRESERVATIVE FREE 10 ML: 5 INJECTION INTRAVENOUS at 20:27

## 2024-02-01 RX ADMIN — SUCRALFATE 1 G: 1 TABLET ORAL at 06:28

## 2024-02-01 RX ADMIN — ENALAPRIL MALEATE 10 MG: 10 TABLET ORAL at 10:39

## 2024-02-01 RX ADMIN — SUCRALFATE 1 G: 1 TABLET ORAL at 20:25

## 2024-02-01 RX ADMIN — DOCUSATE SODIUM 100 MG: 100 CAPSULE, LIQUID FILLED ORAL at 20:26

## 2024-02-01 RX ADMIN — PREGABALIN 150 MG: 150 CAPSULE ORAL at 10:38

## 2024-02-01 RX ADMIN — MAGNESIUM SULFATE HEPTAHYDRATE 2000 MG: 40 INJECTION, SOLUTION INTRAVENOUS at 10:48

## 2024-02-01 RX ADMIN — DOCUSATE SODIUM 50 MG AND SENNOSIDES 8.6 MG 1 TABLET: 8.6; 5 TABLET, FILM COATED ORAL at 10:38

## 2024-02-01 RX ADMIN — PANTOPRAZOLE SODIUM 40 MG: 40 TABLET, DELAYED RELEASE ORAL at 06:27

## 2024-02-01 RX ADMIN — TRAMADOL HYDROCHLORIDE 100 MG: 50 TABLET, COATED ORAL at 12:52

## 2024-02-01 RX ADMIN — METHENAMINE HIPPURATE 1 G: 1 TABLET ORAL at 16:29

## 2024-02-01 RX ADMIN — SODIUM CHLORIDE: 9 INJECTION, SOLUTION INTRAVENOUS at 16:32

## 2024-02-01 RX ADMIN — TRAMADOL HYDROCHLORIDE 100 MG: 50 TABLET, COATED ORAL at 06:27

## 2024-02-01 RX ADMIN — PREGABALIN 150 MG: 150 CAPSULE ORAL at 20:26

## 2024-02-01 ASSESSMENT — PAIN DESCRIPTION - PAIN TYPE
TYPE: CHRONIC PAIN
TYPE: ACUTE PAIN
TYPE: ACUTE PAIN

## 2024-02-01 ASSESSMENT — PAIN DESCRIPTION - ORIENTATION
ORIENTATION: RIGHT
ORIENTATION: RIGHT;LOWER
ORIENTATION: POSTERIOR;LOWER;RIGHT
ORIENTATION: LOWER;POSTERIOR

## 2024-02-01 ASSESSMENT — PAIN DESCRIPTION - LOCATION
LOCATION: LEG
LOCATION: GROIN
LOCATION: BACK
LOCATION: GROIN

## 2024-02-01 ASSESSMENT — PAIN SCALES - GENERAL
PAINLEVEL_OUTOF10: 6
PAINLEVEL_OUTOF10: 7
PAINLEVEL_OUTOF10: 0
PAINLEVEL_OUTOF10: 8
PAINLEVEL_OUTOF10: 0
PAINLEVEL_OUTOF10: 7

## 2024-02-01 ASSESSMENT — PAIN DESCRIPTION - DESCRIPTORS
DESCRIPTORS: ACHING

## 2024-02-01 ASSESSMENT — PAIN - FUNCTIONAL ASSESSMENT
PAIN_FUNCTIONAL_ASSESSMENT: ACTIVITIES ARE NOT PREVENTED

## 2024-02-01 ASSESSMENT — PAIN DESCRIPTION - FREQUENCY: FREQUENCY: CONTINUOUS

## 2024-02-01 ASSESSMENT — PAIN DESCRIPTION - DIRECTION
RADIATING_TOWARDS: KNEE
RADIATING_TOWARDS: KNEE

## 2024-02-01 ASSESSMENT — PAIN DESCRIPTION - ONSET: ONSET: ON-GOING

## 2024-02-01 NOTE — CARE COORDINATION
2/1/24, 10:49 AM EST    DISCHARGE ON GOING EVALUATION    Paulina Gunter       Hospital day: 6  Location: -06/006-A Reason for admit: Bladder distension [N32.89]  Ileus (HCC) [K56.7]  Age-related physical debility [R54]  Acute right hip pain [M25.551]  Stroke-like episode [R29.90]   Procedure:   1/26 CTA A/P: Severe distention of the urinary bladder. Mild bilateral ureterectasis.  2. Relative gaseous distention of bowel suggesting possible ileus. No  bowel inflammation or bowel obstruction. 3. Large hiatal hernia  1/28 MRI Brain WO: There are areas of abnormal signal intensity in the white matter, radha, basal ganglia and thalami bilaterally most likely representing ischemic changes.  2. There is no evidence for an acute infarct. 3. There is mild atrophy. 4. There are inflammatory changes in the ethmoid air cells and mastoid air cells bilaterally and in the sphenoid sinus  1/28 MRI Cervical: Straightening of the normal cervical lordosis with superimposed cervical spondylosis at C4-5, C5-6 and C6-7. There is no significant cord compression or foraminal stenosis at any level. 2. Increased signal intensity in the radha most likely representing ischemic changes.  1/29 XR Cervical: Straightening of the normal cervical lordosis. 2. Minimal anterolisthesis of C2 relative to C3. Cervical spondylosis at C3-4, C4-5, C5-6 and to lesser extent C6-7. 3. Limited mobility on the flexion and extension lateral views. No instability noted.  1/29 KUB: Mildly gassy abdomen. No evidence for constipation or ileus.   1/30 CT Lumbar Spine:   Levoscoliosis. Diffuse osteopenia. Degenerative change between T11 and L5.   1/30 CT Pelvis: No fracture or hardware complication   Barriers to Discharge: CORTISOL stim testing by Hospitalist, Neurology following. Timmons care.    PCP: Claire Castillo APRN - CNP  Readmission Risk Score: 14.6%  Patient Goals/Plan/Treatment Preferences: plans [facility] Home; SW follows.  Precert approved thru

## 2024-02-01 NOTE — PROGRESS NOTES
Internal Medicine Resident Progress Note    Name: Paulina Gunter, female, : 1940, MRN: [id number]    PCP: Claire Castillo APRN - CNP    Date of Admission: 2024  Date of Service: Pt seen/examined on 24      Assessment/Plan:  Progressively worsening generalized weakness/physical deconditioning 2/2 aging, fibromyalgia, chronic back pain, multiple joint replacements: pt unable to transfer self from wheelchair to other seats, was previously able to do so. Difficult bearing weight on RLE. Reporting subjective weakness and worsening hip pain. Ultimately bilateral UE weakness. Currently resides at assisted living facility. MRI brain no acute findings. MRI C-spine noted for straightening cervical lordosis between C4 - C7, increased signal intensity in radha possibly significant for ischemia. CT pelvis negative for fracture/hardware complications. CT lumbar spine noted for foraminal stenosis, R>L. Random cortisol 8.29  ACTH stimulation test tomorrow AM  Orthostats Qshift   PT/OT continue to work with pt  SNF placement at discharge  Defer from ortho consult, as pt has previously and continues to decline further surgical intervention   Neurology previously consulted, signed off at this time   Acute on chronic urinary retention iso recurrent UTIs: noted markedly distended bladder OA, Timmons placed draining 2.5L overnight. Pt follows with urology OP and noted for h/o recurrent UTIs. Noted for vasovagal episode in ED 2/2  bladder distention. Attempted voiding trial , failed, Timmons replaced.   Pt will likely need to be discharged with Timmons  Antiemetics PRN  Strict I&Os  Follow up OP with urology   Hypotonic euvolemic hyponatremia, unknown etiology: urine Osm 278. Pt noted to be on Prozac and NSAIDs, which may be triggers. Initial c/f SIADH, held ibuprofen and Prozac and placed on fluid restriction, with no improvement in Na. Pt fluid challenged on , noted improvement in Na next morning.  prophylaxis:    [x] Lovenox  [] SCDs  [] SQ Heparin  [] Encourage ambulation   [] Already on Anticoagulation  Diet: ADULT DIET; Regular; 5 carb choices (75 gm/meal); Low Fat/Low Chol/High Fiber/2 gm Na; 1800 ml  Code Status: Limited  PT/OT: Following  Tele: No  IVF: NS at 50 cc/hr    Electronically signed by Latricia Cristina MD on 2/1/2024 at 4:01 PM    Case was discussed with Attending, Dr. Bonilla

## 2024-02-01 NOTE — PROGRESS NOTES
[facility] ORTHOPEDICS 7K  Occupational Therapy  Daily Note  Time:   Time In: 849  Time Out: 927  Timed Code Treatment Minutes: 38 Minutes  Minutes: 38          Date: 2024  Patient Name: Paulina BERNAL Height,   Gender: female      Room: [facility]006-A  MRN: [id number]  : 1940  (83 y.o.)  Referring Practitioner: Latricia Cristina MD  Diagnosis: age related physical debility  Additional Pertinent Hx: per chart review; Today while she was in the bathroom trying to get up, she noticed her right leg buckle and give out. She was able to get herself up using her wheelchair and call for help. States that she has had similar pains before and has chronic pain, but this episode was worse than usual and she is unable to transfer herself between wheelchair and other seats. Also states that this morning she was having some subjective right arm weakness that began this afternoon, but had been having similar episodes throughout the week. On exam strength is 5/5 in all extremities, no drift noted. She denies chest pain, SOB, headaches, N/V, dizziness, dysuria, changes in vision.   Upon return to ED to speak with pt, RN notified me that pt had sudden onset lightheadedness, nausea, diaphoresis, and BP dropped to 68/50 and 81/51. Notably, CT A/P showed severe bladder distention, with estimated >1L urinary retention. Pt only had external catheter in place at that time. Dr. Lakshmi kapadia as well, instructed RN to place Timmons catheter as this was likely a vasovagal response to severe urinary retention. Timmons placed, pt reporting improvement in symptoms.      ED course: Tmax 97.8, RR 18, HR 57, /84, SpO2 95% RA. Na 131, Cl 94, Mg 1.5, WBC 4.0. UA - small amount blood (0-2), small amount LE, WBC (15-25), few bacteria. Xray right hip and pelvis - negative for fracture, no hardware complication. CT H+N - no acute findings. CTA head - negative for abnormalities. CT A/P - severe bladder distention with mild  Yes  Mobility Devices: ADL Assistive Devices  Plan: Times Per Week: 5x  Times Per Day: Once a day  Current Treatment Recommendations: ROM, Strengthening, Balance training, Functional mobility training, Endurance training, Neuromuscular re-education, Positioning, Equipment evaluation, education, & procurement, Patient/Caregiver education & training, Safety education & training, Self-Care / ADL, Pain management    Education:  Learners: Patient  ADL's, Reviewed Prior Education, Importance of Increasing Activity, and Assistive Device Safety    Goals  Short Term Goals  Time Frame for Short Term Goals: by discharge  Short Term Goal 1: patient will tolerate 3 min functional standing with SBA to increase with toileting and grooming.  Short Term Goal 2: patient will complete stand pivot transfers with MIN A and 1-2 cues for safety and sequencing.  Short Term Goal 3: patient will complete ADL routine with SBA, use of AE PRN and 1-2 cues for safety and sequencing.  Short Term Goal 4: patient will participate in (B) shldr AAROM HEP to increase mobiity and function for UB dressing/grooming and participate in minimal resistive UB exer to increase UB strength for functional transfers.    Following session, patient left in safe position with all fall risk precautions in place.

## 2024-02-01 NOTE — PROGRESS NOTES
Internal Medicine Resident Progress Note    Name: Paulian Gunter, female, : 1940, MRN: [id number]    PCP: Claire Castillo APRN - CNP    Date of Admission: 2024  Date of Service: Pt seen/examined on 24      Assessment/Plan:  Progressively worsening generalized weakness/physical deconditioning, 2/2 aging, fibromyalgia, chronic back pain,f multiple joint replacements: Patient unable to transfer self from wheelchair to other seats, was previously able to do so. Difficult bearing weight on right lower extremity. Reporting subjective weakness and worsening hip pain.  Ultimately left/right upper extremity weakness.  Currently resides at assisted living facility.   Improvement in RLE motor strength on   Random cortisol 8.29  MRI cervical spine noted for straightening cervical lordosis between C4 and C5, C5 and C6, C6 and C7.  Increased signal intensity in radha possibly significant for ischemia.  MRI brain no acute findings.  Multiple  regions of increased signal intensity possibly significant for ischemia.  Neck flexion and extension imaging noted for straightening of normal cervical lordosis, minimal anterolisthesis of C2 relative to C3, cervical spondylosis at C3-C4, C4-C5, C5-C6, and C6-C7. Limited flexion/extension.  Patient noted for worsening weakness in right leg , no hip flexion, knee flexion/extension, dorsi/plantarflexion.  CT pelvis, negative for fracture/hardware complications. Bilateral hip arthroplasties intact.  CT lumbar spine noted for foraminal stenosis T11-S1, right worse than left.  Recommend continued PT/OT, patient noted to have previously declined any surgery   Neurology following  PT/OT consulted  Possible consideration for SNF placement at discharge  Acute on chronic urinary retention, iso Hx of recurrent UTIs: patient noted for markedly distended bladder on admission, carroll placed and draining 2.5L overnight. Patient follows with urology outpatient and noted for

## 2024-02-01 NOTE — CONSULTS
Pain management consult noted.  Patient is well-known to our outpatient pain team, where she follows with Dr. Dale.  She was last seen in the clinic 10/20/2023.  She undergoes bilateral ablations with Dr. Dale into her lumbar spine, which she last completed this on 11/7/2023 and he does provide her with diclofenac 50 mg TID PRN.          Pain Management   Progress Note      2/1/2024 2:33 PM     Chief Complaint: Right hip pain  Patient presented to emergency department from assisted living by EMS for evaluation of hip pain.  She reported throughout the day she was noticing some right arm weakness, which she had reported started at 2:30 PM.  She states she has had similar episodes in the past that has been ongoing for about 1 week.  She states last episode had occurred 2 days ago which resolved on its own.  Day of admission she reports that she was on the commode trying to get up when she noticed her right leg did buckle and try to give out, but she was able to lift herself up with the left leg onto which she will and call for help.  She states she has had similar pains in the past prior to her hip replacement surgery however is never been this bad.  She rates her pain at a 7/10.  She did state her pain was sharp and radiated towards her groin.  As she has had known TIAs, she did have stroke workup, negative CT of the head findings.  She also did have CT abdomen pelvis which shows no fracture or dislocation.  Patient was admitted for further workup.    SUBJECTIVE: Patient sitting in recliner, family in room with her.  She reports that she initially came to the hospital due to some right arm weakness.  She states that same day she started noticing some right leg weakness as well.  She states this is a new event and she never had any before.  She states that it is from the right groin that radiates down into the inside of her right thigh and down to her knee.  She states it is significant weakness and she has  moderate severity chronic small vessel ischemic changes.    There are no intra-or extra-axial collections.  There is no hydrocephalus, midline shift or mass effect.    There are  no areas of susceptibility artifact are present. The major intracranial vascular flow voids are present.    The midline craniocervical junction structures are normal.  The pituitary gland and brainstem are normal.    There are inflammatory changes in the ethmoid and mastoid air cells bilaterally and sphenoid sinus.    The patient is status post bilateral lens removal.       Impression:          1. There are areas of abnormal signal intensity in the white matter, radha, basal ganglia and thalami bilaterally most likely representing ischemic changes.  2. There is no evidence for an acute infarct.  3. There is mild atrophy.  4. There are inflammatory changes in the ethmoid air cells and mastoid air cells bilaterally and in the sphenoid sinus.       ASSESSMENT  Deconditioning  Generalized weakness  Chronic pain syndrome  Fibromyalgia  Multiple joint replacements  Lumbar spondylosis  SI joint dysfunction  Myofascial pain      Met with today's pain team as above. Discussed patient symptoms, reviewed medications and possible drug interactions, medication side effect profiles, previous medications and their efficacies, medical concerns regarding each pain management option (ie blood pressure, GI concerns, etc). Also reviewed labs (including creatinine clearance and LFT's regarding medication metabolism). Also reviewed all work-up studies including radiologic and other consultants. OARRS was obtained and reviewed.      PLAN  Discussed and educated on pain management alternatives, such as; repositioning, distraction, meditation, ice, heat  Discontinue tramadol.  She reports that this does not do much for her pain relief, only takes the address where his Norco the past has worked better for her.  Start Norco 5/325.  0.5 tablet - 1 tablet every 4 hours

## 2024-02-01 NOTE — PLAN OF CARE
Problem: Discharge Planning  Goal: Discharge to home or other facility with appropriate resources  Outcome: Progressing  Flowsheets (Taken 1/29/2024 0222 by Jigar Zafar, RN)  Discharge to home or other facility with appropriate resources:   Identify barriers to discharge with patient and caregiver   Arrange for needed discharge resources and transportation as appropriate   Identify discharge learning needs (meds, wound care, etc)   Refer to discharge planning if patient needs post-hospital services based on physician order or complex needs related to functional status, cognitive ability or social support system     Problem: Pain  Goal: Verbalizes/displays adequate comfort level or baseline comfort level  Outcome: Progressing  Flowsheets (Taken 1/29/2024 0222 by Jigar Zafar, RN)  Verbalizes/displays adequate comfort level or baseline comfort level:   Encourage patient to monitor pain and request assistance   Assess pain using appropriate pain scale   Administer analgesics based on type and severity of pain and evaluate response   Implement non-pharmacological measures as appropriate and evaluate response     Problem: Skin/Tissue Integrity  Goal: Absence of new skin breakdown  Description: 1.  Monitor for areas of redness and/or skin breakdown  2.  Assess vascular access sites hourly  3.  Every 4-6 hours minimum:  Change oxygen saturation probe site  4.  Every 4-6 hours:  If on nasal continuous positive airway pressure, respiratory therapy assess nares and determine need for appliance change or resting period.  Outcome: Progressing     Problem: Safety - Adult  Goal: Free from fall injury  Outcome: Progressing  Flowsheets (Taken 1/29/2024 0222 by Jigar Zafar, RN)  Free From Fall Injury: Instruct family/caregiver on patient safety  Goal: Isolation precautions  Description: Isolation precautions  Outcome: Progressing     Problem: ABCDS Injury Assessment  Goal: Absence of physical injury  Outcome:  RN)  Urinary catheter remains patent: Assess patency of urinary catheter     Care plan reviewed with patient.  Patient verbalizes understanding of the plan of care and contribute to goal setting.

## 2024-02-02 LAB
ANION GAP SERPL CALC-SCNC: 9 MEQ/L (ref 8–16)
BUN SERPL-MCNC: 7 MG/DL (ref 7–22)
CALCIUM SERPL-MCNC: 9 MG/DL (ref 8.5–10.5)
CHLORIDE SERPL-SCNC: 96 MEQ/L (ref 98–111)
CO2 SERPL-SCNC: 26 MEQ/L (ref 23–33)
CORTIS SERPL-MCNC: 14.22 UG/DL
CORTIS SERPL-MCNC: 23.09 UG/DL
CORTIS SERPL-MCNC: 27.21 UG/DL
CORTISOL COLLECTION INFO: NORMAL
CREAT SERPL-MCNC: 0.5 MG/DL (ref 0.4–1.2)
DEPRECATED RDW RBC AUTO: 46.7 FL (ref 35–45)
ERYTHROCYTE [DISTWIDTH] IN BLOOD BY AUTOMATED COUNT: 13.2 % (ref 11.5–14.5)
GFR SERPL CREATININE-BSD FRML MDRD: > 60 ML/MIN/1.73M2
GLUCOSE SERPL-MCNC: 94 MG/DL (ref 70–108)
HCT VFR BLD AUTO: 42.7 % (ref 37–47)
HGB BLD-MCNC: 13.7 GM/DL (ref 12–16)
MAGNESIUM SERPL-MCNC: 1.6 MG/DL (ref 1.6–2.4)
MCH RBC QN AUTO: 30.7 PG (ref 26–33)
MCHC RBC AUTO-ENTMCNC: 32.1 GM/DL (ref 32.2–35.5)
MCV RBC AUTO: 95.7 FL (ref 81–99)
PLATELET # BLD AUTO: 189 THOU/MM3 (ref 130–400)
PMV BLD AUTO: 10.7 FL (ref 9.4–12.4)
POTASSIUM SERPL-SCNC: 4.4 MEQ/L (ref 3.5–5.2)
RBC # BLD AUTO: 4.46 MILL/MM3 (ref 4.2–5.4)
SODIUM SERPL-SCNC: 131 MEQ/L (ref 135–145)
WBC # BLD AUTO: 3.5 THOU/MM3 (ref 4.8–10.8)

## 2024-02-02 PROCEDURE — 1200000000 HC SEMI PRIVATE

## 2024-02-02 PROCEDURE — 6370000000 HC RX 637 (ALT 250 FOR IP)

## 2024-02-02 PROCEDURE — 82533 TOTAL CORTISOL: CPT

## 2024-02-02 PROCEDURE — 36415 COLL VENOUS BLD VENIPUNCTURE: CPT

## 2024-02-02 PROCEDURE — 83735 ASSAY OF MAGNESIUM: CPT

## 2024-02-02 PROCEDURE — 6360000002 HC RX W HCPCS

## 2024-02-02 PROCEDURE — 80048 BASIC METABOLIC PNL TOTAL CA: CPT

## 2024-02-02 PROCEDURE — 6370000000 HC RX 637 (ALT 250 FOR IP): Performed by: INTERNAL MEDICINE

## 2024-02-02 PROCEDURE — 6360000002 HC RX W HCPCS: Performed by: STUDENT IN AN ORGANIZED HEALTH CARE EDUCATION/TRAINING PROGRAM

## 2024-02-02 PROCEDURE — 2580000003 HC RX 258: Performed by: STUDENT IN AN ORGANIZED HEALTH CARE EDUCATION/TRAINING PROGRAM

## 2024-02-02 PROCEDURE — 6370000000 HC RX 637 (ALT 250 FOR IP): Performed by: NURSE PRACTITIONER

## 2024-02-02 PROCEDURE — 2580000003 HC RX 258

## 2024-02-02 PROCEDURE — 85027 COMPLETE CBC AUTOMATED: CPT

## 2024-02-02 PROCEDURE — 99232 SBSQ HOSP IP/OBS MODERATE 35: CPT | Performed by: STUDENT IN AN ORGANIZED HEALTH CARE EDUCATION/TRAINING PROGRAM

## 2024-02-02 RX ORDER — MAGNESIUM SULFATE IN WATER 40 MG/ML
2000 INJECTION, SOLUTION INTRAVENOUS ONCE
Status: COMPLETED | OUTPATIENT
Start: 2024-02-02 | End: 2024-02-02

## 2024-02-02 RX ADMIN — LEVOTHYROXINE SODIUM 100 MCG: 0.1 TABLET ORAL at 05:11

## 2024-02-02 RX ADMIN — SODIUM CHLORIDE, PRESERVATIVE FREE 10 ML: 5 INJECTION INTRAVENOUS at 19:57

## 2024-02-02 RX ADMIN — AMLODIPINE BESYLATE 10 MG: 10 TABLET ORAL at 08:28

## 2024-02-02 RX ADMIN — PREGABALIN 150 MG: 150 CAPSULE ORAL at 19:57

## 2024-02-02 RX ADMIN — DOCUSATE SODIUM 100 MG: 100 CAPSULE, LIQUID FILLED ORAL at 08:28

## 2024-02-02 RX ADMIN — SUCRALFATE 1 G: 1 TABLET ORAL at 05:39

## 2024-02-02 RX ADMIN — MAGNESIUM SULFATE HEPTAHYDRATE 2000 MG: 40 INJECTION, SOLUTION INTRAVENOUS at 10:09

## 2024-02-02 RX ADMIN — POLYETHYLENE GLYCOL 3350 17 G: 17 POWDER, FOR SOLUTION ORAL at 08:28

## 2024-02-02 RX ADMIN — GRANULES FOR ORAL SOLUTION 1 PACKET: 3 POWDER ORAL at 14:06

## 2024-02-02 RX ADMIN — SUCRALFATE 1 G: 1 TABLET ORAL at 11:30

## 2024-02-02 RX ADMIN — PREGABALIN 150 MG: 150 CAPSULE ORAL at 08:28

## 2024-02-02 RX ADMIN — SUCRALFATE 1 G: 1 TABLET ORAL at 19:58

## 2024-02-02 RX ADMIN — CALCIUM 500 MG: 500 TABLET ORAL at 08:28

## 2024-02-02 RX ADMIN — HYDROCODONE BITARTRATE AND ACETAMINOPHEN 0.5 TABLET: 5; 325 TABLET ORAL at 08:33

## 2024-02-02 RX ADMIN — METHENAMINE HIPPURATE 1 G: 1 TABLET ORAL at 08:28

## 2024-02-02 RX ADMIN — SODIUM CHLORIDE: 9 INJECTION, SOLUTION INTRAVENOUS at 11:39

## 2024-02-02 RX ADMIN — METOPROLOL SUCCINATE 50 MG: 50 TABLET, EXTENDED RELEASE ORAL at 08:28

## 2024-02-02 RX ADMIN — PANTOPRAZOLE SODIUM 40 MG: 40 TABLET, DELAYED RELEASE ORAL at 15:31

## 2024-02-02 RX ADMIN — SODIUM CHLORIDE, PRESERVATIVE FREE 10 ML: 5 INJECTION INTRAVENOUS at 08:28

## 2024-02-02 RX ADMIN — HYDROCODONE BITARTRATE AND ACETAMINOPHEN 0.5 TABLET: 5; 325 TABLET ORAL at 19:57

## 2024-02-02 RX ADMIN — ENALAPRIL MALEATE 10 MG: 10 TABLET ORAL at 08:28

## 2024-02-02 RX ADMIN — DICLOFENAC SODIUM 2 G: 10 GEL TOPICAL at 08:28

## 2024-02-02 RX ADMIN — DOCUSATE SODIUM 50 MG AND SENNOSIDES 8.6 MG 1 TABLET: 8.6; 5 TABLET, FILM COATED ORAL at 08:28

## 2024-02-02 RX ADMIN — PANTOPRAZOLE SODIUM 40 MG: 40 TABLET, DELAYED RELEASE ORAL at 05:40

## 2024-02-02 RX ADMIN — METHENAMINE HIPPURATE 1 G: 1 TABLET ORAL at 18:36

## 2024-02-02 RX ADMIN — COSYNTROPIN 250 MCG: 0.25 INJECTION, POWDER, LYOPHILIZED, FOR SOLUTION INTRAMUSCULAR; INTRAVENOUS at 08:05

## 2024-02-02 ASSESSMENT — PAIN DESCRIPTION - LOCATION
LOCATION: GROIN;LEG
LOCATION: GROIN;LEG
LOCATION: BACK
LOCATION: GROIN;LEG

## 2024-02-02 ASSESSMENT — PAIN DESCRIPTION - ORIENTATION
ORIENTATION: RIGHT
ORIENTATION: LOWER

## 2024-02-02 ASSESSMENT — PAIN DESCRIPTION - DESCRIPTORS
DESCRIPTORS: ACHING
DESCRIPTORS: SORE;ACHING
DESCRIPTORS: ACHING;SORE
DESCRIPTORS: SORE;ACHING

## 2024-02-02 ASSESSMENT — PAIN SCALES - GENERAL
PAINLEVEL_OUTOF10: 4

## 2024-02-02 ASSESSMENT — PAIN DESCRIPTION - ONSET: ONSET: ON-GOING

## 2024-02-02 ASSESSMENT — PAIN - FUNCTIONAL ASSESSMENT
PAIN_FUNCTIONAL_ASSESSMENT: ACTIVITIES ARE NOT PREVENTED
PAIN_FUNCTIONAL_ASSESSMENT: ACTIVITIES ARE NOT PREVENTED
PAIN_FUNCTIONAL_ASSESSMENT: PREVENTS OR INTERFERES SOME ACTIVE ACTIVITIES AND ADLS
PAIN_FUNCTIONAL_ASSESSMENT: ACTIVITIES ARE NOT PREVENTED

## 2024-02-02 ASSESSMENT — PAIN DESCRIPTION - PAIN TYPE
TYPE: ACUTE PAIN
TYPE: ACUTE PAIN

## 2024-02-02 ASSESSMENT — PAIN DESCRIPTION - DIRECTION: RADIATING_TOWARDS: KNEE

## 2024-02-02 ASSESSMENT — PAIN DESCRIPTION - FREQUENCY: FREQUENCY: CONTINUOUS

## 2024-02-02 NOTE — CARE COORDINATION
2/2/24, 2:35 PM EST    DISCHARGE PLANNING EVALUATION    Spoke with [facility], facility can accept tomorrow, precert will still be valid.  Transport available at 11 :00  Sat 2/3.       Family has been notified and in agreement.     2/2/24, 3:27 PM EST    Patient goals/plan/ treatment preferences discussed by  and .  Patient goals/plan/ treatment preferences reviewed with patient/ family.  Patient/ family verbalize understanding of discharge plan and are in agreement with goal/plan/treatment preferences.  Understanding was demonstrated using the teach back method.  AVS provided by RN at time of discharge, which includes all necessary medical information pertaining to the patients current course of illness, treatment, post-discharge goals of care, and treatment preferences.     Services At/After Discharge: Skilled Nursing Facility (SNF) and In ambulance       IMM Letter  IMM Letter given to Patient/Family/Significant other/Guardian/POA/by:: GHULAM: Radha RODRIGUEZ  IMM Letter date given:: 02/02/24  IMM Letter time given:: 1410

## 2024-02-02 NOTE — PLAN OF CARE
Problem: Discharge Planning  Goal: Discharge to home or other facility with appropriate resources  2/1/2024 1956 by Genie Rothman RN  Outcome: Progressing  Flowsheets (Taken 1/29/2024 0222 by Jigar Zafar, RN)  Discharge to home or other facility with appropriate resources:   Identify barriers to discharge with patient and caregiver   Arrange for needed discharge resources and transportation as appropriate   Identify discharge learning needs (meds, wound care, etc)   Refer to discharge planning if patient needs post-hospital services based on physician order or complex needs related to functional status, cognitive ability or social support system     Problem: Pain  Goal: Verbalizes/displays adequate comfort level or baseline comfort level  2/1/2024 1956 by Genie Rothman RN  Outcome: Progressing  Flowsheets (Taken 1/29/2024 0222 by Jigar Zafar, RN)  Verbalizes/displays adequate comfort level or baseline comfort level:   Encourage patient to monitor pain and request assistance   Assess pain using appropriate pain scale   Administer analgesics based on type and severity of pain and evaluate response   Implement non-pharmacological measures as appropriate and evaluate response     Problem: Skin/Tissue Integrity  Goal: Absence of new skin breakdown  Description: 1.  Monitor for areas of redness and/or skin breakdown  2.  Assess vascular access sites hourly  3.  Every 4-6 hours minimum:  Change oxygen saturation probe site  4.  Every 4-6 hours:  If on nasal continuous positive airway pressure, respiratory therapy assess nares and determine need for appliance change or resting period.  2/1/2024 1956 by Genie Rothman, RN  Outcome: Progressing  Note: No new skin breakdown this shift     Problem: Safety - Adult  Goal: Free from fall injury  2/1/2024 1956 by Genie Rothman, RN  Outcome: Progressing  Flowsheets (Taken 1/29/2024 0222 by Jigar Zafar, RN)  Free From Fall Injury: Instruct family/caregiver on  patient safety     Problem: Safety - Adult  Goal: Isolation precautions  Description: Isolation precautions  2/1/2024 1956 by Genie Rothman RN  Outcome: Progressing  Note: isolation precautions followed     Problem: ABCDS Injury Assessment  Goal: Absence of physical injury  2/1/2024 1956 by Genie Rothman RN  Outcome: Progressing  Flowsheets (Taken 1/29/2024 0222 by Jigar Zafar, RN)  Absence of Physical Injury: Implement safety measures based on patient assessment     Problem: Chronic Conditions and Co-morbidities  Goal: Patient's chronic conditions and co-morbidity symptoms are monitored and maintained or improved  2/1/2024 1956 by Genie Rothman RN  Outcome: Progressing  Flowsheets (Taken 1/30/2024 2009 by Varsha Kwok, RN)  Care Plan - Patient's Chronic Conditions and Co-Morbidity Symptoms are Monitored and Maintained or Improved: Monitor and assess patient's chronic conditions and comorbid symptoms for stability, deterioration, or improvement     Problem: Skin/Tissue Integrity - Adult  Goal: Skin integrity remains intact  2/1/2024 1956 by Genie Rothman RN  Outcome: Progressing  Flowsheets (Taken 2/1/2024 1113)  Skin Integrity Remains Intact: Monitor for areas of redness and/or skin breakdown     Problem: Musculoskeletal - Adult  Goal: Return mobility to safest level of function  2/1/2024 1956 by Genie Rothman RN  Outcome: Progressing  Flowsheets (Taken 1/30/2024 2009 by Varsha Kwok RN)  Return Mobility to Safest Level of Function: Assist with transfers and ambulation using safe patient handling equipment as needed     Problem: Musculoskeletal - Adult  Goal: Maintain proper alignment of affected body part  2/1/2024 1956 by Genie Rothman RN  Outcome: Progressing  Flowsheets (Taken 1/29/2024 0222 by Jigar Zafar RN)  Maintain proper alignment of affected body part:   Support and protect limb and body alignment per provider's orders   Instruct and reinforce with patient and family

## 2024-02-02 NOTE — CARE COORDINATION
2/2/24, 9:54 AM EST    DISCHARGE PLANNING EVALUATION     Precert  for [facility] will need updated closer to discharge.

## 2024-02-02 NOTE — PROGRESS NOTES
Internal Medicine Resident Progress Note    Name: Paulina Gunter, female, : 1940, MRN: [id number]    PCP: Claire Castillo APRN - CNP    Date of Admission: 2024  Date of Service: Pt seen/examined on 24      Assessment/Plan:  Progressively worsening generalized weakness/physical deconditioning 2/2 aging, fibromyalgia, chronic back pain, multiple joint replacements: pt unable to transfer self from wheelchair to other seats, was previously able to do so. Difficult bearing weight on RLE. Reporting subjective weakness and worsening hip pain. Ultimately bilateral UE weakness. Currently resides at assisted living facility. MRI brain no acute findings. MRI C-spine noted for straightening cervical lordosis between C4 - C7, increased signal intensity in radha possibly significant for ischemia. CT pelvis negative for fracture/hardware complications. CT lumbar spine noted for foraminal stenosis, R>L. Random cortisol 8.29  ACTH stimulation test negative  Orthostats negative  PT/OT continue to work with pt  SNF placement at discharge  Defer from ortho consult, as pt has previously and continues to decline further surgical intervention   Neurology previously consulted, signed off at this time   Acute on chronic urinary retention iso recurrent UTIs: noted markedly distended bladder OA, Timmons placed draining 2.5L overnight. Pt follows with urology OP and noted for h/o recurrent UTIs. Noted for vasovagal episode in ED 2/2  bladder distention. Attempted voiding trial , failed, Timmons replaced.   Pt will need to be discharged with Timmons  Antiemetics PRN  Strict I&Os  Follow up OP with urology   Hypotonic euvolemic hyponatremia, unknown etiology: urine Osm 278. Pt noted to be on Prozac and NSAIDs, which may be triggers. Initial c/f SIADH, held ibuprofen and Prozac and placed on fluid restriction, with no improvement in Na. Pt fluid challenged on , noted improvement in Na next morning.   Continue IVF

## 2024-02-02 NOTE — PLAN OF CARE
Problem: Discharge Planning  Goal: Discharge to home or other facility with appropriate resources  2/1/2024 2157 by Madeleine Angel RN  Outcome: Progressing  Flowsheets (Taken 2/1/2024 2157)  Discharge to home or other facility with appropriate resources:   Identify barriers to discharge with patient and caregiver   Arrange for needed discharge resources and transportation as appropriate   Identify discharge learning needs (meds, wound care, etc)     Problem: Pain  Goal: Verbalizes/displays adequate comfort level or baseline comfort level  2/1/2024 2157 by Madeleine Angel RN  Outcome: Progressing  Flowsheets (Taken 2/1/2024 2157)  Verbalizes/displays adequate comfort level or baseline comfort level:   Encourage patient to monitor pain and request assistance   Assess pain using appropriate pain scale   Administer analgesics based on type and severity of pain and evaluate response     Problem: Skin/Tissue Integrity  Goal: Absence of new skin breakdown  Description: 1.  Monitor for areas of redness and/or skin breakdown  2.  Assess vascular access sites hourly  3.  Every 4-6 hours minimum:  Change oxygen saturation probe site  4.  Every 4-6 hours:  If on nasal continuous positive airway pressure, respiratory therapy assess nares and determine need for appliance change or resting period.  2/1/2024 2157 by Madeleine Angel RN  Outcome: Progressing  Note: Patient free from skin breakdown. Patient turns self and makes frequent positional changes. Heels elevated off of bed with pillow support. Will continue to monitor.      Problem: Safety - Adult  Goal: Free from fall injury  2/1/2024 2157 by Madeleine Angel RN  Outcome: Progressing  Flowsheets (Taken 2/1/2024 2157)  Free From Fall Injury: Instruct family/caregiver on patient safety     Problem: Safety - Adult  Goal: Isolation precautions  Description: Isolation precautions  2/1/2024 2157 by Madeleine Angel RN  Outcome: Progressing  Note: Patient in contact

## 2024-02-03 VITALS
HEIGHT: 67 IN | BODY MASS INDEX: 22.63 KG/M2 | OXYGEN SATURATION: 97 % | DIASTOLIC BLOOD PRESSURE: 70 MMHG | WEIGHT: 144.18 LBS | SYSTOLIC BLOOD PRESSURE: 134 MMHG | RESPIRATION RATE: 18 BRPM | HEART RATE: 66 BPM | TEMPERATURE: 98.4 F

## 2024-02-03 LAB
ANION GAP SERPL CALC-SCNC: 8 MEQ/L (ref 8–16)
BUN SERPL-MCNC: 8 MG/DL (ref 7–22)
CALCIUM SERPL-MCNC: 9.3 MG/DL (ref 8.5–10.5)
CHLORIDE SERPL-SCNC: 98 MEQ/L (ref 98–111)
CO2 SERPL-SCNC: 28 MEQ/L (ref 23–33)
CREAT SERPL-MCNC: 0.6 MG/DL (ref 0.4–1.2)
DEPRECATED RDW RBC AUTO: 46.2 FL (ref 35–45)
ERYTHROCYTE [DISTWIDTH] IN BLOOD BY AUTOMATED COUNT: 13.2 % (ref 11.5–14.5)
GFR SERPL CREATININE-BSD FRML MDRD: > 60 ML/MIN/1.73M2
GLUCOSE SERPL-MCNC: 95 MG/DL (ref 70–108)
HCT VFR BLD AUTO: 39.5 % (ref 37–47)
HGB BLD-MCNC: 12.9 GM/DL (ref 12–16)
MAGNESIUM SERPL-MCNC: 1.5 MG/DL (ref 1.6–2.4)
MCH RBC QN AUTO: 31 PG (ref 26–33)
MCHC RBC AUTO-ENTMCNC: 32.7 GM/DL (ref 32.2–35.5)
MCV RBC AUTO: 95 FL (ref 81–99)
PLATELET # BLD AUTO: 178 THOU/MM3 (ref 130–400)
PMV BLD AUTO: 10.5 FL (ref 9.4–12.4)
POTASSIUM SERPL-SCNC: 4 MEQ/L (ref 3.5–5.2)
RBC # BLD AUTO: 4.16 MILL/MM3 (ref 4.2–5.4)
SODIUM SERPL-SCNC: 134 MEQ/L (ref 135–145)
WBC # BLD AUTO: 3.4 THOU/MM3 (ref 4.8–10.8)

## 2024-02-03 PROCEDURE — 6370000000 HC RX 637 (ALT 250 FOR IP)

## 2024-02-03 PROCEDURE — 82024 ASSAY OF ACTH: CPT

## 2024-02-03 PROCEDURE — 6370000000 HC RX 637 (ALT 250 FOR IP): Performed by: NURSE PRACTITIONER

## 2024-02-03 PROCEDURE — 2580000003 HC RX 258: Performed by: STUDENT IN AN ORGANIZED HEALTH CARE EDUCATION/TRAINING PROGRAM

## 2024-02-03 PROCEDURE — 36415 COLL VENOUS BLD VENIPUNCTURE: CPT

## 2024-02-03 PROCEDURE — 6370000000 HC RX 637 (ALT 250 FOR IP): Performed by: INTERNAL MEDICINE

## 2024-02-03 PROCEDURE — 83735 ASSAY OF MAGNESIUM: CPT

## 2024-02-03 PROCEDURE — 6360000002 HC RX W HCPCS

## 2024-02-03 PROCEDURE — 85027 COMPLETE CBC AUTOMATED: CPT

## 2024-02-03 PROCEDURE — 99239 HOSP IP/OBS DSCHRG MGMT >30: CPT | Performed by: STUDENT IN AN ORGANIZED HEALTH CARE EDUCATION/TRAINING PROGRAM

## 2024-02-03 PROCEDURE — 80048 BASIC METABOLIC PNL TOTAL CA: CPT

## 2024-02-03 RX ORDER — LIDOCAINE 4 G/G
3 PATCH TOPICAL DAILY
DISCHARGE
Start: 2024-02-04

## 2024-02-03 RX ORDER — MAGNESIUM SULFATE IN WATER 40 MG/ML
4000 INJECTION, SOLUTION INTRAVENOUS ONCE
Status: COMPLETED | OUTPATIENT
Start: 2024-02-03 | End: 2024-02-03

## 2024-02-03 RX ADMIN — ENALAPRIL MALEATE 10 MG: 10 TABLET ORAL at 08:24

## 2024-02-03 RX ADMIN — POLYETHYLENE GLYCOL 3350 17 G: 17 POWDER, FOR SOLUTION ORAL at 08:26

## 2024-02-03 RX ADMIN — DOCUSATE SODIUM 50 MG AND SENNOSIDES 8.6 MG 1 TABLET: 8.6; 5 TABLET, FILM COATED ORAL at 08:25

## 2024-02-03 RX ADMIN — SODIUM CHLORIDE: 9 INJECTION, SOLUTION INTRAVENOUS at 05:58

## 2024-02-03 RX ADMIN — SUCRALFATE 1 G: 1 TABLET ORAL at 05:58

## 2024-02-03 RX ADMIN — AMLODIPINE BESYLATE 10 MG: 10 TABLET ORAL at 08:28

## 2024-02-03 RX ADMIN — MAGNESIUM SULFATE IN WATER FOR 4000 MG: 40 INJECTION INTRAVENOUS at 08:40

## 2024-02-03 RX ADMIN — DOCUSATE SODIUM 100 MG: 100 CAPSULE, LIQUID FILLED ORAL at 08:24

## 2024-02-03 RX ADMIN — LEVOTHYROXINE SODIUM 100 MCG: 0.1 TABLET ORAL at 05:04

## 2024-02-03 RX ADMIN — DICLOFENAC SODIUM 2 G: 10 GEL TOPICAL at 08:25

## 2024-02-03 RX ADMIN — CALCIUM 500 MG: 500 TABLET ORAL at 08:24

## 2024-02-03 RX ADMIN — METHENAMINE HIPPURATE 1 G: 1 TABLET ORAL at 08:24

## 2024-02-03 RX ADMIN — PANTOPRAZOLE SODIUM 40 MG: 40 TABLET, DELAYED RELEASE ORAL at 05:58

## 2024-02-03 RX ADMIN — PREGABALIN 150 MG: 150 CAPSULE ORAL at 08:24

## 2024-02-03 RX ADMIN — METOPROLOL SUCCINATE 50 MG: 50 TABLET, EXTENDED RELEASE ORAL at 08:24

## 2024-02-03 NOTE — PROGRESS NOTES
Patient to be picked up by transport this morning at 11 to [facility]. Report called along with avs and mar faxed. All questions and concerns addressed.

## 2024-02-03 NOTE — PLAN OF CARE
Problem: Discharge Planning  Goal: Discharge to home or other facility with appropriate resources  Outcome: Progressing  Flowsheets (Taken 2/2/2024 1949)  Discharge to home or other facility with appropriate resources:   Identify barriers to discharge with patient and caregiver   Arrange for needed discharge resources and transportation as appropriate   Identify discharge learning needs (meds, wound care, etc)   Refer to discharge planning if patient needs post-hospital services based on physician order or complex needs related to functional status, cognitive ability or social support system     Problem: Pain  Goal: Verbalizes/displays adequate comfort level or baseline comfort level  Outcome: Progressing  Flowsheets (Taken 2/1/2024 2157 by Madeleine Angel, RN)  Verbalizes/displays adequate comfort level or baseline comfort level:   Encourage patient to monitor pain and request assistance   Assess pain using appropriate pain scale   Administer analgesics based on type and severity of pain and evaluate response     Problem: Skin/Tissue Integrity  Goal: Absence of new skin breakdown  Description: 1.  Monitor for areas of redness and/or skin breakdown  2.  Assess vascular access sites hourly  3.  Every 4-6 hours minimum:  Change oxygen saturation probe site  4.  Every 4-6 hours:  If on nasal continuous positive airway pressure, respiratory therapy assess nares and determine need for appliance change or resting period.  Outcome: Progressing     Problem: Safety - Adult  Goal: Free from fall injury  Outcome: Progressing  Flowsheets (Taken 2/1/2024 2157 by Madeleine Angel, RN)  Free From Fall Injury: Instruct family/caregiver on patient safety  Goal: Isolation precautions  Description: Isolation precautions  Outcome: Progressing     Problem: ABCDS Injury Assessment  Goal: Absence of physical injury  Outcome: Progressing  Flowsheets (Taken 2/3/2024 0041)  Absence of Physical Injury: Implement safety measures based on

## 2024-02-03 NOTE — DISCHARGE SUMMARY
Resident Discharge Summary (Hospitalist)      Patient Identification:   Paulina Gunter   : 1940  MRN: [id number]   Account: [id number]   Patient's PCP: Claire Castillo APRN - CNP    Admit Date: 2024   Discharge Date: 2/3/2024      Admitting Physician: Juan Schneider, DO  Discharge Physician: Cricket Clark, DO       Discharge Diagnoses:    Progressively worsening generalized weakness/physical deconditioning 2/2 aging, fibromyalgia, chronic back pain, multiple joint replacements  Acute on chronic urinary retention iso recurrent UTIs  Hypotonic euvolemic hyponatremia, unknown etiology  Acute on chronic pain 2/2 fibromyalgia, chronic back pain, multiple joint replacements  Asymptomatic bacteriuria  Hypomagnesemia  Neutropenia, stable  Resting tremor  Nausea likely 2/2 medication  Large hiatal hernia  HTN  H/o DVT and PE ()  Depression      Hospital Course:   Paulina Gunter is a 83 y.o. female with PMHx of chronic back pain, fibromyalgia, multiple joint replacements, recurrent UTI's was admitted to [facility] on 2024 for right hip pain, worsening right sided weakness, and acute on chronic urinary retention. Per H&P: \"Today while she was in the bathroom trying to get up, she noticed her right leg buckle and give out. She was able to get herself up using her wheelchair and call for help. States that she has had similar pains before and has chronic pain, but this episode was worse than usual and she is unable to transfer herself between wheelchair and other seats. Also states that this morning she was having some subjective right arm weakness that began this afternoon, but had been having similar episodes throughout the week. On exam strength is 5/5 in all extremities, no drift noted. She denies chest pain, SOB, headaches, N/V, dizziness, dysuria, changes in vision. Upon return to ED to speak with pt, RN notified me that pt had sudden onset lightheadedness, nausea,  tablet  Commonly known as: MUCINEX  Take 1 tablet by mouth 2 times daily     HYDROcodone-acetaminophen 5-325 MG per tablet  Commonly known as: NORCO     levothyroxine 100 MCG tablet  Commonly known as: SYNTHROID  TAKE 1 TABLET DAILY     melatonin 3 MG Tabs tablet  Take 1 tablet by mouth nightly as needed (sleep)     methenamine 1 g tablet  Commonly known as: Hiprex  Take 1 tablet by mouth 2 times daily (with meals)     metoprolol succinate 50 MG extended release tablet  Commonly known as: TOPROL XL  TAKE 1 TABLET BY MOUTH DAILY     MIRALAX PO     * Misc. Devices Misc  Mechanical lift for a Van     * Walker Misc  1 each by Does not apply route daily     * Misc. Devices Misc  Check BP daily.  Update office with BP readings on 8/10/23     OMEGA 3-6-9 COMPLEX PO     ondansetron 4 MG disintegrating tablet  Commonly known as: ZOFRAN-ODT  Take 1 tablet by mouth every 8 hours as needed for Nausea or Vomiting     pantoprazole 40 MG tablet  Commonly known as: PROTONIX  Take 1 tablet by mouth 2 times daily (before meals)     pregabalin 150 MG capsule  Commonly known as: LYRICA  Take 1 capsule by mouth 2 times daily for 180 days.     Scooter Misc  by Does not apply route Power scooter     sodium chloride 1 g tablet  Take 2 tablets by mouth 2 times daily (with meals)     sucralfate 1 GM tablet  Commonly known as: CARAFATE  Take 1 tablet by mouth 4 times daily (before meals and nightly)     * EYE HEALTH PO     * therapeutic multivitamin-minerals tablet           * This list has 5 medication(s) that are the same as other medications prescribed for you. Read the directions carefully, and ask your doctor or other care provider to review them with you.                STOP taking these medications      diclofenac 50 MG EC tablet  Commonly known as: VOLTAREN     FLUoxetine 40 MG capsule  Commonly known as: PROzac     guaiFENesin-dextromethorphan 100-10 MG/5ML syrup  Commonly known as: ROBITUSSIN DM     ondansetron 4 MG tablet  Commonly

## 2024-02-04 LAB — ACTH PLAS-MCNC: 18.6 PG/ML (ref 7.2–63.3)

## 2024-02-05 ENCOUNTER — TELEPHONE (OUTPATIENT)
Dept: FAMILY MEDICINE CLINIC | Age: 84
End: 2024-02-05

## 2024-02-05 NOTE — TELEPHONE ENCOUNTER
Care Transitions Initial Follow Up Call    Outreach made within 2 business days of discharge: Yes    Patient: Paulina Gunter Patient : 1940   MRN: [id number]  Reason for Admission: There are no discharge diagnoses documented for the most recent discharge.  Discharge Date: 2/3/24       Spoke with: Candace villa HIPAA    Discharge department/facility: [facility]    TCM Interactive Patient Contact:  Was patient able to fill all prescriptions: Yes  Was patient instructed to bring all medications to the follow-up visit: Yes  Is patient taking all medications as directed in the discharge summary? Yes  Does patient understand their discharge instructions: No: the hospital did not really talk about discharge instructions  Does patient have questions or concerns that need addressed prior to 7-14 day follow up office visit: no    Scheduled appointment with PCP within 7-14 days    Follow Up  Future Appointments   Date Time Provider Department Center   2024 10:20 AM Claire Castillo APRN - CNP [facility] [place]   2024  2:00 PM Cristino Curry MD N [facility] [place]   2024 11:40 AM Jared Dale DO N [facility] [place]   3/7/2024  3:00 PM Jared Dale DO [facility] [place]   3/12/2024  1:40 PM Claire Castillo APRN - CNP [facility] [place]   3/14/2024 11:00 AM Antoine Mason PA-C [facility] [place] [facility]   2024  1:30 PM Martin Avila MD N [facility]       Libra Shaikh LPN

## 2024-02-12 ENCOUNTER — TELEPHONE (OUTPATIENT)
Dept: FAMILY MEDICINE CLINIC | Age: 84
End: 2024-02-12

## 2024-02-12 NOTE — TELEPHONE ENCOUNTER
I contacted Elen to r/s her appointment for tomorrow 02/13/2024 due to Claire Jonathan being out of office and Elen requested that I call her daughter Candace to reschedule due to Candace being her transportation. I had to leave Candace a message to contact the office. If Elen needs to be seen tomorrow she requested that she see Maxx White as her spouse has seen him in the past.

## 2024-02-13 ENCOUNTER — TELEPHONE (OUTPATIENT)
Dept: FAMILY MEDICINE CLINIC | Age: 84
End: 2024-02-13

## 2024-02-13 DIAGNOSIS — G89.4 PAIN SYNDROME, CHRONIC: Primary | ICD-10-CM

## 2024-02-13 NOTE — TELEPHONE ENCOUNTER
Pt called stating the hydrocodone is no longer working for her pain and is asking for a new pain medication.    Please advise

## 2024-02-13 NOTE — TELEPHONE ENCOUNTER
Spoke with Candace pt is currently admitted at [facility] for rehab, daughter will call to schedule appt after discharge

## 2024-02-14 ENCOUNTER — TELEPHONE (OUTPATIENT)
Dept: FAMILY MEDICINE CLINIC | Age: 84
End: 2024-02-14

## 2024-02-14 NOTE — TELEPHONE ENCOUNTER
Patient will be discharged from [facility] on 2/16/24, will need a call for the hospital follow up

## 2024-02-15 RX ORDER — HYDROCODONE BITARTRATE AND ACETAMINOPHEN 5; 325 MG/1; MG/1
1 TABLET ORAL EVERY 6 HOURS PRN
Qty: 120 TABLET | Refills: 0 | Status: SHIPPED | OUTPATIENT
Start: 2024-02-15 | End: 2024-03-16

## 2024-02-19 ENCOUNTER — TELEPHONE (OUTPATIENT)
Dept: FAMILY MEDICINE CLINIC | Age: 84
End: 2024-02-19

## 2024-02-19 NOTE — TELEPHONE ENCOUNTER
Candace (daughter on HIPAA) called to tell us that Paulina was in hospital 1/26/24 and she still has her catheter in and does not know what to do about still having it in, she is currently back at [facility] as of 2/16/24. Tried to take catheter out 1/30 or 1/31 and could not pee. Saturday 2/17/24 it was leaking and patient was really upset about it.    Daughter would like to know what to do to have this catheter removed or if the catheter needs removed, please advise.     Pt has appointment with urologist 3/14/24, daughter wants to know if they should see them sooner?    Call Candace's number back

## 2024-02-20 ENCOUNTER — TELEPHONE (OUTPATIENT)
Dept: FAMILY MEDICINE CLINIC | Age: 84
End: 2024-02-20

## 2024-02-20 RX ORDER — METOPROLOL SUCCINATE 50 MG/1
TABLET, EXTENDED RELEASE ORAL
Qty: 90 TABLET | Refills: 3 | Status: SHIPPED | OUTPATIENT
Start: 2024-02-20

## 2024-02-20 NOTE — TELEPHONE ENCOUNTER
Recent Visits  Date Type Provider Dept   01/23/24 Office Visit Claire Castillo, APRN - CNP [facility]   03/27/23 Office Visit Claire Castillo, APRN - CNP [facility]   02/28/23 Office Visit Greta Bedoya, APRN - CNP [facility]   10/31/22 Office Visit Ascencion Pierce, DO [facility]   09/29/22 Office Visit Greta Bedoya, APRN - CNP [facility]   09/20/22 Office Visit Claire Castillo, APRN - CNP [facility]   09/06/22 Office Visit Ascencion Pierce, DO [facility]   Showing recent visits within past 540 days with a meds authorizing provider and meeting all other requirements  Future Appointments  Date Type Provider Dept   02/27/24 Appointment Claire Castillo APRN - CNP [facility]   03/12/24 Appointment Claire Castillo, APRN - CNP [facility]   Showing future appointments within next 150 days with a meds authorizing provider and meeting all other requirements

## 2024-02-20 NOTE — TELEPHONE ENCOUNTER
Patient called the office stating she has a carroll catheter and it was placed at her last visit to [facility]. 1.5 months ago pt doesn't know how to go about getting this taken out. Please advise

## 2024-02-22 ENCOUNTER — OFFICE VISIT (OUTPATIENT)
Dept: ONCOLOGY | Age: 84
End: 2024-02-22
Payer: MEDICARE

## 2024-02-22 ENCOUNTER — HOSPITAL ENCOUNTER (OUTPATIENT)
Dept: INFUSION THERAPY | Age: 84
Discharge: HOME | End: 2024-02-22

## 2024-02-22 VITALS
BODY MASS INDEX: 23.7 KG/M2 | HEART RATE: 60 BPM | WEIGHT: 151 LBS | DIASTOLIC BLOOD PRESSURE: 55 MMHG | RESPIRATION RATE: 18 BRPM | OXYGEN SATURATION: 94 % | SYSTOLIC BLOOD PRESSURE: 92 MMHG | TEMPERATURE: 98.2 F | HEIGHT: 67 IN

## 2024-02-22 VITALS
OXYGEN SATURATION: 94 % | SYSTOLIC BLOOD PRESSURE: 92 MMHG | HEART RATE: 60 BPM | DIASTOLIC BLOOD PRESSURE: 55 MMHG | TEMPERATURE: 98.2 F | RESPIRATION RATE: 18 BRPM

## 2024-02-22 DIAGNOSIS — D72.819 LEUKOPENIA, UNSPECIFIED TYPE: Primary | ICD-10-CM

## 2024-02-22 DIAGNOSIS — D72.819 LEUKOPENIA, UNSPECIFIED TYPE: ICD-10-CM

## 2024-02-22 LAB
ABSOLUTE IMMATURE GRANULOCYTE: 0.01 THOU/MM3 (ref 0–0.07)
BASOPHILS ABSOLUTE: 0.1 THOU/MM3 (ref 0–0.1)
BASOPHILS NFR BLD AUTO: 1 % (ref 0–3)
EOSINOPHIL NFR BLD AUTO: 5 % (ref 0–4)
EOSINOPHILS ABSOLUTE: 0.2 THOU/MM3 (ref 0–0.4)
ERYTHROCYTE [DISTWIDTH] IN BLOOD BY AUTOMATED COUNT: 14.2 % (ref 11.5–14.5)
HCT VFR BLD AUTO: 40.2 % (ref 37–47)
HGB BLD-MCNC: 13.2 GM/DL (ref 12–16)
IMMATURE GRANULOCYTES: 0 %
LYMPHOCYTES ABSOLUTE: 1.1 THOU/MM3 (ref 1–4.8)
LYMPHOCYTES NFR BLD AUTO: 25 % (ref 15–47)
MCH RBC QN AUTO: 31.2 PG (ref 26–33)
MCHC RBC AUTO-ENTMCNC: 32.8 GM/DL (ref 32.2–35.5)
MCV RBC AUTO: 95 FL (ref 81–99)
MONOCYTES ABSOLUTE: 0.4 THOU/MM3 (ref 0.4–1.3)
MONOCYTES NFR BLD AUTO: 8 % (ref 0–12)
NEUTROPHILS NFR BLD AUTO: 61 % (ref 43–75)
PLATELET # BLD AUTO: 129 THOU/MM3 (ref 130–400)
PMV BLD AUTO: 11.1 FL (ref 9.4–12.4)
RBC # BLD AUTO: 4.23 MILL/MM3 (ref 4.2–5.4)
SEGMENTED NEUTROPHILS ABSOLUTE COUNT: 2.8 THOU/MM3 (ref 1.8–7.7)
WBC # BLD AUTO: 4.7 THOU/MM3 (ref 4.8–10.8)

## 2024-02-22 PROCEDURE — 1123F ACP DISCUSS/DSCN MKR DOCD: CPT | Performed by: INTERNAL MEDICINE

## 2024-02-22 PROCEDURE — 85025 COMPLETE CBC W/AUTO DIFF WBC: CPT

## 2024-02-22 PROCEDURE — 99203 OFFICE O/P NEW LOW 30 MIN: CPT | Performed by: INTERNAL MEDICINE

## 2024-02-22 PROCEDURE — 3074F SYST BP LT 130 MM HG: CPT | Performed by: INTERNAL MEDICINE

## 2024-02-22 PROCEDURE — 99211 OFF/OP EST MAY X REQ PHY/QHP: CPT

## 2024-02-22 PROCEDURE — 36415 COLL VENOUS BLD VENIPUNCTURE: CPT

## 2024-02-22 PROCEDURE — 3078F DIAST BP <80 MM HG: CPT | Performed by: INTERNAL MEDICINE

## 2024-02-22 ASSESSMENT — ENCOUNTER SYMPTOMS
RESPIRATORY NEGATIVE: 1
ALLERGIC/IMMUNOLOGIC NEGATIVE: 1
EYES NEGATIVE: 1
GASTROINTESTINAL NEGATIVE: 1

## 2024-02-22 NOTE — PROGRESS NOTES
recognition software. It may contain minor errors which are inherent in voice recognition technology.** Final report electronically signed by DR JEANMARIE OAKLEY on 1/30/2024 1:54 PM    XR ABDOMEN (KUB) (SINGLE AP VIEW)    Result Date: 1/29/2024  Mildly gassy abdomen. No evidence for constipation or ileus. **This report has been created using voice recognition software.  It may contain minor errors which are inherent in voice recognition technology.** Final report electronically signed by Dr. Jigar Vasquez on 1/29/2024 3:13 PM    XR CERVICAL SPINE FLEXION AND EXTENSION    Result Date: 1/29/2024   1. Straightening of the normal cervical lordosis. 2. Minimal anterolisthesis of C2 relative to C3. Cervical spondylosis at C3-4, C4-5, C5-6 and to lesser extent C6-7. 3. Limited mobility on the flexion and extension lateral views. No instability noted.. **This report has been created using voice recognition software. It may contain minor errors which are inherent in voice recognition technology.** Final report electronically signed by DR JEANMARIE OAKLEY on 1/29/2024 7:24 AM    MRI CERVICAL SPINE WO CONTRAST    Result Date: 1/28/2024   1. Straightening of the normal cervical lordosis with superimposed cervical spondylosis at C4-5, C5-6 and C6-7. There is no significant cord compression or foraminal stenosis at any level. 2. Increased signal intensity in the radha most likely representing ischemic changes.. **This report has been created using voice recognition software. It may contain minor errors which are inherent in voice recognition technology.** Final report electronically signed by DR JEANMARIE OAKLEY on 1/28/2024 11:41 AM    MRI BRAIN WO CONTRAST    Result Date: 1/28/2024   1. There are areas of abnormal signal intensity in the white matter, radha, basal ganglia and thalami bilaterally most likely representing ischemic changes. 2. There is no evidence for an acute infarct. 3. There is mild atrophy. 4. There are inflammatory changes

## 2024-02-23 ENCOUNTER — OFFICE VISIT (OUTPATIENT)
Dept: UROLOGY | Age: 84
End: 2024-02-23
Payer: MEDICARE

## 2024-02-23 ENCOUNTER — OFFICE VISIT (OUTPATIENT)
Dept: PHYSICAL MEDICINE AND REHAB | Age: 84
End: 2024-02-23
Payer: MEDICARE

## 2024-02-23 VITALS
DIASTOLIC BLOOD PRESSURE: 60 MMHG | WEIGHT: 151 LBS | HEIGHT: 67 IN | BODY MASS INDEX: 23.7 KG/M2 | SYSTOLIC BLOOD PRESSURE: 102 MMHG

## 2024-02-23 VITALS — RESPIRATION RATE: 16 BRPM | WEIGHT: 151 LBS | BODY MASS INDEX: 23.7 KG/M2 | HEIGHT: 67 IN

## 2024-02-23 DIAGNOSIS — N39.0 RECURRENT UTI: Primary | ICD-10-CM

## 2024-02-23 DIAGNOSIS — G89.4 CHRONIC PAIN SYNDROME: Primary | ICD-10-CM

## 2024-02-23 DIAGNOSIS — M79.18 MYOFASCIAL PAIN: ICD-10-CM

## 2024-02-23 DIAGNOSIS — R33.8 ACUTE URINARY RETENTION: ICD-10-CM

## 2024-02-23 DIAGNOSIS — M47.896 OTHER SPONDYLOSIS, LUMBAR REGION: ICD-10-CM

## 2024-02-23 DIAGNOSIS — M47.816 SPONDYLOSIS OF LUMBAR SPINE: ICD-10-CM

## 2024-02-23 PROCEDURE — 3078F DIAST BP <80 MM HG: CPT | Performed by: ANESTHESIOLOGY

## 2024-02-23 PROCEDURE — 99213 OFFICE O/P EST LOW 20 MIN: CPT

## 2024-02-23 PROCEDURE — 1123F ACP DISCUSS/DSCN MKR DOCD: CPT | Performed by: ANESTHESIOLOGY

## 2024-02-23 PROCEDURE — 99214 OFFICE O/P EST MOD 30 MIN: CPT | Performed by: ANESTHESIOLOGY

## 2024-02-23 PROCEDURE — 3074F SYST BP LT 130 MM HG: CPT | Performed by: ANESTHESIOLOGY

## 2024-02-23 PROCEDURE — 1123F ACP DISCUSS/DSCN MKR DOCD: CPT

## 2024-02-23 RX ORDER — BUPRENORPHINE 10 UG/H
1 PATCH TRANSDERMAL WEEKLY
Qty: 4 PATCH | Refills: 0 | Status: SHIPPED | OUTPATIENT
Start: 2024-02-23 | End: 2024-03-24

## 2024-02-23 NOTE — PROGRESS NOTES
[facility] [place] [facility]  [facility]  [address]  [address]  [facility] [address]  Dept: [phone]  Loc: [phone]  Visit Date: 2/23/2024      HPI:   Paulina Gunter is a 83 y.o. female with past medical history as listed below who presents today accompanied by her  and daughter in follow-up for recurrent UTI and urinary retention.      Hospital follow-up for carroll removal today. Carroll catheter placed while IP for an estimated > 1 L of retention while managed with external catheter secondary to immobility. Patient was IP for right hip pain, weakness, and deconditioning which led to her being largely immobile and incontinent.    Has a history of acute urinary retention in the setting of UTI's. Since starting D-Mannose at her last visit, Elen has done very well- no recent UTI's.       Labs   UA and PVR: Carroll catheter in place and draining straw-colored urine.    Current Outpatient Medications   Medication Sig Dispense Refill    metoprolol succinate (TOPROL XL) 50 MG extended release tablet TAKE 1 TABLET BY MOUTH DAILY 90 tablet 3    HYDROcodone-acetaminophen (NORCO) 5-325 MG per tablet Take 1 tablet by mouth every 6 hours as needed for Pain for up to 30 days. Max Daily Amount: 4 tablets 120 tablet 0    diclofenac sodium (VOLTAREN) 1 % GEL Apply 2 g topically 4 times daily      lidocaine 4 % external patch Place 3 patches onto the skin daily      Calcium Carb-Cholecalciferol (CALCIUM + VITAMIN D3 PO) Take 600 mg by mouth daily      Cholecalciferol (VITAMIN D3) 50 MCG (2000 UT) TABS Take 125 mcg by mouth daily      sucralfate (CARAFATE) 1 GM tablet Take 1 tablet by mouth 4 times daily (before meals and nightly) 120 tablet 3    amLODIPine (NORVASC) 10 MG tablet Take 1 tablet by mouth daily 90 tablet 3    Polyethylene Glycol 3350 (MIRALAX PO) Take by mouth daily      guaiFENesin (MUCINEX) 600 MG extended release tablet Take 1 tablet by mouth 2 times daily  0    sodium

## 2024-02-23 NOTE — PROGRESS NOTES
Functionality Assessment/Goals Worksheet     On a scale of 0 (Does not Interfere) to 10 (Completely Interferes)     1.  Which number describes how during the past week pain has interfered with           the following:  A.  General Activity:  8  B.  Mood: 6  C.  Walking Ability:  10  D.  Normal Work (Includes both work outside the home and housework):  6  E.  Relations with Other People:   2  F.  Sleep:   0  G.  Enjoyment of Life:   5    2.  Patient Prefers to Take their Pain Medications:     [x]  On a regular basis   []  Only when necessary    []  Does not take pain medications    3.  What are the Patient's Goals/Expectations for Visiting Pain Management?     []  Learn about my pain    [x]  Receive Medication   []  Physical Therapy     []  Treat Depression   []  Receive Injections    []  Treat Sleep   []  Deal with Anxiety and Stress   []  Treat Opoid Dependence/Addiction   []  Other:

## 2024-02-23 NOTE — PROGRESS NOTES
Patient has given me verbal consent to perform carroll removal  Yes    7 cc of water deflated from carroll balloon. 16 Fr carroll removed without difficulty.          Pt will drink fluids and report to ER in 6-8 hours if patient unable to urinate.      F/u with provider Antoine Mason Pa-C.

## 2024-02-23 NOTE — PATIENT INSTRUCTIONS
Call the office with any questions or concerns if having difficulty urinating or if suspecting UTI.     If unable to urinate today following removal of the carroll then will need to present to the ED for replacement.

## 2024-02-23 NOTE — PROGRESS NOTES
Chronic Pain/PM&R Clinic Note     Encounter Date: 2/23/24    Subjective:   Chief Complaint:   Chief Complaint   Patient presents with    Back Pain     A lot of back pain, Norco does not work. Had hospital stay 1-26-24 pain med received at that time worked great.          History of Present Illness:   Paulina Gunter is a 83 y.o. female seen in the clinic initially on 06/23/21 upon request from Ascencion Pierce DO (PCP) for her history of chronic low back pain.  She has a medical history of scoliosis, neuropathy, fibromyalgia, and previous lumbar discectomy (over 50 years ago).  She states that she has low back pain that has been progressively worse over the last 20 years.  She reports the majority of her low back pain to be at her waistline with radiation across to her waist on both sides.  She describes this pain is a constant aching, stabbing 5/10 pain that can get up to a 7-8/10 when severe.  This pain is worse with any activities where she is standing upright such as cooking or when she is walking for any duration of time.  Her pain is improved with rest and sitting in her motorized wheelchair.  She denies any pain radiating down her legs but does endorse numbness in both feet from her neuropathy.  She denies any saddle anesthesia or bowel/bladder incontinence.    Of note, she has multiple joint replacements.  She has bilateral total hip arthroplasties and a left knee total hip arthroplasty.  In addition, she has a previous left femur fracture that has been fixated with hardware and a left total shoulder arthroplasty.  She states that she had an EMG/NCS performed by Dr. Alves which revealed idiopathic neuropathy.  She states that she had a previous RFA in her low back done by Dr. Ulloa which did give her longstanding relief but this was done 8-10 years ago.    Today, 2/23/2024, patient presents to clinic follow-up for management of chronic low back pain.  She states that she was experiencing worsening low back pain

## 2024-02-26 ENCOUNTER — TELEPHONE (OUTPATIENT)
Dept: FAMILY MEDICINE CLINIC | Age: 84
End: 2024-02-26

## 2024-02-26 ENCOUNTER — TELEPHONE (OUTPATIENT)
Dept: PHYSICAL MEDICINE AND REHAB | Age: 84
End: 2024-02-26
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
## 2024-02-26 RX ORDER — LOPERAMIDE HYDROCHLORIDE 2 MG/1
2 CAPSULE ORAL 4 TIMES DAILY PRN
Qty: 120 CAPSULE | Refills: 5 | Status: SHIPPED | OUTPATIENT
Start: 2024-02-26 | End: 2024-03-27

## 2024-02-26 NOTE — TELEPHONE ENCOUNTER
Pt is needing a PRN order for imodium faxed to [facility] for when she experiences diarrhea.    Please fax order to: [phone]

## 2024-02-27 NOTE — TELEPHONE ENCOUNTER
(Key: [id number])  PA Case ID #: [id number]  Buprenorphine 10MCG/HR weekly patches  [facility] is reviewing your PA request. Typically an electronic response will be received within 24-72 hours.

## 2024-03-05 PROBLEM — E87.1 HYPO-OSMOLALITY AND HYPONATREMIA: Status: ACTIVE | Noted: 2024-02-01

## 2024-03-05 PROBLEM — Z86.718 PERSONAL HISTORY OF VENOUS THROMBOSIS AND EMBOLISM: Status: ACTIVE | Noted: 2024-02-01

## 2024-03-05 PROBLEM — G25.0 ESSENTIAL TREMOR: Status: ACTIVE | Noted: 2024-02-01

## 2024-03-05 PROBLEM — Z97.3 PRESENCE OF SPECTACLES AND CONTACT LENSES: Status: ACTIVE | Noted: 2024-02-03

## 2024-03-05 PROBLEM — Z97.2 PRESENCE OF DENTAL PROSTHETIC DEVICE (COMPLETE) (PARTIAL): Status: ACTIVE | Noted: 2024-02-03

## 2024-03-05 PROBLEM — Z97.4 PRESENCE OF EXTERNAL HEARING-AID: Status: ACTIVE | Noted: 2024-02-03

## 2024-03-05 PROBLEM — K57.92 DIVERTICULITIS OF INTESTINE, PART UNSPECIFIED, WITHOUT PERFORATION OR ABSCESS WITHOUT BLEEDING: Status: ACTIVE | Noted: 2024-02-01

## 2024-03-05 PROBLEM — B95.62 METHICILLIN RESISTANT STAPHYLOCOCCUS AUREUS INFECTION AS THE CAUSE OF DISEASES CLASSIFIED ELSEWHERE: Status: ACTIVE | Noted: 2024-02-03

## 2024-03-06 ENCOUNTER — OFFICE VISIT (OUTPATIENT)
Dept: FAMILY MEDICINE CLINIC | Age: 84
End: 2024-03-06
Payer: MEDICARE

## 2024-03-06 ENCOUNTER — NURSE ONLY (OUTPATIENT)
Dept: LAB | Age: 84
End: 2024-03-06

## 2024-03-06 VITALS
DIASTOLIC BLOOD PRESSURE: 68 MMHG | WEIGHT: 144 LBS | HEIGHT: 67 IN | OXYGEN SATURATION: 94 % | BODY MASS INDEX: 22.6 KG/M2 | HEART RATE: 58 BPM | RESPIRATION RATE: 14 BRPM | TEMPERATURE: 97.9 F | SYSTOLIC BLOOD PRESSURE: 118 MMHG

## 2024-03-06 DIAGNOSIS — N39.0 RECURRENT UTI: ICD-10-CM

## 2024-03-06 DIAGNOSIS — R26.89 IMPAIRED GAIT AND MOBILITY: ICD-10-CM

## 2024-03-06 DIAGNOSIS — I10 ESSENTIAL HYPERTENSION: ICD-10-CM

## 2024-03-06 DIAGNOSIS — R53.81 PHYSICAL DECONDITIONING: Primary | ICD-10-CM

## 2024-03-06 DIAGNOSIS — G89.4 PAIN SYNDROME, CHRONIC: ICD-10-CM

## 2024-03-06 DIAGNOSIS — E83.42 HYPOMAGNESEMIA: ICD-10-CM

## 2024-03-06 DIAGNOSIS — E87.1 HYPONATREMIA: ICD-10-CM

## 2024-03-06 LAB
ALBUMIN SERPL BCG-MCNC: 3.7 G/DL (ref 3.5–5.1)
ALP SERPL-CCNC: 152 U/L (ref 38–126)
ALT SERPL W/O P-5'-P-CCNC: 33 U/L (ref 11–66)
ANION GAP SERPL CALC-SCNC: 11 MEQ/L (ref 8–16)
AST SERPL-CCNC: 46 U/L (ref 5–40)
BACTERIA URNS QL MICRO: ABNORMAL /HPF
BILIRUB CONJ SERPL-MCNC: < 0.2 MG/DL (ref 0–0.3)
BILIRUB SERPL-MCNC: 0.3 MG/DL (ref 0.3–1.2)
BILIRUB UR QL STRIP.AUTO: NEGATIVE
BUN SERPL-MCNC: 9 MG/DL (ref 7–22)
CALCIUM SERPL-MCNC: 9.9 MG/DL (ref 8.5–10.5)
CASTS #/AREA URNS LPF: ABNORMAL /LPF
CASTS 2: ABNORMAL /LPF
CHARACTER UR: ABNORMAL
CHLORIDE SERPL-SCNC: 99 MEQ/L (ref 98–111)
CO2 SERPL-SCNC: 24 MEQ/L (ref 23–33)
COLOR: YELLOW
CREAT SERPL-MCNC: 0.5 MG/DL (ref 0.4–1.2)
CRYSTALS URNS MICRO: ABNORMAL
GFR SERPL CREATININE-BSD FRML MDRD: > 60 ML/MIN/1.73M2
GLUCOSE SERPL-MCNC: 102 MG/DL (ref 70–108)
GLUCOSE UR QL STRIP.AUTO: NEGATIVE MG/DL
HGB UR QL STRIP.AUTO: ABNORMAL
KETONES UR QL STRIP.AUTO: NEGATIVE
MAGNESIUM SERPL-MCNC: 1.5 MG/DL (ref 1.6–2.4)
MISCELLANEOUS 2: ABNORMAL
MUCOUS THREADS URNS QL MICRO: ABNORMAL
NITRITE UR QL STRIP: NEGATIVE
PH UR STRIP.AUTO: 6.5 [PH] (ref 5–9)
POTASSIUM SERPL-SCNC: 4.1 MEQ/L (ref 3.5–5.2)
PROT SERPL-MCNC: 7.1 G/DL (ref 6.1–8)
PROT UR STRIP.AUTO-MCNC: 30 MG/DL
SODIUM SERPL-SCNC: 134 MEQ/L (ref 135–145)
SP GR UR REFRACT.AUTO: 1.01 (ref 1–1.03)
UROBILINOGEN, URINE: 0.2 EU/DL (ref 0–1)
WBC #/AREA URNS HPF: ABNORMAL /HPF
WBC #/AREA URNS HPF: ABNORMAL /[HPF]
YEAST LIKE FUNGI URNS QL MICRO: ABNORMAL

## 2024-03-06 PROCEDURE — 3074F SYST BP LT 130 MM HG: CPT | Performed by: NURSE PRACTITIONER

## 2024-03-06 PROCEDURE — 3078F DIAST BP <80 MM HG: CPT | Performed by: NURSE PRACTITIONER

## 2024-03-06 PROCEDURE — 1123F ACP DISCUSS/DSCN MKR DOCD: CPT | Performed by: NURSE PRACTITIONER

## 2024-03-06 PROCEDURE — 99213 OFFICE O/P EST LOW 20 MIN: CPT | Performed by: NURSE PRACTITIONER

## 2024-03-06 RX ORDER — D-MANNOSE 99 %
POWDER (GRAM) MISCELLANEOUS
COMMUNITY

## 2024-03-06 NOTE — PROGRESS NOTES
Depression Sister     Diabetes Sister     Arthritis Brother     Depression Brother     Cancer Maternal Aunt     Early Death Maternal Aunt     Diabetes Maternal Grandmother     Heart Disease Paternal Grandmother          Review of Systems        PHYSICAL EXAM:  /68   Pulse 58   Temp 97.9 °F (36.6 °C) (Oral)   Resp 14   Ht 1.702 m (5' 7.01\")   Wt 65.3 kg (144 lb)   SpO2 94%   BMI 22.55 kg/m²     Physical Exam  Constitutional:       Appearance: Normal appearance.   HENT:      Head: Normocephalic and atraumatic.      Right Ear: External ear normal.      Left Ear: External ear normal.      Nose: Nose normal.      Mouth/Throat:      Mouth: Mucous membranes are moist.   Eyes:      Conjunctiva/sclera: Conjunctivae normal.   Cardiovascular:      Rate and Rhythm: Normal rate and regular rhythm.      Pulses: Normal pulses.      Heart sounds: Normal heart sounds.   Pulmonary:      Effort: Pulmonary effort is normal.      Breath sounds: Normal breath sounds.   Abdominal:      General: Bowel sounds are normal.      Palpations: Abdomen is soft.   Musculoskeletal:         General: Normal range of motion.      Cervical back: Normal range of motion.      Comments: Wheelchair bound   Skin:     General: Skin is warm and dry.   Neurological:      General: No focal deficit present.      Mental Status: She is alert and oriented to person, place, and time.   Psychiatric:         Mood and Affect: Mood normal.         Behavior: Behavior normal.           ASSESSMENT & PLAN  Paulina was seen today for follow-up.    Diagnoses and all orders for this visit:    Physical deconditioning    Pain syndrome, chronic    Essential hypertension    Impaired gait and mobility      Discussed EKG for chest discomfort, declines  Continue current meds      No follow-ups on file.        All copied or forwarded information in the progress note was verified by me to be accurate at the time of visit  Patient's past medical, surgical, social and family

## 2024-03-07 ENCOUNTER — OFFICE VISIT (OUTPATIENT)
Dept: PHYSICAL MEDICINE AND REHAB | Age: 84
End: 2024-03-07
Payer: MEDICARE

## 2024-03-07 VITALS
HEIGHT: 67 IN | BODY MASS INDEX: 22.6 KG/M2 | SYSTOLIC BLOOD PRESSURE: 118 MMHG | DIASTOLIC BLOOD PRESSURE: 60 MMHG | WEIGHT: 143.96 LBS

## 2024-03-07 DIAGNOSIS — M47.816 SPONDYLOSIS OF LUMBAR SPINE: ICD-10-CM

## 2024-03-07 DIAGNOSIS — G89.4 CHRONIC PAIN SYNDROME: Primary | ICD-10-CM

## 2024-03-07 DIAGNOSIS — M47.896 OTHER SPONDYLOSIS, LUMBAR REGION: ICD-10-CM

## 2024-03-07 DIAGNOSIS — M79.18 MYOFASCIAL PAIN: ICD-10-CM

## 2024-03-07 PROCEDURE — 3074F SYST BP LT 130 MM HG: CPT | Performed by: ANESTHESIOLOGY

## 2024-03-07 PROCEDURE — 3078F DIAST BP <80 MM HG: CPT | Performed by: ANESTHESIOLOGY

## 2024-03-07 PROCEDURE — 99213 OFFICE O/P EST LOW 20 MIN: CPT | Performed by: ANESTHESIOLOGY

## 2024-03-07 PROCEDURE — 1123F ACP DISCUSS/DSCN MKR DOCD: CPT | Performed by: ANESTHESIOLOGY

## 2024-03-07 RX ORDER — LEVOTHYROXINE SODIUM 0.1 MG/1
100 TABLET ORAL DAILY
Qty: 90 TABLET | Refills: 3 | Status: SHIPPED | OUTPATIENT
Start: 2024-03-07

## 2024-03-07 RX ORDER — POLYETHYLENE GLYCOL 3350 17 G/17G
17 POWDER, FOR SOLUTION ORAL DAILY PRN
Qty: 289 G | Refills: 2 | Status: SHIPPED | OUTPATIENT
Start: 2024-03-07 | End: 2024-04-06

## 2024-03-07 NOTE — TELEPHONE ENCOUNTER
Recent Visits  Date Type Provider Dept   03/06/24 Office Visit Claire Castillo, APRN - CNP [facility]   01/23/24 Office Visit Claire Castillo, APRN - CNP [facility]   03/27/23 Office Visit Claire Castillo, APRN - CNP [facility]   02/28/23 Office Visit Greta Bedoya, APRN - CNP [facility]   10/31/22 Office Visit Ascencion Pierce, DO [facility]   09/29/22 Office Visit Greta Bedoya, APRN - CNP [facility]   09/20/22 Office Visit Claire Castillo, APRN - CNP [facility]   Showing recent visits within past 540 days with a meds authorizing provider and meeting all other requirements  Future Appointments  No visits were found meeting these conditions.  Showing future appointments within next 150 days with a meds authorizing provider and meeting all other requirements

## 2024-03-08 ENCOUNTER — TELEPHONE (OUTPATIENT)
Dept: UROLOGY | Age: 84
End: 2024-03-08

## 2024-03-08 LAB
BACTERIA UR CULT: ABNORMAL
ORGANISM: ABNORMAL

## 2024-03-08 NOTE — PROGRESS NOTES
[facility] PROFESSIONAL SERVS  [facility] NEUROSCIENCE AND REHABILITATION [address]  [facility] [address]  Dept: [phone]  Dept Fax: [phone]  Loc: [phone]    Visit Date: 3/7/2024    Functionality Assessment/Goals Worksheet     On a scale of 0 (Does not Interfere) to 10 (Completely Interferes)     1.  Which number describes how during the past week pain has interfered with       the following:  A.  General Activity:  9  B.  Mood: 5  C.  Walking Ability:  9  D.  Normal Work (Includes both work outside the home and housework):  9  E.  Relations with Other People:   2  F.  Sleep:   2  G.  Enjoyment of Life:   0    2.  Patient Prefers to Take their Pain Medications:     [x]  On a regular basis   []  Only when necessary    []  Does not take pain medications    3.  What are the Patient's Goals/Expectations for Visiting Pain Management?     [x]  Learn about my pain    []  Receive Medication   []  Physical Therapy     []  Treat Depression   []  Receive Injections    []  Treat Sleep   []  Deal with Anxiety and Stress   []  Treat Opoid Dependence/Addiction   []  Other:  
thoracolumbar junction.  Lumbar paraspinals tender bilaterally.  Positive facet loading bilaterally.  Neurological: Cranial nerves II-XII grossly intact.  1+ bilateral patellar and Achilles reflexes.  Negative ankle clonus.  · Gait - Severely antalgic gait. Ambulates with assistive device.   · Motor: No focal motor deficits appreciated in lower limbs  Skin: No rashes or lesions visible in low back  Psychological: Cooperative, no exaggerated pain behaviors       Assessment:    Diagnosis Orders   1. Chronic pain syndrome        2. Myofascial pain        3. Other spondylosis, lumbar region        4. Spondylosis of lumbar spine                Paulina Gunter is a 83 y.o.female presenting to the pain clinic for evaluation of chronic low back pain.  Her clinical history and physical examination are consistent with lumbar facet medial pain with associated myofascial pain.     She responded significantly to a lumbar radiofrequency ablations.  I have made some adjustments in her medications.  To the patient on a buprenorphine patch we will follow-up in 12 weeks for evaluation.    Plan:   The following treatment recommendations and plan were discussed in detail with Paulina Gunter and .    Imaging:   I have reviewed patient’s imaging of MRI L-spine and results were discussed in detail with the patient.    Analgesics:   For her ongoing chronic pain refractory to adjuvant medications and injection therapy, I have continued the patient on buprenorphine patch 10 mcg/h due to the risks of patient being on oral opioids.  Patient is advised to take the medication as prescribed as taking more than prescribed can lead to the development of tolerance, physical dependency, and addiction.  Patient is advised to store and lock the medication in a safe and secure location.  If the medication is lost or stolen we will not provide early refills on this medication.  Patient understands that they must stay compliant with treatment plan

## 2024-03-08 NOTE — TELEPHONE ENCOUNTER
Patient daughter called back and stated the last time she was on sulfa she had trouble with the sun and burning.

## 2024-03-08 NOTE — PROGRESS NOTES
Klebsiella pneumoniae cultured. Only sensitive to Bactrim. If unable tolerate then will need to see ID. Please inquire what the patient's allergy vs sensitivity to Sulfa is.

## 2024-03-09 DIAGNOSIS — K21.9 GASTROESOPHAGEAL REFLUX DISEASE WITHOUT ESOPHAGITIS: ICD-10-CM

## 2024-03-11 DIAGNOSIS — N39.0 RECURRENT UTI: Primary | ICD-10-CM

## 2024-03-11 RX ORDER — SUCRALFATE 1 G/1
TABLET ORAL
Qty: 360 TABLET | Refills: 3 | OUTPATIENT
Start: 2024-03-11

## 2024-03-14 ENCOUNTER — OFFICE VISIT (OUTPATIENT)
Dept: UROLOGY | Age: 84
End: 2024-03-14
Payer: MEDICARE

## 2024-03-14 VITALS — BODY MASS INDEX: 22.44 KG/M2 | RESPIRATION RATE: 16 BRPM | HEIGHT: 67 IN | WEIGHT: 143 LBS

## 2024-03-14 DIAGNOSIS — N39.0 RECURRENT UTI: Primary | ICD-10-CM

## 2024-03-14 DIAGNOSIS — R33.9 URINARY RETENTION: ICD-10-CM

## 2024-03-14 LAB — POST VOID RESIDUAL (PVR): 549 ML

## 2024-03-14 PROCEDURE — 51798 US URINE CAPACITY MEASURE: CPT

## 2024-03-14 PROCEDURE — 99214 OFFICE O/P EST MOD 30 MIN: CPT

## 2024-03-14 PROCEDURE — 1123F ACP DISCUSS/DSCN MKR DOCD: CPT

## 2024-03-14 NOTE — PROGRESS NOTES
Patient does not have medication list and she is taking all the same and a pain patch was added that she changes weekly.  
03/06/2024 01:55 PM            Assessment & Plan:   Urinary Retention  - Acute retention episode secondary to immobility and deconditioning.  - Carroll removed at last visit. Now is living in a state of moderate retention (300-700 mL). Hypotonic vs neurogenic bladder. PVR of 549 mL in the office today.  - Patient refusing carroll at this time. She and her daughter understand the risks of retention as discussed in depth with them.  - ANISHA to ensure patient is not developing chronic retention. CMP to assess renal function. And UA to rule out UTI at this time.   - Will assess in 3 months with repeat PVR and UA at time of visit.    Recurrent UTI  - Continue Hiprex and D-Mannose for UTI prevention. Doing better on D-Mannose BID to TID.  - GFR remains >60 on recent labs done while IP. CRT of 0.6.    *Follow-up in 3 months to review ANISHA, CMP, and UA.    Antoine Mason PA-C  Urology

## 2024-03-14 NOTE — PATIENT INSTRUCTIONS
If Elen develops bladder or flank pain, increased instance of UTI or difficulty with urination call the office.     Will check a renal ultrasound, renal function, and urinalysis prior to next visit.

## 2024-03-15 DIAGNOSIS — M79.7 FIBROMYALGIA: ICD-10-CM

## 2024-03-15 RX ORDER — PREGABALIN 150 MG/1
150 CAPSULE ORAL 2 TIMES DAILY
Qty: 180 CAPSULE | Refills: 0 | Status: SHIPPED | OUTPATIENT
Start: 2024-03-15 | End: 2024-06-13

## 2024-03-15 NOTE — TELEPHONE ENCOUNTER
Recent Visits  Date Type Provider Dept   03/06/24 Office Visit Claire Castillo, APRN - CNP [facility]   01/23/24 Office Visit Claire Castillo, APRN - CNP [facility]   03/27/23 Office Visit Claire Castillo, APRN - CNP [facility]   02/28/23 Office Visit Greta Bedoya, APRN - CNP [facility]   10/31/22 Office Visit Ascencion Pierce, DO [facility]   09/29/22 Office Visit Greta Bedoya, APRN - CNP [facility]   Showing recent visits within past 540 days with a meds authorizing provider and meeting all other requirements  Future Appointments  No visits were found meeting these conditions.  Showing future appointments within next 150 days with a meds authorizing provider and meeting all other requirements

## 2024-03-18 DIAGNOSIS — G89.4 CHRONIC PAIN SYNDROME: ICD-10-CM

## 2024-03-18 NOTE — TELEPHONE ENCOUNTER
Pt. At [facility]  OARRS reviewed. UDS: no data  .   Last seen: 3/7/2024. Follow-up:   Future Appointments   Date Time Provider Department Center   4/12/2024  1:30 PM Martin Avila MD N [facility] [place]   4/15/2024 11:15 AM Chloe Hirsch APRN - CNP N [facility] [place]   4/18/2024  2:40 PM Jared Dale DO N [facility] [place]   5/16/2024  3:20 PM Jared Dale DO N [facility] [place]   6/14/2024 11:00 AM STR ULTRASOUND RM 2 [facility] US STR Rad/Card   6/26/2024 11:15 AM Antoine Mason PA-C [facility] [place] [facility]    Daughter Candace says when they saw you on 3/7 pt. Was to take both the Norco and the buprenorphine. Claire BARTHOLOMEW at [facility] said would not fill anymore for Norco. Daughter requesting refill. Pt. Getting therapy and having a lot of pain. Not sure from your note that she is to be on both and at what freq. Please advise.

## 2024-03-19 RX ORDER — HYDROCODONE BITARTRATE AND ACETAMINOPHEN 5; 325 MG/1; MG/1
1 TABLET ORAL 2 TIMES DAILY PRN
Qty: 60 TABLET | Refills: 0 | Status: SHIPPED | OUTPATIENT
Start: 2024-03-19 | End: 2024-04-18

## 2024-03-19 NOTE — TELEPHONE ENCOUNTER
Resetting up the norco you sent in. Went to [facility] and canceled. Needs to go to [facility] since at [facility].

## 2024-03-26 DIAGNOSIS — G89.4 CHRONIC PAIN SYNDROME: ICD-10-CM

## 2024-03-26 RX ORDER — BUPRENORPHINE 10 UG/H
1 PATCH TRANSDERMAL WEEKLY
Qty: 4 PATCH | Refills: 0 | Status: SHIPPED | OUTPATIENT
Start: 2024-04-01 | End: 2024-04-29

## 2024-03-26 NOTE — TELEPHONE ENCOUNTER
OARRS reviewed. UDS:No Data  Last seen: 3/7/2024. Follow-up:   Future Appointments   Date Time Provider Department Center   4/12/2024  1:30 PM Martin Avila MD N [facility]   4/15/2024 11:15 AM Chloe Hirsch APRN - CNP N [facility] INF[facility]   5/16/2024  3:20 PM Jared Dale DO N [facility]   6/14/2024 11:00 AM STR ULTRASOUND RM 2 STR[facility] US STR Rad/Card   6/26/2024 11:15 AM Antoine Mason PA-C N [place] [facility]

## 2024-03-26 NOTE — TELEPHONE ENCOUNTER
Patients daughter Candace Mcfarland (on HIPAA) calling in for patient who was last seen 3/7/2024 and her next appt is 5/16/2024.  Candace is calling in for a refill of the buprenorphine patches, she put her last one on yesterday 3/25/2024.  Candace is asking if the patches can go to the mail order pharmacy so they are more affordable?   Mail order pharmacy is [facility], please call Candace to advise.

## 2024-03-29 ENCOUNTER — TELEPHONE (OUTPATIENT)
Dept: UROLOGY | Age: 84
End: 2024-03-29

## 2024-03-29 NOTE — TELEPHONE ENCOUNTER
Spoke with daughter and she stated that her mother is now taking D-mansose twice a day and is now feeling better and not having any more problems.     Daughter said with the Sulfa allergy she has a very high sensitivity to the sun

## 2024-03-29 NOTE — TELEPHONE ENCOUNTER
Urinalysis done 3/18/24 indicative of UTI. Does not appear to have been run for culture and sensitivity. Going off her urine culture from 3/6/24: Klebsiella pneumoniae sensitive to only Bactrim in regard to oral medications. Sulfa listed as an allergy but no clarification. Otherwise, options are limited to trialing Fosfomycin given susceptibility to Gentamicin or going to ID for treatment.

## 2024-04-03 ENCOUNTER — TELEPHONE (OUTPATIENT)
Dept: FAMILY MEDICINE CLINIC | Age: 84
End: 2024-04-03

## 2024-04-03 DIAGNOSIS — F33.1 MODERATE EPISODE OF RECURRENT MAJOR DEPRESSIVE DISORDER (HCC): Primary | ICD-10-CM

## 2024-04-03 RX ORDER — FLUOXETINE HYDROCHLORIDE 20 MG/1
20 CAPSULE ORAL DAILY
Qty: 30 CAPSULE | Refills: 3 | Status: SHIPPED | OUTPATIENT
Start: 2024-04-03 | End: 2024-04-03 | Stop reason: SDUPTHER

## 2024-04-03 RX ORDER — FLUOXETINE HYDROCHLORIDE 20 MG/1
20 CAPSULE ORAL DAILY
Qty: 90 CAPSULE | Refills: 3 | Status: SHIPPED | OUTPATIENT
Start: 2024-04-03 | End: 2024-04-04 | Stop reason: SDUPTHER

## 2024-04-03 NOTE — TELEPHONE ENCOUNTER
Candace is agreeable to restarting the Prozac 40 mg daily for Paulina.    Candace is asking for a short term script to be sent to [facility] on [address] and a long term script to be sent to [facility].

## 2024-04-03 NOTE — TELEPHONE ENCOUNTER
She was on Prozac but looks like it was stopped back in Feb at the hospital.  Would she like to restart this and see if it helps?

## 2024-04-03 NOTE — TELEPHONE ENCOUNTER
Candace called stating Paulina's depression has increased. Paulina has been getting more upset about not being able to do things on her own any longer (like cleaning her teeth), pt's vision also will come and go, so this upsets her. Candace states Paulina will sit in the dark and list everything she can not do anymore, refuses to go for walks or do any activities.  Candace is wanting to know if a change in medications would help Paulina's increase in depression.      Please advise

## 2024-04-04 ENCOUNTER — TELEPHONE (OUTPATIENT)
Dept: FAMILY MEDICINE CLINIC | Age: 84
End: 2024-04-04

## 2024-04-04 DIAGNOSIS — F33.1 MODERATE EPISODE OF RECURRENT MAJOR DEPRESSIVE DISORDER (HCC): ICD-10-CM

## 2024-04-04 RX ORDER — FLUOXETINE HYDROCHLORIDE 20 MG/1
20 CAPSULE ORAL DAILY
Qty: 90 CAPSULE | Refills: 3 | Status: SHIPPED | OUTPATIENT
Start: 2024-04-04

## 2024-04-04 NOTE — TELEPHONE ENCOUNTER
Candace cervantes stating [facility] is needing an order faxed to dispense the prozac to Elen.     Please fax to [phone]

## 2024-04-11 ENCOUNTER — TELEPHONE (OUTPATIENT)
Dept: INFECTIOUS DISEASES | Age: 84
End: 2024-04-11

## 2024-04-12 ENCOUNTER — OFFICE VISIT (OUTPATIENT)
Dept: CARDIOLOGY CLINIC | Age: 84
End: 2024-04-12
Payer: MEDICARE

## 2024-04-12 VITALS
BODY MASS INDEX: 22.6 KG/M2 | DIASTOLIC BLOOD PRESSURE: 68 MMHG | SYSTOLIC BLOOD PRESSURE: 124 MMHG | WEIGHT: 144 LBS | HEART RATE: 58 BPM | HEIGHT: 67 IN

## 2024-04-12 DIAGNOSIS — I10 PRIMARY HYPERTENSION: Primary | ICD-10-CM

## 2024-04-12 DIAGNOSIS — E78.01 FAMILIAL HYPERCHOLESTEROLEMIA: ICD-10-CM

## 2024-04-12 PROCEDURE — 3078F DIAST BP <80 MM HG: CPT | Performed by: NUCLEAR MEDICINE

## 2024-04-12 PROCEDURE — 99213 OFFICE O/P EST LOW 20 MIN: CPT | Performed by: NUCLEAR MEDICINE

## 2024-04-12 PROCEDURE — 3074F SYST BP LT 130 MM HG: CPT | Performed by: NUCLEAR MEDICINE

## 2024-04-12 PROCEDURE — 1123F ACP DISCUSS/DSCN MKR DOCD: CPT | Performed by: NUCLEAR MEDICINE

## 2024-04-12 RX ORDER — METOPROLOL SUCCINATE 50 MG/1
50 TABLET, EXTENDED RELEASE ORAL DAILY
Qty: 90 TABLET | Refills: 3 | Status: SHIPPED | OUTPATIENT
Start: 2024-04-12

## 2024-04-12 RX ORDER — AMLODIPINE BESYLATE 10 MG/1
10 TABLET ORAL DAILY
Qty: 90 TABLET | Refills: 3 | Status: SHIPPED | OUTPATIENT
Start: 2024-04-12

## 2024-04-12 NOTE — PROGRESS NOTES
disintegrating tablet Take 1 tablet by mouth every 8 hours as needed for Nausea or Vomiting 21 tablet 0    docusate sodium (COLACE) 100 MG capsule Take 1 capsule by mouth 2 times daily as needed for Constipation 180 capsule 3    ferrous sulfate (IRON 325) 325 (65 Fe) MG tablet Take 1 tablet by mouth 2 times daily 180 tablet 1    Misc. Devices (WALKER) MISC 1 each by Does not apply route daily 1 each 0    Misc. Devices MISC Mechanical lift for a Van 1 Device 0    Scooter MISC by Does not apply route Power scooter 1 each 0    fluticasone (FLONASE) 50 MCG/ACT nasal spray 1 spray by Nasal route as needed for Rhinitis      therapeutic multivitamin-minerals (THERAGRAN-M) tablet Take 1 tablet by mouth nightly      magnesium citrate solution Take 296 mLs by mouth once for 1 dose 296 mL 0     No current facility-administered medications for this visit.     Allergies   Allergen Reactions    Ampicillin     Flexeril [Cyclobenzaprine]     Morphine Other (See Comments)     Hallucinations     Pcn [Penicillins] Itching    Sulfa Antibiotics      Health Maintenance   Topic Date Due    Annual Wellness Visit (Medicare Advantage)  01/01/2024    Flu vaccine (Season Ended) 01/23/2025 (Originally 8/1/2024)    Depression Monitoring  01/23/2025    DTaP/Tdap/Td vaccine (3 - Td or Tdap) 09/15/2033    DEXA (modify frequency per FRAX score)  Completed    Shingles vaccine  Completed    Pneumococcal 65+ years Vaccine  Completed    COVID-19 Vaccine  Completed    Respiratory Syncytial Virus (RSV) Pregnant or age 60 yrs+  Completed    Hepatitis A vaccine  Aged Out    Hepatitis B vaccine  Aged Out    Hib vaccine  Aged Out    Polio vaccine  Aged Out    Meningococcal (ACWY) vaccine  Aged Out       Subjective:  General:   No fever, no chills, Some fatigue or weight loss  Pulmonary:    some dyspnea, no wheezing  Cardiac:    Denies recent chest pain,   GI:     No nausea or vomiting, no abdominal pain  Neuro:    No dizziness or light headedness,

## 2024-04-15 ENCOUNTER — OFFICE VISIT (OUTPATIENT)
Dept: INFECTIOUS DISEASES | Age: 84
End: 2024-04-15
Payer: MEDICARE

## 2024-04-15 VITALS
OXYGEN SATURATION: 97 % | RESPIRATION RATE: 18 BRPM | WEIGHT: 142 LBS | DIASTOLIC BLOOD PRESSURE: 60 MMHG | SYSTOLIC BLOOD PRESSURE: 82 MMHG | BODY MASS INDEX: 22.29 KG/M2 | HEIGHT: 67 IN | TEMPERATURE: 97.9 F | HEART RATE: 64 BPM

## 2024-04-15 DIAGNOSIS — R33.9 URINARY RETENTION: ICD-10-CM

## 2024-04-15 DIAGNOSIS — N39.0 RECURRENT UTI: Primary | ICD-10-CM

## 2024-04-15 PROBLEM — E87.1 HYPONATREMIA: Status: RESOLVED | Noted: 2021-02-28 | Resolved: 2024-04-15

## 2024-04-15 PROBLEM — R10.13 EPIGASTRIC PAIN: Status: RESOLVED | Noted: 2023-08-29 | Resolved: 2024-04-15

## 2024-04-15 PROBLEM — N30.00 ACUTE CYSTITIS WITHOUT HEMATURIA: Status: RESOLVED | Noted: 2024-01-27 | Resolved: 2024-04-15

## 2024-04-15 PROBLEM — K62.5 BRBPR (BRIGHT RED BLOOD PER RECTUM): Status: RESOLVED | Noted: 2017-09-14 | Resolved: 2024-04-15

## 2024-04-15 PROBLEM — K57.92 DIVERTICULITIS OF INTESTINE, PART UNSPECIFIED, WITHOUT PERFORATION OR ABSCESS WITHOUT BLEEDING: Status: RESOLVED | Noted: 2024-02-01 | Resolved: 2024-04-15

## 2024-04-15 PROBLEM — R52 ACUTE PAIN: Status: RESOLVED | Noted: 2024-02-01 | Resolved: 2024-04-15

## 2024-04-15 PROBLEM — R41.0 ACUTE DELIRIUM: Status: RESOLVED | Noted: 2023-08-21 | Resolved: 2024-04-15

## 2024-04-15 PROBLEM — R55 PRE-SYNCOPE: Status: RESOLVED | Noted: 2021-02-28 | Resolved: 2024-04-15

## 2024-04-15 PROBLEM — S72.452D: Status: RESOLVED | Noted: 2024-01-22 | Resolved: 2024-04-15

## 2024-04-15 PROBLEM — S72.402A CLOSED FRACTURE OF DISTAL END OF LEFT FEMUR, INITIAL ENCOUNTER (HCC): Status: RESOLVED | Noted: 2020-12-21 | Resolved: 2024-04-15

## 2024-04-15 PROCEDURE — 1123F ACP DISCUSS/DSCN MKR DOCD: CPT | Performed by: NURSE PRACTITIONER

## 2024-04-15 PROCEDURE — 99204 OFFICE O/P NEW MOD 45 MIN: CPT | Performed by: NURSE PRACTITIONER

## 2024-04-15 PROCEDURE — 3078F DIAST BP <80 MM HG: CPT | Performed by: NURSE PRACTITIONER

## 2024-04-15 PROCEDURE — 3074F SYST BP LT 130 MM HG: CPT | Performed by: NURSE PRACTITIONER

## 2024-04-15 NOTE — PATIENT INSTRUCTIONS
labia making sure to wipe from the front to the back.   Use second wipe to clean over the opening where urine comes out (urethra), just above the opening of the vagina.:  Keeping labia spread apart, urinate a small amount into the toilet bowl, then stop the flow of urine.   Hold the urine cup a few inches from the urethra and urinate until the cup is about half full then finish urinating into toilet.     After collecting sample  Screw the lid tightly on the cup. Do not touch the inside of the cup or the lid.  Return the sample to the lab.   If you are at home, place the cup in a plastic bag and put the bag in the refrigerator until you take it to the lab or your provider's office.

## 2024-04-19 DIAGNOSIS — I10 ESSENTIAL HYPERTENSION: ICD-10-CM

## 2024-04-19 RX ORDER — ENALAPRIL MALEATE 10 MG/1
10 TABLET ORAL DAILY
Qty: 90 TABLET | Refills: 3 | Status: SHIPPED | OUTPATIENT
Start: 2024-04-19

## 2024-04-23 DIAGNOSIS — G89.4 CHRONIC PAIN SYNDROME: ICD-10-CM

## 2024-04-23 RX ORDER — BUPRENORPHINE 10 UG/H
1 PATCH TRANSDERMAL WEEKLY
Qty: 4 PATCH | Refills: 0 | Status: SHIPPED | OUTPATIENT
Start: 2024-04-29 | End: 2024-05-29

## 2024-04-25 ENCOUNTER — APPOINTMENT (OUTPATIENT)
Dept: GENERAL RADIOLOGY | Age: 84
End: 2024-04-25
Payer: MEDICARE

## 2024-04-25 ENCOUNTER — APPOINTMENT (OUTPATIENT)
Dept: CT IMAGING | Age: 84
End: 2024-04-25
Payer: MEDICARE

## 2024-04-25 ENCOUNTER — HOSPITAL ENCOUNTER (INPATIENT)
Age: 84
LOS: 2 days | Discharge: HOME | End: 2024-04-27
Attending: EMERGENCY MEDICINE
Payer: MEDICARE

## 2024-04-25 DIAGNOSIS — R41.82 ALTERED MENTAL STATUS, UNSPECIFIED ALTERED MENTAL STATUS TYPE: ICD-10-CM

## 2024-04-25 DIAGNOSIS — E87.1 HYPONATREMIA: Primary | ICD-10-CM

## 2024-04-25 PROBLEM — Z16.24 MULTIPLE DRUG RESISTANT ORGANISM (MDRO) CULTURE POSITIVE: Status: ACTIVE | Noted: 2024-04-25

## 2024-04-25 PROBLEM — G93.41 ACUTE METABOLIC ENCEPHALOPATHY: Status: ACTIVE | Noted: 2024-04-25

## 2024-04-25 LAB
ANION GAP SERPL CALC-SCNC: 11 MEQ/L (ref 8–16)
ANION GAP SERPL CALC-SCNC: 11 MEQ/L (ref 8–16)
BACTERIA URNS QL MICRO: ABNORMAL /HPF
BASOPHILS ABSOLUTE: 0 THOU/MM3 (ref 0–0.1)
BASOPHILS NFR BLD AUTO: 1 %
BILIRUB UR QL STRIP.AUTO: NEGATIVE
BUN SERPL-MCNC: 11 MG/DL (ref 7–22)
BUN SERPL-MCNC: 12 MG/DL (ref 7–22)
CALCIUM SERPL-MCNC: 9 MG/DL (ref 8.5–10.5)
CALCIUM SERPL-MCNC: 9.3 MG/DL (ref 8.5–10.5)
CASTS #/AREA URNS LPF: ABNORMAL /LPF
CASTS 2: ABNORMAL /LPF
CHARACTER UR: ABNORMAL
CHLORIDE SERPL-SCNC: 92 MEQ/L (ref 98–111)
CHLORIDE SERPL-SCNC: 95 MEQ/L (ref 98–111)
CO2 SERPL-SCNC: 22 MEQ/L (ref 23–33)
CO2 SERPL-SCNC: 25 MEQ/L (ref 23–33)
COLOR: YELLOW
CREAT SERPL-MCNC: 0.4 MG/DL (ref 0.4–1.2)
CREAT SERPL-MCNC: 0.6 MG/DL (ref 0.4–1.2)
CREAT UR-MCNC: 54.2 MG/DL
CRYSTALS URNS MICRO: ABNORMAL
DEPRECATED RDW RBC AUTO: 47.7 FL (ref 35–45)
EKG ATRIAL RATE: 61 BPM
EKG P AXIS: 17 DEGREES
EKG P-R INTERVAL: 172 MS
EKG Q-T INTERVAL: 402 MS
EKG QRS DURATION: 84 MS
EKG QTC CALCULATION (BAZETT): 404 MS
EKG R AXIS: 35 DEGREES
EKG T AXIS: 46 DEGREES
EKG VENTRICULAR RATE: 61 BPM
EOSINOPHIL NFR BLD AUTO: 1 %
EOSINOPHILS ABSOLUTE: 0 THOU/MM3 (ref 0–0.4)
EPITHELIAL CELLS, UA: ABNORMAL /HPF
ERYTHROCYTE [DISTWIDTH] IN BLOOD BY AUTOMATED COUNT: 13.5 % (ref 11.5–14.5)
GFR SERPL CREATININE-BSD FRML MDRD: 88 ML/MIN/1.73M2
GFR SERPL CREATININE-BSD FRML MDRD: > 90 ML/MIN/1.73M2
GLUCOSE SERPL-MCNC: 113 MG/DL (ref 70–108)
GLUCOSE SERPL-MCNC: 93 MG/DL (ref 70–108)
GLUCOSE UR QL STRIP.AUTO: NEGATIVE MG/DL
HCT VFR BLD AUTO: 42 % (ref 37–47)
HGB BLD-MCNC: 14 GM/DL (ref 12–16)
HGB UR QL STRIP.AUTO: NEGATIVE
IMM GRANULOCYTES # BLD AUTO: 0.01 THOU/MM3 (ref 0–0.07)
IMM GRANULOCYTES NFR BLD AUTO: 0.2 %
KETONES UR QL STRIP.AUTO: ABNORMAL
LYMPHOCYTES ABSOLUTE: 0.7 THOU/MM3 (ref 1–4.8)
LYMPHOCYTES NFR BLD AUTO: 16.8 %
MCH RBC QN AUTO: 31.7 PG (ref 26–33)
MCHC RBC AUTO-ENTMCNC: 33.3 GM/DL (ref 32.2–35.5)
MCV RBC AUTO: 95.2 FL (ref 81–99)
MISCELLANEOUS 2: ABNORMAL
MONOCYTES ABSOLUTE: 0.3 THOU/MM3 (ref 0.4–1.3)
MONOCYTES NFR BLD AUTO: 7 %
NEUTROPHILS NFR BLD AUTO: 74 %
NITRITE UR QL STRIP: NEGATIVE
NRBC BLD AUTO-RTO: 0 /100 WBC
OSMOLALITY SERPL CALC.SUM OF ELEC: 256.2 MOSMOL/KG (ref 275–300)
OSMOLALITY SERPL CALC.SUM OF ELEC: 257.6 MOSMOL/KG (ref 275–300)
OSMOLALITY UR: 324 MOSMOL/KG (ref 250–750)
PH UR STRIP.AUTO: 6 [PH] (ref 5–9)
PLATELET # BLD AUTO: 172 THOU/MM3 (ref 130–400)
PMV BLD AUTO: 10.9 FL (ref 9.4–12.4)
POTASSIUM SERPL-SCNC: 4.4 MEQ/L (ref 3.5–5.2)
POTASSIUM SERPL-SCNC: 4.4 MEQ/L (ref 3.5–5.2)
PROT UR STRIP.AUTO-MCNC: NEGATIVE MG/DL
RBC # BLD AUTO: 4.41 MILL/MM3 (ref 4.2–5.4)
RBC URINE: ABNORMAL /HPF
RENAL EPI CELLS #/AREA URNS HPF: ABNORMAL /[HPF]
SEGMENTED NEUTROPHILS ABSOLUTE COUNT: 3.1 THOU/MM3 (ref 1.8–7.7)
SODIUM SERPL-SCNC: 128 MEQ/L (ref 135–145)
SODIUM SERPL-SCNC: 128 MEQ/L (ref 135–145)
SODIUM SERPL-SCNC: 135 MEQ/L (ref 135–145)
SODIUM UR-SCNC: 40 MEQ/L
SP GR UR REFRACT.AUTO: 1.01 (ref 1–1.03)
T4 FREE SERPL-MCNC: 1.34 NG/DL (ref 0.93–1.68)
TSH SERPL DL<=0.005 MIU/L-ACNC: 13.8 UIU/ML (ref 0.4–4.2)
UROBILINOGEN, URINE: 0.2 EU/DL (ref 0–1)
UUN 24H UR-MCNC: 410 MG/DL
WBC # BLD AUTO: 4.2 THOU/MM3 (ref 4.8–10.8)
WBC #/AREA URNS HPF: > 100 /HPF
WBC #/AREA URNS HPF: ABNORMAL /[HPF]
YEAST LIKE FUNGI URNS QL MICRO: ABNORMAL

## 2024-04-25 PROCEDURE — 1200000000 HC SEMI PRIVATE

## 2024-04-25 PROCEDURE — 99223 1ST HOSP IP/OBS HIGH 75: CPT | Performed by: INTERNAL MEDICINE

## 2024-04-25 PROCEDURE — 51798 US URINE CAPACITY MEASURE: CPT

## 2024-04-25 PROCEDURE — 84439 ASSAY OF FREE THYROXINE: CPT

## 2024-04-25 PROCEDURE — 36415 COLL VENOUS BLD VENIPUNCTURE: CPT

## 2024-04-25 PROCEDURE — 6370000000 HC RX 637 (ALT 250 FOR IP): Performed by: INTERNAL MEDICINE

## 2024-04-25 PROCEDURE — 85025 COMPLETE CBC W/AUTO DIFF WBC: CPT

## 2024-04-25 PROCEDURE — 2580000003 HC RX 258: Performed by: INTERNAL MEDICINE

## 2024-04-25 PROCEDURE — 87186 SC STD MICRODIL/AGAR DIL: CPT

## 2024-04-25 PROCEDURE — 71045 X-RAY EXAM CHEST 1 VIEW: CPT

## 2024-04-25 PROCEDURE — 6360000002 HC RX W HCPCS: Performed by: INTERNAL MEDICINE

## 2024-04-25 PROCEDURE — 70450 CT HEAD/BRAIN W/O DYE: CPT

## 2024-04-25 PROCEDURE — 84443 ASSAY THYROID STIM HORMONE: CPT

## 2024-04-25 PROCEDURE — 87040 BLOOD CULTURE FOR BACTERIA: CPT

## 2024-04-25 PROCEDURE — 83935 ASSAY OF URINE OSMOLALITY: CPT

## 2024-04-25 PROCEDURE — 84540 ASSAY OF URINE/UREA-N: CPT

## 2024-04-25 PROCEDURE — 80048 BASIC METABOLIC PNL TOTAL CA: CPT

## 2024-04-25 PROCEDURE — 84295 ASSAY OF SERUM SODIUM: CPT

## 2024-04-25 PROCEDURE — 82570 ASSAY OF URINE CREATININE: CPT

## 2024-04-25 PROCEDURE — 51701 INSERT BLADDER CATHETER: CPT

## 2024-04-25 PROCEDURE — 81001 URINALYSIS AUTO W/SCOPE: CPT

## 2024-04-25 PROCEDURE — 84300 ASSAY OF URINE SODIUM: CPT

## 2024-04-25 PROCEDURE — 93005 ELECTROCARDIOGRAM TRACING: CPT | Performed by: EMERGENCY MEDICINE

## 2024-04-25 PROCEDURE — 2580000003 HC RX 258: Performed by: STUDENT IN AN ORGANIZED HEALTH CARE EDUCATION/TRAINING PROGRAM

## 2024-04-25 PROCEDURE — 93010 ELECTROCARDIOGRAM REPORT: CPT | Performed by: NUCLEAR MEDICINE

## 2024-04-25 PROCEDURE — 99285 EMERGENCY DEPT VISIT HI MDM: CPT

## 2024-04-25 PROCEDURE — 87086 URINE CULTURE/COLONY COUNT: CPT

## 2024-04-25 PROCEDURE — 87077 CULTURE AEROBIC IDENTIFY: CPT

## 2024-04-25 RX ORDER — METOPROLOL SUCCINATE 50 MG/1
50 TABLET, EXTENDED RELEASE ORAL DAILY
Status: DISCONTINUED | OUTPATIENT
Start: 2024-04-26 | End: 2024-04-27 | Stop reason: HOSPADM

## 2024-04-25 RX ORDER — HYDROCODONE BITARTRATE AND ACETAMINOPHEN 5; 325 MG/1; MG/1
1 TABLET ORAL EVERY 6 HOURS PRN
COMMUNITY

## 2024-04-25 RX ORDER — ENALAPRIL MALEATE 10 MG/1
10 TABLET ORAL DAILY
Status: DISCONTINUED | OUTPATIENT
Start: 2024-04-26 | End: 2024-04-27 | Stop reason: HOSPADM

## 2024-04-25 RX ORDER — POTASSIUM CHLORIDE 20 MEQ/1
40 TABLET, EXTENDED RELEASE ORAL PRN
Status: DISCONTINUED | OUTPATIENT
Start: 2024-04-25 | End: 2024-04-27 | Stop reason: HOSPADM

## 2024-04-25 RX ORDER — POLYETHYLENE GLYCOL 3350 17 G/17G
17 POWDER, FOR SOLUTION ORAL DAILY
Status: DISCONTINUED | OUTPATIENT
Start: 2024-04-25 | End: 2024-04-27 | Stop reason: HOSPADM

## 2024-04-25 RX ORDER — BUPRENORPHINE 10 UG/H
1 PATCH TRANSDERMAL WEEKLY
Status: DISCONTINUED | OUTPATIENT
Start: 2024-04-29 | End: 2024-04-27 | Stop reason: HOSPADM

## 2024-04-25 RX ORDER — LEVOTHYROXINE SODIUM 0.1 MG/1
100 TABLET ORAL DAILY
Status: DISCONTINUED | OUTPATIENT
Start: 2024-04-26 | End: 2024-04-27 | Stop reason: HOSPADM

## 2024-04-25 RX ORDER — SENNOSIDES A AND B 8.6 MG/1
1 TABLET, FILM COATED ORAL DAILY
Status: DISCONTINUED | OUTPATIENT
Start: 2024-04-25 | End: 2024-04-27 | Stop reason: HOSPADM

## 2024-04-25 RX ORDER — SODIUM CHLORIDE, SODIUM LACTATE, POTASSIUM CHLORIDE, AND CALCIUM CHLORIDE .6; .31; .03; .02 G/100ML; G/100ML; G/100ML; G/100ML
500 INJECTION, SOLUTION INTRAVENOUS ONCE
Status: COMPLETED | OUTPATIENT
Start: 2024-04-25 | End: 2024-04-25

## 2024-04-25 RX ORDER — SODIUM CHLORIDE 0.9 % (FLUSH) 0.9 %
5-40 SYRINGE (ML) INJECTION EVERY 12 HOURS SCHEDULED
Status: DISCONTINUED | OUTPATIENT
Start: 2024-04-25 | End: 2024-04-27 | Stop reason: HOSPADM

## 2024-04-25 RX ORDER — ONDANSETRON 2 MG/ML
4 INJECTION INTRAMUSCULAR; INTRAVENOUS EVERY 6 HOURS PRN
Status: DISCONTINUED | OUTPATIENT
Start: 2024-04-25 | End: 2024-04-27 | Stop reason: HOSPADM

## 2024-04-25 RX ORDER — ACETAMINOPHEN 650 MG/1
650 SUPPOSITORY RECTAL EVERY 6 HOURS PRN
Status: DISCONTINUED | OUTPATIENT
Start: 2024-04-25 | End: 2024-04-27 | Stop reason: HOSPADM

## 2024-04-25 RX ORDER — PANTOPRAZOLE SODIUM 40 MG/1
40 TABLET, DELAYED RELEASE ORAL
Status: DISCONTINUED | OUTPATIENT
Start: 2024-04-25 | End: 2024-04-27 | Stop reason: HOSPADM

## 2024-04-25 RX ORDER — 0.9 % SODIUM CHLORIDE 0.9 %
500 INTRAVENOUS SOLUTION INTRAVENOUS ONCE
Status: COMPLETED | OUTPATIENT
Start: 2024-04-25 | End: 2024-04-25

## 2024-04-25 RX ORDER — AMLODIPINE BESYLATE 10 MG/1
10 TABLET ORAL DAILY
Status: DISCONTINUED | OUTPATIENT
Start: 2024-04-26 | End: 2024-04-27 | Stop reason: HOSPADM

## 2024-04-25 RX ORDER — POLYETHYLENE GLYCOL 3350 17 G/17G
17 POWDER, FOR SOLUTION ORAL DAILY
COMMUNITY

## 2024-04-25 RX ORDER — METHENAMINE HIPPURATE 1000 MG/1
1 TABLET ORAL 2 TIMES DAILY WITH MEALS
Status: DISCONTINUED | OUTPATIENT
Start: 2024-04-25 | End: 2024-04-27 | Stop reason: HOSPADM

## 2024-04-25 RX ORDER — SODIUM CHLORIDE 0.9 % (FLUSH) 0.9 %
5-40 SYRINGE (ML) INJECTION PRN
Status: DISCONTINUED | OUTPATIENT
Start: 2024-04-25 | End: 2024-04-27 | Stop reason: HOSPADM

## 2024-04-25 RX ORDER — ACETAMINOPHEN 325 MG/1
650 TABLET ORAL EVERY 6 HOURS PRN
Status: DISCONTINUED | OUTPATIENT
Start: 2024-04-25 | End: 2024-04-27 | Stop reason: HOSPADM

## 2024-04-25 RX ORDER — FLUOXETINE HYDROCHLORIDE 20 MG/1
20 CAPSULE ORAL DAILY
Status: DISCONTINUED | OUTPATIENT
Start: 2024-04-25 | End: 2024-04-27 | Stop reason: HOSPADM

## 2024-04-25 RX ORDER — POTASSIUM CHLORIDE 7.45 MG/ML
10 INJECTION INTRAVENOUS PRN
Status: DISCONTINUED | OUTPATIENT
Start: 2024-04-25 | End: 2024-04-27 | Stop reason: HOSPADM

## 2024-04-25 RX ORDER — SODIUM CHLORIDE 9 MG/ML
INJECTION, SOLUTION INTRAVENOUS PRN
Status: DISCONTINUED | OUTPATIENT
Start: 2024-04-25 | End: 2024-04-27 | Stop reason: HOSPADM

## 2024-04-25 RX ORDER — ONDANSETRON 4 MG/1
4 TABLET, ORALLY DISINTEGRATING ORAL EVERY 8 HOURS PRN
Status: DISCONTINUED | OUTPATIENT
Start: 2024-04-25 | End: 2024-04-27 | Stop reason: HOSPADM

## 2024-04-25 RX ORDER — SUCRALFATE 1 G/1
1 TABLET ORAL
Status: DISCONTINUED | OUTPATIENT
Start: 2024-04-25 | End: 2024-04-27 | Stop reason: HOSPADM

## 2024-04-25 RX ORDER — ENOXAPARIN SODIUM 100 MG/ML
40 INJECTION SUBCUTANEOUS EVERY 24 HOURS
Status: DISCONTINUED | OUTPATIENT
Start: 2024-04-25 | End: 2024-04-27 | Stop reason: HOSPADM

## 2024-04-25 RX ADMIN — SODIUM CHLORIDE, PRESERVATIVE FREE 10 ML: 5 INJECTION INTRAVENOUS at 20:49

## 2024-04-25 RX ADMIN — SUCRALFATE 1 G: 1 TABLET ORAL at 20:51

## 2024-04-25 RX ADMIN — MEROPENEM 1000 MG: 1 INJECTION, POWDER, FOR SOLUTION INTRAVENOUS at 14:46

## 2024-04-25 RX ADMIN — SODIUM CHLORIDE 500 ML: 9 INJECTION, SOLUTION INTRAVENOUS at 14:44

## 2024-04-25 RX ADMIN — SODIUM CHLORIDE, POTASSIUM CHLORIDE, SODIUM LACTATE AND CALCIUM CHLORIDE 500 ML: 600; 310; 30; 20 INJECTION, SOLUTION INTRAVENOUS at 18:40

## 2024-04-25 RX ADMIN — SENNOSIDES 8.6 MG: 8.6 TABLET, FILM COATED ORAL at 14:48

## 2024-04-25 RX ADMIN — METHENAMINE HIPPURATE 1 G: 1 TABLET ORAL at 18:44

## 2024-04-25 RX ADMIN — PANTOPRAZOLE SODIUM 40 MG: 40 TABLET, DELAYED RELEASE ORAL at 18:44

## 2024-04-25 RX ADMIN — ENOXAPARIN SODIUM 40 MG: 100 INJECTION SUBCUTANEOUS at 14:48

## 2024-04-25 RX ADMIN — MEROPENEM 1000 MG: 1 INJECTION, POWDER, FOR SOLUTION INTRAVENOUS at 20:50

## 2024-04-25 ASSESSMENT — PAIN - FUNCTIONAL ASSESSMENT: PAIN_FUNCTIONAL_ASSESSMENT: NONE - DENIES PAIN

## 2024-04-25 NOTE — ED PROVIDER NOTES
[facility] EMERGENCY DEPT    Pt Name: Paulina Gunter  MRN: [id number]  Birthdate 1940  Date of evaluation: 4/25/2024  Resident Physician: Kimberlee Schwarz MD  Supervising Physician: Dr. Jcarlos Martins MD    CHIEF COMPLAINT       Chief Complaint   Patient presents with    Altered Mental Status       HISTORY OF PRESENT ILLNESS   Paulina Gunter is a 83 y.o. female with PMHx of recurrent UTI, urinary retention  who presents to the emergency department for evaluation of altered mental status.    Patient coming from assisted living due to altered mental status.  Patient baseline is confused per daughter at bedside but states that patient does have good and bad days.  Currently, seemingly more altered than usual.  Patient states that she woke up this morning at 3 AM and was just extremely cold.  Denies any symptoms of recent illnesses.  Denies any chest pain cough, shortness of breath.  Does admit to having somewhat of lower abdominal pain.  Denies any dysuria however does state that she cannot remember voiding in the past 2 days.    The patient has no other acute complaints at this time.    Review of Systems    Negative Except as Documented Above.    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 Isabel    Fibromyalgia     GERD (gastroesophageal reflux disease)     Diverticulitis     Hiatal hernia     Hx of blood clots     Hypertension     BAKI    Hypothyroidism     Macular degeneration     Osteoarthritis     Urinary incontinence     UTI (urinary tract infection)        Patient Active Problem List   Diagnosis Code    Osteoarthritis M19.90    Fibromyalgia M79.7    Gastroesophageal reflux disease K21.9    Hypothyroidism E03.9    Patient is Yazidi [id number]    H/O  daily.  Update office with BP readings on 8/10/23    MULTIPLE VITAMINS-MINERALS (EYE HEALTH PO)    Take 1 capsule by mouth daily    OMEGA 3-6-9 FATTY ACIDS (OMEGA 3-6-9 COMPLEX PO)    Take by mouth daily    ONDANSETRON (ZOFRAN-ODT) 4 MG DISINTEGRATING TABLET    Take 1 tablet by mouth every 8 hours as needed for Nausea or Vomiting    PANTOPRAZOLE (PROTONIX) 40 MG TABLET    Take 1 tablet by mouth 2 times daily (before meals)    PREGABALIN (LYRICA) 150 MG CAPSULE    Take 1 capsule by mouth 2 times daily for 90 days. Max Daily Amount: 300 mg    SCOOTER MISC    by Does not apply route Power scooter    SODIUM CHLORIDE 1 G TABLET    Take 2 tablets by mouth 2 times daily (with meals)    SUCRALFATE (CARAFATE) 1 GM TABLET    Take 1 tablet by mouth 4 times daily (before meals and nightly)    THERAPEUTIC MULTIVITAMIN-MINERALS (THERAGRAN-M) TABLET    Take 1 tablet by mouth nightly       ALLERGIES     is allergic to ampicillin, flexeril [cyclobenzaprine], morphine, pcn [penicillins], and sulfa antibiotics.    FAMILY HISTORY     She indicated that her mother is . She indicated that her father is . She indicated that her sister is alive. She indicated that her brother is alive. She indicated that the status of her maternal grandmother is unknown. She indicated that the status of her paternal grandmother is unknown. She indicated that the status of her maternal aunt is unknown.       SOCIAL HISTORY       Social History     Tobacco Use    Smoking status: Former     Current packs/day: 0.00     Average packs/day: 2.0 packs/day for 11.0 years (22.0 ttl pk-yrs)     Types: Cigarettes     Start date: 1957     Quit date: 1967     Years since quittin.5    Smokeless tobacco: Never   Vaping Use    Vaping Use: Never used   Substance Use Topics    Alcohol use: Yes     Comment: wine with dinner    Drug use: No       PHYSICAL EXAM       ED Triage Vitals [24 0913]   BP Temp Temp Source Pulse Respirations SpO2

## 2024-04-25 NOTE — PROCEDURES
Bladder scan was completed by J leopold at 1630. The residual amount of >999 ml was resulted to Irma RODRIGUEZ

## 2024-04-25 NOTE — ED NOTES
Jared Dale DO at [facility] [place] [facility]    PAIN MANAGEMENT PROCEDURE Right 12/20/2022    right-sided lumbar radiofrequency ablation targeting RIGHT Lumbar 4/5, 5/Sacral 1 performed by Jared Dale DO at [facility] [place] [facility]    PAIN MANAGEMENT PROCEDURE Left 1/26/2023    thermal radiofrequency ablation LEFT medial branches Lumbar 4/5, 5/Sacral 1 performed by Jared Dale DO at [facility] [place] [facility]    PAIN MANAGEMENT PROCEDURE Bilateral 11/7/2023    radiofrequency ablation medial branches Lumbar 4/5, 5/Sacral 1 bilateral performed by Jared Dale DO at [facility] [place] [facility]    MO COLON CA SCRN NOT HI RSK IND Left 09/15/2017    COLONOSCOPY performed by Ascencion Chaudhry MD at [facility]    SMALL INTESTINE SURGERY      SPINE SURGERY  1968    discectomy    TONSILLECTOMY  1943    TOOTH EXTRACTION  1964    UPPER GASTROINTESTINAL ENDOSCOPY  2012    UPPER GASTROINTESTINAL ENDOSCOPY Left 6/7/2022    EGD BIOPSY performed by Elgin Isabel MD at [facility]    UPPER GASTROINTESTINAL ENDOSCOPY N/A 8/17/2022    EGD BIOPSY performed by Cristino Lunsford MD at [facility]    UPPER GASTROINTESTINAL ENDOSCOPY N/A 10/25/2022    EGD POLYP ABLATION/OTHER performed by Elgin Isabel MD at [facility]    UPPER GASTROINTESTINAL ENDOSCOPY N/A 8/24/2023    EGD performed by Ti Kennedy MD at [facility] ENDOSCOPY    UPPER GASTROINTESTINAL ENDOSCOPY Left 8/24/2023    EGD BIOPSY performed by Ti Kennedy MD at [facility] ENDOSCOPY       PAST MEDICAL HISTORY       Past Medical History:   Diagnosis Date    Abnormal EKG     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 Isabel    Fibromyalgia     GERD (gastroesophageal reflux disease)     Diverticulitis     Hiatal hernia     Hx of blood clots     Hypertension     BAKI    Hypothyroidism      Macular degeneration     Osteoarthritis     Urinary incontinence     UTI (urinary tract infection)            Electronically signed by Cyndy Odom RN on 4/25/2024 at 12:23 PM

## 2024-04-25 NOTE — ED NOTES
Pt transported to [facility] in stable condition.  Floor contacted before transport,spoke to Irma.

## 2024-04-25 NOTE — ED TRIAGE NOTES
Presents to ED with c/o altered mental status. Patient at bedside and states patient is normally confused. Patient is oriented to person, place, and time. Patient states she is here because she was cold today. Respirations easy and unlabored.

## 2024-04-25 NOTE — ED PROVIDER NOTES
PATIENT NAME: Paulina Gunter  MRN: [id number]  : 1940  CRUZ: 2024    I performed a history and physical examination of the patient and discussed management with the Resident. I reviewed the Resident's note and agree with the documented findings and plan of care. Any areas of disagreement are noted on the chart. I was personally present for the key portions of any procedures and have documented in the chart those procedures where I was not present during the key portions. I have reviewed the emergency nurses triage note and agree with the chief complaint, past medical history, past surgical history, allergies, medications, social and family history as documented unless otherwise noted below.    MEDICAL DEDISION MAKING (MDM)     Paulina Gunter is a 83 y.o. female who presents to Emergency Department with Altered Mental Status     ED workup reveals acute cystitis and acute on chronic hyponatremia which could be the culprits of her mental status change. D/w and admitted by Hospitalist service. Hemodynamically stable in ED. D/w hospitalist and Meropenem given in ED due to Hx of MDRO UTI sensitive to Meropenem. Hospitalist admission.     Vitals:    24 1058 24 1339 24 1500 24 1650   BP: 131/68 111/72 110/75 (!) 140/80   Pulse: 61 64 58 58   Resp: 18 16 16 18   Temp:   98 °F (36.7 °C) 98.1 °F (36.7 °C)   TempSrc:   Oral Oral   SpO2: 95% 96% 99% 97%   Weight:         Labs Reviewed   CBC WITH AUTO DIFFERENTIAL - Abnormal; Notable for the following components:       Result Value    WBC 4.2 (*)     RDW-SD 47.7 (*)     Lymphocytes Absolute 0.7 (*)     Monocytes Absolute 0.3 (*)     All other components within normal limits   BASIC METABOLIC PANEL - Abnormal; Notable for the following components:    Sodium 128 (*)     Chloride 92 (*)     Glucose 113 (*)     All other components within normal limits   OSMOLALITY - Abnormal; Notable for the following components:    Osmolality Calc 257.6 (*)

## 2024-04-25 NOTE — H&P
Hospitalist - History & Physical      Patient: Paulina BERNAL Height    Unit/Bed:20/020A  YOB: 1940  MRN: [id number]   Acct: [id number]   PCP: Claire Castillo APRN - CNP    Date of Service: Pt seen/examined on 04/25/24  and Admitted to Inpatient with expected LOS greater than two midnights due to medical therapy.     Chief Complaint:  confusion    Assessment and Plan:-  Acute metabolic encephalopathy secondary to acute cystitis  Urinary retention  Known MDRO cystitis  Etiology of encephalopathy may be secondary to cystitis.  Less likely from hyponatremia  Patient is currently hemodynamically stable, afebrile  Start broad-spectrum empiric antibiotics, review of recent urine cultures show sensitivity to meropenem.  Continue Hiprex.  Mannose not available in house  Blood and urine cultures collected, pending.  De-escalate antibiotics accordingly  Bladder scan and straight cath ordered  PT OT consulted    Acute on chronic hyponatremia   Patient's sodium on presentation 128, mildly lower than usual.  Appears to be consistent with hypovolemic hyponatremia.  Will attempt fluid challenge.  Repeat chemistry at 1500  If improved, will start gentle fluids.  If worsened, will start 800 cc fluid restriction    Elevated TSH  Hypothyroidism  TSH of 13.8 versus 9.8 on 1/27/2024.  Normal T4  Continue Synthroid  Can consider outpatient titration    Chronic leukopenia  Around baseline.  Continue to monitor    Chronic  Hypertension: Continue Vasotec, Norvasc, Toprol  Mood: Holding SSRI due to hyponatremia  Chronic neuropathy: Holding pregabalin due to encephalopathy  GERD: Continue PPI    History Of Present Illness:    Paulina BERNAL Height is a(n) 83 y.o. female presenting to [facility] for evaluation of altered mental status. Known medical history of acute on chronic urinary retention secondary to recurrent UTIs, chronic hyponatremia, leukopenia.     Patient presenting from [facility] due to progressive

## 2024-04-26 LAB
SODIUM SERPL-SCNC: 134 MEQ/L (ref 135–145)
SODIUM SERPL-SCNC: 136 MEQ/L (ref 135–145)

## 2024-04-26 PROCEDURE — 6370000000 HC RX 637 (ALT 250 FOR IP)

## 2024-04-26 PROCEDURE — 1200000000 HC SEMI PRIVATE

## 2024-04-26 PROCEDURE — 99232 SBSQ HOSP IP/OBS MODERATE 35: CPT

## 2024-04-26 PROCEDURE — 6360000002 HC RX W HCPCS: Performed by: INTERNAL MEDICINE

## 2024-04-26 PROCEDURE — 36415 COLL VENOUS BLD VENIPUNCTURE: CPT

## 2024-04-26 PROCEDURE — 2580000003 HC RX 258: Performed by: INTERNAL MEDICINE

## 2024-04-26 PROCEDURE — 84295 ASSAY OF SERUM SODIUM: CPT

## 2024-04-26 PROCEDURE — 6370000000 HC RX 637 (ALT 250 FOR IP): Performed by: INTERNAL MEDICINE

## 2024-04-26 RX ORDER — PREGABALIN 150 MG/1
150 CAPSULE ORAL 2 TIMES DAILY
Status: DISCONTINUED | OUTPATIENT
Start: 2024-04-26 | End: 2024-04-27 | Stop reason: HOSPADM

## 2024-04-26 RX ADMIN — ENALAPRIL MALEATE 10 MG: 10 TABLET ORAL at 08:48

## 2024-04-26 RX ADMIN — LEVOTHYROXINE SODIUM 100 MCG: 0.1 TABLET ORAL at 05:29

## 2024-04-26 RX ADMIN — MEROPENEM 1000 MG: 1 INJECTION, POWDER, FOR SOLUTION INTRAVENOUS at 20:43

## 2024-04-26 RX ADMIN — METHENAMINE HIPPURATE 1 G: 1 TABLET ORAL at 08:48

## 2024-04-26 RX ADMIN — METOPROLOL SUCCINATE 50 MG: 50 TABLET, EXTENDED RELEASE ORAL at 12:32

## 2024-04-26 RX ADMIN — MEROPENEM 1000 MG: 1 INJECTION, POWDER, FOR SOLUTION INTRAVENOUS at 12:22

## 2024-04-26 RX ADMIN — PREGABALIN 150 MG: 150 CAPSULE ORAL at 20:39

## 2024-04-26 RX ADMIN — AMLODIPINE BESYLATE 10 MG: 10 TABLET ORAL at 08:48

## 2024-04-26 RX ADMIN — SUCRALFATE 1 G: 1 TABLET ORAL at 12:18

## 2024-04-26 RX ADMIN — PANTOPRAZOLE SODIUM 40 MG: 40 TABLET, DELAYED RELEASE ORAL at 05:29

## 2024-04-26 RX ADMIN — METHENAMINE HIPPURATE 1 G: 1 TABLET ORAL at 17:38

## 2024-04-26 RX ADMIN — SODIUM CHLORIDE, PRESERVATIVE FREE 10 ML: 5 INJECTION INTRAVENOUS at 20:39

## 2024-04-26 RX ADMIN — ONDANSETRON 4 MG: 4 TABLET, ORALLY DISINTEGRATING ORAL at 05:29

## 2024-04-26 RX ADMIN — SENNOSIDES 8.6 MG: 8.6 TABLET, FILM COATED ORAL at 08:46

## 2024-04-26 RX ADMIN — SUCRALFATE 1 G: 1 TABLET ORAL at 17:38

## 2024-04-26 RX ADMIN — POLYETHYLENE GLYCOL 3350 17 G: 17 POWDER, FOR SOLUTION ORAL at 08:45

## 2024-04-26 RX ADMIN — PANTOPRAZOLE SODIUM 40 MG: 40 TABLET, DELAYED RELEASE ORAL at 17:38

## 2024-04-26 RX ADMIN — ONDANSETRON 4 MG: 2 INJECTION INTRAMUSCULAR; INTRAVENOUS at 12:18

## 2024-04-26 RX ADMIN — SUCRALFATE 1 G: 1 TABLET ORAL at 05:29

## 2024-04-26 RX ADMIN — MEROPENEM 1000 MG: 1 INJECTION, POWDER, FOR SOLUTION INTRAVENOUS at 05:31

## 2024-04-26 RX ADMIN — SUCRALFATE 1 G: 1 TABLET ORAL at 20:39

## 2024-04-26 NOTE — PLAN OF CARE
Problem: Discharge Planning  Goal: Discharge to home or other facility with appropriate resources  4/26/2024 1430 by Sulma Anglin, MSW, LSW  Outcome: Progressing  Flowsheets (Taken 4/26/2024 1430)  Discharge to home or other facility with appropriate resources: Identify barriers to discharge with patient and caregiver  Note: Return to [facility], see  notes 4/26/24.

## 2024-04-26 NOTE — PROGRESS NOTES
Hospitalist Progress Note      Patient:  Paulina Gunter    Unit/Bed:6K-08/008-A  YOB: 1940  MRN: [id number]   Acct: [id number]   PCP: Claire Castillo APRN - CNP  Date of Admission: 4/25/2024    Assessment/Plan:    Acute metabolic encephalopathy secondary to UTI: Known history of urinary retention, MDRO cystitis.  Previous urine culture shows sensitivity to meropenem.  Current urine culture showing gram-negative bacilli.  Blood cultures NGTD.  Bladder scan checked on admission, > 999, Timmons catheter placed.  Known history of urinary retention.  Follows with Antoine Mason outpatient urology.  Recommendation to keep Timmons in place until infection cleared.  Continue meropenem, home Hiprex. Follow urine culture.  Outpatient follow-up with urology.  Patient alert and oriented x 4 this morning  Acute on chronic hyponatremia: Sodium 128 on arrival.  Fluid challenge attempted.  No improvement.  Then placed on fluid restriction.  Sodium improved up to 134 this morning.  Continue 1800 mL fluid restriction.  Repeat BMP in the morning.  Hypothyroidism: TSH 13.8, normal T4.  Continue Synthroid at this time.  Recommend outpatient follow-up with PCP for titration.  Primary hypertension: Continue home medications.  Depression: Continue Prozac.  GERD: Continue PPI.  Neuropathy: Continue Lyrica.  Large hiatal hernia: Noted in chart review.  No plans for surgical intervention.    Chief Complaint: confusion    Initial H and P:-    \"Paulina Gunter is a(n) 83 y.o. female presenting to [facility] for evaluation of altered mental status. Known medical history of acute on chronic urinary retention secondary to recurrent UTIs, chronic hyponatremia, leukopenia.      Patient presenting from [facility] due to progressive confusion over the past several days, which has been noted by SNF staff and patient's daughter. Similar occurences in the past with  is a levoscoliosis and thoracic spondylosis.     1. No interval change since previous study dated 9/29/2023. **This report has been created using voice recognition software. It may contain minor errors which are inherent in voice recognition technology.** Final report electronically signed by DR JEANMARIE OAKLEY on 4/25/2024 11:04 AM      Electronically signed by FLORIDA Castañeda CNP on 4/26/2024 at 5:13 PM

## 2024-04-26 NOTE — CARE COORDINATION
4/26/24, 2:29 PM EDT  Discharge Planning Evaluation  Social work consult received, patient from [facility].   Patient/Family preference is to return to [facility].    Would patient be willing to go to a skilled facility if needed: if needed.  Is there skilled care available at current facility: yes.  Barriers to return to current living situation: n/a  Spoke with Steve at the facility.  Patient bed hold: yes  Anticipated transport plan: daughter  Patient's Healthcare Decision Maker: Named in Scanned ACP Document    Readmission Risk Low 0-14, Mod 15-19), High > 20: Readmission Risk Score: 23.8    Current PCP: Claire Castillo APRN - CNP  PCP verified by CM?      Patient Orientation: Alert and Oriented    Patient Cognition: Alert  History Provided by: Patient    Advance Directives:      Code Status: Limited   Patient's Primary Decision Maker is: Named in Scanned ACP Document    Primary Decision Maker: Bruce Gunter - Spouse - [phone]    Primary Decision Maker: SORAYA POSADA Alecia Child - [phone]     Discharge Planning:    Patient lives with: Spouse/Significant Other Type of Home: Assisted living  Primary Care Giver: Self  Patient Support Systems include: Spouse/Significant Other, Family Members   Current Financial resources: None  Current community resources: Assisted Living  Current services prior to admission: None            Current DME:              Type of Home Care services:       ADLS  Prior functional level: Assistance with the following:, Bathing, Shopping, Mobility, Housework, Cooking  Current functional level: Mobility, Shopping, Bathing, Assistance with the following:, Cooking, Housework    Family can provide assistance at DC: No  Would you like Case Management to discuss the discharge plan with any other family members/significant others, and if so, who? No  Plans to Return to Present Housing: Yes  Other Identified Issues/Barriers to RETURNING to current housing: n/a  Potential Assistance  needed at discharge: N/A            Potential DME:    Patient expects to discharge to:    Plan for transportation at discharge:      Financial  Payor: [facility] MEDICARE / Plan: [facility] MEDICARE COMPLETE / Product Type: *No Product type* /     Potential assistance Purchasing Medications: No

## 2024-04-26 NOTE — PLAN OF CARE
Problem: Discharge Planning  Goal: Discharge to home or other facility with appropriate resources  Outcome: Progressing  Flowsheets (Taken 4/25/2024 1500 by Nkechi Kimball RN)  Discharge to home or other facility with appropriate resources: Identify barriers to discharge with patient and caregiver     Problem: Skin/Tissue Integrity  Goal: Absence of new skin breakdown  Description: 1.  Monitor for areas of redness and/or skin breakdown  2.  Assess vascular access sites hourly  3.  Every 4-6 hours minimum:  Change oxygen saturation probe site  4.  Every 4-6 hours:  If on nasal continuous positive airway pressure, respiratory therapy assess nares and determine need for appliance change or resting period.  Outcome: Progressing     Problem: Safety - Adult  Goal: Free from fall injury  Outcome: Progressing

## 2024-04-26 NOTE — CARE COORDINATION
Case Management Assessment Initial Evaluation    Date/Time of Evaluation: 4/26/2024 7:13 AM  Assessment Completed by: Manuel Wild RN    If patient is discharged prior to next notation, then this note serves as note for discharge by case management.    Patient Name: Paulina Gunter                   YOB: 1940  Diagnosis: Hyponatremia [E87.1]  Altered mental status, unspecified altered mental status type [R41.82]  Acute metabolic encephalopathy [G93.41]                   Date / Time: 4/25/2024  9:05 AM  Location: [address] [place] [address]     Patient Admission Status: Inpatient   Readmission Risk Low 0-14, Mod 15-19), High > 20: Readmission Risk Score: 23.8    Current PCP: Claire Castillo APRN - JEREMIAH    Additional Case Management Notes: Na+ improved to 134, UA abn, TSH 13.8. Na+ level q4h. IV merrem. PT/OT.     Procedures: 4/25 bladder scan: 999+    Imaging: no acute findings    Patient Goals/Plan/Treatment Preferences: from [facility]. SW consulted.

## 2024-04-27 VITALS
DIASTOLIC BLOOD PRESSURE: 75 MMHG | TEMPERATURE: 98.6 F | RESPIRATION RATE: 18 BRPM | BODY MASS INDEX: 22.24 KG/M2 | OXYGEN SATURATION: 97 % | SYSTOLIC BLOOD PRESSURE: 120 MMHG | HEIGHT: 67 IN | WEIGHT: 141.7 LBS | HEART RATE: 58 BPM

## 2024-04-27 LAB
ANION GAP SERPL CALC-SCNC: 9 MEQ/L (ref 8–16)
BACTERIA BLD AEROBE CULT: NORMAL
BACTERIA BLD AEROBE CULT: NORMAL
BACTERIA UR CULT: ABNORMAL
BUN SERPL-MCNC: 9 MG/DL (ref 7–22)
CALCIUM SERPL-MCNC: 8.9 MG/DL (ref 8.5–10.5)
CHLORIDE SERPL-SCNC: 96 MEQ/L (ref 98–111)
CO2 SERPL-SCNC: 27 MEQ/L (ref 23–33)
CREAT SERPL-MCNC: 0.6 MG/DL (ref 0.4–1.2)
DEPRECATED RDW RBC AUTO: 47.6 FL (ref 35–45)
ERYTHROCYTE [DISTWIDTH] IN BLOOD BY AUTOMATED COUNT: 13.2 % (ref 11.5–14.5)
GFR SERPL CREATININE-BSD FRML MDRD: 88 ML/MIN/1.73M2
GLUCOSE SERPL-MCNC: 99 MG/DL (ref 70–108)
HCT VFR BLD AUTO: 39 % (ref 37–47)
HGB BLD-MCNC: 12.9 GM/DL (ref 12–16)
MCH RBC QN AUTO: 31.9 PG (ref 26–33)
MCHC RBC AUTO-ENTMCNC: 33.1 GM/DL (ref 32.2–35.5)
MCV RBC AUTO: 96.3 FL (ref 81–99)
ORGANISM: ABNORMAL
PLATELET # BLD AUTO: 147 THOU/MM3 (ref 130–400)
PMV BLD AUTO: 10.7 FL (ref 9.4–12.4)
POTASSIUM SERPL-SCNC: 4.1 MEQ/L (ref 3.5–5.2)
RBC # BLD AUTO: 4.05 MILL/MM3 (ref 4.2–5.4)
SODIUM SERPL-SCNC: 132 MEQ/L (ref 135–145)
WBC # BLD AUTO: 4 THOU/MM3 (ref 4.8–10.8)

## 2024-04-27 PROCEDURE — 2580000003 HC RX 258: Performed by: INTERNAL MEDICINE

## 2024-04-27 PROCEDURE — 6360000002 HC RX W HCPCS: Performed by: INTERNAL MEDICINE

## 2024-04-27 PROCEDURE — 6370000000 HC RX 637 (ALT 250 FOR IP)

## 2024-04-27 PROCEDURE — 80048 BASIC METABOLIC PNL TOTAL CA: CPT

## 2024-04-27 PROCEDURE — 85027 COMPLETE CBC AUTOMATED: CPT

## 2024-04-27 PROCEDURE — 6370000000 HC RX 637 (ALT 250 FOR IP): Performed by: INTERNAL MEDICINE

## 2024-04-27 PROCEDURE — 36415 COLL VENOUS BLD VENIPUNCTURE: CPT

## 2024-04-27 RX ORDER — GRANULES FOR ORAL 3 G/1
3 POWDER ORAL
Status: DISCONTINUED | OUTPATIENT
Start: 2024-04-27 | End: 2024-04-27 | Stop reason: HOSPADM

## 2024-04-27 RX ORDER — SENNOSIDES A AND B 8.6 MG/1
1 TABLET, FILM COATED ORAL DAILY
Qty: 30 TABLET | Refills: 0 | Status: SHIPPED | OUTPATIENT
Start: 2024-04-27 | End: 2024-05-27

## 2024-04-27 RX ORDER — SODIUM CHLORIDE 1 G/1
1 TABLET ORAL 2 TIMES DAILY WITH MEALS
Status: DISCONTINUED | OUTPATIENT
Start: 2024-04-27 | End: 2024-04-27 | Stop reason: HOSPADM

## 2024-04-27 RX ORDER — GRANULES FOR ORAL 3 G/1
3 POWDER ORAL
Qty: 2 EACH | Refills: 0 | Status: SHIPPED | OUTPATIENT
Start: 2024-04-27 | End: 2024-05-04

## 2024-04-27 RX ADMIN — ENALAPRIL MALEATE 10 MG: 10 TABLET ORAL at 10:43

## 2024-04-27 RX ADMIN — MEROPENEM 1000 MG: 1 INJECTION, POWDER, FOR SOLUTION INTRAVENOUS at 05:18

## 2024-04-27 RX ADMIN — SUCRALFATE 1 G: 1 TABLET ORAL at 05:14

## 2024-04-27 RX ADMIN — AMLODIPINE BESYLATE 10 MG: 10 TABLET ORAL at 10:43

## 2024-04-27 RX ADMIN — PREGABALIN 150 MG: 150 CAPSULE ORAL at 10:43

## 2024-04-27 RX ADMIN — LEVOTHYROXINE SODIUM 100 MCG: 0.1 TABLET ORAL at 05:14

## 2024-04-27 RX ADMIN — SODIUM CHLORIDE 1 G: 1 TABLET ORAL at 13:21

## 2024-04-27 RX ADMIN — FLUOXETINE 20 MG: 20 CAPSULE ORAL at 10:43

## 2024-04-27 RX ADMIN — METHENAMINE HIPPURATE 1 G: 1 TABLET ORAL at 10:48

## 2024-04-27 RX ADMIN — SUCRALFATE 1 G: 1 TABLET ORAL at 10:44

## 2024-04-27 RX ADMIN — PANTOPRAZOLE SODIUM 40 MG: 40 TABLET, DELAYED RELEASE ORAL at 05:14

## 2024-04-27 RX ADMIN — SENNOSIDES 8.6 MG: 8.6 TABLET, FILM COATED ORAL at 10:43

## 2024-04-27 RX ADMIN — GRANULES FOR ORAL SOLUTION 1 PACKET: 3 POWDER ORAL at 13:21

## 2024-04-27 RX ADMIN — METOPROLOL SUCCINATE 50 MG: 50 TABLET, EXTENDED RELEASE ORAL at 10:43

## 2024-04-27 NOTE — DISCHARGE SUMMARY
Patient is sitting up in bed.  Family is at her side.  Denies any chest pain, shortness of breath, abdominal pain, nausea/vomiting.  Reports it has been a couple days since she has had a bowel movement.  No further urinary symptoms.  Timmons catheter is in place.  Urinary culture is showing Klebsiella which is resistant to multiple medications.  Is noted to have sensitivity to Bactrim, gentamicin, meropenem.  Had been on meropenem inpatient.  Has an allergy to sulfa.  Was discharged in February with fosfomycin, good results per patient's daughter.  Will discharge with fosfomycin every 3 days for 3 doses.  Recommendation to follow-up with PCP, ID, urology.  Stool softener, MiraLAX as needed for constipation as this can impact urinary retention/UTI.  Sodium is at 132.  Continue salt tabs on discharge, repeat BMP prior to visit with PCP.

## 2024-04-27 NOTE — CARE COORDINATION
4/27/24, 10:32 AM EDT    Patient goals/plan/ treatment preferences discussed by  and .  Patient goals/plan/ treatment preferences reviewed with patient/ family.  Patient/ family verbalize understanding of discharge plan and are in agreement with goal/plan/treatment preferences.  Understanding was demonstrated using the teach back method.  AVS provided by RN at time of discharge, which includes all necessary medical information pertaining to the patients current course of illness, treatment, post-discharge goals of care, and treatment preferences.     Services At/After Discharge: Assisted Living, Aide services, and Nursing service      Discharge to [facility] later today per [facility]. Daughter plans provide transportation.

## 2024-04-27 NOTE — PLAN OF CARE
Problem: Discharge Planning  Goal: Discharge to home or other facility with appropriate resources  4/27/2024 0039 by Melissa Linton RN  Outcome: Progressing  Flowsheets (Taken 4/27/2024 0039)  Discharge to home or other facility with appropriate resources:   Identify barriers to discharge with patient and caregiver   Arrange for needed discharge resources and transportation as appropriate   Identify discharge learning needs (meds, wound care, etc)     Problem: Skin/Tissue Integrity  Goal: Absence of new skin breakdown  Description: 1.  Monitor for areas of redness and/or skin breakdown  2.  Assess vascular access sites hourly  3.  Every 4-6 hours minimum:  Change oxygen saturation probe site  4.  Every 4-6 hours:  If on nasal continuous positive airway pressure, respiratory therapy assess nares and determine need for appliance change or resting period.  Outcome: Progressing     Problem: Safety - Adult  Goal: Free from fall injury  Outcome: Progressing  Flowsheets (Taken 4/27/2024 0039)  Free From Fall Injury: Instruct family/caregiver on patient safety     Problem: Infection - Adult  Goal: Absence of infection at discharge  Outcome: Progressing  Flowsheets (Taken 4/27/2024 0039)  Absence of infection at discharge:   Assess and monitor for signs and symptoms of infection   Monitor lab/diagnostic results   Monitor all insertion sites i.e., indwelling lines, tubes and drains   Administer medications as ordered   Identify and instruct in appropriate isolation precautions for identified infection/condition     Problem: Infection - Adult  Goal: Absence of infection during hospitalization  Outcome: Progressing  Flowsheets (Taken 4/27/2024 0039)  Absence of infection during hospitalization:   Assess and monitor for signs and symptoms of infection   Monitor lab/diagnostic results   Monitor all insertion sites i.e., indwelling lines, tubes and drains   Administer medications as ordered   Identify and instruct in

## 2024-04-29 ENCOUNTER — TELEPHONE (OUTPATIENT)
Dept: INFECTIOUS DISEASES | Age: 84
End: 2024-04-29

## 2024-04-29 ENCOUNTER — TELEPHONE (OUTPATIENT)
Dept: FAMILY MEDICINE CLINIC | Age: 84
End: 2024-04-29

## 2024-04-29 ENCOUNTER — TELEPHONE (OUTPATIENT)
Dept: WOUND CARE | Age: 84
End: 2024-04-29

## 2024-04-29 NOTE — TELEPHONE ENCOUNTER
Candace Mcfarland called talked to Kierra  in Wound Care, who gave me the message that Candace called and wanted to set up appointment for her mother Elen Gunter.    Candace states that her mother just got out of the hospital with a UTI and sent home with a medication for the UTI, she was told to get a appointment set up with Infectious Disease.  Candace states she already has a appointment with her mom's PCP this Wednesday, May 1 and she will call me back if her mom needs a appointment with Infectious Disease Clinic.  Our correct clinic phone numbers were given.

## 2024-04-29 NOTE — TELEPHONE ENCOUNTER
Care Transitions Initial Follow Up Call    Outreach made within 2 business days of discharge: Yes    Patient: Paulina Gunter Patient : 1940   MRN: [id number]  Reason for Admission: There are no discharge diagnoses documented for the most recent discharge.  Discharge Date: 24       Spoke with: Left message on answering machine. Requested pt to call back  at their earliest convenience.     Discharge department/facility: [facility]     TCM Interactive Patient Contact:  Was patient able to fill all prescriptions:   Was patient instructed to bring all medications to the follow-up visit:   Is patient taking all medications as directed in the discharge summary?   Does patient understand their discharge instructions:   Does patient have questions or concerns that need addressed prior to 7-14 day follow up office visit:     Scheduled appointment with PCP within 7-14 days  [facility] hospital F/U appointment   Follow Up  Future Appointments   Date Time Provider Department Center   2024  3:20 PM Jared Dale DO N [facility] [place]   2024  9:30 AM STR ULTRASOUND RM 2 [facility] US STR Rad/Card   2024 11:15 AM Antoine Mason PA-C N [place] [facility] [place]       Libra Shaikh LPN

## 2024-04-29 NOTE — TELEPHONE ENCOUNTER
Daughter Candace called to make an appointment for her mom. Patient has seen Chloe Hirsch CNP in the infectious disease clinic. All information given to ANTHONY Vasquez who works in the infectious disease clinic

## 2024-04-29 NOTE — TELEPHONE ENCOUNTER
Care Transitions Initial Follow Up Call    Outreach made within 2 business days of discharge: Yes    Patient: Paulina Gunter Patient : 1940   MRN: [id number]  Reason for Admission: There are no discharge diagnoses documented for the most recent discharge.  Discharge Date: 24       Spoke with: Candace Mcfarland (on HIPAA)    Discharge department/facility: [facility] Interactive Patient Contact:  Was patient able to fill all prescriptions: Yes  Was patient instructed to bring all medications to the follow-up visit: Yes  Is patient taking all medications as directed in the discharge summary? Yes  Does patient understand their discharge instructions: Yes  Does patient have questions or concerns that need addressed prior to 7-14 day follow up office visit: no    Scheduled appointment with PCP within 7-14 days    Follow Up  Future Appointments   Date Time Provider Department Center   2024  3:40 PM Claire Castillo APRN - CNP [facility] - [place]   2024  3:20 PM Jared Dale DO N [facility] - [place]   2024  9:30 AM STR ULTRASOUND RM 2 [facility] US STR Rad/Card   2024 11:15 AM Antoine Mason PA-C N [place] [facility] [place]         Rigo Causey CMA ([facility])

## 2024-04-30 LAB
BACTERIA BLD AEROBE CULT: NORMAL
BACTERIA BLD AEROBE CULT: NORMAL

## 2024-05-02 DIAGNOSIS — K21.9 GASTROESOPHAGEAL REFLUX DISEASE WITHOUT ESOPHAGITIS: ICD-10-CM

## 2024-05-02 RX ORDER — SUCRALFATE 1 G/1
TABLET ORAL
Qty: 120 TABLET | Refills: 11 | Status: SHIPPED | OUTPATIENT
Start: 2024-05-02

## 2024-05-02 NOTE — TELEPHONE ENCOUNTER
Future Appointments   Date Time Provider Department Center   5/3/2024 10:20 AM Claire Castillo APRN - CNP [facility] [place]   5/13/2024  1:30 PM Antoine Mason PA-C N [place] [facility] [place]   5/16/2024  3:20 PM Jared Dale DO N [facility] [place]   6/12/2024  9:30 AM STR ULTRASOUND RM 2 [facility] US STR Rad/Card   6/26/2024 11:15 AM Antoine Mason PA-C N [place] [facility] [place]    Recent Visits  Date Type Provider Dept   03/06/24 Office Visit Claire Castillo APRN - CNP [facility]   01/23/24 Office Visit Claire Castillo APRN - CNP [facility]   03/27/23 Office Visit Claire Castillo APRN - CNP [facility]   02/28/23 Office Visit Greta Bedoya APRN - CNP [facility]   Showing recent visits within past 540 days with a meds authorizing provider and meeting all other requirements  Future Appointments  Date Type Provider Dept   05/03/24 Appointment Claire Castillo APRN - CNP [facility]   Showing future appointments within next 150 days with a meds authorizing provider and meeting all other requirements

## 2024-05-03 ENCOUNTER — TELEPHONE (OUTPATIENT)
Dept: FAMILY MEDICINE CLINIC | Age: 84
End: 2024-05-03

## 2024-05-06 ENCOUNTER — TELEPHONE (OUTPATIENT)
Dept: FAMILY MEDICINE CLINIC | Age: 84
End: 2024-05-06

## 2024-05-06 NOTE — TELEPHONE ENCOUNTER
Spoke with Candace, pt's daughter (on HIPAA), was informed of the reasoning of starting Paulina on the Prozac 20 mg daily, Candace verbally understood.  Candace is asking to have an order to be sent to [facility] to allow the nurses to dispense [facility].      Please advise.

## 2024-05-06 NOTE — TELEPHONE ENCOUNTER
CC: acute urinary retention with gross hematuria.  Hx ofBPH with obstruction, OAB    Casper Osborne is a 64 y.o. man who is here for urgent visit, because he was not able to urinate all day.  Got at 7 am this morning and noted some blood dripping when he tried to urinate.  So far he was not able to urinate and called the urology office.  Pt was asked to come in for evaluation and management.  Had SUDS cysto by me recently.  Simple Urodynamics w/ Cysto  Procedure: 4/26/22  Diagnostic Cystourethroscopy   Complex Cystometrogram   Voiding / Pressure Study with Intrarectal Balloon   Complex Uroflow   Electromyogram of Anal Sphincter.     Pre-OP Diagnosis:   BPH with obstruction, incomplete bladder emptying, OAB   Post-OP Diagnosis:   same   Anesthesia:   Anesthesia Administered:   Intraurethral instillation of 10 mL 2% lidocaine (Xylocaine) jelly.   Findings:  Uroflow:  Voided 80 ml,  ml. Q max 7 ml/sec   --- Bladder ---   CYSTOMETROGRAM ( Filling Phase ):   Cystometric Numeric Data:   - First Desire (Sensation): 250 mL at 16 cm of water.   - Normal Desire: 348mL at 28 cm of water.   - Strong Desire: 359 mL at 35 cm of water.   - Urgency (Imminent Void) : 459 mL at 63cm of water.   - Maximum Cystometric Capacity: 460 mL.   Compliance:   - normal.   Leak Point Pressure:   - Valsalva ( Abdominal ) Leak Point Pressure: none.   UROFLOW:   - Voided Volume: 71 mL.   - Residual Urine: 450 mL.   - Maximum Flow Rate: 5 mL/sec.   - Flow Pattern:  Minimal voiding with low flow  VOIDING PRESSURE STUDY ( Voiding Phase ):   Detrusor Pressure:   - Maximum Detrusor Pressure: 77 cm of water.   - Detrusor Pressure at Maximum Flow: 67 cm of water.   - Detrusor Contraction Characteristics: Sustained contraction(s).   ELECTROMYOGRAM:   - normal.     ---Diagnostic Cystourethroscopy ---   Normal urethra.    Prostate: 6 cm trilobar obstruction, moderate intravesical lobe of the prostate causing obstruction  Width of Bladder Neck  Candace, pt's daughter (on HIPAA), called very upset about the dose of Prozac. Pt is currently taking Prozac 20 mg which is not helping with Elen's depression. Candace is asking for an increase back up to Prozac 40 mg, this dose was working great for Elen in the past.    Please advise   Opening: Approximately 18 Fr.   Normal bladder. 3+ trabeculation. No stone or tumors seen.  Normal ureteral orifices bilaterally.     CONCLUSIONS:   1. BPH with obstruction  Pt re-started flomax and noted that his urinary symptoms have gotten better.  He did not have to strain as much as he used to.  I explained the findings in detail.  Rather than Rezum Therapy, I think that it will be better for him to undergo bipolar TURP given the size and shape of his enlarged prostate ( approx. 75 grams in size from previous CT) with intravesical lobe and large median bar obstruction with significant change in his bladder due to chronic obstruction.  Pt informed me that he has a lot going on this summer and would like to wait until this fall.    So I start him on flomax and finasteride.  Will see him in 5 months for a follow up.    2. Incomplete bladder with larger PVR ( 450 ml)  3. OAB    His PCP, Leon Chaparro MD     Patient had incidental finding of renal cysts on an MRI of the spine done outside of [facility].  MRI of the spine done at [facility] apparently showed a couple of compressed vertebras.  Patient has had a renal US and CT scan to further evaluate his renal cysts.  In 2020 CT revealed 4 hypodensities in the right kidney all measuring about 15 mm.  They measure water density postcontrast consistent with simple cysts.  No convincing enhancing component. Hypodensity in the liver most likely a small cyst.    Patient saw Dr. Moon for the evaluation of OAB and obstructive symptoms. Has been on VESIcare for 1 year and stated that he showed improvement with the medication but it started to taper off. Medication is not covered by insurance now. He also takes Alfazosin. Primary complaints are nocturia x3 and significant straining during urination. No dyuria or nocturia.   He had some improvement with an anti-muscarinic, but effect tapered off and not covered by insurance. Majority of symptoms are of obstructive nature.  However, will start him on Myrbitriq given some prior efficacy on OAB medication.   Patient would like to return to meed with Dr. Ireland for obstructive symptoms to discuss his interest in Rezum.     I saw him last back in 2019. He received uroxatral, which helped his LUTS.  Then he noticed that his urinary symptoms have gotten worse in terms of frequency and rugency.    C/o straining to urinate, weak urine flow, frequency and urgency, nocturia 2 x.  Frequency is almost every 2 hours.  He tried Myrbetriq but was able to continue it due to his insurance coverage.  He is currently on Vesicare 5 mg and noticed improved OAB and nocturia symptoms.    Past Medical History:   Diagnosis Date    GERD (gastroesophageal reflux disease)     Hyperlipidemia      Past Surgical History:   Procedure Laterality Date    ARTHROSCOPY OF ANKLE WITH DEBRIDEMENT Left 12/19/2019    Procedure: ARTHROSCOPY, ANKLE, WITH DEBRIDEMENT;  Surgeon: Bay Melendez MD;  Location: [facility] OR;  Service: Orthopedics;  Laterality: Left;    EPIDURAL STEROID INJECTION N/A 1/29/2021    Procedure: INJECTION, STEROID, EPIDURAL L4/5;  Surgeon: Alvarado Reaves MD;  Location: [facility] PAIN MGT;  Service: Pain Management;  Laterality: N/A;    EPIDURAL STEROID INJECTION INTO LUMBAR SPINE N/A 12/24/2020    Procedure: Injection-steroid-epidural-lumbar--L4-5;  Surgeon: Ori Cali Jr., MD;  Location: [facility] PAIN MGT;  Service: Pain Management;  Laterality: N/A;    FRACTURE SURGERY      left heel at 37yo    HEEL SPUR SURGERY      left heel - fell off a ladder and had to have this reconstructed    SPINE SURGERY      c7 fx - prior to this, he was getting dizzy spells - none since    TONSILLECTOMY      VASECTOMY       Social History     Tobacco Use    Smoking status: Never Smoker    Smokeless tobacco: Never Used   Substance Use Topics    Alcohol use: Yes     Comment: less than once a month    Drug use: No     Family History   Problem Relation Age of Onset     Diabetes Mother     Cancer Father         melanoma cancer with mets    Heart disease Daughter         enlarged heart    Hypothyroidism Daughter     Colon polyps Neg Hx     Prostate cancer Neg Hx     Colon cancer Neg Hx     Stroke Neg Hx     Hypertension Neg Hx     Hyperlipidemia Neg Hx      Allergy:  Review of patient's allergies indicates:  No Known Allergies  Outpatient Encounter Medications as of 5/2/2022   Medication Sig Dispense Refill    aspirin (ECOTRIN) 81 MG EC tablet Take 81 mg by mouth once daily.      ciprofloxacin HCl (CIPRO) 500 MG tablet Take 1 tablet (500 mg total) by mouth 2 (two) times daily. for 14 doses 14 tablet 0    finasteride (PROSCAR) 5 mg tablet Take 1 tablet (5 mg total) by mouth once daily. 30 tablet 12    gabapentin (NEURONTIN) 300 MG capsule Take 1 capsule (300 mg total) by mouth 3 (three) times daily. 90 capsule 1    ibuprofen (ADVIL,MOTRIN) 800 MG tablet TAKE ONE(1) TABLET BY MOUTH EVERY EIGHT(8) HOURS AS NEEDED FOR PAIN 90 tablet 2    ipratropium (ATROVENT) 0.03 % nasal spray PLACE TWO SPRAYS IN EACH NOSTRIL THREE TIMES DAILY  30 mL 0    lovastatin (MEVACOR) 40 MG tablet Take 1 tablet (40 mg total) by mouth nightly. 90 tablet 0    omeprazole (PRILOSEC) 40 MG capsule Take 1 capsule (40 mg total) by mouth once daily. 90 capsule 3    sodium chloride 0.9% 0.9 % irrigation Irrigate with 500 mLs as directed as needed (use  ml to irrigate the bladder prn blood clots or catheter not draining). 500 mL 1    tamsulosin (FLOMAX) 0.4 mg Cap Take 1 capsule (0.4 mg total) by mouth every evening. 30 capsule 11     Facility-Administered Encounter Medications as of 5/2/2022   Medication Dose Route Frequency Provider Last Rate Last Admin    alprazolam ODT dissolvable tablet 0.5 mg  0.5 mg Oral Once PRN Ori Cali Jr., MD         Review of Systems   ROS  Physical Exam     There were no vitals filed for this visit.  Physical Exam  Constitutional:       General: He is  not in acute distress.     Appearance: He is well-developed. He is not diaphoretic.   HENT:      Head: Normocephalic and atraumatic.      Right Ear: External ear normal.      Left Ear: External ear normal.      Nose: Nose normal.   Eyes:      Conjunctiva/sclera: Conjunctivae normal.      Pupils: Pupils are equal, round, and reactive to light.   Neck:      Thyroid: No thyromegaly.      Vascular: No JVD.      Trachea: No tracheal deviation.   Cardiovascular:      Rate and Rhythm: Normal rate and regular rhythm.      Heart sounds: Normal heart sounds. No murmur heard.    No friction rub. No gallop.   Pulmonary:      Effort: Pulmonary effort is normal. No respiratory distress.      Breath sounds: Normal breath sounds. No wheezing.   Chest:      Chest wall: No tenderness.   Abdominal:      General: Bowel sounds are normal. There is no distension.      Palpations: Abdomen is soft. There is no mass.      Tenderness: There is no abdominal tenderness. There is no guarding or rebound.   Genitourinary:     Penis: Normal. No tenderness.       Prostate: Normal.      Rectum: Normal.   Musculoskeletal:         General: No tenderness or deformity. Normal range of motion.      Cervical back: Normal range of motion and neck supple.   Lymphadenopathy:      Cervical: No cervical adenopathy.   Skin:     General: Skin is warm and dry.   Neurological:      Mental Status: He is alert and oriented to person, place, and time.   Psychiatric:         Behavior: Behavior normal.         Thought Content: Thought content normal.     he wears right knee braces.  Numbness below the left knee but has severe left ankle pain.            LABS:  Lab Results   Component Value Date    PSA 0.79 02/03/2020    PSA 0.49 01/04/2019    PSA 0.62 05/24/2018    PSA 0.62 05/23/2017    PSA 0.71 05/12/2016    PSA 0.87 03/13/2015    PSA 0.78 03/10/2014    PSA 0.61 02/06/2013    PSA 0.48 08/19/2011    PSA 0.56 08/17/2010    PSADIAG 0.61 03/08/2022     Results for orders  placed or performed in visit on 03/08/22   Prostate Specific Antigen, Diagnostic   Result Value Ref Range    PSA Diagnostic 0.61 0.00 - 4.00 ng/mL     Lab Results   Component Value Date    CREATININE 0.90 03/08/2022    CREATININE 1.1 12/07/2020    CREATININE 1.1 02/03/2020     No results found for this or any previous visit.  No results found for: LABURIN  Hemoglobin A1C   Date Value Ref Range Status   02/03/2020 5.8 (H) 4.0 - 5.6 % Final     Comment:     ADA Screening Guidelines:  5.7-6.4%  Consistent with prediabetes  >or=6.5%  Consistent with diabetes  High levels of fetal hemoglobin interfere with the HbA1C  assay. Heterozygous hemoglobin variants (HbS, HgC, etc)do  not significantly interfere with this assay.   However, presence of multiple variants may affect accuracy.     01/04/2019 5.7 (H) 4.0 - 5.6 % Final     Comment:     ADA Screening Guidelines:  5.7-6.4%  Consistent with prediabetes  >or=6.5%  Consistent with diabetes  High levels of fetal hemoglobin interfere with the HbA1C  assay. Heterozygous hemoglobin variants (HbS, HgC, etc)do  not significantly interfere with this assay.   However, presence of multiple variants may affect accuracy.         Procedure  Procedure:  Jacques catheter placement    The penis was prepped and draped in a sterile fashion using betadine swaps.  2% lidocaine jelly was injected into the urethra as a local anesthetics.  A new 22 F jacques catheter was inserted into the bladder in a sterile fashion without any difficulty.  300 ml dark urine returned.  No obvious blood clots seen.  Sterile water ( NS) 700 ml was used to irrigate the bladder.    A return of urine got almost clear but still slightly bloody ( light pink).  No active bleeding was suspected.    The catheter was connected to a leg bag.  A large bag was also provided to pt for night time use.  He tolerated a procedure well.    Pt was provided with instruction and supplies to irrigate the bladder as needed.    Assessment  and Plan:  Diagnoses and all orders for this visit:    Enlarged prostate with urinary retention    Hematuria, gross  -     Urine culture; Future  -     Urine culture  -     ciprofloxacin HCl (CIPRO) 500 MG tablet; Take 1 tablet (500 mg total) by mouth 2 (two) times daily. for 14 doses  -     sodium chloride 0.9% 0.9 % irrigation; Irrigate with 500 mLs as directed as needed (use  ml to irrigate the bladder prn blood clots or catheter not draining).    Urinary retention  -     sodium chloride 0.9% 0.9 % irrigation; Irrigate with 500 mLs as directed as needed (use  ml to irrigate the bladder prn blood clots or catheter not draining).    cipro for prophylactic abx.  Continue flomax and finasteride.  Hold off OAB meds as this time.    Pt should consider bipolar TURP but he would like to wait until summer is over.    Voiding trial with an GABY once urine is clear, in 3 days.    Follow-up:  Follow up in about 3 days (around 5/5/2022), or voiding trial.

## 2024-05-06 NOTE — TELEPHONE ENCOUNTER
Can we call [facility] and have them stop the 20 mg Prozac and start Prozac 40 mg daily?  If they can't take a verbal order, then can we print off my order in the computer for the medication and fax it to them?  thx

## 2024-05-06 NOTE — TELEPHONE ENCOUNTER
I'm sorry she is upset.  I sent a message to the pool to call her and let her know we were starting it back up at 20 mg since she had been off of it for a little while.  Message was sent 4/3/24 at 1659.  Did anyone ever call the daughter and update her???    I'm more than happy to increase her dose back to 40 mg.  I just wasn't going to start her back at 40 mg when she hadn't been taking it for awhile.  Can someone please CALL the daughter and update her.  thx

## 2024-05-07 ENCOUNTER — OFFICE VISIT (OUTPATIENT)
Dept: FAMILY MEDICINE CLINIC | Age: 84
End: 2024-05-07

## 2024-05-07 VITALS
HEART RATE: 50 BPM | DIASTOLIC BLOOD PRESSURE: 62 MMHG | HEIGHT: 67 IN | RESPIRATION RATE: 14 BRPM | OXYGEN SATURATION: 93 % | TEMPERATURE: 97.9 F | WEIGHT: 145 LBS | BODY MASS INDEX: 22.76 KG/M2 | SYSTOLIC BLOOD PRESSURE: 116 MMHG

## 2024-05-07 DIAGNOSIS — F33.1 MODERATE EPISODE OF RECURRENT MAJOR DEPRESSIVE DISORDER (HCC): ICD-10-CM

## 2024-05-07 DIAGNOSIS — E03.9 ACQUIRED HYPOTHYROIDISM: ICD-10-CM

## 2024-05-07 DIAGNOSIS — Z09 HOSPITAL DISCHARGE FOLLOW-UP: Primary | ICD-10-CM

## 2024-05-07 DIAGNOSIS — H93.8X3 SENSATION OF FULLNESS IN BOTH EARS: ICD-10-CM

## 2024-05-07 DIAGNOSIS — E87.1 HYPONATREMIA: ICD-10-CM

## 2024-05-07 RX ORDER — FLUTICASONE PROPIONATE 50 MCG
2 SPRAY, SUSPENSION (ML) NASAL PRN
Qty: 16 G | Refills: 1 | Status: SHIPPED | OUTPATIENT
Start: 2024-05-07

## 2024-05-07 RX ORDER — LEVOTHYROXINE SODIUM 0.12 MG/1
125 TABLET ORAL DAILY
Qty: 90 TABLET | Refills: 3 | Status: SHIPPED | OUTPATIENT
Start: 2024-05-07

## 2024-05-07 RX ORDER — FLUOXETINE HYDROCHLORIDE 20 MG/1
40 CAPSULE ORAL DAILY
Qty: 180 CAPSULE | Refills: 3 | Status: SHIPPED | OUTPATIENT
Start: 2024-05-07

## 2024-05-07 RX ORDER — CETIRIZINE HYDROCHLORIDE 5 MG/1
5 TABLET ORAL DAILY
Qty: 90 TABLET | Refills: 3 | Status: SHIPPED | OUTPATIENT
Start: 2024-05-07

## 2024-05-07 SDOH — ECONOMIC STABILITY: FOOD INSECURITY: WITHIN THE PAST 12 MONTHS, THE FOOD YOU BOUGHT JUST DIDN'T LAST AND YOU DIDN'T HAVE MONEY TO GET MORE.: NEVER TRUE

## 2024-05-07 SDOH — ECONOMIC STABILITY: FOOD INSECURITY: WITHIN THE PAST 12 MONTHS, YOU WORRIED THAT YOUR FOOD WOULD RUN OUT BEFORE YOU GOT MONEY TO BUY MORE.: NEVER TRUE

## 2024-05-07 SDOH — ECONOMIC STABILITY: INCOME INSECURITY: HOW HARD IS IT FOR YOU TO PAY FOR THE VERY BASICS LIKE FOOD, HOUSING, MEDICAL CARE, AND HEATING?: NOT HARD AT ALL

## 2024-05-07 NOTE — PROGRESS NOTES
Post-Discharge Transitional Care Follow Up      Paulina Gunter   YOB: 1940    Date of Office Visit:  5/7/2024  Date of Hospital Admission: 4/25/24  Date of Hospital Discharge: 4/27/24  Readmission Risk Score (high >=14%. Medium >=10%):Readmission Risk Score: 24.6      Care management risk score Rising risk (score 2-5) and Complex Care (Scores >=6): No Risk Score On File     Non face to face  following discharge, date last encounter closed (first attempt may have been earlier): 04/29/2024     Call initiated 2 business days of discharge: Yes     Hospital discharge follow-up  -     [place] DISCHARGE MEDS RECONCILED W/ CURRENT OUTPATIENT MED LIST  Moderate episode of recurrent major depressive disorder (HCC)  -     FLUoxetine (PROZAC) 20 MG capsule; Take 2 capsules by mouth daily, Disp-180 capsule, R-3Normal  Acquired hypothyroidism  -     levothyroxine (SYNTHROID) 125 MCG tablet; Take 1 tablet by mouth daily, Disp-90 tablet, R-3Please send a replace/new response with 90-Day Supply if appropriate to maximize member benefit. Requesting 1 year supply.Normal  -     TSH; Future  -     T4, Free; Future  Hyponatremia  -     Comprehensive Metabolic Panel; Future  Sensation of fullness in both ears  -     fluticasone (FLONASE) 50 MCG/ACT nasal spray; 2 sprays by Nasal route as needed for Rhinitis or Allergies (ear fullness), Disp-16 g, R-1Normal    Recheck CMP in 2 weeks  Recheck TSH in 4 weeks  Will check on ear fullness and dizziness in a couple days  Ears irrigated today     Medical Decision Making: moderate complexity  No follow-ups on file.    On this date 5/7/2024 I have spent 40+ minutes reviewing previous notes, test results and face to face with the patient discussing the diagnosis and importance of compliance with the treatment plan as well as documenting on the day of the visit.       Subjective:   HPI    Inpatient course: Discharge summary reviewed- see chart.       Hospitalist Discharge Summary

## 2024-05-07 NOTE — PATIENT INSTRUCTIONS
Increase Levothyroxine to 125 mcg daily  Recheck TSH and FT4 in 4 weeks    Recheck CMP in 2 weeks    Start Zyrtec and Flonase daily  Will call and see if she is seeing an improvement in her sinus pressure, ear fullness, and dizziness    Increase Prozac to 40 mg daily

## 2024-05-09 ENCOUNTER — TELEPHONE (OUTPATIENT)
Dept: FAMILY MEDICINE CLINIC | Age: 84
End: 2024-05-09

## 2024-05-09 NOTE — TELEPHONE ENCOUNTER
Pt's daughter answered the phone and stated that the pt's depression is getting bad but she is not sure about the dizziness.   She will call the pt and see how it is, then give us a call back    Okay per HIPAA

## 2024-05-09 NOTE — TELEPHONE ENCOUNTER
----- Message from FLORIDA Cabrera CNP sent at 5/7/2024  4:21 PM EDT -----  Please call pt and see how her dizziness is

## 2024-05-13 ENCOUNTER — OFFICE VISIT (OUTPATIENT)
Dept: UROLOGY | Age: 84
End: 2024-05-13
Payer: MEDICARE

## 2024-05-13 ENCOUNTER — TELEPHONE (OUTPATIENT)
Dept: UROLOGY | Age: 84
End: 2024-05-13

## 2024-05-13 VITALS — WEIGHT: 145 LBS | HEIGHT: 67 IN | RESPIRATION RATE: 20 BRPM | BODY MASS INDEX: 22.76 KG/M2

## 2024-05-13 DIAGNOSIS — R33.9 URINARY RETENTION: ICD-10-CM

## 2024-05-13 DIAGNOSIS — N39.0 RECURRENT UTI: Primary | ICD-10-CM

## 2024-05-13 PROCEDURE — 1123F ACP DISCUSS/DSCN MKR DOCD: CPT

## 2024-05-13 PROCEDURE — 99213 OFFICE O/P EST LOW 20 MIN: CPT

## 2024-05-13 NOTE — PROGRESS NOTES
[facility] [place] [facility]  [facility] UROLOGY  [address]  [address]  [facility] [address]  Dept: [phone]  Loc: [phone]  Visit Date: 5/13/2024      HPI:   Paulina Gunter is a 83 y.o. female with past medical history as listed below who presents today accompanied by her daughter in follow-up for recurrent UTI and urinary retention.      Following up for UTI hospitalization from 4/25-4/27/24. Treated with Merrem while IP and started on Fosfomycin at discharge. Restarted on Hiprex.     Urine has remained light yellow without sedimentation or blood since discharge. Back to baseline mentality per daughter. Doing well.      URO History  Elen has a history of acute urinary retention in the setting of UTI's. Incomplete bladder emptying at baseline. On D-Mannose and Hiprex for UTI prevention.    [facility] [place].    Labs   PVR: 549 mL   UA: Unable to void.    Current Outpatient Medications   Medication Sig Dispense Refill    FLUoxetine (PROZAC) 20 MG capsule Take 2 capsules by mouth daily 180 capsule 3    levothyroxine (SYNTHROID) 125 MCG tablet Take 1 tablet by mouth daily 90 tablet 3    fluticasone (FLONASE) 50 MCG/ACT nasal spray 2 sprays by Nasal route as needed for Rhinitis or Allergies (ear fullness) 16 g 1    cetirizine (ZYRTEC) 5 MG tablet Take 1 tablet by mouth daily 90 tablet 3    sucralfate (CARAFATE) 1 GM tablet TAKE 1 TABLET BY MOUTH 4 TIMES  DAILY BEFORE MEALS AND NIGHTLY 120 tablet 11    senna (SENOKOT) 8.6 MG tablet Take 1 tablet by mouth daily 30 tablet 0    HYDROcodone-acetaminophen (NORCO) 5-325 MG per tablet Take 1 tablet by mouth every 6 hours as needed for Pain.      polyethylene glycol (GLYCOLAX) 17 GM/SCOOP powder Take 17 g by mouth daily      buprenorphine (BUPRENEX) 10 MCG/HR PTWK Place 1 patch onto the skin once a week for 30 days. Max Daily Amount: 1 patch 4 patch 0    enalapril (VASOTEC) 10 MG tablet Take 1

## 2024-05-14 NOTE — TELEPHONE ENCOUNTER
Pt states the dizziness is pretty much gone     The daughter is with the pt and she states they are going to [facility] Thurs and Dr Mcrae will be their new PCP

## 2024-05-16 ENCOUNTER — OFFICE VISIT (OUTPATIENT)
Dept: PHYSICAL MEDICINE AND REHAB | Age: 84
End: 2024-05-16
Payer: MEDICARE

## 2024-05-16 VITALS
BODY MASS INDEX: 22.76 KG/M2 | DIASTOLIC BLOOD PRESSURE: 64 MMHG | HEIGHT: 67 IN | WEIGHT: 145 LBS | SYSTOLIC BLOOD PRESSURE: 102 MMHG

## 2024-05-16 DIAGNOSIS — M47.816 SPONDYLOSIS OF LUMBAR SPINE: ICD-10-CM

## 2024-05-16 DIAGNOSIS — M47.896 OTHER SPONDYLOSIS, LUMBAR REGION: ICD-10-CM

## 2024-05-16 DIAGNOSIS — M79.18 MYOFASCIAL PAIN: ICD-10-CM

## 2024-05-16 DIAGNOSIS — G89.4 CHRONIC PAIN SYNDROME: Primary | ICD-10-CM

## 2024-05-16 PROCEDURE — 3078F DIAST BP <80 MM HG: CPT | Performed by: ANESTHESIOLOGY

## 2024-05-16 PROCEDURE — 1036F TOBACCO NON-USER: CPT | Performed by: ANESTHESIOLOGY

## 2024-05-16 PROCEDURE — 3074F SYST BP LT 130 MM HG: CPT | Performed by: ANESTHESIOLOGY

## 2024-05-16 PROCEDURE — G8399 PT W/DXA RESULTS DOCUMENT: HCPCS | Performed by: ANESTHESIOLOGY

## 2024-05-16 PROCEDURE — G8420 CALC BMI NORM PARAMETERS: HCPCS | Performed by: ANESTHESIOLOGY

## 2024-05-16 PROCEDURE — 99214 OFFICE O/P EST MOD 30 MIN: CPT | Performed by: ANESTHESIOLOGY

## 2024-05-16 PROCEDURE — 1123F ACP DISCUSS/DSCN MKR DOCD: CPT | Performed by: ANESTHESIOLOGY

## 2024-05-16 PROCEDURE — 1111F DSCHRG MED/CURRENT MED MERGE: CPT | Performed by: ANESTHESIOLOGY

## 2024-05-16 PROCEDURE — 1090F PRES/ABSN URINE INCON ASSESS: CPT | Performed by: ANESTHESIOLOGY

## 2024-05-16 PROCEDURE — G8427 DOCREV CUR MEDS BY ELIG CLIN: HCPCS | Performed by: ANESTHESIOLOGY

## 2024-05-16 NOTE — PROGRESS NOTES
Functionality Assessment/Goals Worksheet     On a scale of 0 (Does not Interfere) to 10 (Completely Interferes)     1.  Which number describes how during the past week pain has interfered with           the following:  A.  General Activity:  8  B.  Mood: 9  C.  Walking Ability:  0  D.  Normal Work (Includes both work outside the home and housework):  10  E.  Relations with Other People:   3  F.  Sleep:   2  G.  Enjoyment of Life:   9    2.  Patient Prefers to Take their Pain Medications:     [x]  On a regular basis   []  Only when necessary    []  Does not take pain medications    3.  What are the Patient's Goals/Expectations for Visiting Pain Management?     []  Learn about my pain    [x]  Receive Medication   []  Physical Therapy     []  Treat Depression   []  Receive Injections    []  Treat Sleep   []  Deal with Anxiety and Stress   []  Treat Opoid Dependence/Addiction   []  Other:                                
  Eyes: Conjunctivae normal   Neck: Supple, symmetrical   Lungs: Normal respiratory effort, non-labored breathing   Cardiovascular: Limbs warm and well perfused   Abdomen: Non-protruded   Musculoskeletal: Muscle bulk symmetric, no atrophy, no gross deformities   · Lumbar Spine: ROM severely reduced in extension.  Scoliosis appreciated in thoracolumbar junction.  Lumbar paraspinals tender bilaterally.  Positive facet loading bilaterally.  Neurological: Cranial nerves II-XII grossly intact.  1+ bilateral patellar and Achilles reflexes.  Negative ankle clonus.  · Gait - Severely antalgic gait. Ambulates with assistive device.   · Motor: No focal motor deficits appreciated in lower limbs  Skin: No rashes or lesions visible in low back  Psychological: Cooperative, no exaggerated pain behaviors       Assessment:    Diagnosis Orders   1. Chronic pain syndrome        2. Myofascial pain        3. Other spondylosis, lumbar region        4. Spondylosis of lumbar spine              Paulina Gunter is a 83 y.o.female presenting to the pain clinic for evaluation of chronic low back pain.  Her clinical history and physical examination are consistent with lumbar facet medial pain with associated myofascial pain.     She responded significantly to a lumbar radiofrequency ablations.  I have made some adjustments in her medications.  I have increased her buprenorphine patch to 20 mcg/h to get better pain coverage so she is not so reliant on her breakthrough opioid.    Plan:   The following treatment recommendations and plan were discussed in detail with Paulina Gunter and .    Imaging:   I have reviewed patient’s imaging of MRI L-spine and results were discussed in detail with the patient.    Analgesics:   For her ongoing chronic pain refractory to adjuvant medications and injection therapy, I have increased the patient's buprenorphine patch to 20 mcg/h. Patient is advised to take the medication as prescribed as taking more

## 2024-05-17 RX ORDER — BUPRENORPHINE 20 UG/H
1 PATCH TRANSDERMAL
Qty: 4 PATCH | Refills: 0 | Status: SHIPPED | OUTPATIENT
Start: 2024-05-17 | End: 2024-06-16

## 2024-05-17 RX ORDER — HYDROCODONE BITARTRATE AND ACETAMINOPHEN 5; 325 MG/1; MG/1
1 TABLET ORAL 2 TIMES DAILY PRN
Qty: 60 TABLET | Refills: 0 | Status: SHIPPED | OUTPATIENT
Start: 2024-05-17 | End: 2024-06-16

## 2024-05-28 ENCOUNTER — TELEPHONE (OUTPATIENT)
Dept: FAMILY MEDICINE CLINIC | Age: 84
End: 2024-05-28

## 2024-05-28 NOTE — TELEPHONE ENCOUNTER
Spoke to Jacquie (POA and on HIPAA) and she states that she is doing much better on the increased dose of Prozac.

## 2024-05-28 NOTE — TELEPHONE ENCOUNTER
----- Message from FLORIDA Cabrera - CNP sent at 5/7/2024  4:29 PM EDT -----  How is pt doing with the increased dose of Prozac

## 2024-05-31 ENCOUNTER — HOSPITAL ENCOUNTER (OUTPATIENT)
Dept: CT IMAGING | Age: 84
Discharge: HOME | End: 2024-05-31
Attending: FAMILY MEDICINE
Payer: MEDICARE
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
## 2024-05-31 DIAGNOSIS — S09.93XA INJURY OF FOREHEAD, INITIAL ENCOUNTER: ICD-10-CM

## 2024-05-31 PROCEDURE — 70450 CT HEAD/BRAIN W/O DYE: CPT

## 2024-06-12 ENCOUNTER — HOSPITAL ENCOUNTER (OUTPATIENT)
Dept: ULTRASOUND IMAGING | Age: 84
Discharge: HOME | End: 2024-06-12
Payer: MEDICARE

## 2024-06-12 DIAGNOSIS — R33.9 URINARY RETENTION: ICD-10-CM

## 2024-06-12 PROCEDURE — 76770 US EXAM ABDO BACK WALL COMP: CPT

## 2024-06-23 ENCOUNTER — HOSPITAL ENCOUNTER (EMERGENCY)
Age: 84
Discharge: HOME | End: 2024-06-23
Attending: EMERGENCY MEDICINE
Payer: MEDICARE

## 2024-06-23 VITALS
SYSTOLIC BLOOD PRESSURE: 105 MMHG | HEART RATE: 68 BPM | TEMPERATURE: 97.5 F | DIASTOLIC BLOOD PRESSURE: 59 MMHG | RESPIRATION RATE: 10 BRPM | OXYGEN SATURATION: 97 %

## 2024-06-23 DIAGNOSIS — T83.9XXA FOLEY CATHETER PROBLEM, INITIAL ENCOUNTER (HCC): Primary | ICD-10-CM

## 2024-06-23 LAB
BACTERIA URNS QL MICRO: ABNORMAL /HPF
BILIRUB UR QL STRIP.AUTO: NEGATIVE
CASTS #/AREA URNS LPF: ABNORMAL /LPF
CASTS 2: ABNORMAL /LPF
CHARACTER UR: CLEAR
COLOR: YELLOW
CRYSTALS URNS MICRO: ABNORMAL
EPITHELIAL CELLS, UA: ABNORMAL /HPF
GLUCOSE UR QL STRIP.AUTO: NEGATIVE MG/DL
HGB UR QL STRIP.AUTO: ABNORMAL
KETONES UR QL STRIP.AUTO: NEGATIVE
MISCELLANEOUS 2: ABNORMAL
NITRITE UR QL STRIP: NEGATIVE
PH UR STRIP.AUTO: 7.5 [PH] (ref 5–9)
PROT UR STRIP.AUTO-MCNC: NEGATIVE MG/DL
RBC URINE: ABNORMAL /HPF
RENAL EPI CELLS #/AREA URNS HPF: ABNORMAL /[HPF]
SP GR UR REFRACT.AUTO: 1.01 (ref 1–1.03)
UROBILINOGEN, URINE: 0.2 EU/DL (ref 0–1)
WBC #/AREA URNS HPF: ABNORMAL /HPF
WBC #/AREA URNS HPF: ABNORMAL /[HPF]
YEAST LIKE FUNGI URNS QL MICRO: ABNORMAL

## 2024-06-23 PROCEDURE — 99284 EMERGENCY DEPT VISIT MOD MDM: CPT

## 2024-06-23 PROCEDURE — 81001 URINALYSIS AUTO W/SCOPE: CPT

## 2024-06-23 RX ORDER — LIDOCAINE HYDROCHLORIDE 20 MG/ML
JELLY TOPICAL ONCE
Status: DISCONTINUED | OUTPATIENT
Start: 2024-06-23 | End: 2024-06-23 | Stop reason: HOSPADM

## 2024-06-23 NOTE — ED TRIAGE NOTES
Patient to ED via EMS due to abdominal pain and urinary retention. Pt has chronic carroll catheter that has approx. 600ml output. Pt bladder scanned upon arrival and has 948ml in bladder. EMS gave 4mg of zofran en route due to nausea. Pt received atarax by nursing home. Nursing home tried to irrigate carroll with no return. Pt abd distended but pt  said that's normal per pt.

## 2024-06-23 NOTE — DISCHARGE INSTRUCTIONS
Patient had what appeared to be clogged Timmons catheter.  Caregivers instructed to keep this clean and flush.  They are instructed to have the patient follow-up with a primary care physician to do so within the next 1 to 2 days.  They are instructed to return this patient to the nearest emergency room immediately for any new or worsening complaints.

## 2024-06-23 NOTE — ED PROVIDER NOTES
decreased breath sounds, wheezing, rhonchi or rales.   Chest:      Chest wall: No tenderness.   Abdominal:      General: Bowel sounds are normal. There is distension. There are no signs of injury.      Palpations: Abdomen is soft. There is no mass.      Tenderness: There is generalized abdominal tenderness. There is no guarding or rebound.      Hernia: No hernia is present.      Comments: Urinary bladder was percussible to the umbilicus   Musculoskeletal:         General: No tenderness. Normal range of motion.      Cervical back: Normal range of motion and neck supple.      Right lower leg: No edema.      Left lower leg: No edema.   Lymphadenopathy:      Cervical: No cervical adenopathy.   Skin:     General: Skin is warm and dry.      Findings: No bruising, ecchymosis, lesion or rash.   Neurological:      Mental Status: She is alert and oriented to person, place, and time.      Cranial Nerves: No cranial nerve deficit.      Coordination: Coordination normal.      Deep Tendon Reflexes: Reflexes are normal and symmetric.   Psychiatric:         Speech: Speech normal.         Behavior: Behavior normal.         Thought Content: Thought content normal.         Judgment: Judgment normal.           DIFFERENTIAL DIAGNOSIS:   Timmons catheter dysfunction, urinary retention, urinary tract infection    DIAGNOSTIC RESULTS     EKG: All EKG's are interpreted by the Emergency Department Physician who either signs or Co-signs this chart in the absence of a cardiologist.  None    RADIOLOGY: non-plain film images(s) such as CT, Ultrasound and MRI are read by the radiologist.  No orders to display         LABS:   Labs Reviewed   URINE WITH REFLEXED MICRO - Abnormal; Notable for the following components:       Result Value    Blood, Urine SMALL (*)     Leukocyte Esterase, Urine TRACE (*)     All other components within normal limits       EMERGENCY DEPARTMENT COURSE:   Vitals:    Vitals:    06/23/24 0116 06/23/24 0216 06/23/24 0318   BP:

## 2024-06-23 NOTE — ED NOTES
Patient carroll changed at this time. Pt had removed carroll with balloon still intact. Patient had also been leaking around carroll before removal.   20Fr carroll inserted. Urine flowing easily at this time. 500ml drained at this time. Carroll clamped at this time. Pt states relief in abdominal pain.  and son at bedside.

## 2024-06-23 NOTE — ED NOTES
Patient updated on transport time. Timmons unclamped to drain. Pt given additional pillows and blankets. Patient resting in bed. Respirations easy and unlabored. No distress noted. Pt denies needs at this time. Call light within reach.

## 2024-06-26 ENCOUNTER — OFFICE VISIT (OUTPATIENT)
Dept: UROLOGY | Age: 84
End: 2024-06-26
Payer: MEDICARE

## 2024-06-26 ENCOUNTER — TELEPHONE (OUTPATIENT)
Dept: UROLOGY | Age: 84
End: 2024-06-26

## 2024-06-26 DIAGNOSIS — R33.9 URINARY RETENTION: ICD-10-CM

## 2024-06-26 DIAGNOSIS — N39.0 RECURRENT UTI: Primary | ICD-10-CM

## 2024-06-26 PROCEDURE — 1123F ACP DISCUSS/DSCN MKR DOCD: CPT

## 2024-06-26 PROCEDURE — 99213 OFFICE O/P EST LOW 20 MIN: CPT

## 2024-06-26 NOTE — PROGRESS NOTES
[facility] [place] [facility]  [facility] UROLOGY  [address]  [address]  [facility] [address]  Dept: [phone]  Loc: [phone]  Visit Date: 6/26/2024      HPI:   Paulina Gunter is a 83 y.o. female with past medical history as listed below who presents today accompanied by her daughter in follow-up for recurrent UTI and urinary retention.      Following up for UTI hospitalization from 4/25-4/27/24. Treated with Merrem while IP and started on Fosfomycin at discharge. Restarted on Hiprex. Doing well since her discharge.    Urine has remained light yellow without sedimentation or blood since discharge. Back to baseline mentality per daughter. Doing well.      URO History  Elen has a history of acute urinary retention in the setting of UTI's. Incomplete bladder emptying at baseline. On D-Mannose and Hiprex for UTI prevention.    [facility]      Current Outpatient Medications   Medication Sig Dispense Refill    FLUoxetine (PROZAC) 20 MG capsule Take 2 capsules by mouth daily 180 capsule 3    levothyroxine (SYNTHROID) 125 MCG tablet Take 1 tablet by mouth daily 90 tablet 3    fluticasone (FLONASE) 50 MCG/ACT nasal spray 2 sprays by Nasal route as needed for Rhinitis or Allergies (ear fullness) 16 g 1    cetirizine (ZYRTEC) 5 MG tablet Take 1 tablet by mouth daily 90 tablet 3    sucralfate (CARAFATE) 1 GM tablet TAKE 1 TABLET BY MOUTH 4 TIMES  DAILY BEFORE MEALS AND NIGHTLY 120 tablet 11    HYDROcodone-acetaminophen (NORCO) 5-325 MG per tablet Take 1 tablet by mouth every 6 hours as needed for Pain.      polyethylene glycol (GLYCOLAX) 17 GM/SCOOP powder Take 17 g by mouth daily      enalapril (VASOTEC) 10 MG tablet Take 1 tablet by mouth daily 90 tablet 3    amLODIPine (NORVASC) 10 MG tablet Take 1 tablet by mouth daily 90 tablet 3    metoprolol succinate (TOPROL XL) 50 MG extended release tablet Take 1 tablet by mouth daily 90 tablet 3

## 2024-07-25 DIAGNOSIS — K21.9 GASTROESOPHAGEAL REFLUX DISEASE WITHOUT ESOPHAGITIS: ICD-10-CM

## 2024-07-25 RX ORDER — PANTOPRAZOLE SODIUM 40 MG/1
40 TABLET, DELAYED RELEASE ORAL
Qty: 180 TABLET | Refills: 3 | OUTPATIENT
Start: 2024-07-25

## 2024-09-04 NOTE — TELEPHONE ENCOUNTER
Patient's daughter called to reschedule patient's procedure that was scheduled today due to patient having diarrhea all night. 2 seconds or less

## 2024-10-01 NOTE — PROGRESS NOTES
Vaccine Information Sheet, \"Influenza - Inactivated\"  given to Jodee Gunter, or parent/legal guardian of  Jodee Gunter and verbalized understanding. Patient responses:     Have you ever had a reaction to a flu vaccine? No  Do you have an allergy to eggs, neomycin or polymixin? No  Do you have an allergy to Thimerosal, contact lens solution, or Merthiolate? No  Have you ever had Guillian Holton Syndrome? No  Do you have any current illness? No  Do you have a temperature above 100 degrees? No  Are you pregnant? No  If pregnant, permission obtained from physician? NA  Do you have an active neurological disorder? No      Flu vaccine given per order. Please see immunization tab. No

## 2024-12-18 ENCOUNTER — HOSPITAL ENCOUNTER (INPATIENT)
Age: 84
LOS: 4 days | Discharge: NURSING, REHAB OR LONG-TERM CARE | DRG: 698 | End: 2024-12-22
Attending: STUDENT IN AN ORGANIZED HEALTH CARE EDUCATION/TRAINING PROGRAM | Admitting: STUDENT IN AN ORGANIZED HEALTH CARE EDUCATION/TRAINING PROGRAM
Payer: MEDICARE

## 2024-12-18 ENCOUNTER — APPOINTMENT (OUTPATIENT)
Dept: GENERAL RADIOLOGY | Age: 84
DRG: 698 | End: 2024-12-18
Payer: MEDICARE

## 2024-12-18 ENCOUNTER — APPOINTMENT (OUTPATIENT)
Dept: CT IMAGING | Age: 84
DRG: 698 | End: 2024-12-18
Payer: MEDICARE

## 2024-12-18 DIAGNOSIS — F41.9 ANXIETY DISORDER, UNSPECIFIED TYPE: ICD-10-CM

## 2024-12-18 DIAGNOSIS — R54 SENILE ASTHENIA: ICD-10-CM

## 2024-12-18 DIAGNOSIS — Z51.5 PALLIATIVE CARE PATIENT: ICD-10-CM

## 2024-12-18 DIAGNOSIS — E03.9 ACQUIRED HYPOTHYROIDISM: ICD-10-CM

## 2024-12-18 DIAGNOSIS — R41.82 ALTERED MENTAL STATUS, UNSPECIFIED ALTERED MENTAL STATUS TYPE: Primary | ICD-10-CM

## 2024-12-18 DIAGNOSIS — M79.2 NEUROPATHIC PAIN: ICD-10-CM

## 2024-12-18 DIAGNOSIS — E03.9 HYPOTHYROIDISM, UNSPECIFIED TYPE: ICD-10-CM

## 2024-12-18 DIAGNOSIS — T83.511A URINARY TRACT INFECTION ASSOCIATED WITH INDWELLING URETHRAL CATHETER, INITIAL ENCOUNTER (HCC): ICD-10-CM

## 2024-12-18 DIAGNOSIS — N39.0 URINARY TRACT INFECTION ASSOCIATED WITH INDWELLING URETHRAL CATHETER, INITIAL ENCOUNTER (HCC): ICD-10-CM

## 2024-12-18 DIAGNOSIS — Z51.5 END OF LIFE CARE: ICD-10-CM

## 2024-12-18 DIAGNOSIS — R01.1 HEART MURMUR: ICD-10-CM

## 2024-12-18 DIAGNOSIS — G89.4 CHRONIC PAIN SYNDROME: ICD-10-CM

## 2024-12-18 DIAGNOSIS — G25.0 ESSENTIAL TREMOR: ICD-10-CM

## 2024-12-18 LAB
ALBUMIN SERPL BCG-MCNC: 3.2 G/DL (ref 3.5–5.1)
ALP SERPL-CCNC: 84 U/L (ref 38–126)
ALT SERPL W/O P-5'-P-CCNC: 15 U/L (ref 11–66)
AMORPH SED URNS QL MICRO: ABNORMAL
ANION GAP SERPL CALC-SCNC: 10 MEQ/L (ref 8–16)
APTT PPP: 33.5 SECONDS (ref 22–38)
ARTERIAL PATENCY WRIST A: POSITIVE
ARTERIAL PATENCY WRIST A: POSITIVE
AST SERPL-CCNC: 32 U/L (ref 5–40)
BACTERIA URNS QL MICRO: ABNORMAL /HPF
BASE EXCESS BLDA CALC-SCNC: 3.7 MMOL/L (ref -2.5–2.5)
BASE EXCESS BLDA CALC-SCNC: 4.3 MMOL/L (ref -2.5–2.5)
BASE EXCESS BLDA CALC-SCNC: 4.5 MMOL/L (ref -2–3)
BASOPHILS ABSOLUTE: 0.1 THOU/MM3 (ref 0–0.1)
BASOPHILS NFR BLD AUTO: 1.1 %
BDY SITE: ABNORMAL
BDY SITE: ABNORMAL
BILIRUB CONJ SERPL-MCNC: < 0.1 MG/DL (ref 0.1–13.8)
BILIRUB SERPL-MCNC: 0.2 MG/DL (ref 0.3–1.2)
BILIRUB UR QL STRIP.AUTO: NEGATIVE
BUN SERPL-MCNC: 14 MG/DL (ref 7–22)
CALCIUM SERPL-MCNC: 9.1 MG/DL (ref 8.5–10.5)
CASTS #/AREA URNS LPF: ABNORMAL /LPF
CHARACTER UR: ABNORMAL
CHLORIDE SERPL-SCNC: 100 MEQ/L (ref 98–111)
CO2 SERPL-SCNC: 27 MEQ/L (ref 23–33)
COLLECTED BY:: ABNORMAL
COLOR, UA: YELLOW
CREAT SERPL-MCNC: 0.8 MG/DL (ref 0.4–1.2)
CRYSTALS URNS MICRO: ABNORMAL
DEPRECATED RDW RBC AUTO: 52.4 FL (ref 35–45)
DEVICE: ABNORMAL
EKG ATRIAL RATE: 57 BPM
EKG P AXIS: 84 DEGREES
EKG P-R INTERVAL: 188 MS
EKG Q-T INTERVAL: 402 MS
EKG QRS DURATION: 72 MS
EKG QTC CALCULATION (BAZETT): 391 MS
EKG R AXIS: -8 DEGREES
EKG T AXIS: 52 DEGREES
EKG VENTRICULAR RATE: 57 BPM
EOSINOPHIL NFR BLD AUTO: 1.3 %
EOSINOPHILS ABSOLUTE: 0.1 THOU/MM3 (ref 0–0.4)
EPITHELIAL CELLS, UA: ABNORMAL /HPF
ERYTHROCYTE [DISTWIDTH] IN BLOOD BY AUTOMATED COUNT: 14.5 % (ref 11.5–14.5)
FIO2 ON VENT O2 ANALYZER: 28 %
FIO2 ON VENT O2 ANALYZER: 40 %
GFR SERPL CREATININE-BSD FRML MDRD: 72 ML/MIN/1.73M2
GLUCOSE SERPL-MCNC: 94 MG/DL (ref 70–108)
GLUCOSE UR QL STRIP.AUTO: NEGATIVE MG/DL
HCO3 BLDA-SCNC: 31 MMOL/L (ref 23–28)
HCO3 BLDA-SCNC: 32 MMOL/L (ref 23–28)
HCO3 BLDA-SCNC: 33 MMOL/L (ref 23–28)
HCT VFR BLD AUTO: 39.4 % (ref 37–47)
HGB BLD-MCNC: 12.5 GM/DL (ref 12–16)
HGB UR QL STRIP.AUTO: ABNORMAL
IMM GRANULOCYTES # BLD AUTO: 0.01 THOU/MM3 (ref 0–0.07)
IMM GRANULOCYTES NFR BLD AUTO: 0.2 %
INR PPP: 0.93 (ref 0.85–1.13)
KETONES UR QL STRIP.AUTO: ABNORMAL
LACTIC ACID, SEPSIS: 0.6 MMOL/L (ref 0.5–1.9)
LACTIC ACID, SEPSIS: 1 MMOL/L (ref 0.5–1.9)
LYMPHOCYTES ABSOLUTE: 1 THOU/MM3 (ref 1–4.8)
LYMPHOCYTES NFR BLD AUTO: 18.5 %
MCH RBC QN AUTO: 30.9 PG (ref 26–33)
MCHC RBC AUTO-ENTMCNC: 31.7 GM/DL (ref 32.2–35.5)
MCV RBC AUTO: 97.3 FL (ref 81–99)
MISCELLANEOUS LAB TEST RESULT: ABNORMAL
MONOCYTES ABSOLUTE: 0.3 THOU/MM3 (ref 0.4–1.3)
MONOCYTES NFR BLD AUTO: 6.4 %
MUCOUS THREADS URNS QL MICRO: ABNORMAL
NEUTROPHILS ABSOLUTE: 3.9 THOU/MM3 (ref 1.8–7.7)
NEUTROPHILS NFR BLD AUTO: 72.5 %
NITRITE UR QL STRIP: NEGATIVE
NRBC BLD AUTO-RTO: 0 /100 WBC
NT-PROBNP SERPL IA-MCNC: 497.2 PG/ML (ref 0–449)
OSMOLALITY SERPL CALC.SUM OF ELEC: 274 MOSMOL/KG (ref 275–300)
PCO2 BLDA: 57 MMHG (ref 35–45)
PCO2 BLDA: 61 MMHG (ref 35–45)
PCO2 TEMP ADJ BLDMV: 70 MMHG (ref 41–51)
PH BLDA: 7.33 [PH] (ref 7.35–7.45)
PH BLDA: 7.34 [PH] (ref 7.35–7.45)
PH BLDMV: 7.29 [PH] (ref 7.31–7.41)
PH UR STRIP.AUTO: 7 [PH] (ref 5–9)
PLATELET # BLD AUTO: 176 THOU/MM3 (ref 130–400)
PMV BLD AUTO: 10.5 FL (ref 9.4–12.4)
PO2 BLDA: 78 MMHG (ref 71–104)
PO2 BLDA: 98 MMHG (ref 71–104)
PO2 BLDMV: 37 MMHG (ref 25–40)
POTASSIUM SERPL-SCNC: 4.8 MEQ/L (ref 3.5–5.2)
PROCALCITONIN SERPL IA-MCNC: 0.06 NG/ML (ref 0.01–0.09)
PROT SERPL-MCNC: 6.3 G/DL (ref 6.1–8)
PROT UR STRIP.AUTO-MCNC: >= 300 MG/DL
RBC # BLD AUTO: 4.05 MILL/MM3 (ref 4.2–5.4)
RBC URINE: > 200 /HPF
RENAL EPI CELLS #/AREA URNS HPF: ABNORMAL /[HPF]
SAO2 % BLDA: 94 %
SAO2 % BLDA: 97 %
SAO2 % BLDMV: 60 %
SITE: ABNORMAL
SODIUM SERPL-SCNC: 137 MEQ/L (ref 135–145)
SP GR UR REFRACT.AUTO: 1.02 (ref 1–1.03)
T4 FREE SERPL-MCNC: 0.26 NG/DL (ref 0.93–1.68)
TROPONIN, HIGH SENSITIVITY: 22 NG/L (ref 0–12)
TROPONIN, HIGH SENSITIVITY: 23 NG/L (ref 0–12)
TSH SERPL DL<=0.005 MIU/L-ACNC: 66.55 UIU/ML (ref 0.4–4.2)
UROBILINOGEN, URINE: 0.2 EU/DL (ref 0–1)
VENTILATION MODE VENT: ABNORMAL
WBC # BLD AUTO: 5.4 THOU/MM3 (ref 4.8–10.8)
WBC #/AREA URNS HPF: > 200 /HPF
WBC #/AREA URNS HPF: ABNORMAL /[HPF]
YEAST LIKE FUNGI URNS QL MICRO: ABNORMAL

## 2024-12-18 PROCEDURE — 36415 COLL VENOUS BLD VENIPUNCTURE: CPT

## 2024-12-18 PROCEDURE — 2580000003 HC RX 258: Performed by: PHYSICIAN ASSISTANT

## 2024-12-18 PROCEDURE — 87077 CULTURE AEROBIC IDENTIFY: CPT

## 2024-12-18 PROCEDURE — 82803 BLOOD GASES ANY COMBINATION: CPT

## 2024-12-18 PROCEDURE — 6360000004 HC RX CONTRAST MEDICATION: Performed by: STUDENT IN AN ORGANIZED HEALTH CARE EDUCATION/TRAINING PROGRAM

## 2024-12-18 PROCEDURE — 94760 N-INVAS EAR/PLS OXIMETRY 1: CPT

## 2024-12-18 PROCEDURE — 87040 BLOOD CULTURE FOR BACTERIA: CPT

## 2024-12-18 PROCEDURE — 99223 1ST HOSP IP/OBS HIGH 75: CPT | Performed by: PHYSICIAN ASSISTANT

## 2024-12-18 PROCEDURE — 87086 URINE CULTURE/COLONY COUNT: CPT

## 2024-12-18 PROCEDURE — 85730 THROMBOPLASTIN TIME PARTIAL: CPT

## 2024-12-18 PROCEDURE — 71045 X-RAY EXAM CHEST 1 VIEW: CPT

## 2024-12-18 PROCEDURE — 96374 THER/PROPH/DIAG INJ IV PUSH: CPT

## 2024-12-18 PROCEDURE — 2700000000 HC OXYGEN THERAPY PER DAY

## 2024-12-18 PROCEDURE — 6360000002 HC RX W HCPCS: Performed by: STUDENT IN AN ORGANIZED HEALTH CARE EDUCATION/TRAINING PROGRAM

## 2024-12-18 PROCEDURE — 2060000000 HC ICU INTERMEDIATE R&B

## 2024-12-18 PROCEDURE — 36600 WITHDRAWAL OF ARTERIAL BLOOD: CPT

## 2024-12-18 PROCEDURE — 93005 ELECTROCARDIOGRAM TRACING: CPT | Performed by: STUDENT IN AN ORGANIZED HEALTH CARE EDUCATION/TRAINING PROGRAM

## 2024-12-18 PROCEDURE — 83880 ASSAY OF NATRIURETIC PEPTIDE: CPT

## 2024-12-18 PROCEDURE — 84443 ASSAY THYROID STIM HORMONE: CPT

## 2024-12-18 PROCEDURE — 83605 ASSAY OF LACTIC ACID: CPT

## 2024-12-18 PROCEDURE — 74177 CT ABD & PELVIS W/CONTRAST: CPT

## 2024-12-18 PROCEDURE — 2500000003 HC RX 250 WO HCPCS: Performed by: STUDENT IN AN ORGANIZED HEALTH CARE EDUCATION/TRAINING PROGRAM

## 2024-12-18 PROCEDURE — 87186 SC STD MICRODIL/AGAR DIL: CPT

## 2024-12-18 PROCEDURE — 94761 N-INVAS EAR/PLS OXIMETRY MLT: CPT

## 2024-12-18 PROCEDURE — 81001 URINALYSIS AUTO W/SCOPE: CPT

## 2024-12-18 PROCEDURE — 85025 COMPLETE CBC W/AUTO DIFF WBC: CPT

## 2024-12-18 PROCEDURE — 80053 COMPREHEN METABOLIC PANEL: CPT

## 2024-12-18 PROCEDURE — 99285 EMERGENCY DEPT VISIT HI MDM: CPT

## 2024-12-18 PROCEDURE — 51702 INSERT TEMP BLADDER CATH: CPT

## 2024-12-18 PROCEDURE — 85610 PROTHROMBIN TIME: CPT

## 2024-12-18 PROCEDURE — 6360000002 HC RX W HCPCS: Performed by: PHYSICIAN ASSISTANT

## 2024-12-18 PROCEDURE — 84484 ASSAY OF TROPONIN QUANT: CPT

## 2024-12-18 PROCEDURE — 84145 PROCALCITONIN (PCT): CPT

## 2024-12-18 PROCEDURE — 93010 ELECTROCARDIOGRAM REPORT: CPT | Performed by: NUCLEAR MEDICINE

## 2024-12-18 PROCEDURE — 84439 ASSAY OF FREE THYROXINE: CPT

## 2024-12-18 PROCEDURE — 2580000003 HC RX 258: Performed by: STUDENT IN AN ORGANIZED HEALTH CARE EDUCATION/TRAINING PROGRAM

## 2024-12-18 PROCEDURE — 82248 BILIRUBIN DIRECT: CPT

## 2024-12-18 RX ORDER — SODIUM CHLORIDE 0.9 % (FLUSH) 0.9 %
5-40 SYRINGE (ML) INJECTION PRN
Status: DISCONTINUED | OUTPATIENT
Start: 2024-12-18 | End: 2024-12-22 | Stop reason: HOSPADM

## 2024-12-18 RX ORDER — 0.9 % SODIUM CHLORIDE 0.9 %
1000 INTRAVENOUS SOLUTION INTRAVENOUS ONCE
Status: COMPLETED | OUTPATIENT
Start: 2024-12-18 | End: 2024-12-18

## 2024-12-18 RX ORDER — AMLODIPINE BESYLATE 10 MG/1
10 TABLET ORAL DAILY
Status: DISCONTINUED | OUTPATIENT
Start: 2024-12-18 | End: 2024-12-21

## 2024-12-18 RX ORDER — POTASSIUM CHLORIDE 7.45 MG/ML
10 INJECTION INTRAVENOUS PRN
Status: DISCONTINUED | OUTPATIENT
Start: 2024-12-18 | End: 2024-12-22 | Stop reason: HOSPADM

## 2024-12-18 RX ORDER — MAGNESIUM SULFATE IN WATER 40 MG/ML
2000 INJECTION, SOLUTION INTRAVENOUS PRN
Status: DISCONTINUED | OUTPATIENT
Start: 2024-12-18 | End: 2024-12-22 | Stop reason: HOSPADM

## 2024-12-18 RX ORDER — ACETAMINOPHEN 650 MG/1
650 SUPPOSITORY RECTAL EVERY 6 HOURS PRN
Status: DISCONTINUED | OUTPATIENT
Start: 2024-12-18 | End: 2024-12-22 | Stop reason: HOSPADM

## 2024-12-18 RX ORDER — BUSPIRONE HYDROCHLORIDE 5 MG/1
5 TABLET ORAL 2 TIMES DAILY
Status: DISCONTINUED | OUTPATIENT
Start: 2024-12-18 | End: 2024-12-21

## 2024-12-18 RX ORDER — BUSPIRONE HYDROCHLORIDE 5 MG/1
5 TABLET ORAL 2 TIMES DAILY
Status: ON HOLD | COMMUNITY
End: 2024-12-22 | Stop reason: HOSPADM

## 2024-12-18 RX ORDER — SENNOSIDES A AND B 8.6 MG/1
1 TABLET, FILM COATED ORAL DAILY
COMMUNITY

## 2024-12-18 RX ORDER — SODIUM CHLORIDE 0.9 % (FLUSH) 0.9 %
5-40 SYRINGE (ML) INJECTION EVERY 12 HOURS SCHEDULED
Status: DISCONTINUED | OUTPATIENT
Start: 2024-12-18 | End: 2024-12-22 | Stop reason: HOSPADM

## 2024-12-18 RX ORDER — ENALAPRIL MALEATE 10 MG/1
10 TABLET ORAL DAILY
Status: DISCONTINUED | OUTPATIENT
Start: 2024-12-18 | End: 2024-12-21

## 2024-12-18 RX ORDER — BUPRENORPHINE 10 UG/H
1 PATCH TRANSDERMAL WEEKLY
COMMUNITY

## 2024-12-18 RX ORDER — LEVOTHYROXINE SODIUM 125 UG/1
125 TABLET ORAL DAILY
Status: DISCONTINUED | OUTPATIENT
Start: 2024-12-18 | End: 2024-12-20

## 2024-12-18 RX ORDER — ONDANSETRON 4 MG/1
4 TABLET, ORALLY DISINTEGRATING ORAL EVERY 8 HOURS PRN
Status: DISCONTINUED | OUTPATIENT
Start: 2024-12-18 | End: 2024-12-22 | Stop reason: HOSPADM

## 2024-12-18 RX ORDER — PANTOPRAZOLE SODIUM 40 MG/10ML
40 INJECTION, POWDER, LYOPHILIZED, FOR SOLUTION INTRAVENOUS 2 TIMES DAILY
Status: DISCONTINUED | OUTPATIENT
Start: 2024-12-18 | End: 2024-12-19 | Stop reason: SDUPTHER

## 2024-12-18 RX ORDER — MECLIZINE HYDROCHLORIDE 25 MG/1
25 TABLET ORAL EVERY 6 HOURS PRN
Status: ON HOLD | COMMUNITY
End: 2024-12-22 | Stop reason: HOSPADM

## 2024-12-18 RX ORDER — HYDROCODONE BITARTRATE AND ACETAMINOPHEN 5; 325 MG/1; MG/1
1 TABLET ORAL EVERY 6 HOURS PRN
Status: DISCONTINUED | OUTPATIENT
Start: 2024-12-18 | End: 2024-12-20

## 2024-12-18 RX ORDER — SUCRALFATE 1 G/1
1 TABLET ORAL
Status: DISCONTINUED | OUTPATIENT
Start: 2024-12-18 | End: 2024-12-21

## 2024-12-18 RX ORDER — PHENOL 1.4 %
1 AEROSOL, SPRAY (ML) MUCOUS MEMBRANE DAILY
Status: ON HOLD | COMMUNITY
End: 2024-12-22 | Stop reason: HOSPADM

## 2024-12-18 RX ORDER — LEVOTHYROXINE SODIUM 125 UG/1
125 TABLET ORAL DAILY
Status: DISCONTINUED | OUTPATIENT
Start: 2024-12-19 | End: 2024-12-18

## 2024-12-18 RX ORDER — ACETAMINOPHEN 325 MG/1
650 TABLET ORAL EVERY 6 HOURS PRN
Status: DISCONTINUED | OUTPATIENT
Start: 2024-12-18 | End: 2024-12-22 | Stop reason: HOSPADM

## 2024-12-18 RX ORDER — POLYETHYLENE GLYCOL 3350 17 G/17G
17 POWDER, FOR SOLUTION ORAL DAILY PRN
Status: DISCONTINUED | OUTPATIENT
Start: 2024-12-18 | End: 2024-12-22 | Stop reason: HOSPADM

## 2024-12-18 RX ORDER — IOPAMIDOL 755 MG/ML
80 INJECTION, SOLUTION INTRAVASCULAR
Status: COMPLETED | OUTPATIENT
Start: 2024-12-18 | End: 2024-12-18

## 2024-12-18 RX ORDER — ONDANSETRON 2 MG/ML
4 INJECTION INTRAMUSCULAR; INTRAVENOUS EVERY 6 HOURS PRN
Status: DISCONTINUED | OUTPATIENT
Start: 2024-12-18 | End: 2024-12-22 | Stop reason: HOSPADM

## 2024-12-18 RX ORDER — SODIUM CHLORIDE 9 MG/ML
INJECTION, SOLUTION INTRAVENOUS PRN
Status: DISCONTINUED | OUTPATIENT
Start: 2024-12-18 | End: 2024-12-22 | Stop reason: HOSPADM

## 2024-12-18 RX ORDER — PANTOPRAZOLE SODIUM 40 MG/1
40 TABLET, DELAYED RELEASE ORAL
Status: DISCONTINUED | OUTPATIENT
Start: 2024-12-19 | End: 2024-12-21

## 2024-12-18 RX ORDER — ENOXAPARIN SODIUM 100 MG/ML
40 INJECTION SUBCUTANEOUS EVERY 24 HOURS
Status: DISCONTINUED | OUTPATIENT
Start: 2024-12-18 | End: 2024-12-21

## 2024-12-18 RX ORDER — BUPRENORPHINE 10 UG/H
1 PATCH TRANSDERMAL WEEKLY
Status: DISCONTINUED | OUTPATIENT
Start: 2024-12-23 | End: 2024-12-22 | Stop reason: HOSPADM

## 2024-12-18 RX ORDER — METOPROLOL SUCCINATE 50 MG/1
50 TABLET, EXTENDED RELEASE ORAL DAILY
Status: DISCONTINUED | OUTPATIENT
Start: 2024-12-18 | End: 2024-12-21

## 2024-12-18 RX ORDER — POTASSIUM CHLORIDE 1500 MG/1
40 TABLET, EXTENDED RELEASE ORAL PRN
Status: DISCONTINUED | OUTPATIENT
Start: 2024-12-18 | End: 2024-12-22 | Stop reason: HOSPADM

## 2024-12-18 RX ORDER — SODIUM CHLORIDE 9 MG/ML
INJECTION, SOLUTION INTRAVENOUS CONTINUOUS
Status: ACTIVE | OUTPATIENT
Start: 2024-12-18 | End: 2024-12-19

## 2024-12-18 RX ADMIN — SODIUM CHLORIDE 1000 ML: 9 INJECTION, SOLUTION INTRAVENOUS at 16:27

## 2024-12-18 RX ADMIN — PANTOPRAZOLE SODIUM 40 MG: 40 INJECTION, POWDER, FOR SOLUTION INTRAVENOUS at 21:36

## 2024-12-18 RX ADMIN — IOPAMIDOL 80 ML: 755 INJECTION, SOLUTION INTRAVENOUS at 14:54

## 2024-12-18 RX ADMIN — MEROPENEM 1000 MG: 1 INJECTION INTRAVENOUS at 16:20

## 2024-12-18 RX ADMIN — ENOXAPARIN SODIUM 40 MG: 100 INJECTION SUBCUTANEOUS at 21:36

## 2024-12-18 RX ADMIN — SODIUM CHLORIDE: 9 INJECTION, SOLUTION INTRAVENOUS at 21:45

## 2024-12-18 ASSESSMENT — PAIN - FUNCTIONAL ASSESSMENT
PAIN_FUNCTIONAL_ASSESSMENT: NONE - DENIES PAIN

## 2024-12-18 NOTE — ED NOTES
Patient to the ED with complaint of AMS and hypotension. Patient was at ECF today and was noted to be hypotensive and altered. Patient's family states that patient's carroll has not been draining appropriately for the past few days. Patient appears to be severely distended in suprapubic region. Patient denies any pain. Patient is alert and oriented when this RN speaks to her. Patient denies any other complaints. Current carroll catheter does not appear to be draining. Patient is resting in bed with easy and unlabored respirations. Call light in reach. Side rails up x2. Patient denies further complaints or concerns. Will monitor.

## 2024-12-18 NOTE — ED PROVIDER NOTES
[facility] EMERGENCY DEPT  EMERGENCY DEPARTMENT ENCOUNTER          Pt Name: Paulina Gunter  MRN: [id number]  Birthdate 1940  Date of evaluation: 12/18/2024  Physician: Kimberlee Pascual MD  Supervising Attending Physician: Jae Hendrickson MD       CHIEF COMPLAINT     No chief complaint on file.        HISTORY OF PRESENT ILLNESS    HPI  Paulina Gunter is a 84 y.o. female past medical history of hypertension, hiatal hernia, hypothyroidism, GERD and history of blood clots past medical history who presents to the emergency department from nursing home for evaluation of for evaluation of altered mental status and hypotension.  Per patient's daughter the patient is not acting like herself today.  She relates that she does have good days and bad days but states today she seems more confused than her baseline.  Daughter relates she is also concerned because her abdomen appears more distended and her urinary catheter does not seem to be draining appropriately.  Per the patient's daughter the patient has had recurrent UTIs in the past.  Patient's daughter relates that the patient has a distended stomach at baseline because she has no abdominal muscles holding in her intestines appropriately, but reports that it seems more distended than it normally is.  Patient does not report any pain to me.  She was able to tell me her name, where she was and the year but was not of answering other questions appropriately.        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 Isabel    Fibromyalgia     GERD (gastroesophageal reflux disease)     Diverticulitis     Hiatal hernia     Hx of blood clots     Hypertension     BAKI    Hypothyroidism     Macular

## 2024-12-18 NOTE — H&P
Hospitalist History & Physical    Patient:  Paulina Gunter    Unit/Bed:15/015A  YOB: 1940  MRN: [id number]   PCP: Jacob Mcrae MD  Code Status: Limited    Date of Service: The patient was seen and examined on 12/18/24 and admitted to Inpatient with an expected length of stay of greater than two midnights due to medical therapy.     Chief Complaint: Hypotension, altered mental status    Assessment/Plan:    Catheter associated urinary tract infection, present on arrival  History of ESBL Klebsiella noted on urine culture in September 2023.  Continue meropenem.  De-escalate as able.  Consider infectious disease consult pending results of culture.  Toxic metabolic encephalopathy  Likely secondary to urinary tract infection with superimposed dementia.  N.p.o. until cognition improves.  Check VBG.  SLP consultation.  Physical deconditioning  Currently resides in nursing facility.  Unable to ambulate at baseline.  PT/OT consultation.  Chronic pain syndrome  Hold oral opioids at this time due to encephalopathy.  Continue buprenorphine patch.  Systolic murmur  Check TTE  Hypertension  Hold antihypertensives due to soft blood pressures.  Gastroesophageal reflux disease  Continue home regimen  Chronic urinary retention  Timmons catheter in place  Hypothyroidism  Continue Synthroid.  Check TSH with reflex.  GERD  Continue home regimen  Cognitive impairment  Patient's son reports progressive cognitive decline with occasional sundowning at baseline.    History of Present Illness:  Paulina Gunter is a 84 y.o. female with a history of chronic pain syndrome, hypertension, gastroesophageal reflux disease, chronic urinary retention with chronic indwelling Timmons catheter, hypothyroidism, GERD, and cognitive impairment who presented to [facility] with chief complaint of altered mental status and hypotension.  History is provided by the patient's son who is present at bedside as the patient is encephalopathic.  The

## 2024-12-18 NOTE — ED NOTES
Patient medicated per order. Patient denies any discomfort. Patient is resting in bed with easy and unlabored respirations. Call light in reach. Side rails up x2. Patient denies further complaints or concerns. Will monitor.

## 2024-12-19 ENCOUNTER — APPOINTMENT (OUTPATIENT)
Age: 84
DRG: 698 | End: 2024-12-19
Attending: PHYSICIAN ASSISTANT
Payer: MEDICARE

## 2024-12-19 LAB
ANION GAP SERPL CALC-SCNC: 7 MEQ/L (ref 8–16)
BASOPHILS ABSOLUTE: 0.1 THOU/MM3 (ref 0–0.1)
BASOPHILS NFR BLD AUTO: 1.8 %
BUN SERPL-MCNC: 11 MG/DL (ref 7–22)
CALCIUM SERPL-MCNC: 7.9 MG/DL (ref 8.5–10.5)
CHLORIDE SERPL-SCNC: 100 MEQ/L (ref 98–111)
CO2 SERPL-SCNC: 27 MEQ/L (ref 23–33)
CREAT SERPL-MCNC: 0.4 MG/DL (ref 0.4–1.2)
DEPRECATED RDW RBC AUTO: 51.4 FL (ref 35–45)
ECHO AO ASC DIAM: 3.3 CM
ECHO AO ASCENDING AORTA INDEX: 1.86 CM/M2
ECHO AO SINUS VALSALVA DIAM: 3.3 CM
ECHO AO SINUS VALSALVA INDEX: 1.86 CM/M2
ECHO AO ST JNCT DIAM: 2.8 CM
ECHO AV CUSP MM: 1.8 CM
ECHO AV MEAN GRADIENT: 3 MMHG
ECHO AV MEAN VELOCITY: 0.8 M/S
ECHO AV PEAK GRADIENT: 6 MMHG
ECHO AV PEAK VELOCITY: 1.2 M/S
ECHO AV VELOCITY RATIO: 0.58
ECHO AV VTI: 27.9 CM
ECHO BSA: 1.78 M2
ECHO LA AREA 4C: 18.3 CM2
ECHO LA DIAMETER INDEX: 1.58 CM/M2
ECHO LA DIAMETER: 2.8 CM
ECHO LA MAJOR AXIS: 5.8 CM
ECHO LA VOL MOD A4C: 46 ML (ref 22–52)
ECHO LA VOLUME INDEX MOD A4C: 26 ML/M2 (ref 16–34)
ECHO LV EJECTION FRACTION BIPLANE: 55 % (ref 55–100)
ECHO LV FRACTIONAL SHORTENING: 32 % (ref 28–44)
ECHO LV INTERNAL DIMENSION DIASTOLE INDEX: 1.58 CM/M2
ECHO LV INTERNAL DIMENSION DIASTOLIC: 2.8 CM (ref 3.9–5.3)
ECHO LV INTERNAL DIMENSION SYSTOLIC INDEX: 1.07 CM/M2
ECHO LV INTERNAL DIMENSION SYSTOLIC: 1.9 CM
ECHO LV ISOVOLUMETRIC RELAXATION TIME (IVRT): 81 MS
ECHO LV IVSD: 1.5 CM (ref 0.6–0.9)
ECHO LV MASS 2D: 120.8 G (ref 67–162)
ECHO LV MASS INDEX 2D: 68.2 G/M2 (ref 43–95)
ECHO LV POSTERIOR WALL DIASTOLIC: 1.2 CM (ref 0.6–0.9)
ECHO LV RELATIVE WALL THICKNESS RATIO: 0.86
ECHO LVOT AV VTI INDEX: 0.72
ECHO LVOT MEAN GRADIENT: 1 MMHG
ECHO LVOT PEAK GRADIENT: 2 MMHG
ECHO LVOT PEAK VELOCITY: 0.7 M/S
ECHO LVOT VTI: 20.1 CM
ECHO MV A VELOCITY: 0.58 M/S
ECHO MV E DECELERATION TIME (DT): 254 MS
ECHO MV E VELOCITY: 0.72 M/S
ECHO MV E/A RATIO: 1.24
ECHO MV REGURGITANT PEAK GRADIENT: 74 MMHG
ECHO MV REGURGITANT PEAK VELOCITY: 4.3 M/S
ECHO PULMONARY ARTERY END DIASTOLIC PRESSURE: 5 MMHG
ECHO PV MAX VELOCITY: 0.7 M/S
ECHO PV PEAK GRADIENT: 2 MMHG
ECHO PV REGURGITANT MAX VELOCITY: 1.2 M/S
ECHO RV INTERNAL DIMENSION: 3.1 CM
ECHO TV E WAVE: 0.7 M/S
ECHO TV REGURGITANT MAX VELOCITY: 2.17 M/S
ECHO TV REGURGITANT PEAK GRADIENT: 19 MMHG
EOSINOPHIL NFR BLD AUTO: 4.2 %
EOSINOPHILS ABSOLUTE: 0.2 THOU/MM3 (ref 0–0.4)
ERYTHROCYTE [DISTWIDTH] IN BLOOD BY AUTOMATED COUNT: 14.2 % (ref 11.5–14.5)
GFR SERPL CREATININE-BSD FRML MDRD: > 90 ML/MIN/1.73M2
GLUCOSE SERPL-MCNC: 87 MG/DL (ref 70–108)
HCT VFR BLD AUTO: 37 % (ref 37–47)
HGB BLD-MCNC: 11.5 GM/DL (ref 12–16)
IMM GRANULOCYTES # BLD AUTO: 0.01 THOU/MM3 (ref 0–0.07)
IMM GRANULOCYTES NFR BLD AUTO: 0.2 %
LYMPHOCYTES ABSOLUTE: 0.9 THOU/MM3 (ref 1–4.8)
LYMPHOCYTES NFR BLD AUTO: 19.2 %
MCH RBC QN AUTO: 30.3 PG (ref 26–33)
MCHC RBC AUTO-ENTMCNC: 31.1 GM/DL (ref 32.2–35.5)
MCV RBC AUTO: 97.4 FL (ref 81–99)
MONOCYTES ABSOLUTE: 0.4 THOU/MM3 (ref 0.4–1.3)
MONOCYTES NFR BLD AUTO: 9.9 %
NEUTROPHILS ABSOLUTE: 2.9 THOU/MM3 (ref 1.8–7.7)
NEUTROPHILS NFR BLD AUTO: 64.7 %
NRBC BLD AUTO-RTO: 0 /100 WBC
OSMOLALITY SERPL CALC.SUM OF ELEC: 267 MOSMOL/KG (ref 275–300)
PLATELET # BLD AUTO: 143 THOU/MM3 (ref 130–400)
PMV BLD AUTO: 10.4 FL (ref 9.4–12.4)
POTASSIUM SERPL-SCNC: 4.1 MEQ/L (ref 3.5–5.2)
RBC # BLD AUTO: 3.8 MILL/MM3 (ref 4.2–5.4)
SODIUM SERPL-SCNC: 134 MEQ/L (ref 135–145)
WBC # BLD AUTO: 4.5 THOU/MM3 (ref 4.8–10.8)

## 2024-12-19 PROCEDURE — 36415 COLL VENOUS BLD VENIPUNCTURE: CPT

## 2024-12-19 PROCEDURE — 99233 SBSQ HOSP IP/OBS HIGH 50: CPT | Performed by: STUDENT IN AN ORGANIZED HEALTH CARE EDUCATION/TRAINING PROGRAM

## 2024-12-19 PROCEDURE — 2700000000 HC OXYGEN THERAPY PER DAY

## 2024-12-19 PROCEDURE — 6370000000 HC RX 637 (ALT 250 FOR IP): Performed by: STUDENT IN AN ORGANIZED HEALTH CARE EDUCATION/TRAINING PROGRAM

## 2024-12-19 PROCEDURE — 6360000002 HC RX W HCPCS

## 2024-12-19 PROCEDURE — 80048 BASIC METABOLIC PNL TOTAL CA: CPT

## 2024-12-19 PROCEDURE — 93306 TTE W/DOPPLER COMPLETE: CPT | Performed by: NUCLEAR MEDICINE

## 2024-12-19 PROCEDURE — 2580000003 HC RX 258: Performed by: STUDENT IN AN ORGANIZED HEALTH CARE EDUCATION/TRAINING PROGRAM

## 2024-12-19 PROCEDURE — 6370000000 HC RX 637 (ALT 250 FOR IP): Performed by: PHYSICIAN ASSISTANT

## 2024-12-19 PROCEDURE — 92526 ORAL FUNCTION THERAPY: CPT

## 2024-12-19 PROCEDURE — 2060000000 HC ICU INTERMEDIATE R&B

## 2024-12-19 PROCEDURE — 92610 EVALUATE SWALLOWING FUNCTION: CPT

## 2024-12-19 PROCEDURE — 97530 THERAPEUTIC ACTIVITIES: CPT

## 2024-12-19 PROCEDURE — 2580000003 HC RX 258: Performed by: PHYSICIAN ASSISTANT

## 2024-12-19 PROCEDURE — 85025 COMPLETE CBC W/AUTO DIFF WBC: CPT

## 2024-12-19 PROCEDURE — 97166 OT EVAL MOD COMPLEX 45 MIN: CPT

## 2024-12-19 PROCEDURE — 93306 TTE W/DOPPLER COMPLETE: CPT

## 2024-12-19 PROCEDURE — 2500000003 HC RX 250 WO HCPCS: Performed by: PHYSICIAN ASSISTANT

## 2024-12-19 PROCEDURE — 6360000002 HC RX W HCPCS: Performed by: PHYSICIAN ASSISTANT

## 2024-12-19 RX ORDER — LEVOTHYROXINE SODIUM 20 UG/ML
93.75 INJECTION, SOLUTION INTRAVENOUS DAILY
Status: DISCONTINUED | OUTPATIENT
Start: 2024-12-19 | End: 2024-12-20

## 2024-12-19 RX ORDER — SODIUM CHLORIDE 9 MG/ML
INJECTION, SOLUTION INTRAVENOUS CONTINUOUS
Status: DISCONTINUED | OUTPATIENT
Start: 2024-12-19 | End: 2024-12-20

## 2024-12-19 RX ADMIN — SUCRALFATE 1 G: 1 TABLET ORAL at 17:55

## 2024-12-19 RX ADMIN — HYDROCODONE BITARTRATE AND ACETAMINOPHEN 1 TABLET: 5; 325 TABLET ORAL at 12:29

## 2024-12-19 RX ADMIN — BUSPIRONE HYDROCHLORIDE 5 MG: 5 TABLET ORAL at 19:32

## 2024-12-19 RX ADMIN — SODIUM CHLORIDE: 9 INJECTION, SOLUTION INTRAVENOUS at 20:29

## 2024-12-19 RX ADMIN — MEROPENEM 1000 MG: 1 INJECTION INTRAVENOUS at 23:15

## 2024-12-19 RX ADMIN — MEROPENEM 1000 MG: 1 INJECTION INTRAVENOUS at 09:27

## 2024-12-19 RX ADMIN — SODIUM CHLORIDE: 9 INJECTION, SOLUTION INTRAVENOUS at 18:01

## 2024-12-19 RX ADMIN — SODIUM CHLORIDE, PRESERVATIVE FREE 10 ML: 5 INJECTION INTRAVENOUS at 08:23

## 2024-12-19 RX ADMIN — MEROPENEM 1000 MG: 1 INJECTION INTRAVENOUS at 18:00

## 2024-12-19 RX ADMIN — HYDROCODONE BITARTRATE AND ACETAMINOPHEN 1 TABLET: 5; 325 TABLET ORAL at 19:32

## 2024-12-19 RX ADMIN — PANTOPRAZOLE SODIUM 40 MG: 40 TABLET, DELAYED RELEASE ORAL at 17:55

## 2024-12-19 RX ADMIN — SODIUM CHLORIDE, PRESERVATIVE FREE 10 ML: 5 INJECTION INTRAVENOUS at 19:32

## 2024-12-19 RX ADMIN — MEROPENEM 1000 MG: 1 INJECTION INTRAVENOUS at 00:22

## 2024-12-19 RX ADMIN — Medication 3 MG: at 19:32

## 2024-12-19 RX ADMIN — SODIUM CHLORIDE: 9 INJECTION, SOLUTION INTRAVENOUS at 00:24

## 2024-12-19 RX ADMIN — SUCRALFATE 1 G: 1 TABLET ORAL at 19:32

## 2024-12-19 RX ADMIN — ONDANSETRON 4 MG: 2 INJECTION INTRAMUSCULAR; INTRAVENOUS at 12:05

## 2024-12-19 RX ADMIN — LEVOTHYROXINE SODIUM 93.75 MCG: 20 INJECTION, SOLUTION INTRAVENOUS at 08:23

## 2024-12-19 RX ADMIN — PANTOPRAZOLE SODIUM 40 MG: 40 INJECTION, POWDER, FOR SOLUTION INTRAVENOUS at 08:23

## 2024-12-19 RX ADMIN — ENOXAPARIN SODIUM 40 MG: 100 INJECTION SUBCUTANEOUS at 18:54

## 2024-12-19 ASSESSMENT — PAIN SCALES - GENERAL
PAINLEVEL_OUTOF10: 4
PAINLEVEL_OUTOF10: 2

## 2024-12-19 NOTE — CARE COORDINATION
12/19/24, 11:45 AM EST  Discharge Planning Evaluation  Social work consult received, patient from [facility].    Patient/Family preference is to return to [facility], per daughter Candace.    The patient's current payor source at the facility is Medicaid.   Medicare skilled days available: Yes  Medicare does the patient have a three midnight qualifying stay? N/A  Insurance precert:  Yes if skillable. Needs started but not completed to return  Spoke with Vicente at the facility.  Patient bed hold: Yes- Medicaid  Anticipated transport plan: Ambulance if still confused  Patient's Healthcare Decision Maker: Named in Scanned ACP Document- Mile Maria       12/19/24 1040   Service Assessment   Patient Orientation Self   Cognition Short Term Memory Deficit   History Provided By Child/Family   Primary Caregiver Other (Comment)  (Long-term at [facility])   Support Systems Children;Family Members   Patient's Healthcare Decision Maker is: Named in Scanned ACP Document   PCP Verified by CM Yes   Last Visit to PCP Within last 6 months   Prior Functional Level Bathing;Assistance with the following:;Dressing;Toileting;Cooking;Housework;Shopping;Mobility   Current Functional Level Assistance with the following:;Bathing;Dressing;Toileting;Cooking;Housework;Shopping;Mobility   Can patient return to prior living arrangement Yes   Ability to make needs known: Fair   Family able to assist with home care needs: No   Would you like for me to discuss the discharge plan with any other family members/significant others, and if so, who? Yes  (Daughter/POA)   Financial Resources Medicare;Medicaid   Community Resources ECF/Home Care;Transportation   Discharge Planning   Type of Residence Long-Term Care   Living Arrangements Other (Comment)  (Long-term at [facility] w/spouse)   Current Services Prior To Admission Extended Care Facility;Durable Medical Equipment;Transportation   Current DME Prior to Arrival Wheelchair   Potential Assistance

## 2024-12-19 NOTE — CARE COORDINATION
Case Management Assessment Initial Evaluation    Date/Time of Evaluation: 2024 10:13 AM  Assessment Completed by: Johana Marmoeljo RN    If patient is discharged prior to next notation, then this note serves as note for discharge by case management.    Patient Name: Paulina Gunter                   YOB: 1940  Diagnosis: Heart murmur [R01.1]  Urinary tract infection [N39.0]  Altered mental status, unspecified altered mental status type [R41.82]  Urinary tract infection associated with indwelling urethral catheter, initial encounter ([facility]) [T83.511A, N39.0]                   Date / Time: 2024  1:23 PM  Location: [facility]     Patient Admission Status: Inpatient   Readmission Risk Low 0-14, Mod 15-19), High > 20: Readmission Risk Score: 18.8    Current PCP: Jacob Mcrae MD  Health Care Decision Makers:   Primary Decision Maker: ALBINOSORAYA - Child - [phone]    Secondary Decision Maker: Rafael Gunter  Child - [phone]    Additional Case Management Notes: Presented to ED with AMS and hypotension from SNF. Hx of recurrent UTI's and ESBL Klebsiella. Pt has chronic carroll that was exchanged in ED. Admitted to . Echo ordered. Mentation has improved. Passed swallow eval. On room air. Afebrile. NSR. Oriented to self. Follows commands. SLP/PT/OT. Urine +Gram neg bacilli. Telemetry, carroll care. Lovenox, prn norco, IV merrem Q8H, prn zofran, Electrolyte replacement protocols. Received 1L fld bolus x1 yesterday. Blood cultures sent. Na+ 134, NT Pro-bnp 497.2, trop 22 - now 23, alb 3.2, tsh 66.55, Free T4 0.26, wbc 4.5, hgb 11.5.     Procedures: none    Imagin/18 CXR: Minimal blunting of the left costophrenic angle may present minimal pleural  fluid or pleural scarring. No other acute cardiopulmonary findings. Chronic  changes are as described above.   CT Abd/pelvis: 1. Once again there is diastases of the rectus abdominal musculature with a  large widemouth ventral hernia

## 2024-12-19 NOTE — PLAN OF CARE
ABG notable for pCO2 of 61, pH of 7.33.  Patient is not an ideal candidate for BiPAP due to encephalopathy.  Will trial patient on high flow nasal cannula and repeat ABG after this has been initiated.  If respiratory acidosis fails to improve, may need to consider BiPAP. Additionally, patient's TSH noted to be significantly elevated with a low T4.  Discussed with patient's son who is present at bedside.  Per nursing home MAR, her Synthroid was to be self-administered.  He reports she has not been administering herself any medications.  Per her fill history, she has not filled Synthroid since May.  Suspect patient is likely not receiving Synthroid.  Will restart Synthroid with dose to be given this evening.  Encouraged patient's son to discuss with the nursing facility to ensure that the patient begins receiving Synthroid as prescribed.    Electronically signed by Subhash Culver PA-C on 12/18/24 at 9:19 PM EST

## 2024-12-19 NOTE — PLAN OF CARE
Problem: Confusion  Goal: Confusion, delirium, dementia, or psychosis is improved or at baseline  Description: INTERVENTIONS:  1. Assess for possible contributors to thought disturbance, including medications, impaired vision or hearing, underlying metabolic abnormalities, dehydration, psychiatric diagnoses, and notify attending LIP  2. [place] high risk fall precautions, as indicated  3. Provide frequent short contacts to provide reality reorientation, refocusing and direction  4. Decrease environmental stimuli, including noise as appropriate  5. Monitor and intervene to maintain adequate nutrition, hydration, elimination, sleep and activity  6. If unable to ensure safety without constant attention obtain sitter and review sitter guidelines with assigned personnel  7. Initiate Psychosocial CNS and Spiritual Care consult, as indicated  Outcome: Progressing  Flowsheets (Taken 12/19/2024 0258)  Effect of thought disturbance (confusion, delirium, dementia, or psychosis) are managed with adequate functional status:   Assess for contributors to thought disturbance, including medications, impaired vision or hearing, underlying metabolic abnormalities, dehydration, psychiatric diagnoses, notify LIP   [place] high risk fall precautions, as indicated   Provide frequent short contacts to provide reality reorientation, refocusing and direction   Decrease environmental stimuli, including noise as appropriate     Problem: Safety - Adult  Goal: Free from fall injury  Outcome: Progressing  Flowsheets (Taken 12/19/2024 0258)  Free From Fall Injury: Instruct family/caregiver on patient safety     Problem: ABCDS Injury Assessment  Goal: Absence of physical injury  Outcome: Progressing  Flowsheets (Taken 12/19/2024 0258)  Absence of Physical Injury: Implement safety measures based on patient assessment     Problem: Discharge Planning  Goal: Discharge to home or other facility with appropriate resources  Outcome:

## 2024-12-19 NOTE — PLAN OF CARE
Problem: Discharge Planning  Goal: Discharge to home or other facility with appropriate resources  12/19/2024 1138 by Sofia Taylor LSW  Outcome: Progressing       Consult received. Please see SW note dated 12/19.

## 2024-12-20 PROBLEM — R54 SENILE ASTHENIA: Status: ACTIVE | Noted: 2024-05-17

## 2024-12-20 PROBLEM — Z71.89 GOALS OF CARE, COUNSELING/DISCUSSION: Status: ACTIVE | Noted: 2024-12-20

## 2024-12-20 PROBLEM — F41.9 ANXIETY DISORDER: Status: ACTIVE | Noted: 2024-05-17

## 2024-12-20 PROBLEM — Z51.5 PALLIATIVE CARE PATIENT: Status: ACTIVE | Noted: 2024-12-20

## 2024-12-20 PROBLEM — J44.9 CHRONIC OBSTRUCTIVE PULMONARY DISEASE (HCC): Status: ACTIVE | Noted: 2023-10-04

## 2024-12-20 PROBLEM — F32.A DEPRESSIVE DISORDER: Status: ACTIVE | Noted: 2023-08-29

## 2024-12-20 PROBLEM — G93.40 ACUTE ENCEPHALOPATHY: Status: ACTIVE | Noted: 2024-12-20

## 2024-12-20 LAB
ANION GAP SERPL CALC-SCNC: 10 MEQ/L (ref 8–16)
BUN SERPL-MCNC: 10 MG/DL (ref 7–22)
CALCIUM SERPL-MCNC: 8 MG/DL (ref 8.5–10.5)
CHLORIDE SERPL-SCNC: 94 MEQ/L (ref 98–111)
CO2 SERPL-SCNC: 27 MEQ/L (ref 23–33)
CREAT SERPL-MCNC: 0.4 MG/DL (ref 0.4–1.2)
DEPRECATED RDW RBC AUTO: 47.9 FL (ref 35–45)
ERYTHROCYTE [DISTWIDTH] IN BLOOD BY AUTOMATED COUNT: 13.8 % (ref 11.5–14.5)
GFR SERPL CREATININE-BSD FRML MDRD: > 90 ML/MIN/1.73M2
GLUCOSE SERPL-MCNC: 95 MG/DL (ref 70–108)
HCT VFR BLD AUTO: 36.2 % (ref 37–47)
HGB BLD-MCNC: 11.6 GM/DL (ref 12–16)
MCH RBC QN AUTO: 30.4 PG (ref 26–33)
MCHC RBC AUTO-ENTMCNC: 32 GM/DL (ref 32.2–35.5)
MCV RBC AUTO: 94.8 FL (ref 81–99)
PLATELET # BLD AUTO: 148 THOU/MM3 (ref 130–400)
PMV BLD AUTO: 10.5 FL (ref 9.4–12.4)
POTASSIUM SERPL-SCNC: 4 MEQ/L (ref 3.5–5.2)
RBC # BLD AUTO: 3.82 MILL/MM3 (ref 4.2–5.4)
SODIUM SERPL-SCNC: 131 MEQ/L (ref 135–145)
WBC # BLD AUTO: 3.3 THOU/MM3 (ref 4.8–10.8)

## 2024-12-20 PROCEDURE — 6370000000 HC RX 637 (ALT 250 FOR IP)

## 2024-12-20 PROCEDURE — 2580000003 HC RX 258: Performed by: STUDENT IN AN ORGANIZED HEALTH CARE EDUCATION/TRAINING PROGRAM

## 2024-12-20 PROCEDURE — 2500000003 HC RX 250 WO HCPCS: Performed by: STUDENT IN AN ORGANIZED HEALTH CARE EDUCATION/TRAINING PROGRAM

## 2024-12-20 PROCEDURE — 6360000002 HC RX W HCPCS: Performed by: PHYSICIAN ASSISTANT

## 2024-12-20 PROCEDURE — 97162 PT EVAL MOD COMPLEX 30 MIN: CPT

## 2024-12-20 PROCEDURE — 6370000000 HC RX 637 (ALT 250 FOR IP): Performed by: STUDENT IN AN ORGANIZED HEALTH CARE EDUCATION/TRAINING PROGRAM

## 2024-12-20 PROCEDURE — 6360000002 HC RX W HCPCS: Performed by: STUDENT IN AN ORGANIZED HEALTH CARE EDUCATION/TRAINING PROGRAM

## 2024-12-20 PROCEDURE — 2580000003 HC RX 258: Performed by: PHYSICIAN ASSISTANT

## 2024-12-20 PROCEDURE — 2500000003 HC RX 250 WO HCPCS: Performed by: PHYSICIAN ASSISTANT

## 2024-12-20 PROCEDURE — 92523 SPEECH SOUND LANG COMPREHEN: CPT

## 2024-12-20 PROCEDURE — 2060000000 HC ICU INTERMEDIATE R&B

## 2024-12-20 PROCEDURE — 6370000000 HC RX 637 (ALT 250 FOR IP): Performed by: PHYSICIAN ASSISTANT

## 2024-12-20 PROCEDURE — 99233 SBSQ HOSP IP/OBS HIGH 50: CPT | Performed by: STUDENT IN AN ORGANIZED HEALTH CARE EDUCATION/TRAINING PROGRAM

## 2024-12-20 PROCEDURE — 36415 COLL VENOUS BLD VENIPUNCTURE: CPT

## 2024-12-20 PROCEDURE — 97530 THERAPEUTIC ACTIVITIES: CPT

## 2024-12-20 PROCEDURE — 80048 BASIC METABOLIC PNL TOTAL CA: CPT

## 2024-12-20 PROCEDURE — 99223 1ST HOSP IP/OBS HIGH 75: CPT | Performed by: STUDENT IN AN ORGANIZED HEALTH CARE EDUCATION/TRAINING PROGRAM

## 2024-12-20 PROCEDURE — 6360000002 HC RX W HCPCS

## 2024-12-20 PROCEDURE — 85027 COMPLETE CBC AUTOMATED: CPT

## 2024-12-20 RX ORDER — LORAZEPAM 0.5 MG/1
0.5 TABLET ORAL EVERY 6 HOURS PRN
Qty: 28 TABLET | Refills: 0 | Status: SHIPPED | OUTPATIENT
Start: 2024-12-20 | End: 2024-12-22 | Stop reason: HOSPADM

## 2024-12-20 RX ORDER — LORAZEPAM 2 MG/ML
0.5 INJECTION INTRAMUSCULAR EVERY 4 HOURS PRN
Status: DISCONTINUED | OUTPATIENT
Start: 2024-12-20 | End: 2024-12-22 | Stop reason: HOSPADM

## 2024-12-20 RX ORDER — LORAZEPAM 0.5 MG/1
0.5 TABLET ORAL EVERY 4 HOURS PRN
Status: DISCONTINUED | OUTPATIENT
Start: 2024-12-20 | End: 2024-12-22 | Stop reason: HOSPADM

## 2024-12-20 RX ORDER — SODIUM CHLORIDE, SODIUM LACTATE, POTASSIUM CHLORIDE, CALCIUM CHLORIDE 600; 310; 30; 20 MG/100ML; MG/100ML; MG/100ML; MG/100ML
INJECTION, SOLUTION INTRAVENOUS CONTINUOUS
Status: ACTIVE | OUTPATIENT
Start: 2024-12-20 | End: 2024-12-20

## 2024-12-20 RX ORDER — LEVOTHYROXINE SODIUM 150 UG/1
150 TABLET ORAL DAILY
Status: DISCONTINUED | OUTPATIENT
Start: 2024-12-21 | End: 2024-12-22 | Stop reason: HOSPADM

## 2024-12-20 RX ORDER — MORPHINE SULFATE 2 MG/ML
2 INJECTION, SOLUTION INTRAMUSCULAR; INTRAVENOUS EVERY 4 HOURS PRN
Status: DISCONTINUED | OUTPATIENT
Start: 2024-12-20 | End: 2024-12-22 | Stop reason: HOSPADM

## 2024-12-20 RX ORDER — OXYCODONE HYDROCHLORIDE 5 MG/1
5 TABLET ORAL EVERY 4 HOURS PRN
Status: DISCONTINUED | OUTPATIENT
Start: 2024-12-20 | End: 2024-12-22

## 2024-12-20 RX ORDER — OXYCODONE HYDROCHLORIDE 5 MG/1
5 TABLET ORAL EVERY 4 HOURS PRN
Qty: 42 TABLET | Refills: 0 | Status: SHIPPED | OUTPATIENT
Start: 2024-12-20 | End: 2024-12-22 | Stop reason: HOSPADM

## 2024-12-20 RX ORDER — LORAZEPAM 0.5 MG/1
0.5 TABLET ORAL EVERY 6 HOURS PRN
Status: DISCONTINUED | OUTPATIENT
Start: 2024-12-20 | End: 2024-12-20

## 2024-12-20 RX ADMIN — LORAZEPAM 0.5 MG: 2 INJECTION INTRAMUSCULAR; INTRAVENOUS at 22:14

## 2024-12-20 RX ADMIN — LEVOTHYROXINE SODIUM 93.75 MCG: 20 INJECTION, SOLUTION INTRAVENOUS at 07:49

## 2024-12-20 RX ADMIN — SUCRALFATE 1 G: 1 TABLET ORAL at 19:34

## 2024-12-20 RX ADMIN — SUCRALFATE 1 G: 1 TABLET ORAL at 05:27

## 2024-12-20 RX ADMIN — Medication 3 MG: at 19:34

## 2024-12-20 RX ADMIN — ONDANSETRON 4 MG: 2 INJECTION INTRAMUSCULAR; INTRAVENOUS at 16:59

## 2024-12-20 RX ADMIN — BUSPIRONE HYDROCHLORIDE 5 MG: 5 TABLET ORAL at 19:34

## 2024-12-20 RX ADMIN — BUSPIRONE HYDROCHLORIDE 5 MG: 5 TABLET ORAL at 07:50

## 2024-12-20 RX ADMIN — SUCRALFATE 1 G: 1 TABLET ORAL at 12:27

## 2024-12-20 RX ADMIN — SUCRALFATE 1 G: 1 TABLET ORAL at 16:32

## 2024-12-20 RX ADMIN — PANTOPRAZOLE SODIUM 40 MG: 40 TABLET, DELAYED RELEASE ORAL at 16:26

## 2024-12-20 RX ADMIN — PANTOPRAZOLE SODIUM 40 MG: 40 TABLET, DELAYED RELEASE ORAL at 05:27

## 2024-12-20 RX ADMIN — WATER 2000 MG: 1 INJECTION INTRAMUSCULAR; INTRAVENOUS; SUBCUTANEOUS at 16:27

## 2024-12-20 RX ADMIN — FLUOXETINE HYDROCHLORIDE 40 MG: 20 CAPSULE ORAL at 07:50

## 2024-12-20 RX ADMIN — POLYETHYLENE GLYCOL 3350 17 G: 17 POWDER, FOR SOLUTION ORAL at 07:49

## 2024-12-20 RX ADMIN — HYDROCODONE BITARTRATE AND ACETAMINOPHEN 1 TABLET: 5; 325 TABLET ORAL at 09:25

## 2024-12-20 RX ADMIN — SODIUM CHLORIDE, PRESERVATIVE FREE 10 ML: 5 INJECTION INTRAVENOUS at 19:34

## 2024-12-20 RX ADMIN — OXYCODONE 5 MG: 5 TABLET ORAL at 16:25

## 2024-12-20 RX ADMIN — SODIUM CHLORIDE, PRESERVATIVE FREE 10 ML: 5 INJECTION INTRAVENOUS at 07:51

## 2024-12-20 RX ADMIN — MEROPENEM 1000 MG: 1 INJECTION INTRAVENOUS at 08:00

## 2024-12-20 RX ADMIN — SODIUM CHLORIDE, POTASSIUM CHLORIDE, SODIUM LACTATE AND CALCIUM CHLORIDE: 600; 310; 30; 20 INJECTION, SOLUTION INTRAVENOUS at 12:26

## 2024-12-20 RX ADMIN — ONDANSETRON 4 MG: 2 INJECTION INTRAMUSCULAR; INTRAVENOUS at 09:29

## 2024-12-20 RX ADMIN — LORAZEPAM 0.5 MG: 0.5 TABLET ORAL at 18:20

## 2024-12-20 ASSESSMENT — PAIN SCALES - GENERAL
PAINLEVEL_OUTOF10: 0

## 2024-12-20 NOTE — PALLIATIVE CARE
Initial Evaluation        Patient:   Paulina Gunter  YOB: 1940  Age:  84 y.o.  Room:  [address] [place] [address]-  MRN:  [id number]   Acct: [id number]    Date of Admission:  12/18/2024  1:23 PM  Date of Service:  12/20/2024  Completed By:  Patti Heredia RN        Reason for Palliative Care Evaluation:-   Goals of Care     Current Concerns   pain     Palliative Performance Scale   20%  Bed Bound; Extensive disease; Total care; intake minimal; Drowsy/coma     Goals of Care Discussions and Plan         Advance Care Planning   Goals of Care/Advance Care Planning (ACP) Conversation    Date of Conversation: 12/20/24    Individuals present for the conversation: Patient, Son Rafael, and Daughter Candace     ACP documents on file prior to discussion:  -Power of  for Healthcare    Healthcare Power of /Healthcare Surrogate Decision Makers:  Advance Care Planning   Healthcare Decision Maker:    Primary Decision Maker: LyricBruce - Spouse - [phone]    Secondary Decision Maker: CANDACE POSADA - Child - [phone]  Marcellus share that patient's  is also at [facility] and has dementia    Conversation Summary: Patient in bed, gripping side rails, facial grimace. Marcellus at bedside. Candace informing this RN of patient's medical history over the last few years. Candace shares that the patient wanted to be a DNRCC a few years ago but \"some doctor talked her out of it\". Educated on DNRCCA vs DNRCC. Family agree that patient would like to proceed with DNRCC. Family shares that patient becomes very fearful when hospitalized. Patient often becomes aggressive, required restraints this admission. Discussed the progression of dementia. Candace shares that the patient has had back pain for years, does not seem controlled. Takes [place] at the nursing home, also has buprenorphine patch. Discussed hospice concepts and philosophies. Family believe that this may align with patient's wishes. Request to

## 2024-12-20 NOTE — DISCHARGE INSTR - COC
Continuity of Care Form    Patient Name: Paulina Gunter   :  1940  MRN:  [id number]    Admit date:  2024  Discharge date:  ***    Code Status Order: Limited   Advance Directives:   Advance Care Flowsheet Documentation             Admitting Physician:  No admitting provider for patient encounter.  PCP: Jacob Mcrae MD    Discharging Nurse: ***  Discharging Hospital Unit/Room#: 4B-11/011-A  Discharging Unit Phone Number: ***    Emergency Contact:   Extended Emergency Contact Information  Primary Emergency Contact: Bruce Gunter  Address: [address] [place] [id number] [facility]  Home Phone: [phone]  Mobile Phone: [phone]  Relation: Spouse  Secondary Emergency Contact: SORAYA POSADA  Address: [place]            [facility]  Home Phone: [phone]  Mobile Phone: [phone]  Relation: Child    Past Surgical History:  Past Surgical History:   Procedure Laterality Date    APPENDECTOMY      BACK SURGERY      CARPAL TUNNEL RELEASE Bilateral     w/cubital tunnel    COLON SURGERY  2016    Procedure: ATTEMPTED DAVINCI XI ROBOTIC ASSISTED SIGMOID COLON RESECTION, ROBOTIC ASSISTED MOBILIZATION OF RECTAL SIGMOID TO SPLENIC FLEXURE, CONVERTED TO OPEN LEFT HEMICOLECTOMY WITH COLOPROCTOCTOMY, SPLENORRHAPHY, RIGID SIGMOIDOSCOPY, LASER EVALUATION OF VASCULARITY WITH ICG; Surgeon: Drew Martines MD; Location: [facility]; Service: General    COLONOSCOPY  2012    CYST REMOVAL      EYE SURGERY  ,     Cataract    FEMUR FRACTURE SURGERY Left 2020    LEFT FEMUR OPEN REDUCTION INTERNAL FIXATION performed by Sudhir Austin MD at [facility] OR    FOOT SURGERY Left     HIATAL HERNIA REPAIR N/A 2021    ROBOTIC EXTENSIVE LYSIS OF ADHESIONS, ROBOTIC REDUCTION OF HIATAL HERNIA WITH GASTROPEXY performed by Lauro Grullon MD at [facility] OR    HYSTERECTOMY (CERVIX STATUS UNKNOWN)      w/bladder suspension    JOINT

## 2024-12-20 NOTE — CONSULTS
results for input(s): \"BNP\" in the last 72 hours.  Glucose:No results for input(s): \"POCGLU\" in the last 72 hours.  HgbA1C: No results for input(s): \"LABA1C\" in the last 72 hours.  INR:   Recent Labs     12/18/24  1330   INR 0.93     Hepatic:   Recent Labs     12/18/24  1330   ALKPHOS 84   ALT 15   AST 32   BILITOT 0.2*   BILIDIR <0.1     Amylase and Lipase:No results for input(s): \"LACTA\", \"AMYLASE\" in the last 72 hours.  Lactic Acid: No results for input(s): \"LACTA\" in the last 72 hours.  Troponin: No results for input(s): \"CKTOTAL\", \"CKMB\", \"TROPONINI\" in the last 72 hours.  BNP: No results for input(s): \"BNP\" in the last 72 hours.  Lipids: No results for input(s): \"CHOL\", \"TRIG\", \"HDL\", \"LDL\" in the last 72 hours.    Invalid input(s): \"LDLCALC\"  ABGs: No results found for: \"PHART\", \"PO2ART\", \"VRF0TAF\", \"ZXZ9AAL\", \"BEART\"    Cultures: Reviewed cultures obtained from urine in setting of chronic Timmons     CXR: Reviewed last chest x-ray obtained      UA:   Recent Labs     12/18/24  1345   PHUR 7.0   COLORU YELLOW   MUCUS THREADS   PROTEINU >= 300*   BLOODU LARGE*   RBCUA > 200   WBCUA > 200   BACTERIA MODERATE   NITRU NEGATIVE   GLUCOSEU NEGATIVE   BILIRUBINUR NEGATIVE   UROBILINOGEN 0.2   KETUA TRACE*         Imaging: I independently interpreted imaging including CT scan of the abdomen and pelvis    Micro:   Lab Results   Component Value Date/Time    BC No growth 24 hours. 12/18/2024 02:23 PM    BC No growth 24 hours. 12/18/2024 02:23 PM     Patient Active Problem List   Diagnosis    Osteoarthritis    Fibromyalgia    Gastroesophageal reflux disease    Hypothyroidism    Patient is Tenriism    H/O abdominal surgery    Normocytic anemia    History of diverticulosis    Chronic low back pain with sciatica    Essential hypertension    Abnormal CT scan, chest    Spinal stenosis of lumbar region without neurogenic claudication    Other idiopathic scoliosis, lumbar region    Debility    Hiatal hernia    
inherent in voice recognition technology.**      Electronically signed by Dr. Walt Berg           Palliative Performance Scale   30%  Bed Bound; Extensive disease; Total care; intake reduced; LOC full/confusion    Assessment and Plan   Paulina Gunter is a 84 y.o. who is admitted to [facility] for Urinary tract infection. Palliative care is consulted for Goals of Care and End Stage Disease.  Patient and son met with the palliative care nurse prior to visit.  They had a goals of care discussion and ultimately the patient's son felt that a DNR CC aligns with her wishes.  They wish to meet with hospice to discuss if this would be the best option for the patient.  She has not had her pain very well-controlled.  She currently is on Norco 5-325 mg every 6 hours for this.  Her son reports that the Norco helps with her pain, however it does not last the full 6 hours.  We will discontinue the Norco and switch her to oxycodone 5 mg every 4 hours as needed for pain in hopes this better controls her pain.  She has also had intermittent agitation and anxiety, this can be worse throughout hospitalizations.  We will order 0.5 mg Ativan every 6 hours as needed for agitation, anxiety or any shortness of breath.  The patient is planned to go back to [facility] on Sunday, I did print these prescriptions and placed them in the blue folder for the patient's discharge.  Hospice is planning to meet with the patient and family prior to discharge.  During the visit the patient was moaning in pain, I did update her primary RN who will get her some pain medication.  The patient was unable to participate in a conversation regarding her symptoms due to cognition.  This is the patient's baseline per her son due to her dementia.  Patient will continue to follow with palliative care in the palliative care clinic on discharge.         Principal Problem:    Urinary tract infection  Active Problems:    Chronic pain

## 2024-12-20 NOTE — CARE COORDINATION
12/20/24, 3:40 PM EST    DISCHARGE PLANNING EVALUATION    This SW did speak with Vicente from [facility], they were able to start precert this morning and can accept over the weekend. [facility] will set up transport for Sunday.       3:57 PM- Transport set up for 10AM on Sunday.

## 2024-12-20 NOTE — PLAN OF CARE
Problem: Confusion  Goal: Confusion, delirium, dementia, or psychosis is improved or at baseline  Description: INTERVENTIONS:  1. Assess for possible contributors to thought disturbance, including medications, impaired vision or hearing, underlying metabolic abnormalities, dehydration, psychiatric diagnoses, and notify attending LIP  2. [place] high risk fall precautions, as indicated  3. Provide frequent short contacts to provide reality reorientation, refocusing and direction  4. Decrease environmental stimuli, including noise as appropriate  5. Monitor and intervene to maintain adequate nutrition, hydration, elimination, sleep and activity  6. If unable to ensure safety without constant attention obtain sitter and review sitter guidelines with assigned personnel  7. Initiate Psychosocial CNS and Spiritual Care consult, as indicated  Outcome: Progressing  Flowsheets (Taken 12/19/2024 2000)  Effect of thought disturbance (confusion, delirium, dementia, or psychosis) are managed with adequate functional status: Assess for contributors to thought disturbance, including medications, impaired vision or hearing, underlying metabolic abnormalities, dehydration, psychiatric diagnoses, notify LIP     Problem: Safety - Adult  Goal: Free from fall injury  Outcome: Progressing  Flowsheets (Taken 12/19/2024 2000)  Free From Fall Injury:   Instruct family/caregiver on patient safety   Based on caregiver fall risk screen, instruct family/caregiver to ask for assistance with transferring infant if caregiver noted to have fall risk factors     Problem: ABCDS Injury Assessment  Goal: Absence of physical injury  Outcome: Progressing  Flowsheets (Taken 12/19/2024 2000)  Absence of Physical Injury: Implement safety measures based on patient assessment     Problem: Discharge Planning  Goal: Discharge to home or other facility with appropriate resources  12/19/2024 2133 by Mane Retana, RN  Outcome: Progressing  Flowsheets (Taken

## 2024-12-20 NOTE — PLAN OF CARE
Problem: Safety - Adult  Goal: Free from fall injury  12/20/2024 0941 by Brenna Grimm, RN  Outcome: Progressing  Flowsheets (Taken 12/20/2024 0941)  Free From Fall Injury: Instruct family/caregiver on patient safety       Problem: Pain  Goal: Verbalizes/displays adequate comfort level or baseline comfort level  12/20/2024 0941 by Brenna Grimm, RN  Outcome: Progressing  Flowsheets (Taken 12/20/2024 0746)  Verbalizes/displays adequate comfort level or baseline comfort level:   Encourage patient to monitor pain and request assistance   Assess pain using appropriate pain scale   Administer analgesics based on type and severity of pain and evaluate response

## 2024-12-21 LAB
ANION GAP SERPL CALC-SCNC: 10 MEQ/L (ref 8–16)
BACTERIA UR CULT: ABNORMAL
BACTERIA UR CULT: ABNORMAL
BUN SERPL-MCNC: 8 MG/DL (ref 7–22)
CALCIUM SERPL-MCNC: 7.9 MG/DL (ref 8.5–10.5)
CHLORIDE SERPL-SCNC: 96 MEQ/L (ref 98–111)
CO2 SERPL-SCNC: 28 MEQ/L (ref 23–33)
CREAT SERPL-MCNC: 0.5 MG/DL (ref 0.4–1.2)
DEPRECATED RDW RBC AUTO: 45.6 FL (ref 35–45)
ERYTHROCYTE [DISTWIDTH] IN BLOOD BY AUTOMATED COUNT: 13.5 % (ref 11.5–14.5)
GFR SERPL CREATININE-BSD FRML MDRD: > 90 ML/MIN/1.73M2
GLUCOSE SERPL-MCNC: 91 MG/DL (ref 70–108)
HCT VFR BLD AUTO: 36 % (ref 37–47)
HGB BLD-MCNC: 12.3 GM/DL (ref 12–16)
MCH RBC QN AUTO: 31.5 PG (ref 26–33)
MCHC RBC AUTO-ENTMCNC: 34.2 GM/DL (ref 32.2–35.5)
MCV RBC AUTO: 92.3 FL (ref 81–99)
ORGANISM: ABNORMAL
ORGANISM: ABNORMAL
PLATELET # BLD AUTO: 107 THOU/MM3 (ref 130–400)
PMV BLD AUTO: 11.4 FL (ref 9.4–12.4)
POTASSIUM SERPL-SCNC: 4 MEQ/L (ref 3.5–5.2)
RBC # BLD AUTO: 3.9 MILL/MM3 (ref 4.2–5.4)
SODIUM SERPL-SCNC: 134 MEQ/L (ref 135–145)
WBC # BLD AUTO: 5.9 THOU/MM3 (ref 4.8–10.8)

## 2024-12-21 PROCEDURE — 6370000000 HC RX 637 (ALT 250 FOR IP): Performed by: STUDENT IN AN ORGANIZED HEALTH CARE EDUCATION/TRAINING PROGRAM

## 2024-12-21 PROCEDURE — 2500000003 HC RX 250 WO HCPCS: Performed by: PHYSICIAN ASSISTANT

## 2024-12-21 PROCEDURE — 6360000002 HC RX W HCPCS: Performed by: STUDENT IN AN ORGANIZED HEALTH CARE EDUCATION/TRAINING PROGRAM

## 2024-12-21 PROCEDURE — 2060000000 HC ICU INTERMEDIATE R&B

## 2024-12-21 PROCEDURE — 80048 BASIC METABOLIC PNL TOTAL CA: CPT

## 2024-12-21 PROCEDURE — 99232 SBSQ HOSP IP/OBS MODERATE 35: CPT | Performed by: STUDENT IN AN ORGANIZED HEALTH CARE EDUCATION/TRAINING PROGRAM

## 2024-12-21 PROCEDURE — 2500000003 HC RX 250 WO HCPCS: Performed by: STUDENT IN AN ORGANIZED HEALTH CARE EDUCATION/TRAINING PROGRAM

## 2024-12-21 PROCEDURE — 6370000000 HC RX 637 (ALT 250 FOR IP): Performed by: PHYSICIAN ASSISTANT

## 2024-12-21 PROCEDURE — 85027 COMPLETE CBC AUTOMATED: CPT

## 2024-12-21 PROCEDURE — 99231 SBSQ HOSP IP/OBS SF/LOW 25: CPT | Performed by: STUDENT IN AN ORGANIZED HEALTH CARE EDUCATION/TRAINING PROGRAM

## 2024-12-21 PROCEDURE — 36415 COLL VENOUS BLD VENIPUNCTURE: CPT

## 2024-12-21 RX ADMIN — PANTOPRAZOLE SODIUM 40 MG: 40 TABLET, DELAYED RELEASE ORAL at 05:28

## 2024-12-21 RX ADMIN — BUSPIRONE HYDROCHLORIDE 5 MG: 5 TABLET ORAL at 07:48

## 2024-12-21 RX ADMIN — LEVOTHYROXINE SODIUM 150 MCG: 0.15 TABLET ORAL at 05:28

## 2024-12-21 RX ADMIN — WATER 2000 MG: 1 INJECTION INTRAMUSCULAR; INTRAVENOUS; SUBCUTANEOUS at 15:33

## 2024-12-21 RX ADMIN — ONDANSETRON 4 MG: 4 TABLET, ORALLY DISINTEGRATING ORAL at 15:08

## 2024-12-21 RX ADMIN — MORPHINE SULFATE 2 MG: 2 INJECTION, SOLUTION INTRAMUSCULAR; INTRAVENOUS at 22:07

## 2024-12-21 RX ADMIN — SODIUM CHLORIDE, PRESERVATIVE FREE 10 ML: 5 INJECTION INTRAVENOUS at 22:07

## 2024-12-21 RX ADMIN — FLUOXETINE HYDROCHLORIDE 40 MG: 20 CAPSULE ORAL at 07:48

## 2024-12-21 RX ADMIN — SUCRALFATE 1 G: 1 TABLET ORAL at 05:28

## 2024-12-21 ASSESSMENT — PAIN SCALES - WONG BAKER: WONGBAKER_NUMERICALRESPONSE: NO HURT

## 2024-12-21 ASSESSMENT — PAIN SCALES - GENERAL
PAINLEVEL_OUTOF10: 0
PAINLEVEL_OUTOF10: 7
PAINLEVEL_OUTOF10: 0

## 2024-12-21 ASSESSMENT — PAIN DESCRIPTION - LOCATION: LOCATION: GENERALIZED

## 2024-12-21 ASSESSMENT — PAIN DESCRIPTION - DESCRIPTORS: DESCRIPTORS: ACHING;DISCOMFORT

## 2024-12-21 NOTE — PLAN OF CARE
Problem: Confusion  Goal: Confusion, delirium, dementia, or psychosis is improved or at baseline  Description: INTERVENTIONS:  1. Assess for possible contributors to thought disturbance, including medications, impaired vision or hearing, underlying metabolic abnormalities, dehydration, psychiatric diagnoses, and notify attending LIP  2. [place] high risk fall precautions, as indicated  3. Provide frequent short contacts to provide reality reorientation, refocusing and direction  4. Decrease environmental stimuli, including noise as appropriate  5. Monitor and intervene to maintain adequate nutrition, hydration, elimination, sleep and activity  6. If unable to ensure safety without constant attention obtain sitter and review sitter guidelines with assigned personnel  7. Initiate Psychosocial CNS and Spiritual Care consult, as indicated  Outcome: Progressing  Flowsheets (Taken 12/20/2024 2000)  Effect of thought disturbance (confusion, delirium, dementia, or psychosis) are managed with adequate functional status: Assess for contributors to thought disturbance, including medications, impaired vision or hearing, underlying metabolic abnormalities, dehydration, psychiatric diagnoses, notify LIP     Problem: Safety - Adult  Goal: Free from fall injury  12/20/2024 2052 by Mane Retana, RN  Outcome: Progressing  Flowsheets (Taken 12/20/2024 1910)  Free From Fall Injury:   Instruct family/caregiver on patient safety   Based on caregiver fall risk screen, instruct family/caregiver to ask for assistance with transferring infant if caregiver noted to have fall risk factors  12/20/2024 0941 by Brenna Grimm, RN  Outcome: Progressing  Flowsheets (Taken 12/20/2024 0941)  Free From Fall Injury: Instruct family/caregiver on patient safety     Problem: ABCDS Injury Assessment  Goal: Absence of physical injury  Outcome: Progressing  Flowsheets (Taken 12/20/2024 1910)  Absence of Physical Injury: Implement safety measures based on

## 2024-12-22 VITALS
HEIGHT: 66 IN | HEART RATE: 92 BPM | WEIGHT: 168.21 LBS | BODY MASS INDEX: 27.03 KG/M2 | RESPIRATION RATE: 16 BRPM | DIASTOLIC BLOOD PRESSURE: 86 MMHG | SYSTOLIC BLOOD PRESSURE: 159 MMHG | OXYGEN SATURATION: 91 % | TEMPERATURE: 97.5 F

## 2024-12-22 PROBLEM — N39.0 URINARY TRACT INFECTION: Status: RESOLVED | Noted: 2024-12-18 | Resolved: 2024-12-22

## 2024-12-22 PROBLEM — T83.511A URINARY TRACT INFECTION ASSOCIATED WITH INDWELLING URETHRAL CATHETER (HCC): Status: ACTIVE | Noted: 2023-09-26

## 2024-12-22 PROCEDURE — 6370000000 HC RX 637 (ALT 250 FOR IP): Performed by: PHYSICIAN ASSISTANT

## 2024-12-22 PROCEDURE — 6370000000 HC RX 637 (ALT 250 FOR IP): Performed by: STUDENT IN AN ORGANIZED HEALTH CARE EDUCATION/TRAINING PROGRAM

## 2024-12-22 PROCEDURE — 99239 HOSP IP/OBS DSCHRG MGMT >30: CPT | Performed by: STUDENT IN AN ORGANIZED HEALTH CARE EDUCATION/TRAINING PROGRAM

## 2024-12-22 PROCEDURE — 99232 SBSQ HOSP IP/OBS MODERATE 35: CPT | Performed by: STUDENT IN AN ORGANIZED HEALTH CARE EDUCATION/TRAINING PROGRAM

## 2024-12-22 PROCEDURE — 6360000002 HC RX W HCPCS: Performed by: STUDENT IN AN ORGANIZED HEALTH CARE EDUCATION/TRAINING PROGRAM

## 2024-12-22 RX ORDER — LORAZEPAM 0.5 MG/1
0.5 TABLET ORAL EVERY 4 HOURS PRN
Qty: 10 TABLET | Refills: 0 | Status: SHIPPED | OUTPATIENT
Start: 2024-12-22 | End: 2024-12-22

## 2024-12-22 RX ORDER — OXYCODONE HYDROCHLORIDE 5 MG/1
5 TABLET ORAL EVERY 4 HOURS PRN
Qty: 10 TABLET | Refills: 0 | Status: SHIPPED | OUTPATIENT
Start: 2024-12-22 | End: 2024-12-22

## 2024-12-22 RX ORDER — OXYCODONE HYDROCHLORIDE 5 MG/1
5 TABLET ORAL EVERY 4 HOURS PRN
Qty: 10 TABLET | Refills: 0 | Status: SHIPPED | OUTPATIENT
Start: 2024-12-22 | End: 2024-12-25

## 2024-12-22 RX ORDER — LORAZEPAM 0.5 MG/1
0.5 TABLET ORAL EVERY 4 HOURS PRN
Qty: 10 TABLET | Refills: 0 | Status: SHIPPED | OUTPATIENT
Start: 2024-12-22 | End: 2025-01-21

## 2024-12-22 RX ORDER — OXYCODONE HYDROCHLORIDE 5 MG/1
10 TABLET ORAL EVERY 4 HOURS PRN
Status: DISCONTINUED | OUTPATIENT
Start: 2024-12-22 | End: 2024-12-22 | Stop reason: HOSPADM

## 2024-12-22 RX ORDER — POLYETHYLENE GLYCOL 3350 17 G/17G
17 POWDER, FOR SOLUTION ORAL DAILY PRN
COMMUNITY
Start: 2024-12-22

## 2024-12-22 RX ORDER — OXYCODONE HYDROCHLORIDE 5 MG/1
5 TABLET ORAL EVERY 4 HOURS PRN
Status: DISCONTINUED | OUTPATIENT
Start: 2024-12-22 | End: 2024-12-22 | Stop reason: HOSPADM

## 2024-12-22 RX ADMIN — LORAZEPAM 0.5 MG: 2 INJECTION INTRAMUSCULAR; INTRAVENOUS at 03:22

## 2024-12-22 RX ADMIN — OXYCODONE 5 MG: 5 TABLET ORAL at 10:09

## 2024-12-22 RX ADMIN — MORPHINE SULFATE 2 MG: 2 INJECTION, SOLUTION INTRAMUSCULAR; INTRAVENOUS at 02:26

## 2024-12-22 RX ADMIN — ONDANSETRON 4 MG: 4 TABLET, ORALLY DISINTEGRATING ORAL at 10:09

## 2024-12-22 ASSESSMENT — PAIN SCALES - GENERAL
PAINLEVEL_OUTOF10: 0

## 2024-12-22 ASSESSMENT — PAIN SCALES - WONG BAKER
WONGBAKER_NUMERICALRESPONSE: NO HURT

## 2024-12-22 ASSESSMENT — PAIN DESCRIPTION - DESCRIPTORS: DESCRIPTORS: ACHING

## 2024-12-22 NOTE — PLAN OF CARE
Problem: Confusion  Goal: Confusion, delirium, dementia, or psychosis is improved or at baseline  Description: INTERVENTIONS:  1. Assess for possible contributors to thought disturbance, including medications, impaired vision or hearing, underlying metabolic abnormalities, dehydration, psychiatric diagnoses, and notify attending LIP  2. [place] high risk fall precautions, as indicated  3. Provide frequent short contacts to provide reality reorientation, refocusing and direction  4. Decrease environmental stimuli, including noise as appropriate  5. Monitor and intervene to maintain adequate nutrition, hydration, elimination, sleep and activity  6. If unable to ensure safety without constant attention obtain sitter and review sitter guidelines with assigned personnel  7. Initiate Psychosocial CNS and Spiritual Care consult, as indicated  Outcome: Progressing  Flowsheets (Taken 12/21/2024 2304)  Effect of thought disturbance (confusion, delirium, dementia, or psychosis) are managed with adequate functional status:   Assess for contributors to thought disturbance, including medications, impaired vision or hearing, underlying metabolic abnormalities, dehydration, psychiatric diagnoses, notify LIP   [place] high risk fall precautions, as indicated   Provide frequent short contacts to provide reality reorientation, refocusing and direction   Decrease environmental stimuli, including noise as appropriate   Monitor and intervene to maintain adequate nutrition, hydration, elimination, sleep and activity   If unable to ensure safety without constant attention obtain sitter and review sitter guidelines with assigned personnel   Initiate Psychosocial Clinical Nurse Specialist and Spiritual Care consult, as indicated     Problem: Safety - Adult  Goal: Free from fall injury  Outcome: Progressing  Flowsheets (Taken 12/21/2024 2304)  Free From Fall Injury:   Instruct family/caregiver on patient safety   Based on caregiver fall

## 2024-12-22 NOTE — PROGRESS NOTES
[facility] Infectious Disease         Progress Note      Paulina Gunter  MEDICAL RECORD NUMBER:  [id number]  AGE: 84 y.o.   GENDER: female  : 1940  EPISODE DATE:  2024      Assessment:     Patient is an 84-year-old female who I am consulted for encephalopathy in the setting of dementia.     This patient has a chronic Timmons in place with polymicrobial urine culture findings.  No evidence of fevers.  No significant flank pain.  Timmons in place, exchanged every 4 weeks.  Given poor oral intake with water and chronic Timmons, there are risk factors for recurrent urinary tract infection.  Imaging does not demonstrate evidence of a ascending infection.  Based on UTI guidelines in altered mental status, this is less likely infectious and more likely urinary colonization.  I would recommend de-escalation to ceftriaxone 2 g daily.  I suspect that hypothyroidism may be more contributory to encephalopathy.     This patient would benefit from increased oral hydration.  Timmons care with exchange every 3 to 4 weeks.  Will continue ceftriaxone with end of therapy .      Currently on ceftriaxone 2 g daily  Aerococcus and Klebsiella from urine culture, more likely colonization in setting of chronic Timmons  End of therapy   Discussed case with hospitalist service      Subjective:     No chief complaint on file.        No acute events overnight.  No complaints or concerns this morning.      Patient Active Problem List   Diagnosis Code    Osteoarthritis M19.90    Fibromyalgia M79.7    Gastroesophageal reflux disease K21.9    Hypothyroidism E03.9    Patient is Anglican [id number]    H/O abdominal surgery Z98.890    Normocytic anemia D64.9    History of diverticulosis Z87.19    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.40, G89.29    Essential hypertension I10    Abnormal CT scan, chest R93.89    Spinal stenosis of lumbar region without neurogenic claudication M48.061    Other idiopathic 
          [facility] Infectious Disease         Progress Note      Paulina Gunter  MEDICAL RECORD NUMBER:  [id number]  AGE: 84 y.o.   GENDER: female  : 1940  EPISODE DATE:  2024      Assessment:     Patient is an 84-year-old female who I am consulted for encephalopathy in the setting of dementia.     This patient has a chronic Timmons in place with polymicrobial urine culture findings.  No evidence of fevers.  No significant flank pain.  Timmons in place, exchanged every 4 weeks.  Given poor oral intake with water and chronic Timmons, there are risk factors for recurrent urinary tract infection.  Imaging does not demonstrate evidence of a ascending infection.  Based on UTI guidelines in altered mental status, this is less likely infectious and more likely urinary colonization.  I would recommend de-escalation to ceftriaxone 2 g daily.  I suspect that hypothyroidism may be more contributory to encephalopathy.     This patient would benefit from increased oral hydration.  Timmons care with exchange every 3 to 4 weeks.  Will continue ceftriaxone with end of therapy .      Currently on ceftriaxone 2 g daily  Aerococcus and Klebsiella from urine culture, more likely colonization in setting of chronic Timmons  End of therapy   Discussed case with hospitalist service      Subjective:     No chief complaint on file.        No acute events overnight.  No complaints or concerns this morning.  CODE STATUS, DNR CC      Patient Active Problem List   Diagnosis Code    Osteoarthritis M19.90    Fibromyalgia M79.7    Gastroesophageal reflux disease K21.9    Hypothyroidism E03.9    Patient is Denominational [id number]    H/O abdominal surgery Z98.890    Normocytic anemia D64.9    History of diverticulosis Z87.19    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.40, G89.29    Essential hypertension I10    Abnormal CT scan, chest R93.89    Spinal stenosis of lumbar region without neurogenic claudication M48.061    
    Hospitalist Progress Note      Patient:  Paulina BERNAL Height 84 y.o. female     : 1940  Unit/Bed:4B-11/011-A  Date of Admission: 2024        ASSESSMENT AND PLAN  84-year-old lady with past medical history of dementia, chronic pain syndrome on opioids, hypertension, GERD, chronic urinary retention with chronic indwelling Timmons, prior UTIs, hypothyroidism who presented with altered mental status and hypotension and was found to have UTI admitted for further evaluation and management    Catheter associated urinary tract infection, present on arrival  History of ESBL Klebsiella noted on urine culture in 2023.  Continue meropenem.  De-escalate as able.  Consider infectious disease consult pending results of culture.  Toxic metabolic encephalopathy, improving  Likely secondary to urinary tract infection with superimposed dementia.  SLP consultation.,  Passed swallow eval  Physical deconditioning  Currently resides in nursing facility.  Unable to ambulate at baseline.  PT/OT consultation.  Chronic pain syndrome  Resume oral opioids as mentation improved and she passed a swallow evaluation  Continue buprenorphine patch.  Systolic murmur  Follow-up TTE  Hypertension  Hold antihypertensives due to soft blood pressures.  Gastroesophageal reflux disease  Continue home regimen  Chronic urinary retention  Timmons catheter in place, changed upon admission  Hypothyroidism  Continue Synthroid.    Cognitive impairment  Patient's son reports progressive cognitive decline with occasional sundowning at baseline.  Delirium prevention      LDA: []CVC / []PICC / []Midline / [x]Timmons / []Drains / []Mediport / []None  Antibiotics: Meropenem  Steroids: N/A  Labs (still needed?): [x]Yes / []No  IVF (still needed?): []Yes / [x]No    Level of care: []Step Down / [x]Med-Surg  Bed Status: [x]Inpatient / []Observation  Telemetry: []Yes / [x]No  PT/OT: [x]Yes / []No    DVT Prophylaxis: [x] Lovenox / [] Heparin / [] SCDs / [] 
    Hospitalist Progress Note      Patient:  Paulina BERNAL Height 84 y.o. female     : 1940  Unit/Bed:4B-11/011-A  Date of Admission: 2024        ASSESSMENT AND PLAN  84-year-old lady with past medical history of dementia, chronic pain syndrome on opioids, hypertension, GERD, chronic urinary retention with chronic indwelling Timmons, prior UTIs, hypothyroidism who presented with altered mental status and hypotension and was found to have UTI admitted for further evaluation and management    Catheter associated urinary tract infection, present on arrival  History of ESBL Klebsiella noted on urine culture in 2023.  Urine culture with polymicrobial growth including Klebsiella pneumonia and gram-positive cocci further speciation to follow  Initially on meropenem.  But after consulted with ID plan to de-escalate to ceftriaxone and monitor as it might be colonization, plan to continue abx till    .  Toxic metabolic encephalopathy, improving  Likely secondary to urinary tract infection with superimposed dementia.  SLP consultation.,  Passed swallow eval    Physical deconditioning  Currently resides in nursing facility.  Unable to ambulate at baseline.  PT/OT consultation.  But doubt ongoing benefit  Chronic pain syndrome  Resume oral opioids as mentation improved and she passed  swallow evaluation  Continue buprenorphine patch.    Systolic murmur   TTE with no major pathology aside from age-related degenerative changes     Hypertension  Hold antihypertensives due to soft blood pressures.    Gastroesophageal reflux disease  Continue home regimen    Chronic urinary retention  Timmons catheter in place, changed upon admission    Hypothyroidism  TSH is elevated with decreased free T4 which indicate undertreated hypothyroidism per patient family they think that she has not been taking her medication because she cannot manage them  Increase Synthroid.  To 150 pantera and will need repeat labs in couple of 
[facility]  SPEECH THERAPY  STRZ CVICU 4B  Speech - Language - Cognitive Evaluation    Discharge Recommendations: SNF    SLP Individual Minutes  Time In: 1121  Time Out: 1133  Minutes: 12  Timed Code Treatment Minutes: 0 Minutes       Date: 2024  Patient Name: Paulina Gunter      CSN: [id number]   : 1940  (84 y.o.)  Gender: female   Referring Physician:  Subhash Culver PA-C   Diagnosis: Urinary tract infection  Precautions: fall risk, aspiration   History of Present Illness/Injury: : Paulina Gunter is a 84 y.o. female with a history of chronic pain syndrome, hypertension, gastroesophageal reflux disease, chronic urinary retention with chronic indwelling Timmons catheter, hypothyroidism, GERD, and cognitive impairment who presented to [facility] with chief complaint of altered mental status and hypotension.  History is provided by the patient's son who is present at bedside as the patient is encephalopathic.  The patient currently lives at the nursing facility.  She is unable to ambulate at baseline and her son report some cognitive decline with intermittent sundowning.  Apparently, today, the patient was less responsive than normal.  She was noted to have a low blood pressure.  Her symptoms briefly improved, but returned, so she was sent to the hospital for further evaluation.  Emergency department, she is found to have findings consistent with a urinary tract infection.  She does have a chronic Timmons catheter and has recurrent urinary tract infections.  Her son reports they typically caused her to have altered mental status.  At this time, the patient is somnolent but will arouse to local stimuli.  She is not able to provide a history or review of systems.  The patient's son denies any history of COPD or congestive heart failure.  At baseline, the patient is able to have a normal conversation and is typically oriented   Past Medical History:   Diagnosis Date    Abnormal EKG     inferior 
Arranged transport with [facility] transport.  time at 12:00pm today. Daughter and son at bedside made aware of the transport time.   
[facility]  SPEECH THERAPY  STRZ CVICU 4B  Clinical Swallow Evaluation  +Tx    Discharge Recommendations: SNF  DIET ORDER RECOMMENDATIONS AFTER EVALUATION: Soft and bite sized and thin liquids  Strategies:  Small Bite/Sip, Medications Whole with Thin, Direct 1:1 Supervision, Limit Distractions, Monitor for Fatigue, and Straws for liquids      SLP Individual Minutes  Time In: 1118  Time Out: 1139  Minutes: 21  Timed Code Treatment Minutes: 0 Minutes       Date: 2024  Patient Name: Paulina Gunter      CSN: [id number]   : 1940  (84 y.o.)  Gender: female   Referring Physician:  Subhash Culver PA-C     Diagnosis: Urinary tract infection    History of Present Illness/Injury: Paulina Gunter is a 84 y.o. female with a history of chronic pain syndrome, hypertension, gastroesophageal reflux disease, chronic urinary retention with chronic indwelling Timmons catheter, hypothyroidism, GERD, and cognitive impairment who presented to [facility] with chief complaint of altered mental status and hypotension.  History is provided by the patient's son who is present at bedside as the patient is encephalopathic.  The patient currently lives at the nursing facility.  She is unable to ambulate at baseline and her son report some cognitive decline with intermittent sundowning.  Apparently, today, the patient was less responsive than normal.  She was noted to have a low blood pressure.  Her symptoms briefly improved, but returned, so she was sent to the hospital for further evaluation.  Emergency department, she is found to have findings consistent with a urinary tract infection.  She does have a chronic Timmons catheter and has recurrent urinary tract infections.  Her son reports they typically caused her to have altered mental status.  At this time, the patient is somnolent but will arouse to local stimuli.  She is not able to provide a history or review of systems.  The patient's son denies any history of COPD 
Nursing report given to Gena from [facility] at [phone]  
Pharmacy Medication History Note      List of current medications patient is taking is complete.    Source of information: MAR    Changes made to medication list:  Medications removed (include reason, ex. therapy complete or physician discontinued):  Acetaminophen - not taking  Calcium + Vitamin D  Calcium carbonate - dose change  Miralax    Medications added/doses adjusted:  Changed fluticasone from 1 spray daily PRN to 1 spray each nostril daily  Changed calcium carbonate from 500 mg to 600 mg daily  Added buprenorphine 10 mcg/hr 1 patch weekly  Changed melatonin from 3 mg HS PRN to 5 mg HS  Added senna 8.6 mg daily   Added buspirone 5 mg BID  Changed pregabalin from 150 mg to 200 mg BID  Changed sodium chloride from 2 grams to 1 gram BID  Changed diclofenac 1% gel from four times daily to BID PRN  Changed guaifenesin 600 mg from BID to BID PRN    Other notes (ex. Recent course of antibiotics, Coumadin dosing):  Denies use of other OTC or herbal medications.      Allergies reviewed      Electronically signed by Magnolia Travis RPH on 12/18/2024 at 7:38 PM                                                    
Pt arrived to  5K9 via stretcher  from ED.  Complaints: hypotension and AMS.    With no IV fluid infusing. With G.18 IV cannula on left antecubital vein patent and intact.  IV site free of s/s of infection or infiltration. Vital signs obtained. Assessment and data collection initiated. Oriented to room. Policies and procedures for 5K explained to patient's son. Admission questions answered partially answered by her daughter Jacquie through phone. with no further questions at this time. Fall prevention and safety precautions implemented  Bed alarm on. Call light in reach    Explained patients right to have family, representative or physician notified of their admission.  Patient is not oriented. Son at bed side and  has Declined for physician to be notified.  P  
Pt's family declined.    12/19/24 1444   Encounter Summary   Encounter Overview/Reason Initial Encounter   Service Provided For Patient and family together   Referral/Consult From [facility]   Support System Children   Last Encounter  12/19/24   Complexity of Encounter Low   Begin Time 0815   End Time  0821   Total Time Calculated 6 min   Spiritual/Emotional needs   Type Spiritual Support   Assessment/Intervention/Outcome   Outcome Refused/Declined       
RCP spoke with bedside nurse about transfer order to stepdown unit since patient is on HFNC. Patient placed on at 2130 and will obtain repeat ABG per order @ 2330  
Report given to Nurse Martha. Transferred patient safely to [facility].   
[facility]  INPATIENT PHYSICAL THERAPY  EVALUATION  [facility] CVICU 4B - 4B-11/011-A    Discharge Recommendations: Subacute/Skilled Nursing Facility  Equipment Recommendations: No               Time In: 0814  Time Out: 0836  Timed Code Treatment Minutes: 13 Minutes  Minutes: 22          Date: 2024  Patient Name: Paulina Gunter,  Gender:  female        MRN: [id number]  : 1940  (84 y.o.)      Referring Practitioner: Subhash Culver PA-C  Diagnosis: Urinary tract infection  Additional Pertinent Hx: 84 y.o. female with a history of chronic pain syndrome, hypertension, gastroesophageal reflux disease, chronic urinary retention with chronic indwelling Timmons catheter, hypothyroidism, GERD, and cognitive impairment who presented to [facility] with chief complaint of altered mental status and hypotension.  History is provided by the patient's son who is present at bedside as the patient is encephalopathic.  The patient currently lives at the nursing facility.  She is unable to ambulate at baseline and her son report some cognitive decline with intermittent sundowning. Emergency department, she is found to have findings consistent with a urinary tract infection.  She does have a chronic Timmons catheter and has recurrent urinary tract infections.     Restrictions/Precautions:  Restrictions/Precautions: Fall Risk, Contact Precautions     Subjective:  Chart Reviewed: Yes  Patient assessed for rehabilitation services?: Yes  Subjective: RN approved session. Pt pleasant and agreeable to therapy    General:  Overall Orientation Status: Within Functional Limits (with cues)  Vision: Within Functional Limits  Hearing: Within functional limits       Pain: 0/10: denies pain     Vitals:  on room air upon entry, PO2 assessed and at 86%, 1L placed and RT present , PO2 returned to 94%     Social/Functional History:    Lives With: Other (Comment) ([facility])  Type of Home: Facility             Prior Level of Assist for 
(ROCEPHIN) IV  2,000 mg IntraVENous Q24H    levothyroxine  150 mcg Oral Daily    sodium chloride flush  5-40 mL IntraVENous 2 times per day    enoxaparin  40 mg SubCUTAneous Q24H    [Held by provider] amLODIPine  10 mg Oral Daily    [Held by provider] enalapril  10 mg Oral Daily    FLUoxetine  40 mg Oral Daily    [Held by provider] metoprolol succinate  50 mg Oral Daily    pantoprazole  40 mg Oral BID AC    sucralfate  1 g Oral 4x Daily AC & HS    busPIRone  5 mg Oral BID    [START ON 12/23/2024] buprenorphine  1 patch TransDERmal Weekly    PRN Meds: oxyCODONE, LORazepam, LORazepam, morphine, sodium chloride flush, sodium chloride, potassium chloride **OR** potassium alternative oral replacement **OR** potassium chloride, magnesium sulfate, ondansetron **OR** ondansetron, polyethylene glycol, acetaminophen **OR** acetaminophen, melatonin    Exam:  BP (!) 170/84   Pulse 85   Temp 98 °F (36.7 °C) (Oral)   Resp 18   Ht 1.676 m (5' 6\")   Wt 76.3 kg (168 lb 3.4 oz)   SpO2 95%   BMI 27.15 kg/m²         Labs/Radiology: See chart or assessment above.       Parts of this note may have been dictated by use of voice recognition software and electronically transcribed. The note may contain errors not detected in proofreading     Electronically signed by Liliana Loja MD on 12/21/2024 at 7:40 AM   
performance.  Short Term Goal 2: Patient will tolerate sitting EOB for 15 min with SBA to increase participation in ADLs.  Short Term Goal 3: OTR to advance goals for functional t/fs as appropriate.    AM-PAC Inpatient Daily Activity Raw Score: 9  AM-PAC Inpatient ADL T-Scale Score : 25.33    Following session, patient left in safe position with all fall risk precautions in place.

## 2024-12-22 NOTE — DISCHARGE SUMMARY
provider to review them with you.                ASK your doctor about these medications      amLODIPine 10 MG tablet  Commonly known as: NORVASC  Take 1 tablet by mouth daily     buprenorphine 10 MCG/HR Ptwk  Commonly known as: BUPRENEX     busPIRone 5 MG tablet  Commonly known as: BUSPAR     calcium carbonate 600 MG Tabs tablet  Ask about: Which instructions should I use?     cetirizine 5 MG tablet  Commonly known as: ZYRTEC  Take 1 tablet by mouth daily     D-Mannose Powd     diclofenac sodium 1 % Gel  Commonly known as: VOLTAREN  Apply 2 g topically 4 times daily     docusate sodium 100 MG capsule  Commonly known as: COLACE  Take 1 capsule by mouth 2 times daily as needed for Constipation     enalapril 10 MG tablet  Commonly known as: VASOTEC  Take 1 tablet by mouth daily     ferrous sulfate 325 (65 Fe) MG tablet  Commonly known as: IRON 325  Take 1 tablet by mouth 2 times daily     FLUoxetine 20 MG capsule  Commonly known as: PROZAC  Take 2 capsules by mouth daily     fluticasone 50 MCG/ACT nasal spray  Commonly known as: FLONASE  2 sprays by Nasal route as needed for Rhinitis or Allergies (ear fullness)     guaiFENesin 600 MG extended release tablet  Commonly known as: MUCINEX  Take 1 tablet by mouth 2 times daily     HYDROcodone-acetaminophen 5-325 MG per tablet  Commonly known as: NORCO     levothyroxine 125 MCG tablet  Commonly known as: SYNTHROID  Take 1 tablet by mouth daily     meclizine 25 MG tablet  Commonly known as: ANTIVERT     melatonin 3 MG Tabs tablet  Take 1 tablet by mouth nightly as needed (sleep)     methenamine 1 g tablet  Commonly known as: Hiprex  Take 1 tablet by mouth 2 times daily (with meals)     metoprolol succinate 50 MG extended release tablet  Commonly known as: TOPROL XL  Take 1 tablet by mouth daily     OMEGA 3-6-9 COMPLEX PO     ondansetron 4 MG disintegrating tablet  Commonly known as: ZOFRAN-ODT  Take 1 tablet by mouth every 8 hours as needed for Nausea or Vomiting

## 2024-12-23 LAB
BACTERIA BLD AEROBE CULT: NORMAL
BACTERIA BLD AEROBE CULT: NORMAL

## 2024-12-23 NOTE — CARE COORDINATION
12/23/24, 7:14 AM EST    Patient goals/plan/ treatment preferences discussed by  and .  Patient goals/plan/ treatment preferences reviewed with patient/ family.  Patient/ family verbalize understanding of discharge plan and are in agreement with goal/plan/treatment preferences.  Understanding was demonstrated using the teach back method.  AVS provided by RN at time of discharge, which includes all necessary medical information pertaining to the patients current course of illness, treatment, post-discharge goals of care, and treatment preferences.     Services At/After Discharge: Long Term Care, Transport, Aide services, In ambulance, and Nursing service- [facility]       Elen was discharged yesterday to [facility], where she is a long-term resident. Transport was set up for 10AM on Sunday, by stretcher with DANIEL. Patient did have a precert started through [facility] Medicare but did not need completed before returning Medicaid.

## 2024-12-28 NOTE — PROGRESS NOTES
Hospice Certification of Terminal Illness      84-year-old woman with terminal diagnosis of neurogenic bladder.    She has been having frequent UTIs associated with neurogenic bladder and chronic indwelling carroll catheter, with 3 since October.  The most recent 1 caused increased confusion and hypotension requiring transfer to the hospital.  She is developed multiply drug-resistant urinary tract infections as well as allergies to multiple antibiotics.  She has had 3 hospitalizations in the past year.   Family has opted against returning her back to the hospital as it causes patient confusion and aggression.  Current weight is 157 pounds, decline from prior healthy baseline weight of 190 pounds 2 to 3 years ago.  At time of hospice admission intake was only bites.  PPS is 30%, decline from 50%.  6 months ago she was able to do ADLs while sitting in a chair but has had increased weakness so has moved from assisted living to skilled care and needs an assist in order to pivot transfer.  She is able to feed herself with cueing but is dependent for other ADLs.  Comorbid conditions include cognitive impairment likely dementia, UTIs, resistant organisms causing infection, chronic pain and back pain.  She is a former smoker who quit in 1967 at 27-year-old years old and her creatinine is 0.5.    She has a DNR in place.    I certify this pt has a life expectancy of 6 months or less if the disease follows it's normal expected course.    Millie Fraser MD  Board Certified Hospice and Palliative Medicine  Hospice Medical Director Certified

## 2025-01-07 ENCOUNTER — CARE COORDINATION (OUTPATIENT)
Dept: CASE MANAGEMENT | Age: 85
End: 2025-01-07

## 2025-01-07 NOTE — CARE COORDINATION
Per note from 12/18 hospital stay pt enrolled in hospice.     Per Dorinda pt with [facility]      Etta Richardson, BSN, RN   [facility] Acute Care Coordinator  [phone]

## (undated) DEVICE — SYRINGE MED 10ML LUERLOCK TIP W/O SFTY DISP

## (undated) DEVICE — TROCAR: Brand: KII FIOS FIRST ENTRY

## (undated) DEVICE — 35 ML SYRINGE LUER-LOCK TIP: Brand: MONOJECT

## (undated) DEVICE — BASIC SINGLE BASIN BTC-LF: Brand: MEDLINE INDUSTRIES, INC.

## (undated) DEVICE — 6 ML SYRINGE LUER-LOCK TIP: Brand: MONOJECT

## (undated) DEVICE — 3 ML SYRINGE LUER-LOCK TIP: Brand: MONOJECT

## (undated) DEVICE — NEEDLE HYPO 18GA L1.5IN THN WALL PIVOTING SHLD BVL ORIENTED

## (undated) DEVICE — GLOVE BIOGEL POWDER FREE SZ 8

## (undated) DEVICE — SUTURE VCRL + SZ 0 L27IN ABSRB VLT L26MM UR-6 5/8 CIR VCP603H

## (undated) DEVICE — GENERAL LAPAROSCOPY PACK-LF: Brand: MEDLINE INDUSTRIES, INC.

## (undated) DEVICE — ELECTRODE PT RET AD L9FT HI MOIST COND ADH HYDRGEL CORDED

## (undated) DEVICE — GLOVE ORTHO 7 1/2   MSG9475

## (undated) DEVICE — BANDAGE COMPR M W6INXL10YD WHT BGE VELC E MTRX HK AND LOOP

## (undated) DEVICE — TOWEL,OR,DSP,ST,BLUE,STD,4/PK,20PK/CS: Brand: MEDLINE

## (undated) DEVICE — HYPODERMIC SAFETY NEEDLE: Brand: MAGELLAN

## (undated) DEVICE — TUBE GASTROSTMY 24FR MED GRD SIL FEED GAM RECESS DST TIP

## (undated) DEVICE — PUMP SUC IRR TBNG L10FT W/ HNDPC ASSEMB STRYKEFLOW 2

## (undated) DEVICE — ELECTRO LUBE IS A SINGLE PATIENT USE DEVICE THAT IS INTENDED TO BE USED ON ELECTROSURGICAL ELECTRODES TO REDUCE STICKING.: Brand: KEY SURGICAL ELECTRO LUBE

## (undated) DEVICE — 1840 FOAM BLOCK NEEDLE COUNTER: Brand: DEVON

## (undated) DEVICE — MARKER,SKIN,WI/RULER AND LABELS: Brand: MEDLINE

## (undated) DEVICE — SYRINGE MED 3ML CLR PLAS STD N CTRL LUERLOCK TIP DISP

## (undated) DEVICE — APPLICATOR MEDICATED 3 CC SOLUTION CLR STRL CHLORAPREP

## (undated) DEVICE — Z DISCONTINUED NEEDLE HYPO 18GA L1.5IN THN WALL PIVOTING SHLD BVL ORIENTED

## (undated) DEVICE — Device

## (undated) DEVICE — SPONGE LAP W18XL18IN WHT COT 4 PLY FLD STRUNG RADPQ DISP ST

## (undated) DEVICE — 3.5MM DRILL BIT W/QUICK CONNECT: Brand: PERI-LOC

## (undated) DEVICE — PERI-LOC 4.5MM T25 CORTEX SCREW                                    80MM SELF-TAPPING
Type: IMPLANTABLE DEVICE | Status: NON-FUNCTIONAL
Brand: PERI-LOC
Removed: 2020-12-21

## (undated) DEVICE — SYSTEM SKIN CLSR 22CM DERMBND PRINEO

## (undated) DEVICE — SUTURE VCRL + SZ 3-0 L27IN ABSRB UD L26MM SH 1/2 CIR VCP416H

## (undated) DEVICE — TETRA-FLEX CF WOVEN LATEX FREE ELASTIC BANDAGE 6" X 5.5 YD: Brand: TETRA-FLEX™CF

## (undated) DEVICE — GAUZE SPONGES,USP TYPE VII GAUZE, 12 PLY: Brand: CURITY

## (undated) DEVICE — BIOGUARD A/W CLEANING ADAPTER

## (undated) DEVICE — COLUMN DRAPE

## (undated) DEVICE — SEAL

## (undated) DEVICE — SUTURE VCRL SZ 2-0 L27IN ABSRB UD L26MM SH 1/2 CIR J417H

## (undated) DEVICE — ADAPTER CLEANING PORPOISE CLEANING

## (undated) DEVICE — PERI-LOC TARGETER LG FRAG 3.5MM                                    DRILL BIT W/QC: Brand: PERI-LOC

## (undated) DEVICE — GLOVE SURG SZ 65 THK91MIL LTX FREE SYN POLYISOPRENE

## (undated) DEVICE — FIAPC® PROBE W/ FILTER 3000 A OD 2.3MM/6.9FR; L 3M/9.8FT: Brand: ERBE

## (undated) DEVICE — NEEDLE SPNL 22GA L3.5IN BLK HUB S STL REG WALL FIT STYL W/

## (undated) DEVICE — VESSEL SEALER EXTEND: Brand: ENDOWRIST

## (undated) DEVICE — GOWN,SIRUS,NONRNF,SETINSLV,XL,20/CS: Brand: MEDLINE

## (undated) DEVICE — BLADELESS OBTURATOR: Brand: WECK VISTA

## (undated) DEVICE — INSUFFLATION NEEDLE TO ESTABLISH PNEUMOPERITONEUM.: Brand: INSUFFLATION NEEDLE

## (undated) DEVICE — COUNTER NDL 10 COUNT HLD 20 FOAM BLK SGL MAG

## (undated) DEVICE — SET ADMIN 25ML L117IN PMP MOD CK VLV RLER CLMP 2 SMRTSITE

## (undated) DEVICE — BANDAGE ADH W1XL3IN NAT FAB WVN FLX DURABLE N ADH PD SEAL

## (undated) DEVICE — SUTURE ETHBND EXCEL SZ 0 L30IN NONABSORBABLE GRN L26MM SH X834H

## (undated) DEVICE — TIP COVER ACCESSORY

## (undated) DEVICE — PENCIL SMK EVAC ALL IN 1 DSGN ENH VISIBILITY IMPROVED AIR

## (undated) DEVICE — GOWN,SIRUS,NON REINFRCD,LARGE,SET IN SL: Brand: MEDLINE

## (undated) DEVICE — SOLUTION ANTIFOG VIS SYS CLEARIFY LAPSCP

## (undated) DEVICE — SUTURE ETHBND EXCEL SZ 0 L36IN NONABSORBABLE GRN SH L26MM X524H

## (undated) DEVICE — CANNULA SEAL

## (undated) DEVICE — TUBING INSUFFLATOR HEAT HUMIDIFIED SMK EVAC SET PNEUMOCLEAR

## (undated) DEVICE — BAG,BANDED,W/RUBBERBAND,STERILE,30X36: Brand: MEDLINE

## (undated) DEVICE — SUTURE VCRL SZ 0 L27IN ABSRB UD L36MM CP-1 1/2 CIR REV CUT J267H

## (undated) DEVICE — SOLUTION IV 1000ML 0.45% SOD CHL PH 5 INJ USP VIAFLX PLAS

## (undated) DEVICE — C-ARMOR C-ARM EQUIPMENT COVERS CLEAR STERILE UNIVERSAL FIT 12 PER CASE: Brand: C-ARMOR

## (undated) DEVICE — NEEDLE SPINAL 22GA L3.5IN SPINOCAN

## (undated) DEVICE — ARM DRAPE

## (undated) DEVICE — 2000CC GUARDIAN II: Brand: GUARDIAN

## (undated) DEVICE — GLOVE ORANGE PI 8   MSG9080